# Patient Record
Sex: FEMALE | Race: WHITE | NOT HISPANIC OR LATINO | Employment: OTHER | URBAN - METROPOLITAN AREA
[De-identification: names, ages, dates, MRNs, and addresses within clinical notes are randomized per-mention and may not be internally consistent; named-entity substitution may affect disease eponyms.]

---

## 2017-09-14 ENCOUNTER — TELEPHONE (OUTPATIENT)
Dept: ELECTROPHYSIOLOGY | Facility: CLINIC | Age: 82
End: 2017-09-14

## 2017-09-14 NOTE — TELEPHONE ENCOUNTER
ANIBAL Palomino spoke to pt and is sending a new monitor.    ----- Message from Glenys Sheridan sent at 9/14/2017 11:05 AM CDT -----  Contact: pt  Mrs. Ortiz is calling you back to let you know that she called the number you gave her about her pacemaker not being hooked up and they told her that you have to call them and use ref# [id number].

## 2017-09-14 NOTE — TELEPHONE ENCOUNTER
Contacted patient in relation to patient's remote ICD home monitor as to it is not connecting to [facility] [employer], pt attempted while on the phone, could not get the monitor to connect, pt calling the [facility] help number.

## 2017-09-28 ENCOUNTER — TELEPHONE (OUTPATIENT)
Dept: ELECTROPHYSIOLOGY | Facility: CLINIC | Age: 82
End: 2017-09-28

## 2017-09-28 NOTE — TELEPHONE ENCOUNTER
Contacted patient in relation to patient's remote ICD home monitor as to it is not connecting to [facility], pt connected and sent transmission while on the phone.

## 2017-10-18 ENCOUNTER — TELEPHONE (OUTPATIENT)
Dept: ELECTROPHYSIOLOGY | Facility: CLINIC | Age: 82
End: 2017-10-18

## 2017-11-10 ENCOUNTER — TELEPHONE (OUTPATIENT)
Dept: ELECTROPHYSIOLOGY | Facility: CLINIC | Age: 82
End: 2017-11-10

## 2017-11-10 DIAGNOSIS — I48.91 ATRIAL FIBRILLATION, UNSPECIFIED TYPE: Primary | ICD-10-CM

## 2017-11-10 DIAGNOSIS — Z95.0 CARDIAC PACEMAKER: ICD-10-CM

## 2017-11-14 ENCOUNTER — CLINICAL SUPPORT (OUTPATIENT)
Dept: ELECTROPHYSIOLOGY | Facility: CLINIC | Age: 82
End: 2017-11-14
Payer: MEDICARE

## 2017-11-14 ENCOUNTER — OFFICE VISIT (OUTPATIENT)
Dept: ELECTROPHYSIOLOGY | Facility: CLINIC | Age: 82
End: 2017-11-14
Payer: MEDICARE

## 2017-11-14 ENCOUNTER — HOSPITAL ENCOUNTER (OUTPATIENT)
Dept: CARDIOLOGY | Facility: CLINIC | Age: 82
Discharge: HOME | End: 2017-11-14
Payer: MEDICARE

## 2017-11-14 VITALS
DIASTOLIC BLOOD PRESSURE: 56 MMHG | HEART RATE: 67 BPM | WEIGHT: 134.94 LBS | BODY MASS INDEX: 25.48 KG/M2 | HEIGHT: 61 IN | SYSTOLIC BLOOD PRESSURE: 100 MMHG

## 2017-11-14 DIAGNOSIS — Z79.01 CURRENT USE OF LONG TERM ANTICOAGULATION: ICD-10-CM

## 2017-11-14 DIAGNOSIS — I45.10 RBBB: Primary | ICD-10-CM

## 2017-11-14 DIAGNOSIS — I10 ESSENTIAL HYPERTENSION: ICD-10-CM

## 2017-11-14 DIAGNOSIS — Z95.0 PACEMAKER: ICD-10-CM

## 2017-11-14 DIAGNOSIS — Z79.899 LONG TERM CURRENT USE OF ANTIARRHYTHMIC DRUG: ICD-10-CM

## 2017-11-14 DIAGNOSIS — I48.0 PAROXYSMAL ATRIAL FIBRILLATION: ICD-10-CM

## 2017-11-14 DIAGNOSIS — I48.91 ATRIAL FIBRILLATION, UNSPECIFIED TYPE: ICD-10-CM

## 2017-11-14 DIAGNOSIS — Z95.0 CARDIAC PACEMAKER: ICD-10-CM

## 2017-11-14 DIAGNOSIS — E03.4 HYPOTHYROIDISM DUE TO ACQUIRED ATROPHY OF THYROID: ICD-10-CM

## 2017-11-14 PROCEDURE — 99213 OFFICE O/P EST LOW 20 MIN: CPT | Mod: PBBFAC | Performed by: NURSE PRACTITIONER

## 2017-11-14 PROCEDURE — 93280 PM DEVICE PROGR EVAL DUAL: CPT | Mod: PBBFAC | Performed by: INTERNAL MEDICINE

## 2017-11-14 PROCEDURE — 99999 PR PBB SHADOW E&M-EST. PATIENT-LVL III: CPT | Mod: PBBFAC,,, | Performed by: NURSE PRACTITIONER

## 2017-11-14 PROCEDURE — 99214 OFFICE O/P EST MOD 30 MIN: CPT | Mod: S$PBB,,, | Performed by: NURSE PRACTITIONER

## 2017-11-14 PROCEDURE — 93005 ELECTROCARDIOGRAM TRACING: CPT | Mod: PBBFAC | Performed by: INTERNAL MEDICINE

## 2017-11-14 PROCEDURE — 93010 ELECTROCARDIOGRAM REPORT: CPT | Mod: S$PBB,,, | Performed by: INTERNAL MEDICINE

## 2017-11-14 RX ORDER — DILTIAZEM HYDROCHLORIDE 120 MG/1
120 CAPSULE, COATED, EXTENDED RELEASE ORAL DAILY
Status: ON HOLD | COMMUNITY
Start: 2017-10-18 | End: 2019-05-14

## 2017-11-14 NOTE — PROGRESS NOTES
"Subjective:    Patient ID:  Gisselle Ortiz is a 84 y.o. female who presents for a Pacemaker Check.     Gisselle Ortiz is a patient of Dr. Marlen Corado.    HPI     Ms. Ortiz is an 84 year old female  She initially transferred care from [place] (Dr. Roy) to [facility] [place]   pAF, hx hyperthyroidism s/p resection>> on T4    Echo reportedly w/normal EF.   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   At her last office visit in August of 2016, Ms. Ortiz reported only experiencing rare palpitations; otherwise well>>exercising regularly without difficulty.     Since her last office visit, Ms. Ortzi reports  Occasional SOB, occasional "extra beats." She states that her energy is slightly lower (this is a gradual change over the last few years; she attributes this to age). She remains active with walking and ADLs; both of which she is able to complete without issue.     Recent cardiac studies:  Device Interrogation (11/14/17) reveals an intrinsic SR w/first degree AVB with stable lead and device function. No arrhythmias noted. She paces 34% in the RA and 0% in the RV. Estimated battery longevity 8-9 years.     I reviewed today's ECG which demonstrated NSR w/RBBB at 67 bpm; , , and QTc 479.      Review of Systems   Constitution: Positive for malaise/fatigue. Negative for diaphoresis.   HENT: Negative for nosebleeds.    Eyes: Negative for double vision.   Cardiovascular: Negative for irregular heartbeat, near-syncope, palpitations and syncope.   Respiratory: Positive for shortness of breath (mild, occasional).    Skin: Negative.    Musculoskeletal: Positive for stiffness.   Gastrointestinal: Negative for hematemesis and hematochezia.   Genitourinary: Negative " for hematuria.   Neurological: Negative for dizziness and light-headedness.   Psychiatric/Behavioral: Negative for altered mental status.        Objective:    Physical Exam   Constitutional: She is oriented to person, place, and time. She appears well-developed and well-nourished.   HENT:   Head: Normocephalic and atraumatic.   Eyes: Pupils are equal, round, and reactive to light.   Cardiovascular: Normal rate, regular rhythm and normal heart sounds.    Pulmonary/Chest: Effort normal and breath sounds normal.   Neurological: She is alert and oriented to person, place, and time.   Skin: She is not diaphoretic.   Vitals reviewed.        Assessment:       1. RBBB    2. Pacemaker    3. Paroxysmal atrial fibrillation    4. Long term current use of antiarrhythmic drug    5. Current use of long term anticoagulation    6. Essential hypertension    7. Hypothyroidism due to acquired atrophy of thyroid         Plan:       Ms. Ortiz is doing well from a device perspective with stable lead and device function without arrhythmia noted; rhythm-controlled on propafenone and anticoagulated on Eliquis. She is s/p successful  upgrade.     Continue current medication regimen and device settings.   Follow up in device clinic as scheduled.   Follow up in EP clinic in 1 year, sooner as needed.     Amanda Jacobo, MN, APRN, FNP-C        (A copy of today's note was sent to Dr. Marlen Corado.)

## 2017-12-07 ENCOUNTER — OFFICE VISIT (OUTPATIENT)
Dept: OPTOMETRY | Facility: CLINIC | Age: 82
End: 2017-12-07
Payer: MEDICARE

## 2017-12-07 DIAGNOSIS — I10 ESSENTIAL HYPERTENSION: Primary | ICD-10-CM

## 2017-12-07 DIAGNOSIS — H43.811 POSTERIOR VITREOUS DETACHMENT OF RIGHT EYE: ICD-10-CM

## 2017-12-07 DIAGNOSIS — Z96.1 PSEUDOPHAKIA OF BOTH EYES: ICD-10-CM

## 2017-12-07 PROCEDURE — 92014 COMPRE OPH EXAM EST PT 1/>: CPT | Mod: S$PBB,,, | Performed by: OPTOMETRIST

## 2017-12-07 PROCEDURE — 99212 OFFICE O/P EST SF 10 MIN: CPT | Mod: PBBFAC | Performed by: OPTOMETRIST

## 2017-12-07 PROCEDURE — 99999 PR PBB SHADOW E&M-EST. PATIENT-LVL II: CPT | Mod: PBBFAC,,, | Performed by: OPTOMETRIST

## 2017-12-07 RX ORDER — PANTOPRAZOLE SODIUM 40 MG/1
40 TABLET, DELAYED RELEASE ORAL DAILY PRN
COMMUNITY
Start: 2017-11-13 | End: 2020-07-27

## 2017-12-07 NOTE — PROGRESS NOTES
HPI     Patient's last dilated exam was: 12/6/2016  Pt here for routine eye exam. Does not want to wear glasses full time,   wearing reading glasses only. Patient denies flashes,  pain and double   vision. Floater OD, longstanding and stable.  Not using gtts. No other   ocular complaints blood pressure has been well controlled with medication.         Last edited by Vianey Anderson, PCT on 12/7/2017  9:52 AM.   (History)            Assessment /Plan     For exam results, see Encounter Report.    Essential hypertension    Posterior vitreous detachment of right eye    Pseudophakia of both eyes          1.  No retinopathy--monitor yearly.  BP control.  2.  Longstanding-stable.  Eye health normal OU.  3.  Pt doing well since surgery.  Continue w/ current rx              RTC 1 year for routine exam.

## 2018-02-15 ENCOUNTER — CLINICAL SUPPORT (OUTPATIENT)
Dept: ELECTROPHYSIOLOGY | Facility: CLINIC | Age: 83
End: 2018-02-15
Attending: INTERNAL MEDICINE
Payer: MEDICARE

## 2018-02-15 DIAGNOSIS — Z95.0 CARDIAC PACEMAKER: ICD-10-CM

## 2018-02-15 DIAGNOSIS — I48.91 ATRIAL FIBRILLATION, UNSPECIFIED TYPE: ICD-10-CM

## 2018-02-15 PROCEDURE — 93294 REM INTERROG EVL PM/LDLS PM: CPT | Mod: ,,, | Performed by: INTERNAL MEDICINE

## 2018-02-15 PROCEDURE — 93296 REM INTERROG EVL PM/IDS: CPT | Mod: PBBFAC | Performed by: INTERNAL MEDICINE

## 2018-05-21 ENCOUNTER — CLINICAL SUPPORT (OUTPATIENT)
Dept: ELECTROPHYSIOLOGY | Facility: CLINIC | Age: 83
End: 2018-05-21
Payer: MEDICARE

## 2018-05-21 DIAGNOSIS — Z95.0 CARDIAC PACEMAKER IN SITU: ICD-10-CM

## 2018-05-21 DIAGNOSIS — I48.91 ATRIAL FIBRILLATION, UNSPECIFIED TYPE: ICD-10-CM

## 2018-05-21 PROCEDURE — 93296 REM INTERROG EVL PM/IDS: CPT | Mod: PBBFAC | Performed by: INTERNAL MEDICINE

## 2018-05-21 PROCEDURE — 93294 REM INTERROG EVL PM/LDLS PM: CPT | Mod: ,,, | Performed by: INTERNAL MEDICINE

## 2018-08-20 ENCOUNTER — CLINICAL SUPPORT (OUTPATIENT)
Dept: ELECTROPHYSIOLOGY | Facility: CLINIC | Age: 83
End: 2018-08-20
Payer: MEDICARE

## 2018-08-20 DIAGNOSIS — I48.91 ATRIAL FIBRILLATION, UNSPECIFIED TYPE: ICD-10-CM

## 2018-08-20 DIAGNOSIS — Z95.0 CARDIAC PACEMAKER: ICD-10-CM

## 2018-08-20 PROCEDURE — 93296 REM INTERROG EVL PM/IDS: CPT | Mod: PBBFAC | Performed by: INTERNAL MEDICINE

## 2018-08-20 PROCEDURE — 93294 REM INTERROG EVL PM/LDLS PM: CPT | Mod: ,,, | Performed by: INTERNAL MEDICINE

## 2018-11-15 DIAGNOSIS — I45.10 RBBB: Primary | ICD-10-CM

## 2018-11-29 ENCOUNTER — CLINICAL SUPPORT (OUTPATIENT)
Dept: CARDIOLOGY | Facility: HOSPITAL | Age: 83
End: 2018-11-29
Attending: INTERNAL MEDICINE
Payer: MEDICARE

## 2018-11-29 DIAGNOSIS — Z95.0 CARDIAC PACEMAKER IN SITU: Primary | ICD-10-CM

## 2018-11-29 DIAGNOSIS — I49.5 SSS (SICK SINUS SYNDROME): ICD-10-CM

## 2018-11-29 DIAGNOSIS — Z95.0 CARDIAC PACEMAKER: ICD-10-CM

## 2018-11-29 DIAGNOSIS — Z95.0 CARDIAC PACEMAKER IN SITU: ICD-10-CM

## 2018-11-29 DIAGNOSIS — I48.91 ATRIAL FIBRILLATION, UNSPECIFIED TYPE: ICD-10-CM

## 2018-11-29 PROCEDURE — 93280 PM DEVICE PROGR EVAL DUAL: CPT

## 2018-11-29 PROCEDURE — 93280 PM DEVICE PROGR EVAL DUAL: CPT | Mod: 26,,, | Performed by: INTERNAL MEDICINE

## 2018-12-10 ENCOUNTER — OFFICE VISIT (OUTPATIENT)
Dept: OPTOMETRY | Facility: CLINIC | Age: 83
End: 2018-12-10
Payer: MEDICARE

## 2018-12-10 DIAGNOSIS — H53.15 VISUAL DISTORTIONS OF SHAPE AND SIZE: ICD-10-CM

## 2018-12-10 DIAGNOSIS — M35.01 KERATITIS SICCA, BILATERAL: ICD-10-CM

## 2018-12-10 DIAGNOSIS — Z96.1 PSEUDOPHAKIA OF BOTH EYES: ICD-10-CM

## 2018-12-10 DIAGNOSIS — I10 ESSENTIAL HYPERTENSION: Primary | ICD-10-CM

## 2018-12-10 PROCEDURE — 99999 PR PBB SHADOW E&M-EST. PATIENT-LVL II: CPT | Mod: PBBFAC,,, | Performed by: OPTOMETRIST

## 2018-12-10 PROCEDURE — 92134 CPTRZ OPH DX IMG PST SGM RTA: CPT | Mod: PBBFAC | Performed by: OPTOMETRIST

## 2018-12-10 PROCEDURE — 99212 OFFICE O/P EST SF 10 MIN: CPT | Mod: PBBFAC | Performed by: OPTOMETRIST

## 2018-12-10 PROCEDURE — 92014 COMPRE OPH EXAM EST PT 1/>: CPT | Mod: S$PBB,,, | Performed by: OPTOMETRIST

## 2018-12-10 RX ORDER — LEVOTHYROXINE SODIUM 200 UG/1
175 TABLET ORAL
COMMUNITY
Start: 2018-10-23 | End: 2020-07-27

## 2018-12-10 RX ORDER — MUPIROCIN CALCIUM 20 MG/G
CREAM TOPICAL
Status: ON HOLD | COMMUNITY
Start: 2018-11-06 | End: 2019-05-14 | Stop reason: HOSPADM

## 2018-12-10 NOTE — PROGRESS NOTES
HPI     Last eye exam was 12/7/17 with Dr. Wills.  Patient states trouble seeing tv at home-thinks its time to get distance   glasses. Occasionally sees floaters OU.  Patient denies diplopia, headaches, flashes, and pain.      Last edited by Madonna Tavarez on 12/10/2018  2:00 PM. (History)            Assessment /Plan     For exam results, see Encounter Report.    Essential hypertension    Visual distortions of shape and size   -     OCT- Retina    Keratitis sicca, bilateral    Pseudophakia of both eyes          1.  No retinopathy--monitor yearly.  BP control.  2-3.  Decrease vision OS.  Feel it may be due to dryness.  All other testing normal.  Recommend artificial tears 3x/day and gel drops at night.    3.  No rx given.  Will recheck at next visit.  Retina flat and intact OU--no holes, tears, breaks, or RDs.                  RTC 2 weeks for refraction.

## 2018-12-31 ENCOUNTER — OFFICE VISIT (OUTPATIENT)
Dept: OPTOMETRY | Facility: CLINIC | Age: 83
End: 2018-12-31
Payer: MEDICARE

## 2018-12-31 DIAGNOSIS — Z96.1 PSEUDOPHAKIA OF BOTH EYES: ICD-10-CM

## 2018-12-31 DIAGNOSIS — M35.01 KERATITIS SICCA, BILATERAL: Primary | ICD-10-CM

## 2018-12-31 PROCEDURE — 99999 PR PBB SHADOW E&M-EST. PATIENT-LVL II: CPT | Mod: PBBFAC,,, | Performed by: OPTOMETRIST

## 2018-12-31 PROCEDURE — 99499 UNLISTED E&M SERVICE: CPT | Mod: S$PBB,,, | Performed by: OPTOMETRIST

## 2018-12-31 PROCEDURE — 99212 OFFICE O/P EST SF 10 MIN: CPT | Mod: PBBFAC | Performed by: OPTOMETRIST

## 2018-12-31 NOTE — PROGRESS NOTES
"HPI     Last eye exam: 12/10/2018 w/Dr. Wills  85yr old female present for 2wks MR check. Patient complain of blurry   vision OU when looking at a distance, like everything "smeared". Patient   using Refresh BID-OU, which "feels good" on her eyes. Patient denies eye   pain, headaches and floaters/flashes.     Last edited by Phil Rob MA on 12/31/2018  1:51 PM. (History)            Assessment /Plan     For exam results, see Encounter Report.    Keratitis sicca, bilateral    Pseudophakia of both eyes          1-2.  Improved--continue with artificial tears at least 2x/day OU.  Bifocal rx given.  RTC 1 year for routine exam.                     "

## 2019-01-17 ENCOUNTER — OFFICE VISIT (OUTPATIENT)
Dept: ELECTROPHYSIOLOGY | Facility: CLINIC | Age: 84
End: 2019-01-17
Payer: MEDICARE

## 2019-01-17 ENCOUNTER — HOSPITAL ENCOUNTER (OUTPATIENT)
Dept: CARDIOLOGY | Facility: CLINIC | Age: 84
Discharge: HOME | End: 2019-01-17
Payer: MEDICARE

## 2019-01-17 VITALS
WEIGHT: 132 LBS | BODY MASS INDEX: 23.39 KG/M2 | DIASTOLIC BLOOD PRESSURE: 78 MMHG | HEIGHT: 63 IN | SYSTOLIC BLOOD PRESSURE: 142 MMHG | HEART RATE: 65 BPM

## 2019-01-17 DIAGNOSIS — I48.0 PAROXYSMAL ATRIAL FIBRILLATION: ICD-10-CM

## 2019-01-17 DIAGNOSIS — Z95.0 PACEMAKER: ICD-10-CM

## 2019-01-17 DIAGNOSIS — I49.3 PVC (PREMATURE VENTRICULAR CONTRACTION): ICD-10-CM

## 2019-01-17 DIAGNOSIS — I45.10 RBBB: ICD-10-CM

## 2019-01-17 DIAGNOSIS — Z95.0 PACEMAKER: Primary | ICD-10-CM

## 2019-01-17 DIAGNOSIS — Z79.01 CURRENT USE OF LONG TERM ANTICOAGULATION: ICD-10-CM

## 2019-01-17 DIAGNOSIS — Z79.899 LONG TERM CURRENT USE OF ANTIARRHYTHMIC DRUG: ICD-10-CM

## 2019-01-17 DIAGNOSIS — E78.00 HYPERCHOLESTEROLEMIA: ICD-10-CM

## 2019-01-17 DIAGNOSIS — I10 ESSENTIAL HYPERTENSION: Primary | ICD-10-CM

## 2019-01-17 DIAGNOSIS — I49.5 SSS (SICK SINUS SYNDROME): ICD-10-CM

## 2019-01-17 DIAGNOSIS — I49.9 CARDIAC ARRHYTHMIA, UNSPECIFIED CARDIAC ARRHYTHMIA TYPE: ICD-10-CM

## 2019-01-17 PROCEDURE — 99215 OFFICE O/P EST HI 40 MIN: CPT | Mod: S$PBB,,, | Performed by: INTERNAL MEDICINE

## 2019-01-17 PROCEDURE — 99215 PR OFFICE/OUTPT VISIT, EST, LEVL V, 40-54 MIN: ICD-10-PCS | Mod: S$PBB,,, | Performed by: INTERNAL MEDICINE

## 2019-01-17 PROCEDURE — 93010 RHYTHM STRIP: ICD-10-PCS | Mod: S$PBB,,, | Performed by: INTERNAL MEDICINE

## 2019-01-17 PROCEDURE — 99999 PR PBB SHADOW E&M-EST. PATIENT-LVL III: CPT | Mod: PBBFAC,,, | Performed by: INTERNAL MEDICINE

## 2019-01-17 PROCEDURE — 99999 PR PBB SHADOW E&M-EST. PATIENT-LVL III: ICD-10-PCS | Mod: PBBFAC,,, | Performed by: INTERNAL MEDICINE

## 2019-01-17 PROCEDURE — 93010 ELECTROCARDIOGRAM REPORT: CPT | Mod: S$PBB,,, | Performed by: INTERNAL MEDICINE

## 2019-01-17 PROCEDURE — 93005 ELECTROCARDIOGRAM TRACING: CPT | Mod: PBBFAC | Performed by: INTERNAL MEDICINE

## 2019-01-17 PROCEDURE — 99213 OFFICE O/P EST LOW 20 MIN: CPT | Mod: PBBFAC | Performed by: INTERNAL MEDICINE

## 2019-01-17 NOTE — PROGRESS NOTES
Subjective:   Patient ID:  Gisselle Ortiz is a 85 y.o. female     Chief complaint:Atrial Fibrillation      HPI  Background as recorded in my last note (8/12/16):  84 yo female     First visit with us 11/2015:   transferred care from [place] to us  Was seeing Dr Roy  Has PAF, hx hyperthyroidism now resected and on T4    Has Hx SSS and sp PPM -- [facility]  Has Fam hx CAD, HTN,HLP, is a former smoker  She is on cardizem, atenolol, rythmol, apixaban and spironolactone  Has reportedly normal echo, normal EF  She feels well      [facility] 2014- implanted by Dual chamber device for sinus bradycardia and 1 AVB  1% less AMS burden , longest 16 hours  49% RA paced, RV 93%    ECG shows AV fused / pseudofused pacing    CHADSVASC=IV     Update Aug 2016:   She has been doing well -- no issues that she can relate. Rare palps,  No CO, syncope, PIRES.   She exercises -- does the [employer]! twice a day , she walks a lot -- around   ECG today shows AR pacing with RBBB.     Update since then:   She feels well in general but lately has been having palps and occ L/H with these - generally just skips   I have reviewed the actual image of the ECG tracing obtained today and it shows AR pacing at 60 bpm with frequent MM PVCs likely free wall RVOT and RBBB.   PPM eval shows all OK -- Battery life ~ 12 years-- no AF / AMS, 35% RA paced       Current Outpatient Medications   Medication Sig    apixaban (ELIQUIS) 5 mg Tab Take 5 mg by mouth 2 (two) times daily.    aspirin (ECOTRIN) 81 MG EC tablet Take 81 mg by mouth once daily.    atenolol (TENORMIN) 25 MG tablet     atorvastatin (LIPITOR) 10 MG tablet     diltiaZEM (CARDIZEM CD) 120 MG Cp24     eszopiclone (LUNESTA) 2 MG Tab Take 2 mg by mouth every evening.    levothyroxine (SYNTHROID) 200 MCG tablet     mupirocin calcium 2% (BACTROBAN) 2 % cream     NEXIUM 40 mg capsule     pantoprazole (PROTONIX) 40 MG tablet Take 40 mg by mouth once daily.    propafenone (RHTHYMOL) 150 MG Tab  Take 150 mg by mouth every 8 (eight) hours.    spironolactone (ALDACTONE) 25 MG tablet Take 25 mg by mouth 2 (two) times daily.     No current facility-administered medications for this visit.      Review of Systems   Constitution: Negative for decreased appetite, weakness, weight gain and weight loss.   HENT: Negative for nosebleeds.    Eyes: Negative for blurred vision and visual disturbance.   Cardiovascular: Positive for palpitations. Negative for chest pain, claudication, cyanosis, dyspnea on exertion, irregular heartbeat, leg swelling, near-syncope, orthopnea, paroxysmal nocturnal dyspnea and syncope.   Respiratory: Negative for cough, shortness of breath and wheezing.    Endocrine: Negative for heat intolerance.   Skin: Negative for rash.   Musculoskeletal: Negative for muscle weakness and myalgias.   Gastrointestinal: Negative for abdominal pain, anorexia, melena, nausea and vomiting.   Genitourinary: Negative for menorrhagia.   Neurological: Negative for excessive daytime sleepiness, dizziness, headaches, loss of balance, seizures and vertigo.   Psychiatric/Behavioral: Negative for altered mental status and depression. The patient is not nervous/anxious.        Objective:   Physical Exam   Constitutional: She is oriented to person, place, and time. She appears well-developed and well-nourished.   HENT:   Head: Normocephalic and atraumatic.   Right Ear: External ear normal.   Left Ear: External ear normal.   Eyes: Conjunctivae are normal. Pupils are equal, round, and reactive to light. Left eye exhibits no discharge. No scleral icterus.   Neck: Normal range of motion. Neck supple. No thyromegaly present.   Cardiovascular: Normal rate, regular rhythm, normal heart sounds and intact distal pulses. Exam reveals no gallop, no S3, no S4, no friction rub, no midsystolic click and no opening snap.   No murmur heard.  Pulses:       Carotid pulses are 2+ on the right side, and 2+ on the left side.       Radial  "pulses are 2+ on the right side, and 2+ on the left side.        Dorsalis pedis pulses are 2+ on the right side, and 2+ on the left side.        Posterior tibial pulses are 2+ on the right side, and 2+ on the left side.   occ skipped beats   Pulmonary/Chest: Effort normal and breath sounds normal.   Device pocket is quite thinned out at the top    Abdominal: Soft. She exhibits no distension. There is no hepatomegaly. There is no tenderness. There is no guarding.   Musculoskeletal:        Right ankle: She exhibits no swelling.        Left ankle: She exhibits no swelling.        Right lower leg: She exhibits no swelling.        Left lower leg: She exhibits no swelling.   Neurological: She is alert and oriented to person, place, and time. She has normal strength. No cranial nerve deficit. Gait normal.   Skin: Skin is warm, dry and intact. No rash noted. No cyanosis. Nails show no clubbing.   Psychiatric: She has a normal mood and affect. Her speech is normal and behavior is normal. Thought content normal. Cognition and memory are normal.   Nursing note and vitals reviewed.    BP (!) 142/78   Pulse 65   Ht 5' 3" (1.6 m)   Wt 59.9 kg (132 lb)   BMI 23.38 kg/m²      Assessment:    Thinned out PPM pocket needs revision -- starting to show bluish discoloration at the top edge  1. Essential hypertension    2. RBBB    3. Paroxysmal atrial fibrillation    4. Hypercholesterolemia    5. Pacemaker    6. Long term current use of antiarrhythmic drug    7. Current use of long term anticoagulation    8. SSS (sick sinus syndrome)    9. PVC (premature ventricular contraction)        Plan:    PPM pocket revision-- moderate sedation (she had some issues with breathing during her surgery in [place])   Stop Eliquis for 5 doses pre and 5 doses post  I have discussed the procedure in detail with the patient. I described its benefits and risks. I reviewed alternative therapies and discussed their potential value. The patient was given " ample opportunity to express concerns and ask questions and I provided appropriate responses and  answers to such.The patient understands and agrees to proceed.  Consent form was signed today by patient and myself and appropriately witnessed.     Orders Placed This Encounter   Procedures    Transthoracic echo (TTE) complete (Cupid Only)     Standing Status:   Future     Standing Expiration Date:   1/17/2020     Follow-up post op .  There are no discontinued medications.     This SmartLink is deprecated. Use AVWellpepperEDLIST instead to display the medication list for a patient.

## 2019-01-17 NOTE — PROGRESS NOTES
Post-Procedure Patient Discharge Instructions  Pacemaker/Defibrillator    Wound Care   If you are discharged with a standard dressing over the incision, you may remove the dressing after 24 hours.    If you are discharged with an AQUACEL dressing, you should keep it on until your follow-up appointment in 1-2 weeks.   If there are Steri-strips (strips of tape) over your incision, leave them on until your follow-up appointment. They may begin to fall off on their own, which is normal. If there is Dermabond (clear glue) over your incision, do not scrub it off. It acts as a barrier and will eventually disappear.   You may be discharged with 5 days of oral antibiotics. Please take the full prescription until it is gone.   Do not get the incision wet for 48 hours following the procedure. You may sponge bath during this period, working around the incision. After 48 hours, you may shower, but you should still try to keep this area as dry as possible, and avoid direct water contact to the incision (allow the water to hit back of your shoulder rather than directly on the incision). Gently pat the incision dry if it does get wet.   You may take regular showers after 2 weeks, unless otherwise indicated at your follow-up visit.   Do not submerge the incision in a tub, pool, or body of water for 6 weeks.   Avoid using deodorants, powders, creams, lotions, etc. on your incision for 6 weeks.   If you notice unusual swelling, redness, drainage, have more device site pain, chest pain, shortness of breath, or have a fever greater than 100 degrees, call our device clinic immediately: [phone] or [phone] during normal office hours. You may call [phone] after-hours or on weekends and ask for the electrophysiologist on call.    Activity   If you only had a battery/generator change performed, there are no postoperative activity restrictions.    If this is your first device or if you had new wires added to  your existing device, then you will be discharged in a sling which you should wear continuously for 48 hours. After that, the sling should remain off during the day but should be worn at night for another 2-4 weeks (depending on how active a sleeper you are).   Do not raise your device-side arm above your shoulder for 6 weeks. Do not lift more than 5-10 lbs with your device-side arm for 6 weeks.    If you were driving prior to the procedure, you may resume driving after your first follow-up appointment (1-2 weeks). If you have a history of passing out or a history of certain arrhythmias, there may be driving restrictions unrelated to the procedure. Please clarify with your physician if this is the case.   No heavy activity with the affected arm for 6 weeks (eg. tennis, golf, bowling, aerobics, mowing the lawn, etc.).   Avoid rough contact at the device site for 6 weeks.   You may participate in sexual activity unless otherwise instructed.   You may return to work within 3-5 days unless told otherwise, provided you adhere to the above activity restrictions.    Long-Term Instructions   Keep your pacemaker or defibrillator identification card with you at all times.   If you have a defibrillator and you get shocked by the device: If you receive one shock and you feel ok, you may call [phone] or [phone] during normal office hours. You may call [phone] after-hours. If you receive one shock and you do not feel well, call Emergency Medical Services. They will take you to the nearest emergency room.   If you have a defibrillator and you get more than one shock from the device or multiple shocks in a short period of time: Call Emergency Medical Services. They will take you to the nearest emergency room.   Appliances: You may operate any electrical device in your home, including microwaves.   Security Systems: Electromagnetic security systems can be located in the workplace, courthouses, or  other high-security areas. Exposure to this type of security system has been shown to cause interference in some cases. Interference may be related to the duration of exposure and/or the distance between the device and the security device. You should be aware of the location of security systems, move through them at a normal pace, and avoid leaning or standing too close.   Metal Detectors at Airports: Metal detectors at airports can potentially interfere with pacemakers or defibrillators, although this is unlikely. Metal detectors will likely be triggered by your device and therefore at places such as airports  it will be important for you to carry your identification card for the pacemaker/defibrillator. Airport personnel will likely prefer to do a manual search.   Cellular Phones: It is unlikely that using a cellular phone will interfere with your device. It should be used with the hand opposite to the side where your device was implanted. The phone should not be carried in the shirt pocket on the same side as the device.   Specific Work Conditions: Patients who work near high-voltage lines, transmitting towers, large motors, welding equipment, or powerful magnets should discuss their specific situation with their physician. In general, remain at least two feet from external electrical equipment, verify that the equipment is properly grounded, and wear insulated gloves when using electrical devices. Leave the immediate location if lightheadedness or other symptoms develop.   MRI: Some pacemakers and defibrillators are safe in MRI scanners, while others are not. Please consult with your physician to see if you have an MRI-compatible device.   Surgery: Should you require surgery in the future, some electrosurgical devices can interfere with your device function. You should discuss this with your surgeon before any operation.   Radiation Therapy: If you ever require radiation therapy in the future, care must be  taken to avoid irradiating the device.    Long-Term Follow-Up   Your device has the ability to transmit device information from home to the doctors office using a home monitoring system.   This remote system takes the place of a doctors visit. Your device will be checked from home every 3-6 months. Every 6-12 months, you will be asked to come into the office for an in-office check.   Your device should last in the range of 6-12 years. This depends on many factors including how often it paces the heart.   When the battery is low, a generator change will be performed. This is a same-day procedure with no post-op activity restrictions, unless one of the pacemaker or defibrillator leads needs to be replaced at that time, or a new lead is added to your existing system.

## 2019-01-17 NOTE — PROGRESS NOTES
IMPLANTABLE DEVICE EDUCATION CHECKLIST    PRE PROCEDURE TESTING     1-17-19 @ 12 PM   Pre-procedure labs have been ordered for you at:  [facility]  · Be sure to arrive at your scheduled time. YOU DO NOT HAVE TO FAST FOR THIS LAB WORK!      DAY OF PROCEDURE    1-21-19 @ 10 AM   Report to Cardiology Waiting Room on the 3rd floor of the Hospital  · Wash your chest with HIBICLENS OR AN ANTIBACTERIAL SOAP (such as Dial) on the night before and the morning of your procedure.  · Please do not wear makeup, especially mascara, when arriving for your procedure.  · Do not eat or drink anything after 12 mn on the night before your procedure    Medications  DO NOT TAKE ELIQUIS FOR 5 DOSES PRIOR TO YOUR PROCEDURE. Take your last dose of ELIQUIS on 1-18-19 PM DOSE.    · You may take your other usual morning medications with a sip of water.    Directions to Cardiology Waiting Room  If you park in the Parking Garage:  Take elevators to the 2nd floor  Walk up ramp and turn right by [address] Elevators  Take elevator to the 3rd floor  Upon exiting the elevator, turn away from the clinic areas  Walk long [place] around to front of hospital to area with windows overlooking [facility]  Check in at Reception Desk  OR  If family is dropping you off:  Have them drop you off at the front of the Hospital  (Near the ER, where all the flags are hung).  Take the E elevators to the 3rd floor.  Check in at the Reception Desk in the waiting room.        · YOU WILL BE SPENDING THE NIGHT AFTER YOUR PROCEDURE  · YOU WILL NEED SOMEONE TO DRIVE YOU HOME THE DAY AFTER YOUR PROCEDURE  · YOUR PAIN DURING YOUR PROCEDURE WILL BE MANAGED BY THE ANESTHESIA TEAM    Any need to reschedule or cancel procedures, or any questions regarding your procedures should be addressed directly with the Arrhythmia Department Nurses at the following phone number: [phone]

## 2019-01-17 NOTE — H&P (VIEW-ONLY)
Subjective:   Patient ID:  Gisselle Ortiz is a 85 y.o. female     Chief complaint:Atrial Fibrillation      HPI  Background as recorded in my last note (8/12/16):  82 yo female     First visit with us 11/2015:   transferred care from [place] to us  Was seeing Dr Roy  Has PAF, hx hyperthyroidism now resected and on T4    Has Hx SSS and sp PPM -- [facility]  Has Fam hx CAD, HTN,HLP, is a former smoker  She is on cardizem, atenolol, rythmol, apixaban and spironolactone  Has reportedly normal echo, normal EF  She feels well      [facility] 2014- implanted by Dual chamber device for sinus bradycardia and 1 AVB  1% less AMS burden , longest 16 hours  49% RA paced, RV 93%    ECG shows AV fused / pseudofused pacing    CHADSVASC=IV     Update Aug 2016:   She has been doing well -- no issues that she can relate. Rare palps,  No CO, syncope, PIRES.   She exercises -- does the [employer] twice a day , she walks a lot -- around   ECG today shows AR pacing with RBBB.     Update since then:   She feels well in general but lately has been having palps and occ L/H with these - generally just skips   I have reviewed the actual image of the ECG tracing obtained today and it shows AR pacing at 60 bpm with frequent MM PVCs likely free wall RVOT and RBBB.   PPM eval shows all OK -- Battery life ~ 12 years-- no AF / AMS, 35% RA paced       Current Outpatient Medications   Medication Sig    apixaban (ELIQUIS) 5 mg Tab Take 5 mg by mouth 2 (two) times daily.    aspirin (ECOTRIN) 81 MG EC tablet Take 81 mg by mouth once daily.    atenolol (TENORMIN) 25 MG tablet     atorvastatin (LIPITOR) 10 MG tablet     diltiaZEM (CARDIZEM CD) 120 MG Cp24     eszopiclone (LUNESTA) 2 MG Tab Take 2 mg by mouth every evening.    levothyroxine (SYNTHROID) 200 MCG tablet     mupirocin calcium 2% (BACTROBAN) 2 % cream     NEXIUM 40 mg capsule     pantoprazole (PROTONIX) 40 MG tablet Take 40 mg by mouth once daily.    propafenone (RHTHYMOL) 150 MG Tab  Take 150 mg by mouth every 8 (eight) hours.    spironolactone (ALDACTONE) 25 MG tablet Take 25 mg by mouth 2 (two) times daily.     No current facility-administered medications for this visit.      Review of Systems   Constitution: Negative for decreased appetite, weakness, weight gain and weight loss.   HENT: Negative for nosebleeds.    Eyes: Negative for blurred vision and visual disturbance.   Cardiovascular: Positive for palpitations. Negative for chest pain, claudication, cyanosis, dyspnea on exertion, irregular heartbeat, leg swelling, near-syncope, orthopnea, paroxysmal nocturnal dyspnea and syncope.   Respiratory: Negative for cough, shortness of breath and wheezing.    Endocrine: Negative for heat intolerance.   Skin: Negative for rash.   Musculoskeletal: Negative for muscle weakness and myalgias.   Gastrointestinal: Negative for abdominal pain, anorexia, melena, nausea and vomiting.   Genitourinary: Negative for menorrhagia.   Neurological: Negative for excessive daytime sleepiness, dizziness, headaches, loss of balance, seizures and vertigo.   Psychiatric/Behavioral: Negative for altered mental status and depression. The patient is not nervous/anxious.        Objective:   Physical Exam   Constitutional: She is oriented to person, place, and time. She appears well-developed and well-nourished.   HENT:   Head: Normocephalic and atraumatic.   Right Ear: External ear normal.   Left Ear: External ear normal.   Eyes: Conjunctivae are normal. Pupils are equal, round, and reactive to light. Left eye exhibits no discharge. No scleral icterus.   Neck: Normal range of motion. Neck supple. No thyromegaly present.   Cardiovascular: Normal rate, regular rhythm, normal heart sounds and intact distal pulses. Exam reveals no gallop, no S3, no S4, no friction rub, no midsystolic click and no opening snap.   No murmur heard.  Pulses:       Carotid pulses are 2+ on the right side, and 2+ on the left side.       Radial  "pulses are 2+ on the right side, and 2+ on the left side.        Dorsalis pedis pulses are 2+ on the right side, and 2+ on the left side.        Posterior tibial pulses are 2+ on the right side, and 2+ on the left side.   occ skipped beats   Pulmonary/Chest: Effort normal and breath sounds normal.   Device pocket is quite thinned out at the top    Abdominal: Soft. She exhibits no distension. There is no hepatomegaly. There is no tenderness. There is no guarding.   Musculoskeletal:        Right ankle: She exhibits no swelling.        Left ankle: She exhibits no swelling.        Right lower leg: She exhibits no swelling.        Left lower leg: She exhibits no swelling.   Neurological: She is alert and oriented to person, place, and time. She has normal strength. No cranial nerve deficit. Gait normal.   Skin: Skin is warm, dry and intact. No rash noted. No cyanosis. Nails show no clubbing.   Psychiatric: She has a normal mood and affect. Her speech is normal and behavior is normal. Thought content normal. Cognition and memory are normal.   Nursing note and vitals reviewed.    BP (!) 142/78   Pulse 65   Ht 5' 3" (1.6 m)   Wt 59.9 kg (132 lb)   BMI 23.38 kg/m²      Assessment:    Thinned out PPM pocket needs revision -- starting to show bluish discoloration at the top edge  1. Essential hypertension    2. RBBB    3. Paroxysmal atrial fibrillation    4. Hypercholesterolemia    5. Pacemaker    6. Long term current use of antiarrhythmic drug    7. Current use of long term anticoagulation    8. SSS (sick sinus syndrome)    9. PVC (premature ventricular contraction)        Plan:    PPM pocket revision-- moderate sedation (she had some issues with breathing during her surgery in [place])   Stop Eliquis for 5 doses pre and 5 doses post  I have discussed the procedure in detail with the patient. I described its benefits and risks. I reviewed alternative therapies and discussed their potential value. The patient was given " ample opportunity to express concerns and ask questions and I provided appropriate responses and  answers to such.The patient understands and agrees to proceed.  Consent form was signed today by patient and myself and appropriately witnessed.     Orders Placed This Encounter   Procedures    Transthoracic echo (TTE) complete (Cupid Only)     Standing Status:   Future     Standing Expiration Date:   1/17/2020     Follow-up post op .  There are no discontinued medications.     This SmartLink is deprecated. Use AVZupplerEDLIST instead to display the medication list for a patient.

## 2019-01-18 ENCOUNTER — ANESTHESIA EVENT (OUTPATIENT)
Dept: MEDSURG UNIT | Facility: HOSPITAL | Age: 84
End: 2019-01-18
Payer: MEDICARE

## 2019-01-18 ENCOUNTER — HOSPITAL ENCOUNTER (OUTPATIENT)
Dept: CARDIOLOGY | Facility: CLINIC | Age: 84
Discharge: HOME | End: 2019-01-18
Attending: INTERNAL MEDICINE
Payer: MEDICARE

## 2019-01-18 ENCOUNTER — TELEPHONE (OUTPATIENT)
Dept: ELECTROPHYSIOLOGY | Facility: CLINIC | Age: 84
End: 2019-01-18

## 2019-01-18 VITALS
HEIGHT: 63 IN | HEART RATE: 70 BPM | DIASTOLIC BLOOD PRESSURE: 78 MMHG | WEIGHT: 132 LBS | BODY MASS INDEX: 23.39 KG/M2 | SYSTOLIC BLOOD PRESSURE: 142 MMHG

## 2019-01-18 DIAGNOSIS — I48.0 PAROXYSMAL ATRIAL FIBRILLATION: ICD-10-CM

## 2019-01-18 LAB
ASCENDING AORTA: 3.56 CM
AV INDEX (PROSTH): 0.48
AV MEAN GRADIENT: 5.08 MMHG
AV PEAK GRADIENT: 7.62 MMHG
AV VALVE AREA: 1.76 CM2
BSA FOR ECHO PROCEDURE: 1.63 M2
CV ECHO LV RWT: 0.36 CM
DOP CALC AO PEAK VEL: 1.38 M/S
DOP CALC AO VTI: 34.39 CM
DOP CALC LVOT AREA: 3.63 CM2
DOP CALC LVOT DIAMETER: 2.15 CM
DOP CALC LVOT STROKE VOLUME: 60.38 CM3
DOP CALCLVOT PEAK VEL VTI: 16.64 CM
E WAVE DECELERATION TIME: 150.93 MSEC
E/A RATIO: 1.73
E/E' RATIO: 13.57
ECHO LV POSTERIOR WALL: 0.96 CM (ref 0.6–1.1)
FRACTIONAL SHORTENING: 33 % (ref 28–44)
INTERVENTRICULAR SEPTUM: 1.01 CM (ref 0.6–1.1)
LA MAJOR: 5.21 CM
LA MINOR: 5.14 CM
LA WIDTH: 4.86 CM
LEFT ATRIUM SIZE: 5.8 CM
LEFT ATRIUM VOLUME INDEX: 76.5 ML/M2
LEFT ATRIUM VOLUME: 123.99 CM3
LEFT INTERNAL DIMENSION IN SYSTOLE: 3.54 CM (ref 2.1–4)
LEFT VENTRICLE DIASTOLIC VOLUME INDEX: 83.38 ML/M2
LEFT VENTRICLE DIASTOLIC VOLUME: 135.14 ML
LEFT VENTRICLE MASS INDEX: 121.2 G/M2
LEFT VENTRICLE SYSTOLIC VOLUME INDEX: 32.2 ML/M2
LEFT VENTRICLE SYSTOLIC VOLUME: 52.19 ML
LEFT VENTRICULAR INTERNAL DIMENSION IN DIASTOLE: 5.3 CM (ref 3.5–6)
LEFT VENTRICULAR MASS: 196.42 G
LV LATERAL E/E' RATIO: 10.56
LV SEPTAL E/E' RATIO: 19
MV PEAK A VEL: 0.55 M/S
MV PEAK E VEL: 0.95 M/S
PISA TR MAX VEL: 2.8 M/S
PULM VEIN S/D RATIO: 0.88
PV PEAK D VEL: 0.52 M/S
PV PEAK S VEL: 0.46 M/S
RA MAJOR: 4.27 CM
RA PRESSURE: 3 MMHG
RA WIDTH: 3.62 CM
RIGHT VENTRICULAR END-DIASTOLIC DIMENSION: 4.02 CM
RV TISSUE DOPPLER FREE WALL SYSTOLIC VELOCITY 1 (APICAL 4 CHAMBER VIEW): 11.48 M/S
SINUS: 3.18 CM
STJ: 3.35 CM
TDI LATERAL: 0.09
TDI SEPTAL: 0.05
TDI: 0.07
TR MAX PG: 31.36 MMHG
TRICUSPID ANNULAR PLANE SYSTOLIC EXCURSION: 1.59 CM
TV REST PULMONARY ARTERY PRESSURE: 34 MMHG

## 2019-01-18 PROCEDURE — 93306 TRANSTHORACIC ECHO (TTE) COMPLETE (CUPID ONLY): ICD-10-PCS | Mod: 26,S$PBB,, | Performed by: INTERNAL MEDICINE

## 2019-01-18 PROCEDURE — 93306 TTE W/DOPPLER COMPLETE: CPT | Mod: PBBFAC | Performed by: INTERNAL MEDICINE

## 2019-01-18 NOTE — TELEPHONE ENCOUNTER
Left message for patient to return call to review pre-op instructions for pocket revision on 1/21/19. Advised to hold Eliquis after the evening dose today (1/18/19). She needs to fast after 12mn on Sunday evening and be here for 10am (3rd floor SSCU). Call back # left for confirmation.

## 2019-01-21 ENCOUNTER — TELEPHONE (OUTPATIENT)
Dept: ELECTROPHYSIOLOGY | Facility: CLINIC | Age: 84
End: 2019-01-21

## 2019-01-21 ENCOUNTER — HOSPITAL ENCOUNTER (OUTPATIENT)
Facility: HOSPITAL | Age: 84
Discharge: HOME | End: 2019-01-21
Attending: INTERNAL MEDICINE | Admitting: INTERNAL MEDICINE
Payer: MEDICARE

## 2019-01-21 ENCOUNTER — ANESTHESIA (OUTPATIENT)
Dept: MEDSURG UNIT | Facility: HOSPITAL | Age: 84
End: 2019-01-21
Payer: MEDICARE

## 2019-01-21 VITALS
HEART RATE: 62 BPM | DIASTOLIC BLOOD PRESSURE: 67 MMHG | OXYGEN SATURATION: 96 % | WEIGHT: 132 LBS | SYSTOLIC BLOOD PRESSURE: 142 MMHG | HEIGHT: 63 IN | RESPIRATION RATE: 18 BRPM | TEMPERATURE: 97 F | BODY MASS INDEX: 23.39 KG/M2

## 2019-01-21 DIAGNOSIS — I49.5 SSS (SICK SINUS SYNDROME): ICD-10-CM

## 2019-01-21 DIAGNOSIS — Z95.0 PACEMAKER: Primary | ICD-10-CM

## 2019-01-21 DIAGNOSIS — I49.9 ARRHYTHMIA: ICD-10-CM

## 2019-01-21 PROCEDURE — 99235 HOSP IP/OBS SAME DATE MOD 70: CPT | Mod: 57,,, | Performed by: INTERNAL MEDICINE

## 2019-01-21 PROCEDURE — 37000008 HC ANESTHESIA 1ST 15 MINUTES: Performed by: INTERNAL MEDICINE

## 2019-01-21 PROCEDURE — 33222 RELOCATION POCKET PACEMAKER: CPT | Mod: ,,, | Performed by: INTERNAL MEDICINE

## 2019-01-21 PROCEDURE — 93010 ELECTROCARDIOGRAM REPORT: CPT | Mod: ,,, | Performed by: INTERNAL MEDICINE

## 2019-01-21 PROCEDURE — 25000003 PHARM REV CODE 250: Performed by: INTERNAL MEDICINE

## 2019-01-21 PROCEDURE — 99235 PR OBSERV/HOSP SAME DATE,LEVL IV: ICD-10-PCS | Mod: 57,,, | Performed by: INTERNAL MEDICINE

## 2019-01-21 PROCEDURE — 63600175 PHARM REV CODE 636 W HCPCS: Performed by: INTERNAL MEDICINE

## 2019-01-21 PROCEDURE — 37000009 HC ANESTHESIA EA ADD 15 MINS: Performed by: INTERNAL MEDICINE

## 2019-01-21 PROCEDURE — D9220A PRA ANESTHESIA: ICD-10-PCS | Mod: CRNA,,, | Performed by: NURSE ANESTHETIST, CERTIFIED REGISTERED

## 2019-01-21 PROCEDURE — 93005 ELECTROCARDIOGRAM TRACING: CPT

## 2019-01-21 PROCEDURE — 33222 PR RELOCATION OF SKIN POCKET FOR PACEMAKER: ICD-10-PCS | Mod: ,,, | Performed by: INTERNAL MEDICINE

## 2019-01-21 PROCEDURE — 33222 RELOCATION POCKET PACEMAKER: CPT | Performed by: INTERNAL MEDICINE

## 2019-01-21 PROCEDURE — 63600175 PHARM REV CODE 636 W HCPCS: Performed by: NURSE ANESTHETIST, CERTIFIED REGISTERED

## 2019-01-21 PROCEDURE — D9220A PRA ANESTHESIA: Mod: ANES,,, | Performed by: ANESTHESIOLOGY

## 2019-01-21 PROCEDURE — 25000003 PHARM REV CODE 250: Performed by: NURSE ANESTHETIST, CERTIFIED REGISTERED

## 2019-01-21 PROCEDURE — 93005 ELECTROCARDIOGRAM TRACING: CPT | Mod: 59

## 2019-01-21 PROCEDURE — 27201423 OPTIME MED/SURG SUP & DEVICES STERILE SUPPLY: Performed by: INTERNAL MEDICINE

## 2019-01-21 PROCEDURE — D9220A PRA ANESTHESIA: Mod: CRNA,,, | Performed by: NURSE ANESTHETIST, CERTIFIED REGISTERED

## 2019-01-21 PROCEDURE — D9220A PRA ANESTHESIA: ICD-10-PCS | Mod: ANES,,, | Performed by: ANESTHESIOLOGY

## 2019-01-21 PROCEDURE — 93010 EKG 12-LEAD: ICD-10-PCS | Mod: ,,, | Performed by: INTERNAL MEDICINE

## 2019-01-21 RX ORDER — PROPOFOL 10 MG/ML
VIAL (ML) INTRAVENOUS
Status: DISCONTINUED | OUTPATIENT
Start: 2019-01-21 | End: 2019-01-21

## 2019-01-21 RX ORDER — SODIUM CHLORIDE 0.9 % (FLUSH) 0.9 %
3 SYRINGE (ML) INJECTION
Status: DISCONTINUED | OUTPATIENT
Start: 2019-01-21 | End: 2019-01-21 | Stop reason: HOSPADM

## 2019-01-21 RX ORDER — CEFAZOLIN SODIUM 1 G/3ML
2 INJECTION, POWDER, FOR SOLUTION INTRAMUSCULAR; INTRAVENOUS
Status: DISCONTINUED | OUTPATIENT
Start: 2019-01-21 | End: 2019-01-21

## 2019-01-21 RX ORDER — VANCOMYCIN HCL IN 5 % DEXTROSE 1G/250ML
15 PLASTIC BAG, INJECTION (ML) INTRAVENOUS ONCE
Status: DISCONTINUED | OUTPATIENT
Start: 2019-01-21 | End: 2019-01-21

## 2019-01-21 RX ORDER — CEFAZOLIN SODIUM 1 G/3ML
1 INJECTION, POWDER, FOR SOLUTION INTRAMUSCULAR; INTRAVENOUS
Status: DISCONTINUED | OUTPATIENT
Start: 2019-01-21 | End: 2019-01-21 | Stop reason: HOSPADM

## 2019-01-21 RX ORDER — ACETAMINOPHEN 325 MG/1
325 TABLET ORAL EVERY 4 HOURS PRN
Status: DISCONTINUED | OUTPATIENT
Start: 2019-01-21 | End: 2019-01-21 | Stop reason: HOSPADM

## 2019-01-21 RX ORDER — VANCOMYCIN HCL IN 5 % DEXTROSE 1G/250ML
15 PLASTIC BAG, INJECTION (ML) INTRAVENOUS
Status: DISCONTINUED | OUTPATIENT
Start: 2019-01-21 | End: 2019-01-21 | Stop reason: HOSPADM

## 2019-01-21 RX ORDER — PROPOFOL 10 MG/ML
VIAL (ML) INTRAVENOUS CONTINUOUS PRN
Status: DISCONTINUED | OUTPATIENT
Start: 2019-01-21 | End: 2019-01-21

## 2019-01-21 RX ORDER — LIDOCAINE HYDROCHLORIDE 20 MG/ML
INJECTION, SOLUTION INFILTRATION; PERINEURAL
Status: DISCONTINUED | OUTPATIENT
Start: 2019-01-21 | End: 2019-01-21 | Stop reason: HOSPADM

## 2019-01-21 RX ORDER — SODIUM CHLORIDE 9 MG/ML
INJECTION, SOLUTION INTRAVENOUS CONTINUOUS
Status: DISCONTINUED | OUTPATIENT
Start: 2019-01-21 | End: 2020-06-12

## 2019-01-21 RX ORDER — BUPIVACAINE HYDROCHLORIDE 2.5 MG/ML
INJECTION, SOLUTION EPIDURAL; INFILTRATION; INTRACAUDAL
Status: DISCONTINUED | OUTPATIENT
Start: 2019-01-21 | End: 2019-01-21 | Stop reason: HOSPADM

## 2019-01-21 RX ORDER — CEFADROXIL 500 MG/1
CAPSULE ORAL
Qty: 4 CAPSULE | Refills: 0 | Status: SHIPPED | OUTPATIENT
Start: 2019-01-21 | End: 2019-01-31

## 2019-01-21 RX ADMIN — PROPOFOL 10 MG: 10 INJECTION, EMULSION INTRAVENOUS at 12:01

## 2019-01-21 RX ADMIN — CEFAZOLIN 1 G: 330 INJECTION, POWDER, FOR SOLUTION INTRAMUSCULAR; INTRAVENOUS at 11:01

## 2019-01-21 RX ADMIN — PROPOFOL 50 MCG/KG/MIN: 10 INJECTION, EMULSION INTRAVENOUS at 11:01

## 2019-01-21 RX ADMIN — SODIUM CHLORIDE, SODIUM GLUCONATE, SODIUM ACETATE, POTASSIUM CHLORIDE, MAGNESIUM CHLORIDE, SODIUM PHOSPHATE, DIBASIC, AND POTASSIUM PHOSPHATE: .53; .5; .37; .037; .03; .012; .00082 INJECTION, SOLUTION INTRAVENOUS at 11:01

## 2019-01-21 RX ADMIN — Medication 1000 MG: at 11:01

## 2019-01-21 NOTE — PROGRESS NOTES
Patient admitted to recovery see [facility] for complete assessment pacu bcg's maintained safety measures verified patient instructed on pain scale and patient verbalized understanding. Called for ekg and it was done and placed in patient's chart. Called patient's family and updated on patient location with the permission of patient.

## 2019-01-21 NOTE — INTERVAL H&P NOTE
Ms. Ortiz is an 85 year old female with a PMHx of SSS, SJM dual chamber PPM (placed 2014), hyperthyroidism, CAD, HTN, HLD, CHADSVASC IV, RBBB, and pAF. She presents today to SSCU for scheduled PPM pocket revision with Dr. Watts. She denies any chest pain, palpitations, SOB, PIRES, dizziness, light headedness, weakness, syncope, or near syncopal episodes. She denies any fever, bleeding, infections, rashes, or surgeries in the past 30 days. She is currently taking eliquis 5 mg BID (last dose was 1/18/19 PM as instructed), aspirin 81 mg, atenolol, diltiazem, propafenone 150 mg TID (last dose 1/20/19 PM), and spironolactone. She states she is feeling well today.    The patient has been examined and the H&P has been reviewed:    I concur with the findings and no changes have occurred since H&P was written.    Review of Systems   Constitution: Negative. Negative for fevers/chills, weakness and malaise/fatigue.   HENT: Negative.  Negative for ear pain and tinnitus.    Eyes: Negative for blurred vision.   Cardiovascular: Negative. Negative for chest pain, dyspnea on exertion, leg swelling, near-syncope, palpitations and syncope.   Respiratory: Negative. Negative for shortness of breath.    Endocrine: Negative.  Negative for polyuria.   Hematologic/Lymphatic: Positive for bruise/bleed easily. Negative for significant bleeding.  Skin: Negative.  Negative for rash.   Musculoskeletal: Negative.  Negative for joint pain and muscle weakness.   Gastrointestinal: Negative.  Negative for abdominal pain hematemesis, hematochezia, and change in bowel habit.   Genitourinary: Negative for frequency or hematuria.   Neurological: Negative.  Negative for dizziness.   Psychiatric/Behavioral: Negative.  Negative for depression. The patient is not nervous/anxious.       Physical Exam   Constitutional: She is oriented to person, place, and time. She appears well-developed and well-nourished.   HENT:   Head: Normocephalic and  atraumatic.   Eyes: Conjunctivae, EOM and lids are normal. No scleral icterus.   Neck: Normal range of motion. No JVD present. No tracheal deviation present. No thyromegaly present.   Cardiovascular: Normal rate and intact distal pulses. Regular rhythm present. No extrasystoles are present. PMI is not displaced. Exam reveals no gallop and no friction rub. No murmur heard.  Pulses:       Radial pulses are 2+ on the right side, and 2+ on the left side.        Pedal pulses are 2+ on the right side, and 2+ on the left side.   Pulmonary/Chest: Effort normal and breath sounds normal. No accessory muscle usage. No tachypnea. No respiratory distress. She has no wheezes. She has no rales. Device pocket is thinned out at the top. Skin intact with no rash, wound, or infection noted.    Abdominal: Soft. Bowel sounds are normal. She exhibits no distension. There is no hepatosplenomegaly. There is no tenderness.   Musculoskeletal: Normal range of motion. She exhibits no edema.   Neurological: She is alert and oriented to person, place, and time. She has normal reflexes. She exhibits normal muscle tone.   Skin: Skin is warm and dry. No rash noted.   Psychiatric: She has a normal mood and affect. Her behavior is normal.   Nursing note and vitals reviewed.     Labs: Labs from 1/17/19 reviewed. Dr. Watts notified.     Significant Studies: EKG this AM reveals atrial paced rhythm with a RBBB at 60 BPM.     Active Hospital Problems    Diagnosis  POA    Pacemaker [Z95.0]  Yes      Resolved Hospital Problems   No resolved problems to display.   Plan:  - PPM pocket revision.  - Anesthesia for sedation. Anesthesia/Surgery risks, benefits and alternative options discussed and understood by patient/family.    Prior to procedure, we discussed the alternatives, benefits and risks of the procedure including pain, infection, bleeding, injury to lung causing pneumothorax requiring tube placement, injury to heart valves, puncture of the heart  leading to pericardial effusion or tamponade requiring tube drainage, heart attack, stroke and death.The patient voices understanding and all questions have been answered. No further questions/concerns voiced at this time. Patient spoke with Dr. Watts in clinic at which time consents were signed. Spoke with Marsha Allen with inpatient pharmacy to discuss appropriate antibiotic dosing for Ms. Ortiz. Ancef 1 gm pre-op was recommended and 1 gm every 12 hours post operatively if patient stays over night. Dr. Watts would also like Vancomycin pre-operatively per Marsha Thomas, the recommeded dose is 1 gram.     MAYA Yepez-C  Cardiology Electrophysiology  NP   [facility]    Attending: Asher Watts MD

## 2019-01-21 NOTE — TRANSFER OF CARE
"Anesthesia Transfer of Care Note    Patient: Gisselle Ortiz    Procedure(s) Performed: Procedure(s) (LRB):  REVISION-POCKET-PACEMAKER (N/A)    Patient location: PACU    Anesthesia Type: general    Transport from OR: Transported from OR on room air with adequate spontaneous ventilation    Post pain: adequate analgesia    Post assessment: no apparent anesthetic complications and tolerated procedure well    Post vital signs: stable    Level of consciousness: awake    Nausea/Vomiting: no nausea/vomiting    Complications: none    Transfer of care protocol was followed      Last vitals:   Visit Vitals  BP (!) 142/63 (BP Location: Right arm, Patient Position: Lying)   Pulse 64   Temp 36.4 °C (97.5 °F) (Oral)   Resp 20   Ht 5' 3" (1.6 m)   Wt 59.9 kg (132 lb)   SpO2 96%   Breastfeeding? No   BMI 23.38 kg/m²     "

## 2019-01-21 NOTE — TELEPHONE ENCOUNTER
----- Message from Asher Watts MD sent at 1/19/2019 12:07 AM CST -----  Echo results OK with a large LA

## 2019-01-21 NOTE — PLAN OF CARE
Problem: Adult Inpatient Plan of Care  Goal: Plan of Care Review  Outcome: Ongoing (interventions implemented as appropriate)  Report received from DAMON Lunsford. Patient s/p pocket revision. Dressing to L CW cdi, soft. No bleeding or hematoma noted. No complaints from patient. Call light in reach. Will monitor.

## 2019-01-21 NOTE — ANESTHESIA PREPROCEDURE EVALUATION
"                                                                                                             01/21/2019  Pre-operative evaluation for Procedure(s) (LRB):  REVISION-POCKET-PACEMAKER (N/A)    Gisselle Ortiz is a 85 y.o. female normal EF, SSS here for PPM pocket revision. Reports having "stopped breathing" in the recovery room after her initial PPM placement at OSH under [facility]. This event occurred after an episode of emesis. Intraop was uneventful per pt.     Patient Active Problem List   Diagnosis    Paroxysmal atrial fibrillation    Pacemaker    Hypertension    Former smoker    Hypothyroid    Hypercholesterolemia    RBBB    Long term current use of antiarrhythmic drug    Current use of long term anticoagulation    SSS (sick sinus syndrome)    PVC (premature ventricular contraction)       Review of patient's allergies indicates:  No Known Allergies    No current facility-administered medications on file prior to encounter.      Current Outpatient Medications on File Prior to Encounter   Medication Sig Dispense Refill    aspirin (ECOTRIN) 81 MG EC tablet Take 81 mg by mouth once daily.      atenolol (TENORMIN) 25 MG tablet Take 25 mg by mouth once daily.       atorvastatin (LIPITOR) 10 MG tablet Take 10 mg by mouth once daily.       diltiaZEM (CARDIZEM CD) 120 MG Cp24 Take 120 mg by mouth once daily.       eszopiclone (LUNESTA) 2 MG Tab Take 2 mg by mouth every evening.      levothyroxine (SYNTHROID) 200 MCG tablet Take 200 mcg by mouth before breakfast.       NEXIUM 40 mg capsule Take 40 mg by mouth daily as needed.       propafenone (RHTHYMOL) 150 MG Tab Take 150 mg by mouth every 8 (eight) hours. Patient takes 1 1/2 in the AM and 1 1/2 in the PM      spironolactone (ALDACTONE) 25 MG tablet Take 25 mg by mouth 2 (two) times daily.      apixaban (ELIQUIS) 5 mg Tab Take 5 mg by mouth 2 (two) times daily.      mupirocin calcium 2% (BACTROBAN) 2 % cream       pantoprazole " (PROTONIX) 40 MG tablet Take 40 mg by mouth daily as needed.          Past Surgical History:   Procedure Laterality Date    CATARACT EXTRACTION      CATARACT EXTRACTION W/  INTRAOCULAR LENS IMPLANT Bilateral     DR IN [place]       Social History     Socioeconomic History    Marital status:      Spouse name: Not on file    Number of children: Not on file    Years of education: Not on file    Highest education level: Not on file   Social Needs    Financial resource strain: Not on file    Food insecurity - worry: Not on file    Food insecurity - inability: Not on file    Transportation needs - medical: Not on file    Transportation needs - non-medical: Not on file   Occupational History    Not on file   Tobacco Use    Smoking status: Former Smoker     Start date: 1964    Smokeless tobacco: Never Used   Substance and Sexual Activity    Alcohol use: No    Drug use: No    Sexual activity: Not on file   Other Topics Concern    Not on file   Social History Narrative    Not on file         CBC: No results for input(s): WBC, RBC, HGB, HCT, PLT, MCV, MCH, MCHC in the last 72 hours.    CMP: No results for input(s): NA, K, CL, CO2, BUN, CREATININE, GLU, MG, PHOS, CALCIUM, ALBUMIN, PROT, ALKPHOS, ALT, AST, BILITOT in the last 72 hours.    INR  No results for input(s): PT, INR, PROTIME, APTT in the last 72 hours.        Diagnostic Studies:      EKD Echo:  No results found for this or any previous visit.      Anesthesia Evaluation         Review of Systems      Physical Exam  General:  Well nourished    Airway/Jaw/Neck:  Airway Findings: Mouth Opening: Normal Tongue: Normal  General Airway Assessment: Adult  Mallampati: II  TM Distance: Normal, at least 6 cm  Jaw/Neck Findings:  Neck ROM: Normal ROM      Dental:  Dental Findings: In tact   Chest/Lungs:  Chest/Lungs Findings: Clear to auscultation, Normal Respiratory Rate         Mental Status:  Mental Status Findings:  Cooperative,  Alert and Oriented         Anesthesia Plan  Type of Anesthesia, risks & benefits discussed:  Anesthesia Type:  general  Patient's Preference:   Intra-op Monitoring Plan: standard ASA monitors  Intra-op Monitoring Plan Comments:   Post Op Pain Control Plan: multimodal analgesia  Post Op Pain Control Plan Comments:   Induction:   IV  Beta Blocker:  Patient is on a Beta-Blocker and has received one dose within the past 24 hours (No further documentation required).       Informed Consent: Patient understands risks and agrees with Anesthesia plan.  Questions answered. Anesthesia consent signed with patient.  ASA Score: 3     Day of Surgery Review of History & Physical:    H&P update referred to the provider.     Anesthesia Plan Notes: Suspect respiratory issue was narcotic related as it occurred after the procedure in PACU and was associated with an episode of N/V. Plan to avoid narcotics and benzo's in this 84 yo pt. Propofol sedation with local.         Ready For Surgery From Anesthesia Perspective.

## 2019-01-21 NOTE — ANESTHESIA POSTPROCEDURE EVALUATION
"Anesthesia Post Evaluation    Patient: Gisselle Ortiz    Procedure(s) Performed: Procedure(s) (LRB):  REVISION-POCKET-PACEMAKER (N/A)    Final Anesthesia Type: general (Natural airway)  Patient location during evaluation: PACU  Patient participation: Yes- Able to Participate  Level of consciousness: awake and alert  Post-procedure vital signs: reviewed and stable  Pain management: adequate  Airway patency: patent  PONV status at discharge: No PONV  Anesthetic complications: no      Cardiovascular status: hemodynamically stable  Respiratory status: unassisted  Hydration status: euvolemic  Follow-up not needed.        Visit Vitals  BP (!) 142/67 (BP Location: Right arm, Patient Position: Lying)   Pulse 62   Temp 36.2 °C (97.2 °F) (Oral)   Resp 18   Ht 5' 3" (1.6 m)   Wt 59.9 kg (132 lb)   SpO2 96%   Breastfeeding? No   BMI 23.38 kg/m²       Pain/Shalonda Score: Shalonda Score: 10 (1/21/2019  1:55 PM)        "

## 2019-01-21 NOTE — TELEPHONE ENCOUNTER
Attempt to call patient, called all phone numbers listed on the patient's demographics, no answer- left a message with my phone number requesting a return call to review recent lab results and 's recommendations.   Billy JOSE CCM

## 2019-01-21 NOTE — TELEPHONE ENCOUNTER
----- Message from Asher Watts MD sent at 1/19/2019 12:24 AM CST -----  Results are abnormal.  Please see comments below and call patient.  In general you do not need to route back to me.  Her Cr has increased a lot since 3 years ago - she has been on Aldactone since 2015 -- so this maybe the cause -- not sure who started her med --  Please call her and  Ask her to decrease the dose to 25 ONCE a day -- repeat BMP along with BNP in 2 weeks   tx

## 2019-01-21 NOTE — TELEPHONE ENCOUNTER
Attempt to call patient, called all phone numbers listed on the patient's demographics, no answer- left a message: Notified that  reviewed recent 2 d echo and provided with the following results all results ok with large LA. Per  would like for patient to continue same treatment plan.      Billy JOSE CCM

## 2019-01-21 NOTE — NURSING TRANSFER
Nursing Transfer Note      1/21/2019     Transfer To: short stay 12    Transfer via stretcher    Transfer with nurse    Transported by karena rn    Medicines sent: none    Chart send with patient: Yes    Notified: nurse    Patient reassessed at: see epic (date, time)    Upon arrival to floor: to room no complaints no distress noted

## 2019-01-21 NOTE — Clinical Note
Generator Pocket opened at the left  upper chest  with blunt dissection, electrocautery and sharp dissection.

## 2019-01-22 NOTE — DISCHARGE SUMMARY
[facility]  Cardiac Electrophysiology  Discharge Summary      Patient Name: Gisselle Ortiz  MRN: [id number]  Admission Date: 1/21/2019  Hospital Length of Stay: 0 days  Discharge Date and Time: 1/21/2019  3:54 PM  Attending Physician: Asher Watts MD  Discharging Provider: Virgie Cedeno NP  Primary Care Physician: Kai Montez MD    HPI: Ms. Ortiz is an 85 year old female with a PMHx of SSS, SJM dual chamber PPM (placed 2014), hyperthyroidism, CAD, HTN, HLD, CHADSVASC IV, RBBB, and pAF. She presents today to [facility] for scheduled PPM pocket revision with Dr. Watts. She denies any chest pain, palpitations, SOB, PIRES, dizziness, light headedness, weakness, syncope, or near syncopal episodes. She denies any fever, bleeding, infections, rashes, or surgeries in the past 30 days. She is currently taking eliquis 5 mg BID (last dose was 1/18/19 PM as instructed), aspirin 81 mg, atenolol, diltiazem, propafenone 150 mg TID (last dose 1/20/19 PM), and spironolactone. She states she is feeling well today.     The patient has been examined and the H&P has been reviewed:     I concur with the findings and no changes have occurred since H&P was written.    Procedure(s) (LRB):  REVISION-POCKET-PACEMAKER (N/A)     Indwelling Lines/Drains at time of discharge:  Lines/Drains/Airways          None        Hospital Course: Patient underwent a PPM pocket revision with Dr. Watts (see procedure note). Patient tolerated procedure well with no acute complications. Patient feeling well and redy to be discharged. Post procedure EKG showed A-paced rhythm at 60 bpm. Post EKG and discharge plans reviewed with Dr. Watts. Patient to keep pressure dressing and Aquacel dressing to left chest wall site for 48 hours, then remove. Ice pack to site for 3 days. Start Cefadroxil (duricef) 500 mg capsules: Take two capsules (1,000 mg total) tonight and then take 1 capsule (500 mg total) daily. Hold  Eliquis for 3 days upon discharge and then resume. Decrease Eliquis to 2.5 mg BID patient's age, weight and Cr of 1.8 meet criteria. Continue all other home mediations including aspirin 81 mg, atenolol, diltiazem, propafenone 150 mg, and spironolactone. Wound check in 1 week. Follow up with Dr. Watts in 3 months. Discharge plans/instructions discussed with patient and family who verbalized understanding and agreement of plans of care. No further questions or concerns voiced at this time. Discharged home in stable condition.   Physical Exam   Constitutional: She is oriented to person, place, and time. She appears well-developed and well-nourished.   HENT:   Head: Normocephalic and atraumatic.   Eyes: Conjunctivae, EOM and lids are normal. No scleral icterus.   Neck: Normal range of motion. No JVD present. No tracheal deviation present. No thyromegaly present.   Cardiovascular: Normal rate and intact distal pulses. Regular rhythm present. No extrasystoles are present. PMI is not displaced. Exam reveals no gallop and no friction rub. No murmur heard.  Pulses:       Radial pulses are 2+ on the right side, and 2+ on the left side.        Pedal pulses are 2+ on the right side, and 2+ on the left side.   Pulmonary/Chest: Effort normal and breath sounds normal. No accessory muscle usage. No tachypnea. No respiratory distress. She has no wheezes. She has no rales. Left chest wall device site with pressure dressing intact. Ice pack in place. No redness, swelling, redness, or drainage noted.   Abdominal: Soft. Bowel sounds are normal. She exhibits no distension. There is no hepatosplenomegaly. There is no tenderness.   Musculoskeletal: Normal range of motion. She exhibits no edema.   Neurological: She is alert and oriented to person, place, and time. She has normal reflexes. She exhibits normal muscle tone.   Skin: Skin is warm and dry. No rash noted.   Psychiatric: She has a normal mood and affect. Her behavior is  normal.   Nursing note and vitals reviewed.     Consults:   Anesthesia.    Significant Diagnostic Studies: Labs from 1/17/19 through today reviewed.  INR   Lab Results   Component Value Date    INR 1.0 01/17/2019     Cardiac Graphics: Transthoracic echo (TTE) complete (Cupid Only):   Results for orders placed or performed during the hospital encounter of 01/18/19   Transthoracic echo (TTE) complete (Cupid Only)   Result Value Ref Range    BSA 1.63 m2    TDI SEPTAL 0.05     LV LATERAL E/E' RATIO 10.56     LV SEPTAL E/E' RATIO 19.00     LA WIDTH 4.86 cm    TDI LATERAL 0.09     LVIDD 5.30 3.5 - 6.0 cm    IVS 1.01 0.6 - 1.1 cm    PW 0.96 0.6 - 1.1 cm    LVIDS 3.54 2.1 - 4.0 cm    FS 33 28 - 44 %    LA volume 123.99 cm3    Sinus 3.18 cm    STJ 3.35 cm    Ascending aorta 3.56 cm    LV mass 196.42 g    LA size 5.80 cm    RVDD 4.02 cm    TAPSE 1.59 cm    RV S' 11.48 m/s    Left Ventricle Relative Wall Thickness 0.36 cm    AV mean gradient 5.08 mmHg    AV valve area 1.76 cm2    AV index (prosthetic) 0.48     E/A ratio 1.73     Mean e' 0.07     E wave decelartion time 150.93 msec    Pulm vein S/D ratio 0.88     LVOT diameter 2.15 cm    LVOT area 3.63 cm2    LVOT peak VTI 16.64 cm    Ao peak brian 1.38 m/s    Ao VTI 34.39 cm    LVOT stroke volume 60.38 cm3    AV peak gradient 7.62 mmHg    E/E' ratio 13.57     MV Peak E Brian 0.95 m/s    TR Max Brian 2.80 m/s    MV Peak A Brian 0.55 m/s    PV Peak S Brian 0.46 m/s    PV Peak D Brian 0.52 m/s    LV Systolic Volume 52.19 mL    LV Systolic Volume Index 32.2 mL/m2    LV Diastolic Volume 135.14 mL    LV Diastolic Volume Index 83.38 mL/m2    LA Volume Index 76.5 mL/m2    LV Mass Index 121.2 g/m2    RA Major Axis 4.27 cm    Left Atrium Minor Axis 5.14 cm    Left Atrium Major Axis 5.21 cm    Triscuspid Valve Regurgitation Peak Gradient 31.36 mmHg    RA Width 3.62 cm    Right Atrial Pressure (from IVC) 3 mmHg    TV rest pulmonary artery pressure 34 mmHg     Final Active Diagnoses:    Diagnosis Date  Noted POA    PRINCIPAL PROBLEM:  Pacemaker [Z95.0] 05/19/2015 Yes      Problems Resolved During this Admission:     No new Assessment & Plan notes have been filed under this hospital service since the last note was generated.  Service: Arrhythmia    Discharged Condition: stable    Disposition: Home or Self Care    Follow Up:    Patient Instructions:      No driving until:   Order Comments: No driving or operating heavy machinery for 24-48 hours after your procedure because you received sedation.     Other restrictions (specify):   Order Comments: 1. Sling to left arm - wear for 48 hours, then only at night for 6 weeks.   2. Keep pressure dressing and Aquacel dressing to left chest wall site for 48 hours, then remove.  3. Ice pack for 3 days.  4. Cefadroxil (duricef) 500 mg capsules: Take two capsules (1,000 mg total) tonight and then take 1 capsule (500 mg total) daily.  5. No lifting left elbow above shoulder height for 6 weeks.  6. No lifting over 5 pounds for 6 weeks.  7. No driving for 1 week and for 4 weeks if patient uses left arm to make turns.   8. Patient may shower in 48 hours after the pressure dressing is removed, do not let beam of shower hit site directly and no scrubbing in area. Do not submerge incision site in water for 2 weeks.    9. Every day, take your temperature and check your incision for signs of infection (redness, swelling, drainage, or warmth) for the next 7 days. Call the clinic if you notice any of these signs of infection.  10.Carry an ID card that contains information about your pacemaker. You can show this card if your pacemaker sets off a metal detector. You should also show it to avoid screening with a hand-held security wand.  11. Hold your Eliquis for 3 days upon discharge and then resume. You will start your new dose, Eliquis 2.5 mg twice daily.   12. Follow up in device clinic in 1 week for a wound check.  13. Follow up Dr. Asher Watts in 3 months.      Medications:  Reconciled Home Medications:      Medication List      START taking these medications    cefadroxil 500 MG Cap  Commonly known as:  DURICEF  Take 2 capsules tonight (1,000 mg total) and then take 1 capsule (500 mg total) daily.        CHANGE how you take these medications    apixaban 2.5 mg Tab  Commonly known as:  ELIQUIS  Take 1 tablet (2.5 mg total) by mouth 2 (two) times daily.  What changed:    · medication strength  · how much to take        CONTINUE taking these medications    aspirin 81 MG EC tablet  Commonly known as:  ECOTRIN  Take 81 mg by mouth once daily.     atenolol 25 MG tablet  Commonly known as:  TENORMIN  Take 25 mg by mouth once daily.     atorvastatin 10 MG tablet  Commonly known as:  LIPITOR  Take 10 mg by mouth once daily.     diltiaZEM 120 MG Cp24  Commonly known as:  CARDIZEM CD  Take 120 mg by mouth once daily.     eszopiclone 2 MG Tab  Commonly known as:  LUNESTA  Take 2 mg by mouth every evening.     levothyroxine 200 MCG tablet  Commonly known as:  SYNTHROID  Take 200 mcg by mouth before breakfast.     mupirocin calcium 2% 2 % cream  Commonly known as:  BACTROBAN     NexIUM 40 MG capsule  Generic drug:  esomeprazole  Take 40 mg by mouth daily as needed.     pantoprazole 40 MG tablet  Commonly known as:  PROTONIX  Take 40 mg by mouth daily as needed.     propafenone 150 MG Tab  Commonly known as:  RHTHYMOL  Take 150 mg by mouth every 8 (eight) hours. Patient takes 1 1/2 in the AM and 1 1/2 in the PM     spironolactone 25 MG tablet  Commonly known as:  ALDACTONE  Take 25 mg by mouth 2 (two) times daily.          Plan:  -Pressure dressing and Aquacel dressing to left chest wall site for 48 hours, then remove.  -Ice pack for 3 days.  -Cefadroxil (duricef) 500 mg capsules: Take two capsules (1,000 mg total) tonight and then take 1 capsule (500 mg total) daily.  -Hold Eliquis for 3 days upon discharge and then resume.   -Decrease Eliquis to 2.5 mg BID.  -Wound check in 1  week.  -Follow up with Dr. Watts in 3 months.     Time spent on the discharge of patient: 28 minutes    Virgie Cedeno NP  Cardiac Electrophysiology  [facility]    Attending: Asher Watts MD

## 2019-01-29 ENCOUNTER — CLINICAL SUPPORT (OUTPATIENT)
Dept: CARDIOLOGY | Facility: HOSPITAL | Age: 84
End: 2019-01-29
Attending: INTERNAL MEDICINE
Payer: MEDICARE

## 2019-01-29 DIAGNOSIS — Z95.0 CARDIAC PACEMAKER IN SITU: ICD-10-CM

## 2019-01-29 DIAGNOSIS — I49.5 SSS (SICK SINUS SYNDROME): ICD-10-CM

## 2019-01-29 PROCEDURE — 93280 PM DEVICE PROGR EVAL DUAL: CPT

## 2019-01-30 ENCOUNTER — TELEPHONE (OUTPATIENT)
Dept: ELECTROPHYSIOLOGY | Facility: CLINIC | Age: 84
End: 2019-01-30

## 2019-01-30 NOTE — TELEPHONE ENCOUNTER
----- Message from Asher Watts MD sent at 1/29/2019  6:44 PM CST -----  Needs RTC to adjust meds etc   Me or albert -- maybe this Thursday with Lizabeth glenn I am in clinic too   Tx  ----- Message -----  From: Fabiola Abreu  Sent: 1/29/2019  11:03 AM  To: Asher Watts MD, Mica Ragland RN, #    Pt seen for post op pocket revision today; all looks good with regard to the site.     She is in an atrial arrhythmia, possibly flutter; on Eliquis.     She is noting she feels more SOB and fatigued and knows her heartbeat is irregular.  Episode in progress since 1/22/19.     Will leave strips in your door for review.     Thanks,  Fabiola

## 2019-01-30 NOTE — PROGRESS NOTES
Ms. Ortiz is a patient of Dr. Watts and was last seen in clinic 1/17/2019.      Subjective:   Patient ID:  Gisselle Ortiz is a 85 y.o. female who presents for follow-up of Atrial Fibrillation  .     HPI:    Ms. Ortiz is a 85 y.o. female with pAF, hyperthyroid s/p resection, PPM (2014 for SB) here for follow up.     Background:    Background as recorded in my last note (8/12/16):  First visit with us 11/2015: transferred care from [place] to   Was seeing Dr Roy  Has PAF, hx hyperthyroidism now resected and on T4    Has Hx SSS and sp PPM -- [facility]  Has Fam hx CAD, HTN,HLP, is a former smoker  She is on cardizem, atenolol, rythmol, apixaban and spironolactone  Has reportedly normal echo, normal EF  She feels well   [facility] 2014- implanted by Dual chamber device for sinus bradycardia and 1 AVB  1% less AMS burden , longest 16 hours  49% RA paced, RV 93%    ECG shows AV fused / pseudofused pacing    CHADSVASC=IV     Update Aug 2016:   She has been doing well -- no issues that she can relate. Rare palps,  No CO, syncope, PIRES.   She exercises -- does the [employer] twice a day , she walks a lot -- around   ECG today shows AR pacing with RBBB.     Update 1/17/2019:   She feels well in general but lately has been having palps and occ L/H with these - generally just skips   I have reviewed the actual image of the ECG tracing obtained today and it shows AR pacing at 60 bpm with frequent MM PVCs likely free wall RVOT and RBBB.   PPM eval shows all OK -- Battery life ~ 12 years-- no AF / AMS, 35% RA paced   PPM pocket revision-- moderate sedation (she had some issues with breathing during her surgery in [place]). Stop Eliquis for 5 doses pre and 5 doses post    Update (01/31/2019):    On 1/21/2019 she underwent pocket revision.  On 1/30/2019 she was in device clinic for post-op check: Pt seen for post op pocket revision today; all looks good with regard to the site. She is in an atrial arrhythmia,  possibly flutter; on Eliquis. She is noting she feels more SOB and fatigued and knows her heartbeat is irregular.  Episode had been in progress since 1/22/19.     Today she reports continued fatigue and feeling that she is out of rhythm. Some PIRES. Denies palpitations, light-headedness, CP, syncioe.  She is currently taking eliquis 5mg BID for stroke prophylaxis and denies significant bleeding episodes. She is currently being treated with propafenone 225mg TID for rhythm control and atenolol 25mg daily for HR control.  Kidney function is stable, with a creatinine of 1.8 on 1/17/2019.     In device clinic on 1/29/2019 she was in an atrial arrhythmia (possibly atrial flutter). However, I have personally reviewed the patient's EKG today, which shows SR with PACs at 88bpm. This was confirmed with in-clinic device check.    Recent Cardiac Tests:    2D Echo (1/18/2019):  · Normal left ventricular systolic function. The estimated ejection fraction is 55%  · Eccentric left ventricular hypertrophy.  · Severe left atrial enlargement.  · Indeterminate left ventricular diastolic function.  · No wall motion abnormalities.  · Normal right ventricular systolic function.  · Mild mitral regurgitation.  · Mild tricuspid regurgitation.  · The estimated PA systolic pressure is 34 mm Hg  · Normal central venous pressure (3 mm Hg).    Current Outpatient Medications   Medication Sig    apixaban (ELIQUIS) 2.5 mg Tab Take 1 tablet (2.5 mg total) by mouth 2 (two) times daily.    aspirin (ECOTRIN) 81 MG EC tablet Take 81 mg by mouth once daily.    atenolol (TENORMIN) 25 MG tablet Take 25 mg by mouth once daily.     atorvastatin (LIPITOR) 10 MG tablet Take 10 mg by mouth once daily.     cefadroxil (DURICEF) 500 MG Cap Take 2 capsules tonight (1,000 mg total) and then take 1 capsule (500 mg total) daily.    diltiaZEM (CARDIZEM CD) 120 MG Cp24 Take 120 mg by mouth once daily.     eszopiclone (LUNESTA) 2 MG Tab Take 2 mg by mouth every  "evening.    levothyroxine (SYNTHROID) 200 MCG tablet Take 200 mcg by mouth before breakfast.     mupirocin calcium 2% (BACTROBAN) 2 % cream     NEXIUM 40 mg capsule Take 40 mg by mouth daily as needed.     pantoprazole (PROTONIX) 40 MG tablet Take 40 mg by mouth daily as needed.     propafenone (RHTHYMOL) 150 MG Tab Take 150 mg by mouth every 8 (eight) hours. Patient takes 1 1/2 in the AM and 1 1/2 in the PM    spironolactone (ALDACTONE) 25 MG tablet Take 25 mg by mouth 2 (two) times daily.     No current facility-administered medications for this visit.      Facility-Administered Medications Ordered in Other Visits   Medication    0.9%  NaCl infusion       Review of Systems   Constitution: Positive for malaise/fatigue.   Cardiovascular: Positive for irregular heartbeat. Negative for chest pain, dyspnea on exertion, leg swelling and palpitations.   Respiratory: Negative for shortness of breath.    Hematologic/Lymphatic: Negative for bleeding problem.   Skin: Negative for rash.   Musculoskeletal: Negative for myalgias.   Gastrointestinal: Negative for hematemesis, hematochezia and nausea.   Genitourinary: Negative for hematuria.   Neurological: Negative for light-headedness.   Psychiatric/Behavioral: Negative for altered mental status.   Allergic/Immunologic: Negative for persistent infections.     Objective:        BP (!) 150/76   Pulse 88   Ht 5' 3" (1.6 m)   Wt 61.8 kg (136 lb 3.9 oz)   BMI 24.13 kg/m²     Physical Exam   Constitutional: She is oriented to person, place, and time. She appears well-developed and well-nourished.   HENT:   Head: Normocephalic.   Nose: Nose normal.   Eyes: Pupils are equal, round, and reactive to light.   Cardiovascular: Normal rate, regular rhythm, S1 normal and S2 normal.   No murmur heard.  Pulses:       Radial pulses are 2+ on the right side, and 2+ on the left side.   Pulmonary/Chest: Breath sounds normal. No respiratory distress.   Device to LUCW.   Abdominal: Normal " appearance.   Musculoskeletal: Normal range of motion. She exhibits no edema.   Neurological: She is alert and oriented to person, place, and time.   Skin: Skin is warm and dry. No erythema.   Psychiatric: She has a normal mood and affect. Her speech is normal and behavior is normal.   Nursing note and vitals reviewed.        Lab Results   Component Value Date     01/17/2019    K 5.1 01/17/2019    BUN 29 (H) 01/17/2019    CREATININE 1.8 (H) 01/17/2019    ALT 6 (L) 05/20/2015    AST 12 05/20/2015    HGB 12.7 01/17/2019    HCT 39.7 01/17/2019    TSH 3.540 05/20/2015    LDLCALC 65.0 05/20/2015       Recent Labs   Lab 01/17/19  1244   INR 1.0       Assessment:     1. Paroxysmal atrial fibrillation    2. Essential hypertension    3. RBBB    4. SSS (sick sinus syndrome)    5. Pacemaker    6. Current use of long term anticoagulation      Plan:     In summary, Ms. Ortiz is a 85 y.o. female with pAF, hyperthyroid s/p resection, PPM (2014 for SB) here for follow up.   She was in an atrial arrhythmia earlier today, which had been persistent since 1/22/2019. However, in-clinic device check indicates that she is back in sinus rhythm at this moment. Case had been discussed with Dr. Watts.  Will increase propafenone from 225mg TID to 300mg TID. She is also taking atenolol for rate control and is on eliquis for CVA prophylaxis.     Increase propafenone from 225mg TID to 300mg TID  Continue other medications.  RTC in 3 months with device check, sooner if needed.    *A copy of this note has been sent to Dr. Watts*    Follow-up in about 3 months (around 4/30/2019), or as scheduled following procedure.    ------------------------------------------------------------------    MATTHEW Jiang, NP-C  Arrhythmia [facility]

## 2019-01-31 ENCOUNTER — OFFICE VISIT (OUTPATIENT)
Dept: ELECTROPHYSIOLOGY | Facility: CLINIC | Age: 84
End: 2019-01-31
Payer: MEDICARE

## 2019-01-31 ENCOUNTER — HOSPITAL ENCOUNTER (OUTPATIENT)
Dept: CARDIOLOGY | Facility: CLINIC | Age: 84
Discharge: HOME | End: 2019-01-31
Payer: MEDICARE

## 2019-01-31 VITALS
DIASTOLIC BLOOD PRESSURE: 76 MMHG | HEART RATE: 88 BPM | BODY MASS INDEX: 24.14 KG/M2 | SYSTOLIC BLOOD PRESSURE: 150 MMHG | HEIGHT: 63 IN | WEIGHT: 136.25 LBS

## 2019-01-31 DIAGNOSIS — I48.0 PAROXYSMAL ATRIAL FIBRILLATION: Primary | ICD-10-CM

## 2019-01-31 DIAGNOSIS — I45.10 RBBB: ICD-10-CM

## 2019-01-31 DIAGNOSIS — Z79.01 CURRENT USE OF LONG TERM ANTICOAGULATION: ICD-10-CM

## 2019-01-31 DIAGNOSIS — Z95.0 PACEMAKER: ICD-10-CM

## 2019-01-31 DIAGNOSIS — I49.5 SSS (SICK SINUS SYNDROME): ICD-10-CM

## 2019-01-31 DIAGNOSIS — I10 ESSENTIAL HYPERTENSION: ICD-10-CM

## 2019-01-31 PROCEDURE — 93010 ELECTROCARDIOGRAM REPORT: CPT | Mod: S$PBB,,, | Performed by: INTERNAL MEDICINE

## 2019-01-31 PROCEDURE — 93010 RHYTHM STRIP: ICD-10-PCS | Mod: S$PBB,,, | Performed by: INTERNAL MEDICINE

## 2019-01-31 PROCEDURE — 99214 OFFICE O/P EST MOD 30 MIN: CPT | Mod: S$PBB,,, | Performed by: NURSE PRACTITIONER

## 2019-01-31 PROCEDURE — 99999 PR PBB SHADOW E&M-EST. PATIENT-LVL III: ICD-10-PCS | Mod: PBBFAC,,, | Performed by: NURSE PRACTITIONER

## 2019-01-31 PROCEDURE — 99214 PR OFFICE/OUTPT VISIT, EST, LEVL IV, 30-39 MIN: ICD-10-PCS | Mod: S$PBB,,, | Performed by: NURSE PRACTITIONER

## 2019-01-31 PROCEDURE — 99999 PR PBB SHADOW E&M-EST. PATIENT-LVL III: CPT | Mod: PBBFAC,,, | Performed by: NURSE PRACTITIONER

## 2019-01-31 PROCEDURE — 99213 OFFICE O/P EST LOW 20 MIN: CPT | Mod: PBBFAC,25 | Performed by: NURSE PRACTITIONER

## 2019-01-31 PROCEDURE — 93005 ELECTROCARDIOGRAM TRACING: CPT | Mod: PBBFAC | Performed by: INTERNAL MEDICINE

## 2019-01-31 RX ORDER — PROPAFENONE HYDROCHLORIDE 150 MG/1
300 TABLET, COATED ORAL EVERY 8 HOURS
Qty: 540 TABLET | Refills: 3 | Status: SHIPPED | OUTPATIENT
Start: 2019-01-31 | End: 2019-03-07 | Stop reason: ALTCHOICE

## 2019-01-31 NOTE — Clinical Note
Can we please schedule Ms. Ortiz for a 3 month device check and clinic visit? She had a gen change last week so she should have a 3 month clinic/device follow up but I don't see that scheduled (just a home check).Thanks!

## 2019-02-26 ENCOUNTER — TELEPHONE (OUTPATIENT)
Dept: ELECTROPHYSIOLOGY | Facility: CLINIC | Age: 84
End: 2019-02-26

## 2019-02-26 ENCOUNTER — CLINICAL SUPPORT (OUTPATIENT)
Dept: CARDIOLOGY | Facility: HOSPITAL | Age: 84
End: 2019-02-26
Attending: INTERNAL MEDICINE
Payer: MEDICARE

## 2019-02-26 DIAGNOSIS — I49.5 SSS (SICK SINUS SYNDROME): ICD-10-CM

## 2019-02-26 DIAGNOSIS — Z95.0 CARDIAC PACEMAKER IN SITU: ICD-10-CM

## 2019-02-26 DIAGNOSIS — I48.0 PAROXYSMAL ATRIAL FIBRILLATION: Primary | ICD-10-CM

## 2019-02-26 DIAGNOSIS — I49.5 SSS (SICK SINUS SYNDROME): Primary | ICD-10-CM

## 2019-02-26 RX ORDER — ATENOLOL 25 MG/1
50 TABLET ORAL DAILY
Qty: 60 TABLET | Refills: 11 | Status: SHIPPED | OUTPATIENT
Start: 2019-02-26 | End: 2019-04-26 | Stop reason: SDUPTHER

## 2019-02-26 NOTE — TELEPHONE ENCOUNTER
Patient came to clinic with complaints of not feeling well. Device check shows episodes of AF with RVR, although overall AF burden has decreased. Will increase atenolol for more optimal rate control and have patient see me in clinic next Thursday 2/7/2019.

## 2019-03-06 NOTE — PROGRESS NOTES
Ms. Ortiz is a patient of Dr. Watts and was last seen in clinic 1/31/2019.      Subjective:   Patient ID:  Gisselle Ortiz is a 85 y.o. female who presents for follow-up of Atrial Fibrillation  .     HPI:    Ms. Ortiz is a 85 y.o. female with pAF (on rhythmol), hyperthyroid s/p resection, PPM (2014 for SB) here for follow up.      Background:    Background as recorded in my last note (8/12/16):  First visit with us 11/2015: transferred care from [place] to   Was seeing Dr Roy  Has PAF, hx hyperthyroidism now resected and on T4    Has Hx SSS and sp PPM -- [facility]  Has Fam hx CAD, HTN,HLP, is a former smoker  She is on cardizem, atenolol, rythmol, apixaban and spironolactone  Has reportedly normal echo, normal EF  She feels well   [facility] 2014- implanted by Dual chamber device for sinus bradycardia and 1 AVB  1% less AMS burden , longest 16 hours  49% RA paced, RV 93%    ECG shows AV fused / pseudofused pacing    CHADSVASC=IV     Update Aug 2016:   She has been doing well -- no issues that she can relate. Rare palps,  No CO, syncope, PIRES.   She exercises -- does the calisthenics twice a day , she walks a lot -- around   ECG today shows AR pacing with RBBB.     Update 1/17/2019:   She feels well in general but lately has been having palps and occ L/H with these - generally just skips   I have reviewed the actual image of the ECG tracing obtained today and it shows AR pacing at 60 bpm with frequent MM PVCs likely free wall RVOT and RBBB.   PPM eval shows all OK -- Battery life ~ 12 years-- no AF / AMS, 35% RA paced   PPM pocket revision-- moderate sedation (she had some issues with breathing during her surgery in [place]). Stop Eliquis for 5 doses pre and 5 doses post    Update (01/31/2019):  On 1/21/2019 she underwent pocket revision.  On 1/30/2019 she was in device clinic for post-op check: Pt seen for post op pocket revision today; all looks good with regard to the site. She is in an atrial  arrhythmia, possibly flutter; on Eliquis. She is noting she feels more SOB and fatigued and knows her heartbeat is irregular.  Episode had been in progress since 1/22/19.   Today she reports continued fatigue and feeling that she is out of rhythm. Some PIRES. Denies palpitations, light-headedness, CP, syncioe.  She is currently taking eliquis 5mg BID for stroke prophylaxis and denies significant bleeding episodes. She is currently being treated with propafenone 225mg TID for rhythm control and atenolol 25mg daily for HR control.  Kidney function is stable, with a creatinine of 1.8 on 1/17/2019.   In device clinic on 1/29/2019 she was in an atrial arrhythmia (possibly atrial flutter). However, I have personally reviewed the patient's EKG today, which shows SR with PACs at 88bpm. This was confirmed with in-clinic device check.  Increased propafenone from 225mg TID to 300mg TID.    Update (03/07/2019):    2/26/2019: Patient came to clinic with complaints of not feeling well. Device check shows episodes of AF with RVR, although overall AF burden has decreased. Increased atenolol to 50mg/25mg for more optimal rate control and have patient see me in clinic next Thursday 2/7/2019.    Today she says she continues to experience PIRES and fatigue. Not constant, but frequent and sustained. Denies palps, light-headedness, or syncope.    She is currently taking eliquis 2.5mg BID for stroke prophylaxis and denies significant bleeding episodes. She is currently being treated with rhythmol 300mg TID for rhythm control and atenolol 50mg/25mg for HR control.  Kidney function is decreased with a creatinine of 1.8 on 1/17/2019.    Device Interrogation today shows that she is in AF, with a 5.5% burden. V pacing 66%.     I have personally reviewed the patient's EKG today, which shows AF with intermittent V pacing at 92bpm.    Recent Cardiac Tests:    2D Echo (1/18/2019):  · Normal left ventricular systolic function. The estimated ejection  fraction is 55%  · Eccentric left ventricular hypertrophy.  · Severe left atrial enlargement.  · Indeterminate left ventricular diastolic function.  · No wall motion abnormalities.  · Normal right ventricular systolic function.  · Mild mitral regurgitation.  · Mild tricuspid regurgitation.  · The estimated PA systolic pressure is 34 mm Hg  · Normal central venous pressure (3 mm Hg).    Current Outpatient Medications   Medication Sig    apixaban (ELIQUIS) 2.5 mg Tab Take 1 tablet (2.5 mg total) by mouth 2 (two) times daily.    aspirin (ECOTRIN) 81 MG EC tablet Take 81 mg by mouth once daily.    atenolol (TENORMIN) 25 MG tablet Take 2 tablets (50 mg total) by mouth once daily.    atorvastatin (LIPITOR) 10 MG tablet Take 10 mg by mouth once daily.     diltiaZEM (CARDIZEM CD) 120 MG Cp24 Take 120 mg by mouth once daily.     eszopiclone (LUNESTA) 2 MG Tab Take 2 mg by mouth every evening.    levothyroxine (SYNTHROID) 200 MCG tablet Take 200 mcg by mouth before breakfast.     mupirocin calcium 2% (BACTROBAN) 2 % cream     pantoprazole (PROTONIX) 40 MG tablet Take 40 mg by mouth daily as needed.     propafenone (RHTHYMOL) 150 MG Tab Take 2 tablets (300 mg total) by mouth every 8 (eight) hours.    spironolactone (ALDACTONE) 25 MG tablet Take 25 mg by mouth 2 (two) times daily.     No current facility-administered medications for this visit.      Facility-Administered Medications Ordered in Other Visits   Medication    0.9%  NaCl infusion     Review of Systems   Constitution: Positive for malaise/fatigue.   Cardiovascular: Positive for dyspnea on exertion and irregular heartbeat. Negative for chest pain, leg swelling and palpitations.   Respiratory: Positive for shortness of breath.    Hematologic/Lymphatic: Negative for bleeding problem.   Skin: Negative for rash.   Musculoskeletal: Negative for myalgias.   Gastrointestinal: Negative for hematemesis, hematochezia and nausea.   Genitourinary: Negative for  "hematuria.   Neurological: Negative for light-headedness.   Psychiatric/Behavioral: Negative for altered mental status.   Allergic/Immunologic: Negative for persistent infections.     Objective:        /68   Pulse 97   Resp 18   Ht 5' 3" (1.6 m)   Wt 60.4 kg (133 lb 2.5 oz)   BMI 23.59 kg/m²     Physical Exam   Constitutional: She is oriented to person, place, and time. She appears well-developed and well-nourished.   HENT:   Head: Normocephalic.   Nose: Nose normal.   Eyes: Pupils are equal, round, and reactive to light.   Cardiovascular: Normal rate, S1 normal and S2 normal. An irregularly irregular rhythm present.   No murmur heard.  Pulses:       Radial pulses are 2+ on the right side, and 2+ on the left side.   Pulmonary/Chest: Breath sounds normal. No respiratory distress.   Device to LUCW.   Abdominal: Normal appearance.   Musculoskeletal: Normal range of motion. She exhibits no edema.   Neurological: She is alert and oriented to person, place, and time.   Skin: Skin is warm and dry. No erythema.   Psychiatric: She has a normal mood and affect. Her speech is normal and behavior is normal.   Nursing note and vitals reviewed.    Lab Results   Component Value Date     01/17/2019    K 5.1 01/17/2019    BUN 29 (H) 01/17/2019    CREATININE 1.8 (H) 01/17/2019    ALT 6 (L) 05/20/2015    AST 12 05/20/2015    HGB 12.7 01/17/2019    HCT 39.7 01/17/2019    TSH 3.540 05/20/2015    LDLCALC 65.0 05/20/2015       Recent Labs   Lab 01/17/19  1244   INR 1.0       Assessment:     1. Paroxysmal atrial fibrillation    2. SSS (sick sinus syndrome)    3. RBBB    4. PVC (premature ventricular contraction)    5. Pacemaker    6. Current use of long term anticoagulation    7. Long term current use of antiarrhythmic drug      Plan:     In summary, Ms. Ortiz is a 85 y.o. female with pAF (on rhythmol), hyperthyroid s/p resection, PPM (2014 for SB) here for follow up.   She continues to experience symptomatic AF " despite rhythmol, with an increased AF burden (and concomitant increased V pacing) despite increasing the dose.  Case discussed with Dr. Watts. Will switch to flecainide 50mg BID with JILL/DCCV in one week if her AF is persistent. She is on eliquis for CVA prophylaxis.    She remains on atenolol for rate control    Stop propafenone today.  On Saturday start flecainide 50mg twice daily.  Continue other medications.  EKG next week. Cardioversion if not in sinus rhythm.  Flecainide level and anti-Xa testing.    *A copy of this note has been sent to Dr. Watts*    Follow-up in about 3 months (around 6/7/2019).    ------------------------------------------------------------------    MATTHEW Jiang, NP-C  [facility]

## 2019-03-07 ENCOUNTER — OFFICE VISIT (OUTPATIENT)
Dept: ELECTROPHYSIOLOGY | Facility: CLINIC | Age: 84
End: 2019-03-07
Payer: MEDICARE

## 2019-03-07 ENCOUNTER — HOSPITAL ENCOUNTER (OUTPATIENT)
Dept: CARDIOLOGY | Facility: CLINIC | Age: 84
Discharge: HOME | End: 2019-03-07
Payer: MEDICARE

## 2019-03-07 VITALS
SYSTOLIC BLOOD PRESSURE: 128 MMHG | DIASTOLIC BLOOD PRESSURE: 68 MMHG | HEART RATE: 97 BPM | RESPIRATION RATE: 18 BRPM | HEIGHT: 63 IN | WEIGHT: 133.19 LBS | BODY MASS INDEX: 23.6 KG/M2

## 2019-03-07 DIAGNOSIS — I49.5 SSS (SICK SINUS SYNDROME): ICD-10-CM

## 2019-03-07 DIAGNOSIS — I48.0 PAROXYSMAL ATRIAL FIBRILLATION: Primary | ICD-10-CM

## 2019-03-07 DIAGNOSIS — I45.10 RBBB: ICD-10-CM

## 2019-03-07 DIAGNOSIS — Z79.899 LONG TERM CURRENT USE OF ANTIARRHYTHMIC DRUG: ICD-10-CM

## 2019-03-07 DIAGNOSIS — Z95.0 PACEMAKER: ICD-10-CM

## 2019-03-07 DIAGNOSIS — Z79.01 CURRENT USE OF LONG TERM ANTICOAGULATION: ICD-10-CM

## 2019-03-07 DIAGNOSIS — I49.3 PVC (PREMATURE VENTRICULAR CONTRACTION): ICD-10-CM

## 2019-03-07 PROCEDURE — 93010 RHYTHM STRIP: ICD-10-PCS | Mod: S$PBB,,, | Performed by: INTERNAL MEDICINE

## 2019-03-07 PROCEDURE — 99999 PR PBB SHADOW E&M-EST. PATIENT-LVL III: CPT | Mod: PBBFAC,,, | Performed by: NURSE PRACTITIONER

## 2019-03-07 PROCEDURE — 99999 PR PBB SHADOW E&M-EST. PATIENT-LVL III: ICD-10-PCS | Mod: PBBFAC,,, | Performed by: NURSE PRACTITIONER

## 2019-03-07 PROCEDURE — 99214 OFFICE O/P EST MOD 30 MIN: CPT | Mod: S$PBB,,, | Performed by: NURSE PRACTITIONER

## 2019-03-07 PROCEDURE — 99213 OFFICE O/P EST LOW 20 MIN: CPT | Mod: PBBFAC,25 | Performed by: NURSE PRACTITIONER

## 2019-03-07 PROCEDURE — 93010 ELECTROCARDIOGRAM REPORT: CPT | Mod: S$PBB,,, | Performed by: INTERNAL MEDICINE

## 2019-03-07 PROCEDURE — 99214 PR OFFICE/OUTPT VISIT, EST, LEVL IV, 30-39 MIN: ICD-10-PCS | Mod: S$PBB,,, | Performed by: NURSE PRACTITIONER

## 2019-03-07 PROCEDURE — 93005 ELECTROCARDIOGRAM TRACING: CPT | Mod: PBBFAC | Performed by: INTERNAL MEDICINE

## 2019-03-07 RX ORDER — FLECAINIDE ACETATE 50 MG/1
50 TABLET ORAL EVERY 12 HOURS
Qty: 60 TABLET | Refills: 11 | Status: SHIPPED | OUTPATIENT
Start: 2019-03-07 | End: 2019-03-21

## 2019-03-07 NOTE — PATIENT INSTRUCTIONS
Stop propafenone today.  On Saturday start flecainide 50mg twice daily.  Continue other medications.  EKG next week. Cardioversion if not in sinus rhythm.

## 2019-03-07 NOTE — PROGRESS NOTES
CARDIOVERSION EDUCATION CHECK LIST    PRE PROCEDURE TESTING    3-13-19 @ 1015AM  Pre - procedure labs have been ordered for you at:  [facility]  · Be sure to arrive at your scheduled time. YOU DO NOT HAVE TO FAST FOR THIS LAB WORK!      DAY OF PROCEDURE     3-18-19 @ 7 AM   Report to Cardiology Waiting Room on 3rd floor of the hospital  · Do not eat or drink anything after 12 mn on the night before your procedure.  · Please do not wear makeup (especially mascara) when arriving for your procedure.    Medications:  · You may take your usual morning medications with a sip of water    FOLLOW-UP EKG TO BE DONE 1 WEEK AFTER CARDIOVERSION ON 3-25-19 930am AT [facility].    Directions to the Cardiology Waiting Room  If you park in the Parking Garage:  Take elevators to the 2nd floor  Walk up ramp and turn right by Gold Elevators  Take elevator to the 3rd floor  Upon exiting the elevator, turn away from the clinic areas  Walk long [address]ano around to front of hospital to area with windows overlooking [facility]  Check in at Reception Desk  OR  If family is dropping you off:  Have them drop you off at the front of the Hospital  (Near the ER, where all the flags are hung).  Take the E elevators to the 3rd floor.  Check in at the Reception Desk in the waiting room.        · YOU WILL BE GOING HOME THE SAME DAY AS YOUR PROCEDURE  · YOU WILL NEED SOMEONE TO DRIVE YOU HOME AFTER YOUR PROCEDURE   · YOUR PAIN DURING YOUR PROCEDURE WILL BE MANAGED BY THE ANESTHESIA TEAM      Any need to reschedule or cancel procedures, or any questions regarding your procedure should be addressed directly with the Arrhythmia Department Nurses at the following phone number: [phone]    THE ABOVE INSTRUCTIONS WERE GIVEN TO THE PATIENT VERBALLY AND THEY VERBALIZED UNDERSTANDING. THEY DO NOT REQUIRE ANY SPECIAL NEEDS AND DO NOT HAVE ANY LEARNING BARRIERS.

## 2019-03-13 ENCOUNTER — LAB VISIT (OUTPATIENT)
Dept: LAB | Facility: HOSPITAL | Age: 84
End: 2019-03-13
Attending: INTERNAL MEDICINE
Payer: MEDICARE

## 2019-03-13 DIAGNOSIS — I48.0 PAROXYSMAL ATRIAL FIBRILLATION: ICD-10-CM

## 2019-03-13 LAB
ANION GAP SERPL CALC-SCNC: 9 MMOL/L
APTT BLDCRRT: 29.7 SEC
BASOPHILS # BLD AUTO: 0.04 K/UL
BASOPHILS NFR BLD: 0.6 %
BUN SERPL-MCNC: 24 MG/DL
CALCIUM SERPL-MCNC: 9.6 MG/DL
CHLORIDE SERPL-SCNC: 104 MMOL/L
CO2 SERPL-SCNC: 25 MMOL/L
CREAT SERPL-MCNC: 1.5 MG/DL
DIFFERENTIAL METHOD: ABNORMAL
EOSINOPHIL # BLD AUTO: 0.2 K/UL
EOSINOPHIL NFR BLD: 2.5 %
ERYTHROCYTE [DISTWIDTH] IN BLOOD BY AUTOMATED COUNT: 12.6 %
EST. GFR  (AFRICAN AMERICAN): 36.4 ML/MIN/1.73 M^2
EST. GFR  (NON AFRICAN AMERICAN): 31.5 ML/MIN/1.73 M^2
FACT X PPP CHRO-ACNC: >1.85 IU/ML
GLUCOSE SERPL-MCNC: 88 MG/DL
HCT VFR BLD AUTO: 39.7 %
HGB BLD-MCNC: 12.4 G/DL
IMM GRANULOCYTES # BLD AUTO: 0.04 K/UL
IMM GRANULOCYTES NFR BLD AUTO: 0.6 %
INR PPP: 1
LYMPHOCYTES # BLD AUTO: 1.7 K/UL
LYMPHOCYTES NFR BLD: 26.5 %
MCH RBC QN AUTO: 28.8 PG
MCHC RBC AUTO-ENTMCNC: 31.2 G/DL
MCV RBC AUTO: 92 FL
MONOCYTES # BLD AUTO: 0.8 K/UL
MONOCYTES NFR BLD: 13 %
NEUTROPHILS # BLD AUTO: 3.6 K/UL
NEUTROPHILS NFR BLD: 56.8 %
NRBC BLD-RTO: 0 /100 WBC
PLATELET # BLD AUTO: 237 K/UL
PMV BLD AUTO: 9.3 FL
POTASSIUM SERPL-SCNC: 4.9 MMOL/L
PROTHROMBIN TIME: 10 SEC
RBC # BLD AUTO: 4.3 M/UL
SODIUM SERPL-SCNC: 138 MMOL/L
WBC # BLD AUTO: 6.3 K/UL

## 2019-03-13 PROCEDURE — 85520 HEPARIN ASSAY: CPT

## 2019-03-13 PROCEDURE — 85025 COMPLETE CBC W/AUTO DIFF WBC: CPT

## 2019-03-13 PROCEDURE — 80048 BASIC METABOLIC PNL TOTAL CA: CPT

## 2019-03-13 PROCEDURE — 36415 COLL VENOUS BLD VENIPUNCTURE: CPT

## 2019-03-13 PROCEDURE — 85730 THROMBOPLASTIN TIME PARTIAL: CPT

## 2019-03-13 PROCEDURE — 85610 PROTHROMBIN TIME: CPT

## 2019-03-15 ENCOUNTER — TELEPHONE (OUTPATIENT)
Dept: ELECTROPHYSIOLOGY | Facility: CLINIC | Age: 84
End: 2019-03-15

## 2019-03-15 NOTE — TELEPHONE ENCOUNTER
Spoke with patient. Went over the following with patient: pre-operative lab work date and time, EP procedure date, arrival time, pre-operative hold of medications. All questions answered. Advised pt to call clinic if pt had any questions or concerns.     Reviewed Pre-procedure labs; the following noted: BUN 24, creatinine 1.5   notified-No new orders given.          Billy JOSE CCM

## 2019-03-16 LAB — FLECAINIDE SERPL-MCNC: NORMAL MCG/ML (ref 0.2–1)

## 2019-03-18 ENCOUNTER — HOSPITAL ENCOUNTER (OUTPATIENT)
Dept: CARDIOLOGY | Facility: CLINIC | Age: 84
Discharge: HOME | End: 2019-03-18
Payer: MEDICARE

## 2019-03-18 ENCOUNTER — ANESTHESIA (OUTPATIENT)
Dept: MEDSURG UNIT | Facility: HOSPITAL | Age: 84
End: 2019-03-18
Payer: MEDICARE

## 2019-03-18 ENCOUNTER — ANESTHESIA EVENT (OUTPATIENT)
Dept: MEDSURG UNIT | Facility: HOSPITAL | Age: 84
End: 2019-03-18
Payer: MEDICARE

## 2019-03-18 ENCOUNTER — HOSPITAL ENCOUNTER (OUTPATIENT)
Facility: HOSPITAL | Age: 84
Discharge: HOME | End: 2019-03-18
Attending: INTERNAL MEDICINE | Admitting: INTERNAL MEDICINE
Payer: MEDICARE

## 2019-03-18 VITALS
HEART RATE: 64 BPM | BODY MASS INDEX: 23.04 KG/M2 | OXYGEN SATURATION: 99 % | HEIGHT: 63 IN | DIASTOLIC BLOOD PRESSURE: 56 MMHG | TEMPERATURE: 97 F | WEIGHT: 130 LBS | SYSTOLIC BLOOD PRESSURE: 112 MMHG | RESPIRATION RATE: 18 BRPM

## 2019-03-18 DIAGNOSIS — I48.91 ATRIAL FIBRILLATION: ICD-10-CM

## 2019-03-18 DIAGNOSIS — I49.3 PVC (PREMATURE VENTRICULAR CONTRACTION): ICD-10-CM

## 2019-03-18 DIAGNOSIS — I45.10 RBBB: ICD-10-CM

## 2019-03-18 DIAGNOSIS — I48.0 PAROXYSMAL ATRIAL FIBRILLATION: ICD-10-CM

## 2019-03-18 DIAGNOSIS — I48.0 PAROXYSMAL ATRIAL FIBRILLATION: Primary | ICD-10-CM

## 2019-03-18 DIAGNOSIS — I49.5 SSS (SICK SINUS SYNDROME): Primary | ICD-10-CM

## 2019-03-18 DIAGNOSIS — Z79.01 CURRENT USE OF LONG TERM ANTICOAGULATION: Primary | ICD-10-CM

## 2019-03-18 LAB
BSA FOR ECHO PROCEDURE: 1.62 M2
SINUS: 3.1 CM
STJ: 2.4 CM

## 2019-03-18 PROCEDURE — 93312 TRANSESOPHAGEAL ECHO (TEE) W/ POSSIBLE CARDIOVERSION: ICD-10-PCS | Mod: 26,S$PBB,, | Performed by: INTERNAL MEDICINE

## 2019-03-18 PROCEDURE — 93312 ECHO TRANSESOPHAGEAL: CPT | Mod: 26,S$PBB,, | Performed by: INTERNAL MEDICINE

## 2019-03-18 PROCEDURE — 63600175 PHARM REV CODE 636 W HCPCS: Performed by: NURSE ANESTHETIST, CERTIFIED REGISTERED

## 2019-03-18 PROCEDURE — D9220A PRA ANESTHESIA: Mod: ANES,,, | Performed by: ANESTHESIOLOGY

## 2019-03-18 PROCEDURE — 37000009 HC ANESTHESIA EA ADD 15 MINS: Performed by: INTERNAL MEDICINE

## 2019-03-18 PROCEDURE — D9220A PRA ANESTHESIA: Mod: CRNA,,, | Performed by: NURSE ANESTHETIST, CERTIFIED REGISTERED

## 2019-03-18 PROCEDURE — 93325 DOPPLER ECHO COLOR FLOW MAPG: CPT | Mod: PBBFAC | Performed by: INTERNAL MEDICINE

## 2019-03-18 PROCEDURE — 92960 CARDIOVERSION ELECTRIC EXT: CPT | Mod: ,,, | Performed by: INTERNAL MEDICINE

## 2019-03-18 PROCEDURE — 93325 TRANSESOPHAGEAL ECHO (TEE) W/ POSSIBLE CARDIOVERSION: ICD-10-PCS | Mod: 26,S$PBB,, | Performed by: INTERNAL MEDICINE

## 2019-03-18 PROCEDURE — 93320 TRANSESOPHAGEAL ECHO (TEE) W/ POSSIBLE CARDIOVERSION: ICD-10-PCS | Mod: 26,S$PBB,, | Performed by: INTERNAL MEDICINE

## 2019-03-18 PROCEDURE — 37000008 HC ANESTHESIA 1ST 15 MINUTES: Performed by: INTERNAL MEDICINE

## 2019-03-18 PROCEDURE — 93010 EKG 12-LEAD: ICD-10-PCS | Mod: 76,59,ICN, | Performed by: INTERNAL MEDICINE

## 2019-03-18 PROCEDURE — 92960 CARDIOVERSION ELECTRIC EXT: CPT | Performed by: INTERNAL MEDICINE

## 2019-03-18 PROCEDURE — 25000003 PHARM REV CODE 250: Performed by: NURSE PRACTITIONER

## 2019-03-18 PROCEDURE — 25000003 PHARM REV CODE 250: Performed by: INTERNAL MEDICINE

## 2019-03-18 PROCEDURE — 92960 PR CARDIOVERSION, ELECTIVE;EXTERN: ICD-10-PCS | Mod: ,,, | Performed by: INTERNAL MEDICINE

## 2019-03-18 PROCEDURE — 93010 ELECTROCARDIOGRAM REPORT: CPT | Mod: 59,ICN,, | Performed by: INTERNAL MEDICINE

## 2019-03-18 PROCEDURE — D9220A PRA ANESTHESIA: ICD-10-PCS | Mod: CRNA,,, | Performed by: NURSE ANESTHETIST, CERTIFIED REGISTERED

## 2019-03-18 PROCEDURE — 93320 DOPPLER ECHO COMPLETE: CPT | Mod: 26,S$PBB,, | Performed by: INTERNAL MEDICINE

## 2019-03-18 PROCEDURE — D9220A PRA ANESTHESIA: ICD-10-PCS | Mod: ANES,,, | Performed by: ANESTHESIOLOGY

## 2019-03-18 PROCEDURE — 99235 PR OBSERV/HOSP SAME DATE,LEVL IV: ICD-10-PCS | Mod: 25,GC,, | Performed by: INTERNAL MEDICINE

## 2019-03-18 PROCEDURE — 93005 ELECTROCARDIOGRAM TRACING: CPT | Mod: 59

## 2019-03-18 PROCEDURE — 93010 ELECTROCARDIOGRAM REPORT: CPT | Mod: 76,59,ICN, | Performed by: INTERNAL MEDICINE

## 2019-03-18 PROCEDURE — 99235 HOSP IP/OBS SAME DATE MOD 70: CPT | Mod: 25,GC,, | Performed by: INTERNAL MEDICINE

## 2019-03-18 RX ORDER — PROPOFOL 10 MG/ML
VIAL (ML) INTRAVENOUS
Status: DISCONTINUED | OUTPATIENT
Start: 2019-03-18 | End: 2019-03-18

## 2019-03-18 RX ORDER — SILVER SULFADIAZINE 10 G/1000G
CREAM TOPICAL
Status: DISCONTINUED | OUTPATIENT
Start: 2019-03-18 | End: 2019-03-18 | Stop reason: HOSPADM

## 2019-03-18 RX ORDER — FENTANYL CITRATE 50 UG/ML
25 INJECTION, SOLUTION INTRAMUSCULAR; INTRAVENOUS EVERY 5 MIN PRN
Status: DISCONTINUED | OUTPATIENT
Start: 2019-03-18 | End: 2019-03-18 | Stop reason: HOSPADM

## 2019-03-18 RX ORDER — LIDOCAINE HCL/PF 100 MG/5ML
SYRINGE (ML) INTRAVENOUS
Status: DISCONTINUED | OUTPATIENT
Start: 2019-03-18 | End: 2019-03-18

## 2019-03-18 RX ORDER — SODIUM CHLORIDE 0.9 % (FLUSH) 0.9 %
5 SYRINGE (ML) INJECTION
Status: DISCONTINUED | OUTPATIENT
Start: 2019-03-18 | End: 2020-06-12

## 2019-03-18 RX ORDER — SODIUM CHLORIDE 9 MG/ML
INJECTION, SOLUTION INTRAVENOUS CONTINUOUS
Status: DISCONTINUED | OUTPATIENT
Start: 2019-03-18 | End: 2020-06-12

## 2019-03-18 RX ORDER — SODIUM CHLORIDE 0.9 % (FLUSH) 0.9 %
3 SYRINGE (ML) INJECTION
Status: DISCONTINUED | OUTPATIENT
Start: 2019-03-18 | End: 2019-03-18 | Stop reason: HOSPADM

## 2019-03-18 RX ADMIN — PROPOFOL 20 MG: 10 INJECTION, EMULSION INTRAVENOUS at 09:03

## 2019-03-18 RX ADMIN — PROPOFOL 40 MG: 10 INJECTION, EMULSION INTRAVENOUS at 09:03

## 2019-03-18 RX ADMIN — PROPOFOL 50 MG: 10 INJECTION, EMULSION INTRAVENOUS at 09:03

## 2019-03-18 RX ADMIN — LIDOCAINE HYDROCHLORIDE 50 MG: 20 INJECTION, SOLUTION INTRAVENOUS at 09:03

## 2019-03-18 RX ADMIN — SODIUM CHLORIDE 1000 ML: 0.9 INJECTION, SOLUTION INTRAVENOUS at 08:03

## 2019-03-18 RX ADMIN — PROPOFOL 10 MG: 10 INJECTION, EMULSION INTRAVENOUS at 09:03

## 2019-03-18 RX ADMIN — SODIUM CHLORIDE: 0.9 INJECTION, SOLUTION INTRAVENOUS at 08:03

## 2019-03-18 NOTE — PLAN OF CARE
Problem: Adult Inpatient Plan of Care  Goal: Plan of Care Review  Outcome: Outcome(s) achieved Date Met: 03/18/19  Patient discharged per MD orders. Instructions given on medications, wound care, activity, signs of infection, when to call MD, and follow up appointments. Pt also instructed not to drive x 24 hours due to sedation. Pt verbalized understanding.  Patient AAOx4, VSS, no c/o pain or discomfort at this time. Per Dr Vazquez discharge instructions were also reinformed to patient and patient's daughter to hold her Diltiazem until she has her Flecanide level drawn on Thursday March, 21 and to resume her Diltiazem after her lab is drawn. Telemetry and PIV removed. Patient left unit via wheelchair with transport and daughter.

## 2019-03-18 NOTE — HOSPITAL COURSE
Successful DCCV with 200 J  Discussed with nurse will order an outpatient Flecainide level off of diltiazem. Told patient to hold this till the lab draws this  Resumed her home medications

## 2019-03-18 NOTE — H&P
[facility]  Cardiology  History and Physical     Patient Name: Gisselle Ortiz  MRN: [id number]  Admission Date: 3/18/2019  Code Status: Prior   Attending Provider: Asher Watts MD   Primary Care Physician: Kai Montez MD  Principal Problem:<principal problem not specified>    Patient information was obtained from patient and past medical records.     Subjective:     Chief Complaint:  Atrial fibrillation     HPI:  Gisselle Ortiz is a 85 y.o. woman with PMHx of pAF(rhythmol switched to flecainide recently), Hyperthyroids s/p resection, DC-PPM(2014 for Sb, SSS) who presents for JILL cardioversion. JILL 1/18/2019 EF 55 %, Severe LAE, DARREN 77, Normal RVSF, mild MR, mild TR, PASP 34    Dysphagia or odynophagia:  No  Liver Disease, esophageal disease, or known varices:  No  Upper GI Bleeding: No  Snoring:  No  Sleep Apnea:  No  Prior neck surgery or radiation:  Yes  History of anesthetic difficulties:  No  Family history of anesthetic difficulties:  No  Last oral intake:  12 hours ago  Able to move neck in all directions:  Yes  Anticoagulation eliquis 2.5 mg BID  Platelet count 237    Past Medical History:   Diagnosis Date    A-fib     Arthritis     Hypertension     Thyroid disease        Past Surgical History:   Procedure Laterality Date    CATARACT EXTRACTION      CATARACT EXTRACTION W/  INTRAOCULAR LENS IMPLANT Bilateral 2004     IN [place]    EYE SURGERY      HIP REPLACEMENT ARTHROPLASTY      HYSTERECTOMY      JOINT REPLACEMENT      REVISION-POCKET-PACEMAKER N/A 1/21/2019    Performed by Asher Watts MD at [facility] EP LAB       Review of patient's allergies indicates:  No Known Allergies    Current Facility-Administered Medications on File Prior to Encounter   Medication    0.9%  NaCl infusion     Current Outpatient Medications on File Prior to Encounter   Medication Sig    apixaban (ELIQUIS) 2.5 mg Tab Take 1 tablet (2.5 mg total) by mouth 2 (two)  times daily.    aspirin (ECOTRIN) 81 MG EC tablet Take 81 mg by mouth once daily.    atenolol (TENORMIN) 25 MG tablet Take 2 tablets (50 mg total) by mouth once daily.    atorvastatin (LIPITOR) 10 MG tablet Take 10 mg by mouth once daily.     diltiaZEM (CARDIZEM CD) 120 MG Cp24 Take 120 mg by mouth once daily.     eszopiclone (LUNESTA) 2 MG Tab Take 2 mg by mouth every evening.    flecainide (TAMBOCOR) 50 MG Tab Take 1 tablet (50 mg total) by mouth every 12 (twelve) hours.    levothyroxine (SYNTHROID) 200 MCG tablet Take 200 mcg by mouth before breakfast.     spironolactone (ALDACTONE) 25 MG tablet Take 25 mg by mouth 2 (two) times daily.    mupirocin calcium 2% (BACTROBAN) 2 % cream     pantoprazole (PROTONIX) 40 MG tablet Take 40 mg by mouth daily as needed.      Family History     Problem Relation (Age of Onset)    Heart attack Mother, Maternal Grandmother    Heart disease Mother, Maternal Grandmother    Heart failure Mother, Maternal Grandmother    Hypertension Mother, Maternal Grandmother    Stroke Maternal Grandmother        Tobacco Use    Smoking status: Former Smoker     Start date: 5/19/1964    Smokeless tobacco: Never Used   Substance and Sexual Activity    Alcohol use: No    Drug use: No    Sexual activity: Not on file     Review of Systems   All other systems reviewed and are negative.    Objective:     Vital Signs (Most Recent):  Temp: 97.1 °F (36.2 °C) (03/18/19 0748)  Pulse: 88 (03/18/19 0748)  Resp: 18 (03/18/19 0748)  BP: (!) 109/57 (03/18/19 0749)  SpO2: 96 % (03/18/19 0748) Vital Signs (24h Range):  Temp:  [97.1 °F (36.2 °C)] 97.1 °F (36.2 °C)  Pulse:  [88] 88  Resp:  [18] 18  SpO2:  [96 %] 96 %  BP: (109-123)/(56-57) 109/57     Weight: 59 kg (130 lb)  Body mass index is 23.03 kg/m².    SpO2: 96 %  O2 Device (Oxygen Therapy): room air    No intake or output data in the 24 hours ending 03/18/19 0840    Lines/Drains/Airways     Peripheral Intravenous Line                 Peripheral IV  - Single Lumen 03/18/19 0815 Left Antecubital less than 1 day                Physical Exam  General: alert, awake and oriented x 3  Eyes:PERRL.   Neck:no JVD   Lungs:  clear to auscultation bilaterally   Cardiovascular: Heart: Irregular rate and rhythm, S1, S2 normal, no murmur, click, rub or gallop.   Extremities: no cyanosis or edema.   Abdomen/Rectal: Abdomen: soft, non-tender non-distented; bowel sounds normal  Neurologic: Normal strength and tone. No focal numbness or weakness  Significant Labs: CMP No results for input(s): NA, K, CL, CO2, GLU, BUN, CREATININE, CALCIUM, PROT, ALBUMIN, BILITOT, ALKPHOS, AST, ALT, ANIONGAP, ESTGFRAFRICA, EGFRNONAA in the last 48 hours., CBC No results for input(s): WBC, HGB, HCT, PLT in the last 48 hours. and INR No results for input(s): INR, PROTIME in the last 48 hours.    Significant Imaging: Echocardiogram:   2D echo with color flow doppler: No results found for this or any previous visit. and Transthoracic echo (TTE) complete (Cupid Only):   Results for orders placed or performed during the hospital encounter of 01/18/19   Transthoracic echo (TTE) complete (Cupid Only)   Result Value Ref Range    BSA 1.63 m2    TDI SEPTAL 0.05     LV LATERAL E/E' RATIO 10.56     LV SEPTAL E/E' RATIO 19.00     LA WIDTH 4.86 cm    TDI LATERAL 0.09     LVIDD 5.30 3.5 - 6.0 cm    IVS 1.01 0.6 - 1.1 cm    PW 0.96 0.6 - 1.1 cm    LVIDS 3.54 2.1 - 4.0 cm    FS 33 28 - 44 %    LA volume 123.99 cm3    Sinus 3.18 cm    STJ 3.35 cm    Ascending aorta 3.56 cm    LV mass 196.42 g    LA size 5.80 cm    RVDD 4.02 cm    TAPSE 1.59 cm    RV S' 11.48 m/s    Left Ventricle Relative Wall Thickness 0.36 cm    AV mean gradient 5.08 mmHg    AV valve area 1.76 cm2    AV index (prosthetic) 0.48     E/A ratio 1.73     Mean e' 0.07     E wave decelartion time 150.93 msec    Pulm vein S/D ratio 0.88     LVOT diameter 2.15 cm    LVOT area 3.63 cm2    LVOT peak VTI 16.64 cm    Ao peak marlene 1.38 m/s    Ao VTI 34.39 cm     LVOT stroke volume 60.38 cm3    AV peak gradient 7.62 mmHg    E/E' ratio 13.57     MV Peak E Brian 0.95 m/s    TR Max Brian 2.80 m/s    MV Peak A Brian 0.55 m/s    PV Peak S Brian 0.46 m/s    PV Peak D Brian 0.52 m/s    LV Systolic Volume 52.19 mL    LV Systolic Volume Index 32.2 mL/m2    LV Diastolic Volume 135.14 mL    LV Diastolic Volume Index 83.38 mL/m2    LA Volume Index 76.5 mL/m2    LV Mass Index 121.2 g/m2    RA Major Axis 4.27 cm    Left Atrium Minor Axis 5.14 cm    Left Atrium Major Axis 5.21 cm    Triscuspid Valve Regurgitation Peak Gradient 31.36 mmHg    RA Width 3.62 cm    Right Atrial Pressure (from IVC) 3 mmHg    TV rest pulmonary artery pressure 34 mmHg     Assessment and Plan:     Paroxysmal atrial fibrillation    PLAN:  1. JILL for evaluation of EAGLE to r/o thrombus    -The risks, benefits & alternatives of the procedure were explained to the patient.    -The risks of transesophageal echo include but are not limited to:  Dental trauma, esophageal trauma/perforation, bleeding, laryngospasm/brochospasm, aspiration, sore throat/hoarseness, & dislodgement of the endotracheal tube/nasogastric tube (where applicable).    -The risks of moderate sedation include hypotension, respiratory depression, arrhythmias, bronchospasm, & death.    -Informed consent was obtained & the patient is agreeable to proceed with the procedure.    I will discuss with the attending physician. Attending addendum is to follow.     Further recommendations per attending addendum         VTE Risk Mitigation (From admission, onward)    None          Ronnell Finn MD  Cardiology   [facility]

## 2019-03-18 NOTE — ASSESSMENT & PLAN NOTE
PLAN:  1. JILL for evaluation of EAGLE to r/o thrombus    -The risks, benefits & alternatives of the procedure were explained to the patient.    -The risks of transesophageal echo include but are not limited to:  Dental trauma, esophageal trauma/perforation, bleeding, laryngospasm/brochospasm, aspiration, sore throat/hoarseness, & dislodgement of the endotracheal tube/nasogastric tube (where applicable).    -The risks of moderate sedation include hypotension, respiratory depression, arrhythmias, bronchospasm, & death.    -Informed consent was obtained & the patient is agreeable to proceed with the procedure.    I will discuss with the attending physician. Attending addendum is to follow.     Further recommendations per attending addendum

## 2019-03-18 NOTE — PROGRESS NOTES
Patient admitted to recovery see [facility] for complete assessment pacu bcg's maintained safety measures verified patient instructed on pain scale and patient verbalized understanding. Also called for ekg and called patient's daughter and updated on patient location.

## 2019-03-18 NOTE — SUBJECTIVE & OBJECTIVE
Past Medical History:   Diagnosis Date    A-fib     Arthritis     Hypertension     Thyroid disease        Past Surgical History:   Procedure Laterality Date    CATARACT EXTRACTION      CATARACT EXTRACTION W/  INTRAOCULAR LENS IMPLANT Bilateral 2004     IN [place]    EYE SURGERY      HIP REPLACEMENT ARTHROPLASTY      HYSTERECTOMY      JOINT REPLACEMENT      REVISION-POCKET-PACEMAKER N/A 1/21/2019    Performed by Asher Watts MD at [facility] EP LAB       Review of patient's allergies indicates:  No Known Allergies    Current Facility-Administered Medications on File Prior to Encounter   Medication    0.9%  NaCl infusion     Current Outpatient Medications on File Prior to Encounter   Medication Sig    apixaban (ELIQUIS) 2.5 mg Tab Take 1 tablet (2.5 mg total) by mouth 2 (two) times daily.    aspirin (ECOTRIN) 81 MG EC tablet Take 81 mg by mouth once daily.    atenolol (TENORMIN) 25 MG tablet Take 2 tablets (50 mg total) by mouth once daily.    atorvastatin (LIPITOR) 10 MG tablet Take 10 mg by mouth once daily.     diltiaZEM (CARDIZEM CD) 120 MG Cp24 Take 120 mg by mouth once daily.     eszopiclone (LUNESTA) 2 MG Tab Take 2 mg by mouth every evening.    flecainide (TAMBOCOR) 50 MG Tab Take 1 tablet (50 mg total) by mouth every 12 (twelve) hours.    levothyroxine (SYNTHROID) 200 MCG tablet Take 200 mcg by mouth before breakfast.     spironolactone (ALDACTONE) 25 MG tablet Take 25 mg by mouth 2 (two) times daily.    mupirocin calcium 2% (BACTROBAN) 2 % cream     pantoprazole (PROTONIX) 40 MG tablet Take 40 mg by mouth daily as needed.      Family History     Problem Relation (Age of Onset)    Heart attack Mother, Maternal Grandmother    Heart disease Mother, Maternal Grandmother    Heart failure Mother, Maternal Grandmother    Hypertension Mother, Maternal Grandmother    Stroke Maternal Grandmother        Tobacco Use    Smoking status: Former Smoker     Start date: 5/19/1964    Smokeless  tobacco: Never Used   Substance and Sexual Activity    Alcohol use: No    Drug use: No    Sexual activity: Not on file     Review of Systems   All other systems reviewed and are negative.    Objective:     Vital Signs (Most Recent):  Temp: 97.1 °F (36.2 °C) (03/18/19 0748)  Pulse: 88 (03/18/19 0748)  Resp: 18 (03/18/19 0748)  BP: (!) 109/57 (03/18/19 0749)  SpO2: 96 % (03/18/19 0748) Vital Signs (24h Range):  Temp:  [97.1 °F (36.2 °C)] 97.1 °F (36.2 °C)  Pulse:  [88] 88  Resp:  [18] 18  SpO2:  [96 %] 96 %  BP: (109-123)/(56-57) 109/57     Weight: 59 kg (130 lb)  Body mass index is 23.03 kg/m².    SpO2: 96 %  O2 Device (Oxygen Therapy): room air    No intake or output data in the 24 hours ending 03/18/19 0840    Lines/Drains/Airways     Peripheral Intravenous Line                 Peripheral IV - Single Lumen 03/18/19 0815 Left Antecubital less than 1 day                Physical Exam  General: alert, awake and oriented x 3  Eyes:PERRL.   Neck:no JVD   Lungs:  clear to auscultation bilaterally   Cardiovascular: Heart: Irregular rate and rhythm, S1, S2 normal, no murmur, click, rub or gallop.   Extremities: no cyanosis or edema.   Abdomen/Rectal: Abdomen: soft, non-tender non-distented; bowel sounds normal  Neurologic: Normal strength and tone. No focal numbness or weakness  Significant Labs: CMP No results for input(s): NA, K, CL, CO2, GLU, BUN, CREATININE, CALCIUM, PROT, ALBUMIN, BILITOT, ALKPHOS, AST, ALT, ANIONGAP, ESTGFRAFRICA, EGFRNONAA in the last 48 hours., CBC No results for input(s): WBC, HGB, HCT, PLT in the last 48 hours. and INR No results for input(s): INR, PROTIME in the last 48 hours.    Significant Imaging: Echocardiogram:   2D echo with color flow doppler: No results found for this or any previous visit. and Transthoracic echo (TTE) complete (Cupid Only):   Results for orders placed or performed during the hospital encounter of 01/18/19   Transthoracic echo (TTE) complete (Cupid Only)   Result Value  Ref Range    BSA 1.63 m2    TDI SEPTAL 0.05     LV LATERAL E/E' RATIO 10.56     LV SEPTAL E/E' RATIO 19.00     LA WIDTH 4.86 cm    TDI LATERAL 0.09     LVIDD 5.30 3.5 - 6.0 cm    IVS 1.01 0.6 - 1.1 cm    PW 0.96 0.6 - 1.1 cm    LVIDS 3.54 2.1 - 4.0 cm    FS 33 28 - 44 %    LA volume 123.99 cm3    Sinus 3.18 cm    STJ 3.35 cm    Ascending aorta 3.56 cm    LV mass 196.42 g    LA size 5.80 cm    RVDD 4.02 cm    TAPSE 1.59 cm    RV S' 11.48 m/s    Left Ventricle Relative Wall Thickness 0.36 cm    AV mean gradient 5.08 mmHg    AV valve area 1.76 cm2    AV index (prosthetic) 0.48     E/A ratio 1.73     Mean e' 0.07     E wave decelartion time 150.93 msec    Pulm vein S/D ratio 0.88     LVOT diameter 2.15 cm    LVOT area 3.63 cm2    LVOT peak VTI 16.64 cm    Ao peak brian 1.38 m/s    Ao VTI 34.39 cm    LVOT stroke volume 60.38 cm3    AV peak gradient 7.62 mmHg    E/E' ratio 13.57     MV Peak E Brian 0.95 m/s    TR Max Brian 2.80 m/s    MV Peak A Brian 0.55 m/s    PV Peak S Brian 0.46 m/s    PV Peak D Brian 0.52 m/s    LV Systolic Volume 52.19 mL    LV Systolic Volume Index 32.2 mL/m2    LV Diastolic Volume 135.14 mL    LV Diastolic Volume Index 83.38 mL/m2    LA Volume Index 76.5 mL/m2    LV Mass Index 121.2 g/m2    RA Major Axis 4.27 cm    Left Atrium Minor Axis 5.14 cm    Left Atrium Major Axis 5.21 cm    Triscuspid Valve Regurgitation Peak Gradient 31.36 mmHg    RA Width 3.62 cm    Right Atrial Pressure (from IVC) 3 mmHg    TV rest pulmonary artery pressure 34 mmHg

## 2019-03-18 NOTE — DISCHARGE SUMMARY
[facility]  Cardiac Electrophysiology  Discharge Summary      Patient Name: Gisselle Ortzi  MRN: [id number]  Admission Date: 3/18/2019  Hospital Length of Stay: 0 days  Discharge Date and Time:  03/18/2019 9:44 AM  Attending Physician: Asher Watts MD    Discharging Provider: Vlad Vazquez MD  Primary Care Physician: Kai Montez MD    HPI:   Gisselle Ortiz is a 85 y.o. woman with PMHx of pAF(rhythmol switched to flecainide recently), Hyperthyroids s/p resection, DC-PPM(2014 for Sb, SSS) who presents for JILL cardioversion. JILL 1/18/2019 EF 55 %, Severe LAE, DARREN 77, Normal RVSF, mild MR, mild TR, PASP 34           Procedure(s) (LRB):  CARDIOVERSION OR DEFIBRILLATION (N/A)  Transesophageal echo (JILL) intra-procedure log documentation (N/A)     Indwelling Lines/Drains at time of discharge:  Lines/Drains/Airways          None          Hospital Course:  Successful DCCV with 200 J  Discussed with nurse will order an outpatient Flecainide level off of diltiazem. Told patient to hold this till the lab draws this  Resumed her home medications    Consults:     Significant Diagnostic Studies: Labs: CMP No results for input(s): NA, K, CL, CO2, GLU, BUN, CREATININE, CALCIUM, PROT, ALBUMIN, BILITOT, ALKPHOS, AST, ALT, ANIONGAP, ESTGFRAFRICA, EGFRNONAA in the last 48 hours., CBC No results for input(s): WBC, HGB, HCT, PLT in the last 48 hours. and All labs within the past 24 hours have been reviewed    Pending Diagnostic Studies:     Procedure Component Value Units Date/Time    EKG 12-LEAD [[id number]]     Order Status:  Sent Lab Status:  No result           Final Active Diagnoses:    Diagnosis Date Noted POA    Paroxysmal atrial fibrillation [I48.0] 05/19/2015 Yes      Problems Resolved During this Admission:     No new Assessment & Plan notes have been filed under this hospital service since the last note was generated.  Service: Arrhythmia      Discharged Condition:  good    Disposition: good    Follow Up: with lab on Thursday for flec level    Patient Instructions:   No discharge procedures on file.  Medications:  Reconciled Home Medications:      Medication List      CONTINUE taking these medications    apixaban 2.5 mg Tab  Commonly known as:  ELIQUIS  Take 1 tablet (2.5 mg total) by mouth 2 (two) times daily.     aspirin 81 MG EC tablet  Commonly known as:  ECOTRIN  Take 81 mg by mouth once daily.     atenolol 25 MG tablet  Commonly known as:  TENORMIN  Take 2 tablets (50 mg total) by mouth once daily.     atorvastatin 10 MG tablet  Commonly known as:  LIPITOR  Take 10 mg by mouth once daily.     diltiaZEM 120 MG Cp24  Commonly known as:  CARDIZEM CD  Take 120 mg by mouth once daily.     eszopiclone 2 MG Tab  Commonly known as:  LUNESTA  Take 2 mg by mouth every evening.     flecainide 50 MG Tab  Commonly known as:  TAMBOCOR  Take 1 tablet (50 mg total) by mouth every 12 (twelve) hours.     levothyroxine 200 MCG tablet  Commonly known as:  SYNTHROID  Take 200 mcg by mouth before breakfast.     mupirocin calcium 2% 2 % cream  Commonly known as:  BACTROBAN     pantoprazole 40 MG tablet  Commonly known as:  PROTONIX  Take 40 mg by mouth daily as needed.     spironolactone 25 MG tablet  Commonly known as:  ALDACTONE  Take 25 mg by mouth 2 (two) times daily.            Time spent on the discharge of patient: 15 minutes    Vlad Vazquez MD  Cardiac Electrophysiology  [facility]

## 2019-03-18 NOTE — HPI
Gisselle Ortiz is a 85 y.o. woman with PMHx of pAF(rhythmol switched to flecainide recently), Hyperthyroids s/p resection, DC-PPM(2014 for Sb, SSS) who presents for JILL cardioversion. JILL 1/18/2019 EF 55 %, Severe LAE, DARREN 77, Normal RVSF, mild MR, mild TR, PASP 34    Dysphagia or odynophagia:  No  Liver Disease, esophageal disease, or known varices:  No  Upper GI Bleeding: No  Snoring:  No  Sleep Apnea:  No  Prior neck surgery or radiation:  No  History of anesthetic difficulties:  No  Family history of anesthetic difficulties:  No  Last oral intake:  12 hours ago  Able to move neck in all directions:  Yes

## 2019-03-18 NOTE — HPI
Gisselle Ortiz is a 85 y.o. woman with PMHx of pAF(rhythmol switched to flecainide recently), Hyperthyroids s/p resection, DC-PPM(2014 for Sb, SSS) who presents for JILL cardioversion. JILL 1/18/2019 EF 55 %, Severe LAE, DARREN 77, Normal RVSF, mild MR, mild TR, PASP 34

## 2019-03-18 NOTE — ANESTHESIA POSTPROCEDURE EVALUATION
"Anesthesia Post Evaluation    Patient: Gisselle Ortiz    Procedure(s) Performed: Procedure(s) (LRB):  CARDIOVERSION OR DEFIBRILLATION (N/A)  Transesophageal echo (JILL) intra-procedure log documentation (N/A)    Final Anesthesia Type: general  Patient location during evaluation: PACU  Patient participation: Yes- Able to Participate  Level of consciousness: awake and alert and oriented  Post-procedure vital signs: reviewed and stable  Pain management: adequate  Airway patency: patent  PONV status at discharge: No PONV  Anesthetic complications: no      Cardiovascular status: blood pressure returned to baseline, hemodynamically stable and stable  Respiratory status: unassisted, room air and spontaneous ventilation  Hydration status: euvolemic  Follow-up not needed.        Visit Vitals  BP (!) 111/59 (BP Location: Right arm, Patient Position: Lying)   Pulse 60   Temp 36.5 °C (97.7 °F) (Temporal)   Resp 16   Ht 5' 3" (1.6 m)   Wt 59 kg (130 lb)   SpO2 99%   Breastfeeding? No   BMI 23.03 kg/m²       Pain/Shalonda Score: Shalonda Score: 10 (3/18/2019 10:14 AM)        "

## 2019-03-18 NOTE — ANESTHESIA PREPROCEDURE EVALUATION
03/18/2019  Pre-operative evaluation for Procedure(s) (LRB):  CARDIOVERSION OR DEFIBRILLATION (N/A)  Transesophageal echo (JILL) intra-procedure log documentation (N/A)    Gisselle Ortiz is a 85 y.o. female     · Normal left ventricular systolic function. The estimated ejection fraction is 55%  · Eccentric left ventricular hypertrophy.  · Severe left atrial enlargement.  · Indeterminate left ventricular diastolic function.  · No wall motion abnormalities.  · Normal right ventricular systolic function.  · Mild mitral regurgitation.  · Mild tricuspid regurgitation.  · The estimated PA systolic pressure is 34 mm Hg  · Normal central venous pressure (3 mm Hg).         Patient Active Problem List   Diagnosis    Paroxysmal atrial fibrillation    Pacemaker    Hypertension    Former smoker    Hypothyroid    Hypercholesterolemia    RBBB    Long term current use of antiarrhythmic drug    Current use of long term anticoagulation    SSS (sick sinus syndrome)    PVC (premature ventricular contraction)       Review of patient's allergies indicates:  No Known Allergies    Current Facility-Administered Medications on File Prior to Encounter   Medication Dose Route Frequency Provider Last Rate Last Dose    0.9%  NaCl infusion   Intravenous Continuous Virgie Cedeno NP         Current Outpatient Medications on File Prior to Encounter   Medication Sig Dispense Refill    apixaban (ELIQUIS) 2.5 mg Tab Take 1 tablet (2.5 mg total) by mouth 2 (two) times daily. 180 tablet 3    aspirin (ECOTRIN) 81 MG EC tablet Take 81 mg by mouth once daily.      atenolol (TENORMIN) 25 MG tablet Take 2 tablets (50 mg total) by mouth once daily. 60 tablet 11    atorvastatin (LIPITOR) 10 MG tablet Take 10 mg by mouth once daily.       diltiaZEM (CARDIZEM CD) 120 MG Cp24 Take 120 mg by mouth once daily.       eszopiclone  (LUNESTA) 2 MG Tab Take 2 mg by mouth every evening.      flecainide (TAMBOCOR) 50 MG Tab Take 1 tablet (50 mg total) by mouth every 12 (twelve) hours. 60 tablet 11    levothyroxine (SYNTHROID) 200 MCG tablet Take 200 mcg by mouth before breakfast.       spironolactone (ALDACTONE) 25 MG tablet Take 25 mg by mouth 2 (two) times daily.      mupirocin calcium 2% (BACTROBAN) 2 % cream       pantoprazole (PROTONIX) 40 MG tablet Take 40 mg by mouth daily as needed.          Past Surgical History:   Procedure Laterality Date    CATARACT EXTRACTION      CATARACT EXTRACTION W/  INTRAOCULAR LENS IMPLANT Bilateral 2004     IN [place]    EYE SURGERY      HIP REPLACEMENT ARTHROPLASTY      HYSTERECTOMY      JOINT REPLACEMENT      REVISION-POCKET-PACEMAKER N/A 1/21/2019    Performed by Asher Watts MD at [facility] EP LAB       Social History     Socioeconomic History    Marital status:      Spouse name: Not on file    Number of children: Not on file    Years of education: Not on file    Highest education level: Not on file   Social Needs    Financial resource strain: Not on file    Food insecurity - worry: Not on file    Food insecurity - inability: Not on file    Transportation needs - medical: Not on file    Transportation needs - non-medical: Not on file   Occupational History    Not on file   Tobacco Use    Smoking status: Former Smoker     Start date: 5/19/1964    Smokeless tobacco: Never Used   Substance and Sexual Activity    Alcohol use: No    Drug use: No    Sexual activity: Not on file   Other Topics Concern    Not on file   Social History Narrative    Not on file         CBC: No results for input(s): WBC, RBC, HGB, HCT, PLT, MCV, MCH, MCHC in the last 72 hours.    CMP: No results for input(s): NA, K, CL, CO2, BUN, CREATININE, GLU, MG, PHOS, CALCIUM, ALBUMIN, PROT, ALKPHOS, ALT, AST, BILITOT in the last 72 hours.    INR  No results for input(s): PT, INR, PROTIME, APTT in the  last 72 hours.        Diagnostic Studies:      EKG:  Atrial-paced rhythm with prolonged AV conduction  Right bundle branch block  Abnormal ECG  When compared with ECG of 21-JAN-2019 10:41,  No significant change was found  Confirmed by Julian Camilo MD ([id number]) on 1/22/2019 11:40:39 PM    2D Echo:  No results found for this or any previous visit.      Anesthesia Evaluation    I have reviewed the Patient Summary Reports.    I have reviewed the Nursing Notes.   I have reviewed the Medications.     Review of Systems  Anesthesia Hx:  No problems with previous Anesthesia  History of prior surgery of interest to airway management or planning: Denies Family Hx of Anesthesia complications.   Denies Personal Hx of Anesthesia complications.   Hematology/Oncology:         -- Denies Anemia:   Cardiovascular:   Exercise tolerance: poor Hypertension Denies CAD.   Dysrhythmias atrial fibrillation ECG has been reviewed.    Pulmonary:   Denies COPD.  Denies Asthma.  Denies Sleep Apnea.    Renal/:   Chronic Renal Disease, CRI    Hepatic/GI:   Denies GERD. Denies Liver Disease.    Neurological:   Denies CVA. Denies Seizures.    Endocrine:   Hypothyroidism        Physical Exam  General:  Well nourished    Airway/Jaw/Neck:  Airway Findings: Mouth Opening: Normal Tongue: Normal  General Airway Assessment: Adult  Mallampati: II  TM Distance: Normal, at least 6 cm  Jaw/Neck Findings:  Neck ROM: Normal ROM      Dental:  Dental Findings: In tact   Chest/Lungs:  Chest/Lungs Findings: Clear to auscultation, Normal Respiratory Rate         Mental Status:  Mental Status Findings:  Cooperative, Alert and Oriented         Anesthesia Plan  Type of Anesthesia, risks & benefits discussed:  Anesthesia Type:  general  Patient's Preference:   Intra-op Monitoring Plan: standard ASA monitors  Intra-op Monitoring Plan Comments:   Post Op Pain Control Plan: per primary service following discharge from PACU  Post Op Pain Control Plan Comments:   Induction:    IV  Beta Blocker:  Patient is on a Beta-Blocker and has received one dose within the past 24 hours (No further documentation required).       Informed Consent: Patient understands risks and agrees with Anesthesia plan.  Questions answered. Anesthesia consent signed with patient.  ASA Score: 3     Day of Surgery Review of History & Physical:    H&P update referred to the provider.         Ready For Surgery From Anesthesia Perspective.

## 2019-03-18 NOTE — TRANSFER OF CARE
"Anesthesia Transfer of Care Note    Patient: Gisselle Ortiz    Procedure(s) Performed: Procedure(s) (LRB):  CARDIOVERSION OR DEFIBRILLATION (N/A)  Transesophageal echo (JILL) intra-procedure log documentation (N/A)    Patient location: PACU    Anesthesia Type: general    Transport from OR: Transported from OR on 2-3 L/min O2 by NC with adequate spontaneous ventilation    Post pain: adequate analgesia    Post assessment: no apparent anesthetic complications    Post vital signs: stable    Level of consciousness: awake    Nausea/Vomiting: no nausea/vomiting    Complications: none    Transfer of care protocol was followed      Last vitals:   Visit Vitals  BP (!) 109/57 (BP Location: Right arm, Patient Position: Lying)   Pulse 88   Temp 36.2 °C (97.1 °F) (Oral)   Resp 18   Ht 5' 3" (1.6 m)   Wt 59 kg (130 lb)   SpO2 96%   Breastfeeding? No   BMI 23.03 kg/m²     "

## 2019-03-18 NOTE — NURSING TRANSFER
Nursing Transfer Note      3/18/2019     Transfer To: short stay 5    Transfer via stretcher    Transfer with karena rn    Transported by karena rn    Medicines sent: silvadene cream    Chart send with patient: Yes    Notified: reported to obey wesley    Patient reassessed at: see epic (date, time)    Upon arrival to floor: to room no complaints no distress noted.

## 2019-03-18 NOTE — PLAN OF CARE
Problem: Adult Inpatient Plan of Care  Goal: Plan of Care Review  Outcome: Ongoing (interventions implemented as appropriate)  Pt arrived to floor ambulatory from home accompanied by her daughter. Pt oriented to room. Iv inserted. Admit assessment completed. Nurse call bell within reach. Will continue to monitor

## 2019-03-21 ENCOUNTER — HOSPITAL ENCOUNTER (OUTPATIENT)
Dept: CARDIOLOGY | Facility: CLINIC | Age: 84
Discharge: HOME | End: 2019-03-21
Payer: MEDICARE

## 2019-03-21 ENCOUNTER — TELEPHONE (OUTPATIENT)
Dept: ELECTROPHYSIOLOGY | Facility: CLINIC | Age: 84
End: 2019-03-21

## 2019-03-21 ENCOUNTER — DOCUMENTATION ONLY (OUTPATIENT)
Dept: ELECTROPHYSIOLOGY | Facility: CLINIC | Age: 84
End: 2019-03-21

## 2019-03-21 DIAGNOSIS — I48.0 PAROXYSMAL ATRIAL FIBRILLATION: Primary | ICD-10-CM

## 2019-03-21 DIAGNOSIS — I48.0 PAROXYSMAL ATRIAL FIBRILLATION: ICD-10-CM

## 2019-03-21 PROCEDURE — 93010 RHYTHM STRIP: ICD-10-PCS | Mod: S$PBB,,, | Performed by: INTERNAL MEDICINE

## 2019-03-21 PROCEDURE — 93005 ELECTROCARDIOGRAM TRACING: CPT | Mod: PBBFAC | Performed by: INTERNAL MEDICINE

## 2019-03-21 PROCEDURE — 93010 ELECTROCARDIOGRAM REPORT: CPT | Mod: S$PBB,,, | Performed by: INTERNAL MEDICINE

## 2019-03-21 RX ORDER — AMIODARONE HYDROCHLORIDE 200 MG/1
TABLET ORAL
Qty: 60 TABLET | Refills: 0 | Status: SHIPPED | OUTPATIENT
Start: 2019-03-21 | End: 2019-06-21 | Stop reason: SDUPTHER

## 2019-03-21 RX ORDER — AMIODARONE HYDROCHLORIDE 200 MG/1
200 TABLET ORAL DAILY
Qty: 90 TABLET | Refills: 3 | Status: SHIPPED | OUTPATIENT
Start: 2019-03-21 | End: 2019-03-25 | Stop reason: SDUPTHER

## 2019-03-21 NOTE — TELEPHONE ENCOUNTER
Patient arrives to  complaining of feelings that her heart is racing out of her chest- feels she went back into AFIB after 24 hours of being in NSR. Patient underwent DCCV on 3-18-19.  Patient to go get EKG and then I will show EKG to .    Billy JOSE CCM

## 2019-03-21 NOTE — TELEPHONE ENCOUNTER
"Heparin Anti-Xa 1.73.    Per Dr. Watts: "Cut down to 1.25 bid and repeat test after a week or so -- trough"  "

## 2019-03-21 NOTE — PROGRESS NOTES
reviewed EKG from today, the following orders obtained:    TSH level today  Stop Flecainide  Start Amiodarone 200 mg po BID for 4 weeks, then Amiodarone 200 mg po daily.  Arrange DCCV in 4 weeks (No JILL needed).      Billy JOSE CCM     Mom requesting a referral be placed for Madie to see a counselor to discuss her nerves/anxiety. Mom states she has missed 2 days of school this week due to her nervousness and wanting to throw up. Please call mom with any questions.

## 2019-03-21 NOTE — TELEPHONE ENCOUNTER
Spoke with patient. Notified that per Flor Lawson NP-   wants patient to decrease eliquis to 1.25mg BID and repeat Anti-XA in 1 week. appt made.  Pt verbalized understanding. No further questions or concerns at this time.        Billy JOSE CCM

## 2019-03-21 NOTE — TELEPHONE ENCOUNTER
"Flor Lawson NP   You 17 minutes ago (3:50 PM)      Mica, can you please call this patient and tell her we would like her to cut down her eliquis to 1.25 bid (1/2 tab twice daily) and get an anti-Xa heparin test (trough) test after a week or so? Orders are in.   Thanks!    Routing Comment       Flor Lawson NP 24 minutes ago (3:44 PM)         Heparin Anti-Xa 1.73.     Per Dr. Watts: "Cut down to 1.25 bid and repeat test after a week or so -- trough"         Documentation        "

## 2019-03-22 ENCOUNTER — TELEPHONE (OUTPATIENT)
Dept: ELECTROPHYSIOLOGY | Facility: CLINIC | Age: 84
End: 2019-03-22

## 2019-03-22 DIAGNOSIS — Z79.899 LONG TERM CURRENT USE OF ANTIARRHYTHMIC DRUG: ICD-10-CM

## 2019-03-22 DIAGNOSIS — I48.0 PAROXYSMAL ATRIAL FIBRILLATION: Primary | ICD-10-CM

## 2019-03-22 NOTE — TELEPHONE ENCOUNTER
----- Message from Berenice Hilliard sent at 3/22/2019  2:05 PM CDT -----  Contact: Pt called  Pt calling for directions on how to take her eliquis. Please call pt @ [phone]. Thank you.

## 2019-03-22 NOTE — TELEPHONE ENCOUNTER
----- Message from Asher Watts MD sent at 3/22/2019 12:05 AM CDT -----  Results are abnormal.  Please see comments below and call patient.  In general you do not need to route back to me.  She is hyperthyroid with current replacement  Stop synthroid for a week  Hold amio for now  Repeat TSH in 1 week  At which time we will adjust dosing of synthroid and rec what to do with amio

## 2019-03-22 NOTE — TELEPHONE ENCOUNTER
Spoke with patient- notified that:   wants patient to decrease eliquis to 1.25mg BID.      Billy JOSE CCM

## 2019-03-22 NOTE — TELEPHONE ENCOUNTER
Spoke with patient. Notified that  reviewed recent LABS and provided with the following results         Results are abnormal.   Please see comments below and call patient.   In general you do not need to route back to me.   She is hyperthyroid with current replacement   Stop synthroid for a week   Hold amio for now   Repeat TSH in 1 week  At which time we will adjust dosing of synthroid and rec what to do with amio       Advised pt to call clinic if pt had any questions or concerns. Pt verbalized understanding. No further questions or concerns at this time.      Billy JOSE CCM

## 2019-03-25 DIAGNOSIS — I48.0 PAROXYSMAL ATRIAL FIBRILLATION: ICD-10-CM

## 2019-03-26 RX ORDER — AMIODARONE HYDROCHLORIDE 200 MG/1
200 TABLET ORAL DAILY
Qty: 90 TABLET | Refills: 3 | Status: SHIPPED | OUTPATIENT
Start: 2019-03-26 | End: 2020-03-03

## 2019-03-28 ENCOUNTER — TELEPHONE (OUTPATIENT)
Dept: ELECTROPHYSIOLOGY | Facility: CLINIC | Age: 84
End: 2019-03-28

## 2019-03-28 NOTE — TELEPHONE ENCOUNTER
Notes recorded by Asher Watts MD on 3/27/2019 at 1:59 PM CDT  Please see comments below and call patient.  In general you do not need to route back to me.  OK range   Keep same for now

## 2019-03-29 ENCOUNTER — LAB VISIT (OUTPATIENT)
Dept: LAB | Facility: HOSPITAL | Age: 84
End: 2019-03-29
Attending: NURSE PRACTITIONER
Payer: MEDICARE

## 2019-03-29 DIAGNOSIS — I48.0 PAROXYSMAL ATRIAL FIBRILLATION: ICD-10-CM

## 2019-03-29 DIAGNOSIS — Z79.899 LONG TERM CURRENT USE OF ANTIARRHYTHMIC DRUG: ICD-10-CM

## 2019-03-29 LAB
FACT X PPP CHRO-ACNC: 1.59 IU/ML (ref 0.3–0.7)
T4 FREE SERPL-MCNC: 1.3 NG/DL (ref 0.71–1.51)
TSH SERPL DL<=0.005 MIU/L-ACNC: 0.24 UIU/ML (ref 0.4–4)

## 2019-03-29 PROCEDURE — 84439 ASSAY OF FREE THYROXINE: CPT

## 2019-03-29 PROCEDURE — 36415 COLL VENOUS BLD VENIPUNCTURE: CPT

## 2019-03-29 PROCEDURE — 84443 ASSAY THYROID STIM HORMONE: CPT

## 2019-03-29 PROCEDURE — 85520 HEPARIN ASSAY: CPT

## 2019-04-01 ENCOUNTER — TELEPHONE (OUTPATIENT)
Dept: CARDIOLOGY | Facility: CLINIC | Age: 84
End: 2019-04-01

## 2019-04-01 NOTE — TELEPHONE ENCOUNTER
----- Message from Asher Watts MD sent at 3/29/2019  6:29 PM CDT -----  Reviewed results. All OK. Please open telephone encounter, call patient and inform him/ her of results,then document in encounter.  Please do not route back to me.   Thanks.

## 2019-04-01 NOTE — TELEPHONE ENCOUNTER
Telephoned results to patient with no changes .  Patient stated she still gets short of breath but will follow up on 4/25/19 as scheduled

## 2019-04-02 ENCOUNTER — TELEPHONE (OUTPATIENT)
Dept: ELECTROPHYSIOLOGY | Facility: CLINIC | Age: 84
End: 2019-04-02

## 2019-04-02 NOTE — TELEPHONE ENCOUNTER
Please see comments below and call patient.   In general you do not need to route back to me.   Stable

## 2019-04-09 ENCOUNTER — DOCUMENTATION ONLY (OUTPATIENT)
Dept: ELECTROPHYSIOLOGY | Facility: CLINIC | Age: 84
End: 2019-04-09

## 2019-04-09 NOTE — PROGRESS NOTES
Request from pharmacy rec'd inquiring if MD wants patient on amiodarone and flecainide. Form placed in 's bin for MD tor review/sign.      Billy JOSE CCM

## 2019-04-10 ENCOUNTER — TELEPHONE (OUTPATIENT)
Dept: ELECTROPHYSIOLOGY | Facility: CLINIC | Age: 84
End: 2019-04-10

## 2019-04-10 DIAGNOSIS — I48.0 PAROXYSMAL ATRIAL FIBRILLATION: Primary | ICD-10-CM

## 2019-04-10 NOTE — TELEPHONE ENCOUNTER
Patient would like to wait to have DCCV when  is back in the office. She reports feeling fine with regards to her heart rate. Patient will call if she begins feels bad related to her heart rate. Spoke with patient provided potential date for EP procedure. Patient in agreement. Will sent instructions via portal and mail.     Billy JOSE CCM

## 2019-04-24 ENCOUNTER — LAB VISIT (OUTPATIENT)
Dept: LAB | Facility: HOSPITAL | Age: 84
End: 2019-04-24
Attending: INTERNAL MEDICINE
Payer: MEDICARE

## 2019-04-24 ENCOUNTER — TELEPHONE (OUTPATIENT)
Dept: CARDIOLOGY | Facility: HOSPITAL | Age: 84
End: 2019-04-24

## 2019-04-24 DIAGNOSIS — I48.0 PAROXYSMAL ATRIAL FIBRILLATION: ICD-10-CM

## 2019-04-24 LAB
ANION GAP SERPL CALC-SCNC: 9 MMOL/L (ref 8–16)
APTT BLDCRRT: 28.5 SEC (ref 21–32)
BASOPHILS # BLD AUTO: 0.02 K/UL (ref 0–0.2)
BASOPHILS NFR BLD: 0.3 % (ref 0–1.9)
BUN SERPL-MCNC: 30 MG/DL (ref 8–23)
CALCIUM SERPL-MCNC: 9.6 MG/DL (ref 8.7–10.5)
CHLORIDE SERPL-SCNC: 106 MMOL/L (ref 95–110)
CO2 SERPL-SCNC: 23 MMOL/L (ref 23–29)
CREAT SERPL-MCNC: 1.4 MG/DL (ref 0.5–1.4)
DIFFERENTIAL METHOD: ABNORMAL
EOSINOPHIL # BLD AUTO: 0.1 K/UL (ref 0–0.5)
EOSINOPHIL NFR BLD: 2.2 % (ref 0–8)
ERYTHROCYTE [DISTWIDTH] IN BLOOD BY AUTOMATED COUNT: 13.3 % (ref 11.5–14.5)
EST. GFR  (AFRICAN AMERICAN): 40 ML/MIN/1.73 M^2
EST. GFR  (NON AFRICAN AMERICAN): 34 ML/MIN/1.73 M^2
GLUCOSE SERPL-MCNC: 102 MG/DL (ref 70–110)
HCT VFR BLD AUTO: 35.7 % (ref 37–48.5)
HGB BLD-MCNC: 11.1 G/DL (ref 12–16)
INR PPP: 1 (ref 0.8–1.2)
LYMPHOCYTES # BLD AUTO: 1.8 K/UL (ref 1–4.8)
LYMPHOCYTES NFR BLD: 28.8 % (ref 18–48)
MCH RBC QN AUTO: 28.6 PG (ref 27–31)
MCHC RBC AUTO-ENTMCNC: 31.1 G/DL (ref 32–36)
MCV RBC AUTO: 92 FL (ref 82–98)
MONOCYTES # BLD AUTO: 0.7 K/UL (ref 0.3–1)
MONOCYTES NFR BLD: 11.5 % (ref 4–15)
NEUTROPHILS # BLD AUTO: 3.5 K/UL (ref 1.8–7.7)
NEUTROPHILS NFR BLD: 56.6 % (ref 38–73)
PLATELET # BLD AUTO: 248 K/UL (ref 150–350)
PMV BLD AUTO: 9.4 FL (ref 9.2–12.9)
POTASSIUM SERPL-SCNC: 5.3 MMOL/L (ref 3.5–5.1)
PROTHROMBIN TIME: 10.6 SEC (ref 9–12.5)
RBC # BLD AUTO: 3.88 M/UL (ref 4–5.4)
SODIUM SERPL-SCNC: 138 MMOL/L (ref 136–145)
WBC # BLD AUTO: 6.25 K/UL (ref 3.9–12.7)

## 2019-04-24 PROCEDURE — 85025 COMPLETE CBC W/AUTO DIFF WBC: CPT

## 2019-04-24 PROCEDURE — 36415 COLL VENOUS BLD VENIPUNCTURE: CPT

## 2019-04-24 PROCEDURE — 85730 THROMBOPLASTIN TIME PARTIAL: CPT

## 2019-04-24 PROCEDURE — 85610 PROTHROMBIN TIME: CPT

## 2019-04-24 PROCEDURE — 80048 BASIC METABOLIC PNL TOTAL CA: CPT

## 2019-04-25 ENCOUNTER — TELEPHONE (OUTPATIENT)
Dept: ELECTROPHYSIOLOGY | Facility: CLINIC | Age: 84
End: 2019-04-25

## 2019-04-25 ENCOUNTER — CLINICAL SUPPORT (OUTPATIENT)
Dept: CARDIOLOGY | Facility: HOSPITAL | Age: 84
End: 2019-04-25
Attending: INTERNAL MEDICINE
Payer: MEDICARE

## 2019-04-25 DIAGNOSIS — I49.5 SSS (SICK SINUS SYNDROME): ICD-10-CM

## 2019-04-25 DIAGNOSIS — Z95.0 CARDIAC PACEMAKER IN SITU: ICD-10-CM

## 2019-04-25 PROCEDURE — 93280 PM DEVICE PROGR EVAL DUAL: CPT

## 2019-04-25 NOTE — TELEPHONE ENCOUNTER
Message sent to Ursula to alert pt is transferring to  from .     Notified that on 3-21-19  stopped Flecainide and wanted pt to start amiodarone load. On 3-22-19 MD instructed patient to hold off on starting amiodarone.    Will cancel DCCV arranged for 4-30-19 with , as per Boni HICKMAN recommendations.    Notified patient that DCCV will be canceled and await further recommendations. Patient reports that she is taking amiodarone at present.     Notified Ursula NP that pt states she was instructed to start amiodarone pt is unsure who or when she was told to resume medication. DCCV canceled on 4-30-19. Pt to see  on 4-26-19.    Billy JOSE CCM

## 2019-04-25 NOTE — TELEPHONE ENCOUNTER
----- Message from Flor Lawson NP sent at 4/24/2019  5:07 PM CDT -----  Do we know why this patient is seeing Dr. Colmenares on Friday?   I see her labs came in for her DCCV - is she still having this 4/30/2019? I don't see where Dr. Watts wanted her to restart her amiodarone - if she hasn't started this then we should defer the DCCV anyway. She should definitely NOT be taking flecainide with amiodarone.      Thanks, JASPER

## 2019-04-26 ENCOUNTER — HOSPITAL ENCOUNTER (OUTPATIENT)
Dept: CARDIOLOGY | Facility: CLINIC | Age: 84
Discharge: HOME | End: 2019-04-26
Payer: MEDICARE

## 2019-04-26 ENCOUNTER — OFFICE VISIT (OUTPATIENT)
Dept: ELECTROPHYSIOLOGY | Facility: CLINIC | Age: 84
End: 2019-04-26
Attending: INTERNAL MEDICINE
Payer: MEDICARE

## 2019-04-26 VITALS
DIASTOLIC BLOOD PRESSURE: 80 MMHG | BODY MASS INDEX: 23.25 KG/M2 | SYSTOLIC BLOOD PRESSURE: 110 MMHG | HEART RATE: 100 BPM | HEIGHT: 63 IN | WEIGHT: 131.19 LBS

## 2019-04-26 DIAGNOSIS — I45.10 RBBB: ICD-10-CM

## 2019-04-26 DIAGNOSIS — Z79.899 LONG TERM CURRENT USE OF ANTIARRHYTHMIC DRUG: ICD-10-CM

## 2019-04-26 DIAGNOSIS — I49.3 PVC (PREMATURE VENTRICULAR CONTRACTION): ICD-10-CM

## 2019-04-26 DIAGNOSIS — E78.00 HYPERCHOLESTEROLEMIA: ICD-10-CM

## 2019-04-26 DIAGNOSIS — Z95.0 PACEMAKER: ICD-10-CM

## 2019-04-26 DIAGNOSIS — Z79.01 CURRENT USE OF LONG TERM ANTICOAGULATION: ICD-10-CM

## 2019-04-26 DIAGNOSIS — I48.0 PAROXYSMAL ATRIAL FIBRILLATION: Primary | ICD-10-CM

## 2019-04-26 DIAGNOSIS — I10 ESSENTIAL HYPERTENSION: ICD-10-CM

## 2019-04-26 DIAGNOSIS — I49.5 SSS (SICK SINUS SYNDROME): ICD-10-CM

## 2019-04-26 PROCEDURE — 99999 PR PBB SHADOW E&M-EST. PATIENT-LVL III: ICD-10-PCS | Mod: PBBFAC,,, | Performed by: INTERNAL MEDICINE

## 2019-04-26 PROCEDURE — 93005 ELECTROCARDIOGRAM TRACING: CPT | Mod: PBBFAC | Performed by: INTERNAL MEDICINE

## 2019-04-26 PROCEDURE — 99999 PR PBB SHADOW E&M-EST. PATIENT-LVL III: CPT | Mod: PBBFAC,,, | Performed by: INTERNAL MEDICINE

## 2019-04-26 PROCEDURE — 99213 OFFICE O/P EST LOW 20 MIN: CPT | Mod: PBBFAC,25 | Performed by: INTERNAL MEDICINE

## 2019-04-26 PROCEDURE — 99215 PR OFFICE/OUTPT VISIT, EST, LEVL V, 40-54 MIN: ICD-10-PCS | Mod: S$PBB,,, | Performed by: INTERNAL MEDICINE

## 2019-04-26 PROCEDURE — 93010 RHYTHM STRIP: ICD-10-PCS | Mod: S$PBB,,, | Performed by: INTERNAL MEDICINE

## 2019-04-26 PROCEDURE — 99215 OFFICE O/P EST HI 40 MIN: CPT | Mod: S$PBB,,, | Performed by: INTERNAL MEDICINE

## 2019-04-26 PROCEDURE — 93010 ELECTROCARDIOGRAM REPORT: CPT | Mod: S$PBB,,, | Performed by: INTERNAL MEDICINE

## 2019-04-26 RX ORDER — ATENOLOL 25 MG/1
TABLET ORAL
Qty: 270 TABLET | Refills: 3 | Status: SHIPPED | OUTPATIENT
Start: 2019-04-26 | End: 2020-04-23

## 2019-04-26 RX ORDER — ESOMEPRAZOLE MAGNESIUM 40 MG/1
40 CAPSULE, DELAYED RELEASE ORAL
Status: ON HOLD | COMMUNITY
Start: 2015-12-07 | End: 2019-05-14 | Stop reason: HOSPADM

## 2019-04-26 NOTE — PROGRESS NOTES
Subjective:    Patient ID:  Gisselle Ortiz is a 85 y.o. female who presents for evaluation of AF    Prior pt of Dr Marlen Corado    HPI  85 y.o. F  PAF on meds  hyperthyroid, resected  PPM for SB    Gen change 1/2019. AF/AFL noted on f/u. Propafenone increased. Still had AF with RVR after that.  Changed to flecainide, planned for DCCV; at some point changed to amio. Unclear timing of these changes.  TSH found to be abnormal, but T4 normal.  In past, following DCCV, she felt great! -- until AF recurred.    PPM: V paced 10%    My interpretation of today's ECG is AF with V response 100 bpm    Review of Systems   Constitution: Negative. Negative for malaise/fatigue.   HENT: Negative.  Negative for ear pain and tinnitus.    Eyes: Negative for blurred vision.   Cardiovascular: Positive for dyspnea on exertion. Negative for chest pain, near-syncope, palpitations and syncope.   Respiratory: Negative.  Negative for shortness of breath.    Endocrine: Negative.  Negative for polyuria.   Hematologic/Lymphatic: Does not bruise/bleed easily.   Skin: Negative.  Negative for rash.   Musculoskeletal: Negative.  Negative for joint pain and muscle weakness.   Gastrointestinal: Negative.  Negative for abdominal pain and change in bowel habit.   Genitourinary: Negative for frequency.   Neurological: Negative.  Negative for dizziness and weakness.   Psychiatric/Behavioral: Negative.  Negative for depression. The patient is not nervous/anxious.    Allergic/Immunologic: Negative for environmental allergies.        Objective:    Physical Exam   Constitutional: She is oriented to person, place, and time. Vital signs are normal. She appears well-developed and well-nourished. She is active and cooperative.   HENT:   Head: Normocephalic and atraumatic.   Eyes: Conjunctivae and EOM are normal.   Neck: Normal range of motion. Carotid bruit is not present. No tracheal deviation and no edema present. No thyroid mass and no thyromegaly present.    Cardiovascular: Normal rate, normal heart sounds, intact distal pulses and normal pulses. An irregularly irregular rhythm present.  No extrasystoles are present. PMI is not displaced. Exam reveals no gallop and no friction rub.   No murmur heard.  Pulmonary/Chest: Effort normal and breath sounds normal. No respiratory distress. She has no wheezes. She has no rales.   Abdominal: Soft. Normal appearance. She exhibits no distension. There is no hepatosplenomegaly.   Musculoskeletal: Normal range of motion.   Neurological: She is alert and oriented to person, place, and time. Coordination normal.   Skin: Skin is warm and dry. No rash noted.   Psychiatric: She has a normal mood and affect. Her speech is normal and behavior is normal. Thought content normal. Cognition and memory are normal.   Nursing note and vitals reviewed.        Assessment:       1. Paroxysmal atrial fibrillation    2. Pacemaker    3. Essential hypertension    4. RBBB    5. Hypercholesterolemia    6. SSS (sick sinus syndrome)    7. PVC (premature ventricular contraction)    8. Current use of long term anticoagulation    9. Long term current use of antiarrhythmic drug         Plan:       Now that amio is on board, plan JILL/DCCV.  Increased eliquis to therapeutic dose: 2.5 bid.    I discussed with patient risks, indications, benefits, and alternatives of the planned procedure. All questions were answered. Patient understands and wishes to proceed.

## 2019-04-30 ENCOUNTER — CLINICAL SUPPORT (OUTPATIENT)
Dept: CARDIOLOGY | Facility: HOSPITAL | Age: 84
End: 2019-04-30
Attending: INTERNAL MEDICINE
Payer: MEDICARE

## 2019-04-30 DIAGNOSIS — I48.0 PAROXYSMAL ATRIAL FIBRILLATION: Primary | ICD-10-CM

## 2019-04-30 PROCEDURE — 93294 CARDIAC DEVICE CHECK CHECK - REMOTE: ICD-10-PCS | Mod: ,,, | Performed by: INTERNAL MEDICINE

## 2019-04-30 PROCEDURE — 93296 REM INTERROG EVL PM/IDS: CPT

## 2019-04-30 PROCEDURE — 93294 REM INTERROG EVL PM/LDLS PM: CPT | Mod: ,,, | Performed by: INTERNAL MEDICINE

## 2019-05-08 ENCOUNTER — LAB VISIT (OUTPATIENT)
Dept: LAB | Facility: HOSPITAL | Age: 84
End: 2019-05-08
Attending: INTERNAL MEDICINE
Payer: MEDICARE

## 2019-05-08 DIAGNOSIS — I48.0 PAROXYSMAL ATRIAL FIBRILLATION: ICD-10-CM

## 2019-05-08 LAB
ANION GAP SERPL CALC-SCNC: 8 MMOL/L (ref 8–16)
APTT BLDCRRT: 27.4 SEC (ref 21–32)
BASOPHILS # BLD AUTO: 0.02 K/UL (ref 0–0.2)
BASOPHILS NFR BLD: 0.3 % (ref 0–1.9)
BUN SERPL-MCNC: 16 MG/DL (ref 8–23)
CALCIUM SERPL-MCNC: 9.6 MG/DL (ref 8.7–10.5)
CHLORIDE SERPL-SCNC: 107 MMOL/L (ref 95–110)
CO2 SERPL-SCNC: 23 MMOL/L (ref 23–29)
CREAT SERPL-MCNC: 1.4 MG/DL (ref 0.5–1.4)
DIFFERENTIAL METHOD: ABNORMAL
EOSINOPHIL # BLD AUTO: 0.1 K/UL (ref 0–0.5)
EOSINOPHIL NFR BLD: 1.9 % (ref 0–8)
ERYTHROCYTE [DISTWIDTH] IN BLOOD BY AUTOMATED COUNT: 13.8 % (ref 11.5–14.5)
EST. GFR  (AFRICAN AMERICAN): 40 ML/MIN/1.73 M^2
EST. GFR  (NON AFRICAN AMERICAN): 34 ML/MIN/1.73 M^2
GLUCOSE SERPL-MCNC: 97 MG/DL (ref 70–110)
HCT VFR BLD AUTO: 37.4 % (ref 37–48.5)
HGB BLD-MCNC: 11.7 G/DL (ref 12–16)
INR PPP: 1 (ref 0.8–1.2)
LYMPHOCYTES # BLD AUTO: 1.4 K/UL (ref 1–4.8)
LYMPHOCYTES NFR BLD: 24.9 % (ref 18–48)
MCH RBC QN AUTO: 28.7 PG (ref 27–31)
MCHC RBC AUTO-ENTMCNC: 31.3 G/DL (ref 32–36)
MCV RBC AUTO: 92 FL (ref 82–98)
MONOCYTES # BLD AUTO: 0.6 K/UL (ref 0.3–1)
MONOCYTES NFR BLD: 10.2 % (ref 4–15)
NEUTROPHILS # BLD AUTO: 3.6 K/UL (ref 1.8–7.7)
NEUTROPHILS NFR BLD: 62.4 % (ref 38–73)
PLATELET # BLD AUTO: 244 K/UL (ref 150–350)
PMV BLD AUTO: 9.5 FL (ref 9.2–12.9)
POTASSIUM SERPL-SCNC: 5.1 MMOL/L (ref 3.5–5.1)
PROTHROMBIN TIME: 10.7 SEC (ref 9–12.5)
RBC # BLD AUTO: 4.07 M/UL (ref 4–5.4)
SODIUM SERPL-SCNC: 138 MMOL/L (ref 136–145)
WBC # BLD AUTO: 5.78 K/UL (ref 3.9–12.7)

## 2019-05-08 PROCEDURE — 36415 COLL VENOUS BLD VENIPUNCTURE: CPT

## 2019-05-08 PROCEDURE — 80048 BASIC METABOLIC PNL TOTAL CA: CPT

## 2019-05-08 PROCEDURE — 85730 THROMBOPLASTIN TIME PARTIAL: CPT

## 2019-05-08 PROCEDURE — 85025 COMPLETE CBC W/AUTO DIFF WBC: CPT

## 2019-05-08 PROCEDURE — 85610 PROTHROMBIN TIME: CPT

## 2019-05-13 ENCOUNTER — TELEPHONE (OUTPATIENT)
Dept: ELECTROPHYSIOLOGY | Facility: CLINIC | Age: 84
End: 2019-05-13

## 2019-05-13 NOTE — TELEPHONE ENCOUNTER
Called pt to review pre op instructions for DCCV in AM. Reminded pt to fast after midnight, take usual AM meds including eliquis with small sip of water, and arrive for 9 AM to cardiology waiting area. Pt verbalized understanding.

## 2019-05-14 ENCOUNTER — ANESTHESIA (OUTPATIENT)
Dept: MEDSURG UNIT | Facility: HOSPITAL | Age: 84
End: 2019-05-14
Payer: MEDICARE

## 2019-05-14 ENCOUNTER — HOSPITAL ENCOUNTER (OUTPATIENT)
Dept: CARDIOLOGY | Facility: CLINIC | Age: 84
Discharge: HOME | End: 2019-05-14
Payer: MEDICARE

## 2019-05-14 ENCOUNTER — ANESTHESIA EVENT (OUTPATIENT)
Dept: MEDSURG UNIT | Facility: HOSPITAL | Age: 84
End: 2019-05-14
Payer: MEDICARE

## 2019-05-14 ENCOUNTER — HOSPITAL ENCOUNTER (OUTPATIENT)
Facility: HOSPITAL | Age: 84
Discharge: HOME | End: 2019-05-14
Attending: INTERNAL MEDICINE | Admitting: INTERNAL MEDICINE
Payer: MEDICARE

## 2019-05-14 VITALS
HEART RATE: 60 BPM | HEIGHT: 63 IN | DIASTOLIC BLOOD PRESSURE: 58 MMHG | TEMPERATURE: 98 F | RESPIRATION RATE: 18 BRPM | OXYGEN SATURATION: 98 % | WEIGHT: 128 LBS | BODY MASS INDEX: 22.68 KG/M2 | SYSTOLIC BLOOD PRESSURE: 122 MMHG

## 2019-05-14 DIAGNOSIS — I48.0 PAROXYSMAL ATRIAL FIBRILLATION: Primary | ICD-10-CM

## 2019-05-14 DIAGNOSIS — I48.91 ATRIAL FIBRILLATION: ICD-10-CM

## 2019-05-14 LAB — BSA FOR ECHO PROCEDURE: 1.61 M2

## 2019-05-14 PROCEDURE — D9220A PRA ANESTHESIA: Mod: ANES,,, | Performed by: ANESTHESIOLOGY

## 2019-05-14 PROCEDURE — 37000009 HC ANESTHESIA EA ADD 15 MINS: Performed by: INTERNAL MEDICINE

## 2019-05-14 PROCEDURE — 25000003 PHARM REV CODE 250: Performed by: INTERNAL MEDICINE

## 2019-05-14 PROCEDURE — 93010 ELECTROCARDIOGRAM REPORT: CPT | Mod: ,,, | Performed by: INTERNAL MEDICINE

## 2019-05-14 PROCEDURE — 37000008 HC ANESTHESIA 1ST 15 MINUTES: Performed by: INTERNAL MEDICINE

## 2019-05-14 PROCEDURE — 93005 ELECTROCARDIOGRAM TRACING: CPT | Mod: 59

## 2019-05-14 PROCEDURE — 92960 CARDIOVERSION ELECTRIC EXT: CPT | Mod: ,,, | Performed by: INTERNAL MEDICINE

## 2019-05-14 PROCEDURE — 71000033 HC RECOVERY, INTIAL HOUR: Performed by: INTERNAL MEDICINE

## 2019-05-14 PROCEDURE — 25000003 PHARM REV CODE 250: Performed by: NURSE ANESTHETIST, CERTIFIED REGISTERED

## 2019-05-14 PROCEDURE — D9220A PRA ANESTHESIA: Mod: CRNA,,, | Performed by: NURSE ANESTHETIST, CERTIFIED REGISTERED

## 2019-05-14 PROCEDURE — 63600175 PHARM REV CODE 636 W HCPCS: Performed by: NURSE ANESTHETIST, CERTIFIED REGISTERED

## 2019-05-14 PROCEDURE — 92960 PR CARDIOVERSION, ELECTIVE;EXTERN: ICD-10-PCS | Mod: ,,, | Performed by: INTERNAL MEDICINE

## 2019-05-14 PROCEDURE — 93010 EKG 12-LEAD: ICD-10-PCS | Mod: 76,,, | Performed by: INTERNAL MEDICINE

## 2019-05-14 PROCEDURE — 92960 CARDIOVERSION ELECTRIC EXT: CPT | Performed by: INTERNAL MEDICINE

## 2019-05-14 PROCEDURE — D9220A PRA ANESTHESIA: ICD-10-PCS | Mod: ANES,,, | Performed by: ANESTHESIOLOGY

## 2019-05-14 PROCEDURE — D9220A PRA ANESTHESIA: ICD-10-PCS | Mod: CRNA,,, | Performed by: NURSE ANESTHETIST, CERTIFIED REGISTERED

## 2019-05-14 RX ORDER — HYDROMORPHONE HYDROCHLORIDE 1 MG/ML
0.2 INJECTION, SOLUTION INTRAMUSCULAR; INTRAVENOUS; SUBCUTANEOUS EVERY 5 MIN PRN
Status: DISCONTINUED | OUTPATIENT
Start: 2019-05-14 | End: 2019-05-14 | Stop reason: HOSPADM

## 2019-05-14 RX ORDER — MEPERIDINE HYDROCHLORIDE 50 MG/ML
12.5 INJECTION INTRAMUSCULAR; INTRAVENOUS; SUBCUTANEOUS ONCE AS NEEDED
Status: DISCONTINUED | OUTPATIENT
Start: 2019-05-14 | End: 2019-05-14 | Stop reason: HOSPADM

## 2019-05-14 RX ORDER — LIDOCAINE HYDROCHLORIDE 20 MG/ML
SOLUTION OROPHARYNGEAL
Status: DISCONTINUED | OUTPATIENT
Start: 2019-05-14 | End: 2019-05-14

## 2019-05-14 RX ORDER — LIDOCAINE HCL/PF 100 MG/5ML
SYRINGE (ML) INTRAVENOUS
Status: DISCONTINUED | OUTPATIENT
Start: 2019-05-14 | End: 2019-05-14

## 2019-05-14 RX ORDER — SODIUM CHLORIDE 0.9 % (FLUSH) 0.9 %
5 SYRINGE (ML) INJECTION
Status: DISCONTINUED | OUTPATIENT
Start: 2019-05-14 | End: 2020-06-12

## 2019-05-14 RX ORDER — PROPOFOL 10 MG/ML
VIAL (ML) INTRAVENOUS CONTINUOUS PRN
Status: DISCONTINUED | OUTPATIENT
Start: 2019-05-14 | End: 2019-05-14

## 2019-05-14 RX ORDER — DILTIAZEM HYDROCHLORIDE 120 MG/1
120 CAPSULE, COATED, EXTENDED RELEASE ORAL DAILY
COMMUNITY
End: 2020-07-17 | Stop reason: SDUPTHER

## 2019-05-14 RX ORDER — SODIUM CHLORIDE 9 MG/ML
INJECTION, SOLUTION INTRAVENOUS CONTINUOUS
Status: DISCONTINUED | OUTPATIENT
Start: 2019-05-14 | End: 2019-05-14 | Stop reason: HOSPADM

## 2019-05-14 RX ORDER — SILVER SULFADIAZINE 10 G/1000G
CREAM TOPICAL
Status: DISCONTINUED | OUTPATIENT
Start: 2019-05-14 | End: 2019-05-14 | Stop reason: HOSPADM

## 2019-05-14 RX ORDER — PROPOFOL 10 MG/ML
VIAL (ML) INTRAVENOUS
Status: DISCONTINUED | OUTPATIENT
Start: 2019-05-14 | End: 2019-05-14

## 2019-05-14 RX ADMIN — LIDOCAINE HYDROCHLORIDE 15 ML: 20 SOLUTION OROPHARYNGEAL at 11:05

## 2019-05-14 RX ADMIN — PROPOFOL 100 MCG/KG/MIN: 10 INJECTION, EMULSION INTRAVENOUS at 11:05

## 2019-05-14 RX ADMIN — PROPOFOL 70 MG: 10 INJECTION, EMULSION INTRAVENOUS at 11:05

## 2019-05-14 RX ADMIN — LIDOCAINE HYDROCHLORIDE 100 MG: 20 INJECTION, SOLUTION INTRAVENOUS at 11:05

## 2019-05-14 NOTE — SUBJECTIVE & OBJECTIVE
Past Medical History:   Diagnosis Date    A-fib     Arthritis     Hypertension     Thyroid disease        Past Surgical History:   Procedure Laterality Date    CARDIOVERSION OR DEFIBRILLATION N/A 3/18/2019    Performed by Asher Watts [facility] at [facility] EP LAB    CATARACT EXTRACTION      CATARACT EXTRACTION W/  INTRAOCULAR LENS IMPLANT Bilateral 2004     IN [place]    EYE SURGERY      HIP REPLACEMENT ARTHROPLASTY      HYSTERECTOMY      JOINT REPLACEMENT      REVISION-POCKET-PACEMAKER N/A 1/21/2019    Performed by Asher Watts MD at [facility] EP LAB    Transesophageal echo (JILL) intra-procedure log documentation N/A 3/18/2019    Performed by [facility] Diagnostic Provider at [facility] EP LAB       Review of patient's allergies indicates:  No Known Allergies    Current Facility-Administered Medications on File Prior to Encounter   Medication    0.9%  NaCl infusion    0.9%  NaCl infusion    sodium chloride 0.9% flush 5 mL     Current Outpatient Medications on File Prior to Encounter   Medication Sig    amiodarone (PACERONE) 200 MG Tab Take 1 tablet (200 mg) by mouth twice a day for 4 weeks, then take 1 tablet (200mg) by mouth daily.    amiodarone (PACERONE) 200 MG Tab Take 1 tablet (200 mg total) by mouth once daily. Start taking only 1 tablet by mouth every day on 4-19-19.    aspirin (ECOTRIN) 81 MG EC tablet Take 81 mg by mouth once daily.    atorvastatin (LIPITOR) 10 MG tablet Take 10 mg by mouth once daily.     diltiaZEM (CARDIZEM CD) 120 MG Cp24 Take 120 mg by mouth once daily.     eszopiclone (LUNESTA) 2 MG Tab Take 2 mg by mouth every evening.    levothyroxine (SYNTHROID) 200 MCG tablet Take 200 mcg by mouth before breakfast.     mupirocin calcium 2% (BACTROBAN) 2 % cream     pantoprazole (PROTONIX) 40 MG tablet Take 40 mg by mouth daily as needed.     spironolactone (ALDACTONE) 25 MG tablet Take 25 mg by mouth 2 (two) times daily.     Family History     Problem Relation (Age of Onset)     Heart attack Mother, Maternal Grandmother    Heart disease Mother, Maternal Grandmother    Heart failure Mother, Maternal Grandmother    Hypertension Mother, Maternal Grandmother    Stroke Maternal Grandmother        Tobacco Use    Smoking status: Former Smoker     Start date: 5/19/1964    Smokeless tobacco: Never Used   Substance and Sexual Activity    Alcohol use: No    Drug use: No    Sexual activity: Not on file     Review of Systems   Constitution: Negative.   HENT: Negative.    Eyes: Negative.    Cardiovascular: Negative.    Respiratory: Negative.    Endocrine: Negative.    Skin: Negative.    Musculoskeletal: Negative.    Gastrointestinal: Negative.    Genitourinary: Negative.    Neurological: Negative.      Objective:     Vital Signs (Most Recent):    Vital Signs (24h Range):           There is no height or weight on file to calculate BMI.            No intake or output data in the 24 hours ending 05/14/19 0903    Lines/Drains/Airways     Peripheral Intravenous Line                 Peripheral IV - Single Lumen 03/18/19 0815 Left Antecubital 57 days                Physical Exam   Constitutional: She is oriented to person, place, and time. She appears well-developed and well-nourished.   HENT:   Head: Normocephalic and atraumatic.   Eyes: Pupils are equal, round, and reactive to light. Conjunctivae and EOM are normal.   Neck: Normal range of motion. Neck supple. No JVD present.   Cardiovascular: Normal rate, regular rhythm and normal heart sounds. Exam reveals no gallop and no friction rub.   No murmur heard.  Pulmonary/Chest: Effort normal and breath sounds normal. No respiratory distress. She has no wheezes. She has no rales. She exhibits no tenderness.   Abdominal: Soft. Bowel sounds are normal. She exhibits no distension. There is no tenderness.   Musculoskeletal: Normal range of motion. She exhibits no edema or tenderness.   Neurological: She is alert and oriented to person, place, and time.   Skin:  Skin is warm and dry. No erythema. No pallor.       Significant Labs:   Recent Lab Results     None          Significant Imaging:as per hpi

## 2019-05-14 NOTE — PROGRESS NOTES
Patient admitted to recovery see [facility] for complete assessment pacu bcg's maintained safety measures verified patient instructed on pain scale but patient sedated at this time also called for patient's ekg. Called patient's daughter and updated on patient location. Also ekg being done and placed in patient's chart.

## 2019-05-14 NOTE — PLAN OF CARE
Problem: Adult Inpatient Plan of Care  Goal: Plan of Care Review  Outcome: Ongoing (interventions implemented as appropriate)  Pt arrived to unit accompanied by family.  Oriented pt to rm and unit.  Pre op orders and assessment initiated.  Pt remains npo.  Pt in no acute distress and verbalizes no complaints.  Will continue to monitor.  Call bell within reach.

## 2019-05-14 NOTE — ASSESSMENT & PLAN NOTE
1. JILL for evaluation of EAGLE    -The risks, benefits & alternatives of the procedure were explained to the patient.    -The risks of transesophageal echo include but are not limited to:  Dental trauma, esophageal trauma/perforation, bleeding, laryngospasm/brochospasm, aspiration, sore throat/hoarseness, & dislodgement of the endotracheal tube/nasogastric tube (where applicable).    -The risks of moderate sedation include hypotension, respiratory depression, arrhythmias, bronchospasm, & death.    -Informed consent was obtained & the patient is agreeable to proceed with the procedure.    I will discuss with the attending physician. Attending addendum is to follow.     Further recommendations per attending addendum    Shakeel Dawn MD  PGY-V Cardiology Fellow  [phone]

## 2019-05-14 NOTE — ANESTHESIA PREPROCEDURE EVALUATION
05/14/2019  Gisselle Ortiz is a 85 y.o., female.    Anesthesia Evaluation    I have reviewed the Patient Summary Reports.    I have reviewed the Nursing Notes.      Review of Systems  Anesthesia Hx:  No problems with previous Anesthesia    Hematology/Oncology:  Hematology Normal   Oncology Normal     EENT/Dental:EENT/Dental Normal   Cardiovascular:   Hypertension Dysrhythmias atrial fibrillation Pacemaker  SSS (sick sinus syndrome   Pulmonary:  Pulmonary Normal    Renal/:  Renal/ Normal     Hepatic/GI:  Hepatic/GI Normal    Musculoskeletal:  Musculoskeletal Normal    Neurological:  Neurology Normal    Endocrine:   Hypothyroidism    Dermatological:  Skin Normal    Psych:  Psychiatric Normal           Physical Exam  General:  Well nourished    Airway/Jaw/Neck:  Airway Findings: Mouth Opening: Normal Tongue: Normal  General Airway Assessment: Adult  Mallampati: II  TM Distance: Normal, at least 6 cm        Eyes/Ears/Nose:  EYES/EARS/NOSE FINDINGS: Normal   Dental:  Dental Findings: In tact   Chest/Lungs:  Chest/Lungs Clear    Heart/Vascular:  Heart Findings: Normal Heart murmur: negative Vascular Findings: Normal    Abdomen:  Abdomen Findings: Normal    Musculoskeletal:  Musculoskeletal Findings: Normal   Skin:  Skin Findings: Normal    Mental Status:  Mental Status Findings: Normal        Anesthesia Plan  Type of Anesthesia, risks & benefits discussed:  Anesthesia Type:  general  Patient's Preference:   Intra-op Monitoring Plan:   Intra-op Monitoring Plan Comments:   Post Op Pain Control Plan:   Post Op Pain Control Plan Comments:   Induction:   IV  Beta Blocker:  Patient is on a Beta-Blocker and has received one dose within the past 24 hours (No further documentation required).       Informed Consent: Patient understands risks and agrees with Anesthesia plan.  Questions answered. Anesthesia consent  signed with patient.  ASA Score: 3     Day of Surgery Review of History & Physical:    H&P update referred to the surgeon.         Ready For Surgery From Anesthesia Perspective.

## 2019-05-14 NOTE — H&P
[facility]  Cardiology  History and Physical     Patient Name: Gisselle Ortiz  MRN: [id number]  Admission Date: 5/14/2019  Code Status: Prior   Attending Provider: Favian Colmenares MD   Primary Care Physician: Kai Montez MD  Principal Problem:<principal problem not specified>    Patient information was obtained from EMR and patient    Subjective:     Chief Complaint:  AFIB     HPI:  85 y.o. woman with PMHx of pAF, Hyperthyroids s/p resection, DC-PPM(2014 for Sb, SSS) who presents for JILL cardioversion. JILL 3/2019  · Low normal left ventricular systolic function. The estimated ejection fraction is 50%  · Mild global hypokinetic wall motion.  · Normal appearing left atrial appendage. No thrombus is present in the appendage. Abnormal appendage velocities. Emptying velocity 0.2 m/s  · No interatrial septal defect present.  · Mild to moderate tricuspid regurgitation.  · Mildly to moderately reduced right ventricular systolic function.  · Plaque present.  · Normal LV diastolic function.    Presenting today for JILL/DCCV        Past Medical History:   Diagnosis Date    A-fib     Arthritis     Hypertension     Thyroid disease        Past Surgical History:   Procedure Laterality Date    CARDIOVERSION OR DEFIBRILLATION N/A 3/18/2019    Performed by Asher Watts MD at [facility] EP LAB    CATARACT EXTRACTION      CATARACT EXTRACTION W/  INTRAOCULAR LENS IMPLANT Bilateral 2004     IN [place]    EYE SURGERY      HIP REPLACEMENT ARTHROPLASTY      HYSTERECTOMY      JOINT REPLACEMENT      REVISION-POCKET-PACEMAKER N/A 1/21/2019    Performed by Asher Watts MD at [facility] EP LAB    Transesophageal echo (JILL) intra-procedure log documentation N/A 3/18/2019    Performed by [facility] Diagnostic Provider at [facility] EP LAB       Review of patient's allergies indicates:  No Known Allergies    Current Facility-Administered Medications on File Prior to Encounter   Medication    0.9%  NaCl  infusion    0.9%  NaCl infusion    sodium chloride 0.9% flush 5 mL     Current Outpatient Medications on File Prior to Encounter   Medication Sig    amiodarone (PACERONE) 200 MG Tab Take 1 tablet (200 mg) by mouth twice a day for 4 weeks, then take 1 tablet (200mg) by mouth daily.    amiodarone (PACERONE) 200 MG Tab Take 1 tablet (200 mg total) by mouth once daily. Start taking only 1 tablet by mouth every day on 4-19-19.    aspirin (ECOTRIN) 81 MG EC tablet Take 81 mg by mouth once daily.    atorvastatin (LIPITOR) 10 MG tablet Take 10 mg by mouth once daily.     diltiaZEM (CARDIZEM CD) 120 MG Cp24 Take 120 mg by mouth once daily.     eszopiclone (LUNESTA) 2 MG Tab Take 2 mg by mouth every evening.    levothyroxine (SYNTHROID) 200 MCG tablet Take 200 mcg by mouth before breakfast.     mupirocin calcium 2% (BACTROBAN) 2 % cream     pantoprazole (PROTONIX) 40 MG tablet Take 40 mg by mouth daily as needed.     spironolactone (ALDACTONE) 25 MG tablet Take 25 mg by mouth 2 (two) times daily.     Family History     Problem Relation (Age of Onset)    Heart attack Mother, Maternal Grandmother    Heart disease Mother, Maternal Grandmother    Heart failure Mother, Maternal Grandmother    Hypertension Mother, Maternal Grandmother    Stroke Maternal Grandmother        Tobacco Use    Smoking status: Former Smoker     Start date: 5/19/1964    Smokeless tobacco: Never Used   Substance and Sexual Activity    Alcohol use: No    Drug use: No    Sexual activity: Not on file     Review of Systems   Constitution: Negative.   HENT: Negative.    Eyes: Negative.    Cardiovascular: Negative.    Respiratory: Negative.    Endocrine: Negative.    Skin: Negative.    Musculoskeletal: Negative.    Gastrointestinal: Negative.    Genitourinary: Negative.    Neurological: Negative.      Objective:     Vital Signs (Most Recent):    Vital Signs (24h Range):           There is no height or weight on file to calculate BMI.            No  intake or output data in the 24 hours ending 05/14/19 0903    Lines/Drains/Airways     Peripheral Intravenous Line                 Peripheral IV - Single Lumen 03/18/19 0815 Left Antecubital 57 days                Physical Exam   Constitutional: She is oriented to person, place, and time. She appears well-developed and well-nourished.   HENT:   Head: Normocephalic and atraumatic.   Eyes: Pupils are equal, round, and reactive to light. Conjunctivae and EOM are normal.   Neck: Normal range of motion. Neck supple. No JVD present.   Cardiovascular: Normal rate, regular rhythm and normal heart sounds. Exam reveals no gallop and no friction rub.   No murmur heard.  Pulmonary/Chest: Effort normal and breath sounds normal. No respiratory distress. She has no wheezes. She has no rales. She exhibits no tenderness.   Abdominal: Soft. Bowel sounds are normal. She exhibits no distension. There is no tenderness.   Musculoskeletal: Normal range of motion. She exhibits no edema or tenderness.   Neurological: She is alert and oriented to person, place, and time.   Skin: Skin is warm and dry. No erythema. No pallor.       Significant Labs:   Recent Lab Results     None          Significant Imaging:as per hpi    Assessment and Plan:     Paroxysmal atrial fibrillation  1. JILL for evaluation of EAGLE    -The risks, benefits & alternatives of the procedure were explained to the patient.    -The risks of transesophageal echo include but are not limited to:  Dental trauma, esophageal trauma/perforation, bleeding, laryngospasm/brochospasm, aspiration, sore throat/hoarseness, & dislodgement of the endotracheal tube/nasogastric tube (where applicable).    -The risks of moderate sedation include hypotension, respiratory depression, arrhythmias, bronchospasm, & death.    -Informed consent was obtained & the patient is agreeable to proceed with the procedure.    I will discuss with the attending physician. Attending addendum is to follow.      Further recommendations per attending addendum    Shakeel Dawn MD  PGY-V Cardiology Fellow  [phone]

## 2019-05-14 NOTE — DISCHARGE SUMMARY
[facility]  Cardiac Electrophysiology  Discharge Summary      Patient Name: Gisselle Ortiz  MRN: [id number]  Admission Date: 5/14/2019  Hospital Length of Stay: 0 days  Discharge Date and Time: 5/14/2019  2:10 PM  Attending Physician: Favian Colmenares MD   Discharging Provider: Virgie Cedeno NP  Primary Care Physician: Kai Montez MD    HPI: Ms. Ortiz is a 85 year old female (Prior pt of Dr Marlen Corado) with a PMHx of PAF, hyperthyroid, resected, PPM for SB.  Gen change 1/2019. AF/AFL noted on f/u. Propafenone increased. Still had AF with RVR after that. Changed to flecainide, planned for DCCV; at some point changed to amio. Unclear timing of these changes.  TSH found to be abnormal, but T4 normal.  In past, following DCCV, she felt great! -- until AF recurred.     PPM: V paced 10%  At patient last visit with Dr. Colmenares (4/26/19), patient noted to be in AF. Amio load started 4/2019 at 200 mg BID x 4 weeks, then decrease to 200 mg daily. JILL/DCCV to be scheduled after amio is on board.    She presents today to [facility]SCU for scheduled JILL/DCCV. Currently still taking amiodarone 200 mg BID. She states she was instructed to decrease to 200 mg daily on this coming Sunday (OK with Dr. Colmenares). I have personally reviewed the patient's EKG today, which shows AF at 73 bpm.    Procedure(s) (LRB):  Cardioversion or Defibrillation (N/A)  Transesophageal echo (JILL) intra-procedure log documentation (N/A)     Indwelling Lines/Drains at time of discharge:  Lines/Drains/Airways          None        Hospital Course: Patient presented in atrial fibrillation. Patient on Eliquis. JILL completed, negative for EAGLE thrombus. DCCV completed with restoration of normal sinus rhythm (see procedure report-Paient of Dr. Lemus, DCCV done by Dr. Vizcarra)). Patient tolerated procedure well with no acute complications. Post procedure EKG showed normal A-paced V-sensed rhythm with a ventricular rate of 60 bpm.  Discussed post procedure EKG and discharge plans with Dr. Colmenares. Plan to continue home medications including eliquis and amiodarone. Patient to decrease amiodarone from 200 mg BID to 200 mg daily on Sunday 5/19/19 as she was previously instructed. Follow up EKG in 1 week and follow up with Dr. Colmenares in 4 weeks.  Discharge plans/instructions discussed with patient and family who verbalized understanding and agreement of plans of care. No further questions or concerns voiced at this time. Discharged home in stable condition. VSS.    Physical Exam   Constitutional: She is oriented to person, place, and time. She appears well-developed and well-nourished.   HENT:   Head: Normocephalic and atraumatic.   Eyes: Conjunctivae, EOM and lids are normal. No scleral icterus.   Neck: Normal range of motion. No JVD present. No tracheal deviation present. No thyromegaly present.   Cardiovascular: Normal rate and intact distal pulses. Regular rhythm present. No extrasystoles are present. PMI is not displaced. Exam reveals no gallop and no friction rub.   Pulses:       Radial pulses are 2+ on the right side, and 2+ on the left side.    Pulmonary/Chest: Effort normal and breath sounds normal. No accessory muscle usage. No tachypnea. No respiratory distress. She has no wheezes. She has no rales. Left chest wall device site in good condition.  Abdominal: Soft. Bowel sounds are normal. She exhibits no distension.There is no tenderness.   Musculoskeletal: Normal range of motion. She exhibits no edema.   Neurological: She is alert and oriented to person, place, and time. She has normal reflexes. She exhibits normal muscle tone.   Skin: Skin is warm and dry. No rash noted.   Psychiatric: She has a normal mood and affect. Her behavior is normal.   Nursing note and vitals reviewed.    Consults:   Anesthesia.    Significant Diagnostic Studies: Labs from 5/8/19 reviewed.   INR   Lab Results   Component Value Date    INR 1.0 05/08/2019    INR  1.0 04/24/2019    INR 1.0 03/13/2019     Cardiac Graphics: Echocardiogram:   Transthoracic echo (TTE) complete (Cupid Only):   Results for orders placed or performed during the hospital encounter of 01/18/19   Transthoracic echo (TTE) complete (Cupid Only)   Result Value Ref Range    BSA 1.63 m2    TDI SEPTAL 0.05     LV LATERAL E/E' RATIO 10.56     LV SEPTAL E/E' RATIO 19.00     LA WIDTH 4.86 cm    TDI LATERAL 0.09     LVIDD 5.30 3.5 - 6.0 cm    IVS 1.01 0.6 - 1.1 cm    PW 0.96 0.6 - 1.1 cm    LVIDS 3.54 2.1 - 4.0 cm    FS 33 28 - 44 %    LA volume 123.99 cm3    Sinus 3.18 cm    STJ 3.35 cm    Ascending aorta 3.56 cm    LV mass 196.42 g    LA size 5.80 cm    RVDD 4.02 cm    TAPSE 1.59 cm    RV S' 11.48 m/s    Left Ventricle Relative Wall Thickness 0.36 cm    AV mean gradient 5.08 mmHg    AV valve area 1.76 cm2    AV index (prosthetic) 0.48     E/A ratio 1.73     Mean e' 0.07     E wave decelartion time 150.93 msec    Pulm vein S/D ratio 0.88     LVOT diameter 2.15 cm    LVOT area 3.63 cm2    LVOT peak VTI 16.64 cm    Ao peak brian 1.38 m/s    Ao VTI 34.39 cm    LVOT stroke volume 60.38 cm3    AV peak gradient 7.62 mmHg    E/E' ratio 13.57     MV Peak E Brian 0.95 m/s    TR Max Brian 2.80 m/s    MV Peak A Brian 0.55 m/s    PV Peak S Brian 0.46 m/s    PV Peak D Brian 0.52 m/s    LV Systolic Volume 52.19 mL    LV Systolic Volume Index 32.2 mL/m2    LV Diastolic Volume 135.14 mL    LV Diastolic Volume Index 83.38 mL/m2    LA Volume Index 76.5 mL/m2    LV Mass Index 121.2 g/m2    RA Major Axis 4.27 cm    Left Atrium Minor Axis 5.14 cm    Left Atrium Major Axis 5.21 cm    Triscuspid Valve Regurgitation Peak Gradient 31.36 mmHg    RA Width 3.62 cm    Right Atrial Pressure (from IVC) 3 mmHg    TV rest pulmonary artery pressure 34 mmHg     Final Active Diagnoses:    Diagnosis Date Noted POA    PRINCIPAL PROBLEM:  Paroxysmal atrial fibrillation [I48.0] 05/19/2015 Yes      Problems Resolved During this Admission:       Discharged  Condition: stable    Disposition: Home or Self Care    Follow Up:  Follow-up Information     EKG 1 [facility] On 5/21/2019.    Specialty:  Cardiology  Why:  at 10:30 post DCCV           Favian Colmenares MD In 1 month.    Specialties:  Electrophysiology, Cardiology  Why:  s/p [facility]  Contact information:  [address]  [facility] [address]  [phone]                 Patient Instructions:      Diet Cardiac     No driving until:   Order Comments: No driving or operating heavy machinery for 24-48 hours after your procedure because you received sedation.     Other restrictions (specify):   Order Comments: Medications:  -Continue to take all of your home medications as listed on your medication list after you are discharged.  -Decrease your Amiodarone to 200 mg daily on Sunday (5/19/19) as instructed.  -If you have problems or side effects caused by your medications, call your Physician.    New Medications:  None.    Diet  -You may resume oral intake after you are discharged, as long you have no swallowing difficulties.    Side effects:  -You may be drowsy for the remainder of the day because you received sedation.    Because you have received sedation for this procedure:  -Limit activity for the remainder of the day.  -Do not drive or operate any equipment for the remainder of the day.  -Do not smoke for at least 6 hours and until you are fully awake and alert.  -Do not drink alcoholic beverage for 24 hours.  -Defer important decision making until the following day.    Go to the Emergency Department if you develop:  -Bleeding  -Weakness or numbness  -Visual, gait or speech disturbance  -New chest pain, palpitations, shortness of breath, rapid heart beat, or fainting  -Fever    Follow up:  -EKG on 5/21/19 at 10:30 AM.  -Dr. Colmenares in 1 month.     Notify your health care provider if you experience any of the following:   Order Comments: For any concerning medical symptoms.     Notify your health care provider if you  experience any of the following:  increased confusion or weakness     Notify your health care provider if you experience any of the following:  persistent dizziness, light-headedness, or visual disturbances     Notify your health care provider if you experience any of the following:  worsening rash     Notify your health care provider if you experience any of the following:  severe persistent headache     Notify your health care provider if you experience any of the following:  difficulty breathing or increased cough     Notify your health care provider if you experience any of the following:  redness, tenderness, or signs of infection (pain, swelling, redness, odor or green/yellow discharge around incision site)     Notify your health care provider if you experience any of the following:  severe uncontrolled pain     Notify your health care provider if you experience any of the following:  persistent nausea and vomiting or diarrhea     Notify your health care provider if you experience any of the following:  temperature >100.4     Medications:  Reconciled Home Medications:      Medication List      CONTINUE taking these medications    * amiodarone 200 MG Tab  Commonly known as:  PACERONE  Take 1 tablet (200 mg) by mouth twice a day for 4 weeks, then take 1 tablet (200mg) by mouth daily.     * amiodarone 200 MG Tab  Commonly known as:  PACERONE  Take 1 tablet (200 mg total) by mouth once daily. Start taking only 1 tablet by mouth every day on 4-19-19.     apixaban 2.5 mg Tab  Commonly known as:  ELIQUIS  Take 1 tablet (2.5 mg total) by mouth 2 (two) times daily.     aspirin 81 MG EC tablet  Commonly known as:  ECOTRIN  Take 81 mg by mouth once daily.     atenolol 25 MG tablet  Commonly known as:  TENORMIN  2 tabs (50 mg) PO q AM, and 1 tab (25 mg) PO q PM.     atorvastatin 10 MG tablet  Commonly known as:  LIPITOR  Take 10 mg by mouth once daily.     CARDIZEM  MG Cp24  Generic drug:  diltiaZEM  Take 180 mg by  mouth once daily.     eszopiclone 2 MG Tab  Commonly known as:  LUNESTA  Take 2 mg by mouth every evening.     levothyroxine 200 MCG tablet  Commonly known as:  SYNTHROID  Take 200 mcg by mouth before breakfast.     pantoprazole 40 MG tablet  Commonly known as:  PROTONIX  Take 40 mg by mouth daily as needed.     spironolactone 25 MG tablet  Commonly known as:  ALDACTONE  Take 25 mg by mouth 2 (two) times daily.         * This list has 2 medication(s) that are the same as other medications prescribed for you. Read the directions carefully, and ask your doctor or other care provider to review them with you.            STOP taking these medications    mupirocin calcium 2% 2 % cream  Commonly known as:  BACTROBAN     NexIUM 40 MG capsule  Generic drug:  esomeprazole          Plan:  -Continue all home medications.  -Follow up in 1 week for EKG.  -Follow up with Dr. Colmenares in 1 month.    Time spent on the discharge of patient: 14 minutes    Virgie Cedeno NP  Cardiac Electrophysiology  [facility]    Attending: Favian Colmenares MD

## 2019-05-14 NOTE — NURSING
Received report from Isatu JOSE. Patient s/p DCCV, AAOx3. VSS, resp even and unlabored. Post procedure protocol reviewed with patient. Understanding verbalized.  Nurse call bell within reach. Will continue to monitor per post procedure protocol.

## 2019-05-14 NOTE — NURSING TRANSFER
Nursing Transfer Note      5/14/2019     Transfer To: short stay 11    Transfer via stretcher    Transfer with chart and ticket to ride    Transported by escort with ticket to ride    Medicines sent: silvadene caream    Chart send with patient: Yes    Notified: reported to noa rn    Patient reassessed at: see epic (date, time)    Upon arrival to floor: to room no complaints no distress noted.

## 2019-05-14 NOTE — HPI
85 y.o. woman with PMHx of pAF, Hyperthyroids s/p resection, DC-PPM(2014 for Sb, SSS) who presents for JILL cardioversion. JILL 3/2019  · Low normal left ventricular systolic function. The estimated ejection fraction is 50%  · Mild global hypokinetic wall motion.  · Normal appearing left atrial appendage. No thrombus is present in the appendage. Abnormal appendage velocities. Emptying velocity 0.2 m/s  · No interatrial septal defect present.  · Mild to moderate tricuspid regurgitation.  · Mildly to moderately reduced right ventricular systolic function.  · Plaque present.  · Normal LV diastolic function.    Presenting today for JILL/DCCV

## 2019-05-14 NOTE — NURSING
Discharge instructions and medication list given and reviewed with patient.  Pt states understanding.  IV d/c'ed x 1.  Instructed patient to call with any questions or concerns.

## 2019-05-14 NOTE — TRANSFER OF CARE
"Anesthesia Transfer of Care Note    Patient: Gisselle Ortiz    Procedure(s) Performed: Procedure(s) (LRB):  Cardioversion or Defibrillation (N/A)  Transesophageal echo (JILL) intra-procedure log documentation (N/A)    Patient location: PACU    Anesthesia Type: general    Transport from OR: Transported from OR on 2-3 L/min O2 by NC with adequate spontaneous ventilation    Post pain: adequate analgesia    Post assessment: no apparent anesthetic complications and tolerated procedure well    Post vital signs: stable    Level of consciousness: sedated    Nausea/Vomiting: no nausea/vomiting    Complications: none    Transfer of care protocol was followed      Last vitals:   Visit Vitals  /82 (BP Location: Left arm)   Pulse 94   Resp 18   Ht 5' 3" (1.6 m)   Wt 58.1 kg (128 lb)   SpO2 98%   Breastfeeding? No   BMI 22.67 kg/m²     "

## 2019-05-21 ENCOUNTER — HOSPITAL ENCOUNTER (OUTPATIENT)
Dept: CARDIOLOGY | Facility: CLINIC | Age: 84
Discharge: HOME | End: 2019-05-21
Payer: MEDICARE

## 2019-05-21 DIAGNOSIS — I49.5 SSS (SICK SINUS SYNDROME): ICD-10-CM

## 2019-05-21 PROCEDURE — 93010 RHYTHM STRIP: ICD-10-PCS | Mod: S$PBB,,, | Performed by: INTERNAL MEDICINE

## 2019-05-21 PROCEDURE — 93005 ELECTROCARDIOGRAM TRACING: CPT | Mod: PBBFAC | Performed by: INTERNAL MEDICINE

## 2019-05-21 PROCEDURE — 93010 ELECTROCARDIOGRAM REPORT: CPT | Mod: S$PBB,,, | Performed by: INTERNAL MEDICINE

## 2019-06-20 NOTE — PROGRESS NOTES
Ms. Ortiz is a patient of Dr. Colmenares and was last seen in clinic 4/26/2019.      Subjective:   Patient ID:  Gisselle Ortiz is a 85 y.o. female who presents for follow-up of Atrial Fibrillation and Pacemaker Check  .     HPI:    Ms. Ortiz is a 85 y.o. female with pAF, hyperthyroid hx (resected, now on synthroid), PPM here for follow up after cardioversion.    Background:    Prior pt of Dr Marlen Corado    PAF on meds  hyperthyroid, resected  PPM for SB  Gen change 1/2019. AF/AFL noted on f/u. Propafenone increased. Still had AF with RVR after that.  Changed to flecainide, planned for DCCV; at some point changed to amio. Unclear timing of these changes.  TSH found to be abnormal, but T4 normal.  In past, following DCCV, she felt great! -- until AF recurred.    PPM: V paced 10%  ECG is AF with V response 100 bpm  Now that amio is on board, plan JILL/DCCV.  Increased eliquis to therapeutic dose: 2.5 bid.    Update (06/21/2019):    Successful JILL/DCCV 5/14/2019.    Today she says she feels great. No more SOB, PIRES, palpitations. Denies CP, light-headedness, syncope.    She is currently taking eliquis 2.5mg BID for stroke prophylaxis and denies significant bleeding episodes. She is currently being treated with amiodarone 200mg daily for rhythm control and atenolol 50mg/25mg for HR control.  Kidney function is stable, with a creatinine of 1.4 on 5/8/2019. LFTs are WNL in 2015. TSH is decreased but T4 is WNL. No PFTs on file.    Device Interrogation (6/21/2019) reveals an intrinsic SR with stable lead and device function. No arrhythmias or treated episodes were noted since her cardioversion.  She paces 65% in the RA and 3.1% in the RV. Estimated battery longevity 8-9 years.     I have personally reviewed the patient's EKG today, which shows APVS at 60bpm. NM interval is 192. QRS is 132. QT is 450.    Recent Cardiac Tests:    JILL (5/14/2019):  · Moderately decreased left ventricular systolic function. The  estimated ejection fraction is 43%  · Normal appearing left atrial appendage. No thrombus is present in the appendage. Abnormal appendage velocities 0.2 cm/sec  · Left ventricular diastolic dysfunction.  · Mild mitral sclerosis.  · Mild mitral regurgitation.  · Normal right ventricular systolic function.  · Severe left atrial enlargement.  · Severe right atrial enlargement.  · Grade 2 plaque present.    Current Outpatient Medications   Medication Sig    amiodarone (PACERONE) 200 MG Tab Take 1 tablet (200 mg) by mouth twice a day for 4 weeks, then take 1 tablet (200mg) by mouth daily.    amiodarone (PACERONE) 200 MG Tab Take 1 tablet (200 mg total) by mouth once daily. Start taking only 1 tablet by mouth every day on 4-19-19.    apixaban (ELIQUIS) 2.5 mg Tab Take 1 tablet (2.5 mg total) by mouth 2 (two) times daily.    aspirin (ECOTRIN) 81 MG EC tablet Take 81 mg by mouth once daily.    atenolol (TENORMIN) 25 MG tablet 2 tabs (50 mg) PO q AM, and 1 tab (25 mg) PO q PM.    atorvastatin (LIPITOR) 10 MG tablet Take 10 mg by mouth once daily.     diltiaZEM (CARDIZEM CD) 120 MG Cp24 Take 180 mg by mouth once daily.    eszopiclone (LUNESTA) 2 MG Tab Take 2 mg by mouth every evening.    levothyroxine (SYNTHROID) 200 MCG tablet Take 200 mcg by mouth before breakfast.     pantoprazole (PROTONIX) 40 MG tablet Take 40 mg by mouth daily as needed.     spironolactone (ALDACTONE) 25 MG tablet Take 25 mg by mouth 2 (two) times daily.     No current facility-administered medications for this visit.      Facility-Administered Medications Ordered in Other Visits   Medication    0.9%  NaCl infusion    0.9%  NaCl infusion    sodium chloride 0.9% flush 5 mL    sodium chloride 0.9% flush 5 mL       Review of Systems   Constitution: Negative for malaise/fatigue.   Cardiovascular: Negative for chest pain, dyspnea on exertion, irregular heartbeat, leg swelling and palpitations.   Respiratory: Negative for shortness of breath.   "  Hematologic/Lymphatic: Negative for bleeding problem.   Skin: Negative for rash.   Musculoskeletal: Negative for myalgias.   Gastrointestinal: Negative for hematemesis, hematochezia and nausea.   Genitourinary: Negative for hematuria.   Neurological: Negative for light-headedness.   Psychiatric/Behavioral: Negative for altered mental status.   Allergic/Immunologic: Negative for persistent infections.         Objective:          BP (!) 140/67   Pulse 60   Ht 5' 3" (1.6 m)   Wt 57 kg (125 lb 10.6 oz)   BMI 22.26 kg/m²     Physical Exam   Constitutional: She is oriented to person, place, and time. She appears well-developed and well-nourished.   HENT:   Head: Normocephalic.   Nose: Nose normal.   Eyes: Pupils are equal, round, and reactive to light.   Cardiovascular: Normal rate, regular rhythm, S1 normal and S2 normal.   No murmur heard.  Pulses:       Radial pulses are 2+ on the right side, and 2+ on the left side.   Pulmonary/Chest: Breath sounds normal. No respiratory distress.   Device to LUCW.   Abdominal: Normal appearance.   Musculoskeletal: Normal range of motion. She exhibits no edema.   Neurological: She is alert and oriented to person, place, and time.   Skin: Skin is warm and dry. No erythema.   Psychiatric: She has a normal mood and affect. Her speech is normal and behavior is normal.   Nursing note and vitals reviewed.    Lab Results   Component Value Date     05/08/2019    K 5.1 05/08/2019    BUN 16 05/08/2019    CREATININE 1.4 05/08/2019    ALT 6 (L) 05/20/2015    AST 12 05/20/2015    HGB 11.7 (L) 05/08/2019    HCT 37.4 05/08/2019    TSH 0.244 (L) 03/29/2019    LDLCALC 65.0 05/20/2015       Recent Labs   Lab 01/17/19  1244 03/13/19  1004 04/24/19  0947 05/08/19  0942   INR 1.0 1.0 1.0 1.0       Assessment:     1. Paroxysmal atrial fibrillation    2. Essential hypertension    3. RBBB    4. SSS (sick sinus syndrome)    5. PVC (premature ventricular contraction)    6. Pacemaker    7. Long term " current use of antiarrhythmic drug    8. Current use of long term anticoagulation        Plan:     In summary, Ms. Ortiz is a 85 y.o. female with pAF, hyperthyroid, PPM here for follow up after cardioversion.  She is doing very well 1 month s/p DCCV, remaining in rhythm on amiodarone. Her symptoms have significantly improved. Minimal RV pacing, no CHF symptoms. Device functioning well. Her last JILL showed a decrease in EF to 43%. Will obtain updated echo prior to next visit. She needs updated LFTs. She remains on eliquis for CVA prophylaxis.    LFTs.  Continue current medication regimen and device settings.   Follow up in device clinic as scheduled.   Follow up in EP clinic in 6 months with echo, sooner as needed.     *A copy of this note has been sent to Dr. Colmenares*    Follow up in about 6 months (around 12/21/2019).    ------------------------------------------------------------------    MATTHEW Jiang, NP-C  Cardiac Electrophysiology

## 2019-06-21 ENCOUNTER — CLINICAL SUPPORT (OUTPATIENT)
Dept: CARDIOLOGY | Facility: HOSPITAL | Age: 84
End: 2019-06-21
Attending: INTERNAL MEDICINE
Payer: MEDICARE

## 2019-06-21 ENCOUNTER — HOSPITAL ENCOUNTER (OUTPATIENT)
Dept: CARDIOLOGY | Facility: CLINIC | Age: 84
Discharge: HOME | End: 2019-06-21
Payer: MEDICARE

## 2019-06-21 ENCOUNTER — OFFICE VISIT (OUTPATIENT)
Dept: ELECTROPHYSIOLOGY | Facility: CLINIC | Age: 84
End: 2019-06-21
Attending: INTERNAL MEDICINE
Payer: MEDICARE

## 2019-06-21 VITALS
SYSTOLIC BLOOD PRESSURE: 140 MMHG | DIASTOLIC BLOOD PRESSURE: 67 MMHG | WEIGHT: 125.69 LBS | BODY MASS INDEX: 22.27 KG/M2 | HEART RATE: 60 BPM | HEIGHT: 63 IN

## 2019-06-21 DIAGNOSIS — I48.0 PAROXYSMAL ATRIAL FIBRILLATION: ICD-10-CM

## 2019-06-21 DIAGNOSIS — I45.10 RBBB: ICD-10-CM

## 2019-06-21 DIAGNOSIS — Z79.899 LONG TERM CURRENT USE OF ANTIARRHYTHMIC DRUG: ICD-10-CM

## 2019-06-21 DIAGNOSIS — I10 ESSENTIAL HYPERTENSION: ICD-10-CM

## 2019-06-21 DIAGNOSIS — I49.5 SSS (SICK SINUS SYNDROME): ICD-10-CM

## 2019-06-21 DIAGNOSIS — Z95.0 CARDIAC PACEMAKER IN SITU: ICD-10-CM

## 2019-06-21 DIAGNOSIS — Z95.0 PACEMAKER: ICD-10-CM

## 2019-06-21 DIAGNOSIS — Z79.01 CURRENT USE OF LONG TERM ANTICOAGULATION: ICD-10-CM

## 2019-06-21 DIAGNOSIS — I49.3 PVC (PREMATURE VENTRICULAR CONTRACTION): ICD-10-CM

## 2019-06-21 DIAGNOSIS — I48.0 PAROXYSMAL ATRIAL FIBRILLATION: Primary | ICD-10-CM

## 2019-06-21 PROCEDURE — 99999 PR PBB SHADOW E&M-EST. PATIENT-LVL III: ICD-10-PCS | Mod: PBBFAC,,, | Performed by: NURSE PRACTITIONER

## 2019-06-21 PROCEDURE — 93010 RHYTHM STRIP: ICD-10-PCS | Mod: S$PBB,,, | Performed by: INTERNAL MEDICINE

## 2019-06-21 PROCEDURE — 99214 PR OFFICE/OUTPT VISIT, EST, LEVL IV, 30-39 MIN: ICD-10-PCS | Mod: S$PBB,,, | Performed by: NURSE PRACTITIONER

## 2019-06-21 PROCEDURE — 99214 OFFICE O/P EST MOD 30 MIN: CPT | Mod: S$PBB,,, | Performed by: NURSE PRACTITIONER

## 2019-06-21 PROCEDURE — 93005 ELECTROCARDIOGRAM TRACING: CPT | Mod: PBBFAC,59 | Performed by: INTERNAL MEDICINE

## 2019-06-21 PROCEDURE — 99999 PR PBB SHADOW E&M-EST. PATIENT-LVL III: CPT | Mod: PBBFAC,,, | Performed by: NURSE PRACTITIONER

## 2019-06-21 PROCEDURE — 99213 OFFICE O/P EST LOW 20 MIN: CPT | Mod: PBBFAC,25 | Performed by: NURSE PRACTITIONER

## 2019-06-21 PROCEDURE — 93010 ELECTROCARDIOGRAM REPORT: CPT | Mod: S$PBB,,, | Performed by: INTERNAL MEDICINE

## 2019-06-21 PROCEDURE — 93280 PM DEVICE PROGR EVAL DUAL: CPT

## 2019-06-21 NOTE — LETTER
June 21, 2019      Asher Watts MD  [address]  [facility] [address]           [facility] - Arrhythmia  [address]  [facility] [address]  Phone: [phone]  Fax: [phone]          Patient: Gisselle Ortiz   MR Number: [id number]   YOB: 1933   Date of Visit: 6/21/2019       Dear Dr. Asher Watts:    Thank you for referring Gisselle Ortiz to me for evaluation. Attached you will find relevant portions of my assessment and plan of care.    If you have questions, please do not hesitate to call me. I look forward to following Gisselle Ortiz along with you.    Sincerely,    Flor Lawson, NP    Enclosure  CC:  No Recipients    If you would like to receive this communication electronically, please contact [email or web address] or [phone] to request more information on [employer] access.    For providers and/or their staff who would like to refer a patient to [facility], please contact us through our one-stop-shop provider referral line, [facility], at [phone].    If you feel you have received this communication in error or would no longer like to receive these types of communications, please e-mail [email or web address]

## 2019-08-08 ENCOUNTER — CLINICAL SUPPORT (OUTPATIENT)
Dept: CARDIOLOGY | Facility: HOSPITAL | Age: 84
End: 2019-08-08
Payer: MEDICARE

## 2019-08-08 DIAGNOSIS — I48.91 UNSPECIFIED ATRIAL FIBRILLATION: ICD-10-CM

## 2019-08-08 DIAGNOSIS — Z95.0 PRESENCE OF CARDIAC PACEMAKER: ICD-10-CM

## 2019-08-08 DIAGNOSIS — I49.5 SICK SINUS SYNDROME: ICD-10-CM

## 2019-09-26 ENCOUNTER — HOSPITAL ENCOUNTER (OUTPATIENT)
Dept: CARDIOLOGY | Facility: CLINIC | Age: 84
Discharge: HOME | End: 2019-09-26
Attending: NURSE PRACTITIONER
Payer: MEDICARE

## 2019-09-26 ENCOUNTER — TELEPHONE (OUTPATIENT)
Dept: ELECTROPHYSIOLOGY | Facility: CLINIC | Age: 84
End: 2019-09-26

## 2019-09-26 VITALS
BODY MASS INDEX: 22.15 KG/M2 | DIASTOLIC BLOOD PRESSURE: 68 MMHG | SYSTOLIC BLOOD PRESSURE: 122 MMHG | HEIGHT: 63 IN | HEART RATE: 65 BPM | WEIGHT: 125 LBS

## 2019-09-26 DIAGNOSIS — I48.0 PAROXYSMAL ATRIAL FIBRILLATION: ICD-10-CM

## 2019-09-26 LAB
ASCENDING AORTA: 2.94 CM
BSA FOR ECHO PROCEDURE: 1.59 M2
CV ECHO LV RWT: 0.23 CM
DOP CALC LVOT AREA: 4 CM2
DOP CALC LVOT DIAMETER: 2.25 CM
DOP CALC LVOT PEAK VEL: 0.85 M/S
DOP CALC LVOT STROKE VOLUME: 78.13 CM3
DOP CALCLVOT PEAK VEL VTI: 19.66 CM
E WAVE DECELERATION TIME: 133.26 MSEC
E/A RATIO: 1.41
E/E' RATIO: 17.56 M/S
ECHO LV POSTERIOR WALL: 0.7 CM (ref 0.6–1.1)
FRACTIONAL SHORTENING: 35 % (ref 28–44)
INTERVENTRICULAR SEPTUM: 0.74 CM (ref 0.6–1.1)
IVRT: 0.1 MSEC
LA MAJOR: 6 CM
LA MINOR: 6 CM
LA WIDTH: 5 CM
LEFT ATRIUM SIZE: 5.01 CM
LEFT ATRIUM VOLUME INDEX: 80.7 ML/M2
LEFT ATRIUM VOLUME: 127.76 CM3
LEFT INTERNAL DIMENSION IN SYSTOLE: 3.91 CM (ref 2.1–4)
LEFT VENTRICLE DIASTOLIC VOLUME INDEX: 114.74 ML/M2
LEFT VENTRICLE DIASTOLIC VOLUME: 181.71 ML
LEFT VENTRICLE MASS INDEX: 104 G/M2
LEFT VENTRICLE SYSTOLIC VOLUME INDEX: 41.8 ML/M2
LEFT VENTRICLE SYSTOLIC VOLUME: 66.24 ML
LEFT VENTRICULAR INTERNAL DIMENSION IN DIASTOLE: 6.02 CM (ref 3.5–6)
LEFT VENTRICULAR MASS: 164.5 G
LV LATERAL E/E' RATIO: 13.17 M/S
LV SEPTAL E/E' RATIO: 26.33 M/S
MV PEAK A VEL: 0.56 M/S
MV PEAK E VEL: 0.79 M/S
PISA TR MAX VEL: 2.85 M/S
PULM VEIN S/D RATIO: 1.02
PV PEAK D VEL: 0.47 M/S
PV PEAK S VEL: 0.48 M/S
RA MAJOR: 3.7 CM
RA WIDTH: 3.47 CM
RIGHT VENTRICULAR END-DIASTOLIC DIMENSION: 4.2 CM
RV TISSUE DOPPLER FREE WALL SYSTOLIC VELOCITY 1 (APICAL 4 CHAMBER VIEW): 7.94 CM/S
SINUS: 3.14 CM
STJ: 3 CM
TDI LATERAL: 0.06 M/S
TDI SEPTAL: 0.03 M/S
TDI: 0.05 M/S
TR MAX PG: 32 MMHG
TRICUSPID ANNULAR PLANE SYSTOLIC EXCURSION: 1.65 CM

## 2019-09-26 PROCEDURE — 93306 TTE W/DOPPLER COMPLETE: CPT | Mod: PBBFAC | Performed by: INTERNAL MEDICINE

## 2019-09-26 PROCEDURE — 93306 TRANSTHORACIC ECHO (TTE) COMPLETE (CUPID ONLY): ICD-10-PCS | Mod: 26,S$PBB,, | Performed by: INTERNAL MEDICINE

## 2019-09-26 NOTE — TELEPHONE ENCOUNTER
----- Message from Flor Lawson NP sent at 9/26/2019  4:35 PM CDT -----  Please let patient know the echo shows her heart function is normal. Thanks.

## 2019-11-06 ENCOUNTER — CLINICAL SUPPORT (OUTPATIENT)
Dept: CARDIOLOGY | Facility: HOSPITAL | Age: 84
End: 2019-11-06
Attending: INTERNAL MEDICINE
Payer: MEDICARE

## 2019-11-06 DIAGNOSIS — Z95.0 CARDIAC PACEMAKER: ICD-10-CM

## 2019-11-06 DIAGNOSIS — I48.91 ATRIAL FIBRILLATION, UNSPECIFIED TYPE: ICD-10-CM

## 2019-11-06 PROCEDURE — 93296 REM INTERROG EVL PM/IDS: CPT | Performed by: INTERNAL MEDICINE

## 2019-11-06 PROCEDURE — 93294 CARDIAC DEVICE CHECK - REMOTE: ICD-10-PCS | Mod: ,,, | Performed by: INTERNAL MEDICINE

## 2019-11-06 PROCEDURE — 93294 REM INTERROG EVL PM/LDLS PM: CPT | Mod: ,,, | Performed by: INTERNAL MEDICINE

## 2019-12-02 ENCOUNTER — OFFICE VISIT (OUTPATIENT)
Dept: OPTOMETRY | Facility: CLINIC | Age: 84
End: 2019-12-02
Payer: MEDICARE

## 2019-12-02 DIAGNOSIS — Z96.1 PSEUDOPHAKIA OF BOTH EYES: ICD-10-CM

## 2019-12-02 DIAGNOSIS — I10 ESSENTIAL HYPERTENSION: Primary | ICD-10-CM

## 2019-12-02 PROCEDURE — 92014 COMPRE OPH EXAM EST PT 1/>: CPT | Mod: S$PBB,,, | Performed by: OPTOMETRIST

## 2019-12-02 PROCEDURE — 92014 PR EYE EXAM, EST PATIENT,COMPREHESV: ICD-10-PCS | Mod: S$PBB,,, | Performed by: OPTOMETRIST

## 2019-12-02 PROCEDURE — 99212 OFFICE O/P EST SF 10 MIN: CPT | Mod: PBBFAC | Performed by: OPTOMETRIST

## 2019-12-02 PROCEDURE — 99999 PR PBB SHADOW E&M-EST. PATIENT-LVL II: CPT | Mod: PBBFAC,,, | Performed by: OPTOMETRIST

## 2019-12-02 PROCEDURE — 99999 PR PBB SHADOW E&M-EST. PATIENT-LVL II: ICD-10-PCS | Mod: PBBFAC,,, | Performed by: OPTOMETRIST

## 2019-12-02 NOTE — PROGRESS NOTES
HPI     Last eye exam was 12/21/18 with     Pt states that her VA is doing okay, they are the same form last visit.  Patient denies diplopia, headaches, flashes/floaters, and pain.  Pt states she only wears readers as of right now.  No eye drops, and cataract OU      Last edited by Flory Vitale on 12/2/2019  1:29 PM. (History)            Assessment /Plan     For exam results, see Encounter Report.    Essential hypertension    Pseudophakia of both eyes            1.  No retinopathy--monitor yearly.  BP control.  Eye health normal OU.  2.  Bifocal rx given

## 2019-12-17 NOTE — PROGRESS NOTES
Ms. Ortiz is a patient of Dr. Colmenares and was last seen in clinic 6/21/2019.      Subjective:   Patient ID:  Gisselle Ortiz is a 86 y.o. female who presents for follow-up of Atrial Fibrillation  .     HPI:    Ms. Ortiz is an 86 y.o. female with pAF, hyperthyroid hx (resected, now on synthroid), PPM here for follow up after cardioversion.    Background:    Prior pt of Dr Marlen Corado    PAF on meds  hyperthyroid, resected  PPM for SB  Gen change 1/2019. AF/AFL noted on f/u. Propafenone increased. Still had AF with RVR after that.  Changed to flecainide, planned for DCCV; at some point changed to amio. Unclear timing of these changes.  TSH found to be abnormal, but T4 normal.  In past, following DCCV, she felt great! -- until AF recurred.    PPM: V paced 10%  ECG is AF with V response 100 bpm  Now that amio is on board, plan JILL/DCCV.  Increased eliquis to therapeutic dose: 2.5 bid.    Successful JILL/DCCV 5/14/2019. [place] wonderful afterward. JILL indicated EF 40%. Plan to continue amiodarone and update echo.    Update (12/18/2019):    Today she says she continues to feel wonderful. Ms. Ortiz denies chest pain with exertion or at rest, palpitations, SOB, PIRES, dizziness, or syncope.    She is currently taking eliquis 2.5mg BID for stroke prophylaxis and denies significant bleeding episodes. She is currently being treated with amiodarone 200mg daily for rhythm control and atenolol 50mg/25mg for HR control. Kidney function is stable, with a creatinine of 1.4 on 5/8/2019. LFTs WNL 6/2019. TSH low - she says her synthroid was recently reduced. No PFTs on file.    Device Interrogation (12/18/2019) reveals an intrinsic SB with stable lead and device function. No arrhythmias or treated episodes were noted since DCCV.  She paces 99% in the RA and <1% in the RV. Estimated battery longevity 8.9-9.6 years.     I have personally reviewed the patient's EKG today, which shows APVS at 60bpm. QRS is 144. QT is  488.    Recent Cardiac Tests:    2D Echo (9/26/2019):  · Normal left ventricular systolic function. The estimated ejection fraction is 55%  · Grade II (moderate) left ventricular diastolic dysfunction consistent with pseudonormalization.  · Eccentric left ventricular hypertrophy. Mild left ventricular enlargement.  · Mild mitral regurgitation.  · Normal right ventricular systolic function.  · Mild tricuspid regurgitation.  · Severe left atrial enlargement.     Current Outpatient Medications   Medication Sig    amiodarone (PACERONE) 200 MG Tab Take 1 tablet (200 mg total) by mouth once daily. Start taking only 1 tablet by mouth every day on 4-19-19.    apixaban (ELIQUIS) 2.5 mg Tab Take 1 tablet (2.5 mg total) by mouth 2 (two) times daily.    aspirin (ECOTRIN) 81 MG EC tablet Take 81 mg by mouth once daily.    atenolol (TENORMIN) 25 MG tablet 2 tabs (50 mg) PO q AM, and 1 tab (25 mg) PO q PM.    atorvastatin (LIPITOR) 10 MG tablet Take 10 mg by mouth once daily.     diltiaZEM (CARDIZEM CD) 120 MG Cp24 Take 180 mg by mouth once daily.    eszopiclone (LUNESTA) 2 MG Tab Take 2 mg by mouth every evening.    levothyroxine (SYNTHROID) 200 MCG tablet Take 175 mcg by mouth before breakfast.     pantoprazole (PROTONIX) 40 MG tablet Take 40 mg by mouth daily as needed.     spironolactone (ALDACTONE) 25 MG tablet Take 25 mg by mouth 2 (two) times daily.     No current facility-administered medications for this visit.      Facility-Administered Medications Ordered in Other Visits   Medication    0.9%  NaCl infusion    0.9%  NaCl infusion    sodium chloride 0.9% flush 5 mL    sodium chloride 0.9% flush 5 mL     Review of Systems   Constitution: Negative for malaise/fatigue.   Cardiovascular: Negative for chest pain, dyspnea on exertion, irregular heartbeat, leg swelling and palpitations.   Respiratory: Negative for shortness of breath.    Hematologic/Lymphatic: Negative for bleeding problem.   Skin: Negative for  "rash.   Musculoskeletal: Negative for myalgias.   Gastrointestinal: Negative for hematemesis, hematochezia and nausea.   Genitourinary: Negative for hematuria.   Neurological: Negative for light-headedness.   Psychiatric/Behavioral: Negative for altered mental status.   Allergic/Immunologic: Negative for persistent infections.     Objective:        BP (!) 141/72   Pulse (!) 59   Ht 5' 3" (1.6 m)   Wt 56.6 kg (124 lb 12.5 oz)   BMI 22.10 kg/m²     Physical Exam   Constitutional: She is oriented to person, place, and time. She appears well-developed and well-nourished.   HENT:   Head: Normocephalic.   Nose: Nose normal.   Eyes: Pupils are equal, round, and reactive to light.   Cardiovascular: Normal rate, regular rhythm, S1 normal and S2 normal.   No murmur heard.  Pulses:       Radial pulses are 2+ on the right side, and 2+ on the left side.   Pulmonary/Chest: Breath sounds normal. No respiratory distress.   Device to LUCW.   Abdominal: Normal appearance.   Musculoskeletal: Normal range of motion. She exhibits no edema.   Neurological: She is alert and oriented to person, place, and time.   Skin: Skin is warm and dry. No erythema.   Psychiatric: She has a normal mood and affect. Her speech is normal and behavior is normal.   Nursing note and vitals reviewed.    Lab Results   Component Value Date     05/08/2019    K 5.1 05/08/2019    BUN 16 05/08/2019    CREATININE 1.4 05/08/2019    ALT 20 06/21/2019    AST 21 06/21/2019    HGB 11.7 (L) 05/08/2019    HCT 37.4 05/08/2019    TSH 0.244 (L) 03/29/2019    LDLCALC 65.0 05/20/2015       Recent Labs   Lab 01/17/19  1244 03/13/19  1004 04/24/19  0947 05/08/19  0942   INR 1.0 1.0 1.0 1.0       Assessment:     1. Pacemaker    2. Paroxysmal atrial fibrillation    3. PVC (premature ventricular contraction)    4. RBBB    5. Essential hypertension    6. SSS (sick sinus syndrome)    7. Current use of long term anticoagulation      Plan:     In summary, Ms. Ortiz is an " 86 y.o. female with pAF, hyperthyroid hx (resected, now on synthroid), PPM here for follow up after cardioversion.  Ms. Ortiz is doing well from a device perspective with stable lead and device function. No arrhythmia noted on amiodarone. Synthroid recently reduced. LFTs ok. No RV pacing. No CHF symptoms. She feels great. Heart function has recovered in SR and is now normal per echo 9/2019.     Continue current medication regimen and device settings.   Follow up in device clinic as scheduled.   Follow up in EP clinic in 6 months with amio tests, sooner as needed.     *A copy of this note has been sent to Dr. Colmenares*    Follow up in about 6 months (around 6/18/2020).    ------------------------------------------------------------------    MATTHEW Jiang, NP-C  Cardiac Electrophysiology

## 2019-12-18 ENCOUNTER — OFFICE VISIT (OUTPATIENT)
Dept: ELECTROPHYSIOLOGY | Facility: CLINIC | Age: 84
End: 2019-12-18
Payer: MEDICARE

## 2019-12-18 ENCOUNTER — HOSPITAL ENCOUNTER (OUTPATIENT)
Dept: CARDIOLOGY | Facility: CLINIC | Age: 84
Discharge: HOME | End: 2019-12-18
Payer: MEDICARE

## 2019-12-18 ENCOUNTER — CLINICAL SUPPORT (OUTPATIENT)
Dept: CARDIOLOGY | Facility: HOSPITAL | Age: 84
End: 2019-12-18
Attending: INTERNAL MEDICINE
Payer: MEDICARE

## 2019-12-18 VITALS
DIASTOLIC BLOOD PRESSURE: 72 MMHG | HEIGHT: 63 IN | SYSTOLIC BLOOD PRESSURE: 141 MMHG | BODY MASS INDEX: 22.11 KG/M2 | WEIGHT: 124.75 LBS | HEART RATE: 59 BPM

## 2019-12-18 DIAGNOSIS — I49.5 SSS (SICK SINUS SYNDROME): ICD-10-CM

## 2019-12-18 DIAGNOSIS — I45.10 RBBB: ICD-10-CM

## 2019-12-18 DIAGNOSIS — I48.0 PAROXYSMAL ATRIAL FIBRILLATION: ICD-10-CM

## 2019-12-18 DIAGNOSIS — I10 ESSENTIAL HYPERTENSION: ICD-10-CM

## 2019-12-18 DIAGNOSIS — Z95.0 CARDIAC PACEMAKER IN SITU: ICD-10-CM

## 2019-12-18 DIAGNOSIS — I49.3 PVC (PREMATURE VENTRICULAR CONTRACTION): ICD-10-CM

## 2019-12-18 DIAGNOSIS — Z79.01 CURRENT USE OF LONG TERM ANTICOAGULATION: ICD-10-CM

## 2019-12-18 DIAGNOSIS — Z95.0 PACEMAKER: Primary | ICD-10-CM

## 2019-12-18 PROCEDURE — 1159F PR MEDICATION LIST DOCUMENTED IN MEDICAL RECORD: ICD-10-PCS | Mod: ,,, | Performed by: NURSE PRACTITIONER

## 2019-12-18 PROCEDURE — 93005 ELECTROCARDIOGRAM TRACING: CPT | Mod: PBBFAC,59 | Performed by: INTERNAL MEDICINE

## 2019-12-18 PROCEDURE — 1159F MED LIST DOCD IN RCRD: CPT | Mod: ,,, | Performed by: NURSE PRACTITIONER

## 2019-12-18 PROCEDURE — 99214 PR OFFICE/OUTPT VISIT, EST, LEVL IV, 30-39 MIN: ICD-10-PCS | Mod: S$PBB,,, | Performed by: NURSE PRACTITIONER

## 2019-12-18 PROCEDURE — 1126F AMNT PAIN NOTED NONE PRSNT: CPT | Mod: ,,, | Performed by: NURSE PRACTITIONER

## 2019-12-18 PROCEDURE — 93010 ELECTROCARDIOGRAM REPORT: CPT | Mod: S$PBB,,, | Performed by: INTERNAL MEDICINE

## 2019-12-18 PROCEDURE — 99999 PR PBB SHADOW E&M-EST. PATIENT-LVL III: CPT | Mod: PBBFAC,,, | Performed by: NURSE PRACTITIONER

## 2019-12-18 PROCEDURE — 99214 OFFICE O/P EST MOD 30 MIN: CPT | Mod: S$PBB,,, | Performed by: NURSE PRACTITIONER

## 2019-12-18 PROCEDURE — 99213 OFFICE O/P EST LOW 20 MIN: CPT | Mod: PBBFAC,25 | Performed by: NURSE PRACTITIONER

## 2019-12-18 PROCEDURE — 1126F PR PAIN SEVERITY QUANTIFIED, NO PAIN PRESENT: ICD-10-PCS | Mod: ,,, | Performed by: NURSE PRACTITIONER

## 2019-12-18 PROCEDURE — 93010 RHYTHM STRIP: ICD-10-PCS | Mod: S$PBB,,, | Performed by: INTERNAL MEDICINE

## 2019-12-18 PROCEDURE — 99999 PR PBB SHADOW E&M-EST. PATIENT-LVL III: ICD-10-PCS | Mod: PBBFAC,,, | Performed by: NURSE PRACTITIONER

## 2019-12-18 PROCEDURE — 93280 PM DEVICE PROGR EVAL DUAL: CPT

## 2020-02-04 ENCOUNTER — CLINICAL SUPPORT (OUTPATIENT)
Dept: CARDIOLOGY | Facility: HOSPITAL | Age: 85
End: 2020-02-04
Payer: MEDICARE

## 2020-02-04 DIAGNOSIS — Z95.0 PRESENCE OF CARDIAC PACEMAKER: ICD-10-CM

## 2020-02-04 DIAGNOSIS — I48.91 UNSPECIFIED ATRIAL FIBRILLATION: ICD-10-CM

## 2020-02-04 DIAGNOSIS — I49.5 SICK SINUS SYNDROME: ICD-10-CM

## 2020-02-04 PROCEDURE — 93294 REM INTERROG EVL PM/LDLS PM: CPT | Mod: ,,, | Performed by: INTERNAL MEDICINE

## 2020-02-04 PROCEDURE — 93294 CARDIAC DEVICE CHECK - REMOTE: ICD-10-PCS | Mod: ,,, | Performed by: INTERNAL MEDICINE

## 2020-03-03 DIAGNOSIS — I48.0 PAROXYSMAL ATRIAL FIBRILLATION: ICD-10-CM

## 2020-03-03 RX ORDER — AMIODARONE HYDROCHLORIDE 200 MG/1
TABLET ORAL
Qty: 90 TABLET | Refills: 4 | Status: ON HOLD | OUTPATIENT
Start: 2020-03-03 | End: 2021-02-03 | Stop reason: SDUPTHER

## 2020-04-23 DIAGNOSIS — I48.0 PAROXYSMAL ATRIAL FIBRILLATION: ICD-10-CM

## 2020-04-23 RX ORDER — ATENOLOL 25 MG/1
TABLET ORAL
Qty: 270 TABLET | Refills: 3 | Status: SHIPPED | OUTPATIENT
Start: 2020-04-23 | End: 2021-06-10

## 2020-05-04 ENCOUNTER — CLINICAL SUPPORT (OUTPATIENT)
Dept: CARDIOLOGY | Facility: HOSPITAL | Age: 85
End: 2020-05-04
Payer: MEDICARE

## 2020-05-04 DIAGNOSIS — Z95.0 PRESENCE OF CARDIAC PACEMAKER: ICD-10-CM

## 2020-05-04 PROCEDURE — 93294 CARDIAC DEVICE CHECK - REMOTE: ICD-10-PCS | Mod: ,,, | Performed by: INTERNAL MEDICINE

## 2020-05-04 PROCEDURE — 93294 REM INTERROG EVL PM/LDLS PM: CPT | Mod: ,,, | Performed by: INTERNAL MEDICINE

## 2020-06-11 PROBLEM — E55.9 VITAMIN D DEFICIENCY: Status: ACTIVE | Noted: 2017-11-13

## 2020-06-11 PROBLEM — N25.81 HYPERPARATHYROIDISM DUE TO RENAL INSUFFICIENCY: Status: ACTIVE | Noted: 2020-06-11

## 2020-07-14 DIAGNOSIS — I48.0 PAF (PAROXYSMAL ATRIAL FIBRILLATION): Primary | ICD-10-CM

## 2020-07-17 ENCOUNTER — CLINICAL SUPPORT (OUTPATIENT)
Dept: CARDIOLOGY | Facility: HOSPITAL | Age: 85
End: 2020-07-17
Attending: INTERNAL MEDICINE
Payer: MEDICARE

## 2020-07-17 ENCOUNTER — OFFICE VISIT (OUTPATIENT)
Dept: ELECTROPHYSIOLOGY | Facility: CLINIC | Age: 85
End: 2020-07-17
Payer: MEDICARE

## 2020-07-17 VITALS
HEIGHT: 63 IN | SYSTOLIC BLOOD PRESSURE: 154 MMHG | WEIGHT: 126.75 LBS | DIASTOLIC BLOOD PRESSURE: 67 MMHG | HEART RATE: 60 BPM | BODY MASS INDEX: 22.46 KG/M2

## 2020-07-17 DIAGNOSIS — Z95.0 PACEMAKER: ICD-10-CM

## 2020-07-17 DIAGNOSIS — I49.5 SSS (SICK SINUS SYNDROME): ICD-10-CM

## 2020-07-17 DIAGNOSIS — Z95.0 CARDIAC PACEMAKER IN SITU: ICD-10-CM

## 2020-07-17 DIAGNOSIS — I48.0 PAF (PAROXYSMAL ATRIAL FIBRILLATION): ICD-10-CM

## 2020-07-17 DIAGNOSIS — I48.0 PAROXYSMAL ATRIAL FIBRILLATION: Primary | ICD-10-CM

## 2020-07-17 DIAGNOSIS — I45.10 RBBB: ICD-10-CM

## 2020-07-17 DIAGNOSIS — I10 ESSENTIAL HYPERTENSION: ICD-10-CM

## 2020-07-17 DIAGNOSIS — Z79.899 LONG TERM CURRENT USE OF ANTIARRHYTHMIC DRUG: ICD-10-CM

## 2020-07-17 PROCEDURE — 93280 PM DEVICE PROGR EVAL DUAL: CPT | Mod: 26,,, | Performed by: INTERNAL MEDICINE

## 2020-07-17 PROCEDURE — 93280 CARDIAC DEVICE CHECK - IN CLINIC & HOSPITAL: ICD-10-PCS | Mod: 26,,, | Performed by: INTERNAL MEDICINE

## 2020-07-17 PROCEDURE — 93010 ELECTROCARDIOGRAM REPORT: CPT | Mod: S$PBB,,, | Performed by: INTERNAL MEDICINE

## 2020-07-17 PROCEDURE — 99213 OFFICE O/P EST LOW 20 MIN: CPT | Mod: PBBFAC,25 | Performed by: INTERNAL MEDICINE

## 2020-07-17 PROCEDURE — 99999 PR PBB SHADOW E&M-EST. PATIENT-LVL III: ICD-10-PCS | Mod: PBBFAC,,, | Performed by: INTERNAL MEDICINE

## 2020-07-17 PROCEDURE — 93010 RHYTHM STRIP: ICD-10-PCS | Mod: S$PBB,,, | Performed by: INTERNAL MEDICINE

## 2020-07-17 PROCEDURE — 99214 PR OFFICE/OUTPT VISIT, EST, LEVL IV, 30-39 MIN: ICD-10-PCS | Mod: S$PBB,,, | Performed by: INTERNAL MEDICINE

## 2020-07-17 PROCEDURE — 99214 OFFICE O/P EST MOD 30 MIN: CPT | Mod: S$PBB,,, | Performed by: INTERNAL MEDICINE

## 2020-07-17 PROCEDURE — 99999 PR PBB SHADOW E&M-EST. PATIENT-LVL III: CPT | Mod: PBBFAC,,, | Performed by: INTERNAL MEDICINE

## 2020-07-17 PROCEDURE — 93280 PM DEVICE PROGR EVAL DUAL: CPT

## 2020-07-17 PROCEDURE — 93005 ELECTROCARDIOGRAM TRACING: CPT | Mod: PBBFAC | Performed by: INTERNAL MEDICINE

## 2020-07-17 RX ORDER — DILTIAZEM HYDROCHLORIDE 180 MG/1
180 CAPSULE, COATED, EXTENDED RELEASE ORAL DAILY
Qty: 90 CAPSULE | Refills: 3 | Status: ON HOLD | OUTPATIENT
Start: 2020-07-17 | End: 2020-10-29 | Stop reason: SDUPTHER

## 2020-07-17 RX ORDER — DILTIAZEM HYDROCHLORIDE 180 MG/1
180 CAPSULE, COATED, EXTENDED RELEASE ORAL DAILY
Qty: 90 CAPSULE | Refills: 3 | Status: SHIPPED | OUTPATIENT
Start: 2020-07-17 | End: 2020-07-17 | Stop reason: SDUPTHER

## 2020-07-17 NOTE — PROGRESS NOTES
Subjective:    Patient ID:  Gisselle Ortiz is a 86 y.o. female who presents for evaluation of AF    Prior pt of Dr Marlen Corado    HPI  86 y.o. F  PAF on meds  hyperthyroid, resected  PPM for SB    Gen change 1/2019. AF/AFL noted on f/u. Propafenone increased. Still had AF with RVR after that.  Changed to flecainide, planned for DCCV; at some point changed to amio. Unclear timing of these changes.  TSH found to be abnormal, but T4 normal.  In past, following DCCV, she felt great! -- until AF recurred.    On amio now, with great effect.    PPM: no AMS.    My interpretation of today's ECG is APVS 60 bpm    Review of Systems   Constitution: Negative. Negative for malaise/fatigue.   HENT: Negative.  Negative for ear pain and tinnitus.    Eyes: Negative for blurred vision.   Cardiovascular: Negative for chest pain, dyspnea on exertion, near-syncope, palpitations and syncope.   Respiratory: Negative.  Negative for shortness of breath.    Endocrine: Negative.  Negative for polyuria.   Hematologic/Lymphatic: Does not bruise/bleed easily.   Skin: Negative.  Negative for rash.   Musculoskeletal: Negative.  Negative for joint pain and muscle weakness.   Gastrointestinal: Negative.  Negative for abdominal pain and change in bowel habit.   Genitourinary: Negative for frequency.   Neurological: Negative.  Negative for dizziness and weakness.   Psychiatric/Behavioral: Negative.  Negative for depression. The patient is not nervous/anxious.    Allergic/Immunologic: Negative for environmental allergies.        Objective:    Physical Exam   Constitutional: She is oriented to person, place, and time. Vital signs are normal. She appears well-developed and well-nourished. She is active and cooperative.   HENT:   Head: Normocephalic and atraumatic.   Eyes: Conjunctivae and EOM are normal.   Neck: Normal range of motion. Carotid bruit is not present. No tracheal deviation and no edema present. No thyroid mass and no thyromegaly present.    Cardiovascular: Normal rate, normal heart sounds, intact distal pulses and normal pulses. An irregularly irregular rhythm present.  No extrasystoles are present. PMI is not displaced. Exam reveals no gallop and no friction rub.   No murmur heard.  Pulmonary/Chest: Effort normal and breath sounds normal. No respiratory distress. She has no wheezes. She has no rales.   Abdominal: Soft. Normal appearance. She exhibits no distension. There is no hepatosplenomegaly.   Musculoskeletal: Normal range of motion.   Neurological: She is alert and oriented to person, place, and time. Coordination normal.   Skin: Skin is warm and dry. No rash noted.   Psychiatric: She has a normal mood and affect. Her speech is normal and behavior is normal. Thought content normal. Cognition and memory are normal.   Nursing note and vitals reviewed.        Assessment:       1. Paroxysmal atrial fibrillation    2. PAF (paroxysmal atrial fibrillation)    3. Pacemaker    4. Essential hypertension    5. SSS (sick sinus syndrome)    6. RBBB    7. Long term current use of antiarrhythmic drug         Plan:       Doing well on amio. Check LFTs, TSH today.  Added rate response to PPM programming.  Eliquis at therapeutic dose: 2.5 bid.    Return in 1 year with LFT, TSH, or earlier prn.

## 2020-07-23 ENCOUNTER — TELEPHONE (OUTPATIENT)
Dept: ELECTROPHYSIOLOGY | Facility: CLINIC | Age: 85
End: 2020-07-23

## 2020-07-23 NOTE — TELEPHONE ENCOUNTER
TSH suppressed with high T4.  Pt's thyroid was resected. Therefore unlikely this is related to amio use, but rather simply overtreatment with synthroid.    d/w pt.  Dr. Montez follows her thyroid function; cc'd here. Defer managment to him.

## 2020-07-28 PROBLEM — F43.21 GRIEF: Status: ACTIVE | Noted: 2020-07-28

## 2020-08-02 ENCOUNTER — CLINICAL SUPPORT (OUTPATIENT)
Dept: CARDIOLOGY | Facility: HOSPITAL | Age: 85
End: 2020-08-02
Payer: MEDICARE

## 2020-08-02 DIAGNOSIS — Z95.0 PRESENCE OF CARDIAC PACEMAKER: ICD-10-CM

## 2020-08-02 PROCEDURE — 93294 CARDIAC DEVICE CHECK - REMOTE: ICD-10-PCS | Mod: ,,, | Performed by: INTERNAL MEDICINE

## 2020-08-02 PROCEDURE — 93296 REM INTERROG EVL PM/IDS: CPT | Performed by: INTERNAL MEDICINE

## 2020-08-02 PROCEDURE — 93294 REM INTERROG EVL PM/LDLS PM: CPT | Mod: ,,, | Performed by: INTERNAL MEDICINE

## 2020-09-11 ENCOUNTER — HOSPITAL ENCOUNTER (INPATIENT)
Facility: HOSPITAL | Age: 85
LOS: 3 days | Discharge: HOME | DRG: 438 | End: 2020-09-15
Attending: EMERGENCY MEDICINE | Admitting: STUDENT IN AN ORGANIZED HEALTH CARE EDUCATION/TRAINING PROGRAM
Payer: MEDICARE

## 2020-09-11 ENCOUNTER — ANESTHESIA EVENT (OUTPATIENT)
Dept: ENDOSCOPY | Facility: HOSPITAL | Age: 85
DRG: 438 | End: 2020-09-11
Payer: MEDICARE

## 2020-09-11 DIAGNOSIS — K85.10 ACUTE GALLSTONE PANCREATITIS: Primary | ICD-10-CM

## 2020-09-11 DIAGNOSIS — R07.9 CHEST PAIN: ICD-10-CM

## 2020-09-11 LAB
ALBUMIN SERPL BCP-MCNC: 3.6 G/DL (ref 3.5–5.2)
ALP SERPL-CCNC: 125 U/L (ref 55–135)
ALT SERPL W/O P-5'-P-CCNC: 33 U/L (ref 10–44)
ANION GAP SERPL CALC-SCNC: 10 MMOL/L (ref 8–16)
AST SERPL-CCNC: 39 U/L (ref 10–40)
BASOPHILS # BLD AUTO: 0.01 K/UL (ref 0–0.2)
BASOPHILS NFR BLD: 0.2 % (ref 0–1.9)
BILIRUB SERPL-MCNC: 1 MG/DL (ref 0.1–1)
BNP SERPL-MCNC: 100 PG/ML (ref 0–99)
BUN SERPL-MCNC: 30 MG/DL (ref 8–23)
CALCIUM SERPL-MCNC: 8.1 MG/DL (ref 8.7–10.5)
CHLORIDE SERPL-SCNC: 104 MMOL/L (ref 95–110)
CO2 SERPL-SCNC: 24 MMOL/L (ref 23–29)
CREAT SERPL-MCNC: 1.9 MG/DL (ref 0.5–1.4)
DIFFERENTIAL METHOD: ABNORMAL
EOSINOPHIL # BLD AUTO: 0.1 K/UL (ref 0–0.5)
EOSINOPHIL NFR BLD: 0.8 % (ref 0–8)
ERYTHROCYTE [DISTWIDTH] IN BLOOD BY AUTOMATED COUNT: 13.5 % (ref 11.5–14.5)
EST. GFR  (AFRICAN AMERICAN): 26.9 ML/MIN/1.73 M^2
EST. GFR  (NON AFRICAN AMERICAN): 23.4 ML/MIN/1.73 M^2
GLUCOSE SERPL-MCNC: 144 MG/DL (ref 70–110)
HCT VFR BLD AUTO: 35.5 % (ref 37–48.5)
HGB BLD-MCNC: 11.1 G/DL (ref 12–16)
IMM GRANULOCYTES # BLD AUTO: 0.02 K/UL (ref 0–0.04)
IMM GRANULOCYTES NFR BLD AUTO: 0.3 % (ref 0–0.5)
LIPASE SERPL-CCNC: >1000 U/L (ref 4–60)
LYMPHOCYTES # BLD AUTO: 0.4 K/UL (ref 1–4.8)
LYMPHOCYTES NFR BLD: 6.5 % (ref 18–48)
MCH RBC QN AUTO: 30.2 PG (ref 27–31)
MCHC RBC AUTO-ENTMCNC: 31.3 G/DL (ref 32–36)
MCV RBC AUTO: 97 FL (ref 82–98)
MONOCYTES # BLD AUTO: 0 K/UL (ref 0.3–1)
MONOCYTES NFR BLD: 0.5 % (ref 4–15)
NEUTROPHILS # BLD AUTO: 5.8 K/UL (ref 1.8–7.7)
NEUTROPHILS NFR BLD: 91.7 % (ref 38–73)
NRBC BLD-RTO: 0 /100 WBC
PLATELET # BLD AUTO: 212 K/UL (ref 150–350)
PMV BLD AUTO: 9.3 FL (ref 9.2–12.9)
POTASSIUM SERPL-SCNC: 3.6 MMOL/L (ref 3.5–5.1)
PROT SERPL-MCNC: 6.4 G/DL (ref 6–8.4)
RBC # BLD AUTO: 3.67 M/UL (ref 4–5.4)
SARS-COV-2 RDRP RESP QL NAA+PROBE: NEGATIVE
SODIUM SERPL-SCNC: 138 MMOL/L (ref 136–145)
TROPONIN I SERPL DL<=0.01 NG/ML-MCNC: 0.01 NG/ML (ref 0–0.03)
WBC # BLD AUTO: 6.34 K/UL (ref 3.9–12.7)

## 2020-09-11 PROCEDURE — 85025 COMPLETE CBC W/AUTO DIFF WBC: CPT

## 2020-09-11 PROCEDURE — G0378 HOSPITAL OBSERVATION PER HR: HCPCS

## 2020-09-11 PROCEDURE — 63600175 PHARM REV CODE 636 W HCPCS: Performed by: STUDENT IN AN ORGANIZED HEALTH CARE EDUCATION/TRAINING PROGRAM

## 2020-09-11 PROCEDURE — 99285 EMERGENCY DEPT VISIT HI MDM: CPT | Mod: ,,, | Performed by: EMERGENCY MEDICINE

## 2020-09-11 PROCEDURE — 96375 TX/PRO/DX INJ NEW DRUG ADDON: CPT | Performed by: EMERGENCY MEDICINE

## 2020-09-11 PROCEDURE — 99285 EMERGENCY DEPT VISIT HI MDM: CPT | Mod: 25

## 2020-09-11 PROCEDURE — 25000003 PHARM REV CODE 250: Performed by: STUDENT IN AN ORGANIZED HEALTH CARE EDUCATION/TRAINING PROGRAM

## 2020-09-11 PROCEDURE — 99223 PR INITIAL HOSPITAL CARE,LEVL III: ICD-10-PCS | Mod: GC,,, | Performed by: INTERNAL MEDICINE

## 2020-09-11 PROCEDURE — 99220 PR INITIAL OBSERVATION CARE,LEVL III: ICD-10-PCS | Mod: ,,, | Performed by: STUDENT IN AN ORGANIZED HEALTH CARE EDUCATION/TRAINING PROGRAM

## 2020-09-11 PROCEDURE — S0028 INJECTION, FAMOTIDINE, 20 MG: HCPCS | Performed by: STUDENT IN AN ORGANIZED HEALTH CARE EDUCATION/TRAINING PROGRAM

## 2020-09-11 PROCEDURE — 96374 THER/PROPH/DIAG INJ IV PUSH: CPT

## 2020-09-11 PROCEDURE — 99223 1ST HOSP IP/OBS HIGH 75: CPT | Mod: GC,,, | Performed by: INTERNAL MEDICINE

## 2020-09-11 PROCEDURE — 96361 HYDRATE IV INFUSION ADD-ON: CPT | Performed by: EMERGENCY MEDICINE

## 2020-09-11 PROCEDURE — 99285 PR EMERGENCY DEPT VISIT,LEVEL V: ICD-10-PCS | Mod: ,,, | Performed by: EMERGENCY MEDICINE

## 2020-09-11 PROCEDURE — 83880 ASSAY OF NATRIURETIC PEPTIDE: CPT

## 2020-09-11 PROCEDURE — 99220 PR INITIAL OBSERVATION CARE,LEVL III: CPT | Mod: ,,, | Performed by: STUDENT IN AN ORGANIZED HEALTH CARE EDUCATION/TRAINING PROGRAM

## 2020-09-11 PROCEDURE — U0002 COVID-19 LAB TEST NON-CDC: HCPCS

## 2020-09-11 PROCEDURE — 84484 ASSAY OF TROPONIN QUANT: CPT

## 2020-09-11 PROCEDURE — 93010 EKG 12-LEAD: ICD-10-PCS | Mod: ,,, | Performed by: INTERNAL MEDICINE

## 2020-09-11 PROCEDURE — 93010 ELECTROCARDIOGRAM REPORT: CPT | Mod: ,,, | Performed by: INTERNAL MEDICINE

## 2020-09-11 PROCEDURE — 80053 COMPREHEN METABOLIC PANEL: CPT

## 2020-09-11 PROCEDURE — 93005 ELECTROCARDIOGRAM TRACING: CPT

## 2020-09-11 PROCEDURE — 83690 ASSAY OF LIPASE: CPT

## 2020-09-11 PROCEDURE — 94761 N-INVAS EAR/PLS OXIMETRY MLT: CPT

## 2020-09-11 RX ORDER — DILTIAZEM HYDROCHLORIDE 180 MG/1
180 CAPSULE, COATED, EXTENDED RELEASE ORAL DAILY
Status: DISCONTINUED | OUTPATIENT
Start: 2020-09-12 | End: 2020-09-15 | Stop reason: HOSPADM

## 2020-09-11 RX ORDER — ATENOLOL 25 MG/1
25 TABLET ORAL 2 TIMES DAILY
Status: DISCONTINUED | OUTPATIENT
Start: 2020-09-11 | End: 2020-09-15 | Stop reason: HOSPADM

## 2020-09-11 RX ORDER — IBUPROFEN 200 MG
24 TABLET ORAL
Status: DISCONTINUED | OUTPATIENT
Start: 2020-09-11 | End: 2020-09-15 | Stop reason: HOSPADM

## 2020-09-11 RX ORDER — FAMOTIDINE 10 MG/ML
20 INJECTION INTRAVENOUS
Status: COMPLETED | OUTPATIENT
Start: 2020-09-11 | End: 2020-09-11

## 2020-09-11 RX ORDER — ONDANSETRON 2 MG/ML
4 INJECTION INTRAMUSCULAR; INTRAVENOUS EVERY 8 HOURS PRN
Status: DISCONTINUED | OUTPATIENT
Start: 2020-09-11 | End: 2020-09-15 | Stop reason: HOSPADM

## 2020-09-11 RX ORDER — ASPIRIN 81 MG/1
81 TABLET ORAL DAILY
Status: DISCONTINUED | OUTPATIENT
Start: 2020-09-12 | End: 2020-09-15 | Stop reason: HOSPADM

## 2020-09-11 RX ORDER — ALUMINUM HYDROXIDE, MAGNESIUM HYDROXIDE, AND SIMETHICONE 2400; 240; 2400 MG/30ML; MG/30ML; MG/30ML
5 SUSPENSION ORAL ONCE
Status: COMPLETED | OUTPATIENT
Start: 2020-09-11 | End: 2020-09-11

## 2020-09-11 RX ORDER — MORPHINE SULFATE 2 MG/ML
2 INJECTION, SOLUTION INTRAMUSCULAR; INTRAVENOUS EVERY 4 HOURS PRN
Status: DISCONTINUED | OUTPATIENT
Start: 2020-09-11 | End: 2020-09-15 | Stop reason: HOSPADM

## 2020-09-11 RX ORDER — ATORVASTATIN CALCIUM 10 MG/1
10 TABLET, FILM COATED ORAL DAILY
Status: DISCONTINUED | OUTPATIENT
Start: 2020-09-12 | End: 2020-09-15 | Stop reason: HOSPADM

## 2020-09-11 RX ORDER — ACETAMINOPHEN 325 MG/1
650 TABLET ORAL EVERY 6 HOURS PRN
Status: DISCONTINUED | OUTPATIENT
Start: 2020-09-11 | End: 2020-09-15 | Stop reason: HOSPADM

## 2020-09-11 RX ORDER — AMIODARONE HYDROCHLORIDE 200 MG/1
200 TABLET ORAL DAILY
Status: DISCONTINUED | OUTPATIENT
Start: 2020-09-12 | End: 2020-09-15 | Stop reason: HOSPADM

## 2020-09-11 RX ORDER — IBUPROFEN 200 MG
16 TABLET ORAL
Status: DISCONTINUED | OUTPATIENT
Start: 2020-09-11 | End: 2020-09-15 | Stop reason: HOSPADM

## 2020-09-11 RX ORDER — ACETAMINOPHEN 325 MG/1
650 TABLET ORAL EVERY 4 HOURS PRN
Status: DISCONTINUED | OUTPATIENT
Start: 2020-09-11 | End: 2020-09-15 | Stop reason: HOSPADM

## 2020-09-11 RX ORDER — SODIUM CHLORIDE 0.9 % (FLUSH) 0.9 %
10 SYRINGE (ML) INJECTION
Status: DISCONTINUED | OUTPATIENT
Start: 2020-09-11 | End: 2020-09-15 | Stop reason: HOSPADM

## 2020-09-11 RX ORDER — SODIUM CHLORIDE, SODIUM LACTATE, POTASSIUM CHLORIDE, CALCIUM CHLORIDE 600; 310; 30; 20 MG/100ML; MG/100ML; MG/100ML; MG/100ML
INJECTION, SOLUTION INTRAVENOUS CONTINUOUS
Status: DISCONTINUED | OUTPATIENT
Start: 2020-09-11 | End: 2020-09-13

## 2020-09-11 RX ORDER — TALC
3 POWDER (GRAM) TOPICAL NIGHTLY
Status: DISCONTINUED | OUTPATIENT
Start: 2020-09-11 | End: 2020-09-15 | Stop reason: HOSPADM

## 2020-09-11 RX ORDER — GLUCAGON 1 MG
1 KIT INJECTION
Status: DISCONTINUED | OUTPATIENT
Start: 2020-09-11 | End: 2020-09-15 | Stop reason: HOSPADM

## 2020-09-11 RX ORDER — HEPARIN SODIUM 5000 [USP'U]/ML
5000 INJECTION, SOLUTION INTRAVENOUS; SUBCUTANEOUS EVERY 8 HOURS
Status: DISCONTINUED | OUTPATIENT
Start: 2020-09-11 | End: 2020-09-15

## 2020-09-11 RX ADMIN — SODIUM CHLORIDE, SODIUM LACTATE, POTASSIUM CHLORIDE, AND CALCIUM CHLORIDE: .6; .31; .03; .02 INJECTION, SOLUTION INTRAVENOUS at 03:09

## 2020-09-11 RX ADMIN — MELATONIN TAB 3 MG 3 MG: 3 TAB at 08:09

## 2020-09-11 RX ADMIN — FAMOTIDINE 20 MG: 10 INJECTION INTRAVENOUS at 04:09

## 2020-09-11 RX ADMIN — ATENOLOL 25 MG: 25 TABLET ORAL at 08:09

## 2020-09-11 RX ADMIN — ALUMINUM HYDROXIDE, MAGNESIUM HYDROXIDE, AND DIMETHICONE 5 ML: 400; 400; 40 SUSPENSION ORAL at 04:09

## 2020-09-11 RX ADMIN — MORPHINE SULFATE 2 MG: 2 INJECTION, SOLUTION INTRAMUSCULAR; INTRAVENOUS at 08:09

## 2020-09-11 NOTE — ED NOTES
Pt is sleeping. Aroused pt to ask if pt wanted breakfast, pt declined. NAD noted, VSS. BP cuff and pulse ox alarms are set. Bed low and locked, side rails up x2. Call light within reach of pt. Will continue to monitor.

## 2020-09-11 NOTE — H&P
[facility] Hospital Medicine                                                         History and Physical       Team: [facility] HOSP MED N Otf Mason MD   Admit Date: 9/11/2020   HOD: 0     FRANCESCA: 9/15/2020     Primary care Physician: Kai Montez MD    Principal Problem: Acute gallstone pancreatitis      Patient information was obtained from patient and ER records.      Code status: Full Code      HPI:       Ms Gisselle Ortiz is an 87 y.o. lady with history of atrial fibrillation on Eliquis with previous cardioversion, SSS s/p pacemaker, arthritis, HTN, thyroid disease with diastolic dysfunction (EF 55%, 9/2019) who presented to the ED with acute onset chest and epigastric pain that started acutely and woke pt from sleep at ~ midnight. Pt had had some nausea for several hours before bed. After waking had 2 associated episodes of NBNB vomiting. No diarrhea. Chest pain is pleuritic. Worse with movement but not necessarily exertion. No palpitations. Pain now most prominent in epigastrum- burning, aching pain with intermittent radiation to back. Pain is most similar to previous episode of pancreatitis that pt had several years ago. She is uncertain of the etiology of that episode. Has never required intervention on gallbladder. Denies EtOH use or recent medication changes.     In the ED she was afebrile. All VS initially wnl. Labs pertinent for elevated lipase > 1000. Mild anemia on CBC but no leukocytosis. Cr elevated to 1.9 from baseline 1.5. Remainder of electrolytes and LFTs wnl. Troponin wnl and BNP near-normal.     EKG with conduction delay but no ST changes.     Abd u/s demonstrated significantly dilated common bile duct with sludge in GB.     Given famotidine and maalox-simethicone. Admitted to Hospitalist Medicine.       Review of Systems:  Constitutional: no fever or chills  Respiratory: no  cough or shortness of breath  Cardiovascular: Positve for chest pain. No palpitations  Gastrointestinal: Positive for nausea, vomiting, abdominal pain. No change in bowel habits  Genitourinary: no hematuria or dysuria  Integument/Breast: no rash or pruritis  Hematologic/Lymphatic: no easy bruising or lymphadenopathy  Musculoskeletal: no arthralgias or myalgias  Neurological: no seizures or tremors  Behavioral/Psych: no depression or anxiety      PAST HISTORY:     Past Medical History:   Diagnosis Date    A-fib     Arthritis     Hypertension     Thyroid disease        Past Surgical History:   Procedure Laterality Date    CATARACT EXTRACTION      CATARACT EXTRACTION W/  INTRAOCULAR LENS IMPLANT Bilateral 2004     IN [place]    EYE SURGERY      HIP REPLACEMENT ARTHROPLASTY      HYSTERECTOMY      JOINT REPLACEMENT      REVISION OF SKIN POCKET FOR PACEMAKER N/A 1/21/2019    Procedure: REVISION-POCKET-PACEMAKER;  Surgeon: Asher Watts MD;  Location: [facility] EP LAB;  Service: Cardiology;  Laterality: N/A;  MODERATE SEDATION, sedation issues in the past    THYROIDECTOMY      TREATMENT OF CARDIAC ARRHYTHMIA N/A 3/18/2019    Procedure: CARDIOVERSION OR DEFIBRILLATION;  Surgeon: Asher Watts MD;  Location: [facility] EP LAB;  Service: Cardiology;  Laterality: N/A;  AF, JILL-cx if SR, DCCV, MAC, FAS, 3PREP    TREATMENT OF CARDIAC ARRHYTHMIA N/A 5/14/2019    Procedure: Cardioversion or Defibrillation;  Surgeon: Derick Vizcarra MD;  Location: [facility] EP LAB;  Service: Cardiology;  Laterality: N/A;  AF, JILL, DCCV, MAC, DM, 3PREP       Family History   Problem Relation Age of Onset    Heart attack Mother     Heart disease Mother     Heart failure Mother     Hypertension Mother     Stroke Maternal Grandmother     Hypertension Maternal Grandmother     Heart disease Maternal Grandmother     Heart attack Maternal Grandmother     Heart failure Maternal Grandmother        Social History      Socioeconomic History    Marital status:      Spouse name: Not on file    Number of children: Not on file    Years of education: Not on file    Highest education level: Not on file   Occupational History    Not on file   Social Needs    Financial resource strain: Not on file    Food insecurity     Worry: Not on file     Inability: Not on file    Transportation needs     Medical: Not on file     Non-medical: Not on file   Tobacco Use    Smoking status: Former Smoker     Start date: 1964    Smokeless tobacco: Never Used   Substance and Sexual Activity    Alcohol use: No    Drug use: No    Sexual activity: Not Currently     Partners: Male   Lifestyle    Physical activity     Days per week: Not on file     Minutes per session: Not on file    Stress: Not on file   Relationships    Social connections     Talks on phone: Not on file     Gets together: Not on file     Attends Holiness service: Not on file     Active member of club or organization: Not on file     Attends meetings of clubs or organizations: Not on file     Relationship status: Not on file   Other Topics Concern    Not on file   Social History Narrative    Not on file   Former smoker, ~ 10 pack years, quit in 20s. Currently lives alone.  recently . Manages all IADLs. Never heavy EtOH use.     MEDICATIONS and ALLERGIES:   Reviewed    No current facility-administered medications on file prior to encounter.      Current Outpatient Medications on File Prior to Encounter   Medication Sig Dispense Refill    amiodarone (PACERONE) 200 MG Tab TAKE 1 TABLET ONCE DAILY . START TAKING ONLY 1 TABLET EVERY DAY ON 19 90 tablet 4    apixaban (ELIQUIS) 2.5 mg Tab Take 1 tablet (2.5 mg total) by mouth 2 (two) times daily. 180 tablet 3    aspirin (ECOTRIN) 81 MG EC tablet Take 81 mg by mouth once daily.      atenoloL (TENORMIN) 25 MG tablet TAKE 2 TABLETS EVERY MORNING AND 1 TABLET EVERY EVENING 270 tablet 3     atorvastatin (LIPITOR) 10 MG tablet TAKE 1 TABLET DAILY 90 tablet 3    diltiaZEM (CARDIZEM CD) 180 MG 24 hr capsule Take 1 capsule (180 mg total) by mouth once daily. 90 capsule 3    eszopiclone (LUNESTA) 2 MG Tab Take 2 mg by mouth every evening.      levothyroxine (SYNTHROID) 137 MCG Tab tablet Take 1 tablet (137 mcg total) by mouth once daily. 90 tablet 3    spironolactone (ALDACTONE) 25 MG tablet       [DISCONTINUED] ergocalciferol, vitamin D2, (VITAMIN D ORAL) 1 tablet          Review of patient's allergies indicates:   Allergen Reactions    Ciprofloxacin Nausea And Vomiting           PHYSICAL EXAM:     Temp:  [97.7 °F (36.5 °C)-98.9 °F (37.2 °C)]   Pulse:  [60-64]   Resp:  [15-20]   BP: ()/(48-70)   SpO2:  [96 %-100 %]   Body mass index is 22.67 kg/m².   No intake or output data in the 24 hours ending 09/11/20 1535    General appearance: moderate distress  Mental status: Alert and oriented x 3  HEENT:  conjunctivae/corneas clear, PERRL  Neck: supple, thyroid not enlarged  Pulm:   Shallow breaths limited by pain. CTA B, no c/w/r  Card: RRR, S1, S2 normal, no murmur, click, rub or gallop  Abd: Diffusely tender. Most severe in RUQ and epigastrum. soft, ND, BS present; no masses, no organomegaly  Ext: no c/c/e  Pulses: 2+, symmetric  Skin: color, texture, turgor normal. No rashes or lesions  Neuro: Cranial Nerves grossly intact, no focal numbness or weakness, normal strength and tone       LABS and IMAGING:       Recent Results (from the past 24 hour(s))   CBC auto differential    Collection Time: 09/11/20  4:06 AM   Result Value Ref Range    WBC 6.34 3.90 - 12.70 K/uL    RBC 3.67 (L) 4.00 - 5.40 M/uL    Hemoglobin 11.1 (L) 12.0 - 16.0 g/dL    Hematocrit 35.5 (L) 37.0 - 48.5 %    Mean Corpuscular Volume 97 82 - 98 fL    Mean Corpuscular Hemoglobin 30.2 27.0 - 31.0 pg    Mean Corpuscular Hemoglobin Conc 31.3 (L) 32.0 - 36.0 g/dL    RDW 13.5 11.5 - 14.5 %    Platelets 212 150 - 350 K/uL    MPV 9.3 9.2 -  12.9 fL    Immature Granulocytes 0.3 0.0 - 0.5 %    Gran # (ANC) 5.8 1.8 - 7.7 K/uL    Immature Grans (Abs) 0.02 0.00 - 0.04 K/uL    Lymph # 0.4 (L) 1.0 - 4.8 K/uL    Mono # 0.0 (L) 0.3 - 1.0 K/uL    Eos # 0.1 0.0 - 0.5 K/uL    Baso # 0.01 0.00 - 0.20 K/uL    nRBC 0 0 /100 WBC    Gran% 91.7 (H) 38.0 - 73.0 %    Lymph% 6.5 (L) 18.0 - 48.0 %    Mono% 0.5 (L) 4.0 - 15.0 %    Eosinophil% 0.8 0.0 - 8.0 %    Basophil% 0.2 0.0 - 1.9 %    Differential Method Automated    Comprehensive metabolic panel    Collection Time: 09/11/20  4:06 AM   Result Value Ref Range    Sodium 138 136 - 145 mmol/L    Potassium 3.6 3.5 - 5.1 mmol/L    Chloride 104 95 - 110 mmol/L    CO2 24 23 - 29 mmol/L    Glucose 144 (H) 70 - 110 mg/dL    BUN, Bld 30 (H) 8 - 23 mg/dL    Creatinine 1.9 (H) 0.5 - 1.4 mg/dL    Calcium 8.1 (L) 8.7 - 10.5 mg/dL    Total Protein 6.4 6.0 - 8.4 g/dL    Albumin 3.6 3.5 - 5.2 g/dL    Total Bilirubin 1.0 0.1 - 1.0 mg/dL    Alkaline Phosphatase 125 55 - 135 U/L    AST 39 10 - 40 U/L    ALT 33 10 - 44 U/L    Anion Gap 10 8 - 16 mmol/L    eGFR if African American 26.9 (A) >60 mL/min/1.73 m^2    eGFR if non  23.4 (A) >60 mL/min/1.73 m^2   Troponin I #1    Collection Time: 09/11/20  4:06 AM   Result Value Ref Range    Troponin I 0.014 0.000 - 0.026 ng/mL   B-Type natriuretic peptide (BNP)    Collection Time: 09/11/20  4:06 AM   Result Value Ref Range     (H) 0 - 99 pg/mL   Lipase    Collection Time: 09/11/20  4:06 AM   Result Value Ref Range    Lipase >1000 (H) 4 - 60 U/L   COVID-19 Rapid Screening    Collection Time: 09/11/20  5:13 AM   Result Value Ref Range    SARS-CoV-2 RNA, Amplification, Qual Negative Negative       No results for input(s): POCTGLUCOSE in the last 168 hours.    Active Hospital Problems    Diagnosis  POA    *Acute gallstone pancreatitis [K85.10]  Yes    SSS (sick sinus syndrome) [I49.5]  Yes    Long term current use of antiarrhythmic drug [Z79.899]  Not Applicable    Vitamin D  deficiency [E55.9]  Yes    Insomnia [G47.00]  Yes    Gastroesophageal reflux disease [K21.9]  Yes    Paroxysmal atrial fibrillation [I48.0]  Yes    Hypertension [I10]  Yes    Hypothyroidism [E03.9]  Yes    High cholesterol [E78.00]  Yes      Resolved Hospital Problems    Diagnosis Date Resolved POA    Chest pain [R07.9] 09/11/2020 Yes           ASSESSMENT and PLAN:     Problems List:  Active Hospital Problems    Diagnosis  POA    *Acute gallstone pancreatitis [K85.10]  Yes    SSS (sick sinus syndrome) [I49.5]  Yes    Long term current use of antiarrhythmic drug [Z79.899]  Not Applicable    Vitamin D deficiency [E55.9]  Yes    Insomnia [G47.00]  Yes    Gastroesophageal reflux disease [K21.9]  Yes    Paroxysmal atrial fibrillation [I48.0]  Yes    Hypertension [I10]  Yes    Hypothyroidism [E03.9]  Yes    High cholesterol [E78.00]  Yes      Resolved Hospital Problems    Diagnosis Date Resolved POA    Chest pain [R07.9] 09/11/2020 Yes       This is an 87 y.o. lady with history of atrial fibrillation on Eliquis with previous cardioversion, SSS s/p pacemaker, arthritis, HTN, thyroid disease with diastolic dysfunction (EF 55%, 9/2019) here with acute gallstone pancreatitis. Currently hemodynamically stable without evidence of cholangitis or systemic inflammation.     Pancreatitis  -GI consulted, appreciate recs  -unable to perform MRCP due to PPM  -No immediate ERCP given recent systemic anticoagulation with eliquis. Plan for ERCP with possible EBUS on 9/14  -Gen surg consulted for cholecystectomy eval  -IVF at 1.5x maintenance, cautious given hx of CHF  -morphine and tylenol prn pain    pAFib with SSS and hx of PVCs s/p PPM  -c/w home amiodarone  -c/w home atenolol  -c/w home diltiazem  -holding home eliquis    HF/HLD/HTN  -holding home sprionolactone to avoid diurectic function  -c/w home atorvastatin  -c/w home aspirin    Postsurgical hypothyroidism  -c/w home synthroid    Insomnia  -hold home lunesta  for now given risk for hospital delirium  -melatonin in place    Code Status:  Full   Prophylaxis- heparin for now while awaiting procedure. At home on eliquis    Post-acute care- pending clinical condition  Discharge plan and follow up - d/c home once medically stable    Time spent in care of patient: >30 minutes    Otf Mason MD  Hospital Medicine Staff  Pager [phone]

## 2020-09-11 NOTE — ANESTHESIA PREPROCEDURE EVALUATION
[facility]  Anesthesia Pre-Operative Evaluation         Patient Name: Gisselle Ortiz  YOB: 1933  MRN: [id number]    SUBJECTIVE:     Pre-operative evaluation for Procedure(s) (LRB):  ERCP (ENDOSCOPIC RETROGRADE CHOLANGIOPANCREATOGRAPHY) (N/A)  ULTRASOUND, UPPER GI TRACT, ENDOSCOPIC (N/A)     09/11/2020    Gisselle Ortiz is a 87 y.o. female w/ a significant PMHx of Afib (on Eliquis; previous cardioversion), SSS s/p pacemaker, arthritis, HTN, hypothyroidism, diastolic dysfunction (EF 55%, 9/2019), pancreatitis who presented with chest pain.    Abd U/S demonstrated significantly dilated common bile duct with sludge in GB.     Patient now presents for the above procedure(s).    TTE 9/26/19  · Normal left ventricular systolic function. The estimated ejection fraction is 55%  · Grade II (moderate) left ventricular diastolic dysfunction consistent with pseudonormalization.  · Eccentric left ventricular hypertrophy. Mild left ventricular enlargement.  · Mild mitral regurgitation.  · Normal right ventricular systolic function.  · Mild tricuspid regurgitation.  · Severe left atrial enlargement.     EKG 9/11/20  Atrial-paced rhythm  Right bundle branch block  Abnormal ECG  When compared with ECG of 17-JUL-2020 10:11,  No significant change was found    LDA: None documented.    Prev airway: None documented.    Drips: None documented.    Patient Active Problem List   Diagnosis    Paroxysmal atrial fibrillation    Pacemaker    Hypertension    Former smoker    Hypothyroidism    High cholesterol    RBBB    Long term current use of antiarrhythmic drug    Current use of long term anticoagulation    SSS (sick sinus syndrome)    PVC (premature ventricular contraction)    Vitamin D deficiency    Osteoarthritis of right hip    Insomnia    Hyperparathyroidism due to renal insufficiency    Gastroesophageal reflux disease    Chronic renal  insufficiency, stage III (moderate)    Grief    Acute gallstone pancreatitis       Review of patient's allergies indicates:   Allergen Reactions    Ciprofloxacin Nausea And Vomiting       Current Outpatient Medications:    Current Facility-Administered Medications:     acetaminophen tablet 650 mg, 650 mg, Oral, Q6H PRN, Otf Mason MD    acetaminophen tablet 650 mg, 650 mg, Oral, Q4H PRN, Otf Mason MD    [START ON 9/12/2020] amiodarone tablet 200 mg, 200 mg, Oral, Daily, Otf Mason MD    [START ON 9/12/2020] aspirin EC tablet 81 mg, 81 mg, Oral, Daily, Otf Mason MD    atenoloL tablet 25 mg, 25 mg, Oral, BID, Otf Mason MD    [START ON 9/12/2020] atorvastatin tablet 10 mg, 10 mg, Oral, Daily, Otf Mason MD    dextrose 50% injection 12.5 g, 12.5 g, Intravenous, PRN, Otf Mason MD    dextrose 50% injection 25 g, 25 g, Intravenous, PRN, Otf Mason MD    [START ON 9/12/2020] diltiaZEM 24 hr capsule 180 mg, 180 mg, Oral, Daily, Otf Mason MD    glucagon (human recombinant) injection 1 mg, 1 mg, Intramuscular, PRN, Otf Mason MD    glucose chewable tablet 16 g, 16 g, Oral, PRN, Otf Mason MD    glucose chewable tablet 24 g, 24 g, Oral, PRN, Otf Mason MD    heparin (porcine) injection 5,000 Units, 5,000 Units, Subcutaneous, Q8H, Otf Mason MD    lactated ringers infusion, , Intravenous, Continuous, Otf Mason MD, Last Rate: 150 mL/hr at 09/11/20 1515    [START ON 9/12/2020] levothyroxine tablet 137 mcg, 137 mcg, Oral, Before breakfast, Otf Mason MD    melatonin tablet 3 mg, 3 mg, Oral, Nightly, Otf Mason MD    morphine injection 2 mg, 2 mg, Intravenous, Q4H PRN, Otf Mason MD    ondansetron injection 4 mg, 4 mg, Intravenous, Q8H PRN, Otf Mason MD    ondansetron injection 4 mg, 4 mg, Intravenous, Q8H PRN, Otf Mason MD    sodium  chloride 0.9% flush 10 mL, 10 mL, Intravenous, PRN, Otf Mason MD    Past Surgical History:   Procedure Laterality Date    CATARACT EXTRACTION      CATARACT EXTRACTION W/  INTRAOCULAR LENS IMPLANT Bilateral 2004     IN [place]    EYE SURGERY      HIP REPLACEMENT ARTHROPLASTY      HYSTERECTOMY      JOINT REPLACEMENT      REVISION OF SKIN POCKET FOR PACEMAKER N/A 1/21/2019    Procedure: REVISION-POCKET-PACEMAKER;  Surgeon: Asher Watts MD;  Location: [facility] EP LAB;  Service: Cardiology;  Laterality: N/A;  MODERATE SEDATION, sedation issues in the past    THYROIDECTOMY      TREATMENT OF CARDIAC ARRHYTHMIA N/A 3/18/2019    Procedure: CARDIOVERSION OR DEFIBRILLATION;  Surgeon: Asher Watts MD;  Location: [facility] EP LAB;  Service: Cardiology;  Laterality: N/A;  AF, JILL-cx if SR, DCCV, MAC, FAS, 3PREP    TREATMENT OF CARDIAC ARRHYTHMIA N/A 5/14/2019    Procedure: Cardioversion or Defibrillation;  Surgeon: Derick Vizcarra MD;  Location: [facility] EP LAB;  Service: Cardiology;  Laterality: N/A;  AF, JILL, DCCV, MAC, DM, 3PREP       Social History     Socioeconomic History    Marital status:      Spouse name: Not on file    Number of children: Not on file    Years of education: Not on file    Highest education level: Not on file   Occupational History    Not on file   Social Needs    Financial resource strain: Not on file    Food insecurity     Worry: Not on file     Inability: Not on file    Transportation needs     Medical: Not on file     Non-medical: Not on file   Tobacco Use    Smoking status: Former Smoker     Start date: 5/19/1964    Smokeless tobacco: Never Used   Substance and Sexual Activity    Alcohol use: No    Drug use: No    Sexual activity: Not Currently     Partners: Male   Lifestyle    Physical activity     Days per week: Not on file     Minutes per session: Not on file    Stress: Not on file   Relationships    Social connections     Talks on phone: Not  on file     Gets together: Not on file     Attends Yarsanism service: Not on file     Active member of club or organization: Not on file     Attends meetings of clubs or organizations: Not on file     Relationship status: Not on file   Other Topics Concern    Not on file   Social History Narrative    Not on file       OBJECTIVE:     Vital Signs Range (Last 24H):  Temp:  [36.5 °C (97.7 °F)-37.4 °C (99.3 °F)]   Pulse:  [60-65]   Resp:  [15-20]   BP: ()/(48-73)   SpO2:  [96 %-100 %]       Significant Labs:  Lab Results   Component Value Date    WBC 6.34 09/11/2020    HGB 11.1 (L) 09/11/2020    HCT 35.5 (L) 09/11/2020     09/11/2020    CHOL 115 (L) 05/20/2015    TRIG 60 05/20/2015    HDL 38 (L) 05/20/2015    ALT 33 09/11/2020    AST 39 09/11/2020     09/11/2020    K 3.6 09/11/2020     09/11/2020    CREATININE 1.9 (H) 09/11/2020    BUN 30 (H) 09/11/2020    CO2 24 09/11/2020    TSH 0.012 (L) 07/17/2020    TSH 0.012 (L) 07/17/2020    INR 1.0 05/08/2019       Diagnostic Studies: No relevant studies.    EKG:   Results for orders placed or performed during the hospital encounter of 09/11/20   EKG 12-lead    Collection Time: 09/11/20  3:54 AM    Narrative    Test Reason : R07.9,    Vent. Rate : 060 BPM     Atrial Rate : 060 BPM     P-R Int : 000 ms          QRS Dur : 160 ms      QT Int : 516 ms       P-R-T Axes : 000 078 021 degrees     QTc Int : 516 ms    Atrial-paced rhythm  Right bundle branch block  Abnormal ECG  When compared with ECG of 17-JUL-2020 10:11,  No significant change was found  Confirmed by Jocelyn Escobar MD ([id number]) on 9/11/2020 1:09:47 PM    Referred By: [email or web address]   SELF           Confirmed By:Jocelyn Escobar MD       2D ECHO:  TTE:  Results for orders placed or performed during the hospital encounter of 09/26/19   Transthoracic echo (TTE) 2D with Color Flow   Result Value Ref Range    Ascending aorta 2.94 cm    STJ 3.00 cm    IVRT 0.10 msec    IVS 0.74 0.6 - 1.1 cm    LA size 5.01 cm     Left Atrium Major Axis 6.00 cm    Left Atrium Minor Axis 6.00 cm    LVIDD 6.02 (A) 3.5 - 6.0 cm    LVIDS 3.91 2.1 - 4.0 cm    LVOT diameter 2.25 cm    LVOT peak VTI 19.66 cm    PW 0.70 0.6 - 1.1 cm    MV Peak A Brian 0.56 m/s    E wave decelartion time 133.26 msec    MV Peak E Brian 0.79 m/s    PV Peak D Brian 0.47 m/s    PV Peak S Brian 0.48 m/s    RA Major Axis 3.70 cm    RA Width 3.47 cm    RVDD 4.20 cm    Sinus 3.14 cm    TAPSE 1.65 cm    TR Max Brian 2.85 m/s    TDI LATERAL 0.06 m/s    TDI SEPTAL 0.03 m/s    LA WIDTH 5.00 cm    LV Diastolic Volume 181.71 mL    LV Systolic Volume 66.24 mL    RV S' 7.94 cm/s    LVOT peak brian 0.85 m/s    LV LATERAL E/E' RATIO 13.17 m/s    LV SEPTAL E/E' RATIO 26.33 m/s    FS 35 %    LA volume 127.76 cm3    LV mass 164.50 g    Left Ventricle Relative Wall Thickness 0.23 cm    E/A ratio 1.41     Mean e' 0.05 m/s    Pulm vein S/D ratio 1.02     LVOT area 4.0 cm2    LVOT stroke volume 78.13 cm3    E/E' ratio 17.56 m/s    Triscuspid Valve Regurgitation Peak Gradient 32 mmHg    BSA 1.59 m2    LV Systolic Volume Index 41.8 mL/m2    LV Diastolic Volume Index 114.74 mL/m2    LA Volume Index 80.7 mL/m2    LV Mass Index 104 g/m2    Narrative    · Normal left ventricular systolic function. The estimated ejection   fraction is 55%  · Grade II (moderate) left ventricular diastolic dysfunction consistent   with pseudonormalization.  · Eccentric left ventricular hypertrophy. Mild left ventricular   enlargement.  · Mild mitral regurgitation.  · Normal right ventricular systolic function.  · Mild tricuspid regurgitation.  · Severe left atrial enlargement.          JILL:  No results found for this or any previous visit.    ASSESSMENT/PLAN:       Anesthesia Evaluation    I have reviewed the Patient Summary Reports.      I have reviewed the Medications.     Review of Systems  Anesthesia Hx:  Neg history of prior surgery. Denies Family Hx of Anesthesia complications.   Denies Personal Hx of Anesthesia  complications.   Hematology/Oncology:  Hematology Normal   Oncology Normal     EENT/Dental:EENT/Dental Normal   Cardiovascular:   Hypertension Dysrhythmias    Pulmonary:  Pulmonary Normal    Renal/:   Chronic Renal Disease    Hepatic/GI:  Hepatic/GI Normal    Musculoskeletal:   Arthritis     Neurological:  Neurology Normal    Endocrine:   Hypothyroidism    Dermatological:  Skin Normal    Psych:  Psychiatric Normal           Physical Exam  General:  Well nourished    Airway/Jaw/Neck:  Airway Findings: Mouth Opening: Normal Tongue: Normal  General Airway Assessment: Adult  Mallampati: II  TM Distance: Normal, at least 6 cm        Eyes/Ears/Nose:  EYES/EARS/NOSE FINDINGS: Normal   Dental:  Dental Findings: In tact   Chest/Lungs:  Chest/Lungs Clear    Heart/Vascular:  Heart Findings: Normal Heart murmur: negative Vascular Findings: Normal    Abdomen:  Abdomen Findings: Normal    Musculoskeletal:  Musculoskeletal Findings: Normal   Skin:  Skin Findings: Normal    Mental Status:  Mental Status Findings: Normal        Anesthesia Plan  Type of Anesthesia, risks & benefits discussed:  Anesthesia Type:  general, MAC  Patient's Preference:   Intra-op Monitoring Plan: standard ASA monitors  Intra-op Monitoring Plan Comments:   Post Op Pain Control Plan: multimodal analgesia, IV/PO Opioids PRN and per primary service following discharge from PACU  Post Op Pain Control Plan Comments:   Induction:   IV  Beta Blocker:  Patient is not currently on a Beta-Blocker (No further documentation required).       Informed Consent: Patient understands risks and agrees with Anesthesia plan.  Questions answered. Anesthesia consent signed with patient.  ASA Score: 3     Day of Surgery Review of History & Physical: I have interviewed and examined the patient. I have reviewed the patient's H&P dated:  There are no significant changes.  H&P update referred to the surgeon.         Ready For Surgery From Anesthesia Perspective.

## 2020-09-11 NOTE — ED TRIAGE NOTES
Gisselle KAELYN Jameseliezerjessicaneida, a 87 y.o. female presents to the ED w/ complaint of acute onset of non-radiating chest pain. PT denies SOB, cough, fever/chills, and v/d. PT stated that she is supposed to be burying her  this morning.     Triage note:  Chief Complaint   Patient presents with    Chest Pain     Pt woke up with centralized chest pain this am with some nausea. Pt hx of afib and pacer     Review of patient's allergies indicates:   Allergen Reactions    Ciprofloxacin Nausea And Vomiting     Past Medical History:   Diagnosis Date    A-fib     Arthritis     Hypertension     Thyroid disease

## 2020-09-11 NOTE — PROGRESS NOTES
Pt arrived to  from ED. Pt aao x 4. Fluids @ 150cc. Safety precautions maintained. Bed in low position wheels locked. Side rails up x 2. Call light within reach. Pt free of falls.

## 2020-09-11 NOTE — ED PROVIDER NOTES
Encounter Date: 9/11/2020       History     Chief Complaint   Patient presents with    Chest Pain     Pt woke up with centralized chest pain this am with some nausea. Pt hx of afib and pacer     HPI     Ms. Ortiz is a 87 y.o. female with history of atrial fibrillation on Eliquis and diltiazem with history of previous cardioversion, HTN, arthritis and thyroid disease who presents to the ED with complaints of lower sternal and epigastric pain that work the patient up out of sleep with associated nausea with vomiting x1 with no shortness of breath that started 1 hour PTA. States that the pain is worse with deep breaths. Patient received 325mg ASA in route and 1 nitroglycerin sublingual tablet with no improvement in her chest pain with her blood pressure dropped to 100s systolic. Patient denies any recent illnesses, cough, fever/chills, diarrhea/constipation, urinary symptoms, headaches or lightheadedness/dizziness. States she has never had pain like this before. Patient with history of smoking decades ago but not currently. EMS reports that the patient is supposed to have her spouses wake today.     Review of patient's allergies indicates:   Allergen Reactions    Ciprofloxacin Nausea And Vomiting     Past Medical History:   Diagnosis Date    A-fib     Arthritis     Hypertension     Thyroid disease      Past Surgical History:   Procedure Laterality Date    CATARACT EXTRACTION      CATARACT EXTRACTION W/  INTRAOCULAR LENS IMPLANT Bilateral 2004     IN [place]    EYE SURGERY      HIP REPLACEMENT ARTHROPLASTY      HYSTERECTOMY      JOINT REPLACEMENT      REVISION OF SKIN POCKET FOR PACEMAKER N/A 1/21/2019    Procedure: REVISION-POCKET-PACEMAKER;  Surgeon: Asher Watts MD;  Location: [facility] EP LAB;  Service: Cardiology;  Laterality: N/A;  MODERATE SEDATION, sedation issues in the past    THYROIDECTOMY      TREATMENT OF CARDIAC ARRHYTHMIA N/A 3/18/2019    Procedure: CARDIOVERSION OR  DEFIBRILLATION;  Surgeon: Asher Watts MD;  Location: [facility] EP LAB;  Service: Cardiology;  Laterality: N/A;  AF, JILL-cx if SR, DCCV, MAC, FAS, 3PREP    TREATMENT OF CARDIAC ARRHYTHMIA N/A 5/14/2019    Procedure: Cardioversion or Defibrillation;  Surgeon: Derick Vizcarra MD;  Location: [facility] EP LAB;  Service: Cardiology;  Laterality: N/A;  AF, JILL, DCCV, MAC, DM, 3PREP     Family History   Problem Relation Age of Onset    Heart attack Mother     Heart disease Mother     Heart failure Mother     Hypertension Mother     Stroke Maternal Grandmother     Hypertension Maternal Grandmother     Heart disease Maternal Grandmother     Heart attack Maternal Grandmother     Heart failure Maternal Grandmother      Social History     Tobacco Use    Smoking status: Former Smoker     Start date: 5/19/1964    Smokeless tobacco: Never Used   Substance Use Topics    Alcohol use: No    Drug use: No     Review of Systems   Constitutional: Negative for chills and fever.   HENT: Negative for congestion and nosebleeds.    Respiratory: Negative for cough and shortness of breath.    Cardiovascular: Positive for chest pain. Negative for palpitations and leg swelling.   Gastrointestinal: Positive for nausea and vomiting (nonbloody x1). Negative for abdominal pain, constipation and diarrhea.   Genitourinary: Negative for dysuria and hematuria.   Musculoskeletal: Negative for back pain and neck pain.   Skin: Negative for rash.   Neurological: Negative for dizziness, light-headedness and headaches.       Physical Exam     Initial Vitals [09/11/20 0346]   BP Pulse Resp Temp SpO2   100/70 60 20 98 °F (36.7 °C) 99 %      MAP       --         Physical Exam    Nursing note and vitals reviewed.  Constitutional: She appears well-developed. She is not diaphoretic.   Elderly  female appears as if she doesn't feel well, speaking in full sentences with no signs of respiratory distress   HENT:   Head: Normocephalic and  atraumatic.   Eyes: Conjunctivae are normal. Pupils are equal, round, and reactive to light.   Neck: Normal range of motion. Neck supple.   Cardiovascular: Normal rate, regular rhythm, normal heart sounds and intact distal pulses.   No murmur heard.  Pulmonary/Chest: Breath sounds normal. No respiratory distress. She has no wheezes. She has no rales. She exhibits no tenderness.   Abdominal: Soft. Bowel sounds are normal. She exhibits no distension. There is no abdominal tenderness. There is no rebound and no guarding.   Musculoskeletal: Normal range of motion.      Comments: No lower extremity edema, no calf tenderness   Neurological: She is alert and oriented to person, place, and time.   Gross motor and sensory to light touch intact throughout   Skin: Skin is warm and dry. Capillary refill takes less than 2 seconds.         ED Course   Procedures  Labs Reviewed   CBC W/ AUTO DIFFERENTIAL - Abnormal; Notable for the following components:       Result Value    RBC 3.67 (*)     Hemoglobin 11.1 (*)     Hematocrit 35.5 (*)     Mean Corpuscular Hemoglobin Conc 31.3 (*)     Lymph # 0.4 (*)     Mono # 0.0 (*)     Gran% 91.7 (*)     Lymph% 6.5 (*)     Mono% 0.5 (*)     All other components within normal limits   COMPREHENSIVE METABOLIC PANEL - Abnormal; Notable for the following components:    Glucose 144 (*)     BUN, Bld 30 (*)     Creatinine 1.9 (*)     Calcium 8.1 (*)     eGFR if  26.9 (*)     eGFR if non  23.4 (*)     All other components within normal limits   B-TYPE NATRIURETIC PEPTIDE - Abnormal; Notable for the following components:     (*)     All other components within normal limits   TROPONIN I   TROPONIN I   SARS-COV-2 RNA AMPLIFICATION, QUAL     EKG Readings: (Independently Interpreted)   Initial Reading: No STEMI. Previous EKG Date: 7/17/20.   Atrial paced rhythm, rate 60 bpm, RBBB that is chronic, no ST elevations, QTc 516ms       Imaging Results          X-Ray Chest  AP Portable (Final result)  Result time 20 04:32:32    Final result by Arie Morton MD (20 04:32:32)                 Impression:      Cardiomegaly with mild congestive changes.  Pacer present.      Electronically signed by: Arie Morton MD  Date:    2020  Time:    04:32             Narrative:    EXAMINATION:  XR CHEST AP PORTABLE    CLINICAL HISTORY:  Chest Pain;    TECHNIQUE:  Single frontal view of the chest was performed.    COMPARISON:  None    FINDINGS:  Patient rotated to the right.  Pacer leads overlie the heart.    No consolidation, pleural effusion or pneumothorax.    Cardiomegaly with mild congestive changes.                                 Medical Decision Making:   History:   I obtained history from: EMS provider.       <> Summary of History: See HPI  Old Medical Records: I decided to obtain old medical records.  Old Records Summarized: records from clinic visits and records from previous admission(s).       <> Summary of Records: See HPI  Initial Assessment:   87 y.o. female with history of atrial fibrillation on Eliquis with previous cardioversion, SSS s/p pacemaker, arthritis, HTN, thyroid disease with diastolic dysfunction (EF 55%, 2019) who presents to the ED with complaints of lower substernal chest pain with some nausea and vomiting x 1 with no associated shortness of breath or lower extremity edema that started 1 hour PTA refractory to nitroglycerin with EMS who also gave ASA. In the setting of patient's spouse's  to occur this morning. PE significant for afebrile, RRR, CTAB, no lower extremity edema, soft non-tender abdomen with no overlying skin changes and non-reproducible chest wall pain.   Differential Diagnosis:   Includes but not limited to ACS/MI, arrhythmia, CHF exacerbation, pneumonia, Takasubos, electrolyte abnormality, anemia, GERD, costochondritis, low suspicion for zoster.   Clinical Tests:   Lab Tests: Ordered and Reviewed  Radiological Study:  Ordered and Reviewed  ED Management:  Will order cardiac workup with CBC, CMP, troponin, EKG, CXR and BNP. Will order mylanta and pepcid for possible GI etiology in the setting of refractory chest pain from nitroglycerin. Patient already received ASA. Anticipate admission for high risk chest pain in the setting of moderate heart score.     Tiffani Bowers MD  PGY-4 [facility]                   ED Course as of Sep 11 0619   Fri Sep 11, 2020   0523 1st troponin negative. No leukocytosis. . MARIO with creatinine 1.9 (baseline 1.5). COVID pending. EKG showing atrially paced RBBB that is old with QTC 516ms with no ST elevations. HEART score moderate. Will consult to medicine for further cardiac evaluation.     [MK]   0239 Spoke with case management who agrees with observation status. Spoke with hospital medicine who is requesting to be called back when her COVID results.     [MK]   0604 COVID negative. Patient assigned to Dr. Otf Ko.     [MK]      ED Course User Index  [MK] Tiffani Bowers MD            Clinical Impression:       ICD-10-CM ICD-9-CM   1. Chest pain  R07.9 786.50             ED Disposition Condition    Observation                             Tiffani Bowers MD  Resident  09/11/20 0657

## 2020-09-11 NOTE — CONSULTS
[facility]  Advanced Endoscopy Service  Consult Note    Patient Name: Gisselle Ortiz  MRN: [id number]  Admission Date: 9/11/2020  Hospital Length of Stay: 0 days  Code Status: Full Code   Attending Provider: Otf Mason MD   Consulting Provider: Hetal Zapata MD  Primary Care Physician: Kai Montez MD  Principal Problem:<principal problem not specified>    Inpatient consult to Advanced Endoscopy Service (AES)  Consult performed by: Hetal Zapata MD  Consult ordered by: Otf Mason MD        Subjective:     HPI: Gisselle Ortiz is a 87 y.o. female with history of AFib on Eliquis s/p pacemaker placement, hypertension, hyperthyroidism s/p thyroidectomy, who presents for abdominal pain since last night.  The patient reports that she woke up with severe epigastric abdominal pain that was worse with movement, better when lying still.  It was associated with nausea and an episode of vomiting but no diarrhea, constipation or blood in the stool.  She did not try anything to make it better but pain medications in the ED helped.  It is not exacerbated by food although the patient had nothing to eat since last night.  She reports similar pain in 2003 when she had an episode of pancreatitis for which she underwent an ERCP but was told that everything was normal.  She still has her gallbladder.  She denies smoking or alcohol use.  She is on Eliquis with the last dose last night.    On admission the patient was afebrile and hemodynamically stable.  Lab so where remarkable for an elevated lipase > 1000 but normal LFTs and bilirubin.  She was initially thought to have ACS but that has been ruled out.  She underwent a right upper quadrant ultrasound which showed a dilated CBD to 1.4 cm with mild intrahepatic ductal dilation.  Gallbladder with sludge.  The patient was admitted for acute gallstone pancreatitis in AES consulted for possible ERCP.      Past Medical History:    Diagnosis Date    A-fib     Arthritis     Hypertension     Thyroid disease        Past Surgical History:   Procedure Laterality Date    CATARACT EXTRACTION      CATARACT EXTRACTION W/  INTRAOCULAR LENS IMPLANT Bilateral 2004     IN [place]    EYE SURGERY      HIP REPLACEMENT ARTHROPLASTY      HYSTERECTOMY      JOINT REPLACEMENT      REVISION OF SKIN POCKET FOR PACEMAKER N/A 1/21/2019    Procedure: REVISION-POCKET-PACEMAKER;  Surgeon: Asher Watts MD;  Location: [facility] EP LAB;  Service: Cardiology;  Laterality: N/A;  MODERATE SEDATION, sedation issues in the past    THYROIDECTOMY      TREATMENT OF CARDIAC ARRHYTHMIA N/A 3/18/2019    Procedure: CARDIOVERSION OR DEFIBRILLATION;  Surgeon: Asher Watts MD;  Location: [facility] EP LAB;  Service: Cardiology;  Laterality: N/A;  AF, JILL-cx if SR, DCCV, MAC, FAS, 3PREP    TREATMENT OF CARDIAC ARRHYTHMIA N/A 5/14/2019    Procedure: Cardioversion or Defibrillation;  Surgeon: Derick Vizcarra MD;  Location: [facility] EP LAB;  Service: Cardiology;  Laterality: N/A;  AF, JILL, DCCV, MAC, DM, 3PREP       Family History   Problem Relation Age of Onset    Heart attack Mother     Heart disease Mother     Heart failure Mother     Hypertension Mother     Stroke Maternal Grandmother     Hypertension Maternal Grandmother     Heart disease Maternal Grandmother     Heart attack Maternal Grandmother     Heart failure Maternal Grandmother        Social History     Socioeconomic History    Marital status:      Spouse name: Not on file    Number of children: Not on file    Years of education: Not on file    Highest education level: Not on file   Occupational History    Not on file   Social Needs    Financial resource strain: Not on file    Food insecurity     Worry: Not on file     Inability: Not on file    Transportation needs     Medical: Not on file     Non-medical: Not on file   Tobacco Use    Smoking status: Former Smoker     Start date:  5/19/1964    Smokeless tobacco: Never Used   Substance and Sexual Activity    Alcohol use: No    Drug use: No    Sexual activity: Not Currently     Partners: Male   Lifestyle    Physical activity     Days per week: Not on file     Minutes per session: Not on file    Stress: Not on file   Relationships    Social connections     Talks on phone: Not on file     Gets together: Not on file     Attends Christian service: Not on file     Active member of club or organization: Not on file     Attends meetings of clubs or organizations: Not on file     Relationship status: Not on file   Other Topics Concern    Not on file   Social History Narrative    Not on file       No current facility-administered medications on file prior to encounter.      Current Outpatient Medications on File Prior to Encounter   Medication Sig Dispense Refill    amiodarone (PACERONE) 200 MG Tab TAKE 1 TABLET ONCE DAILY . START TAKING ONLY 1 TABLET EVERY DAY ON 4-19-19 90 tablet 4    apixaban (ELIQUIS) 2.5 mg Tab Take 1 tablet (2.5 mg total) by mouth 2 (two) times daily. 180 tablet 3    aspirin (ECOTRIN) 81 MG EC tablet Take 81 mg by mouth once daily.      atenoloL (TENORMIN) 25 MG tablet TAKE 2 TABLETS EVERY MORNING AND 1 TABLET EVERY EVENING 270 tablet 3    atorvastatin (LIPITOR) 10 MG tablet TAKE 1 TABLET DAILY 90 tablet 3    diltiaZEM (CARDIZEM CD) 180 MG 24 hr capsule Take 1 capsule (180 mg total) by mouth once daily. 90 capsule 3    ergocalciferol, vitamin D2, (VITAMIN D ORAL) 1 tablet      eszopiclone (LUNESTA) 2 MG Tab Take 2 mg by mouth every evening.      levothyroxine (SYNTHROID) 137 MCG Tab tablet Take 1 tablet (137 mcg total) by mouth once daily. 90 tablet 3    spironolactone (ALDACTONE) 25 MG tablet          Review of patient's allergies indicates:   Allergen Reactions    Ciprofloxacin Nausea And Vomiting       Review of Systems   Constitutional: Positive for chills.   HENT: Negative.    Eyes: Negative.     Respiratory: Negative.    Cardiovascular: Positive for chest pain.   Gastrointestinal: Positive for abdominal pain, nausea and vomiting. Negative for blood in stool, constipation, diarrhea and melena.   Genitourinary: Negative.    Musculoskeletal: Negative.    Neurological: Negative.    Psychiatric/Behavioral: Negative.         Objective:     Vitals:    09/11/20 0830   BP: (!) 114/56   Pulse: 64   Resp: 20   Temp: 98.9 °F (37.2 °C)         Constitutional:  not in acute distress and well developed  HENT: Head: Normal, normocephalic, atraumatic.  Eyes: conjunctiva clear and sclera nonicteric  Cardiovascular: regular rate and rhythm and no murmur  Respiratory: normal chest expansion & respiratory effort   and no accessory muscle use  GI: soft, tenderness mild in the epigastrium, in the periumbilical area and in the RUQ and without guarding  Musculoskeletal: no muscular tenderness noted  Skin: normal color  Neurological: alert, oriented x3  Psychiatric: mood and affect are within normal limits, pt is a good historian; no memory problems were noted    Significant Labs:  Recent Labs   Lab 09/11/20  0406   HGB 11.1*       Lab Results   Component Value Date    WBC 6.34 09/11/2020    HGB 11.1 (L) 09/11/2020    HCT 35.5 (L) 09/11/2020    MCV 97 09/11/2020     09/11/2020       Lab Results   Component Value Date     09/11/2020    K 3.6 09/11/2020     09/11/2020    CO2 24 09/11/2020    BUN 30 (H) 09/11/2020    CREATININE 1.9 (H) 09/11/2020    CALCIUM 8.1 (L) 09/11/2020    ANIONGAP 10 09/11/2020    ESTGFRAFRICA 26.9 (A) 09/11/2020    EGFRNONAA 23.4 (A) 09/11/2020       Lab Results   Component Value Date    ALT 33 09/11/2020    AST 39 09/11/2020    ALKPHOS 125 09/11/2020    BILITOT 1.0 09/11/2020       Lab Results   Component Value Date    INR 1.0 05/08/2019    INR 1.0 04/24/2019    INR 1.0 03/13/2019       Significant Imaging:  Reviewed pertinent radiology findings.       Assessment/Plan:     Gisselle ART  Diana is a 87 y.o. female with history of AFib on Eliquis s/p pacemaker placement, hypertension, hyperthyroidism s/p thyroidectomy who was admitted for gallstone pancreatitis.  AES consulted for CBD dilation.    Problem List:  1. Acute gallstone pancreatitis  2. Biliary ductal dilation    Patient's presentation is consistent with acute gallstone pancreatitis.  She had 1 episode of gallstone pancreatitis in 2003 but her gallbladder was not removed.  Currently her LFTs and bilirubin are normal.  New signs or symptoms of cholangitis.  Ultrasound showing CBD dilation to 1.4 cm.  This could signify an obstructing stone vs a stone that had already passed.  Since the patient is on Eliquis, and there are no signs of cholangitis, there is no emergent indication for ERCP.  Would recommend holding anticoagulation and perform MRCP in the meantime.    Recommendations:  - no emergent indication for ERCP in the setting of anticoagulation.  - please hold Eliquis  - please obtain MRCP to evaluate for the presence of a stone.  If stone is present will perform ERCP on Monday  - conservative pancreatitis treatment with aggressive IV fluids, pain control, early diet advancement to fat-free diet  - recommend surgical consultation for cholecystectomy during this admission  - daily BMP, LFTs  - watch for signs and symptoms of cholangitis, please inform us if those develop    Thank you for involving us in the care of Gisselle Ortiz. Please call with any additional questions, concerns or changes in the patient's clinical status. We will continue to follow.    Hetal Zapata MD  Gastroenterology Fellow PGY IV   [facility]

## 2020-09-12 PROBLEM — R07.9 CHEST PAIN: Status: ACTIVE | Noted: 2020-09-12

## 2020-09-12 LAB
ALBUMIN SERPL BCP-MCNC: 2.8 G/DL (ref 3.5–5.2)
ALP SERPL-CCNC: 102 U/L (ref 55–135)
ALT SERPL W/O P-5'-P-CCNC: 201 U/L (ref 10–44)
ANION GAP SERPL CALC-SCNC: 8 MMOL/L (ref 8–16)
AST SERPL-CCNC: 196 U/L (ref 10–40)
BILIRUB SERPL-MCNC: 1.1 MG/DL (ref 0.1–1)
BUN SERPL-MCNC: 44 MG/DL (ref 8–23)
CALCIUM SERPL-MCNC: 7.9 MG/DL (ref 8.7–10.5)
CHLORIDE SERPL-SCNC: 104 MMOL/L (ref 95–110)
CO2 SERPL-SCNC: 28 MMOL/L (ref 23–29)
CREAT SERPL-MCNC: 1.8 MG/DL (ref 0.5–1.4)
EST. GFR  (AFRICAN AMERICAN): 28.8 ML/MIN/1.73 M^2
EST. GFR  (NON AFRICAN AMERICAN): 24.9 ML/MIN/1.73 M^2
GLUCOSE SERPL-MCNC: 84 MG/DL (ref 70–110)
MAGNESIUM SERPL-MCNC: 1.9 MG/DL (ref 1.6–2.6)
PHOSPHATE SERPL-MCNC: 4.3 MG/DL (ref 2.7–4.5)
POTASSIUM SERPL-SCNC: 5.2 MMOL/L (ref 3.5–5.1)
PROT SERPL-MCNC: 5.5 G/DL (ref 6–8.4)
SODIUM SERPL-SCNC: 140 MMOL/L (ref 136–145)

## 2020-09-12 PROCEDURE — 25000003 PHARM REV CODE 250: Performed by: STUDENT IN AN ORGANIZED HEALTH CARE EDUCATION/TRAINING PROGRAM

## 2020-09-12 PROCEDURE — 36415 COLL VENOUS BLD VENIPUNCTURE: CPT

## 2020-09-12 PROCEDURE — 99233 PR SUBSEQUENT HOSPITAL CARE,LEVL III: ICD-10-PCS | Mod: ,,, | Performed by: STUDENT IN AN ORGANIZED HEALTH CARE EDUCATION/TRAINING PROGRAM

## 2020-09-12 PROCEDURE — 96361 HYDRATE IV INFUSION ADD-ON: CPT | Performed by: EMERGENCY MEDICINE

## 2020-09-12 PROCEDURE — 83735 ASSAY OF MAGNESIUM: CPT

## 2020-09-12 PROCEDURE — 11000001 HC ACUTE MED/SURG PRIVATE ROOM

## 2020-09-12 PROCEDURE — 99233 SBSQ HOSP IP/OBS HIGH 50: CPT | Mod: ,,, | Performed by: STUDENT IN AN ORGANIZED HEALTH CARE EDUCATION/TRAINING PROGRAM

## 2020-09-12 PROCEDURE — 84100 ASSAY OF PHOSPHORUS: CPT

## 2020-09-12 PROCEDURE — 80053 COMPREHEN METABOLIC PANEL: CPT

## 2020-09-12 PROCEDURE — 63600175 PHARM REV CODE 636 W HCPCS: Performed by: STUDENT IN AN ORGANIZED HEALTH CARE EDUCATION/TRAINING PROGRAM

## 2020-09-12 RX ADMIN — MORPHINE SULFATE 2 MG: 2 INJECTION, SOLUTION INTRAMUSCULAR; INTRAVENOUS at 10:09

## 2020-09-12 RX ADMIN — ATENOLOL 25 MG: 25 TABLET ORAL at 09:09

## 2020-09-12 RX ADMIN — HEPARIN SODIUM 5000 UNITS: 5000 INJECTION INTRAVENOUS; SUBCUTANEOUS at 02:09

## 2020-09-12 RX ADMIN — ASPIRIN 81 MG: 81 TABLET, COATED ORAL at 09:09

## 2020-09-12 RX ADMIN — DILTIAZEM HYDROCHLORIDE 180 MG: 180 CAPSULE, COATED, EXTENDED RELEASE ORAL at 09:09

## 2020-09-12 RX ADMIN — ATORVASTATIN CALCIUM 10 MG: 10 TABLET, FILM COATED ORAL at 09:09

## 2020-09-12 RX ADMIN — MELATONIN TAB 3 MG 3 MG: 3 TAB at 08:09

## 2020-09-12 RX ADMIN — LEVOTHYROXINE SODIUM 137 MCG: 25 TABLET ORAL at 05:09

## 2020-09-12 RX ADMIN — ATENOLOL 25 MG: 25 TABLET ORAL at 08:09

## 2020-09-12 RX ADMIN — AMIODARONE HYDROCHLORIDE 200 MG: 200 TABLET ORAL at 09:09

## 2020-09-12 NOTE — ASSESSMENT & PLAN NOTE
86yo female with PAF on Eliquis (last dose 9/10), SSS s/p pacemaker placement 1/2019 who presented given acute onset abdominal pain with labs and imaging concerning for gallstone pancreatitis. Surgery was consulted to evaluate patient as surgical candidate for cholecystectomy    - Patient seen and examined, labs and imaging reviewed  - Patient does have significant epigastric tenderness but has lipase on admit >1000 suggesting this is more likely than not secondary to the pancreatitis and not due to an underlying cholecystitis picture; especially when coupled with normal WBC on admit  - Would recommend trending WBC and lipase in addition to the LFTs to monitor for underlying cholecystitis and assess for resolution of acute pancreatitis picture  - AES following with plans for ERCP on Monday 9/14 after holding Eliquis; would like to see how patient does following this procedure prior to proceeding with cholecystectomy  - Given age and co-morbidities she is at a high risk for surgery and if acute process resolves following ERCP would likely choose to manage conservatively  - Will continue to follow; please call with questions or concerns

## 2020-09-12 NOTE — CONSULTS
[facility]  General Surgery  Consult Note    Patient Name: Gisselle Ortiz  MRN: [id number]  Code Status: Full Code  Admission Date: 9/11/2020  Hospital Length of Stay: 0 days  Attending Physician: Otf Mason MD  Primary Care Provider: Kai Montez MD    Patient information was obtained from patient and past medical records.     Inpatient consult to General Surgery  Consult performed by: Gabriela Rob MD  Consult ordered by: Otf Mason MD  Reason for consult: Concern for gallstone pancreatitis         Subjective:     Principal Problem: Acute gallstone pancreatitis    History of Present Illness: Patient is an 88 yo female with medical history significant for AFib on Eliquis, SSS s/p pacemaker placement (1/2019), HTN, hyperthyroidism s/p thyroidectomy, who presented on 9/11 given one day of acute onset abdominal pain. The patient reports that she woke up with severe epigastric abdominal pain that was worse with movement, better when lying still.  It was associated with nausea and an episode of vomiting but no diarrhea, constipation or blood in the stool.  She did not try anything to make it better but pain medications in the ED helped. Denied association with food. She reports similar pain in 2003 when she had an episode of pancreatitis for which she underwent an ERCP but was told that everything was normal.  She still has her gallbladder.  She denies smoking or alcohol use.  She is on Eliquis with the last dose - 9/10.  Labs remarkable for an elevated lipase > 1000 but normal LFTs and bilirubin on admission.  She was initially thought to have ACS, which was ruled out.  She subsequently underwent a right upper quadrant ultrasound which showed a dilated CBD to 1.4 cm with mild intrahepatic ductal dilation and gallbladder with sludge.  The patient was admitted given concern for pancreatitis with biliary etiology. AES has been consulted with plans for ERCP on 9/14. As  such, Eliquis is being held. The patient has been afebrile and hemodynamically stable since admission. LFTs and t bili slowly trending up today.        No current facility-administered medications on file prior to encounter.      Current Outpatient Medications on File Prior to Encounter   Medication Sig    amiodarone (PACERONE) 200 MG Tab TAKE 1 TABLET ONCE DAILY . START TAKING ONLY 1 TABLET EVERY DAY ON 4-19-19    apixaban (ELIQUIS) 2.5 mg Tab Take 1 tablet (2.5 mg total) by mouth 2 (two) times daily.    aspirin (ECOTRIN) 81 MG EC tablet Take 81 mg by mouth once daily.    atenoloL (TENORMIN) 25 MG tablet TAKE 2 TABLETS EVERY MORNING AND 1 TABLET EVERY EVENING    atorvastatin (LIPITOR) 10 MG tablet TAKE 1 TABLET DAILY    diltiaZEM (CARDIZEM CD) 180 MG 24 hr capsule Take 1 capsule (180 mg total) by mouth once daily.    eszopiclone (LUNESTA) 2 MG Tab Take 2 mg by mouth every evening.    levothyroxine (SYNTHROID) 137 MCG Tab tablet Take 1 tablet (137 mcg total) by mouth once daily.    spironolactone (ALDACTONE) 25 MG tablet        Review of patient's allergies indicates:   Allergen Reactions    Ciprofloxacin Nausea And Vomiting       Past Medical History:   Diagnosis Date    A-fib     Arthritis     Hypertension     Thyroid disease      Past Surgical History:   Procedure Laterality Date    CATARACT EXTRACTION      CATARACT EXTRACTION W/  INTRAOCULAR LENS IMPLANT Bilateral 2004     IN [place]    EYE SURGERY      HIP REPLACEMENT ARTHROPLASTY      HYSTERECTOMY      JOINT REPLACEMENT      REVISION OF SKIN POCKET FOR PACEMAKER N/A 1/21/2019    Procedure: REVISION-POCKET-PACEMAKER;  Surgeon: Asher Watts MD;  Location: [facility] EP LAB;  Service: Cardiology;  Laterality: N/A;  MODERATE SEDATION, sedation issues in the past    THYROIDECTOMY      TREATMENT OF CARDIAC ARRHYTHMIA N/A 3/18/2019    Procedure: CARDIOVERSION OR DEFIBRILLATION;  Surgeon: Asher Watts MD;  Location: [facility] EP LAB;   Service: Cardiology;  Laterality: N/A;  AF, JILL-cx if SR, DCCV, MAC, FAS, 3PREP    TREATMENT OF CARDIAC ARRHYTHMIA N/A 5/14/2019    Procedure: Cardioversion or Defibrillation;  Surgeon: Derick Vizcarra MD;  Location: [facility] EP LAB;  Service: Cardiology;  Laterality: N/A;  AF, JILL, DCCV, MAC, DM, 3PREP     Family History     Problem Relation (Age of Onset)    Heart attack Mother, Maternal Grandmother    Heart disease Mother, Maternal Grandmother    Heart failure Mother, Maternal Grandmother    Hypertension Mother, Maternal Grandmother    Stroke Maternal Grandmother        Tobacco Use    Smoking status: Former Smoker     Start date: 5/19/1964    Smokeless tobacco: Never Used   Substance and Sexual Activity    Alcohol use: No    Drug use: No    Sexual activity: Not Currently     Partners: Male     Review of Systems   Constitutional: Positive for appetite change. Negative for chills and fever.   HENT: Negative for congestion, sore throat, trouble swallowing and voice change.    Respiratory: Negative for chest tightness and shortness of breath.    Cardiovascular: Negative for chest pain and palpitations.   Gastrointestinal: Positive for abdominal pain (Epigastric and RUQ), nausea and vomiting. Negative for constipation and diarrhea.   Genitourinary: Negative for difficulty urinating and dysuria.   Musculoskeletal: Negative for joint swelling, neck pain and neck stiffness.   Skin: Negative for color change, rash and wound.   Neurological: Negative for dizziness, weakness, light-headedness and headaches.   Hematological: Negative for adenopathy.   Psychiatric/Behavioral: Negative for behavioral problems, sleep disturbance and suicidal ideas. The patient is not nervous/anxious.      Objective:     Vital Signs (Most Recent):  Temp: 98 °F (36.7 °C) (09/12/20 0800)  Pulse: 64 (09/12/20 0800)  Resp: 16 (09/12/20 0800)  BP: 100/60 (09/12/20 0800)  SpO2: (!) 93 % (09/12/20 0453) Vital Signs (24h Range):  Temp:  [98 °F  (36.7 °C)-99.3 °F (37.4 °C)] 98 °F (36.7 °C)  Pulse:  [60-65] 64  Resp:  [16-19] 16  SpO2:  [93 %-97 %] 93 %  BP: ()/(54-73) 100/60     Weight: 58.1 kg (128 lb)  Body mass index is 22.67 kg/m².    Physical Exam  Constitutional:       General: She is not in acute distress.     Appearance: She is well-developed.   HENT:      Head: Normocephalic and atraumatic.      Nose: Nose normal.   Eyes:      General: No scleral icterus.     Conjunctiva/sclera: Conjunctivae normal.      Pupils: Pupils are equal, round, and reactive to light.   Neck:      Musculoskeletal: Normal range of motion.      Thyroid: No thyromegaly.   Cardiovascular:      Rate and Rhythm: Normal rate.   Pulmonary:      Effort: Pulmonary effort is normal. No respiratory distress.   Abdominal:      General: There is no distension.      Palpations: Abdomen is soft.      Tenderness: There is abdominal tenderness (Worst in the epigastric region and RUQ). There is no guarding or rebound.   Musculoskeletal: Normal range of motion.         General: No tenderness.   Lymphadenopathy:      Cervical: No cervical adenopathy.   Skin:     General: Skin is warm and dry.      Findings: No rash.   Neurological:      Mental Status: She is alert and oriented to person, place, and time.         Significant Labs:  CBC:   Recent Labs   Lab 09/11/20  0406   WBC 6.34   RBC 3.67*   HGB 11.1*   HCT 35.5*      MCV 97   MCH 30.2   MCHC 31.3*     CMP:   Recent Labs   Lab 09/12/20  0457   GLU 84   CALCIUM 7.9*   ALBUMIN 2.8*   PROT 5.5*      K 5.2*   CO2 28      BUN 44*   CREATININE 1.8*   ALKPHOS 102   *   *   BILITOT 1.1*       Significant Diagnostics:  I have reviewed all pertinent imaging results/findings within the past 24 hours.   Abdominal US with dilated CBD and mild intrahepatic ductal dilation; gallbladder with sludge but without evidence of acute cholecystitis    Assessment/Plan:     * Acute gallstone pancreatitis  86yo female with PAF  on Eliquis (last dose 9/10), SSS s/p pacemaker placement 1/2019 who presented given acute onset abdominal pain with labs and imaging concerning for gallstone pancreatitis. Surgery was consulted to evaluate patient as surgical candidate for cholecystectomy    - Patient seen and examined, labs and imaging reviewed  - Patient does have significant epigastric tenderness but has lipase on admit >1000 suggesting this is more likely than not secondary to the pancreatitis and not due to an underlying cholecystitis picture; especially when coupled with normal WBC on admit  - Would recommend trending WBC and lipase in addition to the LFTs to monitor for underlying cholecystitis and assess for resolution of acute pancreatitis picture  - AES following with plans for ERCP on Monday 9/14 after holding Eliquis; would like to see how patient does following this procedure prior to proceeding with cholecystectomy  - Given age and co-morbidities she is at a high risk for surgery and if acute process resolves following ERCP would likely choose to manage conservatively  - Will continue to follow; please call with questions or concerns      VTE Risk Mitigation (From admission, onward)         Ordered     heparin (porcine) injection 5,000 Units  Every 8 hours      09/11/20 1529     IP VTE HIGH RISK PATIENT  Once      09/11/20 1529     Place sequential compression device  Until discontinued      09/11/20 0645                Thank you for your consult. I will follow-up with patient. Please contact us if you have any additional questions.    Gabriela oRb MD  General Surgery  [facility]

## 2020-09-12 NOTE — PLAN OF CARE
SW met with patient at bedside. Patient verified information on FS. Patient was calm and cooperative during dc planning assessment. Patient denies use of illicit drugs/ETOH. Patient lives at home alone. There are no steps to enter the home. Patient has family supports at home at [place]. Patient has transportation at [place]. SW/CM to follow and assist with needs as indicated.     [id number] - [place], [place] - [address] [facility]  [address] [facility]  [place] [address]  Phone: [phone] Fax: [phone]    [id number] - [place] - [address] AT [facility] [address]  [address] [place] [address]  Phone: [phone] Fax: [phone]    [facility] [address] [address]  Phone: [phone] Fax: [phone]       09/12/20 1113   Discharge Assessment   Assessment Type Discharge Planning Assessment   Confirmed/corrected address and phone number on facesheet? Yes   Assessment information obtained from? Patient   Expected Length of Stay (days) 1   Communicated expected length of stay with patient/caregiver yes   Prior to hospitilization cognitive status: Alert/Oriented   Prior to hospitalization functional status: Independent;Assistive Equipment   Current cognitive status: Alert/Oriented   Current Functional Status: Independent;Assistive Equipment   Lives With alone   Able to Return to Prior Arrangements yes   Is patient able to care for self after discharge? Yes   Patient's perception of discharge disposition home or selfcare   Patient currently being followed by outpatient case management? No   Patient currently receives any other outside agency services? No   Equipment Currently Used at Home wheelchair;walker, rolling   Do you have any problems affording any of your prescribed medications? No   Is the patient taking medications as prescribed? yes   Does the patient have  transportation home? Yes   Transportation Anticipated family or friend will provide   Does the patient receive services at the [facility]? Yes   Discharge Plan A Home   Discharge Plan B Home;Home with family   DME Needed Upon Discharge  none   Patient/Family in Agreement with Plan yes       Flor Dubose LMSW  [facility]  [id number]

## 2020-09-12 NOTE — PLAN OF CARE
Pt in bed resting with IVF infusing. Pt brought to bathroom ambulatory with steady gait. Medicated pt with IV pain medication and pt is no longer complaining of pain. Will continue to monitor.

## 2020-09-12 NOTE — NURSING
Pt currently refusing heparin injection due to abd discomfort. Educated pt on importance of heparin. No acute distress noted will continue to monitor.

## 2020-09-12 NOTE — SUBJECTIVE & OBJECTIVE
No current facility-administered medications on file prior to encounter.      Current Outpatient Medications on File Prior to Encounter   Medication Sig    amiodarone (PACERONE) 200 MG Tab TAKE 1 TABLET ONCE DAILY . START TAKING ONLY 1 TABLET EVERY DAY ON 4-19-19    apixaban (ELIQUIS) 2.5 mg Tab Take 1 tablet (2.5 mg total) by mouth 2 (two) times daily.    aspirin (ECOTRIN) 81 MG EC tablet Take 81 mg by mouth once daily.    atenoloL (TENORMIN) 25 MG tablet TAKE 2 TABLETS EVERY MORNING AND 1 TABLET EVERY EVENING    atorvastatin (LIPITOR) 10 MG tablet TAKE 1 TABLET DAILY    diltiaZEM (CARDIZEM CD) 180 MG 24 hr capsule Take 1 capsule (180 mg total) by mouth once daily.    eszopiclone (LUNESTA) 2 MG Tab Take 2 mg by mouth every evening.    levothyroxine (SYNTHROID) 137 MCG Tab tablet Take 1 tablet (137 mcg total) by mouth once daily.    spironolactone (ALDACTONE) 25 MG tablet        Review of patient's allergies indicates:   Allergen Reactions    Ciprofloxacin Nausea And Vomiting       Past Medical History:   Diagnosis Date    A-fib     Arthritis     Hypertension     Thyroid disease      Past Surgical History:   Procedure Laterality Date    CATARACT EXTRACTION      CATARACT EXTRACTION W/  INTRAOCULAR LENS IMPLANT Bilateral 2004     IN [place]    EYE SURGERY      HIP REPLACEMENT ARTHROPLASTY      HYSTERECTOMY      JOINT REPLACEMENT      REVISION OF SKIN POCKET FOR PACEMAKER N/A 1/21/2019    Procedure: REVISION-POCKET-PACEMAKER;  Surgeon: Asher Watts MD;  Location: [facility] EP LAB;  Service: Cardiology;  Laterality: N/A;  MODERATE SEDATION, sedation issues in the past    THYROIDECTOMY      TREATMENT OF CARDIAC ARRHYTHMIA N/A 3/18/2019    Procedure: CARDIOVERSION OR DEFIBRILLATION;  Surgeon: Asher Watts MD;  Location: [facility] EP LAB;  Service: Cardiology;  Laterality: N/A;  AF, JILL-cx if SR, DCCV, MAC, FAS, 3PREP    TREATMENT OF CARDIAC ARRHYTHMIA N/A 5/14/2019    Procedure:  Cardioversion or Defibrillation;  Surgeon: Derick Vizcarra MD;  Location: [facility] EP LAB;  Service: Cardiology;  Laterality: N/A;  AF, JILL, DCCV, MAC, DM, 3PREP     Family History     Problem Relation (Age of Onset)    Heart attack Mother, Maternal Grandmother    Heart disease Mother, Maternal Grandmother    Heart failure Mother, Maternal Grandmother    Hypertension Mother, Maternal Grandmother    Stroke Maternal Grandmother        Tobacco Use    Smoking status: Former Smoker     Start date: 5/19/1964    Smokeless tobacco: Never Used   Substance and Sexual Activity    Alcohol use: No    Drug use: No    Sexual activity: Not Currently     Partners: Male     Review of Systems   Constitutional: Positive for appetite change. Negative for chills and fever.   HENT: Negative for congestion, sore throat, trouble swallowing and voice change.    Respiratory: Negative for chest tightness and shortness of breath.    Cardiovascular: Negative for chest pain and palpitations.   Gastrointestinal: Positive for abdominal pain (Epigastric and RUQ), nausea and vomiting. Negative for constipation and diarrhea.   Genitourinary: Negative for difficulty urinating and dysuria.   Musculoskeletal: Negative for joint swelling, neck pain and neck stiffness.   Skin: Negative for color change, rash and wound.   Neurological: Negative for dizziness, weakness, light-headedness and headaches.   Hematological: Negative for adenopathy.   Psychiatric/Behavioral: Negative for behavioral problems, sleep disturbance and suicidal ideas. The patient is not nervous/anxious.      Objective:     Vital Signs (Most Recent):  Temp: 98 °F (36.7 °C) (09/12/20 0800)  Pulse: 64 (09/12/20 0800)  Resp: 16 (09/12/20 0800)  BP: 100/60 (09/12/20 0800)  SpO2: (!) 93 % (09/12/20 0453) Vital Signs (24h Range):  Temp:  [98 °F (36.7 °C)-99.3 °F (37.4 °C)] 98 °F (36.7 °C)  Pulse:  [60-65] 64  Resp:  [16-19] 16  SpO2:  [93 %-97 %] 93 %  BP: ()/(54-73) 100/60      Weight: 58.1 kg (128 lb)  Body mass index is 22.67 kg/m².    Physical Exam  Constitutional:       General: She is not in acute distress.     Appearance: She is well-developed.   HENT:      Head: Normocephalic and atraumatic.      Nose: Nose normal.   Eyes:      General: No scleral icterus.     Conjunctiva/sclera: Conjunctivae normal.      Pupils: Pupils are equal, round, and reactive to light.   Neck:      Musculoskeletal: Normal range of motion.      Thyroid: No thyromegaly.   Cardiovascular:      Rate and Rhythm: Normal rate.   Pulmonary:      Effort: Pulmonary effort is normal. No respiratory distress.   Abdominal:      General: There is no distension.      Palpations: Abdomen is soft.      Tenderness: There is abdominal tenderness (Worst in the epigastric region and RUQ). There is no guarding or rebound.   Musculoskeletal: Normal range of motion.         General: No tenderness.   Lymphadenopathy:      Cervical: No cervical adenopathy.   Skin:     General: Skin is warm and dry.      Findings: No rash.   Neurological:      Mental Status: She is alert and oriented to person, place, and time.         Significant Labs:  CBC:   Recent Labs   Lab 09/11/20  0406   WBC 6.34   RBC 3.67*   HGB 11.1*   HCT 35.5*      MCV 97   MCH 30.2   MCHC 31.3*     CMP:   Recent Labs   Lab 09/12/20  0457   GLU 84   CALCIUM 7.9*   ALBUMIN 2.8*   PROT 5.5*      K 5.2*   CO2 28      BUN 44*   CREATININE 1.8*   ALKPHOS 102   *   *   BILITOT 1.1*       Significant Diagnostics:  I have reviewed all pertinent imaging results/findings within the past 24 hours.   Abdominal US with dilated CBD and mild intrahepatic ductal dilation; gallbladder with sludge but without evidence of acute cholecystitis

## 2020-09-12 NOTE — HPI
Patient is an 88 yo female with medical history significant for AFib on Eliquis, SSS s/p pacemaker placement (1/2019), HTN, hyperthyroidism s/p thyroidectomy, who presented on 9/11 given one day of acute onset abdominal pain. The patient reports that she woke up with severe epigastric abdominal pain that was worse with movement, better when lying still.  It was associated with nausea and an episode of vomiting but no diarrhea, constipation or blood in the stool.  She did not try anything to make it better but pain medications in the ED helped. Denied association with food. She reports similar pain in 2003 when she had an episode of pancreatitis for which she underwent an ERCP but was told that everything was normal.  She still has her gallbladder.  She denies smoking or alcohol use.  She is on Eliquis with the last dose - 9/10.  Labs remarkable for an elevated lipase > 1000 but normal LFTs and bilirubin on admission.  She was initially thought to have ACS, which was ruled out.  She subsequently underwent a right upper quadrant ultrasound which showed a dilated CBD to 1.4 cm with mild intrahepatic ductal dilation and gallbladder with sludge.  The patient was admitted given concern for pancreatitis with biliary etiology. [facility] has been consulted with plans for ERCP on 9/14. As such, Eliquis is being held. The patient has been afebrile and hemodynamically stable since admission. LFTs and t bili slowly trending up today.

## 2020-09-13 LAB
ALBUMIN SERPL BCP-MCNC: 2.4 G/DL (ref 3.5–5.2)
ALP SERPL-CCNC: 95 U/L (ref 55–135)
ALT SERPL W/O P-5'-P-CCNC: 232 U/L (ref 10–44)
ANION GAP SERPL CALC-SCNC: 7 MMOL/L (ref 8–16)
ANISOCYTOSIS BLD QL SMEAR: SLIGHT
AST SERPL-CCNC: 186 U/L (ref 10–40)
BASO STIPL BLD QL SMEAR: ABNORMAL
BASOPHILS # BLD AUTO: 0.05 K/UL (ref 0–0.2)
BASOPHILS NFR BLD: 0.4 % (ref 0–1.9)
BILIRUB SERPL-MCNC: 1.5 MG/DL (ref 0.1–1)
BUN SERPL-MCNC: 46 MG/DL (ref 8–23)
CALCIUM SERPL-MCNC: 8.2 MG/DL (ref 8.7–10.5)
CHLORIDE SERPL-SCNC: 106 MMOL/L (ref 95–110)
CO2 SERPL-SCNC: 28 MMOL/L (ref 23–29)
CREAT SERPL-MCNC: 1.4 MG/DL (ref 0.5–1.4)
DIFFERENTIAL METHOD: ABNORMAL
EOSINOPHIL # BLD AUTO: 0 K/UL (ref 0–0.5)
EOSINOPHIL NFR BLD: 0 % (ref 0–8)
ERYTHROCYTE [DISTWIDTH] IN BLOOD BY AUTOMATED COUNT: 14.6 % (ref 11.5–14.5)
EST. GFR  (AFRICAN AMERICAN): 39 ML/MIN/1.73 M^2
EST. GFR  (NON AFRICAN AMERICAN): 33.8 ML/MIN/1.73 M^2
GLUCOSE SERPL-MCNC: 76 MG/DL (ref 70–110)
HCT VFR BLD AUTO: 36.9 % (ref 37–48.5)
HGB BLD-MCNC: 11 G/DL (ref 12–16)
IMM GRANULOCYTES # BLD AUTO: 0.06 K/UL (ref 0–0.04)
IMM GRANULOCYTES NFR BLD AUTO: 0.4 % (ref 0–0.5)
LYMPHOCYTES # BLD AUTO: 0.9 K/UL (ref 1–4.8)
LYMPHOCYTES NFR BLD: 6.4 % (ref 18–48)
MAGNESIUM SERPL-MCNC: 1.9 MG/DL (ref 1.6–2.6)
MCH RBC QN AUTO: 30.5 PG (ref 27–31)
MCHC RBC AUTO-ENTMCNC: 29.8 G/DL (ref 32–36)
MCV RBC AUTO: 102 FL (ref 82–98)
MONOCYTES # BLD AUTO: 1 K/UL (ref 0.3–1)
MONOCYTES NFR BLD: 7.4 % (ref 4–15)
NEUTROPHILS # BLD AUTO: 11.8 K/UL (ref 1.8–7.7)
NEUTROPHILS NFR BLD: 85.4 % (ref 38–73)
NRBC BLD-RTO: 0 /100 WBC
PHOSPHATE SERPL-MCNC: 3.9 MG/DL (ref 2.7–4.5)
PLATELET # BLD AUTO: 202 K/UL (ref 150–350)
PLATELET BLD QL SMEAR: ABNORMAL
PMV BLD AUTO: 10.3 FL (ref 9.2–12.9)
POLYCHROMASIA BLD QL SMEAR: ABNORMAL
POTASSIUM SERPL-SCNC: 5.1 MMOL/L (ref 3.5–5.1)
PROT SERPL-MCNC: 5.1 G/DL (ref 6–8.4)
RBC # BLD AUTO: 3.61 M/UL (ref 4–5.4)
SODIUM SERPL-SCNC: 141 MMOL/L (ref 136–145)
WBC # BLD AUTO: 13.85 K/UL (ref 3.9–12.7)

## 2020-09-13 PROCEDURE — U0003 INFECTIOUS AGENT DETECTION BY NUCLEIC ACID (DNA OR RNA); SEVERE ACUTE RESPIRATORY SYNDROME CORONAVIRUS 2 (SARS-COV-2) (CORONAVIRUS DISEASE [COVID-19]), AMPLIFIED PROBE TECHNIQUE, MAKING USE OF HIGH THROUGHPUT TECHNOLOGIES AS DESCRIBED BY CMS-2020-01-R: HCPCS

## 2020-09-13 PROCEDURE — 99233 PR SUBSEQUENT HOSPITAL CARE,LEVL III: ICD-10-PCS | Mod: ,,, | Performed by: STUDENT IN AN ORGANIZED HEALTH CARE EDUCATION/TRAINING PROGRAM

## 2020-09-13 PROCEDURE — 99233 SBSQ HOSP IP/OBS HIGH 50: CPT | Mod: ,,, | Performed by: STUDENT IN AN ORGANIZED HEALTH CARE EDUCATION/TRAINING PROGRAM

## 2020-09-13 PROCEDURE — 80053 COMPREHEN METABOLIC PANEL: CPT

## 2020-09-13 PROCEDURE — 85025 COMPLETE CBC W/AUTO DIFF WBC: CPT

## 2020-09-13 PROCEDURE — 36415 COLL VENOUS BLD VENIPUNCTURE: CPT

## 2020-09-13 PROCEDURE — 84100 ASSAY OF PHOSPHORUS: CPT

## 2020-09-13 PROCEDURE — 11000001 HC ACUTE MED/SURG PRIVATE ROOM

## 2020-09-13 PROCEDURE — 63600175 PHARM REV CODE 636 W HCPCS: Performed by: STUDENT IN AN ORGANIZED HEALTH CARE EDUCATION/TRAINING PROGRAM

## 2020-09-13 PROCEDURE — 25000003 PHARM REV CODE 250: Performed by: STUDENT IN AN ORGANIZED HEALTH CARE EDUCATION/TRAINING PROGRAM

## 2020-09-13 PROCEDURE — 83735 ASSAY OF MAGNESIUM: CPT

## 2020-09-13 PROCEDURE — 94761 N-INVAS EAR/PLS OXIMETRY MLT: CPT

## 2020-09-13 RX ADMIN — ATORVASTATIN CALCIUM 10 MG: 10 TABLET, FILM COATED ORAL at 09:09

## 2020-09-13 RX ADMIN — AMIODARONE HYDROCHLORIDE 200 MG: 200 TABLET ORAL at 10:09

## 2020-09-13 RX ADMIN — DILTIAZEM HYDROCHLORIDE 180 MG: 180 CAPSULE, COATED, EXTENDED RELEASE ORAL at 09:09

## 2020-09-13 RX ADMIN — LEVOTHYROXINE SODIUM 137 MCG: 25 TABLET ORAL at 06:09

## 2020-09-13 RX ADMIN — HEPARIN SODIUM 5000 UNITS: 5000 INJECTION INTRAVENOUS; SUBCUTANEOUS at 01:09

## 2020-09-13 RX ADMIN — ASPIRIN 81 MG: 81 TABLET, COATED ORAL at 09:09

## 2020-09-13 RX ADMIN — HEPARIN SODIUM 5000 UNITS: 5000 INJECTION INTRAVENOUS; SUBCUTANEOUS at 09:09

## 2020-09-13 RX ADMIN — MELATONIN TAB 3 MG 3 MG: 3 TAB at 09:09

## 2020-09-13 RX ADMIN — ATENOLOL 25 MG: 25 TABLET ORAL at 09:09

## 2020-09-13 RX ADMIN — SODIUM CHLORIDE, SODIUM LACTATE, POTASSIUM CHLORIDE, AND CALCIUM CHLORIDE: .6; .31; .03; .02 INJECTION, SOLUTION INTRAVENOUS at 01:09

## 2020-09-13 RX ADMIN — HEPARIN SODIUM 5000 UNITS: 5000 INJECTION INTRAVENOUS; SUBCUTANEOUS at 06:09

## 2020-09-13 NOTE — PLAN OF CARE
Pt. needs met. VSS throughout shift. IVF as ordered. Pt ambulated to bathroom without difficulty. Pt presented with education, needs reinfocrcement. POC reviewed will continue to monitor and adjust POC.   Problem: Fall Injury Risk  Goal: Absence of Fall and Fall-Related Injury  Outcome: Ongoing, Progressing     Problem: Skin Injury Risk Increased  Goal: Skin Health and Integrity  Outcome: Ongoing, Progressing     Problem: Adult Inpatient Plan of Care  Goal: Plan of Care Review  Outcome: Ongoing, Progressing  Goal: Patient-Specific Goal (Individualization)  Outcome: Ongoing, Progressing  Goal: Absence of Hospital-Acquired Illness or Injury  Outcome: Ongoing, Progressing  Goal: Optimal Comfort and Wellbeing  Outcome: Ongoing, Progressing  Goal: Readiness for Transition of Care  Outcome: Ongoing, Progressing  Goal: Rounds/Family Conference  Outcome: Ongoing, Progressing

## 2020-09-13 NOTE — NURSING
Pt was noted to be satting high to mid 80's. Pt was placed on 1L Nc and sats were at 95%. Notified on call physican for med N and was told to monitor patient. Pt now on room air and sats at 93%. No acute distress noted will continue to monitor.

## 2020-09-13 NOTE — PROGRESS NOTES
Hospital Medicine   Progress note   Team: [facility]OS[facility] MED N Otf Mason MD   Admit Date: 9/11/2020   FRANCESCA 9/15/2020   Code status: Full Code   Principal Problem: Acute gallstone pancreatitis     Ms Gisselle Ortiz is an 87 y.o. lady with history of atrial fibrillation on Eliquis with previous cardioversion, SSS s/p pacemaker, arthritis, HTN, thyroid disease with diastolic dysfunction (EF 55%, 9/2019) who presented to the ED with acute onset chest and epigastric pain that started acutely and woke pt from sleep at ~ midnight. Pt had had some nausea for several hours before bed. After waking had 2 associated episodes of NBNB vomiting. No diarrhea. Chest pain is pleuritic. Worse with movement but not necessarily exertion. No palpitations. Pain now most prominent in epigastrum- burning, aching pain with intermittent radiation to back. Pain is most similar to previous episode of pancreatitis that pt had several years ago. She is uncertain of the etiology of that episode. Has never required intervention on gallbladder. Denies EtOH use or recent medication changes.      In the ED she was afebrile. All VS initially wnl. Labs pertinent for elevated lipase > 1000. Mild anemia on CBC but no leukocytosis. Cr elevated to 1.9 from baseline 1.5. Remainder of electrolytes and LFTs wnl. Troponin wnl and BNP near-normal.      EKG with conduction delay but no ST changes.      Abd u/s demonstrated significantly dilated common bile duct with sludge in GB.      Given famotidine and maalox-simethicone. Admitted to Hospitalist Medicine.     Interval hx:     Seen by [facility] yesterday. Planning ERCP on 9/14. Surgery recommending against surgery as pt's age and comorbidities represent significant risk. Pain much improved today. Pt interested in drinking though remains with poor appetite. No new episodes of vomiting. New development of transaminitis this am.     ROS   Constitutional: no fever or chills  Respiratory: no cough or shortness  of breath  Cardiovascular: Positve for chest pain. No palpitations  Gastrointestinal: Positive for nausea, vomiting, abdominal pain. No change in bowel habits  Genitourinary: no hematuria or dysuria  Integument/Breast: no rash or pruritis  Hematologic/Lymphatic: no easy bruising or lymphadenopathy  Musculoskeletal: no arthralgias or myalgias  Neurological: no seizures or tremors  Behavioral/Psych: no depression or anxiety  PEx   Temp:  [98 °F (36.7 °C)-99.3 °F (37.4 °C)]   Pulse:  [60-90]   Resp:  [16-18]   BP: ()/(50-60)   SpO2:  [93 %-97 %]      I & O (Last 24H):   No intake or output data in the 24 hours ending 09/12/20 1946    General appearance: moderate distress  Mental status: Alert and oriented x 3  HEENT:  conjunctivae/corneas clear, PERRL  Neck: supple, thyroid not enlarged  Pulm:    CTA B, no c/w/r  Card: RRR, S1, S2 normal, no murmur, click, rub or gallop  Abd: Mildly tender diffusely. Most severe in RUQ and epigastrum. soft, ND, BS present; no masses, no organomegaly  Ext: no c/c/e  Pulses: 2+, symmetric  Skin: color, texture, turgor normal. No rashes or lesions  Neuro: Cranial Nerves grossly intact, no focal numbness or weakness, normal strength and tone     Recent Results (from the past 24 hour(s))   Comprehensive Metabolic Panel (CMP)    Collection Time: 09/12/20  4:57 AM   Result Value Ref Range    Sodium 140 136 - 145 mmol/L    Potassium 5.2 (H) 3.5 - 5.1 mmol/L    Chloride 104 95 - 110 mmol/L    CO2 28 23 - 29 mmol/L    Glucose 84 70 - 110 mg/dL    BUN, Bld 44 (H) 8 - 23 mg/dL    Creatinine 1.8 (H) 0.5 - 1.4 mg/dL    Calcium 7.9 (L) 8.7 - 10.5 mg/dL    Total Protein 5.5 (L) 6.0 - 8.4 g/dL    Albumin 2.8 (L) 3.5 - 5.2 g/dL    Total Bilirubin 1.1 (H) 0.1 - 1.0 mg/dL    Alkaline Phosphatase 102 55 - 135 U/L     (H) 10 - 40 U/L     (H) 10 - 44 U/L    Anion Gap 8 8 - 16 mmol/L    eGFR if African American 28.8 (A) >60 mL/min/1.73 m^2    eGFR if non African American 24.9 (A) >60  mL/min/1.73 m^2   Magnesium    Collection Time: 09/12/20  4:57 AM   Result Value Ref Range    Magnesium 1.9 1.6 - 2.6 mg/dL   Phosphorus    Collection Time: 09/12/20  4:57 AM   Result Value Ref Range    Phosphorus 4.3 2.7 - 4.5 mg/dL       No results for input(s): POCTGLUCOSE in the last 168 hours.    No results found for: HGBA1C     Active Hospital Problems    Diagnosis  POA    *Acute gallstone pancreatitis [K85.10]  Yes    Chest pain [R07.9]  Yes    SSS (sick sinus syndrome) [I49.5]  Yes    Long term current use of antiarrhythmic drug [Z79.899]  Not Applicable    Vitamin D deficiency [E55.9]  Yes    Insomnia [G47.00]  Yes    Gastroesophageal reflux disease [K21.9]  Yes    Paroxysmal atrial fibrillation [I48.0]  Yes    Hypertension [I10]  Yes    Hypothyroidism [E03.9]  Yes    High cholesterol [E78.00]  Yes      Resolved Hospital Problems    Diagnosis Date Resolved POA    Chest pain [R07.9] 09/11/2020 Yes        Overview: This is an 87 y.o. lady with history of atrial fibrillation on Eliquis with previous cardioversion, SSS s/p pacemaker, arthritis, HTN, thyroid disease with diastolic dysfunction (EF 55%, 9/2019) here with acute gallstone pancreatitis. Currently hemodynamically stable without evidence of cholangitis or systemic inflammation.     Assessment and Plan for problems addressed today:       Current Facility-Administered Medications:     acetaminophen tablet 650 mg, 650 mg, Oral, Q6H PRN, Otf Mason MD    acetaminophen tablet 650 mg, 650 mg, Oral, Q4H PRN, Otf Mason MD    amiodarone tablet 200 mg, 200 mg, Oral, Daily, Otf Mason MD, 200 mg at 09/12/20 0959    aspirin EC tablet 81 mg, 81 mg, Oral, Daily, Otf Mason MD, 81 mg at 09/12/20 0902    atenoloL tablet 25 mg, 25 mg, Oral, BID, Otf Mason MD, 25 mg at 09/12/20 0902    atorvastatin tablet 10 mg, 10 mg, Oral, Daily, Otf Mason MD, 10 mg at 09/12/20 0902    dextrose 50% injection  12.5 g, 12.5 g, Intravenous, PRN, Otf Mason MD    dextrose 50% injection 25 g, 25 g, Intravenous, PRN, Otf Mason MD    diltiaZEM 24 hr capsule 180 mg, 180 mg, Oral, Daily, Otf Mason MD, 180 mg at 09/12/20 0902    glucagon (human recombinant) injection 1 mg, 1 mg, Intramuscular, PRN, Otf Mason MD    glucose chewable tablet 16 g, 16 g, Oral, PRN, Otf Mason MD    glucose chewable tablet 24 g, 24 g, Oral, PRN, Otf Mason MD    heparin (porcine) injection 5,000 Units, 5,000 Units, Subcutaneous, Q8H, Otf Mason MD, 5,000 Units at 09/12/20 1459    lactated ringers infusion, , Intravenous, Continuous, Otf Mason MD, Last Rate: 100 mL/hr at 09/12/20 1300    levothyroxine tablet 137 mcg, 137 mcg, Oral, Before breakfast, Otf Mason MD, 137 mcg at 09/12/20 0519    melatonin tablet 3 mg, 3 mg, Oral, Nightly, Otf Mason MD, 3 mg at 09/11/20 2015    morphine injection 2 mg, 2 mg, Intravenous, Q4H PRN, Otf Mason MD, 2 mg at 09/12/20 1010    ondansetron injection 4 mg, 4 mg, Intravenous, Q8H PRN, Otf Mason MD    ondansetron injection 4 mg, 4 mg, Intravenous, Q8H PRN, Otf Mason MD    sodium chloride 0.9% flush 10 mL, 10 mL, Intravenous, PRN, Otf Mason MD         Pancreatitis  -GI consulted, appreciate recs  -unable to perform MRCP due to PPM  -No immediate ERCP given recent systemic anticoagulation with eliquis. Plan for ERCP with possible EBUS on 9/14  -Gen surg consulted for cholecystectomy eval. Risks outweigh benefits.   -IVF at maintenance, cautious given hx of CHF  -morphine and tylenol prn pain     pAFib with SSS and hx of PVCs s/p PPM  -c/w home amiodarone  -c/w home atenolol  -c/w home diltiazem  -holding home eliquis     HF/HLD/HTN  -holding home sprionolactone to avoid diurectic function  -c/w home atorvastatin  -c/w home aspirin     Postsurgical hypothyroidism  -c/w home  synthroid     Insomnia  -hold home lunesta for now given risk for hospital delirium  -melatonin in place     Code Status:  Full   Prophylaxis- heparin for now while awaiting procedure. At home on eliquis     Post-acute care- pending clinical condition  Discharge plan and follow up - d/c home once medically stable     Time spent in care of patient: >30 minutes     Otf Mason MD  Hospital Medicine Staff  Pager [phone]

## 2020-09-13 NOTE — PROGRESS NOTES
Hospital Medicine   Progress note   Team: [facility] [place] [facility] MED N Otf Mason MD   Admit Date: 9/11/2020   FRANCESCA 9/15/2020   Code status: Full Code   Principal Problem: Acute gallstone pancreatitis     Ms Gisselle Ortiz is an 87 y.o. lady with history of atrial fibrillation on Eliquis with previous cardioversion, SSS s/p pacemaker, arthritis, HTN, thyroid disease with diastolic dysfunction (EF 55%, 9/2019) who presented to the ED with acute onset chest and epigastric pain that started acutely and woke pt from sleep at ~ midnight. Pt had had some nausea for several hours before bed. After waking had 2 associated episodes of NBNB vomiting. No diarrhea. Chest pain is pleuritic. Worse with movement but not necessarily exertion. No palpitations. Pain now most prominent in epigastrum- burning, aching pain with intermittent radiation to back. Pain is most similar to previous episode of pancreatitis that pt had several years ago. She is uncertain of the etiology of that episode. Has never required intervention on gallbladder. Denies EtOH use or recent medication changes.      In the ED she was afebrile. All VS initially wnl. Labs pertinent for elevated lipase > 1000. Mild anemia on CBC but no leukocytosis. Cr elevated to 1.9 from baseline 1.5. Remainder of electrolytes and LFTs wnl. Troponin wnl and BNP near-normal.      EKG with conduction delay but no ST changes.      Abd u/s demonstrated significantly dilated common bile duct with sludge in GB.      Given famotidine and maalox-simethicone. Admitted to Hospitalist Medicine.     Seen by AES. Planning ERCP on 9/14. Surgery planning to re-evaluate after ERCP as pt's age and comorbidities represent significant risk.    Interval hx:   MARIO resolving. Transaminitis worsening. Pt developed acute hypoxia yesterday evening with O2 sats to high 80's. IVF's held. Feels better today. Denies dyspnea. Abdominal pain continues improvement. No fever.     ROS   Constitutional: no fever  or chills  Respiratory: no cough or shortness of breath  Cardiovascular: Negative for chest pain. No palpitations  Gastrointestinal: Positive for  abdominal pain. No change in bowel habits, nausea, vomiting  Genitourinary: no hematuria or dysuria  Integument/Breast: no rash or pruritis  Hematologic/Lymphatic: no easy bruising or lymphadenopathy  Musculoskeletal: no arthralgias or myalgias  Neurological: no seizures or tremors  Behavioral/Psych: no depression or anxiety  PEx   Temp:  [98 °F (36.7 °C)-99.8 °F (37.7 °C)]   Pulse:  [61-90]   Resp:  [16-18]   BP: (100-145)/(50-67)   SpO2:  [89 %-97 %]      I & O (Last 24H):   No intake or output data in the 24 hours ending 09/13/20 0810    General appearance: moderate distress  Mental status: Alert and oriented x 3  HEENT:  conjunctivae/corneas clear, PERRL  Neck: supple, thyroid not enlarged  Pulm:    CTA B, no c/w/r  Card: RRR, S1, S2 normal, no murmur, click, rub or gallop  Abd: Mildly tender diffusely. Most severe in RUQ and epigastrum. soft, ND, BS present; no masses, no organomegaly  Ext: no c/c/e  Pulses: 2+, symmetric  Skin: color, texture, turgor normal. No rashes or lesions  Neuro: Cranial Nerves grossly intact, no focal numbness or weakness, normal strength and tone     Recent Results (from the past 24 hour(s))   Comprehensive Metabolic Panel (CMP)    Collection Time: 09/13/20  3:18 AM   Result Value Ref Range    Sodium 141 136 - 145 mmol/L    Potassium 5.1 3.5 - 5.1 mmol/L    Chloride 106 95 - 110 mmol/L    CO2 28 23 - 29 mmol/L    Glucose 76 70 - 110 mg/dL    BUN, Bld 46 (H) 8 - 23 mg/dL    Creatinine 1.4 0.5 - 1.4 mg/dL    Calcium 8.2 (L) 8.7 - 10.5 mg/dL    Total Protein 5.1 (L) 6.0 - 8.4 g/dL    Albumin 2.4 (L) 3.5 - 5.2 g/dL    Total Bilirubin 1.5 (H) 0.1 - 1.0 mg/dL    Alkaline Phosphatase 95 55 - 135 U/L     (H) 10 - 40 U/L     (H) 10 - 44 U/L    Anion Gap 7 (L) 8 - 16 mmol/L    eGFR if African American 39.0 (A) >60 mL/min/1.73 m^2     eGFR if non  33.8 (A) >60 mL/min/1.73 m^2   Magnesium    Collection Time: 09/13/20  3:18 AM   Result Value Ref Range    Magnesium 1.9 1.6 - 2.6 mg/dL   Phosphorus    Collection Time: 09/13/20  3:18 AM   Result Value Ref Range    Phosphorus 3.9 2.7 - 4.5 mg/dL       No results for input(s): POCTGLUCOSE in the last 168 hours.    No results found for: HGBA1C     Active Hospital Problems    Diagnosis  POA    *Acute gallstone pancreatitis [K85.10]  Yes    Chest pain [R07.9]  Yes    SSS (sick sinus syndrome) [I49.5]  Yes    Long term current use of antiarrhythmic drug [Z79.899]  Not Applicable    Vitamin D deficiency [E55.9]  Yes    Insomnia [G47.00]  Yes    Gastroesophageal reflux disease [K21.9]  Yes    Paroxysmal atrial fibrillation [I48.0]  Yes    Hypertension [I10]  Yes    Hypothyroidism [E03.9]  Yes    High cholesterol [E78.00]  Yes      Resolved Hospital Problems    Diagnosis Date Resolved POA    Chest pain [R07.9] 09/11/2020 Yes        Overview: This is an 87 y.o. lady with history of atrial fibrillation on Eliquis with previous cardioversion, SSS s/p pacemaker, arthritis, HTN, thyroid disease with diastolic dysfunction (EF 55%, 9/2019) here with acute gallstone pancreatitis. Currently hemodynamically stable without evidence of cholangitis or systemic inflammation.     Assessment and Plan for problems addressed today:       Current Facility-Administered Medications:     acetaminophen tablet 650 mg, 650 mg, Oral, Q6H PRN, Otf Mason MD    acetaminophen tablet 650 mg, 650 mg, Oral, Q4H PRN, Otf Mason MD    amiodarone tablet 200 mg, 200 mg, Oral, Daily, Otf Mason MD, 200 mg at 09/12/20 0959    aspirin EC tablet 81 mg, 81 mg, Oral, Daily, Otf Mason MD, 81 mg at 09/12/20 0902    atenoloL tablet 25 mg, 25 mg, Oral, BID, Otf Mason MD, 25 mg at 09/12/20 2016    atorvastatin tablet 10 mg, 10 mg, Oral, Daily, Otf Mason MD, 10 mg at  09/12/20 0902    dextrose 50% injection 12.5 g, 12.5 g, Intravenous, PRN, Otf Mason MD    dextrose 50% injection 25 g, 25 g, Intravenous, PRN, Otf Mason MD    diltiaZEM 24 hr capsule 180 mg, 180 mg, Oral, Daily, Otf Mason MD, 180 mg at 09/12/20 0902    glucagon (human recombinant) injection 1 mg, 1 mg, Intramuscular, PRN, Otf Mason MD    glucose chewable tablet 16 g, 16 g, Oral, PRN, Otf Mason MD    glucose chewable tablet 24 g, 24 g, Oral, PRN, Otf Mason MD    heparin (porcine) injection 5,000 Units, 5,000 Units, Subcutaneous, Q8H, Otf Mason MD, 5,000 Units at 09/13/20 0611    lactated ringers infusion, , Intravenous, Continuous, Otf Mason MD, Last Rate: 100 mL/hr at 09/13/20 0116    levothyroxine tablet 137 mcg, 137 mcg, Oral, Before breakfast, Otf Mason MD, 137 mcg at 09/13/20 0607    melatonin tablet 3 mg, 3 mg, Oral, Nightly, Otf Mason MD, 3 mg at 09/12/20 2016    morphine injection 2 mg, 2 mg, Intravenous, Q4H PRN, Otf Mason MD, 2 mg at 09/12/20 1010    ondansetron injection 4 mg, 4 mg, Intravenous, Q8H PRN, Otf Mason MD    ondansetron injection 4 mg, 4 mg, Intravenous, Q8H PRN, Otf Mason MD    sodium chloride 0.9% flush 10 mL, 10 mL, Intravenous, PRN, Otf Mason MD         Pancreatitis  -GI consulted, appreciate recs  -unable to perform MRCP due to PPM  -No immediate ERCP given recent systemic anticoagulation with eliquis. Plan for ERCP with possible EBUS on 9/14  -Gen surg consulted for cholecystectomy eval. Will re-evaluate after ERCP.   -IVF at maintenance, cautious given hx of CHF  -morphine and tylenol prn pain     pAFib with SSS and hx of PVCs s/p PPM  -c/w home amiodarone  -c/w home atenolol  -c/w home diltiazem  -holding home eliquis     HF/HLD/HTN  -holding home sprionolactone to avoid diurectic function  -c/w home atorvastatin  -c/w home  aspirin     Postsurgical hypothyroidism  -c/w home synthroid     Insomnia  -hold home lunesta for now given risk for hospital delirium  -melatonin in place     Code Status:  Full   Prophylaxis- heparin for now while awaiting procedure. At home on eliquis     Post-acute care- pending clinical condition    Discharge Planning   FRANCESCA: 9/15/2020     Code Status: Full Code   Is the patient medically ready for discharge?: No    Reason for patient still in hospital (select all that apply): Treatment and Consult recommendations  Discharge Plan A: Home                Otf Mason MD  [facility] Medicine Staff  Pager [phone]

## 2020-09-14 ENCOUNTER — ANESTHESIA (OUTPATIENT)
Dept: ENDOSCOPY | Facility: HOSPITAL | Age: 85
DRG: 438 | End: 2020-09-14
Payer: MEDICARE

## 2020-09-14 PROBLEM — K83.1 BILIARY OBSTRUCTION: Status: ACTIVE | Noted: 2020-09-14

## 2020-09-14 LAB
ALBUMIN SERPL BCP-MCNC: 2.5 G/DL (ref 3.5–5.2)
ALP SERPL-CCNC: 106 U/L (ref 55–135)
ALT SERPL W/O P-5'-P-CCNC: 169 U/L (ref 10–44)
ANION GAP SERPL CALC-SCNC: 8 MMOL/L (ref 8–16)
AST SERPL-CCNC: 91 U/L (ref 10–40)
BASOPHILS # BLD AUTO: 0.02 K/UL (ref 0–0.2)
BASOPHILS NFR BLD: 0.2 % (ref 0–1.9)
BILIRUB SERPL-MCNC: 1.6 MG/DL (ref 0.1–1)
BUN SERPL-MCNC: 35 MG/DL (ref 8–23)
CALCIUM SERPL-MCNC: 8.2 MG/DL (ref 8.7–10.5)
CHLORIDE SERPL-SCNC: 105 MMOL/L (ref 95–110)
CO2 SERPL-SCNC: 25 MMOL/L (ref 23–29)
CREAT SERPL-MCNC: 1 MG/DL (ref 0.5–1.4)
DIFFERENTIAL METHOD: ABNORMAL
EOSINOPHIL # BLD AUTO: 0.1 K/UL (ref 0–0.5)
EOSINOPHIL NFR BLD: 1.2 % (ref 0–8)
ERYTHROCYTE [DISTWIDTH] IN BLOOD BY AUTOMATED COUNT: 13.8 % (ref 11.5–14.5)
EST. GFR  (AFRICAN AMERICAN): 58.5 ML/MIN/1.73 M^2
EST. GFR  (NON AFRICAN AMERICAN): 50.8 ML/MIN/1.73 M^2
GLUCOSE SERPL-MCNC: 72 MG/DL (ref 70–110)
HCT VFR BLD AUTO: 34.3 % (ref 37–48.5)
HGB BLD-MCNC: 10.7 G/DL (ref 12–16)
IMM GRANULOCYTES # BLD AUTO: 0.06 K/UL (ref 0–0.04)
IMM GRANULOCYTES NFR BLD AUTO: 0.6 % (ref 0–0.5)
LYMPHOCYTES # BLD AUTO: 0.6 K/UL (ref 1–4.8)
LYMPHOCYTES NFR BLD: 5.9 % (ref 18–48)
MAGNESIUM SERPL-MCNC: 2 MG/DL (ref 1.6–2.6)
MCH RBC QN AUTO: 30.3 PG (ref 27–31)
MCHC RBC AUTO-ENTMCNC: 31.2 G/DL (ref 32–36)
MCV RBC AUTO: 97 FL (ref 82–98)
MONOCYTES # BLD AUTO: 0.8 K/UL (ref 0.3–1)
MONOCYTES NFR BLD: 7.5 % (ref 4–15)
NEUTROPHILS # BLD AUTO: 9.2 K/UL (ref 1.8–7.7)
NEUTROPHILS NFR BLD: 84.6 % (ref 38–73)
NRBC BLD-RTO: 0 /100 WBC
PHOSPHATE SERPL-MCNC: 2.3 MG/DL (ref 2.7–4.5)
PLATELET # BLD AUTO: 173 K/UL (ref 150–350)
PMV BLD AUTO: 10.4 FL (ref 9.2–12.9)
POTASSIUM SERPL-SCNC: 4.3 MMOL/L (ref 3.5–5.1)
PROT SERPL-MCNC: 5.5 G/DL (ref 6–8.4)
RBC # BLD AUTO: 3.53 M/UL (ref 4–5.4)
SARS-COV-2 RNA RESP QL NAA+PROBE: NOT DETECTED
SODIUM SERPL-SCNC: 138 MMOL/L (ref 136–145)
WBC # BLD AUTO: 10.81 K/UL (ref 3.9–12.7)

## 2020-09-14 PROCEDURE — 27201674 HC SPHINCTERTOME: Performed by: INTERNAL MEDICINE

## 2020-09-14 PROCEDURE — 99222 1ST HOSP IP/OBS MODERATE 55: CPT | Mod: ,,, | Performed by: SURGERY

## 2020-09-14 PROCEDURE — 85025 COMPLETE CBC W/AUTO DIFF WBC: CPT

## 2020-09-14 PROCEDURE — 99233 SBSQ HOSP IP/OBS HIGH 50: CPT | Mod: ,,, | Performed by: STUDENT IN AN ORGANIZED HEALTH CARE EDUCATION/TRAINING PROGRAM

## 2020-09-14 PROCEDURE — 84100 ASSAY OF PHOSPHORUS: CPT

## 2020-09-14 PROCEDURE — 25500020 PHARM REV CODE 255: Performed by: INTERNAL MEDICINE

## 2020-09-14 PROCEDURE — 27202304 HC CANNULA, ERCP: Performed by: INTERNAL MEDICINE

## 2020-09-14 PROCEDURE — 43277 ERCP EA DUCT/AMPULLA DILATE: CPT | Mod: 59,,, | Performed by: INTERNAL MEDICINE

## 2020-09-14 PROCEDURE — 43259 EGD US EXAM DUODENUM/JEJUNUM: CPT | Mod: 51,,, | Performed by: INTERNAL MEDICINE

## 2020-09-14 PROCEDURE — 74328 PR  X-RAY FOR BILE DUCT ENDOSCOPY: ICD-10-PCS | Mod: 26,,, | Performed by: INTERNAL MEDICINE

## 2020-09-14 PROCEDURE — 83735 ASSAY OF MAGNESIUM: CPT

## 2020-09-14 PROCEDURE — 11000001 HC ACUTE MED/SURG PRIVATE ROOM

## 2020-09-14 PROCEDURE — 27202125 HC BALLOON, EXTRACTION (ANY): Performed by: INTERNAL MEDICINE

## 2020-09-14 PROCEDURE — 43277 PR ERCP W/BALOON DILATION BILIARY/PANCREATIC DUCT/AMPULLA: ICD-10-PCS | Mod: 59,,, | Performed by: INTERNAL MEDICINE

## 2020-09-14 PROCEDURE — 63600175 PHARM REV CODE 636 W HCPCS: Performed by: STUDENT IN AN ORGANIZED HEALTH CARE EDUCATION/TRAINING PROGRAM

## 2020-09-14 PROCEDURE — 25000003 PHARM REV CODE 250: Performed by: STUDENT IN AN ORGANIZED HEALTH CARE EDUCATION/TRAINING PROGRAM

## 2020-09-14 PROCEDURE — 36415 COLL VENOUS BLD VENIPUNCTURE: CPT

## 2020-09-14 PROCEDURE — 43262 ENDO CHOLANGIOPANCREATOGRAPH: CPT | Performed by: INTERNAL MEDICINE

## 2020-09-14 PROCEDURE — D9220A PRA ANESTHESIA: ICD-10-PCS | Mod: ANES,,, | Performed by: STUDENT IN AN ORGANIZED HEALTH CARE EDUCATION/TRAINING PROGRAM

## 2020-09-14 PROCEDURE — C1769 GUIDE WIRE: HCPCS | Performed by: INTERNAL MEDICINE

## 2020-09-14 PROCEDURE — 43264 ERCP REMOVE DUCT CALCULI: CPT | Mod: ,,, | Performed by: INTERNAL MEDICINE

## 2020-09-14 PROCEDURE — 99233 PR SUBSEQUENT HOSPITAL CARE,LEVL III: ICD-10-PCS | Mod: ,,, | Performed by: STUDENT IN AN ORGANIZED HEALTH CARE EDUCATION/TRAINING PROGRAM

## 2020-09-14 PROCEDURE — 80053 COMPREHEN METABOLIC PANEL: CPT

## 2020-09-14 PROCEDURE — D9220A PRA ANESTHESIA: ICD-10-PCS | Mod: CRNA,,, | Performed by: STUDENT IN AN ORGANIZED HEALTH CARE EDUCATION/TRAINING PROGRAM

## 2020-09-14 PROCEDURE — 37000008 HC ANESTHESIA 1ST 15 MINUTES: Performed by: INTERNAL MEDICINE

## 2020-09-14 PROCEDURE — C1726 CATH, BAL DIL, NON-VASCULAR: HCPCS | Performed by: INTERNAL MEDICINE

## 2020-09-14 PROCEDURE — D9220A PRA ANESTHESIA: Mod: ANES,,, | Performed by: STUDENT IN AN ORGANIZED HEALTH CARE EDUCATION/TRAINING PROGRAM

## 2020-09-14 PROCEDURE — 43264 ERCP REMOVE DUCT CALCULI: CPT | Performed by: INTERNAL MEDICINE

## 2020-09-14 PROCEDURE — 94761 N-INVAS EAR/PLS OXIMETRY MLT: CPT

## 2020-09-14 PROCEDURE — 25000003 PHARM REV CODE 250: Performed by: INTERNAL MEDICINE

## 2020-09-14 PROCEDURE — 43259 PR ENDOSCOPIC ULTRASOUND EXAM: ICD-10-PCS | Mod: 51,,, | Performed by: INTERNAL MEDICINE

## 2020-09-14 PROCEDURE — 43264 PR ERCP,W/REMOVAL STONE,BIL/PANCR DUCTS: ICD-10-PCS | Mod: ,,, | Performed by: INTERNAL MEDICINE

## 2020-09-14 PROCEDURE — D9220A PRA ANESTHESIA: Mod: CRNA,,, | Performed by: STUDENT IN AN ORGANIZED HEALTH CARE EDUCATION/TRAINING PROGRAM

## 2020-09-14 PROCEDURE — 74328 X-RAY BILE DUCT ENDOSCOPY: CPT | Performed by: INTERNAL MEDICINE

## 2020-09-14 PROCEDURE — 99222 PR INITIAL HOSPITAL CARE,LEVL II: ICD-10-PCS | Mod: ,,, | Performed by: SURGERY

## 2020-09-14 PROCEDURE — 74328 X-RAY BILE DUCT ENDOSCOPY: CPT | Mod: 26,,, | Performed by: INTERNAL MEDICINE

## 2020-09-14 PROCEDURE — 43259 EGD US EXAM DUODENUM/JEJUNUM: CPT | Performed by: INTERNAL MEDICINE

## 2020-09-14 PROCEDURE — 37000009 HC ANESTHESIA EA ADD 15 MINS: Performed by: INTERNAL MEDICINE

## 2020-09-14 RX ORDER — PROPOFOL 10 MG/ML
VIAL (ML) INTRAVENOUS
Status: DISCONTINUED | OUTPATIENT
Start: 2020-09-14 | End: 2020-09-14

## 2020-09-14 RX ORDER — SODIUM CHLORIDE 9 MG/ML
INJECTION, SOLUTION INTRAVENOUS CONTINUOUS
Status: CANCELLED | OUTPATIENT
Start: 2020-09-14

## 2020-09-14 RX ORDER — PROPOFOL 10 MG/ML
VIAL (ML) INTRAVENOUS CONTINUOUS PRN
Status: DISCONTINUED | OUTPATIENT
Start: 2020-09-14 | End: 2020-09-14

## 2020-09-14 RX ORDER — INDOMETHACIN 50 MG/1
SUPPOSITORY RECTAL
Status: COMPLETED | OUTPATIENT
Start: 2020-09-14 | End: 2020-09-14

## 2020-09-14 RX ORDER — SODIUM CHLORIDE 0.9 % (FLUSH) 0.9 %
10 SYRINGE (ML) INJECTION
Status: DISCONTINUED | OUTPATIENT
Start: 2020-09-14 | End: 2020-09-15 | Stop reason: HOSPADM

## 2020-09-14 RX ORDER — FENTANYL CITRATE 50 UG/ML
INJECTION, SOLUTION INTRAMUSCULAR; INTRAVENOUS
Status: DISCONTINUED | OUTPATIENT
Start: 2020-09-14 | End: 2020-09-14

## 2020-09-14 RX ORDER — SODIUM CHLORIDE 9 MG/ML
INJECTION, SOLUTION INTRAVENOUS CONTINUOUS PRN
Status: DISCONTINUED | OUTPATIENT
Start: 2020-09-14 | End: 2020-09-14

## 2020-09-14 RX ORDER — LIDOCAINE HYDROCHLORIDE 20 MG/ML
INJECTION INTRAVENOUS
Status: DISCONTINUED | OUTPATIENT
Start: 2020-09-14 | End: 2020-09-14

## 2020-09-14 RX ORDER — SODIUM CHLORIDE 0.9 % (FLUSH) 0.9 %
10 SYRINGE (ML) INJECTION
Status: CANCELLED | OUTPATIENT
Start: 2020-09-14

## 2020-09-14 RX ADMIN — MELATONIN TAB 3 MG 3 MG: 3 TAB at 09:09

## 2020-09-14 RX ADMIN — SODIUM CHLORIDE: 9 INJECTION, SOLUTION INTRAVENOUS at 11:09

## 2020-09-14 RX ADMIN — PROPOFOL 40 MG: 10 INJECTION, EMULSION INTRAVENOUS at 11:09

## 2020-09-14 RX ADMIN — INDOMETHACIN 100 MG: 50 SUPPOSITORY RECTAL at 12:09

## 2020-09-14 RX ADMIN — PROPOFOL 150 MCG/KG/MIN: 10 INJECTION, EMULSION INTRAVENOUS at 11:09

## 2020-09-14 RX ADMIN — SODIUM PHOSPHATE, MONOBASIC, MONOHYDRATE 15 MMOL: 276; 142 INJECTION, SOLUTION INTRAVENOUS at 09:09

## 2020-09-14 RX ADMIN — HEPARIN SODIUM 5000 UNITS: 5000 INJECTION INTRAVENOUS; SUBCUTANEOUS at 09:09

## 2020-09-14 RX ADMIN — LIDOCAINE HYDROCHLORIDE 100 MG: 20 INJECTION, SOLUTION INTRAVENOUS at 11:09

## 2020-09-14 RX ADMIN — FENTANYL CITRATE 25 MCG: 50 INJECTION, SOLUTION INTRAMUSCULAR; INTRAVENOUS at 12:09

## 2020-09-14 RX ADMIN — ATENOLOL 25 MG: 25 TABLET ORAL at 09:09

## 2020-09-14 RX ADMIN — IOHEXOL 20 ML: 300 INJECTION, SOLUTION INTRAVENOUS at 12:09

## 2020-09-14 NOTE — ANESTHESIA POSTPROCEDURE EVALUATION
Anesthesia Post Evaluation    Patient: Gisselle Ortiz    Procedure(s) Performed: Procedure(s) (LRB):  ERCP (ENDOSCOPIC RETROGRADE CHOLANGIOPANCREATOGRAPHY) (N/A)  ULTRASOUND, UPPER GI TRACT, ENDOSCOPIC (N/A)    Final Anesthesia Type: general    Patient location during evaluation: PACU  Patient participation: Yes- Able to Participate  Level of consciousness: awake and alert  Post-procedure vital signs: reviewed and stable  Pain management: adequate  Airway patency: patent    PONV status at discharge: No PONV  Anesthetic complications: no      Cardiovascular status: blood pressure returned to baseline  Respiratory status: spontaneous ventilation  Hydration status: euvolemic  Follow-up not needed.          Vitals Value Taken Time   /66 09/14/20 1346   Temp 36.6 °C (97.9 °F) 09/14/20 1255   Pulse 60 09/14/20 1349   Resp 23 09/14/20 1349   SpO2 93 % 09/14/20 1349   Vitals shown include unvalidated device data.      No case tracking events are documented in the log.      Pain/Shalonda Score: Shalonda Score: 9 (9/14/2020  1:15 PM)

## 2020-09-14 NOTE — TREATMENT PLAN
"[facility] Post Procedure Treatment Plan    Interval history:  ERCP",EUS / ERCP completed.  - Hyperechoic material consistent with sludge was visualized endosonographically in the common bile duct.  - A filling defect consistent with a stone and sludge was seen on the cholangiogram.   - The entire biliary tree was dilated, acquired.   - The examination was suspicious for choledocholithiasis.   - A biliary sphincterotomy was performed.   - Bile duct orifice was successfully dilated.   - The biliary tree was swept and sludge was found.     Plan:  - s/p ERCP",EUS / ERCP  - advance diet as tolerated  - recommend cholecystectomy to prevent recurrence of gallstone pancreatitis  - Monitor for s&s of post-ERCP pancreatitis  - we will continue to follow    Hetal Zapata MD  GI Fellow  "

## 2020-09-14 NOTE — [employer]ATION PATIENT INSTRUCTIONS
Discharge Summary/Instructions after an Endoscopic Procedure  Patient Name: Gisselle Ortiz  Patient MRN: [id number]  Patient YOB: 1933 Monday, September 14, 2020  Esha Howard MD  RESTRICTIONS:  During your procedure today, you received medications for sedation.  These   medications may affect your judgment, balance and coordination.  Therefore,   for 24 hours, you have the following restrictions:   - DO NOT drive a car, operate machinery, make legal/financial decisions,   sign important papers or drink alcohol.    ACTIVITY:  Today: no heavy lifting, straining or running due to procedural   sedation/anesthesia.  The following day: return to full activity including work.  DIET:  Eat and drink normally unless instructed otherwise.     TREATMENT FOR COMMON SIDE EFFECTS:  - Mild abdominal pain, nausea, belching, bloating or excessive gas:  rest,   eat lightly and use a heating pad.  - Sore Throat: treat with throat lozenges and/or gargle with warm salt   water.  - Because air was used during the procedure, expelling large amounts of air   from your rectum or belching is normal.  - If a bowel prep was taken, you may not have a bowel movement for 1-3 days.    This is normal.  SYMPTOMS TO WATCH FOR AND REPORT TO YOUR PHYSICIAN:  1. Abdominal pain or bloating, other than gas cramps.  2. Chest pain.  3. Back pain.  4. Signs of infection such as: chills or fever occurring within 24 hours   after the procedure.  5. Rectal bleeding, which would show as bright red, maroon, or black stools.   (A tablespoon of blood from the rectum is not serious, especially if   hemorrhoids are present.)  6. Vomiting.  7. Weakness or dizziness.  GO DIRECTLY TO THE NEAREST EMERGENCY ROOM IF YOU HAVE ANY OF THE FOLLOWING:      Difficulty breathing              Chills and/or fever over 101 F   Persistent vomiting and/or vomiting blood   Severe abdominal pain   Severe chest pain   Black, tarry stools   Bleeding- more than  one tablespoon   Any other symptom or condition that you feel may need urgent attention  Your doctor recommends these additional instructions:  If any biopsies were taken, your doctors clinic will contact you in 1 to 2   weeks with any results.  - Return patient to [facility] for [facility] care.   - Resume previous diet.   - Continue present medications.   - Patient has a contact number available for emergencies.  The signs and   symptoms of potential delayed complications were discussed with the   patient.  Return to normal activities tomorrow.  Written discharge   instructions were provided to the patient.   For questions, problems or results please call your physician - Esha Howard MD at Work:  [phone].  [facility], EMERGENCY ROOM PHONE NUMBER: [phone]  IF A COMPLICATION OR EMERGENCY SITUATION ARISES AND YOU ARE UNABLE TO REACH   YOUR PHYSICIAN - GO DIRECTLY TO THE EMERGENCY ROOM.  Esha Howard MD  9/14/2020 1:43:40 PM  This report has been verified and signed electronically.  [employer]

## 2020-09-14 NOTE — PROGRESS NOTES
Hospital Medicine   Progress note   Team: [facility] [place] [facility] [place] N Otf Mason MD   Admit Date: 9/11/2020   FRANCESCA 9/15/2020   Code status: Full Code   Principal Problem: Acute gallstone pancreatitis     Ms Gisselle Ortiz is an 87 y.o. lady with history of atrial fibrillation on Eliquis with previous cardioversion, SSS s/p pacemaker, arthritis, HTN, thyroid disease with diastolic dysfunction (EF 55%, 9/2019) who presented to the ED with acute onset chest and epigastric pain that started acutely and woke pt from sleep at ~ midnight. Pt had had some nausea for several hours before bed. After waking had 2 associated episodes of NBNB vomiting. No diarrhea. Chest pain is pleuritic. Worse with movement but not necessarily exertion. No palpitations. Pain now most prominent in epigastrum- burning, aching pain with intermittent radiation to back. Pain is most similar to previous episode of pancreatitis that pt had several years ago. She is uncertain of the etiology of that episode. Has never required intervention on gallbladder. Denies EtOH use or recent medication changes.      In the ED she was afebrile. All VS initially wnl. Labs pertinent for elevated lipase > 1000. Mild anemia on CBC but no leukocytosis. Cr elevated to 1.9 from baseline 1.5. Remainder of electrolytes and LFTs wnl. Troponin wnl and BNP near-normal.      EKG with conduction delay but no ST changes.      Abd u/s demonstrated significantly dilated common bile duct with sludge in GB.      Given famotidine and maalox-simethicone. Admitted to Hospitalist Medicine.     Seen by AES. Planning ERCP on 9/14. Surgery planning to re-evaluate after ERCP as pt's age and comorbidities represent significant risk.    Interval hx:   Underwent ERCP today with removal of stone. Well tolerated. Pt feels better. Has some bruising around her eye. Leukocytosis resolving. MARIO resolved. Transaminitis improving.     ROS   Constitutional: no fever or chills  Respiratory: no cough  or shortness of breath  Cardiovascular: Negative for chest pain. No palpitations  Gastrointestinal: Positive for  abdominal pain. No change in bowel habits, nausea, vomiting  Genitourinary: no hematuria or dysuria  Integument/Breast: no rash or pruritis  Hematologic/Lymphatic: no easy bruising or lymphadenopathy  Musculoskeletal: no arthralgias or myalgias  Neurological: no seizures or tremors  Behavioral/Psych: no depression or anxiety  PEx   Temp:  [98.2 °F (36.8 °C)-99.6 °F (37.6 °C)]   Pulse:  [60-64]   Resp:  [16-18]   BP: (132-149)/(63-68)   SpO2:  [90 %-94 %]      I & O (Last 24H):   No intake or output data in the 24 hours ending 09/14/20 0842    General appearance: moderate distress  Mental status: Alert and oriented x 3  HEENT:  conjunctivae/corneas clear, PERRL  Neck: supple, thyroid not enlarged  Pulm:    CTA B, no c/w/r  Card: RRR, S1, S2 normal, no murmur, click, rub or gallop  Abd: Mildly tender diffusely. Most severe in RUQ and epigastrum. soft, ND, BS present; no masses, no organomegaly  Ext: no c/c/e  Pulses: 2+, symmetric  Skin: color, texture, turgor normal. No rashes or lesions  Neuro: Cranial Nerves grossly intact, no focal numbness or weakness, normal strength and tone     Recent Results (from the past 24 hour(s))   COVID-19 Routine Screening    Collection Time: 09/13/20  5:31 PM   Result Value Ref Range    SARS-CoV2 (COVID-19) Qualitative PCR Not Detected Not Detected   Comprehensive Metabolic Panel (CMP)    Collection Time: 09/14/20  3:58 AM   Result Value Ref Range    Sodium 138 136 - 145 mmol/L    Potassium 4.3 3.5 - 5.1 mmol/L    Chloride 105 95 - 110 mmol/L    CO2 25 23 - 29 mmol/L    Glucose 72 70 - 110 mg/dL    BUN, Bld 35 (H) 8 - 23 mg/dL    Creatinine 1.0 0.5 - 1.4 mg/dL    Calcium 8.2 (L) 8.7 - 10.5 mg/dL    Total Protein 5.5 (L) 6.0 - 8.4 g/dL    Albumin 2.5 (L) 3.5 - 5.2 g/dL    Total Bilirubin 1.6 (H) 0.1 - 1.0 mg/dL    Alkaline Phosphatase 106 55 - 135 U/L    AST 91 (H) 10 - 40  U/L     (H) 10 - 44 U/L    Anion Gap 8 8 - 16 mmol/L    eGFR if African American 58.5 (A) >60 mL/min/1.73 m^2    eGFR if non African American 50.8 (A) >60 mL/min/1.73 m^2   Magnesium    Collection Time: 09/14/20  3:58 AM   Result Value Ref Range    Magnesium 2.0 1.6 - 2.6 mg/dL   Phosphorus    Collection Time: 09/14/20  3:58 AM   Result Value Ref Range    Phosphorus 2.3 (L) 2.7 - 4.5 mg/dL   CBC auto differential    Collection Time: 09/14/20  3:58 AM   Result Value Ref Range    WBC 10.81 3.90 - 12.70 K/uL    RBC 3.53 (L) 4.00 - 5.40 M/uL    Hemoglobin 10.7 (L) 12.0 - 16.0 g/dL    Hematocrit 34.3 (L) 37.0 - 48.5 %    Mean Corpuscular Volume 97 82 - 98 fL    Mean Corpuscular Hemoglobin 30.3 27.0 - 31.0 pg    Mean Corpuscular Hemoglobin Conc 31.2 (L) 32.0 - 36.0 g/dL    RDW 13.8 11.5 - 14.5 %    Platelets 173 150 - 350 K/uL    MPV 10.4 9.2 - 12.9 fL    Immature Granulocytes 0.6 (H) 0.0 - 0.5 %    Gran # (ANC) 9.2 (H) 1.8 - 7.7 K/uL    Immature Grans (Abs) 0.06 (H) 0.00 - 0.04 K/uL    Lymph # 0.6 (L) 1.0 - 4.8 K/uL    Mono # 0.8 0.3 - 1.0 K/uL    Eos # 0.1 0.0 - 0.5 K/uL    Baso # 0.02 0.00 - 0.20 K/uL    nRBC 0 0 /100 WBC    Gran% 84.6 (H) 38.0 - 73.0 %    Lymph% 5.9 (L) 18.0 - 48.0 %    Mono% 7.5 4.0 - 15.0 %    Eosinophil% 1.2 0.0 - 8.0 %    Basophil% 0.2 0.0 - 1.9 %    Differential Method Automated        No results for input(s): POCTGLUCOSE in the last 168 hours.    No results found for: HGBA1C     Active Hospital Problems    Diagnosis  POA    *Acute gallstone pancreatitis [K85.10]  Yes    Chest pain [R07.9]  Yes    SSS (sick sinus syndrome) [I49.5]  Yes    Long term current use of antiarrhythmic drug [Z79.899]  Not Applicable    Vitamin D deficiency [E55.9]  Yes    Insomnia [G47.00]  Yes    Gastroesophageal reflux disease [K21.9]  Yes    Paroxysmal atrial fibrillation [I48.0]  Yes    Hypertension [I10]  Yes    Hypothyroidism [E03.9]  Yes    High cholesterol [E78.00]  Yes      Resolved Hospital  Problems    Diagnosis Date Resolved POA    Chest pain [R07.9] 09/11/2020 Yes        Overview: This is an 87 y.o. lady with history of atrial fibrillation on Eliquis with previous cardioversion, SSS s/p pacemaker, arthritis, HTN, thyroid disease with diastolic dysfunction (EF 55%, 9/2019) here with acute gallstone pancreatitis.     Assessment and Plan for problems addressed today:       Current Facility-Administered Medications:     acetaminophen tablet 650 mg, 650 mg, Oral, Q6H PRN, Otf Mason MD    acetaminophen tablet 650 mg, 650 mg, Oral, Q4H PRN, Otf Mason MD    amiodarone tablet 200 mg, 200 mg, Oral, Daily, Otf Mason MD, Stopped at 09/14/20 0900    aspirin EC tablet 81 mg, 81 mg, Oral, Daily, Otf Mason MD, Stopped at 09/14/20 0900    atenoloL tablet 25 mg, 25 mg, Oral, BID, Otf Mason MD, Stopped at 09/14/20 0900    atorvastatin tablet 10 mg, 10 mg, Oral, Daily, Otf Mason MD, Stopped at 09/14/20 0900    dextrose 50% injection 12.5 g, 12.5 g, Intravenous, PRN, Otf Mason MD    dextrose 50% injection 25 g, 25 g, Intravenous, PRN, Otf Mason MD    diltiaZEM 24 hr capsule 180 mg, 180 mg, Oral, Daily, Otf Mason MD, Stopped at 09/14/20 0900    glucagon (human recombinant) injection 1 mg, 1 mg, Intramuscular, PRN, Otf Mason MD    glucose chewable tablet 16 g, 16 g, Oral, PRN, Otf Mason MD    glucose chewable tablet 24 g, 24 g, Oral, PRN, Otf Mason MD    heparin (porcine) injection 5,000 Units, 5,000 Units, Subcutaneous, Q8H, Otf Mason MD, 5,000 Units at 09/13/20 2128    levothyroxine tablet 137 mcg, 137 mcg, Oral, Before breakfast, Otf Mason MD, 137 mcg at 09/13/20 0607    melatonin tablet 3 mg, 3 mg, Oral, Nightly, Otf Mason MD, 3 mg at 09/13/20 2128    morphine injection 2 mg, 2 mg, Intravenous, Q4H PRN, Otf Mason MD, 2 mg at 09/12/20 1010     ondansetron injection 4 mg, 4 mg, Intravenous, Q8H PRN, Otf Mason MD    ondansetron injection 4 mg, 4 mg, Intravenous, Q8H PRN, Otf Mason MD    sodium chloride 0.9% flush 10 mL, 10 mL, Intravenous, PRN, Otf Mason MD         Pancreatitis  -GI consulted, appreciate recs  -unable to perform MRCP due to PPM  -s/p ERCP with stone removal 9/14  -Gen surg consulted for cholecystectomy eval. Will re-evaluate.  -morphine and tylenol prn pain     pAFib with SSS and hx of PVCs s/p PPM  -c/w home amiodarone  -c/w home atenolol  -c/w home diltiazem  -holding home eliquis     HF/HLD/HTN  -holding home sprionolactone to avoid diurectic function  -c/w home atorvastatin  -c/w home aspirin     Postsurgical hypothyroidism  -c/w home synthroid     Insomnia  -hold home lunesta for now given risk for hospital delirium  -melatonin in place     Code Status:  Full   Prophylaxis- heparin for now while awaiting procedure. At home on eliquis     Post-acute care- pending clinical condition    Discharge Planning   FRANCESCA: 9/15/2020     Code Status: Full Code   Is the patient medically ready for discharge?: No    Reason for patient still in hospital (select all that apply): Treatment and Consult recommendations  Discharge Plan A: Home           Otf Mason MD  Hospital Medicine Staff  Pager [phone]

## 2020-09-14 NOTE — H&P
History & Physical - Short Stay  Gastroenterology      SUBJECTIVE:     Procedure: EUS and ERCP    Chief Complaint/Indication for Procedure: biliary pancreatitis    History of Present Illness:  Patient is a 87 y.o. female with biliary pancreatitis for the second time coming for EUS and ERCP.     PTA Medications   Medication Sig    amiodarone (PACERONE) 200 MG Tab TAKE 1 TABLET ONCE DAILY . START TAKING ONLY 1 TABLET EVERY DAY ON 4-19-19    apixaban (ELIQUIS) 2.5 mg Tab Take 1 tablet (2.5 mg total) by mouth 2 (two) times daily.    aspirin (ECOTRIN) 81 MG EC tablet Take 81 mg by mouth once daily.    atenoloL (TENORMIN) 25 MG tablet TAKE 2 TABLETS EVERY MORNING AND 1 TABLET EVERY EVENING    atorvastatin (LIPITOR) 10 MG tablet TAKE 1 TABLET DAILY    diltiaZEM (CARDIZEM CD) 180 MG 24 hr capsule Take 1 capsule (180 mg total) by mouth once daily.    eszopiclone (LUNESTA) 2 MG Tab Take 2 mg by mouth every evening.    levothyroxine (SYNTHROID) 137 MCG Tab tablet Take 1 tablet (137 mcg total) by mouth once daily.    spironolactone (ALDACTONE) 25 MG tablet        Review of patient's allergies indicates:   Allergen Reactions    Ciprofloxacin Nausea And Vomiting        Past Medical History:   Diagnosis Date    A-fib     Arthritis     Hypertension     Thyroid disease      Past Surgical History:   Procedure Laterality Date    CATARACT EXTRACTION      CATARACT EXTRACTION W/  INTRAOCULAR LENS IMPLANT Bilateral 2004     IN [place]    EYE SURGERY      HIP REPLACEMENT ARTHROPLASTY      HYSTERECTOMY      JOINT REPLACEMENT      REVISION OF SKIN POCKET FOR PACEMAKER N/A 1/21/2019    Procedure: REVISION-POCKET-PACEMAKER;  Surgeon: Asher Watts MD;  Location: [facility];  Service: Cardiology;  Laterality: N/A;  MODERATE SEDATION, sedation issues in the past    THYROIDECTOMY      TREATMENT OF CARDIAC ARRHYTHMIA N/A 3/18/2019    Procedure: CARDIOVERSION OR DEFIBRILLATION;  Surgeon: Asher Watts MD;   Location: [facility] EP LAB;  Service: Cardiology;  Laterality: N/A;  AF, JILL-cx if SR, DCCV, MAC, FAS, 3PREP    TREATMENT OF CARDIAC ARRHYTHMIA N/A 5/14/2019    Procedure: Cardioversion or Defibrillation;  Surgeon: Derick Vizcarra MD;  Location: [facility] EP LAB;  Service: Cardiology;  Laterality: N/A;  AF, JILL, DCCV, MAC, DM, 3PREP     Family History   Problem Relation Age of Onset    Heart attack Mother     Heart disease Mother     Heart failure Mother     Hypertension Mother     Stroke Maternal Grandmother     Hypertension Maternal Grandmother     Heart disease Maternal Grandmother     Heart attack Maternal Grandmother     Heart failure Maternal Grandmother      Social History     Tobacco Use    Smoking status: Former Smoker     Start date: 5/19/1964    Smokeless tobacco: Never Used   Substance Use Topics    Alcohol use: No    Drug use: No            OBJECTIVE:     Vital Signs (Most Recent)  Temp: 98.8 °F (37.1 °C) (09/14/20 1102)  Pulse: 66 (09/14/20 1102)  Resp: 14 (09/14/20 1102)  BP: (!) 157/70 (09/14/20 1102)  SpO2: 96 % (09/14/20 1102)         ASSESSMENT/PLAN:     Patient is a 87 y.o. female with biliary pancreatitis for the second time coming for EUS and ERCP.     Plan: EUS and ERCP    Anesthesia Plan: Moderate Sedation    ASA Grade: ASA 2 - Patient with mild systemic disease with no functional limitations

## 2020-09-14 NOTE — [employer] PATIENT INSTRUCTIONS
Discharge Summary/Instructions after an Endoscopic Procedure  Patient Name: Gisselle Ortiz  Patient MRN: [id number]  Patient YOB: 1933 Monday, September 14, 2020  Esha Howard MD  RESTRICTIONS:  During your procedure today, you received medications for sedation.  These   medications may affect your judgment, balance and coordination.  Therefore,   for 24 hours, you have the following restrictions:   - DO NOT drive a car, operate machinery, make legal/financial decisions,   sign important papers or drink alcohol.    ACTIVITY:  Today: no heavy lifting, straining or running due to procedural   sedation/anesthesia.  The following day: return to full activity including work.  DIET:  Eat and drink normally unless instructed otherwise.     TREATMENT FOR COMMON SIDE EFFECTS:  - Mild abdominal pain, nausea, belching, bloating or excessive gas:  rest,   eat lightly and use a heating pad.  - Sore Throat: treat with throat lozenges and/or gargle with warm salt   water.  - Because air was used during the procedure, expelling large amounts of air   from your rectum or belching is normal.  - If a bowel prep was taken, you may not have a bowel movement for 1-3 days.    This is normal.  SYMPTOMS TO WATCH FOR AND REPORT TO YOUR PHYSICIAN:  1. Abdominal pain or bloating, other than gas cramps.  2. Chest pain.  3. Back pain.  4. Signs of infection such as: chills or fever occurring within 24 hours   after the procedure.  5. Rectal bleeding, which would show as bright red, maroon, or black stools.   (A tablespoon of blood from the rectum is not serious, especially if   hemorrhoids are present.)  6. Vomiting.  7. Weakness or dizziness.  GO DIRECTLY TO THE NEAREST EMERGENCY ROOM IF YOU HAVE ANY OF THE FOLLOWING:      Difficulty breathing              Chills and/or fever over 101 F   Persistent vomiting and/or vomiting blood   Severe abdominal pain   Severe chest pain   Black, tarry stools   Bleeding- more than  one tablespoon   Any other symptom or condition that you feel may need urgent attention  Your doctor recommends these additional instructions:  If any biopsies were taken, your doctors clinic will contact you in 1 to 2   weeks with any results.  - Proceed with ERCP.   - Resume previous diet.   - Continue present medications.   - Return to referring physician.  For questions, problems or results please call your physician - Esha Howard MD at Work:  [phone].  [facility], EMERGENCY ROOM PHONE NUMBER: [phone]  IF A COMPLICATION OR EMERGENCY SITUATION ARISES AND YOU ARE UNABLE TO REACH   YOUR PHYSICIAN - GO DIRECTLY TO THE EMERGENCY ROOM.  Esha Howard MD  9/14/2020 1:38:08 PM  This report has been verified and signed electronically.  [employer]

## 2020-09-14 NOTE — DISCHARGE INSTRUCTIONS
ERCP (Endoscopic Retrograde Cholangiopancreatography)     A balloon at the tip of a catheter opens above the stone. The stone is pulled out of the duct and leaves your body through stool.     ERCP stands for endoscopic retrograde cholangiopancreatography. This procedure is used to view the biliary and pancreatic ducts.  It is used to evaluate diseases that affect the ducts and to help locate and treat blockages that may be present.  How do I get ready for ERCP?  · Talk to your doctor about any health problems or allergies you have.  · Ask your doctor about the risks of ERCP. These include pancreatitis, infection, bleeding, and tearing the bowel.  · Be sure your doctor knows about all medicines you take. You may be told to stop taking some or all of them before the test. This includes:  · All prescription medicines  · Over-the-counter medicines that don't need a prescription  ·  Any street drugs you may use   · Herbs, vitamins, kelp, seaweed, cough syrups, and other supplements  · You may be asked to take antibiotics ahead of time.  · Avoid blood-thinning medicines for 1 week before ERCP.  · Do not eat or drink for 8 to 12 hours before ERCP.  · Have someone ready to take you home.  What happens during the procedure?  · You may be given medicine through an IV to help you relax.  · Your throat is numbed.  · A thin tube (endoscope) is placed into your throat. It is advanced from the throat through the upper digestive tract, to the common bile duct opening. The endoscope lets the doctor see the common bile and pancreatic ducts on a video screen.  · A cut may be made where the common bile duct opens to the duodenum to make it easier to remove stones.  · As blockages are located and removed, X-rays are taken.  · Contrast dye is injected through a catheter to make the duct show up better on the X-rays.  · An imaging technique that uses X-rays to obtain real-time moving images of internal organs is called fluoroscopy.  Fluoroscopy is used to watch and guide progress of the procedure.   · In some cases, a plastic tube (stent) is placed to hold the ducts open. This stent may be replaced or removed in 6 to 8 weeks. Or it may be left to fall out on its own and be passed in the stool.  What happens after ERCP?  Your doctor may discuss the test results right away or a return visit may be scheduled. You may go home the same day or spend the night in the hospital. Follow these tips:  · You can return to a normal routine the day after the ERCP.  · If a cut was made in the duct, avoid blood-thinning medicines such as aspirin for 5 to 7 days.  · Call your doctor right away if you have a fever or abdominal pain. These may be signs of an infection or torn bowel.   Date Last Reviewed: 6/19/2015  © 0086-8533 [employer]. [address] [place] [address], [place] [address]. All rights reserved. This information is not intended as a substitute for professional medical care. Always follow your healthcare professional's instructions.

## 2020-09-14 NOTE — NURSING TRANSFER
Nursing Transfer Note      9/14/2020     Transfer from [facility] to floor    Transfer via stretcher    Transfer with chart    Transported by nurse tech    Chart send with patient: yes    Notified: floor nurse    Patient reassessed at: 9/14/20 1345    Pt trans to floor [facility] via stretcher. Stable for trans, NAD, VSS. Report was called to floor nurse

## 2020-09-14 NOTE — NURSING
Pt to/from ERCP today, VSS throughout, no pain. Report given to DAMON Stratton. Safety maintained throughout. Fall precautions reviewed w/ pt who indicated understanding. See flowsheet for detail.

## 2020-09-14 NOTE — TRANSFER OF CARE
"Anesthesia Transfer of Care Note    Patient: Gisselle Ortiz    Procedure(s) Performed: Procedure(s) (LRB):  ERCP (ENDOSCOPIC RETROGRADE CHOLANGIOPANCREATOGRAPHY) (N/A)  ULTRASOUND, UPPER GI TRACT, ENDOSCOPIC (N/A)    Patient location: [facility]    Anesthesia Type: general    Transport from OR: Transported from OR on room air with adequate spontaneous ventilation    Post pain: adequate analgesia    Post assessment: no apparent anesthetic complications and tolerated procedure well    Post vital signs: stable    Level of consciousness: awake    Nausea/Vomiting: no nausea/vomiting    Complications: none    Transfer of care protocol was followed      Last vitals:   Visit Vitals  BP (!) 157/70 (BP Location: Right arm, Patient Position: Lying)   Pulse 66   Temp 37.1 °C (98.8 °F) (Oral)   Resp 14   Ht 5' 3" (1.6 m)   Wt 63.9 kg (140 lb 14 oz)   SpO2 96%   Breastfeeding No   BMI 24.95 kg/m²     "

## 2020-09-15 VITALS
DIASTOLIC BLOOD PRESSURE: 64 MMHG | BODY MASS INDEX: 24.96 KG/M2 | TEMPERATURE: 98 F | OXYGEN SATURATION: 96 % | HEART RATE: 60 BPM | SYSTOLIC BLOOD PRESSURE: 128 MMHG | RESPIRATION RATE: 16 BRPM | HEIGHT: 63 IN | WEIGHT: 140.88 LBS

## 2020-09-15 PROBLEM — K83.1 BILIARY OBSTRUCTION: Status: RESOLVED | Noted: 2020-09-14 | Resolved: 2020-09-15

## 2020-09-15 PROBLEM — R07.9 CHEST PAIN: Status: RESOLVED | Noted: 2020-09-12 | Resolved: 2020-09-15

## 2020-09-15 PROBLEM — K85.10 ACUTE GALLSTONE PANCREATITIS: Status: RESOLVED | Noted: 2020-09-11 | Resolved: 2020-09-15

## 2020-09-15 LAB
ALBUMIN SERPL BCP-MCNC: 2.3 G/DL (ref 3.5–5.2)
ALP SERPL-CCNC: 117 U/L (ref 55–135)
ALT SERPL W/O P-5'-P-CCNC: 139 U/L (ref 10–44)
ANION GAP SERPL CALC-SCNC: 11 MMOL/L (ref 8–16)
AST SERPL-CCNC: 71 U/L (ref 10–40)
BASOPHILS # BLD AUTO: 0.02 K/UL (ref 0–0.2)
BASOPHILS NFR BLD: 0.2 % (ref 0–1.9)
BILIRUB SERPL-MCNC: 1.3 MG/DL (ref 0.1–1)
BUN SERPL-MCNC: 40 MG/DL (ref 8–23)
CALCIUM SERPL-MCNC: 8.1 MG/DL (ref 8.7–10.5)
CHLORIDE SERPL-SCNC: 106 MMOL/L (ref 95–110)
CO2 SERPL-SCNC: 24 MMOL/L (ref 23–29)
CREAT SERPL-MCNC: 1.1 MG/DL (ref 0.5–1.4)
DIFFERENTIAL METHOD: ABNORMAL
EOSINOPHIL # BLD AUTO: 0.1 K/UL (ref 0–0.5)
EOSINOPHIL NFR BLD: 1.2 % (ref 0–8)
ERYTHROCYTE [DISTWIDTH] IN BLOOD BY AUTOMATED COUNT: 13.4 % (ref 11.5–14.5)
EST. GFR  (AFRICAN AMERICAN): 52.2 ML/MIN/1.73 M^2
EST. GFR  (NON AFRICAN AMERICAN): 45.3 ML/MIN/1.73 M^2
GLUCOSE SERPL-MCNC: 82 MG/DL (ref 70–110)
HCT VFR BLD AUTO: 34.5 % (ref 37–48.5)
HGB BLD-MCNC: 10.8 G/DL (ref 12–16)
IMM GRANULOCYTES # BLD AUTO: 0.07 K/UL (ref 0–0.04)
IMM GRANULOCYTES NFR BLD AUTO: 0.8 % (ref 0–0.5)
LYMPHOCYTES # BLD AUTO: 0.6 K/UL (ref 1–4.8)
LYMPHOCYTES NFR BLD: 6.3 % (ref 18–48)
MAGNESIUM SERPL-MCNC: 2 MG/DL (ref 1.6–2.6)
MCH RBC QN AUTO: 29.9 PG (ref 27–31)
MCHC RBC AUTO-ENTMCNC: 31.3 G/DL (ref 32–36)
MCV RBC AUTO: 96 FL (ref 82–98)
MONOCYTES # BLD AUTO: 0.9 K/UL (ref 0.3–1)
MONOCYTES NFR BLD: 10.2 % (ref 4–15)
NEUTROPHILS # BLD AUTO: 7.5 K/UL (ref 1.8–7.7)
NEUTROPHILS NFR BLD: 81.3 % (ref 38–73)
NRBC BLD-RTO: 0 /100 WBC
PHOSPHATE SERPL-MCNC: 3.5 MG/DL (ref 2.7–4.5)
PLATELET # BLD AUTO: 170 K/UL (ref 150–350)
PMV BLD AUTO: 10.5 FL (ref 9.2–12.9)
POTASSIUM SERPL-SCNC: 4.4 MMOL/L (ref 3.5–5.1)
PROT SERPL-MCNC: 5.3 G/DL (ref 6–8.4)
RBC # BLD AUTO: 3.61 M/UL (ref 4–5.4)
SODIUM SERPL-SCNC: 141 MMOL/L (ref 136–145)
WBC # BLD AUTO: 9.22 K/UL (ref 3.9–12.7)

## 2020-09-15 PROCEDURE — 99239 PR HOSPITAL DISCHARGE DAY,>30 MIN: ICD-10-PCS | Mod: ,,, | Performed by: STUDENT IN AN ORGANIZED HEALTH CARE EDUCATION/TRAINING PROGRAM

## 2020-09-15 PROCEDURE — 25000003 PHARM REV CODE 250: Performed by: STUDENT IN AN ORGANIZED HEALTH CARE EDUCATION/TRAINING PROGRAM

## 2020-09-15 PROCEDURE — 83735 ASSAY OF MAGNESIUM: CPT

## 2020-09-15 PROCEDURE — 36415 COLL VENOUS BLD VENIPUNCTURE: CPT

## 2020-09-15 PROCEDURE — 99239 HOSP IP/OBS DSCHRG MGMT >30: CPT | Mod: ,,, | Performed by: STUDENT IN AN ORGANIZED HEALTH CARE EDUCATION/TRAINING PROGRAM

## 2020-09-15 PROCEDURE — 85025 COMPLETE CBC W/AUTO DIFF WBC: CPT

## 2020-09-15 PROCEDURE — 63600175 PHARM REV CODE 636 W HCPCS: Performed by: STUDENT IN AN ORGANIZED HEALTH CARE EDUCATION/TRAINING PROGRAM

## 2020-09-15 PROCEDURE — 80053 COMPREHEN METABOLIC PANEL: CPT

## 2020-09-15 PROCEDURE — 84100 ASSAY OF PHOSPHORUS: CPT

## 2020-09-15 RX ORDER — ERGOCALCIFEROL 1.25 MG/1
50000 CAPSULE ORAL
Start: 2020-09-15 | End: 2020-12-12

## 2020-09-15 RX ADMIN — APIXABAN 2.5 MG: 2.5 TABLET, FILM COATED ORAL at 09:09

## 2020-09-15 RX ADMIN — AMIODARONE HYDROCHLORIDE 200 MG: 200 TABLET ORAL at 09:09

## 2020-09-15 RX ADMIN — ATENOLOL 25 MG: 25 TABLET ORAL at 09:09

## 2020-09-15 RX ADMIN — DILTIAZEM HYDROCHLORIDE 180 MG: 180 CAPSULE, COATED, EXTENDED RELEASE ORAL at 09:09

## 2020-09-15 RX ADMIN — ATORVASTATIN CALCIUM 10 MG: 10 TABLET, FILM COATED ORAL at 09:09

## 2020-09-15 RX ADMIN — LEVOTHYROXINE SODIUM 137 MCG: 25 TABLET ORAL at 06:09

## 2020-09-15 RX ADMIN — ASPIRIN 81 MG: 81 TABLET, COATED ORAL at 09:09

## 2020-09-15 NOTE — TREATMENT PLAN
GENERAL SURGERY TREATMENT PLAN    Seen and examined  S/p ERCP with sphincterotomy and sweeping of duct  Pain improved  Discussed with patient and daughter  She does not desire surgery at this time and we believe that is reasonable due to age and co-morbidities  Can follow up with surgery as needed  Will sign off      Tim Hernández M.D.  [facility], PGY 5

## 2020-09-15 NOTE — TREATMENT PLAN
AES Follow-up Note     Gisselle Ortiz was seen and examined.   1 Day Post-Op s/p ERCP   No abdominal discomfort. Tolerating diet.    Vitals stable. Afebrile.     Lab Results   Component Value Date     (H) 09/15/2020    AST 71 (H) 09/15/2020    ALKPHOS 117 09/15/2020    BILITOT 1.3 (H) 09/15/2020    BILITOT 1.6 (H) 09/14/2020    BILITOT 1.5 (H) 09/13/2020       No sx/sx of post-ERCP pancreatitis or other procedural complications.   AES will sign off. Please call if any questions/concerns.  Patient will be contacted with follow-up information.     Hetal Zapata MD  Gastroenterology & Hepatology Fellow

## 2020-09-15 NOTE — PLAN OF CARE
Pt was discharge today with all her personal items. Pts discharged paper was reviewed and she stated understanding. VSS at time of discharge. Pt was taken to vehicle via wheelchair and family walked out with her.

## 2020-09-15 NOTE — ANESTHESIA POSTPROCEDURE EVALUATION
Anesthesia Post Evaluation    Patient: Gisselle Ortiz    Procedure(s) Performed: Procedure(s) (LRB):  ERCP (ENDOSCOPIC RETROGRADE CHOLANGIOPANCREATOGRAPHY) (N/A)  ULTRASOUND, UPPER GI TRACT, ENDOSCOPIC (N/A)    Final Anesthesia Type: general    Patient location during evaluation: PACU  Patient participation: Yes- Able to Participate  Level of consciousness: awake and alert, awake and oriented  Post-procedure vital signs: reviewed and stable  Pain management: adequate  Airway patency: patent    PONV status at discharge: No PONV  Anesthetic complications: no      Cardiovascular status: blood pressure returned to baseline, hemodynamically stable and stable  Respiratory status: unassisted, spontaneous ventilation and room air  Hydration status: euvolemic  Follow-up not needed.          Vitals Value Taken Time   /66 09/14/20 1346   Temp 36.6 °C (97.9 °F) 09/14/20 1255   Pulse 60 09/14/20 1349   Resp 23 09/14/20 1349   SpO2 93 % 09/14/20 1349   Vitals shown include unvalidated device data.      No case tracking events are documented in the log.      Pain/Shalonda Score: Shalonda Score: 9 (9/14/2020  1:15 PM)

## 2020-09-15 NOTE — PLAN OF CARE
09/15/20 1022   Final Note   Assessment Type Final Discharge Note   Anticipated Discharge Disposition Home   What phone number can be called within the next 1-3 days to see how you are doing after discharge? [phone]   Discharge plans and expectations educations in teach back method with documentation complete? Yes   Right Care Referral Info   Post Acute Recommendation No Care   Post-Acute Status   Post-Acute Authorization Other   Other Status No Post-Acute Service Needs     Future Appointments   Date Time Provider Department Center   12/11/2020 11:30 AM Kai Montez MD [facility]

## 2020-09-16 ENCOUNTER — TELEPHONE (OUTPATIENT)
Dept: GASTROENTEROLOGY | Facility: CLINIC | Age: 85
End: 2020-09-16

## 2020-09-16 ENCOUNTER — PATIENT OUTREACH (OUTPATIENT)
Dept: ADMINISTRATIVE | Facility: CLINIC | Age: 85
End: 2020-09-16

## 2020-09-16 NOTE — TELEPHONE ENCOUNTER
----- Message from [place] Chi sent at 9/16/2020 10:55 AM CDT -----  Contact: pt  Pt calling to schedule a hospital follow up for pancreatitis, referral in system         Please contact pt [phone]

## 2020-09-16 NOTE — TELEPHONE ENCOUNTER
Spoke with pt and pt was scheduled for f/u appt on 09/24/2020 @3PM. Pt verbalized understanding and appt letter was mailed out.

## 2020-09-16 NOTE — PROGRESS NOTES
Please forward this important TCC information to your provider in order to maximize the post discharge care delivery of this patient.    C3 nurse spoke with Gisselle Ortiz  for a TCC post hospital discharge follow up call. The patient does not have a scheduled HOSFU appointment with Kai Montez MD  within 7-14 days post hospital discharge date 09/15/2020. C3 nurse was unable to schedule HOSFU appointment in [facility].  Please contact pcp  and schedule follow up appointment using HOSFU visit type on or before 09/29/2020    Respectfully,  Skyla Man LPN    [facility]    [email or web address]       Please do not reply to this message, as this inbox is not routinely monitored.

## 2020-09-16 NOTE — PATIENT INSTRUCTIONS
Discharge Instructions for ERCP (Endoscopic Retrograde Cholangiopancreatography)  You had a procedure known as an ERCP. Your healthcare provider performed the ERCP to look at your bile or pancreatic ducts, and to locate and treat blockages in the ducts. This procedure is used to diagnose diseases of the pancreas, bile ducts, and pancreatic duct, liver, and gallbladder. Heres what you need to do following your ERCP.  Home care  · Dont take aspirin or any other blood-thinning medicines (anticoagulants) until your provider says its OK.  · Your provider may prescribe an antibiotic, depending on what was done during the ERCP.  · You may have a sore throat for 1 to 2 days after the procedure. Use lozenges or gargle with salt water for your sore throat. If you're not better in a few days, call your provider.  · Rest, drink fluids, and eat light meals. If you feel bloated or have too much gas, use a heating pad on your belly to help reduce the discomfort. This should help you feel better. But if it doesn't, call your provider.  · Dont drink alcohol for 2 days after the procedure.  Follow-up care  Make a follow-up appointment as directed by our staff.     When to seek medical care  Call your provider right away if you have any of the following:  · Trouble swallowing or throat pain that gets worse   · Chest pain or severe belly or abdominal pain  · Fever above 100°F (37.7°C) or chills  · Upset stomach (nausea) and vomiting  · Black or tarry stools   Date Last Reviewed: 6/13/2015  © 0041-3588 [employer]. [address] [place] [address], [place] [address]. All rights reserved. This information is not intended as a substitute for professional medical care. Always follow your healthcare professional's instructions.

## 2020-09-24 ENCOUNTER — HOSPITAL ENCOUNTER (INPATIENT)
Facility: HOSPITAL | Age: 85
LOS: 8 days | Discharge: NURSING, REHAB OR LONG-TERM CARE | DRG: 862 | End: 2020-10-02
Attending: EMERGENCY MEDICINE | Admitting: EMERGENCY MEDICINE
Payer: MEDICARE

## 2020-09-24 ENCOUNTER — OFFICE VISIT (OUTPATIENT)
Dept: GASTROENTEROLOGY | Facility: CLINIC | Age: 85
DRG: 862 | End: 2020-09-24
Payer: MEDICARE

## 2020-09-24 VITALS
SYSTOLIC BLOOD PRESSURE: 92 MMHG | HEIGHT: 63 IN | DIASTOLIC BLOOD PRESSURE: 51 MMHG | WEIGHT: 126.13 LBS | BODY MASS INDEX: 22.35 KG/M2 | HEART RATE: 60 BPM

## 2020-09-24 DIAGNOSIS — K85.10 GALLSTONE PANCREATITIS: ICD-10-CM

## 2020-09-24 DIAGNOSIS — K85.90 PANCREATIC ABSCESS: ICD-10-CM

## 2020-09-24 DIAGNOSIS — K75.0 HEPATIC ABSCESS: ICD-10-CM

## 2020-09-24 DIAGNOSIS — D72.829 LEUKOCYTOSIS, UNSPECIFIED TYPE: ICD-10-CM

## 2020-09-24 DIAGNOSIS — K65.1 PERITONEAL ABSCESS: ICD-10-CM

## 2020-09-24 DIAGNOSIS — R78.81 BACTEREMIA: ICD-10-CM

## 2020-09-24 DIAGNOSIS — R53.83 FATIGUE, UNSPECIFIED TYPE: ICD-10-CM

## 2020-09-24 DIAGNOSIS — R07.9 CHEST PAIN: ICD-10-CM

## 2020-09-24 DIAGNOSIS — R94.31 QT PROLONGATION: ICD-10-CM

## 2020-09-24 DIAGNOSIS — K83.8 DILATED BILE DUCT: ICD-10-CM

## 2020-09-24 DIAGNOSIS — I95.9 HYPOTENSION, UNSPECIFIED HYPOTENSION TYPE: Primary | ICD-10-CM

## 2020-09-24 DIAGNOSIS — R79.89 ELEVATED LFTS: ICD-10-CM

## 2020-09-24 DIAGNOSIS — E80.6 HYPERBILIRUBINEMIA: Primary | ICD-10-CM

## 2020-09-24 DIAGNOSIS — R53.83 FATIGUE: ICD-10-CM

## 2020-09-24 LAB
ALBUMIN SERPL BCP-MCNC: 2.3 G/DL (ref 3.5–5.2)
ALP SERPL-CCNC: 132 U/L (ref 55–135)
ALT SERPL W/O P-5'-P-CCNC: 151 U/L (ref 10–44)
ANION GAP SERPL CALC-SCNC: 8 MMOL/L (ref 8–16)
AST SERPL-CCNC: 103 U/L (ref 10–40)
BACTERIA #/AREA URNS AUTO: ABNORMAL /HPF
BASOPHILS # BLD AUTO: 0.07 K/UL (ref 0–0.2)
BASOPHILS NFR BLD: 0.3 % (ref 0–1.9)
BILIRUB SERPL-MCNC: 3.3 MG/DL (ref 0.1–1)
BILIRUB UR QL STRIP: ABNORMAL
BUN SERPL-MCNC: 30 MG/DL (ref 8–23)
CALCIUM SERPL-MCNC: 8 MG/DL (ref 8.7–10.5)
CHLORIDE SERPL-SCNC: 101 MMOL/L (ref 95–110)
CLARITY UR REFRACT.AUTO: ABNORMAL
CO2 SERPL-SCNC: 25 MMOL/L (ref 23–29)
COLOR UR AUTO: ABNORMAL
CREAT SERPL-MCNC: 1.2 MG/DL (ref 0.5–1.4)
CTP QC/QA: YES
DIFFERENTIAL METHOD: ABNORMAL
EOSINOPHIL # BLD AUTO: 0.1 K/UL (ref 0–0.5)
EOSINOPHIL NFR BLD: 0.4 % (ref 0–8)
ERYTHROCYTE [DISTWIDTH] IN BLOOD BY AUTOMATED COUNT: 14.8 % (ref 11.5–14.5)
EST. GFR  (AFRICAN AMERICAN): 47 ML/MIN/1.73 M^2
EST. GFR  (NON AFRICAN AMERICAN): 40.7 ML/MIN/1.73 M^2
GLUCOSE SERPL-MCNC: 120 MG/DL (ref 70–110)
GLUCOSE UR QL STRIP: NEGATIVE
HCT VFR BLD AUTO: 31.7 % (ref 37–48.5)
HGB BLD-MCNC: 10.2 G/DL (ref 12–16)
HGB UR QL STRIP: NEGATIVE
HYALINE CASTS UR QL AUTO: 2 /LPF
IMM GRANULOCYTES # BLD AUTO: 0.96 K/UL (ref 0–0.04)
IMM GRANULOCYTES NFR BLD AUTO: 4.7 % (ref 0–0.5)
INR PPP: 1 (ref 0.8–1.2)
INR PPP: 1.1 (ref 0.8–1.2)
KETONES UR QL STRIP: NEGATIVE
LEUKOCYTE ESTERASE UR QL STRIP: ABNORMAL
LIPASE SERPL-CCNC: 647 U/L (ref 4–60)
LYMPHOCYTES # BLD AUTO: 1 K/UL (ref 1–4.8)
LYMPHOCYTES NFR BLD: 4.9 % (ref 18–48)
MCH RBC QN AUTO: 30.2 PG (ref 27–31)
MCHC RBC AUTO-ENTMCNC: 32.2 G/DL (ref 32–36)
MCV RBC AUTO: 94 FL (ref 82–98)
MICROSCOPIC COMMENT: ABNORMAL
MONOCYTES # BLD AUTO: 2.2 K/UL (ref 0.3–1)
MONOCYTES NFR BLD: 10.6 % (ref 4–15)
NEUTROPHILS # BLD AUTO: 16.1 K/UL (ref 1.8–7.7)
NEUTROPHILS NFR BLD: 79.1 % (ref 38–73)
NITRITE UR QL STRIP: NEGATIVE
NRBC BLD-RTO: 0 /100 WBC
PH UR STRIP: 5 [PH] (ref 5–8)
PLATELET # BLD AUTO: 479 K/UL (ref 150–350)
PMV BLD AUTO: 10.6 FL (ref 9.2–12.9)
POTASSIUM SERPL-SCNC: 4.5 MMOL/L (ref 3.5–5.1)
PROT SERPL-MCNC: 6.1 G/DL (ref 6–8.4)
PROT UR QL STRIP: ABNORMAL
PROTHROMBIN TIME: 11.5 SEC (ref 9–12.5)
PROTHROMBIN TIME: 11.8 SEC (ref 9–12.5)
RBC # BLD AUTO: 3.38 M/UL (ref 4–5.4)
RBC #/AREA URNS AUTO: 4 /HPF (ref 0–4)
SARS-COV-2 RDRP RESP QL NAA+PROBE: NEGATIVE
SODIUM SERPL-SCNC: 134 MMOL/L (ref 136–145)
SP GR UR STRIP: 1.02 (ref 1–1.03)
SQUAMOUS #/AREA URNS AUTO: 5 /HPF
TSH SERPL DL<=0.005 MIU/L-ACNC: 0.44 UIU/ML (ref 0.4–4)
URN SPEC COLLECT METH UR: ABNORMAL
WBC # BLD AUTO: 20.38 K/UL (ref 3.9–12.7)
WBC #/AREA URNS AUTO: 20 /HPF (ref 0–5)

## 2020-09-24 PROCEDURE — 11000001 HC ACUTE MED/SURG PRIVATE ROOM

## 2020-09-24 PROCEDURE — U0002 COVID-19 LAB TEST NON-CDC: HCPCS | Performed by: EMERGENCY MEDICINE

## 2020-09-24 PROCEDURE — 93010 EKG 12-LEAD: ICD-10-PCS | Mod: ,,, | Performed by: INTERNAL MEDICINE

## 2020-09-24 PROCEDURE — 87077 CULTURE AEROBIC IDENTIFY: CPT | Mod: 59

## 2020-09-24 PROCEDURE — 87088 URINE BACTERIA CULTURE: CPT

## 2020-09-24 PROCEDURE — 84443 ASSAY THYROID STIM HORMONE: CPT

## 2020-09-24 PROCEDURE — 99999 PR PBB SHADOW E&M-EST. PATIENT-LVL III: ICD-10-PCS | Mod: PBBFAC,GC,, | Performed by: STUDENT IN AN ORGANIZED HEALTH CARE EDUCATION/TRAINING PROGRAM

## 2020-09-24 PROCEDURE — 99285 EMERGENCY DEPT VISIT HI MDM: CPT | Mod: 25,27

## 2020-09-24 PROCEDURE — 99999 PR PBB SHADOW E&M-EST. PATIENT-LVL III: CPT | Mod: PBBFAC,GC,, | Performed by: STUDENT IN AN ORGANIZED HEALTH CARE EDUCATION/TRAINING PROGRAM

## 2020-09-24 PROCEDURE — 96361 HYDRATE IV INFUSION ADD-ON: CPT

## 2020-09-24 PROCEDURE — 87186 SC STD MICRODIL/AGAR DIL: CPT | Mod: 59

## 2020-09-24 PROCEDURE — 80053 COMPREHEN METABOLIC PANEL: CPT

## 2020-09-24 PROCEDURE — 93005 ELECTROCARDIOGRAM TRACING: CPT

## 2020-09-24 PROCEDURE — 99214 OFFICE O/P EST MOD 30 MIN: CPT | Mod: S$PBB,GC,, | Performed by: STUDENT IN AN ORGANIZED HEALTH CARE EDUCATION/TRAINING PROGRAM

## 2020-09-24 PROCEDURE — 99285 EMERGENCY DEPT VISIT HI MDM: CPT | Mod: ,,, | Performed by: EMERGENCY MEDICINE

## 2020-09-24 PROCEDURE — 87040 BLOOD CULTURE FOR BACTERIA: CPT

## 2020-09-24 PROCEDURE — 99213 OFFICE O/P EST LOW 20 MIN: CPT | Mod: PBBFAC | Performed by: STUDENT IN AN ORGANIZED HEALTH CARE EDUCATION/TRAINING PROGRAM

## 2020-09-24 PROCEDURE — 25000003 PHARM REV CODE 250: Performed by: EMERGENCY MEDICINE

## 2020-09-24 PROCEDURE — 81001 URINALYSIS AUTO W/SCOPE: CPT

## 2020-09-24 PROCEDURE — 93010 ELECTROCARDIOGRAM REPORT: CPT | Mod: ,,, | Performed by: INTERNAL MEDICINE

## 2020-09-24 PROCEDURE — 99214 PR OFFICE/OUTPT VISIT, EST, LEVL IV, 30-39 MIN: ICD-10-PCS | Mod: S$PBB,GC,, | Performed by: STUDENT IN AN ORGANIZED HEALTH CARE EDUCATION/TRAINING PROGRAM

## 2020-09-24 PROCEDURE — 85610 PROTHROMBIN TIME: CPT

## 2020-09-24 PROCEDURE — 87086 URINE CULTURE/COLONY COUNT: CPT

## 2020-09-24 PROCEDURE — 85025 COMPLETE CBC W/AUTO DIFF WBC: CPT

## 2020-09-24 PROCEDURE — 83690 ASSAY OF LIPASE: CPT

## 2020-09-24 PROCEDURE — 36415 COLL VENOUS BLD VENIPUNCTURE: CPT

## 2020-09-24 PROCEDURE — 96360 HYDRATION IV INFUSION INIT: CPT

## 2020-09-24 PROCEDURE — 99285 PR EMERGENCY DEPT VISIT,LEVEL V: ICD-10-PCS | Mod: ,,, | Performed by: EMERGENCY MEDICINE

## 2020-09-24 PROCEDURE — 85610 PROTHROMBIN TIME: CPT | Mod: 91

## 2020-09-24 RX ORDER — SODIUM CHLORIDE 0.9 % (FLUSH) 0.9 %
10 SYRINGE (ML) INJECTION
Status: DISCONTINUED | OUTPATIENT
Start: 2020-09-24 | End: 2020-10-02 | Stop reason: HOSPADM

## 2020-09-24 RX ORDER — IBUPROFEN 200 MG
16 TABLET ORAL
Status: DISCONTINUED | OUTPATIENT
Start: 2020-09-24 | End: 2020-10-02 | Stop reason: HOSPADM

## 2020-09-24 RX ORDER — ATORVASTATIN CALCIUM 10 MG/1
10 TABLET, FILM COATED ORAL DAILY
Status: DISCONTINUED | OUTPATIENT
Start: 2020-09-25 | End: 2020-10-02 | Stop reason: HOSPADM

## 2020-09-24 RX ORDER — IBUPROFEN 200 MG
24 TABLET ORAL
Status: DISCONTINUED | OUTPATIENT
Start: 2020-09-24 | End: 2020-10-02 | Stop reason: HOSPADM

## 2020-09-24 RX ORDER — GLUCAGON 1 MG
1 KIT INJECTION
Status: DISCONTINUED | OUTPATIENT
Start: 2020-09-24 | End: 2020-10-02 | Stop reason: HOSPADM

## 2020-09-24 RX ORDER — ONDANSETRON 2 MG/ML
4 INJECTION INTRAMUSCULAR; INTRAVENOUS
Status: ACTIVE | OUTPATIENT
Start: 2020-09-24 | End: 2020-09-25

## 2020-09-24 RX ORDER — AMIODARONE HYDROCHLORIDE 200 MG/1
200 TABLET ORAL DAILY
Status: DISCONTINUED | OUTPATIENT
Start: 2020-09-25 | End: 2020-10-02 | Stop reason: HOSPADM

## 2020-09-24 RX ADMIN — SODIUM CHLORIDE 1000 ML: 0.9 INJECTION, SOLUTION INTRAVENOUS at 06:09

## 2020-09-24 NOTE — ED NOTES
Pt states she had pancreatitis two weeks ago, was discharged one week ago and has been sleeping since discharged

## 2020-09-24 NOTE — PROGRESS NOTES
[facility]    Reason for visit: There were no encounter diagnoses.  Referring Provider/PCP: aKi Montez MD    History of Present Illness:  Gisselle Ortiz is a 87 y.o. female with a history of AFib on Eliquis s/p pacemaker placement, hypertension, hyperthyroidism s/p thyroidectomy who is presenting for follow-up evaluation of gallstone pancreatitis.  The patient was admitted to the hospital on 09/11 for abdominal pain and was found to have gallstone pancreatitis.  She had CBD dilation and a gallbladder with sludge.  She underwent EUS and ERCP with stone and sludge seen on the cholangiogram.  Sphincterotomy was performed and stone/sludge removed.  No stents placed.  Surgery was consulted and deemed the patient to be high risk for cholecystectomy.  She is here for follow-up.  Patient reports that she recovered well from the pancreatitis and was eating prior to discharge.  A few days after discharge she started having severe fatigue that progressively worsened to the point where the patient could not walk.  She complains of decreased appetite and nausea but only 1 episode of vomiting.  She denies any abdominal pain, fevers, diarrhea.  She reports that her urine is orange/brown and has a bad odor but denies any dysuria.    PEndoHx:  ERCP 9/2020  - The major papilla was on the rim of a diverticulum.   - A filling defect consistent with a stone and sludge was seen on the cholangiogram.   - The entire biliary tree was dilated, acquired.   - The examination was suspicious for choledocholithiasis.   - A biliary sphincterotomy was performed.   - Bile duct orifice was successfully dilated.   - The biliary tree was swept and sludge was found.    Review of Systems   Constitutional: Positive for chills and malaise/fatigue. Negative for fever.   HENT: Negative.    Eyes: Negative.    Respiratory: Negative.    Cardiovascular: Negative.    Gastrointestinal: Positive for nausea and vomiting.  Negative for abdominal pain, blood in stool, constipation, diarrhea and melena.   Genitourinary: Negative for dysuria.        Malodorous urine   Musculoskeletal: Negative.    Skin: Negative.    Neurological: Positive for dizziness and weakness. Negative for loss of consciousness.   Psychiatric/Behavioral: Negative.        Medical History:  Past Medical History:   Diagnosis Date    A-fib     Arthritis     Hypertension     Thyroid disease        Past Surgical History:   Procedure Laterality Date    CATARACT EXTRACTION      CATARACT EXTRACTION W/  INTRAOCULAR LENS IMPLANT Bilateral 2004    DR IN [place]    ENDOSCOPIC ULTRASOUND OF UPPER GASTROINTESTINAL TRACT N/A 9/14/2020    Procedure: ULTRASOUND, UPPER GI TRACT, ENDOSCOPIC;  Surgeon: Esha Hoawrd MD;  Location: [facility] ([address] [place] [address]);  Service: Endoscopy;  Laterality: N/A;    ERCP N/A 9/14/2020    Procedure: ERCP (ENDOSCOPIC RETROGRADE CHOLANGIOPANCREATOGRAPHY);  Surgeon: Esha Howard MD;  Location: [facility] ([address] [place] [address]);  Service: Endoscopy;  Laterality: N/A;    EYE SURGERY      HIP REPLACEMENT ARTHROPLASTY      HYSTERECTOMY      JOINT REPLACEMENT      REVISION OF SKIN POCKET FOR PACEMAKER N/A 1/21/2019    Procedure: REVISION-POCKET-PACEMAKER;  Surgeon: Asher Watts MD;  Location: [facility];  Service: Cardiology;  Laterality: N/A;  MODERATE SEDATION, sedation issues in the past    THYROIDECTOMY      TREATMENT OF CARDIAC ARRHYTHMIA N/A 3/18/2019    Procedure: CARDIOVERSION OR DEFIBRILLATION;  Surgeon: Asher Watts MD;  Location: [facility] EP [facility];  Service: Cardiology;  Laterality: N/A;  AF, JILL-cx if SR, DCCV, MAC, FAS, 3PREP    TREATMENT OF CARDIAC ARRHYTHMIA N/A 5/14/2019    Procedure: Cardioversion or Defibrillation;  Surgeon: Derick Vizcarra MD;  Location: [facility] EP [facility];  Service: Cardiology;  Laterality: N/A;  AF, JILL, DCCV, MAC, DM, 3PREP       Family History   Problem Relation Age of Onset    Heart attack Mother      Heart disease Mother     Heart failure Mother     Hypertension Mother     Stroke Maternal Grandmother     Hypertension Maternal Grandmother     Heart disease Maternal Grandmother     Heart attack Maternal Grandmother     Heart failure Maternal Grandmother        Social History     Socioeconomic History    Marital status:      Spouse name: Not on file    Number of children: Not on file    Years of education: Not on file    Highest education level: Not on file   Occupational History    Not on file   Social Needs    Financial resource strain: Not on file    Food insecurity     Worry: Not on file     Inability: Not on file    Transportation needs     Medical: Not on file     Non-medical: Not on file   Tobacco Use    Smoking status: Former Smoker     Start date: 5/19/1964    Smokeless tobacco: Never Used   Substance and Sexual Activity    Alcohol use: No    Drug use: No    Sexual activity: Not Currently     Partners: Male   Lifestyle    Physical activity     Days per week: Not on file     Minutes per session: Not on file    Stress: Not on file   Relationships    Social connections     Talks on phone: Not on file     Gets together: Not on file     Attends Buddhist service: Not on file     Active member of club or organization: Not on file     Attends meetings of clubs or organizations: Not on file     Relationship status: Not on file   Other Topics Concern    Not on file   Social History Narrative    Not on file       Current Outpatient Medications on File Prior to Visit   Medication Sig Dispense Refill    amiodarone (PACERONE) 200 MG Tab TAKE 1 TABLET ONCE DAILY . START TAKING ONLY 1 TABLET EVERY DAY ON 4-19-19 90 tablet 4    apixaban (ELIQUIS) 2.5 mg Tab Take 1 tablet (2.5 mg total) by mouth 2 (two) times daily. 180 tablet 3    aspirin (ECOTRIN) 81 MG EC tablet Take 81 mg by mouth once daily.      atenoloL (TENORMIN) 25 MG tablet TAKE 2 TABLETS EVERY MORNING AND 1 TABLET EVERY  EVENING 270 tablet 3    atorvastatin (LIPITOR) 10 MG tablet TAKE 1 TABLET DAILY 90 tablet 3    diltiaZEM (CARDIZEM CD) 180 MG 24 hr capsule Take 1 capsule (180 mg total) by mouth once daily. 90 capsule 3    ergocalciferol (VITAMIN D2) 50,000 unit Cap Take 1 capsule (50,000 Units total) by mouth every 7 days.      eszopiclone (LUNESTA) 2 MG Tab Take 2 mg by mouth every evening.      levothyroxine (SYNTHROID) 137 MCG Tab tablet Take 1 tablet (137 mcg total) by mouth once daily. 90 tablet 3    spironolactone (ALDACTONE) 25 MG tablet        No current facility-administered medications on file prior to visit.        Review of patient's allergies indicates:   Allergen Reactions    Ciprofloxacin Nausea And Vomiting       Physical Exam:  Constitutional: in mild distress, weak appearing  HENT: Head: Normal, normocephalic, atraumatic.  Eyes: conjunctiva clear and sclera nonicteric  Cardiovascular: regular rate and rhythm and no murmur  Respiratory: normal chest expansion & respiratory effort   and no accessory muscle use  GI: soft, non-tender, without masses or organomegaly  Musculoskeletal: no muscular tenderness noted  Skin: jaundice present  Neurological: alert, oriented x3  Psychiatric: mood and affect are within normal limits, pt is a good historian; no memory problems were noted    Laboratory:  Lab Results   Component Value Date     09/15/2020    K 4.4 09/15/2020     09/15/2020    CO2 24 09/15/2020    BUN 40 (H) 09/15/2020    CREATININE 1.1 09/15/2020    CALCIUM 8.1 (L) 09/15/2020    ANIONGAP 11 09/15/2020    ESTGFRAFRICA 52.2 (A) 09/15/2020    EGFRNONAA 45.3 (A) 09/15/2020       Lab Results   Component Value Date     (H) 09/15/2020    AST 71 (H) 09/15/2020    ALKPHOS 117 09/15/2020    BILITOT 1.3 (H) 09/15/2020       Lab Results   Component Value Date    WBC 9.22 09/15/2020    HGB 10.8 (L) 09/15/2020    HCT 34.5 (L) 09/15/2020    MCV 96 09/15/2020     09/15/2020       Microbiology:  No  Pertinent Microbiology    Imaging:  U/S 9/11/2020  Dilated common duct measuring up to 14 mm with mild intrahepatic ductal dilatation.     Enlarged gallbladder containing sludge.  There is non-specific tenderness while scanning over the gallbladder.    Assessment:  Gisselle Ortiz is a 87 y.o. female who is presenting for follow-up evaluation of gallstone pancreatitis.    Upon evaluation, the patient appears very fatigued, weak and jaundice.  Blood pressure 91/50.  She reports nausea, decreased p.o. intake but no abdominal pain.  We believe the patient needs to go to the ER to be evaluated.  Differential diagnosis includes biliary stone/sludge causing early cholangitis, recurrent pancreatitis however less likely in the absence of abdominal pain, hepatitis, or completely GI unrelated disease process such as UTI.      Plan:  1. Transfer patient to the ER for further evaluation and IV hydration  2. Recommend obtaining CBC, CMP, hepatic function panel, blood cultures  3. Based on an investigations, consider admission and appropriate consultations.  4.   Hetal Zapata MD  Gastroenterology Fellow    No orders of the defined types were placed in this encounter.

## 2020-09-24 NOTE — ED PROVIDER NOTES
Encounter Date: 9/24/2020       History     Chief Complaint   Patient presents with    Fatigue     comes from GI clinic, weakness. No eating and drinking     HPI   Ms. Ortiz is a 87 y.o. female with Afib (on Eliquis), HTN, thyroid disease here today with fatigue. Patient reports that over the past couple weeks she has had worsening fatigue and decreased p.o. intake.  She feels dehydrated.  States that she had an ERCP for pancreatitis, and symptoms have started since then.  Went to GI clinic today and was sent here for further evaluation.  The requesting blood cultures and liver failure workup.  Had 1 episode of vomiting 2 or 3 days ago.  None since then.  No changes in bowel movements.  Denies fevers, chills, palpitations, chest pain, shortness of breath, abdominal pain, numbness, tingling, weakness, diarrhea, constipation, dysuria, melena, hematochezia, hematuria.       Review of patient's allergies indicates:   Allergen Reactions    Ciprofloxacin Nausea And Vomiting     Past Medical History:   Diagnosis Date    A-fib     Arthritis     Hypertension     Thyroid disease      Past Surgical History:   Procedure Laterality Date    CATARACT EXTRACTION      CATARACT EXTRACTION W/  INTRAOCULAR LENS IMPLANT Bilateral 2004    DR IN [place]    ENDOSCOPIC ULTRASOUND OF UPPER GASTROINTESTINAL TRACT N/A 9/14/2020    Procedure: ULTRASOUND, UPPER GI TRACT, ENDOSCOPIC;  Surgeon: Esha Howard MD;  Location: [address]);  Service: Endoscopy;  Laterality: N/A;    ERCP N/A 9/14/2020    Procedure: ERCP (ENDOSCOPIC RETROGRADE CHOLANGIOPANCREATOGRAPHY);  Surgeon: Esha Howard MD;  Location: [facility] ([address] [place] [address]);  Service: Endoscopy;  Laterality: N/A;    EYE SURGERY      HIP REPLACEMENT ARTHROPLASTY      HYSTERECTOMY      JOINT REPLACEMENT      REVISION OF SKIN POCKET FOR PACEMAKER N/A 1/21/2019    Procedure: REVISION-POCKET-PACEMAKER;  Surgeon: Asher Watts MD;  Location: [facility] EP LAB;   Service: Cardiology;  Laterality: N/A;  MODERATE SEDATION, sedation issues in the past    THYROIDECTOMY      TREATMENT OF CARDIAC ARRHYTHMIA N/A 3/18/2019    Procedure: CARDIOVERSION OR DEFIBRILLATION;  Surgeon: Asher Watts MD;  Location: [facility] EP LAB;  Service: Cardiology;  Laterality: N/A;  AF, JILL-cx if SR, DCCV, MAC, FAS, 3PREP    TREATMENT OF CARDIAC ARRHYTHMIA N/A 5/14/2019    Procedure: Cardioversion or Defibrillation;  Surgeon: Derick Vizcarra MD;  Location: [facility] EP [facility];  Service: Cardiology;  Laterality: N/A;  AF, JILL, DCCV, MAC, DM, 3PREP     Family History   Problem Relation Age of Onset    Heart attack Mother     Heart disease Mother     Heart failure Mother     Hypertension Mother     Stroke Maternal Grandmother     Hypertension Maternal Grandmother     Heart disease Maternal Grandmother     Heart attack Maternal Grandmother     Heart failure Maternal Grandmother      Social History     Tobacco Use    Smoking status: Former Smoker     Start date: 5/19/1964    Smokeless tobacco: Never Used   Substance Use Topics    Alcohol use: No    Drug use: No     Review of Systems   Constitutional: Positive for activity change, appetite change and fatigue. Negative for diaphoresis and fever.   HENT: Negative for sore throat.    Respiratory: Negative for shortness of breath.    Cardiovascular: Negative for chest pain.   Gastrointestinal: Positive for nausea and vomiting. Negative for abdominal distention and abdominal pain.   Genitourinary: Negative for dysuria.   Musculoskeletal: Negative for back pain.   Skin: Negative for rash.   Neurological: Negative for weakness.   Hematological: Does not bruise/bleed easily.       Physical Exam     Initial Vitals [09/24/20 1627]   BP Pulse Resp Temp SpO2   (!) 112/51 62 18 98 °F (36.7 °C) 97 %      MAP       --         Physical Exam    Nursing note and vitals reviewed.  Constitutional: She appears well-developed and well-nourished. She is not  diaphoretic. No distress.   HENT:   Head: Normocephalic and atraumatic.   Right Ear: External ear normal.   Left Ear: External ear normal.   Eyes: No scleral icterus.   Neck: Neck supple.   Cardiovascular: Normal rate, regular rhythm, normal heart sounds and intact distal pulses.   Pulmonary/Chest: Breath sounds normal. No respiratory distress. She has no wheezes. She has no rhonchi. She has no rales.   Abdominal: Soft. She exhibits no distension. There is no abdominal tenderness. There is no rebound and no guarding.   Neurological: She is alert and oriented to person, place, and time. GCS score is 15. GCS eye subscore is 4. GCS verbal subscore is 5. GCS motor subscore is 6.   Skin: Skin is warm. Capillary refill takes less than 2 seconds. No rash noted.   Psychiatric: She has a normal mood and affect.         ED Course   Procedures  Labs Reviewed   CBC W/ AUTO DIFFERENTIAL - Abnormal; Notable for the following components:       Result Value    WBC 20.38 (*)     RBC 3.38 (*)     Hemoglobin 10.2 (*)     Hematocrit 31.7 (*)     RDW 14.8 (*)     Platelets 479 (*)     Immature Granulocytes 4.7 (*)     Gran # (ANC) 16.1 (*)     Immature Grans (Abs) 0.96 (*)     Mono # 2.2 (*)     Gran% 79.1 (*)     Lymph% 4.9 (*)     All other components within normal limits   COMPREHENSIVE METABOLIC PANEL - Abnormal; Notable for the following components:    Sodium 134 (*)     Glucose 120 (*)     BUN, Bld 30 (*)     Calcium 8.0 (*)     Albumin 2.3 (*)     Total Bilirubin 3.3 (*)      (*)      (*)     eGFR if  47.0 (*)     eGFR if non  40.7 (*)     All other components within normal limits   LIPASE - Abnormal; Notable for the following components:    Lipase 647 (*)     All other components within normal limits   CULTURE, BLOOD   CULTURE, BLOOD   PROTIME-INR   TSH   URINALYSIS, REFLEX TO URINE CULTURE   SARS-COV-2 RDRP GENE          Imaging Results    None          Medical Decision Making:    History:   Old Medical Records: I decided to obtain old medical records.  Old Records Summarized: other records.       <> Summary of Records: Sent from GI clinic today for further evaluation of fatigue and weakness  Independently Interpreted Test(s):   I have ordered and independently interpreted EKG Reading(s) - see summary below       <> Summary of EKG Reading(s): Bradycardic rhythm.  Narrow complex.  No STEMI.  RBBB present.  Clinical Tests:   Lab Tests: Ordered and Reviewed  Medical Tests: Ordered and Reviewed  ED Management:  Vitals normal.  Afebrile.  Here with worsening fatigue and weakness.  Clinically without focal or obvious reason for symptoms.  Will obtain blood cultures rule out sepsis.  Will obtain UA to rule out UTI.  COVID-19 swab obtained. Will check electrolytes and blood counts as well.  EKG obtained to rule out arrhythmia.  1 L normal saline fluid bolus given.  Blood cultures obtained per GI clinics recommendations.    Labs significant for T bili 3.3, , .  WBC 20 K.  UA pending.    Right upper quadrant ultrasound obtained.    Discussed with hospital medicine who admitted patient.  UA and ultrasound of right upper quadrant pending on admission.  Other:   I have discussed this case with another health care provider.       <> Summary of the Discussion: Hospital medicine                             Clinical Impression:     ICD-10-CM ICD-9-CM   1. Hyperbilirubinemia  E80.6 782.4   2. Fatigue  R53.83 780.79   3. Elevated LFTs  R94.5 790.6   4. Leukocytosis, unspecified type  D72.829 288.60                          ED Disposition Condition    Admit                             Travis Parker MD  09/24/20 2026

## 2020-09-24 NOTE — ED NOTES
Pt identifiers Gisselle Ortiz were checked and are correct  LOC: The patient is awake, alert, aware of environment with an appropriate affect. Oriented x3, speaking appropriately  APPEARANCE: Pt resting comfortably, in no acute distress, pt is clean and well groomed, clothing properly fastened  SKIN: Skin warm, dry and intact, normal skin turgor, moist mucus membranes  RESPIRATORY: Airway is open and patent, respirations are spontaneous, even and unlabored, normal effort and rate Breath sounds clear valente to all lung fields on auscultation  CARDIAC: Normal rate and rhythm, no peripheral edema noted, capillary refill < 3 seconds, bilateral radial pulses 2+  ABDOMEN: Soft, nontender, nondistended. Bowel sounds present to all fouir quad of abd on auscultation  NEUROLOGIC:  facial expression is symmetrical, patient moving all extremities spontaneously, normal sensation in all extremities when touched with a finger.  Follows all commands appropriately  MUSCULOSKELETAL: No obvious deformities.

## 2020-09-25 ENCOUNTER — ANESTHESIA EVENT (OUTPATIENT)
Dept: ENDOSCOPY | Facility: HOSPITAL | Age: 85
DRG: 862 | End: 2020-09-25
Payer: MEDICARE

## 2020-09-25 ENCOUNTER — ANESTHESIA (OUTPATIENT)
Dept: ENDOSCOPY | Facility: HOSPITAL | Age: 85
DRG: 862 | End: 2020-09-25
Payer: MEDICARE

## 2020-09-25 DIAGNOSIS — K85.10 GALLSTONE PANCREATITIS: Primary | ICD-10-CM

## 2020-09-25 PROBLEM — K83.8 DILATED BILE DUCT: Status: ACTIVE | Noted: 2020-09-25

## 2020-09-25 LAB
ALBUMIN SERPL BCP-MCNC: 2 G/DL (ref 3.5–5.2)
ALP SERPL-CCNC: 103 U/L (ref 55–135)
ALT SERPL W/O P-5'-P-CCNC: 108 U/L (ref 10–44)
ANION GAP SERPL CALC-SCNC: 7 MMOL/L (ref 8–16)
ANISOCYTOSIS BLD QL SMEAR: SLIGHT
AST SERPL-CCNC: 70 U/L (ref 10–40)
BASO STIPL BLD QL SMEAR: ABNORMAL
BASOPHILS NFR BLD: 0 % (ref 0–1.9)
BILIRUB SERPL-MCNC: 2.5 MG/DL (ref 0.1–1)
BUN SERPL-MCNC: 30 MG/DL (ref 8–23)
CALCIUM SERPL-MCNC: 7.5 MG/DL (ref 8.7–10.5)
CHLORIDE SERPL-SCNC: 104 MMOL/L (ref 95–110)
CO2 SERPL-SCNC: 22 MMOL/L (ref 23–29)
CREAT SERPL-MCNC: 1 MG/DL (ref 0.5–1.4)
DIFFERENTIAL METHOD: ABNORMAL
DOHLE BOD BLD QL SMEAR: PRESENT
EOSINOPHIL NFR BLD: 2 % (ref 0–8)
ERYTHROCYTE [DISTWIDTH] IN BLOOD BY AUTOMATED COUNT: 15.1 % (ref 11.5–14.5)
EST. GFR  (AFRICAN AMERICAN): 58.5 ML/MIN/1.73 M^2
EST. GFR  (NON AFRICAN AMERICAN): 50.8 ML/MIN/1.73 M^2
GLUCOSE SERPL-MCNC: 88 MG/DL (ref 70–110)
HCT VFR BLD AUTO: 26.1 % (ref 37–48.5)
HGB BLD-MCNC: 8.1 G/DL (ref 12–16)
HYPOCHROMIA BLD QL SMEAR: ABNORMAL
IMM GRANULOCYTES # BLD AUTO: ABNORMAL K/UL (ref 0–0.04)
IMM GRANULOCYTES NFR BLD AUTO: ABNORMAL % (ref 0–0.5)
LYMPHOCYTES NFR BLD: 6 % (ref 18–48)
MAGNESIUM SERPL-MCNC: 1.9 MG/DL (ref 1.6–2.6)
MCH RBC QN AUTO: 29.1 PG (ref 27–31)
MCHC RBC AUTO-ENTMCNC: 31 G/DL (ref 32–36)
MCV RBC AUTO: 94 FL (ref 82–98)
METAMYELOCYTES NFR BLD MANUAL: 1 %
MONOCYTES NFR BLD: 11 % (ref 4–15)
MYELOCYTES NFR BLD MANUAL: 1 %
NEUTROPHILS NFR BLD: 77 % (ref 38–73)
NEUTS BAND NFR BLD MANUAL: 2 %
NRBC BLD-RTO: 0 /100 WBC
OVALOCYTES BLD QL SMEAR: ABNORMAL
PHOSPHATE SERPL-MCNC: 3.4 MG/DL (ref 2.7–4.5)
PLATELET # BLD AUTO: 417 K/UL (ref 150–350)
PLATELET BLD QL SMEAR: ABNORMAL
PMV BLD AUTO: 10.6 FL (ref 9.2–12.9)
POCT GLUCOSE: 90 MG/DL (ref 70–110)
POIKILOCYTOSIS BLD QL SMEAR: SLIGHT
POLYCHROMASIA BLD QL SMEAR: ABNORMAL
POTASSIUM SERPL-SCNC: 4.2 MMOL/L (ref 3.5–5.1)
PROT SERPL-MCNC: 5 G/DL (ref 6–8.4)
RBC # BLD AUTO: 2.78 M/UL (ref 4–5.4)
SODIUM SERPL-SCNC: 133 MMOL/L (ref 136–145)
TARGETS BLD QL SMEAR: ABNORMAL
TOXIC GRANULES BLD QL SMEAR: PRESENT
WBC # BLD AUTO: 17.8 K/UL (ref 3.9–12.7)

## 2020-09-25 PROCEDURE — 99223 PR INITIAL HOSPITAL CARE,LEVL III: ICD-10-PCS | Mod: AI,GC,, | Performed by: INTERNAL MEDICINE

## 2020-09-25 PROCEDURE — 25000003 PHARM REV CODE 250: Performed by: NURSE ANESTHETIST, CERTIFIED REGISTERED

## 2020-09-25 PROCEDURE — 43274 PR ERCP W/STENT PLCMNT BILIARY/PANCREATIC DUCT: ICD-10-PCS | Mod: ,,, | Performed by: INTERNAL MEDICINE

## 2020-09-25 PROCEDURE — 11000001 HC ACUTE MED/SURG PRIVATE ROOM

## 2020-09-25 PROCEDURE — 27202125 HC BALLOON, EXTRACTION (ANY): Performed by: INTERNAL MEDICINE

## 2020-09-25 PROCEDURE — 93005 ELECTROCARDIOGRAM TRACING: CPT

## 2020-09-25 PROCEDURE — 63600175 PHARM REV CODE 636 W HCPCS: Performed by: STUDENT IN AN ORGANIZED HEALTH CARE EDUCATION/TRAINING PROGRAM

## 2020-09-25 PROCEDURE — 99223 1ST HOSP IP/OBS HIGH 75: CPT | Mod: AI,GC,, | Performed by: INTERNAL MEDICINE

## 2020-09-25 PROCEDURE — 74328 PR  X-RAY FOR BILE DUCT ENDOSCOPY: ICD-10-PCS | Mod: 26,,, | Performed by: INTERNAL MEDICINE

## 2020-09-25 PROCEDURE — 25500020 PHARM REV CODE 255: Performed by: INTERNAL MEDICINE

## 2020-09-25 PROCEDURE — 43274 ERCP DUCT STENT PLACEMENT: CPT | Mod: ,,, | Performed by: INTERNAL MEDICINE

## 2020-09-25 PROCEDURE — 83735 ASSAY OF MAGNESIUM: CPT

## 2020-09-25 PROCEDURE — 99223 PR INITIAL HOSPITAL CARE,LEVL III: ICD-10-PCS | Mod: ,,, | Performed by: INTERNAL MEDICINE

## 2020-09-25 PROCEDURE — 37000009 HC ANESTHESIA EA ADD 15 MINS: Performed by: INTERNAL MEDICINE

## 2020-09-25 PROCEDURE — 63600175 PHARM REV CODE 636 W HCPCS: Performed by: NURSE ANESTHETIST, CERTIFIED REGISTERED

## 2020-09-25 PROCEDURE — D9220A PRA ANESTHESIA: ICD-10-PCS | Mod: CRNA,,, | Performed by: NURSE ANESTHETIST, CERTIFIED REGISTERED

## 2020-09-25 PROCEDURE — C2617 STENT, NON-COR, TEM W/O DEL: HCPCS | Performed by: INTERNAL MEDICINE

## 2020-09-25 PROCEDURE — 80053 COMPREHEN METABOLIC PANEL: CPT

## 2020-09-25 PROCEDURE — 84100 ASSAY OF PHOSPHORUS: CPT

## 2020-09-25 PROCEDURE — 36415 COLL VENOUS BLD VENIPUNCTURE: CPT

## 2020-09-25 PROCEDURE — 97165 OT EVAL LOW COMPLEX 30 MIN: CPT

## 2020-09-25 PROCEDURE — 93010 EKG 12-LEAD: ICD-10-PCS | Mod: ,,, | Performed by: INTERNAL MEDICINE

## 2020-09-25 PROCEDURE — D9220A PRA ANESTHESIA: Mod: CRNA,,, | Performed by: NURSE ANESTHETIST, CERTIFIED REGISTERED

## 2020-09-25 PROCEDURE — C1769 GUIDE WIRE: HCPCS | Performed by: INTERNAL MEDICINE

## 2020-09-25 PROCEDURE — 25000003 PHARM REV CODE 250: Performed by: STUDENT IN AN ORGANIZED HEALTH CARE EDUCATION/TRAINING PROGRAM

## 2020-09-25 PROCEDURE — 97162 PT EVAL MOD COMPLEX 30 MIN: CPT

## 2020-09-25 PROCEDURE — 74328 X-RAY BILE DUCT ENDOSCOPY: CPT | Mod: 26,,, | Performed by: INTERNAL MEDICINE

## 2020-09-25 PROCEDURE — 85027 COMPLETE CBC AUTOMATED: CPT

## 2020-09-25 PROCEDURE — D9220A PRA ANESTHESIA: Mod: ANES,,, | Performed by: ANESTHESIOLOGY

## 2020-09-25 PROCEDURE — 97535 SELF CARE MNGMENT TRAINING: CPT

## 2020-09-25 PROCEDURE — D9220A PRA ANESTHESIA: ICD-10-PCS | Mod: ANES,,, | Performed by: ANESTHESIOLOGY

## 2020-09-25 PROCEDURE — 27201674 HC SPHINCTERTOME: Performed by: INTERNAL MEDICINE

## 2020-09-25 PROCEDURE — 43274 ERCP DUCT STENT PLACEMENT: CPT | Performed by: INTERNAL MEDICINE

## 2020-09-25 PROCEDURE — 99223 1ST HOSP IP/OBS HIGH 75: CPT | Mod: ,,, | Performed by: INTERNAL MEDICINE

## 2020-09-25 PROCEDURE — 37000008 HC ANESTHESIA 1ST 15 MINUTES: Performed by: INTERNAL MEDICINE

## 2020-09-25 PROCEDURE — 85007 BL SMEAR W/DIFF WBC COUNT: CPT

## 2020-09-25 PROCEDURE — 93010 ELECTROCARDIOGRAM REPORT: CPT | Mod: ,,, | Performed by: INTERNAL MEDICINE

## 2020-09-25 PROCEDURE — 27202127 HC STENT INTRODUCER: Performed by: INTERNAL MEDICINE

## 2020-09-25 PROCEDURE — 74328 X-RAY BILE DUCT ENDOSCOPY: CPT | Performed by: INTERNAL MEDICINE

## 2020-09-25 PROCEDURE — 27202304 HC CANNULA, ERCP: Performed by: INTERNAL MEDICINE

## 2020-09-25 DEVICE — GEENEN PANCREATIC STENT
Type: IMPLANTABLE DEVICE | Site: BILE DUCT | Status: NON-FUNCTIONAL
Brand: GEENEN
Removed: 2020-11-25

## 2020-09-25 RX ORDER — LIDOCAINE HYDROCHLORIDE 20 MG/ML
INJECTION INTRAVENOUS
Status: DISCONTINUED | OUTPATIENT
Start: 2020-09-25 | End: 2020-09-25

## 2020-09-25 RX ORDER — ATENOLOL 25 MG/1
25 TABLET ORAL 2 TIMES DAILY
Status: DISCONTINUED | OUTPATIENT
Start: 2020-09-25 | End: 2020-09-25

## 2020-09-25 RX ORDER — SODIUM CHLORIDE 0.9 % (FLUSH) 0.9 %
3 SYRINGE (ML) INJECTION
Status: DISCONTINUED | OUTPATIENT
Start: 2020-09-25 | End: 2020-09-25 | Stop reason: HOSPADM

## 2020-09-25 RX ORDER — ATENOLOL 25 MG/1
25 TABLET ORAL NIGHTLY
Status: DISCONTINUED | OUTPATIENT
Start: 2020-09-25 | End: 2020-09-28

## 2020-09-25 RX ORDER — ATENOLOL 50 MG/1
50 TABLET ORAL EVERY MORNING
Status: DISCONTINUED | OUTPATIENT
Start: 2020-09-25 | End: 2020-10-02 | Stop reason: HOSPADM

## 2020-09-25 RX ORDER — PROPOFOL 10 MG/ML
VIAL (ML) INTRAVENOUS
Status: DISCONTINUED | OUTPATIENT
Start: 2020-09-25 | End: 2020-09-25

## 2020-09-25 RX ORDER — PROPOFOL 10 MG/ML
VIAL (ML) INTRAVENOUS CONTINUOUS PRN
Status: DISCONTINUED | OUTPATIENT
Start: 2020-09-25 | End: 2020-09-25

## 2020-09-25 RX ORDER — SODIUM CHLORIDE 9 MG/ML
INJECTION, SOLUTION INTRAVENOUS CONTINUOUS
Status: ACTIVE | OUTPATIENT
Start: 2020-09-25 | End: 2020-09-26

## 2020-09-25 RX ORDER — SODIUM CHLORIDE 9 MG/ML
INJECTION, SOLUTION INTRAVENOUS CONTINUOUS PRN
Status: DISCONTINUED | OUTPATIENT
Start: 2020-09-25 | End: 2020-09-25

## 2020-09-25 RX ORDER — FENTANYL CITRATE 50 UG/ML
INJECTION, SOLUTION INTRAMUSCULAR; INTRAVENOUS
Status: DISCONTINUED | OUTPATIENT
Start: 2020-09-25 | End: 2020-09-25

## 2020-09-25 RX ADMIN — ATORVASTATIN CALCIUM 10 MG: 10 TABLET, FILM COATED ORAL at 09:09

## 2020-09-25 RX ADMIN — PIPERACILLIN SODIUM,TAZOBACTAM SODIUM 4.5 G: 4; .5 INJECTION, POWDER, FOR SOLUTION INTRAVENOUS at 11:09

## 2020-09-25 RX ADMIN — FENTANYL CITRATE 50 MCG: 50 INJECTION, SOLUTION INTRAMUSCULAR; INTRAVENOUS at 11:09

## 2020-09-25 RX ADMIN — ATENOLOL 50 MG: 50 TABLET ORAL at 12:09

## 2020-09-25 RX ADMIN — PROPOFOL 125 MCG/KG/MIN: 10 INJECTION, EMULSION INTRAVENOUS at 11:09

## 2020-09-25 RX ADMIN — IOHEXOL 10 ML: 300 INJECTION, SOLUTION INTRAVENOUS at 11:09

## 2020-09-25 RX ADMIN — IOHEXOL 75 ML: 350 INJECTION, SOLUTION INTRAVENOUS at 03:09

## 2020-09-25 RX ADMIN — ATENOLOL 25 MG: 25 TABLET ORAL at 09:09

## 2020-09-25 RX ADMIN — PROPOFOL 40 MG: 10 INJECTION, EMULSION INTRAVENOUS at 11:09

## 2020-09-25 RX ADMIN — SODIUM CHLORIDE: 0.9 INJECTION, SOLUTION INTRAVENOUS at 11:09

## 2020-09-25 RX ADMIN — SODIUM CHLORIDE: 0.9 INJECTION, SOLUTION INTRAVENOUS at 06:09

## 2020-09-25 RX ADMIN — LIDOCAINE HYDROCHLORIDE 100 MG: 20 INJECTION, SOLUTION INTRAVENOUS at 11:09

## 2020-09-25 RX ADMIN — AMIODARONE HYDROCHLORIDE 200 MG: 200 TABLET ORAL at 09:09

## 2020-09-25 RX ADMIN — LEVOTHYROXINE SODIUM 137 MCG: 25 TABLET ORAL at 06:09

## 2020-09-25 RX ADMIN — PIPERACILLIN SODIUM,TAZOBACTAM SODIUM 4.5 G: 4; .5 INJECTION, POWDER, FOR SOLUTION INTRAVENOUS at 06:09

## 2020-09-25 NOTE — PROGRESS NOTES
Report jacobo to Wayne RN,pt made ready fro transport to 621 with tele via stretcher per transport,VSS,resp unlabored,denies c/o pain, upper denture labeled in cup sent with pt, pt did not want it placed in mouth.

## 2020-09-25 NOTE — PLAN OF CARE
Oriented to care settings, VSS, NPO at midnight. Safety measures initiated. Will continue to monitor.  Problem: Fall Injury Risk  Goal: Absence of Fall and Fall-Related Injury  Outcome: Ongoing, Progressing     Problem: Adult Inpatient Plan of Care  Goal: Plan of Care Review  Outcome: Ongoing, Progressing  Goal: Patient-Specific Goal (Individualization)  Outcome: Ongoing, Progressing  Goal: Optimal Comfort and Wellbeing  Outcome: Ongoing, Progressing  Goal: Readiness for Transition of Care  Outcome: Ongoing, Progressing

## 2020-09-25 NOTE — PT/OT/SLP EVAL
Occupational Therapy   Evaluation    Name: Gisselle Ortiz  MRN: [id number]  Admitting Diagnosis:  Hyperbilirubinemia Day of Surgery    Recommendations:     Discharge Recommendations: home health OT  Discharge Equipment Recommendations:  none  Barriers to discharge:  None    Assessment:     Gisselle Ortiz is a 87 y.o. female with a medical diagnosis of Hyperbilirubinemia. Performance deficits affecting function: weakness, impaired endurance, impaired self care skills, impaired functional mobilty, gait instability, impaired balance, decreased lower extremity function. Patient would benefit from continued skilled acute OT 2x/wk to improve functional mobility, increase independence with ADLs, and address established goals. Recommending HHOT once medically appropriate for discharge to increase maximal independence, reduce burden of care, and ensure safety.     Rehab Prognosis: Good; patient would benefit from acute skilled OT services to address these deficits and reach maximum level of function.       Plan:     Patient to be seen 2 x/week to address the above listed problems via self-care/home management, therapeutic activities, therapeutic groups  · Plan of Care Expires: 10/25/20  · Plan of Care Reviewed with: patient    Subjective     Chief Complaint: none  Patient/Family Comments/goals: to go home    Occupational Profile:  Living Environment: Patient lives alone in a [facility] with 0 MANUEL with daughter living 2 houses down. Patient has a walk in shower with shower chair.   Prior to admission, patients level of function was Mod I ambulating with Rollator and not driving.  Equipment used at home: rollator, bedside commode, shower chair, wheelchair.  DME owned (not currently used): none.  Upon discharge, patient will have assistance from family.      Pain/Comfort:  · Pain Rating 1: 0/10  · Pain Rating Post-Intervention 1: 0/10    Patients cultural, spiritual, Bahai conflicts given the current situation:  no    Objective:     Communicated with: NS prior to session.  Patient found HOB elevated upon OT entry to room.    General Precautions: Standard, fall   Orthopedic Precautions:N/A   Braces: N/A     Occupational Performance:    Bed Mobility:    · Patient completed Scooting/Bridging with stand by assistance to EOB sitting EOB  · Patient completed Supine to Sit with stand by assistance  · Patient completed Sit to Supine with stand by assistance    Functional Mobility/Transfers:  · Patient completed Sit <> Stand Transfer with contact guard assistance  with  hand-held assist   · Patient completed Toilet Transfer bed<>toilet with functional ambulation technique with contact guard assistance with  hand-held assist    Activities of Daily Living:  · Grooming: stand by assistance standing at sink for oral care and washing face, but patient became fatigued and needed a seated rest break on toilet. Patient combed hair while seated on toilet.     Cognitive/Visual Perceptual:  Cognitive/Psychosocial Skills:     -       Oriented to: Person, Place, Time and Situation   -       Follows Commands/attention:Follows multistep  commands  -       Communication: clear/fluent  -       Memory: No Deficits noted  -       Safety awareness/insight to disability: intact   -       Mood/Affect/Coping skills/emotional control: Appropriate to situation  Visual/Perceptual:      -Intact      Physical Exam:  Upper Extremity Range of Motion:     -       Right Upper Extremity: limited shoulder flexion  -       Left Upper Extremity: limited shoulder flexion  Upper Extremity Strength:    -       Right Upper Extremity: WFL  -       Left Upper Extremity: WFL   Strength:    -       Right Upper Extremity: WFL  -       Left Upper Extremity: WFL  Fine Motor Coordination:    -       Intact  Gross motor coordination:   WFL    AMPAC 6 Click ADL:  AMPAC Total Score: 19    Treatment & Education:  Role of OT and POC  Safety  ADL retraining  Functional mobility  training  Discharge planning  Education:    Patient left HOB elevated with call button in reach and all needs met.     GOALS:   Multidisciplinary Problems     Occupational Therapy Goals        Problem: Occupational Therapy Goal    Goal Priority Disciplines Outcome Interventions   Occupational Therapy Goal     OT, PT/OT Ongoing, Progressing    Description: Goals to be met by: 10/9/2020     Patient will increase functional independence with ADLs by performing:    UE Dressing with Set-up Assistance.  LE Dressing with Set-up Assistance.  Grooming while standing at sink with Modified [place].  Toileting from toilet with Modified [place] for hygiene and clothing management.   Supine to sit with Modified [place].  Stand pivot transfers with Supervision.  Toilet transfer to toilet with Supervision.                     History:     Past Medical History:   Diagnosis Date    A-fib     Arthritis     Hypertension     Thyroid disease        Past Surgical History:   Procedure Laterality Date    CATARACT EXTRACTION      CATARACT EXTRACTION W/  INTRAOCULAR LENS IMPLANT Bilateral 2004    DR IN [place]    ENDOSCOPIC ULTRASOUND OF UPPER GASTROINTESTINAL TRACT N/A 9/14/2020    Procedure: ULTRASOUND, UPPER GI TRACT, ENDOSCOPIC;  Surgeon: Esha Howard MD;  Location: [address]);  Service: Endoscopy;  Laterality: N/A;    ERCP N/A 9/14/2020    Procedure: ERCP (ENDOSCOPIC RETROGRADE CHOLANGIOPANCREATOGRAPHY);  Surgeon: Esha Howard MD;  Location: [address]);  Service: Endoscopy;  Laterality: N/A;    EYE SURGERY      HIP REPLACEMENT ARTHROPLASTY      HYSTERECTOMY      JOINT REPLACEMENT      REVISION OF SKIN POCKET FOR PACEMAKER N/A 1/21/2019    Procedure: REVISION-POCKET-PACEMAKER;  Surgeon: Asher Watts MD;  Location: [facility] EP LAB;  Service: Cardiology;  Laterality: N/A;  MODERATE SEDATION, sedation issues in the past    THYROIDECTOMY      TREATMENT OF CARDIAC ARRHYTHMIA  N/A 3/18/2019    Procedure: CARDIOVERSION OR DEFIBRILLATION;  Surgeon: Asher Watts MD;  Location: [facility] EP LAB;  Service: Cardiology;  Laterality: N/A;  AF, JILL-cx if SR, DCCV, MAC, FAS, 3PREP    TREATMENT OF CARDIAC ARRHYTHMIA N/A 5/14/2019    Procedure: Cardioversion or Defibrillation;  Surgeon: Derick Vizcarra MD;  Location: [facility] EP LAB;  Service: Cardiology;  Laterality: N/A;  AF, JILL, DCCV, MAC, DM, 3PREP       Time Tracking:     OT Date of Treatment: 09/25/20  OT Start Time: 0916  OT Stop Time: 0925  OT Total Time (min): 9 min    Billable Minutes:Evaluation 9    SHAHIDA Taylor  9/25/2020

## 2020-09-25 NOTE — PT/OT/SLP EVAL
Physical Therapy Evaluation    Patient Name:  Gisselle Ortiz   MRN:  [id number]    Recommendations:     Discharge Recommendations:  home health PT   Discharge Equipment Recommendations: none   Barriers to discharge: None    Assessment:     Gisselle Ortiz is a 87 y.o. female admitted with a medical diagnosis of Hyperbilirubinemia.  She presents with the following impairments/functional limitations:  weakness, impaired endurance, impaired self care skills, impaired functional mobilty, decreased lower extremity function, gait instability, impaired balance. Pt evaluated on this date presenting near functional baseline. Pt reports  recently passed and now lives alone but DTR lives 2 houses down. At baseline pt has recently began amb with Rollator and not driving. Pt performed all mobility on this date SBA/CGA and is safe to mobilize with nursing utilizing RW ordered to room. Pt would benefit from continued skilled acute PT 2x/wk to improve functional mobility.  Recommending pt receive PT services in HHPT setting following discharge from hospital once medically cleared.     Rehab Prognosis: Good; patient would benefit from acute skilled PT services to address these deficits and reach maximum level of function.    Recent Surgery: Procedure(s) (LRB):  ERCP (ENDOSCOPIC RETROGRADE CHOLANGIOPANCREATOGRAPHY) (N/A) Day of Surgery    Plan:     During this hospitalization, patient to be seen 2 x/week to address the identified rehab impairments via gait training, therapeutic activities, therapeutic exercises, neuromuscular re-education and progress toward the following goals:    · Plan of Care Expires:  10/24/20    Subjective     Chief Complaint: none noted   Patient/Family Comments/goals: Pt pleasant and willing to participate with therapy on this date.    Pain/Comfort:  ·      Patients cultural, spiritual, Voodoo conflicts given the current situation: no    Living Environment:  Pt lives alone in a [facility] with 0  MANUEL with DTR living 2 houses down.  Prior to admission, patients level of function was Mod I amb with Rollator and not driving.  Equipment used at home: rollator, bedside commode, shower chair, wheelchair.  DME owned (not currently used): none.  Upon discharge, patient will have assistance from family.    Objective:     Communicated with NSG prior to session.  Patient found HOB elevated with    upon PT entry to room.    General Precautions: Standard,     Orthopedic Precautions:    Braces: N/A     Exams:  · Cognitive Exam:  Patient is oriented to Person, Place, Time and Situation  · Gross Motor Coordination:  WFL  · RLE ROM: WFL  · RLE Strength: WFL  · LLE ROM: WFL  · LLE Strength: WFL    Functional Mobility:  · Bed Mobility:     · Supine to Sit: stand by assistance  · Sit to Supine: stand by assistance  · Transfers:     · Sit to Stand:  contact guard assistance with hand-held assist  · Toilet Transfer: contact guard assistance with  hand-held assist  using  Step Transfer  · Gait: 15ft x2 CGA with HHA demonstrating flexed trunk and decreased tae but no LOB  · Balance: Sitting: SBA      Standing: CGA    Therapeutic Activities and Exercises:  - Static Standinmins SBA performing ADL tasks at the sink    - Pt educated on:   -PT roles, expectations, and POC    -Safety with mobility   -Benefits of OOB activities to increase strength and functional mobility    -Performing ther ex for increasing LE ROM and strength   -Discharge recommendations     AM-PAC 6 CLICK MOBILITY  Total Score:18     Patient left HOB elevated with call button in reach.    GOALS:   Multidisciplinary Problems     Physical Therapy Goals        Problem: Physical Therapy Goal    Goal Priority Disciplines Outcome Goal Variances Interventions   Physical Therapy Goal     PT, PT/OT Ongoing, Progressing     Description: Goals to be met by: 10/24/2020    Patient will increase functional independence with mobility by performin. Sit to stand  transfer with Stand-by Assistance  2. Gait  x 30 feet with Stand-by Assistance using LRAD  3. Lower extremity exercise program x15 reps, with independence                      History:     Past Medical History:   Diagnosis Date    A-fib     Arthritis     Hypertension     Thyroid disease        Past Surgical History:   Procedure Laterality Date    CATARACT EXTRACTION      CATARACT EXTRACTION W/  INTRAOCULAR LENS IMPLANT Bilateral 2004    DR IN [place]    ENDOSCOPIC ULTRASOUND OF UPPER GASTROINTESTINAL TRACT N/A 9/14/2020    Procedure: ULTRASOUND, UPPER GI TRACT, ENDOSCOPIC;  Surgeon: Esha Howard MD;  Location: [facility] ([address] [place] [address]);  Service: Endoscopy;  Laterality: N/A;    ERCP N/A 9/14/2020    Procedure: ERCP (ENDOSCOPIC RETROGRADE CHOLANGIOPANCREATOGRAPHY);  Surgeon: Esha Howard MD;  Location: [facility] ([address] [place] [address]);  Service: Endoscopy;  Laterality: N/A;    EYE SURGERY      HIP REPLACEMENT ARTHROPLASTY      HYSTERECTOMY      JOINT REPLACEMENT      REVISION OF SKIN POCKET FOR PACEMAKER N/A 1/21/2019    Procedure: REVISION-POCKET-PACEMAKER;  Surgeon: Asher Watts MD;  Location: [facility] EP LAB;  Service: Cardiology;  Laterality: N/A;  MODERATE SEDATION, sedation issues in the past    THYROIDECTOMY      TREATMENT OF CARDIAC ARRHYTHMIA N/A 3/18/2019    Procedure: CARDIOVERSION OR DEFIBRILLATION;  Surgeon: Asher Watts MD;  Location: [facility] EP LAB;  Service: Cardiology;  Laterality: N/A;  AF, JILL-cx if SR, DCCV, MAC, FAS, 3PREP    TREATMENT OF CARDIAC ARRHYTHMIA N/A 5/14/2019    Procedure: Cardioversion or Defibrillation;  Surgeon: Derick Vizcarra MD;  Location: [facility] EP LAB;  Service: Cardiology;  Laterality: N/A;  AF, JILL, DCCV, MAC, DM, 3PREP       Time Tracking:     PT Received On: 09/25/20  PT Start Time: 0915     PT Stop Time: 0925  PT Total Time (min): 10 min     Billable Minutes: Evaluation 10      Tr Gardner, PT  09/25/2020

## 2020-09-25 NOTE — CONSULTS
"[facility]  Gastroenterology  Consult Note    Patient Name: Gisselle Ortiz  MRN: [id number]  Admission Date: 9/24/2020  Hospital Length of Stay: 1 days  Code Status: Full Code   Attending Provider: Kai Arellano MD   Consulting Provider: Sola Sahu MD  Primary Care Physician: Kai Montez MD  Principal Problem:Hyperbilirubinemia    Inpatient consult to [facility]  Consult performed by: Sola Sahu MD  Consult ordered by: Ta Mccoy MD        Subjective:     HPI:  Patient is an 88 y/o female with past medical history of HTN, HLD, Afib on Eliquis, who was recently admitted on 9/11 after presenting with abdominal pain, nausea and vomiting, and was found to have pancreatitis with CBD dilation suggestive of gallstone pancreatitis. She underwent an ERCP with removal of stones 2 days after (since she was on Eliquis). She did well after procedure. Surgery was consulted but thought patient is high risk for sarahi. She went home and did well for a few days, but then started having significant nausea, vomiting and fatigue. She denies associated abdominal pain. She denies fevers or chills. This persisted until she was seen in clinic yesterday. She was complaining of nausea and fatigue and she was hypotensive as well. She was seen in the ED. Found to have elevated WBC to 20, elevated TBili to 3.3 (from 1.3), lipase 600, she was hemodynamically stable otherwise. An U/S of the abdomen was obtained and showed: "Dilatation of the extrahepatic common bile duct, intrahepatic duct demonstrates normal caliber however has internal debris (possibly representing biliary sludge)." [facility] consulted for evaluation    Past Medical History:   Diagnosis Date    A-fib     Arthritis     Hypertension     Thyroid disease        Past Surgical History:   Procedure Laterality Date    CATARACT EXTRACTION      CATARACT EXTRACTION W/  INTRAOCULAR LENS IMPLANT Bilateral 2004    DR IN " [place]    ENDOSCOPIC ULTRASOUND OF UPPER GASTROINTESTINAL TRACT N/A 9/14/2020    Procedure: ULTRASOUND, UPPER GI TRACT, ENDOSCOPIC;  Surgeon: Esha Howard MD;  Location: [facility] ENDO (2ND FLR);  Service: Endoscopy;  Laterality: N/A;    ERCP N/A 9/14/2020    Procedure: ERCP (ENDOSCOPIC RETROGRADE CHOLANGIOPANCREATOGRAPHY);  Surgeon: Esha Howard MD;  Location: [facility] ENDO (2ND FLR);  Service: Endoscopy;  Laterality: N/A;    EYE SURGERY      HIP REPLACEMENT ARTHROPLASTY      HYSTERECTOMY      JOINT REPLACEMENT      REVISION OF SKIN POCKET FOR PACEMAKER N/A 1/21/2019    Procedure: REVISION-POCKET-PACEMAKER;  Surgeon: Asher Watts MD;  Location: [facility] EP LAB;  Service: Cardiology;  Laterality: N/A;  MODERATE SEDATION, sedation issues in the past    THYROIDECTOMY      TREATMENT OF CARDIAC ARRHYTHMIA N/A 3/18/2019    Procedure: CARDIOVERSION OR DEFIBRILLATION;  Surgeon: Asher Watts MD;  Location: [facility] EP LAB;  Service: Cardiology;  Laterality: N/A;  AF, JILL-cx if SR, DCCV, MAC, FAS, 3PREP    TREATMENT OF CARDIAC ARRHYTHMIA N/A 5/14/2019    Procedure: Cardioversion or Defibrillation;  Surgeon: Derick Vizcarra MD;  Location: [facility] EP LAB;  Service: Cardiology;  Laterality: N/A;  AF, JILL, DCCV, MAC, DM, 3PREP       Review of patient's allergies indicates:   Allergen Reactions    Ciprofloxacin Nausea And Vomiting     Family History     Problem Relation (Age of Onset)    Heart attack Mother, Maternal Grandmother    Heart disease Mother, Maternal Grandmother    Heart failure Mother, Maternal Grandmother    Hypertension Mother, Maternal Grandmother    Stroke Maternal Grandmother        Tobacco Use    Smoking status: Former Smoker     Start date: 5/19/1964    Smokeless tobacco: Never Used   Substance and Sexual Activity    Alcohol use: No    Drug use: No    Sexual activity: Not Currently     Partners: Male     Review of Systems   Constitutional: Positive for activity change and  fatigue. Negative for appetite change, chills and fever.   HENT: Negative for trouble swallowing.    Respiratory: Negative for cough, choking, chest tightness and shortness of breath.    Cardiovascular: Negative for chest pain and leg swelling.   Gastrointestinal: Positive for nausea and vomiting. Negative for abdominal distention, abdominal pain, anal bleeding, constipation and diarrhea.   Genitourinary: Negative for difficulty urinating and dysuria.   Musculoskeletal: Negative for arthralgias and back pain.   Skin: Negative for color change and pallor.   Neurological: Negative for dizziness and headaches.   Psychiatric/Behavioral: Negative for agitation and confusion.     Objective:     Vital Signs (Most Recent):  Temp: 98.3 °F (36.8 °C) (09/25/20 0852)  Pulse: 60 (09/25/20 0852)  Resp: 16 (09/25/20 0852)  BP: (!) 122/57 (09/25/20 0852)  SpO2: (!) 92 % (09/25/20 0852) Vital Signs (24h Range):  Temp:  [98 °F (36.7 °C)-98.7 °F (37.1 °C)] 98.3 °F (36.8 °C)  Pulse:  [60-63] 60  Resp:  [16-21] 16  SpO2:  [92 %-99 %] 92 %  BP: ()/(51-62) 122/57     Weight: 56.3 kg (124 lb 1.9 oz) (09/24/20 2200)  Body mass index is 21.99 kg/m².      Intake/Output Summary (Last 24 hours) at 9/25/2020 0944  Last data filed at 9/24/2020 2043  Gross per 24 hour   Intake 1000 ml   Output 200 ml   Net 800 ml       Lines/Drains/Airways     Peripheral Intravenous Line                 Peripheral IV - Single Lumen 09/24/20 1759 18 G;1 1/4 in Left Forearm less than 1 day         Peripheral IV - Single Lumen 09/24/20 1938 18 G;1 1/4 in Right Forearm less than 1 day                Physical Exam  Constitutional:       General: She is not in acute distress.     Appearance: She is well-developed.   HENT:      Head: Normocephalic.   Eyes:      General: No scleral icterus.     Conjunctiva/sclera: Conjunctivae normal.   Neck:      Musculoskeletal: Normal range of motion and neck supple.   Cardiovascular:      Rate and Rhythm: Normal rate and regular  rhythm.   Pulmonary:      Effort: Pulmonary effort is normal.      Breath sounds: Normal breath sounds.   Abdominal:      General: Bowel sounds are normal. There is no distension.      Palpations: Abdomen is soft. There is no mass.      Tenderness: There is no abdominal tenderness. There is no guarding or rebound.   Musculoskeletal: Normal range of motion.   Skin:     General: Skin is warm and dry.   Neurological:      Mental Status: She is alert and oriented to person, place, and time.         Significant Labs:  CBC:   Recent Labs   Lab 09/24/20  1736 09/25/20  0554   WBC 20.38* 17.80*   HGB 10.2* 8.1*   HCT 31.7* 26.1*   * 417*     BMP:   Recent Labs   Lab 09/25/20  0554   GLU 88   *   K 4.2      CO2 22*   BUN 30*   CREATININE 1.0   CALCIUM 7.5*   MG 1.9     CMP:   Recent Labs   Lab 09/25/20  0554   GLU 88   CALCIUM 7.5*   ALBUMIN 2.0*   PROT 5.0*   *   K 4.2   CO2 22*      BUN 30*   CREATININE 1.0   ALKPHOS 103   *   AST 70*   BILITOT 2.5*     Coagulation:   Recent Labs   Lab 09/24/20  2220   INR 1.1       Significant Imaging:  Imaging results within the past 24 hours have been reviewed.    Assessment/Plan:     Dilated bile duct  Patient is an 87-year-old female with history of AFib on Eliquis and recent episode of gallstone pancreatitis status post ERCP, who presented to the hospital after she was found hypotensive with persistent nausea and vomiting for the past few days.  LFTs worse compared to prior discharge, with leukocytosis and CBD dilation. Concern for cholangitis vs pancreatitis, favoring the former. We will proceed with ERCP today. If she does not have improvement afterwards, she will require contrasted CT imaging of the pancreas.    -Start abx, obtain cultures.  -Keep NPO and proceed with ERCP today  -Trend CBC,CMP        Thank you for your consult. I will follow-up with patient. Please contact us if you have any additional questions.    Jarad Sahu  MD  Gastroenterology  [facility]

## 2020-09-25 NOTE — ASSESSMENT & PLAN NOTE
Home meds include Amiodarone 200mg daily, Atenolol 25mg, Diltiazem 180mg daily, Apixaban 2.5mg BID (last dose 9/23 pm)  BP 92/51 in GI clinic, improved with 1L fluid in ED  Last Cards Note ([place] 7/17/20)  Gen change 1/2019. AF/AFL noted on f/u. Propafenone increased. Still had AF with RVR after that. Changed to flecainide, planned for DCCV; at some point changed to amio. Unclear timing of these changes.  TSH found to be abnormal, but T4 normal.  In past, following DCCV, she felt great! -- until AF recurred.  On amio now, with great effect.  PLAN  - Telemetry  - Continue Amiodarone  - Hold Atenolol and Dilt for now  - Hold Apixaban pending GI consult

## 2020-09-25 NOTE — [employer] PATIENT INSTRUCTIONS
Discharge Summary/Instructions after an Endoscopic Procedure  Patient Name: Gisselle Ortiz  Patient MRN: [id number]  Patient YOB: 1933 Friday, September 25, 2020  Esha Howard MD  RESTRICTIONS:  During your procedure today, you received medications for sedation.  These   medications may affect your judgment, balance and coordination.  Therefore,   for 24 hours, you have the following restrictions:   - DO NOT drive a car, operate machinery, make legal/financial decisions,   sign important papers or drink alcohol.    ACTIVITY:  Today: no heavy lifting, straining or running due to procedural   sedation/anesthesia.  The following day: return to full activity including work.  DIET:  Eat and drink normally unless instructed otherwise.     TREATMENT FOR COMMON SIDE EFFECTS:  - Mild abdominal pain, nausea, belching, bloating or excessive gas:  rest,   eat lightly and use a heating pad.  - Sore Throat: treat with throat lozenges and/or gargle with warm salt   water.  - Because air was used during the procedure, expelling large amounts of air   from your rectum or belching is normal.  - If a bowel prep was taken, you may not have a bowel movement for 1-3 days.    This is normal.  SYMPTOMS TO WATCH FOR AND REPORT TO YOUR PHYSICIAN:  1. Abdominal pain or bloating, other than gas cramps.  2. Chest pain.  3. Back pain.  4. Signs of infection such as: chills or fever occurring within 24 hours   after the procedure.  5. Rectal bleeding, which would show as bright red, maroon, or black stools.   (A tablespoon of blood from the rectum is not serious, especially if   hemorrhoids are present.)  6. Vomiting.  7. Weakness or dizziness.  GO DIRECTLY TO THE NEAREST EMERGENCY ROOM IF YOU HAVE ANY OF THE FOLLOWING:      Difficulty breathing              Chills and/or fever over 101 F   Persistent vomiting and/or vomiting blood   Severe abdominal pain   Severe chest pain   Black, tarry stools   Bleeding- more than  one tablespoon   Any other symptom or condition that you feel may need urgent attention  Your doctor recommends these additional instructions:  If any biopsies were taken, your doctors clinic will contact you in 1 to 2   weeks with any results.  - Return patient to [facility] for ongoing care.   - Resume previous diet.   - Continue present medications.   - Return to referring physician.   - Repeat ERCP in 2 months to remove stent.   - Her symptoms and lab work can not be explained with ERCP findings.   Recommend CT with IV contrast asap.   - Continue antibiotics.  For questions, problems or results please call your physician - sEha Howard MD at Work:  [phone].  [facility], EMERGENCY ROOM PHONE NUMBER: [phone]  IF A COMPLICATION OR EMERGENCY SITUATION ARISES AND YOU ARE UNABLE TO REACH   YOUR PHYSICIAN - GO DIRECTLY TO THE EMERGENCY ROOM.  Esha Howard MD  9/25/2020 11:51:28 AM  This report has been verified and signed electronically.  [employer]

## 2020-09-25 NOTE — HPI
Gisselle Ortiz is a 87 y.o. female with a medical history of AFib on Eliquis with previous cardioversion, SSS s/p pacemaker, arthritis, HTN, thyroid disease with diastolic dysfunction (EF 55%, 9/2019) who presented to the ED from GI clinic after she was found to have elevated liver enzymes and hypotensive to 92/51.    She presented to the hospital on 09/11 for abdominal pain and was found to have gallstone pancreatitis. She had CBD dilation and a gallbladder with sludge. She underwent EUS and ERCP with stone and sludge seen on the cholangiogram. Sphincterotomy was performed and stone/sludge removed with no stents placed. Per notes, ERCP was concerning for choledocholithiasis.  Surgery was consulted and deemed the patient to be high risk for cholecystectomy. She was seen today in GI clinic for follow-up. She initially recovered well from the pancreatitis and ERCP and was eating prior to discharge. A few days after discharge she started having severe fatigue that progressively worsened to the point where the patient could not walk. She complains of decreased appetite and nausea but only 1 episode of vomiting. She denies any abdominal pain, fevers, diarrhea. She reports that her urine is orange/brown and has a bad odor but denies any dysuria. In the office her, BP was found to be 92/51. She was sent to the ED with BP rechecked at 112/51. She was given one liter NS and admitted for further evaluations.     In the ED, she was found to be afebrile and her BP improved to 130/61 with IVF. HR 63 with NSR and existing RBBB. Qtc 512. She was breathing 20bpm with no O2 requirement. She c/o some nausea but no abd pain. On exam, she had mild discomfort to deep palpation to RUQ abdomen.

## 2020-09-25 NOTE — PLAN OF CARE
Eval completed and POC established     Tahir Gardner, PT, DPT  2020     Problem: Physical Therapy Goal  Goal: Physical Therapy Goal  Description: Goals to be met by: 10/24/2020    Patient will increase functional independence with mobility by performin. Sit to stand transfer with Stand-by Assistance  2. Gait  x 30 feet with Stand-by Assistance using LRAD  3. Lower extremity exercise program x15 reps, with independence     Outcome: Ongoing, Progressing

## 2020-09-25 NOTE — ANESTHESIA PREPROCEDURE EVALUATION
[facility]  Anesthesia Pre-Operative Evaluation         Patient Name: Gisselle Ortiz  YOB: 1933  MRN: [id number]    SUBJECTIVE:     Pre-operative evaluation for Procedure(s) (LRB):  ERCP (ENDOSCOPIC RETROGRADE CHOLANGIOPANCREATOGRAPHY) (N/A)  ULTRASOUND, UPPER GI TRACT, ENDOSCOPIC (N/A)     09/25/2020    Gisselle Ortiz is a 87 y.o. female w/ a significant PMHx of Afib (on Eliquis; previous cardioversion), SSS s/p pacemaker, arthritis, HTN, hypothyroidism, diastolic dysfunction (EF 55%, 9/2019), pancreatitis who presented with chest pain.    Abd U/S demonstrated significantly dilated common bile duct with sludge in GB. Had ERCP done 9/14 and presented back to ED with chest pain    Patient now presents for the above procedure(s).    TTE 9/26/19  · Normal left ventricular systolic function. The estimated ejection fraction is 55%  · Grade II (moderate) left ventricular diastolic dysfunction consistent with pseudonormalization.  · Eccentric left ventricular hypertrophy. Mild left ventricular enlargement.  · Mild mitral regurgitation.  · Normal right ventricular systolic function.  · Mild tricuspid regurgitation.  · Severe left atrial enlargement.     EKG 9/11/20  Atrial-paced rhythm  Right bundle branch block  Abnormal ECG  When compared with ECG of 17-JUL-2020 10:11,  No significant change was found  cumented.    Patient Active Problem List   Diagnosis    Paroxysmal atrial fibrillation    Pacemaker    Hypertension    Former smoker    Hypothyroidism    High cholesterol    RBBB    Long term current use of antiarrhythmic drug    Current use of long term anticoagulation    SSS (sick sinus syndrome)    PVC (premature ventricular contraction)    Vitamin D deficiency    Osteoarthritis of right hip    Insomnia    Hyperparathyroidism due to renal insufficiency    Gastroesophageal reflux disease    Chronic renal insufficiency, stage III  (moderate)    Grief    Hyperbilirubinemia    Dilated bile duct       Review of patient's allergies indicates:   Allergen Reactions    Ciprofloxacin Nausea And Vomiting             ASSESSMENT/PLAN:       Pre-op Assessment    I have reviewed the Patient Summary Reports.       I have reviewed the Medications.     Review of Systems  Anesthesia Hx:  No problems with previous Anesthesia  Neg history of prior surgery. Denies Family Hx of Anesthesia complications.   Denies Personal Hx of Anesthesia complications.   Social:  Former Smoker    Hematology/Oncology:  Hematology Normal   Oncology Normal     EENT/Dental:EENT/Dental Normal   Cardiovascular:   Exercise tolerance: poor Hypertension Dysrhythmias atrial fibrillation ECG has been reviewed.    Pulmonary:  Pulmonary Normal    Renal/:   Chronic Renal Disease, CRI    Hepatic/GI:  Hepatic/GI Normal    Musculoskeletal:   Arthritis     Neurological:  Neurology Normal    Endocrine:   Hypothyroidism    Dermatological:  Skin Normal    Psych:  Psychiatric Normal           Physical Exam  General:  Well nourished    Airway/Jaw/Neck:  Airway Findings: Mouth Opening: Normal Tongue: Normal  General Airway Assessment: Adult  Mallampati: II  TM Distance: Normal, at least 6 cm        Eyes/Ears/Nose:  EYES/EARS/NOSE FINDINGS: Normal   Dental:  Dental Findings: In tact   Chest/Lungs:  Chest/Lungs Clear    Heart/Vascular:  Heart Findings: Normal Heart murmur: negative Vascular Findings: Normal    Abdomen:  Abdomen Findings: Normal    Musculoskeletal:  Musculoskeletal Findings: Normal   Skin:  Skin Findings: Normal    Mental Status:  Mental Status Findings: Normal        Anesthesia Plan  Type of Anesthesia, risks & benefits discussed:  Anesthesia Type:  general, MAC  Patient's Preference:   Intra-op Monitoring Plan: standard ASA monitors  Intra-op Monitoring Plan Comments:   Post Op Pain Control Plan: multimodal analgesia, IV/PO Opioids PRN and per primary service following discharge  from PACU  Post Op Pain Control Plan Comments:   Induction:   IV  Beta Blocker:  Patient is not currently on a Beta-Blocker (No further documentation required).       Informed Consent: Patient understands risks and agrees with Anesthesia plan.  Questions answered. Anesthesia consent signed with patient.  ASA Score: 3     Day of Surgery Review of History & Physical: I have interviewed and examined the patient. I have reviewed the patient's H&P dated:  There are no significant changes.  H&P update referred to the surgeon.         Ready For Surgery From Anesthesia Perspective.

## 2020-09-25 NOTE — PROGRESS NOTES
I was present with Hetal Zapata MD GI fellow during the above evaluation, including history and exam.  I discussed the case with follow and agree with the findings and plan as documented in the follow's note except for patient appears jaundiced with scleral that is icteric.  Patient somewhat lethargic complaining of severe fatigue and lethargy.  Complaining of anorexia and weakness.  Symptoms started today after hospital discharge last Wednesday.  Patient denies fever chills no appetite not really eating at home.  Systolic blood pressure around 92 patient says she runs in the 140s usually.  Not tachycardic Accu-Chek 117 pulse ox 97% on room air not tachypneic no chest pain no nausea or vomiting.  No diarrhea no blood in her stool.  Patient recently discharged with a diagnosis of suspected biliary pancreatitis still with gallbladder intact.  Denying any chest pain shortness of breath or abdominal pain.  No syncope.  She is here in clinic with her son.  I spoke with ER physician who is in charge currently Haseeb Sanderson M.D. about patient and recommendations a need for hospital admission with GI/ AES consult.  Need for blood cultures and possible empiric antibiotics patient appears possible early septic.  Patient was personally transported via wheelchair down to the emergency room by myself to expedite her care.

## 2020-09-25 NOTE — ASSESSMENT & PLAN NOTE
On Apixaban 2.5mg po BID (age > 80, Wt < 60kg); last dose 9/23/20 pm  PLAN  - hold for possible GI procedure

## 2020-09-25 NOTE — ASSESSMENT & PLAN NOTE
Patient is an 87-year-old female with history of AFib on Eliquis and recent episode of gallstone pancreatitis status post ERCP, who presented to the hospital after she was found hypotensive with persistent nausea and vomiting for the past few days.  LFTs worse compared to prior discharge, with leukocytosis and CBD dilation. Concern for cholangitis vs pancreatitis, favoring the former. We will proceed with ERCP today. If she does not have improvement afterwards, she will require contrasted CT imaging of the pancreas.    -Start abx, obtain cultures.  -Keep NPO and proceed with ERCP today  -Trend CBC,CMP

## 2020-09-25 NOTE — SUBJECTIVE & OBJECTIVE
Past Medical History:   Diagnosis Date    A-fib     Arthritis     Hypertension     Thyroid disease        Past Surgical History:   Procedure Laterality Date    CATARACT EXTRACTION      CATARACT EXTRACTION W/  INTRAOCULAR LENS IMPLANT Bilateral 2004    DR IN [place]    ENDOSCOPIC ULTRASOUND OF UPPER GASTROINTESTINAL TRACT N/A 9/14/2020    Procedure: ULTRASOUND, UPPER GI TRACT, ENDOSCOPIC;  Surgeon: Esha Howard MD;  Location: [facility] ENDO (2ND FLR);  Service: Endoscopy;  Laterality: N/A;    ERCP N/A 9/14/2020    Procedure: ERCP (ENDOSCOPIC RETROGRADE CHOLANGIOPANCREATOGRAPHY);  Surgeon: Esha Howard MD;  Location: [facility] ENDO (2ND FLR);  Service: Endoscopy;  Laterality: N/A;    EYE SURGERY      HIP REPLACEMENT ARTHROPLASTY      HYSTERECTOMY      JOINT REPLACEMENT      REVISION OF SKIN POCKET FOR PACEMAKER N/A 1/21/2019    Procedure: REVISION-POCKET-PACEMAKER;  Surgeon: Asher Watts MD;  Location: [facility] EP LAB;  Service: Cardiology;  Laterality: N/A;  MODERATE SEDATION, sedation issues in the past    THYROIDECTOMY      TREATMENT OF CARDIAC ARRHYTHMIA N/A 3/18/2019    Procedure: CARDIOVERSION OR DEFIBRILLATION;  Surgeon: Asher Watts MD;  Location: [facility] EP LAB;  Service: Cardiology;  Laterality: N/A;  AF, JILL-cx if SR, DCCV, MAC, FAS, 3PREP    TREATMENT OF CARDIAC ARRHYTHMIA N/A 5/14/2019    Procedure: Cardioversion or Defibrillation;  Surgeon: Derick Vizcarra MD;  Location: [facility] EP LAB;  Service: Cardiology;  Laterality: N/A;  AF, JILL, DCCV, MAC, DM, 3PREP       Review of patient's allergies indicates:   Allergen Reactions    Ciprofloxacin Nausea And Vomiting     Family History     Problem Relation (Age of Onset)    Heart attack Mother, Maternal Grandmother    Heart disease Mother, Maternal Grandmother    Heart failure Mother, Maternal Grandmother    Hypertension Mother, Maternal Grandmother    Stroke Maternal Grandmother        Tobacco Use    Smoking status: Former  Smoker     Start date: 5/19/1964    Smokeless tobacco: Never Used   Substance and Sexual Activity    Alcohol use: No    Drug use: No    Sexual activity: Not Currently     Partners: Male     Review of Systems   Constitutional: Positive for activity change and fatigue. Negative for appetite change, chills and fever.   HENT: Negative for trouble swallowing.    Respiratory: Negative for cough, choking, chest tightness and shortness of breath.    Cardiovascular: Negative for chest pain and leg swelling.   Gastrointestinal: Positive for nausea and vomiting. Negative for abdominal distention, abdominal pain, anal bleeding, constipation and diarrhea.   Genitourinary: Negative for difficulty urinating and dysuria.   Musculoskeletal: Negative for arthralgias and back pain.   Skin: Negative for color change and pallor.   Neurological: Negative for dizziness and headaches.   Psychiatric/Behavioral: Negative for agitation and confusion.     Objective:     Vital Signs (Most Recent):  Temp: 98.3 °F (36.8 °C) (09/25/20 0852)  Pulse: 60 (09/25/20 0852)  Resp: 16 (09/25/20 0852)  BP: (!) 122/57 (09/25/20 0852)  SpO2: (!) 92 % (09/25/20 0852) Vital Signs (24h Range):  Temp:  [98 °F (36.7 °C)-98.7 °F (37.1 °C)] 98.3 °F (36.8 °C)  Pulse:  [60-63] 60  Resp:  [16-21] 16  SpO2:  [92 %-99 %] 92 %  BP: ()/(51-62) 122/57     Weight: 56.3 kg (124 lb 1.9 oz) (09/24/20 2200)  Body mass index is 21.99 kg/m².      Intake/Output Summary (Last 24 hours) at 9/25/2020 0944  Last data filed at 9/24/2020 2043  Gross per 24 hour   Intake 1000 ml   Output 200 ml   Net 800 ml       Lines/Drains/Airways     Peripheral Intravenous Line                 Peripheral IV - Single Lumen 09/24/20 1759 18 G;1 1/4 in Left Forearm less than 1 day         Peripheral IV - Single Lumen 09/24/20 1938 18 G;1 1/4 in Right Forearm less than 1 day                Physical Exam  Constitutional:       General: She is not in acute distress.     Appearance: She is  well-developed.   HENT:      Head: Normocephalic.   Eyes:      General: No scleral icterus.     Conjunctiva/sclera: Conjunctivae normal.   Neck:      Musculoskeletal: Normal range of motion and neck supple.   Cardiovascular:      Rate and Rhythm: Normal rate and regular rhythm.   Pulmonary:      Effort: Pulmonary effort is normal.      Breath sounds: Normal breath sounds.   Abdominal:      General: Bowel sounds are normal. There is no distension.      Palpations: Abdomen is soft. There is no mass.      Tenderness: There is no abdominal tenderness. There is no guarding or rebound.   Musculoskeletal: Normal range of motion.   Skin:     General: Skin is warm and dry.   Neurological:      Mental Status: She is alert and oriented to person, place, and time.         Significant Labs:  CBC:   Recent Labs   Lab 09/24/20  1736 09/25/20  0554   WBC 20.38* 17.80*   HGB 10.2* 8.1*   HCT 31.7* 26.1*   * 417*     BMP:   Recent Labs   Lab 09/25/20  0554   GLU 88   *   K 4.2      CO2 22*   BUN 30*   CREATININE 1.0   CALCIUM 7.5*   MG 1.9     CMP:   Recent Labs   Lab 09/25/20  0554   GLU 88   CALCIUM 7.5*   ALBUMIN 2.0*   PROT 5.0*   *   K 4.2   CO2 22*      BUN 30*   CREATININE 1.0   ALKPHOS 103   *   AST 70*   BILITOT 2.5*     Coagulation:   Recent Labs   Lab 09/24/20  2220   INR 1.1       Significant Imaging:  Imaging results within the past 24 hours have been reviewed.

## 2020-09-25 NOTE — TRANSFER OF CARE
"Anesthesia Transfer of Care Note    Patient: Gisselle Ortiz    Procedure(s) Performed: Procedure(s) (LRB):  ERCP (ENDOSCOPIC RETROGRADE CHOLANGIOPANCREATOGRAPHY) (N/A)    Patient location: PACU    Anesthesia Type: general    Transport from OR: Transported from OR on 6-10 L/min O2 by face mask with adequate spontaneous ventilation    Post pain: adequate analgesia    Post assessment: no apparent anesthetic complications    Post vital signs: stable    Level of consciousness: awake, alert and oriented    Nausea/Vomiting: no nausea/vomiting    Complications: none    Transfer of care protocol was followed      Last vitals:   Visit Vitals  /62 (Patient Position: Lying)   Pulse 62   Temp 36.5 °C (97.7 °F) (Oral)   Resp 14   Ht 5' 3" (1.6 m)   Wt 56.6 kg (124 lb 12.5 oz)   SpO2 98%   Breastfeeding No   BMI 22.10 kg/m²     "

## 2020-09-25 NOTE — PLAN OF CARE
09/25/20 1306   Post-Acute Status   Post-Acute Authorization Home Health   Home Health Status Awaiting Therapy Documentation

## 2020-09-25 NOTE — HPI
"Patient is an 86 y/o female with past medical history of HTN, HLD, Afib on Eliquis, who was recently admitted on 9/11 after presenting with abdominal pain, nausea and vomiting, and was found to have pancreatitis with CBD dilation suggestive of gallstone pancreatitis. She underwent an ERCP with removal of stones 2 days after (since she was on Eliquis). She did well after procedure. Surgery was consulted but thought patient is high risk for sarahi. She went home and did well for a few days, but then started having significant nausea, vomiting and fatigue. She denies associated abdominal pain. She denies fevers or chills. This persisted until she was seen in clinic yesterday. She was complaining of nausea and fatigue and she was hypotensive as well. She was seen in the ED. Found to have elevated WBC to 20, elevated TBili to 3.3 (from 1.3), lipase 600, she was hemodynamically stable otherwise. An U/S of the abdomen was obtained and showed: "Dilatation of the extrahepatic common bile duct, intrahepatic duct demonstrates normal caliber however has internal debris (possibly representing biliary sludge)." AES consulted for evaluation  "

## 2020-09-25 NOTE — ED NOTES
Telemetry Verification   Patient placed on Telemetry Box  Verified with War Room  Box # [id number]   Monitor Tech    Rate 63   Rhythm A-FIB

## 2020-09-25 NOTE — ASSESSMENT & PLAN NOTE
Home meds include  Atenolol 25mg BID, Diltiazem 180mg daily  BP 92/51 at GI clinic, improved SBP to 120s in ED after fluids  PLAN  - Hold BP meds for now, consider adding atenolol back if needed for rate control for AFib

## 2020-09-25 NOTE — ANESTHESIA POSTPROCEDURE EVALUATION
Anesthesia Post Evaluation    Patient: Gisselle Ortiz    Procedure(s) Performed: Procedure(s) (LRB):  ERCP (ENDOSCOPIC RETROGRADE CHOLANGIOPANCREATOGRAPHY) (N/A)    Final Anesthesia Type: general    Patient location during evaluation: PACU  Patient participation: Yes- Able to Participate  Level of consciousness: awake and alert  Post-procedure vital signs: reviewed and stable  Pain management: adequate  Airway patency: patent    PONV status at discharge: No PONV  Anesthetic complications: no      Cardiovascular status: blood pressure returned to baseline  Respiratory status: unassisted  Hydration status: euvolemic  Follow-up not needed.          Vitals Value Taken Time   /56 09/25/20 1150   Temp 36.4 °C (97.5 °F) 09/25/20 1150   Pulse 65 09/25/20 1159   Resp 35 09/25/20 1159   SpO2 96 % 09/25/20 1159   Vitals shown include unvalidated device data.      No case tracking events are documented in the log.      Pain/Shalonda Score: Shalonda Score: 9 (9/25/2020 11:50 AM)

## 2020-09-25 NOTE — SUBJECTIVE & OBJECTIVE
Past Medical History:   Diagnosis Date    A-fib     Arthritis     Hypertension     Thyroid disease        Past Surgical History:   Procedure Laterality Date    CATARACT EXTRACTION      CATARACT EXTRACTION W/  INTRAOCULAR LENS IMPLANT Bilateral 2004    [place]    ENDOSCOPIC ULTRASOUND OF UPPER GASTROINTESTINAL TRACT N/A 9/14/2020    Procedure: ULTRASOUND, UPPER GI TRACT, ENDOSCOPIC;  Surgeon: Esha Howard MD;  Location: [facility] ENDO 2ND FLR);  Service: Endoscopy;  Laterality: N/A;    ERCP N/A 9/14/2020    Procedure: ERCP (ENDOSCOPIC RETROGRADE CHOLANGIOPANCREATOGRAPHY);  Surgeon: Esha Howard MD;  [facility]  Service: Endoscopy;  Laterality: N/A;    EYE SURGERY      HIP REPLACEMENT ARTHROPLASTY      HYSTERECTOMY      JOINT REPLACEMENT      REVISION OF SKIN POCKET FOR PACEMAKER N/A 1/21/2019    Procedure: REVISION-POCKET-PACEMAKER;  Surgeon: Asher Watts MD;  Location: [facility] EP LAB;  Service: Cardiology;  Laterality: N/A;  MODERATE SEDATION, sedation issues in the past    THYROIDECTOMY      TREATMENT OF CARDIAC ARRHYTHMIA N/A 3/18/2019    Procedure: CARDIOVERSION OR DEFIBRILLATION;  Surgeon: Asher Watts MD;  Location: [facility] EP LAB;  Service: Cardiology;  Laterality: N/A;  AF, JILL-cx if SR, DCCV, MAC, FAS, 3PREP    TREATMENT OF CARDIAC ARRHYTHMIA N/A 5/14/2019    Procedure: Cardioversion or Defibrillation;  Surgeon: Derick Vizcarra MD;  Location: [facility] EP LAB;  Service: Cardiology;  Laterality: N/A;  AF, JILL, DCCV, MAC, DM, 3PREP       Review of patient's allergies indicates:   Allergen Reactions    Ciprofloxacin Nausea And Vomiting       No current facility-administered medications on file prior to encounter.      Current Outpatient Medications on File Prior to Encounter   Medication Sig    amiodarone (PACERONE) 200 MG Tab TAKE 1 TABLET ONCE DAILY . START TAKING ONLY 1 TABLET EVERY DAY ON 4-19-19    apixaban (ELIQUIS) 2.5 mg Tab Take 1  tablet (2.5 mg total) by mouth 2 (two) times daily.    aspirin (ECOTRIN) 81 MG EC tablet Take 81 mg by mouth once daily.    atenoloL (TENORMIN) 25 MG tablet TAKE 2 TABLETS EVERY MORNING AND 1 TABLET EVERY EVENING    atorvastatin (LIPITOR) 10 MG tablet TAKE 1 TABLET DAILY    diltiaZEM (CARDIZEM CD) 180 MG 24 hr capsule Take 1 capsule (180 mg total) by mouth once daily.    eszopiclone (LUNESTA) 2 MG Tab Take 2 mg by mouth every evening.    levothyroxine (SYNTHROID) 137 MCG Tab tablet Take 1 tablet (137 mcg total) by mouth once daily.    spironolactone (ALDACTONE) 25 MG tablet     ergocalciferol (VITAMIN D2) 50,000 unit Cap Take 1 capsule (50,000 Units total) by mouth every 7 days.     Family History     Problem Relation (Age of Onset)    Heart attack Mother, Maternal Grandmother    Heart disease Mother, Maternal Grandmother    Heart failure Mother, Maternal Grandmother    Hypertension Mother, Maternal Grandmother    Stroke Maternal Grandmother        Tobacco Use    Smoking status: Former Smoker     Start date: 5/19/1964    Smokeless tobacco: Never Used   Substance and Sexual Activity    Alcohol use: No    Drug use: No    Sexual activity: Not Currently     Partners: Male     Review of Systems   Constitutional: Positive for appetite change (some decreased appetite) and fatigue. Negative for fever.   HENT: Negative for congestion, nosebleeds and sore throat.    Eyes: Negative for visual disturbance.   Respiratory: Negative for cough, chest tightness and shortness of breath.    Cardiovascular: Negative for chest pain, palpitations and leg swelling.   Gastrointestinal: Positive for nausea and vomiting. Negative for abdominal distention, abdominal pain, anal bleeding, blood in stool, constipation and diarrhea.   Genitourinary: Negative for flank pain and hematuria.        Dark / orange urine   Musculoskeletal: Negative for back pain and joint swelling.   Skin: Negative for rash.   Neurological: Negative for  seizures, syncope and headaches.   Psychiatric/Behavioral: Negative for agitation, confusion and decreased concentration.     Objective:     Vital Signs (Most Recent):  Temp: 98.2 °F (36.8 °C) (09/24/20 1812)  Pulse: 60 (09/24/20 1938)  Resp: (!) 21 (09/24/20 1938)  BP: 130/61 (09/24/20 1812)  SpO2: 95 % (09/24/20 1938) Vital Signs (24h Range):  Temp:  [98 °F (36.7 °C)-98.2 °F (36.8 °C)] 98.2 °F (36.8 °C)  Pulse:  [60-62] 60  Resp:  [18-21] 21  SpO2:  [95 %-99 %] 95 %  BP: ()/(51-61) 130/61     Weight: 57.2 kg (126 lb)  Body mass index is 22.32 kg/m².    Physical Exam  Constitutional:       General: She is not in acute distress.     Appearance: Normal appearance. She is not toxic-appearing.   HENT:      Head: Normocephalic and atraumatic.      Right Ear: External ear normal.      Left Ear: External ear normal.      Nose: Nose normal.      Mouth/Throat:      Mouth: Mucous membranes are moist.      Pharynx: No oropharyngeal exudate.   Eyes:      General:         Right eye: No discharge.         Left eye: No discharge.   Neck:      Musculoskeletal: No neck rigidity.   Cardiovascular:      Rate and Rhythm: Normal rate and regular rhythm.      Pulses: Normal pulses.      Heart sounds: Normal heart sounds. No murmur.   Pulmonary:      Effort: Pulmonary effort is normal. No respiratory distress.      Breath sounds: Normal breath sounds. No stridor. No wheezing or rales.   Abdominal:      General: Abdomen is flat. There is no distension.      Palpations: There is no mass.      Tenderness: There is abdominal tenderness (RUQ discomfort with deep palpation).   Musculoskeletal:         General: No swelling or tenderness.      Right lower leg: No edema.      Left lower leg: No edema.   Lymphadenopathy:      Cervical: No cervical adenopathy.   Skin:     General: Skin is warm and dry.      Capillary Refill: Capillary refill takes less than 2 seconds.      Coloration: Skin is not jaundiced.      Findings: Bruising (on  Eliquis) present.   Neurological:      General: No focal deficit present.      Mental Status: She is alert and oriented to person, place, and time.      Cranial Nerves: No cranial nerve deficit.   Psychiatric:         Mood and Affect: Mood normal.          Significant Labs:   CBC:   Recent Labs   Lab 09/24/20  1736   WBC 20.38*   HGB 10.2*   HCT 31.7*   *     CMP:   Recent Labs   Lab 09/24/20  1736   *   K 4.5      CO2 25   *   BUN 30*   CREATININE 1.2   CALCIUM 8.0*   PROT 6.1   ALBUMIN 2.3*   BILITOT 3.3*   ALKPHOS 132   *   *   ANIONGAP 8   EGFRNONAA 40.7*       Significant Imaging: I have reviewed all pertinent imaging results/findings within the past 24 hours.   Abd US 9/24 pending

## 2020-09-25 NOTE — H&P
[facility] Hospital Medicine  History & Physical    Patient Name: Gisselle Ortiz  MRN: [id number]  Admission Date: 9/24/2020  Attending Physician: Kai Arellano MD   Primary Care Provider: Kai Montez MD    [facility] Medicine Team: [facility] HOSP MED 1 Daisy Richardson MD     Patient information was obtained from patient, past medical records and ER records.     Subjective:     Principal Problem:Hyperbilirubinemia    Chief Complaint:   Chief Complaint   Patient presents with    Fatigue     comes from GI clinic, weakness. No eating and drinking        HPI: Gisselle Ortiz is a 87 y.o. female with a medical history of AFib on Eliquis with previous cardioversion, SSS s/p pacemaker, arthritis, HTN, thyroid disease with diastolic dysfunction (EF 55%, 9/2019) who presented to the ED from GI clinic after she was found to have elevated liver enzymes and hypotensive to 92/51.    She presented to the hospital on 09/11 for abdominal pain and was found to have gallstone pancreatitis. She had CBD dilation and a gallbladder with sludge. She underwent EUS and ERCP with stone and sludge seen on the cholangiogram. Sphincterotomy was performed and stone/sludge removed with no stents placed. Per notes, ERCP was concerning for choledocholithiasis.  Surgery was consulted and deemed the patient to be high risk for cholecystectomy. She was seen today in GI clinic for follow-up. She initially recovered well from the pancreatitis and ERCP and was eating prior to discharge. A few days after discharge she started having severe fatigue that progressively worsened to the point where the patient could not walk. She complains of decreased appetite and nausea but only 1 episode of vomiting. She denies any abdominal pain, fevers, diarrhea. She reports that her urine is orange/brown and has a bad odor but denies any dysuria. In the office her, BP was found to be 92/51. She was sent to the ED with BP  rechecked at 112/51. She was given one liter NS and admitted for further evaluations.     In the ED, she was found to be afebrile and her BP improved to 130/61 with IVF. HR 63 with NSR and existing RBBB. Qtc 512. She was breathing 20bpm with no O2 requirement. She c/o some nausea but no abd pain. On exam, she had mild discomfort to deep palpation to RUQ abdomen.       Past Medical History:   Diagnosis Date    A-fib     Arthritis     Hypertension     Thyroid disease        Past Surgical History:   Procedure Laterality Date    CATARACT EXTRACTION      CATARACT EXTRACTION W/  INTRAOCULAR LENS IMPLANT Bilateral 2004    DR IN [place]    ENDOSCOPIC ULTRASOUND OF UPPER GASTROINTESTINAL TRACT N/A 9/14/2020    Procedure: ULTRASOUND, UPPER GI TRACT, ENDOSCOPIC;  Surgeon: Esha Howard MD;  Location: [facility] ([address] [place] [address]);  Service: Endoscopy;  Laterality: N/A;    ERCP N/A 9/14/2020    Procedure: ERCP (ENDOSCOPIC RETROGRADE CHOLANGIOPANCREATOGRAPHY);  Surgeon: Esha Howard MD;  Location: [facility] ([address] [place] [address]);  Service: Endoscopy;  Laterality: N/A;    EYE SURGERY      HIP REPLACEMENT ARTHROPLASTY      HYSTERECTOMY      JOINT REPLACEMENT      REVISION OF SKIN POCKET FOR PACEMAKER N/A 1/21/2019    Procedure: REVISION-POCKET-PACEMAKER;  Surgeon: Asher Watts MD;  Location: [facility] EP LAB;  Service: Cardiology;  Laterality: N/A;  MODERATE SEDATION, sedation issues in the past    THYROIDECTOMY      TREATMENT OF CARDIAC ARRHYTHMIA N/A 3/18/2019    Procedure: CARDIOVERSION OR DEFIBRILLATION;  Surgeon: Asher Watts MD;  Location: [facility] EP LAB;  Service: Cardiology;  Laterality: N/A;  AF, JILL-cx if SR, DCCV, MAC, FAS, 3PREP    TREATMENT OF CARDIAC ARRHYTHMIA N/A 5/14/2019    Procedure: Cardioversion or Defibrillation;  Surgeon: Derick Vizcarra MD;  Location: [facility] EP LAB;  Service: Cardiology;  Laterality: N/A;  AF, JILL, DCCV, MAC, DM, 3PREP       Review of patient's allergies indicates:    Allergen Reactions    Ciprofloxacin Nausea And Vomiting       No current facility-administered medications on file prior to encounter.      Current Outpatient Medications on File Prior to Encounter   Medication Sig    amiodarone (PACERONE) 200 MG Tab TAKE 1 TABLET ONCE DAILY . START TAKING ONLY 1 TABLET EVERY DAY ON 4-19-19    apixaban (ELIQUIS) 2.5 mg Tab Take 1 tablet (2.5 mg total) by mouth 2 (two) times daily.    aspirin (ECOTRIN) 81 MG EC tablet Take 81 mg by mouth once daily.    atenoloL (TENORMIN) 25 MG tablet TAKE 2 TABLETS EVERY MORNING AND 1 TABLET EVERY EVENING    atorvastatin (LIPITOR) 10 MG tablet TAKE 1 TABLET DAILY    diltiaZEM (CARDIZEM CD) 180 MG 24 hr capsule Take 1 capsule (180 mg total) by mouth once daily.    eszopiclone (LUNESTA) 2 MG Tab Take 2 mg by mouth every evening.    levothyroxine (SYNTHROID) 137 MCG Tab tablet Take 1 tablet (137 mcg total) by mouth once daily.    spironolactone (ALDACTONE) 25 MG tablet     ergocalciferol (VITAMIN D2) 50,000 unit Cap Take 1 capsule (50,000 Units total) by mouth every 7 days.     Family History     Problem Relation (Age of Onset)    Heart attack Mother, Maternal Grandmother    Heart disease Mother, Maternal Grandmother    Heart failure Mother, Maternal Grandmother    Hypertension Mother, Maternal Grandmother    Stroke Maternal Grandmother        Tobacco Use    Smoking status: Former Smoker     Start date: 5/19/1964    Smokeless tobacco: Never Used   Substance and Sexual Activity    Alcohol use: No    Drug use: No    Sexual activity: Not Currently     Partners: Male     Review of Systems   Constitutional: Positive for appetite change (some decreased appetite) and fatigue. Negative for fever.   HENT: Negative for congestion, nosebleeds and sore throat.    Eyes: Negative for visual disturbance.   Respiratory: Negative for cough, chest tightness and shortness of breath.    Cardiovascular: Negative for chest pain, palpitations and leg  swelling.   Gastrointestinal: Positive for nausea and vomiting. Negative for abdominal distention, abdominal pain, anal bleeding, blood in stool, constipation and diarrhea.   Genitourinary: Negative for flank pain and hematuria.        Dark / orange urine   Musculoskeletal: Negative for back pain and joint swelling.   Skin: Negative for rash.   Neurological: Negative for seizures, syncope and headaches.   Psychiatric/Behavioral: Negative for agitation, confusion and decreased concentration.     Objective:     Vital Signs (Most Recent):  Temp: 98.2 °F (36.8 °C) (09/24/20 1812)  Pulse: 60 (09/24/20 1938)  Resp: (!) 21 (09/24/20 1938)  BP: 130/61 (09/24/20 1812)  SpO2: 95 % (09/24/20 1938) Vital Signs (24h Range):  Temp:  [98 °F (36.7 °C)-98.2 °F (36.8 °C)] 98.2 °F (36.8 °C)  Pulse:  [60-62] 60  Resp:  [18-21] 21  SpO2:  [95 %-99 %] 95 %  BP: ()/(51-61) 130/61     Weight: 57.2 kg (126 lb)  Body mass index is 22.32 kg/m².    Physical Exam  Constitutional:       General: She is not in acute distress.     Appearance: Normal appearance. She is not toxic-appearing.   HENT:      Head: Normocephalic and atraumatic.      Right Ear: External ear normal.      Left Ear: External ear normal.      Nose: Nose normal.      Mouth/Throat:      Mouth: Mucous membranes are moist.      Pharynx: No oropharyngeal exudate.   Eyes:      General:         Right eye: No discharge.         Left eye: No discharge.   Neck:      Musculoskeletal: No neck rigidity.   Cardiovascular:      Rate and Rhythm: Normal rate and regular rhythm.      Pulses: Normal pulses.      Heart sounds: Normal heart sounds. No murmur.   Pulmonary:      Effort: Pulmonary effort is normal. No respiratory distress.      Breath sounds: Normal breath sounds. No stridor. No wheezing or rales.   Abdominal:      General: Abdomen is flat. There is no distension.      Palpations: There is no mass.      Tenderness: There is abdominal tenderness (RUQ discomfort with deep  palpation).   Musculoskeletal:         General: No swelling or tenderness.      Right lower leg: No edema.      Left lower leg: No edema.   Lymphadenopathy:      Cervical: No cervical adenopathy.   Skin:     General: Skin is warm and dry.      Capillary Refill: Capillary refill takes less than 2 seconds.      Coloration: Skin is not jaundiced.      Findings: Bruising (on Eliquis) present.   Neurological:      General: No focal deficit present.      Mental Status: She is alert and oriented to person, place, and time.      Cranial Nerves: No cranial nerve deficit.   Psychiatric:         Mood and Affect: Mood normal.          Significant Labs:   CBC:   Recent Labs   Lab 09/24/20  1736   WBC 20.38*   HGB 10.2*   HCT 31.7*   *     CMP:   Recent Labs   Lab 09/24/20  1736   *   K 4.5      CO2 25   *   BUN 30*   CREATININE 1.2   CALCIUM 8.0*   PROT 6.1   ALBUMIN 2.3*   BILITOT 3.3*   ALKPHOS 132   *   *   ANIONGAP 8   EGFRNONAA 40.7*       Significant Imaging: I have reviewed all pertinent imaging results/findings within the past 24 hours.   Abd US 9/24 pending     Assessment/Plan:     * Hyperbilirubinemia  Sent from GI clinic where she had a f/u for recent hospital stay for ERCP --> elevated TBili, hypotensive, and increased transaminases  Admitted to the hospital on 09/11 for abdominal pain and was found to have gallstone pancreatitis; got ERCP   CBD dilation and a gallbladder with sludge --> had EUS and ERCP with stone and sludge seen on the cholangiogram --> Sphincterotomy, no stents   Deemed not a surgical candidate for cholecystectomy; ERCP note indicates concern for choledocholithiasis   PLAN  - Consult GI in am  - CMP daily  - Abd US pending   - Hold apixaban  - NPO after midnight    Dilated bile duct  See hyperbilirubinemia      Paroxysmal atrial fibrillation  Home meds include Amiodarone 200mg daily, Atenolol 25mg, Diltiazem 180mg daily, Apixaban 2.5mg BID (last dose 9/23  pm)  BP 92/51 in GI clinic, improved with 1L fluid in ED  Last Cards Note (Darrian 7/17/20)  Gen change 1/2019. AF/AFL noted on f/u. Propafenone increased. Still had AF with RVR after that. Changed to flecainide, planned for DCCV; at some point changed to amio. Unclear timing of these changes.  TSH found to be abnormal, but T4 normal.  In past, following DCCV, she felt great! -- until AF recurred.  On amio now, with great effect.  PLAN  - Telemetry  - Continue Amiodarone  - Hold Atenolol and Dilt for now  - Hold Apixaban pending GI consult      Current use of long term anticoagulation  On Apixaban 2.5mg po BID (age > 80, Wt < 60kg); last dose 9/23/20 pm  PLAN  - hold for possible GI procedure       SSS (sick sinus syndrome)  Followed by Dr Colmenares in Cardiology  TSH 0.443  PM for SB  PLAN  - Telemetry      RBBB  On existing EKG  Has pacemaker'  PLAN  - Telemetry      Hypertension  Home meds include  Atenolol 25mg BID, Diltiazem 180mg daily  BP 92/51 at GI clinic, improved SBP to 120s in ED after fluids  PLAN  - Hold BP meds for now, consider adding atenolol back if needed for rate control for AFib     Pacemaker  DC PPM (2014; [facility]) implanted for symptomatic SB and first degree AVB      Hypothyroidism  TSH 0.443  PLAN  - Continue levothyroxine 0.137 mg        VTE Risk Mitigation (From admission, onward)         Ordered     Reason for No Pharmacological VTE Prophylaxis  Once     Question:  Reasons:  Answer:  Already adequately anticoagulated on oral Anticoagulants    09/24/20 2041     IP VTE HIGH RISK PATIENT  Once      09/24/20 2041     Place sequential compression device  Until discontinued      09/24/20 2041                   Daisy Richardson MD  Department of Hospital Medicine   [facility]

## 2020-09-25 NOTE — ASSESSMENT & PLAN NOTE
Sent from GI clinic where she had a f/u for recent hospital stay for ERCP --> elevated TBili, hypotensive, and increased transaminases  Admitted to the hospital on 09/11 for abdominal pain and was found to have gallstone pancreatitis; got ERCP   CBD dilation and a gallbladder with sludge --> had EUS and ERCP with stone and sludge seen on the cholangiogram --> Sphincterotomy, no stents   Deemed not a surgical candidate for cholecystectomy; ERCP note indicates concern for choledocholithiasis   PLAN  - Consult GI in am  - CMP daily  - Abd  pending   - Hold apixaban  - NPO after midnight

## 2020-09-25 NOTE — TREATMENT PLAN
AES Treatment Plan    Gisselle Ortiz is a 87 y.o. female admitted to hospital 9/24/2020 (Hospital Day: 2) due to Hyperbilirubinemia.     Interval History  ERCP done today  Impression:           - The major papilla was located partially within a                         large diverticulum.                         - The entire main bile duct was dilated.                         - The biliary tree was swept and nothing was found.                         - One plastic stent was placed into the common bile                         duct.     Objective  Temp:  [97.5 °F (36.4 °C)-98.7 °F (37.1 °C)] 97.5 °F (36.4 °C) (09/25 1150)  Pulse:  [60-66] 66 (09/25 1150)  BP: ()/(51-62) 114/56 (09/25 1150)  Resp:  [14-21] 20 (09/25 1200)  SpO2:  [92 %-99 %] 97 % (09/25 1150)  Laboratory  Lab Results   Component Value Date    WBC 17.80 (H) 09/25/2020    HGB 8.1 (L) 09/25/2020    HCT 26.1 (L) 09/25/2020    MCV 94 09/25/2020     (H) 09/25/2020       Lab Results   Component Value Date     (H) 09/25/2020    AST 70 (H) 09/25/2020    ALKPHOS 103 09/25/2020    BILITOT 2.5 (H) 09/25/2020       Recommendations  - Resume previous diet.   - Repeat ERCP in 2 months to remove stent.   - Her symptoms and lab work can not be explained  with ERCP findings. Recommend CT with IV contrast asap.   - Continue antibiotics.   - Plan of care was discussed with primary team.  - We will continue to follow.    Thank you for involving us in the care of Gisselle Ortiz. Please call with any additional questions, concerns or changes in the patient's clinical status.    Sola Sahu MD  Gastroenterology Fellow

## 2020-09-25 NOTE — PLAN OF CARE
09/25/20 1514   Post-Acute Status   Post-Acute Authorization Home Health   Home Health Status Awaiting Internal Medical Clearance

## 2020-09-25 NOTE — PLAN OF CARE
CM assessment complete, patient AAOX4 and participated in interview at bedside.  Pt lives alone after her  of 60yrs recently (2 months ago). Pt states she is established with [facility]. CM awaiting therapy recs will cont to follow.     09/25/20 1238   Discharge Assessment   Assessment Type Discharge Planning Assessment   Confirmed/corrected address and phone number on facesheet? Yes   Assessment information obtained from? Patient   Expected Length of Stay (days) 4   Communicated expected length of stay with patient/caregiver yes   Prior to hospitilization cognitive status: Alert/Oriented   Prior to hospitalization functional status: Assistive Equipment   Current cognitive status: Alert/Oriented   Current Functional Status: Needs Assistance   Facility Arrived From: Home   Lives With alone   Able to Return to Prior Arrangements yes   Is patient able to care for self after discharge? Yes   Who are your caregiver(s) and their phone number(s)? Génesis Arazia (daughter) [phone]   Patient's perception of discharge disposition home or selfcare;home health   Readmission Within the Last 30 Days no previous admission in last 30 days   Patient currently being followed by outpatient case management? No   Patient currently receives any other outside agency services? No   Equipment Currently Used at Home walker, rolling;wheelchair;cane, straight   Do you have any problems affording any of your prescribed medications? No   Is the patient taking medications as prescribed? yes   Does the patient have transportation home? Yes   Transportation Anticipated family or friend will provide   Does the patient receive services at the Coumadin Clinic? No   Discharge Plan A Home;Home Health   Discharge Plan B Home;Home Health   DME Needed Upon Discharge  none   Patient/Family in Agreement with Plan yes   Maryam Sanabria, MSN  Case Management  Ext [phone]

## 2020-09-25 NOTE — PLAN OF CARE
Problem: Occupational Therapy Goal  Goal: Occupational Therapy Goal  Description: Goals to be met by: 10/9/2020     Patient will increase functional independence with ADLs by performing:    UE Dressing with Set-up Assistance.  LE Dressing with Set-up Assistance.  Grooming while standing at sink with Modified [place].  Toileting from toilet with Modified [place] for hygiene and clothing management.   Supine to sit with Modified [place].  Stand pivot transfers with Supervision.  Toilet transfer to toilet with Supervision.    9/25/2020 1522 by SHAHIDA Ayala  Outcome: Ongoing, Progressing   Patient's goals are set.   SHAHIDA Taylor  9/25/2020

## 2020-09-26 PROBLEM — K75.0 HEPATIC ABSCESS: Status: ACTIVE | Noted: 2020-09-26

## 2020-09-26 PROBLEM — N39.0 UTI (URINARY TRACT INFECTION): Status: ACTIVE | Noted: 2020-09-26

## 2020-09-26 PROBLEM — R78.81 BACTEREMIA: Status: ACTIVE | Noted: 2020-09-26

## 2020-09-26 PROBLEM — K85.90 PANCREATIC ABSCESS: Status: ACTIVE | Noted: 2020-09-26

## 2020-09-26 LAB
ALBUMIN SERPL BCP-MCNC: 1.8 G/DL (ref 3.5–5.2)
ALP SERPL-CCNC: 96 U/L (ref 55–135)
ALT SERPL W/O P-5'-P-CCNC: 93 U/L (ref 10–44)
ANION GAP SERPL CALC-SCNC: 10 MMOL/L (ref 8–16)
ANISOCYTOSIS BLD QL SMEAR: SLIGHT
APPEARANCE FLD: NORMAL
APTT BLDCRRT: 29.2 SEC (ref 21–32)
AST SERPL-CCNC: 59 U/L (ref 10–40)
BASOPHILS # BLD AUTO: ABNORMAL K/UL (ref 0–0.2)
BASOPHILS NFR BLD: 0 % (ref 0–1.9)
BILIRUB SERPL-MCNC: 2 MG/DL (ref 0.1–1)
BODY FLD TYPE: NORMAL
BUN SERPL-MCNC: 25 MG/DL (ref 8–23)
BURR CELLS BLD QL SMEAR: ABNORMAL
CALCIUM SERPL-MCNC: 7.3 MG/DL (ref 8.7–10.5)
CHLORIDE SERPL-SCNC: 106 MMOL/L (ref 95–110)
CO2 SERPL-SCNC: 23 MMOL/L (ref 23–29)
COLOR FLD: NORMAL
CREAT SERPL-MCNC: 0.9 MG/DL (ref 0.5–1.4)
DIFFERENTIAL METHOD: ABNORMAL
EOSINOPHIL # BLD AUTO: ABNORMAL K/UL (ref 0–0.5)
EOSINOPHIL NFR BLD: 1 % (ref 0–8)
ERYTHROCYTE [DISTWIDTH] IN BLOOD BY AUTOMATED COUNT: 15.1 % (ref 11.5–14.5)
EST. GFR  (AFRICAN AMERICAN): >60 ML/MIN/1.73 M^2
EST. GFR  (NON AFRICAN AMERICAN): 57.7 ML/MIN/1.73 M^2
GLUCOSE SERPL-MCNC: 78 MG/DL (ref 70–110)
HCT VFR BLD AUTO: 27.4 % (ref 37–48.5)
HGB BLD-MCNC: 8.4 G/DL (ref 12–16)
HYPOCHROMIA BLD QL SMEAR: ABNORMAL
IMM GRANULOCYTES # BLD AUTO: ABNORMAL K/UL (ref 0–0.04)
IMM GRANULOCYTES NFR BLD AUTO: ABNORMAL % (ref 0–0.5)
INR PPP: 1 (ref 0.8–1.2)
LYMPHOCYTES # BLD AUTO: ABNORMAL K/UL (ref 1–4.8)
LYMPHOCYTES NFR BLD: 6 % (ref 18–48)
MAGNESIUM SERPL-MCNC: 2 MG/DL (ref 1.6–2.6)
MCH RBC QN AUTO: 29.6 PG (ref 27–31)
MCHC RBC AUTO-ENTMCNC: 30.7 G/DL (ref 32–36)
MCV RBC AUTO: 97 FL (ref 82–98)
METAMYELOCYTES NFR BLD MANUAL: 3 %
MONOCYTES # BLD AUTO: ABNORMAL K/UL (ref 0.3–1)
MONOCYTES NFR BLD: 9 % (ref 4–15)
MYELOCYTES NFR BLD MANUAL: 2 %
NEUTROPHILS NFR BLD: 76 % (ref 38–73)
NEUTROPHILS NFR FLD MANUAL: 100 %
NEUTS BAND NFR BLD MANUAL: 3 %
NRBC BLD-RTO: 0 /100 WBC
OVALOCYTES BLD QL SMEAR: ABNORMAL
PHOSPHATE SERPL-MCNC: 3.1 MG/DL (ref 2.7–4.5)
PLATELET # BLD AUTO: 469 K/UL (ref 150–350)
PLATELET BLD QL SMEAR: ABNORMAL
PMV BLD AUTO: 10.2 FL (ref 9.2–12.9)
POIKILOCYTOSIS BLD QL SMEAR: SLIGHT
POLYCHROMASIA BLD QL SMEAR: ABNORMAL
POTASSIUM SERPL-SCNC: 4.2 MMOL/L (ref 3.5–5.1)
PROT SERPL-MCNC: 4.9 G/DL (ref 6–8.4)
PROTHROMBIN TIME: 11.3 SEC (ref 9–12.5)
RBC # BLD AUTO: 2.84 M/UL (ref 4–5.4)
SCHISTOCYTES BLD QL SMEAR: ABNORMAL
SODIUM SERPL-SCNC: 139 MMOL/L (ref 136–145)
TARGETS BLD QL SMEAR: ABNORMAL
TOXIC GRANULES BLD QL SMEAR: PRESENT
WBC # BLD AUTO: 16.75 K/UL (ref 3.9–12.7)
WBC # FLD: NORMAL /CU MM

## 2020-09-26 PROCEDURE — 25000003 PHARM REV CODE 250: Performed by: STUDENT IN AN ORGANIZED HEALTH CARE EDUCATION/TRAINING PROGRAM

## 2020-09-26 PROCEDURE — 87070 CULTURE OTHR SPECIMN AEROBIC: CPT

## 2020-09-26 PROCEDURE — 11000001 HC ACUTE MED/SURG PRIVATE ROOM

## 2020-09-26 PROCEDURE — 85027 COMPLETE CBC AUTOMATED: CPT

## 2020-09-26 PROCEDURE — 99233 SBSQ HOSP IP/OBS HIGH 50: CPT | Mod: GC,,, | Performed by: INTERNAL MEDICINE

## 2020-09-26 PROCEDURE — 87186 SC STD MICRODIL/AGAR DIL: CPT

## 2020-09-26 PROCEDURE — 85730 THROMBOPLASTIN TIME PARTIAL: CPT

## 2020-09-26 PROCEDURE — 99233 PR SUBSEQUENT HOSPITAL CARE,LEVL III: ICD-10-PCS | Mod: GC,,, | Performed by: INTERNAL MEDICINE

## 2020-09-26 PROCEDURE — 84100 ASSAY OF PHOSPHORUS: CPT

## 2020-09-26 PROCEDURE — 99223 PR INITIAL HOSPITAL CARE,LEVL III: ICD-10-PCS | Mod: ,,, | Performed by: PHYSICIAN ASSISTANT

## 2020-09-26 PROCEDURE — 85610 PROTHROMBIN TIME: CPT

## 2020-09-26 PROCEDURE — 36415 COLL VENOUS BLD VENIPUNCTURE: CPT

## 2020-09-26 PROCEDURE — 25000003 PHARM REV CODE 250: Performed by: RADIOLOGY

## 2020-09-26 PROCEDURE — 87040 BLOOD CULTURE FOR BACTERIA: CPT | Mod: 59

## 2020-09-26 PROCEDURE — 87102 FUNGUS ISOLATION CULTURE: CPT

## 2020-09-26 PROCEDURE — 99223 1ST HOSP IP/OBS HIGH 75: CPT | Mod: ,,, | Performed by: PHYSICIAN ASSISTANT

## 2020-09-26 PROCEDURE — 63600175 PHARM REV CODE 636 W HCPCS: Performed by: STUDENT IN AN ORGANIZED HEALTH CARE EDUCATION/TRAINING PROGRAM

## 2020-09-26 PROCEDURE — 85007 BL SMEAR W/DIFF WBC COUNT: CPT

## 2020-09-26 PROCEDURE — 87077 CULTURE AEROBIC IDENTIFY: CPT

## 2020-09-26 PROCEDURE — 89051 BODY FLUID CELL COUNT: CPT

## 2020-09-26 PROCEDURE — 83735 ASSAY OF MAGNESIUM: CPT

## 2020-09-26 PROCEDURE — 80053 COMPREHEN METABOLIC PANEL: CPT

## 2020-09-26 PROCEDURE — 87205 SMEAR GRAM STAIN: CPT

## 2020-09-26 PROCEDURE — 63600175 PHARM REV CODE 636 W HCPCS: Performed by: RADIOLOGY

## 2020-09-26 PROCEDURE — 87075 CULTR BACTERIA EXCEPT BLOOD: CPT

## 2020-09-26 RX ORDER — LIDOCAINE HYDROCHLORIDE 10 MG/ML
INJECTION INFILTRATION; PERINEURAL CODE/TRAUMA/SEDATION MEDICATION
Status: COMPLETED | OUTPATIENT
Start: 2020-09-26 | End: 2020-09-26

## 2020-09-26 RX ORDER — FENTANYL CITRATE 50 UG/ML
INJECTION, SOLUTION INTRAMUSCULAR; INTRAVENOUS CODE/TRAUMA/SEDATION MEDICATION
Status: COMPLETED | OUTPATIENT
Start: 2020-09-26 | End: 2020-09-26

## 2020-09-26 RX ORDER — ACETAMINOPHEN 325 MG/1
650 TABLET ORAL EVERY 6 HOURS PRN
Status: DISCONTINUED | OUTPATIENT
Start: 2020-09-26 | End: 2020-10-02 | Stop reason: HOSPADM

## 2020-09-26 RX ORDER — CEFTRIAXONE 1 G/1
INJECTION, POWDER, FOR SOLUTION INTRAMUSCULAR; INTRAVENOUS CODE/TRAUMA/SEDATION MEDICATION
Status: COMPLETED | OUTPATIENT
Start: 2020-09-26 | End: 2020-09-26

## 2020-09-26 RX ADMIN — ATENOLOL 50 MG: 50 TABLET ORAL at 08:09

## 2020-09-26 RX ADMIN — ATENOLOL 25 MG: 25 TABLET ORAL at 09:09

## 2020-09-26 RX ADMIN — LIDOCAINE HYDROCHLORIDE 10 ML: 10 INJECTION, SOLUTION INFILTRATION; PERINEURAL at 02:09

## 2020-09-26 RX ADMIN — PIPERACILLIN SODIUM,TAZOBACTAM SODIUM 4.5 G: 4; .5 INJECTION, POWDER, FOR SOLUTION INTRAVENOUS at 01:09

## 2020-09-26 RX ADMIN — FENTANYL CITRATE 50 MCG: 50 INJECTION INTRAMUSCULAR; INTRAVENOUS at 02:09

## 2020-09-26 RX ADMIN — PIPERACILLIN SODIUM,TAZOBACTAM SODIUM 4.5 G: 4; .5 INJECTION, POWDER, FOR SOLUTION INTRAVENOUS at 09:09

## 2020-09-26 RX ADMIN — LEVOTHYROXINE SODIUM 137 MCG: 25 TABLET ORAL at 05:09

## 2020-09-26 RX ADMIN — AMIODARONE HYDROCHLORIDE 200 MG: 200 TABLET ORAL at 09:09

## 2020-09-26 RX ADMIN — ACETAMINOPHEN 650 MG: 325 TABLET ORAL at 05:09

## 2020-09-26 RX ADMIN — ATORVASTATIN CALCIUM 10 MG: 10 TABLET, FILM COATED ORAL at 09:09

## 2020-09-26 RX ADMIN — PIPERACILLIN SODIUM,TAZOBACTAM SODIUM 4.5 G: 4; .5 INJECTION, POWDER, FOR SOLUTION INTRAVENOUS at 05:09

## 2020-09-26 RX ADMIN — CEFTRIAXONE SODIUM 1 G: 1 INJECTION, POWDER, FOR SOLUTION INTRAMUSCULAR; INTRAVENOUS at 02:09

## 2020-09-26 NOTE — CONSULTS
Inpatient Radiology Consult Note      Plan:  Images reviewed. Hepatic segment 4 lesion concerning for liver abscess. Will place drain today.    There is a small fluid and gas collection near the pancreatic head which could represent duodenal diverticulum. Recommend correlation with prior imaging if possible. There are separate hypoattenuating lesions near the pancreatic head and body which are more concerning for neoplasm.    See IR H&P from today for further details.     Jo Ann Purcell MD  Department of Radiology, PGY-3  Pager: [phone]

## 2020-09-26 NOTE — PROCEDURES
Interventional Radiology Post-Procedure Note    Pre Op Diagnosis: Hepatic abscess  Post Op Diagnosis: Same    Procedure: US-guide drainage catheter placement    Procedure performed by: Talya    Written Informed Consent Obtained: Yes  Specimen Sent: Yes  Estimated Blood Loss: Minimal    Findings:   Complex fluid collection in segment 4 c/w abscess. 10-Fr APD catheter placed under real-time US guidance. 35 mL purulent bilious material removed through drain. Specimen sent.    No immediate complications. Patient tolerated procedure well. Please see full dictated procedure report for additional details and recommendations.    Recs:  Consider re-imaging and/or removing drain when output drops below 5-10 mL per day.      Luke Babin MD  [facility]  Pager [phone]

## 2020-09-26 NOTE — SUBJECTIVE & OBJECTIVE
Review of Systems   Constitutional: Positive for fatigue. Negative for chills and fever.   HENT: Negative for congestion, dental problem and nosebleeds.    Eyes: Negative for photophobia and visual disturbance.   Respiratory: Negative for apnea, cough, choking, chest tightness, shortness of breath, wheezing and stridor.    Cardiovascular: Negative for chest pain and leg swelling.   Gastrointestinal: Negative for abdominal distention, abdominal pain, anal bleeding, constipation and diarrhea.        Difficulty in holding food down; no nausea/vomiting.    Endocrine: Negative for polydipsia and polyphagia.   Genitourinary: Negative for dysuria and flank pain.   Musculoskeletal: Negative for arthralgias and back pain.   Skin: Negative for color change and pallor.   Neurological: Negative for tremors and syncope.   Psychiatric/Behavioral: Negative for agitation and behavioral problems.     Objective:     Vital Signs (Most Recent):  Temp: 97.8 °F (36.6 °C) (09/26/20 1554)  Pulse: 60 (09/26/20 1554)  Resp: 20 (09/26/20 1554)  BP: 136/64 (09/26/20 1554)  SpO2: 97 % (09/26/20 1554) Vital Signs (24h Range):  Temp:  [96.4 °F (35.8 °C)-98.4 °F (36.9 °C)] 97.8 °F (36.6 °C)  Pulse:  [60-88] 60  Resp:  [15-24] 20  SpO2:  [94 %-100 %] 97 %  BP: (131-174)/(60-80) 136/64     Weight: 56.6 kg (124 lb 12.5 oz)  Body mass index is 22.1 kg/m².    Intake/Output Summary (Last 24 hours) at 9/26/2020 1626  Last data filed at 9/26/2020 1100  Gross per 24 hour   Intake 900 ml   Output 3 ml   Net 897 ml      Physical Exam  Vitals signs and nursing note reviewed.   Constitutional:       Appearance: Normal appearance.      Comments: Appears weak; Jaundice   HENT:      Head: Normocephalic and atraumatic.      Nose: Nose normal.      Mouth/Throat:      Mouth: Mucous membranes are moist.      Pharynx: Oropharynx is clear.   Eyes:      Extraocular Movements: Extraocular movements intact.      Conjunctiva/sclera: Conjunctivae normal.      Pupils:  Pupils are equal, round, and reactive to light.   Cardiovascular:      Rate and Rhythm: Normal rate and regular rhythm.      Pulses: Normal pulses.      Heart sounds: Normal heart sounds.   Pulmonary:      Effort: Pulmonary effort is normal.      Breath sounds: Normal breath sounds.   Abdominal:      General: Abdomen is flat. Bowel sounds are normal.      Palpations: Abdomen is soft.      Comments: Rosario's sign present   Musculoskeletal: Normal range of motion.   Skin:     General: Skin is warm and dry.   Neurological:      General: No focal deficit present.      Mental Status: She is alert and oriented to person, place, and time. Mental status is at baseline.   Psychiatric:         Mood and Affect: Mood normal.         Behavior: Behavior normal.         Thought Content: Thought content normal.         Judgment: Judgment normal.         Significant Labs: All pertinent labs within the past 24 hours have been reviewed.    Significant Imaging: I have reviewed all pertinent imaging results/findings within the past 24 hours.

## 2020-09-26 NOTE — ASSESSMENT & PLAN NOTE
"86 y/o female with A.fib on eliquis, SSS s/p pacemaker, HTN presents to ED from GI clinic for elevated LFTs and hypotension 92/51 now with GNR bacteremia. We are consulted for abx recommendations.    Had recent admission on 9/11 for abd pain, n/v found to have pancreatitis and CBD dilation and gallstone pancreatitis. She underwent ERCP 9/14 and underwent spincterotomy and stone removal. She is not a candidate for cholecystectomy. She returns to GI clinic and was noted to have n/v/ and fatigue and elevated LFTs with associated hypotension.  Imaging studies showed "Dilatation of the extrahepatic common bile duct, intrahepatic duct demonstrates normal caliber however has internal debris (possibly representing biliary sludge), hepatic abscess and pancreatic mass.     She was seen by GI and underwent ERCP yesterday. Noted to have bile duct dilation and stent placed in CBD. Blood cultures on admit are positive with GNR and urine cultures +E.faecalis. she is currently on zosyn. We are consulted for abx recommendations.     Recommendations  1. Continue zosyn for now  2. Repeat blood cultures today  3. Recommend IR to aspirate hepatic abscess if able for cell count and cultures  4. Will tailor abx accordingly    Discussed with ID staff     "

## 2020-09-26 NOTE — CONSULTS
"[facility]  Infectious Disease  Consult Note    Patient Name: Gisselle Ortiz  MRN: [id number]  Admission Date: 9/24/2020  Hospital Length of Stay: 2 days  Attending Physician: Kai Arellano MD  Primary Care Provider: Kai Montez MD     Isolation Status: No active isolations      Inpatient consult to Infectious Diseases  Consult performed by: Mirtha Purcell PA-C  Consult ordered by: Ta Mccoy MD        Assessment/Plan:     Bacteremia  88 y/o female with A.fib on eliquis, SSS s/p pacemaker, HTN presents to ED from GI clinic for elevated LFTs and hypotension 92/51 now with GNR bacteremia. We are consulted for abx recommendations.    Had recent admission on 9/11 for abd pain, n/v found to have pancreatitis and CBD dilation and gallstone pancreatitis. She underwent ERCP 9/14 and underwent spincterotomy and stone removal. She is not a candidate for cholecystectomy. She returns to GI clinic and was noted to have n/v/ and fatigue and elevated LFTs with associated hypotension.  Imaging studies showed "Dilatation of the extrahepatic common bile duct, intrahepatic duct demonstrates normal caliber however has internal debris (possibly representing biliary sludge), hepatic abscess and pancreatic mass.     She was seen by GI and underwent ERCP yesterday. Noted to have bile duct dilation and stent placed in CBD. Blood cultures on admit are positive with GNR and urine cultures +E.faecalis. she is currently on zosyn. We are consulted for abx recommendations.     Recommendations  1. Continue zosyn for now  2. Repeat blood cultures today  3. Recommend IR to aspirate hepatic abscess if able for cell count and cultures  4. Will tailor abx accordingly    Discussed with ID staff           Thank you for your consult. I will follow-up with patient. Please contact us if you have any additional questions.    Mirtha Purcell PA-C  Infectious Disease  [facility]  [phone]    Subjective:     Principal Problem: Hyperbilirubinemia    HPI: 86 y/o female with A.fib on eliquis, SSS s/p pacemaker, HTN presents to ED from GI clinic for elevated LFTs and hypotension 92/51 now with GNR bacteremia. We are consulted for abx recommendations.    Had recnet admission on 9/11 for abd pain, n/v found to have pancreatitis and CBD dilation and gallstone pancreatitis. She underwent ERCP 9/14 and underwent spincterotomy and stone removal. She is not a candidate for cholecystectomy. She returns to GI clinic and was noted to have n/v/ and fatigue and elevated LFTs with associated hypotension.  U/S of the abdomen was obtained and showed: "Dilatation of the extrahepatic common bile duct, intrahepatic duct demonstrates normal caliber however has internal debris (possibly representing biliary sludge), hepatic abscess and pancreatic mass.     She was seen by GI and underwent ERCP yesterday. Noted to have bile duct dilation and stent placed in CBD. Blood cultures on admit are positive with GNR and urine cultures +E.faecalis. she is currently on zosyn. We are consulted for abx recommendations.     CT A/P: Complex thick-walled partially septated cystic lesion involving the right hepatic lobe concerning for hepatic abscess.  Complex multiloculated cystic lesion involving the pancreatic body with additional suspected small cystic lesion/collection in the region of the pancreatic head as discussed above.  These are nonspecific and could relate to evolving pancreatic pseudocyst/acute collections, however underlying cystic neoplastic process is not excluded. Common bile duct stent in place with pneumobilia as well as small volume of air within the mildly distended, thickened gallbladder.  There is an air and fluid containing structure along the anterior margin of the extrahepatic common bile duct which may represent a loop of bowel (possibly relating to reported diverticulum seen on recent " ERCP) versus fluid and gas containing collection adjacent to and/or communicating with the common bile duct.       Past Medical History:   Diagnosis Date    A-fib     Arthritis     Hypertension     Thyroid disease        Past Surgical History:   Procedure Laterality Date    CATARACT EXTRACTION      CATARACT EXTRACTION W/  INTRAOCULAR LENS IMPLANT Bilateral 2004    DR IN [place]    ENDOSCOPIC ULTRASOUND OF UPPER GASTROINTESTINAL TRACT N/A 9/14/2020    Procedure: ULTRASOUND, UPPER GI TRACT, ENDOSCOPIC;  Surgeon: Esha Howard MD;  Location: [facility] ENDO (2ND FLR);  Service: Endoscopy;  Laterality: N/A;    ERCP N/A 9/14/2020    Procedure: ERCP (ENDOSCOPIC RETROGRADE CHOLANGIOPANCREATOGRAPHY);  Surgeon: Esha Howard MD;  Location: [facility] ENDO (2ND FLR);  Service: Endoscopy;  Laterality: N/A;    EYE SURGERY      HIP REPLACEMENT ARTHROPLASTY      HYSTERECTOMY      JOINT REPLACEMENT      REVISION OF SKIN POCKET FOR PACEMAKER N/A 1/21/2019    Procedure: REVISION-POCKET-PACEMAKER;  Surgeon: Asher Watts MD;  Location: [facility] EP LAB;  Service: Cardiology;  Laterality: N/A;  MODERATE SEDATION, sedation issues in the past    THYROIDECTOMY      TREATMENT OF CARDIAC ARRHYTHMIA N/A 3/18/2019    Procedure: CARDIOVERSION OR DEFIBRILLATION;  Surgeon: Asher Watts MD;  Location: [facility] EP LAB;  Service: Cardiology;  Laterality: N/A;  AF, JILL-cx if SR, DCCV, MAC, FAS, 3PREP    TREATMENT OF CARDIAC ARRHYTHMIA N/A 5/14/2019    Procedure: Cardioversion or Defibrillation;  Surgeon: Derick Vizcarra MD;  Location: [facility] EP LAB;  Service: Cardiology;  Laterality: N/A;  AF, JILL, DCCV, MAC, DM, 3PREP       Review of patient's allergies indicates:   Allergen Reactions    Ciprofloxacin Nausea And Vomiting       Medications:  Medications Prior to Admission   Medication Sig    amiodarone (PACERONE) 200 MG Tab TAKE 1 TABLET ONCE DAILY . START TAKING ONLY 1 TABLET EVERY DAY ON 4-19-19    apixaban  (ELIQUIS) 2.5 mg Tab Take 1 tablet (2.5 mg total) by mouth 2 (two) times daily.    aspirin (ECOTRIN) 81 MG EC tablet Take 81 mg by mouth once daily.    atenoloL (TENORMIN) 25 MG tablet TAKE 2 TABLETS EVERY MORNING AND 1 TABLET EVERY EVENING    atorvastatin (LIPITOR) 10 MG tablet TAKE 1 TABLET DAILY    diltiaZEM (CARDIZEM CD) 180 MG 24 hr capsule Take 1 capsule (180 mg total) by mouth once daily.    eszopiclone (LUNESTA) 2 MG Tab Take 2 mg by mouth every evening.    levothyroxine (SYNTHROID) 137 MCG Tab tablet Take 1 tablet (137 mcg total) by mouth once daily.    spironolactone (ALDACTONE) 25 MG tablet     ergocalciferol (VITAMIN D2) 50,000 unit Cap Take 1 capsule (50,000 Units total) by mouth every 7 days.     Antibiotics (From admission, onward)    Start     Stop Route Frequency Ordered    09/25/20 0955  piperacillin-tazobactam 4.5 g in sodium chloride 0.9% 100 mL IVPB (ready to mix system)      -- IV Every 8 hours (non-standard times) 09/25/20 0955        Antifungals (From admission, onward)    None        Antivirals (From admission, onward)    None           Immunization History   Administered Date(s) Administered    Influenza - High Dose - PF (65 years and older) 11/13/2014, 10/01/2015, 08/18/2017, 09/24/2018    Influenza - Quadrivalent - PF *Preferred* (6 months and older) 09/16/2019    Pneumococcal Conjugate - 13 Valent 05/11/2017    Pneumococcal Polysaccharide - 23 Valent 06/14/2007       Family History     Problem Relation (Age of Onset)    Heart attack Mother, Maternal Grandmother    Heart disease Mother, Maternal Grandmother    Heart failure Mother, Maternal Grandmother    Hypertension Mother, Maternal Grandmother    Stroke Maternal Grandmother        Social History     Socioeconomic History    Marital status:      Spouse name: Not on file    Number of children: Not on file    Years of education: Not on file    Highest education level: Not on file   Occupational History    Not on  file   Social Needs    Financial resource strain: Not on file    Food insecurity     Worry: Not on file     Inability: Not on file    Transportation needs     Medical: Not on file     Non-medical: Not on file   Tobacco Use    Smoking status: Former Smoker     Start date: 5/19/1964    Smokeless tobacco: Never Used   Substance and Sexual Activity    Alcohol use: No    Drug use: No    Sexual activity: Not Currently     Partners: Male   Lifestyle    Physical activity     Days per week: Not on file     Minutes per session: Not on file    Stress: Not on file   Relationships    Social connections     Talks on phone: Not on file     Gets together: Not on file     Attends Tenriism service: Not on file     Active member of club or organization: Not on file     Attends meetings of clubs or organizations: Not on file     Relationship status: Not on file   Other Topics Concern    Not on file   Social History Narrative    Not on file     Review of Systems   Constitutional: Positive for activity change, appetite change and fatigue. Negative for chills, diaphoresis and fever.   Respiratory: Negative for cough and shortness of breath.    Cardiovascular: Negative for chest pain.   Gastrointestinal: Positive for abdominal pain and nausea. Negative for diarrhea and vomiting.   Genitourinary: Negative for dysuria.   Musculoskeletal: Negative for arthralgias, back pain, gait problem and joint swelling.   Skin: Negative for color change, pallor, rash and wound.   Neurological: Negative for dizziness and headaches.   All other systems reviewed and are negative.    Objective:     Vital Signs (Most Recent):  Temp: 98.2 °F (36.8 °C) (09/26/20 1148)  Pulse: 88 (09/26/20 1148)  Resp: 20 (09/26/20 1148)  BP: 132/60 (09/26/20 1148)  SpO2: 97 % (09/26/20 1148) Vital Signs (24h Range):  Temp:  [96.4 °F (35.8 °C)-98.4 °F (36.9 °C)] 98.2 °F (36.8 °C)  Pulse:  [60-88] 88  Resp:  [15-24] 20  SpO2:  [94 %-98 %] 97 %  BP: (126-155)/(60-80)  132/60     Weight: 56.6 kg (124 lb 12.5 oz)  Body mass index is 22.1 kg/m².    Estimated Creatinine Clearance: 36.4 mL/min (based on SCr of 0.9 mg/dL).    Physical Exam  Vitals signs reviewed.   Constitutional:       General: She is not in acute distress.     Appearance: Normal appearance. She is not ill-appearing, toxic-appearing or diaphoretic.   HENT:      Head: Normocephalic.      Mouth/Throat:      Mouth: Mucous membranes are moist.   Cardiovascular:      Rate and Rhythm: Normal rate and regular rhythm.      Heart sounds: No murmur. No friction rub. No gallop.    Pulmonary:      Effort: Pulmonary effort is normal. No respiratory distress.      Breath sounds: Normal breath sounds. No stridor.   Abdominal:      General: Abdomen is flat. There is no distension.      Palpations: Abdomen is soft. There is no mass.   Musculoskeletal: Normal range of motion.         General: No swelling or tenderness.   Skin:     General: Skin is warm and dry.      Coloration: Skin is not jaundiced or pale.   Neurological:      General: No focal deficit present.      Mental Status: She is alert.   Psychiatric:         Mood and Affect: Mood normal.         Significant Labs: All pertinent labs within the past 24 hours have been reviewed.    Significant Imaging: I have reviewed all pertinent imaging results/findings within the past 24 hours.

## 2020-09-26 NOTE — ASSESSMENT & PLAN NOTE
Followed by Dr Colmenares in Cardiology  TSH 0.443  PM for SB  On existing EKG  ECG showed RBBB;  PLAN  - Telemetry  - Also started on atenalol.  - Apixaban stopped for GI consult.   - amiodarone continued.   - Has pacemaker in place.

## 2020-09-26 NOTE — ASSESSMENT & PLAN NOTE
On Apixaban 2.5mg po BID (age > 80, Wt < 60kg); last dose 9/23/20 pm  PLAN  - hold for possible GI procedure   - INR today was 1.

## 2020-09-26 NOTE — ASSESSMENT & PLAN NOTE
On existing EKG  Has pacemaker, ECG showed RBBB;  PLAN  - Telemetry  - Also started on atenalol.  - Apixaban stopped for GI consult.   - amiodarone continued.

## 2020-09-26 NOTE — TREATMENT PLAN
AES Treatment Plan    Gisselle Ortiz is a 87 y.o. female admitted to hospital 9/24/2020 (Hospital Day: 3) due to Hyperbilirubinemia.     Interval History  ERCP done 9/25  Impression:           - The major papilla was located partially within a                         large diverticulum.                         - The entire main bile duct was dilated.                         - The biliary tree was swept and nothing was found.                         - One plastic stent was placed into the common bile                         duct.     CT abdomen:  1.  Complex thick-walled partially septated cystic lesion involving the right hepatic lobe concerning for hepatic abscess, corresponding to abnormality on prior ultrasound examination.     2.  Complex multiloculated cystic lesion involving the pancreatic body with additional suspected small cystic lesion/collection in the region of the pancreatic head as discussed above.  These are nonspecific and could relate to evolving pancreatic pseudocyst/acute collections, however underlying cystic neoplastic process is not excluded.  Correlation with recent EUS and continued follow-up is advised.     3.  Common bile duct stent in place with pneumobilia as well as small volume of air within the mildly distended, thickened gallbladder.  There is an air and fluid containing structure along the anterior margin of the extrahepatic common bile duct which may represent a loop of bowel (possibly relating to reported diverticulum seen on recent ERCP) versus fluid and gas containing collection adjacent to and/or communicating with the common bile duct.  Objective  Temp:  [96.4 °F (35.8 °C)-98.4 °F (36.9 °C)] 96.4 °F (35.8 °C) (09/26 0901)  Pulse:  [60-66] 60 (09/26 0901)  BP: (110-155)/(55-80) 139/61 (09/26 0901)  Resp:  [14-31] 16 (09/26 0901)  SpO2:  [94 %-98 %] 94 % (09/26 0901)  Laboratory  Lab Results   Component Value Date    WBC 16.75 (H) 09/26/2020    HGB 8.4 (L) 09/26/2020    HCT  27.4 (L) 09/26/2020    MCV 97 09/26/2020     (H) 09/26/2020       Lab Results   Component Value Date    ALT 93 (H) 09/26/2020    AST 59 (H) 09/26/2020    ALKPHOS 96 09/26/2020    BILITOT 2.0 (H) 09/26/2020       Elevated LFTs, down trending, s/p ERCP found nothing, CT abdomen showing right hepatic lobe abscess, complex multiloculated cystic lesion in the pancreatic body and small cystic lesion/collection in region of pancreatic head, possible evolving pseudocyst/acute collections, underlying cystic neoplastic process not excluded.       Recommendations  - recommend general surgery and IR evaluation for hepatic abscess  - Repeat ERCP in 2 months to remove stent.   - Continue antibiotics.   - Plan of care was discussed with primary team.  - We will continue to follow.    Thank you for involving us in the care of Gisselle Ortiz. Please call with any additional questions, concerns or changes in the patient's clinical status.    Deandre Perea MD  Gastroenterology Fellow PGY V   [facility]

## 2020-09-26 NOTE — PLAN OF CARE
Patient tolerated procedure well. Report given to Floor RN. Patient in ROCU being watched by RN. To be taken to room by transport. Patient stable VS stable, patient sleeping. Patient given Fentanyl during procedure and fell asleep. Patient has no s/s of distress at this time.

## 2020-09-26 NOTE — ASSESSMENT & PLAN NOTE
Home meds include Amiodarone 200mg daily, Atenolol 25mg, Diltiazem 180mg daily, Apixaban 2.5mg BID (last dose 9/23 pm); held this admission.   BP 92/51 in GI clinic, improved with 1L fluid in ED  Last Cards Note (Darrian 7/17/20)  Gen change 1/2019. AF/AFL noted on f/u. Propafenone increased. Still had AF with RVR after that. Changed to flecainide, planned for DCCV; at some point changed to amio. Unclear timing of these changes.  TSH found to be abnormal, but T4 normal.  In past, following DCCV, she felt great! -- until AF recurred.  On amio now, with great effect.  PLAN  - Telemetry  - Continue Amiodarone  - Started atenolol   - Hold Apixaban for GI procedure.

## 2020-09-26 NOTE — HOSPITAL COURSE
9/26: Pt had ERCP 9/25, which was unremarkable for stone or obvious etiology for her elevated bili and LFT. CT scan with contrast following pancreatic protocol showed hepatic abscess. Blood culture grew gram negative cristofer; urine culture was positive enterococcus faecalis.   Pt had IR intervention for her hepatic abscess 9/26. Around 50 ml was put out via drainage yesterday. Blood culture grew E.Coli susceptible to multiple abx. Urine enterococcus faecalis also shown to have susceptibility to multiple organism. 9/27 drain still with significant output, will continue to monitor. Abscess cultures grew E. Coli. On 9/29 patient's WBC increased slightly to 25 and was overall more fatigued, prompting further workup and ID was notified.

## 2020-09-26 NOTE — SUBJECTIVE & OBJECTIVE
Past Medical History:   Diagnosis Date    A-fib     Arthritis     Hypertension     Thyroid disease        Past Surgical History:   Procedure Laterality Date    CATARACT EXTRACTION      CATARACT EXTRACTION W/  INTRAOCULAR LENS IMPLANT Bilateral 2004    DR IN [place]    ENDOSCOPIC ULTRASOUND OF UPPER GASTROINTESTINAL TRACT N/A 9/14/2020    Procedure: ULTRASOUND, UPPER GI TRACT, ENDOSCOPIC;  Surgeon: Esha Howard MD;  Location: [facility] ENDO (2ND FLR);  Service: Endoscopy;  Laterality: N/A;    ERCP N/A 9/14/2020    Procedure: ERCP (ENDOSCOPIC RETROGRADE CHOLANGIOPANCREATOGRAPHY);  Surgeon: Esha Howard MD;  Location: [facility] ENDO (2ND FLR);  Service: Endoscopy;  Laterality: N/A;    EYE SURGERY      HIP REPLACEMENT ARTHROPLASTY      HYSTERECTOMY      JOINT REPLACEMENT      REVISION OF SKIN POCKET FOR PACEMAKER N/A 1/21/2019    Procedure: REVISION-POCKET-PACEMAKER;  Surgeon: Asher Watts MD;  Location: [facility] EP LAB;  Service: Cardiology;  Laterality: N/A;  MODERATE SEDATION, sedation issues in the past    THYROIDECTOMY      TREATMENT OF CARDIAC ARRHYTHMIA N/A 3/18/2019    Procedure: CARDIOVERSION OR DEFIBRILLATION;  Surgeon: Asher Watts MD;  Location: [facility] EP LAB;  Service: Cardiology;  Laterality: N/A;  AF, JILL-cx if SR, DCCV, MAC, FAS, 3PREP    TREATMENT OF CARDIAC ARRHYTHMIA N/A 5/14/2019    Procedure: Cardioversion or Defibrillation;  Surgeon: Derick Vizcarra MD;  Location: [facility] EP LAB;  Service: Cardiology;  Laterality: N/A;  AF, JILL, DCCV, MAC, DM, 3PREP       Review of patient's allergies indicates:   Allergen Reactions    Ciprofloxacin Nausea And Vomiting       Medications:  Medications Prior to Admission   Medication Sig    amiodarone (PACERONE) 200 MG Tab TAKE 1 TABLET ONCE DAILY . START TAKING ONLY 1 TABLET EVERY DAY ON 4-19-19    apixaban (ELIQUIS) 2.5 mg Tab Take 1 tablet (2.5 mg total) by mouth 2 (two) times daily.    aspirin (ECOTRIN) 81 MG EC tablet Take  81 mg by mouth once daily.    atenoloL (TENORMIN) 25 MG tablet TAKE 2 TABLETS EVERY MORNING AND 1 TABLET EVERY EVENING    atorvastatin (LIPITOR) 10 MG tablet TAKE 1 TABLET DAILY    diltiaZEM (CARDIZEM CD) 180 MG 24 hr capsule Take 1 capsule (180 mg total) by mouth once daily.    eszopiclone (LUNESTA) 2 MG Tab Take 2 mg by mouth every evening.    levothyroxine (SYNTHROID) 137 MCG Tab tablet Take 1 tablet (137 mcg total) by mouth once daily.    spironolactone (ALDACTONE) 25 MG tablet     ergocalciferol (VITAMIN D2) 50,000 unit Cap Take 1 capsule (50,000 Units total) by mouth every 7 days.     Antibiotics (From admission, onward)    Start     Stop Route Frequency Ordered    09/25/20 0955  piperacillin-tazobactam 4.5 g in sodium chloride 0.9% 100 mL IVPB (ready to mix system)      -- IV Every 8 hours (non-standard times) 09/25/20 0955        Antifungals (From admission, onward)    None        Antivirals (From admission, onward)    None           Immunization History   Administered Date(s) Administered    Influenza - High Dose - PF (65 years and older) 11/13/2014, 10/01/2015, 08/18/2017, 09/24/2018    Influenza - Quadrivalent - PF *Preferred* (6 months and older) 09/16/2019    Pneumococcal Conjugate - 13 Valent 05/11/2017    Pneumococcal Polysaccharide - 23 Valent 06/14/2007       Family History     Problem Relation (Age of Onset)    Heart attack Mother, Maternal Grandmother    Heart disease Mother, Maternal Grandmother    Heart failure Mother, Maternal Grandmother    Hypertension Mother, Maternal Grandmother    Stroke Maternal Grandmother        Social History     Socioeconomic History    Marital status:      Spouse name: Not on file    Number of children: Not on file    Years of education: Not on file    Highest education level: Not on file   Occupational History    Not on file   Social Needs    Financial resource strain: Not on file    Food insecurity     Worry: Not on file     Inability: Not  on file    Transportation needs     Medical: Not on file     Non-medical: Not on file   Tobacco Use    Smoking status: Former Smoker     Start date: 5/19/1964    Smokeless tobacco: Never Used   Substance and Sexual Activity    Alcohol use: No    Drug use: No    Sexual activity: Not Currently     Partners: Male   Lifestyle    Physical activity     Days per week: Not on file     Minutes per session: Not on file    Stress: Not on file   Relationships    Social connections     Talks on phone: Not on file     Gets together: Not on file     Attends [employer] service: Not on file     Active member of club or organization: Not on file     Attends meetings of clubs or organizations: Not on file     Relationship status: Not on file   Other Topics Concern    Not on file   Social History Narrative    Not on file     Review of Systems   Constitutional: Positive for activity change, appetite change and fatigue. Negative for chills, diaphoresis and fever.   Respiratory: Negative for cough and shortness of breath.    Cardiovascular: Negative for chest pain.   Gastrointestinal: Positive for abdominal pain and nausea. Negative for diarrhea and vomiting.   Genitourinary: Negative for dysuria.   Musculoskeletal: Negative for arthralgias, back pain, gait problem and joint swelling.   Skin: Negative for color change, pallor, rash and wound.   Neurological: Negative for dizziness and headaches.   All other systems reviewed and are negative.    Objective:     Vital Signs (Most Recent):  Temp: 98.2 °F (36.8 °C) (09/26/20 1148)  Pulse: 88 (09/26/20 1148)  Resp: 20 (09/26/20 1148)  BP: 132/60 (09/26/20 1148)  SpO2: 97 % (09/26/20 1148) Vital Signs (24h Range):  Temp:  [96.4 °F (35.8 °C)-98.4 °F (36.9 °C)] 98.2 °F (36.8 °C)  Pulse:  [60-88] 88  Resp:  [15-24] 20  SpO2:  [94 %-98 %] 97 %  BP: (126-155)/(60-80) 132/60     Weight: 56.6 kg (124 lb 12.5 oz)  Body mass index is 22.1 kg/m².    Estimated Creatinine Clearance: 36.4 mL/min  (based on SCr of 0.9 mg/dL).    Physical Exam  Vitals signs reviewed.   Constitutional:       General: She is not in acute distress.     Appearance: Normal appearance. She is not ill-appearing, toxic-appearing or diaphoretic.   HENT:      Head: Normocephalic.      Mouth/Throat:      Mouth: Mucous membranes are moist.   Cardiovascular:      Rate and Rhythm: Normal rate and regular rhythm.      Heart sounds: No murmur. No friction rub. No gallop.    Pulmonary:      Effort: Pulmonary effort is normal. No respiratory distress.      Breath sounds: Normal breath sounds. No stridor.   Abdominal:      General: Abdomen is flat. There is no distension.      Palpations: Abdomen is soft. There is no mass.   Musculoskeletal: Normal range of motion.         General: No swelling or tenderness.   Skin:     General: Skin is warm and dry.      Coloration: Skin is not jaundiced or pale.   Neurological:      General: No focal deficit present.      Mental Status: She is alert.   Psychiatric:         Mood and Affect: Mood normal.         Significant Labs: All pertinent labs within the past 24 hours have been reviewed.    Significant Imaging: I have reviewed all pertinent imaging results/findings within the past 24 hours.

## 2020-09-26 NOTE — HPI
"86 y/o female with A.fib on eliquis, SSS s/p pacemaker, HTN presents to ED from GI clinic for elevated LFTs and hypotension 92/51 now with GNR bacteremia. We are consulted for abx recommendations.    Had recnet admission on 9/11 for abd pain, n/v found to have pancreatitis and CBD dilation and gallstone pancreatitis. She underwent ERCP 9/14 and underwent spincterotomy and stone removal. She is not a candidate for cholecystectomy. She returns to GI clinic and was noted to have n/v/ and fatigue and elevated LFTs with associated hypotension.  U/S of the abdomen was obtained and showed: "Dilatation of the extrahepatic common bile duct, intrahepatic duct demonstrates normal caliber however has internal debris (possibly representing biliary sludge), hepatic abscess and pancreatic mass.     She was seen by GI and underwent ERCP yesterday. Noted to have bile duct dilation and stent placed in CBD. Blood cultures on admit are positive with GNR and urine cultures +E.faecalis. she is currently on zosyn. We are consulted for abx recommendations.     CT A/P: Complex thick-walled partially septated cystic lesion involving the right hepatic lobe concerning for hepatic abscess.  Complex multiloculated cystic lesion involving the pancreatic body with additional suspected small cystic lesion/collection in the region of the pancreatic head as discussed above.  These are nonspecific and could relate to evolving pancreatic pseudocyst/acute collections, however underlying cystic neoplastic process is not excluded. Common bile duct stent in place with pneumobilia as well as small volume of air within the mildly distended, thickened gallbladder.  There is an air and fluid containing structure along the anterior margin of the extrahepatic common bile duct which may represent a loop of bowel (possibly relating to reported diverticulum seen on recent ERCP) versus fluid and gas containing collection adjacent to and/or communicating with the " common bile duct.

## 2020-09-27 LAB
BACTERIA BLD CULT: ABNORMAL
BACTERIA UR CULT: ABNORMAL
GRAM STN SPEC: NORMAL
GRAM STN SPEC: NORMAL

## 2020-09-27 PROCEDURE — 99222 PR INITIAL HOSPITAL CARE,LEVL II: ICD-10-PCS | Mod: ,,, | Performed by: SURGERY

## 2020-09-27 PROCEDURE — 99233 SBSQ HOSP IP/OBS HIGH 50: CPT | Mod: GC,,, | Performed by: INTERNAL MEDICINE

## 2020-09-27 PROCEDURE — 99222 1ST HOSP IP/OBS MODERATE 55: CPT | Mod: ,,, | Performed by: SURGERY

## 2020-09-27 PROCEDURE — 11000001 HC ACUTE MED/SURG PRIVATE ROOM

## 2020-09-27 PROCEDURE — 25000003 PHARM REV CODE 250: Performed by: STUDENT IN AN ORGANIZED HEALTH CARE EDUCATION/TRAINING PROGRAM

## 2020-09-27 PROCEDURE — 63600175 PHARM REV CODE 636 W HCPCS: Performed by: STUDENT IN AN ORGANIZED HEALTH CARE EDUCATION/TRAINING PROGRAM

## 2020-09-27 PROCEDURE — 99233 PR SUBSEQUENT HOSPITAL CARE,LEVL III: ICD-10-PCS | Mod: GC,,, | Performed by: INTERNAL MEDICINE

## 2020-09-27 PROCEDURE — 99233 PR SUBSEQUENT HOSPITAL CARE,LEVL III: ICD-10-PCS | Mod: ,,, | Performed by: PHYSICIAN ASSISTANT

## 2020-09-27 PROCEDURE — 99233 SBSQ HOSP IP/OBS HIGH 50: CPT | Mod: ,,, | Performed by: PHYSICIAN ASSISTANT

## 2020-09-27 RX ORDER — SODIUM CHLORIDE 9 MG/ML
INJECTION, SOLUTION INTRAVENOUS CONTINUOUS
Status: DISCONTINUED | OUTPATIENT
Start: 2020-09-27 | End: 2020-09-28

## 2020-09-27 RX ORDER — DICLOFENAC SODIUM 10 MG/G
4 GEL TOPICAL 3 TIMES DAILY
Status: DISCONTINUED | OUTPATIENT
Start: 2020-09-27 | End: 2020-10-02 | Stop reason: HOSPADM

## 2020-09-27 RX ORDER — HYDROCODONE BITARTRATE AND ACETAMINOPHEN 5; 325 MG/1; MG/1
1 TABLET ORAL EVERY 6 HOURS PRN
Status: DISCONTINUED | OUTPATIENT
Start: 2020-09-27 | End: 2020-10-02 | Stop reason: HOSPADM

## 2020-09-27 RX ADMIN — HYDROCODONE BITARTRATE AND ACETAMINOPHEN 1 TABLET: 5; 325 TABLET ORAL at 09:09

## 2020-09-27 RX ADMIN — ATENOLOL 50 MG: 50 TABLET ORAL at 06:09

## 2020-09-27 RX ADMIN — PIPERACILLIN SODIUM,TAZOBACTAM SODIUM 4.5 G: 4; .5 INJECTION, POWDER, FOR SOLUTION INTRAVENOUS at 10:09

## 2020-09-27 RX ADMIN — SODIUM CHLORIDE 500 ML: 0.9 INJECTION, SOLUTION INTRAVENOUS at 12:09

## 2020-09-27 RX ADMIN — PIPERACILLIN SODIUM,TAZOBACTAM SODIUM 4.5 G: 4; .5 INJECTION, POWDER, FOR SOLUTION INTRAVENOUS at 06:09

## 2020-09-27 RX ADMIN — ATORVASTATIN CALCIUM 10 MG: 10 TABLET, FILM COATED ORAL at 09:09

## 2020-09-27 RX ADMIN — DICLOFENAC 4 G: 10 GEL TOPICAL at 09:09

## 2020-09-27 RX ADMIN — SODIUM CHLORIDE 250 ML: 0.9 INJECTION, SOLUTION INTRAVENOUS at 09:09

## 2020-09-27 RX ADMIN — AMIODARONE HYDROCHLORIDE 200 MG: 200 TABLET ORAL at 09:09

## 2020-09-27 RX ADMIN — PIPERACILLIN SODIUM,TAZOBACTAM SODIUM 4.5 G: 4; .5 INJECTION, POWDER, FOR SOLUTION INTRAVENOUS at 02:09

## 2020-09-27 RX ADMIN — SODIUM CHLORIDE: 0.9 INJECTION, SOLUTION INTRAVENOUS at 05:09

## 2020-09-27 RX ADMIN — LEVOTHYROXINE SODIUM 137 MCG: 25 TABLET ORAL at 05:09

## 2020-09-27 RX ADMIN — ACETAMINOPHEN 650 MG: 325 TABLET ORAL at 09:09

## 2020-09-27 NOTE — ASSESSMENT & PLAN NOTE
Blood culture grew gram negative cristofer yesterday. Did not identify the exact organism or susceptibility.   - VSS stable: Temp 97, HR 60, RR 15, and /77; SpO2 94%. Patient appeared alert, oriented, and responding to commands.   - WBC is 16  - ID was consulted.   - ID recommended to continue with zosyn since susceptibility is pending.   - Follow up culture susceptibility results.

## 2020-09-27 NOTE — CONSULTS
[facility]  General Surgery  Consult Note    Inpatient consult to General Surgery  Consult performed by: Esteban Baugh MD  Consult ordered by: Deandre Perea MD        Subjective:     Chief Complaint/Reason for Admission: Fatigue, N/V    History of Present Illness: Patient is an 86 yo female with medical history significant for AFib on Eliquis, SSS s/p pacemaker placement (1/2019), HTN, hyperthyroidism s/p thyroidectomy recently admitted on 9/11 for gallstone pancreatitis. Underwent ERCP and sphincterotomy for choledocholithiasis at that time. She was deemed to not be a surgical candidate due to age and co-morbidities. She was also not interested in any surgical intervention.   Patient was seen by GI on 9/24 and complained on fatigue, N/V. Labs showed leukocytosis, elevated LFTs, and bilirubin. She was sent to ED.   Imaging revealed biliary dilation, right hepatic abscess, and small pancreatic pseudocyst. She underwent ERCP and CBD stent placement on 9/25. Also underwent IR drainage of hepatic abscess yesterday. Surgery consulted for further recs.     Blood cultures positive for E. Coli and urine cultures positive for Enterococcus.     No current facility-administered medications on file prior to encounter.      Current Outpatient Medications on File Prior to Encounter   Medication Sig    amiodarone (PACERONE) 200 MG Tab TAKE 1 TABLET ONCE DAILY . START TAKING ONLY 1 TABLET EVERY DAY ON 4-19-19    apixaban (ELIQUIS) 2.5 mg Tab Take 1 tablet (2.5 mg total) by mouth 2 (two) times daily.    aspirin (ECOTRIN) 81 MG EC tablet Take 81 mg by mouth once daily.    atenoloL (TENORMIN) 25 MG tablet TAKE 2 TABLETS EVERY MORNING AND 1 TABLET EVERY EVENING    atorvastatin (LIPITOR) 10 MG tablet TAKE 1 TABLET DAILY    diltiaZEM (CARDIZEM CD) 180 MG 24 hr capsule Take 1 capsule (180 mg total) by mouth once daily.    eszopiclone (LUNESTA) 2 MG Tab Take 2 mg by mouth every evening.    levothyroxine  (SYNTHROID) 137 MCG Tab tablet Take 1 tablet (137 mcg total) by mouth once daily.    spironolactone (ALDACTONE) 25 MG tablet     ergocalciferol (VITAMIN D2) 50,000 unit Cap Take 1 capsule (50,000 Units total) by mouth every 7 days.       Review of patient's allergies indicates:   Allergen Reactions    Ciprofloxacin Nausea And Vomiting       Past Medical History:   Diagnosis Date    A-fib     Arthritis     Hypertension     Thyroid disease      Past Surgical History:   Procedure Laterality Date    CATARACT EXTRACTION      CATARACT EXTRACTION W/  INTRAOCULAR LENS IMPLANT Bilateral 2004    DR IN [place]    ENDOSCOPIC ULTRASOUND OF UPPER GASTROINTESTINAL TRACT N/A 9/14/2020    Procedure: ULTRASOUND, UPPER GI TRACT, ENDOSCOPIC;  Surgeon: Esha Howard MD;  [facility]  Service: Endoscopy;  Laterality: N/A;    ERCP N/A 9/14/2020    Procedure: ERCP (ENDOSCOPIC RETROGRADE CHOLANGIOPANCREATOGRAPHY);  Surgeon: Esha Howard MD;  Location: [facility] ENDO ([address] [place] [address]);  Service: Endoscopy;  Laterality: N/A;    EYE SURGERY      HIP REPLACEMENT ARTHROPLASTY      HYSTERECTOMY      JOINT REPLACEMENT      REVISION OF SKIN POCKET FOR PACEMAKER N/A 1/21/2019    Procedure: REVISION-POCKET-PACEMAKER;  Surgeon: Asher Watts MD;  Location: [facility] EP LAB;  Service: Cardiology;  Laterality: N/A;  MODERATE SEDATION, sedation issues in the past    THYROIDECTOMY      TREATMENT OF CARDIAC ARRHYTHMIA N/A 3/18/2019    Procedure: CARDIOVERSION OR DEFIBRILLATION;  Surgeon: Asher Watts MD;  Location: [facility] EP LAB;  Service: Cardiology;  Laterality: N/A;  AF, JILL-cx if SR, DCCV, MAC, FAS, 3PREP    TREATMENT OF CARDIAC ARRHYTHMIA N/A 5/14/2019    Procedure: Cardioversion or Defibrillation;  Surgeon: Derick Vizcarra MD;  Location: [facility] EP LAB;  Service: Cardiology;  Laterality: N/A;  AF, JILL, DCCV, MAC, DM, 3PREP     Family History     Problem Relation (Age of Onset)    Heart attack Mother,  Maternal Grandmother    Heart disease Mother, Maternal Grandmother    Heart failure Mother, Maternal Grandmother    Hypertension Mother, Maternal Grandmother    Stroke Maternal Grandmother        Tobacco Use    Smoking status: Former Smoker     Start date: 5/19/1964    Smokeless tobacco: Never Used   Substance and Sexual Activity    Alcohol use: No    Drug use: No    Sexual activity: Not Currently     Partners: Male     Review of Systems   Constitutional: Positive for activity change and appetite change. Negative for chills, fatigue and fever.   HENT: Negative.    Eyes: Negative.    Respiratory: Negative.    Cardiovascular: Negative.    Gastrointestinal: Positive for nausea and vomiting. Negative for constipation and diarrhea.   Endocrine: Negative.    Genitourinary: Negative.    Musculoskeletal: Negative.    Skin: Negative.    Neurological: Negative.    Hematological: Negative.    Psychiatric/Behavioral: Negative.      Objective:     Vital Signs (Most Recent):  Temp: 97.5 °F (36.4 °C) (09/27/20 1136)  Pulse: 60 (09/27/20 1136)  Resp: 20 (09/27/20 1136)  BP: (!) 90/55 (09/27/20 1138)  SpO2: (!) 93 % (09/27/20 1136) Vital Signs (24h Range):  Temp:  [97.5 °F (36.4 °C)-98.9 °F (37.2 °C)] 97.5 °F (36.4 °C)  Pulse:  [60-82] 60  Resp:  [16-20] 20  SpO2:  [93 %-100 %] 93 %  BP: ()/(51-80) 90/55     Weight: 56.6 kg (124 lb 12.5 oz)  Body mass index is 22.1 kg/m².      Intake/Output Summary (Last 24 hours) at 9/27/2020 1252  Last data filed at 9/27/2020 0916  Gross per 24 hour   Intake 300 ml   Output 37 ml   Net 263 ml       Physical Exam  Vitals signs and nursing note reviewed.   Constitutional:       Appearance: Normal appearance.   HENT:      Head: Normocephalic.      Mouth/Throat:      Mouth: Mucous membranes are moist.   Eyes:      Extraocular Movements: Extraocular movements intact.      Pupils: Pupils are equal, round, and reactive to light.   Cardiovascular:      Rate and Rhythm: Normal rate and regular  rhythm.      Pulses: Normal pulses.   Pulmonary:      Effort: Pulmonary effort is normal.   Abdominal:      General: Abdomen is flat. There is no distension.      Palpations: There is no mass.      Tenderness: There is no abdominal tenderness.      Hernia: No hernia is present.      Comments: Mild TTP around IR drain site. LUQ IR drain in placement with SS output   Musculoskeletal: Normal range of motion.   Skin:     General: Skin is warm and dry.   Neurological:      General: No focal deficit present.      Mental Status: She is oriented to person, place, and time.   Psychiatric:         Mood and Affect: Mood normal.         Behavior: Behavior normal.         Significant Labs:  ABGs: No results for input(s): PH, PCO2, PO2, HCO3, POCSATURATED, BE in the last 48 hours.  Amylase: No results for input(s): AMYLASE in the last 48 hours.  BMP:   Recent Labs   Lab 09/26/20  0602   GLU 78      K 4.2      CO2 23   BUN 25*   CREATININE 0.9   CALCIUM 7.3*   MG 2.0     Cardiac markers: No results for input(s): CKMB, CPKMB, TROPONINT, TROPONINI, MYOGLOBIN in the last 48 hours.  CBC:   Recent Labs   Lab 09/26/20  0602   WBC 16.75*   RBC 2.84*   HGB 8.4*   HCT 27.4*   *   MCV 97   MCH 29.6   MCHC 30.7*     CMP:   Recent Labs   Lab 09/26/20  0602   GLU 78   CALCIUM 7.3*   ALBUMIN 1.8*   PROT 4.9*      K 4.2   CO2 23      BUN 25*   CREATININE 0.9   ALKPHOS 96   ALT 93*   AST 59*   BILITOT 2.0*       Significant Diagnostics:  I have reviewed all pertinent imaging results/findings within the past 24 hours.    Assessment/Plan:     87 y.o. female with multiple co-morbidities, recent gallstone pancreatitis s/p ERCP and sphincterotomy presents with hepatic abscess, pancreatic pseudocyst, GNR bacteremia     Now s/p ERCP and CBD stent placement with improving bilirubin. Hepatic abscess addressed with IR drainage.   She has no evidence of acute cholecystitis. Gallbladder is decompressed without evidence of wall  thickening or pericholecystic fluid on imaging. She is not a candidate for or interested in cholecystectomy.     Continue care per primary team.     Thank you for your consult. I will sign off. Please contact us if you have any additional questions.    Esteban Baugh MD  General Surgery  [facility]

## 2020-09-27 NOTE — MEDICAL/APP STUDENT
[facility] Med Student Progress Note      Gisselle Ortiz  YOB: 1933  Medical Record Number:  [id number]  Attending Physician:  Kai Arellano MD   Date of Admission: 9/24/2020       Hospital Day:  3  Current Principal Problem:  Hepatic abscess      History     Cc: Pain at drain site    HPI  Ms. Ortiz is a 87 year old female with gallstone pancreatitis two weeks ago s/p ERCP,  Afib, SSS s/p pacemaker and hypothyroidism who presented from GI clinic with elevated liver enzymes and hypotension (92/51).     She also has HTN and arthritis.     The patient has been recovering from gallstone pancreatitis s/p ERCP done on 9/14/20 and had been doing well until a few days after discharge when she became severely fatigued to the point the patient could not walk. She has also had decreased appetite and nausea but only 1 episode of vomiting. She denies abdominal pain, fevers, diarrhea. Further, she said her urine is orange/brown and is malodorous but denies dysuria.     During her recent hospitalization for gallstone pancreatitis she underwent an ERCP and EUS with sphincterotomy and the stone and sludge was removed. No stents were placed. Given her age and co-morbidities cholecystectomy was not performed.     ED Course:  Vitals on arrival: Temp 98, Pulse 62, RR 18, /51, SpO2 97%. Patient was nauseous and had RUQ discomfort to deep palpation. She received 1 liter of NS. BP improved to 130/61 after IVF.      Hospital Course:  Patient has had ERCP which was unremarkable for stone or obvious etiology for her elevated bilirubin or transaminases.  CT scan showed a complex thick-walled partially septated cystic lesion involving the right hepatic lobe suspicious for hepatic abscess. The hepatic abscess was drained by IR on 9/26.      Today, the patient complains of pain around the drain site and is very thirsty.       Medications  Scheduled Meds:   amiodarone  200 mg Oral Daily     atenoloL  25 mg Oral QHS    atenoloL  50 mg Oral QAM    atorvastatin  10 mg Oral Daily    levothyroxine  137 mcg Oral Before breakfast    piperacillin-tazobactam (ZOSYN) IVPB  4.5 g Intravenous Q8H     Continuous Infusions:  PRN Meds:.acetaminophen, dextrose 50%, dextrose 50%, glucagon (human recombinant), glucose, glucose, HYDROcodone-acetaminophen, iohexol, sodium chloride 0.9%      PSFH:  Please see admission note and assessment and plan below.      ROS   Admits Denies   Constitutional  Chills, diaphoresis, malaise   Eyes  Visual changes   ENMT  Dysphagia, Epistaxis, nasal congestion,    Cardiovascular  Chest pain, palpitations, edema   Respiratory  Cough, dyspnea, wheezing   Gastrointestinal Diarrhea  Nausea, vomiting, constipation, anorexia.     Genitourinary  Dysuria, incontinence   Musculoskeletal  Myalgias, joint pain, joint swelling   Integumentary Pain around drain site Rash, inflammation   Neruo-Psychiatric  Anxiety, insomnia.  Changes in speech, strength, sensation.     Endocrine  Polyuria, polydipsia   Hematologic  Abnormal bruising, bleeding   Immunologic  Inflammation, pain at IV sites.  Pruritis.         Physical Examination     General:  Patient lying in bed in moderate discomfort     Vital Signs  Vitals  Temp: 97.8 °F (36.6 °C)  Temp src: Oral  Pulse: 60  Heart Rate Source: Monitor  Resp: 16  SpO2: 97 %  Pulse Oximetry Type: Intermittent  O2 Device (Oxygen Therapy): room air  BP: (!) 104/53  MAP (mmHg): 77  BP Location: Left arm  BP Method: Automatic  Patient Position: Lying          24 Hour VS Range    Temp:  [97.8 °F (36.6 °C)-98.9 °F (37.2 °C)]   Pulse:  [60-88]   Resp:  [16-20]   BP: ()/(52-80)   SpO2:  [94 %-100 %]     Intake/Output Summary (Last 24 hours) at 9/27/2020 1100  Last data filed at 9/27/2020 0400  Gross per 24 hour   Intake 100 ml   Output 27 ml   Net 73 ml       Head: NCAT  Eyes: conjunctivae and lids normal, no scleral icterus.  ENMT:  no gingival bleeding, normal  oral mucosa without pallor or cyanosis.   Neck:  JVP normal.  Trachea non-displaced.     Chest:  Normal respiratory effort.  Chest clear to auscultation.  No wheezes, rales, or rhonchi.  Heart:  Normal rate and rythem, S1 S2 normal quality.  No murmurs rubs or gallops.  Peripheral pulses 2+.  Abdomen: Drain site CDI, tenderness to palpation around drain site Non-distended, normal bowel sounds, non-tender.    Extremities:  No edema. Normal capillary refill.    Skin:  Warm and dry.  No cyanosis or pallor.  No ulcers, stasis.  IV sites without tenderness or inflammation.    Neurological / Psychiatric:  Oriented to person, time, and place.  No facial asymmetry, drift.  Fluent without dysarthria.  Mood euthymic, affect normal.           Data       Recent Labs   Lab 09/24/20 1736 09/25/20 0554 09/26/20  0602   WBC 20.38* 17.80* 16.75*   HGB 10.2* 8.1* 8.4*   HCT 31.7* 26.1* 27.4*   * 417* 469*        Recent Labs   Lab 09/24/20 1736 09/25/20  0554 09/26/20  0602   * 133* 139   K 4.5 4.2 4.2    104 106   CO2 25 22* 23   BUN 30* 30* 25*   CREATININE 1.2 1.0 0.9   ANIONGAP 8 7* 10   CALCIUM 8.0* 7.5* 7.3*        Recent Labs   Lab 09/24/20 1736 09/25/20  0554 09/26/20  0602   PROT 6.1 5.0* 4.9*   ALBUMIN 2.3* 2.0* 1.8*   BILITOT 3.3* 2.5* 2.0*   ALKPHOS 132 103 96   * 70* 59*   * 108* 93*      Urine Culture: (9/24/20)  E. Faecalis (>100,000 cfu/ml)  Ampicillin   <=2 mcg/mL Sensitive    Nitrofurantoin <=32 mcg/mL Sensitive    Tetracycline >8 mcg/mL Resistant    Vancomycin 1 mcg/mL Sensitive      Blood Culture: (9/24/20)  E. coli  Amox/K Clav'ate <=8/4 mcg/mL Sensitive    Amp/Sulbactam >16/8 mcg/mL Resistant    Ampicillin >16 mcg/mL Resistant    Cefazolin <=2 mcg/mL Sensitive    Ceftriaxone <=1 mcg/mL Sensitive    Ciprofloxacin <=0.5 mcg/mL Sensitive    Ertapenem <=0.5 mcg/mL Sensitive    Gentamicin <=1 mcg/mL Sensitive    Levofloxacin <=1 mcg/mL Sensitive    Meropenem <=1 mcg/mL Sensitive     Piperacillin/Tazo <=8 mcg/mL Sensitive    Tetracycline <=2 mcg/mL Sensitive    Tobramycin <=2 mcg/mL Sensitive    Trimeth/Sulfa >2/38 mcg/mL Resistant        ERCP (9/25/20)  The entire main bile duct was dilated.   The biliary tree was swept and nothing was found.    One plastic stent was placed into the common bile duct     CT (9/25/20)  Complex thick-walled partially septated cystic lesion involving the right hepatic lobe concerning for hepatic abscess     Complex multiloculated cystic lesion involving the pancreatic body with additional suspected small cystic lesion/collection in the region of the pancreatic head. These are nonspecific and could relate to evolving pancreatic pseudocyst/acute collections, however underlying cystic neoplastic process is not excluded.       Common bile duct stent in place with pneumobilia as well as small volume of air within the mildly distended, thickened gallbladder.          Assessment & Plan     * Hepatic abscess  Acute.    Complex thick-walled partially septated cystic lesion involving the right hepatic lobe  35 ml Purulent bilious material removed from abscess           - Gram negative rods and many WBC's on gram stain   Surgery, GI and ID following      Cholecystectomy not recommended  Cultures of liver abscess pending    Continue Zosyn  Norco 5 PRN for pain         UTI (urinary tract infection)  Acute    E. Faecalis on culture    Continue on Zosyn       Bacteremia  Acute    E. Coli on culture     Continue on Zosyn          SSS (sick sinus syndrome)  Chronic Stable   Followed by Dr Colmenares in Cardiology          Telemetry   atenalol.  Apixaban stopped for GI consult.   amiodarone continued  Has pacemaker in place.         RBBB  Chronic Stable  On existing EKG  Has pacemaker, ECG showed RBBB;      Telemetry  Also started on atenalol.  Apixaban stopped for GI consult.   amiodarone continued.         Hypothyroidism  Chronic Stable  TSH 0.443    Continue levothyroxine 0.137  mg        Hypertension  Chronic Stable    atenolol.      Pacemaker  DC PPM (2014; [facility]JM) implanted for symptomatic SB and first degree AVB        Paroxysmal atrial fibrillation  Chronic Stable      Telemetry  Continue Amiodarone  atenolol   Hold Apixaban for GI procedure.               Sha Perez  MS3

## 2020-09-27 NOTE — ASSESSMENT & PLAN NOTE
"86 y/o female with A.fib on eliquis, SSS s/p pacemaker, HTN presents to ED from GI clinic for elevated LFTs and hypotension 92/51 now with E.coli bacteremia. We are consulted for abx recommendations.    Had recent admission on 9/11 for abd pain, n/v found to have pancreatitis and CBD dilation and gallstone pancreatitis. She underwent ERCP 9/14 and underwent spincterotomy and stone removal. She is not a candidate for cholecystectomy. She returns to GI clinic and was noted to have n/v/ and fatigue and elevated LFTs with associated hypotension.  Imaging studies showed "Dilatation of the extrahepatic common bile duct, intrahepatic duct demonstrates normal caliber however has internal debris (possibly representing biliary sludge), hepatic abscess and pancreatic mass.     She was seen by GI and underwent ERCP 9/25. Noted to have bile duct dilation and stent placed in CBD. Blood cultures on admit are positive with E.coli and urine cultures +E.faecalis. She is currently on zosyn. We are consulted for abx recommendations.     Recommendations  1. Continue zosyn for now  2. Repeat blood cultures 9/26 NGTD  3. S/p IR drainage with catheter placement. 35 mL purulent bilious material removed through drain. Specimen sent. Gram stain GNR. Cultures pending  4. Will tailor abx accordingly    Discussed with ID staff     "

## 2020-09-27 NOTE — PLAN OF CARE
Problem: Fall Injury Risk  Goal: Absence of Fall and Fall-Related Injury  Outcome: Ongoing, Progressing  Intervention: Identify and Manage Contributors to Fall Injury Risk  Flowsheets (Taken 9/27/2020 0524)  Self-Care Promotion:   independence encouraged   BADL personal objects within reach  Medication Review/Management: medications reviewed     Problem: Adult Inpatient Plan of Care  Goal: Plan of Care Review  Outcome: Ongoing, Progressing  Flowsheets (Taken 9/27/2020 0524)  Plan of Care Reviewed With: patient  Goal: Patient-Specific Goal (Individualization)  Outcome: Ongoing, Progressing  Goal: Absence of Hospital-Acquired Illness or Injury  Outcome: Ongoing, Progressing  Goal: Optimal Comfort and Wellbeing  Outcome: Ongoing, Progressing  Intervention: Provide Person-Centered Care  Flowsheets (Taken 9/27/2020 0524)  Trust Relationship/Rapport:   care explained   questions answered   questions encouraged   reassurance provided  Goal: Readiness for Transition of Care  Outcome: Ongoing, Progressing  Goal: Rounds/Family Conference  Outcome: Ongoing, Progressing

## 2020-09-27 NOTE — PROGRESS NOTES
"[facility]  Infectious Disease  Progress Note    Patient Name: Gisselle Ortiz  MRN: [id number]  Admission Date: 9/24/2020  Length of Stay: 3 days  Attending Physician: Kai Arellano MD  Primary Care Provider: Kai Montez MD    Isolation Status: No active isolations  Assessment/Plan:      Bacteremia  88 y/o female with A.fib on eliquis, SSS s/p pacemaker, HTN presents to ED from GI clinic for elevated LFTs and hypotension 92/51 now with E.coli bacteremia. We are consulted for abx recommendations.    Had recent admission on 9/11 for abd pain, n/v found to have pancreatitis and CBD dilation and gallstone pancreatitis. She underwent ERCP 9/14 and underwent spincterotomy and stone removal. She is not a candidate for cholecystectomy. She returns to GI clinic and was noted to have n/v/ and fatigue and elevated LFTs with associated hypotension.  Imaging studies showed "Dilatation of the extrahepatic common bile duct, intrahepatic duct demonstrates normal caliber however has internal debris (possibly representing biliary sludge), hepatic abscess and pancreatic mass.     She was seen by GI and underwent ERCP 9/25. Noted to have bile duct dilation and stent placed in CBD. Blood cultures on admit are positive with E.coli and urine cultures +E.faecalis. She is currently on zosyn. We are consulted for abx recommendations.     Recommendations  1. Continue zosyn for now  2. Repeat blood cultures 9/26 NGTD  3. S/p IR drainage with catheter placement. 35 mL purulent bilious material removed through drain. Specimen sent. Gram stain GNR. Cultures pending  4. Will tailor abx accordingly    Discussed with ID staff         Anticipated Disposition:     Thank you for your consult. I will follow-up with patient. Please contact us if you have any additional questions.    Mirtha Purcell PA-C  Infectious Disease  [facility]  [phone]    Subjective:     Principal Problem:Hepatic " "abscess    HPI: 88 y/o female with A.fib on eliquis, SSS s/p pacemaker, HTN presents to ED from GI clinic for elevated LFTs and hypotension 92/51 now with GNR bacteremia. We are consulted for abx recommendations.    Had recnet admission on 9/11 for abd pain, n/v found to have pancreatitis and CBD dilation and gallstone pancreatitis. She underwent ERCP 9/14 and underwent spincterotomy and stone removal. She is not a candidate for cholecystectomy. She returns to GI clinic and was noted to have n/v/ and fatigue and elevated LFTs with associated hypotension.  U/S of the abdomen was obtained and showed: "Dilatation of the extrahepatic common bile duct, intrahepatic duct demonstrates normal caliber however has internal debris (possibly representing biliary sludge), hepatic abscess and pancreatic mass.     She was seen by GI and underwent ERCP yesterday. Noted to have bile duct dilation and stent placed in CBD. Blood cultures on admit are positive with GNR and urine cultures +E.faecalis. she is currently on zosyn. We are consulted for abx recommendations.     CT A/P: Complex thick-walled partially septated cystic lesion involving the right hepatic lobe concerning for hepatic abscess.  Complex multiloculated cystic lesion involving the pancreatic body with additional suspected small cystic lesion/collection in the region of the pancreatic head as discussed above.  These are nonspecific and could relate to evolving pancreatic pseudocyst/acute collections, however underlying cystic neoplastic process is not excluded. Common bile duct stent in place with pneumobilia as well as small volume of air within the mildly distended, thickened gallbladder.  There is an air and fluid containing structure along the anterior margin of the extrahepatic common bile duct which may represent a loop of bowel (possibly relating to reported diverticulum seen on recent ERCP) versus fluid and gas containing collection adjacent to and/or " communicating with the common bile duct.     Interval History:   S/p IR drain and catheter placement yesterday  Gram stain +GNR  Repeat blood cultures NGTD  Blood cultures on admit +E.coli  No dysuria per pt.       Review of Systems   Constitutional: Positive for activity change, appetite change and fatigue. Negative for chills, diaphoresis and fever.   Respiratory: Negative for cough and shortness of breath.    Cardiovascular: Negative for chest pain.   Gastrointestinal: Positive for abdominal pain and nausea. Negative for anal bleeding, blood in stool, constipation, diarrhea and vomiting.   Genitourinary: Negative for dysuria.   Musculoskeletal: Negative for arthralgias, back pain, gait problem and joint swelling.   Skin: Negative for color change, pallor, rash and wound.   Neurological: Negative for dizziness and headaches.   All other systems reviewed and are negative.    Objective:     Vital Signs (Most Recent):  Temp: 97.5 °F (36.4 °C) (09/27/20 1136)  Pulse: 60 (09/27/20 1136)  Resp: 20 (09/27/20 1136)  BP: (!) 90/55 (09/27/20 1138)  SpO2: (!) 93 % (09/27/20 1136) Vital Signs (24h Range):  Temp:  [97.5 °F (36.4 °C)-98.9 °F (37.2 °C)] 97.5 °F (36.4 °C)  Pulse:  [60-82] 60  Resp:  [16-20] 20  SpO2:  [93 %-100 %] 93 %  BP: ()/(51-80) 90/55     Weight: 56.6 kg (124 lb 12.5 oz)  Body mass index is 22.1 kg/m².    Estimated Creatinine Clearance: 36.4 mL/min (based on SCr of 0.9 mg/dL).    Physical Exam  Vitals signs reviewed.   Constitutional:       General: She is not in acute distress.     Appearance: Normal appearance. She is not ill-appearing, toxic-appearing or diaphoretic.   HENT:      Head: Normocephalic.      Mouth/Throat:      Mouth: Mucous membranes are moist.   Cardiovascular:      Rate and Rhythm: Normal rate and regular rhythm.      Heart sounds: No murmur. No friction rub. No gallop.    Pulmonary:      Effort: Pulmonary effort is normal. No respiratory distress.      Breath sounds: Normal breath  sounds. No stridor.   Abdominal:      General: Abdomen is flat. There is no distension.      Palpations: Abdomen is soft. There is no mass.      Tenderness: There is abdominal tenderness. There is no guarding or rebound.      Hernia: No hernia is present.      Comments: Drain in place RUQ   Musculoskeletal: Normal range of motion.         General: No swelling or tenderness.   Skin:     General: Skin is warm and dry.      Coloration: Skin is not jaundiced or pale.   Neurological:      General: No focal deficit present.      Mental Status: She is alert.   Psychiatric:         Mood and Affect: Mood normal.         Significant Labs: All pertinent labs within the past 24 hours have been reviewed.    Significant Imaging: I have reviewed all pertinent imaging results/findings within the past 24 hours.

## 2020-09-27 NOTE — TREATMENT PLAN
AES Treatment Plan    Gisselle Ortiz is a 87 y.o. female admitted to hospital 9/24/2020 (Hospital Day: 4) due to Hepatic abscess.     Interval History  ERCP done 9/25  Impression:           - The major papilla was located partially within a                         large diverticulum.                         - The entire main bile duct was dilated.                         - The biliary tree was swept and nothing was found.                         - One plastic stent was placed into the common bile                         duct.     CT abdomen:  1.  Complex thick-walled partially septated cystic lesion involving the right hepatic lobe concerning for hepatic abscess, corresponding to abnormality on prior ultrasound examination.     2.  Complex multiloculated cystic lesion involving the pancreatic body with additional suspected small cystic lesion/collection in the region of the pancreatic head as discussed above.  These are nonspecific and could relate to evolving pancreatic pseudocyst/acute collections, however underlying cystic neoplastic process is not excluded.  Correlation with recent EUS and continued follow-up is advised.     3.  Common bile duct stent in place with pneumobilia as well as small volume of air within the mildly distended, thickened gallbladder.  There is an air and fluid containing structure along the anterior margin of the extrahepatic common bile duct which may represent a loop of bowel (possibly relating to reported diverticulum seen on recent ERCP) versus fluid and gas containing collection adjacent to and/or communicating with the common bile duct.        9/26/20: IR placed drain into hepatic abscess, pending culture, blood cultures growing E.coli, urine culture growing enterococcus.     Objective  Temp:  [96.4 °F (35.8 °C)-98.9 °F (37.2 °C)] 98 °F (36.7 °C) (09/26 2357)  Pulse:  [60-88] 60 (09/27 0706)  BP: ()/(52-80) 93/53 (09/26 2357)  Resp:  [16-20] 19 (09/26 2000)  SpO2:  [94  %-100 %] 94 % (09/26 3417)  Laboratory  Lab Results   Component Value Date    WBC 16.75 (H) 09/26/2020    HGB 8.4 (L) 09/26/2020    HCT 27.4 (L) 09/26/2020    MCV 97 09/26/2020     (H) 09/26/2020       Lab Results   Component Value Date    ALT 93 (H) 09/26/2020    AST 59 (H) 09/26/2020    ALKPHOS 96 09/26/2020    BILITOT 2.0 (H) 09/26/2020       Elevated LFTs, down trending, s/p ERCP found nothing, CT abdomen showing right hepatic lobe abscess, complex multiloculated cystic lesion in the pancreatic body and small cystic lesion/collection in region of pancreatic head, possible evolving pseudocyst/acute collections, underlying cystic neoplastic process not excluded. IR drained abscess, pending culture, blood cultures growing E coli. Will discuss with general surgery regarding etiology of hepatic abscess and see if they think it is from gall bladder etiology.       Recommendations  - Repeat ERCP in 2 months to remove stent.   - Continue antibiotics.   -Will discuss with general surgery regarding etiology of hepatic abscess and see if they think it is from gall bladder etiology.   - Plan of care was discussed with primary team.  - We will continue to follow.    Thank you for involving us in the care of Gisselle KAELYN Ortiz. Please call with any additional questions, concerns or changes in the patient's clinical status.    Deandre Perea MD  Gastroenterology Fellow PGY V   [facility]

## 2020-09-27 NOTE — PROGRESS NOTES
[facility] Hospital Medicine  Progress Note    Patient Name: Gisselle Ortiz  MRN: [id number]  Patient Class: IP- Inpatient   Admission Date: 9/24/2020  Length of Stay: 2 days  Attending Physician: Kai Arellano MD  Primary Care Provider: Kai Montez MD    [facility] Medicine Team: [facility] 1 Ta Mccoy MD    Subjective:     Principal Problem:Hepatic abscess        HPI:  Gisselle Ortiz is a 87 y.o. female with a medical history of AFib on Eliquis with previous cardioversion, SSS s/p pacemaker, arthritis, HTN, thyroid disease with diastolic dysfunction (EF 55%, 9/2019) who presented to the ED from GI clinic after she was found to have elevated liver enzymes and hypotensive to 92/51.    She presented to the hospital on 09/11 for abdominal pain and was found to have gallstone pancreatitis. She had CBD dilation and a gallbladder with sludge. She underwent EUS and ERCP with stone and sludge seen on the cholangiogram. Sphincterotomy was performed and stone/sludge removed with no stents placed. Per notes, ERCP was concerning for choledocholithiasis.  Surgery was consulted and deemed the patient to be high risk for cholecystectomy. She was seen today in GI clinic for follow-up. She initially recovered well from the pancreatitis and ERCP and was eating prior to discharge. A few days after discharge she started having severe fatigue that progressively worsened to the point where the patient could not walk. She complains of decreased appetite and nausea but only 1 episode of vomiting. She denies any abdominal pain, fevers, diarrhea. She reports that her urine is orange/brown and has a bad odor but denies any dysuria. In the office her, BP was found to be 92/51. She was sent to the ED with BP rechecked at 112/51. She was given one liter NS and admitted for further evaluations.     In the ED, she was found to be afebrile and her BP improved to 130/61 with IVF. HR 63 with NSR  and existing RBBB. Qtc 512. She was breathing 20bpm with no O2 requirement. She c/o some nausea but no abd pain. On exam, she had mild discomfort to deep palpation to RUQ abdomen.       Overview/Hospital Course:  9/26: Pt had ERCP yesterday, which was unremarkable for stone or obvious etiology for her elevated bili and LFT. CT scan with contrast following pancreatic protocol showed hepatic abscess. Blood culture grew gram negative cristofer; urine culture was positive enterococcus faecalis.         Review of Systems   Constitutional: Positive for fatigue. Negative for chills and fever.   HENT: Negative for congestion, dental problem and nosebleeds.    Eyes: Negative for photophobia and visual disturbance.   Respiratory: Negative for apnea, cough, choking, chest tightness, shortness of breath, wheezing and stridor.    Cardiovascular: Negative for chest pain and leg swelling.   Gastrointestinal: Negative for abdominal distention, abdominal pain, anal bleeding, constipation and diarrhea.        Difficulty in holding food down; no nausea/vomiting.    Endocrine: Negative for polydipsia and polyphagia.   Genitourinary: Negative for dysuria and flank pain.   Musculoskeletal: Negative for arthralgias and back pain.   Skin: Negative for color change and pallor.   Neurological: Negative for tremors and syncope.   Psychiatric/Behavioral: Negative for agitation and behavioral problems.     Objective:     Vital Signs (Most Recent):  Temp: 97.8 °F (36.6 °C) (09/26/20 1554)  Pulse: 60 (09/26/20 1554)  Resp: 20 (09/26/20 1554)  BP: 136/64 (09/26/20 1554)  SpO2: 97 % (09/26/20 1554) Vital Signs (24h Range):  Temp:  [96.4 °F (35.8 °C)-98.4 °F (36.9 °C)] 97.8 °F (36.6 °C)  Pulse:  [60-88] 60  Resp:  [15-24] 20  SpO2:  [94 %-100 %] 97 %  BP: (131-174)/(60-80) 136/64     Weight: 56.6 kg (124 lb 12.5 oz)  Body mass index is 22.1 kg/m².    Intake/Output Summary (Last 24 hours) at 9/26/2020 1626  Last data filed at 9/26/2020 1100  Gross per 24  hour   Intake 900 ml   Output 3 ml   Net 897 ml      Physical Exam  Vitals signs and nursing note reviewed.   Constitutional:       Appearance: Normal appearance.      Comments: Appears weak; Jaundice   HENT:      Head: Normocephalic and atraumatic.      Nose: Nose normal.      Mouth/Throat:      Mouth: Mucous membranes are moist.      Pharynx: Oropharynx is clear.   Eyes:      Extraocular Movements: Extraocular movements intact.      Conjunctiva/sclera: Conjunctivae normal.      Pupils: Pupils are equal, round, and reactive to light.   Cardiovascular:      Rate and Rhythm: Normal rate and regular rhythm.      Pulses: Normal pulses.      Heart sounds: Normal heart sounds.   Pulmonary:      Effort: Pulmonary effort is normal.      Breath sounds: Normal breath sounds.   Abdominal:      General: Abdomen is flat. Bowel sounds are normal.      Palpations: Abdomen is soft.      Comments: Rosario's sign present   Musculoskeletal: Normal range of motion.   Skin:     General: Skin is warm and dry.   Neurological:      General: No focal deficit present.      Mental Status: She is alert and oriented to person, place, and time. Mental status is at baseline.   Psychiatric:         Mood and Affect: Mood normal.         Behavior: Behavior normal.         Thought Content: Thought content normal.         Judgment: Judgment normal.         Significant Labs: All pertinent labs within the past 24 hours have been reviewed.    Significant Imaging: I have reviewed all pertinent imaging results/findings within the past 24 hours.      Assessment/Plan:      * Hepatic abscess  Sent from GI clinic where she had a f/u for recent hospital stay for ERCP --> elevated TBili, hypotensive, and increased transaminases. Was admitted to the hospital on 09/11 for abdominal pain and was found to have gallstone pancreatitis; got ERCP, CBD dilation and a gallbladder with sludge --> had EUS and ERCP with stone and sludge seen on the cholangiogram -->  Sphincterotomy, no stents. Deemed not a surgical candidate for cholecystectomy; ERCP note indicates concern for choledocholithiasis.     PLAN    - Consult GI yesterday. ERCP was repeated yesterday. Did not show stone or obvious etiology for her elevated T bili and LFT on admission. The entire main bile duct was dilated; the biliary tree was swept and nothing was found; One plastic stent was placed into the common bile duct.      - Abd US was done yesterday. It showed heterogeneous pancreatic echotexture within adjacent 1.7 cm hypoechoic structure with irregular borders;  Dilatation of the extrahepatic common bile duct; intrahepatic duct demonstrates internal debris (possibly representing biliary sludge); 5.6 cm predominantly cystic lesion in the right hepatic lobe with internal debris. DDX included pancreatic mass, abscess, or pseudocyst.     - CT scan following pancreatic protocol done yesterday. It showed complex thick-walled partially septated cystic lesion involving the right hepatic lobe concerning for hepatic abscess; complex multiloculated cystic lesion involving the pancreatic body with additional suspected small cystic lesion/collection in the region of the pancreatic head; Common bile duct stent in place with pneumobilia as well as small volume of air within the mildly distended, thickened gallbladder.      -Consulted GI. GI identified principal problem is hepatic abscess, which needs IR drainage.     - IR was consulted. IR drainage for hepatic abscess was today.     - Follow up culture result.           UTI (urinary tract infection)  Urinary culture grew enterococcus faecalis pending susceptibility today (9/26).  - VSS stable: Temp 97, HR 60, RR 15, and /77; SpO2 94%. Patient appeared alert, oriented, and responding to commands.   - WBC is 16  - UA showed WBC 20, many bacteria, 2 hyaline cast.   - ID was consulted.   - ID recommended to continue with zosyn since susceptibility is pending.   - Follow  up culture susceptibility results.       Bacteremia  Blood culture grew gram negative cristofer yesterday. Did not identify the exact organism or susceptibility.   - VSS stable: Temp 97, HR 60, RR 15, and /77; SpO2 94%. Patient appeared alert, oriented, and responding to commands.   - WBC is 16  - ID was consulted.   - ID recommended to continue with zosyn since susceptibility is pending.   - Follow up culture susceptibility results.         Pancreatic abscess  See hyperbilirubinemia      Dilated bile duct  See hyperbilirubinemia      Hyperbilirubinemia  See hepatic abscess.       SSS (sick sinus syndrome)  Followed by Dr Colmenares in Cardiology  TSH 0.443  PM for SB  On existing EKG  ECG showed RBBB;  PLAN  - Telemetry  - Also started on atenalol.  - Apixaban stopped for GI consult.   - amiodarone continued.   - Has pacemaker in place.     Current use of long term anticoagulation  On Apixaban 2.5mg po BID (age > 80, Wt < 60kg); last dose 9/23/20 pm  PLAN  - hold for possible GI procedure   - INR today was 1.       RBBB  On existing EKG  Has pacemaker, ECG showed RBBB;  PLAN  - Telemetry  - Also started on atenalol.  - Apixaban stopped for GI consult.   - amiodarone continued.       Hypothyroidism  TSH 0.443  PLAN  - Continue levothyroxine 0.137 mg      Hypertension  Home meds include  Atenolol 25mg BID, Diltiazem 180mg daily  BP 92/51 at GI clinic, improved SBP to 120s in ED after fluids  PLAN  - Started on atenolol.     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   BP 92/51 in GI clinic, improved with 1L fluid in ED  Last Cards Note (Darrian 7/17/20)  Gen change 1/2019. AF/AFL noted on f/u. Propafenone increased. Still had AF with RVR after that. Changed to flecainide, planned for DCCV; at some point changed  to amio. Unclear timing of these changes.  TSH found to be abnormal, but T4 normal.  In past, following DCCV, she felt great! -- until AF recurred.  On amio now, with great effect.  PLAN  - Telemetry  - Continue Amiodarone  - Started atenolol   - Hold Apixaban for GI procedure.       VTE Risk Mitigation (From admission, onward)         Ordered     Reason for No Pharmacological VTE Prophylaxis  Once     Question:  Reasons:  Answer:  Already adequately anticoagulated on oral Anticoagulants    09/24/20 2041     IP VTE HIGH RISK PATIENT  Once      09/24/20 2041     Place sequential compression device  Until discontinued      09/24/20 2041                Discharge Planning   FRANCESCA: 9/30/2020     Code Status: Full Code   Is the patient medically ready for discharge?: No    Reason for patient still in hospital (select all that apply): Treatment  Discharge Plan A: Home, Home Health                  Ta Mccoy MD  Department of Hospital Medicine   [facility]

## 2020-09-27 NOTE — SUBJECTIVE & OBJECTIVE
Interval History:   S/p IR drain and catheter placement yesterday  Gram stain +GNR  Repeat blood cultures NGTD  Blood cultures on admit +E.coli  No dysuria per pt.       Review of Systems   Constitutional: Positive for activity change, appetite change and fatigue. Negative for chills, diaphoresis and fever.   Respiratory: Negative for cough and shortness of breath.    Cardiovascular: Negative for chest pain.   Gastrointestinal: Positive for abdominal pain and nausea. Negative for anal bleeding, blood in stool, constipation, diarrhea and vomiting.   Genitourinary: Negative for dysuria.   Musculoskeletal: Negative for arthralgias, back pain, gait problem and joint swelling.   Skin: Negative for color change, pallor, rash and wound.   Neurological: Negative for dizziness and headaches.   All other systems reviewed and are negative.    Objective:     Vital Signs (Most Recent):  Temp: 97.5 °F (36.4 °C) (09/27/20 1136)  Pulse: 60 (09/27/20 1136)  Resp: 20 (09/27/20 1136)  BP: (!) 90/55 (09/27/20 1138)  SpO2: (!) 93 % (09/27/20 1136) Vital Signs (24h Range):  Temp:  [97.5 °F (36.4 °C)-98.9 °F (37.2 °C)] 97.5 °F (36.4 °C)  Pulse:  [60-82] 60  Resp:  [16-20] 20  SpO2:  [93 %-100 %] 93 %  BP: ()/(51-80) 90/55     Weight: 56.6 kg (124 lb 12.5 oz)  Body mass index is 22.1 kg/m².    Estimated Creatinine Clearance: 36.4 mL/min (based on SCr of 0.9 mg/dL).    Physical Exam  Vitals signs reviewed.   Constitutional:       General: She is not in acute distress.     Appearance: Normal appearance. She is not ill-appearing, toxic-appearing or diaphoretic.   HENT:      Head: Normocephalic.      Mouth/Throat:      Mouth: Mucous membranes are moist.   Cardiovascular:      Rate and Rhythm: Normal rate and regular rhythm.      Heart sounds: No murmur. No friction rub. No gallop.    Pulmonary:      Effort: Pulmonary effort is normal. No respiratory distress.      Breath sounds: Normal breath sounds. No stridor.   Abdominal:       General: Abdomen is flat. There is no distension.      Palpations: Abdomen is soft. There is no mass.      Tenderness: There is abdominal tenderness. There is no guarding or rebound.      Hernia: No hernia is present.      Comments: Drain in place RUQ   Musculoskeletal: Normal range of motion.         General: No swelling or tenderness.   Skin:     General: Skin is warm and dry.      Coloration: Skin is not jaundiced or pale.   Neurological:      General: No focal deficit present.      Mental Status: She is alert.   Psychiatric:         Mood and Affect: Mood normal.         Significant Labs: All pertinent labs within the past 24 hours have been reviewed.    Significant Imaging: I have reviewed all pertinent imaging results/findings within the past 24 hours.

## 2020-09-27 NOTE — ASSESSMENT & PLAN NOTE
Urinary culture grew enterococcus faecalis pending susceptibility today (9/26).  - VSS stable: Temp 97, HR 60, RR 15, and /77; SpO2 94%. Patient appeared alert, oriented, and responding to commands.   - WBC is 16  - UA showed WBC 20, many bacteria, 2 hyaline cast.   - ID was consulted.   - ID recommended to continue with zosyn since susceptibility is pending.   - Follow up culture susceptibility results.

## 2020-09-27 NOTE — SUBJECTIVE & OBJECTIVE
Review of Systems   Constitutional: Positive for fatigue. Negative for chills and fever.   HENT: Negative for congestion, dental problem and nosebleeds.    Eyes: Negative for photophobia and visual disturbance.   Respiratory: Negative for apnea, cough, choking, chest tightness, shortness of breath, wheezing and stridor.    Cardiovascular: Negative for chest pain and leg swelling.   Gastrointestinal: Negative for abdominal distention, abdominal pain, anal bleeding, constipation and diarrhea.   Endocrine: Negative for polydipsia and polyphagia.   Genitourinary: Negative for dysuria and flank pain.   Musculoskeletal: Negative for arthralgias and back pain.   Skin: Negative for color change and pallor.   Neurological: Negative for tremors and syncope.   Psychiatric/Behavioral: Negative for agitation and behavioral problems.     Objective:     Vital Signs (Most Recent):  Temp: 97.5 °F (36.4 °C) (09/27/20 1136)  Pulse: 60 (09/27/20 1506)  Resp: 20 (09/27/20 1136)  BP: (!) 90/55 (09/27/20 1138)  SpO2: (!) 93 % (09/27/20 1136) Vital Signs (24h Range):  Temp:  [97.5 °F (36.4 °C)-98.9 °F (37.2 °C)] 97.5 °F (36.4 °C)  Pulse:  [60-62] 60  Resp:  [16-20] 20  SpO2:  [93 %-98 %] 93 %  BP: ()/(51-64) 90/55     Weight: 56.6 kg (124 lb 12.5 oz)  Body mass index is 22.1 kg/m².    Intake/Output Summary (Last 24 hours) at 9/27/2020 1523  Last data filed at 9/27/2020 0916  Gross per 24 hour   Intake 300 ml   Output 37 ml   Net 263 ml      Physical Exam  Vitals signs and nursing note reviewed.   Constitutional:       Appearance: Normal appearance.      Comments: Appears weak; Jaundice   HENT:      Head: Normocephalic and atraumatic.      Nose: Nose normal.      Mouth/Throat:      Mouth: Mucous membranes are moist.      Pharynx: Oropharynx is clear.   Eyes:      Extraocular Movements: Extraocular movements intact.      Conjunctiva/sclera: Conjunctivae normal.      Pupils: Pupils are equal, round, and reactive to light.    Cardiovascular:      Rate and Rhythm: Normal rate and regular rhythm.      Pulses: Normal pulses.      Heart sounds: Normal heart sounds.   Pulmonary:      Effort: Pulmonary effort is normal.      Breath sounds: Normal breath sounds.   Abdominal:      General: Abdomen is flat. Bowel sounds are normal.      Palpations: Abdomen is soft.      Comments: IR drainage placed; pain at drainage site.    Musculoskeletal: Normal range of motion.   Skin:     General: Skin is warm and dry.   Neurological:      General: No focal deficit present.      Mental Status: She is alert and oriented to person, place, and time. Mental status is at baseline.   Psychiatric:         Mood and Affect: Mood normal.         Behavior: Behavior normal.         Thought Content: Thought content normal.         Judgment: Judgment normal.         Significant Labs: All pertinent labs within the past 24 hours have been reviewed.    Significant Imaging: I have reviewed all pertinent imaging results/findings within the past 24 hours.

## 2020-09-27 NOTE — ASSESSMENT & PLAN NOTE
Home meds include  Atenolol 25mg BID, Diltiazem 180mg daily  BP 92/51 at GI clinic, improved SBP to 120s in ED after fluids  PLAN  - Started on atenolol.

## 2020-09-27 NOTE — ASSESSMENT & PLAN NOTE
Sent from GI clinic where she had a f/u for recent hospital stay for ERCP --> elevated TBili, hypotensive, and increased transaminases. Was admitted to the hospital on 09/11 for abdominal pain and was found to have gallstone pancreatitis; got ERCP, CBD dilation and a gallbladder with sludge --> had EUS and ERCP with stone and sludge seen on the cholangiogram --> Sphincterotomy, no stents. Deemed not a surgical candidate for cholecystectomy; ERCP note indicates concern for choledocholithiasis.     PLAN    - Consult GI yesterday. ERCP was repeated yesterday. Did not show stone or obvious etiology for her elevated T bili and LFT on admission. The entire main bile duct was dilated; the biliary tree was swept and nothing was found; One plastic stent was placed into the common bile duct.      - Abd US was done yesterday. It showed heterogeneous pancreatic echotexture within adjacent 1.7 cm hypoechoic structure with irregular borders;  Dilatation of the extrahepatic common bile duct; intrahepatic duct demonstrates internal debris (possibly representing biliary sludge); 5.6 cm predominantly cystic lesion in the right hepatic lobe with internal debris. DDX included pancreatic mass, abscess, or pseudocyst.     - CT scan following pancreatic protocol done yesterday. It showed complex thick-walled partially septated cystic lesion involving the right hepatic lobe concerning for hepatic abscess; complex multiloculated cystic lesion involving the pancreatic body with additional suspected small cystic lesion/collection in the region of the pancreatic head; Common bile duct stent in place with pneumobilia as well as small volume of air within the mildly distended, thickened gallbladder.      -Consulted GI. GI identified principal problem is hepatic abscess, which needs IR drainage.     - IR was consulted. IR drainage for hepatic abscess was today.     - Follow up culture result.

## 2020-09-27 NOTE — ASSESSMENT & PLAN NOTE
Sent from GI clinic where she had a f/u for recent hospital stay for ERCP --> elevated TBili, hypotensive, and increased transaminases. Was admitted to the hospital on 09/11 for abdominal pain and was found to have gallstone pancreatitis; got ERCP, CBD dilation and a gallbladder with sludge --> had EUS and ERCP with stone and sludge seen on the cholangiogram --> Sphincterotomy, no stents. Deemed not a surgical candidate for cholecystectomy; ERCP note indicates concern for choledocholithiasis.     PLAN    - Consult GI yesterday. ERCP was repeated yesterday. Did not show stone or obvious etiology for her elevated T bili and LFT on admission. The entire main bile duct was dilated; the biliary tree was swept and nothing was found; One plastic stent was placed into the common bile duct.      - Abd US was done yesterday. It showed heterogeneous pancreatic echotexture within adjacent 1.7 cm hypoechoic structure with irregular borders;  Dilatation of the extrahepatic common bile duct; intrahepatic duct demonstrates internal debris (possibly representing biliary sludge); 5.6 cm predominantly cystic lesion in the right hepatic lobe with internal debris. DDX included pancreatic mass, abscess, or pseudocyst.     - CT scan following pancreatic protocol done yesterday. It showed complex thick-walled partially septated cystic lesion involving the right hepatic lobe concerning for hepatic abscess; complex multiloculated cystic lesion involving the pancreatic body with additional suspected small cystic lesion/collection in the region of the pancreatic head; Common bile duct stent in place with pneumobilia as well as small volume of air within the mildly distended, thickened gallbladder.      -Consulted GI. GI identified principal problem is hepatic abscess, which needs IR drainage.     - IR was consulted. IR drainage for hepatic abscess was done yesterday. A drain was placed. It lia 50 cc overnight, mainly pus and blood. Drained  fluid showed WBC of 137[address]. F/u culture.     - Talked with surgery and ID about source control of her infection. Surgery states that since gall bladder and biliary tract is not dilated, it is unlikely cholecystectomy will control source. ID states likely source of bacteremia is from the ERCP procedure earlier this month from previous admission.     - Follow up culture result of abscess fluid.

## 2020-09-28 LAB
ALBUMIN SERPL BCP-MCNC: 1.7 G/DL (ref 3.5–5.2)
ALP SERPL-CCNC: 93 U/L (ref 55–135)
ALT SERPL W/O P-5'-P-CCNC: 75 U/L (ref 10–44)
ANION GAP SERPL CALC-SCNC: 8 MMOL/L (ref 8–16)
ANISOCYTOSIS BLD QL SMEAR: SLIGHT
AST SERPL-CCNC: 82 U/L (ref 10–40)
BASOPHILS # BLD AUTO: 0.11 K/UL (ref 0–0.2)
BASOPHILS NFR BLD: 0.5 % (ref 0–1.9)
BILIRUB SERPL-MCNC: 1.9 MG/DL (ref 0.1–1)
BUN SERPL-MCNC: 29 MG/DL (ref 8–23)
CALCIUM SERPL-MCNC: 7.5 MG/DL (ref 8.7–10.5)
CHLORIDE SERPL-SCNC: 111 MMOL/L (ref 95–110)
CO2 SERPL-SCNC: 20 MMOL/L (ref 23–29)
CREAT SERPL-MCNC: 1.1 MG/DL (ref 0.5–1.4)
DIFFERENTIAL METHOD: ABNORMAL
EOSINOPHIL # BLD AUTO: 0.2 K/UL (ref 0–0.5)
EOSINOPHIL NFR BLD: 0.8 % (ref 0–8)
ERYTHROCYTE [DISTWIDTH] IN BLOOD BY AUTOMATED COUNT: 15.9 % (ref 11.5–14.5)
EST. GFR  (AFRICAN AMERICAN): 52.2 ML/MIN/1.73 M^2
EST. GFR  (NON AFRICAN AMERICAN): 45.3 ML/MIN/1.73 M^2
GLUCOSE SERPL-MCNC: 98 MG/DL (ref 70–110)
HCT VFR BLD AUTO: 26.2 % (ref 37–48.5)
HGB BLD-MCNC: 8.2 G/DL (ref 12–16)
HYPOCHROMIA BLD QL SMEAR: ABNORMAL
IMM GRANULOCYTES # BLD AUTO: 1.08 K/UL (ref 0–0.04)
IMM GRANULOCYTES NFR BLD AUTO: 4.6 % (ref 0–0.5)
LACTATE SERPL-SCNC: 0.5 MMOL/L (ref 0.5–2.2)
LYMPHOCYTES # BLD AUTO: 1 K/UL (ref 1–4.8)
LYMPHOCYTES NFR BLD: 4.1 % (ref 18–48)
MAGNESIUM SERPL-MCNC: 2 MG/DL (ref 1.6–2.6)
MCH RBC QN AUTO: 29.8 PG (ref 27–31)
MCHC RBC AUTO-ENTMCNC: 31.3 G/DL (ref 32–36)
MCV RBC AUTO: 95 FL (ref 82–98)
MONOCYTES # BLD AUTO: 1.4 K/UL (ref 0.3–1)
MONOCYTES NFR BLD: 5.8 % (ref 4–15)
NEUTROPHILS # BLD AUTO: 19.8 K/UL (ref 1.8–7.7)
NEUTROPHILS NFR BLD: 84.2 % (ref 38–73)
NRBC BLD-RTO: 0 /100 WBC
PHOSPHATE SERPL-MCNC: 3.1 MG/DL (ref 2.7–4.5)
PLATELET # BLD AUTO: 457 K/UL (ref 150–350)
PLATELET BLD QL SMEAR: ABNORMAL
PMV BLD AUTO: 9.6 FL (ref 9.2–12.9)
POTASSIUM SERPL-SCNC: 4 MMOL/L (ref 3.5–5.1)
PROT SERPL-MCNC: 4.7 G/DL (ref 6–8.4)
RBC # BLD AUTO: 2.75 M/UL (ref 4–5.4)
SODIUM SERPL-SCNC: 139 MMOL/L (ref 136–145)
WBC # BLD AUTO: 23.52 K/UL (ref 3.9–12.7)

## 2020-09-28 PROCEDURE — 83605 ASSAY OF LACTIC ACID: CPT

## 2020-09-28 PROCEDURE — 85025 COMPLETE CBC W/AUTO DIFF WBC: CPT

## 2020-09-28 PROCEDURE — 99232 SBSQ HOSP IP/OBS MODERATE 35: CPT | Mod: ,,, | Performed by: NURSE PRACTITIONER

## 2020-09-28 PROCEDURE — 25000003 PHARM REV CODE 250: Performed by: STUDENT IN AN ORGANIZED HEALTH CARE EDUCATION/TRAINING PROGRAM

## 2020-09-28 PROCEDURE — 80053 COMPREHEN METABOLIC PANEL: CPT

## 2020-09-28 PROCEDURE — 11000001 HC ACUTE MED/SURG PRIVATE ROOM

## 2020-09-28 PROCEDURE — 83735 ASSAY OF MAGNESIUM: CPT

## 2020-09-28 PROCEDURE — 36415 COLL VENOUS BLD VENIPUNCTURE: CPT

## 2020-09-28 PROCEDURE — 99232 PR SUBSEQUENT HOSPITAL CARE,LEVL II: ICD-10-PCS | Mod: ,,, | Performed by: NURSE PRACTITIONER

## 2020-09-28 PROCEDURE — 99233 SBSQ HOSP IP/OBS HIGH 50: CPT | Mod: GC,,, | Performed by: INTERNAL MEDICINE

## 2020-09-28 PROCEDURE — 63600175 PHARM REV CODE 636 W HCPCS: Performed by: STUDENT IN AN ORGANIZED HEALTH CARE EDUCATION/TRAINING PROGRAM

## 2020-09-28 PROCEDURE — 84100 ASSAY OF PHOSPHORUS: CPT

## 2020-09-28 PROCEDURE — 99233 PR SUBSEQUENT HOSPITAL CARE,LEVL III: ICD-10-PCS | Mod: GC,,, | Performed by: INTERNAL MEDICINE

## 2020-09-28 RX ADMIN — PIPERACILLIN SODIUM,TAZOBACTAM SODIUM 4.5 G: 4; .5 INJECTION, POWDER, FOR SOLUTION INTRAVENOUS at 09:09

## 2020-09-28 RX ADMIN — DICLOFENAC 4 G: 10 GEL TOPICAL at 08:09

## 2020-09-28 RX ADMIN — AMIODARONE HYDROCHLORIDE 200 MG: 200 TABLET ORAL at 09:09

## 2020-09-28 RX ADMIN — ATORVASTATIN CALCIUM 10 MG: 10 TABLET, FILM COATED ORAL at 09:09

## 2020-09-28 RX ADMIN — DICLOFENAC 4 G: 10 GEL TOPICAL at 03:09

## 2020-09-28 RX ADMIN — LEVOTHYROXINE SODIUM 137 MCG: 25 TABLET ORAL at 06:09

## 2020-09-28 RX ADMIN — ATENOLOL 50 MG: 50 TABLET ORAL at 06:09

## 2020-09-28 RX ADMIN — DICLOFENAC 4 G: 10 GEL TOPICAL at 09:09

## 2020-09-28 RX ADMIN — PIPERACILLIN SODIUM,TAZOBACTAM SODIUM 4.5 G: 4; .5 INJECTION, POWDER, FOR SOLUTION INTRAVENOUS at 02:09

## 2020-09-28 RX ADMIN — PIPERACILLIN SODIUM,TAZOBACTAM SODIUM 4.5 G: 4; .5 INJECTION, POWDER, FOR SOLUTION INTRAVENOUS at 06:09

## 2020-09-28 NOTE — PROGRESS NOTES
[facility] Hospital Medicine  Progress Note    Patient Name: Gisselle Ortiz  MRN: [id number]  Patient Class: IP- Inpatient   Admission Date: 9/24/2020  Length of Stay: 4 days  Attending Physician: Kai Arellano MD  Primary Care Provider: Kai Montez MD    [facility] Medicine Team: [facility] 1 Lorenzo Marlow MD    Subjective:     Principal Problem:Hepatic abscess        HPI:  Gisselle Ortiz is a 87 y.o. female with a medical history of AFib on Eliquis with previous cardioversion, SSS s/p pacemaker, arthritis, HTN, thyroid disease with diastolic dysfunction (EF 55%, 9/2019) who presented to the ED from GI clinic after she was found to have elevated liver enzymes and hypotensive to 92/51.    She presented to the hospital on 09/11 for abdominal pain and was found to have gallstone pancreatitis. She had CBD dilation and a gallbladder with sludge. She underwent EUS and ERCP with stone and sludge seen on the cholangiogram. Sphincterotomy was performed and stone/sludge removed with no stents placed. Per notes, ERCP was concerning for choledocholithiasis.  Surgery was consulted and deemed the patient to be high risk for cholecystectomy. She was seen today in GI clinic for follow-up. She initially recovered well from the pancreatitis and ERCP and was eating prior to discharge. A few days after discharge she started having severe fatigue that progressively worsened to the point where the patient could not walk. She complains of decreased appetite and nausea but only 1 episode of vomiting. She denies any abdominal pain, fevers, diarrhea. She reports that her urine is orange/brown and has a bad odor but denies any dysuria. In the office her, BP was found to be 92/51. She was sent to the ED with BP rechecked at 112/51. She was given one liter NS and admitted for further evaluations.     In the ED, she was found to be afebrile and her BP improved to 130/61 with IVF. HR 63 with NSR and  existing RBBB. Qtc 512. She was breathing 20bpm with no O2 requirement. She c/o some nausea but no abd pain. On exam, she had mild discomfort to deep palpation to RUQ abdomen.    Overview/Hospital Course:  9/26: Pt had ERCP 9/25, which was unremarkable for stone or obvious etiology for her elevated bili and LFT. CT scan with contrast following pancreatic protocol showed hepatic abscess. Blood culture grew gram negative cristofer; urine culture was positive enterococcus faecalis.   Pt had IR intervention for her hepatic abscess 9/26. Around 50 ml was put out via drainage yesterday. Blood culture grew E.Coli susceptible to multiple abx. Urine enterococcus faecalis also shown to have susceptibility to multiple organism. 9/27 drain still with significant output, will continue to monitor. Abscess cultures grew GNR.      Interval History: No events overnight. BP on lower side, atenolol held for now.    Review of Systems   Constitutional: Negative for fever.   HENT: Negative for congestion, ear pain and rhinorrhea.    Eyes: Negative for visual disturbance.   Respiratory: Negative for shortness of breath.    Cardiovascular: Negative for chest pain.   Gastrointestinal: Positive for abdominal pain (at drainage site). Negative for nausea and vomiting.   Genitourinary: Negative for difficulty urinating.   Musculoskeletal: Negative for arthralgias.   Neurological: Negative for dizziness and light-headedness.     Objective:     Vital Signs (Most Recent):  Temp: 97.4 °F (36.3 °C) (09/28/20 1201)  Pulse: 60 (09/28/20 1059)  Resp: 16 (09/28/20 0815)  BP: (!) 109/51 (09/28/20 0815)  SpO2: 97 % (09/28/20 0815) Vital Signs (24h Range):  Temp:  [96.9 °F (36.1 °C)-98.4 °F (36.9 °C)] 97.4 °F (36.3 °C)  Pulse:  [60-62] 60  Resp:  [16-20] 16  SpO2:  [96 %-98 %] 97 %  BP: (101-126)/(51-65) 109/51     Weight: 56.6 kg (124 lb 12.5 oz)  Body mass index is 22.1 kg/m².    Intake/Output Summary (Last 24 hours) at 9/28/2020 1243  Last data filed at  9/28/2020 0800  Gross per 24 hour   Intake 275 ml   Output 428 ml   Net -153 ml      Physical Exam  Vitals signs and nursing note reviewed.   Constitutional:       General: She is not in acute distress.     Appearance: She is not ill-appearing.   HENT:      Head: Normocephalic and atraumatic.      Nose: Nose normal.      Mouth/Throat:      Mouth: Mucous membranes are dry.   Eyes:      Extraocular Movements: Extraocular movements intact.      Conjunctiva/sclera: Conjunctivae normal.      Pupils: Pupils are equal, round, and reactive to light.   Neck:      Musculoskeletal: Normal range of motion.   Cardiovascular:      Rate and Rhythm: Normal rate and regular rhythm.      Heart sounds: Normal heart sounds. No murmur. No gallop.    Pulmonary:      Effort: Pulmonary effort is normal. No respiratory distress.      Breath sounds: No stridor. No wheezing, rhonchi or rales.   Abdominal:      General: Abdomen is flat. There is no distension.      Palpations: Abdomen is soft.      Tenderness: There is abdominal tenderness. There is no guarding.      Comments: Pain around incision/drainage site     Musculoskeletal: Normal range of motion.   Skin:     General: Skin is warm.   Neurological:      General: No focal deficit present.      Mental Status: She is alert and oriented to person, place, and time.         Significant Labs:   Bilirubin:   Recent Labs   Lab 09/15/20  0252 09/24/20  1736 09/25/20  0554 09/26/20  0602 09/28/20  0637   BILITOT 1.3* 3.3* 2.5* 2.0* 1.9*     Blood Culture: No results for input(s): LABBLOO in the last 48 hours.  CBC:   Recent Labs   Lab 09/28/20  0637   WBC 23.52*   HGB 8.2*   HCT 26.2*   *     CMP:   Recent Labs   Lab 09/28/20  0637      K 4.0   *   CO2 20*   GLU 98   BUN 29*   CREATININE 1.1   CALCIUM 7.5*   PROT 4.7*   ALBUMIN 1.7*   BILITOT 1.9*   ALKPHOS 93   AST 82*   ALT 75*   ANIONGAP 8   EGFRNONAA 45.3*     Lactic Acid:   Recent Labs   Lab 09/28/20  0637   LACTATE 0.5      Magnesium:   Recent Labs   Lab 09/28/20  0637   MG 2.0     Urine Culture: No results for input(s): LABURIN in the last 48 hours.  All pertinent labs within the past 24 hours have been reviewed.    Significant Imaging: I have reviewed all pertinent imaging results/findings within the past 24 hours.      Assessment/Plan:      * Hepatic abscess  Sent from GI clinic where she had a f/u for recent hospital stay for ERCP --> elevated TBili, hypotensive, and increased transaminases. Was admitted to the hospital on 09/11 for abdominal pain and was found to have gallstone pancreatitis; got ERCP, CBD dilation and a gallbladder with sludge --> had EUS and ERCP with stone and sludge seen on the cholangiogram --> Sphincterotomy, no stents. Deemed not a surgical candidate for cholecystectomy; ERCP note indicates concern for choledocholithiasis.     PLAN    - Consult GI 9/26. ERCP was repeated yesterday. Did not show stone or obvious etiology for her elevated T bili and LFT on admission. The entire main bile duct was dilated; the biliary tree was swept and nothing was found; One plastic stent was placed into the common bile duct.  - Abd US was done 9/26. It showed heterogeneous pancreatic echotexture within adjacent 1.7 cm hypoechoic structure with irregular borders;  Dilatation of the extrahepatic common bile duct; intrahepatic duct demonstrates internal debris (possibly representing biliary sludge); 5.6 cm predominantly cystic lesion in the right hepatic lobe with internal debris. DDX included pancreatic mass, abscess, or pseudocyst.   - CT scan following pancreatic protocol done 9/26. It showed complex thick-walled partially septated cystic lesion involving the right hepatic lobe concerning for hepatic abscess; complex multiloculated cystic lesion involving the pancreatic body with additional suspected small cystic lesion/collection in the region of the pancreatic head; Common bile duct stent in place with pneumobilia as  "well as small volume of air within the mildly distended, thickened gallbladder.      - GI following. GI identified principal problem is hepatic abscess, which needs IR drainage.     - IR following. IR drainage for hepatic abscess was done 9/26. A drain was placed. Continues to drain. Drained fluid [place] of 67063. Culture grew GNR.    - Talked with surgery and ID about source control of her infection. Surgery states that since gall bladder and biliary tract is not dilated, it is unlikely cholecystectomy will control source and says she is not a candidate for cholecystectomy. ID states likely source of bacteremia is from the ERCP procedure earlier this month from previous admission.           UTI (urinary tract infection)  Urinary culture grew enterococcus faecalis susceptible to multiple abx.  - VSS stable. Patient appeared alert, oriented, and responding to commands. No dysuria, frequency , urgency.   - WBC is 24 from 16  - UA showed WBC 20, many bacteria, 2 hyaline cast.   - ID recommended to continue with zosyn  - Since pt is not showing symptoms of dysuria, urgency, frequency, ID thinks enterococcus faecalis is currently colonizing without active infection    Bacteremia  Blood culture grew gram negative cristofer. VS todau: Temp 98, HR 62, RR 19, and BP 93/53; SpO2 94%. Patient appeared alert, oriented, and responding to commands.   - WBC yesterday was 16. Results did not come today.   - Culture grew E.Coli susceptible to multiple abx.   - ID is following. ID recommended to continue with zosyn.   - ID thinks source of bacteremia is from ERCP from the previous admission.     -  Hypotension (bp 93/53) was treated with fluids. Currently improved.  - Abscess culture grew GNR.        Pancreatic abscess  See "hepatic abscess"    Dilated bile duct  See hepatic abscess     Hyperbilirubinemia  See hepatic abscess.       SSS (sick sinus syndrome)  Followed by Dr Colmenares in Cardiology  TSH 0.443  PM for SB  On existing " EKG  ECG showed RBBB    Plan:  - Telemetry  - Also started on atenalol. Held 9/28 due hypotension.  - Apixaban stopped for GI consult.   - Amiodarone continued.   - Has pacemaker in place.     Current use of long term anticoagulation  On Apixaban 2.5mg po BID (age > 80, Wt < 60kg); last dose 9/23/20 pm  PLAN  - hold for possible GI procedure         RBBB  On existing EKG  Has pacemaker, ECG showed RBBB;  PLAN  - Telemetry  - Also started on atenalol.  - Apixaban stopped for GI consult.   - amiodarone continued.       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   BP 92/51 in GI clinic, improved with 1L fluid in ED  Last Cards Note (Darrian 7/17/20)  Gen change 1/2019. AF/AFL noted on f/u. Propafenone increased. Still had AF with RVR after that. Changed to flecainide, planned for DCCV; at some point changed to amio. Unclear timing of these changes.  TSH found to be abnormal, but T4 normal.  In past, following DCCV, she felt great -- until AF recurred.  On amio now, with great effect.  Plan:  - Telemetry  - Continue Amiodarone  - Started atenolol (held 9/28 d/t hypotension)  - Hold Apixaban for GI procedure.         VTE Risk Mitigation (From admission, onward)         Ordered     Reason for No Pharmacological VTE Prophylaxis  Once     Question:  Reasons:  Answer:  Already adequately anticoagulated on oral Anticoagulants    09/24/20 2041     IP VTE HIGH RISK PATIENT  Once      09/24/20 2041     Place sequential  compression device  Until discontinued      09/24/20 2041                Discharge Planning   FRANCESCA: 10/2/2020     Code Status: Full Code   Is the patient medically ready for discharge?: No    Reason for patient still in hospital (select all that apply): Patient unstable, Patient trending condition, Treatment, Imaging and Consult recommendations  Discharge Plan A: (P) Home, Home Health   Discharge Delays: (P) None known at this time              Lorenzo Marlow MD  Department of Hospital Medicine   [facility]

## 2020-09-28 NOTE — MEDICAL/APP STUDENT
[facility] Med Student Progress Note      Gisselle Ortiz  YOB: 1933  Medical Record Number:  [id number]  Attending Physician:  Kai Arellano MD   Date of Admission: 9/24/2020       Hospital Day:  4  Current Principal Problem:  Hepatic abscess      History     Cc: Pain at drain site    HPI  Ms. Ortiz is a 87 year old female with gallstone pancreatitis two weeks ago s/p ERCP,  Afib, SSS s/p pacemaker and hypothyroidism who presented from GI clinic with elevated liver enzymes and hypotension (92/51).      She also has HTN and arthritis.      The patient has been recovering from gallstone pancreatitis s/p ERCP done on 9/14/20 and had been doing well until a few days after discharge when she became severely fatigued to the point the patient could not walk. She has also had decreased appetite and nausea but only 1 episode of vomiting. She denies abdominal pain, fevers, diarrhea. Further, she said her urine is orange/brown and is malodorous but denies dysuria.      During her recent hospitalization for gallstone pancreatitis she underwent an ERCP and EUS with sphincterotomy and the stone and sludge was removed. No stents were placed. Given her age and co-morbidities cholecystectomy was not performed.      ED Course:  Vitals on arrival: Temp 98, Pulse 62, RR 18, /51, SpO2 97%. Patient was nauseous and had RUQ discomfort to deep palpation. She received 1 liter of NS. BP improved to 130/61 after IVF.      Hospital Course:  Patient has had ERCP which was unremarkable for stone or obvious etiology for her elevated bilirubin or transaminases.  CT scan showed a complex thick-walled partially septated cystic lesion involving the right hepatic lobe suspicious for hepatic abscess. The hepatic abscess was drained by IR on 9/26.     The patient has had pain around the drain site but has been improving over the last two days. Drain output has been significantly above IR  threshold for removal of 5-10 ml/day.       Today, the patient is feeling much better and has been enjoying being able to drink water and juice. The pain around drain site is better controlled.    Overnight, her atenolol was held for hypotension      Medications  Scheduled Meds:   amiodarone  200 mg Oral Daily    atenoloL  50 mg Oral QAM    atorvastatin  10 mg Oral Daily    diclofenac sodium  4 g Topical (Top) TID    levothyroxine  137 mcg Oral Before breakfast    piperacillin-tazobactam (ZOSYN) IVPB  4.5 g Intravenous Q8H     Continuous Infusions:  PRN Meds:.acetaminophen, dextrose 50%, dextrose 50%, glucagon (human recombinant), glucose, glucose, HYDROcodone-acetaminophen, sodium chloride 0.9%      PSFH:  Please see admission note and assessment and plan below.      ROS   Admits Denies   Constitutional  Chills, diaphoresis, malaise   Eyes  Visual changes   ENMT  Dysphagia, Epistaxis, nasal congestion, hearing loss   Cardiovascular  Chest pain, palpitations, edema   Respiratory  Cough, dyspnea, wheezing   Gastrointestinal  Nausea, vomiting, constipation, diarrhea, anorexia.     Genitourinary  Dysuria, incontinence   Musculoskeletal  Myalgias, joint pain, joint swelling   Integumentary Pain around drain site Rash, inflammation,    Neruo-Psychiatric  Anxiety, insomnia.  Changes in speech, strength, sensation.     Endocrine  Polyuria, polydipsia   Hematologic  Abnormal bruising, bleeding   Immunologic  Inflammation, pain at IV sites.  Pruritis.         Physical Examination     General:  Patient laying in bed in no apparent distress    Vital Signs  Vitals  Temp: 98.1 °F (36.7 °C)  Temp src: Oral  Pulse: 60  Heart Rate Source: Continuous  Resp: 16  SpO2: 97 %  Pulse Oximetry Type: Intermittent  O2 Device (Oxygen Therapy): room air  BP: (!) 109/51  MAP (mmHg): 74  BP Location: Right arm  BP Method: cNIBP  Patient Position: Lying          24 Hour VS Range    Temp:  [96.9 °F (36.1 °C)-98.4 °F (36.9 °C)]   Pulse:   [60-62]   Resp:  [16-20]   BP: (101-126)/(51-65)   SpO2:  [96 %-98 %]     Intake/Output Summary (Last 24 hours) at 9/28/2020 1149  Last data filed at 9/28/2020 0800  Gross per 24 hour   Intake 275 ml   Output 428 ml   Net -153 ml       Head: NCAT  Eyes: conjunctivae and lids normal, no scleral icterus.  ENMT:  no gingival bleeding, normal oral mucosa without pallor or cyanosis.   Neck:  JVP normal.  Trachea non-displaced.     Chest:  Normal respiratory effort.  Chest clear to auscultation.  No wheezes, rales, or rhonchi.  Heart:  Normal rate and rythem, S1 S2 normal quality.  No murmurs rubs or gallops.  Peripheral pulses 2+.  Abdomen: Drain site CDI, tenderness to palpation around drain site. Drain putting out serosanguinous discharge.  Non-distended, normal bowel sounds, non-tender.    Extremities:  No edema. Normal capillary refill.    Skin:  Warm and dry.  No cyanosis or pallor.  No ulcers, stasis.  IV sites without tenderness or inflammation.    Neurological / Psychiatric:  Oriented to person, time, and place.  No facial asymmetry, drift.  Fluent without dysarthria.  Mood euthymic, affect normal.             Data       Recent Labs   Lab 09/24/20 1736 09/25/20 0554 09/26/20  0602 09/28/20  0637   WBC 20.38* 17.80* 16.75* 23.52*   HGB 10.2* 8.1* 8.4* 8.2*   HCT 31.7* 26.1* 27.4* 26.2*   * 417* 469* 457*        Recent Labs   Lab 09/24/20 1736 09/25/20  0554 09/26/20  0602 09/28/20  0637   * 133* 139 139   K 4.5 4.2 4.2 4.0    104 106 111*   CO2 25 22* 23 20*   BUN 30* 30* 25* 29*   CREATININE 1.2 1.0 0.9 1.1   ANIONGAP 8 7* 10 8   CALCIUM 8.0* 7.5* 7.3* 7.5*      Glucose  98    Recent Labs   Lab 09/24/20 1736 09/25/20  0554 09/26/20  0602 09/28/20  0637   PROT 6.1 5.0* 4.9* 4.7*   ALBUMIN 2.3* 2.0* 1.8* 1.7*   BILITOT 3.3* 2.5* 2.0* 1.9*   ALKPHOS 132 103 96 93   * 70* 59* 82*   * 108* 93* 75*       Lactate 0.5      Culture Results  Liver abscess:   GNR     Urine:   E. Faecalis      Blood:   E. Coli (9/24/20)  NGTD (9/26/20)        Assessment & Plan       * Hepatic abscess  Acute. Improving     Complex thick-walled partially septated cystic lesion involving the right hepatic lobe  35 ml Purulent bilious material removed from abscess           - Gram negative rods and many WBC's on gram stain            - Gram negative rods on culture waiting for identification and sensitivities    GI and ID following        Cholecystectomy not recommended     Continue Zosyn  Norco 5 PRN for pain            UTI (urinary tract infection)  Acute, improving     E. Faecalis on culture     Continue on Zosyn        Bacteremia  Acute, improving     E. Coli on culture      Continue on Zosyn           SSS (sick sinus syndrome)  Chronic Stable   Followed by Dr Colmenares in Cardiology     Telemetry   atenalol.  Apixaban stopped for GI consult.   amiodarone continued  Has pacemaker in place.         RBBB  Chronic Stable  On existing EKG  Has pacemaker, ECG showed RBBB;        Telemetry   atenalol.  Apixaban stopped for GI consult.   amiodarone continued.         Hypothyroidism  Chronic Stable  TSH 0.443     Continue levothyroxine 0.137 mg        Hypertension  Chronic Stable     atenolol.      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          Disposition   Clinically improving but suction bulb drain still putting out serosanguinous discharge above what IR recommends before pulling. When medically stable she will be discharged with home health     Sha Perez  MS3

## 2020-09-28 NOTE — ASSESSMENT & PLAN NOTE
88 y/o female with A.fib on eliquis, SSS s/p pacemaker, HTN presents to ED from GI clinic for elevated LFTs and hypotension 92/51 now with E.coli bacteremia. We are consulted for abx recommendations.     Had recent admission on 9/11 for abd pain, n/v found to have pancreatitis and CBD dilation and gallstone pancreatitis. She underwent ERCP 9/14 and underwent spincterotomy and stone removal. She is not a candidate for cholecystectomy. She returns to GI clinic and was noted to have n/v and fatigue and elevated LFTs with associated hypotension.  Imaging studies showed dilatation of the extrahepatic common bile duct, intrahepatic duct demonstrates normal caliber however has internal debris (possibly representing biliary sludge), hepatic abscess and pancreatic mass.         Blood cultures on admit positive with E.coli and urine cultures +E.faecalis.  Repeat blood cultures NGTD. She underwent ERCP 9/25.  Noted to have bile duct dilation and stent placed in CBD. She is s/p IR drainage of hepatic abscess 9/26.  Abscess culture showing GNR, ID pending. She is currently on zosyn.     She is afebrile today, but WBC uptrending.  Primary complaint is pain around IR drain site. Denies dysuria.      Recommendations  1. Continue zosyn for now pending abscess culture.  Will likely de-escalate tomorrow pending abscess ID.   2. Will follow up tomorrow with further/final recommendations.     Discussed with ID staff

## 2020-09-28 NOTE — ASSESSMENT & PLAN NOTE
Home meds include Amiodarone 200mg daily, Atenolol 25mg (held 9/27 d/t hypotension), Diltiazem 180mg daily, Apixaban 2.5mg BID (last dose 9/23 pm); held this admission.   BP 92/51 in GI clinic, improved with 1L fluid in ED  Last Cards Note (Darrian 7/17/20)  Gen change 1/2019. AF/AFL noted on f/u. Propafenone increased. Still had AF with RVR after that. Changed to flecainide, planned for DCCV; at some point changed to amio. Unclear timing of these changes.  TSH found to be abnormal, but T4 normal.  In past, following DCCV, she felt great -- until AF recurred.  On amio now, with great effect.  Plan:  - Telemetry  - Continue Amiodarone  - Started atenolol (held 9/28 d/t hypotension)  - Hold Apixaban for GI procedure.

## 2020-09-28 NOTE — TREATMENT PLAN
[facility] Treatment Plan    Giseslle Ortiz is a 87 y.o. female admitted to hospital 9/24/2020 (Hospital Day: 5) due to Hepatic abscess.     Interval History  S/p IR drain placement, approx 75cc output in drain this AM.  Abdominal pain improved.  Remains on Abx.  Abscess culture growing GNR    Objective  Temp:  [96.9 °F (36.1 °C)-98.4 °F (36.9 °C)] 98.1 °F (36.7 °C) (09/28 0815)  Pulse:  [60-62] 60 (09/28 0815)  BP: ()/(51-65) 109/51 (09/28 0815)  Resp:  [16-20] 16 (09/28 0815)  SpO2:  [93 %-98 %] 97 % (09/28 0815)  Laboratory  Lab Results   Component Value Date    WBC 23.52 (H) 09/28/2020    HGB 8.2 (L) 09/28/2020    HCT 26.2 (L) 09/28/2020    MCV 95 09/28/2020     (H) 09/28/2020       Lab Results   Component Value Date    ALT 75 (H) 09/28/2020    AST 82 (H) 09/28/2020    ALKPHOS 93 09/28/2020    BILITOT 1.9 (H) 09/28/2020       Recommendations  - Repeat ERCP in 2 months to remove stent.   - Continue antibiotics.   -continue to monitor drain output, further management per IR  -continue to trend daily CMP, CBC, INR  - We will continue to follow.    Thank you for involving us in the care of Gisselle Ortiz. Please call with any additional questions, concerns or changes in the patient's clinical status.    Antolin Ortega MD  Gastroenterology Fellow PGY IV   [facility]

## 2020-09-28 NOTE — ASSESSMENT & PLAN NOTE
Home meds include Atenolol 25mg BID, Diltiazem 180mg daily  BP 92/51 at GI clinic, improved SBP to 120s in ED after fluids  PLAN  - Started on atenolol (held d/t hypotension 9/28)

## 2020-09-28 NOTE — ASSESSMENT & PLAN NOTE
Blood culture grew gram negative cristofer. VS todau: Temp 98, HR 62, RR 19, and BP 93/53; SpO2 94%. Patient appeared alert, oriented, and responding to commands.   - WBC yesterday was 16. Results did not come today.   - Culture grew E.Coli susceptible to multiple abx.   - ID is following. ID recommended to continue with zosyn.   - ID thinks source of bacteremia is from ERCP from the previous admission.     -  Hypotension (bp 93/53) was treated with fluids. Currently improved.  - Abscess culture grew GNR.

## 2020-09-28 NOTE — PLAN OF CARE
09/28/20 0924   Post-Acute Status   Post-Acute Authorization Home Health   Home Health Status Awaiting Internal Medical Clearance

## 2020-09-28 NOTE — ASSESSMENT & PLAN NOTE
Sent from GI clinic where she had a f/u for recent hospital stay for ERCP --> elevated TBili, hypotensive, and increased transaminases. Was admitted to the hospital on 09/11 for abdominal pain and was found to have gallstone pancreatitis; got ERCP, CBD dilation and a gallbladder with sludge --> had EUS and ERCP with stone and sludge seen on the cholangiogram --> Sphincterotomy, no stents. Deemed not a surgical candidate for cholecystectomy; ERCP note indicates concern for choledocholithiasis.     PLAN    - Consult GI 9/26. ERCP was repeated yesterday. Did not show stone or obvious etiology for her elevated T bili and LFT on admission. The entire main bile duct was dilated; the biliary tree was swept and nothing was found; One plastic stent was placed into the common bile duct.  - Abd US was done 9/26. It showed heterogeneous pancreatic echotexture within adjacent 1.7 cm hypoechoic structure with irregular borders;  Dilatation of the extrahepatic common bile duct; intrahepatic duct demonstrates internal debris (possibly representing biliary sludge); 5.6 cm predominantly cystic lesion in the right hepatic lobe with internal debris. DDX included pancreatic mass, abscess, or pseudocyst.   - CT scan following pancreatic protocol done 9/26. It showed complex thick-walled partially septated cystic lesion involving the right hepatic lobe concerning for hepatic abscess; complex multiloculated cystic lesion involving the pancreatic body with additional suspected small cystic lesion/collection in the region of the pancreatic head; Common bile duct stent in place with pneumobilia as well as small volume of air within the mildly distended, thickened gallbladder.      - GI following. GI identified principal problem is hepatic abscess, which needs IR drainage.     - IR following. IR drainage for hepatic abscess was done 9/26. A drain was placed. Continues to drain. Drained fluid showed WBC of 58[address]. Culture grew GNR.    - Talked  with surgery and ID about source control of her infection. Surgery states that since gall bladder and biliary tract is not dilated, it is unlikely cholecystectomy will control source and says she is not a candidate for cholecystectomy. ID states likely source of bacteremia is from the ERCP procedure earlier this month from previous admission.

## 2020-09-28 NOTE — SUBJECTIVE & OBJECTIVE
Interval History: No events overnight. BP on lower side, atenolol held for now.    Review of Systems   Constitutional: Negative for fever.   HENT: Negative for congestion, ear pain and rhinorrhea.    Eyes: Negative for visual disturbance.   Respiratory: Negative for shortness of breath.    Cardiovascular: Negative for chest pain.   Gastrointestinal: Positive for abdominal pain (at drainage site). Negative for nausea and vomiting.   Genitourinary: Negative for difficulty urinating.   Musculoskeletal: Negative for arthralgias.   Neurological: Negative for dizziness and light-headedness.     Objective:     Vital Signs (Most Recent):  Temp: 97.4 °F (36.3 °C) (09/28/20 1201)  Pulse: 60 (09/28/20 1059)  Resp: 16 (09/28/20 0815)  BP: (!) 109/51 (09/28/20 0815)  SpO2: 97 % (09/28/20 0815) Vital Signs (24h Range):  Temp:  [96.9 °F (36.1 °C)-98.4 °F (36.9 °C)] 97.4 °F (36.3 °C)  Pulse:  [60-62] 60  Resp:  [16-20] 16  SpO2:  [96 %-98 %] 97 %  BP: (101-126)/(51-65) 109/51     Weight: 56.6 kg (124 lb 12.5 oz)  Body mass index is 22.1 kg/m².    Intake/Output Summary (Last 24 hours) at 9/28/2020 1243  Last data filed at 9/28/2020 0800  Gross per 24 hour   Intake 275 ml   Output 428 ml   Net -153 ml      Physical Exam  Vitals signs and nursing note reviewed.   Constitutional:       General: She is not in acute distress.     Appearance: She is not ill-appearing.   HENT:      Head: Normocephalic and atraumatic.      Nose: Nose normal.      Mouth/Throat:      Mouth: Mucous membranes are dry.   Eyes:      Extraocular Movements: Extraocular movements intact.      Conjunctiva/sclera: Conjunctivae normal.      Pupils: Pupils are equal, round, and reactive to light.   Neck:      Musculoskeletal: Normal range of motion.   Cardiovascular:      Rate and Rhythm: Normal rate and regular rhythm.      Heart sounds: Normal heart sounds. No murmur. No gallop.    Pulmonary:      Effort: Pulmonary effort is normal. No respiratory distress.       Breath sounds: No stridor. No wheezing, rhonchi or rales.   Abdominal:      General: Abdomen is flat. There is no distension.      Palpations: Abdomen is soft.      Tenderness: There is abdominal tenderness. There is no guarding.      Comments: Pain around incision/drainage site     Musculoskeletal: Normal range of motion.   Skin:     General: Skin is warm.   Neurological:      General: No focal deficit present.      Mental Status: She is alert and oriented to person, place, and time.         Significant Labs:   Bilirubin:   Recent Labs   Lab 09/15/20  0252 09/24/20  1736 09/25/20  0554 09/26/20  0602 09/28/20  0637   BILITOT 1.3* 3.3* 2.5* 2.0* 1.9*     Blood Culture: No results for input(s): LABBLOO in the last 48 hours.  CBC:   Recent Labs   Lab 09/28/20  0637   WBC 23.52*   HGB 8.2*   HCT 26.2*   *     CMP:   Recent Labs   Lab 09/28/20  0637      K 4.0   *   CO2 20*   GLU 98   BUN 29*   CREATININE 1.1   CALCIUM 7.5*   PROT 4.7*   ALBUMIN 1.7*   BILITOT 1.9*   ALKPHOS 93   AST 82*   ALT 75*   ANIONGAP 8   EGFRNONAA 45.3*     Lactic Acid:   Recent Labs   Lab 09/28/20  0637   LACTATE 0.5     Magnesium:   Recent Labs   Lab 09/28/20  0637   MG 2.0     Urine Culture: No results for input(s): LABURIN in the last 48 hours.  All pertinent labs within the past 24 hours have been reviewed.    Significant Imaging: I have reviewed all pertinent imaging results/findings within the past 24 hours.

## 2020-09-28 NOTE — PLAN OF CARE
Pt is not medically ready for d/c at this time. Will d/c with HH when ready, referrals placed. CM will cont to follow.     09/28/20 1255   Discharge Reassessment   Assessment Type Discharge Planning Reassessment   Provided patient/caregiver education on the expected discharge date and the discharge plan Yes   Do you have any problems affording any of your prescribed medications? No   Discharge Plan A Home;Home Health   Discharge Plan B Home;Home Health   DME Needed Upon Discharge  none   Anticipated Discharge Disposition Home-Health   Can the patient/caregiver answer the patient profile reliably? Yes, cognitively intact   How does the patient rate their overall health at the present time? Fair   Describe the patient's ability to walk at the present time. Walks with the help of equipment   How often would a person be available to care for the patient? Often   Number of comorbid conditions (as recorded on the chart) Two   During the past month, has the patient often been bothered by feeling down, depressed or hopeless? No   During the past month, has the patient often been bothered by little interest or pleasure in doing things? No   Post-Acute Status   Post-Acute Authorization Home Health   Home Health Status Awaiting Internal Medical Clearance   Discharge Delays None known at this time   Maryam Sanabria, MSN  Case Management  Ext [phone]

## 2020-09-28 NOTE — ASSESSMENT & PLAN NOTE
Urinary culture grew enterococcus faecalis susceptible to multiple abx.  - VSS stable: Temp 97, HR 60, RR 15, and /77; SpO2 94%. Patient appeared alert, oriented, and responding to commands. No dysuria, frequency , urgency.   - WBC is 16  - UA showed WBC 20, many bacteria, 2 hyaline cast.   - ID was consulted.   - ID recommended to continue with zosyn for bacteremia.   - Since pt is not showing symptoms of dysuria, urgency, frequency, ID thinks enterococcus faecalis is currently colonizing without active infection.

## 2020-09-28 NOTE — PROGRESS NOTES
[facility]  Infectious Disease  Progress Note    Patient Name: Gisselle Ortiz  MRN: [id number]  Admission Date: 9/24/2020  Length of Stay: 4 days  Attending Physician: Kai Arellano MD  Primary Care Provider: Kai Montez MD    Isolation Status: No active isolations  Assessment/Plan:      Bacteremia     86 y/o female with A.fib on eliquis, SSS s/p pacemaker, HTN presents to ED from GI clinic for elevated LFTs and hypotension 92/51 now with E.coli bacteremia. We are consulted for abx recommendations.     Had recent admission on 9/11 for abd pain, n/v found to have pancreatitis and CBD dilation and gallstone pancreatitis. She underwent ERCP 9/14 and underwent spincterotomy and stone removal. She is not a candidate for cholecystectomy. She returns to GI clinic and was noted to have n/v and fatigue and elevated LFTs with associated hypotension.  Imaging studies showed dilatation of the extrahepatic common bile duct, intrahepatic duct demonstrates normal caliber however has internal debris (possibly representing biliary sludge), hepatic abscess and pancreatic mass.         Blood cultures on admit positive with E.coli and urine cultures +E.faecalis.  Repeat blood cultures NGTD. She underwent ERCP 9/25.  Noted to have bile duct dilation and stent placed in CBD. She is s/p IR drainage of hepatic abscess 9/26.  Abscess culture showing GNR, ID pending. She is currently on zosyn.     She is afebrile today, but WBC uptrending. Denies diarrhea.   Primary complaint is pain around IR drain site. Denies dysuria.      Recommendations  1. Continue zosyn for now pending abscess culture.  Will likely de-escalate tomorrow pending abscess ID.   2.  Follow WBC  3. Will follow up tomorrow with further/final recommendations.     Discussed with ID staff         Thank you.   Please call for any questions or concerns.  MATTHEW Richards, ANP-C  [phone] pager, [place] [phone]    Subjective:     Principal  "Problem:Hepatic abscess    HPI: 88 y/o female with A.fib on eliquis, SSS s/p pacemaker, HTN presents to ED from GI clinic for elevated LFTs and hypotension 92/51 now with GNR bacteremia. We are consulted for abx recommendations.    Had recnet admission on 9/11 for abd pain, n/v found to have pancreatitis and CBD dilation and gallstone pancreatitis. She underwent ERCP 9/14 and underwent spincterotomy and stone removal. She is not a candidate for cholecystectomy. She returns to GI clinic and was noted to have n/v/ and fatigue and elevated LFTs with associated hypotension.  U/S of the abdomen was obtained and showed: "Dilatation of the extrahepatic common bile duct, intrahepatic duct demonstrates normal caliber however has internal debris (possibly representing biliary sludge), hepatic abscess and pancreatic mass.     She was seen by GI and underwent ERCP yesterday. Noted to have bile duct dilation and stent placed in CBD. Blood cultures on admit are positive with GNR and urine cultures +E.faecalis. she is currently on zosyn. We are consulted for abx recommendations.     CT A/P: Complex thick-walled partially septated cystic lesion involving the right hepatic lobe concerning for hepatic abscess.  Complex multiloculated cystic lesion involving the pancreatic body with additional suspected small cystic lesion/collection in the region of the pancreatic head as discussed above.  These are nonspecific and could relate to evolving pancreatic pseudocyst/acute collections, however underlying cystic neoplastic process is not excluded. Common bile duct stent in place with pneumobilia as well as small volume of air within the mildly distended, thickened gallbladder.  There is an air and fluid containing structure along the anterior margin of the extrahepatic common bile duct which may represent a loop of bowel (possibly relating to reported diverticulum seen on recent ERCP) versus fluid and gas containing collection adjacent to " and/or communicating with the common bile duct.     Interval History:   S/p IR drain and catheter placement 9/26  Gram stain +GNR - ID pending.  Afebrile, WBC uptrending 15>>26  Blood cultures on admit +E.coli, repeat blood cultures NGTD  Primary complaint today is pain at IR drain site      Review of Systems   Constitutional: Positive for activity change, appetite change (decreased) and fatigue. Negative for chills, diaphoresis and fever.   Respiratory: Negative for cough and shortness of breath.    Cardiovascular: Negative for chest pain.   Gastrointestinal: Positive for abdominal pain (right sided pain at drain site) and nausea (intermittent ). Negative for anal bleeding, blood in stool, constipation, diarrhea and vomiting.   Genitourinary: Negative for dysuria and flank pain.   Musculoskeletal: Negative for arthralgias, back pain, gait problem and joint swelling.   Skin: Negative for color change, pallor, rash and wound.   Neurological: Negative for dizziness and headaches.   All other systems reviewed and are negative.    Objective:     Vital Signs (Most Recent):  Temp: 98.1 °F (36.7 °C) (09/28/20 0815)  Pulse: 60 (09/28/20 0815)  Resp: 16 (09/28/20 0815)  BP: (!) 109/51 (09/28/20 0815)  SpO2: 97 % (09/28/20 0815) Vital Signs (24h Range):  Temp:  [96.9 °F (36.1 °C)-98.4 °F (36.9 °C)] 98.1 °F (36.7 °C)  Pulse:  [60-62] 60  Resp:  [16-20] 16  SpO2:  [93 %-98 %] 97 %  BP: ()/(51-65) 109/51     Weight: 56.6 kg (124 lb 12.5 oz)  Body mass index is 22.1 kg/m².    Estimated Creatinine Clearance: 29.8 mL/min (based on SCr of 1.1 mg/dL).    Physical Exam  Vitals signs reviewed.   Constitutional:       General: She is not in acute distress.     Appearance: Normal appearance. She is not ill-appearing, toxic-appearing or diaphoretic.   HENT:      Head: Normocephalic.      Mouth/Throat:      Mouth: Mucous membranes are moist.   Cardiovascular:      Rate and Rhythm: Normal rate and regular rhythm.      Heart sounds: No  murmur. No friction rub. No gallop.    Pulmonary:      Effort: Pulmonary effort is normal. No respiratory distress.      Breath sounds: Normal breath sounds. No stridor.   Abdominal:      General: Abdomen is flat. There is no distension.      Palpations: Abdomen is soft.      Tenderness: There is abdominal tenderness (around IR drain site). There is no guarding.      Comments: Drain in place RUQ.  Moderate amount sanguinous drainage.   Musculoskeletal: Normal range of motion.         General: No swelling or tenderness.   Skin:     General: Skin is warm and dry.      Coloration: Skin is not jaundiced or pale.   Neurological:      General: No focal deficit present.      Mental Status: She is alert.   Psychiatric:         Mood and Affect: Mood normal.         Significant Labs: All pertinent labs within the past 24 hours have been reviewed.    Significant Imaging: I have reviewed all pertinent imaging results/findings within the past 24 hours.

## 2020-09-28 NOTE — ASSESSMENT & PLAN NOTE
Blood culture grew gram negative cristofer. VS todau: Temp 98, HR 62, RR 19, and BP 93/53; SpO2 94%. Patient appeared alert, oriented, and responding to commands.   - WBC yesterday was 16. Results did not come today.   - Culture grew E.Coli susceptible to multiple abx.   - ID is following. ID recommended to continue with zosyn.   - ID thinks source of bacteremia is from ERCP from the previous admission.    -  Hypotension (bp 93/53) was treated with fluids. Currently improved.

## 2020-09-28 NOTE — ASSESSMENT & PLAN NOTE
Urinary culture grew enterococcus faecalis susceptible to multiple abx.  - VSS stable. Patient appeared alert, oriented, and responding to commands. No dysuria, frequency , urgency.   - WBC is 24 from 16  - UA showed WBC 20, many bacteria, 2 hyaline cast.   - ID recommended to continue with zosyn  - Since pt is not showing symptoms of dysuria, urgency, frequency, ID thinks enterococcus faecalis is currently colonizing without active infection

## 2020-09-28 NOTE — PROGRESS NOTES
[facility] Hospital Medicine  Progress Note    Patient Name: Gisselle Ortiz  MRN: [id number]  Patient Class: IP- Inpatient   Admission Date: 9/24/2020  Length of Stay: 3 days  Attending Physician: Kai Arellano MD  Primary Care Provider: Kai Montez MD    [facility] Medicine Team: [facility] 1 Ta Mccoy MD    Subjective:     Principal Problem:Hepatic abscess        HPI:  Gisselle Ortiz is a 87 y.o. female with a medical history of AFib on Eliquis with previous cardioversion, SSS s/p pacemaker, arthritis, HTN, thyroid disease with diastolic dysfunction (EF 55%, 9/2019) who presented to the ED from GI clinic after she was found to have elevated liver enzymes and hypotensive to 92/51.    She presented to the hospital on 09/11 for abdominal pain and was found to have gallstone pancreatitis. She had CBD dilation and a gallbladder with sludge. She underwent EUS and ERCP with stone and sludge seen on the cholangiogram. Sphincterotomy was performed and stone/sludge removed with no stents placed. Per notes, ERCP was concerning for choledocholithiasis.  Surgery was consulted and deemed the patient to be high risk for cholecystectomy. She was seen today in GI clinic for follow-up. She initially recovered well from the pancreatitis and ERCP and was eating prior to discharge. A few days after discharge she started having severe fatigue that progressively worsened to the point where the patient could not walk. She complains of decreased appetite and nausea but only 1 episode of vomiting. She denies any abdominal pain, fevers, diarrhea. She reports that her urine is orange/brown and has a bad odor but denies any dysuria. In the office her, BP was found to be 92/51. She was sent to the ED with BP rechecked at 112/51. She was given one liter NS and admitted for further evaluations.     In the ED, she was found to be afebrile and her BP improved to 130/61 with IVF. HR 63 with NSR  and existing RBBB. Qtc 512. She was breathing 20bpm with no O2 requirement. She c/o some nausea but no abd pain. On exam, she had mild discomfort to deep palpation to RUQ abdomen.       Overview/Hospital Course:  9/26: Pt had ERCP yesterday, which was unremarkable for stone or obvious etiology for her elevated bili and LFT. CT scan with contrast following pancreatic protocol showed hepatic abscess. Blood culture grew gram negative cristofer; urine culture was positive enterococcus faecalis.     9/27: Pt had IR intervention for her hepatic abscess yesterday. Around 50 ml was put out via drainage yesterday. Blood culture grew E.Coli susceptible to multiple abx. Urine enterococcus faecalis also shown to have susceptibility to multiple organism.             Review of Systems   Constitutional: Positive for fatigue. Negative for chills and fever.   HENT: Negative for congestion, dental problem and nosebleeds.    Eyes: Negative for photophobia and visual disturbance.   Respiratory: Negative for apnea, cough, choking, chest tightness, shortness of breath, wheezing and stridor.    Cardiovascular: Negative for chest pain and leg swelling.   Gastrointestinal: Negative for abdominal distention, abdominal pain, anal bleeding, constipation and diarrhea.   Endocrine: Negative for polydipsia and polyphagia.   Genitourinary: Negative for dysuria and flank pain.   Musculoskeletal: Negative for arthralgias and back pain.   Skin: Negative for color change and pallor.   Neurological: Negative for tremors and syncope.   Psychiatric/Behavioral: Negative for agitation and behavioral problems.     Objective:     Vital Signs (Most Recent):  Temp: 97.5 °F (36.4 °C) (09/27/20 1136)  Pulse: 60 (09/27/20 1506)  Resp: 20 (09/27/20 1136)  BP: (!) 90/55 (09/27/20 1138)  SpO2: (!) 93 % (09/27/20 1136) Vital Signs (24h Range):  Temp:  [97.5 °F (36.4 °C)-98.9 °F (37.2 °C)] 97.5 °F (36.4 °C)  Pulse:  [60-62] 60  Resp:  [16-20] 20  SpO2:  [93 %-98 %] 93  %  BP: ()/(51-64) 90/55     Weight: 56.6 kg (124 lb 12.5 oz)  Body mass index is 22.1 kg/m².    Intake/Output Summary (Last 24 hours) at 9/27/2020 1523  Last data filed at 9/27/2020 0916  Gross per 24 hour   Intake 300 ml   Output 37 ml   Net 263 ml      Physical Exam  Vitals signs and nursing note reviewed.   Constitutional:       Appearance: Normal appearance.      Comments: Appears weak; Jaundice   HENT:      Head: Normocephalic and atraumatic.      Nose: Nose normal.      Mouth/Throat:      Mouth: Mucous membranes are moist.      Pharynx: Oropharynx is clear.   Eyes:      Extraocular Movements: Extraocular movements intact.      Conjunctiva/sclera: Conjunctivae normal.      Pupils: Pupils are equal, round, and reactive to light.   Cardiovascular:      Rate and Rhythm: Normal rate and regular rhythm.      Pulses: Normal pulses.      Heart sounds: Normal heart sounds.   Pulmonary:      Effort: Pulmonary effort is normal.      Breath sounds: Normal breath sounds.   Abdominal:      General: Abdomen is flat. Bowel sounds are normal.      Palpations: Abdomen is soft.      Comments: IR drainage placed; pain at drainage site.    Musculoskeletal: Normal range of motion.   Skin:     General: Skin is warm and dry.   Neurological:      General: No focal deficit present.      Mental Status: She is alert and oriented to person, place, and time. Mental status is at baseline.   Psychiatric:         Mood and Affect: Mood normal.         Behavior: Behavior normal.         Thought Content: Thought content normal.         Judgment: Judgment normal.         Significant Labs: All pertinent labs within the past 24 hours have been reviewed.    Significant Imaging: I have reviewed all pertinent imaging results/findings within the past 24 hours.      Assessment/Plan:      * Hepatic abscess  Sent from GI clinic where she had a f/u for recent hospital stay for ERCP --> elevated TBili, hypotensive, and increased transaminases. Was  admitted to the hospital on 09/11 for abdominal pain and was found to have gallstone pancreatitis; got ERCP, CBD dilation and a gallbladder with sludge --> had EUS and ERCP with stone and sludge seen on the cholangiogram --> Sphincterotomy, no stents. Deemed not a surgical candidate for cholecystectomy; ERCP note indicates concern for choledocholithiasis.     PLAN    - Consult GI yesterday. ERCP was repeated yesterday. Did not show stone or obvious etiology for her elevated T bili and LFT on admission. The entire main bile duct was dilated; the biliary tree was swept and nothing was found; One plastic stent was placed into the common bile duct.      - Abd US was done yesterday. It showed heterogeneous pancreatic echotexture within adjacent 1.7 cm hypoechoic structure with irregular borders;  Dilatation of the extrahepatic common bile duct; intrahepatic duct demonstrates internal debris (possibly representing biliary sludge); 5.6 cm predominantly cystic lesion in the right hepatic lobe with internal debris. DDX included pancreatic mass, abscess, or pseudocyst.     - CT scan following pancreatic protocol done yesterday. It showed complex thick-walled partially septated cystic lesion involving the right hepatic lobe concerning for hepatic abscess; complex multiloculated cystic lesion involving the pancreatic body with additional suspected small cystic lesion/collection in the region of the pancreatic head; Common bile duct stent in place with pneumobilia as well as small volume of air within the mildly distended, thickened gallbladder.      -Consulted GI. GI identified principal problem is hepatic abscess, which needs IR drainage.     - IR was consulted. IR drainage for hepatic abscess was done yesterday. A drain was placed. It lia 50 cc overnight, mainly pus and blood. Drained fluid showed WBC of [address]. F/u culture.     - Talked with surgery and ID about source control of her infection. Surgery states that since gall  "bladder and biliary tract is not dilated, it is unlikely cholecystectomy will control source. ID states likely source of bacteremia is from the ERCP procedure earlier this month from previous admission.     - Follow up culture result of abscess fluid.           UTI (urinary tract infection)  Urinary culture grew enterococcus faecalis susceptible to multiple abx.  - VSS stable: Temp 97, HR 60, RR 15, and /77; SpO2 94%. Patient appeared alert, oriented, and responding to commands. No dysuria, frequency , urgency.   - WBC is 16  - UA showed WBC 20, many bacteria, 2 hyaline cast.   - ID was consulted.   - ID recommended to continue with zosyn for bacteremia.   - Since pt is not showing symptoms of dysuria, urgency, frequency, ID thinks enterococcus faecalis is currently colonizing without active infection.          Bacteremia  Blood culture grew gram negative cristofer. VS todau: Temp 98, HR 62, RR 19, and BP 93/53; SpO2 94%. Patient appeared alert, oriented, and responding to commands.   - WBC yesterday was 16. Results did not come today.   - Culture grew E.Coli susceptible to multiple abx.   - ID is following. ID recommended to continue with zosyn.   - ID thinks source of bacteremia is from ERCP from the previous admission.    -  Hypotension (bp 93/53) was treated with fluids. Currently improved.         Pancreatic abscess  See "hepatic abscess"      Dilated bile duct  See hepatic abscess     Hyperbilirubinemia  See hepatic abscess.       SSS (sick sinus syndrome)  Followed by Dr Colmenares in Cardiology  TSH 0.443  PM for SB  On existing EKG  ECG showed RBBB;  PLAN  - Telemetry  - Also started on atenalol.  - Apixaban stopped for GI consult.   - amiodarone continued.   - Has pacemaker in place.     Current use of long term anticoagulation  On Apixaban 2.5mg po BID (age > 80, Wt < 60kg); last dose 9/23/20 pm  PLAN  - hold for possible GI procedure       Hypothyroidism  TSH 0.443  PLAN  - Continue levothyroxine 0.137 " mg      Hypertension  Home meds include  Atenolol 25mg BID, Diltiazem 180mg daily  BP 92/51 at GI clinic, improved SBP to 120s in ED after fluids  PLAN  - Started on atenolol.     Pacemaker  DC PPM (2014; [facility]) implanted for symptomatic SB and first degree AVB      Paroxysmal atrial fibrillation  Home meds include Amiodarone 200mg daily, Atenolol 25mg, Diltiazem 180mg daily, Apixaban 2.5mg BID (last dose 9/23 pm); held this admission.   BP 92/51 in GI clinic, improved with 1L fluid in ED  Last Cards Note (Darrian 7/17/20)  Gen change 1/2019. AF/AFL noted on f/u. Propafenone increased. Still had AF with RVR after that. Changed to flecainide, planned for DCCV; at some point changed to amio. Unclear timing of these changes.  TSH found to be abnormal, but T4 normal.  In past, following DCCV, she felt great! -- until AF recurred.  On amio now, with great effect.  PLAN  - Telemetry  - Continue Amiodarone  - Started atenolol   - Hold Apixaban for GI procedure.         VTE Risk Mitigation (From admission, onward)         Ordered     Reason for No Pharmacological VTE Prophylaxis  Once     Question:  Reasons:  Answer:  Already adequately anticoagulated on oral Anticoagulants    09/24/20 2041     IP VTE HIGH RISK PATIENT  Once      09/24/20 2041     Place sequential compression device  Until discontinued      09/24/20 2041                Discharge Planning   FRANCESCA: 9/30/2020     Code Status: Full Code   Is the patient medically ready for discharge?: No    Reason for patient still in hospital (select all that apply): Patient trending condition  Discharge Plan A: Home, Home Health                  Ta Mccoy MD  Department of Hospital Medicine   [facility]

## 2020-09-28 NOTE — SUBJECTIVE & OBJECTIVE
Interval History:   S/p IR drain and catheter placement 9/26  Gram stain +GNR - ID pending.  Afebrile, WBC uptrending 15>>26  Blood cultures on admit +E.coli, repeat blood cultures NGTD  Primary complaint today is pain at IR drain site      Review of Systems   Constitutional: Positive for activity change, appetite change (decreased) and fatigue. Negative for chills, diaphoresis and fever.   Respiratory: Negative for cough and shortness of breath.    Cardiovascular: Negative for chest pain.   Gastrointestinal: Positive for abdominal pain (right sided pain at drain site) and nausea (intermittent ). Negative for anal bleeding, blood in stool, constipation, diarrhea and vomiting.   Genitourinary: Negative for dysuria and flank pain.   Musculoskeletal: Negative for arthralgias, back pain, gait problem and joint swelling.   Skin: Negative for color change, pallor, rash and wound.   Neurological: Negative for dizziness and headaches.   All other systems reviewed and are negative.    Objective:     Vital Signs (Most Recent):  Temp: 98.1 °F (36.7 °C) (09/28/20 0815)  Pulse: 60 (09/28/20 0815)  Resp: 16 (09/28/20 0815)  BP: (!) 109/51 (09/28/20 0815)  SpO2: 97 % (09/28/20 0815) Vital Signs (24h Range):  Temp:  [96.9 °F (36.1 °C)-98.4 °F (36.9 °C)] 98.1 °F (36.7 °C)  Pulse:  [60-62] 60  Resp:  [16-20] 16  SpO2:  [93 %-98 %] 97 %  BP: ()/(51-65) 109/51     Weight: 56.6 kg (124 lb 12.5 oz)  Body mass index is 22.1 kg/m².    Estimated Creatinine Clearance: 29.8 mL/min (based on SCr of 1.1 mg/dL).    Physical Exam  Vitals signs reviewed.   Constitutional:       General: She is not in acute distress.     Appearance: Normal appearance. She is not ill-appearing, toxic-appearing or diaphoretic.   HENT:      Head: Normocephalic.      Mouth/Throat:      Mouth: Mucous membranes are moist.   Cardiovascular:      Rate and Rhythm: Normal rate and regular rhythm.      Heart sounds: No murmur. No friction rub. No gallop.    Pulmonary:       Effort: Pulmonary effort is normal. No respiratory distress.      Breath sounds: Normal breath sounds. No stridor.   Abdominal:      General: Abdomen is flat. There is no distension.      Palpations: Abdomen is soft.      Tenderness: There is abdominal tenderness (around IR drain site). There is no guarding.      Comments: Drain in place RUQ.  Moderate amount sanguinous drainage.   Musculoskeletal: Normal range of motion.         General: No swelling or tenderness.   Skin:     General: Skin is warm and dry.      Coloration: Skin is not jaundiced or pale.   Neurological:      General: No focal deficit present.      Mental Status: She is alert.   Psychiatric:         Mood and Affect: Mood normal.         Significant Labs: All pertinent labs within the past 24 hours have been reviewed.    Significant Imaging: I have reviewed all pertinent imaging results/findings within the past 24 hours.

## 2020-09-28 NOTE — ASSESSMENT & PLAN NOTE
Followed by Dr Colmenares in Cardiology  TSH 0.443  PM for SB  On existing EKG  ECG showed RBBB    Plan:  - Telemetry  - Also started on atenalol. Held 9/28 due hypotension.  - Apixaban stopped for GI consult.   - Amiodarone continued.   - Has pacemaker in place.

## 2020-09-29 LAB
ALBUMIN SERPL BCP-MCNC: 1.6 G/DL (ref 3.5–5.2)
ALP SERPL-CCNC: 107 U/L (ref 55–135)
ALT SERPL W/O P-5'-P-CCNC: 75 U/L (ref 10–44)
ANION GAP SERPL CALC-SCNC: 10 MMOL/L (ref 8–16)
ANISOCYTOSIS BLD QL SMEAR: SLIGHT
AST SERPL-CCNC: 86 U/L (ref 10–40)
BACTERIA SPEC AEROBE CULT: ABNORMAL
BASOPHILS NFR BLD: 0 % (ref 0–1.9)
BILIRUB SERPL-MCNC: 1.4 MG/DL (ref 0.1–1)
BUN SERPL-MCNC: 24 MG/DL (ref 8–23)
CALCIUM SERPL-MCNC: 7.3 MG/DL (ref 8.7–10.5)
CHLORIDE SERPL-SCNC: 114 MMOL/L (ref 95–110)
CO2 SERPL-SCNC: 16 MMOL/L (ref 23–29)
CREAT SERPL-MCNC: 0.8 MG/DL (ref 0.5–1.4)
DIFFERENTIAL METHOD: ABNORMAL
EOSINOPHIL NFR BLD: 1 % (ref 0–8)
ERYTHROCYTE [DISTWIDTH] IN BLOOD BY AUTOMATED COUNT: 16.5 % (ref 11.5–14.5)
EST. GFR  (AFRICAN AMERICAN): >60 ML/MIN/1.73 M^2
EST. GFR  (NON AFRICAN AMERICAN): >60 ML/MIN/1.73 M^2
GLUCOSE SERPL-MCNC: 70 MG/DL (ref 70–110)
HCT VFR BLD AUTO: 26.5 % (ref 37–48.5)
HGB BLD-MCNC: 8.2 G/DL (ref 12–16)
HYPOCHROMIA BLD QL SMEAR: ABNORMAL
IMM GRANULOCYTES # BLD AUTO: ABNORMAL K/UL (ref 0–0.04)
IMM GRANULOCYTES NFR BLD AUTO: ABNORMAL % (ref 0–0.5)
LACTATE SERPL-SCNC: 0.6 MMOL/L (ref 0.5–2.2)
LYMPHOCYTES NFR BLD: 11 % (ref 18–48)
MCH RBC QN AUTO: 29.8 PG (ref 27–31)
MCHC RBC AUTO-ENTMCNC: 30.9 G/DL (ref 32–36)
MCV RBC AUTO: 96 FL (ref 82–98)
METAMYELOCYTES NFR BLD MANUAL: 3 %
MONOCYTES NFR BLD: 4 % (ref 4–15)
MYELOCYTES NFR BLD MANUAL: 4 %
NEUTROPHILS NFR BLD: 77 % (ref 38–73)
NRBC BLD-RTO: 0 /100 WBC
OVALOCYTES BLD QL SMEAR: ABNORMAL
PLATELET # BLD AUTO: 486 K/UL (ref 150–350)
PLATELET BLD QL SMEAR: ABNORMAL
PMV BLD AUTO: 10 FL (ref 9.2–12.9)
POIKILOCYTOSIS BLD QL SMEAR: SLIGHT
POTASSIUM SERPL-SCNC: 3.6 MMOL/L (ref 3.5–5.1)
PROT SERPL-MCNC: 4.6 G/DL (ref 6–8.4)
RBC # BLD AUTO: 2.75 M/UL (ref 4–5.4)
SODIUM SERPL-SCNC: 140 MMOL/L (ref 136–145)
WBC # BLD AUTO: 25.22 K/UL (ref 3.9–12.7)

## 2020-09-29 PROCEDURE — 36415 COLL VENOUS BLD VENIPUNCTURE: CPT

## 2020-09-29 PROCEDURE — 63600175 PHARM REV CODE 636 W HCPCS: Performed by: STUDENT IN AN ORGANIZED HEALTH CARE EDUCATION/TRAINING PROGRAM

## 2020-09-29 PROCEDURE — 25000003 PHARM REV CODE 250: Performed by: STUDENT IN AN ORGANIZED HEALTH CARE EDUCATION/TRAINING PROGRAM

## 2020-09-29 PROCEDURE — 99233 PR SUBSEQUENT HOSPITAL CARE,LEVL III: ICD-10-PCS | Mod: GC,,, | Performed by: INTERNAL MEDICINE

## 2020-09-29 PROCEDURE — 80053 COMPREHEN METABOLIC PANEL: CPT

## 2020-09-29 PROCEDURE — 99233 SBSQ HOSP IP/OBS HIGH 50: CPT | Mod: GC,,, | Performed by: INTERNAL MEDICINE

## 2020-09-29 PROCEDURE — 94761 N-INVAS EAR/PLS OXIMETRY MLT: CPT

## 2020-09-29 PROCEDURE — 97110 THERAPEUTIC EXERCISES: CPT

## 2020-09-29 PROCEDURE — 83605 ASSAY OF LACTIC ACID: CPT

## 2020-09-29 PROCEDURE — 85007 BL SMEAR W/DIFF WBC COUNT: CPT

## 2020-09-29 PROCEDURE — 99233 PR SUBSEQUENT HOSPITAL CARE,LEVL III: ICD-10-PCS | Mod: ,,, | Performed by: NURSE PRACTITIONER

## 2020-09-29 PROCEDURE — 97535 SELF CARE MNGMENT TRAINING: CPT

## 2020-09-29 PROCEDURE — 85027 COMPLETE CBC AUTOMATED: CPT

## 2020-09-29 PROCEDURE — 99233 SBSQ HOSP IP/OBS HIGH 50: CPT | Mod: ,,, | Performed by: NURSE PRACTITIONER

## 2020-09-29 PROCEDURE — 11000001 HC ACUTE MED/SURG PRIVATE ROOM

## 2020-09-29 RX ADMIN — DICLOFENAC 4 G: 10 GEL TOPICAL at 10:09

## 2020-09-29 RX ADMIN — AMIODARONE HYDROCHLORIDE 200 MG: 200 TABLET ORAL at 10:09

## 2020-09-29 RX ADMIN — ATORVASTATIN CALCIUM 10 MG: 10 TABLET, FILM COATED ORAL at 10:09

## 2020-09-29 RX ADMIN — PIPERACILLIN SODIUM,TAZOBACTAM SODIUM 4.5 G: 4; .5 INJECTION, POWDER, FOR SOLUTION INTRAVENOUS at 02:09

## 2020-09-29 RX ADMIN — LEVOTHYROXINE SODIUM 137 MCG: 25 TABLET ORAL at 05:09

## 2020-09-29 RX ADMIN — SODIUM CHLORIDE 500 ML: 0.9 INJECTION, SOLUTION INTRAVENOUS at 11:09

## 2020-09-29 RX ADMIN — DICLOFENAC 4 G: 10 GEL TOPICAL at 02:09

## 2020-09-29 RX ADMIN — PIPERACILLIN SODIUM,TAZOBACTAM SODIUM 4.5 G: 4; .5 INJECTION, POWDER, FOR SOLUTION INTRAVENOUS at 06:09

## 2020-09-29 RX ADMIN — ATENOLOL 50 MG: 50 TABLET ORAL at 07:09

## 2020-09-29 RX ADMIN — DICLOFENAC 4 G: 10 GEL TOPICAL at 08:09

## 2020-09-29 RX ADMIN — PIPERACILLIN SODIUM,TAZOBACTAM SODIUM 4.5 G: 4; .5 INJECTION, POWDER, FOR SOLUTION INTRAVENOUS at 10:09

## 2020-09-29 NOTE — PLAN OF CARE
Problem: Fall Injury Risk  Goal: Absence of Fall and Fall-Related Injury  Outcome: Ongoing, Progressing     Problem: Adult Inpatient Plan of Care  Goal: Plan of Care Review  Outcome: Ongoing, Progressing  Goal: Patient-Specific Goal (Individualization)  Outcome: Ongoing, Progressing  Goal: Absence of Hospital-Acquired Illness or Injury  Outcome: Ongoing, Progressing  Goal: Optimal Comfort and Wellbeing  Outcome: Ongoing, Progressing  Goal: Readiness for Transition of Care  Outcome: Ongoing, Progressing  Goal: Rounds/Family Conference  Outcome: Ongoing, Progressing  Care plan reviewed with patient questions answered, kept free from falls and injury

## 2020-09-29 NOTE — ASSESSMENT & PLAN NOTE
Blood culture grew gram negative cristofer. VS todau: Temp 98, HR 62, RR 19, and BP 93/53; SpO2 94%. Patient appeared alert, oriented, and responding to commands.   - WBC yesterday was 16. Results did not come today.   - Culture grew E.Coli susceptible to multiple abx.   - ID is following. ID recommended to continue with zosyn.   - ID thinks source of bacteremia is from ERCP from the previous admission.     -  Hypotension (bp 93/53) was treated with fluids. Currently improved.  - Abscess culture grew E. Coli

## 2020-09-29 NOTE — MEDICAL/APP STUDENT
[facility] Med Student Progress Note      Gisselle Ortiz  YOB: 1933  Medical Record Number:  [id number]  Attending Physician:  Kai Arellano MD   Date of Admission: 9/24/2020       Hospital Day:  5  Current Principal Problem:  Hepatic abscess      History     Cc: tired     HPI  This is a 87 year old female with gallstone pancreatitis two weeks ago s/p ERCP,  Afib, SSS s/p pacemaker and hypothyroidism who presented from GI clinic with elevated liver enzymes and hypotension (92/51).      She also has HTN and arthritis.      The patient has been recovering from gallstone pancreatitis s/p ERCP done on 9/14/20 and had been doing well until a few days after discharge when she became severely fatigued to the point the patient could not walk. She has also had decreased appetite and nausea but only 1 episode of vomiting. She denies abdominal pain, fevers, diarrhea. Further, she said her urine is orange/brown and is malodorous but denies dysuria.      During her recent hospitalization for gallstone pancreatitis she underwent an ERCP and EUS with sphincterotomy and the stone and sludge was removed. No stents were placed. Given her age and co-morbidities cholecystectomy was not performed.                                                                                                                                             ED Course:  Vitals on arrival: Temp 98, Pulse 62, RR 18, /51, SpO2 97%. Patient was nauseous and had RUQ discomfort to deep palpation. She received 1 liter of NS. BP improved to 130/61 after IVF.      Hospital Course:  Patient has had ERCP which was unremarkable for stone or obvious etiology for her elevated bilirubin or transaminases.  CT scan showed a complex thick-walled partially septated cystic lesion involving the right hepatic lobe suspicious for hepatic abscess. The hepatic abscess was drained by IR on 9/26.      The patient has had pain around  the drain site but has been improving over the last three days. Drain output has been significantly above IR threshold for removal of 5-10 ml/day.     The cultures of her abscess grew E. Coli sensitive to zosyn.         Today, the patient is fatigued and feels worse than she did yesterday. She had three episodes diarrhea yesterday. She has decreased appetite.        Medications  Scheduled Meds:   amiodarone  200 mg Oral Daily    atenoloL  50 mg Oral QAM    atorvastatin  10 mg Oral Daily    diclofenac sodium  4 g Topical (Top) TID    levothyroxine  137 mcg Oral Before breakfast    piperacillin-tazobactam (ZOSYN) IVPB  4.5 g Intravenous Q8H     Continuous Infusions:  PRN Meds:.acetaminophen, dextrose 50%, dextrose 50%, glucagon (human recombinant), glucose, glucose, HYDROcodone-acetaminophen, sodium chloride 0.9%      PSFH:  Please see admission note and assessment and plan below.      ROS   Admits Denies   Constitutional Fatigue  Chills, diaphoresis   Eyes  Visual changes   ENMT  Dysphagia, Epistaxis, nasal congestion   Cardiovascular  Chest pain, palpitations, edema   Respiratory  Cough, dyspnea, wheezing   Gastrointestinal diarrhea Nausea, vomiting, constipation, anorexia.     Genitourinary  Dysuria, incontinence   Musculoskeletal  Myalgias, joint pain, joint swelling   Integumentary  Rash, inflammation, burning   Neruo-Psychiatric  Anxiety, insomnia.  Changes in speech, strength, sensation.     Endocrine     Hematologic  Abnormal bruising, bleeding   Immunologic  Inflammation, pain at IV sites.  Pruritis.         Physical Examination     General: Laying in bed in no apparent respiratory distress. Patient appears tired.    Vital Signs  Vitals  Temp: 97.1 °F (36.2 °C)  Temp src: Oral  Pulse: 60  Heart Rate Source: Monitor  Resp: 16  SpO2: 96 %  Pulse Oximetry Type: Continuous  O2 Device (Oxygen Therapy): room air  BP: (!) 117/55  MAP (mmHg): 79  BP Location: Right arm  BP Method: cNIBP  Patient Position:  Lying          24 Hour VS Range    Temp:  [96.4 °F (35.8 °C)-97.9 °F (36.6 °C)]   Pulse:  [60-62]   Resp:  [16-22]   BP: (105-148)/(55-63)   SpO2:  [94 %-98 %]     Intake/Output Summary (Last 24 hours) at 9/29/2020 1324  Last data filed at 9/29/2020 0900  Gross per 24 hour   Intake 420 ml   Output 330 ml   Net 90 ml       Head: NCAT  Eyes: conjunctivae and lids normal, no scleral icterus.  ENMT:  no gingival bleeding, normal oral mucosa without pallor or cyanosis.   Neck:  JVP normal.  Trachea non-displaced.     Chest:  Normal respiratory effort.  Chest clear to auscultation.  No wheezes, rales, or rhonchi.  Heart:  Normal rate and rythem, S1 S2 normal quality.  No murmurs rubs or gallops.  Peripheral pulses 2+.  Abdomen: Drain site CDI, tenderness to palpation around drain site. Drain putting out serosanguinous discharge.  Non-distended, normal bowel sounds, non-tender.    Extremities:  No edema. Normal capillary refill.    Skin:  Warm and dry.  No cyanosis or pallor.  No ulcers, stasis.  IV sites without tenderness or inflammation.    Neurological / Psychiatric:  Oriented to person, time, and place.  No facial asymmetry, drift.  Fluent without dysarthria.  Mood euthymic, affect normal.               Data       Recent Labs   Lab 09/24/20 1736 09/25/20  0554 09/26/20  0602 09/28/20  0637 09/29/20  0335   WBC 20.38* 17.80* 16.75* 23.52* 25.22*   HGB 10.2* 8.1* 8.4* 8.2* 8.2*   HCT 31.7* 26.1* 27.4* 26.2* 26.5*   * 417* 469* 457* 486*        Recent Labs   Lab 09/24/20 1736 09/25/20  0554 09/26/20  0602 09/28/20  0637 09/29/20  0335   * 133* 139 139 140   K 4.5 4.2 4.2 4.0 3.6    104 106 111* 114*   CO2 25 22* 23 20* 16*   BUN 30* 30* 25* 29* 24*   CREATININE 1.2 1.0 0.9 1.1 0.8   ANIONGAP 8 7* 10 8 10   CALCIUM 8.0* 7.5* 7.3* 7.5* 7.3*        Recent Labs   Lab 09/24/20  1736 09/25/20  0554 09/26/20  0602 09/28/20  0637 09/29/20  0335   PROT 6.1 5.0* 4.9* 4.7* 4.6*   ALBUMIN 2.3* 2.0* 1.8* 1.7*  1.6*   BILITOT 3.3* 2.5* 2.0* 1.9* 1.4*   ALKPHOS 132 103 96 93 107   * 70* 59* 82* 86*   * 108* 93* 75* 75*        Lactate (Lactic Acid) (mmol/L)   Date Value   09/29/2020 0.6   09/28/2020 0.5     Cultures  Hepatic Abscess:  E. Coli   Sensitive to Zosyn    Imaging  ERCP (9/25/20)  The entire main bile duct was dilated.   The biliary tree was swept and nothing was found.    One plastic stent was placed into the common bile duct      CT (9/25/20)  Complex thick-walled partially septated cystic lesion involving the right hepatic lobe concerning for hepatic abscess     Complex multiloculated cystic lesion involving the pancreatic body with additional suspected small cystic lesion/collection in the region of the pancreatic head. These are nonspecific and could relate to evolving pancreatic pseudocyst/acute collections, however underlying cystic neoplastic process is not excluded.       Common bile duct stent in place with pneumobilia as well as small volume of air within the mildly distended, thickened gallbladder.           Assessment & Plan     * Hepatic abscess  Acute. Patient fatigued and worse appearing than previous day      Complex thick-walled partially septated cystic lesion involving the right hepatic lobe  35 ml Purulent bilious material removed from abscess.   55 ml of Serosanguinous drain output  last 24 hirs  Source likely secondary to ERCP performed earlier this month  Culture grew E. Coli and is sensitive to zosyn     Cholecystectomy not recommended per gen surg    Plan  -GI and ID following  - ultrasound of RUQ ordered   -Continue Zosyn  -Norco 5 PRN for pain      Diarrhea  New onset  3 episodes in last 24 hrs    DDx: antibiotic AE vs C. Difficile     Plan  -C. Difficile EIA ordered        UTI (urinary tract infection)  Acute,  Patient fatigued and worse appearing than previous day    Lactate WNL    E. Faecalis on culture     Plan:      -Ordered UA with culture   -Continue on Zosyn          Bacteremia  Acute, improving      E. Coli on culture and sensitive to Zosyn  Source likely hepatic abscess given culture results showing E. Coli   NGTD from repeat blood cultures     Plan  -Continue on Zosyn           SSS (sick sinus syndrome)  Chronic Stable   Followed by Dr Colmenares in Cardiology    Plan:   Telemetry   atenalol.  Apixaban stopped for GI consult.   amiodarone continued  Has pacemaker in place.         RBBB  Chronic Stable  On existing EKG  Has pacemaker, ECG showed RBBB;       Plan:   Telemetry   atenalol.  Apixaban stopped for GI consult.   amiodarone continued.         Hypothyroidism  Chronic Stable  TSH 0.443     Continue levothyroxine 0.137 mg        Hypertension  Chronic Stable     atenolol.      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          Disposition  Patient fatigued and worse appearing from yesterday. Suction bulb drain still putting out serosanguinous discharge above what IR recommends before pulling. When medically stable she will be discharged with home health           Sha Perez  MS3

## 2020-09-29 NOTE — PT/OT/SLP PROGRESS
Occupational Therapy   Treatment    Name: Gisselle Ortiz  MRN: [id number]  Admitting Diagnosis:  Hepatic abscess  4 Days Post-Op    Recommendations:     Discharge Recommendations: home health OT, home health PT  Discharge Equipment Recommendations:  none  Barriers to discharge:  Decreased caregiver support    Assessment:     Gisselle Ortiz is a 87 y.o. female with a medical diagnosis of Hepatic abscess. The Patient presents with rapid onset fatigue all OOB. Performance deficits affecting function are impaired endurance, impaired self care skills, impaired functional mobilty, gait instability, impaired balance, impaired cardiopulmonary response to activity.     Rehab Prognosis:  Good; patient would benefit from acute skilled OT services to address these deficits and reach maximum level of function.       Plan:     Patient to be seen 2 x/week to address the above listed problems via self-care/home management, therapeutic activities, therapeutic exercises, neuromuscular re-education  · Plan of Care Expires: 10/25/20  · Plan of Care Reviewed with: patient    Subjective     Pain/Comfort:  Pain Rating 1: 0/10    Objective:     Communicated with: RN prior to session.  Patient found supine with   O2, IV upon OT entry to room.    General Precautions: Standard, aspiration, fall   Orthopedic Precautions:N/A   Braces: N/A     Occupational Performance:     Bed Mobility:    · Patient completed Rolling/Turning to Right with minimum assistance  · Patient completed Supine to Sit with contact guard assistance  · Patient completed Sit to Supine with minimum assistance     Functional Mobility/Transfers:  · Patient completed Sit <> Stand Transfer with contact guard assistance  with  hand-held assist   · Patient completed Toilet Transfer Step Transfer technique with minimum assistance with  hand-held assist to [facility]      Activities of Daily Living:  · Grooming: set-up mod a gown, rapid onset fatigue EOB  · Upper Body Dressing: min  a gown  · Lower Body Dressing: moderate assistance <> max a non skid soks  · Toileting: min a all aspects at [facility] 6 Click ADL: 15    Treatment & Education:  Skilled education was provided to patient  re: Energy Conservation and joint protection constructs. Patient demonstrating [employer] information integration of training provided regarding energy conservation in conjunction w/ task modification evidenced by this dates return demonstration / verbal recitation. Follow up  edu recommended.   Edu rpovided related to importance of performing BUE AROM exercises while in bed.    Patient further engaged in there ex to BUE/BLE for all major planes of tolerable motion x 1 set 10 reps while seated .    +++Safe swallow ensured for meals, Patient/bed positioned on high fowlers.    Patient left with bed in chair position with all lines intact, call button in reach and RN notifiedEducation:      GOALS:   Multidisciplinary Problems     Occupational Therapy Goals        Problem: Occupational Therapy Goal    Goal Priority Disciplines Outcome Interventions   Occupational Therapy Goal     OT, PT/OT Ongoing, Progressing    Description: Goals to be met by: 10/9/2020     Patient will increase functional independence with ADLs by performing:    UE Dressing with Set-up Assistance.  LE Dressing with Set-up Assistance.  Grooming while standing at sink with Modified [place].  Toileting from toilet with Modified [place] for hygiene and clothing management.   Supine to sit with Modified [place].  Stand pivot transfers with Supervision.  Toilet transfer to toilet with Supervision.                     Time Tracking:     OT Date of Treatment: 09/29/20  OT Start Time: 0950  OT Stop Time: 1019  OT Total Time (min): 29 min    Billable Minutes:Self Care/Home Management 16  Therapeutic Exercise 13    SHAHIDA Lin  9/29/2020

## 2020-09-29 NOTE — SUBJECTIVE & OBJECTIVE
Interval History: Patient feeling more fatigued and has loss of appetite. Denies nausea or significant abdominal pain.    Review of Systems   Constitutional: Positive for appetite change and fatigue. Negative for activity change and fever.   HENT: Negative for congestion and rhinorrhea.    Eyes: Negative for pain and visual disturbance.   Respiratory: Negative for shortness of breath and wheezing.    Cardiovascular: Negative for chest pain and leg swelling.   Gastrointestinal: Positive for abdominal pain. Negative for abdominal distention and nausea.   Genitourinary: Negative for difficulty urinating and dysuria.   Musculoskeletal: Negative for arthralgias and joint swelling.   Skin: Negative for color change and wound.   Neurological: Negative for dizziness and weakness.     Objective:     Vital Signs (Most Recent):  Temp: 97.1 °F (36.2 °C) (09/29/20 1149)  Pulse: 60 (09/29/20 1149)  Resp: 16 (09/29/20 1149)  BP: (!) 117/55 (09/29/20 1149)  SpO2: 96 % (09/29/20 1149) Vital Signs (24h Range):  Temp:  [96.4 °F (35.8 °C)-97.9 °F (36.6 °C)] 97.1 °F (36.2 °C)  Pulse:  [60-62] 60  Resp:  [16-22] 16  SpO2:  [94 %-98 %] 96 %  BP: (105-148)/(55-63) 117/55     Weight: 56.6 kg (124 lb 12.5 oz)  Body mass index is 22.1 kg/m².    Intake/Output Summary (Last 24 hours) at 9/29/2020 1502  Last data filed at 9/29/2020 0900  Gross per 24 hour   Intake 420 ml   Output 330 ml   Net 90 ml      Physical Exam  Vitals signs and nursing note reviewed.   Constitutional:       General: She is not in acute distress.     Appearance: She is not ill-appearing.   HENT:      Head: Normocephalic and atraumatic.      Nose: Nose normal.      Mouth/Throat:      Mouth: Mucous membranes are dry.   Eyes:      Extraocular Movements: Extraocular movements intact.      Conjunctiva/sclera: Conjunctivae normal.      Pupils: Pupils are equal, round, and reactive to light.   Neck:      Musculoskeletal: Normal range of motion.   Cardiovascular:      Rate and  Rhythm: Normal rate and regular rhythm.      Heart sounds: Normal heart sounds. No murmur. No gallop.    Pulmonary:      Effort: Pulmonary effort is normal. No respiratory distress.      Breath sounds: No stridor. No wheezing, rhonchi or rales.   Abdominal:      General: Abdomen is flat. There is no distension.      Palpations: Abdomen is soft.      Tenderness: There is no abdominal tenderness. There is no guarding.   Musculoskeletal: Normal range of motion.   Skin:     General: Skin is warm.   Neurological:      General: No focal deficit present.      Mental Status: She is alert and oriented to person, place, and time.         Significant Labs:   CBC:   Recent Labs   Lab 09/28/20  0637 09/29/20  0335   WBC 23.52* 25.22*   HGB 8.2* 8.2*   HCT 26.2* 26.5*   * 486*     CMP:   Recent Labs   Lab 09/28/20  0637 09/29/20  0335    140   K 4.0 3.6   * 114*   CO2 20* 16*   GLU 98 70   BUN 29* 24*   CREATININE 1.1 0.8   CALCIUM 7.5* 7.3*   PROT 4.7* 4.6*   ALBUMIN 1.7* 1.6*   BILITOT 1.9* 1.4*   ALKPHOS 93 107   AST 82* 86*   ALT 75* 75*   ANIONGAP 8 10   EGFRNONAA 45.3* >60.0     Lactic Acid:   Recent Labs   Lab 09/28/20  0637 09/29/20  1122   LACTATE 0.5 0.6     All pertinent labs within the past 24 hours have been reviewed.    Significant Imaging: I have reviewed all pertinent imaging results/findings within the past 24 hours.

## 2020-09-29 NOTE — PROGRESS NOTES
[facility] Hospital Medicine  Progress Note    Patient Name: Gisselle Ortiz  MRN: [id number]  Patient Class: IP- Inpatient   Admission Date: 9/24/2020  Length of Stay: 5 days  Attending Physician: Kai Arellano MD  Primary Care Provider: Kai Montez MD    [facility] Medicine Team: [facility] HOSP MED 1 Lorenzo Marlow MD    Subjective:     Principal Problem:Hepatic abscess        HPI:  Gisselle Ortiz is a 87 y.o. female with a medical history of AFib on Eliquis with previous cardioversion, SSS s/p pacemaker, arthritis, HTN, thyroid disease with diastolic dysfunction (EF 55%, 9/2019) who presented to the ED from GI clinic after she was found to have elevated liver enzymes and hypotensive to 92/51.    She presented to the hospital on 09/11 for abdominal pain and was found to have gallstone pancreatitis. She had CBD dilation and a gallbladder with sludge. She underwent EUS and ERCP with stone and sludge seen on the cholangiogram. Sphincterotomy was performed and stone/sludge removed with no stents placed. Per notes, ERCP was concerning for choledocholithiasis.  Surgery was consulted and deemed the patient to be high risk for cholecystectomy. She was seen today in GI clinic for follow-up. She initially recovered well from the pancreatitis and ERCP and was eating prior to discharge. A few days after discharge she started having severe fatigue that progressively worsened to the point where the patient could not walk. She complains of decreased appetite and nausea but only 1 episode of vomiting. She denies any abdominal pain, fevers, diarrhea. She reports that her urine is orange/brown and has a bad odor but denies any dysuria. In the office her, BP was found to be 92/51. She was sent to the ED with BP rechecked at 112/51. She was given one liter NS and admitted for further evaluations.     In the ED, she was found to be afebrile and her BP improved to 130/61 with IVF. HR 63 with NSR and  existing RBBB. Qtc 512. She was breathing 20bpm with no O2 requirement. She c/o some nausea but no abd pain. On exam, she had mild discomfort to deep palpation to RUQ abdomen.    Overview/Hospital Course:  9/26: Pt had ERCP 9/25, which was unremarkable for stone or obvious etiology for her elevated bili and LFT. CT scan with contrast following pancreatic protocol showed hepatic abscess. Blood culture grew gram negative cristofer; urine culture was positive enterococcus faecalis.   Pt had IR intervention for her hepatic abscess 9/26. Around 50 ml was put out via drainage yesterday. Blood culture grew E.Coli susceptible to multiple abx. Urine enterococcus faecalis also shown to have susceptibility to multiple organism. 9/27 drain still with significant output, will continue to monitor. Abscess cultures grew E. Coli. On 9/29 patient's WBC increased slightly to 25 and was overall more fatigued, prompting further workup and ID was notified.    Interval History: Patient feeling more fatigued and has loss of appetite. Denies nausea or significant abdominal pain.    Review of Systems   Constitutional: Positive for appetite change and fatigue. Negative for activity change and fever.   HENT: Negative for congestion and rhinorrhea.    Eyes: Negative for pain and visual disturbance.   Respiratory: Negative for shortness of breath and wheezing.    Cardiovascular: Negative for chest pain and leg swelling.   Gastrointestinal: Positive for abdominal pain. Negative for abdominal distention and nausea.   Genitourinary: Negative for difficulty urinating and dysuria.   Musculoskeletal: Negative for arthralgias and joint swelling.   Skin: Negative for color change and wound.   Neurological: Negative for dizziness and weakness.     Objective:     Vital Signs (Most Recent):  Temp: 97.1 °F (36.2 °C) (09/29/20 1149)  Pulse: 60 (09/29/20 1149)  Resp: 16 (09/29/20 1149)  BP: (!) 117/55 (09/29/20 1149)  SpO2: 96 % (09/29/20 1149) Vital Signs (24h  Range):  Temp:  [96.4 °F (35.8 °C)-97.9 °F (36.6 °C)] 97.1 °F (36.2 °C)  Pulse:  [60-62] 60  Resp:  [16-22] 16  SpO2:  [94 %-98 %] 96 %  BP: (105-148)/(55-63) 117/55     Weight: 56.6 kg (124 lb 12.5 oz)  Body mass index is 22.1 kg/m².    Intake/Output Summary (Last 24 hours) at 9/29/2020 1502  Last data filed at 9/29/2020 0900  Gross per 24 hour   Intake 420 ml   Output 330 ml   Net 90 ml      Physical Exam  Vitals signs and nursing note reviewed.   Constitutional:       General: She is not in acute distress.     Appearance: She is not ill-appearing.   HENT:      Head: Normocephalic and atraumatic.      Nose: Nose normal.      Mouth/Throat:      Mouth: Mucous membranes are dry.   Eyes:      Extraocular Movements: Extraocular movements intact.      Conjunctiva/sclera: Conjunctivae normal.      Pupils: Pupils are equal, round, and reactive to light.   Neck:      Musculoskeletal: Normal range of motion.   Cardiovascular:      Rate and Rhythm: Normal rate and regular rhythm.      Heart sounds: Normal heart sounds. No murmur. No gallop.    Pulmonary:      Effort: Pulmonary effort is normal. No respiratory distress.      Breath sounds: No stridor. No wheezing, rhonchi or rales.   Abdominal:      General: Abdomen is flat. There is no distension.      Palpations: Abdomen is soft.      Tenderness: There is no abdominal tenderness. There is no guarding.   Musculoskeletal: Normal range of motion.   Skin:     General: Skin is warm.   Neurological:      General: No focal deficit present.      Mental Status: She is alert and oriented to person, place, and time.         Significant Labs:   CBC:   Recent Labs   Lab 09/28/20  0637 09/29/20  0335   WBC 23.52* 25.22*   HGB 8.2* 8.2*   HCT 26.2* 26.5*   * 486*     CMP:   Recent Labs   Lab 09/28/20  0637 09/29/20  0335    140   K 4.0 3.6   * 114*   CO2 20* 16*   GLU 98 70   BUN 29* 24*   CREATININE 1.1 0.8   CALCIUM 7.5* 7.3*   PROT 4.7* 4.6*   ALBUMIN 1.7* 1.6*    BILITOT 1.9* 1.4*   ALKPHOS 93 107   AST 82* 86*   ALT 75* 75*   ANIONGAP 8 10   EGFRNONAA 45.3* >60.0     Lactic Acid:   Recent Labs   Lab 09/28/20  0637 09/29/20  1122   LACTATE 0.5 0.6     All pertinent labs within the past 24 hours have been reviewed.    Significant Imaging: I have reviewed all pertinent imaging results/findings within the past 24 hours.      Assessment/Plan:      * Hepatic abscess  Sent from GI clinic where she had a f/u for recent hospital stay for ERCP --> elevated TBili, hypotensive, and increased transaminases. Was admitted to the hospital on 09/11 for abdominal pain and was found to have gallstone pancreatitis; got ERCP, CBD dilation and a gallbladder with sludge --> had EUS and ERCP with stone and sludge seen on the cholangiogram --> Sphincterotomy, no stents. Deemed not a surgical candidate for cholecystectomy; ERCP note indicates concern for choledocholithiasis.     PLAN    - Consult GI 9/26. ERCP was repeated yesterday. Did not show stone or obvious etiology for her elevated T bili and LFT on admission. The entire main bile duct was dilated; the biliary tree was swept and nothing was found; One plastic stent was placed into the common bile duct.  - Abd US was done 9/26. It showed heterogeneous pancreatic echotexture within adjacent 1.7 cm hypoechoic structure with irregular borders;  Dilatation of the extrahepatic common bile duct; intrahepatic duct demonstrates internal debris (possibly representing biliary sludge); 5.6 cm predominantly cystic lesion in the right hepatic lobe with internal debris. DDX included pancreatic mass, abscess, or pseudocyst.   - CT scan following pancreatic protocol done 9/26. It showed complex thick-walled partially septated cystic lesion involving the right hepatic lobe concerning for hepatic abscess; complex multiloculated cystic lesion involving the pancreatic body with additional suspected small cystic lesion/collection in the region of the pancreatic  head; Common bile duct stent in place with pneumobilia as well as small volume of air within the mildly distended, thickened gallbladder.      - GI following. GI identified principal problem is hepatic abscess, which needs IR drainage.    - Talked with surgery and ID about source control of her infection. Surgery states that since gall bladder and biliary tract is not dilated, it is unlikely cholecystectomy will control source and says she is not a candidate for cholecystectomy. ID states likely source of bacteremia is from the ERCP procedure earlier this month from previous admission.    - IR following. IR drainage for hepatic abscess was done 9/26. A drain was placed. Continues to drain. Drained fluid [place]BC of [address]. Culture grew GNR --> E. Coli. ID will adjust abx based on susceptibilities.    - 9/29 Patient WBC increased slightly and is more fatigued. Per ID, they do not think treatment is necessary for growth seen in urine  - Repeat lactate 0.6  - F/U repeat UA  - F/U C. Diff workup  - F/U repeat abdominal US  - Continue to hold eliquis in the setting of possible procedure        Leukocytosis        UTI (urinary tract infection)  Urinary culture grew enterococcus faecalis susceptible to multiple abx.  - VSS stable. Patient appeared alert, oriented, and responding to commands. Denies dysuria, frequency, urgency.   - WBC is now 25 from 23  - UA showed WBC 20, many bacteria, 2 hyaline cast.  - Repeat UA pending   - ID recommended to continue with zosyn, will deescalate based on susceptibilities  - Since pt is not showing symptoms of dysuria, urgency, frequency, ID thinks enterococcus faecalis is currently colonizing without active infection    Bacteremia  Blood culture grew gram negative cristofer. VS todau: Temp 98, HR 62, RR 19, and BP 93/53; SpO2 94%. Patient appeared alert, oriented, and responding to commands.   - WBC yesterday was 16. Results did not come today.   - Culture grew E.Coli susceptible to multiple  "abx.   - ID is following. ID recommended to continue with zosyn.   - ID thinks source of bacteremia is from ERCP from the previous admission.     -  Hypotension (bp 93/53) was treated with fluids. Currently improved.  - Abscess culture grew E. Coli        Pancreatic abscess  See "hepatic abscess"    Dilated bile duct  See hepatic abscess     Hyperbilirubinemia  See hepatic abscess.       SSS (sick sinus syndrome)  Followed by Dr Colmenares in Cardiology  TSH 0.443  PM for SB  On existing EKG  ECG showed RBBB    Plan:  - Telemetry  - Also started on atenalol. Held 9/28 due hypotension.  - Apixaban stopped for GI consult.   - Amiodarone continued.   - Has pacemaker in place.     Current use of long term anticoagulation  On Apixaban 2.5mg po BID (age > 80, Wt < 60kg); last dose 9/23/20 pm  PLAN  - hold for possible procedure         RBBB  On existing EKG  Has pacemaker, ECG showed RBBB;  PLAN  - Telemetry  - Also started on atenalol.  - Apixaban stopped for GI consult.   - amiodarone continued.       Hypothyroidism  TSH 0.443  PLAN  - Continue levothyroxine 0.137 mg      Hypertension  Home meds include Atenolol 25mg BID, Diltiazem 180mg daily  BP 92/51 at GI clinic, improved SBP to 120s in ED after fluids  PLAN  - Started on atenolol (held d/t hypotension 9/28)    Pacemaker  DC PPM (2014; [facility]) implanted for symptomatic SB and first degree AVB      Paroxysmal atrial fibrillation  Home meds include Amiodarone 200mg daily, Atenolol 25mg (held 9/27 d/t hypotension), Diltiazem 180mg daily, Apixaban 2.5mg BID (last dose 9/23 pm); held this admission.   BP 92/51 in GI clinic, improved with 1L fluid in ED  Last Cards Note (Darrian 7/17/20)  Gen change 1/2019. AF/AFL noted on f/u. Propafenone increased. Still had AF with RVR after that. Changed to flecainide, planned for DCCV; at some point changed to amio. Unclear timing of these changes.  TSH found to be abnormal, but T4 normal.  In past, following DCCV, she felt great -- until AF " recurred.  On amio now, with great effect.  Plan:  - Telemetry  - Continue Amiodarone  - Started atenolol (held 9/28 d/t hypotension)  - Hold Apixaban for GI procedure.       VTE Risk Mitigation (From admission, onward)         Ordered     Reason for No Pharmacological VTE Prophylaxis  Once     Question:  Reasons:  Answer:  Already adequately anticoagulated on oral Anticoagulants    09/24/20 2041     IP VTE HIGH RISK PATIENT  Once      09/24/20 2041     Place sequential compression device  Until discontinued      09/24/20 2041                Discharge Planning   FRANCESCA: 10/5/2020     Code Status: Full Code   Is the patient medically ready for discharge?: No    Reason for patient still in hospital (select all that apply): Patient trending condition, Laboratory test, Imaging and Consult recommendations  Discharge Plan A: Home, Home Health   Discharge Delays: None known at this time        Lorenzo Marlow MD  Department of Hospital Medicine   [facility]

## 2020-09-29 NOTE — PROGRESS NOTES
[facility]  Infectious Disease  Progress Note    Patient Name: Gisselle Ortiz  MRN: [id number]  Admission Date: 9/24/2020  Length of Stay: 5 days  Attending Physician: Kai Arellano MD  Primary Care Provider: Kai Montez MD    Isolation Status: Special Contact  Assessment/Plan:      * Bacteremia     86 y/o female with A.fib on eliquis, SSS s/p pacemaker, HTN presents to ED from GI clinic for elevated LFTs and hypotension 92/51 now with E.coli bacteremia. We are consulted for abx recommendations.     Had recent admission on 9/11 for abd pain, n/v found to have pancreatitis and CBD dilation and gallstone pancreatitis. She underwent ERCP 9/14 and underwent spincterotomy and stone removal.  She returned to GI clinic and was noted to have n/v and fatigue and elevated LFTs with associated hypotension and was admitted.       Blood cultures on admit positive with E.coli and urine cultures +E.faecalis.  Imaging studies showed dilatation of the extrahepatic common bile duct, intrahepatic duct demonstrated normal caliber however has internal debris (possibly representing biliary sludge), hepatic abscess and pancreatic mass.  She underwent ERCP 9/25.  Noted to have bile duct dilation and stent placed in CBD. She is s/p IR drainage of hepatic abscess 9/26. Abscess culture showing E Coli - likely source of bacteremia.  Per surgery, she is not a candidate for cholecystectomy. She is currently on zosyn.     She remains afebrile.  Repeat blood cultures NGTD.  WBC continues with mild uptrend.  She does report episode of watery diarrhea, C dif pending.  More fatigued today. Denies abdominal pain, n/v. Scant drainage in drain. Denies urinary symptoms.  No further pain around IR drain site. Repeat abd US pending.      Recommendations  1. Continue zosyn for now.  If anaerobic cultures finalize as negative, will de-escalate to IV ceftriaxone.  The zosyn will cover e. Faecalis in urine, though  "patient is without urinary symptoms and will have been sufficiently treated at this point.  2.  Will need minimum of 4- 6 weeks of IV antibiotics for hepatic abscess with re-imaging at 4 weeks to determine need for further therapy  3.  Follow up abd US  4.  Follow up C dif.   5.  Will follow up tomorrow with further recommendations.       Discussed with ID staff   Discussed with Primary Team    Hepatic abscess  See assessment/plan above      Thank you.   Please call for any questions or concerns.  MATTHEW Richards, ANP-C  [phone] pager, [phone]  Subjective:     Principal Problem:Bacteremia    HPI: 86 y/o female with A.fib on eliquis, SSS s/p pacemaker, HTN presents to ED from GI clinic for elevated LFTs and hypotension 92/51 now with GNR bacteremia. We are consulted for abx recommendations.    Had recnet admission on 9/11 for abd pain, n/v found to have pancreatitis and CBD dilation and gallstone pancreatitis. She underwent ERCP 9/14 and underwent spincterotomy and stone removal. She is not a candidate for cholecystectomy. She returns to GI clinic and was noted to have n/v/ and fatigue and elevated LFTs with associated hypotension.  U/S of the abdomen was obtained and showed: "Dilatation of the extrahepatic common bile duct, intrahepatic duct demonstrates normal caliber however has internal debris (possibly representing biliary sludge), hepatic abscess and pancreatic mass.     She was seen by GI and underwent ERCP yesterday. Noted to have bile duct dilation and stent placed in CBD. Blood cultures on admit are positive with GNR and urine cultures +E.faecalis. she is currently on zosyn. We are consulted for abx recommendations.     CT A/P: Complex thick-walled partially septated cystic lesion involving the right hepatic lobe concerning for hepatic abscess.  Complex multiloculated cystic lesion involving the pancreatic body with additional suspected small cystic lesion/collection in the region of the pancreatic " head as discussed above.  These are nonspecific and could relate to evolving pancreatic pseudocyst/acute collections, however underlying cystic neoplastic process is not excluded. Common bile duct stent in place with pneumobilia as well as small volume of air within the mildly distended, thickened gallbladder.  There is an air and fluid containing structure along the anterior margin of the extrahepatic common bile duct which may represent a loop of bowel (possibly relating to reported diverticulum seen on recent ERCP) versus fluid and gas containing collection adjacent to and/or communicating with the common bile duct.     Interval History:   S/p IR drain and catheter placement 9/26  Abscess cultures + for E Coli.  Same as blood cultures.   Repeat blood cultures NGTD  Complaining of increased fatigue today, episode of diarrhea.  Denies abdominal pain, nausea, vomiting.       Review of Systems   Constitutional: Positive for activity change, appetite change (decreased) and fatigue. Negative for chills, diaphoresis and fever.   Respiratory: Negative for cough and shortness of breath.    Cardiovascular: Negative for chest pain.   Gastrointestinal: Positive for diarrhea. Negative for abdominal pain, anal bleeding, blood in stool, constipation, nausea and vomiting.   Genitourinary: Negative for dysuria and flank pain.   Musculoskeletal: Negative for arthralgias, back pain, gait problem and joint swelling.   Skin: Negative for color change, pallor, rash and wound.   Neurological: Negative for dizziness and headaches.   All other systems reviewed and are negative.    Objective:     Vital Signs (Most Recent):  Temp: 96.3 °F (35.7 °C) (09/29/20 1645)  Pulse: 60 (09/29/20 1645)  Resp: 16 (09/29/20 1645)  BP: (!) 104/51 (09/29/20 1645)  SpO2: 97 % (09/29/20 1645) Vital Signs (24h Range):  Temp:  [96.3 °F (35.7 °C)-97.9 °F (36.6 °C)] 96.3 °F (35.7 °C)  Pulse:  [60-62] 60  Resp:  [16-22] 16  SpO2:  [94 %-98 %] 97 %  BP:  (104-148)/(51-63) 104/51     Weight: 56.6 kg (124 lb 12.5 oz)  Body mass index is 22.1 kg/m².    Estimated Creatinine Clearance: 41 mL/min (based on SCr of 0.8 mg/dL).    Physical Exam  Vitals signs reviewed.   Constitutional:       General: She is not in acute distress.     Appearance: Normal appearance. She is not ill-appearing, toxic-appearing or diaphoretic.   HENT:      Head: Normocephalic.      Mouth/Throat:      Mouth: Mucous membranes are moist.   Cardiovascular:      Rate and Rhythm: Normal rate and regular rhythm.      Heart sounds: No murmur. No friction rub. No gallop.    Pulmonary:      Effort: Pulmonary effort is normal. No respiratory distress.      Breath sounds: Normal breath sounds. No stridor.   Abdominal:      General: Abdomen is flat. There is no distension.      Palpations: Abdomen is soft.      Tenderness: There is no abdominal tenderness. There is no guarding.      Comments: Drain in place RUQ.  Scant sanguinous drainage.   Musculoskeletal: Normal range of motion.         General: No swelling or tenderness.   Skin:     General: Skin is warm and dry.      Coloration: Skin is not jaundiced or pale.   Neurological:      General: No focal deficit present.      Mental Status: She is alert.   Psychiatric:         Mood and Affect: Mood normal.         Significant Labs: All pertinent labs within the past 24 hours have been reviewed.    Significant Imaging: I have reviewed all pertinent imaging results/findings within the past 24 hours.

## 2020-09-29 NOTE — ASSESSMENT & PLAN NOTE
Urinary culture grew enterococcus faecalis susceptible to multiple abx.  - VSS stable. Patient appeared alert, oriented, and responding to commands. Denies dysuria, frequency, urgency.   - WBC is now 25 from 23  - UA showed WBC 20, many bacteria, 2 hyaline cast.  - Repeat UA pending   - ID recommended to continue with zosyn, will deescalate based on susceptibilities  - Since pt is not showing symptoms of dysuria, urgency, frequency, ID thinks enterococcus faecalis is currently colonizing without active infection

## 2020-09-29 NOTE — ASSESSMENT & PLAN NOTE
On Apixaban 2.5mg po BID (age > 80, Wt < 60kg); last dose 9/23/20 pm  PLAN  - hold for possible procedure

## 2020-09-29 NOTE — ASSESSMENT & PLAN NOTE
86 y/o female with A.fib on eliquis, SSS s/p pacemaker, HTN presents to ED from GI clinic for elevated LFTs and hypotension 92/51 now with E.coli bacteremia. We are consulted for abx recommendations.     Had recent admission on 9/11 for abd pain, n/v found to have pancreatitis and CBD dilation and gallstone pancreatitis. She underwent ERCP 9/14 and underwent spincterotomy and stone removal.  She returned to GI clinic and was noted to have n/v and fatigue and elevated LFTs with associated hypotension and was admitted.       Blood cultures on admit positive with E.coli and urine cultures +E.faecalis.  Imaging studies showed dilatation of the extrahepatic common bile duct, intrahepatic duct demonstrated normal caliber however has internal debris (possibly representing biliary sludge), hepatic abscess and pancreatic mass.  She underwent ERCP 9/25.  Noted to have bile duct dilation and stent placed in CBD. She is s/p IR drainage of hepatic abscess 9/26. Abscess culture showing E Coli - likely source of bacteremia.  Per surgery, she is not a candidate for cholecystectomy. She is currently on zosyn.     She remains afebrile.  Repeat blood cultures NGTD.  WBC continues with mild uptrend.  She does report episode of watery diarrhea, C dif pending.  More fatigued today. Denies abdominal pain, n/v. Scant drainage in drain. Denies urinary symptoms.  No further pain around IR drain site. Repeat abd US pending.      Recommendations  1. Continue zosyn for now.  If anaerobic cultures finalize as negative, will de-escalate to IV ceftriaxone.  The zosyn will cover e. Faecalis in urine, though patient is without urinary symptoms and will have been sufficiently treated at this point.  2.  Will need minimum of 4- 6 weeks of IV antibiotics for hepatic abscess with re-imaging at 4 weeks to determine need for further therapy  3.  Follow up abd US  4.  Follow up C dif.   5.  Will follow up tomorrow with further recommendations.        Discussed with ID staff   Discussed with Primary Team

## 2020-09-29 NOTE — PLAN OF CARE
Problem: Occupational Therapy Goal  Goal: Occupational Therapy Goal  Description: Goals to be met by: 10/9/2020     Patient will increase functional independence with ADLs by performing:    UE Dressing with Set-up Assistance.  LE Dressing with Set-up Assistance.  Grooming while standing at sink with Modified [place].  Toileting from toilet with Modified [place] for hygiene and clothing management.   Supine to sit with Modified [place].  Stand pivot transfers with Supervision.  Toilet transfer to toilet with Supervision.    Outcome: Ongoing, Progressing

## 2020-09-29 NOTE — SUBJECTIVE & OBJECTIVE
Interval History:   S/p IR drain and catheter placement 9/26  Abscess cultures + for E Coli.  Same as blood cultures.   Repeat blood cultures NGTD  Complaining of increased fatigue today, episode of diarrhea.  Denies abdominal pain, nausea, vomiting.       Review of Systems   Constitutional: Positive for activity change, appetite change (decreased) and fatigue. Negative for chills, diaphoresis and fever.   Respiratory: Negative for cough and shortness of breath.    Cardiovascular: Negative for chest pain.   Gastrointestinal: Positive for diarrhea. Negative for abdominal pain, anal bleeding, blood in stool, constipation, nausea and vomiting.   Genitourinary: Negative for dysuria and flank pain.   Musculoskeletal: Negative for arthralgias, back pain, gait problem and joint swelling.   Skin: Negative for color change, pallor, rash and wound.   Neurological: Negative for dizziness and headaches.   All other systems reviewed and are negative.    Objective:     Vital Signs (Most Recent):  Temp: 96.3 °F (35.7 °C) (09/29/20 1645)  Pulse: 60 (09/29/20 1645)  Resp: 16 (09/29/20 1645)  BP: (!) 104/51 (09/29/20 1645)  SpO2: 97 % (09/29/20 1645) Vital Signs (24h Range):  Temp:  [96.3 °F (35.7 °C)-97.9 °F (36.6 °C)] 96.3 °F (35.7 °C)  Pulse:  [60-62] 60  Resp:  [16-22] 16  SpO2:  [94 %-98 %] 97 %  BP: (104-148)/(51-63) 104/51     Weight: 56.6 kg (124 lb 12.5 oz)  Body mass index is 22.1 kg/m².    Estimated Creatinine Clearance: 41 mL/min (based on SCr of 0.8 mg/dL).    Physical Exam  Vitals signs reviewed.   Constitutional:       General: She is not in acute distress.     Appearance: Normal appearance. She is not ill-appearing, toxic-appearing or diaphoretic.   HENT:      Head: Normocephalic.      Mouth/Throat:      Mouth: Mucous membranes are moist.   Cardiovascular:      Rate and Rhythm: Normal rate and regular rhythm.      Heart sounds: No murmur. No friction rub. No gallop.    Pulmonary:      Effort: Pulmonary effort is  normal. No respiratory distress.      Breath sounds: Normal breath sounds. No stridor.   Abdominal:      General: Abdomen is flat. There is no distension.      Palpations: Abdomen is soft.      Tenderness: There is no abdominal tenderness. There is no guarding.      Comments: Drain in place RUQ.  Scant sanguinous drainage.   Musculoskeletal: Normal range of motion.         General: No swelling or tenderness.   Skin:     General: Skin is warm and dry.      Coloration: Skin is not jaundiced or pale.   Neurological:      General: No focal deficit present.      Mental Status: She is alert.   Psychiatric:         Mood and Affect: Mood normal.         Significant Labs: All pertinent labs within the past 24 hours have been reviewed.    Significant Imaging: I have reviewed all pertinent imaging results/findings within the past 24 hours.

## 2020-09-29 NOTE — ASSESSMENT & PLAN NOTE
Sent from GI clinic where she had a f/u for recent hospital stay for ERCP --> elevated TBili, hypotensive, and increased transaminases. Was admitted to the hospital on 09/11 for abdominal pain and was found to have gallstone pancreatitis; got ERCP, CBD dilation and a gallbladder with sludge --> had EUS and ERCP with stone and sludge seen on the cholangiogram --> Sphincterotomy, no stents. Deemed not a surgical candidate for cholecystectomy; ERCP note indicates concern for choledocholithiasis.     PLAN    - Consult GI 9/26. ERCP was repeated yesterday. Did not show stone or obvious etiology for her elevated T bili and LFT on admission. The entire main bile duct was dilated; the biliary tree was swept and nothing was found; One plastic stent was placed into the common bile duct.  - Abd US was done 9/26. It showed heterogeneous pancreatic echotexture within adjacent 1.7 cm hypoechoic structure with irregular borders;  Dilatation of the extrahepatic common bile duct; intrahepatic duct demonstrates internal debris (possibly representing biliary sludge); 5.6 cm predominantly cystic lesion in the right hepatic lobe with internal debris. DDX included pancreatic mass, abscess, or pseudocyst.   - CT scan following pancreatic protocol done 9/26. It showed complex thick-walled partially septated cystic lesion involving the right hepatic lobe concerning for hepatic abscess; complex multiloculated cystic lesion involving the pancreatic body with additional suspected small cystic lesion/collection in the region of the pancreatic head; Common bile duct stent in place with pneumobilia as well as small volume of air within the mildly distended, thickened gallbladder.      - GI following. GI identified principal problem is hepatic abscess, which needs IR drainage.    - Talked with surgery and ID about source control of her infection. Surgery states that since gall bladder and biliary tract is not dilated, it is unlikely  cholecystectomy will control source and says she is not a candidate for cholecystectomy. ID states likely source of bacteremia is from the ERCP procedure earlier this month from previous admission.    - IR following. IR drainage for hepatic abscess was done 9/26. A drain was placed. Continues to drain. Drained fluid [place] of [address]. Culture grew GNR --> E. Coli. ID will adjust abx based on susceptibilities.    - 9/29 Patient WBC increased slightly and is more fatigued. Per ID, they do not think treatment is necessary for growth seen in urine  - Repeat lactate 0.6  - F/U repeat UA  - F/U C. Diff workup  - F/U repeat abdominal US  - Continue to hold eliquis in the setting of possible procedure

## 2020-09-29 NOTE — TREATMENT PLAN
AES Treatment Plan    Gisselle Ortiz is a 87 y.o. female admitted to hospital 9/24/2020 (Hospital Day: 6) due to Hepatic abscess.     Interval History  S/p IR drain placement, approx 30cc output in drain over last shift.  Abdominal pain improved.  Remains on Abx.  Abscess culture growing GNR    Objective  Temp:  [96.4 °F (35.8 °C)-97.9 °F (36.6 °C)] 97.9 °F (36.6 °C) (09/29 0747)  Pulse:  [60-62] 60 (09/29 0747)  BP: (108-148)/(58-63) 148/63 (09/29 0747)  Resp:  [16-22] 17 (09/29 0747)  SpO2:  [94 %-98 %] 94 % (09/29 0747)  Laboratory  Lab Results   Component Value Date    WBC 25.22 (H) 09/29/2020    HGB 8.2 (L) 09/29/2020    HCT 26.5 (L) 09/29/2020    MCV 96 09/29/2020     (H) 09/29/2020       Lab Results   Component Value Date    ALT 75 (H) 09/29/2020    AST 86 (H) 09/29/2020    ALKPHOS 107 09/29/2020    BILITOT 1.4 (H) 09/29/2020       Recommendations  - Repeat ERCP in 2 months to remove stent.   - Continue antibiotics.   -continue to monitor drain output, further management per IR  -continue to trend daily CMP, CBC, INR  - We will continue to follow.    Thank you for involving us in the care of Gisselle Ortiz. Please call with any additional questions, concerns or changes in the patient's clinical status.    Antolin Ortega MD  Gastroenterology Fellow PGY IV   [facility]

## 2020-09-30 DIAGNOSIS — K75.0 HEPATIC ABSCESS: ICD-10-CM

## 2020-09-30 DIAGNOSIS — K85.90 PANCREATIC ABSCESS: Primary | ICD-10-CM

## 2020-09-30 LAB
ALBUMIN SERPL BCP-MCNC: 1.7 G/DL (ref 3.5–5.2)
ALP SERPL-CCNC: 125 U/L (ref 55–135)
ALT SERPL W/O P-5'-P-CCNC: 92 U/L (ref 10–44)
ANION GAP SERPL CALC-SCNC: 12 MMOL/L (ref 8–16)
ANISOCYTOSIS BLD QL SMEAR: SLIGHT
AST SERPL-CCNC: 118 U/L (ref 10–40)
BASOPHILS NFR BLD: 1 % (ref 0–1.9)
BILIRUB SERPL-MCNC: 1.1 MG/DL (ref 0.1–1)
BUN SERPL-MCNC: 24 MG/DL (ref 8–23)
BURR CELLS BLD QL SMEAR: ABNORMAL
CALCIUM SERPL-MCNC: 7.5 MG/DL (ref 8.7–10.5)
CHLORIDE SERPL-SCNC: 115 MMOL/L (ref 95–110)
CO2 SERPL-SCNC: 15 MMOL/L (ref 23–29)
CREAT SERPL-MCNC: 0.9 MG/DL (ref 0.5–1.4)
DIFFERENTIAL METHOD: ABNORMAL
EOSINOPHIL NFR BLD: 1 % (ref 0–8)
ERYTHROCYTE [DISTWIDTH] IN BLOOD BY AUTOMATED COUNT: 17.1 % (ref 11.5–14.5)
EST. GFR  (AFRICAN AMERICAN): >60 ML/MIN/1.73 M^2
EST. GFR  (NON AFRICAN AMERICAN): 57.7 ML/MIN/1.73 M^2
GLUCOSE SERPL-MCNC: 67 MG/DL (ref 70–110)
HCT VFR BLD AUTO: 29.8 % (ref 37–48.5)
HGB BLD-MCNC: 8.9 G/DL (ref 12–16)
HYPOCHROMIA BLD QL SMEAR: ABNORMAL
IMM GRANULOCYTES # BLD AUTO: ABNORMAL K/UL (ref 0–0.04)
IMM GRANULOCYTES NFR BLD AUTO: ABNORMAL % (ref 0–0.5)
LYMPHOCYTES NFR BLD: 2 % (ref 18–48)
MCH RBC QN AUTO: 29.8 PG (ref 27–31)
MCHC RBC AUTO-ENTMCNC: 29.9 G/DL (ref 32–36)
MCV RBC AUTO: 100 FL (ref 82–98)
MONOCYTES NFR BLD: 3 % (ref 4–15)
MYELOCYTES NFR BLD MANUAL: 2 %
NEUTROPHILS NFR BLD: 85 % (ref 38–73)
NEUTS BAND NFR BLD MANUAL: 6 %
NRBC BLD-RTO: 0 /100 WBC
OVALOCYTES BLD QL SMEAR: ABNORMAL
PLATELET # BLD AUTO: 455 K/UL (ref 150–350)
PLATELET BLD QL SMEAR: ABNORMAL
PMV BLD AUTO: 10 FL (ref 9.2–12.9)
POIKILOCYTOSIS BLD QL SMEAR: SLIGHT
POLYCHROMASIA BLD QL SMEAR: ABNORMAL
POTASSIUM SERPL-SCNC: 4 MMOL/L (ref 3.5–5.1)
PROT SERPL-MCNC: 4.7 G/DL (ref 6–8.4)
RBC # BLD AUTO: 2.99 M/UL (ref 4–5.4)
SODIUM SERPL-SCNC: 142 MMOL/L (ref 136–145)
WBC # BLD AUTO: 20.66 K/UL (ref 3.9–12.7)

## 2020-09-30 PROCEDURE — 85007 BL SMEAR W/DIFF WBC COUNT: CPT

## 2020-09-30 PROCEDURE — 36573 INSJ PICC RS&I 5 YR+: CPT

## 2020-09-30 PROCEDURE — 11000001 HC ACUTE MED/SURG PRIVATE ROOM

## 2020-09-30 PROCEDURE — 25000003 PHARM REV CODE 250: Performed by: STUDENT IN AN ORGANIZED HEALTH CARE EDUCATION/TRAINING PROGRAM

## 2020-09-30 PROCEDURE — 99233 PR SUBSEQUENT HOSPITAL CARE,LEVL III: ICD-10-PCS | Mod: GC,,, | Performed by: INTERNAL MEDICINE

## 2020-09-30 PROCEDURE — 99233 SBSQ HOSP IP/OBS HIGH 50: CPT | Mod: GC,,, | Performed by: INTERNAL MEDICINE

## 2020-09-30 PROCEDURE — 25000003 PHARM REV CODE 250: Performed by: NURSE PRACTITIONER

## 2020-09-30 PROCEDURE — 63600175 PHARM REV CODE 636 W HCPCS: Performed by: NURSE PRACTITIONER

## 2020-09-30 PROCEDURE — 63600175 PHARM REV CODE 636 W HCPCS: Performed by: STUDENT IN AN ORGANIZED HEALTH CARE EDUCATION/TRAINING PROGRAM

## 2020-09-30 PROCEDURE — 80053 COMPREHEN METABOLIC PANEL: CPT

## 2020-09-30 PROCEDURE — C1751 CATH, INF, PER/CENT/MIDLINE: HCPCS

## 2020-09-30 PROCEDURE — 25000003 PHARM REV CODE 250: Performed by: INTERNAL MEDICINE

## 2020-09-30 PROCEDURE — 99233 SBSQ HOSP IP/OBS HIGH 50: CPT | Mod: ,,, | Performed by: NURSE PRACTITIONER

## 2020-09-30 PROCEDURE — 97530 THERAPEUTIC ACTIVITIES: CPT

## 2020-09-30 PROCEDURE — 36415 COLL VENOUS BLD VENIPUNCTURE: CPT

## 2020-09-30 PROCEDURE — 76937 US GUIDE VASCULAR ACCESS: CPT

## 2020-09-30 PROCEDURE — 99233 PR SUBSEQUENT HOSPITAL CARE,LEVL III: ICD-10-PCS | Mod: ,,, | Performed by: NURSE PRACTITIONER

## 2020-09-30 PROCEDURE — A4216 STERILE WATER/SALINE, 10 ML: HCPCS | Performed by: INTERNAL MEDICINE

## 2020-09-30 PROCEDURE — 85027 COMPLETE CBC AUTOMATED: CPT

## 2020-09-30 PROCEDURE — 97110 THERAPEUTIC EXERCISES: CPT

## 2020-09-30 RX ORDER — METRONIDAZOLE 500 MG/1
500 TABLET ORAL EVERY 8 HOURS
Status: DISCONTINUED | OUTPATIENT
Start: 2020-09-30 | End: 2020-10-02 | Stop reason: HOSPADM

## 2020-09-30 RX ORDER — SODIUM CHLORIDE 0.9 % (FLUSH) 0.9 %
10 SYRINGE (ML) INJECTION
Status: DISCONTINUED | OUTPATIENT
Start: 2020-09-30 | End: 2020-10-02 | Stop reason: HOSPADM

## 2020-09-30 RX ORDER — SODIUM CHLORIDE 0.9 % (FLUSH) 0.9 %
10 SYRINGE (ML) INJECTION EVERY 6 HOURS
Status: DISCONTINUED | OUTPATIENT
Start: 2020-09-30 | End: 2020-10-02 | Stop reason: HOSPADM

## 2020-09-30 RX ADMIN — PIPERACILLIN SODIUM,TAZOBACTAM SODIUM 4.5 G: 4; .5 INJECTION, POWDER, FOR SOLUTION INTRAVENOUS at 09:09

## 2020-09-30 RX ADMIN — METRONIDAZOLE 500 MG: 500 TABLET ORAL at 10:09

## 2020-09-30 RX ADMIN — DICLOFENAC 4 G: 10 GEL TOPICAL at 03:09

## 2020-09-30 RX ADMIN — DICLOFENAC 4 G: 10 GEL TOPICAL at 09:09

## 2020-09-30 RX ADMIN — ATENOLOL 50 MG: 50 TABLET ORAL at 09:09

## 2020-09-30 RX ADMIN — CEFTRIAXONE 2 G: 2 INJECTION, SOLUTION INTRAVENOUS at 05:09

## 2020-09-30 RX ADMIN — Medication 10 ML: at 05:09

## 2020-09-30 RX ADMIN — LEVOTHYROXINE SODIUM 137 MCG: 25 TABLET ORAL at 05:09

## 2020-09-30 RX ADMIN — DICLOFENAC 4 G: 10 GEL TOPICAL at 08:09

## 2020-09-30 RX ADMIN — PIPERACILLIN SODIUM,TAZOBACTAM SODIUM 4.5 G: 4; .5 INJECTION, POWDER, FOR SOLUTION INTRAVENOUS at 02:09

## 2020-09-30 RX ADMIN — Medication 10 ML: at 12:09

## 2020-09-30 RX ADMIN — ATORVASTATIN CALCIUM 10 MG: 10 TABLET, FILM COATED ORAL at 09:09

## 2020-09-30 RX ADMIN — AMIODARONE HYDROCHLORIDE 200 MG: 200 TABLET ORAL at 09:09

## 2020-09-30 RX ADMIN — APIXABAN 2.5 MG: 2.5 TABLET, FILM COATED ORAL at 08:09

## 2020-09-30 NOTE — MEDICAL/APP STUDENT
[facility] Med Student Progress Note      Gisselle Ortiz  YOB: 1933  Medical Record Number:  [id number]  Attending Physician:  Kai Arellano MD   Date of Admission: 9/24/2020       Hospital Day:  6  Current Principal Problem:  Hepatic abscess      History     Cc: Tired    HPI  This is a 87 year old female with gallstone pancreatitis two weeks ago s/p ERCP,  Afib, SSS s/p pacemaker and hypothyroidism who presented from GI clinic with elevated liver enzymes and hypotension (92/51).      She also has HTN and arthritis.      The patient has been recovering from gallstone pancreatitis s/p ERCP done on 9/14/20 and had been doing well until a few days after discharge when she became severely fatigued to the point the patient could not walk. She has also had decreased appetite and nausea but only 1 episode of vomiting. She denies abdominal pain, fevers, diarrhea. Further, she said her urine is orange/brown and is malodorous but denies dysuria.      During her recent hospitalization for gallstone pancreatitis she underwent an ERCP and EUS with sphincterotomy and the stone and sludge was removed. No stents were placed. Given her age and co-morbidities cholecystectomy was not performed.                                                                                                                                             ED Course:  Vitals on arrival: Temp 98, Pulse 62, RR 18, /51, SpO2 97%. Patient was nauseous and had RUQ discomfort to deep palpation. She received 1 liter of NS. BP improved to 130/61 after IVF.      Hospital Course:  Patient has had ERCP which was unremarkable for stone or obvious etiology for her elevated bilirubin or transaminases.  CT scan showed a complex thick-walled partially septated cystic lesion involving the right hepatic lobe suspicious for hepatic abscess. The hepatic abscess was drained by IR on 9/26.      The patient no longer has pain  around the drain site. Drain output has decreased over the last 24 hours and is getting close the goal of 5-10 ml per day.      The cultures of her abscess grew E. Coli sensitive to zosyn.     Due to recent episodes of diarrhea we will get a stool sample to rule out C. Diff         Today, the patient is feeling better with more energy and has an appetite      Medications  Scheduled Meds:   amiodarone  200 mg Oral Daily    apixaban  2.5 mg Oral BID    atenoloL  50 mg Oral QAM    atorvastatin  10 mg Oral Daily    diclofenac sodium  4 g Topical (Top) TID    levothyroxine  137 mcg Oral Before breakfast    piperacillin-tazobactam (ZOSYN) IVPB  4.5 g Intravenous Q8H    sodium chloride 0.9%  10 mL Intravenous Q6H     Continuous Infusions:  PRN Meds:.acetaminophen, dextrose 50%, dextrose 50%, glucagon (human recombinant), glucose, glucose, HYDROcodone-acetaminophen, sodium chloride 0.9%, Flushing PICC Protocol **AND** sodium chloride 0.9% **AND** sodium chloride 0.9%      PSFH:  Please see admission note and assessment and plan below.      ROS   Admits Denies   Constitutional Fatigue  Chills, diaphoresis, malaise   Eyes  Visual changes   ENMT  Dysphagia, Epistaxis, nasal congestion, hearing loss   Cardiovascular  Chest pain, palpitations, edema   Respiratory  Cough, dyspnea, wheezing   Gastrointestinal diarrhea Nausea, vomiting, constipation, diarrhea, anorexia.     Genitourinary  Dysuria, incontinence   Musculoskeletal  Myalgias, joint pain, joint swelling   Integumentary  Rash, inflammation, burning   Neruo-Psychiatric  Anxiety, insomnia.  Changes in speech, strength, sensation.     Endocrine     Hematologic  Abnormal bruising, bleeding   Immunologic  Inflammation, pain at IV sites.  Pruritis.         Physical Examination     General: laying in bed in no acute distress    Vital Signs  Vitals  Temp: 98.2 °F (36.8 °C)  Temp src: Oral  Pulse: 60  Heart Rate Source: Monitor  Resp: 16  SpO2: 97 %  Pulse Oximetry Type:  Intermittent  O2 Device (Oxygen Therapy): room air  BP: (!) 113/57  MAP (mmHg): 82  BP Location: Right arm  BP Method: cNIBP  Patient Position: Lying          24 Hour VS Range    Temp:  [96.3 °F (35.7 °C)-98.2 °F (36.8 °C)]   Pulse:  [60-62]   Resp:  [16-18]   BP: (104-125)/(51-66)   SpO2:  [94 %-98 %]     Intake/Output Summary (Last 24 hours) at 9/30/2020 1340  Last data filed at 9/30/2020 0600  Gross per 24 hour   Intake 220 ml   Output 5 ml   Net 215 ml       Head: NCAT  Eyes: conjunctivae and lids normal, no scleral icterus.  ENMT:  no gingival bleeding, normal oral mucosa without pallor or cyanosis.   Neck:  JVP normal.  Trachea non-displaced.     Chest:  Normal respiratory effort.  Chest clear to auscultation.  No wheezes, rales, or rhonchi.  Heart:  Normal rate and rythem, S1 S2 normal quality.  No murmurs rubs or gallops.  Peripheral pulses 2+.  Abdomen: Drain site CDI, tenderness to palpation around drain site. Drain putting out serosanguinous discharge.  Non-distended, normal bowel sounds, non-tender.    Extremities:  No edema. Normal capillary refill.    Skin:  Warm and dry.  No cyanosis or pallor.  No ulcers, stasis.  IV sites without tenderness or inflammation.    Neurological / Psychiatric:  Oriented to person, time, and place.  No facial asymmetry, drift.  Fluent without dysarthria.  Mood euthymic, affect normal.                 Data       Recent Labs   Lab 09/25/20  0554 09/26/20  0602 09/28/20 0637 09/29/20  0335 09/30/20  0550   WBC 17.80* 16.75* 23.52* 25.22* 20.66*   HGB 8.1* 8.4* 8.2* 8.2* 8.9*   HCT 26.1* 27.4* 26.2* 26.5* 29.8*   * 469* 457* 486* 455*        Recent Labs   Lab 09/25/20  0554 09/26/20  0602 09/28/20  0637 09/29/20  0335 09/30/20  0550   * 139 139 140 142   K 4.2 4.2 4.0 3.6 4.0    106 111* 114* 115*   CO2 22* 23 20* 16* 15*   BUN 30* 25* 29* 24* 24*   CREATININE 1.0 0.9 1.1 0.8 0.9   ANIONGAP 7* 10 8 10 12   CALCIUM 7.5* 7.3* 7.5* 7.3* 7.5*        Recent Labs    Lab 09/25/20  0554 09/26/20  0602 09/28/20  0637 09/29/20  0335 09/30/20  0550   PROT 5.0* 4.9* 4.7* 4.6* 4.7*   ALBUMIN 2.0* 1.8* 1.7* 1.6* 1.7*   BILITOT 2.5* 2.0* 1.9* 1.4* 1.1*   ALKPHOS 103 96 93 107 125   AST 70* 59* 82* 86* 118*   * 93* 75* 75* 92*      Lactate (Lactic Acid) (mmol/L)   Date Value   09/29/2020 0.6   09/28/2020 0.5      Cultures  Hepatic Abscess:  E. Coli   Sensitive to Zosyn     Imaging  ERCP (9/25/20)  The entire main bile duct was dilated.   The biliary tree was swept and nothing was found.    One plastic stent was placed into the common bile duct      CT (9/25/20)  Complex thick-walled partially septated cystic lesion involving the right hepatic lobe concerning for hepatic abscess     Complex multiloculated cystic lesion involving the pancreatic body with additional suspected small cystic lesion/collection in the region of the pancreatic head. These are nonspecific and could relate to evolving pancreatic pseudocyst/acute collections, however underlying cystic neoplastic process is not excluded.       Common bile duct stent in place with pneumobilia as well as small volume of air within the mildly distended, thickened gallbladder.       US 9/30/20  1.is a percutaneous hepatic drainage catheter in place.  No definite large residual hepatic collection identified within the limitation of this ultrasound exam.  Further evaluation with additional cross-sectional imaging as warranted.  2. Common bile duct stent and findings suggestive of a small component of pneumobilia, seen on prior exams.  3. Complex cystic pancreatic lesion,   4. Cholelithiasis.  No definite sonographic evidence to suggest acute cholecystitis at this time.  5. Right pleural effusion.        Assessment & Plan     * Hepatic abscess  Acute. improving     Complex thick-walled partially septated cystic lesion involving the right hepatic lobe  35 ml Purulent bilious material removed from abscess.   5 ml of Serosanguinous  drain output  last 12 hrs  Source likely secondary to ERCP performed earlier this month  Culture grew E. Coli and is sensitive to zosyn     Cholecystectomy not recommended per gen surg       Plan  -Given US,  decreased drain out put, and patients subjective improvement patient likely discharge tomorrow  -Continue Zosyn, ID recommends de-escalating to Ceftriaxone if anaerobic abscess culture comes back with no growth  -4-6 weeks abx treatment via picc line (placed 9/30/20)  -Norco 5 PRN for pain      Diarrhea  New onset, improving   1 episode in last 24 hrs     DDx: antibiotic AE vs C. Difficile      Plan  -C. Difficile EIA ordered        UTI (urinary tract infection)  Acute,  Patient fatigued and worse appearing than previous day    Lactate WNL     E. Faecalis on culture     Plan:      -Ordered UA with culture   -Continue on Zosyn         Bacteremia  Acute, improving       E. Coli on culture and sensitive to Zosyn  Source likely hepatic abscess given culture results showing E. Coli   NGTD from repeat blood cultures     Plan  -Continue on Zosyn           SSS (sick sinus syndrome)  Chronic Stable   Followed by Dr Colmenares in Cardiology     Plan:   Telemetry   atenalol.  Apixaban stopped for GI consult.   amiodarone continued  Has pacemaker in place.         RBBB  Chronic Stable  On existing EKG  Has pacemaker, ECG showed RBBB;        Plan:   Telemetry   atenalol.  Apixaban stopped for GI consult.   amiodarone continued.         Hypothyroidism  Chronic Stable  TSH 0.443     Continue levothyroxine 0.137 mg        Hypertension  Chronic Stable     atenolol.      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          Disposition  Patient awaiting final ID recs and drain removal. Will discharge on home IV abx for 4-6 weeks.      Sha Perez  MS3

## 2020-09-30 NOTE — PLAN OF CARE
CM to review chart after bedside rounds, pt will need additional 4-6 weeks of abx,  awaiting final ID recs. PICC line placed, HH referrals placed in [facility] will cont to follow.     09/30/20 1238   Discharge Reassessment   Assessment Type Discharge Planning Reassessment   Provided patient/caregiver education on the expected discharge date and the discharge plan Yes   Do you have any problems affording any of your prescribed medications? No   Discharge Plan A Home;Home Health   Discharge Plan B Home;Home Health   DME Needed Upon Discharge  none   Anticipated Discharge Disposition Home-Health   Can the patient/caregiver answer the patient profile reliably? Yes, cognitively intact   How does the patient rate their overall health at the present time? Fair   Describe the patient's ability to walk at the present time. Walks with the help of equipment   How often would a person be available to care for the patient? Often   Number of comorbid conditions (as recorded on the chart) Two   During the past month, has the patient often been bothered by feeling down, depressed or hopeless? No   During the past month, has the patient often been bothered by little interest or pleasure in doing things? No   Post-Acute Status   Post-Acute Authorization Home Health   HME Status Awaiting Internal medical Clearance   Home Health Status Referrals Sent   Discharge Delays None known at this time   Maryam Sanabria, MSN  Case Management  Ext [phone]

## 2020-09-30 NOTE — CONSULTS
D/L PICC placed in R BASILIC vein, 36cm in length with 0cm exposed. Arm circumference 29cm. Lot#[id number]

## 2020-09-30 NOTE — PLAN OF CARE
Problem: Fall Injury Risk  Goal: Absence of Fall and Fall-Related Injury  Outcome: Ongoing, Progressing     Problem: Adult Inpatient Plan of Care  Goal: Plan of Care Review  Outcome: Ongoing, Progressing     Problem: Adult Inpatient Plan of Care  Goal: Absence of Hospital-Acquired Illness or Injury  Outcome: Ongoing, Progressing

## 2020-09-30 NOTE — PLAN OF CARE
Problem: Fall Injury Risk  Goal: Absence of Fall and Fall-Related Injury  Outcome: Ongoing, Progressing     Problem: Adult Inpatient Plan of Care  Goal: Plan of Care Review  Outcome: Ongoing, Progressing  Goal: Patient-Specific Goal (Individualization)  Outcome: Ongoing, Progressing  Goal: Absence of Hospital-Acquired Illness or Injury  Outcome: Ongoing, Progressing  Goal: Optimal Comfort and Wellbeing  Outcome: Ongoing, Progressing  Goal: Readiness for Transition of Care  Outcome: Ongoing, Progressing  Goal: Rounds/Family Conference  Outcome: Ongoing, Progressing     Problem: Infection  Goal: Infection Symptom Resolution  Outcome: Ongoing, Progressing     AAOx4. VS stable. No PRN medications administered. No falls or injuries. Bed low, wheels locked, side rails up x2, call light within reach. Will continue to monitor pt.

## 2020-09-30 NOTE — PROGRESS NOTES
"[facility]  Infectious Disease  Progress Note    Patient Name: Gisselle Ortiz  MRN: [id number]  Admission Date: 9/24/2020  Length of Stay: 6 days  Attending Physician: Kai Arellano MD  Primary Care Provider: Kai Montez MD    Isolation Status: Special Contact  Assessment/Plan:      * Bacteremia     88 y/o female with A.fib on eliquis, SSS s/p pacemaker, HTN presents to ED from GI clinic for elevated LFTs and hypotension 92/51 now with E.coli bacteremia.  ID consulted for abx recommendations.     Had recent admission on 9/11 for abd pain, n/v found to have pancreatitis and CBD dilation and gallstone pancreatitis. She underwent ERCP 9/14 and underwent spincterotomy and stone removal.  She returned to GI clinic and was noted to have n/v and fatigue and elevated LFTs with associated hypotension and was admitted.       Blood cultures on admit positive with E.coli and urine cultures +E.faecalis.  Imaging studies showed dilatation of the extrahepatic common bile duct, intrahepatic duct demonstrated normal caliber however has internal debris (possibly representing biliary sludge), hepatic abscess and pancreatic mass/pseudocyst.  She underwent ERCP 9/25.  Noted to have bile duct dilation and stent was placed in CBD and she is s/p IR drainage of hepatic abscess 9/26. Abscess culture showing E Coli - likely source of bacteremia.  Per surgery, she is not a candidate for cholecystectomy. She is currently on zosyn.     She remains afebrile.  Repeat blood cultures NGTD.  WBC had trended up, but now trending down.  Diarrhea reported yesterday resolved.  Limited abdominal US yesterday shows no definite large residual abscess ( further cross sectional imaging warranted), persistent complex cystic pancreatic lesion, and cholelithiasis.       Feels "a little" better today.  Denies abdominal pain, n/v. Scant drainage in drain. Denies urinary symptoms.     Recommendations:  1.  Discontinued IV " zosyn and started ceftriaxone 2 grams IV q 24 hours for E coli bacteremia and hepatic abscess  2.  Started metronidazole 500 mg orally q 8 hours empirically for anaerobic coverage.   3.  Anticipate 4-6 weeks antibiotics for hepatic abscess (or until abscess resolves).  Would plan for 4 weeks of IV ceftriaxone and oral flagyl from date of  IR drainage (Estimated end date 10/26) with repeat CT abdomen/pelvis  with contrast at 4 weeks with ID follow up after imaging to determine ultimate duration of antibiotics and ability to transition to orals.    4.  Assuming pancreatic lesion noted on CT is infected pseudocyst/abscess (vs neoplasm), would anticipate it would respond to current antibiotics, but have no culture data for this.  Will follow clinically with repeat imaging. If worsens on current course, will need aspiration for cultures and/or biopsy if concerned for neoplasm  5.  May be able to discontinue oral flagyl earlier if no growth on anaerobic cultures.  Will determine at follow up.   6.  Weekly cbc, cmp, crp, esr and fax results to ID, attention Nimesh Mares NP, at fax [phone]. (Pager [phone], [facility] [phone], office [phone])  7.  ID follow up at 2 weeks, then 4 weeks with imaging.  ID will make the appointments.   8.  Will sign off.  Please call or re-consult as needed.     Discussed with ID staff   Discussed with Primary Team    Hepatic abscess  See assessment/plan above    Pancreatic abscess  See assessment/plan above      Thank you.   Please call for any questions or concerns.  MATTHEW Richards, ANP-C  [phone] pager, [facility] [phone]    Subjective:     Principal Problem:Bacteremia    HPI: 86 y/o female with A.fib on eliquis, SSS s/p pacemaker, HTN presents to ED from GI clinic for elevated LFTs and hypotension 92/51 now with GNR bacteremia. We are consulted for abx recommendations.    Had recnet admission on 9/11 for abd pain, n/v found to have pancreatitis and CBD dilation and gallstone pancreatitis. She  "underwent ERCP 9/14 and underwent spincterotomy and stone removal. She is not a candidate for cholecystectomy. She returns to GI clinic and was noted to have n/v/ and fatigue and elevated LFTs with associated hypotension.  U/S of the abdomen was obtained and showed: "Dilatation of the extrahepatic common bile duct, intrahepatic duct demonstrates normal caliber however has internal debris (possibly representing biliary sludge), hepatic abscess and pancreatic mass.     She was seen by GI and underwent ERCP yesterday. Noted to have bile duct dilation and stent placed in CBD. Blood cultures on admit are positive with GNR and urine cultures +E.faecalis. she is currently on zosyn. We are consulted for abx recommendations.     CT A/P: Complex thick-walled partially septated cystic lesion involving the right hepatic lobe concerning for hepatic abscess.  Complex multiloculated cystic lesion involving the pancreatic body with additional suspected small cystic lesion/collection in the region of the pancreatic head as discussed above.  These are nonspecific and could relate to evolving pancreatic pseudocyst/acute collections, however underlying cystic neoplastic process is not excluded. Common bile duct stent in place with pneumobilia as well as small volume of air within the mildly distended, thickened gallbladder.  There is an air and fluid containing structure along the anterior margin of the extrahepatic common bile duct which may represent a loop of bowel (possibly relating to reported diverticulum seen on recent ERCP) versus fluid and gas containing collection adjacent to and/or communicating with the common bile duct.     Interval History:   S/p IR drain and catheter placement 9/26.  Scant drainage noted in drain.   Abscess cultures + for E Coli.  Same as blood cultures.   Repeat blood cultures NGTD  Reports she "feels a little better" today.  Denies n/v, abdominal pain.  Reports only one b.m. last night.  More form. "   Limited abd US yesterday shows no definite large residual abscess ( further cross sectional imaging warranted), complex cystic pancreatic lesion, and cholelithiasis.   PICC placed      Review of Systems   Constitutional: Positive for activity change, appetite change (decreased) and fatigue. Negative for chills, diaphoresis and fever.   Respiratory: Negative for cough and shortness of breath.    Cardiovascular: Negative for chest pain.   Gastrointestinal: Negative for abdominal pain, anal bleeding, blood in stool, constipation, diarrhea, nausea and vomiting.   Genitourinary: Negative for dysuria and flank pain.   Musculoskeletal: Negative for arthralgias, back pain, gait problem and joint swelling.   Skin: Negative for color change, pallor, rash and wound.   Neurological: Negative for dizziness and headaches.   All other systems reviewed and are negative.    Objective:     Vital Signs (Most Recent):  Temp: 98 °F (36.7 °C) (09/30/20 0630)  Pulse: 62 (09/30/20 0630)  Resp: 18 (09/30/20 0630)  BP: 125/66 (09/30/20 0630)  SpO2: (!) 94 % (09/30/20 0630) Vital Signs (24h Range):  Temp:  [96.3 °F (35.7 °C)-98 °F (36.7 °C)] 98 °F (36.7 °C)  Pulse:  [60-62] 62  Resp:  [16-18] 18  SpO2:  [94 %-98 %] 94 %  BP: (104-125)/(51-66) 125/66     Weight: 56.6 kg (124 lb 12.5 oz)  Body mass index is 22.1 kg/m².    Estimated Creatinine Clearance: 41 mL/min (based on SCr of 0.8 mg/dL).    Physical Exam  Vitals signs reviewed.   Constitutional:       General: She is not in acute distress.     Appearance: Normal appearance. She is not ill-appearing, toxic-appearing or diaphoretic.   HENT:      Head: Normocephalic.      Mouth/Throat:      Mouth: Mucous membranes are moist.   Cardiovascular:      Rate and Rhythm: Normal rate and regular rhythm.      Heart sounds: No murmur. No friction rub. No gallop.    Pulmonary:      Effort: Pulmonary effort is normal. No respiratory distress.      Breath sounds: Normal breath sounds. No stridor.    Abdominal:      General: Abdomen is flat. There is no distension.      Palpations: Abdomen is soft.      Tenderness: There is no abdominal tenderness. There is no guarding.      Comments: Drain in place RUQ.  Scant sanguinous drainage.   Musculoskeletal: Normal range of motion.         General: No swelling or tenderness.   Skin:     General: Skin is warm and dry.      Coloration: Skin is not jaundiced or pale.   Neurological:      General: No focal deficit present.      Mental Status: She is alert.   Psychiatric:         Mood and Affect: Mood normal.         Significant Labs: All pertinent labs within the past 24 hours have been reviewed.    Significant Imaging: I have reviewed all pertinent imaging results/findings within the past 24 hours.

## 2020-09-30 NOTE — PROCEDURES
"Gisselle Ortiz is a 87 y.o. female patient.    Temp: 97.1 °F (36.2 °C) (09/30/20 0809)  Pulse: 60 (09/30/20 0809)  Resp: 16 (09/30/20 0809)  BP: (!) 113/57 (09/30/20 0809)  SpO2: 97 % (09/30/20 0809)  Weight: 56.6 kg (124 lb 12.5 oz) (09/25/20 0855)  Height: 5' 3" (160 cm) (09/24/20 2200)    PICC  Date/Time: 9/30/2020 10:56 AM  Performed by: Kaur Mittal RN  Assisting provider: Ethel Park LPN  Consent Done: Yes  Time out: Immediately prior to procedure a time out was called to verify the correct patient, procedure, equipment, support staff and site/side marked as required  Indications: med administration and vascular access  Anesthesia: local infiltration  Local anesthetic: lidocaine 1% without epinephrine  Anesthetic Total (mL): 2  Preparation: skin prepped with ChloraPrep  Skin prep agent dried: skin prep agent completely dried prior to procedure  Sterile barriers: all five maximum sterile barriers used - cap, mask, sterile gown, sterile gloves, and large sterile sheet  Hand hygiene: hand hygiene performed prior to central venous catheter insertion  Location details: right basilic  Catheter type: double lumen  Catheter size: 5 Fr  Catheter Length: 36cm    Ultrasound guidance: yes  Vessel Caliber: medium and patent, compressibility normal  Vascular Doppler: not done  Needle advanced into vessel with real time Ultrasound guidance.  Guidewire confirmed in vessel.  Image recorded and saved.  Sterile sheath used.  Number of attempts: 1  Post-procedure: blood return through all ports, chlorhexidine patch and sterile dressing applied  Technical procedures used: 3CG  Specimens: No  Implants: No  Assessment: placement verified by x-ray  Complications: none          Ethel Park  9/30/2020  "

## 2020-09-30 NOTE — PT/OT/SLP PROGRESS
"Physical Therapy Treatment    Patient Name:  Gisselle Ortiz   MRN:  [id number]    Recommendations:     Discharge Recommendations:  home health PT   Discharge Equipment Recommendations: none   Barriers to discharge: decreased functional mobility requiring increased assist      Assessment:     Gisselle Ortiz is a 87 y.o. female admitted with a medical diagnosis of Hepatic abscess.  She presents with the following impairments/functional limitations:  weakness, impaired balance, impaired endurance, impaired self care skills, impaired functional mobilty, gait instability, decreased lower extremity function. Pt treated on this date tolerating session with decreased tolerance and demonstrated increased fatigue throughout. Pt stated she has been unable to eat solid foods resulting in current weakness and generalized debility. Pt pleaseant and willing to partipate in EOB sitting exercises but gait training held per pt request. Acute PT will increase current POC x3 day/wk to increase mobility. Pt would benefit from continued skilled acute PT 3x/wk to improve functional mobility.  Recommending pt receive PT services in HHPT setting following discharge from hospital once medically cleared.     Rehab Prognosis: Good; patient would benefit from acute skilled PT services to address these deficits and reach maximum level of function.    Recent Surgery: Procedure(s) (LRB):  ERCP (ENDOSCOPIC RETROGRADE CHOLANGIOPANCREATOGRAPHY) (N/A) 5 Days Post-Op    Plan:     During this hospitalization, patient to be seen 3 x/week to address the identified rehab impairments via gait training, therapeutic activities, therapeutic exercises, neuromuscular re-education and progress toward the following goals:    · Plan of Care Expires:  10/24/20    Subjective     Chief Complaint: Generalized weakness and fatigue   Patient/Family Comments/goals: "I haven't ate solid foods in almost a week"  Pain/Comfort:  ·        Objective:     Communicated " with NSG prior to session.  Patient found HOB elevated with SIMONE drain, peripheral IV upon PT entry to room.     General Precautions: Standard,     Orthopedic Precautions:    Braces:       Functional Mobility:  · Bed Mobility:     · Rolling Left:  stand by assistance  · Scooting: stand by assistance  · Supine to Sit: stand by assistance  · Sit to Supine: stand by assistance  · Transfers:     · Sit to Stand:  minimum assistance with hand-held assist  · Gait: 6 side steps along EOB CGA with HHA. Further distances held per pt request   · Balance: Sitting: SBA      AM-PAC 6 CLICK MOBILITY  Turning over in bed (including adjusting bedclothes, sheets and blankets)?: 3  Sitting down on and standing up from a chair with arms (e.g., wheelchair, bedside commode, etc.): 3  Moving from lying on back to sitting on the side of the bed?: 3  Moving to and from a bed to a chair (including a wheelchair)?: 2  Need to walk in hospital room?: 2  Climbing 3-5 steps with a railing?: 2  Basic Mobility Total Score: 15       Therapeutic Activities and Exercises:   - EOB Sittinmins SBA   - - Seated Therapeutic Exercises    - Ankle Pumps: x20 each    - LAQ: x15 each    - Alt. Seated Marches: x30 total    - Pt educated on:   -PT roles, expectations, and POC    -Safety with mobility   -Benefits of OOB activities to increase strength and functional mobility    -Performing ther ex for increasing LE ROM and strength   -Discharge recommendations     Patient left HOB elevated with all lines intact and call button in reach..    GOALS:   Multidisciplinary Problems     Physical Therapy Goals        Problem: Physical Therapy Goal    Goal Priority Disciplines Outcome Goal Variances Interventions   Physical Therapy Goal     PT, PT/OT Ongoing, Progressing     Description: Goals to be met by: 10/24/2020    Patient will increase functional independence with mobility by performin. Sit to stand transfer with Stand-by Assistance  2. Gait  x 30 feet with  Stand-by Assistance using LRAD  3. Lower extremity exercise program x15 reps, with independence                      Time Tracking:     PT Received On: 09/30/20  PT Start Time: 0821     PT Stop Time: 0848  PT Total Time (min): 27 min     Billable Minutes: Therapeutic Activity 16 and Therapeutic Exercise 11    Treatment Type: Treatment  PT/PTA: PT           Tr Gardner, PT  09/30/2020

## 2020-09-30 NOTE — PROGRESS NOTES
Unable to collect urine and stool sample for the pt because when pt used the bathroom the urine mixed with the stool , night shift nurse notified to try to collect the samples tonight .

## 2020-09-30 NOTE — TREATMENT PLAN
AES Treatment Plan    Gisselle Ortiz is a 87 y.o. female admitted to hospital 9/24/2020 (Hospital Day: 7) due to Bacteremia.     Interval History  S/p IR drain placement, less than 10cc output in drain over last shift.  Abdominal pain improved.  Remains on Abx.  Abscess culture growing E. Coli, mostly pansensitive    Objective  Temp:  [96.3 °F (35.7 °C)-98 °F (36.7 °C)] 98 °F (36.7 °C) (09/30 0630)  Pulse:  [60-62] 62 (09/30 0630)  BP: (104-125)/(51-66) 125/66 (09/30 0630)  Resp:  [16-18] 18 (09/30 0630)  SpO2:  [94 %-98 %] 94 % (09/30 0630)  Laboratory  Lab Results   Component Value Date    WBC 25.22 (H) 09/29/2020    HGB 8.2 (L) 09/29/2020    HCT 26.5 (L) 09/29/2020    MCV 96 09/29/2020     (H) 09/29/2020       Lab Results   Component Value Date    ALT 75 (H) 09/29/2020    AST 86 (H) 09/29/2020    ALKPHOS 107 09/29/2020    BILITOT 1.4 (H) 09/29/2020       Recommendations  - Repeat ERCP in 2 months to remove stent.   - Continue antibiotics. Per ID   -continue to monitor drain output, further management per IR  -continue to trend daily CMP, CBC, INR  - We will continue to follow.    Thank you for involving us in the care of Gisselle Ortiz. Please call with any additional questions, concerns or changes in the patient's clinical status.    Antolin Ortega MD  Gastroenterology Fellow PGY IV   [facility]

## 2020-09-30 NOTE — SUBJECTIVE & OBJECTIVE
Interval History: naeon. Afebrile, feels improved. Denies worsening abdominal pain. Advancing diet as tolerated.     Review of Systems   Constitutional: Positive for appetite change and fatigue. Negative for activity change and fever.   HENT: Negative for congestion and rhinorrhea.    Eyes: Negative for pain and visual disturbance.   Respiratory: Negative for shortness of breath and wheezing.    Cardiovascular: Negative for chest pain and leg swelling.   Gastrointestinal: Positive for abdominal pain. Negative for abdominal distention and nausea.   Genitourinary: Negative for difficulty urinating and dysuria.   Musculoskeletal: Negative for arthralgias and joint swelling.   Skin: Negative for color change and wound.   Neurological: Negative for dizziness and weakness.     Objective:     Vital Signs (Most Recent):  Temp: 98.2 °F (36.8 °C) (09/30/20 1229)  Pulse: 60 (09/30/20 0809)  Resp: 16 (09/30/20 0809)  BP: (!) 113/57 (09/30/20 0809)  SpO2: 97 % (09/30/20 0809) Vital Signs (24h Range):  Temp:  [96.3 °F (35.7 °C)-98.2 °F (36.8 °C)] 98.2 °F (36.8 °C)  Pulse:  [60-62] 60  Resp:  [16-18] 16  SpO2:  [94 %-98 %] 97 %  BP: (104-125)/(51-66) 113/57     Weight: 56.6 kg (124 lb 12.5 oz)  Body mass index is 22.1 kg/m².    Intake/Output Summary (Last 24 hours) at 9/30/2020 1258  Last data filed at 9/30/2020 0600  Gross per 24 hour   Intake 220 ml   Output 5 ml   Net 215 ml      Physical Exam  Vitals signs and nursing note reviewed.   Constitutional:       General: She is not in acute distress.     Appearance: She is not ill-appearing.   HENT:      Head: Normocephalic and atraumatic.      Nose: Nose normal.   Eyes:      Extraocular Movements: Extraocular movements intact.      Conjunctiva/sclera: Conjunctivae normal.      Pupils: Pupils are equal, round, and reactive to light.   Neck:      Musculoskeletal: Normal range of motion.   Cardiovascular:      Rate and Rhythm: Normal rate and regular rhythm.      Heart sounds: Normal  heart sounds. No murmur. No gallop.    Pulmonary:      Effort: Pulmonary effort is normal. No respiratory distress.      Breath sounds: No stridor. No wheezing, rhonchi or rales.   Abdominal:      General: Abdomen is flat. There is no distension.      Palpations: Abdomen is soft.      Tenderness: There is no abdominal tenderness. There is no guarding.   Musculoskeletal: Normal range of motion.   Skin:     General: Skin is warm.   Neurological:      General: No focal deficit present.      Mental Status: She is alert and oriented to person, place, and time.         Significant Labs:   CBC:   Recent Labs   Lab 09/29/20  0335 09/30/20  0550   WBC 25.22* 20.66*   HGB 8.2* 8.9*   HCT 26.5* 29.8*   * 455*     CMP:   Recent Labs   Lab 09/29/20  0335 09/30/20  0550    142   K 3.6 4.0   * 115*   CO2 16* 15*   GLU 70 67*   BUN 24* 24*   CREATININE 0.8 0.9   CALCIUM 7.3* 7.5*   PROT 4.6* 4.7*   ALBUMIN 1.6* 1.7*   BILITOT 1.4* 1.1*   ALKPHOS 107 125   AST 86* 118*   ALT 75* 92*   ANIONGAP 10 12   EGFRNONAA >60.0 57.7*

## 2020-09-30 NOTE — SUBJECTIVE & OBJECTIVE
"Interval History:   S/p IR drain and catheter placement 9/26.  Scant drainage noted in drain.   Abscess cultures + for E Coli.  Same as blood cultures.   Repeat blood cultures NGTD  Reports she "feels a little better" today.  Denies n/v, abdominal pain.  Reports only one b.m. last night.  More form.   Limited abd US yesterday shows no definite large residual abscess ( further cross sectional imaging warranted), complex cystic pancreatic lesion, and cholelithiasis.   PICC placed      Review of Systems   Constitutional: Positive for activity change, appetite change (decreased) and fatigue. Negative for chills, diaphoresis and fever.   Respiratory: Negative for cough and shortness of breath.    Cardiovascular: Negative for chest pain.   Gastrointestinal: Negative for abdominal pain, anal bleeding, blood in stool, constipation, diarrhea, nausea and vomiting.   Genitourinary: Negative for dysuria and flank pain.   Musculoskeletal: Negative for arthralgias, back pain, gait problem and joint swelling.   Skin: Negative for color change, pallor, rash and wound.   Neurological: Negative for dizziness and headaches.   All other systems reviewed and are negative.    Objective:     Vital Signs (Most Recent):  Temp: 98 °F (36.7 °C) (09/30/20 0630)  Pulse: 62 (09/30/20 0630)  Resp: 18 (09/30/20 0630)  BP: 125/66 (09/30/20 0630)  SpO2: (!) 94 % (09/30/20 0630) Vital Signs (24h Range):  Temp:  [96.3 °F (35.7 °C)-98 °F (36.7 °C)] 98 °F (36.7 °C)  Pulse:  [60-62] 62  Resp:  [16-18] 18  SpO2:  [94 %-98 %] 94 %  BP: (104-125)/(51-66) 125/66     Weight: 56.6 kg (124 lb 12.5 oz)  Body mass index is 22.1 kg/m².    Estimated Creatinine Clearance: 41 mL/min (based on SCr of 0.8 mg/dL).    Physical Exam  Vitals signs reviewed.   Constitutional:       General: She is not in acute distress.     Appearance: Normal appearance. She is not ill-appearing, toxic-appearing or diaphoretic.   HENT:      Head: Normocephalic.      Mouth/Throat:      " Mouth: Mucous membranes are moist.   Cardiovascular:      Rate and Rhythm: Normal rate and regular rhythm.      Heart sounds: No murmur. No friction rub. No gallop.    Pulmonary:      Effort: Pulmonary effort is normal. No respiratory distress.      Breath sounds: Normal breath sounds. No stridor.   Abdominal:      General: Abdomen is flat. There is no distension.      Palpations: Abdomen is soft.      Tenderness: There is no abdominal tenderness. There is no guarding.      Comments: Drain in place RUQ.  Scant sanguinous drainage.   Musculoskeletal: Normal range of motion.         General: No swelling or tenderness.   Skin:     General: Skin is warm and dry.      Coloration: Skin is not jaundiced or pale.   Neurological:      General: No focal deficit present.      Mental Status: She is alert.   Psychiatric:         Mood and Affect: Mood normal.         Significant Labs: All pertinent labs within the past 24 hours have been reviewed.    Significant Imaging: I have reviewed all pertinent imaging results/findings within the past 24 hours.

## 2020-09-30 NOTE — ASSESSMENT & PLAN NOTE
"   88 y/o female with A.fib on eliquis, SSS s/p pacemaker, HTN presents to ED from GI clinic for elevated LFTs and hypotension 92/51 now with E.coli bacteremia.  ID consulted for abx recommendations.     Had recent admission on 9/11 for abd pain, n/v found to have pancreatitis and CBD dilation and gallstone pancreatitis. She underwent ERCP 9/14 and underwent spincterotomy and stone removal.  She returned to GI clinic and was noted to have n/v and fatigue and elevated LFTs with associated hypotension and was admitted.       Blood cultures on admit positive with E.coli and urine cultures +E.faecalis.  Imaging studies showed dilatation of the extrahepatic common bile duct, intrahepatic duct demonstrated normal caliber however has internal debris (possibly representing biliary sludge), hepatic abscess and pancreatic mass/pseudocyst.  She underwent ERCP 9/25.  Noted to have bile duct dilation and stent was placed in CBD and she is s/p IR drainage of hepatic abscess 9/26. Abscess culture showing E Coli - likely source of bacteremia.  Per surgery, she is not a candidate for cholecystectomy. She is currently on zosyn.     She remains afebrile.  Repeat blood cultures NGTD.  WBC had trended up, but now trending down.  Diarrhea reported yesterday resolved.  Limited abdominal US yesterday shows no definite large residual abscess ( further cross sectional imaging warranted), persistent complex cystic pancreatic lesion, and cholelithiasis.       Feels "a little" better today.  Denies abdominal pain, n/v. Scant drainage in drain. Denies urinary symptoms.     Recommendations:  1.  Discontinued IV zosyn and started ceftriaxone 2 grams IV q 24 hours for E coli bacteremia and hepatic abscess  2.  Started metronidazole 500 mg orally q 8 hours empirically for anaerobic coverage.   3.  Anticipate 4-6 weeks antibiotics for hepatic abscess (or until abscess resolves).  Would plan for 4 weeks of IV ceftriaxone and oral flagyl from date of  " IR drainage (Estimated end date 10/26) with repeat CT abdomen/pelvis  with contrast at 4 weeks with ID follow up after imaging to determine ultimate duration of antibiotics and ability to transition to orals.    4.  Assuming pancreatic lesion noted on CT is infected pseudocyst/abscess (vs neoplasm), would anticipate it would respond to current antibiotics, but have no culture data for this.  Will follow clinically with repeat imaging. If worsens on current course, will need aspiration for cultures and/or biopsy if concerned for neoplasm  5.  May be able to discontinue oral flagyl earlier if no growth on anaerobic cultures.  Will determine at follow up.   6.  Weekly cbc, cmp, crp, esr and fax results to ID, attention Nimesh Mares NP, at fax [phone]. (Pager [phone], spectra [phone], office [phone])  7.  ID follow up at 2 weeks, then 4 weeks with imaging.  ID will make the appointments.   8.  Will sign off.  Please call or re-consult as needed.     Discussed with ID staff   Discussed with Primary Team

## 2020-09-30 NOTE — PLAN OF CARE
09/30/20 0826   Post-Acute Status   Post-Acute Authorization Home Health   Home Health Status Awaiting Internal Medical Clearance

## 2020-09-30 NOTE — PROGRESS NOTES
[facility] Hospital Medicine  Progress Note    Patient Name: Gisselle Ortiz  MRN: [id number]  Patient Class: IP- Inpatient   Admission Date: 9/24/2020  Length of Stay: 6 days  Attending Physician: Kai Arellano MD  Primary Care Provider: Kai Montez MD    [facility] Medicine Team: [facility] [place] HOSP MED 1 Addi Carrington MD    Subjective:     Principal Problem:Hepatic abscess        HPI:  Gisselle Ortiz is a 87 y.o. female with a medical history of AFib on Eliquis with previous cardioversion, SSS s/p pacemaker, arthritis, HTN, thyroid disease with diastolic dysfunction (EF 55%, 9/2019) who presented to the ED from GI clinic after she was found to have elevated liver enzymes and hypotensive to 92/51.    She presented to the hospital on 09/11 for abdominal pain and was found to have gallstone pancreatitis. She had CBD dilation and a gallbladder with sludge. She underwent EUS and ERCP with stone and sludge seen on the cholangiogram. Sphincterotomy was performed and stone/sludge removed with no stents placed. Per notes, ERCP was concerning for choledocholithiasis.  Surgery was consulted and deemed the patient to be high risk for cholecystectomy. She was seen today in GI clinic for follow-up. She initially recovered well from the pancreatitis and ERCP and was eating prior to discharge. A few days after discharge she started having severe fatigue that progressively worsened to the point where the patient could not walk. She complains of decreased appetite and nausea but only 1 episode of vomiting. She denies any abdominal pain, fevers, diarrhea. She reports that her urine is orange/brown and has a bad odor but denies any dysuria. In the office her, BP was found to be 92/51. She was sent to the ED with BP rechecked at 112/51. She was given one liter NS and admitted for further evaluations.     In the ED, she was found to be afebrile and her BP improved to 130/61 with IVF. HR 63  with NSR and existing RBBB. Qtc 512. She was breathing 20bpm with no O2 requirement. She c/o some nausea but no abd pain. On exam, she had mild discomfort to deep palpation to RUQ abdomen.    Overview/Hospital Course:  9/26: Pt had ERCP 9/25, which was unremarkable for stone or obvious etiology for her elevated bili and LFT. CT scan with contrast following pancreatic protocol showed hepatic abscess. Blood culture grew gram negative cristofer; urine culture was positive enterococcus faecalis.   Pt had IR intervention for her hepatic abscess 9/26. Around 50 ml was put out via drainage yesterday. Blood culture grew E.Coli susceptible to multiple abx. Urine enterococcus faecalis also shown to have susceptibility to multiple organism. 9/27 drain still with significant output, will continue to monitor. Abscess cultures grew E. Coli. On 9/29 patient's WBC increased slightly to 25 and was overall more fatigued, prompting further workup and ID was notified.    Interval History: naeon. Afebrile, feels improved. Denies worsening abdominal pain. Advancing diet as tolerated.     Review of Systems   Constitutional: Positive for appetite change and fatigue. Negative for activity change and fever.   HENT: Negative for congestion and rhinorrhea.    Eyes: Negative for pain and visual disturbance.   Respiratory: Negative for shortness of breath and wheezing.    Cardiovascular: Negative for chest pain and leg swelling.   Gastrointestinal: Positive for abdominal pain. Negative for abdominal distention and nausea.   Genitourinary: Negative for difficulty urinating and dysuria.   Musculoskeletal: Negative for arthralgias and joint swelling.   Skin: Negative for color change and wound.   Neurological: Negative for dizziness and weakness.     Objective:     Vital Signs (Most Recent):  Temp: 98.2 °F (36.8 °C) (09/30/20 1229)  Pulse: 60 (09/30/20 0809)  Resp: 16 (09/30/20 0809)  BP: (!) 113/57 (09/30/20 0809)  SpO2: 97 % (09/30/20 0809) Vital Signs  (24h Range):  Temp:  [96.3 °F (35.7 °C)-98.2 °F (36.8 °C)] 98.2 °F (36.8 °C)  Pulse:  [60-62] 60  Resp:  [16-18] 16  SpO2:  [94 %-98 %] 97 %  BP: (104-125)/(51-66) 113/57     Weight: 56.6 kg (124 lb 12.5 oz)  Body mass index is 22.1 kg/m².    Intake/Output Summary (Last 24 hours) at 9/30/2020 1258  Last data filed at 9/30/2020 0600  Gross per 24 hour   Intake 220 ml   Output 5 ml   Net 215 ml      Physical Exam  Vitals signs and nursing note reviewed.   Constitutional:       General: She is not in acute distress.     Appearance: She is not ill-appearing.   HENT:      Head: Normocephalic and atraumatic.      Nose: Nose normal.   Eyes:      Extraocular Movements: Extraocular movements intact.      Conjunctiva/sclera: Conjunctivae normal.      Pupils: Pupils are equal, round, and reactive to light.   Neck:      Musculoskeletal: Normal range of motion.   Cardiovascular:      Rate and Rhythm: Normal rate and regular rhythm.      Heart sounds: Normal heart sounds. No murmur. No gallop.    Pulmonary:      Effort: Pulmonary effort is normal. No respiratory distress.      Breath sounds: No stridor. No wheezing, rhonchi or rales.   Abdominal:      General: Abdomen is flat. There is no distension.      Palpations: Abdomen is soft.      Tenderness: There is no abdominal tenderness. There is no guarding.   Musculoskeletal: Normal range of motion.   Skin:     General: Skin is warm.   Neurological:      General: No focal deficit present.      Mental Status: She is alert and oriented to person, place, and time.         Significant Labs:   CBC:   Recent Labs   Lab 09/29/20  0335 09/30/20  0550   WBC 25.22* 20.66*   HGB 8.2* 8.9*   HCT 26.5* 29.8*   * 455*     CMP:   Recent Labs   Lab 09/29/20  0335 09/30/20  0550    142   K 3.6 4.0   * 115*   CO2 16* 15*   GLU 70 67*   BUN 24* 24*   CREATININE 0.8 0.9   CALCIUM 7.3* 7.5*   PROT 4.6* 4.7*   ALBUMIN 1.6* 1.7*   BILITOT 1.4* 1.1*   ALKPHOS 107 125   AST 86* 118*    ALT 75* 92*   ANIONGAP 10 12   EGFRNONAA >60.0 57.7*           Assessment/Plan:      * Hepatic abscess  Sent from GI clinic where she had a f/u for recent hospital stay for ERCP --> elevated TBili, hypotensive, and increased transaminases. Was admitted to the hospital on 09/11 for abdominal pain and was found to have gallstone pancreatitis; got ERCP, CBD dilation and a gallbladder with sludge --> had EUS and ERCP with stone and sludge seen on the cholangiogram --> Sphincterotomy, no stents. Deemed not a surgical candidate for cholecystectomy; ERCP note indicates concern for choledocholithiasis.     - Consult GI 9/26. ERCP was repeated yesterday. Did not show stone or obvious etiology for her elevated T bili and LFT on admission. The entire main bile duct was dilated; the biliary tree was swept and nothing was found; One plastic stent was placed into the common bile duct.  - Abd US was done 9/26. It showed heterogeneous pancreatic echotexture within adjacent 1.7 cm hypoechoic structure with irregular borders;  Dilatation of the extrahepatic common bile duct; intrahepatic duct demonstrates internal debris (possibly representing biliary sludge); 5.6 cm predominantly cystic lesion in the right hepatic lobe with internal debris. DDX included pancreatic mass, abscess, or pseudocyst.   - CT scan following pancreatic protocol done 9/26. It showed complex thick-walled partially septated cystic lesion involving the right hepatic lobe concerning for hepatic abscess; complex multiloculated cystic lesion involving the pancreatic body with additional suspected small cystic lesion/collection in the region of the pancreatic head; Common bile duct stent in place with pneumobilia as well as small volume of air within the mildly distended, thickened gallbladder.    - GI following. GI identified principal problem is hepatic abscess, which needs IR drainage.  - Talked with surgery and ID about source control of her infection. Surgery  "states that since gall bladder and biliary tract is not dilated, it is unlikely cholecystectomy will control source and says she is not a candidate for cholecystectomy. ID states likely source of bacteremia is from the ERCP procedure earlier this month from previous admission.  - IR following. IR drainage for hepatic abscess was done 9/26. A drain was placed. Continues to drain. Drained fluid showed WBC of [address]. Culture grew GNR --> E. Coli. ID will adjust abx based on susceptibilities.    Plan:  - repeat U/S showed improvement. Per ID plan for 4-6 weeks IV antibiotics for hepatic abscess. Awaiting anaerobic culture for final abx recs. PICC placement today. Evaluate drain output to see if ok to pull. Plan to discharge home health pending final abx recs.       UTI (urinary tract infection)  Urinary culture grew enterococcus faecalis susceptible to multiple abx.  - VSS stable. Patient appeared alert, oriented, and responding to commands. Denies dysuria, frequency, urgency.   - WBC is now 25 from 23  - UA showed WBC 20, many bacteria, 2 hyaline cast.  - Repeat UA pending   - ID recommended to continue with zosyn, will deescalate based on susceptibilities  - Since pt is not showing symptoms of dysuria, urgency, frequency, ID thinks enterococcus faecalis is currently colonizing without active infection    Bacteremia  Blood culture grew gram negative cristofer. VS todau: Temp 98, HR 62, RR 19, and BP 93/53; SpO2 94%. Patient appeared alert, oriented, and responding to commands.   - WBC yesterday was 16. Results did not come today.   - Culture grew E.Coli susceptible   -  Hypotension (bp 93/53) was treated with fluids. Currently improved.  - Abscess culture grew E. Coli      Pancreatic abscess  See "hepatic abscess"    Dilated bile duct  See hepatic abscess     Hyperbilirubinemia  See hepatic abscess.       SSS (sick sinus syndrome)  Followed by Dr Colmenares in Cardiology  TSH 0.443  PM for SB  On existing EKG  ECG showed " RBBB    Plan:  - Telemetry  - Also started on atenalol. Held 9/28 due hypotension.  - Apixaban resumed following procedure  - Amiodarone continued.   - Has pacemaker in place.     Current use of long term anticoagulation  See above    RBBB  See above    Hypothyroidism  TSH 0.443  PLAN  - Continue levothyroxine 0.137 mg    Hypertension  Home meds include Atenolol 25mg BID, Diltiazem 180mg daily  BP 92/51 at GI clinic, improved SBP to 120s in ED after fluids  PLAN  - Started on atenolol (held d/t hypotension 9/28)    Pacemaker  DC PPM (2014; [facility]) implanted for symptomatic SB and first degree AVB    Paroxysmal atrial fibrillation  - Telemetry  - Continue Amiodarone  - Started atenolol (held 9/28 d/t hypotension)  - resume eliquis      VTE Risk Mitigation (From admission, onward)         Ordered     apixaban tablet 2.5 mg  2 times daily      09/30/20 1055     Reason for No Pharmacological VTE Prophylaxis  Once     Question:  Reasons:  Answer:  Already adequately anticoagulated on oral Anticoagulants    09/24/20 2041     IP VTE HIGH RISK PATIENT  Once      09/24/20 2041     Place sequential compression device  Until discontinued      09/24/20 2041                Discharge Planning   FRANCESCA: 10/1/2020     Code Status: Full Code   Is the patient medically ready for discharge?: No    Reason for patient still in hospital (select all that apply): Patient trending condition and Laboratory test  Discharge Plan A: Home, Home Health   Discharge Delays: None known at this time              Addi Carrington MD  Department of Hospital Medicine   [facility]

## 2020-09-30 NOTE — PLAN OF CARE
"Pt up in bed VS taken c diff  specimen still pending  Pt used the BSC the urine mixed with the stool  Pt stated "I am able to control it" assist x1 with ADLs  call button within reach no acute distress noted  Problem: Fall Injury Risk  Goal: Absence of Fall and Fall-Related Injury  Outcome: Ongoing, Progressing     Problem: Adult Inpatient Plan of Care  Goal: Plan of Care Review  Outcome: Ongoing, Progressing  Goal: Patient-Specific Goal (Individualization)  Outcome: Ongoing, Progressing  Goal: Absence of Hospital-Acquired Illness or Injury  Outcome: Ongoing, Progressing  Goal: Optimal Comfort and Wellbeing  Outcome: Ongoing, Progressing  Goal: Readiness for Transition of Care  Outcome: Ongoing, Progressing  Goal: Rounds/Family Conference  Outcome: Ongoing, Progressing     "

## 2020-10-01 LAB
ALBUMIN SERPL BCP-MCNC: 1.7 G/DL (ref 3.5–5.2)
ALP SERPL-CCNC: 139 U/L (ref 55–135)
ALT SERPL W/O P-5'-P-CCNC: 83 U/L (ref 10–44)
ANION GAP SERPL CALC-SCNC: 7 MMOL/L (ref 8–16)
ANISOCYTOSIS BLD QL SMEAR: SLIGHT
AST SERPL-CCNC: 99 U/L (ref 10–40)
BACTERIA BLD CULT: NORMAL
BACTERIA BLD CULT: NORMAL
BASOPHILS # BLD AUTO: ABNORMAL K/UL (ref 0–0.2)
BASOPHILS NFR BLD: 0 % (ref 0–1.9)
BILIRUB SERPL-MCNC: 0.8 MG/DL (ref 0.1–1)
BUN SERPL-MCNC: 23 MG/DL (ref 8–23)
CALCIUM SERPL-MCNC: 7.4 MG/DL (ref 8.7–10.5)
CHLORIDE SERPL-SCNC: 115 MMOL/L (ref 95–110)
CO2 SERPL-SCNC: 21 MMOL/L (ref 23–29)
CREAT SERPL-MCNC: 0.9 MG/DL (ref 0.5–1.4)
DIFFERENTIAL METHOD: ABNORMAL
EOSINOPHIL # BLD AUTO: ABNORMAL K/UL (ref 0–0.5)
EOSINOPHIL NFR BLD: 0 % (ref 0–8)
ERYTHROCYTE [DISTWIDTH] IN BLOOD BY AUTOMATED COUNT: 17.2 % (ref 11.5–14.5)
EST. GFR  (AFRICAN AMERICAN): >60 ML/MIN/1.73 M^2
EST. GFR  (NON AFRICAN AMERICAN): 57.7 ML/MIN/1.73 M^2
GLUCOSE SERPL-MCNC: 121 MG/DL (ref 70–110)
HCT VFR BLD AUTO: 27.3 % (ref 37–48.5)
HGB BLD-MCNC: 8.4 G/DL (ref 12–16)
HYPOCHROMIA BLD QL SMEAR: ABNORMAL
IMM GRANULOCYTES # BLD AUTO: ABNORMAL K/UL (ref 0–0.04)
IMM GRANULOCYTES NFR BLD AUTO: ABNORMAL % (ref 0–0.5)
LACTATE SERPL-SCNC: 0.4 MMOL/L (ref 0.5–2.2)
LYMPHOCYTES # BLD AUTO: ABNORMAL K/UL (ref 1–4.8)
LYMPHOCYTES NFR BLD: 4 % (ref 18–48)
MCH RBC QN AUTO: 29.9 PG (ref 27–31)
MCHC RBC AUTO-ENTMCNC: 30.8 G/DL (ref 32–36)
MCV RBC AUTO: 97 FL (ref 82–98)
MONOCYTES # BLD AUTO: ABNORMAL K/UL (ref 0.3–1)
MONOCYTES NFR BLD: 1 % (ref 4–15)
MYELOCYTES NFR BLD MANUAL: 2 %
NEUTROPHILS NFR BLD: 93 % (ref 38–73)
NRBC BLD-RTO: 0 /100 WBC
OVALOCYTES BLD QL SMEAR: ABNORMAL
PLATELET # BLD AUTO: 455 K/UL (ref 150–350)
PLATELET BLD QL SMEAR: ABNORMAL
PMV BLD AUTO: 9.8 FL (ref 9.2–12.9)
POIKILOCYTOSIS BLD QL SMEAR: SLIGHT
POLYCHROMASIA BLD QL SMEAR: ABNORMAL
POTASSIUM SERPL-SCNC: 3.6 MMOL/L (ref 3.5–5.1)
PROT SERPL-MCNC: 4.8 G/DL (ref 6–8.4)
RBC # BLD AUTO: 2.81 M/UL (ref 4–5.4)
SODIUM SERPL-SCNC: 143 MMOL/L (ref 136–145)
WBC # BLD AUTO: 18.8 K/UL (ref 3.9–12.7)

## 2020-10-01 PROCEDURE — 25000003 PHARM REV CODE 250: Performed by: STUDENT IN AN ORGANIZED HEALTH CARE EDUCATION/TRAINING PROGRAM

## 2020-10-01 PROCEDURE — 80053 COMPREHEN METABOLIC PANEL: CPT

## 2020-10-01 PROCEDURE — 85027 COMPLETE CBC AUTOMATED: CPT

## 2020-10-01 PROCEDURE — 25000003 PHARM REV CODE 250: Performed by: NURSE PRACTITIONER

## 2020-10-01 PROCEDURE — 99232 PR SUBSEQUENT HOSPITAL CARE,LEVL II: ICD-10-PCS | Mod: GC,,, | Performed by: HOSPITALIST

## 2020-10-01 PROCEDURE — 63600175 PHARM REV CODE 636 W HCPCS: Performed by: NURSE PRACTITIONER

## 2020-10-01 PROCEDURE — 25000003 PHARM REV CODE 250: Performed by: INTERNAL MEDICINE

## 2020-10-01 PROCEDURE — 36415 COLL VENOUS BLD VENIPUNCTURE: CPT

## 2020-10-01 PROCEDURE — 99232 SBSQ HOSP IP/OBS MODERATE 35: CPT | Mod: GC,,, | Performed by: HOSPITALIST

## 2020-10-01 PROCEDURE — 85007 BL SMEAR W/DIFF WBC COUNT: CPT

## 2020-10-01 PROCEDURE — 83605 ASSAY OF LACTIC ACID: CPT

## 2020-10-01 PROCEDURE — 11000001 HC ACUTE MED/SURG PRIVATE ROOM

## 2020-10-01 PROCEDURE — A4216 STERILE WATER/SALINE, 10 ML: HCPCS | Performed by: INTERNAL MEDICINE

## 2020-10-01 RX ORDER — DICLOFENAC SODIUM 10 MG/G
4 GEL TOPICAL 3 TIMES DAILY
Qty: 200 G | Refills: 0 | Status: SHIPPED | OUTPATIENT
Start: 2020-10-01 | End: 2020-12-12

## 2020-10-01 RX ORDER — METRONIDAZOLE 500 MG/1
500 TABLET ORAL EVERY 8 HOURS
Qty: 111 TABLET | Refills: 0 | Status: SHIPPED | OUTPATIENT
Start: 2020-10-01 | End: 2020-11-02

## 2020-10-01 RX ADMIN — Medication 10 ML: at 08:10

## 2020-10-01 RX ADMIN — APIXABAN 2.5 MG: 2.5 TABLET, FILM COATED ORAL at 08:10

## 2020-10-01 RX ADMIN — ATORVASTATIN CALCIUM 10 MG: 10 TABLET, FILM COATED ORAL at 08:10

## 2020-10-01 RX ADMIN — Medication 10 ML: at 12:10

## 2020-10-01 RX ADMIN — METRONIDAZOLE 500 MG: 500 TABLET ORAL at 05:10

## 2020-10-01 RX ADMIN — DICLOFENAC 4 G: 10 GEL TOPICAL at 08:10

## 2020-10-01 RX ADMIN — DICLOFENAC 4 G: 10 GEL TOPICAL at 05:10

## 2020-10-01 RX ADMIN — LEVOTHYROXINE SODIUM 137 MCG: 25 TABLET ORAL at 05:10

## 2020-10-01 RX ADMIN — Medication 10 ML: at 11:10

## 2020-10-01 RX ADMIN — CEFTRIAXONE 2 G: 2 INJECTION, SOLUTION INTRAVENOUS at 05:10

## 2020-10-01 RX ADMIN — ATENOLOL 50 MG: 50 TABLET ORAL at 06:10

## 2020-10-01 RX ADMIN — Medication 10 ML: at 05:10

## 2020-10-01 RX ADMIN — METRONIDAZOLE 500 MG: 500 TABLET ORAL at 01:10

## 2020-10-01 RX ADMIN — METRONIDAZOLE 500 MG: 500 TABLET ORAL at 09:10

## 2020-10-01 RX ADMIN — AMIODARONE HYDROCHLORIDE 200 MG: 200 TABLET ORAL at 08:10

## 2020-10-01 NOTE — ASSESSMENT & PLAN NOTE
Sent from GI clinic where she had a f/u for recent hospital stay for ERCP --> elevated TBili, hypotensive, and increased transaminases. Was admitted to the hospital on 09/11 for abdominal pain and was found to have gallstone pancreatitis; got ERCP, CBD dilation and a gallbladder with sludge --> had EUS and ERCP with stone and sludge seen on the cholangiogram --> Sphincterotomy, no stents. Deemed not a surgical candidate for cholecystectomy; ERCP note indicates concern for choledocholithiasis.     PLAN    - Consult GI 9/26. ERCP was repeated yesterday. Did not show stone or obvious etiology for her elevated T bili and LFT on admission. The entire main bile duct was dilated; the biliary tree was swept and nothing was found; One plastic stent was placed into the common bile duct.  - Abd US was done 9/26. It showed heterogeneous pancreatic echotexture within adjacent 1.7 cm hypoechoic structure with irregular borders;  Dilatation of the extrahepatic common bile duct; intrahepatic duct demonstrates internal debris (possibly representing biliary sludge); 5.6 cm predominantly cystic lesion in the right hepatic lobe with internal debris. DDX included pancreatic mass, abscess, or pseudocyst.   - CT scan following pancreatic protocol done 9/26. It showed complex thick-walled partially septated cystic lesion involving the right hepatic lobe concerning for hepatic abscess; complex multiloculated cystic lesion involving the pancreatic body with additional suspected small cystic lesion/collection in the region of the pancreatic head; Common bile duct stent in place with pneumobilia as well as small volume of air within the mildly distended, thickened gallbladder.      - GI following. GI identified principal problem is hepatic abscess, which needs IR drainage.    - Talked with surgery and ID about source control of her infection. Surgery states that since gall bladder and biliary tract is not dilated, it is unlikely  cholecystectomy will control source and says she is not a candidate for cholecystectomy. ID states likely source of bacteremia is from the ERCP procedure earlier this month from previous admission.    - IR following. IR drainage for hepatic abscess was done 9/26. A drain was placed. Continues to drain. Drained fluid showed WBC of [address]. Culture grew GNR --> E. Coli. ID will adjust abx based on susceptibilities.    - 9/29 Patient WBC increased slightly and is more fatigued. Per ID, they do not think treatment is necessary for growth seen in urine  - Repeat lactate 0.6  - F/U repeat UA  - F/U C. Diff workup    - PICC line placed 9/30 for receiving IV antibiotics for 4 weeks  - Follow up with ID outpatient with weekly labs  - Switched from zosyn to rocephin and flagyl

## 2020-10-01 NOTE — PROGRESS NOTES
[facility] Hospital Medicine  Progress Note    Patient Name: Gisselle Ortiz   MRN: [id number]  Patient Class: IP- Inpatient   Admission Date: 9/24/2020  Length of Stay: 7 days  Attending Physician: Jaguar Montes MD  Primary Care Provider: Kai Montez MD    [facility] Medicine Team: [facility] HOSP MED 1 Lorenzo Marlow MD    Subjective:     Principal Problem:Bacteremia        HPI:  Gisselle Ortiz is a 87 y.o. female with a medical history of AFib on Eliquis with previous cardioversion, SSS s/p pacemaker, arthritis, HTN, thyroid disease with diastolic dysfunction (EF 55%, 9/2019) who presented to the ED from GI clinic after she was found to have elevated liver enzymes and hypotensive to 92/51.    She presented to the hospital on 09/11 for abdominal pain and was found to have gallstone pancreatitis. She had CBD dilation and a gallbladder with sludge. She underwent EUS and ERCP with stone and sludge seen on the cholangiogram. Sphincterotomy was performed and stone/sludge removed with no stents placed. Per notes, ERCP was concerning for choledocholithiasis.  Surgery was consulted and deemed the patient to be high risk for cholecystectomy. She was seen today in GI clinic for follow-up. She initially recovered well from the pancreatitis and ERCP and was eating prior to discharge. A few days after discharge she started having severe fatigue that progressively worsened to the point where the patient could not walk. She complains of decreased appetite and nausea but only 1 episode of vomiting. She denies any abdominal pain, fevers, diarrhea. She reports that her urine is orange/brown and has a bad odor but denies any dysuria. In the office her, BP was found to be 92/51. She was sent to the ED with BP rechecked at 112/51. She was given one liter NS and admitted for further evaluations.     In the ED, she was found to be afebrile and her BP improved to 130/61 with IVF. HR 63 with NSR and  existing RBBB. Qtc 512. She was breathing 20bpm with no O2 requirement. She c/o some nausea but no abd pain. On exam, she had mild discomfort to deep palpation to RUQ abdomen.    Overview/Hospital Course:  9/26: Pt had ERCP 9/25, which was unremarkable for stone or obvious etiology for her elevated bili and LFT. CT scan with contrast following pancreatic protocol showed hepatic abscess. Blood culture grew gram negative cristofer; urine culture was positive enterococcus faecalis.   Pt had IR intervention for her hepatic abscess 9/26. Around 50 ml was put out via drainage yesterday. Blood culture grew E.Coli susceptible to multiple abx. Urine enterococcus faecalis also shown to have susceptibility to multiple organism. 9/27 drain still with significant output, will continue to monitor. Abscess cultures grew E. Coli. On 9/29 patient's WBC increased slightly to 25 and was overall more fatigued, prompting further workup and ID was notified.    Interval History: naeon. Afebrile, feels slightly more fatigued. Denies worsening abdominal pain. Advancing diet as tolerated.     Review of Systems   Constitutional: Positive for appetite change and fatigue. Negative for activity change and fever.   HENT: Negative for congestion and rhinorrhea.    Eyes: Negative for pain and visual disturbance.   Respiratory: Negative for shortness of breath and wheezing.    Cardiovascular: Negative for chest pain and leg swelling.   Gastrointestinal: Positive for abdominal pain. Negative for abdominal distention and nausea.   Genitourinary: Negative for difficulty urinating and dysuria.   Musculoskeletal: Negative for arthralgias and joint swelling.   Skin: Negative for color change and wound.   Neurological: Negative for dizziness and weakness.     Objective:     Vital Signs (Most Recent):  Temp: 98.1 °F (36.7 °C) (10/01/20 1120)  Pulse: 61 (10/01/20 1120)  Resp: 17 (10/01/20 1120)  BP: (!) 122/57 (10/01/20 1120)  SpO2: (!) 93 % (10/01/20 1120) Vital  Signs (24h Range):  Temp:  [96.6 °F (35.9 °C)-98.5 °F (36.9 °C)] 98.1 °F (36.7 °C)  Pulse:  [60-61] 61  Resp:  [16-24] 17  SpO2:  [93 %-98 %] 93 %  BP: (111-122)/(53-57) 122/57     Weight: 56.6 kg (124 lb 12.5 oz)  Body mass index is 22.1 kg/m².    Intake/Output Summary (Last 24 hours) at 10/1/2020 1358  Last data filed at 9/30/2020 1839  Gross per 24 hour   Intake 240 ml   Output 304 ml   Net -64 ml      Physical Exam  Vitals signs and nursing note reviewed.   Constitutional:       General: She is not in acute distress.     Appearance: She is not ill-appearing.   HENT:      Head: Normocephalic and atraumatic.      Nose: Nose normal.   Eyes:      Extraocular Movements: Extraocular movements intact.      Conjunctiva/sclera: Conjunctivae normal.      Pupils: Pupils are equal, round, and reactive to light.   Neck:      Musculoskeletal: Normal range of motion.   Cardiovascular:      Rate and Rhythm: Normal rate and regular rhythm.      Heart sounds: Normal heart sounds. No murmur. No gallop.    Pulmonary:      Effort: Pulmonary effort is normal. No respiratory distress.      Breath sounds: No stridor. No wheezing, rhonchi or rales.   Abdominal:      General: Abdomen is flat. There is no distension.      Palpations: Abdomen is soft.      Tenderness: There is no abdominal tenderness. There is no guarding.   Musculoskeletal: Normal range of motion.   Skin:     General: Skin is warm.   Neurological:      General: No focal deficit present.      Mental Status: She is alert and oriented to person, place, and time.         Significant Labs:   CBC:   Recent Labs   Lab 09/30/20  0550 10/01/20  0442   WBC 20.66* 18.80*   HGB 8.9* 8.4*   HCT 29.8* 27.3*   * 455*     CMP:   Recent Labs   Lab 09/30/20  0550 10/01/20  0442    143   K 4.0 3.6   * 115*   CO2 15* 21*   GLU 67* 121*   BUN 24* 23   CREATININE 0.9 0.9   CALCIUM 7.5* 7.4*   PROT 4.7* 4.8*   ALBUMIN 1.7* 1.7*   BILITOT 1.1* 0.8   ALKPHOS 125 139*   *  "99*   ALT 92* 83*   ANIONGAP 12 7*   EGFRNONAA 57.7* 57.7*           Assessment/Plan:      * Bacteremia  Blood culture grew gram negative cristofer. VS todau: Temp 98, HR 62, RR 19, and BP 93/53; SpO2 94%. Patient appeared alert, oriented, and responding to commands.   - WBC yesterday was 16. Results did not come today.   - Culture grew E.Coli susceptible to multiple abx.   - ID is following. ID recommended to continue with zosyn.   - ID thinks source of bacteremia is from ERCP from the previous admission.     -  Hypotension (bp 93/53) was treated with fluids. Currently improved.  - Abscess culture grew E. Coli        UTI (urinary tract infection)  Urinary culture grew enterococcus faecalis susceptible to multiple abx.  - VSS stable. Patient appeared alert, oriented, and responding to commands. Denies dysuria, frequency, urgency.   - WBC is now 25 from 23  - UA showed WBC 20, many bacteria, 2 hyaline cast.  - Repeat UA pending   - ID recommended to continue with zosyn, will deescalate based on susceptibilities  - Since pt is not showing symptoms of dysuria, urgency, frequency, ID thinks enterococcus faecalis is currently colonizing without active infection    Pancreatic abscess  See "hepatic abscess"    Hepatic abscess  Sent from GI clinic where she had a f/u for recent hospital stay for ERCP --> elevated TBili, hypotensive, and increased transaminases. Was admitted to the hospital on 09/11 for abdominal pain and was found to have gallstone pancreatitis; got ERCP, CBD dilation and a gallbladder with sludge --> had EUS and ERCP with stone and sludge seen on the cholangiogram --> Sphincterotomy, no stents. Deemed not a surgical candidate for cholecystectomy; ERCP note indicates concern for choledocholithiasis.     PLAN    - Consult GI 9/26. ERCP was repeated yesterday. Did not show stone or obvious etiology for her elevated T bili and LFT on admission. The entire main bile duct was dilated; the biliary tree was swept and " nothing was found; One plastic stent was placed into the common bile duct.  - Abd US was done 9/26. It showed heterogeneous pancreatic echotexture within adjacent 1.7 cm hypoechoic structure with irregular borders;  Dilatation of the extrahepatic common bile duct; intrahepatic duct demonstrates internal debris (possibly representing biliary sludge); 5.6 cm predominantly cystic lesion in the right hepatic lobe with internal debris. DDX included pancreatic mass, abscess, or pseudocyst.   - CT scan following pancreatic protocol done 9/26. It showed complex thick-walled partially septated cystic lesion involving the right hepatic lobe concerning for hepatic abscess; complex multiloculated cystic lesion involving the pancreatic body with additional suspected small cystic lesion/collection in the region of the pancreatic head; Common bile duct stent in place with pneumobilia as well as small volume of air within the mildly distended, thickened gallbladder.      - GI following. GI identified principal problem is hepatic abscess, which needs IR drainage.    - Talked with surgery and ID about source control of her infection. Surgery states that since gall bladder and biliary tract is not dilated, it is unlikely cholecystectomy will control source and says she is not a candidate for cholecystectomy. ID states likely source of bacteremia is from the ERCP procedure earlier this month from previous admission.    - IR following. IR drainage for hepatic abscess was done 9/26. A drain was placed. Continues to drain. [place]rained [place] [address]. Culture grew GNR --> E. Coli. ID will adjust abx based on susceptibilities.    - 9/29 Patient WBC increased slightly and is more fatigued. Per ID, they do not think treatment is necessary for growth seen in urine  - Repeat lactate 0.6  - F/U repeat UA  - F/U C. Diff workup    - PICC line placed 9/30 for receiving IV antibiotics for 4 weeks  - Follow up with ID outpatient with weekly  labs  - Switched from zosyn to rocephin and flagyl    Dilated bile duct  See hepatic abscess     Hyperbilirubinemia  See hepatic abscess.       SSS (sick sinus syndrome)  Followed by Dr Colmenares in Cardiology  TSH 0.443  PM for SB  On existing EKG  ECG showed RBBB    Plan:  - Telemetry  - Also started on atenolol. Held 9/28 due hypotension.  - Apixaban stopped for GI consult.  - Amiodarone continued.  - Has pacemaker in place.    Current use of long term anticoagulation  On Apixaban 2.5mg po BID (age > 80, Wt < 60kg); last dose 9/23/20 pm  PLAN  - resume        RBBB  On existing EKG  Has pacemaker, ECG showed RBBB;  PLAN  - Telemetry  - Also started on atenalol.  - amiodarone continued.       Hypothyroidism  TSH 0.443  PLAN  - Continue levothyroxine 0.137 mg    Hypertension  Home meds include Atenolol 25mg BID, Diltiazem 180mg daily  BP 92/51 at GI clinic, improved SBP to 120s in ED after fluids  PLAN  - Started on atenolol (held d/t hypotension 9/28), resumed 9/30    Pacemaker  DC PPM (2014; [facility]) implanted for symptomatic SB and first degree AVB      Paroxysmal atrial fibrillation  Home meds include Amiodarone 200mg daily, Atenolol 25mg (held 9/27 d/t hypotension), Diltiazem 180mg daily, Apixaban 2.5mg BID (last dose 9/23 pm); held this admission.   BP 92/51 in GI clinic, improved with 1L fluid in ED  Last Cards Note (Darrian 7/17/20)  Gen change 1/2019. AF/AFL noted on f/u. Propafenone increased. Still had AF with RVR after that. Changed to flecainide, planned for DCCV; at some point changed to amio. Unclear timing of these changes.  TSH found to be abnormal, but T4 normal.  In past, following DCCV, she felt great -- until AF recurred.  On amio now, with great effect.  Plan:  - Telemetry  - Continue Amiodarone  - Started atenolol (held 9/28 d/t hypotension), resumed 9/30.  - Apixiban resumed 9/30    VTE Risk Mitigation (From admission, onward)         Ordered     apixaban tablet 2.5 mg  2 times daily      09/30/20 9738      Reason for No Pharmacological VTE Prophylaxis  Once     Question:  Reasons:  Answer:  Already adequately anticoagulated on oral Anticoagulants    09/24/20 2041     IP VTE HIGH RISK PATIENT  Once      09/24/20 2041     Place sequential compression device  Until discontinued      09/24/20 2041                Discharge Planning   FRANCESCA: 10/1/2020     Code Status: Full Code   Is the patient medically ready for discharge?: No    Reason for patient still in hospital (select all that apply): Patient trending condition  Discharge Plan A: Home, Home Health   Discharge Delays: None known at this time          Lorenzo Marlow MD  Department of Hospital Medicine   [facility]

## 2020-10-01 NOTE — PLAN OF CARE
10/01/20 0845   Post-Acute Status   Post-Acute Authorization Home Health   Home Health Status Awaiting Internal Medical Clearance

## 2020-10-01 NOTE — ASSESSMENT & PLAN NOTE
Home meds include Atenolol 25mg BID, Diltiazem 180mg daily  BP 92/51 at GI clinic, improved SBP to 120s in ED after fluids  PLAN  - Started on atenolol (held d/t hypotension 9/28), resumed 9/30

## 2020-10-01 NOTE — MEDICAL/APP STUDENT
[facility] Med Student Progress Note      Gisselle Ortiz  YOB: 1933  Medical Record Number:  [id number]  Attending Physician:  Jaguar Montes MD   Date of Admission: 9/24/2020       Hospital Day:  7  Current Principal Problem:  Bacteremia      History     Cc: weak    HPI  This is a 87 year old female with gallstone pancreatitis two weeks ago s/p ERCP,  Afib, SSS s/p pacemaker and hypothyroidism who presented from GI clinic with elevated liver enzymes and hypotension (92/51).      She also has HTN and arthritis.      The patient has been recovering from gallstone pancreatitis s/p ERCP done on 9/14/20 and had been doing well until a few days after discharge when she became severely fatigued to the point the patient could not walk. She has also had decreased appetite and nausea but only 1 episode of vomiting. She denies abdominal pain, fevers, diarrhea. Further, she said her urine is orange/brown and is malodorous but denies dysuria.      During her recent hospitalization for gallstone pancreatitis she underwent an ERCP and EUS with sphincterotomy and the stone and sludge was removed. No stents were placed. Given her age and co-morbidities cholecystectomy was not performed.                                                                                                                                             ED Course:  Vitals on arrival: Temp 98, Pulse 62, RR 18, /51, SpO2 97%. Patient was nauseous and had RUQ discomfort to deep palpation. She received 1 liter of NS. BP improved to 130/61 after IVF.      Hospital Course:  Patient has had ERCP which was unremarkable for stone or obvious etiology for her elevated bilirubin or transaminases.  CT scan showed a complex thick-walled partially septated cystic lesion involving the right hepatic lobe suspicious for hepatic abscess. The hepatic abscess was drained by IR on 9/26.      The patient no longer has pain around  the drain site. Drain output has decreased over the last 24 hours and is at the goal of 5-10 ml per day.     The cultures of her abscess grew E. Coli sensitive to zosyn. ID de-escalated her to Ceftriaxone IV to cover E. coli and Falgyl PO for emperic anerobic coverage.       Due to recent episodes of diarrhea an EIA for C. Diff toxin was ordered but the stool sample was not collected.         Today, she is feeling weak but overall doing well. She had solid foods for dinner last night and had not issues tolerating the advancement of her diet. No N/V, abdominal cramping or pain. The patient still had two episodes of diarrhea last night.       Medications  Scheduled Meds:   amiodarone  200 mg Oral Daily    apixaban  2.5 mg Oral BID    atenoloL  50 mg Oral QAM    atorvastatin  10 mg Oral Daily    cefTRIAXone (ROCEPHIN) IVPB  2 g Intravenous Q24H    diclofenac sodium  4 g Topical (Top) TID    levothyroxine  137 mcg Oral Before breakfast    metroNIDAZOLE  500 mg Oral Q8H    sodium chloride 0.9%  10 mL Intravenous Q6H     Continuous Infusions:  PRN Meds:.acetaminophen, dextrose 50%, dextrose 50%, glucagon (human recombinant), glucose, glucose, HYDROcodone-acetaminophen, sodium chloride 0.9%, Flushing PICC Protocol **AND** sodium chloride 0.9% **AND** sodium chloride 0.9%      PSFH:  Please see admission note and assessment and plan below.      ROS   Admits Denies   Constitutional Weak Chills, diaphoresis   Eyes  Visual changes   ENMT  Dysphagia, Epistaxis, nasal congestion   Cardiovascular  Chest pain, palpitations, edema   Respiratory  Cough, dyspnea, wheezing   Gastrointestinal diarrhea Nausea, vomiting, constipation, anorexia.     Genitourinary  Dysuria, incontinence   Musculoskeletal  Myalgias, joint pain, joint swelling   Integumentary  Rash, inflammation, burning   Neruo-Psychiatric  Anxiety, insomnia.  Changes in speech, strength, sensation.     Endocrine  Polyuria, polydipsia   Hematologic  Abnormal  bruising, bleeding   Immunologic  Inflammation, pain at IV sites.  Pruritis.         Physical Examination     General:  Laying in bed in no acute distress    Vital Signs  Vitals  Temp: 98.5 °F (36.9 °C)  Temp src: Oral  Pulse: 60  Heart Rate Source: Monitor  Resp: 16  SpO2: 95 %  Pulse Oximetry Type: Intermittent  O2 Device (Oxygen Therapy): room air  BP: (!) 116/55  MAP (mmHg): 79  BP Location: Right arm  BP Method: cNIBP  Patient Position: Lying          24 Hour VS Range    Temp:  [96.6 °F (35.9 °C)-98.5 °F (36.9 °C)]   Pulse:  [60]   Resp:  [16-24]   BP: (111-118)/(53-55)   SpO2:  [94 %-98 %]     Intake/Output Summary (Last 24 hours) at 10/1/2020 0831  Last data filed at 9/30/2020 1839  Gross per 24 hour   Intake 720 ml   Output 304 ml   Net 416 ml         Head: NCAT  Eyes: conjunctivae and lids normal, no scleral icterus.  ENMT:  no gingival bleeding, normal oral mucosa without pallor or cyanosis.   Neck:  JVP normal.  Trachea non-displaced.     Chest:  Normal respiratory effort.  Rales in right lower lung base, left lung base clear.  No wheezes or rhonchi.  Heart:  Normal rate and rythem, S1 S2 normal quality.  No murmurs rubs or gallops.  Peripheral pulses 2+.  Abdomen: Drain site CDI, tenderness to palpation around drain site. Drain putting out serosanguinous discharge.  Non-distended, normal bowel sounds, non-tender.    Extremities: Bilateral 1+ lower extremity pitting edema. Normal capillary refill.    Skin:  Warm and dry.  No cyanosis or pallor.  No ulcers, stasis.  IV sites without tenderness or inflammation.    Neurological / Psychiatric:  Oriented to person, time, and place.  No facial asymmetry, drift.  Fluent without dysarthria.  Mood euthymic, affect normal.              Data       Recent Labs   Lab 09/26/20  0602 09/28/20  0637 09/29/20  0335 09/30/20  0550 10/01/20  0442   WBC 16.75* 23.52* 25.22* 20.66* 18.80*   HGB 8.4* 8.2* 8.2* 8.9* 8.4*   HCT 27.4* 26.2* 26.5* 29.8* 27.3*   * 457* 486*  455* 455*        Recent Labs   Lab 09/26/20  0602 09/28/20  0637 09/29/20  0335 09/30/20  0550 10/01/20  0442    139 140 142 143   K 4.2 4.0 3.6 4.0 3.6    111* 114* 115* 115*   CO2 23 20* 16* 15* 21*   BUN 25* 29* 24* 24* 23   CREATININE 0.9 1.1 0.8 0.9 0.9   ANIONGAP 10 8 10 12 7*   CALCIUM 7.3* 7.5* 7.3* 7.5* 7.4*        Recent Labs   Lab 09/26/20  0602 09/28/20  0637 09/29/20  0335 09/30/20  0550 10/01/20  0442   PROT 4.9* 4.7* 4.6* 4.7* 4.8*   ALBUMIN 1.8* 1.7* 1.6* 1.7* 1.7*   BILITOT 2.0* 1.9* 1.4* 1.1* 0.8   ALKPHOS 96 93 107 125 139*   AST 59* 82* 86* 118* 99*   ALT 93* 75* 75* 92* 83*           Lactate (Lactic Acid) (mmol/L)   Date Value   09/29/2020 0.6   09/28/2020 0.5      Cultures  Hepatic Abscess:  E. Coli   Sensitive to Zosyn    Blood  E. Coli (9/24/20)  NGTD on repeat cultures    Urine  E. Faecalis      Imaging  ERCP (9/25/20)  The entire main bile duct was dilated.   The biliary tree was swept and nothing was found.    One plastic stent was placed into the common bile duct      CT (9/25/20)  Complex thick-walled partially septated cystic lesion involving the right hepatic lobe concerning for hepatic abscess     Complex multiloculated cystic lesion involving the pancreatic body with additional suspected small cystic lesion/collection in the region of the pancreatic head. These are nonspecific and could relate to evolving pancreatic pseudocyst/acute collections, however underlying cystic neoplastic process is not excluded.       Common bile duct stent in place with pneumobilia as well as small volume of air within the mildly distended, thickened gallbladder.       US 9/30/20  1. percutaneous hepatic drainage catheter in place.  No definite large residual hepatic collection identified within the limitation of this ultrasound exam.  Further evaluation with additional cross-sectional imaging as warranted.  2. Common bile duct stent and findings suggestive of a small component of pneumobilia,  seen on prior exams.  3. Complex cystic pancreatic lesion  4. Cholelithiasis.  No definite sonographic evidence to suggest acute cholecystitis at this time.  5. Right pleural effusion.        Assessment & Plan     * Hepatic abscess  Acute. improving. PT has been afebrile and hemodynamically stable     Complex thick-walled partially septated cystic lesion involving the right hepatic lobe  35 ml Purulent bilious material removed from abscess.   2 ml of Serosanguinous drain output  last 12 hrs  Source likely secondary to ERCP performed earlier this month  Culture grew E. Coli and is sensitive to zosyn     Cholecystectomy not recommended per gen surg        Plan  - Drain output is at goal and will have drain removed today    - repeat lactate as she is still fatigued today   - ID recs:   - D/C zosyn and started ceftriaxone 2 grams IV q 24 hours    -metronidazole 500 mg orally q 8 hours empirically for anaerobic coverage   -4-6 weeks antibiotics for hepatic abscess (estimated end date 10/26/20)   -Weekly cbc, cmp, crp, esr   -  follow up at 2 weeks, then 4 weeks with imaging    -[place] 5 PRN for pain      Diarrhea  New onset, stable  2 episode in last 24 hrs  Not associated with abdominal pain or fever      DDx: antibiotic AE vs C. Difficile      Plan  -C. Difficile EIA ordered  - Start on probiotics     UTI (urinary tract infection)  Acute, Stable  No dysuria, frequency or urgency   Lactate WNL     E. Faecalis on culture       Plan:     -ID recs:   - zosyn will cover E. Faecalis    -Ordered UA with culture as she has still been fatigued           Bacteremia  Acute, improving       E. Coli on culture and sensitive to Zosyn  Source likely hepatic abscess given culture results showing E. Coli   NGTD from repeat blood cultures     Plan  -ID recs:   -de-escalate to ceftriaxone            SSS (sick sinus syndrome)  Chronic Stable  Followed by Dr Colmenares in Cardiology  Plan:   Telemetry  Atenalol.  Apixaban restarted  amiodarone  continued  Has pacemaker in place.         Paroxysmal atrial fibrillation  Chronic Stable  Telemetry  Continue Amiodarone  atenolol   Apixaban          Disposition  Patient has improved but still feels weak and states she is not ready to go home as she does not think she can do ADLs. She does feel that with a few more solid meals she will have her strength back.  Awaiting PT/OT evaluation before discharging patient with home health.  Patient will get weekly labs and follow up with ID in 2 and 4 weeks post discharge.              Sha Perez  MS3

## 2020-10-01 NOTE — ASSESSMENT & PLAN NOTE
Followed by Dr Colmenares in Cardiology  TSH 0.443  PM for SB  On existing EKG  ECG showed RBBB    Plan:  - Telemetry  - Also started on atenolol. Held 9/28 due hypotension.  - Apixaban stopped for GI consult.  - Amiodarone continued.  - Has pacemaker in place.

## 2020-10-01 NOTE — SUBJECTIVE & OBJECTIVE
Interval History: naeon. Afebrile, feels slightly more fatigued. Denies worsening abdominal pain. Advancing diet as tolerated.     Review of Systems   Constitutional: Positive for appetite change and fatigue. Negative for activity change and fever.   HENT: Negative for congestion and rhinorrhea.    Eyes: Negative for pain and visual disturbance.   Respiratory: Negative for shortness of breath and wheezing.    Cardiovascular: Negative for chest pain and leg swelling.   Gastrointestinal: Positive for abdominal pain. Negative for abdominal distention and nausea.   Genitourinary: Negative for difficulty urinating and dysuria.   Musculoskeletal: Negative for arthralgias and joint swelling.   Skin: Negative for color change and wound.   Neurological: Negative for dizziness and weakness.     Objective:     Vital Signs (Most Recent):  Temp: 98.1 °F (36.7 °C) (10/01/20 1120)  Pulse: 61 (10/01/20 1120)  Resp: 17 (10/01/20 1120)  BP: (!) 122/57 (10/01/20 1120)  SpO2: (!) 93 % (10/01/20 1120) Vital Signs (24h Range):  Temp:  [96.6 °F (35.9 °C)-98.5 °F (36.9 °C)] 98.1 °F (36.7 °C)  Pulse:  [60-61] 61  Resp:  [16-24] 17  SpO2:  [93 %-98 %] 93 %  BP: (111-122)/(53-57) 122/57     Weight: 56.6 kg (124 lb 12.5 oz)  Body mass index is 22.1 kg/m².    Intake/Output Summary (Last 24 hours) at 10/1/2020 1358  Last data filed at 9/30/2020 1839  Gross per 24 hour   Intake 240 ml   Output 304 ml   Net -64 ml      Physical Exam  Vitals signs and nursing note reviewed.   Constitutional:       General: She is not in acute distress.     Appearance: She is not ill-appearing.   HENT:      Head: Normocephalic and atraumatic.      Nose: Nose normal.   Eyes:      Extraocular Movements: Extraocular movements intact.      Conjunctiva/sclera: Conjunctivae normal.      Pupils: Pupils are equal, round, and reactive to light.   Neck:      Musculoskeletal: Normal range of motion.   Cardiovascular:      Rate and Rhythm: Normal rate and regular rhythm.       Heart sounds: Normal heart sounds. No murmur. No gallop.    Pulmonary:      Effort: Pulmonary effort is normal. No respiratory distress.      Breath sounds: No stridor. No wheezing, rhonchi or rales.   Abdominal:      General: Abdomen is flat. There is no distension.      Palpations: Abdomen is soft.      Tenderness: There is no abdominal tenderness. There is no guarding.   Musculoskeletal: Normal range of motion.   Skin:     General: Skin is warm.   Neurological:      General: No focal deficit present.      Mental Status: She is alert and oriented to person, place, and time.         Significant Labs:   CBC:   Recent Labs   Lab 09/30/20  0550 10/01/20  0442   WBC 20.66* 18.80*   HGB 8.9* 8.4*   HCT 29.8* 27.3*   * 455*     CMP:   Recent Labs   Lab 09/30/20  0550 10/01/20  0442    143   K 4.0 3.6   * 115*   CO2 15* 21*   GLU 67* 121*   BUN 24* 23   CREATININE 0.9 0.9   CALCIUM 7.5* 7.4*   PROT 4.7* 4.8*   ALBUMIN 1.7* 1.7*   BILITOT 1.1* 0.8   ALKPHOS 125 139*   * 99*   ALT 92* 83*   ANIONGAP 12 7*   EGFRNONAA 57.7* 57.7*

## 2020-10-01 NOTE — PLAN OF CARE
Problem: Fall Injury Risk  Goal: Absence of Fall and Fall-Related Injury  Outcome: Ongoing, Progressing     Problem: Adult Inpatient Plan of Care  Goal: Plan of Care Review  Outcome: Ongoing, Progressing  Goal: Patient-Specific Goal (Individualization)  Outcome: Ongoing, Progressing  Goal: Absence of Hospital-Acquired Illness or Injury  Outcome: Ongoing, Progressing  Goal: Optimal Comfort and Wellbeing  Outcome: Ongoing, Progressing  Goal: Readiness for Transition of Care  Outcome: Ongoing, Progressing  Goal: Rounds/Family Conference  Outcome: Ongoing, Progressing     Problem: Infection  Goal: Infection Symptom Resolution  Outcome: Ongoing, Progressing     Problem: Skin Injury Risk Increased  Goal: Skin Health and Integrity  Outcome: Ongoing, Progressing     AAOx4. VS stable. No PRN medications administered. Bed low, wheels locked, side rails up x2, call light within reach. Will continue to monitor.

## 2020-10-01 NOTE — ASSESSMENT & PLAN NOTE
On existing EKG  Has pacemaker, ECG showed RBBB;  PLAN  - Telemetry  - Also started on atenalol.  - amiodarone continued.

## 2020-10-01 NOTE — PHYSICIAN QUERY
PT Name: Gisselle Ortiz  MR #: [id number]     Documentation Clarification    Tiffani Stallings RN, CCDS; [email or web address]    This form is a permanent document in the medical record.     Query Date: October 1, 2020    By submitting this query, we are merely seeking further clarification of documentation.   Please utilize your independent clinical judgment when addressing the question(s) below.    The Medical Record reflects the following:    Supporting Clinical Findings Location in Medical Record   Will obtain blood cultures rule out sepsis.   ED MD   · Blood cultures on admit positive with E.coli and urine cultures +E.faecalis.    · She underwent ERCP 9/25        s/p IR drainage of hepatic abscess 9/26.         Abscess culture showing E Coli - likely source of bacteremia.     1.  Discontinued IV zosyn and started ceftriaxone 2 grams IV q 24 hours        for E coli bacteremia and hepatic abscess  2.  Started metronidazole 500 mg orally q 8 hours empirically for        anaerobic coverage.   3.  Anticipate 4-6 weeks antibiotics for hepatic abscess (or until abscess        resolves).         Would plan for 4 weeks of IV ceftriaxone and oral flagyl from date of       IR drainage (Estimated end date 10/26) with repeat CT       abdomen/pelvis with contrast at 4 weeks     ID PN 9/30   source of bacteremia is from ERCP from the previous admission    Hosp Pn 9/29      SOFA 9/24 - 30   wbc 20.38 - 16.75 - 25.22   Plts 479 - 417   Bands 2.0 - 3.0 - 6.0   Cr 1.2 - 0.8   Bili,T 3.3 - 1.1   Lactate: 0.5 - 0.6   BP: 104/51 - 138/77;  90/55 - 105/55 on 9/27  174/80 x1 on 9/26   HR: 60 - 82   RR: 16 - 20   Sats: 93 - 98% on RA      Lab              VS Flow Sheet       Bacteremia  Blood culture grew gram negative cristofer. VS todau: Temp 98, HR 62, RR 19, and BP 93/53; SpO2 94%. Patient appeared alert, oriented, and responding to commands.   - WBC yesterday was 16. Results did not come today.   - Culture grew E.Coli susceptible   -   "Hypotension (bp 93/53) was treated with fluids. Currently improved.  - Abscess culture grew E. Coli  -Hepatic Abscess IR drainage done 9/26.  Culture grew GNR --> E. Coli.    Hosp PN 9/30   UTI E Faecalis   Bacteremia gm neg cristofer   Hosp PN 9/27                                                                           Provider, please provide diagnosis or diagnoses associated with above clinical findings.                     Please clarify "Sepsis"  - - possible 2 part response.     [   ] Sepsis ruled out   [  x ] Sepsis ruled in - source is bacteremia is from ERCP from the previous admission      [   ] Sepsis ruled in - specify source and organism:             - source: _______________            - organism:  _____________     [   ] Other (please specify): ____________   [  ] Clinically undetermined                                                                                                                   "

## 2020-10-01 NOTE — ASSESSMENT & PLAN NOTE
Home meds include Amiodarone 200mg daily, Atenolol 25mg (held 9/27 d/t hypotension), Diltiazem 180mg daily, Apixaban 2.5mg BID (last dose 9/23 pm); held this admission.   BP 92/51 in GI clinic, improved with 1L fluid in ED  Last Cards Note (Darrian 7/17/20)  Gen change 1/2019. AF/AFL noted on f/u. Propafenone increased. Still had AF with RVR after that. Changed to flecainide, planned for DCCV; at some point changed to amio. Unclear timing of these changes.  TSH found to be abnormal, but T4 normal.  In past, following DCCV, she felt great -- until AF recurred.  On amio now, with great effect.  Plan:  - Telemetry  - Continue Amiodarone  - Started atenolol (held 9/28 d/t hypotension), resumed 9/30.  - Apixiban resumed 9/30

## 2020-10-01 NOTE — TREATMENT PLAN
AES Treatment Plan    Gisselle Ortiz is a 87 y.o. female admitted to hospital 9/24/2020 (Hospital Day: 8) due to Bacteremia.     Interval History  S/p IR drain placement, minimal output in drain this AM.  ID arranging home Abx plans    Objective  Temp:  [96.6 °F (35.9 °C)-98.5 °F (36.9 °C)] 98.5 °F (36.9 °C) (10/01 0752)  Pulse:  [60] 60 (10/01 0752)  BP: (111-118)/(53-55) 116/55 (10/01 0752)  Resp:  [16-24] 16 (10/01 0752)  SpO2:  [94 %-98 %] 95 % (10/01 0752)  Laboratory  Lab Results   Component Value Date    WBC 18.80 (H) 10/01/2020    HGB 8.4 (L) 10/01/2020    HCT 27.3 (L) 10/01/2020    MCV 97 10/01/2020     (H) 10/01/2020       Lab Results   Component Value Date    ALT 83 (H) 10/01/2020    AST 99 (H) 10/01/2020    ALKPHOS 139 (H) 10/01/2020    BILITOT 0.8 10/01/2020       Recommendations  - Repeat ERCP in 2 months to remove stent.   - Continue antibiotics. Per ID   -continue to monitor drain output, further management per IR  -continue to trend daily CMP, CBC, INR  - We will continue to follow.    Thank you for involving us in the care of Gisselle Ortiz. Please call with any additional questions, concerns or changes in the patient's clinical status.    Antolin Ortega MD  Gastroenterology Fellow PGY IV   [facility]

## 2020-10-01 NOTE — PLAN OF CARE
AAO, free if falls/injuries, safety measures maintained. VSS. No needs noted at this time.  Problem: Adult Inpatient Plan of Care  Goal: Plan of Care Review  Outcome: Ongoing, Progressing  Goal: Patient-Specific Goal (Individualization)  Outcome: Ongoing, Progressing  Goal: Absence of Hospital-Acquired Illness or Injury  Outcome: Ongoing, Progressing  Goal: Optimal Comfort and Wellbeing  Outcome: Ongoing, Progressing  Goal: Readiness for Transition of Care  Outcome: Ongoing, Progressing     Problem: Infection  Goal: Infection Symptom Resolution  Outcome: Ongoing, Progressing     Problem: Skin Injury Risk Increased  Goal: Skin Health and Integrity  Outcome: Ongoing, Progressing

## 2020-10-01 NOTE — PLAN OF CARE
[facility]    HOME HEALTH ORDERS  FACE TO FACE ENCOUNTER    Patient Name: Gisselle Ortiz  YOB: 1933    PCP: Kai Montez MD   PCP Address: [address] / [place] [address]  PCP Phone Number: [phone]  PCP Fax: [phone]    Encounter Date: 10/01/2020    Admit to Home Health    Diagnoses:  Active Hospital Problems    Diagnosis  POA    *Bacteremia [R78.81]  Yes    Hepatic abscess [K75.0]  Yes    Pancreatic abscess [K85.90]  Yes    UTI (urinary tract infection) [N39.0]  No    Dilated bile duct [K83.8]  Yes    Hyperbilirubinemia [E80.6]  Yes    SSS (sick sinus syndrome) [I49.5]  Yes    Current use of long term anticoagulation [Z79.01]  Not Applicable    Paroxysmal atrial fibrillation [I48.0]  Yes    Hypertension [I10]  Yes    Pacemaker [Z95.0]  Yes      Resolved Hospital Problems   No resolved problems to display.       Future Appointments   Date Time Provider Department Center   10/2/2020  2:05 PM [facility] [id number] [facility] RAD IR [facility]   10/15/2020  2:00 PM Kai Cedillo PA-C [facility] ID [address]   10/23/2020 12:00 PM [facility] OIC-CT2 500 LB LIMIT [facility] IC [facility]   10/26/2020  2:00 PM MATTHEW Carpenter, ANP [facility] ID [address]   12/11/2020 11:30 AM Kai Montez MD [facility] OCC           I have seen and examined this patient face to face today. My clinical findings that support the need for the home health skilled services and home bound status are the following:  Weakness/numbness causing balance and gait disturbance due to Infection making it taxing to leave home.    Allergies:  Review of patient's allergies indicates:   Allergen Reactions    Ciprofloxacin Nausea And Vomiting       Diet: regular diet    Activities: activity as tolerated    Nursing:   SN to complete comprehensive assessment including routine vital signs. Instruct on disease process and s/s of complications to report to MD. Review/verify  medication list sent home with the patient at time of discharge  and instruct patient/caregiver as needed. Frequency may be adjusted depending on start of care date.    Notify MD if SBP > 160 or < 90; DBP > 90 or < 50; HR > 120 or < 50; Temp > 101; Other:         CONSULTS:    Physical Therapy to evaluate and treat. Evaluate for home safety and equipment needs; Establish/upgrade home exercise program. Perform / instruct on therapeutic exercises, gait training, transfer training, and Range of Motion.  Occupational Therapy to evaluate and treat. Evaluate home environment for safety and equipment needs. Perform/Instruct on transfers, ADL training, ROM, and therapeutic exercises.    MISCELLANEOUS CARE:  N/A    WOUND CARE ORDERS  n/a    Weekly cbc, cmp, crp, esr and fax results to ID, ria Mares NP, at fax [phone]. (Pager [phone], spectra [phone], office [phone])    ID follow up at 2 weeks, then 4 weeks with imaging.  ID will make the appointments.     Medications: Review discharge medications with patient and family and provide education.      Current Discharge Medication List      START taking these medications    Details   ceftriaxone sodium (CEFTRIAXONE 2 G/50 ML D5W, READY TO MIX,) Inject 50 mLs (2 g total) into the vein once daily. For 4-6 weeks (End 10/24 or 11/7/2020)      diclofenac sodium (VOLTAREN) 1 % Gel Apply 4 g topically 3 (three) times daily. Apply to right side pain  Qty: 200 g, Refills: 0      metroNIDAZOLE (FLAGYL) 500 MG tablet Take 1 tablet (500 mg total) by mouth every 8 (eight) hours. For 4-6 weeks (End 10/24 or 11/7/2020)  Qty: 111 tablet, Refills: 0         CONTINUE these medications which have NOT CHANGED    Details   amiodarone (PACERONE) 200 MG Tab TAKE 1 TABLET ONCE DAILY . START TAKING ONLY 1 TABLET EVERY DAY ON 4-19-19  Qty: 90 tablet, Refills: 4    Associated Diagnoses: Paroxysmal atrial fibrillation      apixaban (ELIQUIS) 2.5 mg Tab Take 1 tablet (2.5 mg total) by mouth 2  (two) times daily.  Qty: 180 tablet, Refills: 3      aspirin (ECOTRIN) 81 MG EC tablet Take 81 mg by mouth once daily.      atenoloL (TENORMIN) 25 MG tablet TAKE 2 TABLETS EVERY MORNING AND 1 TABLET EVERY EVENING  Qty: 270 tablet, Refills: 3    Associated Diagnoses: Paroxysmal atrial fibrillation      atorvastatin (LIPITOR) 10 MG tablet TAKE 1 TABLET DAILY  Qty: 90 tablet, Refills: 3      BIOTIN ORAL Take 1 capsule by mouth once daily.      diltiaZEM (CARDIZEM CD) 180 MG 24 hr capsule Take 1 capsule (180 mg total) by mouth once daily.  Qty: 90 capsule, Refills: 3    Comments: .      !! ergocalciferol, vitamin D2, (VITAMIN D ORAL) Take 1 capsule by mouth once daily.      eszopiclone (LUNESTA) 2 MG Tab Take 2 mg by mouth every evening.      levothyroxine (SYNTHROID) 137 MCG Tab tablet Take 1 tablet (137 mcg total) by mouth once daily.  Qty: 90 tablet, Refills: 3    Associated Diagnoses: Hypothyroidism due to acquired atrophy of thyroid      spironolactone (ALDACTONE) 25 MG tablet Take 25 mg by mouth 2 (two) times daily.     Comments: .      !! ergocalciferol (VITAMIN D2) 50,000 unit Cap Take 1 capsule (50,000 Units total) by mouth every 7 days.       !! - Potential duplicate medications found. Please discuss with provider.          I certify that this patient is confined to her home and needs intermittent skilled nursing care, speech therapy and occupational therapy.

## 2020-10-02 ENCOUNTER — HOSPITAL ENCOUNTER (INPATIENT)
Facility: HOSPITAL | Age: 85
LOS: 31 days | Discharge: HOME HEALTH | DRG: 871 | End: 2020-11-02
Attending: HOSPITALIST | Admitting: HOSPITALIST
Payer: MEDICARE

## 2020-10-02 VITALS
RESPIRATION RATE: 16 BRPM | OXYGEN SATURATION: 98 % | HEART RATE: 60 BPM | BODY MASS INDEX: 22.11 KG/M2 | HEIGHT: 63 IN | TEMPERATURE: 98 F | DIASTOLIC BLOOD PRESSURE: 56 MMHG | SYSTOLIC BLOOD PRESSURE: 111 MMHG | WEIGHT: 124.75 LBS

## 2020-10-02 DIAGNOSIS — K75.0 HEPATIC ABSCESS: ICD-10-CM

## 2020-10-02 DIAGNOSIS — A41.51 E. COLI SEPTICEMIA: Primary | ICD-10-CM

## 2020-10-02 DIAGNOSIS — R10.9 ABDOMINAL PAIN: ICD-10-CM

## 2020-10-02 DIAGNOSIS — K75.0 HEPATIC ABSCESS: Primary | ICD-10-CM

## 2020-10-02 DIAGNOSIS — R78.81 BACTEREMIA: ICD-10-CM

## 2020-10-02 LAB
ALBUMIN SERPL BCP-MCNC: 1.6 G/DL (ref 3.5–5.2)
ALBUMIN SERPL BCP-MCNC: 1.6 G/DL (ref 3.5–5.2)
ALP SERPL-CCNC: 127 U/L (ref 55–135)
ALP SERPL-CCNC: 127 U/L (ref 55–135)
ALT SERPL W/O P-5'-P-CCNC: 71 U/L (ref 10–44)
ALT SERPL W/O P-5'-P-CCNC: 71 U/L (ref 10–44)
ANION GAP SERPL CALC-SCNC: 5 MMOL/L (ref 8–16)
ANION GAP SERPL CALC-SCNC: 5 MMOL/L (ref 8–16)
ANISOCYTOSIS BLD QL SMEAR: ABNORMAL
ANISOCYTOSIS BLD QL SMEAR: ABNORMAL
AST SERPL-CCNC: 66 U/L (ref 10–40)
AST SERPL-CCNC: 66 U/L (ref 10–40)
BACTERIA SPEC ANAEROBE CULT: NORMAL
BASOPHILS # BLD AUTO: ABNORMAL K/UL (ref 0–0.2)
BASOPHILS # BLD AUTO: ABNORMAL K/UL (ref 0–0.2)
BASOPHILS NFR BLD: 0 % (ref 0–1.9)
BASOPHILS NFR BLD: 0 % (ref 0–1.9)
BILIRUB SERPL-MCNC: 0.7 MG/DL (ref 0.1–1)
BILIRUB SERPL-MCNC: 0.7 MG/DL (ref 0.1–1)
BUN SERPL-MCNC: 20 MG/DL (ref 8–23)
BUN SERPL-MCNC: 20 MG/DL (ref 8–23)
CALCIUM SERPL-MCNC: 7.4 MG/DL (ref 8.7–10.5)
CALCIUM SERPL-MCNC: 7.4 MG/DL (ref 8.7–10.5)
CHLORIDE SERPL-SCNC: 114 MMOL/L (ref 95–110)
CHLORIDE SERPL-SCNC: 114 MMOL/L (ref 95–110)
CO2 SERPL-SCNC: 23 MMOL/L (ref 23–29)
CO2 SERPL-SCNC: 23 MMOL/L (ref 23–29)
CREAT SERPL-MCNC: 0.8 MG/DL (ref 0.5–1.4)
CREAT SERPL-MCNC: 0.8 MG/DL (ref 0.5–1.4)
DIFFERENTIAL METHOD: ABNORMAL
DIFFERENTIAL METHOD: ABNORMAL
EOSINOPHIL # BLD AUTO: ABNORMAL K/UL (ref 0–0.5)
EOSINOPHIL # BLD AUTO: ABNORMAL K/UL (ref 0–0.5)
EOSINOPHIL NFR BLD: 1 % (ref 0–8)
EOSINOPHIL NFR BLD: 1 % (ref 0–8)
ERYTHROCYTE [DISTWIDTH] IN BLOOD BY AUTOMATED COUNT: 17.3 % (ref 11.5–14.5)
ERYTHROCYTE [DISTWIDTH] IN BLOOD BY AUTOMATED COUNT: 17.3 % (ref 11.5–14.5)
EST. GFR  (AFRICAN AMERICAN): >60 ML/MIN/1.73 M^2
EST. GFR  (AFRICAN AMERICAN): >60 ML/MIN/1.73 M^2
EST. GFR  (NON AFRICAN AMERICAN): >60 ML/MIN/1.73 M^2
EST. GFR  (NON AFRICAN AMERICAN): >60 ML/MIN/1.73 M^2
GLUCOSE SERPL-MCNC: 117 MG/DL (ref 70–110)
GLUCOSE SERPL-MCNC: 117 MG/DL (ref 70–110)
HCT VFR BLD AUTO: 26.6 % (ref 37–48.5)
HCT VFR BLD AUTO: 26.6 % (ref 37–48.5)
HGB BLD-MCNC: 8.3 G/DL (ref 12–16)
HGB BLD-MCNC: 8.3 G/DL (ref 12–16)
HYPOCHROMIA BLD QL SMEAR: ABNORMAL
HYPOCHROMIA BLD QL SMEAR: ABNORMAL
IMM GRANULOCYTES # BLD AUTO: ABNORMAL K/UL (ref 0–0.04)
IMM GRANULOCYTES # BLD AUTO: ABNORMAL K/UL (ref 0–0.04)
IMM GRANULOCYTES NFR BLD AUTO: ABNORMAL % (ref 0–0.5)
IMM GRANULOCYTES NFR BLD AUTO: ABNORMAL % (ref 0–0.5)
LYMPHOCYTES # BLD AUTO: ABNORMAL K/UL (ref 1–4.8)
LYMPHOCYTES # BLD AUTO: ABNORMAL K/UL (ref 1–4.8)
LYMPHOCYTES NFR BLD: 3 % (ref 18–48)
LYMPHOCYTES NFR BLD: 3 % (ref 18–48)
MCH RBC QN AUTO: 29.9 PG (ref 27–31)
MCH RBC QN AUTO: 29.9 PG (ref 27–31)
MCHC RBC AUTO-ENTMCNC: 31.2 G/DL (ref 32–36)
MCHC RBC AUTO-ENTMCNC: 31.2 G/DL (ref 32–36)
MCV RBC AUTO: 96 FL (ref 82–98)
MCV RBC AUTO: 96 FL (ref 82–98)
METAMYELOCYTES NFR BLD MANUAL: 2 %
METAMYELOCYTES NFR BLD MANUAL: 2 %
MONOCYTES # BLD AUTO: ABNORMAL K/UL (ref 0.3–1)
MONOCYTES # BLD AUTO: ABNORMAL K/UL (ref 0.3–1)
MONOCYTES NFR BLD: 1 % (ref 4–15)
MONOCYTES NFR BLD: 1 % (ref 4–15)
NEUTROPHILS # BLD AUTO: ABNORMAL K/UL (ref 1.8–7.7)
NEUTROPHILS # BLD AUTO: ABNORMAL K/UL (ref 1.8–7.7)
NEUTROPHILS NFR BLD: 92 % (ref 38–73)
NEUTROPHILS NFR BLD: 92 % (ref 38–73)
NEUTS BAND NFR BLD MANUAL: 1 %
NEUTS BAND NFR BLD MANUAL: 1 %
NRBC BLD-RTO: 0 /100 WBC
NRBC BLD-RTO: 0 /100 WBC
PLATELET # BLD AUTO: 408 K/UL (ref 150–350)
PLATELET # BLD AUTO: 408 K/UL (ref 150–350)
PLATELET BLD QL SMEAR: ABNORMAL
PLATELET BLD QL SMEAR: ABNORMAL
PMV BLD AUTO: 9.7 FL (ref 9.2–12.9)
PMV BLD AUTO: 9.7 FL (ref 9.2–12.9)
POLYCHROMASIA BLD QL SMEAR: ABNORMAL
POLYCHROMASIA BLD QL SMEAR: ABNORMAL
POTASSIUM SERPL-SCNC: 3.3 MMOL/L (ref 3.5–5.1)
POTASSIUM SERPL-SCNC: 3.3 MMOL/L (ref 3.5–5.1)
PROT SERPL-MCNC: 4.5 G/DL (ref 6–8.4)
PROT SERPL-MCNC: 4.5 G/DL (ref 6–8.4)
RBC # BLD AUTO: 2.78 M/UL (ref 4–5.4)
RBC # BLD AUTO: 2.78 M/UL (ref 4–5.4)
SARS-COV-2 RNA RESP QL NAA+PROBE: NOT DETECTED
SODIUM SERPL-SCNC: 142 MMOL/L (ref 136–145)
SODIUM SERPL-SCNC: 142 MMOL/L (ref 136–145)
WBC # BLD AUTO: 16.79 K/UL (ref 3.9–12.7)
WBC # BLD AUTO: 16.79 K/UL (ref 3.9–12.7)

## 2020-10-02 PROCEDURE — 25500020 PHARM REV CODE 255: Performed by: HOSPITALIST

## 2020-10-02 PROCEDURE — 97116 GAIT TRAINING THERAPY: CPT

## 2020-10-02 PROCEDURE — 63600175 PHARM REV CODE 636 W HCPCS: Performed by: NURSE PRACTITIONER

## 2020-10-02 PROCEDURE — U0003 INFECTIOUS AGENT DETECTION BY NUCLEIC ACID (DNA OR RNA); SEVERE ACUTE RESPIRATORY SYNDROME CORONAVIRUS 2 (SARS-COV-2) (CORONAVIRUS DISEASE [COVID-19]), AMPLIFIED PROBE TECHNIQUE, MAKING USE OF HIGH THROUGHPUT TECHNOLOGIES AS DESCRIBED BY CMS-2020-01-R: HCPCS

## 2020-10-02 PROCEDURE — 11000004 HC SNF PRIVATE

## 2020-10-02 PROCEDURE — 97535 SELF CARE MNGMENT TRAINING: CPT

## 2020-10-02 PROCEDURE — 85007 BL SMEAR W/DIFF WBC COUNT: CPT

## 2020-10-02 PROCEDURE — 86580 TB INTRADERMAL TEST: CPT | Performed by: HOSPITALIST

## 2020-10-02 PROCEDURE — 25000003 PHARM REV CODE 250: Performed by: STUDENT IN AN ORGANIZED HEALTH CARE EDUCATION/TRAINING PROGRAM

## 2020-10-02 PROCEDURE — 97530 THERAPEUTIC ACTIVITIES: CPT

## 2020-10-02 PROCEDURE — 25000003 PHARM REV CODE 250: Performed by: INTERNAL MEDICINE

## 2020-10-02 PROCEDURE — 99238 PR HOSPITAL DISCHARGE DAY,<30 MIN: ICD-10-PCS | Mod: GC,,, | Performed by: HOSPITALIST

## 2020-10-02 PROCEDURE — 99238 HOSP IP/OBS DSCHRG MGMT 30/<: CPT | Mod: GC,,, | Performed by: HOSPITALIST

## 2020-10-02 PROCEDURE — A4216 STERILE WATER/SALINE, 10 ML: HCPCS | Performed by: INTERNAL MEDICINE

## 2020-10-02 PROCEDURE — 97802 MEDICAL NUTRITION INDIV IN: CPT

## 2020-10-02 PROCEDURE — 85027 COMPLETE CBC AUTOMATED: CPT

## 2020-10-02 PROCEDURE — 30200315 PPD INTRADERMAL TEST REV CODE 302: Performed by: HOSPITALIST

## 2020-10-02 PROCEDURE — 25000003 PHARM REV CODE 250: Performed by: NURSE PRACTITIONER

## 2020-10-02 PROCEDURE — 80053 COMPREHEN METABOLIC PANEL: CPT

## 2020-10-02 RX ORDER — CALCIUM CARBONATE 200(500)MG
500 TABLET,CHEWABLE ORAL 2 TIMES DAILY PRN
Status: CANCELLED | OUTPATIENT
Start: 2020-10-02

## 2020-10-02 RX ORDER — POTASSIUM CHLORIDE 20 MEQ/1
40 TABLET, EXTENDED RELEASE ORAL ONCE
Status: DISCONTINUED | OUTPATIENT
Start: 2020-10-02 | End: 2020-10-02 | Stop reason: HOSPADM

## 2020-10-02 RX ORDER — TALC
6 POWDER (GRAM) TOPICAL NIGHTLY PRN
Status: CANCELLED | OUTPATIENT
Start: 2020-10-02

## 2020-10-02 RX ORDER — ATENOLOL 50 MG/1
50 TABLET ORAL DAILY
Status: DISCONTINUED | OUTPATIENT
Start: 2020-10-03 | End: 2020-11-02 | Stop reason: HOSPADM

## 2020-10-02 RX ORDER — AMOXICILLIN 250 MG
1 CAPSULE ORAL 2 TIMES DAILY
Status: CANCELLED | OUTPATIENT
Start: 2020-10-02

## 2020-10-02 RX ORDER — DICLOFENAC SODIUM 10 MG/G
4 GEL TOPICAL 3 TIMES DAILY
Status: DISCONTINUED | OUTPATIENT
Start: 2020-10-03 | End: 2020-11-02 | Stop reason: HOSPADM

## 2020-10-02 RX ORDER — POTASSIUM CHLORIDE 20 MEQ/1
40 TABLET, EXTENDED RELEASE ORAL ONCE
Status: COMPLETED | OUTPATIENT
Start: 2020-10-02 | End: 2020-10-02

## 2020-10-02 RX ORDER — AMOXICILLIN 250 MG
1 CAPSULE ORAL 2 TIMES DAILY PRN
Status: DISCONTINUED | OUTPATIENT
Start: 2020-10-03 | End: 2020-11-02 | Stop reason: HOSPADM

## 2020-10-02 RX ORDER — ACETAMINOPHEN 325 MG/1
650 TABLET ORAL EVERY 6 HOURS PRN
Status: CANCELLED | OUTPATIENT
Start: 2020-10-02

## 2020-10-02 RX ORDER — AMIODARONE HYDROCHLORIDE 200 MG/1
200 TABLET ORAL DAILY
Status: DISCONTINUED | OUTPATIENT
Start: 2020-10-03 | End: 2020-11-02 | Stop reason: HOSPADM

## 2020-10-02 RX ORDER — ATORVASTATIN CALCIUM 10 MG/1
10 TABLET, FILM COATED ORAL DAILY
Status: DISCONTINUED | OUTPATIENT
Start: 2020-10-03 | End: 2020-11-02 | Stop reason: HOSPADM

## 2020-10-02 RX ORDER — METRONIDAZOLE 500 MG/1
500 TABLET ORAL EVERY 8 HOURS
Status: DISCONTINUED | OUTPATIENT
Start: 2020-10-03 | End: 2020-11-02 | Stop reason: HOSPADM

## 2020-10-02 RX ORDER — CALCIUM CARBONATE 200(500)MG
500 TABLET,CHEWABLE ORAL 2 TIMES DAILY PRN
Status: DISCONTINUED | OUTPATIENT
Start: 2020-10-03 | End: 2020-11-02 | Stop reason: HOSPADM

## 2020-10-02 RX ORDER — ACETAMINOPHEN 325 MG/1
650 TABLET ORAL EVERY 6 HOURS PRN
Status: DISCONTINUED | OUTPATIENT
Start: 2020-10-03 | End: 2020-10-05

## 2020-10-02 RX ORDER — TALC
6 POWDER (GRAM) TOPICAL NIGHTLY PRN
Status: DISCONTINUED | OUTPATIENT
Start: 2020-10-03 | End: 2020-11-02 | Stop reason: HOSPADM

## 2020-10-02 RX ADMIN — POTASSIUM CHLORIDE 40 MEQ: 1500 TABLET, EXTENDED RELEASE ORAL at 09:10

## 2020-10-02 RX ADMIN — Medication 10 ML: at 05:10

## 2020-10-02 RX ADMIN — DICLOFENAC 4 G: 10 GEL TOPICAL at 09:10

## 2020-10-02 RX ADMIN — AMIODARONE HYDROCHLORIDE 200 MG: 200 TABLET ORAL at 09:10

## 2020-10-02 RX ADMIN — Medication 10 ML: at 06:10

## 2020-10-02 RX ADMIN — Medication 1 CAPSULE: at 09:10

## 2020-10-02 RX ADMIN — APIXABAN 2.5 MG: 2.5 TABLET, FILM COATED ORAL at 09:10

## 2020-10-02 RX ADMIN — Medication 10 ML: at 12:10

## 2020-10-02 RX ADMIN — METRONIDAZOLE 500 MG: 500 TABLET ORAL at 02:10

## 2020-10-02 RX ADMIN — ATORVASTATIN CALCIUM 10 MG: 10 TABLET, FILM COATED ORAL at 09:10

## 2020-10-02 RX ADMIN — METRONIDAZOLE 500 MG: 500 TABLET ORAL at 06:10

## 2020-10-02 RX ADMIN — CEFTRIAXONE 2 G: 2 INJECTION, SOLUTION INTRAVENOUS at 05:10

## 2020-10-02 RX ADMIN — ATENOLOL 50 MG: 50 TABLET ORAL at 06:10

## 2020-10-02 RX ADMIN — IOHEXOL 12 ML: 300 INJECTION, SOLUTION INTRAVENOUS at 08:10

## 2020-10-02 RX ADMIN — TUBERCULIN PURIFIED PROTEIN DERIVATIVE 5 UNITS: 5 INJECTION, SOLUTION INTRADERMAL at 03:10

## 2020-10-02 RX ADMIN — DICLOFENAC 4 G: 10 GEL TOPICAL at 02:10

## 2020-10-02 RX ADMIN — LEVOTHYROXINE SODIUM 137 MCG: 25 TABLET ORAL at 06:10

## 2020-10-02 NOTE — PT/OT/SLP PROGRESS
Physical Therapy Treatment    Patient Name:  Gisselle Ortiz   MRN:  [id number]    Recommendations:     Discharge Recommendations:  home health PT   Discharge Equipment Recommendations: none   Barriers to discharge: None    Assessment:     Gisselle Ortiz is a 87 y.o. female admitted with a medical diagnosis of Bacteremia.  She presents with the following impairments/functional limitations:  weakness, impaired balance, impaired functional mobilty, impaired endurance, impaired self care skills, gait instability, decreased lower extremity function. Pt tolerated PT session well and was eager to participate.     Rehab Prognosis: Good; patient would benefit from acute skilled PT services to address these deficits and reach maximum level of function.    Recent Surgery: Procedure(s) (LRB):  ERCP (ENDOSCOPIC RETROGRADE CHOLANGIOPANCREATOGRAPHY) (N/A) 7 Days Post-Op    Plan:     During this hospitalization, patient to be seen 3 x/week to address the identified rehab impairments via gait training, therapeutic activities, therapeutic exercises, neuromuscular re-education and progress toward the following goals:    · Plan of Care Expires:  10/24/20    Subjective     Chief Complaint: Feel overall weak and get tired easily  Patient/Family Comments/goals: Wish I could do more  Pain/Comfort:  · Pain Rating 1: 0/10      Objective:     Communicated with NSG prior to session.  Patient found HOB elevated with SIMONE drain, peripheral IV(VISI monitor) upon PT entry to room.     General Precautions: Standard, aspiration, fall   Orthopedic Precautions:N/A   Braces: N/A     Functional Mobility:  · Bed Mobility:     · Supine to Sit: stand by assistance and minimum assistance  · Sit to Supine: minimum assistance  · Transfers:     · Sit to Stand:  contact guard assistance with rolling walker from EOB and bedside comode;   · Toilet Transfer: CGA with RW for stand to sit on standard toilet and for step transfer to align, Mod A for sit to  stand.   · Gait: CGA amb with RW 15 ft X 2 with slow cadenceand step through gait pattern    AM-PAC 6 CLICK MOBILITY  Turning over in bed (including adjusting bedclothes, sheets and blankets)?: 3  Sitting down on and standing up from a chair with arms (e.g., wheelchair, bedside commode, etc.): 3  Moving from lying on back to sitting on the side of the bed?: 3  Moving to and from a bed to a chair (including a wheelchair)?: 3  Need to walk in hospital room?: 3  Climbing 3-5 steps with a railing?: 2  Basic Mobility Total Score: 17       Therapeutic Activities and Exercises:  · Standing activities: SBA for standing balance at sink with unilateral UE support on counter with pt needing rest breaks in sitting after a few minutes while performing grooming tasks  · SBA for static standing with RW          Patient left HOB elevated with all lines intact and call button in reach..    GOALS:   Multidisciplinary Problems     Physical Therapy Goals        Problem: Physical Therapy Goal    Goal Priority Disciplines Outcome Goal Variances Interventions   Physical Therapy Goal     PT, PT/OT Ongoing, Progressing     Description: Goals to be met by: 10/24/2020    Patient will increase functional independence with mobility by performin. Sit to stand transfer with Stand-by Assistance  2. Gait  x 30 feet with Stand-by Assistance using LRAD  3. Lower extremity exercise program x15 reps, with independence                      Time Tracking:     PT Received On: 10/02/20  PT Start Time: 1012     PT Stop Time: 1037  PT Total Time (min): 25 min     Billable Minutes: Gait Training 15 and Therapeutic Activity 10    Treatment Type: Treatment  PT/PTA: PT     PTA Visit Number: 0     Flor Avila, PT  10/02/2020

## 2020-10-02 NOTE — PLAN OF CARE
Clinical liaison noted change in dispo from home to SNF.  Will continue to follow at [facility] if this is indeed her destination in the event she requires services at d/c.      [facility]  So Harrison RN Clinical Liaison  Cell: [phone]  Office: [phone]  Fax:  [phone]

## 2020-10-02 NOTE — PROCEDURES
"  Pre Op Diagnosis: hepatic abscess  Post Op Diagnosis: Same    Procedure: abscessogram    Procedure performed by: Feng    Written Informed Consent Obtained: Yes  Specimen Removed: NO  Estimated Blood Loss: Minimal    Findings:   Contrast was injected through indwelling catheter. There appeared to be a small amount of residual abscess collection as well as filling of the biliary ducts. Recommend capping the drain and repeat CT in one week. If patients collection is stable or reduced in size, we can see repeat abscessogram and remove the catheter    Patient tolerated procedure well.    Pranav Toney MD ([place])  Interventional Radiology  [phone]        "

## 2020-10-02 NOTE — MEDICAL/APP STUDENT
[facility] Med Student Progress Note      Gisselle Ortiz  YOB: 1933  Medical Record Number:  [id number]  Attending Physician:  Jaguar Montes MD   Date of Admission: 9/24/2020       Hospital Day:  8  Current Principal Problem:  Bacteremia      History     Cc: Fatigued    HPI  This is a 87 year old female with gallstone pancreatitis two weeks ago s/p ERCP,  Afib, SSS s/p pacemaker and hypothyroidism who presented from GI clinic with elevated liver enzymes and hypotension (92/51).      She also has HTN and arthritis.      The patient has been recovering from gallstone pancreatitis s/p ERCP done on 9/14/20 and had been doing well until a few days after discharge when she became severely fatigued to the point the patient could not walk. She has also had decreased appetite and nausea but only 1 episode of vomiting. She denies abdominal pain, fevers, diarrhea. Further, she said her urine is orange/brown and is malodorous but denies dysuria.      During her recent hospitalization for gallstone pancreatitis she underwent an ERCP and EUS with sphincterotomy and the stone and sludge was removed. No stents were placed. Given her age and co-morbidities cholecystectomy was not performed.                                                                                                                                             ED Course:  Vitals on arrival: Temp 98, Pulse 62, RR 18, /51, SpO2 97%. Patient was nauseous and had RUQ discomfort to deep palpation. She received 1 liter of NS. BP improved to 130/61 after IVF.      Hospital Course:  Patient has had ERCP which was unremarkable for stone or obvious etiology for her elevated bilirubin or transaminases.  CT scan showed a complex thick-walled partially septated cystic lesion involving the right hepatic lobe suspicious for hepatic abscess. The hepatic abscess was drained by IR on 9/26.      The patient went to have drain  "pulled today (10/2/20) under fluoro with contrast through the indwelling catheter and there was a small amount of residual abscess collection as well as filling of the biliary ducts. IR recommends capping drain and repeating CT in one week and if stable can repeat abscessogram and remove the catheter.      The cultures of her abscess grew E. Coli sensitive to zosyn. ID de-escalated her to Ceftriaxone IV to cover E. coli and Falgyl PO for emperic anerobic coverage.       Due to recent episodes of diarrhea an EIA for C. Diff toxin was ordered but the stool sample was not collected.      Today, she is still feeling fatigued but doesn't think she feels worse than yesterday.  Her appetite has decreased again. "don't feel like eating"  No N/V with meals. Patient again had two episodes of diarrhea yesterday. She also noticed swelling in her arms and legs today.       Medications  Scheduled Meds:   amiodarone  200 mg Oral Daily    apixaban  2.5 mg Oral BID    atenoloL  50 mg Oral QAM    atorvastatin  10 mg Oral Daily    cefTRIAXone (ROCEPHIN) IVPB  2 g Intravenous Q24H    diclofenac sodium  4 g Topical (Top) TID    Lactobacillus rhamnosus GG  1 capsule Oral Daily    levothyroxine  137 mcg Oral Before breakfast    metroNIDAZOLE  500 mg Oral Q8H    potassium chloride  40 mEq Oral Once    sodium chloride 0.9%  10 mL Intravenous Q6H     Continuous Infusions:  PRN Meds:.acetaminophen, dextrose 50%, dextrose 50%, glucagon (human recombinant), glucose, glucose, HYDROcodone-acetaminophen, sodium chloride 0.9%, Flushing PICC Protocol **AND** sodium chloride 0.9% **AND** sodium chloride 0.9%      PSFH:  Please see admission note and assessment and plan below.      ROS   Admits Denies   Constitutional Fatigued Chills, diaphoresis   Eyes  Visual changes   ENMT  Dysphagia, Epistaxis, nasal congestion,   Cardiovascular Edema Chest pain, palpitations   Respiratory Orthopnea Cough, wheezing   Gastrointestinal diarrhea Nausea, " vomiting, constipation, anorexia.     Genitourinary  Dysuria, incontinence   Musculoskeletal  Myalgias, joint pain, joint swelling   Integumentary  Rash, inflammation, burning   Neruo-Psychiatric  Anxiety, insomnia.  Changes in speech, sensation.     Endocrine  Polyuria, polydipsia    Hematologic  Abnormal bruising, bleeding   Immunologic  Inflammation, pain at IV sites.  Pruritis.         Physical Examination     General:  Laying in bed asleep. No apparent respirator distress.    Vital Signs  Vitals  Temp: 97.4 °F (36.3 °C)  Temp src: Oral  Pulse: 61  Heart Rate Source: Monitor  Resp: 16  SpO2: 99 %  Pulse Oximetry Type: Intermittent  O2 Device (Oxygen Therapy): room air  BP: (!) 123/56  MAP (mmHg): 81  BP Location: Left arm  BP Method: cNIBP  Patient Position: Lying          24 Hour VS Range    Temp:  [97.3 °F (36.3 °C)-98.1 °F (36.7 °C)]   Pulse:  [60-61]   Resp:  [16-23]   BP: (122-135)/(56-68)   SpO2:  [93 %-99 %]     Intake/Output Summary (Last 24 hours) at 10/2/2020 1029  Last data filed at 10/1/2020 1800  Gross per 24 hour   Intake 480 ml   Output 0 ml   Net 480 ml       Head: NCAT  Eyes: conjunctivae and lids normal, no scleral icterus.  ENMT:  no gingival bleeding, normal oral mucosa without pallor or cyanosis.   Neck:  JVP normal.  Trachea non-displaced.     Chest:  Normal respiratory effort.  Rales in right lower lung base, left lung base clear.  No wheezes or rhonchi.  Heart:  Normal rate and rythem, S1 S2 normal quality.  No murmurs rubs or gallops.  Peripheral pulses 2+.  Abdomen: Drain site CDI, no tenderness to palpation around drain site. Drain is capped.  Non-distended, normal bowel sounds, non-tender.    Extremities: Bilateral 1+ lower extremity pitting edema. Edema present in upper limbs as well. Normal capillary refill.   Skin:  Warm and dry.  No cyanosis or pallor.  No ulcers, stasis.  IV sites without tenderness or inflammation.    Neurological / Psychiatric:  Oriented to person, time, and  place.  No facial asymmetry, drift.  Fluent without dysarthria.  Mood euthymic, affect normal.                Data       Recent Labs   Lab 09/28/20  0637 09/29/20 0335 09/30/20  0550 10/01/20  0442 10/02/20  0615   WBC 23.52* 25.22* 20.66* 18.80* 16.79*  16.79*   HGB 8.2* 8.2* 8.9* 8.4* 8.3*  8.3*   HCT 26.2* 26.5* 29.8* 27.3* 26.6*  26.6*   * 486* 455* 455* 408*  408*        Recent Labs   Lab 09/28/20 0637 09/29/20 0335 09/30/20  0550 10/01/20  0442 10/02/20  0615    140 142 143 142  142   K 4.0 3.6 4.0 3.6 3.3*  3.3*   * 114* 115* 115* 114*  114*   CO2 20* 16* 15* 21* 23  23   BUN 29* 24* 24* 23 20  20   CREATININE 1.1 0.8 0.9 0.9 0.8  0.8   ANIONGAP 8 10 12 7* 5*  5*   CALCIUM 7.5* 7.3* 7.5* 7.4* 7.4*  7.4*        Recent Labs   Lab 09/28/20 0637 09/29/20 0335 09/30/20  0550 10/01/20  0442 10/02/20  0615   PROT 4.7* 4.6* 4.7* 4.8* 4.5*  4.5*   ALBUMIN 1.7* 1.6* 1.7* 1.7* 1.6*  1.6*   BILITOT 1.9* 1.4* 1.1* 0.8 0.7  0.7   ALKPHOS 93 107 125 139* 127  127   AST 82* 86* 118* 99* 66*  66*   ALT 75* 75* 92* 83* 71*  71*        Cultures  Hepatic Abscess:  E. Coli   Sensitive to Zosyn     Blood  E. Coli (9/24/20)  NGTD on repeat cultures     Urine  E. Faecalis      Imaging  ERCP (9/25/20)  The entire main bile duct was dilated.   The biliary tree was swept and nothing was found.    One plastic stent was placed into the common bile duct        US 9/30/20  1. percutaneous hepatic drainage catheter in place.  No definite large residual hepatic collection identified within the limitation of this ultrasound exam.  Further evaluation with additional cross-sectional imaging as warranted.  2. Common bile duct stent and findings suggestive of a small component of pneumobilia, seen on prior exams.  3. Complex cystic pancreatic lesion  4. Cholelithiasis.  No definite sonographic evidence to suggest acute cholecystitis at this time.  5. Right pleural effusion.       CT (10/1/20)  Interval  placement of pigtail drainage catheter in right hepatic lobe abscess with interval decrease in the size of the collection.  No new hepatic collections identified.     Stable internal biliary stent with postprocedural pneumobilia, air within the gallbladder, and a persistent mixed air/fluid collection along the anterior margin of the common bile duct which could reflect additional intra-abdominal abscess or distended loop of bowel.     Complex multiloculated pancreatic body lesion, not significantly changed compared to prior study, though suboptimally evaluated in the absence of intravenous contrast.  Finding could reflect additional complex infectious collection, pancreatic pseudocyst, or cystic pancreatic malignancy.  Continued follow-up is recommended.     Enlarging bilateral pleural effusions and consolidation within the expanded portion of the right lower lobe.  Developing pneumonia not excluded.  Clinical correlation recommended.    Abscessogram 10/2/20  There appeared to be a small amount of residual abscess collection as well as filling of the biliary ducts. Recommend capping the drain and repeat CT in one week.        Assessment & Plan     * Hepatic abscess  Acute. improving. PT has been afebrile and hemodynamically stable      Complex thick-walled partially septated cystic lesion involving the right hepatic lobe  Source likely secondary to ERCP performed earlier this month  35 ml Purulent bilious material removed from abscess.   Culture grew E. Coli    No Serosanguinous drain output in last 12 hrs. IR did not remove drain today for fear of causing biloma.       Cholecystectomy not recommended per gen surg        Plan   - IR recs:   -cap drain and repeat CT in one week   - If collection stable repeat abscessogram and remove catheter    - ID recs:              - D/C zosyn and started ceftriaxone 2 grams IV q 24 hours               -metronidazole 500 mg orally q 8 hours empirically for anaerobic coverage               -4-6 weeks antibiotics for hepatic abscess (estimated end date 10/26/20)              -Weekly cbc, cmp, crp, esr              -  follow up at 2 weeks, then 4 weeks with imaging     -[place] 5 PRN for pain      Edema  New onset.   She noticed today that her arms and legs felt swollen. This is unusual for her.   She has had minimal food since being admitted  JVP not elevated  Albumin has been trending down 1.6 today  CT identified bilateral pleural effusions and a RLL consolidation      DDx: hypoalbuminemia secondary to malnutrition vs Protein losing enteropathy vs CHF    Plan:  -CXR  -Continue to advance diet as tolerated      Diarrhea  New onset, stable  2 episode in last 24 hrs  Not associated with abdominal pain or fever      DDx: antibiotic AE vs C. Difficile      Plan  - Start on probiotics     UTI (urinary tract infection)  Acute, Stable    E. Faecalis on culture done at admission  No dysuria, frequency or urgency   Lactate WNL   At treatment goal per ID     Plan:    - UA with culture as she has still been fatigued           Bacteremia  Acute, improving       E. Coli on culture at admission  Source likely hepatic abscess given culture results showing E. Coli   NGTD from repeat blood cultures     Plan  -ID recs:              -de-escalate to ceftriaxone            SSS (sick sinus syndrome)  Chronic Stable  Followed by Dr Colmenares in Cardiology  Plan:   Telemetry  Atenalol.  Apixaban restarted  amiodarone continued  Has pacemaker in place.          Paroxysmal atrial fibrillation  Chronic Stable  Telemetry  Continue Amiodarone  atenolol   Apixaban          Disposition  Patient  still feels weak and states she is not ready to go home as she does not think she can do ADLs. She feels that eating more may help her feel stronger. Waiting on PT evaluation to see if PT will be discharged with home health. Patient will get weekly labs and follow up with ID in 2 and 4 weeks post discharge               Sha Perez  MS3

## 2020-10-02 NOTE — PLAN OF CARE
10/02/20 0848   Post-Acute Status   Post-Acute Authorization Home Health   Home Health Status Referrals Sent

## 2020-10-02 NOTE — PLAN OF CARE
Problem: Occupational Therapy Goal  Goal: Occupational Therapy Goal  Description: Goals to be met by: 10/9/2020     Patient will increase functional independence with ADLs by performing:    UE Dressing with Set-up Assistance.  LE Dressing with Set-up Assistance.  Grooming while standing at sink with Modified [place].  Toileting from toilet with Modified [place] for hygiene and clothing management.   Supine to sit with Modified [place].  Stand pivot transfers with Supervision.  Toilet transfer to toilet with Supervision.    Outcome: Ongoing, Progressing   Patient's goals are appropriate.   SHAHIDA Taylor  10/2/2020

## 2020-10-02 NOTE — PROGRESS NOTES
Attempted to call report to nurse Ontiveros at [facility], refused to take report. Charge nurse aware. Will pass on to night shift RN.

## 2020-10-02 NOTE — PLAN OF CARE
Report given to Vini JOSE. Patient in ROCU at this time and will be picked up by transport and taken back to her floor.

## 2020-10-02 NOTE — PROGRESS NOTES
"[facility]  Adult Nutrition  Progress Note    SUMMARY       Recommendations  1. Add boost Plus BID.   2. Continue current Regular diet.   3. Encourage good PO intake.  Goals: Pt to consume > 50% of meals by RD follow up.  Nutrition Goal Status: new  Communication of RD Recs: (plan of care)    Reason for Assessment  Reason For Assessment: length of stay  Diagnosis: (hyperbilirubinemia)  Relevant Medical History: A-fib, arthritis, HTN, thyroid disease  General Information Comments: Pt admitted with abdominal pain, nausea and vomiting found to have a hepatic abscess. Pt endorses fatigue and shortness of breath. Pt to discharge on home health. Pt with mild fat and muscle losses.  Nutrition Discharge Planning: Adequate PO intake via Regular diet.    Nutrition Risk Screen  Nutrition Risk Screen: no indicators present    Nutrition/Diet History  Spiritual, Cultural Beliefs, Mandaeism Practices, Values that Affect Care: no  Factors Affecting Nutritional Intake: early satiety, decreased appetite    Anthropometrics  Temp: 97.5 °F (36.4 °C)  Height Method: Stated  Height: 5' 3" (160 cm)  Height (inches): 63 in  Weight Method: Bed Scale  Weight: 56.6 kg (124 lb 12.5 oz)  Weight (lb): 124.78 lb  Ideal Body Weight (IBW), Female: 115 lb  % Ideal Body Weight, Female (lb): 107.93 %  BMI (Calculated): 22.1  BMI Grade: 18.5-24.9 - normal    Lab/Procedures/Meds  Pertinent Labs Reviewed: reviewed  Pertinent Labs Comments: K 3.3, Cl 114, Glu 117  Pertinent Medications Reviewed: reviewed  Pertinent Medications Comments: Atorvastatin, levothyroxine, metronidazole    Estimated/Assessed Needs  Weight Used For Calorie Calculations: 56.6 kg (124 lb 12.5 oz)  Energy Calorie Requirements (kcal): 0989-4192 kcal daily  Energy Need Method: Kcal/kg(25-30 kcal/kg)  Protein Requirements: 45 gm daily  Weight Used For Protein Calculations: 56.6 kg (124 lb 12.5 oz)(0.8 gm/kg)  Fluid Requirements (mL): 1 mL/kcal or per MD  Estimated " Fluid Requirement Method: RDA Method  RDA Method (mL): 1415  CHO Requirement: 177 gm daily    Nutrition Prescription Ordered  Current Diet Order: Regular    Evaluation of Received Nutrient/Fluid Intake  Comments: LBM 10/1  Tolerance: tolerating  % Intake of Estimated Energy Needs: 50 - 75 %  % Meal Intake: 50 - 75 %    Nutrition Risk  Level of Risk/Frequency of Follow-up: low(f/u 1x/weekly)     Assessment and Plan  Nutrition Problem  Inadequate oral intake    Related to (etiology):   Nausea and vomiting    Signs and Symptoms (as evidenced by):   Low appetite and intake per patient    Interventions (treatment strategy):  Collaboration with other providers    Nutrition Diagnosis Status:   New    Monitor and Evaluation  Food and Nutrient Intake: energy intake  Food and Nutrient Adminstration: diet order  Knowledge/Beliefs/Attitudes: food and nutrition knowledge/skill  Physical Activity and Function: nutrition-related ADLs and IADLs  Anthropometric Measurements: weight, weight change  Biochemical Data, Medical Tests and Procedures: electrolyte and renal panel, lipid profile, gastrointestinal profile, glucose/endocrine profile, inflammatory profile  Nutrition-Focused Physical Findings: overall appearance     Nutrition Follow-Up  RD Follow-up?: Yes

## 2020-10-02 NOTE — PLAN OF CARE
Problem: Physical Therapy Goal  Goal: Physical Therapy Goal  Description: Goals to be met by: 10/24/2020    Patient will increase functional independence with mobility by performin. Sit to stand transfer with Stand-by Assistance  2. Gait  x 30 feet with Stand-by Assistance using LRAD  3. Lower extremity exercise program x15 reps, with independence     Outcome: Ongoing, Progressing  Flor Avila PT  10/2/2020

## 2020-10-02 NOTE — SUBJECTIVE & OBJECTIVE
Discharge Summary     Patient: Javier Fry MRN: [id number]  SSN: [id number]    YOB: 1983  Age: 28 y.o. Sex: male       Admit Date: 3/26/2018    Discharge Date: 4/16/2018      Admission Diagnoses: ARF (acute renal failure) [facility])    Discharge Diagnoses:   Problem List as of 4/16/2018  Date Reviewed: 3/29/2018          Codes Class Noted - Resolved    GERD (gastroesophageal reflux disease) ICD-10-CM: K21.9  ICD-9-CM: 530.81  4/3/2018 - Present        Chronic hepatitis C without hepatic coma ([facility] ICD-10-CM: B18.2  ICD-9-CM: 070.54  3/29/2018 - Present        Hepatitis ICD-10-CM: K75.9  ICD-9-CM: 573.3  3/27/2018 - Present        Rhabdomyolysis ICD-10-CM: M62.82  ICD-9-CM: 728.88  3/27/2018 - Present        * (Principal)ARF (acute renal failure) ([facility] ICD-10-CM: N17.9  ICD-9-CM: 584.9  3/26/2018 - Present        Heroin abuse ICD-10-CM: F11.10  ICD-9-CM: 305.50  3/26/2018 - Present        Tobacco abuse ICD-10-CM: Z72.0  ICD-9-CM: 305.1  3/26/2018 - Present               Discharge Condition: Stable    Hospital Course:      [facility] a 29 yo male with a hx of iv drug abuse who was admitted for nausea and vomiting. He Stated symptoms began after injecting heroin.  Patient noted to have AISSATOU with Scr of 4.92, oliguric. Despite hydration, his kidney status worsened and he was placed under HD. He tested positive for Hep C, and low complements levels. Has a family history of SLE on his mother. Initially he had elevation of liver enzymes. It was followed and came down to normal.      HD was started this admission. His latest HD was on 4/9/2018. Had renal biopsy on 4/6/18. Result is consistent with ATN as per nephrology. Was started on Zoloft during the stay.     IJ catheter for dialysis has been removed.      Patient has been eating and drinking well. No fever. No swelling. He has been having adequate urine output. Last creatinine on 4/13/2018 is 1.45.      Physical Exam   General: Interval History: No acute events. Afebrile, still feels fatigued. Denies worsening abdominal pain. Advancing diet as tolerated.    Review of Systems   Constitutional: Positive for appetite change and fatigue. Negative for activity change and fever.   HENT: Negative for congestion and rhinorrhea.    Eyes: Negative for pain and visual disturbance.   Respiratory: Negative for shortness of breath and wheezing.    Cardiovascular: Negative for chest pain and leg swelling.   Gastrointestinal: Negative for abdominal distention, abdominal pain, nausea and vomiting.   Genitourinary: Negative for difficulty urinating and dysuria.   Musculoskeletal: Negative for arthralgias and joint swelling.   Skin: Negative for color change and wound.   Neurological: Negative for dizziness, weakness and light-headedness.     Objective:     Vital Signs (Most Recent):  Temp: 97.5 °F (36.4 °C) (10/02/20 1108)  Pulse: 65 (10/02/20 1108)  Resp: 16 (10/02/20 1108)  BP: 129/63 (10/02/20 1108)  SpO2: 96 % (10/02/20 1108) Vital Signs (24h Range):  Temp:  [97.3 °F (36.3 °C)-98.1 °F (36.7 °C)] 97.5 °F (36.4 °C)  Pulse:  [60-65] 65  Resp:  [16-23] 16  SpO2:  [96 %-99 %] 96 %  BP: (123-135)/(56-68) 129/63     Weight: 56.6 kg (124 lb 12.5 oz)  Body mass index is 22.1 kg/m².    Intake/Output Summary (Last 24 hours) at 10/2/2020 1438  Last data filed at 10/1/2020 1800  Gross per 24 hour   Intake 240 ml   Output 0 ml   Net 240 ml      Physical Exam  Vitals signs and nursing note reviewed.   Constitutional:       General: She is not in acute distress.     Appearance: She is not ill-appearing.   HENT:      Head: Normocephalic and atraumatic.      Nose: Nose normal.   Eyes:      Extraocular Movements: Extraocular movements intact.      Conjunctiva/sclera: Conjunctivae normal.      Pupils: Pupils are equal, round, and reactive to light.   Neck:      Musculoskeletal: Normal range of motion.   Cardiovascular:      Rate and Rhythm: Normal rate and regular rhythm.       Heart sounds: Normal heart sounds. No murmur. No gallop.    Pulmonary:      Effort: Pulmonary effort is normal. No respiratory distress.      Breath sounds: No stridor. No wheezing, rhonchi or rales.   Abdominal:      General: Abdomen is flat. There is no distension.      Palpations: Abdomen is soft.      Tenderness: There is no abdominal tenderness. There is no guarding.   Musculoskeletal: Normal range of motion.      Right lower leg: Edema present.      Left lower leg: Edema present.   Skin:     General: Skin is warm.   Neurological:      General: No focal deficit present.      Mental Status: She is alert and oriented to person, place, and time.         Significant Labs:   CBC:   Recent Labs   Lab 10/01/20  0442 10/02/20  0615   WBC 18.80* 16.79*  16.79*   HGB 8.4* 8.3*  8.3*   HCT 27.3* 26.6*  26.6*   * 408*  408*     CMP:   Recent Labs   Lab 10/01/20  0442 10/02/20  0615    142  142   K 3.6 3.3*  3.3*   * 114*  114*   CO2 21* 23  23   * 117*  117*   BUN 23 20  20   CREATININE 0.9 0.8  0.8   CALCIUM 7.4* 7.4*  7.4*   PROT 4.8* 4.5*  4.5*   ALBUMIN 1.7* 1.6*  1.6*   BILITOT 0.8 0.7  0.7   ALKPHOS 139* 127  127   AST 99* 66*  66*   ALT 83* 71*  71*   ANIONGAP 7* 5*  5*   EGFRNONAA 57.7* >60.0  >60.0        Well nourished. Alert. Lying in bed without respiratory distress. heent-                                  normal  CV:                                      RRR. No murmur, rub, or gallop. Lungs:                       Clear to auscultation bilaterally. No wheezing, rhonchi, or rales. No evidence of cellulitis at the previous site of IJ catheter. CNS- no focal neurological deficits  Abdomen:                  Soft, nontender, nondistended. nontender biopsy site  Extremities:               Warm and dry. No cyanosis or edema. Skin:                                    No rashes or jaundice. Some tattoos.          Consults: Nephrology    Significant Diagnostic Studies:   4/13/2018  6:25 AM - [facility], [facility] [employer] [facility]   Component Results   Component Value Flag Ref Range Units Status   Sodium 144  136 - 145 mmol/L Final   Potassium 4.1  3.5 - 5.1 mmol/L Final   Chloride 112 (H) 98 - 107 mmol/L Final   CO2 23  21 - 32 mmol/L Final   Anion gap 9  7 - 16 mmol/L Final   Glucose 95  65 - 100 mg/dL Final   Comment:   47 - 60 mg/dl Consistent with, but not fully diagnostic of hypoglycemia. 101 - 125 mg/dl Impaired fasting glucose/consistent with pre-diabetes mellitus   > 126 mg/dl Fasting glucose consistent with overt diabetes mellitus      BUN 30 (H) 6 - 23 MG/DL Final   Creatinine 1.45  0.8 - 1.5 MG/DL Final   GFR est AA >60  >60 ml/min/1.73m2 Final   GFR est non-AA 59 (L) >60 ml/min/1.73m2 Final   Comment:   (NOTE)   Estimated GFR is calculated using the Modification of Diet in Renal   Disease (MDRD) Study equation, reported for both  Americans   (GFRAA) and non- Americans (GFRNA), and normalized to 1.73m2   body surface area. The physician must decide which value applies to   the patient. The MDRD study equation should only be used in   individuals age 25 or older.  It has not been validated for the   following: pregnant women, patients with serious comorbid conditions,   or on certain medications, or persons with extremes of body size,   muscle mass, or nutritional status. Calcium 9.1  8.3 - 10.4 MG/DL Final     4/11/2018  7:39 PM - [facility], Lab In [facility]   Component Results   Component Value Flag Ref Range Units Status   Protein, urine random 11  <11.9 mg/dL Final   Creatinine, urine 31.70   mg/dL Final   Protein/Creat. urine Ratio 0.3           4/11/2018  6:14 AM - [facility], Lab In [facility]   Component Results   Component Value Flag Ref Range Units Status   WBC 6.2  4.3 - 11.1 K/uL Final   RBC 3.98 (L) 4.23 - 5.67 M/uL Final   HGB 12.1 (L) 13.6 - 17.2 g/dL Final   HCT 35.8 (L) 41.1 - 50.3 % Final   MCV 89.9  79.6 - 97.8 FL Final   MCH 30.4  26.1 - 32.9 PG Final   MCHC 33.8  31.4 - 35.0 g/dL Final   RDW 12.6  11.9 - 14.6 % Final   PLATELET 785  246 - 351 K/uL Final     4/11/2018  6:24 AM - [facility], [facility]   Component Results   Component Value Flag Ref Range Units Status   Protein, total 7.1  6.3 - 8.2 g/dL Final   Albumin 3.4 (L) 3.5 - 5.0 g/dL Final   Globulin 3.7 (H) 2.3 - 3.5 g/dL Final   A-G Ratio 0.9 (L) 1.2 - 3.5   Final   Bilirubin, total 0.4  0.2 - 1.1 MG/DL Final   Bilirubin, direct 0.1  <0.4 MG/DL Final   Alk. phosphatase 72  50 - 136 U/L Final   AST (SGOT) 20  15 - 37 U/L Final   ALT (SGPT) 92 (H) 12 - 65 U/L Final     3/26/2018   Abdomen ultrasound  IMPRESSION: Increased renal echogenicity. Findings compatible with chronic  medical renal disease.       Disposition: A rescue house    Discharge Medications:   Current Discharge Medication List      START taking these medications    Details   sertraline (ZOLOFT) 25 mg tablet Take 1 Tab by mouth daily for 15 days. Indications: Generalized Anxiety Disorder  Qty: 15 Tab, Refills: 0      amLODIPine (NORVASC) 10 mg tablet Take 1 Tab by mouth daily for 15 days.  Indications: hypertension  Qty: 15 Tab, Refills: 0             Activity: Activity as tolerated  Diet: Cardiac Diet  Wound Care: None needed    Follow-up Appointments   Procedures    FOLLOW UP VISIT Appointment in: 3 - 5 Days See nephrologistPrincess, or maggi Sandoval. Or see primary doctor     See nephrologistPrincess, or maggi Sandoval.   Or see primary doctor     Standing Status:   Standing     Number of Occurrences:   1     Order Specific Question:   Appointment in     Answer:   3 - 5 Days     I have discussed the plan of care with patient. Time spent on discharge is 36 minutes.          Signed By: Angel Pena MD     April 16, 2018

## 2020-10-02 NOTE — PLAN OF CARE
Pt will transport to [facility] this evening. COVID test is negative. RN can call report to charge RN at [phone]. Charge RN requested pt be transported at 7:30 PM via wheelchair van.     RN aware.      10/02/20 1811   Post-Acute Status   Post-Acute Authorization Placement   Post-Acute Placement Status Set-up Complete     Kathy Chi LMSW  [facility]

## 2020-10-02 NOTE — TREATMENT PLAN
AES Treatment Plan    Gisselle Ortiz is a 87 y.o. female admitted to hospital 9/24/2020 (Hospital Day: 9) due to Bacteremia.     Interval History  S/p IR drain placement, NAEON,  IR removing drain today    Objective  Temp:  [97.3 °F (36.3 °C)-98.1 °F (36.7 °C)] 97.5 °F (36.4 °C) (10/02 1108)  Pulse:  [60-65] 65 (10/02 1108)  BP: (123-135)/(56-68) 129/63 (10/02 1108)  Resp:  [16-23] 16 (10/02 1108)  SpO2:  [96 %-99 %] 96 % (10/02 1108)  Laboratory  Lab Results   Component Value Date    WBC 16.79 (H) 10/02/2020    WBC 16.79 (H) 10/02/2020    HGB 8.3 (L) 10/02/2020    HGB 8.3 (L) 10/02/2020    HCT 26.6 (L) 10/02/2020    HCT 26.6 (L) 10/02/2020    MCV 96 10/02/2020    MCV 96 10/02/2020     (H) 10/02/2020     (H) 10/02/2020       Lab Results   Component Value Date    ALT 71 (H) 10/02/2020    ALT 71 (H) 10/02/2020    AST 66 (H) 10/02/2020    AST 66 (H) 10/02/2020    ALKPHOS 127 10/02/2020    ALKPHOS 127 10/02/2020    BILITOT 0.7 10/02/2020    BILITOT 0.7 10/02/2020       Recommendations  - Repeat ERCP in 2 months to remove stent.   - Continue antibiotics. Per ID   -continue to trend daily CMP, CBC, INR  - We are signing-off. Please call with any questions.    Thank you for involving us in the care of Gisselle Ortiz. Please call with any additional questions, concerns or changes in the patient's clinical status.    Antolin Ortega MD  Gastroenterology Fellow PGY IV   [facility]

## 2020-10-02 NOTE — PLAN OF CARE
Pt's family is now requesting SNF placement, a change from previous plan d/c home with HH and infusion for abx. CM provided list for SNFs, discussed at bedside , son made two choices, referrals placed in [facility] . Orders placed for COVID, TB and Consult to [facility]  placed all of which are requirements for placement.       10/02/20 2210   Discharge Reassessment   Assessment Type Discharge Planning Reassessment   Provided patient/caregiver education on the expected discharge date and the discharge plan Yes   Do you have any problems affording any of your prescribed medications? No   Discharge Plan A Skilled Nursing Facility   Discharge Plan B Home Health   DME Needed Upon Discharge  none   Anticipated Discharge Disposition SNF   Can the patient/caregiver answer the patient profile reliably? Yes, cognitively intact   How does the patient rate their overall health at the present time? Fair   Describe the patient's ability to walk at the present time. Walks with the help of equipment   How often would a person be available to care for the patient? Occasionally   Number of comorbid conditions (as recorded on the chart) Two   Post-Acute Status   Post-Acute Authorization Placement   Post-Acute Placement Status Referrals Sent   Home Health Status Discharge Plan Changed   Discharge Delays (!) Post-Acute Set-up   Maryam Sanabria, MSN  Case Management  Ext [phone]

## 2020-10-02 NOTE — PT/OT/SLP PROGRESS
Occupational Therapy   Treatment    Name: Gisselle Ortiz  MRN: [id number]  Admitting Diagnosis:  Bacteremia  7 Days Post-Op    Recommendations:     Discharge Recommendations: home health OT  Discharge Equipment Recommendations:  none  Barriers to discharge:  None    Assessment:     Gisselle Ortiz is a 87 y.o. female with a medical diagnosis of Bacteremia.  Performance deficits affecting function are weakness, impaired endurance, impaired self care skills, impaired functional mobilty, gait instability, impaired balance, decreased lower extremity function. Patient agreed to therapy with encouragement due to just returning from a test. Patient would benefit from continued skilled acute OT 2x/wk to improve functional mobility, increase independence with ADLs, and address established goals. Recommending HHOT once medically appropriate for discharge to increase maximal independence, reduce burden of care, and ensure safety.     Rehab Prognosis:  Good; patient would benefit from acute skilled OT services to address these deficits and reach maximum level of function.       Plan:     Patient to be seen 2 x/week to address the above listed problems via self-care/home management, therapeutic activities, therapeutic exercises, neuromuscular re-education  · Plan of Care Expires: 10/25/20  · Plan of Care Reviewed with: patient    Subjective     Pain/Comfort:  · Pain Rating 1: 0/10  · Pain Rating Post-Intervention 1: 0/10    Objective:     Communicated with: NSG prior to session.  Patient found HOB elevated upon OT entry to room.    General Precautions: Standard, aspiration, fall   Orthopedic Precautions:N/A   Braces:  N/A    Occupational Performance:     Bed Mobility:    · Patient completed Scooting/Bridging with stand by assistance to EOB sitting EOB  · Patient completed Supine to Sit with stand by assistance  · Patient completed Sit to Supine with minimum assistance     Functional Mobility/Transfers:  · Patient  completed Sit <> Stand Transfer with contact guard assistance  with  rolling walker (to and from bed; to and from BSC; Patient needed CGA to sit on low toilet, but needed moderate assistance to stand from low toilet).    · Patient completed Toilet Transfer bed>BSC with functional ambulation technique CGA; BSC>toilet CGA stand pivot; toilet>bed (mod A sit>stand) CGA with functional ambulation technique with contact guard assistance with rolling walker (for all transfers)    Activities of Daily Living:  · Grooming: stand by assistance standing at sink for combing hair. Patient performed oral care seated at sink on BSC due to rest break and fatigue. Patient then stood and washed hands and face.   · Toileting: CGA     [facility] 6 Click ADL: 18    Treatment & Education:  Role of OT and POC  Safety  ADL retraining  Functional mobility training  Importance of OOB activity    Patient left HOB elevated with call button in reach and all needs met. Education:      GOALS:   Multidisciplinary Problems     Occupational Therapy Goals        Problem: Occupational Therapy Goal    Goal Priority Disciplines Outcome Interventions   Occupational Therapy Goal     OT, PT/OT Ongoing, Progressing    Description: Goals to be met by: 10/9/2020     Patient will increase functional independence with ADLs by performing:    UE Dressing with Set-up Assistance.  LE Dressing with Set-up Assistance.  Grooming while standing at sink with Modified [place].  Toileting from toilet with Modified [place] for hygiene and clothing management.   Supine to sit with Modified [place].  Stand pivot transfers with Supervision.  Toilet transfer to toilet with Supervision.                     Time Tracking:     OT Date of Treatment: 10/02/20  OT Start Time: 1012  OT Stop Time: 1036  OT Total Time (min): 24 min    Billable Minutes:Self Care/Home Management 24    SHAHIDA Taylor  10/2/2020

## 2020-10-02 NOTE — PLAN OF CARE
[facility]     Department of Hospital Medicine     [address] [place] [address]     [phone] after hours  [phone] fax       NURSING HOME ORDERS    10/02/2020    Admit to Nursing Home:    Skilled Bed                                               Diagnoses:  Active Hospital Problems    Diagnosis  POA    *Bacteremia [R78.81]  Yes    Hepatic abscess [K75.0]  Yes    Pancreatic abscess [K85.90]  Yes    UTI (urinary tract infection) [N39.0]  No    Dilated bile duct [K83.8]  Yes    Hyperbilirubinemia [E80.6]  Yes    SSS (sick sinus syndrome) [I49.5]  Yes    Current use of long term anticoagulation [Z79.01]  Not Applicable    Paroxysmal atrial fibrillation [I48.0]  Yes    Hypertension [I10]  Yes    Pacemaker [Z95.0]  Yes      Resolved Hospital Problems   No resolved problems to display.       Patient is homebound due to:  Bacteremia    Allergies:  Review of patient's allergies indicates:   Allergen Reactions    Ciprofloxacin Nausea And Vomiting       Vitals:    Per facility protocol    Diet: Regular diet   Supplement:                           Type:  Add boost Plus BID.        Acitivities:   - Up in a chair each morning as tolerated   - Ambulate with assistance to bathroom   - Scheduled walks once each shift (every 8 hours)   - May ambulate independently   - May use walker, cane, or self-propelled wheelchair    LABS:  Per facility protocol    Weekly cbc, cmp, crp, esr and fax results to ID, attention Nimesh Mares NP, at fax [phone]. (Pager [phone], spectra [phone], office [phone])   ID follow up at 2 weeks, then 4 weeks with imaging.     Nursing Precautions:       - Fall precautions per nursing home protocol   - Seizure precaution per nursing home protocol    CONSULTS:    Physical Therapy to evaluate and treat     Occupational Therapy to evaluate and treat     Nutrition to evaluate and recommend diet      MISCELLANEOUS CARE:    Repeat CT 1 week to  evaluate need for drain removal  Drain to bulb for now  GI :  Repeat ERCP in 2 months to remove stent.       Medications: Discontinue all previous medication orders, if any. See new list below.     Gisselle Ortiz   Home Medication Instructions [place]:[id number]    Printed on:10/02/20 6736   Medication Information                      amiodarone (PACERONE) 200 MG Tab  TAKE 1 TABLET ONCE DAILY . START TAKING ONLY 1 TABLET EVERY DAY ON 4-19-19             apixaban (ELIQUIS) 2.5 mg Tab  Take 1 tablet (2.5 mg total) by mouth 2 (two) times daily.             aspirin (ECOTRIN) 81 MG EC tablet  Take 81 mg by mouth once daily.             atenoloL (TENORMIN) 25 MG tablet  TAKE 2 TABLETS EVERY MORNING AND 1 TABLET EVERY EVENING             atorvastatin (LIPITOR) 10 MG tablet  TAKE 1 TABLET DAILY             BIOTIN ORAL  Take 1 capsule by mouth once daily.             ceftriaxone sodium (CEFTRIAXONE 2 G/50 ML D5W, READY TO MIX,)  Inject 50 mLs (2 g total) into the vein once daily. For 4-6 weeks (End 10/24 or 11/7/2020)             diclofenac sodium (VOLTAREN) 1 % Gel  Apply 4 g topically 3 (three) times daily. Apply to right side pain             diltiaZEM (CARDIZEM CD) 180 MG 24 hr capsule  Take 1 capsule (180 mg total) by mouth once daily.             ergocalciferol (VITAMIN D2) 50,000 unit Cap  Take 1 capsule (50,000 Units total) by mouth every 7 days.             ergocalciferol, vitamin D2, (VITAMIN D ORAL)  Take 1 capsule by mouth once daily.             eszopiclone (LUNESTA) 2 MG Tab  Take 2 mg by mouth every evening.             Lactobacillus rhamnosus GG (CULTURELLE) 10 billion cell capsule  Take 1 capsule by mouth once daily.             levothyroxine (SYNTHROID) 137 MCG Tab tablet  Take 1 tablet (137 mcg total) by mouth once daily.             metroNIDAZOLE (FLAGYL) 500 MG tablet  Take 1 tablet (500 mg total) by mouth every 8 (eight) hours. For 4-6 weeks (End 10/24 or 11/7/2020)             spironolactone  (ALDACTONE) 25 MG tablet  Take 25 mg by mouth 2 (two) times daily.                    _________________________________  Addi Carrington MD  10/02/2020

## 2020-10-02 NOTE — PLAN OF CARE
Referral received. Obtaining benefits. Plan to dc home with ceftriaxone 2 GM IV q day. Dosing at 5 pm. Pt has right basilic 36cm double lumen picc placed on 09/30/2020.  Patient will dc with [facility]. Plan to dc home tomorrow. Will come to teach today. Will cont to follow.       [facility]  Marla Cedeno Rn, Clinical Educator  Cell [phone]  Office [phone]  Fax [phone]

## 2020-10-02 NOTE — PLAN OF CARE
Recommendations  1. Add boost Plus BID.   2. Continue current Regular diet.   3. Encourage good PO intake.  Goals: Pt to consume > 50% of meals by RD follow up.  Nutrition Goal Status: new

## 2020-10-02 NOTE — PLAN OF CARE
Pt medically ready for d/c home with IVABX and HH. Orders uploaded to [employer] [facility], referral placed home infusion, HH notified.    Maryam Sanabria, MSN  Case Management  Ext [phone]

## 2020-10-02 NOTE — PLAN OF CARE
Patient becoming increasingly fatigue upon exertion. Unable to transfer from and back to bed from bedside commode without becoming SOB. Denies any other problems, free of falls and injuries.

## 2020-10-02 NOTE — PLAN OF CARE
CM spoke with nursing team to include charge nurse regarding COVID test needed for placement. Will cont to follow  Maryam Sanabria, MSN  Case Management  Ext [phone]

## 2020-10-03 PROCEDURE — 25000003 PHARM REV CODE 250: Performed by: HOSPITALIST

## 2020-10-03 PROCEDURE — 11000004 HC SNF PRIVATE

## 2020-10-03 RX ADMIN — DICLOFENAC 4 G: 10 GEL TOPICAL at 03:10

## 2020-10-03 RX ADMIN — Medication 1 CAPSULE: at 11:10

## 2020-10-03 RX ADMIN — APIXABAN 2.5 MG: 2.5 TABLET, FILM COATED ORAL at 08:10

## 2020-10-03 RX ADMIN — METRONIDAZOLE 500 MG: 500 TABLET ORAL at 12:10

## 2020-10-03 RX ADMIN — ATENOLOL 50 MG: 50 TABLET ORAL at 11:10

## 2020-10-03 RX ADMIN — METRONIDAZOLE 500 MG: 500 TABLET ORAL at 02:10

## 2020-10-03 RX ADMIN — APIXABAN 2.5 MG: 2.5 TABLET, FILM COATED ORAL at 12:10

## 2020-10-03 RX ADMIN — MELATONIN TAB 3 MG 6 MG: 3 TAB at 12:10

## 2020-10-03 RX ADMIN — APIXABAN 2.5 MG: 2.5 TABLET, FILM COATED ORAL at 11:10

## 2020-10-03 RX ADMIN — AMIODARONE HYDROCHLORIDE 200 MG: 200 TABLET ORAL at 11:10

## 2020-10-03 RX ADMIN — DICLOFENAC 4 G: 10 GEL TOPICAL at 11:10

## 2020-10-03 RX ADMIN — LEVOTHYROXINE SODIUM 137 MCG: 50 TABLET ORAL at 05:10

## 2020-10-03 RX ADMIN — METRONIDAZOLE 500 MG: 500 TABLET ORAL at 10:10

## 2020-10-03 RX ADMIN — METRONIDAZOLE 500 MG: 500 TABLET ORAL at 05:10

## 2020-10-03 RX ADMIN — ATORVASTATIN CALCIUM 10 MG: 10 TABLET, FILM COATED ORAL at 11:10

## 2020-10-03 NOTE — PROGRESS NOTES
Occupational Therapy   Pt not seen     Gisselle Ortiz  MRN: [id number]  Room/Bed: [id number] A    Pt not seen secondary to being placed on hold Per nurse. . Will follow up when Pt is appropriate for Therapy.    Khari Stern, OTR/L  10/3/2020

## 2020-10-03 NOTE — DISCHARGE SUMMARY
[facility] Hospital Medicine  Discharge Summary      Patient Name: Gisselle Ortiz  MRN: [id number]  Admission Date: 9/24/2020  Hospital Length of Stay: 8 days  Discharge Date and Time:  10/02/2020 7:11 PM  Attending Physician: Jaguar Montes MD   Discharging Provider: Lorenzo Marlow MD  Primary Care Provider: Kai Montez MD  [facility] Medicine Team: [facility] 1 Lorenzo Marlow MD    HPI:   Gisselle Ortiz is a 87 y.o. female with a medical history of AFib on Eliquis with previous cardioversion, SSS s/p pacemaker, arthritis, HTN, thyroid disease with diastolic dysfunction (EF 55%, 9/2019) who presented to the ED from GI clinic after she was found to have elevated liver enzymes and hypotensive to 92/51.    She presented to the hospital on 09/11 for abdominal pain and was found to have gallstone pancreatitis. She had CBD dilation and a gallbladder with sludge. She underwent EUS and ERCP with stone and sludge seen on the cholangiogram. Sphincterotomy was performed and stone/sludge removed with no stents placed. Per notes, ERCP was concerning for choledocholithiasis.  Surgery was consulted and deemed the patient to be high risk for cholecystectomy. She was seen today in GI clinic for follow-up. She initially recovered well from the pancreatitis and ERCP and was eating prior to discharge. A few days after discharge she started having severe fatigue that progressively worsened to the point where the patient could not walk. She complains of decreased appetite and nausea but only 1 episode of vomiting. She denies any abdominal pain, fevers, diarrhea. She reports that her urine is orange/brown and has a bad odor but denies any dysuria. In the office her, BP was found to be 92/51. She was sent to the ED with BP rechecked at 112/51. She was given one liter NS and admitted for further evaluations.     In the ED, she was found to be afebrile and her BP improved to 130/61 with IVF. HR 63  with NSR and existing RBBB. Qtc 512. She was breathing 20bpm with no O2 requirement. She c/o some nausea but no abd pain. On exam, she had mild discomfort to deep palpation to RUQ abdomen.    Procedure(s) (LRB):  ERCP (ENDOSCOPIC RETROGRADE CHOLANGIOPANCREATOGRAPHY) (N/A)      Hospital Course:   9/26: Pt had ERCP 9/25, which was unremarkable for stone or obvious etiology for her elevated bili and LFT. CT scan with contrast following pancreatic protocol showed hepatic abscess. Blood culture grew gram negative cristofer; urine culture was positive enterococcus faecalis.   Pt had IR intervention for her hepatic abscess 9/26. Around 50 ml was put out via drainage yesterday. Blood culture grew E.Coli susceptible to multiple abx. Urine enterococcus faecalis also shown to have susceptibility to multiple organism. 9/27 drain still with significant output, will continue to monitor. Abscess cultures grew E. Coli. On 9/29 patient's WBC increased slightly to 25 and was overall more fatigued, prompting further workup and ID was notified.     Consults:   Consults (From admission, onward)        Status Ordering Provider     Inpatient consult to [facility]  Once     Provider:  (Not yet assigned)    Completed MARNI MONTANO     Inpatient consult to General Surgery  Once     Provider:  (Not yet assigned)    Completed ANURADHA ESCOBEDO     Inpatient consult to Infectious Diseases  Once     Provider:  (Not yet assigned)    Completed MARNI MONTANO     Inpatient consult to Interventional Radiology  Once     Provider:  (Not yet assigned)    Completed MARNI MONTANO     Inpatient consult to PICC team ([facility])  Once     Provider:  (Not yet assigned)    Completed URIAH GOFF     Inpatient consult to [facility]  Once     Provider:  (Not yet assigned)    Acknowledged GER CHANG     Inpatient consult to [facility]  Once     Provider:  (Not yet assigned)    Acknowledged JEAN PIERRE WILSON     "      * Bacteremia  Blood culture grew gram negative cristofer. VS todau: Temp 98, HR 62, RR 19, and BP 93/53; SpO2 94%. Patient appeared alert, oriented, and responding to commands.   - WBC yesterday was 16. Results did not come today.   - Culture grew E.Coli susceptible to multiple abx.   - ID is following. ID recommended to continue with zosyn.   - ID thinks source of bacteremia is from ERCP from the previous admission.     -  Hypotension (bp 93/53) was treated with fluids. Currently improved.  - Abscess culture grew E. Coli        UTI (urinary tract infection)  Urinary culture grew enterococcus faecalis susceptible to multiple abx.  - VSS stable. Patient appeared alert, oriented, and responding to commands. Denies dysuria, frequency, urgency.   - WBC is now 25 from 23  - UA showed WBC 20, many bacteria, 2 hyaline cast.  - Repeat UA pending   - ID recommended to continue with zosyn, will deescalate based on susceptibilities  - Since pt is not showing symptoms of dysuria, urgency, frequency, ID thinks enterococcus faecalis is currently colonizing without active infection    Pancreatic abscess  See "hepatic abscess"    Hepatic abscess  Sent from GI clinic where she had a f/u for recent hospital stay for ERCP --> elevated TBili, hypotensive, and increased transaminases. Was admitted to the hospital on 09/11 for abdominal pain and was found to have gallstone pancreatitis; got ERCP, CBD dilation and a gallbladder with sludge --> had EUS and ERCP with stone and sludge seen on the cholangiogram --> Sphincterotomy, no stents. Deemed not a surgical candidate for cholecystectomy; ERCP note indicates concern for choledocholithiasis.     PLAN    - Consult GI 9/26. ERCP was repeated yesterday. Did not show stone or obvious etiology for her elevated T bili and LFT on admission. The entire main bile duct was dilated; the biliary tree was swept and nothing was found; One plastic stent was placed into the common bile duct.  - Abd US " was done 9/26. It showed heterogeneous pancreatic echotexture within adjacent 1.7 cm hypoechoic structure with irregular borders;  Dilatation of the extrahepatic common bile duct; intrahepatic duct demonstrates internal debris (possibly representing biliary sludge); 5.6 cm predominantly cystic lesion in the right hepatic lobe with internal debris. DDX included pancreatic mass, abscess, or pseudocyst.   - CT scan following pancreatic protocol done 9/26. It showed complex thick-walled partially septated cystic lesion involving the right hepatic lobe concerning for hepatic abscess; complex multiloculated cystic lesion involving the pancreatic body with additional suspected small cystic lesion/collection in the region of the pancreatic head; Common bile duct stent in place with pneumobilia as well as small volume of air within the mildly distended, thickened gallbladder.      - GI following. GI identified principal problem is hepatic abscess, which needs IR drainage.    - Talked with surgery and ID about source control of her infection. Surgery states that since gall bladder and biliary tract is not dilated, it is unlikely cholecystectomy will control source and says she is not a candidate for cholecystectomy. ID states likely source of bacteremia is from the ERCP procedure earlier this month from previous admission.    - IR following. IR drainage for hepatic abscess was done 9/26. A drain was placed. Continues to drain. Drained fluid showed WBC of 124[address]. Culture grew GNR --> E. Coli. ID will adjust abx based on susceptibilities.    - 9/29 Patient WBC increased slightly and is more fatigued. Per ID, they do not think treatment is necessary for growth seen in urine  - Repeat lactate 0.6  - F/U repeat UA  - F/U C. Diff workup    - PICC line placed 9/30 for receiving IV antibiotics for 4 weeks  - Follow up with ID outpatient with weekly labs  - Switched from zosyn to rocephin and flagyl    - IR to leave drain in and  re-evaluate in 1 week with CT scan    Dilated bile duct  See hepatic abscess     Hyperbilirubinemia  See hepatic abscess.       SSS (sick sinus syndrome)  Followed by Dr Colmenares in Cardiology  TSH 0.443  PM for SB  On existing EKG  ECG showed RBBB    Plan:  - Telemetry  - Also started on atenolol. Held 9/28 due hypotension.  - Apixaban stopped for GI consult.  - Amiodarone continued.  - Has pacemaker in place.    Current use of long term anticoagulation  On Apixaban 2.5mg po BID (age > 80, Wt < 60kg); last dose 9/23/20 pm  PLAN  - resume        Hypertension  Home meds include Atenolol 25mg BID, Diltiazem 180mg daily  BP 92/51 at GI clinic, improved SBP to 120s in ED after fluids  PLAN  - Started on atenolol (held d/t hypotension 9/28), resumed 9/30    Pacemaker  DC PPM (2014; [facility]) implanted for symptomatic SB and first degree AVB      Paroxysmal atrial fibrillation  Home meds include Amiodarone 200mg daily, Atenolol 25mg (held 9/27 d/t hypotension), Diltiazem 180mg daily, Apixaban 2.5mg BID (last dose 9/23 pm); held this admission.   BP 92/51 in GI clinic, improved with 1L fluid in ED  Last Cards Note (Darrian 7/17/20)  Gen change 1/2019. AF/AFL noted on f/u. Propafenone increased. Still had AF with RVR after that. Changed to flecainide, planned for DCCV; at some point changed to amio. Unclear timing of these changes.  TSH found to be abnormal, but T4 normal.  In past, following DCCV, she felt great -- until AF recurred.  On amio now, with great effect.  Plan:  - Telemetry  - Continue Amiodarone  - Started atenolol (held 9/28 d/t hypotension), resumed 9/30.  - Apixiban resumed 9/30      Final Active Diagnoses:    Diagnosis Date Noted POA    PRINCIPAL PROBLEM:  Bacteremia [R78.81] 09/26/2020 Yes    Hepatic abscess [K75.0] 09/26/2020 Yes    Pancreatic abscess [K85.90] 09/26/2020 Yes    UTI (urinary tract infection) [N39.0] 09/26/2020 No    Dilated bile duct [K83.8] 09/25/2020 Yes    Hyperbilirubinemia [E80.6]  09/24/2020 Yes    SSS (sick sinus syndrome) [I49.5] 01/17/2019 Yes    Current use of long term anticoagulation [Z79.01] 11/14/2017 Not Applicable    Paroxysmal atrial fibrillation [I48.0] 05/19/2015 Yes    Hypertension [I10] 05/19/2015 Yes    Pacemaker [Z95.0] 05/19/2015 Yes      Problems Resolved During this Admission:       Discharged Condition: fair    Disposition: Skilled Nursing Facility    Follow Up:  Follow-up Information     [facility] - Infectious Disease 1st Fl.    Specialty: Infectious Diseases  Contact information:  [address] [facility] [address] [phone]  Additional information:  Main Building, 1st floor near [place] entrance   [facility]           Kai Montez MD.    Specialty: Family Medicine  Contact information:  [address]  [address]  [facility] [id number] [phone]             [facility] - GI Center Atrium 4th Fl.    Specialty: Gastroenterology  Contact information:  [address] [facility] [address] [phone]  Additional information:  GI Center & Urology - Main Building, 4th Floor   Please park in [facility] and take Atrium elevator               Patient Instructions:      CT Abdomen Without Contrast   Standing Status: Future Standing Exp. Date: 10/02/21     Order Specific Question Answer Comments   Oral/Rectal Contrast instructions: NO Oral Contrast    Special CT ABD Protocol Request? Routine    May the Radiologist modify the order per protocol to meet the clinical needs of the patient? Yes      Ambulatory referral/consult to Infectious Disease   Standing Status: Future   Referral Priority: Routine Referral Type: Consultation   Referral Reason: Specialty Services Required   Requested Specialty: Infectious Diseases   Number of Visits Requested: 1       Significant Diagnostic Studies: Labs:   CMP   Recent Labs   Lab 10/01/20  0442 10/02/20  0615    142  142   K 3.6 3.3*  3.3*   * 114*  114*   CO2  21* 23  23   * 117*  117*   BUN 23 20  20   CREATININE 0.9 0.8  0.8   CALCIUM 7.4* 7.4*  7.4*   PROT 4.8* 4.5*  4.5*   ALBUMIN 1.7* 1.6*  1.6*   BILITOT 0.8 0.7  0.7   ALKPHOS 139* 127  127   AST 99* 66*  66*   ALT 83* 71*  71*   ANIONGAP 7* 5*  5*   ESTGFRAFRICA >60.0 >60.0  >60.0   EGFRNONAA 57.7* >60.0  >60.0   , CBC   Recent Labs   Lab 10/01/20  0442 10/02/20  0615   WBC 18.80* 16.79*  16.79*   HGB 8.4* 8.3*  8.3*   HCT 27.3* 26.6*  26.6*   * 408*  408*    and All labs within the past 24 hours have been reviewed  Microbiology:   Blood Culture   Lab Results   Component Value Date    LABBLOO No growth after 5 days. 09/26/2020    LABBLOO No growth after 5 days. 09/26/2020     Radiology: X-Ray: CXR: X-Ray Chest 1 View (CXR):   Results for orders placed or performed during the hospital encounter of 09/24/20   X-Ray Chest 1 View for PICC_Central line    Narrative    EXAMINATION:  XR CHEST 1 VIEW    CLINICAL HISTORY:  Evaluate PICC line placement;    TECHNIQUE:  Single frontal view of the chest was performed.    COMPARISON:  09/11/2020    FINDINGS:  PICC line placed via the right upper extremity has its tip overlying the cephalad aspect of the right atrium.    Pacer in the left anterior chest wall and attached transvenous leads are similar to 09/11/2020.    The patient is scoliotic.  Study was obtained with the patient in apical lordosis and right posterior oblique rotation, similar to 09/11/2020.  I detect no new intrathoracic disease in comparison to that earlier study.  Thoracic aorta is tortuous and atherosclerotic.      Impression    PICC line tip projects over the right atrium.      Electronically signed by: Leatha Flores MD  Date:    09/30/2020  Time:    11:47       Pending Diagnostic Studies:     None         Medications:  Reconciled Home Medications:      Medication List      START taking these medications    CEFTRIAXONE 2 G/50 ML D5W (READY TO MIX)  Inject 50 mLs (2 g total)  into the vein once daily. For 4-6 weeks (End 10/24 or 11/7/2020)     diclofenac sodium 1 % Gel  Commonly known as: VOLTAREN  Apply 4 g topically 3 (three) times daily. Apply to right side pain     Lactobacillus rhamnosus GG 10 billion cell capsule  Commonly known as: CULTURELLE  Take 1 capsule by mouth once daily.  Start taking on: October 3, 2020     metroNIDAZOLE 500 MG tablet  Commonly known as: FLAGYL  Take 1 tablet (500 mg total) by mouth every 8 (eight) hours. For 4-6 weeks (End 10/24 or 11/7/2020)        CONTINUE taking these medications    amiodarone 200 MG Tab  Commonly known as: PACERONE  TAKE 1 TABLET ONCE DAILY . START TAKING ONLY 1 TABLET EVERY DAY ON 4-19-19     apixaban 2.5 mg Tab  Commonly known as: ELIQUIS  Take 1 tablet (2.5 mg total) by mouth 2 (two) times daily.     aspirin 81 MG EC tablet  Commonly known as: ECOTRIN  Take 81 mg by mouth once daily.     atenoloL 25 MG tablet  Commonly known as: TENORMIN  TAKE 2 TABLETS EVERY MORNING AND 1 TABLET EVERY EVENING     atorvastatin 10 MG tablet  Commonly known as: LIPITOR  TAKE 1 TABLET DAILY     BIOTIN ORAL  Take 1 capsule by mouth once daily.     diltiaZEM 180 MG 24 hr capsule  Commonly known as: CARDIZEM CD  Take 1 capsule (180 mg total) by mouth once daily.     ergocalciferol 50,000 unit Cap  Commonly known as: VITAMIN D2  Take 1 capsule (50,000 Units total) by mouth every 7 days.     eszopiclone 2 MG Tab  Commonly known as: LUNESTA  Take 2 mg by mouth every evening.     levothyroxine 137 MCG Tab tablet  Commonly known as: SYNTHROID  Take 1 tablet (137 mcg total) by mouth once daily.     spironolactone 25 MG tablet  Commonly known as: ALDACTONE  Take 25 mg by mouth 2 (two) times daily.     VITAMIN D ORAL  Take 1 capsule by mouth once daily.            Indwelling Lines/Drains at time of discharge:   Lines/Drains/Airways     Peripherally Inserted Central Catheter Line            PICC Double Lumen 09/30/20 1059 right basilic 2 days          Drain                  Closed/Suction Drain 09/26/20 1450 Right Abdomen Bulb 10 Fr. 6 days                Time spent on the discharge of patient: 35 minutes  Patient was seen and examined on the date of discharge and determined to be suitable for discharge.         Lorenzo Marlow MD  Department of Hospital Medicine  [facility]

## 2020-10-03 NOTE — PROGRESS NOTES
Pt arrived to floor via wheelchair. Pt is aao x 4, VSS. Right PICC intact. RUQ drain intact with no drainage bag or bulb at the end, tubing clamped off. Pt has noted wound/split to gluteal cleft. Pt has no requests for pain medication at this time. Pt declined to call her family about arrival to floor due to the time.  Pt stated her children were aware she was being transferred today and will call them tomorrow. Will continue to monitor.

## 2020-10-03 NOTE — PROGRESS NOTES
Pt's belongings packed and D/C papers provided to pt via previous nurse. PICC line to remain in place as well as SIMONE drain. Pt off unit via wheelchair transport.

## 2020-10-03 NOTE — ASSESSMENT & PLAN NOTE
Sent from GI clinic where she had a f/u for recent hospital stay for ERCP --> elevated TBili, hypotensive, and increased transaminases. Was admitted to the hospital on 09/11 for abdominal pain and was found to have gallstone pancreatitis; got ERCP, CBD dilation and a gallbladder with sludge --> had EUS and ERCP with stone and sludge seen on the cholangiogram --> Sphincterotomy, no stents. Deemed not a surgical candidate for cholecystectomy; ERCP note indicates concern for choledocholithiasis.     PLAN    - Consult GI 9/26. ERCP was repeated yesterday. Did not show stone or obvious etiology for her elevated T bili and LFT on admission. The entire main bile duct was dilated; the biliary tree was swept and nothing was found; One plastic stent was placed into the common bile duct.  - Abd US was done 9/26. It showed heterogeneous pancreatic echotexture within adjacent 1.7 cm hypoechoic structure with irregular borders;  Dilatation of the extrahepatic common bile duct; intrahepatic duct demonstrates internal debris (possibly representing biliary sludge); 5.6 cm predominantly cystic lesion in the right hepatic lobe with internal debris. DDX included pancreatic mass, abscess, or pseudocyst.   - CT scan following pancreatic protocol done 9/26. It showed complex thick-walled partially septated cystic lesion involving the right hepatic lobe concerning for hepatic abscess; complex multiloculated cystic lesion involving the pancreatic body with additional suspected small cystic lesion/collection in the region of the pancreatic head; Common bile duct stent in place with pneumobilia as well as small volume of air within the mildly distended, thickened gallbladder.      - GI following. GI identified principal problem is hepatic abscess, which needs IR drainage.    - Talked with surgery and ID about source control of her infection. Surgery states that since gall bladder and biliary tract is not dilated, it is unlikely  cholecystectomy will control source and says she is not a candidate for cholecystectomy. ID states likely source of bacteremia is from the ERCP procedure earlier this month from previous admission.    - IR following. IR drainage for hepatic abscess was done 9/26. A drain was placed. Continues to drain. Drained fluid showed WBC of 332[address]. Culture grew GNR --> E. Coli. ID will adjust abx based on susceptibilities.    - 9/29 Patient WBC increased slightly and is more fatigued. Per ID, they do not think treatment is necessary for growth seen in urine  - Repeat lactate 0.6  - F/U repeat UA  - F/U C. Diff workup    - PICC line placed 9/30 for receiving IV antibiotics for 4 weeks  - Follow up with ID outpatient with weekly labs  - Switched from zosyn to rocephin and flagyl    - IR to leave drain in and re-evaluate in 1 week with CT scan

## 2020-10-04 PROCEDURE — 25000003 PHARM REV CODE 250: Performed by: HOSPITALIST

## 2020-10-04 PROCEDURE — 97530 THERAPEUTIC ACTIVITIES: CPT

## 2020-10-04 PROCEDURE — 97165 OT EVAL LOW COMPLEX 30 MIN: CPT

## 2020-10-04 PROCEDURE — 97535 SELF CARE MNGMENT TRAINING: CPT

## 2020-10-04 PROCEDURE — 97162 PT EVAL MOD COMPLEX 30 MIN: CPT

## 2020-10-04 PROCEDURE — 11000004 HC SNF PRIVATE

## 2020-10-04 PROCEDURE — 97116 GAIT TRAINING THERAPY: CPT

## 2020-10-04 RX ADMIN — MELATONIN TAB 3 MG 6 MG: 3 TAB at 10:10

## 2020-10-04 RX ADMIN — AMIODARONE HYDROCHLORIDE 200 MG: 200 TABLET ORAL at 09:10

## 2020-10-04 RX ADMIN — DICLOFENAC 4 G: 10 GEL TOPICAL at 09:10

## 2020-10-04 RX ADMIN — APIXABAN 2.5 MG: 2.5 TABLET, FILM COATED ORAL at 10:10

## 2020-10-04 RX ADMIN — DICLOFENAC 4 G: 10 GEL TOPICAL at 10:10

## 2020-10-04 RX ADMIN — METRONIDAZOLE 500 MG: 500 TABLET ORAL at 03:10

## 2020-10-04 RX ADMIN — ATENOLOL 50 MG: 50 TABLET ORAL at 09:10

## 2020-10-04 RX ADMIN — METRONIDAZOLE 500 MG: 500 TABLET ORAL at 06:10

## 2020-10-04 RX ADMIN — LEVOTHYROXINE SODIUM 137 MCG: 50 TABLET ORAL at 06:10

## 2020-10-04 RX ADMIN — METRONIDAZOLE 500 MG: 500 TABLET ORAL at 10:10

## 2020-10-04 RX ADMIN — DICLOFENAC 4 G: 10 GEL TOPICAL at 03:10

## 2020-10-04 RX ADMIN — Medication 1 CAPSULE: at 09:10

## 2020-10-04 RX ADMIN — APIXABAN 2.5 MG: 2.5 TABLET, FILM COATED ORAL at 09:10

## 2020-10-04 RX ADMIN — ATORVASTATIN CALCIUM 10 MG: 10 TABLET, FILM COATED ORAL at 09:10

## 2020-10-04 NOTE — PT/OT/SLP EVAL
Occupational Therapy  Evaluation/ Treatment    Gisselle Ortiz   MRN: [id number]   Admitting Diagnosis:   OT Date of Treatment: 10/04/20   OT Start Time: 0800  OT Stop Time: 0855  OT Total Time (min): 55 min    Billable Minutes:  Evaluation 15  Self Care/Home Management 40    Diagnosis:     Past Medical History:   Diagnosis Date    A-fib     Arthritis     Hypertension     Thyroid disease       Past Surgical History:   Procedure Laterality Date    CATARACT EXTRACTION      CATARACT EXTRACTION W/  INTRAOCULAR LENS IMPLANT Bilateral 2004    DR IN [place]    ENDOSCOPIC ULTRASOUND OF UPPER GASTROINTESTINAL TRACT N/A 9/14/2020    Procedure: ULTRASOUND, UPPER GI TRACT, ENDOSCOPIC;  Surgeon: Esha Howard MD;  Location: [facility] ([address] [place] [address]);  Service: Endoscopy;  Laterality: N/A;    ERCP N/A 9/14/2020    Procedure: ERCP (ENDOSCOPIC RETROGRADE CHOLANGIOPANCREATOGRAPHY);  Surgeon: Esha Howard MD;  Location: [facility] ([address] [place] [address]);  Service: Endoscopy;  Laterality: N/A;    ERCP N/A 9/25/2020    Procedure: ERCP (ENDOSCOPIC RETROGRADE CHOLANGIOPANCREATOGRAPHY);  Surgeon: Esha Howard MD;  Location: [facility] ([address] [place] [address]);  Service: Endoscopy;  Laterality: N/A;    EYE SURGERY      HIP REPLACEMENT ARTHROPLASTY      HYSTERECTOMY      JOINT REPLACEMENT      REVISION OF SKIN POCKET FOR PACEMAKER N/A 1/21/2019    Procedure: REVISION-POCKET-PACEMAKER;  Surgeon: Asher Watts MD;  Location: [facility] EP LAB;  Service: Cardiology;  Laterality: N/A;  MODERATE SEDATION, sedation issues in the past    THYROIDECTOMY      TREATMENT OF CARDIAC ARRHYTHMIA N/A 3/18/2019    Procedure: CARDIOVERSION OR DEFIBRILLATION;  Surgeon: Asher Watts MD;  Location: [facility] EP LAB;  Service: Cardiology;  Laterality: N/A;  AF, JILL-cx if SR, DCCV, MAC, FAS, 3PREP    TREATMENT OF CARDIAC ARRHYTHMIA N/A 5/14/2019    Procedure: Cardioversion or Defibrillation;  Surgeon: Derick Vizcarra MD;  Location: [facility] EP LAB;   "Service: Cardiology;  Laterality: N/A;  AF, JILL, DCCV, MAC, DM, 3PREP         General Precautions: Standard, aspiration, fall  Orthopedic Precautions: N/A  Braces: N/A    Spiritual, Cultural Beliefs, Hinduism Practices, Values that Affect Care: no     Patient History:  Lives With: alone  Living Arrangements: house  Home Accessibility: stairs to enter home(1 MANUEL)  Transportation Anticipated: family or friend will provide  Living Environment Comment: Pt is recently  and currently living alone in a single story home with 1 MANUEL. Pt's daughter lives 2 houses down and her son lives in [place], [place] and visits her 2x per week. Pt was totally (I) and was assisting her  who was ill.  She has a walk in shower with shower seat and grab bars and has comfort height toilet. Pt's daughter can assist post D/C as needed.  Equipment Currently Used at Home: rollator, wheelchair, bedside commode, shower chair, grab bar    Prior level of function:    Bed Mobility/Transfers: independent  Grooming: independent  Bathing: independent  Upper Body Dressing: independent  Lower Body Dressing: independent  Toileting: independent  Occupation: Other (Comment)  Type of Occupation: Housewife     Dominant hand: right    Subjective:  Communicated with nurse prior to session.  Chief Complaint: Pt indicating that she feels " really weak" andf the she needs a lot of help right now.  Patient/Family stated goals: Pt wants to return to PLOF.    Pain/Comfort  Pain Rating 1: 0/10  Pain Rating Post-Intervention 1: 0/10    Objective:   Patient found with: PICC line(and supine in bed.)    Cognitive Exam:  Oriented to: Person, Place, Time and Situation  Follows Commands/attention: Follows multistep  commands  Communication: clear/fluent  Memory:  No Deficits noted  Safety awareness/insight to disability: intact  Coping skills/emotional control: Appropriate to situation    Visual/perceptual:  Intact    Physical Exam:  Postural examination/scapula " alignment:    -       Rounded shoulders  Skin integrity: Visible skin intact  Edema: Moderate (L) UE    Sensation:      -       Intact    Upper Extremity Range of Motion:  Right Upper Extremity: WFL  Left Upper Extremity: WFL    Upper Extremity Strength:  Right Upper Extremity: WFL  Left Upper Extremity: WFL   Strength: WFLs    Fine motor coordination:      -       Intact    Gross motor coordination: WFL    Occupational Performance:    Bed Mobility:    · Patient completed Rolling/Turning to Right with minimum assistance  · Patient completed Scooting/Bridging with moderate assistance  · Patient completed Supine to Sit with moderate assistance     Functional Mobility/Transfers:  · Patient completed Sit <> Stand Transfer with minimum assistance  with  rolling walker   · Patient completed Bed <> Chair Transfer using Stand Pivot technique with minimum assistance with rolling walker  · Patient completed Toilet Transfer Stand Pivot technique with minimum assistance with  rolling walker  · Functional Mobility: Pt ambulated 4 ft from EOB to W/C with Min (A) and RW for safety.. Pt with decreased step length and slow tae when ambulating.    Activities of Daily Living:  · Feeding:  set up assist only. .  · Grooming: Set up assist only. Grooming performed seated sinkside.  · Bathing: moderate assistance with (A) to wash lower legs/feet and to steady when standing.  · Upper Body Dressing: minimum assistance with (A) to pull shirt over head when donning pullover shirt.  · Lower Body Dressing: moderate assistance with (A) to don/doff socks, to thread feet into pant legs and to steady when standing as well as to pull pants up in back.  · Toileting: moderate assistance with (A) to steady when standing to clean and to pull pants up in back.. Toileting performed from [facility] over toilet .    Additional Treatment:  Pt edu on role of OT, POC, safety when performing self care tasks , benefit of performing OOB activity, and safety when  performing functional transfers and mobility.  - White board updated  - Self care tasks completed-- as noted above     Patient left up in chair with call button in reach    [facility] 6 Click:  [facility] Total Score: 16    Assessment:  Gisselle Ortiz is a 87 y.o. female with a medical diagnosis of Hyperbilirubinemia, Bacteremia.   Pt presents with performance deficits of Physical skills including impaired functional mobility, strength, functional endurance, functional standing balance, and  functional use of ( L ) UE . Pt also demonstrating decreased safety and performance of Functional Activities, self care activities, as well as functional mobility. Pt is motivated and would benefit from OT intervention to further her functional (I)ce and safety.    Rehab identified problem list/impairments: weakness, impaired endurance, impaired self care skills, impaired functional mobilty, impaired balance, gait instability, decreased lower extremity function, decreased safety awareness, decreased ROM, edema, impaired cardiopulmonary response to activity    Rehab potential is good    Activity tolerance: Good    Discharge recommendations: home health OT     Barriers to discharge: Decreased caregiver support     Equipment recommendations: none     GOALS:   Multidisciplinary Problems     Occupational Therapy Goals        Problem: Occupational Therapy Goal    Goal Priority Disciplines Outcome Interventions   Occupational Therapy Goal     OT, PT/OT Ongoing, Progressing    Description: Goals to be met by: 10/20/20     Patient will increase functional independence with ADLs by performing:    UE Dressing with Modified [place].  LE Dressing with Stand-by Assistance.  Grooming while seated with Modified [place].  Toileting from toilet with Stand-by Assistance for hygiene and clothing management.   Bathing from  sitting at sink with Stand-by Assistance.  Supine to sit with Modified [place].  Stand pivot transfers with  Supervision using A/D as needed.  Pt's caregiver will be educated on level of assist required to safely perform self care tasks and functional transfers.                     PLAN: Patient to be seen 5 x/week to address the above listed problems via self-care/home management, therapeutic activities, therapeutic exercises  Plan of Care expires: 11/04/20  Plan of Care reviewed with: patient    Khari Stern OTR/KAELYN  10/04/2020

## 2020-10-04 NOTE — PT/OT/SLP EVAL
PhysicalTherapy   Evaluation    Gisselle Ortiz   MRN: [id number]     PT Received On: 10/04/20  PT Start Time: 0959     PT Stop Time: 1033    PT Total Time (min): 34 min       Billable Minutes:  Evaluation 10, Gait Training 10, Therapeutic Activity 14 and Total Time 24    Diagnosis: Bacteremia  Past Medical History:   Diagnosis Date    A-fib     Arthritis     Hypertension     Thyroid disease       Past Surgical History:   Procedure Laterality Date    CATARACT EXTRACTION      CATARACT EXTRACTION W/  INTRAOCULAR LENS IMPLANT Bilateral 2004    DR IN [place]    ENDOSCOPIC ULTRASOUND OF UPPER GASTROINTESTINAL TRACT N/A 9/14/2020    Procedure: ULTRASOUND, UPPER GI TRACT, ENDOSCOPIC;  Surgeon: Esha Howard MD;  Location: [facility] ([address] [place] [address]);  Service: Endoscopy;  Laterality: N/A;    ERCP N/A 9/14/2020    Procedure: ERCP (ENDOSCOPIC RETROGRADE CHOLANGIOPANCREATOGRAPHY);  Surgeon: Esha Howard MD;  Location: [facility] ([address] [place] [address]);  Service: Endoscopy;  Laterality: N/A;    ERCP N/A 9/25/2020    Procedure: ERCP (ENDOSCOPIC RETROGRADE CHOLANGIOPANCREATOGRAPHY);  Surgeon: Esha Howard MD;  Location: [facility] ([address] [place] [address]);  Service: Endoscopy;  Laterality: N/A;    EYE SURGERY      HIP REPLACEMENT ARTHROPLASTY      HYSTERECTOMY      JOINT REPLACEMENT      REVISION OF SKIN POCKET FOR PACEMAKER N/A 1/21/2019    Procedure: REVISION-POCKET-PACEMAKER;  Surgeon: Asher Watts MD;  Location: [facility] EP LAB;  Service: Cardiology;  Laterality: N/A;  MODERATE SEDATION, sedation issues in the past    THYROIDECTOMY      TREATMENT OF CARDIAC ARRHYTHMIA N/A 3/18/2019    Procedure: CARDIOVERSION OR DEFIBRILLATION;  Surgeon: Asher Watts MD;  Location: [facility] EP LAB;  Service: Cardiology;  Laterality: N/A;  AF, JILL-cx if SR, DCCV, MAC, FAS, 3PREP    TREATMENT OF CARDIAC ARRHYTHMIA N/A 5/14/2019    Procedure: Cardioversion or Defibrillation;  Surgeon: Derick Vizcarra MD;  Location: [facility] EP LAB;  " Service: Cardiology;  Laterality: N/A;  AF, JILL, DCCV, MAC, DM, 3PREP         General Precautions: Standard, aspiration, fall  Orthopedic Precautions: N/A   Braces: N/A    Spiritual, Cultural Beliefs, Congregation Practices, Values that Affect Care: no    Patient History:  Lives With: alone  Living Arrangements: house  Home Accessibility: stairs within home  Home Layout: Able to live on 1st floor  Stair Railings at Home: none  Transportation Anticipated: family or friend will provide  Living Environment Comment: Pt lives alone in a 1SH w/ 0 MANUEL. She has 1 step from the den to the kitchen. She has a walk-in shower w/ a grab bar. She has a raised toilet. Pt's daughter and son have been rotating each week staying w/ her since her  recently passed in July. Her daughter lives 2 houses down and is currently unemployeed.   DME owned: bedside commode, shower chair, wheelchair and rollator    Previous Level of Function: PTA pt was IND w/ all mobility and ADLs. She drives and does her own grocery shopping. She has a recent fall in September when home at night- she felt weak and was walking to the bathroom w/ her rollator when she fell.   Ambulation Skills: independent  Transfer Skills: independent  ADL Skills: needs device  Work/Leisure Activity: independent    Subjective:  Communicated with patient and OT prior to session.  Pt agreeable to session.  Chief Complaint: "total exhaustion"  Patient goals: "to get more energy so I can get up and move"    Pain/Comfort  Pain Rating 1: 0/10  Pain Rating Post-Intervention 1: 0/10    Objective:  Patient found sitting in w/c.      Cognitive Exam:  Oriented to: Person, Place, Time and Situation  Follows Commands/attention: Follows multistep  commands  Communication: clear/fluent  Safety awareness/insight to disability: intact    Physical Exam:  Postural examination/scapula alignment:    -       Rounded shoulders  -       Forward head    Skin integrity: Visible skin intact  Edema: " Moderate BUE/BLE (pt reports she has gained 20lbs since being in the hospital)    Sensation:      -       Intact    Upper Extremity Range of Motion:  Right Upper Extremity: WFL  Left Upper Extremity: WFL    Upper Extremity Strength:  Right Upper Extremity: WFL  Left Upper Extremity: WFL    Lower Extremity Range of Motion:  Right Lower Extremity: WFL  Left Lower Extremity: WFL    Lower Extremity Strength:  Right Lower Extremity: WFL except HF 4-/5  Left Lower Extremity: HF 4-/5, KE 4+/5, KF 4/5, DF 4+/5, PF 4-/5     Fine motor coordination:     -       Intact  Left hand thumb/finger opposition skills and Right hand thumb/finger opposition skills    Gross motor coordination: WFL      AM-PAC 6 CLICK MOBILITY  Total Score:17    Bed Mobility:  Roll Left/Right: on bed w/ SBA  Sit>Supine:on bed w/ SBA  Supine>Sit: on bed w/ SBA  HOB flat and w/o side rail    Transfers:  Sit<>Stand: to/from w/c and bedside chair; both w/ RW and Min/CGA  Stand Pivot Transfer: bedside chair<>EOB w/ RW and Min?CGA  Cues for forward scoot/lean and for hand/foot placement    Gait:  Amb 15ft w/ RW and CGA  Cues for posture and to remain inside RW  Limited in distance by fatigue     Advanced Gait:  Not yet appropriate    Balance:  Static stand 45s w/ RW and CGA  Pt in standing used a reacher to  5 rings from the floor w/ RW and CGA    Additional Treatment:  Pt was educated on PT role and POC. Pt verb understanding.    Patient left up in chair with all lines intact and call button in reach.    Assessment:  Gisselle Ortiz is a 87 y.o. female with a medical diagnosis of Bacteremia. Pt was limited t/o session by fatigue but was agreeable. PTA she was IND w/ mobility/ADLs but is currently limited by the below listed deficits requiring Min/CGA for transfers and short distance gait. She is appropriate for skilled PT services and will begin PT POC.    Rehab identified problem list/impairments: weakness, impaired endurance, impaired self care  skills, impaired functional mobilty, gait instability, impaired balance, edema    Rehab potential is good.    Activity tolerance: Good    Discharge recommendations: home with home health(w/ light family assistance initially)     Barriers to discharge: None    Equipment recommendations: walker, rolling     GOALS:   Multidisciplinary Problems     Physical Therapy Goals        Problem: Physical Therapy Goal    Goal Priority Disciplines Outcome Goal Variances Interventions   Physical Therapy Goal     PT, PT/OT Ongoing, Progressing     Description: Goals to be met by: 10/20/20     Patient will increase functional independence with mobility by performin. Supine to sit with Modified [place]  2. Sit to supine with Modified [place]  3. Rolling to Left and Right with Modified [place].  4. Sit to stand transfer with Supervision  5. Bed to chair transfer with Supervision using Rolling Walker  6. Gait  x 50 feet with Supervision using Rolling Walker.   7. Ascend/Descend 4 inch curb step with Stand-by Assistance using Rolling Walker.  8. Stand for 3 minutes with Stand-by Assistance using Rolling Walker while performing an activity  9. Pt will perform a car transfer (Actual or simulated) w/ RW and CGA  10. Pt will stand and  5 objects from the floor w/ a reacher, RW, and SPV                     PLAN:    Patient to be seen 5 x/week  to address the above listed problems via gait training, therapeutic exercises, therapeutic activities  Plan of Care Expires: 20    Jennifer Rust, PT 10/4/2020

## 2020-10-04 NOTE — PLAN OF CARE
Problem: Occupational Therapy Goal  Goal: Occupational Therapy Goal  Description: Goals to be met by: 10/20/20     Patient will increase functional independence with ADLs by performing:    UE Dressing with Modified [place].  LE Dressing with Stand-by Assistance.  Grooming while seated with Modified [place].  Toileting from toilet with Stand-by Assistance for hygiene and clothing management.   Bathing from  sitting at sink with Stand-by Assistance.  Supine to sit with Modified [place].  Stand pivot transfers with Supervision using A/D as needed.  Pt's caregiver will be educated on level of assist required to safely perform self care tasks and functional transfers.    Outcome: Ongoing, Progressing

## 2020-10-04 NOTE — PLAN OF CARE
PT eval completed. Pt will begin PT POC.  Jennifer Rust, PT  10/4/2020    Problem: Physical Therapy Goal  Goal: Physical Therapy Goal  Description: Goals to be met by: 10/20/20     Patient will increase functional independence with mobility by performin. Supine to sit with Modified [place]  2. Sit to supine with Modified [place]  3. Rolling to Left and Right with Modified [place].  4. Sit to stand transfer with Supervision  5. Bed to chair transfer with Supervision using Rolling Walker  6. Gait  x 50 feet with Supervision using Rolling Walker.   7. Ascend/Descend 4 inch curb step with Stand-by Assistance using Rolling Walker.  8. Stand for 3 minutes with Stand-by Assistance using Rolling Walker while performing an activity  9. Pt will perform a car transfer (Actual or simulated) w/ RW and CGA  10. Pt will stand and  5 objects from the floor w/ a reacher, RW, and SPV    Outcome: Ongoing, Progressing

## 2020-10-05 PROBLEM — R74.8 ABNORMAL LIVER ENZYMES: Status: ACTIVE | Noted: 2020-10-05

## 2020-10-05 PROBLEM — D72.829 LEUKOCYTOSIS: Status: ACTIVE | Noted: 2020-10-05

## 2020-10-05 PROBLEM — A41.51 E. COLI SEPTICEMIA: Status: ACTIVE | Noted: 2020-09-26

## 2020-10-05 LAB
ANISOCYTOSIS BLD QL SMEAR: SLIGHT
BASO STIPL BLD QL SMEAR: ABNORMAL
BASOPHILS # BLD AUTO: 0.05 K/UL (ref 0–0.2)
BASOPHILS NFR BLD: 0.5 % (ref 0–1.9)
DIFFERENTIAL METHOD: ABNORMAL
EOSINOPHIL # BLD AUTO: 0.3 K/UL (ref 0–0.5)
EOSINOPHIL NFR BLD: 2.5 % (ref 0–8)
ERYTHROCYTE [DISTWIDTH] IN BLOOD BY AUTOMATED COUNT: 18.6 % (ref 11.5–14.5)
HCT VFR BLD AUTO: 26.3 % (ref 37–48.5)
HGB BLD-MCNC: 8 G/DL (ref 12–16)
IMM GRANULOCYTES # BLD AUTO: 0.2 K/UL (ref 0–0.04)
IMM GRANULOCYTES NFR BLD AUTO: 2 % (ref 0–0.5)
LYMPHOCYTES # BLD AUTO: 1.2 K/UL (ref 1–4.8)
LYMPHOCYTES NFR BLD: 11.3 % (ref 18–48)
MCH RBC QN AUTO: 29.7 PG (ref 27–31)
MCHC RBC AUTO-ENTMCNC: 30.4 G/DL (ref 32–36)
MCV RBC AUTO: 98 FL (ref 82–98)
MONOCYTES # BLD AUTO: 0.8 K/UL (ref 0.3–1)
MONOCYTES NFR BLD: 8 % (ref 4–15)
NEUTROPHILS # BLD AUTO: 7.7 K/UL (ref 1.8–7.7)
NEUTROPHILS NFR BLD: 75.7 % (ref 38–73)
NRBC BLD-RTO: 0 /100 WBC
OVALOCYTES BLD QL SMEAR: ABNORMAL
PLATELET # BLD AUTO: 313 K/UL (ref 150–350)
PLATELET BLD QL SMEAR: ABNORMAL
PMV BLD AUTO: 11.4 FL (ref 9.2–12.9)
POIKILOCYTOSIS BLD QL SMEAR: SLIGHT
RBC # BLD AUTO: 2.69 M/UL (ref 4–5.4)
WBC # BLD AUTO: 10.17 K/UL (ref 3.9–12.7)

## 2020-10-05 PROCEDURE — 99305 1ST NF CARE MODERATE MDM 35: CPT | Mod: AI,,, | Performed by: HOSPITALIST

## 2020-10-05 PROCEDURE — 11000004 HC SNF PRIVATE

## 2020-10-05 PROCEDURE — 97802 MEDICAL NUTRITION INDIV IN: CPT

## 2020-10-05 PROCEDURE — 97530 THERAPEUTIC ACTIVITIES: CPT

## 2020-10-05 PROCEDURE — 97110 THERAPEUTIC EXERCISES: CPT

## 2020-10-05 PROCEDURE — 63600175 PHARM REV CODE 636 W HCPCS: Performed by: HOSPITALIST

## 2020-10-05 PROCEDURE — 97535 SELF CARE MNGMENT TRAINING: CPT

## 2020-10-05 PROCEDURE — 85025 COMPLETE CBC W/AUTO DIFF WBC: CPT

## 2020-10-05 PROCEDURE — 99305 PR NURSING FACILITY CARE, INIT, MOD SEVERITY: ICD-10-PCS | Mod: AI,,, | Performed by: HOSPITALIST

## 2020-10-05 PROCEDURE — 25000003 PHARM REV CODE 250: Performed by: HOSPITALIST

## 2020-10-05 PROCEDURE — 97116 GAIT TRAINING THERAPY: CPT

## 2020-10-05 RX ORDER — ONDANSETRON 4 MG/1
4 TABLET, ORALLY DISINTEGRATING ORAL EVERY 6 HOURS PRN
Status: DISCONTINUED | OUTPATIENT
Start: 2020-10-05 | End: 2020-11-02 | Stop reason: HOSPADM

## 2020-10-05 RX ORDER — FUROSEMIDE 40 MG/1
40 TABLET ORAL ONCE
Status: COMPLETED | OUTPATIENT
Start: 2020-10-06 | End: 2020-10-06

## 2020-10-05 RX ORDER — ACETAMINOPHEN 325 MG/1
650 TABLET ORAL EVERY 6 HOURS PRN
Status: DISCONTINUED | OUTPATIENT
Start: 2020-10-05 | End: 2020-11-02 | Stop reason: HOSPADM

## 2020-10-05 RX ADMIN — APIXABAN 2.5 MG: 2.5 TABLET, FILM COATED ORAL at 08:10

## 2020-10-05 RX ADMIN — ATORVASTATIN CALCIUM 10 MG: 10 TABLET, FILM COATED ORAL at 08:10

## 2020-10-05 RX ADMIN — Medication 1 CAPSULE: at 08:10

## 2020-10-05 RX ADMIN — ATENOLOL 50 MG: 50 TABLET ORAL at 08:10

## 2020-10-05 RX ADMIN — LEVOTHYROXINE SODIUM 137 MCG: 50 TABLET ORAL at 05:10

## 2020-10-05 RX ADMIN — AMIODARONE HYDROCHLORIDE 200 MG: 200 TABLET ORAL at 08:10

## 2020-10-05 RX ADMIN — METRONIDAZOLE 500 MG: 500 TABLET ORAL at 01:10

## 2020-10-05 RX ADMIN — MELATONIN TAB 3 MG 6 MG: 3 TAB at 09:10

## 2020-10-05 RX ADMIN — CEFTRIAXONE SODIUM 2 G: 2 INJECTION, SOLUTION INTRAVENOUS at 11:10

## 2020-10-05 RX ADMIN — METRONIDAZOLE 500 MG: 500 TABLET ORAL at 05:10

## 2020-10-05 RX ADMIN — APIXABAN 2.5 MG: 2.5 TABLET, FILM COATED ORAL at 09:10

## 2020-10-05 RX ADMIN — METRONIDAZOLE 500 MG: 500 TABLET ORAL at 09:10

## 2020-10-05 RX ADMIN — DICLOFENAC 4 G: 10 GEL TOPICAL at 08:10

## 2020-10-05 NOTE — CARE UPDATE
Interdisciplinary Team Meeting Note     Spoke with daughter, Génesis Araiza, at [phone].     Patient/Familygoals/wishes/concerns/needs: No concerns at this time. CM provided d/c date of 10/20.     Updates on current plan of care: Therapy manager provided therapy update to patient.           Attended by:   Nurse Manager - Carli Simms   - Danielle Brunner    - Debo Farris   Therapy Manager - Shannon Lujan   Registered Dietitian - Laura COONEY - Jackelin Go   - Maria Teresa Ferro

## 2020-10-05 NOTE — PT/OT/SLP PROGRESS
Occupational Therapy  Treatment    Gisselle Ortiz   MRN: [id number]   Admitting Diagnosis: <principal problem not specified>    OT Date of Treatment: 10/05/20     Billable Minutes:  Self Care/Home Management 15, Therapeutic Activity 10 and Therapeutic Exercise 13    General Precautions: Standard, aspiration, fall  Orthopedic Precautions: N/A  Braces: N/A    Spiritual, Cultural Beliefs, Baptism Practices, Values that Affect Care: no  Subjective:  Communicated with nurse prior to session.    Pain/Comfort  Pain Rating 1: 0/10  Pain Rating Post-Intervention 1: 0/10    Objective:  Patient found with: PICC line(and supine in bed.)    Occupational Performance:    Bed Mobility:    · Patient completed Rolling/Turning to Right with supervision  · Patient completed Scooting/Bridging with stand by assistance  · Patient completed Supine to Sit with contact guard assistance     Functional Mobility/Transfers:  · Patient completed Sit <> Stand Transfer with minimum assistance  with  rolling walker   · Patient completed Bed <> Chair Transfer using Stand Pivot technique with minimum assistance with rolling walker  · Patient completed Toilet Transfer Stand Pivot technique with minimum assistance with  rolling walker  · Functional Mobility: Pt ambulated from sink in restroom to bedside chair using RW with CGA a distance of 12 ft with slow tae noted with decreased step length.    Activities of Daily Living:  · Grooming: supervision seated sinkside toperform oral care, face and hand washing and combing her hair.  · Upper Body Dressing: stand by assistance with Pt able to don/doff pullover shirt.  · Lower Body Dressing: moderate assistance with (A) to don/doff socks. Pt able to thread feet into pants legs ans well as depends garment with CGA provided when standing and pulling clothing over her bottom with RW to steady.  · Toileting: minimum assistance to steady whenstanding to perfrom dilcia care and to manage clothnig over hips..  Toileting performed on BSC positioned over toilet with RW to steady.    [facility] 6 Click:  [facility] Total Score: 17    OT Exercises: UE Ergometer performed 5 minutes on UBE with Min resistance.   Pt performed dowel exercises with 1 pound dowel 1 set 10 reps performing shld Flex/ Extn, Horizontal Ab/Adduction, chest presses, and biceps curls.  No (A) needed to complete exercises but brief breaks provided between sets.  UE exercises performed to increase functional endurance and strength in order increase independence when performing self care tasks, functional ambulation, W/C propulsion , functional standing activities as well as when performing functional tasks.    Additional Treatment:  Pt edu on role of OT, POC, safety when performing self care tasks , benefit of performing OOB activity, and safety when performing functional transfers and mobility.  - White board updated  - Self care tasks completed-- as noted above     Patient left up in chair with call button in reach and nurse notified    ASSESSMENT:  Gisselle Ortiz is a 87 y.o. female with a medical diagnosis of Abdominal Pain, Bacteremia .  Pt tolerated Tx without incident and is making slow progress with self care tasks, functional mobility and functional transfers .  Pt fatigues rapidly but completed session. She would continue to benefit from OT intervention to further her functional (I)ce and safety.    Rehab identified problem list/impairments: weakness, impaired endurance, impaired self care skills, impaired functional mobilty, impaired balance, gait instability, decreased lower extremity function, decreased safety awareness, decreased ROM, edema, impaired cardiopulmonary response to activity    Rehab potential is good    Activity tolerance: Fair    Discharge recommendations: home health OT     Barriers to discharge: Decreased caregiver support     Equipment recommendations: none     GOALS:   Multidisciplinary Problems     Occupational Therapy Goals         Problem: Occupational Therapy Goal    Goal Priority Disciplines Outcome Interventions   Occupational Therapy Goal     OT, PT/OT Ongoing, Progressing    Description: Goals to be met by: 10/20/20     Patient will increase functional independence with ADLs by performing:    UE Dressing with Modified [place].  LE Dressing with Stand-by Assistance.  Grooming while seated with Modified [place].  Toileting from toilet with Stand-by Assistance for hygiene and clothing management.   Bathing from  sitting at sink with Stand-by Assistance.  Supine to sit with Modified [place].  Stand pivot transfers with Supervision using A/D as needed.  Pt's caregiver will be educated on level of assist required to safely perform self care tasks and functional transfers.                     Plan:  Patient to be seen 5 x/week to address the above listed problems via self-care/home management, therapeutic activities, therapeutic exercises  Plan of Care expires: 11/04/20  Plan of Care reviewed with: patient    Khari Stern, YEMIR/L  10/05/2020

## 2020-10-05 NOTE — CLINICAL REVIEW
Clinical Pharmacy Chart Review Note      Admit Date: 10/2/2020   LOS: 3 days       Gisselle Ortiz is a 87 y.o. female admitted to [facility] for PT/OT after hospitalization for abdominal pain.    Active Hospital Problems    Diagnosis  POA    Abdominal pain [R10.9]  Yes      Resolved Hospital Problems   No resolved problems to display.     Review of patient's allergies indicates:   Allergen Reactions    Ciprofloxacin Nausea And Vomiting     Patient Active Problem List    Diagnosis Date Noted    Abdominal pain 10/02/2020    Hepatic abscess 09/26/2020    Pancreatic abscess 09/26/2020    Bacteremia 09/26/2020    UTI (urinary tract infection) 09/26/2020    Dilated bile duct 09/25/2020    Hyperbilirubinemia 09/24/2020    Grief 07/28/2020    Hyperparathyroidism due to renal insufficiency 06/11/2020    SSS (sick sinus syndrome) 01/17/2019    PVC (premature ventricular contraction) 01/17/2019    Long term current use of antiarrhythmic drug 11/14/2017    Current use of long term anticoagulation 11/14/2017    Vitamin D deficiency 11/13/2017    Osteoarthritis of right hip 11/10/2016    RBBB 08/12/2016    Chronic renal insufficiency, stage III (moderate) 05/11/2016    Insomnia 05/09/2016    Gastroesophageal reflux disease 11/09/2015    Paroxysmal atrial fibrillation 05/19/2015    Pacemaker 05/19/2015    Hypertension 05/19/2015    Former smoker 05/19/2015    Hypothyroidism 05/19/2015    High cholesterol 05/19/2015       Scheduled Meds:    amiodarone  200 mg Oral Daily    apixaban  2.5 mg Oral BID    atenoloL  50 mg Oral Daily    atorvastatin  10 mg Oral Daily    cefTRIAXone (ROCEPHIN) IVPB  2 g Intravenous Q24H    diclofenac sodium  4 g Topical (Top) TID    Lactobacillus rhamnosus GG  1 capsule Oral Daily    levothyroxine  137 mcg Oral Before breakfast    metroNIDAZOLE  500 mg Oral Q8H     Continuous Infusions:   PRN Meds: acetaminophen, calcium carbonate, melatonin, senna-docusate 8.6-50  mg    OBJECTIVE:     Vital Signs (Last 24H)  Temp:  [98.2 °F (36.8 °C)-98.4 °F (36.9 °C)]   Pulse:  [60-62]   Resp:  [14-18]   BP: (120-122)/(58-59)   SpO2:  [95 %-96 %]     Laboratory:  CBC:   Recent Labs   Lab 09/30/20  0550 10/01/20  0442 10/02/20  0615   WBC 20.66* 18.80* 16.79*  16.79*   RBC 2.99* 2.81* 2.78*  2.78*   HGB 8.9* 8.4* 8.3*  8.3*   HCT 29.8* 27.3* 26.6*  26.6*   * 455* 408*  408*   * 97 96  96   MCH 29.8 29.9 29.9  29.9   MCHC 29.9* 30.8* 31.2*  31.2*     BMP:   Recent Labs   Lab 09/30/20  0550 10/01/20  0442 10/02/20  0615   GLU 67* 121* 117*  117*    143 142  142   K 4.0 3.6 3.3*  3.3*   * 115* 114*  114*   CO2 15* 21* 23  23   BUN 24* 23 20  20   CREATININE 0.9 0.9 0.8  0.8   CALCIUM 7.5* 7.4* 7.4*  7.4*     CMP:   Recent Labs   Lab 09/30/20  0550 10/01/20  0442 10/02/20  0615   GLU 67* 121* 117*  117*   CALCIUM 7.5* 7.4* 7.4*  7.4*   ALBUMIN 1.7* 1.7* 1.6*  1.6*   PROT 4.7* 4.8* 4.5*  4.5*    143 142  142   K 4.0 3.6 3.3*  3.3*   CO2 15* 21* 23  23   * 115* 114*  114*   BUN 24* 23 20  20   CREATININE 0.9 0.9 0.8  0.8   ALKPHOS 125 139* 127  127   ALT 92* 83* 71*  71*   * 99* 66*  66*   BILITOT 1.1* 0.8 0.7  0.7     LFTs:   Recent Labs   Lab 09/30/20  0550 10/01/20  0442 10/02/20  0615   ALT 92* 83* 71*  71*   * 99* 66*  66*   ALKPHOS 125 139* 127  127   BILITOT 1.1* 0.8 0.7  0.7   PROT 4.7* 4.8* 4.5*  4.5*   ALBUMIN 1.7* 1.7* 1.6*  1.6*     Lab Results   Component Value Date    TSH 0.443 09/24/2020         ASSESSMENT/PLAN:     I have reviewed the medications in compliance with CMS Regulation F756 of the [place]. Based on information gathered, the following items may need to be addressed:    **According to PMH and home medication list, patient takes the following medications at home. These medications are not currently ordered at [facility]:  · Diltiazem 24 hr 180 mg daily  · Spironolactone 25 mg twice  daily    Medications reviewed by PharmD, please re-consult if needed.      Flor Frey, Pharm. D.  Clinical Pharmacist  [facility]

## 2020-10-05 NOTE — PROGRESS NOTES
Pt is AAOx4; afebrile; vital signs stable. She denies any pain or nausea. She is up with one assist. PICC dressing clean and dry. BP is being taken in her leg due to swelling in her left arm.

## 2020-10-05 NOTE — PLAN OF CARE
10/05/20 1042   Discharge Assessment   Assessment Type Discharge Planning Assessment   Confirmed/corrected address and phone number on facesheet? Yes   Assessment information obtained from? Patient   Expected Length of Stay (days) 14   Communicated expected length of stay with patient/caregiver yes   Prior to hospitilization cognitive status: Alert/Oriented   Prior to hospitalization functional status: Independent   Current cognitive status: Alert/Oriented   Current Functional Status: Needs Assistance   Lives With alone   Able to Return to Prior Arrangements yes   Is patient able to care for self after discharge? Unable to determine at this time (comments)   Patient's perception of discharge disposition home or selfcare   Readmission Within the Last 30 Days no previous admission in last 30 days   Patient currently being followed by outpatient case management? No   Patient currently receives any other outside agency services? No   Equipment Currently Used at Home none   Do you have any problems affording any of your prescribed medications? No   Is the patient taking medications as prescribed? yes   Does the patient have transportation home? Yes   Transportation Anticipated family or friend will provide   Does the patient receive services at the [facility]? No   Discharge Plan A Home Health   Discharge Plan B Home with family   DME Needed Upon Discharge  other (see comments)  (TBD)   Patient/Family in Agreement with Plan yes     CM conducted discharge planning assessment at the bedside. Patient lives alone.  recently  this summer. Daughter does come by sometimes per patient. Patient was independent prior to admission. Was driving also. No equipment used at home. Wants home Health. CM will continue to follow up and assist with needs as indicated.     [id number] - [place] - [address] [facility]  [address] [place] [address]  Phone: [phone] Fax:  [phone]    [id number] - [place] - [address] AT [facility] [address] [place]  [address]  [place] [address]  Phone: [phone] Fax: [phone]    [facility] - [place], [place] - [address] [place] [address]  Phone: [phone] Fax: [phone]    Payor: MEDICARE / Plan: MEDICARE PART A & B / Product Type: Government /     Danielle Brunner RN Case Manager  [facility]

## 2020-10-05 NOTE — PLAN OF CARE
Plan of care reviewed with patient.  Patient verbalized understanding and had no further questions.  Patient sat up in chair most of the day.  Patient worked with PT/OT throughout the day.  Biliary drain site remains clean, dry, and intact.  Patient now resting comfortably in bed locked in lowest position, side rails up x3, and call bell in reach.  Will continue to monitor.

## 2020-10-05 NOTE — PT/OT/SLP PROGRESS
Physical Therapy  Treatment    Gisselle Ortiz   MRN: [id number]   Admitting Diagnosis: Abdominal pain     PT Received On: 10/05/20        Billable Minutes:  Gait Training 11, Therapeutic Activity 8, Therapeutic Exercise 20 and Total Time 39    Treatment Type: Treatment  PT/PTA: PT     PTA Visit Number: 0       General Precautions: Standard, aspiration, fall  Orthopedic Precautions: N/A   Braces: N/A    Spiritual, Cultural Beliefs, Catholic Practices, Values that Affect Care: no    Subjective:  Communicated with patient prior to session.  Pt agreeable to session but reports exhaustion.    Pain/Comfort  Pain Rating 1: 0/10  Pain Rating Post-Intervention 1: 0/10    Objective:  Patient found sitting in bedside chair.       AM-PAC 6 CLICK MOBILITY  Total Score:17    Bed Mobility:  Not performed    Transfers:  Sit<>Stand: to/from bedside chair, 2 trials, w/ RW and Min/CGA to rise  Stand Pivot Transfer: to/from bedside chair w/ RW and Min/CGA for stability when pivoting  Cues for forward scoot    Gait:  Amb 2 trials (40ft each) w/ RW and CGA for safety  Cues for posture and to remain inside RW     Therex:  Seated BLE therex 2x10 reps (GS, HF, LAQ, KF w/ towel slides, DF, PF)  Cues for form    Additional Treatment:  Seated LBE for endurance x2 trials ([id number], 2 min rest, then [id number]); limited by fatigue    PT and pt discussed her set D/C date along w/ POC/goals. Importance of inc'g time spent OOB was also discussed. Pt verb understanding. All questions answered w/in PT scope of practice.    Patient left up in chair with call button in reach.    Assessment:  Gisselle Ortiz is a 87 y.o. female with a medical diagnosis of abdominal pain.  Pt prasad session well despite her fatigue. She showed progress as she was able to inc gait distance and also tolerate 2 trials which is an increase from her previous session. She will continue PT POC to address the below listed deficits. She seems to be most limited by decreased  endurance.    Rehab identified problem list/impairments: weakness, impaired endurance, impaired self care skills, impaired functional mobilty, gait instability, impaired balance, edema    Rehab potential is good.    Activity tolerance: Good    Discharge recommendations: home with home health(w/ light family assistance initially)     Barriers to discharge: None    Equipment recommendations: walker, rolling     GOALS:   Multidisciplinary Problems     Physical Therapy Goals        Problem: Physical Therapy Goal    Goal Priority Disciplines Outcome Goal Variances Interventions   Physical Therapy Goal     PT, PT/OT Ongoing, Progressing     Description: Goals to be met by: 10/20/20     Patient will increase functional independence with mobility by performin. Supine to sit with Modified [place]  2. Sit to supine with Modified [place]  3. Rolling to Left and Right with Modified [place].  4. Sit to stand transfer with Supervision  5. Bed to chair transfer with Supervision using Rolling Walker  6. Gait  x 50 feet with Supervision using Rolling Walker.   7. Ascend/Descend 4 inch curb step with Stand-by Assistance using Rolling Walker.  8. Stand for 3 minutes with Stand-by Assistance using Rolling Walker while performing an activity  9. Pt will perform a car transfer (Actual or simulated) w/ RW and CGA  10. Pt will stand and  5 objects from the floor w/ a reacher, RW, and SPV                     PLAN:    Patient to be seen 5 x/week  to address the above listed problems via gait training, therapeutic exercises, therapeutic activities  Plan of Care expires: 20  Plan of Care reviewed with: patient    Jennifer Alhamida, PT  10/05/2020

## 2020-10-05 NOTE — PLAN OF CARE
Problem: Occupational Therapy Goal  Goal: Occupational Therapy Goal  Description: Goals to be met by: 10/20/20     Patient will increase functional independence with ADLs by performing:    UE Dressing with Modified [place].  LE Dressing with Stand-by Assistance.  Grooming while seated with Modified [place].  Toileting from toilet with Stand-by Assistance for hygiene and clothing management.   Bathing from  sitting at sink with Stand-by Assistance.  Supine to sit with Modified [place].  Stand pivot transfers with Supervision using A/D as needed.  Pt's caregiver will be educated on level of assist required to safely perform self care tasks and functional transfers.    Outcome: Ongoing, Progressing

## 2020-10-06 ENCOUNTER — PATIENT OUTREACH (OUTPATIENT)
Dept: ADMINISTRATIVE | Facility: CLINIC | Age: 85
End: 2020-10-06

## 2020-10-06 LAB
ALBUMIN SERPL BCP-MCNC: 1.7 G/DL (ref 3.5–5.2)
ALP SERPL-CCNC: 99 U/L (ref 55–135)
ALT SERPL W/O P-5'-P-CCNC: 33 U/L (ref 10–44)
ANION GAP SERPL CALC-SCNC: 5 MMOL/L (ref 8–16)
ANISOCYTOSIS BLD QL SMEAR: SLIGHT
AST SERPL-CCNC: 36 U/L (ref 10–40)
BASO STIPL BLD QL SMEAR: ABNORMAL
BASOPHILS # BLD AUTO: 0.04 K/UL (ref 0–0.2)
BASOPHILS NFR BLD: 0.4 % (ref 0–1.9)
BILIRUB SERPL-MCNC: 0.5 MG/DL (ref 0.1–1)
BUN SERPL-MCNC: 10 MG/DL (ref 8–23)
CALCIUM SERPL-MCNC: 7.5 MG/DL (ref 8.7–10.5)
CHLORIDE SERPL-SCNC: 111 MMOL/L (ref 95–110)
CO2 SERPL-SCNC: 24 MMOL/L (ref 23–29)
CREAT SERPL-MCNC: 0.8 MG/DL (ref 0.5–1.4)
DIFFERENTIAL METHOD: ABNORMAL
EOSINOPHIL # BLD AUTO: 0.2 K/UL (ref 0–0.5)
EOSINOPHIL NFR BLD: 2.3 % (ref 0–8)
ERYTHROCYTE [DISTWIDTH] IN BLOOD BY AUTOMATED COUNT: 18.6 % (ref 11.5–14.5)
EST. GFR  (AFRICAN AMERICAN): >60 ML/MIN/1.73 M^2
EST. GFR  (NON AFRICAN AMERICAN): >60 ML/MIN/1.73 M^2
GLUCOSE SERPL-MCNC: 101 MG/DL (ref 70–110)
HCT VFR BLD AUTO: 26.9 % (ref 37–48.5)
HGB BLD-MCNC: 8.2 G/DL (ref 12–16)
IMM GRANULOCYTES # BLD AUTO: 0.48 K/UL (ref 0–0.04)
IMM GRANULOCYTES NFR BLD AUTO: 4.9 % (ref 0–0.5)
LYMPHOCYTES # BLD AUTO: 1 K/UL (ref 1–4.8)
LYMPHOCYTES NFR BLD: 10.4 % (ref 18–48)
MCH RBC QN AUTO: 30.1 PG (ref 27–31)
MCHC RBC AUTO-ENTMCNC: 30.5 G/DL (ref 32–36)
MCV RBC AUTO: 99 FL (ref 82–98)
MONOCYTES # BLD AUTO: 0.9 K/UL (ref 0.3–1)
MONOCYTES NFR BLD: 9.1 % (ref 4–15)
NEUTROPHILS # BLD AUTO: 7.2 K/UL (ref 1.8–7.7)
NEUTROPHILS NFR BLD: 72.9 % (ref 38–73)
NRBC BLD-RTO: 0 /100 WBC
PLATELET # BLD AUTO: 295 K/UL (ref 150–350)
PLATELET BLD QL SMEAR: ABNORMAL
PMV BLD AUTO: 10.9 FL (ref 9.2–12.9)
POLYCHROMASIA BLD QL SMEAR: ABNORMAL
POTASSIUM SERPL-SCNC: 4 MMOL/L (ref 3.5–5.1)
PROT SERPL-MCNC: 4.5 G/DL (ref 6–8.4)
RBC # BLD AUTO: 2.72 M/UL (ref 4–5.4)
SODIUM SERPL-SCNC: 140 MMOL/L (ref 136–145)
WBC # BLD AUTO: 9.87 K/UL (ref 3.9–12.7)

## 2020-10-06 PROCEDURE — 97110 THERAPEUTIC EXERCISES: CPT

## 2020-10-06 PROCEDURE — 63600175 PHARM REV CODE 636 W HCPCS: Performed by: HOSPITALIST

## 2020-10-06 PROCEDURE — 97116 GAIT TRAINING THERAPY: CPT

## 2020-10-06 PROCEDURE — 25000003 PHARM REV CODE 250: Performed by: HOSPITALIST

## 2020-10-06 PROCEDURE — 80053 COMPREHEN METABOLIC PANEL: CPT

## 2020-10-06 PROCEDURE — 97535 SELF CARE MNGMENT TRAINING: CPT

## 2020-10-06 PROCEDURE — 11000004 HC SNF PRIVATE

## 2020-10-06 PROCEDURE — 97530 THERAPEUTIC ACTIVITIES: CPT

## 2020-10-06 PROCEDURE — 85025 COMPLETE CBC W/AUTO DIFF WBC: CPT

## 2020-10-06 RX ADMIN — Medication 1 CAPSULE: at 09:10

## 2020-10-06 RX ADMIN — METRONIDAZOLE 500 MG: 500 TABLET ORAL at 05:10

## 2020-10-06 RX ADMIN — AMIODARONE HYDROCHLORIDE 200 MG: 200 TABLET ORAL at 09:10

## 2020-10-06 RX ADMIN — APIXABAN 2.5 MG: 2.5 TABLET, FILM COATED ORAL at 08:10

## 2020-10-06 RX ADMIN — METRONIDAZOLE 500 MG: 500 TABLET ORAL at 02:10

## 2020-10-06 RX ADMIN — ATORVASTATIN CALCIUM 10 MG: 10 TABLET, FILM COATED ORAL at 09:10

## 2020-10-06 RX ADMIN — LEVOTHYROXINE SODIUM 137 MCG: 50 TABLET ORAL at 05:10

## 2020-10-06 RX ADMIN — DICLOFENAC 4 G: 10 GEL TOPICAL at 09:10

## 2020-10-06 RX ADMIN — APIXABAN 2.5 MG: 2.5 TABLET, FILM COATED ORAL at 09:10

## 2020-10-06 RX ADMIN — METRONIDAZOLE 500 MG: 500 TABLET ORAL at 10:10

## 2020-10-06 RX ADMIN — FUROSEMIDE 40 MG: 40 TABLET ORAL at 09:10

## 2020-10-06 RX ADMIN — CEFTRIAXONE SODIUM 2 G: 2 INJECTION, SOLUTION INTRAVENOUS at 09:10

## 2020-10-06 NOTE — CONSULTS
"  [facility] - Skilled Nursing Care  Adult Nutrition  Consult Note    SUMMARY     Recommendations-Malnutrition risk    Continue regular diet, encourage PO intake  Boost plus BID chocolate per pt request    Reason for Assessment    Reason For Assessment: consult  Diagnosis: (Abdominal pain)  Relevant Medical History: s/p hepatic abcess, s/p pancreatic abcess, hyperbilirubinemia, pacemaker, Vit D deficiency, HLD, HTN, AFIB, thyroid disease  Interdisciplinary Rounds: attended  General Information Comments: pt was  in July, and stopped eating well, and soon she was sick. PO 25%-50% at best, tonight she only took her soup and diet coke. she reports she still feels sick and weak, no N/V. NFPE completed showing mild to moderate muscle wasting, and mild fat losses.She is asking for Boost plus chocolate for a meal replacement rather than a meal supplement, Encourage PO increase in additon to the chocolate boost plus BID.  Nutrition Discharge Planning: DC on regular diet, ONS of choice    Nutrition Risk Screen    Nutrition Risk Screen: no indicators present    Nutrition/Diet History    Patient Reported Diet/Restrictions/Preferences: general  Typical Food/Fluid Intake: pt went down to 1-2 meals, reports half a sandwich was enough, she shops for food, has dental plate which is not fitting currently,  Food Preferences: no cottage cheese, no yogurt,  Spiritual, Cultural Beliefs, Oriental orthodox Practices, Values that Affect Care: no  Food Allergies: NKFA  Factors Affecting Nutritional Intake: decreased appetite, depression, altered gastrointestinal function  Anthropometrics  Temp: 98.2 °F (36.8 °C)  Height: 5' 3" (160 cm)  Height (inches): 63 in  Weight Method: Standard Scale  Weight: 63.5 kg (139 lb 15.9 oz)  Weight (lb): 139.99 lb  Ideal Body Weight (IBW), Female: 115 lb  % Ideal Body Weight, Female (lb): 121.73 %  BMI (Calculated): 24.8  BMI Grade: 18.5-24.9 - normal(pt overhydrated)  Usual Body Weight (UBW), k.2 kg  % " Usual Body Weight: 113.23  % Weight Change From Usual Weight: 12.99 %     Lab/Procedures/Meds    Pertinent Labs Reviewed: reviewed  Pertinent Labs Comments: Hg 8.3, Hct 26.6, Cl 114, albumin 1.6(infection), glucose 117, K 3.3,  Pertinent Medications Reviewed: reviewed  Pertinent Medications Comments: amiodrarone, apixaban, statin, lasix, levothyroxine, lactobacillus GG.       Estimated/Assessed Needs  Weight Used For Calorie Calculations: 63.5 kg (139 lb 15.9 oz)  Energy Calorie Requirements (kcal): 9334-7763(x 25-30 kcalories)  Energy Need Method: Kcal/kg  Protein Requirements: 76g-83g(x 1.2g/kg-1.3g)  Weight Used For Protein Calculations: 63.5 kg (139 lb 15.9 oz)  Fluid Requirements (mL): 1600 or per MD  Estimated Fluid Requirement Method: RDA Method  RDA Method (mL): 1587  CHO Requirement: -      Nutrition Prescription Ordered    Current Diet Order: Regular  Nutrition Order Comments: poor po no appetite  Oral Nutrition Supplement: -    Evaluation of Received Nutrient/Fluid Intake    Energy Calories Required: not meeting needs  Protein Required: not meeting needs  Fluid Required: meeting needs  Comments: drinks diet coke  Tolerance: tolerating(No N/V)  % Intake of Estimated Energy Needs: 25 - 50 %  % Meal Intake: 25 - 50 %    Nutrition Risk    Level of Risk/Frequency of Follow-up: high     Assessment and Plan    Inadequate oral intake related to poor appetite s/p critical illness as evidenced by PO < 25% , weight loss masked by overhydration during hospital stay,and pt reported grief response.    New    Plan  General diet  Collaboration with other providers  Commercial beverage- calories and protein- boost plus BID chocolate    Monitor and Evaluation    Food and Nutrient Intake: food and beverage intake  Food and Nutrient Adminstration: diet order  Physical Activity and Function: nutrition-related ADLs and IADLs  Anthropometric Measurements: weight change  Biochemical Data, Medical Tests and Procedures:  electrolyte and renal panel, gastrointestinal profile, inflammatory profile, glucose/endocrine profile  Nutrition-Focused Physical Findings: overall appearance     Malnutrition Assessment    10/5/20     Skin (Micronutrient): bruised  Eyes (Micronutrient): conjunctiva dull  Tongue (Micronutrient): magenta               Taoism Region (Muscle Loss): severe depletion  Clavicle Bone Region (Muscle Loss): mild depletion  Clavicle and Acromion Bone Region (Muscle Loss): mild depletion  Dorsal Hand (Muscle Loss): mild depletion  Anterior Thigh Region (Muscle Loss): mild depletion   Edema (Fluid Accumulation): 3-->moderate             Nutrition Follow-Up     yes two times per week

## 2020-10-06 NOTE — PT/OT/SLP PROGRESS
Occupational Therapy  Treatment    Gisselle Ortiz   MRN: [id number]   Admitting Diagnosis: Hepatic abscess    OT Date of Treatment: 10/06/20       Billable Minutes:  Self Care/Home Management 15 there ex 10    General Precautions: Standard, aspiration, fall  Orthopedic Precautions: N/A  Braces: N/A    Spiritual, Cultural Beliefs, Judaism Practices, Values that Affect Care: no    Subjective:  Communicated with nurse prior to session.  I feel terrible    Pain/Comfort  Pain Rating 1: 0/10  Pain Rating Post-Intervention 1: 0/10    Objective:  Patient found with: (seated in bedside chair)    Occupational Performance:    Bed Mobility:    · Not tested as pt. Was up in chair      Functional Mobility/Transfers:  · Patient completed Sit <> Stand Transfer with minimum assistance  with  rolling walker   · Functional Mobility: Pt. Ambulated in room with RW and CGA to and from  bathroom    Activities of Daily Living:  · Grooming: supervision seated at sink to wash face, brush teeth and comb hair    [facility] 6 Click:  [facility] Total Score: 17    OT Exercises: AROM without weight for BUE in all major available planes seated in bedside chair x 2 sets 10 reps    Additional Treatment:  Pt. Educated on importance of OOb/therapy  Pt. Engaged in sit to stand trials from bedside chair focusing on technique with Min A x multiple trials and once in stand required CGA    Patient left up in chair with call button in reach    ASSESSMENT:  Gisselle Ortiz is a 87 y.o. female with a medical diagnosis of Hepatic abscess  Pt. Tolerated session well on this date but was noted to be fatigued. Pt. Requires Min A for mobility and rest breaks with there ex .  Pt. Would benefit from continued OT services.     Rehab identified problem list/impairments: weakness, impaired endurance, impaired self care skills, impaired functional mobilty, impaired balance, gait instability, decreased lower extremity function, decreased safety awareness, decreased ROM,  edema, impaired cardiopulmonary response to activity    Rehab potential is good    Activity tolerance: Fair    Discharge recommendations: home health OT with light assist    Barriers to discharge: Decreased caregiver support     Equipment recommendations: none     GOALS:   Multidisciplinary Problems     Occupational Therapy Goals        Problem: Occupational Therapy Goal    Goal Priority Disciplines Outcome Interventions   Occupational Therapy Goal     OT, PT/OT Ongoing, Progressing    Description: Goals to be met by: 10/20/20     Patient will increase functional independence with ADLs by performing:    UE Dressing with Modified [place].  LE Dressing with Stand-by Assistance.  Grooming while seated with Modified [place].  Toileting from toilet with Stand-by Assistance for hygiene and clothing management.   Bathing from  sitting at sink with Stand-by Assistance.  Supine to sit with Modified [place].  Stand pivot transfers with Supervision using A/D as needed.  Pt's caregiver will be educated on level of assist required to safely perform self care tasks and functional transfers.                     Plan:  Patient to be seen 5 x/week to address the above listed problems via self-care/home management, therapeutic activities, therapeutic exercises  Plan of Care expires: 11/04/20  Plan of Care reviewed with: patient    SHAHIDA Alicea  10/06/2020

## 2020-10-06 NOTE — PROGRESS NOTES
Consult received per LPN for altered skin integrity to midline gluteal cleft.    Pt was admitted on 10/2/20 to [facility] after being [place] [facility] [place]. Pt has a PMHx of AFib on Eliquis with previous cardioversion, SSS s/p pacemaker, arthritis, HTN, thyroid disease with diastolic dysfunction (EF 55%, 9/2019).    Received pt lying in bed with eyes closed with respirations even and unlabored. Pt opens eyes easily when spoken to. Pt reports she has a sore on her backside that burns. Waffle overlay in use. Pt is able to turn easily to have buttocks assessed.    Upon assessment of midline gluteal cleft (present upon admission): open area of partial skin thickness with moist pink tissue with a small amount of serosanguinous drainage noted. Wound area concerning for a Stage 2 pressure injury complicated by moisture and shearing. Cleansed wound bed with sterile normal saline with TRIAD applied to wound bed per wound care nurse with border foam reinforced.    (see assessment and photo below)       10/06/20 0733        Altered Skin Integrity 10/02/20 2345 midline Gluteal cleft   Date First Assessed/Time First Assessed: 10/02/20 2345   Altered Skin Integrity Present on Admission: yes  Orientation: midline  Location: Gluteal cleft   Wound Image    Description of Altered Skin Integrity Partial thickness tissue loss. Shallow open ulcer with a red or pink wound bed, without slough. Intact or Open/Ruptured Serum-filled blister.   Dressing Appearance Dry;Intact;Clean   Drainage Amount Small   Drainage Characteristics/Odor Serosanguineous   Appearance Pink;Red;Moist   Tissue loss description Partial thickness   Periwound Area Intact;Pink;Scar tissue   Wound Edges Open   Wound Length (cm) 1 cm   Wound Width (cm) 0.5 cm   Wound Depth (cm) 0.1 cm   Wound Volume (cm^3) 0.05 cm^3   Wound Surface Area (cm^2) 0.5 cm^2   Care Cleansed with:;Sterile normal saline;Applied:;Skin Barrier   Dressing Reinforced;Foam   Periwound  Care Topical treatment applied   Off Loading Other (see comments)  (waffle overlay)   Dressing Change Due 10/06/20       Tube of TRIAD left at bedside, with additional tube ordred from central supply. Assisted pt with sitting up to receive breakfast tray. No other needs voiced at this time.    Recommendations:  -Nursing to cleanse sacrum with cleansing wipes and apply Triad ointment BID and prn cleaning.  Nursing to maintain pressure injury prevention interventions as directed. Triad ointment provides a hydrophilic environment to promote healing of exposed tissue and prevents breakdown of intact skin.    Plan of care discussed with pt, Charge Nurse aware of care given, and with MARYLOU Story via secure chat. Wound care will continue to follow pt [id number]

## 2020-10-06 NOTE — TREATMENT PLAN
"Rehab Services' DME recommendations    Gisselle Ortiz  MRN: [id number]    [x] Walker Adult (5'4"-6"6")    Accessories N/A    Wheels Yes    [x] Home health PT, OT, MSW and Nurse    Jennifer Rust, PT 10/6/2020        "

## 2020-10-06 NOTE — TELEPHONE ENCOUNTER
PAC 1 Call conducted with US Faye for OS[facility] 3rd floor. Other staff  not available at this time. Stated the patient arrived to the unit safely with all necessary paperwork. Denies any complications, complaints or exacerbations. Will continue to monitor until discharge.

## 2020-10-06 NOTE — PT/OT/SLP PROGRESS
Physical Therapy  Treatment    Gisselle Ortiz   MRN: [id number]   Admitting Diagnosis: Hepatic abscess    PT Received On: 10/06/20        Billable Minutes:  Gait Training 15, Therapeutic Activity 8, Therapeutic Exercise 19 and Total Time 42    Treatment Type: Treatment  PT/PTA: PT     PTA Visit Number: 0       General Precautions: Standard, aspiration, fall  Orthopedic Precautions: N/A   Braces: N/A    Spiritual, Cultural Beliefs, Mandaeism Practices, Values that Affect Care: no    Subjective:  Communicated with patient prior to session.  Pt agreeable to session.    Pain/Comfort  Pain Rating 1: 0/10  Pain Rating Post-Intervention 1: 0/10    Objective:  Patient found supine in bed. Patient found with: PICC line     AM-PAC 6 CLICK MOBILITY  Total Score:17    Bed Mobility:  Supine>Sit: on bed w/ SPV; HOB elevated and w/ side rail  inc'd time required    Transfers:  Sit<>Stand: from EOB and bedside chair (multiple trials) all w/ RW and Min/CGA  Stand Pivot Transfer: to bedside chair w/ RW and CGA  Cues for forward lean and for hand placement    Gait:  Amb 3 trials (30ft, 40ft, and 40ft) w/ RW and CGA  Cues for posture  Limited by fatigue     Therex:  Seated BLE therex 2x10 reps (HF, LAQ, AP)  Cues for full ROM and form    Additional Treatment:  Seated LBE x15min w/o rest break    Patient left up in chair with all lines intact and call button in reach.    Assessment:  Gisselle Ortiz is a 87 y.o. female with a medical diagnosis of Hepatic abscess.  Pt prasad session well but required encouragement for participation. She was able to inc gait distance and also complete 15 continuous minutes on the LBD. She will continue PT POC. She remains most limited by decreased endurance.    Rehab identified problem list/impairments: weakness, impaired endurance, impaired self care skills, impaired functional mobilty, gait instability, impaired balance, edema    Rehab potential is good.    Activity tolerance: Good    Discharge  recommendations: home with home health(w/ light family assistance initially)     Barriers to discharge: None    Equipment recommendations: walker, rolling     GOALS:   Multidisciplinary Problems     Physical Therapy Goals        Problem: Physical Therapy Goal    Goal Priority Disciplines Outcome Goal Variances Interventions   Physical Therapy Goal     PT, PT/OT Ongoing, Progressing     Description: Goals to be met by: 10/20/20     Patient will increase functional independence with mobility by performin. Supine to sit with Modified [place]  2. Sit to supine with Modified [place]  3. Rolling to Left and Right with Modified [place].  4. Sit to stand transfer with Supervision  5. Bed to chair transfer with Supervision using Rolling Walker  6. Gait  x 50 feet with Supervision using Rolling Walker.   7. Ascend/Descend 4 inch curb step with Stand-by Assistance using Rolling Walker.  8. Stand for 3 minutes with Stand-by Assistance using Rolling Walker while performing an activity  9. Pt will perform a car transfer (Actual or simulated) w/ RW and CGA  10. Pt will stand and  5 objects from the floor w/ a reacher, RW, and SPV                     PLAN:    Patient to be seen 5 x/week  to address the above listed problems via gait training, therapeutic exercises, therapeutic activities  Plan of Care expires: 20  Plan of Care reviewed with: patient    Jennifer M Barrera, PT  10/06/2020

## 2020-10-06 NOTE — H&P
Hospital Medicine  Skilled Nursing Facility   History and Physical Exam      Date of Service: 10/05/2020      Patient Name: Gisselle Ortiz  MRN: [id number]  Admission Date: 10/2/2020  Attending Physician: Rajiv Grajeda MD  Primary Care Provider: Kai Montez MD  Code Status: Full Code      Principal problem:  Hepatic abscess      Chief Complaint:   Chief Complaint   Patient presents with    Blood Infection     Admitted to OS for rehabilitation and IV antibiotics following hospital stay for hepatic abscess and E.coli bacteremia.           HPI:   Patient is an 88 yo female PMHx AFib on Eliquis, SSS s/p pacemaker placement (1/2019), HTN, hyperthyroidism s/p thyroidectomy  who presents to SNF following hospitalization for hepatic abscess/bacteremia.         Patient presented to hospital on 9/11 complaining of abdominal pain and was found to have gallstone pancreatitis. She had CBD dilation and a gallbladder with sludge. She underwent EUS and ERCP with stone and sludge seen on the cholangiogram. Sphincterotomy was performed and stone/sludge removed with no stents placed. Per notes, ERCP was concerning for choledocholithiasis.  Surgery was consulted and deemed the patient to be high risk for cholecystectomy. She was seen today in GI clinic for follow-up. She initially recovered well from the pancreatitis and ERCP and was eating prior to discharge. A few days after discharge she started having severe fatigue that progressively worsened to the point where the patient could not walk. She also reported having decreased appetite and nausea.   Patient presented to GI clinic on 9/25 with hypotension and elevated LFTs. Patient was sent to the ED for evaluation.      In the ED, she was found to be afebrile and her BP improved to 130/61 with IVF. HR 63 with NSR and existing RBBB. Qtc 512. She was breathing 20bpm with no O2 requirement. She c/o some nausea but no abd pain. On exam, she had mild discomfort to  deep palpation to RUQ abdomen.  CT scan with contrast following pancreatic protocol showed hepatic abscess. Blood culture grew gram negative cristofer; urine culture was positive enterococcus faecalis.      Pt had IR intervention for her hepatic abscess 9/26. Blood culture collected on 9/26  grew E.Coli susceptible to multiple abx. Urine enterococcus faecalis also shown to have susceptibility to multiple organism. Patient was started on Zosyn and ID consulted who recommended ceftriaxone with metronidazole for 4 weeks of treatment.  She had PICC line placed 9/30 for receiving IV antibiotics for 4 weeks.  IR to leave drain in and re-evaluate in 1 week with CT scan. Patient deemed medically stable and discharged to SNF for secondary weakness, debility and IV antibiotics.     Patient admitted with skilled services with PT and OT to improve functional status and ability to perform ADLs.       Past Medical History:   Past Medical History:   Diagnosis Date    A-fib     Arthritis     Hypertension     Thyroid disease        Past Surgical History:   Past Surgical History:   Procedure Laterality Date    CATARACT EXTRACTION      CATARACT EXTRACTION W/  INTRAOCULAR LENS IMPLANT Bilateral 2004     [place]    ENDOSCOPIC ULTRASOUND OF UPPER GASTROINTESTINAL TRACT N/A 9/14/2020    Procedure: ULTRASOUND, UPPER GI TRACT, ENDOSCOPIC;  Surgeon: Esha Howard MD;  Location: [address];  Service: Endoscopy;  Laterality: N/A;    ERCP N/A 9/14/2020    Procedure: ERCP (ENDOSCOPIC RETROGRADE CHOLANGIOPANCREATOGRAPHY);  Surgeon: Esha Howard MD;  Location: [address];  Service: Endoscopy;  Laterality: N/A;    ERCP N/A 9/25/2020    Procedure: ERCP (ENDOSCOPIC RETROGRADE CHOLANGIOPANCREATOGRAPHY);  Surgeon: Esha Howard MD;  Location: [address]);  Service: Endoscopy;  Laterality: N/A;    EYE SURGERY      HIP REPLACEMENT ARTHROPLASTY      HYSTERECTOMY      JOINT REPLACEMENT       REVISION OF SKIN POCKET FOR PACEMAKER N/A 1/21/2019    Procedure: REVISION-POCKET-PACEMAKER;  Surgeon: Asher Watts MD;  Location: [facility];  Service: Cardiology;  Laterality: N/A;  MODERATE SEDATION, sedation issues in the past    THYROIDECTOMY      TREATMENT OF CARDIAC ARRHYTHMIA N/A 3/18/2019    Procedure: CARDIOVERSION OR DEFIBRILLATION;  Surgeon: Asher Watts MD;  Location: [facility];  Service: Cardiology;  Laterality: N/A;  AF, JILL-cx if SR, DCCV, MAC, FAS, 3PREP    TREATMENT OF CARDIAC ARRHYTHMIA N/A 5/14/2019    Procedure: Cardioversion or Defibrillation;  Surgeon: Derick Vizcarra MD;  Location: [facility];  Service: Cardiology;  Laterality: N/A;  AF, JILL, DCCV, MAC, DM, 3PREP       Social History:   Tobacco Use    Smoking status: Former Smoker     Start date: 5/19/1964    Smokeless tobacco: Never Used   Substance and Sexual Activity    Alcohol use: No    Drug use: No    Sexual activity: Not Currently     Partners: Male       Family History:   Family History     Problem Relation (Age of Onset)    Heart attack Mother, Maternal Grandmother    Heart disease Mother, Maternal Grandmother    Heart failure Mother, Maternal Grandmother    Hypertension Mother, Maternal Grandmother    Stroke Maternal Grandmother          No current facility-administered medications on file prior to encounter.      Current Outpatient Medications on File Prior to Encounter   Medication Sig    amiodarone (PACERONE) 200 MG Tab TAKE 1 TABLET ONCE DAILY . START TAKING ONLY 1 TABLET EVERY DAY ON 4-19-19    apixaban (ELIQUIS) 2.5 mg Tab Take 1 tablet (2.5 mg total) by mouth 2 (two) times daily.    aspirin (ECOTRIN) 81 MG EC tablet Take 81 mg by mouth once daily.    atenoloL (TENORMIN) 25 MG tablet TAKE 2 TABLETS EVERY MORNING AND 1 TABLET EVERY EVENING    atorvastatin (LIPITOR) 10 MG tablet TAKE 1 TABLET DAILY    BIOTIN ORAL Take 1 capsule by mouth once daily.    ceftriaxone sodium (CEFTRIAXONE 2 G/50  ML D5W, READY TO MIX,) Inject 50 mLs (2 g total) into the vein once daily. For 4-6 weeks (End 10/24 or 11/7/2020)    diclofenac sodium (VOLTAREN) 1 % Gel Apply 4 g topically 3 (three) times daily. Apply to right side pain    diltiaZEM (CARDIZEM CD) 180 MG 24 hr capsule Take 1 capsule (180 mg total) by mouth once daily.    ergocalciferol (VITAMIN D2) 50,000 unit Cap Take 1 capsule (50,000 Units total) by mouth every 7 days.    ergocalciferol, vitamin D2, (VITAMIN D ORAL) Take 1 capsule by mouth once daily.    eszopiclone (LUNESTA) 2 MG Tab Take 2 mg by mouth every evening.    Lactobacillus rhamnosus GG (CULTURELLE) 10 billion cell capsule Take 1 capsule by mouth once daily.    levothyroxine (SYNTHROID) 137 MCG Tab tablet Take 1 tablet (137 mcg total) by mouth once daily.    metroNIDAZOLE (FLAGYL) 500 MG tablet Take 1 tablet (500 mg total) by mouth every 8 (eight) hours. For 4-6 weeks (End 10/24 or 11/7/2020)    spironolactone (ALDACTONE) 25 MG tablet Take 25 mg by mouth 2 (two) times daily.        Allergies:   Review of patient's allergies indicates:   Allergen Reactions    Ciprofloxacin Nausea And Vomiting       ROS:  Review of Systems   Constitutional: Positive for appetite change and fatigue. Negative for diaphoresis and fever.   HENT: Positive for rhinorrhea. Negative for congestion, nosebleeds, sinus pressure and sore throat.    Eyes: Negative for pain and itching.   Respiratory: Positive for shortness of breath. Negative for cough and wheezing.    Cardiovascular: Positive for leg swelling. Negative for chest pain and palpitations.   Gastrointestinal: Positive for diarrhea. Negative for abdominal pain, blood in stool, nausea and vomiting.   Endocrine: Negative for polydipsia and polyphagia.   Genitourinary: Negative for difficulty urinating, dysuria, frequency and hematuria.   Musculoskeletal: Negative for arthralgias, back pain and myalgias.   Skin: Negative for pallor and rash.    Allergic/Immunologic: Positive for environmental allergies. Negative for food allergies.   Neurological: Positive for weakness. Negative for dizziness, syncope, light-headedness and headaches.   Hematological: Bruises/bleeds easily.   Psychiatric/Behavioral: Negative for agitation and confusion. The patient is not nervous/anxious.        Objective:  Temp:  [98.2 °F (36.8 °C)-98.4 °F (36.9 °C)]   Pulse:  [60-62]   Resp:  [14-18]   BP: (120-122)/(58-59)   SpO2:  [95 %-96 %]     Body mass index is 24.8 kg/m².      Physical Exam  Vitals signs and nursing note reviewed.   Constitutional:       General: She is not in acute distress.     Appearance: She is well-developed.   HENT:      Head: Normocephalic and atraumatic.      Right Ear: External ear normal.      Left Ear: External ear normal.      Nose: No mucosal edema.      Mouth/Throat:      Dentition: Abnormal dentition.      Pharynx: No oropharyngeal exudate.   Eyes:      General: No scleral icterus.     Conjunctiva/sclera: Conjunctivae normal.      Pupils: Pupils are equal, round, and reactive to light.   Neck:      Musculoskeletal: Neck supple. No muscular tenderness.      Trachea: Phonation normal. No tracheal deviation.   Cardiovascular:      Rate and Rhythm: Normal rate and regular rhythm.      Heart sounds: S1 normal and S2 normal. No murmur.   Pulmonary:      Effort: Pulmonary effort is normal. No respiratory distress.      Breath sounds: Normal breath sounds. No wheezing or rales.   Chest:      Chest wall: No tenderness or crepitus.   Abdominal:      General: Bowel sounds are normal. There is no distension.      Palpations: Abdomen is soft.      Tenderness: There is abdominal tenderness in the right upper quadrant. There is no guarding or rebound.      Comments: Drain present in RUQ   Musculoskeletal:         General: No tenderness.      Right lower le+ Pitting Edema present.      Left lower le+ Pitting Edema present.   Lymphadenopathy:      Cervical:       Right cervical: No superficial cervical adenopathy.     Left cervical: No superficial cervical adenopathy.      Upper Body:      Right upper body: No supraclavicular adenopathy.      Left upper body: No supraclavicular adenopathy.   Skin:     General: Skin is warm and dry.      Findings: No erythema or rash.      Nails: There is no clubbing.     Neurological:      Mental Status: She is alert and oriented to person, place, and time.      Motor: No tremor or seizure activity.   Psychiatric:         Behavior: Behavior normal. Behavior is cooperative.         Thought Content: Thought content normal.         Significant Labs:   Lab Results   Component Value Date    WBC 16.79 (H) 10/02/2020    WBC 16.79 (H) 10/02/2020    HGB 8.3 (L) 10/02/2020    HGB 8.3 (L) 10/02/2020    HCT 26.6 (L) 10/02/2020    HCT 26.6 (L) 10/02/2020    MCV 96 10/02/2020    MCV 96 10/02/2020     (H) 10/02/2020     (H) 10/02/2020       BMP  Lab Results   Component Value Date     10/02/2020     10/02/2020    K 3.3 (L) 10/02/2020    K 3.3 (L) 10/02/2020     (H) 10/02/2020     (H) 10/02/2020    CO2 23 10/02/2020    CO2 23 10/02/2020    BUN 20 10/02/2020    BUN 20 10/02/2020    CREATININE 0.8 10/02/2020    CREATININE 0.8 10/02/2020    CALCIUM 7.4 (L) 10/02/2020    CALCIUM 7.4 (L) 10/02/2020    ANIONGAP 5 (L) 10/02/2020    ANIONGAP 5 (L) 10/02/2020    ESTGFRAFRICA >60.0 10/02/2020    ESTGFRAFRICA >60.0 10/02/2020    EGFRNONAA >60.0 10/02/2020    EGFRNONAA >60.0 10/02/2020     Lab Results   Component Value Date    ALT 71 (H) 10/02/2020    ALT 71 (H) 10/02/2020    AST 66 (H) 10/02/2020    AST 66 (H) 10/02/2020    ALKPHOS 127 10/02/2020    ALKPHOS 127 10/02/2020    BILITOT 0.7 10/02/2020    BILITOT 0.7 10/02/2020             Significant Imaging: I have reviewed all pertinent imaging results/findings completed during prior hospitalization.      Assessment and Plan:  Active Diagnoses:    Diagnosis Date Noted POA     PRINCIPAL PROBLEM:  Hepatic abscess [K75.0] 09/26/2020 Yes    Leukocytosis [D72.829] 10/05/2020 Yes    Abnormal liver enzymes [R74.8] 10/05/2020 Yes    Abdominal pain [R10.9] 10/02/2020 Yes    E. 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] 06/11/2020 Yes    Long term current use of antiarrhythmic drug [Z79.899] 11/14/2017 Not Applicable    Current use of long term anticoagulation [Z79.01] 11/14/2017 Not Applicable    Vitamin D deficiency [E55.9] 11/13/2017 Yes    Insomnia [G47.00] 05/09/2016 Yes    Gastroesophageal reflux disease [K21.9] 11/09/2015 Yes    Hypothyroidism [E03.9] 05/19/2015 Yes    Paroxysmal atrial fibrillation [I48.0] 05/19/2015 Yes    Hypertension [I10] 05/19/2015 Yes    Pacemaker [Z95.0] 05/19/2015 Yes      Problems Resolved During this Admission:     E. Coli Bacteremia  Hepatic abscess  - Culture grew E.Coli susceptible to multiple abx.   -continue Metronidazole 500 mg Q 8 empirically for anaerobic coverage est end date 10/26  -continue IV ceftriaxone (est end date 10/26)  -Weekly CMP, CBC, CRP, ESR  - Follow up with ID outpatient with weekly labs  - Repeat CT scan in a week to monitor hepatic abscess resolution and check with IR regarding the drain removal.     Paroxymal atrial fibrillation  - Continue Amiodarone 200 mg  - continue Atenolol 50 mg   - conitnue Apixiban 2.5 BID    Essential hypertension  -continue Atenolol 50 mg      Hypothyroidism   -TSH 0.443 on 9/25  - Continue levothyroxine 0.137 mg     Pacemaker  -DC PPM (2014; [facility]) implanted for symptomatic SB and first degree AVB     Hyperlipidemia  -continue Atorvastatin 10 mg   -Monitor LFTS      Goals of Care:   Restorative  Treat infection  Optimize nutrition  Wound healing  Muscle strengthening  Restoration of ADL's  Improve mobility      Anticipated Disposition:  Home with home health      Future Appointments   Date Time Provider Department Center   10/15/2020  2:00  PM Kai Cedillo PA-C [facility] ID [address]   10/23/2020 12:00 PM [facility] OIC-CT2 500 LB LIMIT [facility]   10/26/2020  2:00 PM MATTHEW Carpenter, ANP [facility] ID [address]   12/11/2020 11:30 AM Kai Montez MD [facility]       I certify that SNF services are required to be given on an inpatient basis because Gisselle Ortiz needs for skilled nursing care and/or skilled rehabilitation are required on a daily basis and such services can only practically be provided in a skilled nursing facility setting and are for an ongoing condition for which she received inpatient care in the hospital.       Rajiv Grajeda MD  Department of Hospital Medicine   [facility] [place] [facility] - Skilled Nursing Care

## 2020-10-06 NOTE — PLAN OF CARE
Inadequate oral intake related to poor appetite s/p critical illness as evidenced by PO < 25% , weight loss masked by overhydration during hospital stay,and pt reported grief response.     New     Plan  General diet  Collaboration with other providers  Commercial beverage- calories and protein- boost plus BID chocolate    Goal: PO to meet 50% of EEN by next RD follow up

## 2020-10-07 PROCEDURE — 25000003 PHARM REV CODE 250: Performed by: NURSE PRACTITIONER

## 2020-10-07 PROCEDURE — 11000004 HC SNF PRIVATE

## 2020-10-07 PROCEDURE — 97535 SELF CARE MNGMENT TRAINING: CPT | Mod: CO

## 2020-10-07 PROCEDURE — 25000003 PHARM REV CODE 250: Performed by: HOSPITALIST

## 2020-10-07 PROCEDURE — 63600175 PHARM REV CODE 636 W HCPCS: Performed by: HOSPITALIST

## 2020-10-07 PROCEDURE — 97110 THERAPEUTIC EXERCISES: CPT | Mod: CO

## 2020-10-07 RX ORDER — SPIRONOLACTONE 25 MG/1
25 TABLET ORAL 2 TIMES DAILY
Status: DISCONTINUED | OUTPATIENT
Start: 2020-10-07 | End: 2020-11-02 | Stop reason: HOSPADM

## 2020-10-07 RX ADMIN — Medication 1 CAPSULE: at 09:10

## 2020-10-07 RX ADMIN — CEFTRIAXONE SODIUM 2 G: 2 INJECTION, SOLUTION INTRAVENOUS at 11:10

## 2020-10-07 RX ADMIN — SPIRONOLACTONE 25 MG: 25 TABLET ORAL at 05:10

## 2020-10-07 RX ADMIN — ATORVASTATIN CALCIUM 10 MG: 10 TABLET, FILM COATED ORAL at 09:10

## 2020-10-07 RX ADMIN — METRONIDAZOLE 500 MG: 500 TABLET ORAL at 09:10

## 2020-10-07 RX ADMIN — APIXABAN 2.5 MG: 2.5 TABLET, FILM COATED ORAL at 09:10

## 2020-10-07 RX ADMIN — LEVOTHYROXINE SODIUM 137 MCG: 50 TABLET ORAL at 06:10

## 2020-10-07 RX ADMIN — METRONIDAZOLE 500 MG: 500 TABLET ORAL at 06:10

## 2020-10-07 RX ADMIN — METRONIDAZOLE 500 MG: 500 TABLET ORAL at 02:10

## 2020-10-07 RX ADMIN — AMIODARONE HYDROCHLORIDE 200 MG: 200 TABLET ORAL at 09:10

## 2020-10-07 RX ADMIN — ATENOLOL 50 MG: 50 TABLET ORAL at 09:10

## 2020-10-07 NOTE — PROGRESS NOTES
[facility]                                  Skilled Nursing Facility                   Progress Note     Admit Date: 10/2/2020  FRANCESCA 10/20/2020  Principal Problem:  Hepatic abscess   HPI obtained from patient interview and chart review     Chief Complaint: Establish Care/ Initial Visit     HPI:   Patient is an 86 yo female PMHx AFib on Eliquis, SSS s/p pacemaker placement (1/2019), HTN, hyperthyroidism s/p thyroidectomy  who presents to SNF following hospitalization for hepatic abscess/bacteremia.       Patient presented to hospital on 9/11 complaining of abdominal pain and was found to have gallstone pancreatitis. She had CBD dilation and a gallbladder with sludge. She underwent EUS and ERCP with stone and sludge seen on the cholangiogram. Sphincterotomy was performed and stone/sludge removed with no stents placed. Per notes, ERCP was concerning for choledocholithiasis.  Surgery was consulted and deemed the patient to be high risk for cholecystectomy. She was seen today in GI clinic for follow-up. She initially recovered well from the pancreatitis and ERCP and was eating prior to discharge. A few days after discharge she started having severe fatigue that progressively worsened to the point where the patient could not walk. She also reported having decreased appetite and nausea.   Patient presented to GI clinic on 9/25 with hypotension and elevated LFTs. Patient was sent to the ED for evaluation.     In the ED, she was found to be afebrile and her BP improved to 130/61 with IVF. HR 63 with NSR and existing RBBB. Qtc 512. She was breathing 20bpm with no O2 requirement. She c/o some nausea but no abd pain. On exam, she had mild discomfort to deep palpation to RUQ abdomen.  CT scan with contrast following pancreatic protocol showed hepatic abscess. Blood culture grew gram negative cristofer; urine culture was positive enterococcus faecalis.     Pt  had IR intervention for her hepatic abscess 9/26. Blood culture collected on 9/26  grew E.Coli susceptible to multiple abx. Urine enterococcus faecalis also shown to have susceptibility to multiple organism. Patient was started on Zosyn and ID consulted. Recommendations: Discontinued IV zosyn and started ceftriaxone 2 grams IV q 24 hours for E coli bacteremia and hepatic abscess. Started metronidazole 500 mg orally q 8 hours empirically for anaerobic coverage. Anticipate 4-6 weeks antibiotics for hepatic abscess (or until abscess resolves).  Would plan for 4 weeks of IV ceftriaxone and oral flagyl from date of  IR drainage (Estimated end date 10/26) with repeat CT abdomen/pelvis  with contrast at 4 weeks with ID follow up after imaging to determine ultimate duration of antibiotics and ability to transition to orals.  Assuming pancreatic lesion noted on CT is infected pseudocyst/abscess (vs neoplasm), would anticipate it would respond to current antibiotics, but have no culture data for this.  Will follow clinically with repeat imaging. If worsens on current course, will need aspiration for cultures and/or biopsy if concerned for neoplasm. May be able to discontinue oral flagyl earlier if no growth on anaerobic cultures.  Will determine at follow up. Weekly cbc, cmp, crp, esr and fax results to ID, ria Mares NP, at fax [phone]. (Pager [phone], spectra [phone], office [phone]).     PICC line placed 9/30 for receiving IV antibiotics for 4 weeks.  IR to leave drain in and re-evaluate in 1 week with CT scan. Patient deemed medically stable and discharged to SNF for secondary weakness, debility and IV antibiotics.     Patient will be treated at [facility] with PT and OT to improve functional status and ability to perform ADLs.     Past Medical History: Patient has a past medical history of A-fib, Arthritis, Hypertension, and Thyroid disease.    Past Surgical History: Patient has a past surgical history  that includes Cataract extraction; Cataract extraction w/  intraocular lens implant (Bilateral, 2004); Revision of skin pocket for pacemaker (N/A, 1/21/2019); Eye surgery; Hysterectomy; Joint replacement; Hip replacement arthroplasty; Treatment of cardiac arrhythmia (N/A, 3/18/2019); Thyroidectomy; Treatment of cardiac arrhythmia (N/A, 5/14/2019); ERCP (N/A, 9/14/2020); Endoscopic ultrasound of upper gastrointestinal tract (N/A, 9/14/2020); and ERCP (N/A, 9/25/2020).    Social History: Patient reports that she has quit smoking. She started smoking about 56 years ago. She has never used smokeless tobacco. She reports that she does not drink alcohol or use drugs.    Family History: family history includes Heart attack in her maternal grandmother and mother; Heart disease in her maternal grandmother and mother; Heart failure in her maternal grandmother and mother; Hypertension in her maternal grandmother and mother; Stroke in her maternal grandmother.    Allergies: Patient is allergic to ciprofloxacin.    ROS  Constitutional:+ fatigue + decreased appetite Negative for fever   Eyes: Negative for blurred vision, double vision   Respiratory: +shortness of breath (on exertion)  Negative for cough  Cardiovascular: + BLE edema 1+ pitting edema Negative for chest pain, palpitations.   Gastrointestinal: Negative for abdominal pain, constipation, diarrhea, nausea, vomiting.   Genitourinary: Negative for dysuria, frequency   Musculoskeletal:  + generalized weakness. Negative for back pain and myalgias.   Skin: Negative for itching and rash.   Neurological: Negative for dizziness, headaches.   Psychiatric/Behavioral: Negative for depression. The patient is not nervous/anxious.      24 hour Vital Sign Range   Temp:  [98.3 °F (36.8 °C)]   Pulse:  [60-61]   Resp:  [16]   BP: (101-119)/(51-55)   SpO2:  [96 %]     PEx  Constitutional: Patient appears debilitated.  No distress noted  HENT:   Head: Normocephalic and atraumatic.   Eyes:  Pupils are equal, round  Neck: Normal range of motion. Neck supple.   Cardiovascular: Normal rate, regular rhythm and normal heart sounds.    Pulmonary/Chest: Effort normal and breath sounds are clear  Abdominal: Soft. Bowel sounds are normal.   Musculoskeletal:+ weakness 4/5 on all extremities  Normal range of motion.   Neurological: Alert and oriented to person, place, and time.   Psychiatric: Normal mood and affect. Behavior is normal.   Skin: Skin is warm and dry. Full skin assessment: RUQ drain in place, scant amount of serosanguinous drainage, dressing CDI   Stage 2 pressure injury- gluteal cleft followed weekly by wound care nurse  Date First Assessed/Time First Assessed: 10/02/20 5866   Altered Skin Integrity Present on Admission: yes  Orientation: midline  Location: Gluteal cleft    Description of Altered Skin Integrity Partial thickness tissue loss. Shallow open ulcer with a red or pink wound bed, without slough. Intact or Open/Ruptured Serum-filled blister.   Dressing Appearance Dry;Intact;Clean   Drainage Amount Small   Drainage Characteristics/Odor Serosanguineous   Appearance Pink;Red;Moist   Tissue loss description Partial thickness   Periwound Area Intact;Pink;Scar tissue   Wound Edges Open   Wound Length (cm) 1 cm   Wound Width (cm) 0.5 cm   Wound Depth (cm) 0.1 cm   Wound Volume (cm^3) 0.05 cm^3   Wound Surface Area (cm^2) 0.5 cm^2   Care Cleansed with:;Sterile normal saline;Applied:;Skin Barrier   Dressing Reinforced;Foam         Recent Labs   Lab 10/06/20  0356      K 4.0   *   CO2 24   BUN 10   CREATININE 0.8       Recent Labs   Lab 10/06/20  0356   WBC 9.87   RBC 2.72*   HGB 8.2*   HCT 26.9*      MCV 99*   MCH 30.1   MCHC 30.5*         Assessment and Plan:    Bacteremia  - Culture grew E.Coli susceptible to multiple abx.   -continue Metronidazole 500 mg Q 8 empirically for anaerobic coverage est end date 10/26  -continue IV ceftriaxone (est end date 10/26)  -Weekly CMP, CBC,  CRP, ESR      Hepatic abscess  - PICC line placed 9/30 for receiving IV antibiotics for 4 weeks  - Follow up with ID outpatient with weekly labs  - continue ceftriaxone and flagyl   - IR to leave drain in and re-evaluate in 1 week with CT scan       Paroxymal atrial fibrillation  - Continue Amiodarone 200 mg  - continue Atenolol 50 mg   - conitnue Apixiban 2.5 BID     Essential hypertension  -continue Atenolol 50 mg     Hypothyroidism   -TSH 0.443 on 9/25  - Continue levothyroxine 0.137 mg    Pacemaker  -DC PPM (2014; [facility]) implanted for symptomatic SB and first degree AVB    Hyperlipidemia  -continue Atorvastatin 10 mg   -Monitor LFTS      Future Appointments   Date Time Provider Department Center   10/15/2020  2:00 PM Kai Cedillo PA-C [facility] ID [address]   10/23/2020 12:00 PM [facility] O[facility]-CT2 [address] LIMIT [facility]   10/26/2020  2:00 PM Keila Mares APRN, ANP [facility] ID [address]   12/11/2020 11:30 AM Kai Montez MD [facility]         I certify that SNF services are required to be given on an inpatient basis because Gisselle Ortiz needs for skilled nursing care and/or skilled rehabilitation are required on a daily basis and such services can only practically be provided in a skilled nursing facility setting and are for an ongoing condition for which she received inpatient care in the hospital.     Total time of the visit 66 minutes    Non physical exam/ non charting time: 40 minutes   Description of non physical exam/non charting time: counseling patient on clinical conditions and therapies provided regarding upcoming appointments, CT scan and antibiotic regimen.  Extensive chart review completed including all consultation notes.  All pertinent laboratory and radiographical images reviewed.        Chad Downing NP  Department of Hospital Medicine   [facility]     DOS: 10/6/2020       Patient note was created using MModal Dictation.  Any  errors in syntax or even information may not have been identified and edited on initial review prior to signing this note.

## 2020-10-07 NOTE — PT/OT/SLP PROGRESS
"Occupational Therapy  Treatment    Gisselle Ortiz   MRN: [id number]   Admitting Diagnosis: Hepatic abscess    OT Date of Treatment: 10/07/20       Billable Minutes:  Self Care/Home Management 38,  and Therapeutic Exercise 16    General Precautions: Standard, aspiration, fall  Orthopedic Precautions: N/A  Braces: N/A    Spiritual, Cultural Beliefs, Adventism Practices, Values that Affect Care: no    Subjective:  Communicated with nsg prior to session.  "I didn't know I have a visitor"    Pain/Comfort  Pain Rating 1: 0/10  Pain Rating Post-Intervention 1: 0/10    Objective:   Pt. Found supine on arrival     Occupational Performance:    Bed Mobility:    · Patient completed Rolling/Turning to Right with stand by assistance  · Patient completed Scooting/Bridging with stand by assistance  · Patient completed Supine to Sit with stand by assistance     Functional Mobility/Transfers:  · Patient completed Sit <> Stand Transfer with contact guard assistance  with  rolling walker   · Patient completed Bed <> Chair Transfer using Stand Pivot technique with contact guard assistance with rolling walker  · Patient completed Toilet Transfer Stand Pivot technique with contact guard assistance with  grab bars  · Functional Mobility: not tested 2* to fatigue     Activities of Daily Living:  · Grooming: supervision with oral care and hair care while seated at sink level   · Bathing: contact guard assistance with cleaning of Joi Areas instance at sink level  · Upper Body Dressing: supervision to doff gown and rosario button down shirt while seated  · Lower Body Dressing: contact guard assistance to manage pants over hips instance  · Toileting: minimum assistance with hygiene and diaper management at toilet level. Pt. With BM during therapy session     AMPA 6 Click:  AMPA Total Score: 17    OT Exercises: UE Ergometer 10 mins with minimum resistance and 2 rest breaks    Additional Treatment:  Pt. With 3# dowel activity with 2x10 reps " with  shd flex, bicep curls and forward flex motion to increase BUE ROM and strength,.   Pt. With standing and therex performed to increase ROM, endurance selfcare task and fxl mobility for independence     Patient left up in chair with all lines intact and call button in reach and nurse notified    ASSESSMENT:  Gisselle Ortiz is a 87 y.o. female with a medical diagnosis of Hepatic abscess Pt. participated well with session on this day.Pt. noted to be fatigue during therapy session on this day and required frequent rest breaks. Pt. Will continue to benefit from continued OT to progress towards goals    Rehab identified problem list/impairments: weakness, impaired endurance, impaired self care skills, impaired functional mobilty, impaired balance, gait instability, decreased lower extremity function, decreased safety awareness, decreased ROM, edema, impaired cardiopulmonary response to activity    Rehab potential is good    Activity tolerance: Good    Discharge recommendations: home health OT     Barriers to discharge: Decreased caregiver support     Equipment recommendations: none     GOALS:   Multidisciplinary Problems     Occupational Therapy Goals        Problem: Occupational Therapy Goal    Goal Priority Disciplines Outcome Interventions   Occupational Therapy Goal     OT, PT/OT Ongoing, Progressing    Description: Goals to be met by: 10/20/20     Patient will increase functional independence with ADLs by performing:    UE Dressing with Modified [place].  LE Dressing with Stand-by Assistance.  Grooming while seated with Modified [place].  Toileting from toilet with Stand-by Assistance for hygiene and clothing management.   Bathing from  sitting at sink with Stand-by Assistance.  Supine to sit with Modified [place].  Stand pivot transfers with Supervision using A/D as needed.  Pt's caregiver will be educated on level of assist required to safely perform self care tasks and functional  transfers.                 Plan:  Patient to be seen 5 x/week to address the above listed problems via self-care/home management, therapeutic activities, therapeutic exercises  Plan of Care expires: 11/04/20  Plan of Care reviewed with: patient    MINERVA Alcala OT and RONALD have discussed the above patients goals and status in collaboration with Plan of Care.  10/07/2020

## 2020-10-08 ENCOUNTER — LAB VISIT (OUTPATIENT)
Dept: LAB | Facility: OTHER | Age: 85
End: 2020-10-08
Attending: INTERNAL MEDICINE
Payer: MEDICARE

## 2020-10-08 DIAGNOSIS — Z11.59 SPECIAL SCREENING EXAMINATION FOR UNSPECIFIED VIRAL DISEASE: ICD-10-CM

## 2020-10-08 LAB
ALBUMIN SERPL BCP-MCNC: 1.8 G/DL (ref 3.5–5.2)
ALP SERPL-CCNC: 104 U/L (ref 55–135)
ALT SERPL W/O P-5'-P-CCNC: 26 U/L (ref 10–44)
ANION GAP SERPL CALC-SCNC: 5 MMOL/L (ref 8–16)
AST SERPL-CCNC: 36 U/L (ref 10–40)
BASOPHILS # BLD AUTO: 0.06 K/UL (ref 0–0.2)
BASOPHILS NFR BLD: 0.7 % (ref 0–1.9)
BILIRUB DIRECT SERPL-MCNC: 0.4 MG/DL (ref 0.1–0.3)
BILIRUB SERPL-MCNC: 0.5 MG/DL (ref 0.1–1)
BUN SERPL-MCNC: 8 MG/DL (ref 8–23)
CALCIUM SERPL-MCNC: 7.5 MG/DL (ref 8.7–10.5)
CHLORIDE SERPL-SCNC: 108 MMOL/L (ref 95–110)
CO2 SERPL-SCNC: 26 MMOL/L (ref 23–29)
CREAT SERPL-MCNC: 0.8 MG/DL (ref 0.5–1.4)
DIFFERENTIAL METHOD: ABNORMAL
EOSINOPHIL # BLD AUTO: 0.2 K/UL (ref 0–0.5)
EOSINOPHIL NFR BLD: 2.1 % (ref 0–8)
ERYTHROCYTE [DISTWIDTH] IN BLOOD BY AUTOMATED COUNT: 18.4 % (ref 11.5–14.5)
EST. GFR  (AFRICAN AMERICAN): >60 ML/MIN/1.73 M^2
EST. GFR  (NON AFRICAN AMERICAN): >60 ML/MIN/1.73 M^2
GLUCOSE SERPL-MCNC: 104 MG/DL (ref 70–110)
HCT VFR BLD AUTO: 26.9 % (ref 37–48.5)
HGB BLD-MCNC: 8.2 G/DL (ref 12–16)
IMM GRANULOCYTES # BLD AUTO: 0.32 K/UL (ref 0–0.04)
IMM GRANULOCYTES NFR BLD AUTO: 3.5 % (ref 0–0.5)
LYMPHOCYTES # BLD AUTO: 0.7 K/UL (ref 1–4.8)
LYMPHOCYTES NFR BLD: 7.8 % (ref 18–48)
MAGNESIUM SERPL-MCNC: 1.6 MG/DL (ref 1.6–2.6)
MCH RBC QN AUTO: 30.3 PG (ref 27–31)
MCHC RBC AUTO-ENTMCNC: 30.5 G/DL (ref 32–36)
MCV RBC AUTO: 99 FL (ref 82–98)
MONOCYTES # BLD AUTO: 0.9 K/UL (ref 0.3–1)
MONOCYTES NFR BLD: 10.3 % (ref 4–15)
NEUTROPHILS # BLD AUTO: 6.8 K/UL (ref 1.8–7.7)
NEUTROPHILS NFR BLD: 75.6 % (ref 38–73)
NRBC BLD-RTO: 0 /100 WBC
PHOSPHATE SERPL-MCNC: 2.6 MG/DL (ref 2.7–4.5)
PLATELET # BLD AUTO: 256 K/UL (ref 150–350)
PMV BLD AUTO: 10.4 FL (ref 9.2–12.9)
POTASSIUM SERPL-SCNC: 3.8 MMOL/L (ref 3.5–5.1)
PROT SERPL-MCNC: 4.6 G/DL (ref 6–8.4)
RBC # BLD AUTO: 2.71 M/UL (ref 4–5.4)
SARS-COV-2 RNA RESP QL NAA+PROBE: NOT DETECTED
SODIUM SERPL-SCNC: 139 MMOL/L (ref 136–145)
WBC # BLD AUTO: 9.02 K/UL (ref 3.9–12.7)

## 2020-10-08 PROCEDURE — 97530 THERAPEUTIC ACTIVITIES: CPT | Mod: CQ

## 2020-10-08 PROCEDURE — 25000003 PHARM REV CODE 250: Performed by: NURSE PRACTITIONER

## 2020-10-08 PROCEDURE — 97110 THERAPEUTIC EXERCISES: CPT | Mod: CQ

## 2020-10-08 PROCEDURE — U0003 INFECTIOUS AGENT DETECTION BY NUCLEIC ACID (DNA OR RNA); SEVERE ACUTE RESPIRATORY SYNDROME CORONAVIRUS 2 (SARS-COV-2) (CORONAVIRUS DISEASE [COVID-19]), AMPLIFIED PROBE TECHNIQUE, MAKING USE OF HIGH THROUGHPUT TECHNOLOGIES AS DESCRIBED BY CMS-2020-01-R: HCPCS

## 2020-10-08 PROCEDURE — 25000003 PHARM REV CODE 250: Performed by: HOSPITALIST

## 2020-10-08 PROCEDURE — 85025 COMPLETE CBC W/AUTO DIFF WBC: CPT

## 2020-10-08 PROCEDURE — 84100 ASSAY OF PHOSPHORUS: CPT

## 2020-10-08 PROCEDURE — 83735 ASSAY OF MAGNESIUM: CPT

## 2020-10-08 PROCEDURE — 11000004 HC SNF PRIVATE

## 2020-10-08 PROCEDURE — 80048 BASIC METABOLIC PNL TOTAL CA: CPT

## 2020-10-08 PROCEDURE — 80076 HEPATIC FUNCTION PANEL: CPT

## 2020-10-08 PROCEDURE — 63600175 PHARM REV CODE 636 W HCPCS: Performed by: HOSPITALIST

## 2020-10-08 RX ADMIN — METRONIDAZOLE 500 MG: 500 TABLET ORAL at 02:10

## 2020-10-08 RX ADMIN — LEVOTHYROXINE SODIUM 137 MCG: 50 TABLET ORAL at 06:10

## 2020-10-08 RX ADMIN — CEFTRIAXONE SODIUM 2 G: 2 INJECTION, SOLUTION INTRAVENOUS at 09:10

## 2020-10-08 RX ADMIN — ATENOLOL 50 MG: 50 TABLET ORAL at 09:10

## 2020-10-08 RX ADMIN — AMIODARONE HYDROCHLORIDE 200 MG: 200 TABLET ORAL at 09:10

## 2020-10-08 RX ADMIN — SPIRONOLACTONE 25 MG: 25 TABLET ORAL at 09:10

## 2020-10-08 RX ADMIN — METRONIDAZOLE 500 MG: 500 TABLET ORAL at 09:10

## 2020-10-08 RX ADMIN — METRONIDAZOLE 500 MG: 500 TABLET ORAL at 06:10

## 2020-10-08 RX ADMIN — ATORVASTATIN CALCIUM 10 MG: 10 TABLET, FILM COATED ORAL at 09:10

## 2020-10-08 RX ADMIN — APIXABAN 2.5 MG: 2.5 TABLET, FILM COATED ORAL at 09:10

## 2020-10-08 RX ADMIN — Medication 1 CAPSULE: at 09:10

## 2020-10-08 NOTE — PT/OT/SLP PROGRESS
"Physical Therapy  Treatment    Gisselle Ortiz   MRN: [id number]   Admitting Diagnosis: Hepatic abscess    PT Received On: 10/08/20        Billable Minutes:  Therapeutic Activity 15 and Therapeutic Exercise 10    Treatment Type: Treatment  PT/PTA: PTA     PTA Visit Number: 1       General Precautions: Standard, aspiration, fall  Orthopedic Precautions: N/A   Braces: N/A    Spiritual, Cultural Beliefs, Orthodox Practices, Values that Affect Care: no    Subjective:  "I have to get on the commode quick, I have diarrhea"    Pain/Comfort  Pain Rating 1: 0/10  Pain Rating Post-Intervention 1: 0/10    Objective:  Patient found with: (in BSC)     AM-PAC 6 CLICK MOBILITY  Total Score:17    Transfers:  Sit<>Stand: min/CGA with RW and no AD  Stand Pivot Transfer: with RW min/CG BSChair<>BScommode    Gait:deferred today    Therex:  2x10 rep AP,GS,QS,LAQ    Additional Treatment:  toileting min/CGA with trfs and total A with cleaning/changing and clothing mgmt    Patient left up in chair with call button in reach and belongings in reach.    Assessment:  Gisselle Ortiz is a 87 y.o. female with a medical diagnosis of Hepatic abscess.  Pt with limited session 2* to not feeling well, diarrhea, nsg aware,  pt would continue to benefit from skilled PT services to improve overall functional mobility, strength and endurance.  .    Rehab identified problem list/impairments: weakness, impaired endurance, impaired self care skills, impaired functional mobilty, gait instability, impaired balance, edema    Rehab potential is good.    Activity tolerance: Fair    Discharge recommendations: home with home health(w/ light family assistance initially)     Barriers to discharge: None    Equipment recommendations: walker, rolling     GOALS:   Multidisciplinary Problems     Physical Therapy Goals        Problem: Physical Therapy Goal    Goal Priority Disciplines Outcome Goal Variances Interventions   Physical Therapy Goal     PT, PT/OT " Ongoing, Progressing     Description: Goals to be met by: 10/20/20     Patient will increase functional independence with mobility by performin. Supine to sit with Modified [place]  2. Sit to supine with Modified [place]  3. Rolling to Left and Right with Modified [place].  4. Sit to stand transfer with Supervision  5. Bed to chair transfer with Supervision using Rolling Walker  6. Gait  x 50 feet with Supervision using Rolling Walker.   7. Ascend/Descend 4 inch curb step with Stand-by Assistance using Rolling Walker.  8. Stand for 3 minutes with Stand-by Assistance using Rolling Walker while performing an activity  9. Pt will perform a car transfer (Actual or simulated) w/ RW and CGA  10. Pt will stand and  5 objects from the floor w/ a reacher, RW, and SPV                     PLAN:    Patient to be seen 5 x/week  to address the above listed problems via gait training, therapeutic exercises, therapeutic activities  Plan of Care expires: 20  Plan of Care reviewed with: patient    Dorys Grier, PTA  10/08/2020

## 2020-10-08 NOTE — PLAN OF CARE
Problem: Adult Inpatient Plan of Care  Goal: Plan of Care Review  Outcome: Ongoing, Progressing     Problem: Adult Inpatient Plan of Care  Goal: Patient-Specific Goal (Individualization)  Outcome: Ongoing, Progressing     Problem: Infection  Goal: Infection Symptom Resolution  Outcome: Ongoing, Progressing     Problem: Skin Injury Risk Increased  Goal: Skin Health and Integrity  Outcome: Ongoing, Progressing     Problem: Wound  Goal: Optimal Wound Healing  Outcome: Ongoing, Progressing

## 2020-10-09 LAB
ALBUMIN SERPL BCP-MCNC: 1.7 G/DL (ref 3.5–5.2)
ALP SERPL-CCNC: 87 U/L (ref 55–135)
ALT SERPL W/O P-5'-P-CCNC: 19 U/L (ref 10–44)
ANION GAP SERPL CALC-SCNC: 5 MMOL/L (ref 8–16)
AST SERPL-CCNC: 25 U/L (ref 10–40)
BILIRUB SERPL-MCNC: 0.5 MG/DL (ref 0.1–1)
BUN SERPL-MCNC: 8 MG/DL (ref 8–23)
CALCIUM SERPL-MCNC: 7.3 MG/DL (ref 8.7–10.5)
CHLORIDE SERPL-SCNC: 108 MMOL/L (ref 95–110)
CO2 SERPL-SCNC: 26 MMOL/L (ref 23–29)
CREAT SERPL-MCNC: 0.8 MG/DL (ref 0.5–1.4)
CRP SERPL-MCNC: 20.09 MG/L (ref 0–3.19)
ERYTHROCYTE [SEDIMENTATION RATE] IN BLOOD BY WESTERGREN METHOD: 21 MM/HR (ref 0–36)
EST. GFR  (AFRICAN AMERICAN): >60 ML/MIN/1.73 M^2
EST. GFR  (NON AFRICAN AMERICAN): >60 ML/MIN/1.73 M^2
GLUCOSE SERPL-MCNC: 79 MG/DL (ref 70–110)
POTASSIUM SERPL-SCNC: 3.9 MMOL/L (ref 3.5–5.1)
PROT SERPL-MCNC: 4.4 G/DL (ref 6–8.4)
SODIUM SERPL-SCNC: 139 MMOL/L (ref 136–145)

## 2020-10-09 PROCEDURE — 25500020 PHARM REV CODE 255: Performed by: HOSPITALIST

## 2020-10-09 PROCEDURE — 97110 THERAPEUTIC EXERCISES: CPT | Mod: CQ

## 2020-10-09 PROCEDURE — 25000003 PHARM REV CODE 250: Performed by: NURSE PRACTITIONER

## 2020-10-09 PROCEDURE — 97116 GAIT TRAINING THERAPY: CPT | Mod: CQ

## 2020-10-09 PROCEDURE — 63600175 PHARM REV CODE 636 W HCPCS: Performed by: HOSPITALIST

## 2020-10-09 PROCEDURE — 86141 C-REACTIVE PROTEIN HS: CPT

## 2020-10-09 PROCEDURE — 97803 MED NUTRITION INDIV SUBSEQ: CPT

## 2020-10-09 PROCEDURE — 80053 COMPREHEN METABOLIC PANEL: CPT

## 2020-10-09 PROCEDURE — 25000003 PHARM REV CODE 250: Performed by: HOSPITALIST

## 2020-10-09 PROCEDURE — 11000004 HC SNF PRIVATE

## 2020-10-09 PROCEDURE — 85652 RBC SED RATE AUTOMATED: CPT

## 2020-10-09 RX ADMIN — METRONIDAZOLE 500 MG: 500 TABLET ORAL at 05:10

## 2020-10-09 RX ADMIN — ATORVASTATIN CALCIUM 10 MG: 10 TABLET, FILM COATED ORAL at 09:10

## 2020-10-09 RX ADMIN — SPIRONOLACTONE 25 MG: 25 TABLET ORAL at 09:10

## 2020-10-09 RX ADMIN — AMIODARONE HYDROCHLORIDE 200 MG: 200 TABLET ORAL at 09:10

## 2020-10-09 RX ADMIN — DICLOFENAC 4 G: 10 GEL TOPICAL at 04:10

## 2020-10-09 RX ADMIN — METRONIDAZOLE 500 MG: 500 TABLET ORAL at 09:10

## 2020-10-09 RX ADMIN — IOHEXOL 75 ML: 350 INJECTION, SOLUTION INTRAVENOUS at 02:10

## 2020-10-09 RX ADMIN — APIXABAN 2.5 MG: 2.5 TABLET, FILM COATED ORAL at 09:10

## 2020-10-09 RX ADMIN — MELATONIN TAB 3 MG 6 MG: 3 TAB at 09:10

## 2020-10-09 RX ADMIN — Medication 1 CAPSULE: at 09:10

## 2020-10-09 RX ADMIN — CEFTRIAXONE SODIUM 2 G: 2 INJECTION, SOLUTION INTRAVENOUS at 10:10

## 2020-10-09 RX ADMIN — LEVOTHYROXINE SODIUM 137 MCG: 50 TABLET ORAL at 05:10

## 2020-10-09 RX ADMIN — ATENOLOL 50 MG: 50 TABLET ORAL at 09:10

## 2020-10-09 NOTE — NURSING
Patient returned via w/c from [facility], [address]ena [address] from CT scan. Alert & oriented on return. KUNAL PICC line in place.

## 2020-10-09 NOTE — PT/OT/SLP PROGRESS
Occupational Therapy      Patient Name:  Gisselle Ortiz   MRN:  [id number]    Patient not seen today secondary to visitor in A.M and off of unit in P.M  . Will follow-up with OT POC.    MINERVA Alcala  10/9/2020

## 2020-10-09 NOTE — PROGRESS NOTES
"[facility] - Skilled Nursing Care  Adult Nutrition  Progress Note    SUMMARY   Recommendations  Recommendation: Continue regular diet, boost plus BID, RD following  Goals: PO to meet 50% of needs by next RD follow up  Nutrition Goal Status: progressing towards goal    Reason for Assessment    Reason For Assessment: RD follow-up  Diagnosis: (Abdominal pain)  Relevant Medical History: s/p hepatic abcess, s/p pancreatic abcess, hyperbilirubinemia, pacemaker, Vit D deficiency, HLD, HTN, AFIB, thyroid disease  Interdisciplinary Rounds: did not attend  General Information Comments: PO improved patient taking ONS , LBM 10/8, was NPO today and missed two meals  Nutrition Discharge Planning: DC on regular diet, ONS of choice    Nutrition/Diet History    Patient Reported Diet/Restrictions/Preferences: general  Typical Food/Fluid Intake: pt went down to 1-2 meals, reports half a sandwich was enough, she shops for food, has dental plate which is not fitting currently,  Food Preferences: no cottage cheese, no yogurt,  Spiritual, Cultural Beliefs, Judaism Practices, Values that Affect Care: no  Food Allergies: NKFA  Factors Affecting Nutritional Intake: decreased appetite, depression, altered gastrointestinal function    Anthropometrics    Temp: 98 °F (36.7 °C)  Height: 5' 3" (160 cm)  Height (inches): 63 in  Weight Method: Standard Scale  Weight: 63.5 kg (139 lb 15.9 oz)  Weight (lb): 139.99 lb  Ideal Body Weight (IBW), Female: 115 lb  % Ideal Body Weight, Female (lb): 121.73 %  BMI (Calculated): 24.8  BMI Grade: 18.5-24.9 - normal(pt overhydrated)  Usual Body Weight (UBW), k.2 kg  % Usual Body Weight: 113.23  % Weight Change From Usual Weight: 12.99 %       Lab/Procedures/Meds    Pertinent Labs Reviewed: reviewed  Pertinent Labs Comments: CRP 20  Pertinent Medications Reviewed: reviewed  Pertinent Medications Comments: amiodrarone, apixaban, statin, lasix, levothyroxine, lactobacillus GG.       Estimated/Assessed " Needs    Weight Used For Calorie Calculations: 63.5 kg (139 lb 15.9 oz)  Energy Calorie Requirements (kcal): 5262-6084(x 25-30 kcalories)  Energy Need Method: Kcal/kg  Protein Requirements: 76g-83g(x 1.2g/kg-1.3g)  Weight Used For Protein Calculations: 63.5 kg (139 lb 15.9 oz)  Fluid Requirements (mL): 1600 or per MD  Estimated Fluid Requirement Method: RDA Method  RDA Method (mL): 1587  CHO Requirement: -      Nutrition Prescription Ordered    Current Diet Order: Regular  Nutrition Order Comments:   Oral Nutrition Supplement: Boost plus BID     Evaluation of Received Nutrient/Fluid Intake    Energy Calories Required: meeting needs  Protein Required: meeting needs  Fluid Required: meeting needs  Comments: remains on Abx via PICC  Tolerance: tolerating  % Intake of Estimated Energy Needs: 75 - 100 %  % Meal Intake: 75 - 100 %    Nutrition Risk    Level of Risk/Frequency of Follow-up: high     Assessment and Plan  Inadequate oral intake related to poor appetite s/p critical illness as evidenced by PO < 25% , weight loss masked by overhydration during hospital stay,and pt reported grief response.     Goal: PO 50% MET  New Goal: PO to meet 85% of EEN/EPN  with ONS in place,for healing by next RD visit.     Plan  General diet  Collaboration with other providers  Commercial beverage- calories and protein- boost plus BID     Monitor and Evaluation    Food and Nutrient Intake: food and beverage intake  Food and Nutrient Adminstration: diet order  Physical Activity and Function: nutrition-related ADLs and IADLs  Anthropometric Measurements: weight change  Biochemical Data, Medical Tests and Procedures: electrolyte and renal panel, gastrointestinal profile, inflammatory profile, glucose/endocrine profile  Nutrition-Focused Physical Findings: overall appearance     Malnutrition Assessment   10/5/20     Skin (Micronutrient): bruised  Eyes (Micronutrient): conjunctiva dull  Tongue (Micronutrient): magenta               Temple  Region (Muscle Loss): severe depletion  Clavicle Bone Region (Muscle Loss): mild depletion  Clavicle and Acromion Bone Region (Muscle Loss): mild depletion  Dorsal Hand (Muscle Loss): mild depletion  Anterior Thigh Region (Muscle Loss): mild depletion   Edema (Fluid Accumulation): 3-->moderate             Nutrition Follow-Up    RD Follow-up?: Yes

## 2020-10-09 NOTE — NURSING
To [facility] [address] for  CT scan for abd/chest  Via stretcher per medic personnel.

## 2020-10-09 NOTE — PROGRESS NOTES
[facility]                                  Skilled Nursing Facility                   Progress Note     Admit Date: 10/2/2020  FRANCESCA 10/20/2020  Principal Problem:  Hepatic abscess   HPI obtained from patient interview and chart review     Chief Complaint: Re-evaluation of medical treatment and therapy status: Lab review      HPI:   Patient is an 86 yo female PMHx AFib on Eliquis, SSS s/p pacemaker placement (1/2019), HTN, hyperthyroidism s/p thyroidectomy  who presents to SNF following hospitalization for hepatic abscess/bacteremia.       Patient presented to hospital on 9/11 complaining of abdominal pain and was found to have gallstone pancreatitis. She had CBD dilation and a gallbladder with sludge. She underwent EUS and ERCP with stone and sludge seen on the cholangiogram. Sphincterotomy was performed and stone/sludge removed with no stents placed. Per notes, ERCP was concerning for choledocholithiasis.  Surgery was consulted and deemed the patient to be high risk for cholecystectomy. She was seen today in GI clinic for follow-up. She initially recovered well from the pancreatitis and ERCP and was eating prior to discharge. A few days after discharge she started having severe fatigue that progressively worsened to the point where the patient could not walk. She also reported having decreased appetite and nausea.   Patient presented to GI clinic on 9/25 with hypotension and elevated LFTs. Patient was sent to the ED for evaluation.     In the ED, she was found to be afebrile and her BP improved to 130/61 with IVF. HR 63 with NSR and existing RBBB. Qtc 512. She was breathing 20bpm with no O2 requirement. She c/o some nausea but no abd pain. On exam, she had mild discomfort to deep palpation to RUQ abdomen.  CT scan with contrast following pancreatic protocol showed hepatic abscess. Blood culture grew gram negative cristofer; urine culture was  positive enterococcus faecalis.     Pt had IR intervention for her hepatic abscess 9/26. Blood culture collected on 9/26  grew E.Coli susceptible to multiple abx. Urine enterococcus faecalis also shown to have susceptibility to multiple organism. Patient was started on Zosyn and ID consulted. Recommendations: Discontinued IV zosyn and started ceftriaxone 2 grams IV q 24 hours for E coli bacteremia and hepatic abscess. Started metronidazole 500 mg orally q 8 hours empirically for anaerobic coverage. Anticipate 4-6 weeks antibiotics for hepatic abscess (or until abscess resolves).  Would plan for 4 weeks of IV ceftriaxone and oral flagyl from date of  IR drainage (Estimated end date 10/26) with repeat CT abdomen/pelvis  with contrast at 4 weeks with ID follow up after imaging to determine ultimate duration of antibiotics and ability to transition to orals.  Assuming pancreatic lesion noted on CT is infected pseudocyst/abscess (vs neoplasm), would anticipate it would respond to current antibiotics, but have no culture data for this.  Will follow clinically with repeat imaging. If worsens on current course, will need aspiration for cultures and/or biopsy if concerned for neoplasm. May be able to discontinue oral flagyl earlier if no growth on anaerobic cultures.  Will determine at follow up. Weekly cbc, cmp, crp, esr and fax results to ID, attention Nimesh Mares NP, at fax [phone]. (Pager [phone], spectra [phone], office [phone]).     PICC line placed 9/30 for receiving IV antibiotics for 4 weeks.  IR to leave drain in and re-evaluate in 1 week with CT scan. Patient deemed medically stable and discharged to SNF for secondary weakness, debility and IV antibiotics.     Patient will be treated at [facility] with PT and OT to improve functional status and ability to perform ADLs.       Interval history: All of today's labs reviewed and are listed below.  24 hr vital sign ranges listed below.  Patient denies trouble  sleeping at night, shortness of breath, abdominal discomfort, nausea, or vomiting.  Patient reports an adequate appetite.  Patient denies dysuria.  Patient reports having regular bowel movements. BLE edema improving.  Patient progessing with PT/OT. Continuing to follow and treat all acute and chronic conditions.      Past Medical History: Patient has a past medical history of A-fib, Arthritis, Hypertension, and Thyroid disease.    Past Surgical History: Patient has a past surgical history that includes Cataract extraction; Cataract extraction w/  intraocular lens implant (Bilateral, 2004); Revision of skin pocket for pacemaker (N/A, 1/21/2019); Eye surgery; Hysterectomy; Joint replacement; Hip replacement arthroplasty; Treatment of cardiac arrhythmia (N/A, 3/18/2019); Thyroidectomy; Treatment of cardiac arrhythmia (N/A, 5/14/2019); ERCP (N/A, 9/14/2020); Endoscopic ultrasound of upper gastrointestinal tract (N/A, 9/14/2020); and ERCP (N/A, 9/25/2020).    Social History: Patient reports that she has quit smoking. She started smoking about 56 years ago. She has never used smokeless tobacco. She reports that she does not drink alcohol or use drugs.    Family History: family history includes Heart attack in her maternal grandmother and mother; Heart disease in her maternal grandmother and mother; Heart failure in her maternal grandmother and mother; Hypertension in her maternal grandmother and mother; Stroke in her maternal grandmother.    Allergies: Patient is allergic to ciprofloxacin.    ROS  Constitutional:+ fatigue + decreased appetite Negative for fever   Eyes: Negative for blurred vision, double vision   Respiratory: +shortness of breath (on exertion)  Negative for cough  Cardiovascular: + BLE edema 1+ pitting edema (improved) Negative for chest pain, palpitations.   Gastrointestinal: Negative for abdominal pain, constipation, diarrhea, nausea, vomiting.   Genitourinary: Negative for dysuria, frequency    Musculoskeletal:  + generalized weakness. Negative for back pain and myalgias.   Skin: Negative for itching and rash.   Neurological: Negative for dizziness, headaches.   Psychiatric/Behavioral: Negative for depression. The patient is not nervous/anxious.      24 hour Vital Sign Range   Temp:  [98 °F (36.7 °C)-98.4 °F (36.9 °C)]   Pulse:  [60-65]   Resp:  [16-18]   BP: (125-130)/(58-62)   SpO2:  [95 %-97 %]     PEx  Constitutional: Patient appears debilitated.  No distress noted  HENT:   Head: Normocephalic and atraumatic.   Eyes: Pupils are equal, round  Neck: Normal range of motion. Neck supple.   Cardiovascular: Normal rate, regular rhythm and normal heart sounds.    Pulmonary/Chest: Effort normal and breath sounds are clear  Abdominal: Soft. Bowel sounds are normal.   Musculoskeletal:+ weakness 4/5 on all extremities  Normal range of motion.   Neurological: Alert and oriented to person, place, and time.   Psychiatric: Normal mood and affect. Behavior is normal.   Skin: Skin is warm and dry.  RUQ drain in place, scant amount of serosanguinous drainage, dressing CDI   Stage 2 pressure injury- gluteal cleft followed weekly by wound care nurse  Date First Assessed/Time First Assessed: 10/02/20 9089   Altered Skin Integrity Present on Admission: yes  Orientation: midline  Location: Gluteal cleft    Description of Altered Skin Integrity Partial thickness tissue loss. Shallow open ulcer with a red or pink wound bed, without slough. Intact or Open/Ruptured Serum-filled blister.   Dressing Appearance Dry;Intact;Clean   Drainage Amount Small   Drainage Characteristics/Odor Serosanguineous   Appearance Pink;Red;Moist   Tissue loss description Partial thickness   Periwound Area Intact;Pink;Scar tissue   Wound Edges Open   Wound Length (cm) 1 cm   Wound Width (cm) 0.5 cm   Wound Depth (cm) 0.1 cm   Wound Volume (cm^3) 0.05 cm^3   Wound Surface Area (cm^2) 0.5 cm^2   Care Cleansed with:;Sterile normal saline;Applied:;Skin  Barrier   Dressing Reinforced;Foam         Recent Labs   Lab 10/08/20  0719      K 3.8      CO2 26   BUN 8   CREATININE 0.8   MG 1.6       Recent Labs   Lab 10/08/20  0719   WBC 9.02   RBC 2.71*   HGB 8.2*   HCT 26.9*      MCV 99*   MCH 30.3   MCHC 30.5*         Assessment and Plan:    Bacteremia  - Culture grew E.Coli susceptible to multiple abx.   -continue Metronidazole 500 mg Q 8 empirically for anaerobic coverage est end date 10/26  -continue IV ceftriaxone (est end date 10/26)  -Weekly CMP, CBC, CRP, ESR      Hepatic abscess  - PICC line placed 9/30 for receiving IV antibiotics for 4 weeks  - Follow up with ID outpatient with weekly labs  - continue ceftriaxone and flagyl   - IR to leave drain in and re-evaluate in 1 week with CT scan       Paroxymal atrial fibrillation  - Continue Amiodarone 200 mg  - continue Atenolol 50 mg   - conitnue Apixiban 2.5 BID     Essential hypertension  -continue Atenolol 50 mg        -continue Spirolactone 25 mg BID  10/08/2020 BP at goal on current regimen      Hypothyroidism   -TSH 0.443 on 9/25  - Continue levothyroxine 0.137 mg    Pacemaker  -DC PPM (2014; SJM) implanted for symptomatic SB and first degree AVB    Hyperlipidemia  -continue Atorvastatin 10 mg   -Monitor LFTS      Future Appointments   Date Time Provider Department Center   10/15/2020  2:00 PM Kai Cedillo PA-C [facility] ID [address]   10/23/2020 12:00 PM [facility] OIC-CT2 500 LB LIMIT [facility]   10/26/2020  2:00 PM MATTHEW Carpenter, ANP [facility] ID [address]   12/11/2020 11:30 AM Kai Montez MD [facility]         I certify that SNF services are required to be given on an inpatient basis because Gisselle Ortiz needs for skilled nursing care and/or skilled rehabilitation are required on a daily basis and such services can only practically be provided in a skilled nursing facility setting and are for an ongoing condition for which she received inpatient  care in the hospital.         Chad Downing NP  Department of Hospital Medicine   [facility]     DOS: 10/8/2020       Patient note was created using MModal Dictation.  Any errors in syntax or even information may not have been identified and edited on initial review prior to signing this note.

## 2020-10-09 NOTE — PT/OT/SLP PROGRESS
"Physical Therapy  Treatment    Gisselle Ortiz   MRN: [id number]   Admitting Diagnosis: Hepatic abscess    PT Received On: 10/09/20        Billable Minutes:  Gait Training 15 and Therapeutic Exercise 23    Treatment Type: Treatment  PT/PTA: PTA     PTA Visit Number: 2       General Precautions: Standard, aspiration, fall  Orthopedic Precautions: N/A   Braces: N/A    Spiritual, Cultural Beliefs, Hoahaoism Practices, Values that Affect Care: no    Subjective:  " I have to get back to myself" agreeable to therapy "just weak"    Pain/Comfort  Pain Rating 1: 0/10  Pain Rating Post-Intervention 1: 0/10    Objective:   Patient found with: (in bedm nsg present hooking up IV)     AM-PAC 6 CLICK MOBILITY  Total Score:17    Bed Mobility:  Supine>Sit: CG/SBA with HOB elev and rail    Transfers:  Sit<>Stand: with RW min from BSchair and CG from EOB  Stand Pivot Transfer: with RW CGA    Gait:  Amb with RW CGA ~ 22 ft and 32 ft seated rest break iv in tow    Therex:  2x10 reps AP,GS,QS,SAQ,HS,abd/add    Balance:  EOB SBA/S    Patient left up in chair with all lines intact, call button in reach and belongings in reach.    Assessment:  Gisselle Ortiz is a 87 y.o. female with a medical diagnosis of Hepatic abscess. Pt tolerated fairly well, highly motivated, demo good effort, pt would continue to benefit from skilled PT services to improve overall functional mobility, strength and endurance.      Rehab identified problem list/impairments: weakness, impaired endurance, impaired self care skills, impaired functional mobilty, gait instability, impaired balance, edema    Rehab potential is good.    Activity tolerance: Fair    Discharge recommendations: home with home health(w/ light family assistance initially)     Barriers to discharge: None    Equipment recommendations: walker, rolling     GOALS:   Multidisciplinary Problems     Physical Therapy Goals        Problem: Physical Therapy Goal    Goal Priority Disciplines Outcome " Goal Variances Interventions   Physical Therapy Goal     PT, PT/OT Ongoing, Progressing     Description: Goals to be met by: 10/20/20     Patient will increase functional independence with mobility by performin. Supine to sit with Modified [place]  2. Sit to supine with Modified [place]  3. Rolling to Left and Right with Modified [place].  4. Sit to stand transfer with Supervision  5. Bed to chair transfer with Supervision using Rolling Walker  6. Gait  x 50 feet with Supervision using Rolling Walker.   7. Ascend/Descend 4 inch curb step with Stand-by Assistance using Rolling Walker.  8. Stand for 3 minutes with Stand-by Assistance using Rolling Walker while performing an activity  9. Pt will perform a car transfer (Actual or simulated) w/ RW and CGA  10. Pt will stand and  5 objects from the floor w/ a reacher, RW, and SPV                     PLAN:    Patient to be seen 5 x/week  to address the above listed problems via gait training, therapeutic exercises, therapeutic activities  Plan of Care expires: 20  Plan of Care reviewed with: patient    Dorys Grier, PTA  10/09/2020

## 2020-10-10 PROCEDURE — 97110 THERAPEUTIC EXERCISES: CPT | Mod: CQ

## 2020-10-10 PROCEDURE — 25000003 PHARM REV CODE 250: Performed by: NURSE PRACTITIONER

## 2020-10-10 PROCEDURE — 63600175 PHARM REV CODE 636 W HCPCS: Performed by: HOSPITALIST

## 2020-10-10 PROCEDURE — 97116 GAIT TRAINING THERAPY: CPT | Mod: CQ

## 2020-10-10 PROCEDURE — 25000003 PHARM REV CODE 250: Performed by: HOSPITALIST

## 2020-10-10 PROCEDURE — 11000004 HC SNF PRIVATE

## 2020-10-10 PROCEDURE — 97530 THERAPEUTIC ACTIVITIES: CPT | Mod: CQ

## 2020-10-10 RX ADMIN — METRONIDAZOLE 500 MG: 500 TABLET ORAL at 01:10

## 2020-10-10 RX ADMIN — AMIODARONE HYDROCHLORIDE 200 MG: 200 TABLET ORAL at 10:10

## 2020-10-10 RX ADMIN — SPIRONOLACTONE 25 MG: 25 TABLET ORAL at 09:10

## 2020-10-10 RX ADMIN — ATENOLOL 50 MG: 50 TABLET ORAL at 10:10

## 2020-10-10 RX ADMIN — MELATONIN TAB 3 MG 6 MG: 3 TAB at 09:10

## 2020-10-10 RX ADMIN — METRONIDAZOLE 500 MG: 500 TABLET ORAL at 09:10

## 2020-10-10 RX ADMIN — SPIRONOLACTONE 25 MG: 25 TABLET ORAL at 10:10

## 2020-10-10 RX ADMIN — LEVOTHYROXINE SODIUM 137 MCG: 50 TABLET ORAL at 06:10

## 2020-10-10 RX ADMIN — ATORVASTATIN CALCIUM 10 MG: 10 TABLET, FILM COATED ORAL at 09:10

## 2020-10-10 RX ADMIN — CEFTRIAXONE SODIUM 2 G: 2 INJECTION, SOLUTION INTRAVENOUS at 11:10

## 2020-10-10 RX ADMIN — APIXABAN 2.5 MG: 2.5 TABLET, FILM COATED ORAL at 09:10

## 2020-10-10 RX ADMIN — Medication 1 CAPSULE: at 09:10

## 2020-10-10 RX ADMIN — DICLOFENAC 4 G: 10 GEL TOPICAL at 02:10

## 2020-10-10 RX ADMIN — METRONIDAZOLE 500 MG: 500 TABLET ORAL at 06:10

## 2020-10-10 NOTE — DISCHARGE SUMMARY
[facility]  Discharge Summary  Hospital Medicine    Patient Name: Gisselle Ortiz  YOB: 1933    Admit Date: 9/11/2020    Discharge Date and Time: 9/15/2020 12:30 PM    Discharge Attending Physician: Otf Mason MD     Team: [facility]    Reason for Admission:   Chief Complaint   Patient presents with    Chest Pain     Pt woke up with centralized chest pain this am with some nausea. Pt hx of afib and pacer       Active Hospital Problems    Diagnosis  POA    SSS (sick sinus syndrome) [I49.5]  Yes    Long term current use of antiarrhythmic drug [Z79.899]  Not Applicable    Vitamin D deficiency [E55.9]  Yes    Insomnia [G47.00]  Yes    Gastroesophageal reflux disease [K21.9]  Yes    Paroxysmal atrial fibrillation [I48.0]  Yes    Hypertension [I10]  Yes    Hypothyroidism [E03.9]  Yes    High cholesterol [E78.00]  Yes      Resolved Hospital Problems    Diagnosis Date Resolved POA    *Acute gallstone pancreatitis [K85.10] 09/15/2020 Yes    Biliary obstruction [K83.1] 09/15/2020 Yes    Chest pain [R07.9] 09/15/2020 Yes    Chest pain [R07.9] 09/11/2020 Yes       HPI:   Ms Gisselle Ortiz is an 87 y.o. lady with history of atrial fibrillation on Eliquis with previous cardioversion, SSS s/p pacemaker, arthritis, HTN, thyroid disease with diastolic dysfunction (EF 55%, 9/2019) who presented to the ED with acute onset chest and epigastric pain that started acutely and woke pt from sleep at ~ midnight. Pt had had some nausea for several hours before bed. After waking had 2 associated episodes of NBNB vomiting. No diarrhea. Chest pain is pleuritic. Worse with movement but not necessarily exertion. No palpitations. Pain now most prominent in epigastrum- burning, aching pain with intermittent radiation to back. Pain is most similar to previous episode of pancreatitis that pt had several years ago. She is uncertain of the etiology of that episode. Has never required  intervention on gallbladder. Denies EtOH use or recent medication changes.      In the ED she was afebrile. All VS initially wnl. Labs pertinent for elevated lipase > 1000. Mild anemia on CBC but no leukocytosis. Cr elevated to 1.9 from baseline 1.5. Remainder of electrolytes and LFTs wnl. Troponin wnl and BNP near-normal.      EKG with conduction delay but no ST changes.      Abd u/s demonstrated significantly dilated common bile duct with sludge in GB.      Given famotidine and maalox-simethicone. Admitted to Hospitalist Medicine.       Hospital Course:     Seen by [facility]. Underwent ERCP on 9/14. Surgery held discussion with patient following ERCP determined that pt's age and comorbidities represent greater risk than benefit for cholecystectomy. MARIO resolved. Transaminitis improving.        Principal Problem: Acute gallstone pancreatitis      Other Problems Addressed:  Acute kidney injury, hypertension    Procedures Performed: Procedure(s) (LRB):  ERCP (ENDOSCOPIC RETROGRADE CHOLANGIOPANCREATOGRAPHY) (N/A)  ULTRASOUND, UPPER GI TRACT, ENDOSCOPIC (N/A)    Special Care, Treatment, and Services Provided: none    Consults: Gastroenterology and General Surgery    Significant Diagnostic Studies:  No results found for: EF  No results found for: HGBA1C  CBC:   Recent Labs   Lab 10/08/20  0719   WBC 9.02   RBC 2.71*   HGB 8.2*   HCT 26.9*      MCV 99*   MCH 30.3   MCHC 30.5*     CMP:   Recent Labs   Lab 10/09/20  0500   GLU 79   CALCIUM 7.3*   ALBUMIN 1.7*   PROT 4.4*      K 3.9   CO2 26      BUN 8   CREATININE 0.8   ALKPHOS 87   ALT 19   AST 25   BILITOT 0.5           Final Diagnoses: Same as principal problem.    Discharged Condition: fair  Face to face services were provided on 9/15/2020  Time Spent:  I spent > 30 minutes on the discharge, which included reviewing hospital course with patient/family, reviewing discharge medications, and arranging follow-up care.      Disposition: Home or Self  Care    Follow Up Instructions:   Follow-up Information     Kai Montez MD In 3 days.    Specialty: Family Medicine  Contact information:  [address]  [address]  [place] [id number] [phone]             Hetal Zapata MD. Schedule an appointment as soon as possible for a visit in 2 weeks.    Specialty: Gastroenterology  Contact information:  [address]  [place] [address] [phone]                 Future Appointments   Date Time Provider Department Center   10/15/2020  2:00 PM Kai Cedillo PA-C [facility] ID [address]   10/23/2020 12:00 PM [facility] OIC-CT2 500 LB LIMIT [facility] IC Imaging Ctr   10/26/2020  2:00 PM MATTHEW Carpenter, ANP [facility] ID [address]   12/11/2020 11:30 AM Kai Montez MD [facility]       Medications:    Discharge Medication List as of 9/15/2020 12:06 PM      CONTINUE these medications which have CHANGED    Details   ergocalciferol (VITAMIN D2) 50,000 unit Cap Take 1 capsule (50,000 Units total) by mouth every 7 days., Starting Tue 9/15/2020, No Print         CONTINUE these medications which have NOT CHANGED    Details   amiodarone (PACERONE) 200 MG Tab TAKE 1 TABLET ONCE DAILY . START TAKING ONLY 1 TABLET EVERY DAY ON 4-19-19, Normal      apixaban (ELIQUIS) 2.5 mg Tab Take 1 tablet (2.5 mg total) by mouth 2 (two) times daily., Starting Thu 4/23/2020, Normal      aspirin (ECOTRIN) 81 MG EC tablet Take 81 mg by mouth once daily., Until Discontinued, Historical Med      atenoloL (TENORMIN) 25 MG tablet TAKE 2 TABLETS EVERY MORNING AND 1 TABLET EVERY EVENING, Normal      atorvastatin (LIPITOR) 10 MG tablet TAKE 1 TABLET DAILY, Normal      diltiaZEM (CARDIZEM CD) 180 MG 24 hr capsule Take 1 capsule (180 mg total) by mouth once daily., Starting Fri 7/17/2020, Normal      eszopiclone (LUNESTA) 2 MG Tab Take 2 mg by mouth every evening., Until Discontinued, Historical Med      levothyroxine (SYNTHROID) 137 MCG Tab tablet Take 1 tablet (137  mcg total) by mouth once daily., Starting Mon 7/27/2020, Until Tue 7/27/2021, Normal      spironolactone (ALDACTONE) 25 MG tablet Starting Mon 1/6/2020, Historical Med             Discharge Instructions:  Discharge Procedure Orders   Ambulatory referral/consult to Gastroenterology   Standing Status: Future   Referral Priority: Routine Referral Type: Consultation   Referral Reason: Specialty Services Required   Requested Specialty: Gastroenterology   Number of Visits Requested: 1     Diet Cardiac     Notify your health care provider if you experience any of the following:  temperature >100.4     Notify your health care provider if you experience any of the following:  persistent nausea and vomiting or diarrhea     Notify your health care provider if you experience any of the following:  severe uncontrolled pain     Notify your health care provider if you experience any of the following:  redness, tenderness, or signs of infection (pain, swelling, redness, odor or green/yellow discharge around incision site)     Notify your health care provider if you experience any of the following:  difficulty breathing or increased cough     Notify your health care provider if you experience any of the following:  severe persistent headache     Notify your health care provider if you experience any of the following:  worsening rash     Notify your health care provider if you experience any of the following:  persistent dizziness, light-headedness, or visual disturbances     Notify your health care provider if you experience any of the following:  increased confusion or weakness     Notify your health care provider if you experience any of the following:     Report lab results to   Order Comments: Kai Montez MD  [address]  [place] [address] [phone] Fax     Send follow up & questions to   Order Comments: Kai Montez MD  [address]  [place] [address] [phone] Fax      Activity as tolerated         Otf Mason MD  Department of Hospital Medicine

## 2020-10-10 NOTE — PT/OT/SLP PROGRESS
"Physical Therapy  Treatment    Gisselle Ortiz   MRN: [id number]   Admitting Diagnosis: Hepatic abscess    PT Received On: 10/10/20        Billable Minutes:  Gait Training 10, Therapeutic Activity 10 and Therapeutic Exercise 22    Treatment Type: Treatment  PT/PTA: PTA     PTA Visit Number: 3       General Precautions: Standard, aspiration, fall  Orthopedic Precautions: N/A   Braces: N/A    Spiritual, Cultural Beliefs, Buddhism Practices, Values that Affect Care: no    Subjective:  "OK"    Pain/Comfort  Pain Rating 1: 0/10  Pain Rating Post-Intervention 1: 0/10    Objective:   Patient found with: (in bed)     AM-PAC 6 CLICK MOBILITY  Total Score:17    Bed Mobility:  Supine>Sit: SBA HOB elev and rail    Transfers:  Sit<>Stand: with RW CGA  Stand Pivot Transfer: with RW CGA    Gait:  Amb with RW CGA ~ 26 ft no LOB, vcs for erect posture/looking ahead declined further trials 2* to LE feeling week/shakey    Advanced Gait:  Curb Step: plan next week    Therex:supine/sit  2x10 reps AP,GS,QS,HS,LAQ,hip flex/abd/add    Additional Treatment:  Mini elliptical x 10 min    Patient left up in chair with call button in reach and belongings inreach.    Assessment:  Gisselle Ortiz is a 87 y.o. female with a medical diagnosis of Hepatic abscess.  Pt tolerated well, pt would continue to benefit from skilled PT services to improve overall functional mobility, strength and endurance.  .    Rehab identified problem list/impairments: weakness, impaired endurance, impaired self care skills, impaired functional mobilty, gait instability, impaired balance, edema    Rehab potential is good.    Activity tolerance: Fair    Discharge recommendations: home with home health(w/ light family assistance initially)     Barriers to discharge: None    Equipment recommendations: walker, rolling     GOALS:   Multidisciplinary Problems     Physical Therapy Goals        Problem: Physical Therapy Goal    Goal Priority Disciplines Outcome Goal " Variances Interventions   Physical Therapy Goal     PT, PT/OT Ongoing, Progressing     Description: Goals to be met by: 10/20/20     Patient will increase functional independence with mobility by performin. Supine to sit with Modified [place]  2. Sit to supine with Modified [place]  3. Rolling to Left and Right with Modified [place].  4. Sit to stand transfer with Supervision  5. Bed to chair transfer with Supervision using Rolling Walker  6. Gait  x 50 feet with Supervision using Rolling Walker.   7. Ascend/Descend 4 inch curb step with Stand-by Assistance using Rolling Walker.  8. Stand for 3 minutes with Stand-by Assistance using Rolling Walker while performing an activity  9. Pt will perform a car transfer (Actual or simulated) w/ RW and CGA  10. Pt will stand and  5 objects from the floor w/ a reacher, RW, and SPV                     PLAN:    Patient to be seen 5 x/week  to address the above listed problems via gait training, therapeutic exercises, therapeutic activities  Plan of Care expires: 20  Plan of Care reviewed with: patient    Dorys Grier, PTA  10/10/2020

## 2020-10-11 PROCEDURE — 25000003 PHARM REV CODE 250: Performed by: NURSE PRACTITIONER

## 2020-10-11 PROCEDURE — 97530 THERAPEUTIC ACTIVITIES: CPT | Mod: CO

## 2020-10-11 PROCEDURE — 25000003 PHARM REV CODE 250: Performed by: HOSPITALIST

## 2020-10-11 PROCEDURE — 11000004 HC SNF PRIVATE

## 2020-10-11 PROCEDURE — 97110 THERAPEUTIC EXERCISES: CPT | Mod: CO

## 2020-10-11 PROCEDURE — 63600175 PHARM REV CODE 636 W HCPCS: Performed by: HOSPITALIST

## 2020-10-11 RX ADMIN — METRONIDAZOLE 500 MG: 500 TABLET ORAL at 06:10

## 2020-10-11 RX ADMIN — ATENOLOL 50 MG: 50 TABLET ORAL at 09:10

## 2020-10-11 RX ADMIN — ATORVASTATIN CALCIUM 10 MG: 10 TABLET, FILM COATED ORAL at 09:10

## 2020-10-11 RX ADMIN — MELATONIN TAB 3 MG 6 MG: 3 TAB at 09:10

## 2020-10-11 RX ADMIN — SPIRONOLACTONE 25 MG: 25 TABLET ORAL at 09:10

## 2020-10-11 RX ADMIN — DICLOFENAC 4 G: 10 GEL TOPICAL at 02:10

## 2020-10-11 RX ADMIN — Medication 1 CAPSULE: at 09:10

## 2020-10-11 RX ADMIN — AMIODARONE HYDROCHLORIDE 200 MG: 200 TABLET ORAL at 09:10

## 2020-10-11 RX ADMIN — LEVOTHYROXINE SODIUM 137 MCG: 50 TABLET ORAL at 06:10

## 2020-10-11 RX ADMIN — APIXABAN 2.5 MG: 2.5 TABLET, FILM COATED ORAL at 09:10

## 2020-10-11 RX ADMIN — METRONIDAZOLE 500 MG: 500 TABLET ORAL at 02:10

## 2020-10-11 RX ADMIN — CEFTRIAXONE SODIUM 2 G: 2 INJECTION, SOLUTION INTRAVENOUS at 10:10

## 2020-10-11 RX ADMIN — METRONIDAZOLE 500 MG: 500 TABLET ORAL at 09:10

## 2020-10-11 NOTE — PT/OT/SLP PROGRESS
"Occupational Therapy  Treatment    Gisselle Ortiz   MRN: [id number]   Admitting Diagnosis: Hepatic abscess    OT Date of Treatment: 10/11/20       Billable Minutes:  Therapeutic Activity 15 and Therapeutic Exercise 23    General Precautions: Standard, aspiration, fall  Orthopedic Precautions: N/A  Braces: N/A    Spiritual, Cultural Beliefs, Sikhism Practices, Values that Affect Care: no    Subjective:  Communicated with nsg prior to session.  "I just got out of bed"    Pain/Comfort  Pain Rating 1: 0/10  Pain Rating Post-Intervention 1: 0/10    Objective:   Pt. Found seated in bedside chair with nurse on arrival     Occupational Performance:    Bed Mobility:    · Not tested    Functional Mobility/Transfers:  · Patient completed Sit <> Stand Transfer with contact guard assistance and minimum assistance  with  rolling walker   · Functional Mobility: not tested    Activities of Daily Living:  · Grooming: set up assist with oral care while seated    [facility] 6 Click:  [facility] Total Score: 17    OT Exercises: UE Ergometer 10 mins with minimum resistance with 3 rest breaks     Additional Treatment:  Pt. With standing act on this day with task. Pt. With CGA/SBA for balance aspects with task with AD at raised counter Pt with visual perception task with discrimination of various shapes and sizes x 4 min and 50 secs  with standing bal and min cues throught out. Pt. Not able to complete finish task but seated rest break required.  Pt. Performed green theraband exercises 1x10 reps in all planes of motion  Pt. With 2# dowel activity with 2x15 reps with  shd flex, bicep curls horz adb/add and forward flex motion to increase BUE ROM and strength,. -with rest breaks   Pt. With standing and therex performed to increase ROM, endurance selfcare task and fxl mobility for independence     Patient left up in chair with all lines intact and call button in reach    ASSESSMENT:  Gisselle Ortiz is a 87 y.o. female with a medical " diagnosis of Hepatic abscess Pt. participated well with session on this day. Pt. Fatigue throughout therapy session. Pt. Will continue to benefit from continued OT to progress towards goals    Rehab identified problem list/impairments: weakness, impaired endurance, impaired self care skills, impaired functional mobilty, impaired balance, gait instability, decreased lower extremity function, decreased safety awareness, decreased ROM, edema, impaired cardiopulmonary response to activity    Rehab potential is fair    Activity tolerance: Fair    Discharge recommendations: home health OT     Barriers to discharge: Decreased caregiver support     Equipment recommendations: none     GOALS:   Multidisciplinary Problems     Occupational Therapy Goals        Problem: Occupational Therapy Goal    Goal Priority Disciplines Outcome Interventions   Occupational Therapy Goal     OT, PT/OT Ongoing, Progressing    Description: Goals to be met by: 10/20/20     Patient will increase functional independence with ADLs by performing:    UE Dressing with Modified [place].  LE Dressing with Stand-by Assistance.  Grooming while seated with Modified [place].  Toileting from toilet with Stand-by Assistance for hygiene and clothing management.   Bathing from  sitting at sink with Stand-by Assistance.  Supine to sit with Modified [place].  Stand pivot transfers with Supervision using A/D as needed.  Pt's caregiver will be educated on level of assist required to safely perform self care tasks and functional transfers.                 Plan:  Patient to be seen 5 x/week to address the above listed problems via self-care/home management, therapeutic activities, therapeutic exercises  Plan of Care expires: 11/04/20  Plan of Care reviewed with: patient    Roz Mitchell, MINERVA BRAGG and RONALD have discussed the above patients goals and status in collaboration with Plan of Care.  10/11/2020

## 2020-10-11 NOTE — PLAN OF CARE
Problem: Adult Inpatient Plan of Care  Goal: Plan of Care Review  Outcome: Ongoing, Progressing     Problem: Adult Inpatient Plan of Care  Goal: Patient-Specific Goal (Individualization)  Outcome: Ongoing, Progressing     Problem: Adult Inpatient Plan of Care  Goal: Absence of Hospital-Acquired Illness or Injury  Outcome: Ongoing, Progressing     Problem: Adult Inpatient Plan of Care  Goal: Optimal Comfort and Wellbeing  Outcome: Ongoing, Progressing     Problem: Skin Injury Risk Increased  Goal: Skin Health and Integrity  Outcome: Ongoing, Progressing     Problem: Wound  Goal: Optimal Wound Healing  Outcome: Ongoing, Progressing     Problem: Infection  Goal: Infection Symptom Resolution  Outcome: Ongoing, Progressing     Problem: Fall Injury Risk  Goal: Absence of Fall and Fall-Related Injury  Outcome: Ongoing, Progressing

## 2020-10-12 LAB
ALBUMIN SERPL BCP-MCNC: 1.8 G/DL (ref 3.5–5.2)
ALBUMIN SERPL BCP-MCNC: 1.8 G/DL (ref 3.5–5.2)
ALP SERPL-CCNC: 92 U/L (ref 55–135)
ALP SERPL-CCNC: 92 U/L (ref 55–135)
ALT SERPL W/O P-5'-P-CCNC: 18 U/L (ref 10–44)
ALT SERPL W/O P-5'-P-CCNC: 18 U/L (ref 10–44)
ANION GAP SERPL CALC-SCNC: 6 MMOL/L (ref 8–16)
AST SERPL-CCNC: 33 U/L (ref 10–40)
AST SERPL-CCNC: 33 U/L (ref 10–40)
BASOPHILS # BLD AUTO: 0.08 K/UL (ref 0–0.2)
BASOPHILS NFR BLD: 1.1 % (ref 0–1.9)
BILIRUB DIRECT SERPL-MCNC: 0.3 MG/DL (ref 0.1–0.3)
BILIRUB SERPL-MCNC: 0.4 MG/DL (ref 0.1–1)
BILIRUB SERPL-MCNC: 0.4 MG/DL (ref 0.1–1)
BUN SERPL-MCNC: 7 MG/DL (ref 8–23)
CALCIUM SERPL-MCNC: 7.6 MG/DL (ref 8.7–10.5)
CHLORIDE SERPL-SCNC: 106 MMOL/L (ref 95–110)
CO2 SERPL-SCNC: 26 MMOL/L (ref 23–29)
CREAT SERPL-MCNC: 0.9 MG/DL (ref 0.5–1.4)
DIFFERENTIAL METHOD: ABNORMAL
EOSINOPHIL # BLD AUTO: 0.3 K/UL (ref 0–0.5)
EOSINOPHIL NFR BLD: 4.6 % (ref 0–8)
ERYTHROCYTE [DISTWIDTH] IN BLOOD BY AUTOMATED COUNT: 18.2 % (ref 11.5–14.5)
EST. GFR  (AFRICAN AMERICAN): >60 ML/MIN/1.73 M^2
EST. GFR  (NON AFRICAN AMERICAN): 57.7 ML/MIN/1.73 M^2
GLUCOSE SERPL-MCNC: 81 MG/DL (ref 70–110)
HCT VFR BLD AUTO: 27.5 % (ref 37–48.5)
HGB BLD-MCNC: 8.4 G/DL (ref 12–16)
IMM GRANULOCYTES # BLD AUTO: 0.11 K/UL (ref 0–0.04)
IMM GRANULOCYTES NFR BLD AUTO: 1.5 % (ref 0–0.5)
LYMPHOCYTES # BLD AUTO: 1.4 K/UL (ref 1–4.8)
LYMPHOCYTES NFR BLD: 19.4 % (ref 18–48)
MAGNESIUM SERPL-MCNC: 1.7 MG/DL (ref 1.6–2.6)
MCH RBC QN AUTO: 30.5 PG (ref 27–31)
MCHC RBC AUTO-ENTMCNC: 30.5 G/DL (ref 32–36)
MCV RBC AUTO: 100 FL (ref 82–98)
MONOCYTES # BLD AUTO: 0.8 K/UL (ref 0.3–1)
MONOCYTES NFR BLD: 10.5 % (ref 4–15)
NEUTROPHILS # BLD AUTO: 4.6 K/UL (ref 1.8–7.7)
NEUTROPHILS NFR BLD: 62.9 % (ref 38–73)
NRBC BLD-RTO: 0 /100 WBC
PHOSPHATE SERPL-MCNC: 2.9 MG/DL (ref 2.7–4.5)
PLATELET # BLD AUTO: 295 K/UL (ref 150–350)
PMV BLD AUTO: 10.5 FL (ref 9.2–12.9)
POTASSIUM SERPL-SCNC: 4 MMOL/L (ref 3.5–5.1)
PROT SERPL-MCNC: 4.7 G/DL (ref 6–8.4)
PROT SERPL-MCNC: 4.7 G/DL (ref 6–8.4)
RBC # BLD AUTO: 2.75 M/UL (ref 4–5.4)
SODIUM SERPL-SCNC: 138 MMOL/L (ref 136–145)
WBC # BLD AUTO: 7.23 K/UL (ref 3.9–12.7)

## 2020-10-12 PROCEDURE — 80076 HEPATIC FUNCTION PANEL: CPT

## 2020-10-12 PROCEDURE — 97530 THERAPEUTIC ACTIVITIES: CPT | Mod: CQ

## 2020-10-12 PROCEDURE — 83735 ASSAY OF MAGNESIUM: CPT

## 2020-10-12 PROCEDURE — 97116 GAIT TRAINING THERAPY: CPT | Mod: CQ

## 2020-10-12 PROCEDURE — 80053 COMPREHEN METABOLIC PANEL: CPT

## 2020-10-12 PROCEDURE — 25000003 PHARM REV CODE 250: Performed by: HOSPITALIST

## 2020-10-12 PROCEDURE — 97535 SELF CARE MNGMENT TRAINING: CPT | Mod: CO

## 2020-10-12 PROCEDURE — 85025 COMPLETE CBC W/AUTO DIFF WBC: CPT

## 2020-10-12 PROCEDURE — 11000004 HC SNF PRIVATE

## 2020-10-12 PROCEDURE — 25000003 PHARM REV CODE 250: Performed by: NURSE PRACTITIONER

## 2020-10-12 PROCEDURE — 97110 THERAPEUTIC EXERCISES: CPT | Mod: CO

## 2020-10-12 PROCEDURE — 97803 MED NUTRITION INDIV SUBSEQ: CPT

## 2020-10-12 PROCEDURE — 84100 ASSAY OF PHOSPHORUS: CPT

## 2020-10-12 PROCEDURE — 97110 THERAPEUTIC EXERCISES: CPT | Mod: CQ

## 2020-10-12 PROCEDURE — 63600175 PHARM REV CODE 636 W HCPCS: Performed by: HOSPITALIST

## 2020-10-12 RX ADMIN — METRONIDAZOLE 500 MG: 500 TABLET ORAL at 09:10

## 2020-10-12 RX ADMIN — DICLOFENAC 4 G: 10 GEL TOPICAL at 09:10

## 2020-10-12 RX ADMIN — APIXABAN 2.5 MG: 2.5 TABLET, FILM COATED ORAL at 09:10

## 2020-10-12 RX ADMIN — AMIODARONE HYDROCHLORIDE 200 MG: 200 TABLET ORAL at 10:10

## 2020-10-12 RX ADMIN — METRONIDAZOLE 500 MG: 500 TABLET ORAL at 05:10

## 2020-10-12 RX ADMIN — MELATONIN TAB 3 MG 6 MG: 3 TAB at 10:10

## 2020-10-12 RX ADMIN — SPIRONOLACTONE 25 MG: 25 TABLET ORAL at 09:10

## 2020-10-12 RX ADMIN — ATORVASTATIN CALCIUM 10 MG: 10 TABLET, FILM COATED ORAL at 10:10

## 2020-10-12 RX ADMIN — METRONIDAZOLE 500 MG: 500 TABLET ORAL at 02:10

## 2020-10-12 RX ADMIN — Medication 1 CAPSULE: at 10:10

## 2020-10-12 RX ADMIN — DICLOFENAC 4 G: 10 GEL TOPICAL at 02:10

## 2020-10-12 RX ADMIN — CEFTRIAXONE SODIUM 2 G: 2 INJECTION, SOLUTION INTRAVENOUS at 10:10

## 2020-10-12 RX ADMIN — SPIRONOLACTONE 25 MG: 25 TABLET ORAL at 10:10

## 2020-10-12 RX ADMIN — APIXABAN 2.5 MG: 2.5 TABLET, FILM COATED ORAL at 10:10

## 2020-10-12 RX ADMIN — LEVOTHYROXINE SODIUM 137 MCG: 50 TABLET ORAL at 05:10

## 2020-10-12 RX ADMIN — ATENOLOL 50 MG: 50 TABLET ORAL at 10:10

## 2020-10-12 RX ADMIN — DICLOFENAC 4 G: 10 GEL TOPICAL at 10:10

## 2020-10-12 NOTE — CARE UPDATE
Interdisciplinary Team Meeting Note     Spoke with patient only, left voicemail for daughter, Génesis Araiza [phone].     Patient/Family goals/wishes/concerns/needs: None at this time.     Updates on current plan of care: Therapy manager provided therapy update.           Attended by:    - Danielle Brunner    - Debo Farris   Therapy Manager - Shannon Lujan   Registered Dietitian - Laura COONEY - Jackelin Go

## 2020-10-12 NOTE — PLAN OF CARE
Plan of care reviewed with pt. Pt voiced understanding. Pt AAO X 4. Pt denies any c/o during the shift. No apparent distress noted. Pt calls for assistance to bedside commode. Fall precautions maintained. Pt remains free of fall or injury. Bed in lowest position, locked, call light within reach, and bed alarm on. Side rails up x's 2 with slip resistant socks on.     Problem: Adult Inpatient Plan of Care  Goal: Plan of Care Review  Outcome: Ongoing, Progressing  Flowsheets (Taken 10/12/2020 0329)  Plan of Care Reviewed With: patient  Goal: Absence of Hospital-Acquired Illness or Injury  Outcome: Ongoing, Progressing  Goal: Optimal Comfort and Wellbeing  Outcome: Ongoing, Progressing     Problem: Infection  Goal: Infection Symptom Resolution  Outcome: Ongoing, Progressing     Problem: Skin Injury Risk Increased  Goal: Skin Health and Integrity  Outcome: Ongoing, Progressing     Problem: Wound  Goal: Optimal Wound Healing  Outcome: Ongoing, Progressing     Problem: Fall Injury Risk  Goal: Absence of Fall and Fall-Related Injury  Outcome: Ongoing, Progressing

## 2020-10-12 NOTE — PT/OT/SLP PROGRESS
"Occupational Therapy  Treatment    Gisselle Ortiz   MRN: [id number]   Admitting Diagnosis: Hepatic abscess    OT Date of Treatment: 10/12/20       Billable Minutes:  Self Care/Home Management 23 and Therapeutic Exercise 17    General Precautions: Standard, aspiration, fall  Orthopedic Precautions: N/A  Braces: N/A    Spiritual, Cultural Beliefs, Quaker Practices, Values that Affect Care: no    Subjective:  Communicated with nsg prior to session.  "What time is my visitor coming?"    Pain/Comfort  Pain Rating 1: 0/10  Pain Rating Post-Intervention 1: 0/10    Objective:   Pt. Found supine on arrival     Occupational Performance:    Bed Mobility:    · Patient completed Rolling/Turning to Right with stand by assistance  · Patient completed Scooting/Bridging with stand by assistance  · Patient completed Supine to Sit with stand by assistance     Functional Mobility/Transfers:  · Patient completed Sit <> Stand Transfer with minimum assistance  with  no assistive device   · Patient completed Bed <> Chair Transfer using Stand Pivot technique with contact guard assistance with rolling walker  · Patient completed Toilet Transfer Stand Pivot technique with minimum assistance with  grab bars  · Functional Mobility: not tested 2* to fatigue     Activities of Daily Living:  · Grooming: supervision with oral care and face washing while seated at sink level   · Bathing: contact guard assistance with cleaning of Joi areas instace at sink level   · Upper Body Dressing: supervision to doff/rosario gown while seated   · Lower Body Dressing: contact guard assistance to manage pants over hips instance. Pt.able to doff/rosario BLE socks with Fig 4 tech   · Toileting: contact guard assistance with hygiene and clothing management     [facility] 6 Click:  [facility] Total Score: 17    OT Exercises: UE Ergometer 10 mins with minimum resistance with 1 rest break     Additional Treatment:  Pt. With 3# dowel activity with 2x15 reps with  shd flex, bicep " curls horz adb/add and forward flex motion to increase BUE ROM and strength,.   Pt. With standing and therex performed to increase ROM, endurance selfcare task and fxl mobility for independence     Patient left up in chair with all lines intact and call button in reach    ASSESSMENT:  Gisselle Ortiz is a 87 y.o. female with a medical diagnosis of Hepatic abscess Pt. participated well with session on this day.Pt. fatigue and presents with low endurance throughout therapy session requiring frequent rest breaks. Pt. Will continue to benefit from continued OT to progress towards goals    Rehab identified problem list/impairments: weakness, impaired endurance, impaired self care skills, impaired functional mobilty, impaired balance, gait instability, decreased lower extremity function, decreased safety awareness, decreased ROM, edema, impaired cardiopulmonary response to activity    Rehab potential is fair    Activity tolerance: Fair    Discharge recommendations: home health OT     Barriers to discharge: Decreased caregiver support     Equipment recommendations: none     GOALS:   Multidisciplinary Problems     Occupational Therapy Goals        Problem: Occupational Therapy Goal    Goal Priority Disciplines Outcome Interventions   Occupational Therapy Goal     OT, PT/OT Ongoing, Progressing    Description: Goals to be met by: 10/20/20     Patient will increase functional independence with ADLs by performing:    UE Dressing with Modified [place].  LE Dressing with Stand-by Assistance.  Grooming while seated with Modified [place].  Toileting from toilet with Stand-by Assistance for hygiene and clothing management.   Bathing from  sitting at sink with Stand-by Assistance.  Supine to sit with Modified [place].  Stand pivot transfers with Supervision using A/D as needed.  Pt's caregiver will be educated on level of assist required to safely perform self care tasks and functional transfers.                  Plan:  Patient to be seen 5 x/week to address the above listed problems via self-care/home management, therapeutic activities, therapeutic exercises  Plan of Care expires: 11/04/20  Plan of Care reviewed with: patient    MINERVA Alcala OT and RONALD have discussed the above patients goals and status in collaboration with Plan of Care.  10/12/2020

## 2020-10-12 NOTE — PROGRESS NOTES
"[facility] - Skilled Nursing Care  Adult Nutrition  Progress Note    SUMMARY   Recommendations  Recommendation Continue regular diet, boost breeze BID trial, RD following  Goals: PO to meet 50% of needs by next RD follow up  Nutrition Goal Status: progressing towards goal  Communication of RD Recs: reviewed with physician    Reason for Assessment    Reason For Assessment: RD follow-up  Diagnosis: (Abdominal pain)  Relevant Medical History: s/p hepatic abcess, s/p pancreatic abcess, hyperbilirubinemia, pacemaker, Vit D deficiency, HLD, HTN, AFIB, thyroid disease  Interdisciplinary Rounds: attended  General Information Comments: PO encouraged pt with boost plus at bedside, she does not like, will try boost breeze, lives alone and her children rotate spending the night with her. LBM 10/11  Nutrition Discharge Planning: DC on regular diet, ONS of choice    Nutrition/Diet History    Patient Reported Diet/Restrictions/Preferences: general  Typical Food/Fluid Intake: pt went down to 1-2 meals, reports half a sandwich was enough, she shops for food, has dental plate which is not fitting currently,  Food Preferences: no cottage cheese, no yogurt,  Spiritual, Cultural Beliefs, Yarsanism Practices, Values that Affect Care: no  Food Allergies: NKFA  Factors Affecting Nutritional Intake: decreased appetite, depression, altered gastrointestinal function    Anthropometrics    Temp: 97.9 °F (36.6 °C)  Height: 5' 3" (160 cm)  Height (inches): 63 in  Weight Method: Standard Scale  Weight: 63.5 kg (139 lb 15.9 oz)  Weight (lb): 139.99 lb  Ideal Body Weight (IBW), Female: 115 lb  % Ideal Body Weight, Female (lb): 121.73 %  BMI (Calculated): 24.8  BMI Grade: 18.5-24.9 - normal(pt overhydrated)  Usual Body Weight (UBW), k.2 kg  % Usual Body Weight: 113.23  % Weight Change From Usual Weight: 12.99 %       Lab/Procedures/Meds    Pertinent Labs Reviewed: reviewed  Pertinent Labs Comments: Hg 8.4, Hct 27.5, albumin " 1.8(infection)  Pertinent Medications Reviewed: reviewed  Pertinent Medications Comments: IV Abx       Estimated/Assessed Needs    Weight Used For Calorie Calculations: 63.5 kg (139 lb 15.9 oz)  Energy Calorie Requirements (kcal): 1792-6514(x 25-30 kcalories)  Energy Need Method: Kcal/kg  Protein Requirements: 76g-83g(x 1.2g/kg-1.3g)  Weight Used For Protein Calculations: 63.5 kg (139 lb 15.9 oz)  Fluid Requirements (mL): 1600 or per MD  Estimated Fluid Requirement Method: RDA Method  RDA Method (mL): 1587  CHO Requirement: -      Nutrition Prescription Ordered    Current Diet Order: Regular  Nutrition Order Comments: poor po no appetite  Oral Nutrition Supplement: Change to boost breeze trial BID    Evaluation of Received Nutrient/Fluid Intake    Energy Calories Required: meeting needs  Protein Required: meeting needs  Fluid Required: meeting needs  Comments: remains on Abx via PICC  Tolerance: tolerating  % Intake of Estimated Energy Needs: 75 - 100 %  % Meal Intake: 75 - 100 %    Nutrition Risk    Level of Risk/Frequency of Follow-up: high     Assessment and Plan  Inadequate oral intake related to poor appetite s/p critical illness as evidenced by PO < 25% , weight loss masked by overhydration during hospital stay,and pt reported grief response.     Goal: PO 50% MET  New Goal: PO to meet 85% of EEN/EPN  with ONS in place,for healing by next RD visit.     Plan  General diet  Collaboration with other providers  Commercial beverage- calories and protein- breeze BID            Monitor and Evaluation    Food and Nutrient Intake: food and beverage intake  Food and Nutrient Adminstration: diet order  Physical Activity and Function: nutrition-related ADLs and IADLs  Anthropometric Measurements: weight change  Biochemical Data, Medical Tests and Procedures: electrolyte and renal panel, gastrointestinal profile, inflammatory profile, glucose/endocrine profile  Nutrition-Focused Physical Findings: overall appearance      Malnutrition Assessment   10/5/20     Skin (Micronutrient): bruised  Eyes (Micronutrient): conjunctiva dull  Tongue (Micronutrient): magenta               [place] Region (Muscle Loss): severe depletion  Clavicle Bone Region (Muscle Loss): mild depletion  Clavicle and Acromion Bone Region (Muscle Loss): mild depletion  [place] Hand (Muscle Loss): mild depletion  Anterior Thigh Region (Muscle Loss): mild depletion   Edema (Fluid Accumulation): 3-->moderate             Nutrition Follow-Up    RD Follow-up?: Yes

## 2020-10-12 NOTE — PT/OT/SLP PROGRESS
"Physical Therapy  Treatment    Gisselle Ortiz   MRN: [id number]   Admitting Diagnosis: Hepatic abscess    PT Received On: 10/12/20        Billable Minutes:  Gait Training 8, Therapeutic Activity 8 and Therapeutic Exercise 22    Treatment Type: Treatment  PT/PTA: PTA     PTA Visit Number: 4       General Precautions: Standard, aspiration, fall  Orthopedic Precautions: N/A   Braces: N/A    Spiritual, Cultural Beliefs, Rastafari Practices, Values that Affect Care: no    Subjective:  "want to go home but think I needed a little more time" message sent to IDT    Pain/Comfort  Pain Rating 1: 0/10  Pain Rating Post-Intervention 1: 0/10    Objective:   Patient found with: (in BSC)     AM-PAC 6 CLICK MOBILITY  Total Score:17    Transfers:  Sit<>Stand: with RW min A  Stand Pivot Transfer: with RW min A    Gait:  Amb with RW min/CGA ~ 32 ft no LOB     Therex:  2x10 reps AP,GS,LAQ,hip flex,abd/add    Additional Treatment:  Asc/gabi 4" curb with RW x 2 trials min/CGA stepping up then back down  Mini elliptical x 15 min    Patient left up in chair with call button in reach and belongings in reach.    Assessment:  Gisselle Ortiz is a 87 y.o. female with a medical diagnosis of Hepatic abscess.  Pt tolerated well, pt would continue to benefit from skilled PT services to improve overall functional mobility, strength and endurance.  .    Rehab identified problem list/impairments: weakness, impaired endurance, impaired self care skills, impaired functional mobilty, gait instability, impaired balance, edema    Rehab potential is good.    Activity tolerance: Fair    Discharge recommendations: home with home health(w/ light family assistance initially)     Barriers to discharge: None    Equipment recommendations: walker, rolling     GOALS:   Multidisciplinary Problems     Physical Therapy Goals        Problem: Physical Therapy Goal    Goal Priority Disciplines Outcome Goal Variances Interventions   Physical Therapy Goal     PT, " PT/OT Ongoing, Progressing     Description: Goals to be met by: 10/20/20     Patient will increase functional independence with mobility by performin. Supine to sit with Modified [place]  2. Sit to supine with Modified [place]  3. Rolling to Left and Right with Modified [place].  4. Sit to stand transfer with Supervision  5. Bed to chair transfer with Supervision using Rolling Walker  6. Gait  x 50 feet with Supervision using Rolling Walker.   7. Ascend/Descend 4 inch curb step with Stand-by Assistance using Rolling Walker.  8. Stand for 3 minutes with Stand-by Assistance using Rolling Walker while performing an activity  9. Pt will perform a car transfer (Actual or simulated) w/ RW and CGA  10. Pt will stand and  5 objects from the floor w/ a reacher, RW, and SPV                     PLAN:    Patient to be seen 5 x/week  to address the above listed problems via gait training, therapeutic exercises, therapeutic activities  Plan of Care expires: 20  Plan of Care reviewed with: patient    Dorys Grier, PTA  10/12/2020

## 2020-10-12 NOTE — PLAN OF CARE
Inadequate oral intake related to poor appetite s/p critical illness as evidenced by PO < 25% , weight loss masked by overhydration during hospital stay,and pt reported grief response.     Goal: PO 50% MET  New Goal: PO to meet 85% of EEN/EPN  with ONS in place,for healing by next RD visit.     Plan  General diet  Collaboration with other providers  Commercial beverage- calories and protein- breeze BID

## 2020-10-13 PROCEDURE — 97110 THERAPEUTIC EXERCISES: CPT

## 2020-10-13 PROCEDURE — 25000003 PHARM REV CODE 250: Performed by: HOSPITALIST

## 2020-10-13 PROCEDURE — 11000004 HC SNF PRIVATE

## 2020-10-13 PROCEDURE — 63600175 PHARM REV CODE 636 W HCPCS: Performed by: HOSPITALIST

## 2020-10-13 PROCEDURE — 25000003 PHARM REV CODE 250: Performed by: NURSE PRACTITIONER

## 2020-10-13 PROCEDURE — 97116 GAIT TRAINING THERAPY: CPT

## 2020-10-13 PROCEDURE — 97535 SELF CARE MNGMENT TRAINING: CPT

## 2020-10-13 RX ADMIN — CEFTRIAXONE SODIUM 2 G: 2 INJECTION, SOLUTION INTRAVENOUS at 10:10

## 2020-10-13 RX ADMIN — SPIRONOLACTONE 25 MG: 25 TABLET ORAL at 08:10

## 2020-10-13 RX ADMIN — AMIODARONE HYDROCHLORIDE 200 MG: 200 TABLET ORAL at 09:10

## 2020-10-13 RX ADMIN — ATORVASTATIN CALCIUM 10 MG: 10 TABLET, FILM COATED ORAL at 09:10

## 2020-10-13 RX ADMIN — METRONIDAZOLE 500 MG: 500 TABLET ORAL at 01:10

## 2020-10-13 RX ADMIN — SPIRONOLACTONE 25 MG: 25 TABLET ORAL at 09:10

## 2020-10-13 RX ADMIN — DICLOFENAC 4 G: 10 GEL TOPICAL at 09:10

## 2020-10-13 RX ADMIN — METRONIDAZOLE 500 MG: 500 TABLET ORAL at 06:10

## 2020-10-13 RX ADMIN — Medication 1 CAPSULE: at 09:10

## 2020-10-13 RX ADMIN — APIXABAN 2.5 MG: 2.5 TABLET, FILM COATED ORAL at 08:10

## 2020-10-13 RX ADMIN — METRONIDAZOLE 500 MG: 500 TABLET ORAL at 08:10

## 2020-10-13 RX ADMIN — LEVOTHYROXINE SODIUM 137 MCG: 50 TABLET ORAL at 06:10

## 2020-10-13 RX ADMIN — DICLOFENAC 4 G: 10 GEL TOPICAL at 02:10

## 2020-10-13 RX ADMIN — DICLOFENAC 4 G: 10 GEL TOPICAL at 08:10

## 2020-10-13 RX ADMIN — APIXABAN 2.5 MG: 2.5 TABLET, FILM COATED ORAL at 09:10

## 2020-10-13 RX ADMIN — ATENOLOL 50 MG: 50 TABLET ORAL at 09:10

## 2020-10-13 NOTE — PT/OT/SLP PROGRESS
"Physical Therapy  Treatment    Gisselle Ortiz   MRN: [id number]   Admitting Diagnosis: Hepatic abscess    PT Received On: 10/13/20  Total Time (min): 38       Billable Minutes:  Gait Training 10 and Therapeutic Exercise 28    Treatment Type: Treatment  PT/PTA: PT     PTA Visit Number: 0       General Precautions: Standard, fall  Orthopedic Precautions: N/A   Braces: N/A    Spiritual, Cultural Beliefs, Mormon Practices, Values that Affect Care: no    Subjective:  Communicated with nurse prior to session.  "I'm just exhausted. My legs feel so wobbly."    Pain/Comfort  Pain Rating 1: 0/10  Pain Rating 2: 0/10    Objective:  Patient found up in chair with       AM-PAC 6 CLICK MOBILITY  Total Score:19    Bed Mobility:  Deferred, patient sitting up in chair    Transfers:  Sit<>Stand: CGA with RW x 3 trials, Mercedes x 2 trials. Patient took multiple seated rest breaks d/t complaints of fatigue and LE weakness.      Gait:  Amb 38' with RW, CGA, decreased tae, decreased gait speed, decreased step length, flexed posture, increased gait speed on turns to potentially unsafe speed. V/t cues to stay within walker, decreased speed for turns, stand with erect posture.     Advanced Gait:  Curb Step: ascend/descend 3.5 inch curb step x 2 trials, once with LLE leading and CGA, once with RLE leading and Mercedes, use of RW.    Wheelchair Mobility:      Therex:  Seated marching, abd/add with GTB 2x12.    Balance:  Seated reaching with grabber for objects from floor outside of base of support for 2 minutes.    Additional Treatment:  Attempted Standing rows, patient unable to maintain standing and requesting to return to chair  Seated rows with GTB, 3x10    Patient left up in chair with all lines intact.    Assessment:  Gisselle Ortiz is a 87 y.o. female with a medical diagnosis of Hepatic abscess.  Patient with decreased activity tolerance on this date but good effort with minimal encouragement. Decreased tolerance to standing " "activity 2* complaints of "exhaustion" and that her legs "feel wobbly and weak." Continue per POC.     Rehab identified problem list/impairments: weakness, impaired endurance, impaired self care skills, impaired functional mobilty, gait instability, impaired balance, decreased upper extremity function, decreased lower extremity function, decreased safety awareness, pain    Rehab potential is good.    Activity tolerance: Fair    Discharge recommendations: home with home health(w/ light family assistance initially)     Barriers to discharge: None    Equipment recommendations: walker, rolling     GOALS:   Multidisciplinary Problems     Physical Therapy Goals        Problem: Physical Therapy Goal    Goal Priority Disciplines Outcome Goal Variances Interventions   Physical Therapy Goal     PT, PT/OT Ongoing, Progressing     Description: Goals to be met by: 10/20/20     Patient will increase functional independence with mobility by performin. Supine to sit with Modified [place]  2. Sit to supine with Modified [place]  3. Rolling to Left and Right with Modified [place].  4. Sit to stand transfer with Supervision  5. Bed to chair transfer with Supervision using Rolling Walker  6. Gait  x 50 feet with Supervision using Rolling Walker.   7. Ascend/Descend 4 inch curb step with Stand-by Assistance using Rolling Walker.  8. Stand for 3 minutes with Stand-by Assistance using Rolling Walker while performing an activity  9. Pt will perform a car transfer (Actual or simulated) w/ RW and CGA  10. Pt will stand and  5 objects from the floor w/ a reacher, RW, and SPV                     PLAN:    Patient to be seen 5 x/week  to address the above listed problems via gait training, therapeutic exercises, therapeutic activities  Plan of Care expires: 20  Plan of Care reviewed with: patient    Babita Claudia, PT  10/13/2020  "

## 2020-10-13 NOTE — PLAN OF CARE
Problem: Adult Inpatient Plan of Care  Goal: Plan of Care Review  Outcome: Ongoing, Progressing  Goal: Patient-Specific Goal (Individualization)  Outcome: Ongoing, Progressing  Goal: Absence of Hospital-Acquired Illness or Injury  Outcome: Ongoing, Progressing  Goal: Optimal Comfort and Wellbeing  Outcome: Ongoing, Progressing  Goal: Readiness for Transition of Care  Outcome: Ongoing, Progressing  Goal: Rounds/Family Conference  Outcome: Ongoing, Progressing     Problem: Infection  Goal: Infection Symptom Resolution  Outcome: Ongoing, Progressing     Problem: Skin Injury Risk Increased  Goal: Skin Health and Integrity  Outcome: Ongoing, Progressing     Problem: Wound  Goal: Optimal Wound Healing  Outcome: Ongoing, Progressing     Problem: Fall Injury Risk  Goal: Absence of Fall and Fall-Related Injury  Outcome: Ongoing, Progressing

## 2020-10-13 NOTE — PROGRESS NOTES
[facility]                                  Skilled Nursing Facility                   Progress Note     Admit Date: 10/2/2020  FRANCESCA 10/27/2020  Principal Problem:  Hepatic abscess   HPI obtained from patient interview and chart review     Chief Complaint: Re-evaluation of medical treatment and therapy status: Lab review      HPI:   Patient is an 88 yo female PMHx AFib on Eliquis, SSS s/p pacemaker placement (1/2019), HTN, hyperthyroidism s/p thyroidectomy  who presents to SNF following hospitalization for hepatic abscess/bacteremia.       Patient presented to hospital on 9/11 complaining of abdominal pain and was found to have gallstone pancreatitis. She had CBD dilation and a gallbladder with sludge. She underwent EUS and ERCP with stone and sludge seen on the cholangiogram. Sphincterotomy was performed and stone/sludge removed with no stents placed. Per notes, ERCP was concerning for choledocholithiasis.  Surgery was consulted and deemed the patient to be high risk for cholecystectomy. She was seen today in GI clinic for follow-up. She initially recovered well from the pancreatitis and ERCP and was eating prior to discharge. A few days after discharge she started having severe fatigue that progressively worsened to the point where the patient could not walk. She also reported having decreased appetite and nausea.   Patient presented to GI clinic on 9/25 with hypotension and elevated LFTs. Patient was sent to the ED for evaluation.     In the ED, she was found to be afebrile and her BP improved to 130/61 with IVF. HR 63 with NSR and existing RBBB. Qtc 512. She was breathing 20bpm with no O2 requirement. She c/o some nausea but no abd pain. On exam, she had mild discomfort to deep palpation to RUQ abdomen.  CT scan with contrast following pancreatic protocol showed hepatic abscess. Blood culture grew gram negative cristofer; urine culture was  positive enterococcus faecalis.     Pt had IR intervention for her hepatic abscess 9/26. Blood culture collected on 9/26  grew E.Coli susceptible to multiple abx. Urine enterococcus faecalis also shown to have susceptibility to multiple organism. Patient was started on Zosyn and ID consulted. Recommendations: Discontinued IV zosyn and started ceftriaxone 2 grams IV q 24 hours for E coli bacteremia and hepatic abscess. Started metronidazole 500 mg orally q 8 hours empirically for anaerobic coverage. Anticipate 4-6 weeks antibiotics for hepatic abscess (or until abscess resolves).  Would plan for 4 weeks of IV ceftriaxone and oral flagyl from date of  IR drainage (Estimated end date 10/26) with repeat CT abdomen/pelvis  with contrast at 4 weeks with ID follow up after imaging to determine ultimate duration of antibiotics and ability to transition to orals.  Assuming pancreatic lesion noted on CT is infected pseudocyst/abscess (vs neoplasm), would anticipate it would respond to current antibiotics, but have no culture data for this.  Will follow clinically with repeat imaging. If worsens on current course, will need aspiration for cultures and/or biopsy if concerned for neoplasm. May be able to discontinue oral flagyl earlier if no growth on anaerobic cultures.  Will determine at follow up. Weekly cbc, cmp, crp, esr and fax results to ID, attention Nimesh Mares NP, at fax [phone]. (Pager [phone], spectra [phone], office [phone]).     PICC line placed 9/30 for receiving IV antibiotics for 4 weeks.  IR to leave drain in and re-evaluate in 1 week with CT scan. Patient deemed medically stable and discharged to SNF for secondary weakness, debility and IV antibiotics.     Patient will be treated at [facility] with PT and OT to improve functional status and ability to perform ADLs.       Interval history: All of today's labs reviewed and are listed below.  24 hr vital sign ranges listed below.  Patient denies trouble  sleeping at night, shortness of breath, abdominal discomfort, nausea, or vomiting.  Patient reports an adequate appetite.  Patient denies dysuria.  Patient reports having regular bowel movements. BLE edema continuing to improve.  Patient progessing with PT/OT. Continuing to follow and treat all acute and chronic conditions. CT abdomen completed on 10/9/20, care coordinated with ID regarding results.      Past Medical History: Patient has a past medical history of A-fib, Arthritis, Hypertension, and Thyroid disease.    Past Surgical History: Patient has a past surgical history that includes Cataract extraction; Cataract extraction w/  intraocular lens implant (Bilateral, 2004); Revision of skin pocket for pacemaker (N/A, 1/21/2019); Eye surgery; Hysterectomy; Joint replacement; Hip replacement arthroplasty; Treatment of cardiac arrhythmia (N/A, 3/18/2019); Thyroidectomy; Treatment of cardiac arrhythmia (N/A, 5/14/2019); ERCP (N/A, 9/14/2020); Endoscopic ultrasound of upper gastrointestinal tract (N/A, 9/14/2020); and ERCP (N/A, 9/25/2020).    Social History: Patient reports that she has quit smoking. She started smoking about 56 years ago. She has never used smokeless tobacco. She reports that she does not drink alcohol or use drugs.    Family History: family history includes Heart attack in her maternal grandmother and mother; Heart disease in her maternal grandmother and mother; Heart failure in her maternal grandmother and mother; Hypertension in her maternal grandmother and mother; Stroke in her maternal grandmother.    Allergies: Patient is allergic to ciprofloxacin.    ROS  Constitutional:+ fatigue + decreased appetite Negative for fever   Eyes: Negative for blurred vision, double vision   Respiratory: +shortness of breath (on exertion)  Negative for cough  Cardiovascular: + BLE edema 1+ pitting edema (improved) Negative for chest pain, palpitations.   Gastrointestinal: Negative for abdominal pain,  constipation, diarrhea, nausea, vomiting.   Genitourinary: Negative for dysuria, frequency   Musculoskeletal:  + generalized weakness. Negative for back pain and myalgias.   Skin: Negative for itching and rash.   Neurological: Negative for dizziness, headaches.   Psychiatric/Behavioral: Negative for depression. The patient is not nervous/anxious.      24 hour Vital Sign Range   Temp:  [97.9 °F (36.6 °C)-98.1 °F (36.7 °C)]   Pulse:  [62]   Resp:  [16]   BP: (136-163)/(63-74)   SpO2:  [95 %-96 %]     PEx  Constitutional: Patient appears debilitated.  No distress noted  HENT:   Head: Normocephalic and atraumatic.   Eyes: Pupils are equal, round  Neck: Normal range of motion. Neck supple.   Cardiovascular: Normal rate, regular rhythm and normal heart sounds.    Pulmonary/Chest: Effort normal and breath sounds are clear  Abdominal: Soft. Bowel sounds are normal.   Musculoskeletal:+ weakness 4/5 on all extremities  Normal range of motion.   Neurological: Alert and oriented to person, place, and time.   Psychiatric: Normal mood and affect. Behavior is normal.   Skin: Skin is warm and dry.  RUQ drain in place, scant amount of serosanguinous drainage, dressing CDI   Stage 2 pressure injury- gluteal cleft followed weekly by wound care nurse  Date First Assessed/Time First Assessed: 10/02/20 2520   Altered Skin Integrity Present on Admission: yes  Orientation: midline  Location: Gluteal cleft    Description of Altered Skin Integrity Partial thickness tissue loss. Shallow open ulcer with a red or pink wound bed, without slough. Intact or Open/Ruptured Serum-filled blister.   Dressing Appearance Dry;Intact;Clean   Drainage Amount Small   Drainage Characteristics/Odor Serosanguineous   Appearance Pink;Red;Moist   Tissue loss description Partial thickness   Periwound Area Intact;Pink;Scar tissue   Wound Edges Open   Wound Length (cm) 1 cm   Wound Width (cm) 0.5 cm   Wound Depth (cm) 0.1 cm   Wound Volume (cm^3) 0.05 cm^3   Wound  Surface Area (cm^2) 0.5 cm^2   Care Cleansed with:;Sterile normal saline;Applied:;Skin Barrier   Dressing Reinforced;Foam         Recent Labs   Lab 10/12/20  0518      K 4.0      CO2 26   BUN 7*   CREATININE 0.9   MG 1.7       Recent Labs   Lab 10/12/20  0518   WBC 7.23   RBC 2.75*   HGB 8.4*   HCT 27.5*      *   MCH 30.5   MCHC 30.5*         Assessment and Plan:    Bacteremia  - Culture grew E.Coli susceptible to multiple abx.   -continue Metronidazole 500 mg Q 8 empirically for anaerobic coverage est end date 10/26  -continue IV ceftriaxone (est end date 10/26)  -Weekly CMP, CBC, CRP, ESR      Hepatic abscess  - PICC line placed 9/30 for receiving IV antibiotics for 4 weeks  - Follow up with ID outpatient with weekly labs  - continue ceftriaxone and flagyl   - IR to leave drain in and re-evaluate in 1 week with CT scan  10/12/20  CT abdomen completed. Care coordinated with ID, recommend: continuing antiboitics and following up in ID clinic in 2 weeks. Also recommended GI follow up for multiloculated cystic lesion     Paroxymal atrial fibrillation  - Continue Amiodarone 200 mg  - continue Atenolol 50 mg   - conitnue Apixiban 2.5 BID     Essential hypertension  -continue Atenolol 50 mg        -continue Spirolactone 25 mg BID  10/12/2020 BP at goal on current regimen      Hypothyroidism   -TSH 0.443 on 9/25  - Continue levothyroxine 0.137 mg    Pacemaker  -DC PPM (2014; [facility]) implanted for symptomatic SB and first degree AVB    Hyperlipidemia  -continue Atorvastatin 10 mg   -Monitor LFTS  10/12 LFTs stable    Future Appointments   Date Time Provider Department Center   10/15/2020  2:00 PM Kai Cedillo PA-C [facility] ID [address]   10/23/2020 12:00 PM [facility] O[facility]-CT2 500 LB LIMIT [facility]   10/26/2020  2:00 PM Keila Mares APRN, ANP [facility] ID [address]   12/11/2020 11:30 AM Kai Montez MD [facility]         I certify that SNF services are required to be  given on an inpatient basis because Gisselle Ortiz needs for skilled nursing care and/or skilled rehabilitation are required on a daily basis and such services can only practically be provided in a skilled nursing facility setting and are for an ongoing condition for which she received inpatient care in the hospital.         Chad Downing NP  Department of Hospital Medicine   [facility]     DOS: 10/12/2020       Patient note was created using MModal Dictation.  Any errors in syntax or even information may not have been identified and edited on initial review prior to signing this note.

## 2020-10-13 NOTE — PT/OT/SLP PROGRESS
Occupational Therapy  Treatment    Gisselle Ortiz   MRN: [id number]   Admitting Diagnosis: Hepatic abscess    OT Date of Treatment: 10/13/20       Billable Minutes:  Self Care/Home Management 30 there ex 10    General Precautions: Standard, aspiration, fall  Orthopedic Precautions: N/A  Braces: N/A    Spiritual, Cultural Beliefs, Mu-ism Practices, Values that Affect Care: no    Subjective:  Communicated with patient prior to session.  Pt. Reported that she was doing ok but her legs were still weak    Pain/Comfort  Pain Rating 1: 0/10  Pain Rating Post-Intervention 1: 0/10    Objective:  Patient found with: (supine in bed)    Occupational Performance:    Bed Mobility:    · Patient completed Supine to Sit with standby  assistance  With HOB elevated    Functional Mobility/Transfers:  · Patient completed Sit <> Stand Transfer with minimum assistance  with  no assistive device   · Patient completed Bed <> Chair Transfer using Stand Pivot technique with minimum assistance with no assistive device  · Patient completed Toilet Transfer Stand Pivot technique with minimum assistance with  grab bars  · Functional Mobility: Pt. Ambulated ~ 36 feet with RW and Min A    Activities of Daily Living:  · Grooming: supervision seated at sink to brush teeth and comb hair seated at sink  · Bathing: minimum assistance and moderate assistance to steady at sink to wash dilcia/buttocks region  · Upper Body Dressing: supervision to don gown forward  · Lower Body Dressing: moderate assistance with assist to initially thrtead RLE as wll qw to steady and manage pants over hips in stand  · Toileting: minimum assistance to steady with clothing managment    [facility] 6 Click:  AMPA Total Score: 18    OT Exercises: AROM with 2 # dowel x 2 sets for all major planes of BUE motion to improve endurance    Additional Treatment:  Pt. Educated on safety with ADL task performance as well as functional mobility.     Patient left up in chair with call button  in reach    ASSESSMENT:  Gisselle Ortiz is a 87 y.o. female with a medical diagnosis of Hepatic abscess and presents with deficits in endurance as well as self-care skills. Pt. Tolerated session well but did report fatigue with activity. Pt. Would benefit from continued OT services to maximize safety and I with ADL tasks    Rehab identified problem list/impairments: weakness, impaired endurance, impaired self care skills, impaired functional mobilty, impaired balance, gait instability, decreased lower extremity function, decreased safety awareness, decreased ROM, edema, impaired cardiopulmonary response to activity    Rehab potential is good    Activity tolerance: Fair    Discharge recommendations: home health OT     Barriers to discharge: Decreased caregiver support     Equipment recommendations: none     GOALS:   Multidisciplinary Problems     Occupational Therapy Goals        Problem: Occupational Therapy Goal    Goal Priority Disciplines Outcome Interventions   Occupational Therapy Goal     OT, PT/OT Ongoing, Progressing    Description: Goals to be met by: 10/20/20     Patient will increase functional independence with ADLs by performing:    UE Dressing with Modified [place].  LE Dressing with Stand-by Assistance.  Grooming while seated with Modified [place].  Toileting from toilet with Stand-by Assistance for hygiene and clothing management.   Bathing from  sitting at sink with Stand-by Assistance.  Supine to sit with Modified [place].  Stand pivot transfers with Supervision using A/D as needed.  Pt's caregiver will be educated on level of assist required to safely perform self care tasks and functional transfers.                     Plan:  Patient to be seen 5 x/week to address the above listed problems via self-care/home management, therapeutic activities, therapeutic exercises  Plan of Care expires: 11/04/20  Plan of Care reviewed with: patient    Isi Cedillo,  LOTR  10/13/2020

## 2020-10-13 NOTE — PLAN OF CARE
Problem: Physical Therapy Goal  Goal: Physical Therapy Goal  Description: Goals to be met by: 10/20/20     Patient will increase functional independence with mobility by performin. Supine to sit with Modified [place]  2. Sit to supine with Modified [place]  3. Rolling to Left and Right with Modified [place].  4. Sit to stand transfer with Supervision  5. Bed to chair transfer with Supervision using Rolling Walker  6. Gait  x 50 feet with Supervision using Rolling Walker.   7. Ascend/Descend 4 inch curb step with Stand-by Assistance using Rolling Walker.  8. Stand for 3 minutes with Stand-by Assistance using Rolling Walker while performing an activity  9. Pt will perform a car transfer (Actual or simulated) w/ RW and CGA  10. Pt will stand and  5 objects from the floor w/ a reacher, RW, and SPV    Outcome: Ongoing, Progressing     Babita Taylor, PT, DPT  10/13/2020

## 2020-10-13 NOTE — PROGRESS NOTES
"Wound care follow up for altered skin integrity to midline gluteal cleft.     Received pt lying in bed with eyes closed with respirations even and unlabored. Pt opens eyes easily when spoken to. Pt reports that her backside feels a "little better." Waffle overlay in use. Pt is able to turn easily to have buttocks assessed.     Upon assessment of midline gluteal cleft (present upon admission): healing area of partial skin thickness measuring 1 cm x 0.2 cm x 0.1 cm with dry pink tissue noted to wound bed without any drainage noted.  Cleansed wound bed with cleansing cloths with TRIAD applied to wound bed per wound care nurse with border foam reinforced.     Midline gluteal cleft        Recommendations:  -Nursing to continue to cleanse sacrum with cleansing wipes and apply Triad ointment BID and prn cleaning.  Nursing to maintain pressure injury prevention interventions as directed.      Plan of care discussed with pt, Charge Nurse aware of care given. Nursing to continue care. Wound care will continue to follow pt PRN.[id number]    "

## 2020-10-14 PROCEDURE — 97116 GAIT TRAINING THERAPY: CPT | Mod: CQ

## 2020-10-14 PROCEDURE — 11000004 HC SNF PRIVATE

## 2020-10-14 PROCEDURE — 63600175 PHARM REV CODE 636 W HCPCS: Performed by: HOSPITALIST

## 2020-10-14 PROCEDURE — 97535 SELF CARE MNGMENT TRAINING: CPT | Mod: CO

## 2020-10-14 PROCEDURE — 25000003 PHARM REV CODE 250: Performed by: NURSE PRACTITIONER

## 2020-10-14 PROCEDURE — 97110 THERAPEUTIC EXERCISES: CPT | Mod: CQ

## 2020-10-14 PROCEDURE — 97110 THERAPEUTIC EXERCISES: CPT | Mod: CO

## 2020-10-14 PROCEDURE — 25000003 PHARM REV CODE 250: Performed by: HOSPITALIST

## 2020-10-14 PROCEDURE — 97530 THERAPEUTIC ACTIVITIES: CPT | Mod: CQ

## 2020-10-14 RX ADMIN — METRONIDAZOLE 500 MG: 500 TABLET ORAL at 09:10

## 2020-10-14 RX ADMIN — ATORVASTATIN CALCIUM 10 MG: 10 TABLET, FILM COATED ORAL at 08:10

## 2020-10-14 RX ADMIN — LEVOTHYROXINE SODIUM 137 MCG: 50 TABLET ORAL at 05:10

## 2020-10-14 RX ADMIN — CEFTRIAXONE SODIUM 2 G: 2 INJECTION, SOLUTION INTRAVENOUS at 09:10

## 2020-10-14 RX ADMIN — SPIRONOLACTONE 25 MG: 25 TABLET ORAL at 09:10

## 2020-10-14 RX ADMIN — APIXABAN 2.5 MG: 2.5 TABLET, FILM COATED ORAL at 08:10

## 2020-10-14 RX ADMIN — DICLOFENAC 4 G: 10 GEL TOPICAL at 08:10

## 2020-10-14 RX ADMIN — AMIODARONE HYDROCHLORIDE 200 MG: 200 TABLET ORAL at 08:10

## 2020-10-14 RX ADMIN — Medication 1 CAPSULE: at 08:10

## 2020-10-14 RX ADMIN — DICLOFENAC 4 G: 10 GEL TOPICAL at 02:10

## 2020-10-14 RX ADMIN — ATENOLOL 50 MG: 50 TABLET ORAL at 08:10

## 2020-10-14 RX ADMIN — METRONIDAZOLE 500 MG: 500 TABLET ORAL at 06:10

## 2020-10-14 RX ADMIN — APIXABAN 2.5 MG: 2.5 TABLET, FILM COATED ORAL at 09:10

## 2020-10-14 RX ADMIN — MELATONIN TAB 3 MG 6 MG: 3 TAB at 09:10

## 2020-10-14 RX ADMIN — SPIRONOLACTONE 25 MG: 25 TABLET ORAL at 08:10

## 2020-10-14 RX ADMIN — METRONIDAZOLE 500 MG: 500 TABLET ORAL at 02:10

## 2020-10-14 NOTE — PT/OT/SLP PROGRESS
"Physical Therapy  Treatment    Gisselle Ortiz   MRN: [id number]   Admitting Diagnosis: Hepatic abscess    PT Received On: 10/14/20        Billable Minutes:  Gait Training 15, Therapeutic Activity 10 and Therapeutic Exercise 15    Treatment Type: Treatment  PT/PTA: PTA     PTA Visit Number: 1       General Precautions: Standard, fall  Orthopedic Precautions: N/A   Braces: N/A    Spiritual, Cultural Beliefs, Latter day Practices, Values that Affect Care: no    Subjective:  " I need to get better, lets go"    Pain/Comfort  Pain Rating 1: 0/10  Pain Rating Post-Intervention 1: 0/10    Objective:   Patient found with: (in bed)     AM-PAC 6 CLICK MOBILITY  Total Score:19    Bed Mobility:  Supine>Sit: SBA with HOB elev and rail    Transfers:  Sit<>Stand: with RW CGA from EOB/BScommode and BSchair  Stand Pivot Transfer: with RW CGA    Gait:  Amb with RW CGA ~ 16 ft, to bathroom then 20 ft to BSchair and  36ft  in the room all with seated rest break no LOB    Therex:  3x10 reps AP,GS,LAQ,hip flex,abd/add    Balance:  Dyn standing bal act SBA ~ 3:11 matching dominos with RW uni lat support    Additional Treatment:  toileting CGA with RW for trfs,  A to change diaper/pants/gown, CGA with dilcia care in standing and set up for hand hygiene in sitting    Patient left up in chair with call button in reach and belonging sin reach.    Assessment:  Gisselle Ortiz is a 87 y.o. female with a medical diagnosis of Hepatic abscess.  Pt tolerated well, pt would continue to benefit from skilled PT services to improve overall functional mobility, strength and endurance.  .    Rehab identified problem list/impairments: weakness, impaired endurance, impaired self care skills, impaired functional mobilty, gait instability, impaired balance, decreased upper extremity function, decreased lower extremity function, decreased safety awareness, pain    Rehab potential is good.    Activity tolerance: Fair    Discharge recommendations: home with " home health(w/ light family assistance initially)     Barriers to discharge: None    Equipment recommendations: walker, rolling     GOALS:   Multidisciplinary Problems     Physical Therapy Goals        Problem: Physical Therapy Goal    Goal Priority Disciplines Outcome Goal Variances Interventions   Physical Therapy Goal     PT, PT/OT Ongoing, Progressing     Description: Goals to be met by: 10/20/20     Patient will increase functional independence with mobility by performin. Supine to sit with Modified [place]  2. Sit to supine with Modified [place]  3. Rolling to Left and Right with Modified [place].  4. Sit to stand transfer with Supervision  5. Bed to chair transfer with Supervision using Rolling Walker  6. Gait  x 50 feet with Supervision using Rolling Walker.   7. Ascend/Descend 4 inch curb step with Stand-by Assistance using Rolling Walker.  8. Stand for 3 minutes with Stand-by Assistance using Rolling Walker while performing an activity  9. Pt will perform a car transfer (Actual or simulated) w/ RW and CGA  10. Pt will stand and  5 objects from the floor w/ a reacher, RW, and SPV                     PLAN:    Patient to be seen 5 x/week  to address the above listed problems via gait training, therapeutic exercises, therapeutic activities  Plan of Care expires: 20  Plan of Care reviewed with: patient    Dorys Grier, PTA  10/14/2020

## 2020-10-14 NOTE — PT/OT/SLP PROGRESS
"Occupational Therapy  Treatment    Gisselle Ortiz   MRN: [id number]   Admitting Diagnosis: Hepatic abscess    OT Date of Treatment: 10/14/20       Billable Minutes:  Self Care/Home Management 15 and Therapeutic Exercise 25    General Precautions: Standard, aspiration, fall  Orthopedic Precautions: N/A  Braces: N/A    Spiritual, Cultural Beliefs, Yarsani Practices, Values that Affect Care: no    Subjective:  Communicated with nsg prior to session.  "I am a lot better today"    Pain/Comfort  Pain Rating 1: 0/10  Pain Rating Post-Intervention 1: 0/10    Objective:   Pt. Found seated in bedside chair on arrival     Occupational Performance:    Bed Mobility:    · Not tested    Functional Mobility/Transfers:  · Patient completed Sit <> Stand Transfer with contact guard assistance  with  no assistive device   · Patient completed Toilet Transfer Step Transfer technique with contact guard assistance with  rolling walker  · Functional Mobility: Pt. With fxl mobility from bedside chair to bathroom with RW and CGA    Activities of Daily Living:  · Grooming: supervision with oral care and hair care while seated at sink level   · Bathing: contact guard assistance with cleaning of [place] instance at sink level   · Upper Body Dressing: supervision to doff/rosario gown while seated  · Lower Body Dressing: contact guard assistance to thread pants through legs and manage over hips instance  · Toileting: contact guard assistance with hygiene and clothing management at 3n1    [facility] 6 Click:  [facility] Total Score: 18    OT Exercises: UE Ergometer 10 mins with moderate resistance with 2 rest breaks     Additional Treatment:  Pt. Engaged in balloon tapping with therapist while seated x 10 sets 10 reps with good use of BUE noted  Pt. Participated in ball bouncing activity seated x 5 sets 10 reps focusing on improving postural control/balance with SBA.    Pt. With 3# dowel activity with 2x20 reps with  shd flex, bicep curls horz adb/add " and forward flex motion to increase BUE ROM and strength,.   Pt. With standing and therex performed to increase ROM, endurance selfcare task and fxl mobility for independence     Patient left up in chair with all lines intact and call button in reach    ASSESSMENT:  Gisselle Ortiz is a 87 y.o. female with a medical diagnosis of Hepatic abscess Pt. participated well with session on this day. Pt is progressing well with session on this day still continues to requires cues with aspects of safety. Pt. Will continue to benefit from continued OT to progress towards goals    Rehab identified problem list/impairments: weakness, impaired endurance, impaired self care skills, impaired functional mobilty, impaired balance, gait instability, decreased lower extremity function, decreased safety awareness, decreased ROM, edema, impaired cardiopulmonary response to activity    Rehab potential is good    Activity tolerance: Good    Discharge recommendations: home health OT     Barriers to discharge: Decreased caregiver support     Equipment recommendations: none     GOALS:   Multidisciplinary Problems     Occupational Therapy Goals        Problem: Occupational Therapy Goal    Goal Priority Disciplines Outcome Interventions   Occupational Therapy Goal     OT, PT/OT Ongoing, Progressing    Description: Goals to be met by: 10/20/20     Patient will increase functional independence with ADLs by performing:    UE Dressing with Modified [place].  LE Dressing with Stand-by Assistance.  Grooming while seated with Modified [place].  Toileting from toilet with Stand-by Assistance for hygiene and clothing management.   Bathing from  sitting at sink with Stand-by Assistance.  Supine to sit with Modified [place].  Stand pivot transfers with Supervision using A/D as needed.  Pt's caregiver will be educated on level of assist required to safely perform self care tasks and functional transfers.                 Plan:  Patient  to be seen 5 x/week to address the above listed problems via self-care/home management, therapeutic activities, therapeutic exercises  Plan of Care expires: 11/04/20  Plan of Care reviewed with: patient    MINERVA Alcala OT and RONALD have discussed the above patients goals and status in collaboration with Plan of Care.  10/14/2020

## 2020-10-15 ENCOUNTER — LAB VISIT (OUTPATIENT)
Dept: LAB | Facility: OTHER | Age: 85
End: 2020-10-15
Attending: INTERNAL MEDICINE
Payer: MEDICARE

## 2020-10-15 DIAGNOSIS — Z03.818 ENCOUNTER FOR OBSERVATION FOR SUSPECTED EXPOSURE TO OTHER BIOLOGICAL AGENTS RULED OUT: ICD-10-CM

## 2020-10-15 LAB
ALBUMIN SERPL BCP-MCNC: 1.8 G/DL (ref 3.5–5.2)
ALBUMIN SERPL BCP-MCNC: 1.8 G/DL (ref 3.5–5.2)
ALP SERPL-CCNC: 77 U/L (ref 55–135)
ALP SERPL-CCNC: 77 U/L (ref 55–135)
ALT SERPL W/O P-5'-P-CCNC: 11 U/L (ref 10–44)
ALT SERPL W/O P-5'-P-CCNC: 11 U/L (ref 10–44)
ANION GAP SERPL CALC-SCNC: 6 MMOL/L (ref 8–16)
AST SERPL-CCNC: 19 U/L (ref 10–40)
AST SERPL-CCNC: 19 U/L (ref 10–40)
BASOPHILS # BLD AUTO: 0.06 K/UL (ref 0–0.2)
BASOPHILS NFR BLD: 1.1 % (ref 0–1.9)
BILIRUB DIRECT SERPL-MCNC: 0.3 MG/DL (ref 0.1–0.3)
BILIRUB SERPL-MCNC: 0.3 MG/DL (ref 0.1–1)
BILIRUB SERPL-MCNC: 0.3 MG/DL (ref 0.1–1)
BUN SERPL-MCNC: 8 MG/DL (ref 8–23)
CALCIUM SERPL-MCNC: 7.7 MG/DL (ref 8.7–10.5)
CHLORIDE SERPL-SCNC: 108 MMOL/L (ref 95–110)
CO2 SERPL-SCNC: 27 MMOL/L (ref 23–29)
CREAT SERPL-MCNC: 0.8 MG/DL (ref 0.5–1.4)
CRP SERPL-MCNC: 5.71 MG/L (ref 0–3.19)
DIFFERENTIAL METHOD: ABNORMAL
EOSINOPHIL # BLD AUTO: 0.3 K/UL (ref 0–0.5)
EOSINOPHIL NFR BLD: 5.1 % (ref 0–8)
ERYTHROCYTE [DISTWIDTH] IN BLOOD BY AUTOMATED COUNT: 17.7 % (ref 11.5–14.5)
ERYTHROCYTE [SEDIMENTATION RATE] IN BLOOD BY WESTERGREN METHOD: 10 MM/HR (ref 0–36)
EST. GFR  (AFRICAN AMERICAN): >60 ML/MIN/1.73 M^2
EST. GFR  (NON AFRICAN AMERICAN): >60 ML/MIN/1.73 M^2
GLUCOSE SERPL-MCNC: 88 MG/DL (ref 70–110)
HCT VFR BLD AUTO: 27.2 % (ref 37–48.5)
HGB BLD-MCNC: 8.2 G/DL (ref 12–16)
IMM GRANULOCYTES # BLD AUTO: 0.08 K/UL (ref 0–0.04)
IMM GRANULOCYTES NFR BLD AUTO: 1.5 % (ref 0–0.5)
LYMPHOCYTES # BLD AUTO: 1.3 K/UL (ref 1–4.8)
LYMPHOCYTES NFR BLD: 23.4 % (ref 18–48)
MAGNESIUM SERPL-MCNC: 1.6 MG/DL (ref 1.6–2.6)
MCH RBC QN AUTO: 30.1 PG (ref 27–31)
MCHC RBC AUTO-ENTMCNC: 30.1 G/DL (ref 32–36)
MCV RBC AUTO: 100 FL (ref 82–98)
MONOCYTES # BLD AUTO: 0.6 K/UL (ref 0.3–1)
MONOCYTES NFR BLD: 11.5 % (ref 4–15)
NEUTROPHILS # BLD AUTO: 3.1 K/UL (ref 1.8–7.7)
NEUTROPHILS NFR BLD: 57.4 % (ref 38–73)
NRBC BLD-RTO: 0 /100 WBC
PHOSPHATE SERPL-MCNC: 3 MG/DL (ref 2.7–4.5)
PLATELET # BLD AUTO: 243 K/UL (ref 150–350)
PMV BLD AUTO: 10.4 FL (ref 9.2–12.9)
POTASSIUM SERPL-SCNC: 4.1 MMOL/L (ref 3.5–5.1)
PROT SERPL-MCNC: 4.5 G/DL (ref 6–8.4)
PROT SERPL-MCNC: 4.5 G/DL (ref 6–8.4)
RBC # BLD AUTO: 2.72 M/UL (ref 4–5.4)
SARS-COV-2 RNA RESP QL NAA+PROBE: NOT DETECTED
SODIUM SERPL-SCNC: 141 MMOL/L (ref 136–145)
WBC # BLD AUTO: 5.46 K/UL (ref 3.9–12.7)

## 2020-10-15 PROCEDURE — 84100 ASSAY OF PHOSPHORUS: CPT

## 2020-10-15 PROCEDURE — 80076 HEPATIC FUNCTION PANEL: CPT

## 2020-10-15 PROCEDURE — 97110 THERAPEUTIC EXERCISES: CPT

## 2020-10-15 PROCEDURE — U0003 INFECTIOUS AGENT DETECTION BY NUCLEIC ACID (DNA OR RNA); SEVERE ACUTE RESPIRATORY SYNDROME CORONAVIRUS 2 (SARS-COV-2) (CORONAVIRUS DISEASE [COVID-19]), AMPLIFIED PROBE TECHNIQUE, MAKING USE OF HIGH THROUGHPUT TECHNOLOGIES AS DESCRIBED BY CMS-2020-01-R: HCPCS

## 2020-10-15 PROCEDURE — 80053 COMPREHEN METABOLIC PANEL: CPT

## 2020-10-15 PROCEDURE — 25000003 PHARM REV CODE 250: Performed by: NURSE PRACTITIONER

## 2020-10-15 PROCEDURE — 25000003 PHARM REV CODE 250: Performed by: HOSPITALIST

## 2020-10-15 PROCEDURE — 83735 ASSAY OF MAGNESIUM: CPT

## 2020-10-15 PROCEDURE — 11000004 HC SNF PRIVATE

## 2020-10-15 PROCEDURE — 85025 COMPLETE CBC W/AUTO DIFF WBC: CPT

## 2020-10-15 PROCEDURE — 97116 GAIT TRAINING THERAPY: CPT

## 2020-10-15 PROCEDURE — 63600175 PHARM REV CODE 636 W HCPCS: Performed by: HOSPITALIST

## 2020-10-15 PROCEDURE — 85652 RBC SED RATE AUTOMATED: CPT

## 2020-10-15 PROCEDURE — 97530 THERAPEUTIC ACTIVITIES: CPT | Mod: CO

## 2020-10-15 PROCEDURE — 86141 C-REACTIVE PROTEIN HS: CPT

## 2020-10-15 RX ADMIN — AMIODARONE HYDROCHLORIDE 200 MG: 200 TABLET ORAL at 08:10

## 2020-10-15 RX ADMIN — APIXABAN 2.5 MG: 2.5 TABLET, FILM COATED ORAL at 09:10

## 2020-10-15 RX ADMIN — SPIRONOLACTONE 25 MG: 25 TABLET ORAL at 09:10

## 2020-10-15 RX ADMIN — SPIRONOLACTONE 25 MG: 25 TABLET ORAL at 08:10

## 2020-10-15 RX ADMIN — Medication 1 CAPSULE: at 08:10

## 2020-10-15 RX ADMIN — METRONIDAZOLE 500 MG: 500 TABLET ORAL at 09:10

## 2020-10-15 RX ADMIN — DICLOFENAC 4 G: 10 GEL TOPICAL at 08:10

## 2020-10-15 RX ADMIN — LEVOTHYROXINE SODIUM 137 MCG: 50 TABLET ORAL at 05:10

## 2020-10-15 RX ADMIN — APIXABAN 2.5 MG: 2.5 TABLET, FILM COATED ORAL at 08:10

## 2020-10-15 RX ADMIN — MELATONIN TAB 3 MG 6 MG: 3 TAB at 09:10

## 2020-10-15 RX ADMIN — ATORVASTATIN CALCIUM 10 MG: 10 TABLET, FILM COATED ORAL at 08:10

## 2020-10-15 RX ADMIN — ATENOLOL 50 MG: 50 TABLET ORAL at 08:10

## 2020-10-15 RX ADMIN — METRONIDAZOLE 500 MG: 500 TABLET ORAL at 05:10

## 2020-10-15 RX ADMIN — CEFTRIAXONE SODIUM 2 G: 2 INJECTION, SOLUTION INTRAVENOUS at 12:10

## 2020-10-15 RX ADMIN — METRONIDAZOLE 500 MG: 500 TABLET ORAL at 04:10

## 2020-10-15 NOTE — PROGRESS NOTES
[facility]                                  Skilled Nursing Facility                   Progress Note     Admit Date: 10/2/2020  FRANCESCA 10/27/2020  Principal Problem:  Hepatic abscess   HPI obtained from patient interview and chart review     Chief Complaint: Re-evaluation of medical treatment and therapy status: Lab review      HPI:   Patient is an 86 yo female PMHx AFib on Eliquis, SSS s/p pacemaker placement (1/2019), HTN, hyperthyroidism s/p thyroidectomy  who presents to SNF following hospitalization for hepatic abscess/bacteremia.       Patient presented to hospital on 9/11 complaining of abdominal pain and was found to have gallstone pancreatitis. She had CBD dilation and a gallbladder with sludge. She underwent EUS and ERCP with stone and sludge seen on the cholangiogram. Sphincterotomy was performed and stone/sludge removed with no stents placed. Per notes, ERCP was concerning for choledocholithiasis.  Surgery was consulted and deemed the patient to be high risk for cholecystectomy. She was seen today in GI clinic for follow-up. She initially recovered well from the pancreatitis and ERCP and was eating prior to discharge. A few days after discharge she started having severe fatigue that progressively worsened to the point where the patient could not walk. She also reported having decreased appetite and nausea.   Patient presented to GI clinic on 9/25 with hypotension and elevated LFTs. Patient was sent to the ED for evaluation.     In the ED, she was found to be afebrile and her BP improved to 130/61 with IVF. HR 63 with NSR and existing RBBB. Qtc 512. She was breathing 20bpm with no O2 requirement. She c/o some nausea but no abd pain. On exam, she had mild discomfort to deep palpation to RUQ abdomen.  CT scan with contrast following pancreatic protocol showed hepatic abscess. Blood culture grew gram negative cristofer; urine culture was  positive enterococcus faecalis.     Pt had IR intervention for her hepatic abscess 9/26. Blood culture collected on 9/26  grew E.Coli susceptible to multiple abx. Urine enterococcus faecalis also shown to have susceptibility to multiple organism. Patient was started on Zosyn and ID consulted. Recommendations: Discontinued IV zosyn and started ceftriaxone 2 grams IV q 24 hours for E coli bacteremia and hepatic abscess. Started metronidazole 500 mg orally q 8 hours empirically for anaerobic coverage. Anticipate 4-6 weeks antibiotics for hepatic abscess (or until abscess resolves).  Would plan for 4 weeks of IV ceftriaxone and oral flagyl from date of  IR drainage (Estimated end date 10/26) with repeat CT abdomen/pelvis  with contrast at 4 weeks with ID follow up after imaging to determine ultimate duration of antibiotics and ability to transition to orals.  Assuming pancreatic lesion noted on CT is infected pseudocyst/abscess (vs neoplasm), would anticipate it would respond to current antibiotics, but have no culture data for this.  Will follow clinically with repeat imaging. If worsens on current course, will need aspiration for cultures and/or biopsy if concerned for neoplasm. May be able to discontinue oral flagyl earlier if no growth on anaerobic cultures.  Will determine at follow up. Weekly cbc, cmp, crp, esr and fax results to ID, attention Nimesh Mares NP, at fax [phone]. (Pager [phone], spectra [phone], office [phone]).     PICC line placed 9/30 for receiving IV antibiotics for 4 weeks.  IR to leave drain in and re-evaluate in 1 week with CT scan. Patient deemed medically stable and discharged to SNF for secondary weakness, debility and IV antibiotics.     Patient will be treated at [facility] with PT and OT to improve functional status and ability to perform ADLs.       Interval history:    Chart reviewed- no acute events overnight. All of today's labs reviewed and are listed below.  24 hr vital sign  ranges listed below. Patient seen and examined today up in chair- no new complaints. Patient denies trouble sleeping at night, shortness of breath, abdominal discomfort, nausea, or vomiting.  Patient reports an adequate appetite.  Patient denies dysuria.  Patient reports having regular bowel movements.Patient progessing with PT/OT. Continuing to follow and treat all acute and chronic conditions..      Past Medical History: Patient has a past medical history of A-fib, Arthritis, Hypertension, and Thyroid disease.    Past Surgical History: Patient has a past surgical history that includes Cataract extraction; Cataract extraction w/  intraocular lens implant (Bilateral, 2004); Revision of skin pocket for pacemaker (N/A, 1/21/2019); Eye surgery; Hysterectomy; Joint replacement; Hip replacement arthroplasty; Treatment of cardiac arrhythmia (N/A, 3/18/2019); Thyroidectomy; Treatment of cardiac arrhythmia (N/A, 5/14/2019); ERCP (N/A, 9/14/2020); Endoscopic ultrasound of upper gastrointestinal tract (N/A, 9/14/2020); and ERCP (N/A, 9/25/2020).    Social History: Patient reports that she has quit smoking. She started smoking about 56 years ago. She has never used smokeless tobacco. She reports that she does not drink alcohol or use drugs.    Family History: family history includes Heart attack in her maternal grandmother and mother; Heart disease in her maternal grandmother and mother; Heart failure in her maternal grandmother and mother; Hypertension in her maternal grandmother and mother; Stroke in her maternal grandmother.    Allergies: Patient is allergic to ciprofloxacin.    ROS  Constitutional:+ fatigue + decreased appetite Negative for fever   Eyes: Negative for blurred vision, double vision   Respiratory: +shortness of breath (on exertion)  Negative for cough  Cardiovascular: + BLE edema 1+ pitting edema (improved) Negative for chest pain, palpitations.   Gastrointestinal: Negative for abdominal pain, constipation,  diarrhea, nausea, vomiting.   Genitourinary: Negative for dysuria, frequency   Musculoskeletal:  + generalized weakness. Negative for back pain and myalgias.   Skin: Negative for itching and rash.   Neurological: Negative for dizziness, headaches.   Psychiatric/Behavioral: Negative for depression. The patient is not nervous/anxious.      24 hour Vital Sign Range   Temp:  [98.3 °F (36.8 °C)-98.5 °F (36.9 °C)]   Pulse:  [60]   Resp:  [16-18]   BP: (109-126)/(52-59)   SpO2:  [96 %]     PEx  Constitutional: Patient appears debilitated.  No distress noted  HENT:   Head: Normocephalic and atraumatic.   Eyes: Pupils are equal, round  Neck: Normal range of motion. Neck supple.   Cardiovascular: Normal rate, regular rhythm and normal heart sounds.    Pulmonary/Chest: Effort normal and breath sounds are clear  Abdominal: Soft. Bowel sounds are normal.   Musculoskeletal:+ weakness 4/5 on all extremities  Normal range of motion.   Neurological: Alert and oriented to person, place, and time.   Psychiatric: Normal mood and affect. Behavior is normal.   Skin: Skin is warm and dry.  RUQ drain in place, scant amount of serosanguinous drainage, dressing CDI   Stage 2 pressure injury- gluteal cleft followed weekly by wound care nurse  Date First Assessed/Time First Assessed: 10/02/20 7179   Altered Skin Integrity Present on Admission: yes  Orientation: midline  Location: Gluteal cleft    Description of Altered Skin Integrity Partial thickness tissue loss. Shallow open ulcer with a red or pink wound bed, without slough. Intact or Open/Ruptured Serum-filled blister.   Dressing Appearance Dry;Intact;Clean   Drainage Amount Small   Drainage Characteristics/Odor Serosanguineous   Appearance Pink;Red;Moist   Tissue loss description Partial thickness   Periwound Area Intact;Pink;Scar tissue   Wound Edges Open   Wound Length (cm) 1 cm   Wound Width (cm) 0.5 cm   Wound Depth (cm) 0.1 cm   Wound Volume (cm^3) 0.05 cm^3   Wound Surface Area  (cm^2) 0.5 cm^2   Care Cleansed with:;Sterile normal saline;Applied:;Skin Barrier   Dressing Reinforced;Foam         Recent Labs   Lab 10/15/20  0515      K 4.1      CO2 27   BUN 8   CREATININE 0.8   MG 1.6       Recent Labs   Lab 10/15/20  0515   WBC 5.46   RBC 2.72*   HGB 8.2*   HCT 27.2*      *   MCH 30.1   MCHC 30.1*         Assessment and Plan:    Bacteremia  - Culture grew E.Coli susceptible to multiple abx.   -continue Metronidazole 500 mg Q 8 empirically for anaerobic coverage est end date 10/26  -continue IV ceftriaxone (est end date 10/26)  -Weekly CMP, CBC, CRP, ESR      Hepatic abscess  - PICC line placed 9/30 for receiving IV antibiotics for 4 weeks  - Follow up with ID outpatient with weekly labs  - continue ceftriaxone and flagyl   - IR to leave drain in and re-evaluate in 1 week with CT scan  10/12/20  CT abdomen completed. Care coordinated with ID, recommend: continuing antiboitics and following up in ID clinic in 2 weeks. Also recommended GI follow up for multiloculated cystic lesion     Paroxymal atrial fibrillation  - Continue Amiodarone 200 mg  - continue Atenolol 50 mg   - conitnue Apixiban 2.5 BID     Essential hypertension  -continue Atenolol 50 mg        -continue Spirolactone 25 mg BID  10/15/2020 BP at goal on current regimen      Hypothyroidism   -TSH 0.443 on 9/25  - Continue levothyroxine 0.137 mg    Pacemaker  -DC PPM (2014; [facility]) implanted for symptomatic SB and first degree AVB    Hyperlipidemia  -continue Atorvastatin 10 mg   -Monitor LFTS  10/12 LFTs stable    Future Appointments   Date Time Provider Department Center   10/23/2020 12:00 PM [facility] OI-CT2 500 LB LIMIT [facility]   10/26/2020  2:00 PM Keila Mares, MATTHEW, ANP [facility] ID [address]   12/11/2020 11:30 AM Kai Montez MD [facility]         I certify that SNF services are required to be given on an inpatient basis because Gisselle Ortiz needs for skilled nursing care  and/or skilled rehabilitation are required on a daily basis and such services can only practically be provided in a skilled nursing facility setting and are for an ongoing condition for which she received inpatient care in the hospital.         Chad Downing NP  Department of Hospital Medicine   [facility]     DOS: 10/15/2020       Patient note was created using MModal Dictation.  Any errors in syntax or even information may not have been identified and edited on initial review prior to signing this note.

## 2020-10-15 NOTE — PT/OT/SLP PROGRESS
"Occupational Therapy  Treatment    Gisselle Ortiz   MRN: [id number]   Admitting Diagnosis: Hepatic abscess    OT Date of Treatment: 10/15/20       Billable Minutes:  Therapeutic Activity 20    General Precautions: Standard, aspiration, fall  Orthopedic Precautions: N/A  Braces: N/A    Spiritual, Cultural Beliefs, Hinduism Practices, Values that Affect Care: no    Subjective:  Communicated with nsg prior to session.  "I am exhausted"    Pain/Comfort  Pain Rating 1: 0/10  Pain Rating Post-Intervention 1: 0/10    Objective:   Pt. Found seated in bedside chair on arrival with PT    Occupational Performance:    Bed Mobility:    · Not tested. Pt. declined    Functional Mobility/Transfers:  · Patient completed Sit <> Stand Transfer with minimum assistance  with  rolling walker   · Functional Mobility: not tested    Activities of Daily Living:  · Not tested. Pt. declined    [facility] 6 Click:  [facility] Total Score: 18    OT Exercises: UE Ergometer 10 mins with minimum resistance and 2 rest break     Additional Treatment:  Pt. With standing act on this day with task. Pt. With CGA/SBA for balance aspects with task with no AD at raised counter Pt with visual perception task with discrimination of various shapes and sizes x 1 min 46 with standing bal and min cues throught out. Pt. Not able to complete finish task but seated rest break required.    Nurse in room during session to assess IV    Pt. With standing and therex performed to increase ROM, endurance selfcare task and fxl mobility for independence     Patient left up in chair with all lines intact and call button in reach    ASSESSMENT:  Gisselle Ortiz is a 87 y.o. female with a medical diagnosis of Hepatic abscess Pt. participated well with session on this day. Session limited 2* to fatigue. Pt. verbalized "I just had physical therapy so I am exhausted". Pt. Reported fatigue in BUEs and BLEs. Pt. Will continue to benefit from continued OT to progress towards " goals    Rehab identified problem list/impairments: weakness, impaired endurance, impaired self care skills, impaired functional mobilty, impaired balance, gait instability, decreased lower extremity function, decreased safety awareness, decreased ROM, edema, impaired cardiopulmonary response to activity    Rehab potential is good    Activity tolerance: Good    Discharge recommendations: home health OT     Barriers to discharge: Decreased caregiver support     Equipment recommendations: none     GOALS:   Multidisciplinary Problems     Occupational Therapy Goals        Problem: Occupational Therapy Goal    Goal Priority Disciplines Outcome Interventions   Occupational Therapy Goal     OT, PT/OT Ongoing, Progressing    Description: Goals to be met by: 10/20/20     Patient will increase functional independence with ADLs by performing:    UE Dressing with Modified [place].  LE Dressing with Stand-by Assistance.  Grooming while seated with Modified [place].  Toileting from toilet with Stand-by Assistance for hygiene and clothing management.   Bathing from  sitting at sink with Stand-by Assistance.  Supine to sit with Modified [place].  Stand pivot transfers with Supervision using A/D as needed.  Pt's caregiver will be educated on level of assist required to safely perform self care tasks and functional transfers.                 Plan:  Patient to be seen 5 x/week to address the above listed problems via self-care/home management, therapeutic activities, therapeutic exercises  Plan of Care expires: 11/04/20  Plan of Care reviewed with: patient    Roz Mitchell, MINERVA BRAGG and RONALD have discussed the above patients goals and status in collaboration with Plan of Care.  10/15/2020

## 2020-10-16 PROCEDURE — 97110 THERAPEUTIC EXERCISES: CPT | Mod: CO

## 2020-10-16 PROCEDURE — 63600175 PHARM REV CODE 636 W HCPCS: Performed by: HOSPITALIST

## 2020-10-16 PROCEDURE — A4216 STERILE WATER/SALINE, 10 ML: HCPCS | Performed by: NURSE PRACTITIONER

## 2020-10-16 PROCEDURE — 97116 GAIT TRAINING THERAPY: CPT | Mod: CQ

## 2020-10-16 PROCEDURE — 25000003 PHARM REV CODE 250: Performed by: HOSPITALIST

## 2020-10-16 PROCEDURE — 25000003 PHARM REV CODE 250: Performed by: NURSE PRACTITIONER

## 2020-10-16 PROCEDURE — 97535 SELF CARE MNGMENT TRAINING: CPT | Mod: CO

## 2020-10-16 PROCEDURE — 97110 THERAPEUTIC EXERCISES: CPT | Mod: CQ

## 2020-10-16 PROCEDURE — 11000004 HC SNF PRIVATE

## 2020-10-16 RX ORDER — SODIUM CHLORIDE 0.9 % (FLUSH) 0.9 %
10 SYRINGE (ML) INJECTION
Status: DISCONTINUED | OUTPATIENT
Start: 2020-10-16 | End: 2020-11-02 | Stop reason: HOSPADM

## 2020-10-16 RX ORDER — SODIUM CHLORIDE 0.9 % (FLUSH) 0.9 %
10 SYRINGE (ML) INJECTION EVERY 6 HOURS
Status: DISCONTINUED | OUTPATIENT
Start: 2020-10-16 | End: 2020-11-02 | Stop reason: HOSPADM

## 2020-10-16 RX ADMIN — APIXABAN 2.5 MG: 2.5 TABLET, FILM COATED ORAL at 08:10

## 2020-10-16 RX ADMIN — ATENOLOL 50 MG: 50 TABLET ORAL at 08:10

## 2020-10-16 RX ADMIN — Medication 1 CAPSULE: at 08:10

## 2020-10-16 RX ADMIN — APIXABAN 2.5 MG: 2.5 TABLET, FILM COATED ORAL at 09:10

## 2020-10-16 RX ADMIN — AMIODARONE HYDROCHLORIDE 200 MG: 200 TABLET ORAL at 08:10

## 2020-10-16 RX ADMIN — DICLOFENAC 4 G: 10 GEL TOPICAL at 08:10

## 2020-10-16 RX ADMIN — METRONIDAZOLE 500 MG: 500 TABLET ORAL at 10:10

## 2020-10-16 RX ADMIN — LEVOTHYROXINE SODIUM 137 MCG: 50 TABLET ORAL at 05:10

## 2020-10-16 RX ADMIN — DICLOFENAC 4 G: 10 GEL TOPICAL at 09:10

## 2020-10-16 RX ADMIN — METRONIDAZOLE 500 MG: 500 TABLET ORAL at 05:10

## 2020-10-16 RX ADMIN — CEFTRIAXONE SODIUM 2 G: 2 INJECTION, SOLUTION INTRAVENOUS at 11:10

## 2020-10-16 RX ADMIN — ATORVASTATIN CALCIUM 10 MG: 10 TABLET, FILM COATED ORAL at 08:10

## 2020-10-16 RX ADMIN — SPIRONOLACTONE 25 MG: 25 TABLET ORAL at 09:10

## 2020-10-16 RX ADMIN — Medication 10 ML: at 11:10

## 2020-10-16 RX ADMIN — DICLOFENAC 4 G: 10 GEL TOPICAL at 02:10

## 2020-10-16 RX ADMIN — MELATONIN TAB 3 MG 6 MG: 3 TAB at 10:10

## 2020-10-16 RX ADMIN — SPIRONOLACTONE 25 MG: 25 TABLET ORAL at 08:10

## 2020-10-16 RX ADMIN — METRONIDAZOLE 500 MG: 500 TABLET ORAL at 02:10

## 2020-10-16 RX ADMIN — Medication 10 ML: at 07:10

## 2020-10-16 RX ADMIN — Medication 10 ML: at 12:10

## 2020-10-16 NOTE — PT/OT/SLP PROGRESS
"Physical Therapy  Treatment    Gisselle Ortiz   MRN: [id number]   Admitting Diagnosis: Hepatic abscess    PT Received On: 10/16/20        Billable Minutes:  Gait Training 10 and Therapeutic Exercise 14    Treatment Type: Treatment  PT/PTA: PTA     PTA Visit Number: 1       General Precautions: Standard, fall  Orthopedic Precautions: N/A   Braces: N/A    Spiritual, Cultural Beliefs, Scientologist Practices, Values that Affect Care: no    Subjective:  "I can do some"    Pain/Comfort  Pain Rating 1: 0/10  Pain Rating Post-Intervention 1: 0/10    Objective:   Patient found with: (in BSC)     AM-PAC 6 CLICK MOBILITY  Total Score:19     Transfers:  Sit<>Stand: with RW min/CGA from [place]  Stand Pivot Tnsfer: with RW CGA    Gait:  Amb with RW CGA ~40 ft no LOB, inc fatigue with inc distance     Therex:  2x10 reps AP,GS,QS,LAQ,hip flex,abd/add    Additional Treatment:      Patient left up in chair with call button in reach and belongings in reach.    Assessment:  Gisselle Ortiz is a 87 y.o. female with a medical diagnosis of Hepatic abscess.  Pt tolerated fairly well, little fatigued 2* to working with OT earlier and visitation soon, may walk again later today,  pt would continue to benefit from skilled PT services to improve overall functional mobility, strength and endurance.  .    Rehab identified problem list/impairments: weakness, impaired endurance, impaired self care skills, impaired functional mobilty, gait instability, impaired balance, decreased upper extremity function, decreased lower extremity function, decreased safety awareness, pain    Rehab potential is good.    Activity tolerance: Fair    Discharge recommendations: home with home health(w/ light family assistance initially)     Barriers to discharge: None    Equipment recommendations: walker, rolling     GOALS:   Multidisciplinary Problems     Physical Therapy Goals        Problem: Physical Therapy Goal    Goal Priority Disciplines Outcome Goal " Variances Interventions   Physical Therapy Goal     PT, PT/OT Ongoing, Progressing     Description: Goals to be met by: 10/20/20     Patient will increase functional independence with mobility by performin. Supine to sit with Modified [place]  2. Sit to supine with Modified [place]  3. Rolling to Left and Right with Modified [place].  4. Sit to stand transfer with Supervision  5. Bed to chair transfer with Supervision using Rolling Walker  6. Gait  x 50 feet with Supervision using Rolling Walker.   7. Ascend/Descend 4 inch curb step with Stand-by Assistance using Rolling Walker.  8. Stand for 3 minutes with Stand-by Assistance using Rolling Walker while performing an activity met  9. Pt will perform a car transfer (Actual or simulated) w/ RW and CGA  10. Pt will stand and  5 objects from the floor w/ a reacher, RW, and SPV                     PLAN:    Patient to be seen 5 x/week  to address the above listed problems via gait training, therapeutic exercises, therapeutic activities  Plan of Care expires: 20  Plan of Care reviewed with: patient    Dorys Grier, PTA  10/16/2020

## 2020-10-16 NOTE — PT/OT/SLP PROGRESS
"Occupational Therapy  Treatment    Gisselle Ortiz   MRN: [id number]   Admitting Diagnosis: Hepatic abscess    OT Date of Treatment: 10/16/20       Billable Minutes:  Self Care/Home Management 20 and Therapeutic Exercise 25    General Precautions: Standard, aspiration, fall  Orthopedic Precautions: N/A  Braces: N/A    Spiritual, Cultural Beliefs, Rastafari Practices, Values that Affect Care: no    Subjective:  Communicated with nsg prior to session.  "Its early"    Pain/Comfort  Pain Rating 1: 0/10  Pain Rating Post-Intervention 1: 0/10    Objective:   Pt. Found supine on arrival     Occupational Performance:    Bed Mobility:    · Patient completed Rolling/Turning to Right with supervision  · Patient completed Scooting/Bridging with supervision  · Patient completed Supine to Sit with supervision     Functional Mobility/Transfers:  · Patient completed Sit <> Stand Transfer with contact guard assistance  with  no assistive device   · Patient completed Bed <> Chair Transfer using Stand Pivot technique with contact guard assistance with rolling walker  · Patient completed Toilet Transfer Step Transfer technique with contact guard assistance with  rolling walker  · Functional Mobility: Pt. With fxl mobility from EOB to bathroom with RW and CGA     Activities of Daily Living:  · Grooming: supervision with oral care and hair care while seated at sink level   · Bathing: contact guard assistance with cleaning of Joi Areas instance at sink level   · Upper Body Dressing: supervision to doff/rosario gown while seated  · Lower Body Dressing: contact guard assistance to manage pants over hips instance  · Toileting: contact guard assistance with hygiene and clothing management     AMPA 6 Click:  AMPAC Total Score: 18    OT Exercises: UE Ergometer 10 mins with moderate resistance    Additional Treatment:  Pt. With standing act on this day with task. Pt. With CGA/SBA for balance aspects with task with AD at raised counter Pt with " fine motor task x 3 min with standing bal and min cues throught out. Pt. Not able to complete finish task 2* to BLEs fatigue but seated rest break required.    Pt. With 2# dowel activity with 2x15 reps with  shd flex, bicep curls and forward flex motion to increase BUE ROM and strength,.     Pt. With standing and therex performed to increase ROM, endurance selfcare task and fxl mobility for independence     Patient left up in chair with all lines intact and call button in reach    ASSESSMENT:  Gisselle Ortiz is a 87 y.o. female with a medical diagnosis of Hepatic abscess Pt. participated well with session on this day. Pt is progressing well with session on this day still continues to requires cues with aspects of safety . Pt. Will continue to benefit from continued OT to progress towards goals    Rehab identified problem list/impairments: weakness, impaired endurance, impaired self care skills, impaired functional mobilty, impaired balance, gait instability, decreased lower extremity function, decreased safety awareness, decreased ROM, edema, impaired cardiopulmonary response to activity    Rehab potential is good    Activity tolerance: Good    Discharge recommendations: home health OT     Barriers to discharge: Decreased caregiver support     Equipment recommendations: none     GOALS:   Multidisciplinary Problems     Occupational Therapy Goals        Problem: Occupational Therapy Goal    Goal Priority Disciplines Outcome Interventions   Occupational Therapy Goal     OT, PT/OT Ongoing, Progressing    Description: Goals to be met by: 10/20/20     Patient will increase functional independence with ADLs by performing:    UE Dressing with Modified [place].  LE Dressing with Stand-by Assistance.  Grooming while seated with Modified [place].  Toileting from toilet with Stand-by Assistance for hygiene and clothing management.   Bathing from  sitting at sink with Stand-by Assistance.  Supine to sit with  Modified [place].  Stand pivot transfers with Supervision using A/D as needed.  Pt's caregiver will be educated on level of assist required to safely perform self care tasks and functional transfers.                 Plan:  Patient to be seen 5 x/week to address the above listed problems via self-care/home management, therapeutic activities, therapeutic exercises  Plan of Care expires: 11/04/20  Plan of Care reviewed with: patient    Roz Mitchell, MINERVA BRAGG and RONALD have discussed the above patients goals and status in collaboration with Plan of Care.  10/16/2020

## 2020-10-17 PROCEDURE — 97530 THERAPEUTIC ACTIVITIES: CPT | Mod: CQ

## 2020-10-17 PROCEDURE — A4216 STERILE WATER/SALINE, 10 ML: HCPCS | Performed by: NURSE PRACTITIONER

## 2020-10-17 PROCEDURE — 25000003 PHARM REV CODE 250: Performed by: NURSE PRACTITIONER

## 2020-10-17 PROCEDURE — 97110 THERAPEUTIC EXERCISES: CPT

## 2020-10-17 PROCEDURE — 63600175 PHARM REV CODE 636 W HCPCS: Performed by: HOSPITALIST

## 2020-10-17 PROCEDURE — 97110 THERAPEUTIC EXERCISES: CPT | Mod: CQ

## 2020-10-17 PROCEDURE — 25000003 PHARM REV CODE 250: Performed by: HOSPITALIST

## 2020-10-17 PROCEDURE — 11000004 HC SNF PRIVATE

## 2020-10-17 PROCEDURE — 97116 GAIT TRAINING THERAPY: CPT | Mod: CQ

## 2020-10-17 RX ADMIN — ATENOLOL 50 MG: 50 TABLET ORAL at 09:10

## 2020-10-17 RX ADMIN — SPIRONOLACTONE 25 MG: 25 TABLET ORAL at 09:10

## 2020-10-17 RX ADMIN — ATORVASTATIN CALCIUM 10 MG: 10 TABLET, FILM COATED ORAL at 09:10

## 2020-10-17 RX ADMIN — APIXABAN 2.5 MG: 2.5 TABLET, FILM COATED ORAL at 09:10

## 2020-10-17 RX ADMIN — Medication 1 CAPSULE: at 09:10

## 2020-10-17 RX ADMIN — AMIODARONE HYDROCHLORIDE 200 MG: 200 TABLET ORAL at 09:10

## 2020-10-17 RX ADMIN — CEFTRIAXONE SODIUM 2 G: 2 INJECTION, SOLUTION INTRAVENOUS at 09:10

## 2020-10-17 RX ADMIN — Medication 10 ML: at 06:10

## 2020-10-17 RX ADMIN — DICLOFENAC 4 G: 10 GEL TOPICAL at 09:10

## 2020-10-17 RX ADMIN — METRONIDAZOLE 500 MG: 500 TABLET ORAL at 06:10

## 2020-10-17 RX ADMIN — APIXABAN 2.5 MG: 2.5 TABLET, FILM COATED ORAL at 08:10

## 2020-10-17 RX ADMIN — DICLOFENAC 4 G: 10 GEL TOPICAL at 03:10

## 2020-10-17 RX ADMIN — Medication 10 ML: at 05:10

## 2020-10-17 RX ADMIN — SPIRONOLACTONE 25 MG: 25 TABLET ORAL at 08:10

## 2020-10-17 RX ADMIN — Medication 10 ML: at 09:10

## 2020-10-17 RX ADMIN — METRONIDAZOLE 500 MG: 500 TABLET ORAL at 09:10

## 2020-10-17 RX ADMIN — MELATONIN TAB 3 MG 6 MG: 3 TAB at 08:10

## 2020-10-17 RX ADMIN — METRONIDAZOLE 500 MG: 500 TABLET ORAL at 02:10

## 2020-10-17 RX ADMIN — Medication 10 ML: at 12:10

## 2020-10-17 RX ADMIN — LEVOTHYROXINE SODIUM 137 MCG: 50 TABLET ORAL at 06:10

## 2020-10-17 NOTE — PLAN OF CARE
Problem: Occupational Therapy Goal  Goal: Occupational Therapy Goal  Description: Goals to be met by: 10/20/20     Patient will increase functional independence with ADLs by performing:    UE Dressing with Modified [place].  LE Dressing with Stand-by Assistance.  Grooming while seated with Modified [place].  Toileting from toilet with Stand-by Assistance for hygiene and clothing management.   Bathing from  sitting at sink with Stand-by Assistance.  Supine to sit with Modified [place].  Stand pivot transfers with Supervision using A/D as needed.  Pt's caregiver will be educated on level of assist required to safely perform self care tasks and functional transfers.    Outcome: Ongoing, Progressing     Pt was agreeable to OT and was noted to make progress towards her goals in therapy.  Pt's goals remain appropriate at this time.  She will continue to benefit from skilled OT services in order to assist her with increasing her safety and level of independence with self care and mobility tasks.     Tiffani Rodriguez, OT  10/17/2020

## 2020-10-17 NOTE — PT/OT/SLP PROGRESS
"Occupational Therapy  Treatment    Gisselle Ortiz   MRN: [id number]   Admitting Diagnosis: Hepatic abscess    OT Date of Treatment: 10/17/20       Billable Minutes:  Therapeutic Exercise 38    General Precautions: Standard, aspiration, fall  Orthopedic Precautions: N/A  Braces: N/A    Spiritual, Cultural Beliefs, Roman Catholic Practices, Values that Affect Care: no    Subjective:  Communicated with nurse prior to session.  "My family is coming to see me today."    Pain/Comfort  Pain Rating 1: 0/10  Pain Rating Post-Intervention 1: 0/10    Objective:  Patient found with: peripheral IV    Occupational Performance:    Bed Mobility:    · N/A - pt sitting in bedside chair before and w/c after tx session     Functional Mobility/Transfers:  · Patient completed Sit <> Stand Transfer with contact guard assistance  with  rolling walker   · Functional Mobility: Pt wanted to perform transfer without assist - given cues to scoot to edge of chair to facilitate independence with transfers - pt able to rock and stand with only CGA - from bedside chair - pt able to walk short distance to w/c using RW with CGA.    Activities of Daily Living:  · N/A - pt offered ADL session to change clothes for family visit, however, pt declined and wanted to remain in her hospital gown    [facility] 6 Click:  [facility] Total Score: 18    OT Exercises: Pt worked on UE strengthening and endurance exercises to improve her safety and independence during functional activities, such as, w/c mobility, transfers, walking with RW, bed mobility and ADLs  · AROM pt completed 2 sets of 10 reps of shoulder press overhead, chest press, forward rows and biceps curls  · UE Ergometer 15 minutes - began with moderate resistance, but needed to take rest break after 4' - reduced resistance for remaining 11 min and pt only needed to take rest break 1 other time.     Additional Treatment:  · Pt completed ADLs and func mobility activities for tx session as noted above  · Whiteboard " updated  · Pt educated on role of OT and POC      Patient left up in chair with PCA present to assist her downstairs for family visit    ASSESSMENT:  Gisselle Ortiz is a 87 y.o. female with a medical diagnosis of Hepatic abscess and deficits noted below.  Pt was agreeable to OT and was noted to make progress towards her goals in therapy.  Pt worked on UE strength/endurance during today's session to improve progress towards goals.  Pt's goals remain appropriate at this time.  She will continue to benefit from skilled OT services in order to assist her with increasing her safety and level of independence with self care and mobility tasks.       Rehab identified problem list/impairments: weakness, impaired endurance, impaired self care skills, impaired functional mobilty, impaired balance, gait instability, decreased lower extremity function, decreased safety awareness, decreased ROM, edema, impaired cardiopulmonary response to activity    Rehab potential is good    Activity tolerance: Good    Discharge recommendations: home health OT     Barriers to discharge: Decreased caregiver support     Equipment recommendations: none     GOALS:   Multidisciplinary Problems     Occupational Therapy Goals        Problem: Occupational Therapy Goal    Goal Priority Disciplines Outcome Interventions   Occupational Therapy Goal     OT, PT/OT Ongoing, Progressing    Description: Goals to be met by: 10/20/20     Patient will increase functional independence with ADLs by performing:    UE Dressing with Modified [place].  LE Dressing with Stand-by Assistance.  Grooming while seated with Modified [place].  Toileting from toilet with Stand-by Assistance for hygiene and clothing management.   Bathing from  sitting at sink with Stand-by Assistance.  Supine to sit with Modified [place].  Stand pivot transfers with Supervision using A/D as needed.  Pt's caregiver will be educated on level of assist required to safely  perform self care tasks and functional transfers.                     Plan:  Patient to be seen 5 x/week to address the above listed problems via self-care/home management, therapeutic activities, therapeutic exercises  Plan of Care expires: 11/04/20  Plan of Care reviewed with: patient    Tiffani Rodriguez, OT  10/17/2020

## 2020-10-17 NOTE — PT/OT/SLP PROGRESS
"Physical Therapy  Treatment    Gisselle Ortiz   MRN: [id number]   Admitting Diagnosis: Hepatic abscess    PT Received On: 10/17/20        Billable Minutes:  Gait Training 10, Therapeutic Activity 13 and Therapeutic Exercise 15    Treatment Type: Treatment  PT/PTA: PTA     PTA Visit Number: 2       General Precautions: Standard, fall  Orthopedic Precautions: N/A   Braces: N/A    Spiritual, Cultural Beliefs, Bahai Practices, Values that Affect Care: no    Subjective:  "yea, I can go now"    Pain/Comfort  Pain Rating 1: 0/10  Pain Rating Post-Intervention 1: 0/10    Objective:   Patient found with: (in bed)     AM-PAC 6 CLICK MOBILITY  Total Score:20    Bed Mobility: HOB flat no rodriguez  Sit>Supine: mod I 1 trial  Supine>Sit: mod I x 2 trials    Transfers:  Sit<>Stand: with RW SBA  Stand Pivot Transfer: with RW SBA    Gait:  Amb with RW close SBA ~ 15 ft x 2 to/from bathroom and ~ 30 ft in room to include curb step    Advanced Gait:  Curb Step: asc/gabi 3.5 curb step with RW close SBA x 2 trials    Therex:  x20 reps AP,GS,LAQ,hip flex    Balance:  SBA while pt tied pants and dilcia care    Additional Treatment:  Mini elliptical x 10 min  toileting SBA with trfs, close SBA with dilcia care and min A with clothing mgmt/change fresh diaper    Patient left up in chair with call button in reach and belongings in reach.    Assessment:  Gisselle Ortiz is a 87 y.o. female with a medical diagnosis of Hepatic abscess.  Pt tolerated well, pt would continue to benefit from skilled PT services to improve overall functional mobility, strength and endurance.  .    Rehab identified problem list/impairments: weakness, impaired endurance, impaired self care skills, impaired functional mobilty, gait instability, impaired balance, decreased upper extremity function, decreased lower extremity function, decreased safety awareness, pain    Rehab potential is good.    Activity tolerance: Fair    Discharge recommendations: home with home " health(w/ light family assistance initially)     Barriers to discharge: None    Equipment recommendations: walker, rolling     GOALS:   Multidisciplinary Problems     Physical Therapy Goals        Problem: Physical Therapy Goal    Goal Priority Disciplines Outcome Goal Variances Interventions   Physical Therapy Goal     PT, PT/OT Ongoing, Progressing     Description: Goals to be met by: 10/20/20     Patient will increase functional independence with mobility by performin. Supine to sit with Modified [place] met  2. Sit to supine with Modified [place] met  3. Rolling to Left and Right with Modified [place]. met  4. Sit to stand transfer with Supervision  5. Bed to chair transfer with Supervision using Rolling Walker  6. Gait  x 50 feet with Supervision using Rolling Walker.   7. Ascend/Descend 4 inch curb step with Stand-by Assistance using Rolling Walker.met  8. Stand for 3 minutes with Stand-by Assistance using Rolling Walker while performing an activity met  9. Pt will perform a car transfer (Actual or simulated) w/ RW and CGA  10. Pt will stand and  5 objects from the floor w/ a reacher, RW, and SPV                     PLAN:    Patient to be seen 5 x/week  to address the above listed problems via gait training, therapeutic exercises, therapeutic activities  Plan of Care expires: 20  Plan of Care reviewed with: patient    Dorys Grier, PTA  10/17/2020

## 2020-10-18 PROCEDURE — 25000003 PHARM REV CODE 250: Performed by: NURSE PRACTITIONER

## 2020-10-18 PROCEDURE — A4216 STERILE WATER/SALINE, 10 ML: HCPCS | Performed by: NURSE PRACTITIONER

## 2020-10-18 PROCEDURE — 11000004 HC SNF PRIVATE

## 2020-10-18 PROCEDURE — 25000003 PHARM REV CODE 250: Performed by: HOSPITALIST

## 2020-10-18 PROCEDURE — 63600175 PHARM REV CODE 636 W HCPCS: Performed by: HOSPITALIST

## 2020-10-18 RX ADMIN — ATORVASTATIN CALCIUM 10 MG: 10 TABLET, FILM COATED ORAL at 09:10

## 2020-10-18 RX ADMIN — AMIODARONE HYDROCHLORIDE 200 MG: 200 TABLET ORAL at 09:10

## 2020-10-18 RX ADMIN — DICLOFENAC 4 G: 10 GEL TOPICAL at 09:10

## 2020-10-18 RX ADMIN — APIXABAN 2.5 MG: 2.5 TABLET, FILM COATED ORAL at 09:10

## 2020-10-18 RX ADMIN — Medication 10 ML: at 12:10

## 2020-10-18 RX ADMIN — ATENOLOL 50 MG: 50 TABLET ORAL at 09:10

## 2020-10-18 RX ADMIN — DICLOFENAC 4 G: 10 GEL TOPICAL at 02:10

## 2020-10-18 RX ADMIN — CALCIUM CARBONATE (ANTACID) CHEW TAB 500 MG 500 MG: 500 CHEW TAB at 12:10

## 2020-10-18 RX ADMIN — METRONIDAZOLE 500 MG: 500 TABLET ORAL at 09:10

## 2020-10-18 RX ADMIN — Medication 10 ML: at 11:10

## 2020-10-18 RX ADMIN — Medication 10 ML: at 06:10

## 2020-10-18 RX ADMIN — METRONIDAZOLE 500 MG: 500 TABLET ORAL at 02:10

## 2020-10-18 RX ADMIN — Medication 10 ML: at 10:10

## 2020-10-18 RX ADMIN — SPIRONOLACTONE 25 MG: 25 TABLET ORAL at 09:10

## 2020-10-18 RX ADMIN — LEVOTHYROXINE SODIUM 137 MCG: 50 TABLET ORAL at 06:10

## 2020-10-18 RX ADMIN — MELATONIN TAB 3 MG 6 MG: 3 TAB at 09:10

## 2020-10-18 RX ADMIN — METRONIDAZOLE 500 MG: 500 TABLET ORAL at 06:10

## 2020-10-18 RX ADMIN — CEFTRIAXONE SODIUM 2 G: 2 INJECTION, SOLUTION INTRAVENOUS at 10:10

## 2020-10-18 RX ADMIN — Medication 1 CAPSULE: at 09:10

## 2020-10-19 LAB
ALBUMIN SERPL BCP-MCNC: 1.9 G/DL (ref 3.5–5.2)
ALBUMIN SERPL BCP-MCNC: 1.9 G/DL (ref 3.5–5.2)
ALP SERPL-CCNC: 67 U/L (ref 55–135)
ALP SERPL-CCNC: 67 U/L (ref 55–135)
ALT SERPL W/O P-5'-P-CCNC: 10 U/L (ref 10–44)
ALT SERPL W/O P-5'-P-CCNC: 10 U/L (ref 10–44)
ANION GAP SERPL CALC-SCNC: 3 MMOL/L (ref 8–16)
AST SERPL-CCNC: 21 U/L (ref 10–40)
AST SERPL-CCNC: 21 U/L (ref 10–40)
BASOPHILS # BLD AUTO: 0.07 K/UL (ref 0–0.2)
BASOPHILS NFR BLD: 1.5 % (ref 0–1.9)
BILIRUB DIRECT SERPL-MCNC: 0.2 MG/DL (ref 0.1–0.3)
BILIRUB SERPL-MCNC: 0.3 MG/DL (ref 0.1–1)
BILIRUB SERPL-MCNC: 0.3 MG/DL (ref 0.1–1)
BUN SERPL-MCNC: 9 MG/DL (ref 8–23)
CALCIUM SERPL-MCNC: 7.8 MG/DL (ref 8.7–10.5)
CHLORIDE SERPL-SCNC: 107 MMOL/L (ref 95–110)
CO2 SERPL-SCNC: 28 MMOL/L (ref 23–29)
CREAT SERPL-MCNC: 0.8 MG/DL (ref 0.5–1.4)
DIFFERENTIAL METHOD: ABNORMAL
EOSINOPHIL # BLD AUTO: 0.3 K/UL (ref 0–0.5)
EOSINOPHIL NFR BLD: 6.4 % (ref 0–8)
ERYTHROCYTE [DISTWIDTH] IN BLOOD BY AUTOMATED COUNT: 16.7 % (ref 11.5–14.5)
EST. GFR  (AFRICAN AMERICAN): >60 ML/MIN/1.73 M^2
EST. GFR  (NON AFRICAN AMERICAN): >60 ML/MIN/1.73 M^2
GLUCOSE SERPL-MCNC: 94 MG/DL (ref 70–110)
HCT VFR BLD AUTO: 28.2 % (ref 37–48.5)
HGB BLD-MCNC: 8.3 G/DL (ref 12–16)
IMM GRANULOCYTES # BLD AUTO: 0.07 K/UL (ref 0–0.04)
IMM GRANULOCYTES NFR BLD AUTO: 1.5 % (ref 0–0.5)
LYMPHOCYTES # BLD AUTO: 1.3 K/UL (ref 1–4.8)
LYMPHOCYTES NFR BLD: 27.4 % (ref 18–48)
MAGNESIUM SERPL-MCNC: 1.6 MG/DL (ref 1.6–2.6)
MCH RBC QN AUTO: 29.6 PG (ref 27–31)
MCHC RBC AUTO-ENTMCNC: 29.4 G/DL (ref 32–36)
MCV RBC AUTO: 101 FL (ref 82–98)
MONOCYTES # BLD AUTO: 0.6 K/UL (ref 0.3–1)
MONOCYTES NFR BLD: 13.1 % (ref 4–15)
NEUTROPHILS # BLD AUTO: 2.4 K/UL (ref 1.8–7.7)
NEUTROPHILS NFR BLD: 50.1 % (ref 38–73)
NRBC BLD-RTO: 0 /100 WBC
PHOSPHATE SERPL-MCNC: 3.2 MG/DL (ref 2.7–4.5)
PLATELET # BLD AUTO: 229 K/UL (ref 150–350)
PMV BLD AUTO: 10.9 FL (ref 9.2–12.9)
POTASSIUM SERPL-SCNC: 5 MMOL/L (ref 3.5–5.1)
PROT SERPL-MCNC: 4.6 G/DL (ref 6–8.4)
PROT SERPL-MCNC: 4.6 G/DL (ref 6–8.4)
RBC # BLD AUTO: 2.8 M/UL (ref 4–5.4)
SODIUM SERPL-SCNC: 138 MMOL/L (ref 136–145)
WBC # BLD AUTO: 4.81 K/UL (ref 3.9–12.7)

## 2020-10-19 PROCEDURE — 25000003 PHARM REV CODE 250: Performed by: NURSE PRACTITIONER

## 2020-10-19 PROCEDURE — 11000004 HC SNF PRIVATE

## 2020-10-19 PROCEDURE — 83735 ASSAY OF MAGNESIUM: CPT

## 2020-10-19 PROCEDURE — 84100 ASSAY OF PHOSPHORUS: CPT

## 2020-10-19 PROCEDURE — 80053 COMPREHEN METABOLIC PANEL: CPT

## 2020-10-19 PROCEDURE — 97535 SELF CARE MNGMENT TRAINING: CPT

## 2020-10-19 PROCEDURE — 97110 THERAPEUTIC EXERCISES: CPT | Mod: CQ

## 2020-10-19 PROCEDURE — 97803 MED NUTRITION INDIV SUBSEQ: CPT

## 2020-10-19 PROCEDURE — 97116 GAIT TRAINING THERAPY: CPT | Mod: CQ

## 2020-10-19 PROCEDURE — 25000003 PHARM REV CODE 250: Performed by: HOSPITALIST

## 2020-10-19 PROCEDURE — 63600175 PHARM REV CODE 636 W HCPCS: Performed by: HOSPITALIST

## 2020-10-19 PROCEDURE — 85025 COMPLETE CBC W/AUTO DIFF WBC: CPT

## 2020-10-19 PROCEDURE — 97530 THERAPEUTIC ACTIVITIES: CPT

## 2020-10-19 PROCEDURE — 97110 THERAPEUTIC EXERCISES: CPT

## 2020-10-19 PROCEDURE — A4216 STERILE WATER/SALINE, 10 ML: HCPCS | Performed by: NURSE PRACTITIONER

## 2020-10-19 PROCEDURE — 80076 HEPATIC FUNCTION PANEL: CPT

## 2020-10-19 RX ADMIN — DICLOFENAC 4 G: 10 GEL TOPICAL at 08:10

## 2020-10-19 RX ADMIN — APIXABAN 2.5 MG: 2.5 TABLET, FILM COATED ORAL at 09:10

## 2020-10-19 RX ADMIN — SPIRONOLACTONE 25 MG: 25 TABLET ORAL at 08:10

## 2020-10-19 RX ADMIN — DICLOFENAC 4 G: 10 GEL TOPICAL at 03:10

## 2020-10-19 RX ADMIN — AMIODARONE HYDROCHLORIDE 200 MG: 200 TABLET ORAL at 08:10

## 2020-10-19 RX ADMIN — Medication 1 CAPSULE: at 08:10

## 2020-10-19 RX ADMIN — METRONIDAZOLE 500 MG: 500 TABLET ORAL at 09:10

## 2020-10-19 RX ADMIN — METRONIDAZOLE 500 MG: 500 TABLET ORAL at 06:10

## 2020-10-19 RX ADMIN — ATENOLOL 50 MG: 50 TABLET ORAL at 08:10

## 2020-10-19 RX ADMIN — MELATONIN TAB 3 MG 6 MG: 3 TAB at 09:10

## 2020-10-19 RX ADMIN — LEVOTHYROXINE SODIUM 137 MCG: 50 TABLET ORAL at 06:10

## 2020-10-19 RX ADMIN — Medication 10 ML: at 12:10

## 2020-10-19 RX ADMIN — Medication 10 ML: at 06:10

## 2020-10-19 RX ADMIN — SPIRONOLACTONE 25 MG: 25 TABLET ORAL at 09:10

## 2020-10-19 RX ADMIN — APIXABAN 2.5 MG: 2.5 TABLET, FILM COATED ORAL at 08:10

## 2020-10-19 RX ADMIN — CEFTRIAXONE SODIUM 2 G: 2 INJECTION, SOLUTION INTRAVENOUS at 11:10

## 2020-10-19 RX ADMIN — ATORVASTATIN CALCIUM 10 MG: 10 TABLET, FILM COATED ORAL at 08:10

## 2020-10-19 RX ADMIN — Medication 10 ML: at 05:10

## 2020-10-19 RX ADMIN — METRONIDAZOLE 500 MG: 500 TABLET ORAL at 02:10

## 2020-10-19 NOTE — PLAN OF CARE
Inadequate oral intake related to poor appetite s/p critical illness as evidenced by PO < 25% , weight loss masked by overhydration during hospital stay,and pt reported grief response.     Goal: PO 50% MET   New Goal: PO to meet 85% of EEN/EPN  with ONS in place,for healing by next RD visit.     Plan  General diet  Collaboration with other providers  Commercial beverage- calories and protein- breeze daily

## 2020-10-19 NOTE — PT/OT/SLP PROGRESS
"Physical Therapy  Treatment    Gisselle Ortiz   MRN: [id number]   Admitting Diagnosis: Hepatic abscess    PT Received On: 10/19/20        Billable Minutes:  Gait Training 11 and Therapeutic Exercise 28    Treatment Type: Treatment  PT/PTA: PTA     PTA Visit Number: 3       General Precautions: Standard, fall  Orthopedic Precautions: N/A   Braces: N/A    Spiritual, Cultural Beliefs, Lutheran Practices, Values that Affect Care: no    Subjective:  "I almost fell, thank God she was close to me"    Pain/Comfort  Pain Rating 1: 0/10  Pain Rating Post-Intervention 1: 0/10    Objective:  Patient found with: (in bed)     AM-PAC 6 CLICK MOBILITY  Total Score:20    Transfers:  Sit<>Stand: with RW CGA/SBA  Stand Pivot Transfer: with RW CGA    Gait:  Amb with RW CGA ~ 25 ft and 40 ft seated rest break    Therex:resume in PM 2x10 reps AP,GS,QS,LAQ,hip abd/add    Additional Treatment:  Mini elliptical x 15 min    Patient left up in chair with call button in reach and belongings in reach.    Assessment:  Gisselle Ortiz is a 87 y.o. female with a medical diagnosis of Hepatic abscess.  Pt tolerated well, pt would continue to benefit from skilled PT services to improve overall functional mobility, strength and endurance.  .    Rehab identified problem list/impairments: weakness, impaired endurance, impaired self care skills, impaired functional mobilty, gait instability, impaired balance, decreased upper extremity function, decreased lower extremity function, decreased safety awareness, pain    Rehab potential is good.    Activity tolerance: Fair    Discharge recommendations: home with home health(w/ light family assistance initially)     Barriers to discharge: None    Equipment recommendations: walker, rolling     GOALS:   Multidisciplinary Problems     Physical Therapy Goals        Problem: Physical Therapy Goal    Goal Priority Disciplines Outcome Goal Variances Interventions   Physical Therapy Goal     PT, PT/OT Ongoing, " Progressing     Description: Goals to be met by: 10/20/20     Patient will increase functional independence with mobility by performin. Supine to sit with Modified [place] met  2. Sit to supine with Modified [place] met  3. Rolling to Left and Right with Modified [place]. met  4. Sit to stand transfer with Supervision  5. Bed to chair transfer with Supervision using Rolling Walker  6. Gait  x 50 feet with Supervision using Rolling Walker.   7. Ascend/Descend 4 inch curb step with Stand-by Assistance using Rolling Walker.met  8. Stand for 3 minutes with Stand-by Assistance using Rolling Walker while performing an activity met  9. Pt will perform a car transfer (Actual or simulated) w/ RW and CGA  10. Pt will stand and  5 objects from the floor w/ a reacher, RW, and SPV                     PLAN:    Patient to be seen 5 x/week  to address the above listed problems via gait training, therapeutic exercises, therapeutic activities  Plan of Care expires: 20  Plan of Care reviewed with: patient    Dorys Grier, PTA  10/19/2020

## 2020-10-19 NOTE — PROGRESS NOTES
"[facility]  Adult Nutrition  Progress Note    SUMMARY       Recommendations    Recommendation: Continue regular diet, boost breeze once per day, RD following  Goals: PO to meet 50% of needs by next RD follow up  Nutrition Goal Status: progressing towards goal      Reason for Assessment    Reason For Assessment: RD follow-up  Diagnosis: (Abdominal pain)  Relevant Medical History: s/p hepatic abcess, s/p pancreatic abcess, hyperbilirubinemia, pacemaker, Vit D deficiency, HLD, HTN, AFIB, thyroid disease  Interdisciplinary Rounds: attended  General Information Comments: pt. states that she does not eat breakfast often but will eat cereal. doesnt eat breakfast at home often either. is not drinking boost breeze. LBM 10/19 Going to receive help from daughter and son when she returns back home.   Nutrition Discharge Planning: DC on regular IDDSI level 7 diet    Nutrition Risk Screen    Nutrition Risk Screen: no indicators present    Nutrition/Diet History    Patient Reported Diet/Restrictions/Preferences: general  Typical Food/Fluid Intake: pt went down to 1-2 meals, reports half a sandwich was enough, she shops for food, has dental plate which is not fitting currently,  Food Preferences: no cottage cheese, no yogurt,  Spiritual, Cultural Beliefs, Rastafari Practices, Values that Affect Care: no  Food Allergies: NKFA  Factors Affecting Nutritional Intake: decreased appetite, depression, altered gastrointestinal function    Anthropometrics    Temp: 97.6 °F (36.4 °C)  Height: 5' 2.99" (160 cm)  Height (inches): 62.99 in  Weight Method: Standard Scale  Weight: 63 kg (138 lb 14.2 oz)  Weight (lb): 138.89 lb  Ideal Body Weight (IBW), Female: 114.95 lb  % Ideal Body Weight, Female (lb): 120.83 %  BMI (Calculated): 24.6  BMI Grade: 18.5-24.9 - normal(pt overhydrated)  Usual Body Weight (UBW), k.2 kg  % Usual Body Weight: 113.23  % Weight Change From Usual Weight: 12.99 %   " Telephone Encounter by Colles, Mary, CMA at 10/13/17 03:56 PM     Author:  Colles, Mary, CMA Service:  (none) Author Type:  Certified Medical Assistant     Filed:  10/13/17 04:03 PM Encounter Date:  10/13/2017 Status:  Signed     :  Colles, Mary, CMA (Certified Medical Assistant)              [facility]    Patient Age: 67 year old   Refill request by:[MC1.1T] Walk in.  Patient wants a call back? Yes -  Ok to leave response (including medical information) with family member or on answering machine[MC1.1M]  Refill to be:[MC1.1T] Picked up at [MC1.1M]    Medication requested to be refilled:[MC1.1T]   Requested Prescriptions     Pending Prescriptions Disp Refills   • Hydrocodone-Acetaminophen (VICODIN ES) 7.5-300 MG TABS 30 Tab 0     Sig: Take 1 Tab by mouth every 8 (eight) hours as needed for Pain.[MC1.2T]           Next and Last Visit with Provider and Department  Next visit with EDSON FERNANDO is on 04/14/2018 at  8:40 AM in [facility]  Next visit with [facility] is on 04/14/2018 at  8:40 AM in [facility]   Last visit with EDSON FERNANDO was on 09/16/2017 at 10:00 AM in [facility]  Last visit with INTER[facility] was on 09/16/2017 at 10:00 AM in [facility]      WEIGHT AND HEIGHT: As of 10/12/2017 weight is 245.5 lbs.(111.358 kg). Height is 5' 10\"(1.778 m).   BMI is 35.23 kg/(m^2) calculated from:     Height 5' 10\" (1.778 m) as of 10/12/17     Weight 245 lb 8 oz (111.358 kg) as of 10/12/17[MC1.1T]      Allergies      Allergen   Reactions   • Ibuprofen  Wheezing   • Sulfa Antibiotics  Rash     rash with celebrex    • Vioxx [Rofecoxib]  Hives   • Celebrex [Celecoxib]  Rash[MC1.2T]     Current outpatient prescriptions       Medication  Sig Dispense Refill   • carisoprodol (SOMA) 350 MG tablet TAKE ONE TABLET BY MOUTH EVERY 6 HOURS 30 Tab 1   • SIMBRINZA 1-0.2 % SUSP   4   • Fluticasone Propionate (FLONASE NA) by Nasal route.     •      Lab/Procedures/Meds    Pertinent Labs Reviewed: reviewed  Pertinent Labs Comments: Hgb 8.3, Hct 28.2, BUN 3  Pertinent Medications Reviewed: reviewed  Pertinent Medications Comments: Sodium Chloride             Estimated/Assessed Needs    Weight Used For Calorie Calculations: 63 kg (138 lb 14.2 oz)  Energy Calorie Requirements (kcal): 5105-0590 kcal  Energy Need Method: [place]-St Jeor  Protein Requirements: 75g  Weight Used For Protein Calculations: 63 kg (138 lb 14.2 oz)  Fluid Requirements (mL): 1600 or per MD  Estimated Fluid Requirement Method: RDA Method  RDA Method (mL): 1575  CHO Requirement: -      Nutrition Prescription Ordered    Current Diet Order: Regular IDDSI level 7  Nutrition Order Comments: poor po no appetite  Oral Nutrition Supplement: Change to boost breeze daily    Evaluation of Received Nutrient/Fluid Intake    Energy Calories Required: meeting needs  Protein Required: meeting needs  Fluid Required: meeting needs  Comments: remains on Abx via PICC  Tolerance: tolerating  % Intake of Estimated Energy Needs: 50 - 75 %  % Meal Intake: 50 - 75 %    Nutrition Risk    Level of Risk/Frequency of Follow-up: low     Assessment and Plan  Inadequate oral intake related to poor appetite s/p critical illness as evidenced by PO < 25% , weight loss masked by overhydration during hospital stay,and pt reported grief response.    Service: Nutrition  Goal: PO 50% MET  New Goal: PO to meet 85% of EEN/EPN  with ONS in place,for healing by next RD visit.     Plan  General diet  Collaboration with other providers  Commercial beverage- calories and protein- breeze daily          Monitor and Evaluation    Food and Nutrient Intake: food and beverage intake  Food and Nutrient Adminstration: diet order  Physical Activity and Function: nutrition-related ADLs and IADLs  Anthropometric Measurements: weight change  Biochemical Data, Medical Tests and Procedures: electrolyte and renal panel, gastrointestinal profile,  amoxicillin-clavulanate (AUGMENTIN) 875-125 MG per tablet Take 1 Tab by mouth 2 (two) times daily for 10 days. 20 Tab 0   • doxazosin (CARDURA) 8 MG tablet TAKE 1 TABLET BY MOUTH NIGHTLY (REPLACES TAMSULOSIN). 30 Tab 8   • latanoprost (XALATAN) 0.005 % ophthalmic solution Apply 1 Drop in the left eye nightly. 2.5 mL 11   • DUREZOL 0.05 % EMUL      • atropine 1 % ophthalmic solution      • prednisoLONE acetate (PRED FORTE) 1 % ophthalmic suspension      • timolol (TIMOPTIC) 0.5 % ophthalmic solution Apply 1 Drop in the right eye daily. 5 mL 12   • brimonidine (ALPHAGAN) 0.15 % ophthalmic solution APPLY 1 DROP IN THE RIGHT EYE 2 (TWO) TIMES DAILY. 5 mL 12        ROUTING:[MC1.1T] Patient's physician/staff[MC1.1M]        PCP: Ander oLuise MD         INS: Payor: MEDICARE - ASSIGNED / Plan: *No Plan* / Product Type: *No Product type* / Note: This is the primary coverage, but no account was found for this location or the patient's primary location.   ADDRESS:  [address] [place] [address] [address] [place] [address][MC1.1T]             Revision History        User Key Date/Time User Provider Type Action    > MC1.2 10/13/17 04:03 PM Colles, Mary, CMA Certified Medical Assistant Sign     MC1.1 10/13/17 03:56 PM Colles, Mary, CMA Certified Medical Assistant     M - Manual, T - Template             inflammatory profile, glucose/endocrine profile  Nutrition-Focused Physical Findings: overall appearance     Malnutrition Assessment 10/5/2020     Skin (Micronutrient): bruised  Eyes (Micronutrient): conjunctiva dull  Tongue (Micronutrient): magenta               [place] (Muscle Loss): severe depletion  Clavicle Bone Region (Muscle Loss): mild depletion  Clavicle and Acromion Bone Region (Muscle Loss): mild depletion  Dorsal Hand (Muscle Loss): mild depletion  Anterior Thigh Region (Muscle Loss): mild depletion   Edema (Fluid Accumulation): 3-->moderate             Nutrition Follow-Up    RD Follow-up?: Yes

## 2020-10-19 NOTE — PT/OT/SLP PROGRESS
Occupational Therapy  Treatment    Gisselle Ortiz   MRN: [id number]   Admitting Diagnosis: Hepatic abscess    OT Date of Treatment: 10/19/20       Billable Minutes:  Therapeutic Activity 8 and Therapeutic Exercise 12 self-care 18    General Precautions: Standard, aspiration, fall  Orthopedic Precautions: N/A  Braces: N/A    Spiritual, Cultural Beliefs, Episcopal Practices, Values that Affect Care: no    Subjective:  Communicated with nurse during   session.  Pt. Reported her arms and legs quiver at times    Pain/Comfort  Pain Rating 1: 0/10  Pain Rating Post-Intervention 1: 0/10    Objective:  Patient found with: PICC line(seated in w/c)    Occupational Performance:    Bed Mobility:    · Not tested     Functional Mobility/Transfers:  · Patient completed Sit <> Stand Transfer with contact guard assistance  with  rolling walker   · Patient completed Bed <> Chair Transfer using Stand Pivot technique with contact guard assistance with rolling walker  · Functional Mobility: Pt. Ambulated ~ 10 steps tp bedside chair with RW and CGA    Activities of Daily Living:  · Lower Body Dressing: minimum assistance to steady with clothing managemnt over hips in stand; Able to don /doff socks with figure 4 technique    [facility] 6 Click:  [facility] Total Score: 19    OT Exercises: UE Ergometer x 12 minutes with min resistance    Additional Treatment:  Ankle pumps x 2 sets 20 reps to assist with decreasing edema  Pt. Engaged in multiple dynamic standing trials on this date with RW and SBA and each trial was able to stand ~ 2 minutes    Patient left up in chair with call button in reach IV in place    ASSESSMENT:  Gisselle Ortiz is a 87 y.o. female with a medical diagnosis of Hepatic abscess and presents with deficits in endurance, functional mobility and endurance. Pt. Tolerated session well on this date and would benefit from continued oT services. .    Rehab identified problem list/impairments: weakness, impaired endurance,  impaired self care skills, impaired functional mobilty, impaired balance, gait instability, decreased lower extremity function, decreased safety awareness, decreased ROM, edema, impaired cardiopulmonary response to activity    Rehab potential is good    Activity tolerance: Good    Discharge recommendations: home health OT  With light assist    Barriers to discharge: Decreased caregiver support     Equipment recommendations: none     GOALS:   Multidisciplinary Problems     Occupational Therapy Goals        Problem: Occupational Therapy Goal    Goal Priority Disciplines Outcome Interventions   Occupational Therapy Goal     OT, PT/OT Ongoing, Progressing    Description: Goals to be met by: 10/20/20     Patient will increase functional independence with ADLs by performing:    UE Dressing with Modified [place].  LE Dressing with Stand-by Assistance.  Grooming while seated with Modified [place].  Toileting from toilet with Stand-by Assistance for hygiene and clothing management.   Bathing from  sitting at sink with Stand-by Assistance.  Supine to sit with Modified [place].  Stand pivot transfers with Supervision using A/D as needed.  Pt's caregiver will be educated on level of assist required to safely perform self care tasks and functional transfers.                     Plan:  Patient to be seen 5 x/week to address the above listed problems via self-care/home management, therapeutic activities, therapeutic exercises  Plan of Care expires: 11/04/20  Plan of Care reviewed with: patient    SHAHIDA Alicea  10/19/2020

## 2020-10-19 NOTE — CARE UPDATE
Interdisciplinary Team Meeting Note     Spoke with patient and attempted to speak Yimi, son at [phone]. No answer.     Patient/Family goals/wishes/concerns/needs: None at this time.     Updates on current plan of care: Patient spoke with therapy for an update and recommendations.         Attended by:   Nurse Manager - Jag Simms  Charge Nurse - Jennifer    - Danielle Brunner    - Debo Farris   Therapy Manager - Shannon Lujan   Registered Dietitian - Laura Henderson - Glenys COONEY - Jackelin Go

## 2020-10-20 PROCEDURE — 25000003 PHARM REV CODE 250: Performed by: HOSPITALIST

## 2020-10-20 PROCEDURE — 97110 THERAPEUTIC EXERCISES: CPT | Mod: CO

## 2020-10-20 PROCEDURE — 63600175 PHARM REV CODE 636 W HCPCS: Performed by: HOSPITALIST

## 2020-10-20 PROCEDURE — 97530 THERAPEUTIC ACTIVITIES: CPT | Mod: CO

## 2020-10-20 PROCEDURE — 25000003 PHARM REV CODE 250: Performed by: NURSE PRACTITIONER

## 2020-10-20 PROCEDURE — 97110 THERAPEUTIC EXERCISES: CPT

## 2020-10-20 PROCEDURE — 97116 GAIT TRAINING THERAPY: CPT

## 2020-10-20 PROCEDURE — 11000004 HC SNF PRIVATE

## 2020-10-20 PROCEDURE — A4216 STERILE WATER/SALINE, 10 ML: HCPCS | Performed by: NURSE PRACTITIONER

## 2020-10-20 RX ADMIN — CEFTRIAXONE SODIUM 2 G: 2 INJECTION, SOLUTION INTRAVENOUS at 10:10

## 2020-10-20 RX ADMIN — DICLOFENAC 4 G: 10 GEL TOPICAL at 08:10

## 2020-10-20 RX ADMIN — Medication 10 ML: at 06:10

## 2020-10-20 RX ADMIN — APIXABAN 2.5 MG: 2.5 TABLET, FILM COATED ORAL at 08:10

## 2020-10-20 RX ADMIN — SPIRONOLACTONE 25 MG: 25 TABLET ORAL at 08:10

## 2020-10-20 RX ADMIN — ATENOLOL 50 MG: 50 TABLET ORAL at 08:10

## 2020-10-20 RX ADMIN — AMIODARONE HYDROCHLORIDE 200 MG: 200 TABLET ORAL at 08:10

## 2020-10-20 RX ADMIN — Medication 10 ML: at 12:10

## 2020-10-20 RX ADMIN — LEVOTHYROXINE SODIUM 137 MCG: 50 TABLET ORAL at 06:10

## 2020-10-20 RX ADMIN — ATORVASTATIN CALCIUM 10 MG: 10 TABLET, FILM COATED ORAL at 08:10

## 2020-10-20 RX ADMIN — METRONIDAZOLE 500 MG: 500 TABLET ORAL at 08:10

## 2020-10-20 RX ADMIN — METRONIDAZOLE 500 MG: 500 TABLET ORAL at 01:10

## 2020-10-20 RX ADMIN — Medication 1 CAPSULE: at 08:10

## 2020-10-20 RX ADMIN — METRONIDAZOLE 500 MG: 500 TABLET ORAL at 06:10

## 2020-10-20 NOTE — PT/OT/SLP PROGRESS
Physical Therapy  Treatment    Gisselle Ortiz   MRN: [id number]   Admitting Diagnosis: Hepatic abscess    PT Received On: 10/20/20          Billable Minutes:  Gait Training 15, Therapeutic Activity 0 and Therapeutic Exercise 8    Treatment Type: Treatment  PT/PTA: PT     PTA Visit Number: 0       General Precautions: Standard, fall  Orthopedic Precautions: N/A   Braces: N/A    Spiritual, Cultural Beliefs, Sabianism Practices, Values that Affect Care: no    Subjective:  Communicated with patient prior to session.  Agreeable to session    Pain/Comfort  Pain Rating 1: 0/10  Pain Rating Post-Intervention 1: 0/10    Objective:  Patient found seated in chair with       AM-PAC 6 CLICK MOBILITY  Total Score:20    Transfers:  Sit<>Stand: SBA w/ RWfrom chair x2 trials  Stand Pivot Transfer: RW and CGA      Gait:  Amb w/ RW and CGA 40 feet w/ distance limited by fatigue, needing seated rest break.  Second trial 40 feet w/ RW and CGa w/ increased fatigue.      Therex:  Quad sets,   Glute sets,   Ankle  Pumps,   hip abduction/adduction,  heelslides,   LAQ   Hip flexion  x20 reps w/ assist as needed      Additional Treatment:  Patient educated on PT POC    Patient left up in chair with all lines intact and call button in reach.    Assessment:  Gisselle Ortiz is a 87 y.o. female with a medical diagnosis of Hepatic abscess.  Patient will benefit from continued physical therapy to address deficits and improve safety and functional mobility. Continue with physical therapy plan of care.   .    Rehab identified problem list/impairments: weakness, impaired endurance, impaired self care skills, impaired functional mobilty, gait instability, impaired balance, decreased upper extremity function, decreased lower extremity function, decreased safety awareness, pain    Rehab potential is good.    Activity tolerance: Good    Discharge recommendations: home with home health(w/ light family assistance initially)     Barriers to  discharge: None    Equipment recommendations: walker, rolling     GOALS:   Multidisciplinary Problems     Physical Therapy Goals        Problem: Physical Therapy Goal    Goal Priority Disciplines Outcome Goal Variances Interventions   Physical Therapy Goal     PT, PT/OT Ongoing, Progressing     Description: Goals to be met by: 10/20/20     Patient will increase functional independence with mobility by performin. Supine to sit with Modified [place] met  2. Sit to supine with Modified [place] met  3. Rolling to Left and Right with Modified [place]. met  4. Sit to stand transfer with Supervision  5. Bed to chair transfer with Supervision using Rolling Walker  6. Gait  x 50 feet with Supervision using Rolling Walker.   7. Ascend/Descend 4 inch curb step with Stand-by Assistance using Rolling Walker.met  8. Stand for 3 minutes with Stand-by Assistance using Rolling Walker while performing an activity met  9. Pt will perform a car transfer (Actual or simulated) w/ RW and CGA  10. Pt will stand and  5 objects from the floor w/ a reacher, RW, and SPV                     PLAN:    Patient to be seen 5 x/week  to address the above listed problems via gait training, therapeutic exercises, therapeutic activities  Plan of Care expires: 20  Plan of Care reviewed with: patient    Valeria PAMELA Ochoa, PT  10/20/2020

## 2020-10-20 NOTE — PROGRESS NOTES
[facility]                                  Skilled Nursing Facility                   Progress Note     Admit Date: 10/2/2020  FRANCESCA 10/27/2020  Principal Problem:  Hepatic abscess   HPI obtained from patient interview and chart review     Chief Complaint: Re-evaluation of medical treatment and therapy status: Lab review      HPI:   Patient is an 88 yo female PMHx AFib on Eliquis, SSS s/p pacemaker placement (1/2019), HTN, hyperthyroidism s/p thyroidectomy  who presents to SNF following hospitalization for hepatic abscess/bacteremia.       Patient presented to hospital on 9/11 complaining of abdominal pain and was found to have gallstone pancreatitis. She had CBD dilation and a gallbladder with sludge. She underwent EUS and ERCP with stone and sludge seen on the cholangiogram. Sphincterotomy was performed and stone/sludge removed with no stents placed. Per notes, ERCP was concerning for choledocholithiasis.  Surgery was consulted and deemed the patient to be high risk for cholecystectomy. She was seen today in GI clinic for follow-up. She initially recovered well from the pancreatitis and ERCP and was eating prior to discharge. A few days after discharge she started having severe fatigue that progressively worsened to the point where the patient could not walk. She also reported having decreased appetite and nausea.   Patient presented to GI clinic on 9/25 with hypotension and elevated LFTs. Patient was sent to the ED for evaluation.     In the ED, she was found to be afebrile and her BP improved to 130/61 with IVF. HR 63 with NSR and existing RBBB. Qtc 512. She was breathing 20bpm with no O2 requirement. She c/o some nausea but no abd pain. On exam, she had mild discomfort to deep palpation to RUQ abdomen.  CT scan with contrast following pancreatic protocol showed hepatic abscess. Blood culture grew gram negative cristofer; urine culture was  positive enterococcus faecalis.     Pt had IR intervention for her hepatic abscess 9/26. Blood culture collected on 9/26  grew E.Coli susceptible to multiple abx. Urine enterococcus faecalis also shown to have susceptibility to multiple organism. Patient was started on Zosyn and ID consulted. Recommendations: Discontinued IV zosyn and started ceftriaxone 2 grams IV q 24 hours for E coli bacteremia and hepatic abscess. Started metronidazole 500 mg orally q 8 hours empirically for anaerobic coverage. Anticipate 4-6 weeks antibiotics for hepatic abscess (or until abscess resolves).  Would plan for 4 weeks of IV ceftriaxone and oral flagyl from date of  IR drainage (Estimated end date 10/26) with repeat CT abdomen/pelvis  with contrast at 4 weeks with ID follow up after imaging to determine ultimate duration of antibiotics and ability to transition to orals.  Assuming pancreatic lesion noted on CT is infected pseudocyst/abscess (vs neoplasm), would anticipate it would respond to current antibiotics, but have no culture data for this.  Will follow clinically with repeat imaging. If worsens on current course, will need aspiration for cultures and/or biopsy if concerned for neoplasm. May be able to discontinue oral flagyl earlier if no growth on anaerobic cultures.  Will determine at follow up. Weekly cbc, cmp, crp, esr and fax results to ID, attention Nimesh Mares NP, at fax [phone]. (Pager [phone], spectra [phone], office [phone]).     PICC line placed 9/30 for receiving IV antibiotics for 4 weeks.  IR to leave drain in and re-evaluate in 1 week with CT scan. Patient deemed medically stable and discharged to SNF for secondary weakness, debility and IV antibiotics.     Patient will be treated at [facility] with PT and OT to improve functional status and ability to perform ADLs.       Interval history:     Labs reviewed: Na+ 138, K+ 5, BUN/Cr 9/0.8, WBC 4.81, H/h 8.3/28.2. Vital signs reviewed- stable. Patient denies  trouble sleeping at night, shortness of breath, abdominal discomfort, nausea, or vomiting.   Patient denies dysuria.  Appetite slighly decreased. Patient reports having regular bowel movements.Patient progessing with PT/OT. Continuing to follow and treat all acute and chronic conditions..      Past Medical History: Patient has a past medical history of A-fib, Arthritis, Hypertension, and Thyroid disease.    Past Surgical History: Patient has a past surgical history that includes Cataract extraction; Cataract extraction w/  intraocular lens implant (Bilateral, 2004); Revision of skin pocket for pacemaker (N/A, 1/21/2019); Eye surgery; Hysterectomy; Joint replacement; Hip replacement arthroplasty; Treatment of cardiac arrhythmia (N/A, 3/18/2019); Thyroidectomy; Treatment of cardiac arrhythmia (N/A, 5/14/2019); ERCP (N/A, 9/14/2020); Endoscopic ultrasound of upper gastrointestinal tract (N/A, 9/14/2020); and ERCP (N/A, 9/25/2020).    Social History: Patient reports that she has quit smoking. She started smoking about 56 years ago. She has never used smokeless tobacco. She reports that she does not drink alcohol or use drugs.    Family History: family history includes Heart attack in her maternal grandmother and mother; Heart disease in her maternal grandmother and mother; Heart failure in her maternal grandmother and mother; Hypertension in her maternal grandmother and mother; Stroke in her maternal grandmother.    Allergies: Patient is allergic to ciprofloxacin.    ROS  Constitutional:+ fatigue + decreased appetite Negative for fever   Eyes: Negative for blurred vision, double vision   Respiratory: +shortness of breath (on exertion)  Negative for cough  Cardiovascular: + BLE edema 1+ pitting edema (improved) Negative for chest pain, palpitations.   Gastrointestinal: Negative for abdominal pain, constipation, diarrhea, nausea, vomiting.   Genitourinary: Negative for dysuria, frequency   Musculoskeletal:  + generalized  weakness. Negative for back pain and myalgias.   Skin: Negative for itching and rash.   Neurological: Negative for dizziness, headaches.   Psychiatric/Behavioral: Negative for depression. The patient is not nervous/anxious.      24 hour Vital Sign Range   Temp:  [97.6 °F (36.4 °C)-98.3 °F (36.8 °C)]   Pulse:  [60]   Resp:  [14-16]   BP: (122-127)/(56-58)   SpO2:  [95 %-96 %]     PEx  Constitutional: Patient appears debilitated.  No distress noted  HENT:   Head: Normocephalic and atraumatic.   Eyes: Pupils are equal, round  Neck: Normal range of motion. Neck supple.   Cardiovascular: Normal rate, regular rhythm and normal heart sounds.    Pulmonary/Chest: Effort normal and breath sounds are clear  Abdominal: Soft. Bowel sounds are normal.   Musculoskeletal:+ weakness 4/5 on all extremities  Normal range of motion.   Neurological: Alert and oriented to person, place, and time.   Psychiatric: Normal mood and affect. Behavior is normal.   Skin: Skin is warm and dry.  RUQ drain in place, scant amount of serosanguinous drainage, dressing CDI   Stage 2 pressure injury- gluteal cleft followed weekly by wound care nurse  Date First Assessed/Time First Assessed: 10/02/20 8751   Altered Skin Integrity Present on Admission: yes  Orientation: midline  Location: Gluteal cleft    Description of Altered Skin Integrity Partial thickness tissue loss. Shallow open ulcer with a red or pink wound bed, without slough. Intact or Open/Ruptured Serum-filled blister.   Dressing Appearance Dry;Intact;Clean   Drainage Amount Small   Drainage Characteristics/Odor Serosanguineous   Appearance Pink;Red;Moist   Tissue loss description Partial thickness   Periwound Area Intact;Pink;Scar tissue   Wound Edges Open   Wound Length (cm) 1 cm   Wound Width (cm) 0.5 cm   Wound Depth (cm) 0.1 cm   Wound Volume (cm^3) 0.05 cm^3   Wound Surface Area (cm^2) 0.5 cm^2   Care Cleansed with:;Sterile normal saline;Applied:;Skin Barrier   Dressing Reinforced;Foam            Assessment and Plan:      Bacteremia  - Culture grew E.Coli susceptible to multiple abx.   -continue Metronidazole 500 mg Q 8 empirically for anaerobic coverage est end date 10/26  -continue IV ceftriaxone (est end date 10/26)  -Weekly CMP, CBC, CRP, ESR  10/19 Labs stable     Hepatic abscess  - PICC line placed 9/30 for receiving IV antibiotics for 4 weeks  - Follow up with ID outpatient with weekly labs  - continue ceftriaxone and flagyl   - IR to leave drain in and re-evaluate in 1 week with CT scan  10/12/20  CT abdomen completed. Care coordinated with ID, recommend: continuing antiboitics and following up in ID clinic in 2 weeks. Also recommended GI follow up for multiloculated cystic lesion     Paroxymal atrial fibrillation  - Continue Amiodarone 200 mg  - continue Atenolol 50 mg   - conitnue Apixiban 2.5 BID     Essential hypertension  -continue Atenolol 50 mg        -continue Spirolactone 25 mg BID  10/19/20 BP at goal on current regimen      Hypothyroidism   -TSH 0.443 on 9/25  - Continue levothyroxine 0.137 mg    Pacemaker  -DC PPM (2014; [facility]) implanted for symptomatic SB and first degree AVB    Hyperlipidemia  -continue Atorvastatin 10 mg   -Monitor LFTS  10/19 LFTs stable    Future Appointments   Date Time Provider Department Center   10/23/2020 12:00 PM [facility]CT2 500 LB LIMIT [facility]   10/26/2020  2:00 PM MATTHEW Carpenter, ANP [facility] ID [address]   12/11/2020 11:30 AM Kai Montez MD [facility] O[facility]         I certify that SNF services are required to be given on an inpatient basis because Gisselle Ortiz needs for skilled nursing care and/or skilled rehabilitation are required on a daily basis and such services can only practically be provided in a skilled nursing facility setting and are for an ongoing condition for which she received inpatient care in the hospital.         Chad Downing NP  Department of Hospital Medicine   [facility]- Skilled  Nursing Facility     DOS: 10/19/20      Patient note was created using MModal Dictation.  Any errors in syntax or even information may not have been identified and edited on initial review prior to signing this note.

## 2020-10-20 NOTE — PLAN OF CARE
Plan of care reviewed with patient and family.  Patient verbalized understanding and had no further questions.  Patient worked with PT/OT throughout the shift.  Patient now able to ambulate with minimal assistance to the bathroom.  Patient now resting comfortably in bed locked in lowest position, and call bell in reach.  Will continue to monitor.    Patient Weight (Optional But Required For Cumulative Dose-Numbers And Decimals Only): 195 Kilograms Preamble Statement (Weight Entered In Details Tab): Reported Weight in kilograms: Myalgia Treatment: I explained this is common when taking isotretinoin. If this worsens they will contact us. They may try OTC ibuprofen. Add High Risk Medication Management Associated Diagnosis?: Yes Pounds Preamble Statement (Weight Entered In Details Tab): Reported Weight in pounds: Female Completion Statement: After discussing her treatment course we decided to discontinue isotretinoin therapy at this time. I explained that she would need to continue her birth control methods for at least one month after the last dosage. She should also get a pregnancy test one month after the last dose. She shouldn't donate blood for one month after the last dose. She should call with any new symptoms of depression. Male Completion Statement: After discussing his treatment course we decided to discontinue isotretinoin therapy at this time. He shouldn't donate blood for one month after the last dose. He should call with any new symptoms of depression. Completed Therapy?: No Xerosis Aggressive Treatment: I recommended application of Cetaphil or CeraVe numerous times a day going to bed to all dry areas. I also prescribed a topical steroid for twice daily use. Dosing Month 1 (Required For Cumulative Dosing): 40mg Daily Hypertriglyceridemia Monitoring: I explained this is common when taking isotretinoin. We will monitor closely. Retinoid Dermatitis Aggressive Treatment: I recommended more frequent application of Cetaphil or CeraVe to the areas of dermatitis. I also prescribed a topical steroid for twice daily use until the dermatitis resolves. Nosebleeds Normal Treatment: I explained this is common when taking isotretinoin. I recommended saline mist in each nostril multiple times a day. If this worsens they will contact us. Headache Monitoring: I recommended monitoring the headaches for now. There is no evidence of increased intracranial pressure. They were instructed to call if the headaches are worsening. Cheilitis Normal Treatment: I recommended application of Vaseline or Aquaphor numerous times a day (as often as every hour) and before going to bed. Myalgia Monitoring: I explained this is common when taking isotretinoin. If this worsens they will contact us. Xerosis Normal Treatment: I recommended application of Cetaphil or CeraVe numerous times a day and before going to bed to all dry areas. Retinoid Dermatitis Normal Treatment: I recommended more frequent application of Cetaphil or CeraVe to the areas of dermatitis. Detail Level: Zone Counseling Text: I reviewed the side effect in detail. Patient should get monthly blood tests, not donate blood, not drive at night if vision affected, and not share medication. Ipledge Number (Optional): [id number] Months Of Therapy Completed: 1 Weight Units: pounds Xerosis Normal Treatment: I recommended application of Cetaphil or CeraVe numerous times a day going to bed to all dry areas. Xerosis Aggressive Treatment: I recommended application of Cetaphil or CeraVe numerous times a day and before going to bed to all dry areas. I also prescribed a topical steroid for twice daily use. Female Pregnancy Counseling Text: Female patients should also be on two forms of birth control while taking this medication and for one month after their last dose. Cheilitis Aggressive Treatment: I recommended application of Vaseline or Aquaphor numerous times a day (as often as every hour) and before going to bed. I also prescribed a topical steroid for twice daily use. What Is The Patient's Gender: Female

## 2020-10-20 NOTE — PT/OT/SLP PROGRESS
Occupational Therapy  Treatment    Gisselle Ortiz   MRN: [id number]   Admitting Diagnosis: Hepatic abscess    OT Date of Treatment: 10/20/20       Billable Minutes:  Self Care/Home Management 33 and Therapeutic Exercise 10    General Precautions: Standard, aspiration, fall  Orthopedic Precautions: N/A  Braces: N/A    Spiritual, Cultural Beliefs, Scientology Practices, Values that Affect Care: no    Subjective:  Communicated with nsg prior to session.  I am doing well today I just quiver at times    Pain/Comfort  Pain Rating 1: 0/10  Pain Rating Post-Intervention 1: 0/10    Objective:   Pt. supine on arrival    Occupational Performance:    Bed Mobility:    · Patient completed Supine to Sit with supervision     Functional Mobility/Transfers:  · Patient completed Sit <> Stand Transfer with contact guard assistance  with  rolling walker   · Patient completed Bed <> Chair Transfer using Stand Pivot technique with contact guard assistance with rolling walker    Activities of Daily Living:  · Grooming: supervision at sink level  · Bathing: contact guard assistance at sink level   · Upper Body Dressing: minimum assistance to manage gown  · Lower Body Dressing: contact guard assistance to rosario pants seated and to manage over hips instace and SBA for BLE sock management   · Toileting: contact guard assistance with cleaning and clothing mangement from 3n1 level and (A) to rosario brief     [facility] 6 Click:  AMPAC Total Score: 19      Additional Treatment:  Pt. With 2# dowel activity with 2x15 reps with  shd flex, bicep curls  and forward flex motion to increase BUE ROM and strength,.   Pt. With therex performed to increase ROM, endurance selfcare task and fxl mobility for independence   Pt edu on role of OT, POC, safety when performing self care tasks , benefit of performing OOB activity, and safety when performing functional transfers and mobility    Patient left up in chair with all lines intact and call button in  reach    ASSESSMENT:  Gisselle Ortiz is a 87 y.o. female with a medical diagnosis of Hepatic abscess Pt. participated well with session on this day.Pt demos physical deficits with balance  functional mobility, UB strength, endurance  level of functional indep with daily tasks and activities and selfcare skills .Pt. Will continue to benefit from continued OT to progress towards goals      Rehab identified problem list/impairments: weakness, impaired endurance, impaired self care skills, impaired functional mobilty, impaired balance, gait instability, decreased lower extremity function, decreased safety awareness, decreased ROM, edema, impaired cardiopulmonary response to activity    Rehab potential is fair    Activity tolerance: Fair    Discharge recommendations: home health OT     Barriers to discharge: Decreased caregiver support     Equipment recommendations: none     GOALS:   Multidisciplinary Problems     Occupational Therapy Goals        Problem: Occupational Therapy Goal    Goal Priority Disciplines Outcome Interventions   Occupational Therapy Goal     OT, PT/OT Ongoing, Progressing    Description: Goals to be met by: 10/20/20     Patient will increase functional independence with ADLs by performing:    UE Dressing with Modified Coleman.  LE Dressing with Stand-by Assistance.  Grooming while seated with Modified Coleman.  Toileting from toilet with Stand-by Assistance for hygiene and clothing management.   Bathing from  sitting at sink with Stand-by Assistance.  Supine to sit with Modified Coleman.  Stand pivot transfers with Supervision using A/D as needed.  Pt's caregiver will be educated on level of assist required to safely perform self care tasks and functional transfers.                     Plan:  Patient to be seen 5 x/week to address the above listed problems via self-care/home management, therapeutic activities, therapeutic exercises  Plan of Care expires: 11/04/20  Plan of Care  reviewed with: patient  The SHAHIDA and RONALD have collaborated and discussed the patient's status, treatment plan and progress toward established goals.   RONALD Webb/KAELYN 10/20/2020

## 2020-10-20 NOTE — PROGRESS NOTES
Wound care follow-up -- gluteal cleft  Patient is lying in bed with eyes open.  She is able to turn in bed independently and move up in bed without assistance- she does drag her buttocks when moving up in bed.  Discussed lifting buttocks off mattress when moving in bed to avoid shearing of skin- verbalized understanding.  The midline gluteal cleft is resolving with partial thickness skin loss.   Nursing to continue Triad ointment to gluteal cleft BID/prn cleansing and pressure prevention measures- EHOB waffle overlay, chair cushion, foams to heels.  Wound care will follow-up prn  DERRICK García RN, CWCN  [phone]  Gluteal cleft  0.7 cm L x 0.2 cmW  Joi-wound remains intact with purple/red blanchable hue.

## 2020-10-21 ENCOUNTER — PATIENT OUTREACH (OUTPATIENT)
Dept: HOME HEALTH SERVICES | Facility: HOSPITAL | Age: 85
End: 2020-10-21

## 2020-10-21 PROCEDURE — 11000004 HC SNF PRIVATE

## 2020-10-21 PROCEDURE — 25000003 PHARM REV CODE 250: Performed by: NURSE PRACTITIONER

## 2020-10-21 PROCEDURE — 25000003 PHARM REV CODE 250: Performed by: HOSPITALIST

## 2020-10-21 PROCEDURE — 97530 THERAPEUTIC ACTIVITIES: CPT | Mod: CO

## 2020-10-21 PROCEDURE — 63600175 PHARM REV CODE 636 W HCPCS: Performed by: HOSPITALIST

## 2020-10-21 PROCEDURE — 97116 GAIT TRAINING THERAPY: CPT

## 2020-10-21 PROCEDURE — 97110 THERAPEUTIC EXERCISES: CPT

## 2020-10-21 PROCEDURE — 97530 THERAPEUTIC ACTIVITIES: CPT

## 2020-10-21 PROCEDURE — A4216 STERILE WATER/SALINE, 10 ML: HCPCS | Performed by: NURSE PRACTITIONER

## 2020-10-21 RX ADMIN — DICLOFENAC 4 G: 10 GEL TOPICAL at 10:10

## 2020-10-21 RX ADMIN — SPIRONOLACTONE 25 MG: 25 TABLET ORAL at 09:10

## 2020-10-21 RX ADMIN — MELATONIN TAB 3 MG 6 MG: 3 TAB at 09:10

## 2020-10-21 RX ADMIN — METRONIDAZOLE 500 MG: 500 TABLET ORAL at 09:10

## 2020-10-21 RX ADMIN — APIXABAN 2.5 MG: 2.5 TABLET, FILM COATED ORAL at 09:10

## 2020-10-21 RX ADMIN — METRONIDAZOLE 500 MG: 500 TABLET ORAL at 05:10

## 2020-10-21 RX ADMIN — APIXABAN 2.5 MG: 2.5 TABLET, FILM COATED ORAL at 10:10

## 2020-10-21 RX ADMIN — AMIODARONE HYDROCHLORIDE 200 MG: 200 TABLET ORAL at 10:10

## 2020-10-21 RX ADMIN — CEFTRIAXONE SODIUM 2 G: 2 INJECTION, SOLUTION INTRAVENOUS at 10:10

## 2020-10-21 RX ADMIN — Medication 10 ML: at 01:10

## 2020-10-21 RX ADMIN — LEVOTHYROXINE SODIUM 137 MCG: 50 TABLET ORAL at 05:10

## 2020-10-21 RX ADMIN — SPIRONOLACTONE 25 MG: 25 TABLET ORAL at 10:10

## 2020-10-21 RX ADMIN — Medication 10 ML: at 06:10

## 2020-10-21 RX ADMIN — Medication 1 CAPSULE: at 10:10

## 2020-10-21 RX ADMIN — METRONIDAZOLE 500 MG: 500 TABLET ORAL at 01:10

## 2020-10-21 RX ADMIN — ATORVASTATIN CALCIUM 10 MG: 10 TABLET, FILM COATED ORAL at 10:10

## 2020-10-21 RX ADMIN — Medication 10 ML: at 05:10

## 2020-10-21 RX ADMIN — Medication 10 ML: at 12:10

## 2020-10-21 RX ADMIN — ATENOLOL 50 MG: 50 TABLET ORAL at 10:10

## 2020-10-21 NOTE — PT/OT/SLP PROGRESS
"Physical Therapy  Treatment    Gisselle Ortiz   MRN: [id number]   Admitting Diagnosis: Hepatic abscess    PT Received On: 10/21/20  Total Time (min): 38       Billable Minutes:  Gait Training 10, Therapeutic Activity 13 and Therapeutic Exercise 15    Treatment Type: Treatment  PT/PTA: PT     PTA Visit Number: 0       General Precautions: Standard, fall  Orthopedic Precautions: N/A   Braces: N/A    Spiritual, Cultural Beliefs, Adventism Practices, Values that Affect Care: no    Subjective:  Communicated with nurse prior to session.  "I don't know why my legs feel shaky today"    Pain/Comfort  Pain Rating 1: 0/10  Pain Rating Post-Intervention 1: 0/10    Objective:  Patient found sitting up in chair with  occupational therapy in the room     AM-PAC 6 CLICK MOBILITY  Total Score:18    Bed Mobility:  Deferred, patient up and chair and left up in chair    Transfers:  Sit<>Stand: contact guard assistance with RW x 2 trials and Jamey x 2 trials      Gait:  Amb 40' with rolling walker contact guard assistance, decreased gait speed, decreased tae, flexed posture. Minimal v/t cues needed.         Therex:  Standing hip abduction 2x10  Mini Squats 10x - use of UE on RW    Balance:  Challenged balance with SLS in standing with standing hip abduction    Additional Treatment:  LBE for 15 minutes  Pt educated on plan and goals with physical therapy.   Instruction provided for safety and technique for gait and transfers with RW.      Patient left up in chair with all lines intact and call button in reach.    Assessment:  Gisselle Ortiz is a 87 y.o. female with a medical diagnosis of Hepatic abscess.  Patient pleasant and willing to participate in therapy. Patient had just finished working with OT upon PT entering room but despite this tolerated session well. Session focused on improving patient's tolerance to standing activity. Continue per POC.     Rehab identified problem list/impairments: weakness, impaired endurance, " impaired self care skills, impaired functional mobilty, gait instability, impaired balance, decreased upper extremity function, decreased lower extremity function, decreased safety awareness, pain    Rehab potential is good.    Activity tolerance: Good    Discharge recommendations: home with home health(w/ light family assistance initially)     Barriers to discharge: None    Equipment recommendations: walker, rolling     GOALS:   Multidisciplinary Problems     Physical Therapy Goals        Problem: Physical Therapy Goal    Goal Priority Disciplines Outcome Goal Variances Interventions   Physical Therapy Goal     PT, PT/OT Ongoing, Progressing     Description: Goals to be met by: 10/20/20     Patient will increase functional independence with mobility by performin. Supine to sit with Modified Mc[place]od met  2. Sit to supine with Modified [place] met  3. Rolling to Left and Right with Modified [place]. met  4. Sit to stand transfer with Supervision  5. Bed to chair transfer with Supervision using Rolling Walker  6. Gait  x 50 feet with Supervision using Rolling Walker.   7. Ascend/Descend 4 inch curb step with Stand-by Assistance using Rolling Walker.met  8. Stand for 3 minutes with Stand-by Assistance using Rolling Walker while performing an activity met  9. Pt will perform a car transfer (Actual or simulated) w/ RW and CGA  10. Pt will stand and  5 objects from the floor w/ a reacher, RW, and SPV                     PLAN:    Patient to be seen 5 x/week  to address the above listed problems via gait training, therapeutic exercises, therapeutic activities  Plan of Care expires: 20  Plan of Care reviewed with: patient    Babita Senakathy, PT  10/21/2020

## 2020-10-21 NOTE — PT/OT/SLP PROGRESS
Occupational Therapy  Treatment    Gisselle Ortiz   MRN: [id number]   Admitting Diagnosis: Hepatic abscess    OT Date of Treatment: 10/21/20       Billable Minutes:  Therapeutic Activity 38    General Precautions: Standard, aspiration, fall  Orthopedic Precautions: N/A  Braces: N/A    Spiritual, Cultural Beliefs, Jewish Practices, Values that Affect Care: no    Subjective:  Communicated with  Pt prior to session.  I am doing well    Pain/Comfort  Pain Rating 1: 0/10  Pain Rating Post-Intervention 1: 0/10    Objective:   Pt.  Seated in chair on arrival with BLE's elevated    Occupational Performance:    Bed Mobility:    · Not tested     Functional Mobility/Transfers:  · Patient completed Sit <> Stand Transfer with contact guard assistance  with  rolling walker  from chair x 2 trials with cues for safety and hand placement      Activities of Daily Living:  · Not tested     [facility] 6 Click:  [facility] Total Score: 20    OT Exercises: UE Ergometer 10 min    Additional Treatment:  Pt. With standing act on this day with task. Pt. With CGA for balance aspects with task with  AD at raised surfaced  With 2 trials x 2,36 min and then 2,11 min with standing bal with weight shifting and use of BUE's incorporated and crossing mid line and facilitation with posture in prep for home management . Pt. with mild complaints of BLE weakness with quivering with associated task.  Pt. With 2# dowel activity with 3x10 reps with  shd flex, bicep curls and forward flex motion to increase BUE ROM and strength,.   Pt. With therex performed to increase ROM, endurance selfcare task and fxl mobility for independence   Pt edu on role of OT, POC, safety when performing self care tasks , benefit of performing OOB activity, and safety when performing functional transfers and mobility    Patient left up in chair with all lines intact, call button in reach and PT present    ASSESSMENT:  Gisselle Ortiz is a 87 y.o. female with a medical diagnosis  of Hepatic abscess Pt. participated well with session on this day. Pt. Still continues to display BLE leg weakness with associated task gustavo with standing. Pt demos physical deficits with balance  functional mobility, UB strength, endurance  level of functional indep with daily tasks and activities and selfcare skills .Pt. Will continue to benefit from continued OT to progress towards goals  .    Rehab identified problem list/impairments: weakness, impaired endurance, impaired self care skills, impaired functional mobilty, impaired balance, gait instability, decreased lower extremity function, decreased safety awareness, decreased ROM, edema, impaired cardiopulmonary response to activity    Rehab potential is fair    Activity tolerance: Fair    Discharge recommendations: home health OT     Barriers to discharge: Decreased caregiver support     Equipment recommendations: none     GOALS:   Multidisciplinary Problems     Occupational Therapy Goals        Problem: Occupational Therapy Goal    Goal Priority Disciplines Outcome Interventions   Occupational Therapy Goal     OT, PT/OT Ongoing, Progressing    Description: Goals to be met by: 10/20/20     Patient will increase functional independence with ADLs by performing:    UE Dressing with Modified [facility].  LE Dressing with Stand-by Assistance.  Grooming while seated with Modified [facility].  Toileting from toilet with Stand-by Assistance for hygiene and clothing management.   Bathing from  sitting at sink with Stand-by Assistance.  Supine to sit with Modified [facility].  Stand pivot transfers with Supervision using A/D as needed.  Pt's caregiver will be educated on level of assist required to safely perform self care tasks and functional transfers.                     Plan:  Patient to be seen 5 x/week to address the above listed problems via self-care/home management, therapeutic activities, therapeutic exercises  Plan of Care expires: 11/04/20  Plan of Care  reviewed with: patient    RONALD Webb/KAELYN  10/21/2020

## 2020-10-21 NOTE — TREATMENT PLAN
"Rehab Services' DME recommendations    Gisselle Paigejessicaneida  MRN: [id number]      [x] Jared Cazares (4'4"-5'7")    Wheels Yes     [x] Home health PT and OT      TIFFANIE Webb 10/21/2020      "

## 2020-10-21 NOTE — TELEPHONE ENCOUNTER
Follow Up date Updated. Patient still Inpatient via NP and PT notes via Epic. SNF LOS completed. New [place] 10/27/2020.

## 2020-10-22 ENCOUNTER — LAB VISIT (OUTPATIENT)
Dept: LAB | Facility: OTHER | Age: 85
End: 2020-10-22
Payer: MEDICARE

## 2020-10-22 DIAGNOSIS — Z03.818 ENCOUNTER FOR OBSERVATION FOR SUSPECTED EXPOSURE TO OTHER BIOLOGICAL AGENTS RULED OUT: ICD-10-CM

## 2020-10-22 LAB
ANISOCYTOSIS BLD QL SMEAR: SLIGHT
BASOPHILS # BLD AUTO: 0.09 K/UL (ref 0–0.2)
BASOPHILS NFR BLD: 1.9 % (ref 0–1.9)
DIFFERENTIAL METHOD: ABNORMAL
EOSINOPHIL # BLD AUTO: 0.3 K/UL (ref 0–0.5)
EOSINOPHIL NFR BLD: 6 % (ref 0–8)
ERYTHROCYTE [DISTWIDTH] IN BLOOD BY AUTOMATED COUNT: 16.8 % (ref 11.5–14.5)
HCT VFR BLD AUTO: 28.4 % (ref 37–48.5)
HGB BLD-MCNC: 8.7 G/DL (ref 12–16)
IMM GRANULOCYTES # BLD AUTO: 0.01 K/UL (ref 0–0.04)
IMM GRANULOCYTES NFR BLD AUTO: 0.2 % (ref 0–0.5)
LYMPHOCYTES # BLD AUTO: 1.5 K/UL (ref 1–4.8)
LYMPHOCYTES NFR BLD: 32.8 % (ref 18–48)
MCH RBC QN AUTO: 30.3 PG (ref 27–31)
MCHC RBC AUTO-ENTMCNC: 30.6 G/DL (ref 32–36)
MCV RBC AUTO: 99 FL (ref 82–98)
MONOCYTES # BLD AUTO: 0.7 K/UL (ref 0.3–1)
MONOCYTES NFR BLD: 13.9 % (ref 4–15)
NEUTROPHILS # BLD AUTO: 2.1 K/UL (ref 1.8–7.7)
NEUTROPHILS NFR BLD: 45.2 % (ref 38–73)
NRBC BLD-RTO: 0 /100 WBC
OVALOCYTES BLD QL SMEAR: ABNORMAL
PLATELET # BLD AUTO: 224 K/UL (ref 150–350)
PLATELET BLD QL SMEAR: ABNORMAL
PMV BLD AUTO: 10.6 FL (ref 9.2–12.9)
POIKILOCYTOSIS BLD QL SMEAR: SLIGHT
RBC # BLD AUTO: 2.87 M/UL (ref 4–5.4)
SARS-COV-2 RNA RESP QL NAA+PROBE: NOT DETECTED
WBC # BLD AUTO: 4.67 K/UL (ref 3.9–12.7)

## 2020-10-22 PROCEDURE — A4216 STERILE WATER/SALINE, 10 ML: HCPCS | Performed by: NURSE PRACTITIONER

## 2020-10-22 PROCEDURE — 25000003 PHARM REV CODE 250: Performed by: NURSE PRACTITIONER

## 2020-10-22 PROCEDURE — 85025 COMPLETE CBC W/AUTO DIFF WBC: CPT

## 2020-10-22 PROCEDURE — 97535 SELF CARE MNGMENT TRAINING: CPT | Mod: CO

## 2020-10-22 PROCEDURE — 97530 THERAPEUTIC ACTIVITIES: CPT | Mod: CQ

## 2020-10-22 PROCEDURE — 63600175 PHARM REV CODE 636 W HCPCS: Performed by: HOSPITALIST

## 2020-10-22 PROCEDURE — U0003 INFECTIOUS AGENT DETECTION BY NUCLEIC ACID (DNA OR RNA); SEVERE ACUTE RESPIRATORY SYNDROME CORONAVIRUS 2 (SARS-COV-2) (CORONAVIRUS DISEASE [COVID-19]), AMPLIFIED PROBE TECHNIQUE, MAKING USE OF HIGH THROUGHPUT TECHNOLOGIES AS DESCRIBED BY CMS-2020-01-R: HCPCS

## 2020-10-22 PROCEDURE — 11000004 HC SNF PRIVATE

## 2020-10-22 PROCEDURE — 25000003 PHARM REV CODE 250: Performed by: HOSPITALIST

## 2020-10-22 RX ADMIN — MELATONIN TAB 3 MG 6 MG: 3 TAB at 09:10

## 2020-10-22 RX ADMIN — METRONIDAZOLE 500 MG: 500 TABLET ORAL at 09:10

## 2020-10-22 RX ADMIN — SPIRONOLACTONE 25 MG: 25 TABLET ORAL at 09:10

## 2020-10-22 RX ADMIN — AMIODARONE HYDROCHLORIDE 200 MG: 200 TABLET ORAL at 09:10

## 2020-10-22 RX ADMIN — Medication 1 CAPSULE: at 09:10

## 2020-10-22 RX ADMIN — METRONIDAZOLE 500 MG: 500 TABLET ORAL at 05:10

## 2020-10-22 RX ADMIN — Medication 10 ML: at 05:10

## 2020-10-22 RX ADMIN — METRONIDAZOLE 500 MG: 500 TABLET ORAL at 02:10

## 2020-10-22 RX ADMIN — DICLOFENAC 4 G: 10 GEL TOPICAL at 02:10

## 2020-10-22 RX ADMIN — CEFTRIAXONE SODIUM 2 G: 2 INJECTION, SOLUTION INTRAVENOUS at 09:10

## 2020-10-22 RX ADMIN — Medication 10 ML: at 12:10

## 2020-10-22 RX ADMIN — Medication 10 ML: at 11:10

## 2020-10-22 RX ADMIN — DICLOFENAC 4 G: 10 GEL TOPICAL at 09:10

## 2020-10-22 RX ADMIN — LEVOTHYROXINE SODIUM 137 MCG: 50 TABLET ORAL at 05:10

## 2020-10-22 RX ADMIN — APIXABAN 2.5 MG: 2.5 TABLET, FILM COATED ORAL at 09:10

## 2020-10-22 RX ADMIN — ATENOLOL 50 MG: 50 TABLET ORAL at 09:10

## 2020-10-22 RX ADMIN — ATORVASTATIN CALCIUM 10 MG: 10 TABLET, FILM COATED ORAL at 09:10

## 2020-10-22 NOTE — PT/OT/SLP PROGRESS
Physical Therapy  Treatment    Gisselle Ortiz   MRN: [id number]   Admitting Diagnosis: Hepatic abscess    PT Received On: 10/22/20        Billable Minutes:  Therapeutic Activity 20    Treatment Type: Treatment  PT/PTA: PTA     PTA Visit Number: 1       General Precautions: Standard, fall  Orthopedic Precautions: N/A   Braces: N/A    Spiritual, Cultural Beliefs, Yarsanism Practices, Values that Affect Care: no    Subjective:  Communicated with nsg Bebetocee prior to session.re pt high BP reading and dizziness  Pt agreeable to therapy, needing to go to the bathroom first, upon sitting EOB pt reported feeling dizzy, BP taken /75 , taken again 170/97, took in /75 HR 61-62, nsg notified and assessed, BP meds not given prior, brought BSC to EOB for toileing, session deferred in am pt left EOB with nsg taking meds    Pain/Comfort  Pain Rating 1: 0/10  Pain Rating Post-Intervention 1: 0/10    Objective:  Patient found in bed     AM-PAC 6 CLICK MOBILITY  Total Score:18    Bed Mobility:  Supine>Sit: SBA    Transfers:  Stand Pivot Transfer:CGA with RW EOB<>WC    Gait: deferred    Additional Treatment:  toileting CGA with trf,  CGA  With dilcia care in standing, A with clothing/diaper mgmt    Patient left EOB with nsg with call button in reach, nsg present and belongings in reach.    Assessment:  Gisselle Ortiz is a 87 y.o. female with a medical diagnosis of Hepatic abscess.  Pt with  limited session 2* to high BP in AM, may attempt again later in PM, pt would continue to benefit from skilled PT services to improve overall functional mobility, strength and endurance.  .    Rehab identified problem list/impairments: weakness, impaired endurance, impaired self care skills, impaired functional mobilty, gait instability, impaired balance, decreased upper extremity function, decreased lower extremity function, decreased safety awareness, pain    Rehab potential is good.    Activity tolerance: Fair    Discharge  recommendations: home with home health(w/ light family assistance initially)     Barriers to discharge: None    Equipment recommendations: walker, rolling     GOALS:   Multidisciplinary Problems     Physical Therapy Goals        Problem: Physical Therapy Goal    Goal Priority Disciplines Outcome Goal Variances Interventions   Physical Therapy Goal     PT, PT/OT Ongoing, Progressing     Description: Goals to be met by: 10/20/20     Patient will increase functional independence with mobility by performin. Supine to sit with Modified [place] met  2. Sit to supine with Modified [place] met  3. Rolling to Left and Right with Modified [place]. met  4. Sit to stand transfer with Supervision  5. Bed to chair transfer with Supervision using Rolling Walker  6. Gait  x 50 feet with Supervision using Rolling Walker.   7. Ascend/Descend 4 inch curb step with Stand-by Assistance using Rolling Walker.met  8. Stand for 3 minutes with Stand-by Assistance using Rolling Walker while performing an activity met  9. Pt will perform a car transfer (Actual or simulated) w/ RW and CGA  10. Pt will stand and  5 objects from the floor w/ a reacher, RW, and SPV                     PLAN:    Patient to be seen 5 x/week  to address the above listed problems via gait training, therapeutic exercises, therapeutic activities  Plan of Care expires: 20  Plan of Care reviewed with: patient    Dorys Grier, PTA  10/22/2020

## 2020-10-22 NOTE — PLAN OF CARE
Problem: Occupational Therapy Goal  Goal: Occupational Therapy Goal  Description: Goals to be met by: 10/20/20     Patient will increase functional independence with ADLs by performing:    UE Dressing with Modified [place].  LE Dressing with Stand-by Assistance.  Grooming while seated with Modified [place].  Toileting from toilet with Stand-by Assistance for hygiene and clothing management.   Bathing from  sitting at sink with Stand-by Assistance.  Supine to sit with Modified [place].  Stand pivot transfers with Supervision using A/D as needed.  Pt's caregiver will be educated on level of assist required to safely perform self care tasks and functional transfers.    Outcome: Ongoing, Progressing

## 2020-10-22 NOTE — PT/OT/SLP PROGRESS
"Occupational Therapy  Treatment    Gisselle Ortiz   MRN: [id number]   Admitting Diagnosis: Hepatic abscess    OT Date of Treatment: 10/21/20       Billable Minutes:  Self Care/Home Management 33    General Precautions: Standard, aspiration, fall  Orthopedic Precautions: N/A  Braces: N/A    Spiritual, Cultural Beliefs, Anglican Practices, Values that Affect Care: no    Subjective:  Communicated with nsg prior to session.  "I feel really out of it and have no strength."  "I feel dizzy."         Objective:      Patient found supine in bed with IV drip intact upon arrival.  Occupational Performance:    Bed Mobility:    · Patient completed Rolling/Turning to Right with stand by assistance  · Patient completed Scooting/Bridging with stand by assistance initially to EOB and from EOB<>HOB with draw sheet   · Patient completed Supine to Sit with contact guard assistance and minimum assistance  · Patient completed Sit to Supine with moderate assistance to lift BLE's and shift towards HOB due to generalized weakness.     Functional Mobility/Transfers:  · Patient completed Sit <> Stand Transfer with minimum assistance  with  rolling walker and due to generalized fatigue.   · Functional Mobility: Not tested.    Activities of Daily Living:  · Grooming: stand by assistance to perform oral hygiene task seated EOB and maxA to comb hair seated EOB .  · Bathing: moderated to perform sponge bath on trunk and BUE while seated EOB and A  to clean BLE's seated and post perineal area while in stance.  · Upper Body Dressing: moderate assistance to rosario/doff gown seated EOB while having IV drip line intact.  · Lower Body Dressing: maximum assistance to rosario/doff pants seated EOB and to manage over hips while in stance due to generalized fatigue.    [facility] 6 Click:  [facility] Total Score:        Additional Treatment:  Pt instructed in proper t/f techniques to improve t/f ability needed to perform ADL and self-care tasks.     Pt nurse consulted " about high blood pressure concerns and blood pressure taken at 181/83 with O2 stats 95% at srart session. Nsg alerted when IV drip was complete.    Patient left HOB elevated with all lines intact, call button in reach, nsg notified and that IV drip complete    ASSESSMENT:  Gisselle Ortiz is a 87 y.o. female with a medical diagnosis of Hepatic abscess.  Pt. Tolerated session fair on this day Pt. With repots of not feeling her self on this day  With general increase fatigue and Patient reports dizziness during session noted this day  blood pressure taking during treatment with pressure taken at 181/83. O2 stats 95%. . Nsg alerted to high blood pressure concerns noted this day.     Rehab identified problem list/impairments: weakness, impaired endurance, impaired self care skills, impaired functional mobilty, impaired balance, gait instability, decreased lower extremity function, decreased safety awareness, decreased ROM, edema, impaired cardiopulmonary response to activity    Rehab potential is fair    Activity tolerance: Fair    Discharge recommendations: home health OT     Barriers to discharge: Decreased caregiver support     Equipment recommendations: none     GOALS:   Multidisciplinary Problems     Occupational Therapy Goals        Problem: Occupational Therapy Goal    Goal Priority Disciplines Outcome Interventions   Occupational Therapy Goal     OT, PT/OT Ongoing, Progressing    Description: Goals to be met by: 10/28/20     Patient will increase functional independence with ADLs by performing:    UE Dressing with Modified [place].  LE Dressing with Stand-by Assistance.  Grooming while seated with Modified [place].  Toileting from toilet with Stand-by Assistance for hygiene and clothing management.   Bathing from  sitting at sink with Stand-by Assistance.  Supine to sit with Modified [place].  Stand pivot transfers with Supervision using A/D as needed.  Pt's caregiver will be educated on  level of assist required to safely perform self care tasks and functional transfers.                     Plan:  Patient to be seen 5 x/week to address the above listed problems via self-care/home management, therapeutic activities, therapeutic exercises  Plan of Care expires: 11/04/20  Plan of Care reviewed with: patient    Celine ManRONALD/L  10/22/2020   I certify that I was present in the room directing the student in service delivery and guiding them using my skilled judgment. As the co-signing therapist I have reviewed the students documentation and am responsible for the treatment, assessment, and plan.

## 2020-10-23 LAB
CRP SERPL-MCNC: 2.37 MG/L (ref 0–3.19)
ERYTHROCYTE [SEDIMENTATION RATE] IN BLOOD BY WESTERGREN METHOD: 6 MM/HR (ref 0–36)

## 2020-10-23 PROCEDURE — 94761 N-INVAS EAR/PLS OXIMETRY MLT: CPT

## 2020-10-23 PROCEDURE — 85652 RBC SED RATE AUTOMATED: CPT

## 2020-10-23 PROCEDURE — 97530 THERAPEUTIC ACTIVITIES: CPT

## 2020-10-23 PROCEDURE — 25000003 PHARM REV CODE 250: Performed by: HOSPITALIST

## 2020-10-23 PROCEDURE — 86141 C-REACTIVE PROTEIN HS: CPT

## 2020-10-23 PROCEDURE — 25000003 PHARM REV CODE 250: Performed by: NURSE PRACTITIONER

## 2020-10-23 PROCEDURE — 11000004 HC SNF PRIVATE

## 2020-10-23 PROCEDURE — 97535 SELF CARE MNGMENT TRAINING: CPT

## 2020-10-23 PROCEDURE — A4216 STERILE WATER/SALINE, 10 ML: HCPCS | Performed by: NURSE PRACTITIONER

## 2020-10-23 PROCEDURE — 63600175 PHARM REV CODE 636 W HCPCS: Performed by: HOSPITALIST

## 2020-10-23 PROCEDURE — 97110 THERAPEUTIC EXERCISES: CPT

## 2020-10-23 RX ADMIN — CEFTRIAXONE SODIUM 2 G: 2 INJECTION, SOLUTION INTRAVENOUS at 10:10

## 2020-10-23 RX ADMIN — ATENOLOL 50 MG: 50 TABLET ORAL at 10:10

## 2020-10-23 RX ADMIN — LEVOTHYROXINE SODIUM 137 MCG: 50 TABLET ORAL at 05:10

## 2020-10-23 RX ADMIN — MELATONIN TAB 3 MG 6 MG: 3 TAB at 08:10

## 2020-10-23 RX ADMIN — Medication 10 ML: at 12:10

## 2020-10-23 RX ADMIN — APIXABAN 2.5 MG: 2.5 TABLET, FILM COATED ORAL at 10:10

## 2020-10-23 RX ADMIN — ATORVASTATIN CALCIUM 10 MG: 10 TABLET, FILM COATED ORAL at 10:10

## 2020-10-23 RX ADMIN — Medication 1 CAPSULE: at 10:10

## 2020-10-23 RX ADMIN — SPIRONOLACTONE 25 MG: 25 TABLET ORAL at 08:10

## 2020-10-23 RX ADMIN — Medication 10 ML: at 05:10

## 2020-10-23 RX ADMIN — METRONIDAZOLE 500 MG: 500 TABLET ORAL at 05:10

## 2020-10-23 RX ADMIN — SPIRONOLACTONE 25 MG: 25 TABLET ORAL at 10:10

## 2020-10-23 RX ADMIN — APIXABAN 2.5 MG: 2.5 TABLET, FILM COATED ORAL at 08:10

## 2020-10-23 RX ADMIN — METRONIDAZOLE 500 MG: 500 TABLET ORAL at 02:10

## 2020-10-23 RX ADMIN — METRONIDAZOLE 500 MG: 500 TABLET ORAL at 08:10

## 2020-10-23 RX ADMIN — Medication 10 ML: at 06:10

## 2020-10-23 RX ADMIN — AMIODARONE HYDROCHLORIDE 200 MG: 200 TABLET ORAL at 10:10

## 2020-10-23 NOTE — PT/OT/SLP PROGRESS
Physical Therapy      Patient Name:  Gisselle Ortiz   MRN:  [id number]    Patient not seen today secondary to pt KRISTAN for test  . Will follow-up next PT session    Dorys Grier PTA

## 2020-10-23 NOTE — PLAN OF CARE
[facility]    HOME HEALTH ORDERS  FACE TO FACE ENCOUNTER    Patient Name: Gisselle Ortiz  YOB: 1933    PCP: Kai Montez MD   PCP Address: [address] / [place] [address]  PCP Phone Number: [phone]  PCP Fax: [phone]    Encounter Date: 10/29/2020    Admit to Home Health    Diagnoses:  Active Hospital Problems    Diagnosis  POA    *Hepatic abscess [K75.0]  Yes    Leukocytosis [D72.829]  Yes    Abnormal liver enzymes [R74.8]  Yes    Abdominal pain [R10.9]  Yes    E. coli septicemia [A41.51]  Yes    Hyperparathyroidism due to renal insufficiency [N25.81]  Yes    Long term current use of antiarrhythmic drug [Z79.899]  Not Applicable    Current use of long term anticoagulation [Z79.01]  Not Applicable    Vitamin D deficiency [E55.9]  Yes    Insomnia [G47.00]  Yes    Gastroesophageal reflux disease [K21.9]  Yes    Hypothyroidism [E03.9]  Yes    Paroxysmal atrial fibrillation [I48.0]  Yes    Hypertension [I10]  Yes    Pacemaker [Z95.0]  Yes      Resolved Hospital Problems   No resolved problems to display.       Future Appointments   Date Time Provider Department Center   10/30/2020  3:00 PM Anne De La Cruz MD [facility] ID [address]   11/12/2020 10:30 AM [facility]   12/11/2020 11:30 AM Kai Montez MD [facility]       I have seen and examined this patient face to face today. My clinical findings that support the need for the home health skilled services and home bound status are the following:  Weakness/numbness causing balance and gait disturbance due to Weakness/Debility making it taxing to leave home.  Requiring assistive device to leave home due to unsteady gait caused by  Weakness/Debility.    Allergies:  Review of patient's allergies indicates:   Allergen Reactions    Ciprofloxacin Nausea And Vomiting       Diet: cardiac diet    Activities: activity as tolerated    Nursing:   SN to  complete comprehensive assessment including routine vital signs. Instruct on disease process and s/s of complications to report to MD. Review/verify medication list sent home with the patient at time of discharge  and instruct patient/caregiver as needed. Frequency may be adjusted depending on start of care date.    Labs:  Weekly cbc, cmp, crp, esr and fax results to ID, ria Mares NP, at fax [phone]. (Pager [phone], spectra [phone], office [phone])      CONSULTS:    Physical Therapy to evaluate and treat. Evaluate for home safety and equipment needs; Establish/upgrade home exercise program. Perform / instruct on therapeutic exercises, gait training, transfer training, and Range of Motion.  Occupational Therapy to evaluate and treat. Evaluate home environment for safety and equipment needs. Perform/Instruct on transfers, ADL training, ROM, and therapeutic exercises.    MISCELLANEOUS CARE:  Home Infusion Therapy:   SN to perform Infusion Therapy/Central Line Care.  Review Central Line Care & Central Line Flush with patient.    Administer (drug and dose): Ceftriaxone 2g/50mL D5W IVPB    Last dose given: 10/29                        Home dose due: TBD based on last dose end date 11/7/20- may be shortened or extended per ID follow up      Scrub the Hub: Prior to accessing the line, always perform a 30 second alcohol scrub  Each lumen of the central line is to be flushed at least daily with 10 mL Normal Saline and 3 mL Heparin flush (10 units/mL)  Skilled Nurse (SN) may draw blood from IV access  Blood Draw Procedure:   - Aspirate at least 5 mL of blood   - Discard   - Obtain specimen   - Change injection cap   - Flush with 20 mL Normal Saline followed by a                 3-5 mL Heparin flush (10 units/mL)  Central :   - Sterile dressing changes are done weekly and as needed.   - Use chlor-hexadine scrub to cleanse site, apply Biopatch to insertion site,       apply securement device  dressing   - Injection caps are changed weekly and after EVERY lab draw.   - If sterile gauze is under dressing to control oozing,                 dressing change must be performed every 24 hours until gauze is not needed.    WOUND CARE ORDERS  midline gluteal cleft is resolving with partial thickness skin loss.   Nursing to continue Triad ointment to gluteal cleft BID/prn cleansing     Medications: Review discharge medications with patient and family and provide education.      I certify that this patient is confined to her home and needs physical therapy and occupational therapy.

## 2020-10-23 NOTE — NURSING
Patient to [place] [facility] [place] [place]  via w/c with . Patient alert & oriented on departure.

## 2020-10-23 NOTE — PLAN OF CARE
Problem: Occupational Therapy Goal  Goal: Occupational Therapy Goal  Description: Goals to be met by: 10/28/20     Patient will increase functional independence with ADLs by performing:    UE Dressing with Modified [place].  LE Dressing with Stand-by Assistance.  Grooming while seated with Modified [place].  Toileting from toilet with Stand-by Assistance for hygiene and clothing management.   Bathing from  sitting at sink with Stand-by Assistance.  Supine to sit with Modified [place].  Stand pivot transfers with Supervision using A/D as needed.  Pt's caregiver will be educated on level of assist required to safely perform self care tasks and functional transfers.    Outcome: Ongoing, Progressing

## 2020-10-23 NOTE — PT/OT/SLP PROGRESS
Occupational Therapy  Treatment    Gisselle Ortiz   MRN: [id number]   Admitting Diagnosis: Hepatic abscess    OT Date of Treatment: 10/23/20     Billable Minutes:  Self Care/Home Management 15 and Therapeutic Exercise 15 Therapeutic Activity 8    General Precautions: Standard, fall  Orthopedic Precautions: N/A  Braces: N/A    Spiritual, Cultural Beliefs, Restoration Practices, Values that Affect Care: no    Subjective:  Communicated with nurse prior to session.    Pain/Comfort  Pain Rating 1: 0/10  Pain Rating Post-Intervention 1: 0/10    Objective:  Patient found with: PICC line(and seated in bedside chair.)    Occupational Performance:    Bed Mobility:    · Pt seated in W/C at onset of therapy session.    Functional Mobility/Transfers:  · Patient completed Sit <> Stand Transfer with contact guard assistance  with  rolling walker   · Patient completed Bed <> Chair Transfer using Stand Pivot technique with contact guard assistance with rolling walker    Activities of Daily Living:  · Lower Body Dressing: Pt performed LBD doffing/donning socks seated EOB with No assist after set up.  .    OT Exercises: UE Ergometer performed 15 minutes on UBE with Min resistance. UE exercises performed to increase functional endurance and strength in order increase independence when performing self care tasks, functional ambulation, W/C propulsion , functional standing activities as well as when performing functional tasks.    Additional Treatment:  Pt edu on role of OT, POC, safety when performing self care tasks , benefit of performing OOB activity, and safety when performing functional transfers and mobility.  - White board updated  - Self care tasks completed-- as noted above     Patient left up in chair with call button in reach     [facility] 6 Click:  [facility] Total Score: 20    ASSESSMENT:  Gisselle Ortiz is a 87 y.o. female with a medical diagnosis of Hepatic abscess .   Pt tolerated Tx without incident and is making slow  progress with self care tasks, functional mobility and functional transfers .  She would continue to benefit from OT intervention to further her functional (I)ce and safety.    Rehab identified problem list/impairments: weakness, impaired endurance, impaired self care skills, impaired functional mobilty, impaired balance, gait instability, decreased lower extremity function, decreased safety awareness, decreased ROM, edema, impaired cardiopulmonary response to activity    Rehab potential is good    Activity tolerance: Good    Discharge recommendations: home health OT     Barriers to discharge: Decreased caregiver support     Equipment recommendations: none     GOALS:   Multidisciplinary Problems     Occupational Therapy Goals        Problem: Occupational Therapy Goal    Goal Priority Disciplines Outcome Interventions   Occupational Therapy Goal     OT, PT/OT Ongoing, Progressing    Description: Goals to be met by: 10/28/20     Patient will increase functional independence with ADLs by performing:    UE Dressing with Modified [place].  LE Dressing with Stand-by Assistance.  Grooming while seated with Modified [place].  Toileting from toilet with Stand-by Assistance for hygiene and clothing management.   Bathing from  sitting at sink with Stand-by Assistance.  Supine to sit with Modified [place].  Stand pivot transfers with Supervision using A/D as needed.  Pt's caregiver will be educated on level of assist required to safely perform self care tasks and functional transfers.                     Plan:  Patient to be seen 5 x/week to address the above listed problems via self-care/home management, therapeutic activities, therapeutic exercises  Plan of Care expires: 11/04/20  Plan of Care reviewed with: patient    Khari Stern OTR/KAELYN  10/23/2020

## 2020-10-24 PROCEDURE — 97110 THERAPEUTIC EXERCISES: CPT | Mod: CQ

## 2020-10-24 PROCEDURE — 63600175 PHARM REV CODE 636 W HCPCS: Performed by: HOSPITALIST

## 2020-10-24 PROCEDURE — A4216 STERILE WATER/SALINE, 10 ML: HCPCS | Performed by: NURSE PRACTITIONER

## 2020-10-24 PROCEDURE — 25000003 PHARM REV CODE 250: Performed by: NURSE PRACTITIONER

## 2020-10-24 PROCEDURE — 97530 THERAPEUTIC ACTIVITIES: CPT | Mod: CQ

## 2020-10-24 PROCEDURE — 11000004 HC SNF PRIVATE

## 2020-10-24 PROCEDURE — 97116 GAIT TRAINING THERAPY: CPT | Mod: CQ

## 2020-10-24 PROCEDURE — 97535 SELF CARE MNGMENT TRAINING: CPT | Mod: CO

## 2020-10-24 PROCEDURE — 25000003 PHARM REV CODE 250: Performed by: HOSPITALIST

## 2020-10-24 RX ADMIN — MELATONIN TAB 3 MG 6 MG: 3 TAB at 09:10

## 2020-10-24 RX ADMIN — METRONIDAZOLE 500 MG: 500 TABLET ORAL at 09:10

## 2020-10-24 RX ADMIN — ATORVASTATIN CALCIUM 10 MG: 10 TABLET, FILM COATED ORAL at 09:10

## 2020-10-24 RX ADMIN — Medication 10 ML: at 12:10

## 2020-10-24 RX ADMIN — SPIRONOLACTONE 25 MG: 25 TABLET ORAL at 09:10

## 2020-10-24 RX ADMIN — AMIODARONE HYDROCHLORIDE 200 MG: 200 TABLET ORAL at 09:10

## 2020-10-24 RX ADMIN — Medication 10 ML: at 06:10

## 2020-10-24 RX ADMIN — METRONIDAZOLE 500 MG: 500 TABLET ORAL at 02:10

## 2020-10-24 RX ADMIN — DICLOFENAC 4 G: 10 GEL TOPICAL at 09:10

## 2020-10-24 RX ADMIN — CEFTRIAXONE SODIUM 2 G: 2 INJECTION, SOLUTION INTRAVENOUS at 10:10

## 2020-10-24 RX ADMIN — Medication 1 CAPSULE: at 09:10

## 2020-10-24 RX ADMIN — METRONIDAZOLE 500 MG: 500 TABLET ORAL at 05:10

## 2020-10-24 RX ADMIN — APIXABAN 2.5 MG: 2.5 TABLET, FILM COATED ORAL at 09:10

## 2020-10-24 RX ADMIN — Medication 10 ML: at 01:10

## 2020-10-24 RX ADMIN — LEVOTHYROXINE SODIUM 137 MCG: 50 TABLET ORAL at 05:10

## 2020-10-24 RX ADMIN — Medication 10 ML: at 05:10

## 2020-10-24 RX ADMIN — ATENOLOL 50 MG: 50 TABLET ORAL at 09:10

## 2020-10-24 NOTE — PT/OT/SLP PROGRESS
Physical Therapy  Treatment    Gisselle Ortiz   MRN: [id number]   Admitting Diagnosis: Hepatic abscess    PT Received On: 10/24/20          Billable Minutes:  Gait Training 10, Therapeutic Activity 10 and Therapeutic Exercise 18    Treatment Type: Treatment  PT/PTA: PTA     PTA Visit Number: 2       General Precautions: Standard, fall  Orthopedic Precautions: N/A   Braces: N/A    Spiritual, Cultural Beliefs, Congregation Practices, Values that Affect Care: no    Subjective:  Communicated with nursing prior to session.  Pt agreed to work with therapy.     Pain/Comfort  Pain Rating 1: 0/10  Pain Rating Post-Intervention 1: 0/10    Objective:  Patient found seated bedside chair.         AM-PAC 6 CLICK MOBILITY  Total Score:18    Transfers:  Sit<>Stand: to/from bedside chair x2 trials with RW and CGA    Gait:  Amb x2 trials (40ft and 18ft) with RW and CGA for safety.      Advanced Gait:  Curb Step: pt declined on this date    Therex:  -BLE therex 2x10 reps:   -AP   -LAQ   -hip flexion    -GS   -hip abd/add    Additional Treatment:  -Mini-Elliptical x15 min    Patient left up in chair with call button in reach and nursing notified.    Assessment:  Gisselle Ortiz is a 87 y.o. female with a medical diagnosis of Hepatic abscess.  Pt tolerated treatment well, and will continue to benefit from PT services at this time. Continue with PT POC as indicated.    Rehab identified problem list/impairments: weakness, impaired endurance, impaired self care skills, impaired functional mobilty, gait instability, impaired balance, decreased upper extremity function, decreased lower extremity function, decreased safety awareness, pain    Rehab potential is good.    Activity tolerance: Good    Discharge recommendations: home with home health(w/ light family assistance initially)     Barriers to discharge: None    Equipment recommendations: walker, rolling     GOALS:   Multidisciplinary Problems     Physical Therapy Goals         Problem: Physical Therapy Goal    Goal Priority Disciplines Outcome Goal Variances Interventions   Physical Therapy Goal     PT, PT/OT Ongoing, Progressing     Description: Goals to be met by: 10/20/20     Patient will increase functional independence with mobility by performin. Supine to sit with Modified [place] met  2. Sit to supine with Modified [place] met  3. Rolling to Left and Right with Modified [place]. met  4. Sit to stand transfer with Supervision  5. Bed to chair transfer with Supervision using Rolling Walker  6. Gait  x 50 feet with Supervision using Rolling Walker.   7. Ascend/Descend 4 inch curb step with Stand-by Assistance using Rolling Walker.met  8. Stand for 3 minutes with Stand-by Assistance using Rolling Walker while performing an activity met  9. Pt will perform a car transfer (Actual or simulated) w/ RW and CGA  10. Pt will stand and  5 objects from the floor w/ a reacher, RW, and SPV                     PLAN:    Patient to be seen 5 x/week  to address the above listed problems via gait training, therapeutic exercises, therapeutic activities  Plan of Care expires: 20  Plan of Care reviewed with: patient    Flor Waller, PTA  10/24/2020

## 2020-10-24 NOTE — PT/OT/SLP PROGRESS
"Occupational Therapy  Treatment    Gisselle Ortiz   MRN: [id number]   Admitting Diagnosis: Hepatic abscess    OT Date of Treatment: 10/24/20       Billable Minutes:  Self Care/Home Management 50    General Precautions: Standard, fall  Orthopedic Precautions: N/A  Braces: N/A    Spiritual, Cultural Beliefs, Judaism Practices, Values that Affect Care: no    Subjective:  Communicated with nsg prior to session.  "It's too early."  "I'm exhausted."    Pain/Comfort  Pain Rating 1: 0/10  Pain Rating Post-Intervention 1: 0/10    Objective:  Patient found with: PICC line, supine in bed    Occupational Performance:    Bed Mobility:    · Patient completed Rolling/Turning to Right with stand by assistance  · Patient completed Scooting/Bridging with stand by assistance  · Patient completed Supine to Sit with stand by assistance     Functional Mobility/Transfers:  · Patient completed Sit <> Stand Transfer with stand by assistance  with  rolling walker and from w/c<>RW.   · Patient completed Bed <> Chair Transfer using Stand Pivot technique with stand by assistance with no assistive device  · Patient completed Toilet Transfer Stand Pivot technique with stand by assistance with  grab bars from w/c<>3n1.  · Functional Mobility: Not tested    Activities of Daily Living:  · Feeding:  minimum assistance to open milk carton and required set-up assistance for remainder of meal.  · Grooming: stand by assistance and in order to comb hair and required supervision for performing oral care task of brushing teeth .  · Bathing: stand by assistance  to clean UE, LE, and trunk seated and required CGA to clean perineal area while in stance utilizing grab bars at 3n1 in in-room bathroom.  · Upper Body Dressing: moderate assistance to rosario/doff gown while seated   · Lower Body Dressing: minimum assistance to rosario/doff pants while in stance, maxA for brief management, SBA to rosario/doff socks  · Toileting: stand by assistance and contact guard " asssistance to clean perineal area while in stance utilizing grab bars,    AMPA 6 Click:  AMPAC Total Score: 20      Additional Treatment:  Patient instructed in proper transfer techniques in order to ensure safety with transfers and improve transfer ability.    Patient reported TV malfunction. SOTA alerted  of issue in order to meet patient's needs and ensure maximum comfort during stay.       Patient left up in chair with all lines intact and call button in reach    ASSESSMENT:  Gisselle Ortiz is a 87 y.o. female with a medical diagnosis of Hepatic abscess. Patient tolerated treatment well today. Patient c/o exhaustion, but it did not affect participation during treatment session. Patient demonstrated improvement in transfer ability and performance in self-care tasks involving grooming and LB dressing noted this day. Patient reports reduced dizziness noted this day. Patient will benefit from continued OT services due to progress made in therapy session.     Rehab identified problem list/impairments: weakness, impaired endurance, impaired self care skills, impaired functional mobilty, impaired balance, gait instability, decreased lower extremity function, decreased safety awareness, decreased ROM, edema, impaired cardiopulmonary response to activity    Rehab potential is fair    Activity tolerance: Fair    Discharge recommendations: home health OT     Barriers to discharge: Decreased caregiver support     Equipment recommendations: none     GOALS:   Multidisciplinary Problems     Occupational Therapy Goals        Problem: Occupational Therapy Goal    Goal Priority Disciplines Outcome Interventions   Occupational Therapy Goal     OT, PT/OT Ongoing, Progressing    Description: Goals to be met by: 10/28/20     Patient will increase functional independence with ADLs by performing:    UE Dressing with Modified [place].  LE Dressing with Stand-by Assistance.  Grooming while seated with Modified  [place].  Toileting from toilet with Stand-by Assistance for hygiene and clothing management.   Bathing from  sitting at sink with Stand-by Assistance.  Supine to sit with Modified [place].  Stand pivot transfers with Supervision using A/D as needed.  Pt's caregiver will be educated on level of assist required to safely perform self care tasks and functional transfers.                     Plan:  Patient to be seen 5 x/week to address the above listed problems via self-care/home management, therapeutic activities, therapeutic exercises  Plan of Care expires: 11/04/20  Plan of Care reviewed with: patient    Kevin Kelley, SOT  10/24/2020   I certify that I was present in the room directing the student in service delivery and guiding them using my skilled judgment. As the co-signing therapist I have reviewed the students documentation and am responsible for the treatment, assessment, and plan.

## 2020-10-25 PROCEDURE — 25000003 PHARM REV CODE 250: Performed by: NURSE PRACTITIONER

## 2020-10-25 PROCEDURE — A4216 STERILE WATER/SALINE, 10 ML: HCPCS | Performed by: NURSE PRACTITIONER

## 2020-10-25 PROCEDURE — 63600175 PHARM REV CODE 636 W HCPCS: Performed by: HOSPITALIST

## 2020-10-25 PROCEDURE — 25000003 PHARM REV CODE 250: Performed by: HOSPITALIST

## 2020-10-25 PROCEDURE — 11000004 HC SNF PRIVATE

## 2020-10-25 RX ADMIN — ATENOLOL 50 MG: 50 TABLET ORAL at 09:10

## 2020-10-25 RX ADMIN — APIXABAN 2.5 MG: 2.5 TABLET, FILM COATED ORAL at 09:10

## 2020-10-25 RX ADMIN — Medication 1 CAPSULE: at 09:10

## 2020-10-25 RX ADMIN — DICLOFENAC 4 G: 10 GEL TOPICAL at 09:10

## 2020-10-25 RX ADMIN — Medication 10 ML: at 12:10

## 2020-10-25 RX ADMIN — SPIRONOLACTONE 25 MG: 25 TABLET ORAL at 09:10

## 2020-10-25 RX ADMIN — MELATONIN TAB 3 MG 6 MG: 3 TAB at 09:10

## 2020-10-25 RX ADMIN — ATORVASTATIN CALCIUM 10 MG: 10 TABLET, FILM COATED ORAL at 09:10

## 2020-10-25 RX ADMIN — Medication 10 ML: at 06:10

## 2020-10-25 RX ADMIN — METRONIDAZOLE 500 MG: 500 TABLET ORAL at 09:10

## 2020-10-25 RX ADMIN — AMIODARONE HYDROCHLORIDE 200 MG: 200 TABLET ORAL at 09:10

## 2020-10-25 RX ADMIN — LEVOTHYROXINE SODIUM 137 MCG: 50 TABLET ORAL at 06:10

## 2020-10-25 RX ADMIN — DICLOFENAC 4 G: 10 GEL TOPICAL at 02:10

## 2020-10-25 RX ADMIN — CEFTRIAXONE SODIUM 2 G: 2 INJECTION, SOLUTION INTRAVENOUS at 12:10

## 2020-10-25 RX ADMIN — METRONIDAZOLE 500 MG: 500 TABLET ORAL at 02:10

## 2020-10-25 RX ADMIN — METRONIDAZOLE 500 MG: 500 TABLET ORAL at 06:10

## 2020-10-25 NOTE — PLAN OF CARE
Problem: Adult Inpatient Plan of Care  Goal: Plan of Care Review  Outcome: Ongoing, Progressing  Plan of Care Reviewed With: patient  Goal: Patient-Specific Goal (Individualization)  Outcome: Ongoing, Progressing  Goal: Absence of Hospital-Acquired Illness or Injury  Outcome: Ongoing, Progressing  Goal: Optimal Comfort and Wellbeing  Outcome: Ongoing, Progressing     Problem: Infection  Goal: Infection Symptom Resolution  Outcome: Ongoing, Progressing     Problem: Skin Injury Risk Increased  Goal: Skin Health and Integrity  Outcome: Ongoing, Progressing     Problem: Wound  Goal: Optimal Wound Healing  Outcome: Ongoing, Progressing     Problem: Fall Injury Risk  Goal: Absence of Fall and Fall-Related Injury  Outcome: Ongoing, Progressing

## 2020-10-26 LAB
ALBUMIN SERPL BCP-MCNC: 2.2 G/DL (ref 3.5–5.2)
ALBUMIN SERPL BCP-MCNC: 2.2 G/DL (ref 3.5–5.2)
ALP SERPL-CCNC: 59 U/L (ref 55–135)
ALP SERPL-CCNC: 59 U/L (ref 55–135)
ALT SERPL W/O P-5'-P-CCNC: 6 U/L (ref 10–44)
ALT SERPL W/O P-5'-P-CCNC: 6 U/L (ref 10–44)
ANION GAP SERPL CALC-SCNC: 4 MMOL/L (ref 8–16)
AST SERPL-CCNC: 16 U/L (ref 10–40)
AST SERPL-CCNC: 16 U/L (ref 10–40)
BASOPHILS # BLD AUTO: 0.06 K/UL (ref 0–0.2)
BASOPHILS NFR BLD: 1.4 % (ref 0–1.9)
BILIRUB DIRECT SERPL-MCNC: 0.2 MG/DL (ref 0.1–0.3)
BILIRUB SERPL-MCNC: 0.4 MG/DL (ref 0.1–1)
BILIRUB SERPL-MCNC: 0.4 MG/DL (ref 0.1–1)
BUN SERPL-MCNC: 7 MG/DL (ref 8–23)
CALCIUM SERPL-MCNC: 8.1 MG/DL (ref 8.7–10.5)
CHLORIDE SERPL-SCNC: 104 MMOL/L (ref 95–110)
CO2 SERPL-SCNC: 30 MMOL/L (ref 23–29)
CREAT SERPL-MCNC: 0.9 MG/DL (ref 0.5–1.4)
DIFFERENTIAL METHOD: ABNORMAL
EOSINOPHIL # BLD AUTO: 0.3 K/UL (ref 0–0.5)
EOSINOPHIL NFR BLD: 6.4 % (ref 0–8)
ERYTHROCYTE [DISTWIDTH] IN BLOOD BY AUTOMATED COUNT: 16.1 % (ref 11.5–14.5)
EST. GFR  (AFRICAN AMERICAN): >60 ML/MIN/1.73 M^2
EST. GFR  (NON AFRICAN AMERICAN): 57.7 ML/MIN/1.73 M^2
GLUCOSE SERPL-MCNC: 86 MG/DL (ref 70–110)
HCT VFR BLD AUTO: 28.8 % (ref 37–48.5)
HGB BLD-MCNC: 8.8 G/DL (ref 12–16)
IMM GRANULOCYTES # BLD AUTO: 0.02 K/UL (ref 0–0.04)
IMM GRANULOCYTES NFR BLD AUTO: 0.5 % (ref 0–0.5)
LYMPHOCYTES # BLD AUTO: 1.4 K/UL (ref 1–4.8)
LYMPHOCYTES NFR BLD: 31.4 % (ref 18–48)
MAGNESIUM SERPL-MCNC: 1.7 MG/DL (ref 1.6–2.6)
MCH RBC QN AUTO: 30.3 PG (ref 27–31)
MCHC RBC AUTO-ENTMCNC: 30.6 G/DL (ref 32–36)
MCV RBC AUTO: 99 FL (ref 82–98)
MONOCYTES # BLD AUTO: 0.6 K/UL (ref 0.3–1)
MONOCYTES NFR BLD: 13.4 % (ref 4–15)
NEUTROPHILS # BLD AUTO: 2.1 K/UL (ref 1.8–7.7)
NEUTROPHILS NFR BLD: 46.9 % (ref 38–73)
NRBC BLD-RTO: 0 /100 WBC
PHOSPHATE SERPL-MCNC: 3.4 MG/DL (ref 2.7–4.5)
PLATELET # BLD AUTO: 196 K/UL (ref 150–350)
PMV BLD AUTO: 10.6 FL (ref 9.2–12.9)
POTASSIUM SERPL-SCNC: 4.3 MMOL/L (ref 3.5–5.1)
PROT SERPL-MCNC: 4.9 G/DL (ref 6–8.4)
PROT SERPL-MCNC: 4.9 G/DL (ref 6–8.4)
RBC # BLD AUTO: 2.9 M/UL (ref 4–5.4)
SODIUM SERPL-SCNC: 138 MMOL/L (ref 136–145)
WBC # BLD AUTO: 4.39 K/UL (ref 3.9–12.7)

## 2020-10-26 PROCEDURE — 97110 THERAPEUTIC EXERCISES: CPT | Mod: CQ

## 2020-10-26 PROCEDURE — 97116 GAIT TRAINING THERAPY: CPT | Mod: CQ

## 2020-10-26 PROCEDURE — 80053 COMPREHEN METABOLIC PANEL: CPT

## 2020-10-26 PROCEDURE — 80076 HEPATIC FUNCTION PANEL: CPT

## 2020-10-26 PROCEDURE — A4216 STERILE WATER/SALINE, 10 ML: HCPCS | Performed by: NURSE PRACTITIONER

## 2020-10-26 PROCEDURE — 97530 THERAPEUTIC ACTIVITIES: CPT | Mod: CQ

## 2020-10-26 PROCEDURE — 97803 MED NUTRITION INDIV SUBSEQ: CPT

## 2020-10-26 PROCEDURE — 11000004 HC SNF PRIVATE

## 2020-10-26 PROCEDURE — 25000003 PHARM REV CODE 250: Performed by: NURSE PRACTITIONER

## 2020-10-26 PROCEDURE — 63600175 PHARM REV CODE 636 W HCPCS: Performed by: HOSPITALIST

## 2020-10-26 PROCEDURE — 84100 ASSAY OF PHOSPHORUS: CPT

## 2020-10-26 PROCEDURE — 25000003 PHARM REV CODE 250: Performed by: HOSPITALIST

## 2020-10-26 PROCEDURE — 85025 COMPLETE CBC W/AUTO DIFF WBC: CPT

## 2020-10-26 PROCEDURE — 97110 THERAPEUTIC EXERCISES: CPT | Mod: CO

## 2020-10-26 PROCEDURE — 83735 ASSAY OF MAGNESIUM: CPT

## 2020-10-26 PROCEDURE — 97530 THERAPEUTIC ACTIVITIES: CPT | Mod: CO

## 2020-10-26 RX ADMIN — ATENOLOL 50 MG: 50 TABLET ORAL at 08:10

## 2020-10-26 RX ADMIN — SPIRONOLACTONE 25 MG: 25 TABLET ORAL at 09:10

## 2020-10-26 RX ADMIN — Medication 1 CAPSULE: at 08:10

## 2020-10-26 RX ADMIN — METRONIDAZOLE 500 MG: 500 TABLET ORAL at 05:10

## 2020-10-26 RX ADMIN — LEVOTHYROXINE SODIUM 137 MCG: 50 TABLET ORAL at 05:10

## 2020-10-26 RX ADMIN — Medication 10 ML: at 12:10

## 2020-10-26 RX ADMIN — DICLOFENAC 4 G: 10 GEL TOPICAL at 02:10

## 2020-10-26 RX ADMIN — ATORVASTATIN CALCIUM 10 MG: 10 TABLET, FILM COATED ORAL at 08:10

## 2020-10-26 RX ADMIN — CEFTRIAXONE SODIUM 2 G: 2 INJECTION, SOLUTION INTRAVENOUS at 10:10

## 2020-10-26 RX ADMIN — Medication 10 ML: at 06:10

## 2020-10-26 RX ADMIN — METRONIDAZOLE 500 MG: 500 TABLET ORAL at 02:10

## 2020-10-26 RX ADMIN — DICLOFENAC 4 G: 10 GEL TOPICAL at 08:10

## 2020-10-26 RX ADMIN — AMIODARONE HYDROCHLORIDE 200 MG: 200 TABLET ORAL at 08:10

## 2020-10-26 RX ADMIN — DICLOFENAC 4 G: 10 GEL TOPICAL at 09:10

## 2020-10-26 RX ADMIN — APIXABAN 2.5 MG: 2.5 TABLET, FILM COATED ORAL at 09:10

## 2020-10-26 RX ADMIN — APIXABAN 2.5 MG: 2.5 TABLET, FILM COATED ORAL at 08:10

## 2020-10-26 RX ADMIN — METRONIDAZOLE 500 MG: 500 TABLET ORAL at 09:10

## 2020-10-26 RX ADMIN — MELATONIN TAB 3 MG 6 MG: 3 TAB at 09:10

## 2020-10-26 RX ADMIN — SPIRONOLACTONE 25 MG: 25 TABLET ORAL at 08:10

## 2020-10-26 RX ADMIN — Medication 10 ML: at 11:10

## 2020-10-26 NOTE — PROGRESS NOTES
"[facility] - Skilled Nursing Care  Adult Nutrition  Progress Note    SUMMARY   Recommendations  Recommendation: Continue regular diet, encourage intake of boost breeze, RD following  Goals: PO to meet 50% of needs by next RD follow up  Nutrition Goal Status: progressing towards goal  Reason for Assessment    Reason For Assessment: RD follow-up  Diagnosis: (Abdominal pain)  Relevant Medical History: s/p hepatic abcess, s/p pancreatic abcess, hyperbilirubinemia, pacemaker, Vit D deficiency, HLD, HTN, AFIB, thyroid disease  Interdisciplinary Rounds: attended  General Information Comments: Regular diet , boost breeze daily, supplements at bedside table, PO 50%. LBM 10/24,   Nutrition Discharge Planning: DC on regular IDDSI level 7 diet    Nutrition/Diet History    Patient Reported Diet/Restrictions/Preferences: general  Typical Food/Fluid Intake: pt went down to 1-2 meals, reports half a sandwich was enough, she shops for food, has dental plate which is not fitting currently,  Food Preferences: no cottage cheese, no yogurt,  Spiritual, Cultural Beliefs, Orthodox Practices, Values that Affect Care: no  Food Allergies: NKFA  Factors Affecting Nutritional Intake: decreased appetite, depression, altered gastrointestinal function    Anthropometrics    Temp: 98.1 °F (36.7 °C)  Height: 5' 2.99" (160 cm)  Height (inches): 62.99 in  Weight Method: Standard Scale  Weight: 63 kg (138 lb 14.2 oz)  Weight (lb): 138.89 lb  Ideal Body Weight (IBW), Female: 114.95 lb  % Ideal Body Weight, Female (lb): 120.83 %  BMI (Calculated): 24.6  BMI Grade: 18.5-24.9 - normal(pt overhydrated)  Usual Body Weight (UBW), k.2 kg  % Usual Body Weight: 113.23  % Weight Change From Usual Weight: 12.99 %       Lab/Procedures/Meds    Pertinent Labs Reviewed: reviewed  Pertinent Labs Comments: CRP 2.37  Pertinent Medications Reviewed: reviewed  Pertinent Medications Comments: Sodium Chloride        Estimated/Assessed Needs    Weight Used For Calorie " Calculations: 63 kg (138 lb 14.2 oz)  Energy Calorie Requirements (kcal): 8568-7380 kcal  Energy Need Method: [place]  Protein Requirements: 75g  Weight Used For Protein Calculations: 63 kg (138 lb 14.2 oz)  Fluid Requirements (mL): 1600 or per MD  Estimated Fluid Requirement Method: RDA Method  RDA Method (mL): 1575  CHO Requirement: -      Nutrition Prescription Ordered    Current Diet Order: Regular  Nutrition Order Comments: PO 50%  Oral Nutrition Supplement: Boost breeze daily    Evaluation of Received Nutrient/Fluid Intake    Energy Calories Required: meeting needs  Protein Required: not meeting needs  Fluid Required: meeting needs  Comments: remains on Abx via PICC  Tolerance: tolerating  % Intake of Estimated Energy Needs: 75 - 100 %  % Meal Intake: 50 - 75 %    Nutrition Risk    Level of Risk/Frequency of Follow-up: low(one time per week)     Assessment and Plan     Inadequate oral intake related to poor appetite s/p critical illness as evidenced by PO < 25% , weight loss masked by overhydration during hospital stay,and pt reported grief response.     Goal: PO 50% MET   New Goal: PO to meet 85% of EEN/EPN  with ONS in place,for healing by next RD visit.     Plan  General diet  Collaboration with other providers  Commercial beverage- calories and protein- breeze daily    Monitor and Evaluation    Food and Nutrient Intake: food and beverage intake  Food and Nutrient Adminstration: diet order  Physical Activity and Function: nutrition-related ADLs and IADLs  Anthropometric Measurements: weight change  Biochemical Data, Medical Tests and Procedures: electrolyte and renal panel, gastrointestinal profile, inflammatory profile, glucose/endocrine profile  Nutrition-Focused Physical Findings: overall appearance     Malnutrition Assessment    10/5/20     Skin (Micronutrient): bruised  Eyes (Micronutrient): conjunctiva dull  Tongue (Micronutrient): magenta               [place] (Muscle Loss): severe  depletion  Clavicle Bone Region (Muscle Loss): mild depletion  Clavicle and Acromion Bone Region (Muscle Loss): mild depletion  [place]ors[place] Hand (Muscle Loss): mild depletion  Anterior Thigh Region (Muscle Loss): mild depletion   Edema (Fluid Accumulation): 3-->moderate             Nutrition Follow-Up    RD Follow-up?: Yes

## 2020-10-26 NOTE — PT/OT/SLP PROGRESS
"Occupational Therapy  Treatment    Gisselle Ortiz   MRN: [id number]   Admitting Diagnosis: Hepatic abscess    OT Date of Treatment: 10/26/20       Billable Minutes:  Therapeutic Activity 15 and Therapeutic Exercise 31    General Precautions: Standard, fall  Orthopedic Precautions: N/A  Braces: N/A    Spiritual, Cultural Beliefs, Catholic Practices, Values that Affect Care: no    Subjective:  Communicated with nsg prior to session.  "I am going home tomorrow"    Pain/Comfort  Pain Rating 1: 0/10  Pain Rating Post-Intervention 1: 0/10    Objective:   Pt. Found seated in bedside chair on arrival     Occupational Performance:    Bed Mobility:    · Not tested    Functional Mobility/Transfers:  · Patient completed Sit <> Stand Transfer with minimum assistance  with  rolling walker   · Patient completed Toilet Transfer Stand Pivot technique with contact guard assistance with  rolling walker  · Functional Mobility: not tested 2* to fatigue     Activities of Daily Living:  · Grooming: supervision with oral care and face washing while seated at sink level   · Lower Body Dressing: contact guard assistance to manage pants over hips instance  · Toileting: contact guard assistance with hygiene and clothing management at 3n1     [facility] 6 Click:  [facility] Total Score: 20    OT Exercises: UE Ergometer 15 mins with minimum assistance     Additional Treatment:  Pt. With standing act on this day with task. Pt. With CGA/SBA for balance aspects with task with AD at raised counter Pt with visual perception task with discrimination of various shapes and sizes x 5 min with standing bal and min cues throught out. Pt. Not able to complete finish task but seated rest break required.  Pt. With 3# dowel activity with 2x15 reps with  shd flex, bicep curls and forward flex motion to increase BUE ROM and strength,.   Pt. With standing and therex performed to increase ROM, endurance selfcare task and fxl mobility for independence     Patient left up " in chair with all lines intact and call button in reach    ASSESSMENT:  Gisselle Ortiz is a 87 y.o. female with a medical diagnosis of Hepatic abscess Pt. participated well with session on this day. Pt is progressing well with session on this day still continues to requires cues with aspects of safety . Pt. Will continue to benefit from continued OT to progress towards goals    Rehab identified problem list/impairments: weakness, impaired endurance, impaired self care skills, impaired functional mobilty, impaired balance, gait instability, decreased lower extremity function, decreased safety awareness, decreased ROM, edema, impaired cardiopulmonary response to activity    Rehab potential is good    Activity tolerance: Good    Discharge recommendations: home health OT     Barriers to discharge: Decreased caregiver support     Equipment recommendations: none     GOALS:   Multidisciplinary Problems     Occupational Therapy Goals        Problem: Occupational Therapy Goal    Goal Priority Disciplines Outcome Interventions   Occupational Therapy Goal     OT, PT/OT Ongoing, Progressing    Description: Goals to be met by: 10/28/20     Patient will increase functional independence with ADLs by performing:    UE Dressing with Modified [place].  LE Dressing with Stand-by Assistance.  Grooming while seated with Modified [place].  Toileting from toilet with Stand-by Assistance for hygiene and clothing management.   Bathing from  sitting at sink with Stand-by Assistance.  Supine to sit with Modified [place].  Stand pivot transfers with Supervision using A/D as needed.  Pt's caregiver will be educated on level of assist required to safely perform self care tasks and functional transfers.                 Plan:  Patient to be seen 5 x/week to address the above listed problems via self-care/home management, therapeutic activities, therapeutic exercises  Plan of Care expires: 11/04/20  Plan of Care reviewed with:  patient    Roz Mitchell, MINERVA BRAGG and RONALD have discussed the above patients goals and status in collaboration with Plan of Care.  10/26/2020

## 2020-10-26 NOTE — PT/OT/SLP PROGRESS
"Physical Therapy  Treatment    Gisselle Ortiz   MRN: [id number]   Admitting Diagnosis: Hepatic abscess    PT Received On: 10/26/20        Billable Minutes:  Gait Training 10, Therapeutic Activity 10 and Therapeutic Exercise 24    Treatment Type: Treatment  PT/PTA: PTA     PTA Visit Number: 3       General Precautions: Standard, fall  Orthopedic Precautions: N/A   Braces: N/A    Spiritual, Cultural Beliefs, [place] Practices, Values that Affect Care: no    Subjective:  "soso, I can try" "still weak"    Pain/Comfort  Pain Rating 1: 0/10  Pain Rating Post-Intervention 1: 0/10    Objective:  Patient found  In bed     AM-PAC 6 CLICK MOBILITY  Total Score:18    Bed Mobility:  Supine>Sit: S with HOB elev and rail    Transfers:  Sit<>Stand: with RW CG/close SBA  Stand Pivot Transfer: with RW CG/SBA close    Gait:  Amb with RW CG/close SBA vcs for erect posture, looking ahead 15 ft x 2 to/from bathroom and 30 ft in the room     Therex:  2x10 reps AP,GS,LAQ,hip flex    Additional Treatment:  CG/SBA with RW to  5 items from floor with reacher in RUE  Mini elliptical x 15 min light resistance  toileting CG/SBA with RW for trfs, CGA with dilcia care in standing and A to rosario/doff pants, set up for hand hygiene in sitting    Patient left up in chair with call button in reach and belongings in reach.    Assessment:  Gisselle Ortiz is a 87 y.o. female with a medical diagnosis of Hepatic abscess.  Pt tolerated well, pt would continue to benefit from skilled PT services to improve overall functional mobility, strength and endurance.  .    Rehab identified problem list/impairments: weakness, impaired endurance, impaired self care skills, impaired functional mobilty, gait instability, impaired balance, decreased upper extremity function, decreased lower extremity function, decreased safety awareness, pain    Rehab potential is good.    Activity tolerance: Fair    Discharge recommendations: home with home health(w/ light " family assistance initially)     Barriers to discharge: None    Equipment recommendations: walker, rolling     GOALS:   Multidisciplinary Problems     Physical Therapy Goals        Problem: Physical Therapy Goal    Goal Priority Disciplines Outcome Goal Variances Interventions   Physical Therapy Goal     PT, PT/OT Ongoing, Progressing     Description: Goals to be met by: 10/20/20     Patient will increase functional independence with mobility by performin. Supine to sit with Modified [place] met  2. Sit to supine with Modified [place] met  3. Rolling to Left and Right with Modified [place]. met  4. Sit to stand transfer with Supervision  5. Bed to chair transfer with Supervision using Rolling Walker  6. Gait  x 50 feet with Supervision using Rolling Walker.   7. Ascend/Descend 4 inch curb step with Stand-by Assistance using Rolling Walker.met  8. Stand for 3 minutes with Stand-by Assistance using Rolling Walker while performing an activity met  9. Pt will perform a car transfer (Actual or simulated) w/ RW and CGA  10. Pt will stand and  5 objects from the floor w/ a reacher, RW, and SPV                     PLAN:    Patient to be seen 5 x/week  to address the above listed problems via gait training, therapeutic exercises, therapeutic activities  Plan of Care expires: 20  Plan of Care reviewed with: patient    Dorys Grier, PTA  10/26/2020

## 2020-10-26 NOTE — PROGRESS NOTES
[facility]                                  Skilled Nursing Facility                   Progress Note     Admit Date: 10/2/2020  FRANCESCA 10/29/2020  Principal Problem:  Hepatic abscess   HPI obtained from patient interview and chart review     Chief Complaint: lab review, care coordinated with ID    HPI:   Patient is an 86 yo female PMHx AFib on Eliquis, SSS s/p pacemaker placement (1/2019), HTN, hyperthyroidism s/p thyroidectomy  who presents to SNF following hospitalization for hepatic abscess/bacteremia.       Patient presented to hospital on 9/11 complaining of abdominal pain and was found to have gallstone pancreatitis. She had CBD dilation and a gallbladder with sludge. She underwent EUS and ERCP with stone and sludge seen on the cholangiogram. Sphincterotomy was performed and stone/sludge removed with no stents placed. Per notes, ERCP was concerning for choledocholithiasis.  Surgery was consulted and deemed the patient to be high risk for cholecystectomy. She was seen today in GI clinic for follow-up. She initially recovered well from the pancreatitis and ERCP and was eating prior to discharge. A few days after discharge she started having severe fatigue that progressively worsened to the point where the patient could not walk. She also reported having decreased appetite and nausea.   Patient presented to GI clinic on 9/25 with hypotension and elevated LFTs. Patient was sent to the ED for evaluation.     In the ED, she was found to be afebrile and her BP improved to 130/61 with IVF. HR 63 with NSR and existing RBBB. Qtc 512. She was breathing 20bpm with no O2 requirement. She c/o some nausea but no abd pain. On exam, she had mild discomfort to deep palpation to RUQ abdomen.  CT scan with contrast following pancreatic protocol showed hepatic abscess. Blood culture grew gram negative cristofer; urine culture was positive enterococcus faecalis.      Pt had IR intervention for her hepatic abscess 9/26. Blood culture collected on 9/26  grew E.Coli susceptible to multiple abx. Urine enterococcus faecalis also shown to have susceptibility to multiple organism. Patient was started on Zosyn and ID consulted. Recommendations: Discontinued IV zosyn and started ceftriaxone 2 grams IV q 24 hours for E coli bacteremia and hepatic abscess. Started metronidazole 500 mg orally q 8 hours empirically for anaerobic coverage. Anticipate 4-6 weeks antibiotics for hepatic abscess (or until abscess resolves).  Would plan for 4 weeks of IV ceftriaxone and oral flagyl from date of  IR drainage (Estimated end date 10/26) with repeat CT abdomen/pelvis  with contrast at 4 weeks with ID follow up after imaging to determine ultimate duration of antibiotics and ability to transition to orals.  Assuming pancreatic lesion noted on CT is infected pseudocyst/abscess (vs neoplasm), would anticipate it would respond to current antibiotics, but have no culture data for this.  Will follow clinically with repeat imaging. If worsens on current course, will need aspiration for cultures and/or biopsy if concerned for neoplasm. May be able to discontinue oral flagyl earlier if no growth on anaerobic cultures.  Will determine at follow up. Weekly cbc, cmp, crp, esr and fax results to ID, ria Mares NP, at fax [phone]. (Pager [phone], spectra [phone], office [phone]).     PICC line placed 9/30 for receiving IV antibiotics for 4 weeks.  IR to leave drain in and re-evaluate in 1 week with CT scan. Patient deemed medically stable and discharged to SNF for secondary weakness, debility and IV antibiotics.     Patient will be treated at [facility] with PT and OT to improve functional status and ability to perform ADLs.       Interval history:     All of today's labs reviewed and are listed below.  24 hr vital sign ranges listed below.  Patient denies trouble sleeping at night, shortness of  breath, abdominal discomfort, nausea, or vomiting. Appetite improved over the weekend.  Patient reports having regular bowel movements.  Patient progessing with PT/OT. Continuing to follow and treat all acute and chronic conditions. Care coordinated with ID. ID reviewed CT scan completed on Friday; plan to extend IV abx for 2 weeks.       Past Medical History: Patient has a past medical history of A-fib, Arthritis, Hypertension, and Thyroid disease.    Past Surgical History: Patient has a past surgical history that includes Cataract extraction; Cataract extraction w/  intraocular lens implant (Bilateral, 2004); Revision of skin pocket for pacemaker (N/A, 1/21/2019); Eye surgery; Hysterectomy; Joint replacement; Hip replacement arthroplasty; Treatment of cardiac arrhythmia (N/A, 3/18/2019); Thyroidectomy; Treatment of cardiac arrhythmia (N/A, 5/14/2019); ERCP (N/A, 9/14/2020); Endoscopic ultrasound of upper gastrointestinal tract (N/A, 9/14/2020); and ERCP (N/A, 9/25/2020).    Social History: Patient reports that she has quit smoking. She started smoking about 56 years ago. She has never used smokeless tobacco. She reports that she does not drink alcohol or use drugs.    Family History: family history includes Heart attack in her maternal grandmother and mother; Heart disease in her maternal grandmother and mother; Heart failure in her maternal grandmother and mother; Hypertension in her maternal grandmother and mother; Stroke in her maternal grandmother.    Allergies: Patient is allergic to ciprofloxacin.    ROS  Constitutional:+ fatigue + decreased appetite (improved- but not at bedside) Negative for fever   Eyes: Negative for blurred vision, double vision   Respiratory: +shortness of breath (on exertion- improved but not at baseline)  Negative for cough  Cardiovascular: + BLE edema trace edema    Negative for chest pain, palpitations.   Gastrointestinal: Negative for abdominal pain, constipation, diarrhea, nausea,  vomiting.   Genitourinary: Negative for dysuria, frequency   Musculoskeletal:  + generalized weakness. Negative for back pain and myalgias.   Skin: Negative for itching and rash.   Neurological: Negative for dizziness, headaches.   Psychiatric/Behavioral: Negative for depression. The patient is not nervous/anxious.      24 hour Vital Sign Range   Temp:  [98.1 °F (36.7 °C)-98.2 °F (36.8 °C)]   Pulse:  [60-62]   Resp:  [18]   BP: (126-131)/(58-60)   SpO2:  [95 %]     PEx  Constitutional: Patient appears debilitated.  No distress noted  HENT:   Head: Normocephalic and atraumatic.   Eyes: Pupils are equal, round  Neck: Normal range of motion. Neck supple.   Cardiovascular: Normal rate, regular rhythm and normal heart sounds.    Pulmonary/Chest: Effort normal and breath sounds are clear  Abdominal: Soft. Bowel sounds are normal.   Musculoskeletal:+ weakness 4/5 on all extremities  Normal range of motion.   Neurological: Alert and oriented to person, place, and time.   Psychiatric: Normal mood and affect. Behavior is normal.   Skin: Skin is warm and dry.  RUQ drain in place, dressing CDI   Stage 2 pressure injury- gluteal cleft followed weekly by wound care nurse  Date First Assessed/Time First Assessed: 10/02/20 9829   Altered Skin Integrity Present on Admission: yes  Orientation: midline  Location: Gluteal cleft    Description of Altered Skin Integrity Partial thickness tissue loss. Shallow open ulcer with a red or pink wound bed, without slough. Intact or Open/Ruptured Serum-filled blister.   Dressing Appearance Dry;Intact;Clean   Drainage Amount Small   Drainage Characteristics/Odor Serosanguineous   Appearance Pink;Red;Moist   Tissue loss description Partial thickness   Periwound Area Intact;Pink;Scar tissue   Wound Edges Open   Wound Length (cm) 1 cm   Wound Width (cm) 0.5 cm   Wound Depth (cm) 0.1 cm   Wound Volume (cm^3) 0.05 cm^3   Wound Surface Area (cm^2) 0.5 cm^2   Care Cleansed with:;Sterile normal  saline;Applied:;Skin Barrier   Dressing Reinforced;Foam           Assessment and Plan:    Problems addressed today      Bacteremia  - Culture grew E.Coli susceptible to multiple abx.   -continue Metronidazole 500 mg Q 8 empirically for anaerobic coverage est end date 11/9  -continue IV ceftriaxone  11/9  -Weekly CMP, CBC, CRP, ESR  10/26 recent labs stable. ID extending abx for another 2 weeks. CM to set up home infusions.      Hepatic abscess  - PICC line placed 9/30 for receiving IV antibiotics for 4 weeks  - Follow up with ID outpatient with weekly labs  - continue ceftriaxone and flagyl   - IR to leave drain in and re-evaluate in 1 week with CT scan  10/12/20  CT abdomen completed. Care coordinated with ID, recommend: continuing antiboitics and following up in ID clinic in 2 weeks. Also recommended GI follow up for multiloculated cystic lesion   10/26: ID continuing abx for another 2 weeks. IR amb referral for drain check/removal ordered.    Appetite decreased     10/23: Appetite decreased since admission. Patient eating about 25 -50% of meals, but drinks boost in between meals. Albumin low but increasing. Patient would like to hold off on starting appetite stimulant. Will re assess after the weekend.   10/26/2020 Appetite improved over the weekend eating % of all meals      Stable on going issues     Paroxymal atrial fibrillation  - Continue Amiodarone 200 mg  - continue Atenolol 50 mg   - conitnue Apixiban 2.5 BID     Essential hypertension  -continue Atenolol 50 mg        -continue Spirolactone 25 mg BID  10/23/20 BP at goal on current regimen      Hypothyroidism   -TSH 0.443 on 9/25  - Continue levothyroxine 0.137 mg    Pacemaker  -DC PPM (2014; [facility]) implanted for symptomatic SB and first degree AVB    Hyperlipidemia  -continue Atorvastatin 10 mg   -Monitor LFTS  10/23 LFTs stable    Future Appointments   Date Time Provider Department Center   10/29/2020  2:00 PM Anne De La Cruz MD [facility] [address]    12/11/2020 11:30 AM Kai Montez MD [facility]         I certify that SNF services are required to be given on an inpatient basis because Gisselle Ortiz needs for skilled nursing care and/or skilled rehabilitation are required on a daily basis and such services can only practically be provided in a skilled nursing facility setting and are for an ongoing condition for which she received inpatient care in the hospital.         Chad Downing NP  Department of Hospital Medicine   [facility]     DOS: 10/26/20      Patient note was created using MModal Dictation.  Any errors in syntax or even information may not have been identified and edited on initial review prior to signing this note.

## 2020-10-26 NOTE — PLAN OF CARE
Spoke to patient and daughter regarding discharge date being extended to 10/29. Patient and daughter are agreeable. Patient will discharge from the facility and go to ID follow up appointment.

## 2020-10-26 NOTE — PROGRESS NOTES
[facility]                                  Skilled Nursing Facility                   Progress Note     Admit Date: 10/2/2020  FRANCESCA 10/29/2020  Principal Problem:  Hepatic abscess   HPI obtained from patient interview and chart review     Chief Complaint: Decreased appetite     HPI:   Patient is an 88 yo female PMHx AFib on Eliquis, SSS s/p pacemaker placement (1/2019), HTN, hyperthyroidism s/p thyroidectomy  who presents to SNF following hospitalization for hepatic abscess/bacteremia.       Patient presented to hospital on 9/11 complaining of abdominal pain and was found to have gallstone pancreatitis. She had CBD dilation and a gallbladder with sludge. She underwent EUS and ERCP with stone and sludge seen on the cholangiogram. Sphincterotomy was performed and stone/sludge removed with no stents placed. Per notes, ERCP was concerning for choledocholithiasis.  Surgery was consulted and deemed the patient to be high risk for cholecystectomy. She was seen today in GI clinic for follow-up. She initially recovered well from the pancreatitis and ERCP and was eating prior to discharge. A few days after discharge she started having severe fatigue that progressively worsened to the point where the patient could not walk. She also reported having decreased appetite and nausea.   Patient presented to GI clinic on 9/25 with hypotension and elevated LFTs. Patient was sent to the ED for evaluation.     In the ED, she was found to be afebrile and her BP improved to 130/61 with IVF. HR 63 with NSR and existing RBBB. Qtc 512. She was breathing 20bpm with no O2 requirement. She c/o some nausea but no abd pain. On exam, she had mild discomfort to deep palpation to RUQ abdomen.  CT scan with contrast following pancreatic protocol showed hepatic abscess. Blood culture grew gram negative cristofer; urine culture was positive enterococcus faecalis.     Pt had IR  intervention for her hepatic abscess 9/26. Blood culture collected on 9/26  grew E.Coli susceptible to multiple abx. Urine enterococcus faecalis also shown to have susceptibility to multiple organism. Patient was started on Zosyn and ID consulted. Recommendations: Discontinued IV zosyn and started ceftriaxone 2 grams IV q 24 hours for E coli bacteremia and hepatic abscess. Started metronidazole 500 mg orally q 8 hours empirically for anaerobic coverage. Anticipate 4-6 weeks antibiotics for hepatic abscess (or until abscess resolves).  Would plan for 4 weeks of IV ceftriaxone and oral flagyl from date of  IR drainage (Estimated end date 10/26) with repeat CT abdomen/pelvis  with contrast at 4 weeks with ID follow up after imaging to determine ultimate duration of antibiotics and ability to transition to orals.  Assuming pancreatic lesion noted on CT is infected pseudocyst/abscess (vs neoplasm), would anticipate it would respond to current antibiotics, but have no culture data for this.  Will follow clinically with repeat imaging. If worsens on current course, will need aspiration for cultures and/or biopsy if concerned for neoplasm. May be able to discontinue oral flagyl earlier if no growth on anaerobic cultures.  Will determine at follow up. Weekly cbc, cmp, crp, esr and fax results to ID, ria Mares NP, at fax [phone]. (Pager [phone], spectra [phone], office [phone]).     PICC line placed 9/30 for receiving IV antibiotics for 4 weeks.  IR to leave drain in and re-evaluate in 1 week with CT scan. Patient deemed medically stable and discharged to SNF for secondary weakness, debility and IV antibiotics.     Patient will be treated at [facility] with PT and OT to improve functional status and ability to perform ADLs.       Interval history:     Patient seen after CT abdomen/pelvis with contrast. Patient has had diminished appetite since admission. Discussed possibly starting appetite stimulant. Patient  reports having to drink a large amount of oral contrast for scan and would like to hold off on appetite stimulant for now. Patient denies N/V, diarrhea or constipation. Patient eating 25-50% of meals, but drinks ensure/boost in between meals. Albumin slowly trending up.      Past Medical History: Patient has a past medical history of A-fib, Arthritis, Hypertension, and Thyroid disease.    Past Surgical History: Patient has a past surgical history that includes Cataract extraction; Cataract extraction w/  intraocular lens implant (Bilateral, 2004); Revision of skin pocket for pacemaker (N/A, 1/21/2019); Eye surgery; Hysterectomy; Joint replacement; Hip replacement arthroplasty; Treatment of cardiac arrhythmia (N/A, 3/18/2019); Thyroidectomy; Treatment of cardiac arrhythmia (N/A, 5/14/2019); ERCP (N/A, 9/14/2020); Endoscopic ultrasound of upper gastrointestinal tract (N/A, 9/14/2020); and ERCP (N/A, 9/25/2020).    Social History: Patient reports that she has quit smoking. She started smoking about 56 years ago. She has never used smokeless tobacco. She reports that she does not drink alcohol or use drugs.    Family History: family history includes Heart attack in her maternal grandmother and mother; Heart disease in her maternal grandmother and mother; Heart failure in her maternal grandmother and mother; Hypertension in her maternal grandmother and mother; Stroke in her maternal grandmother.    Allergies: Patient is allergic to ciprofloxacin.    ROS  Constitutional:+ fatigue + decreased appetite Negative for fever   Eyes: Negative for blurred vision, double vision   Respiratory: +shortness of breath (on exertion)  Negative for cough  Cardiovascular: + BLE edema trace edema    Negative for chest pain, palpitations.   Gastrointestinal: Negative for abdominal pain, constipation, diarrhea, nausea, vomiting.   Genitourinary: Negative for dysuria, frequency   Musculoskeletal:  + generalized weakness. Negative for back pain  and myalgias.   Skin: Negative for itching and rash.   Neurological: Negative for dizziness, headaches.   Psychiatric/Behavioral: Negative for depression. The patient is not nervous/anxious.      24 hour Vital Sign Range   Temp:  [98.1 °F (36.7 °C)-98.2 °F (36.8 °C)]   Pulse:  [60-62]   Resp:  [18]   BP: (126-131)/(58-60)   SpO2:  [95 %]     PEx  Constitutional: Patient appears debilitated.  No distress noted  HENT:   Head: Normocephalic and atraumatic.   Eyes: Pupils are equal, round  Neck: Normal range of motion. Neck supple.   Cardiovascular: Normal rate, regular rhythm and normal heart sounds.    Pulmonary/Chest: Effort normal and breath sounds are clear  Abdominal: Soft. Bowel sounds are normal.   Musculoskeletal:+ weakness 4/5 on all extremities  Normal range of motion.   Neurological: Alert and oriented to person, place, and time.   Psychiatric: Normal mood and affect. Behavior is normal.   Skin: Skin is warm and dry.  RUQ drain in place, dressing CDI   Stage 2 pressure injury- gluteal cleft followed weekly by wound care nurse  Date First Assessed/Time First Assessed: 10/02/20 6155   Altered Skin Integrity Present on Admission: yes  Orientation: midline  Location: Gluteal cleft    Description of Altered Skin Integrity Partial thickness tissue loss. Shallow open ulcer with a red or pink wound bed, without slough. Intact or Open/Ruptured Serum-filled blister.   Dressing Appearance Dry;Intact;Clean   Drainage Amount Small   Drainage Characteristics/Odor Serosanguineous   Appearance Pink;Red;Moist   Tissue loss description Partial thickness   Periwound Area Intact;Pink;Scar tissue   Wound Edges Open   Wound Length (cm) 1 cm   Wound Width (cm) 0.5 cm   Wound Depth (cm) 0.1 cm   Wound Volume (cm^3) 0.05 cm^3   Wound Surface Area (cm^2) 0.5 cm^2   Care Cleansed with:;Sterile normal saline;Applied:;Skin Barrier   Dressing Reinforced;Foam           Assessment and Plan:    Problems addressed today    Appetite decreased      10/23: Appetite decreased since admission. Patient eating about 25 -50% of meals, but drinks boost in between meals. Albumin low but increasing. Patient would like to hold off on starting appetite stimulant. Will re assess after the weekend.       Stable on going issues       Bacteremia  - Culture grew E.Coli susceptible to multiple abx.   -continue Metronidazole 500 mg Q 8 empirically for anaerobic coverage est end date 10/26  -continue IV ceftriaxone (est end date 10/26)  -Weekly CMP, CBC, CRP, ESR  10/23 recent labs stable     Hepatic abscess  - PICC line placed 9/30 for receiving IV antibiotics for 4 weeks  - Follow up with ID outpatient with weekly labs  - continue ceftriaxone and flagyl   - IR to leave drain in and re-evaluate in 1 week with CT scan  10/12/20  CT abdomen completed. Care coordinated with ID, recommend: continuing antiboitics and following up in ID clinic in 2 weeks. Also recommended GI follow up for multiloculated cystic lesion     Paroxymal atrial fibrillation  - Continue Amiodarone 200 mg  - continue Atenolol 50 mg   - conitnue Apixiban 2.5 BID     Essential hypertension  -continue Atenolol 50 mg        -continue Spirolactone 25 mg BID  10/23/20 BP at goal on current regimen      Hypothyroidism   -TSH 0.443 on 9/25  - Continue levothyroxine 0.137 mg    Pacemaker  -DC PPM (2014; [facility]) implanted for symptomatic SB and first degree AVB    Hyperlipidemia  -continue Atorvastatin 10 mg   -Monitor LFTS  10/23 LFTs stable    Future Appointments   Date Time Provider Department Center   10/29/2020  2:00 PM Anne De La Cruz MD [facility] [address]   12/11/2020 11:30 AM Kai Montez MD [facility]         I certify that SNF services are required to be given on an inpatient basis because Gisselle Ortiz needs for skilled nursing care and/or skilled rehabilitation are required on a daily basis and such services can only practically be provided in a skilled nursing facility setting and  are for an ongoing condition for which she received inpatient care in the hospital.         Chad Downing NP  Department of Hospital Medicine   [facility]     DOS: 10/23/20      Patient note was created using MModal Dictation.  Any errors in syntax or even information may not have been identified and edited on initial review prior to signing this note.

## 2020-10-27 ENCOUNTER — TELEPHONE (OUTPATIENT)
Dept: INFECTIOUS DISEASES | Facility: CLINIC | Age: 85
End: 2020-10-27

## 2020-10-27 PROCEDURE — 25000003 PHARM REV CODE 250: Performed by: HOSPITALIST

## 2020-10-27 PROCEDURE — 25000003 PHARM REV CODE 250: Performed by: NURSE PRACTITIONER

## 2020-10-27 PROCEDURE — A4216 STERILE WATER/SALINE, 10 ML: HCPCS | Performed by: NURSE PRACTITIONER

## 2020-10-27 PROCEDURE — 97116 GAIT TRAINING THERAPY: CPT | Mod: CQ

## 2020-10-27 PROCEDURE — 97110 THERAPEUTIC EXERCISES: CPT | Mod: CQ

## 2020-10-27 PROCEDURE — 97110 THERAPEUTIC EXERCISES: CPT | Mod: CO

## 2020-10-27 PROCEDURE — 97530 THERAPEUTIC ACTIVITIES: CPT | Mod: CO

## 2020-10-27 PROCEDURE — 11000004 HC SNF PRIVATE

## 2020-10-27 PROCEDURE — 63600175 PHARM REV CODE 636 W HCPCS: Performed by: HOSPITALIST

## 2020-10-27 RX ORDER — MICONAZOLE NITRATE 2 %
POWDER (GRAM) TOPICAL 2 TIMES DAILY
Status: DISCONTINUED | OUTPATIENT
Start: 2020-10-27 | End: 2020-11-02 | Stop reason: HOSPADM

## 2020-10-27 RX ADMIN — APIXABAN 2.5 MG: 2.5 TABLET, FILM COATED ORAL at 09:10

## 2020-10-27 RX ADMIN — MELATONIN TAB 3 MG 6 MG: 3 TAB at 10:10

## 2020-10-27 RX ADMIN — SPIRONOLACTONE 25 MG: 25 TABLET ORAL at 10:10

## 2020-10-27 RX ADMIN — ATENOLOL 50 MG: 50 TABLET ORAL at 09:10

## 2020-10-27 RX ADMIN — APIXABAN 2.5 MG: 2.5 TABLET, FILM COATED ORAL at 10:10

## 2020-10-27 RX ADMIN — LEVOTHYROXINE SODIUM 137 MCG: 50 TABLET ORAL at 06:10

## 2020-10-27 RX ADMIN — METRONIDAZOLE 500 MG: 500 TABLET ORAL at 09:10

## 2020-10-27 RX ADMIN — Medication 10 ML: at 12:10

## 2020-10-27 RX ADMIN — Medication 10 ML: at 06:10

## 2020-10-27 RX ADMIN — SPIRONOLACTONE 25 MG: 25 TABLET ORAL at 09:10

## 2020-10-27 RX ADMIN — DICLOFENAC 4 G: 10 GEL TOPICAL at 09:10

## 2020-10-27 RX ADMIN — DICLOFENAC 4 G: 10 GEL TOPICAL at 10:10

## 2020-10-27 RX ADMIN — CEFTRIAXONE SODIUM 2 G: 2 INJECTION, SOLUTION INTRAVENOUS at 09:10

## 2020-10-27 RX ADMIN — METRONIDAZOLE 500 MG: 500 TABLET ORAL at 02:10

## 2020-10-27 RX ADMIN — AMIODARONE HYDROCHLORIDE 200 MG: 200 TABLET ORAL at 09:10

## 2020-10-27 RX ADMIN — DICLOFENAC 4 G: 10 GEL TOPICAL at 02:10

## 2020-10-27 RX ADMIN — MICONAZOLE NITRATE: 20 POWDER TOPICAL at 10:10

## 2020-10-27 RX ADMIN — Medication 1 CAPSULE: at 09:10

## 2020-10-27 RX ADMIN — ATORVASTATIN CALCIUM 10 MG: 10 TABLET, FILM COATED ORAL at 09:10

## 2020-10-27 RX ADMIN — Medication 10 ML: at 05:10

## 2020-10-27 RX ADMIN — METRONIDAZOLE 500 MG: 500 TABLET ORAL at 06:10

## 2020-10-27 NOTE — PT/OT/SLP PROGRESS
"Occupational Therapy   Treatment    Name: Gisselle Ortiz  MRN: [id number]  Admit Date: 10/2/2020  Admitting Diagnosis:  Hepatic abscess    Recent Surgery: * No surgery found *      General Precautions: Standard, fall   Orthopedic Precautions:N/A   Braces:       Recommendations:     Discharge Recommendations: home health OT  Level of Assistance Recommended at Discharge: 24 hours light assistance for ADL's and homemaking tasks  Discharge Equipment Recommendations:  none  Barriers to discharge:       Assessment:     Gisselle Ortiz is a 87 y.o. female with a medical diagnosis of Hepatic abscess  She presents with Performance deficits affecting function are weakness, impaired endurance, impaired self care skills, impaired functional mobilty, impaired balance, gait instability, decreased lower extremity function, decreased safety awareness, decreased ROM, edema, impaired cardiopulmonary response to activity. Pt. participated well with session on this day. Pt is progressing well with session on this day still continues to requires cues with aspects of safety. Pt. Will continue to benefit from continued OT to progress towards goals    .     Rehab Potential is good    Activity tolerance:  Good    Plan:     Patient to be seen 5 x/week to address the above listed problems via self-care/home management, therapeutic activities, therapeutic exercises  · Plan of Care Expires: 11/04/20  · Plan of Care Reviewed with: patient    Subjective     Communicated with: abril prior to session. "I am as ready as I'll ever be" .    Pain/Comfort:  · Pain Rating 1: 0/10  · Pain Rating Post-Intervention 1: 0/10    Patient's cultural, spiritual, Druze conflicts given the current situation:  · no    Objective:     Patient found up in chair upon OT entry to room.    Occupational Performance:     Bed Mobility:    · Not tested    Functional Mobility/Transfers:  · Patient completed Sit <> Stand Transfer with contact guard assistance  with  " rolling walker   · Patient completed Bed <> Chair Transfer using Stand Pivot technique with contact guard assistance with rolling walker  · Functional Mobility: not tested    Activities of Daily Living:  · Grooming: modified independence with oral care and hair care while seated at sink level     [facility] 6 Click ADL: 20    OT Exercises: UE Ergometer 15 mins with minimum resistance to increase endurance    Treatment & Education:  Pt. With standing act on this day with task. Pt. With CGA/SBA for balance aspects with task with AD at raised counter Pt with visual perception task with discrimination of various shapes and sizes x 8 min with standing bal and min cues throught out. Pt. able to complete finish task with no seated rest break required.    Pt. With 3# dowel activity with 2x15 reps with  shd flex, bicep curls horz adb/add and forward flex motion to increase BUE ROM and strength,.     Pt. Engaged in activity of throwing large ball with therapist x 10 sets 10 reps with good use of BUEs noted.    Pt. With standing and therex performed to increase ROM, endurance selfcare task and fxl mobility for independence     Patient left up in chair with all lines intact and call button in reachEducation:      GOALS:   Multidisciplinary Problems     Occupational Therapy Goals        Problem: Occupational Therapy Goal    Goal Priority Disciplines Outcome Interventions   Occupational Therapy Goal     OT, PT/OT Ongoing, Progressing    Description: Goals to be met by: 10/28/20     Patient will increase functional independence with ADLs by performing:    UE Dressing with Modified [place].  LE Dressing with Stand-by Assistance.  Grooming while seated with Modified [place].  Toileting from toilet with Stand-by Assistance for hygiene and clothing management.   Bathing from  sitting at sink with Stand-by Assistance.  Supine to sit with Modified [place].  Stand pivot transfers with Supervision using A/D as needed.  Pt's  caregiver will be educated on level of assist required to safely perform self care tasks and functional transfers.                     Time Tracking:     OT Date of Treatment: 10/27/20  OT Total Time (min):      Billable Minutes:Therapeutic Activity 10  Therapeutic Exercise 31    MINERVA Alcala  10/27/2020   OT and CARDENAS have discussed the above patients goals and status in collaboration with Plan of Care.

## 2020-10-27 NOTE — TELEPHONE ENCOUNTER
Late entry:  Received communication from NP at [facility] that patient would not be able to make appt. On 10/26 as d/c from [facility] 10/27.  Currently on ceftriaxone and flagyl for hepatic/pancreatic abscess ( E coli)  with plan for 4-6 weeks.      Repeat CT 10/23 showed mild interval decrease in size of hepatic collection (3.2 cm >>2.6 cm) with drainage catheter in place and persistent multiloculated cystic lesion within the pancreatic body (2.8 X 1.7 X 2.7 currently.  Previously 3.0 X 3.8 X4.0) similar to prior exam.      Advised to continue IV antibiotics for now until follow up which is now set for 10/29 at which point will either continue IV to complete 6 weeks, or consider transition to oral Augmentin??   She has appointment for follow up with ID staff.

## 2020-10-27 NOTE — PT/OT/SLP PROGRESS
"Physical Therapy Treatment    Patient Name:  Gisselle Ortiz   MRN:  [id number]  Admit Date: 10/2/2020  Admitting Diagnosis: Hepatic abscess  Recent Surgery: * No surgery found *      General Precautions: Standard, fall   Orthopedic Precautions:N/A   Braces:       Recommendations:     Discharge Recommendations:  home with home health(w/ light family assistance initially)   Level of Assistance Recommended at Discharge: 24 hours light assistance  Discharge Equipment Recommendations: walker, rolling   Barriers to discharge: Inaccessible home and Decreased caregiver support    Assessment:     Gisselle Ortiz is a 87 y.o. female admitted with a medical diagnosis of Hepatic abscess . Pt tolerated well, pt would continue to benefit from skilled PT services to improve overall functional mobility, strength and endurance.  .      Performance deficits affecting function:  weakness, impaired endurance, impaired self care skills, impaired functional mobilty, gait instability, impaired balance, decreased upper extremity function, decreased lower extremity function, decreased safety awareness, pain .    Rehab Potential is good    Activity Tolerance: Fair    Plan:     Patient to be seen   to address the above listed problems via       · Plan of Care Expires:    · Plan of Care Reviewed with: patient    Subjective     "alright".     Pain/Comfort:  · Pain Rating 1: 0/10  · Pain Rating Post-Intervention 1: 0/10    Patient's cultural, spiritual, Yazidi conflicts given the current situation:  · no    Objective:       Patient found up in chair with PICC line upon PT entry to room.     Therapeutic Activities and Exercises: 2x10 reps AP,GS,QS,LAQ,hip flex,abd/add  Mini elliptical x 10 min    Functional Mobility:  · Transfers:     · Sit to Stand:  contact guard assistance and minimum assistance with rolling walker  · Gait: amb with RW CGA ~ 30 ft x 2 trials seated rest break    AM-PAC 6 CLICK MOBILITY  Turning over in bed " (including adjusting bedclothes, sheets and blankets)?: 3  Sitting down on and standing up from a chair with arms (e.g., wheelchair, bedside commode, etc.): 3  Moving from lying on back to sitting on the side of the bed?: 3  Moving to and from a bed to a chair (including a wheelchair)?: 3  Need to walk in hospital room?: 3  Climbing 3-5 steps with a railing?: 3  Basic Mobility Total Score: 18     Patient left up in chair with call button in reach and belongings in reach.    GOALS:   Multidisciplinary Problems     Physical Therapy Goals        Problem: Physical Therapy Goal    Goal Priority Disciplines Outcome Goal Variances Interventions   Physical Therapy Goal     PT, PT/OT Ongoing, Progressing     Description: Goals to be met by: 10/20/20     Patient will increase functional independence with mobility by performin. Supine to sit with Modified [place] met  2. Sit to supine with Modified [place] met  3. Rolling to Left and Right with Modified [place]. met  4. Sit to stand transfer with Supervision  5. Bed to chair transfer with Supervision using Rolling Walker  6. Gait  x 50 feet with Supervision using Rolling Walker.   7. Ascend/Descend 4 inch curb step with Stand-by Assistance using Rolling Walker.met  8. Stand for 3 minutes with Stand-by Assistance using Rolling Walker while performing an activity met  9. Pt will perform a car transfer (Actual or simulated) w/ RW and CGA  10. Pt will stand and  5 objects from the floor w/ a reacher, RW, and SPV                     Time Tracking:     PT Received On: 10/27/20  PT Total Time (min):       Billable Minutes: Gait Training 13 and Therapeutic Exercise 25    Treatment Type: Treatment  PT/PTA: PTA     PTA Visit Number: 4     Dorys Grier PTA  10/27/2020

## 2020-10-27 NOTE — PROGRESS NOTES
Wound care follow-up:  The patient is lying on her right side.  The coccyx wound is closed, dilcia-wound remains with blanchable redness.   Continue with Triad daily/foam dressing.    Continue pressure prevention measures, waffle overlay, chair cushion, turning q 2 hours, nutrition. Patient moves independently in bed.   Nursing to continue care, wound care signing off  Please reconsult if needed.   DERRICK García RN, CWCN  [id number]

## 2020-10-28 LAB — FUNGUS SPEC CULT: NORMAL

## 2020-10-28 PROCEDURE — 11000004 HC SNF PRIVATE

## 2020-10-28 PROCEDURE — 97110 THERAPEUTIC EXERCISES: CPT | Mod: CO

## 2020-10-28 PROCEDURE — 97530 THERAPEUTIC ACTIVITIES: CPT | Mod: CQ

## 2020-10-28 PROCEDURE — 97535 SELF CARE MNGMENT TRAINING: CPT | Mod: CO

## 2020-10-28 PROCEDURE — A4216 STERILE WATER/SALINE, 10 ML: HCPCS | Performed by: NURSE PRACTITIONER

## 2020-10-28 PROCEDURE — 97116 GAIT TRAINING THERAPY: CPT | Mod: CQ

## 2020-10-28 PROCEDURE — 63600175 PHARM REV CODE 636 W HCPCS: Performed by: HOSPITALIST

## 2020-10-28 PROCEDURE — 25000003 PHARM REV CODE 250: Performed by: NURSE PRACTITIONER

## 2020-10-28 PROCEDURE — 97110 THERAPEUTIC EXERCISES: CPT | Mod: CQ

## 2020-10-28 PROCEDURE — 25000003 PHARM REV CODE 250: Performed by: HOSPITALIST

## 2020-10-28 RX ADMIN — METRONIDAZOLE 500 MG: 500 TABLET ORAL at 05:10

## 2020-10-28 RX ADMIN — MICONAZOLE NITRATE: 20 POWDER TOPICAL at 09:10

## 2020-10-28 RX ADMIN — MICONAZOLE NITRATE: 20 POWDER TOPICAL at 10:10

## 2020-10-28 RX ADMIN — APIXABAN 2.5 MG: 2.5 TABLET, FILM COATED ORAL at 09:10

## 2020-10-28 RX ADMIN — METRONIDAZOLE 500 MG: 500 TABLET ORAL at 02:10

## 2020-10-28 RX ADMIN — DICLOFENAC 4 G: 10 GEL TOPICAL at 02:10

## 2020-10-28 RX ADMIN — ATENOLOL 50 MG: 50 TABLET ORAL at 09:10

## 2020-10-28 RX ADMIN — ATORVASTATIN CALCIUM 10 MG: 10 TABLET, FILM COATED ORAL at 09:10

## 2020-10-28 RX ADMIN — Medication 10 ML: at 12:10

## 2020-10-28 RX ADMIN — APIXABAN 2.5 MG: 2.5 TABLET, FILM COATED ORAL at 10:10

## 2020-10-28 RX ADMIN — AMIODARONE HYDROCHLORIDE 200 MG: 200 TABLET ORAL at 09:10

## 2020-10-28 RX ADMIN — DICLOFENAC 4 G: 10 GEL TOPICAL at 09:10

## 2020-10-28 RX ADMIN — LEVOTHYROXINE SODIUM 137 MCG: 50 TABLET ORAL at 05:10

## 2020-10-28 RX ADMIN — CEFTRIAXONE SODIUM 2 G: 2 INJECTION, SOLUTION INTRAVENOUS at 09:10

## 2020-10-28 RX ADMIN — Medication 10 ML: at 11:10

## 2020-10-28 RX ADMIN — Medication 10 ML: at 05:10

## 2020-10-28 RX ADMIN — Medication 1 CAPSULE: at 09:10

## 2020-10-28 RX ADMIN — SPIRONOLACTONE 25 MG: 25 TABLET ORAL at 10:10

## 2020-10-28 RX ADMIN — Medication 10 ML: at 06:10

## 2020-10-28 RX ADMIN — MELATONIN TAB 3 MG 6 MG: 3 TAB at 10:10

## 2020-10-28 RX ADMIN — SPIRONOLACTONE 25 MG: 25 TABLET ORAL at 09:10

## 2020-10-28 RX ADMIN — METRONIDAZOLE 500 MG: 500 TABLET ORAL at 10:10

## 2020-10-28 NOTE — PT/OT/SLP PROGRESS
"Physical Therapy Treatment    Patient Name:  Gisselle Ortiz   MRN:  [id number]  Admit Date: 10/2/2020  Admitting Diagnosis: Hepatic abscess  Recent Surgery: * No surgery found *      General Precautions: Standard, fall   Orthopedic Precautions:N/A   Braces:       Recommendations:     Discharge Recommendations:  home with home health(w/ light family assistance initially)   Level of Assistance Recommended at Discharge: 24 hours light assistance  Discharge Equipment Recommendations: walker, rolling   Barriers to discharge: Decreased caregiver support    Assessment:     Gisselle Ortiz is a 87 y.o. female admitted with a medical diagnosis of Hepatic abscess . Pt tolerated well, pt would continue to benefit from skilled PT services to improve overall functional mobility, strength and endurance.  .      Performance deficits affecting function:  weakness, impaired endurance, impaired self care skills, impaired functional mobilty, gait instability, impaired balance, decreased upper extremity function, decreased lower extremity function, decreased safety awareness, pain .    Rehab Potential is good    Activity Tolerance: Fair    Plan:     Patient to be seen   to address the above listed problems via       · Plan of Care Expires:    · Plan of Care Reviewed with: patient    Subjective     "think I turned the corner".     Pain/Comfort:  · Pain Rating 1: 0/10  · Pain Rating Post-Intervention 1: 0/10    Patient's cultural, spiritual, Lutheran conflicts given the current situation:  · no    Objective:     .  Patient found up in chair with PICC line upon PT entry to room.     Therapeutic Activities and Exercises: 2x10 reps AP,GS,QS,LAQ,hip flex,abd/add    Functional Mobility:  · Bed Mobility:     · Supine to Sit: supervision and stand by assistance  · Sit to Supine: supervision and stand by assistance  · Transfers:     · Sit to Stand:  minimum assistance with rolling walker  · Gait: amb with RW CGA ~ 25 ft x 2 trials " seated rest break  · Balance: with RW CG/SBA pt able to  5 items from floor with reach     AM-PAC 6 CLICK MOBILITY  Turning over in bed (including adjusting bedclothes, sheets and blankets)?: 3  Sitting down on and standing up from a chair with arms (e.g., wheelchair, bedside commode, etc.): 3  Moving from lying on back to sitting on the side of the bed?: 3  Moving to and from a bed to a chair (including a wheelchair)?: 3  Need to walk in hospital room?: 3  Climbing 3-5 steps with a railing?: 3  Basic Mobility Total Score: 18     Patient left up in chair with call button in reach and belongings in reach.    GOALS:   Multidisciplinary Problems     Physical Therapy Goals        Problem: Physical Therapy Goal    Goal Priority Disciplines Outcome Goal Variances Interventions   Physical Therapy Goal     PT, PT/OT Ongoing, Progressing     Description: Goals to be met by: 10/20/20     Patient will increase functional independence with mobility by performin. Supine to sit with Modified [place] met  2. Sit to supine with Modified [place] met  3. Rolling to Left and Right with Modified [place]. met  4. Sit to stand transfer with Supervision  5. Bed to chair transfer with Supervision using Rolling Walker  6. Gait  x 50 feet with Supervision using Rolling Walker.   7. Ascend/Descend 4 inch curb step with Stand-by Assistance using Rolling Walker.met  8. Stand for 3 minutes with Stand-by Assistance using Rolling Walker while performing an activity met  9. Pt will perform a car transfer (Actual or simulated) w/ RW and CGA met  10. Pt will stand and  5 objects from the floor w/ a reacher, RW, and SPV met                     Time Tracking:     PT Received On: 10/28/20  PT Total Time (min):       Billable Minutes: Gait Training 10, Therapeutic Activity 15 and Therapeutic Exercise 15    Treatment Type: Treatment  PT/PTA: PTA     PTA Visit Number: 5     Dorys Grier, VALERIE  10/28/2020

## 2020-10-28 NOTE — PT/OT/SLP PROGRESS
"Occupational Therapy   Treatment    Name: Gisselle Ortiz  MRN: [id number]  Admit Date: 10/2/2020  Admitting Diagnosis:  Hepatic abscess    Recent Surgery: * No surgery found *      General Precautions: Standard, fall   Orthopedic Precautions:N/A   Braces:       Recommendations:     Discharge Recommendations: home health OT  Level of Assistance Recommended at Discharge: 24 hours light assistance for ADL's and homemaking tasks  Discharge Equipment Recommendations:  none  Barriers to discharge:       Assessment:     Gisselle Ortiz is a 87 y.o. female with a medical diagnosis of Hepatic abscess  She presents with Performance deficits affecting function are weakness, impaired endurance, impaired self care skills, impaired functional mobilty, impaired balance, gait instability, decreased lower extremity function, decreased safety awareness, decreased ROM, edema, impaired cardiopulmonary response to activity. Pt. participated well with session on this day. Pt is progressing well with session on this day still continues to requires cues with aspects of safety . Pt. Will continue to benefit from continued OT to progress towards goals  .     Rehab Potential is good    Activity tolerance:  Good    Plan:     Patient to be seen 5 x/week to address the above listed problems via self-care/home management, therapeutic activities, therapeutic exercises  · Plan of Care Expires: 11/04/20  · Plan of Care Reviewed with: patient    Subjective     Communicated with: nsarash prior to session. "I'll take a sponge bath please. That would be nice"    Pain/Comfort:  · Pain Rating 1: 0/10  · Pain Rating Post-Intervention 1: 0/10    Patient's cultural, spiritual, Amish conflicts given the current situation:  · no    Objective:     Patient found supine with PICC line upon OT entry to room.    Occupational Performance:     Bed Mobility:    · Patient completed Rolling/Turning to Right with stand by assistance  · Patient completed " Scooting/Bridging with stand by assistance  · Patient completed Supine to Sit with stand by assistance     Functional Mobility/Transfers:  · Patient completed Sit <> Stand Transfer with contact guard assistance  with  no assistive device   · Patient completed Bed <> Chair Transfer using Stand Pivot technique with contact guard assistance with rolling walker  · Patient completed Toilet Transfer Stand Pivot technique with contact guard assistance with  grab bars  · Functional Mobility: not tested    Activities of Daily Living:  · Grooming: modified independence with oral care and hair care while seated at sink level   · Bathing: contact guard assistance with cleaning of [place] instance at sink   · Upper Body Dressing: minimum assistance to doff/rosario gown 2* to PICC line on this day   · Lower Body Dressing: contact guard assistance to manage pants over hips instance. Pt. able to doff/rosario BLE socks with use of Fif 4 tech   · Toileting: contact guard assistance with hygiene and clothing management at 3n1     [facility] 6 Click ADL: 20    OT Exercises: UE Ergometer 10 mins with minimum resistance    Treatment & Education:  Pt. With 3# dowel activity with 2x15 reps with  shd flex, bicep curls and forward flex motion to increase BUE ROM and strength,.   Pt. With standing and therex performed to increase ROM, endurance selfcare task and fxl mobility for independence     Patient left up in chair with PICC line with all lines intact and call button in reachEducation:      GOALS:   Multidisciplinary Problems     Occupational Therapy Goals        Problem: Occupational Therapy Goal    Goal Priority Disciplines Outcome Interventions   Occupational Therapy Goal     OT, PT/OT Ongoing, Progressing    Description: Goals to be met by: 10/28/20     Patient will increase functional independence with ADLs by performing:    UE Dressing with Modified [place].  LE Dressing with Stand-by Assistance.  Grooming while seated with Modified  [place].  Toileting from toilet with Stand-by Assistance for hygiene and clothing management.   Bathing from  sitting at sink with Stand-by Assistance.  Supine to sit with Modified [place].  Stand pivot transfers with Supervision using A/D as needed.  Pt's caregiver will be educated on level of assist required to safely perform self care tasks and functional transfers.                     Time Tracking:     OT Date of Treatment: 10/28/20  OT Total Time (min):      Billable Minutes:Self Care/Home Management 28  Therapeutic Exercise 15    MINERVA Alcala  10/28/2020   OT and CARDENAS have discussed the above patients goals and status in collaboration with Plan of Care.

## 2020-10-28 NOTE — PLAN OF CARE
10/28/20 0907   Post-Acute Status   Post-Acute Authorization Home Health   Post-Acute Placement Status Set-up Agueda Becker will follow up after discharge. Patient has ID appointment tomorrow at 2pm. Daughter will arrive by 1:30 pm.

## 2020-10-29 ENCOUNTER — TELEPHONE (OUTPATIENT)
Dept: INFECTIOUS DISEASES | Facility: CLINIC | Age: 85
End: 2020-10-29

## 2020-10-29 ENCOUNTER — LAB VISIT (OUTPATIENT)
Dept: LAB | Facility: OTHER | Age: 85
End: 2020-10-29
Payer: MEDICARE

## 2020-10-29 DIAGNOSIS — Z03.818 ENCOUNTER FOR OBSERVATION FOR SUSPECTED EXPOSURE TO OTHER BIOLOGICAL AGENTS RULED OUT: ICD-10-CM

## 2020-10-29 LAB
ALBUMIN SERPL BCP-MCNC: 2.4 G/DL (ref 3.5–5.2)
ALBUMIN SERPL BCP-MCNC: 2.4 G/DL (ref 3.5–5.2)
ALP SERPL-CCNC: 59 U/L (ref 55–135)
ALP SERPL-CCNC: 59 U/L (ref 55–135)
ALT SERPL W/O P-5'-P-CCNC: 6 U/L (ref 10–44)
ALT SERPL W/O P-5'-P-CCNC: 6 U/L (ref 10–44)
ANION GAP SERPL CALC-SCNC: 5 MMOL/L (ref 8–16)
AST SERPL-CCNC: 16 U/L (ref 10–40)
AST SERPL-CCNC: 16 U/L (ref 10–40)
BASOPHILS # BLD AUTO: 0.08 K/UL (ref 0–0.2)
BASOPHILS NFR BLD: 1.6 % (ref 0–1.9)
BILIRUB DIRECT SERPL-MCNC: 0.3 MG/DL (ref 0.1–0.3)
BILIRUB SERPL-MCNC: 0.5 MG/DL (ref 0.1–1)
BILIRUB SERPL-MCNC: 0.5 MG/DL (ref 0.1–1)
BUN SERPL-MCNC: 7 MG/DL (ref 8–23)
CALCIUM SERPL-MCNC: 8.4 MG/DL (ref 8.7–10.5)
CHLORIDE SERPL-SCNC: 101 MMOL/L (ref 95–110)
CO2 SERPL-SCNC: 29 MMOL/L (ref 23–29)
CREAT SERPL-MCNC: 0.8 MG/DL (ref 0.5–1.4)
DIFFERENTIAL METHOD: ABNORMAL
EOSINOPHIL # BLD AUTO: 0.3 K/UL (ref 0–0.5)
EOSINOPHIL NFR BLD: 5.6 % (ref 0–8)
ERYTHROCYTE [DISTWIDTH] IN BLOOD BY AUTOMATED COUNT: 15.9 % (ref 11.5–14.5)
EST. GFR  (AFRICAN AMERICAN): >60 ML/MIN/1.73 M^2
EST. GFR  (NON AFRICAN AMERICAN): >60 ML/MIN/1.73 M^2
GLUCOSE SERPL-MCNC: 86 MG/DL (ref 70–110)
HCT VFR BLD AUTO: 30.4 % (ref 37–48.5)
HGB BLD-MCNC: 9.4 G/DL (ref 12–16)
IMM GRANULOCYTES # BLD AUTO: 0.04 K/UL (ref 0–0.04)
IMM GRANULOCYTES NFR BLD AUTO: 0.8 % (ref 0–0.5)
LYMPHOCYTES # BLD AUTO: 1.4 K/UL (ref 1–4.8)
LYMPHOCYTES NFR BLD: 27.8 % (ref 18–48)
MAGNESIUM SERPL-MCNC: 1.6 MG/DL (ref 1.6–2.6)
MCH RBC QN AUTO: 30.2 PG (ref 27–31)
MCHC RBC AUTO-ENTMCNC: 30.9 G/DL (ref 32–36)
MCV RBC AUTO: 98 FL (ref 82–98)
MONOCYTES # BLD AUTO: 0.6 K/UL (ref 0.3–1)
MONOCYTES NFR BLD: 12.1 % (ref 4–15)
NEUTROPHILS # BLD AUTO: 2.5 K/UL (ref 1.8–7.7)
NEUTROPHILS NFR BLD: 52.1 % (ref 38–73)
NRBC BLD-RTO: 0 /100 WBC
PHOSPHATE SERPL-MCNC: 3.2 MG/DL (ref 2.7–4.5)
PLATELET # BLD AUTO: 200 K/UL (ref 150–350)
PMV BLD AUTO: 10.3 FL (ref 9.2–12.9)
POTASSIUM SERPL-SCNC: 4.3 MMOL/L (ref 3.5–5.1)
PROT SERPL-MCNC: 5.2 G/DL (ref 6–8.4)
PROT SERPL-MCNC: 5.2 G/DL (ref 6–8.4)
RBC # BLD AUTO: 3.11 M/UL (ref 4–5.4)
SARS-COV-2 RNA RESP QL NAA+PROBE: NOT DETECTED
SODIUM SERPL-SCNC: 135 MMOL/L (ref 136–145)
WBC # BLD AUTO: 4.86 K/UL (ref 3.9–12.7)

## 2020-10-29 PROCEDURE — 84100 ASSAY OF PHOSPHORUS: CPT

## 2020-10-29 PROCEDURE — 80053 COMPREHEN METABOLIC PANEL: CPT

## 2020-10-29 PROCEDURE — 25000003 PHARM REV CODE 250: Performed by: NURSE PRACTITIONER

## 2020-10-29 PROCEDURE — A4216 STERILE WATER/SALINE, 10 ML: HCPCS | Performed by: NURSE PRACTITIONER

## 2020-10-29 PROCEDURE — 85025 COMPLETE CBC W/AUTO DIFF WBC: CPT

## 2020-10-29 PROCEDURE — 85652 RBC SED RATE AUTOMATED: CPT

## 2020-10-29 PROCEDURE — 25000003 PHARM REV CODE 250: Performed by: HOSPITALIST

## 2020-10-29 PROCEDURE — 83735 ASSAY OF MAGNESIUM: CPT

## 2020-10-29 PROCEDURE — U0003 INFECTIOUS AGENT DETECTION BY NUCLEIC ACID (DNA OR RNA); SEVERE ACUTE RESPIRATORY SYNDROME CORONAVIRUS 2 (SARS-COV-2) (CORONAVIRUS DISEASE [COVID-19]), AMPLIFIED PROBE TECHNIQUE, MAKING USE OF HIGH THROUGHPUT TECHNOLOGIES AS DESCRIBED BY CMS-2020-01-R: HCPCS

## 2020-10-29 PROCEDURE — 86141 C-REACTIVE PROTEIN HS: CPT

## 2020-10-29 PROCEDURE — 63600175 PHARM REV CODE 636 W HCPCS: Performed by: HOSPITALIST

## 2020-10-29 PROCEDURE — 11000004 HC SNF PRIVATE

## 2020-10-29 PROCEDURE — 80076 HEPATIC FUNCTION PANEL: CPT

## 2020-10-29 RX ORDER — DILTIAZEM HYDROCHLORIDE 180 MG/1
180 CAPSULE, COATED, EXTENDED RELEASE ORAL DAILY
Qty: 90 CAPSULE | Refills: 3 | Status: ON HOLD
Start: 2020-10-29 | End: 2021-02-03 | Stop reason: HOSPADM

## 2020-10-29 RX ORDER — SODIUM CHLORIDE 0.9 % (FLUSH) 0.9 %
10 SYRINGE (ML) INJECTION EVERY 6 HOURS
Start: 2020-10-29 | End: 2020-12-12

## 2020-10-29 RX ORDER — SODIUM CHLORIDE 0.9 % (FLUSH) 0.9 %
10 SYRINGE (ML) INJECTION
COMMUNITY
Start: 2020-10-29 | End: 2020-12-12

## 2020-10-29 RX ORDER — ASPIRIN 81 MG/1
81 TABLET ORAL DAILY
Refills: 0 | Status: ON HOLD
Start: 2020-10-29 | End: 2023-01-01 | Stop reason: HOSPADM

## 2020-10-29 RX ORDER — MICONAZOLE NITRATE 2 %
POWDER (GRAM) TOPICAL 2 TIMES DAILY
Refills: 0 | COMMUNITY
Start: 2020-10-29 | End: 2020-12-12

## 2020-10-29 RX ADMIN — LEVOTHYROXINE SODIUM 137 MCG: 50 TABLET ORAL at 05:10

## 2020-10-29 RX ADMIN — SPIRONOLACTONE 25 MG: 25 TABLET ORAL at 09:10

## 2020-10-29 RX ADMIN — METRONIDAZOLE 500 MG: 500 TABLET ORAL at 11:10

## 2020-10-29 RX ADMIN — MICONAZOLE NITRATE: 20 POWDER TOPICAL at 09:10

## 2020-10-29 RX ADMIN — APIXABAN 2.5 MG: 2.5 TABLET, FILM COATED ORAL at 09:10

## 2020-10-29 RX ADMIN — Medication 1 CAPSULE: at 09:10

## 2020-10-29 RX ADMIN — Medication 10 ML: at 05:10

## 2020-10-29 RX ADMIN — DICLOFENAC 4 G: 10 GEL TOPICAL at 09:10

## 2020-10-29 RX ADMIN — Medication 10 ML: at 12:10

## 2020-10-29 RX ADMIN — Medication 10 ML: at 11:10

## 2020-10-29 RX ADMIN — SPIRONOLACTONE 25 MG: 25 TABLET ORAL at 08:10

## 2020-10-29 RX ADMIN — ATENOLOL 50 MG: 50 TABLET ORAL at 09:10

## 2020-10-29 RX ADMIN — ATORVASTATIN CALCIUM 10 MG: 10 TABLET, FILM COATED ORAL at 09:10

## 2020-10-29 RX ADMIN — METRONIDAZOLE 500 MG: 500 TABLET ORAL at 01:10

## 2020-10-29 RX ADMIN — MICONAZOLE NITRATE: 20 POWDER TOPICAL at 08:10

## 2020-10-29 RX ADMIN — DICLOFENAC 4 G: 10 GEL TOPICAL at 02:10

## 2020-10-29 RX ADMIN — APIXABAN 2.5 MG: 2.5 TABLET, FILM COATED ORAL at 08:10

## 2020-10-29 RX ADMIN — CEFTRIAXONE SODIUM 2 G: 2 INJECTION, SOLUTION INTRAVENOUS at 10:10

## 2020-10-29 RX ADMIN — AMIODARONE HYDROCHLORIDE 200 MG: 200 TABLET ORAL at 09:10

## 2020-10-29 RX ADMIN — METRONIDAZOLE 500 MG: 500 TABLET ORAL at 05:10

## 2020-10-29 NOTE — TELEPHONE ENCOUNTER
----- Message from Dewayne Funes sent at 10/29/2020 10:25 AM CDT -----  Regarding: advice  Contact: nurse  Type: Needs Medical Advice  Who Called:  [facility] - [place]  Symptoms (please be specific):    How long has patient had these symptoms:    Pharmacy name and phone #:    Best Call Back Number: [phone]  Additional Information: Skilled nursing facility nurse want to know if patient can be rescheduled. If not the nurse want to know if the doctor would be able to see the patient at skilled nursing facility.

## 2020-10-29 NOTE — PLAN OF CARE
Problem: Fall Injury Risk  Goal: Absence of Fall and Fall-Related Injury  Outcome: Ongoing, Progressing     Problem: Skin Injury Risk Increased  Goal: Skin Health and Integrity  Intervention: Optimize Skin Protection  Flowsheets (Taken 10/29/2020 1046)  Pressure Reduction Devices: positioning supports utilized  Head of Bed (HOB): HOB at 30-45 degrees  Intervention: Promote and Optimize Oral Intake  Flowsheets (Taken 10/29/2020 1046)  Oral Nutrition Promotion: rest periods promoted

## 2020-10-30 LAB
CRP SERPL-MCNC: 1.41 MG/L (ref 0–3.19)
ERYTHROCYTE [SEDIMENTATION RATE] IN BLOOD BY WESTERGREN METHOD: 9 MM/HR (ref 0–36)

## 2020-10-30 PROCEDURE — 25000003 PHARM REV CODE 250: Performed by: HOSPITALIST

## 2020-10-30 PROCEDURE — 97110 THERAPEUTIC EXERCISES: CPT

## 2020-10-30 PROCEDURE — 97535 SELF CARE MNGMENT TRAINING: CPT

## 2020-10-30 PROCEDURE — 97116 GAIT TRAINING THERAPY: CPT

## 2020-10-30 PROCEDURE — 63600175 PHARM REV CODE 636 W HCPCS: Performed by: HOSPITALIST

## 2020-10-30 PROCEDURE — 97530 THERAPEUTIC ACTIVITIES: CPT

## 2020-10-30 PROCEDURE — 25000003 PHARM REV CODE 250: Performed by: NURSE PRACTITIONER

## 2020-10-30 PROCEDURE — A4216 STERILE WATER/SALINE, 10 ML: HCPCS | Performed by: NURSE PRACTITIONER

## 2020-10-30 PROCEDURE — 11000004 HC SNF PRIVATE

## 2020-10-30 RX ADMIN — CEFTRIAXONE SODIUM 2 G: 2 INJECTION, SOLUTION INTRAVENOUS at 10:10

## 2020-10-30 RX ADMIN — SPIRONOLACTONE 25 MG: 25 TABLET ORAL at 09:10

## 2020-10-30 RX ADMIN — Medication 10 ML: at 05:10

## 2020-10-30 RX ADMIN — METRONIDAZOLE 500 MG: 500 TABLET ORAL at 05:10

## 2020-10-30 RX ADMIN — METRONIDAZOLE 500 MG: 500 TABLET ORAL at 09:10

## 2020-10-30 RX ADMIN — ATORVASTATIN CALCIUM 10 MG: 10 TABLET, FILM COATED ORAL at 09:10

## 2020-10-30 RX ADMIN — APIXABAN 2.5 MG: 2.5 TABLET, FILM COATED ORAL at 09:10

## 2020-10-30 RX ADMIN — METRONIDAZOLE 500 MG: 500 TABLET ORAL at 02:10

## 2020-10-30 RX ADMIN — MICONAZOLE NITRATE: 20 POWDER TOPICAL at 09:10

## 2020-10-30 RX ADMIN — AMIODARONE HYDROCHLORIDE 200 MG: 200 TABLET ORAL at 09:10

## 2020-10-30 RX ADMIN — MELATONIN TAB 3 MG 6 MG: 3 TAB at 12:10

## 2020-10-30 RX ADMIN — Medication 10 ML: at 12:10

## 2020-10-30 RX ADMIN — MELATONIN TAB 3 MG 6 MG: 3 TAB at 09:10

## 2020-10-30 RX ADMIN — DICLOFENAC 4 G: 10 GEL TOPICAL at 09:10

## 2020-10-30 RX ADMIN — Medication 1 CAPSULE: at 09:10

## 2020-10-30 RX ADMIN — DICLOFENAC 4 G: 10 GEL TOPICAL at 02:10

## 2020-10-30 RX ADMIN — ATENOLOL 50 MG: 50 TABLET ORAL at 09:10

## 2020-10-30 RX ADMIN — LEVOTHYROXINE SODIUM 137 MCG: 50 TABLET ORAL at 05:10

## 2020-10-30 NOTE — PLAN OF CARE
Ms. Hill does not have electricity and is unable to pick her mom up at this time. Walker delivered to the bedside. CM will continue to follow up.

## 2020-10-30 NOTE — PROGRESS NOTES
[facility]                                  Skilled Nursing Facility                   Progress Note     Admit Date: 10/2/2020  FRANCESCA 10/30/2020  Principal Problem:  Hepatic abscess   HPI obtained from patient interview and chart review     Chief Complaint: lab review    HPI:   Patient is an 86 yo female PMHx AFib on Eliquis, SSS s/p pacemaker placement (1/2019), HTN, hyperthyroidism s/p thyroidectomy  who presents to SNF following hospitalization for hepatic abscess/bacteremia.       Patient presented to hospital on 9/11 complaining of abdominal pain and was found to have gallstone pancreatitis. She had CBD dilation and a gallbladder with sludge. She underwent EUS and ERCP with stone and sludge seen on the cholangiogram. Sphincterotomy was performed and stone/sludge removed with no stents placed. Per notes, ERCP was concerning for choledocholithiasis.  Surgery was consulted and deemed the patient to be high risk for cholecystectomy. She was seen today in GI clinic for follow-up. She initially recovered well from the pancreatitis and ERCP and was eating prior to discharge. A few days after discharge she started having severe fatigue that progressively worsened to the point where the patient could not walk. She also reported having decreased appetite and nausea.   Patient presented to GI clinic on 9/25 with hypotension and elevated LFTs. Patient was sent to the ED for evaluation.     In the ED, she was found to be afebrile and her BP improved to 130/61 with IVF. HR 63 with NSR and existing RBBB. Qtc 512. She was breathing 20bpm with no O2 requirement. She c/o some nausea but no abd pain. On exam, she had mild discomfort to deep palpation to RUQ abdomen.  CT scan with contrast following pancreatic protocol showed hepatic abscess. Blood culture grew gram negative cristofer; urine culture was positive enterococcus faecalis.     Pt had IR intervention  for her hepatic abscess 9/26. Blood culture collected on 9/26  grew E.Coli susceptible to multiple abx. Urine enterococcus faecalis also shown to have susceptibility to multiple organism. Patient was started on Zosyn and ID consulted. Recommendations: Discontinued IV zosyn and started ceftriaxone 2 grams IV q 24 hours for E coli bacteremia and hepatic abscess. Started metronidazole 500 mg orally q 8 hours empirically for anaerobic coverage. Anticipate 4-6 weeks antibiotics for hepatic abscess (or until abscess resolves).  Would plan for 4 weeks of IV ceftriaxone and oral flagyl from date of  IR drainage (Estimated end date 10/26) with repeat CT abdomen/pelvis  with contrast at 4 weeks with ID follow up after imaging to determine ultimate duration of antibiotics and ability to transition to orals.  Assuming pancreatic lesion noted on CT is infected pseudocyst/abscess (vs neoplasm), would anticipate it would respond to current antibiotics, but have no culture data for this.  Will follow clinically with repeat imaging. If worsens on current course, will need aspiration for cultures and/or biopsy if concerned for neoplasm. May be able to discontinue oral flagyl earlier if no growth on anaerobic cultures.  Will determine at follow up. Weekly cbc, cmp, crp, esr and fax results to ID, attention Nimesh Mares NP, at fax [phone]. (Pager [phone], spectra [phone], office [phone]).     PICC line placed 9/30 for receiving IV antibiotics for 4 weeks.  IR to leave drain in and re-evaluate in 1 week with CT scan. Patient deemed medically stable and discharged to SNF for secondary weakness, debility and IV antibiotics.     Patient will be treated at [facility] with PT and OT to improve functional status and ability to perform ADLs.       Interval history:     All of today's labs reviewed and are listed below.  24 hr vital sign ranges listed below.  Patient feeling good today, ready for upcoming discharge.    Past Medical History:  Patient has a past medical history of A-fib, Arthritis, Hypertension, and Thyroid disease.    Past Surgical History: Patient has a past surgical history that includes Cataract extraction; Cataract extraction w/  intraocular lens implant (Bilateral, 2004); Revision of skin pocket for pacemaker (N/A, 1/21/2019); Eye surgery; Hysterectomy; Joint replacement; Hip replacement arthroplasty; Treatment of cardiac arrhythmia (N/A, 3/18/2019); Thyroidectomy; Treatment of cardiac arrhythmia (N/A, 5/14/2019); ERCP (N/A, 9/14/2020); Endoscopic ultrasound of upper gastrointestinal tract (N/A, 9/14/2020); and ERCP (N/A, 9/25/2020).    Social History: Patient reports that she has quit smoking. She started smoking about 56 years ago. She has never used smokeless tobacco. She reports that she does not drink alcohol or use drugs.    Family History: family history includes Heart attack in her maternal grandmother and mother; Heart disease in her maternal grandmother and mother; Heart failure in her maternal grandmother and mother; Hypertension in her maternal grandmother and mother; Stroke in her maternal grandmother.    Allergies: Patient is allergic to ciprofloxacin.    ROS  Constitutional:+ fatigue Negative for fever   Eyes: Negative for blurred vision, double vision   Respiratory: +shortness of breath (on exertion- improved but not at baseline)  Negative for cough  Cardiovascular: + BLE edema trace edema    Negative for chest pain, palpitations.   Gastrointestinal: Negative for abdominal pain, constipation, diarrhea, nausea, vomiting.   Genitourinary: Negative for dysuria, frequency   Musculoskeletal:  + generalized weakness. Negative for back pain and myalgias.   Skin: Negative for itching and rash.   Neurological: Negative for dizziness, headaches.   Psychiatric/Behavioral: Negative for depression. The patient is not nervous/anxious.      24 hour Vital Sign Range   Temp:  [96.7 °F (35.9 °C)-97.6 °F (36.4 °C)]   Pulse:  [62-63]    Resp:  [18-20]   BP: (151-154)/(64-94)   SpO2:  [97 %]     PEx  Constitutional: Patient appears debilitated.  No distress noted  HENT:   Head: Normocephalic and atraumatic.   Eyes: Pupils are equal, round  Neck: Normal range of motion. Neck supple.   Cardiovascular: Normal rate, regular rhythm and normal heart sounds.    Pulmonary/Chest: Effort normal and breath sounds are clear  Abdominal: Soft. Bowel sounds are normal.   Musculoskeletal:+ weakness 4/5 on all extremities  Normal range of motion.   Neurological: Alert and oriented to person, place, and time.   Psychiatric: Normal mood and affect. Behavior is normal.   Skin: Skin is warm and dry.  RUQ drain in place, dressing CDI   Stage 2 pressure injury- gluteal cleft followed weekly by wound care nurse  Date First Assessed/Time First Assessed: 10/02/20 7575   Altered Skin Integrity Present on Admission: yes  Orientation: midline  Location: Gluteal cleft    Description of Altered Skin Integrity Partial thickness tissue loss. Shallow open ulcer with a red or pink wound bed, without slough. Intact or Open/Ruptured Serum-filled blister.   Dressing Appearance Dry;Intact;Clean   Drainage Amount Small   Drainage Characteristics/Odor Serosanguineous   Appearance Pink;Red;Moist   Tissue loss description Partial thickness   Periwound Area Intact;Pink;Scar tissue   Wound Edges Open   Wound Length (cm) 1 cm   Wound Width (cm) 0.5 cm   Wound Depth (cm) 0.1 cm   Wound Volume (cm^3) 0.05 cm^3   Wound Surface Area (cm^2) 0.5 cm^2   Care Cleansed with:;Sterile normal saline;Applied:;Skin Barrier   Dressing Reinforced;Foam           Assessment and Plan:    Problems addressed today      Bacteremia  - Culture grew E.Coli susceptible to multiple abx.   -continue Metronidazole 500 mg Q 8 empirically for anaerobic coverage est end date 11/9  -continue IV ceftriaxone  11/9  -Weekly CMP, CBC, CRP, ESR  10/26 recent labs stable. ID extending abx for another 2 weeks. CM to set up home  infusions.    10/29 CM set up home infusions. Family update on plan.    Appetite decreased     10/23: Appetite decreased since admission. Patient eating about 25 -50% of meals, but drinks boostin between meals. Albumin low but increasing. Patient would like to hold off on starting appetite stimulant. Will re assess after the weekend.    10/26 Appetite improved over the weekend eating % of all meals  10/29 appetite adequate    Stable on going issues     Hepatic abscess  - PICC line placed 9/30 for receiving IV antibiotics for 4 weeks  - Follow up with ID outpatient with weekly labs  - continue ceftriaxone and flagyl   - IR to leave drain in and re-evaluate in 1 week with CT scan  10/12/20  CT abdomen completed. Care coordinated with ID, recommend: continuing antiboitics and following up in ID clinic in 2 weeks. Also recommended GI follow up for multiloculated cystic lesion   10/26: ID continuing abx for another 2 weeks. IR amb referral for drain check/removal ordered.      Paroxymal atrial fibrillation  - Continue Amiodarone 200 mg  - continue Atenolol 50 mg   - conitnue Apixiban 2.5 BID     Essential hypertension  -continue Atenolol 50 mg        -continue Spirolactone 25 mg BID  10/29/20 BP at goal on current regimen      Hypothyroidism   -TSH 0.443 on 9/25  - Continue levothyroxine 0.137 mg    Pacemaker  -DC PPM (2014; [facility]) implanted for symptomatic SB and first degree AVB    Hyperlipidemia  -continue Atorvastatin 10 mg   -Monitor LFTS  10/29 LFTs stable    Future Appointments   Date Time Provider Department Center   10/30/2020  3:00 PM Anne De La Cruz MD [facility] ID [address]   11/12/2020 10:30 AM [facility]   12/11/2020 11:30 AM Kai Montez MD [facility]         I certify that SNF services are required to be given on an inpatient basis because Gisselle Ortiz needs for skilled nursing care and/or skilled rehabilitation are required on a daily basis and  such services can only practically be provided in a skilled nursing facility setting and are for an ongoing condition for which she received inpatient care in the hospital.         Chad Downing NP  Department of Hospital Medicine   [facility]     DOS: 10/29/2020        Patient note was created using MModal Dictation.  Any errors in syntax or even information may not have been identified and edited on initial review prior to signing this note.

## 2020-10-30 NOTE — PT/OT/SLP PROGRESS
Occupational Therapy   Treatment    Name: Gisselle Ortiz  MRN: [id number]  Admit Date: 10/2/2020  Admitting Diagnosis:  Hepatic abscess    Recent Surgery: * No surgery found *      General Precautions: Standard, fall   Orthopedic Precautions:N/A   Braces:       Recommendations:     Discharge Recommendations: home with home health  Level of Assistance Recommended at Discharge: 24 hours light assistance for ADL's and homemaking tasks  Discharge Equipment Recommendations:  walker, rolling  Barriers to discharge:  Nonenone, pts adult children to assist    Assessment:     Gisselle Ortiz is a 87 y.o. female with a medical diagnosis of Hepatic abscess who continues to demonstrate steady functional gains in mobility, endurance and safety with ADL's.  She presents with  Performance deficits affecting function are weakness, impaired endurance, impaired self care skills, impaired functional mobilty, gait instability.     Rehab Potential is good    Activity tolerance:  Good    Plan:     Patient to be seen 5 x/week to address the above listed problems via self-care/home management, therapeutic activities, therapeutic exercises, wheelchair management/training    · Plan of Care Expires: 11/04/20  · Plan of Care Reviewed with: patient    Subjective     Communicated with: nursing prior to session. Pt pleasant and eager for OT this day.    Pain/Comfort:  Pain Rating 1: 0/10  Pain Rating Post-Intervention 1: 0/10    Patient's cultural, spiritual, Mormon conflicts given the current situation:  no    Objective:     Patient found up in chair with PICC line upon OT entry to room.    Functional Mobility/Transfers:  · Patient completed Sit <> Stand Transfer with contact guard assistance  with  rolling walker   · Patient completed Bed <> Chair Transfer using Step Transfer technique with contact guard assistance with rolling walker  · Patient completed Toilet Transfer Step Transfer technique with stand by assistance with  rolling  walker and bedside commode  · Functional Mobility: Pt performed ADL's RW level yet unable to stand for duration of grooming tasks at sink. She required frequent rest breaks.    Activities of Daily Living:  · Grooming: supervision    · Bathing: contact guard assistance    · Upper Body Dressing: supervision    · Lower Body Dressing: stand by assistance    · Toileting: supervision      AMPAC 6 Click ADL: 20    OT Exercises: AROM 2x15 reps all planes of movement. BUE ergometer x 10 min on min resistance.    Treatment & Education:  OT reviewed BUE HEP and pt returned demo. Reviewed safe and feasible means of performing ADL's RW level and requesting assistance as a safety measure due to ongoing high fall risk.    Patient left up in chair with call button in reachEducation:      GOALS:   Multidisciplinary Problems     Occupational Therapy Goals        Problem: Occupational Therapy Goal    Goal Priority Disciplines Outcome Interventions   Occupational Therapy Goal     OT, PT/OT Ongoing, Progressing    Description: Goals to be met by: 10/28/20     Patient will increase functional independence with ADLs by performing:    UE Dressing with Modified [place].  LE Dressing with Stand-by Assistance.  Grooming while seated with Modified [place].  Toileting from toilet with Stand-by Assistance for hygiene and clothing management.   Bathing from  sitting at sink with Stand-by Assistance.  Supine to sit with Modified [place].  Stand pivot transfers with Supervision using A/D as needed.  Pt's caregiver will be educated on level of assist required to safely perform self care tasks and functional transfers.                     Time Tracking:     OT Date of Treatment: 10/30/20  OT Total Time (min):      Billable Minutes:Self Care/Home Management 38  Therapeutic Exercise 15    Shannon Lujan OT  10/30/2020

## 2020-10-30 NOTE — PT/OT/SLP PROGRESS
Physical Therapy Treatment    Patient Name:  Gisselle Ortiz   MRN:  [id number]  Admit Date: 10/2/2020  Admitting Diagnosis: Hepatic abscess  Recent Surgery: * No surgery found *      General Precautions: Standard, fall   Orthopedic Precautions:N/A   Braces:     PT had a face-to-face encounter with regards to the patient's plan of care with the PTA who has been seeing this patient regularly.    Recommendations:     Discharge Recommendations:  home with home health(w/ light family assistance initially)   Level of Assistance Recommended at Discharge: 24 hours light assistance  Discharge Equipment Recommendations: walker, rolling   Barriers to discharge: None    Assessment:     Gisselle Ortiz is a 87 y.o. female admitted with a medical diagnosis of Hepatic abscess . Pt received her Rw today and adjusted to her height. Pt is currently (S) for bed mobility, CGA for transfers, Gt and curb step negotiation.      Performance deficits affecting function:  weakness, impaired endurance, impaired self care skills, impaired functional mobilty, gait instability, impaired balance, decreased upper extremity function, decreased lower extremity function, decreased safety awareness, pain .    Rehab Potential is good    Activity Tolerance: Good    Plan:     Patient to be seen   to address the above listed problems via      · Plan of Care Expires:    · Plan of Care Reviewed with: patient    Subjective     Pt agreeable to work with PT. Pt did not d/c home today because her power is still out and she will need it for the IV pump.    Pain/Comfort:  Pain Rating 1: 0/10  Pain Rating Post-Intervention 1: 0/10    Patient's cultural, spiritual, Hinduism conflicts given the current situation:  no    Objective:     Communicated with pt. prior to session.  Patient found up in chair with PICC line(seated in recliner chair) upon PT entry to room.     Therapeutic Activities and Exercises: seated bicycle for 8mins to increase B l/e strength  "and endurance.    Functional Mobility:  · Bed Mobility:     · Supine to Sit with bed raised to approx 20-25 inches(height of bed at home): supervision using a 4 inch footstool  · Sit to Supinewith bed raised to approx 20-25 inches(height of bed at home): : supervision using a 4 inch footstool  · Transfers:     · Sit to Stand x 4trials:  stand by assistance and contact guard assistance with rolling walker  · Bed to Chair: stand by assistance and contact guard assistance with  rolling walker  using  Stand Pivot  · Gait: 30ft x 2trials with RW with CGA/SBA for safety. pt required a seated rest break d.t fatigue  · Stairs:  Pt ascended/descended 4" curb step with Rolling Walker with no handrails with Stand-by Assistance and Contact Guard Assistance.   · Retrieving objects off floor with Rw and Reacher with CGA.      AM-PAC 6 CLICK MOBILITY  Turning over in bed (including adjusting bedclothes, sheets and blankets)?: 3  Sitting down on and standing up from a chair with arms (e.g., wheelchair, bedside commode, etc.): 3  Moving from lying on back to sitting on the side of the bed?: 3  Moving to and from a bed to a chair (including a wheelchair)?: 3  Need to walk in hospital room?: 3  Climbing 3-5 steps with a railing?: 3  Basic Mobility Total Score: 18     Patient left up in chair with call button in reach.    GOALS:   Multidisciplinary Problems     Physical Therapy Goals        Problem: Physical Therapy Goal    Goal Priority Disciplines Outcome Goal Variances Interventions   Physical Therapy Goal     PT, PT/OT Ongoing, Progressing     Description: Goals to be met by: 10/20/20     Patient will increase functional independence with mobility by performin. Supine to sit with Modified [place] met  2. Sit to supine with Modified [place] met  3. Rolling to Left and Right with Modified [place]. met  4. Sit to stand transfer with Supervision  5. Bed to chair transfer with Supervision using Rolling Walker  6. " Gait  x 50 feet with Supervision using Rolling Walker.   7. Ascend/Descend 4 inch curb step with Stand-by Assistance using Rolling Walker.met  8. Stand for 3 minutes with Stand-by Assistance using Rolling Walker while performing an activity met  9. Pt will perform a car transfer (Actual or simulated) w/ RW and CGA met  10. Pt will stand and  5 objects from the floor w/ a reacher, RW, and SPV met                     Time Tracking:     PT Received On: 10/30/20  PT Total Time (min):       Billable Minutes: Gait Training 10mins, Therapeutic Activity 22mins and Therapeutic Exercise 10mins    Treatment Type: Treatment, 6th Visit  PT/PTA: PT     PTA Visit Number: 0     Rebeca Sargent, PT  10/30/2020

## 2020-10-30 NOTE — PLAN OF CARE
Problem: Adult Inpatient Plan of Care  Goal: Absence of Hospital-Acquired Illness or Injury  Outcome: Ongoing, Progressing     Problem: Adult Inpatient Plan of Care  Goal: Optimal Comfort and Wellbeing  Outcome: Ongoing, Progressing     Problem: Adult Inpatient Plan of Care  Goal: Readiness for Transition of Care  Outcome: Ongoing, Progressing

## 2020-10-30 NOTE — PLAN OF CARE
Problem: Occupational Therapy Goal  Goal: Occupational Therapy Goal  Description: Goals to be met by: 10/28/20     Patient will increase functional independence with ADLs by performing:    UE Dressing with Modified [place]. Met  LE Dressing with Stand-by Assistance. Met  Grooming while seated with Modified [place]. Met  Toileting from toilet with Stand-by Assistance for hygiene and clothing management. Met  Bathing from  sitting at sink with Stand-by Assistance. Met  Supine to sit with Modified [place]. Met  Stand pivot transfers with Supervision using A/D as needed.   Pt's caregiver will be educated on level of assist required to safely perform self care tasks and functional transfers.     Outcome: Ongoing, Progressing

## 2020-10-31 ENCOUNTER — CLINICAL SUPPORT (OUTPATIENT)
Dept: CARDIOLOGY | Facility: HOSPITAL | Age: 85
End: 2020-10-31
Payer: MEDICARE

## 2020-10-31 DIAGNOSIS — Z95.0 PRESENCE OF CARDIAC PACEMAKER: ICD-10-CM

## 2020-10-31 PROCEDURE — 25000003 PHARM REV CODE 250: Performed by: NURSE PRACTITIONER

## 2020-10-31 PROCEDURE — 63600175 PHARM REV CODE 636 W HCPCS: Performed by: HOSPITALIST

## 2020-10-31 PROCEDURE — 11000004 HC SNF PRIVATE

## 2020-10-31 PROCEDURE — 93294 REM INTERROG EVL PM/LDLS PM: CPT | Mod: ,,, | Performed by: INTERNAL MEDICINE

## 2020-10-31 PROCEDURE — A4216 STERILE WATER/SALINE, 10 ML: HCPCS | Performed by: NURSE PRACTITIONER

## 2020-10-31 PROCEDURE — 93294 CARDIAC DEVICE CHECK - REMOTE: ICD-10-PCS | Mod: ,,, | Performed by: INTERNAL MEDICINE

## 2020-10-31 PROCEDURE — 25000003 PHARM REV CODE 250: Performed by: HOSPITALIST

## 2020-10-31 RX ADMIN — Medication 10 ML: at 11:10

## 2020-10-31 RX ADMIN — ATENOLOL 50 MG: 50 TABLET ORAL at 09:10

## 2020-10-31 RX ADMIN — SPIRONOLACTONE 25 MG: 25 TABLET ORAL at 09:10

## 2020-10-31 RX ADMIN — METRONIDAZOLE 500 MG: 500 TABLET ORAL at 02:10

## 2020-10-31 RX ADMIN — Medication 10 ML: at 06:10

## 2020-10-31 RX ADMIN — DICLOFENAC 4 G: 10 GEL TOPICAL at 09:10

## 2020-10-31 RX ADMIN — APIXABAN 2.5 MG: 2.5 TABLET, FILM COATED ORAL at 08:10

## 2020-10-31 RX ADMIN — MELATONIN TAB 3 MG 6 MG: 3 TAB at 08:10

## 2020-10-31 RX ADMIN — APIXABAN 2.5 MG: 2.5 TABLET, FILM COATED ORAL at 09:10

## 2020-10-31 RX ADMIN — DICLOFENAC 4 G: 10 GEL TOPICAL at 08:10

## 2020-10-31 RX ADMIN — Medication 10 ML: at 12:10

## 2020-10-31 RX ADMIN — LEVOTHYROXINE SODIUM 137 MCG: 50 TABLET ORAL at 06:10

## 2020-10-31 RX ADMIN — AMIODARONE HYDROCHLORIDE 200 MG: 200 TABLET ORAL at 09:10

## 2020-10-31 RX ADMIN — CEFTRIAXONE SODIUM 2 G: 2 INJECTION, SOLUTION INTRAVENOUS at 09:10

## 2020-10-31 RX ADMIN — METRONIDAZOLE 500 MG: 500 TABLET ORAL at 06:10

## 2020-10-31 RX ADMIN — Medication 1 CAPSULE: at 09:10

## 2020-10-31 RX ADMIN — MICONAZOLE NITRATE: 20 POWDER TOPICAL at 09:10

## 2020-10-31 RX ADMIN — Medication 10 ML: at 05:10

## 2020-10-31 RX ADMIN — SPIRONOLACTONE 25 MG: 25 TABLET ORAL at 08:10

## 2020-10-31 RX ADMIN — DICLOFENAC 4 G: 10 GEL TOPICAL at 02:10

## 2020-10-31 RX ADMIN — ATORVASTATIN CALCIUM 10 MG: 10 TABLET, FILM COATED ORAL at 09:10

## 2020-10-31 RX ADMIN — METRONIDAZOLE 500 MG: 500 TABLET ORAL at 09:10

## 2020-11-01 PROCEDURE — 25000003 PHARM REV CODE 250: Performed by: NURSE PRACTITIONER

## 2020-11-01 PROCEDURE — 97535 SELF CARE MNGMENT TRAINING: CPT

## 2020-11-01 PROCEDURE — 63600175 PHARM REV CODE 636 W HCPCS: Performed by: HOSPITALIST

## 2020-11-01 PROCEDURE — A4216 STERILE WATER/SALINE, 10 ML: HCPCS | Performed by: NURSE PRACTITIONER

## 2020-11-01 PROCEDURE — 11000004 HC SNF PRIVATE

## 2020-11-01 PROCEDURE — 97110 THERAPEUTIC EXERCISES: CPT

## 2020-11-01 PROCEDURE — 25000003 PHARM REV CODE 250: Performed by: HOSPITALIST

## 2020-11-01 RX ADMIN — MICONAZOLE NITRATE: 20 POWDER TOPICAL at 10:11

## 2020-11-01 RX ADMIN — CEFTRIAXONE SODIUM 2 G: 2 INJECTION, SOLUTION INTRAVENOUS at 10:11

## 2020-11-01 RX ADMIN — ATENOLOL 50 MG: 50 TABLET ORAL at 09:11

## 2020-11-01 RX ADMIN — SPIRONOLACTONE 25 MG: 25 TABLET ORAL at 09:11

## 2020-11-01 RX ADMIN — ATORVASTATIN CALCIUM 10 MG: 10 TABLET, FILM COATED ORAL at 09:11

## 2020-11-01 RX ADMIN — DICLOFENAC 4 G: 10 GEL TOPICAL at 02:11

## 2020-11-01 RX ADMIN — Medication 1 CAPSULE: at 09:11

## 2020-11-01 RX ADMIN — METRONIDAZOLE 500 MG: 500 TABLET ORAL at 09:11

## 2020-11-01 RX ADMIN — METRONIDAZOLE 500 MG: 500 TABLET ORAL at 05:11

## 2020-11-01 RX ADMIN — Medication 10 ML: at 05:11

## 2020-11-01 RX ADMIN — APIXABAN 2.5 MG: 2.5 TABLET, FILM COATED ORAL at 09:11

## 2020-11-01 RX ADMIN — AMIODARONE HYDROCHLORIDE 200 MG: 200 TABLET ORAL at 09:11

## 2020-11-01 RX ADMIN — DICLOFENAC 4 G: 10 GEL TOPICAL at 09:11

## 2020-11-01 RX ADMIN — MICONAZOLE NITRATE: 20 POWDER TOPICAL at 09:11

## 2020-11-01 RX ADMIN — DICLOFENAC 4 G: 10 GEL TOPICAL at 10:11

## 2020-11-01 RX ADMIN — LEVOTHYROXINE SODIUM 137 MCG: 50 TABLET ORAL at 05:11

## 2020-11-01 RX ADMIN — METRONIDAZOLE 500 MG: 500 TABLET ORAL at 01:11

## 2020-11-01 RX ADMIN — Medication 10 ML: at 12:11

## 2020-11-01 RX ADMIN — MELATONIN TAB 3 MG 6 MG: 3 TAB at 09:11

## 2020-11-01 RX ADMIN — CALCIUM CARBONATE (ANTACID) CHEW TAB 500 MG 500 MG: 500 CHEW TAB at 12:11

## 2020-11-01 NOTE — PT/OT/SLP PROGRESS
Occupational Therapy   Treatment    Name: Gisselle Ortiz  MRN: [id number]  Admit Date: 10/2/2020  Admitting Diagnosis:  Hepatic abscess    Recent Surgery: * No surgery found *      General Precautions: Standard, fall   Orthopedic Precautions:N/A   Braces:       Recommendations:     Discharge Recommendations: home with home health  Level of Assistance Recommended at Discharge: 24 hours light assistance for ADL's and homemaking tasks  Discharge Equipment Recommendations:  walker, rolling  Barriers to discharge:       Assessment:     Gisselle Ortiz is a 87 y.o. female with a medical diagnosis of Hepatic abscess and presents with deficits in self-care tasks, endurance and mobility.  She presents with CGA for transfers on this date and toileting . Pt. Tolerated session well and would benefit from continued O  T services to maximize I and safety with ADL tasks.  Rehab Potential is good    Activity tolerance:  Good    Plan:     Patient to be seen 5 x/week to address the above listed problems via self-care/home management, therapeutic activities, therapeutic exercises, wheelchair management/training    · Plan of Care Expires: 11/04/20  · Plan of Care Reviewed with: patient    Subjective     Communicated with: nurse prior to session. Pt. Reported she needed to get off the commode .    Pain/Comfort:  · Pain Rating 1: 0/10  · Pain Rating Post-Intervention 1: 0/10    Patient's cultural, spiritual, Episcopal conflicts given the current situation:  · no    Objective:     Patient found seated on commode with PICC line(seated on bedside commode), drain  upon OT entry to room.    Occupational Performance:     Bed Mobility:    · Not tested      Functional Mobility/Transfers:  · Patient completed Sit <> Stand Transfer with contact guard assistance  with  no assistive device   · Patient completed Bed <> Chair Transfer using Stand Pivot technique with contact guard assistance with no assistive device  · Patient completed Toilet  Transfer Stand Pivot technique with contact guard assistance with  no AD  · Functional Mobility: not tested     Activities of Daily Living:  · Grooming: modified independence seated at sink to brush teeth and wash face  · Upper Body Dressing: set up A seated to don fresh gown seated in w/c at sink  · Toileting: contact guard assistance with clothing managment in stand     [facility] 6 Click ADL: 20    OT Exercises: AROM with  Large towel roll  for all major planes of bUE motion x 2 sets 10 reps while seated in bedsdie chair  To improve level of endurance    Treatment & Education:  Pt. Educated on safety with transfers and ADL task performance      Patient left up in chair with all lines intact and call button in reachEducation:      GOALS:   Multidisciplinary Problems     Occupational Therapy Goals        Problem: Occupational Therapy Goal    Goal Priority Disciplines Outcome Interventions   Occupational Therapy Goal     OT, PT/OT Ongoing, Progressing    Description: Goals to be met by: 10/28/20     Patient will increase functional independence with ADLs by performing:    UE Dressing with Modified [place].  LE Dressing with Stand-by Assistance.  Grooming while seated with Modified [place].  Toileting from toilet with Stand-by Assistance for hygiene and clothing management.   Bathing from  sitting at sink with Stand-by Assistance.  Supine to sit with Modified [place].  Stand pivot transfers with Supervision using A/D as needed.  Pt's caregiver will be educated on level of assist required to safely perform self care tasks and functional transfers.                     Time Tracking:     OT Date of Treatment: 11/01/20  OT Total Time (min):      Billable Minutes:Self Care/Home Management 15  Therapeutic Exercise 10    SHAHIDA Alicea  11/1/2020

## 2020-11-02 ENCOUNTER — OFFICE VISIT (OUTPATIENT)
Dept: INFECTIOUS DISEASES | Facility: CLINIC | Age: 85
DRG: 871 | End: 2020-11-02
Attending: HOSPITALIST
Payer: MEDICARE

## 2020-11-02 ENCOUNTER — CLINICAL SUPPORT (OUTPATIENT)
Dept: INFECTIOUS DISEASES | Facility: CLINIC | Age: 85
End: 2020-11-02
Payer: MEDICARE

## 2020-11-02 ENCOUNTER — INFUSION (OUTPATIENT)
Dept: INFECTIOUS DISEASES | Facility: HOSPITAL | Age: 85
End: 2020-11-02
Attending: INTERNAL MEDICINE
Payer: MEDICARE

## 2020-11-02 VITALS
HEART RATE: 61 BPM | HEIGHT: 62 IN | TEMPERATURE: 98 F | WEIGHT: 138 LBS | BODY MASS INDEX: 25.4 KG/M2 | DIASTOLIC BLOOD PRESSURE: 85 MMHG | SYSTOLIC BLOOD PRESSURE: 169 MMHG

## 2020-11-02 VITALS
RESPIRATION RATE: 18 BRPM | WEIGHT: 127.13 LBS | SYSTOLIC BLOOD PRESSURE: 134 MMHG | HEART RATE: 57 BPM | OXYGEN SATURATION: 94 % | HEIGHT: 63 IN | DIASTOLIC BLOOD PRESSURE: 63 MMHG | BODY MASS INDEX: 22.53 KG/M2 | TEMPERATURE: 98 F

## 2020-11-02 DIAGNOSIS — K75.0 HEPATIC ABSCESS: Primary | ICD-10-CM

## 2020-11-02 DIAGNOSIS — Z23 IMMUNIZATION DUE: ICD-10-CM

## 2020-11-02 DIAGNOSIS — Z95.0 PACEMAKER: ICD-10-CM

## 2020-11-02 DIAGNOSIS — A41.51 E. COLI SEPTICEMIA: ICD-10-CM

## 2020-11-02 DIAGNOSIS — Z71.85 VACCINE COUNSELING: ICD-10-CM

## 2020-11-02 DIAGNOSIS — B36.9 FUNGAL DERMATITIS: ICD-10-CM

## 2020-11-02 LAB
ALBUMIN SERPL BCP-MCNC: 2.5 G/DL (ref 3.5–5.2)
ALBUMIN SERPL BCP-MCNC: 2.5 G/DL (ref 3.5–5.2)
ALP SERPL-CCNC: 52 U/L (ref 55–135)
ALP SERPL-CCNC: 52 U/L (ref 55–135)
ALT SERPL W/O P-5'-P-CCNC: 7 U/L (ref 10–44)
ALT SERPL W/O P-5'-P-CCNC: 7 U/L (ref 10–44)
ANION GAP SERPL CALC-SCNC: 8 MMOL/L (ref 8–16)
AST SERPL-CCNC: 14 U/L (ref 10–40)
AST SERPL-CCNC: 14 U/L (ref 10–40)
BASOPHILS # BLD AUTO: 0.04 K/UL (ref 0–0.2)
BASOPHILS NFR BLD: 1 % (ref 0–1.9)
BILIRUB DIRECT SERPL-MCNC: 0.3 MG/DL (ref 0.1–0.3)
BILIRUB SERPL-MCNC: 0.4 MG/DL (ref 0.1–1)
BILIRUB SERPL-MCNC: 0.4 MG/DL (ref 0.1–1)
BUN SERPL-MCNC: 10 MG/DL (ref 8–23)
CALCIUM SERPL-MCNC: 8 MG/DL (ref 8.7–10.5)
CHLORIDE SERPL-SCNC: 102 MMOL/L (ref 95–110)
CO2 SERPL-SCNC: 27 MMOL/L (ref 23–29)
CREAT SERPL-MCNC: 0.8 MG/DL (ref 0.5–1.4)
DIFFERENTIAL METHOD: ABNORMAL
EOSINOPHIL # BLD AUTO: 0.1 K/UL (ref 0–0.5)
EOSINOPHIL NFR BLD: 2.7 % (ref 0–8)
ERYTHROCYTE [DISTWIDTH] IN BLOOD BY AUTOMATED COUNT: 15.8 % (ref 11.5–14.5)
EST. GFR  (AFRICAN AMERICAN): >60 ML/MIN/1.73 M^2
EST. GFR  (NON AFRICAN AMERICAN): >60 ML/MIN/1.73 M^2
GLUCOSE SERPL-MCNC: 80 MG/DL (ref 70–110)
HCT VFR BLD AUTO: 30.5 % (ref 37–48.5)
HGB BLD-MCNC: 9.3 G/DL (ref 12–16)
IMM GRANULOCYTES # BLD AUTO: 0.04 K/UL (ref 0–0.04)
IMM GRANULOCYTES NFR BLD AUTO: 1 % (ref 0–0.5)
LYMPHOCYTES # BLD AUTO: 1.1 K/UL (ref 1–4.8)
LYMPHOCYTES NFR BLD: 27.5 % (ref 18–48)
MAGNESIUM SERPL-MCNC: 1.6 MG/DL (ref 1.6–2.6)
MCH RBC QN AUTO: 30.3 PG (ref 27–31)
MCHC RBC AUTO-ENTMCNC: 30.5 G/DL (ref 32–36)
MCV RBC AUTO: 99 FL (ref 82–98)
MONOCYTES # BLD AUTO: 0.5 K/UL (ref 0.3–1)
MONOCYTES NFR BLD: 12.3 % (ref 4–15)
NEUTROPHILS # BLD AUTO: 2.3 K/UL (ref 1.8–7.7)
NEUTROPHILS NFR BLD: 55.5 % (ref 38–73)
NRBC BLD-RTO: 0 /100 WBC
PHOSPHATE SERPL-MCNC: 3.1 MG/DL (ref 2.7–4.5)
PLATELET # BLD AUTO: 198 K/UL (ref 150–350)
PMV BLD AUTO: 10 FL (ref 9.2–12.9)
POTASSIUM SERPL-SCNC: 3.7 MMOL/L (ref 3.5–5.1)
PROT SERPL-MCNC: 5.3 G/DL (ref 6–8.4)
PROT SERPL-MCNC: 5.3 G/DL (ref 6–8.4)
RBC # BLD AUTO: 3.07 M/UL (ref 4–5.4)
SODIUM SERPL-SCNC: 137 MMOL/L (ref 136–145)
WBC # BLD AUTO: 4.08 K/UL (ref 3.9–12.7)

## 2020-11-02 PROCEDURE — A4216 STERILE WATER/SALINE, 10 ML: HCPCS | Performed by: NURSE PRACTITIONER

## 2020-11-02 PROCEDURE — 25000003 PHARM REV CODE 250: Performed by: NURSE PRACTITIONER

## 2020-11-02 PROCEDURE — 99214 OFFICE O/P EST MOD 30 MIN: CPT | Mod: PBBFAC,27,25 | Performed by: STUDENT IN AN ORGANIZED HEALTH CARE EDUCATION/TRAINING PROGRAM

## 2020-11-02 PROCEDURE — 80053 COMPREHEN METABOLIC PANEL: CPT

## 2020-11-02 PROCEDURE — 85025 COMPLETE CBC W/AUTO DIFF WBC: CPT

## 2020-11-02 PROCEDURE — 84100 ASSAY OF PHOSPHORUS: CPT

## 2020-11-02 PROCEDURE — 83735 ASSAY OF MAGNESIUM: CPT

## 2020-11-02 PROCEDURE — 99999 PR PBB SHADOW E&M-EST. PATIENT-LVL III: ICD-10-PCS | Mod: PBBFAC,,,

## 2020-11-02 PROCEDURE — 99999 PR PBB SHADOW E&M-EST. PATIENT-LVL IV: CPT | Mod: PBBFAC,,, | Performed by: STUDENT IN AN ORGANIZED HEALTH CARE EDUCATION/TRAINING PROGRAM

## 2020-11-02 PROCEDURE — G0008 ADMIN INFLUENZA VIRUS VAC: HCPCS | Mod: PBBFAC

## 2020-11-02 PROCEDURE — 25000003 PHARM REV CODE 250: Performed by: HOSPITALIST

## 2020-11-02 PROCEDURE — 99999 PR PBB SHADOW E&M-EST. PATIENT-LVL IV: ICD-10-PCS | Mod: PBBFAC,,, | Performed by: STUDENT IN AN ORGANIZED HEALTH CARE EDUCATION/TRAINING PROGRAM

## 2020-11-02 PROCEDURE — 90694 VACC AIIV4 NO PRSRV 0.5ML IM: CPT | Mod: PBBFAC

## 2020-11-02 PROCEDURE — 99214 OFFICE O/P EST MOD 30 MIN: CPT | Mod: S$PBB,,, | Performed by: STUDENT IN AN ORGANIZED HEALTH CARE EDUCATION/TRAINING PROGRAM

## 2020-11-02 PROCEDURE — 80076 HEPATIC FUNCTION PANEL: CPT

## 2020-11-02 PROCEDURE — 99214 PR OFFICE/OUTPT VISIT, EST, LEVL IV, 30-39 MIN: ICD-10-PCS | Mod: S$PBB,,, | Performed by: STUDENT IN AN ORGANIZED HEALTH CARE EDUCATION/TRAINING PROGRAM

## 2020-11-02 PROCEDURE — 99213 OFFICE O/P EST LOW 20 MIN: CPT | Mod: PBBFAC,25

## 2020-11-02 PROCEDURE — 99999 PR PBB SHADOW E&M-EST. PATIENT-LVL III: CPT | Mod: PBBFAC,,,

## 2020-11-02 RX ORDER — AMOXICILLIN AND CLAVULANATE POTASSIUM 875; 125 MG/1; MG/1
1 TABLET, FILM COATED ORAL 2 TIMES DAILY
Qty: 28 TABLET | Refills: 0 | Status: SHIPPED | OUTPATIENT
Start: 2020-11-02 | End: 2020-11-16

## 2020-11-02 RX ADMIN — LEVOTHYROXINE SODIUM 137 MCG: 50 TABLET ORAL at 05:11

## 2020-11-02 RX ADMIN — ATORVASTATIN CALCIUM 10 MG: 10 TABLET, FILM COATED ORAL at 08:11

## 2020-11-02 RX ADMIN — Medication 1 CAPSULE: at 08:11

## 2020-11-02 RX ADMIN — Medication 10 ML: at 12:11

## 2020-11-02 RX ADMIN — AMIODARONE HYDROCHLORIDE 200 MG: 200 TABLET ORAL at 08:11

## 2020-11-02 RX ADMIN — SPIRONOLACTONE 25 MG: 25 TABLET ORAL at 08:11

## 2020-11-02 RX ADMIN — MICONAZOLE NITRATE: 20 POWDER TOPICAL at 08:11

## 2020-11-02 RX ADMIN — Medication 10 ML: at 06:11

## 2020-11-02 RX ADMIN — APIXABAN 2.5 MG: 2.5 TABLET, FILM COATED ORAL at 08:11

## 2020-11-02 RX ADMIN — METRONIDAZOLE 500 MG: 500 TABLET ORAL at 06:11

## 2020-11-02 RX ADMIN — ATENOLOL 50 MG: 50 TABLET ORAL at 08:11

## 2020-11-02 RX ADMIN — DICLOFENAC 4 G: 10 GEL TOPICAL at 08:11

## 2020-11-02 NOTE — PROGRESS NOTES
INFECTIOUS DISEASE CLINIC  11/02/2020     Subjective:      Chief Complaint:   Chief Complaint   Patient presents with    Follow-up       History of Present Illness:    This is a 87 y.o. female with Afib on AC, SSS s/p PM, HTN, gallstone pancreatitis s/p ERCP 9/14, who was recently admitted for Ecoli bacteremia c/b hepatic pyogenic abscess is referred to my clinic for hospital follow up/OPAT.  Patient is new to me.     Please see initial HPI/consult note for full details - though briefly: pt was sent from GI clinic for elevated LFTs/hypotension found to have Efaecalis bacteriuria, Ecoli bacteremia and pyogenic hepatic abscess s/p ERCP 9/25 (see below) and s/p IR drain placed on 9/26 (cx with Ecoli). Pt was evaluated by surgery and was not a surgical candidate. Pt was maintained on zosyn and was discharged to SNF for IV abx with R PICC - CTX 2g IV daily and PO flagyl to cover empiric anaerobes) to complete 4-6w course (CHINEDU 10/26 for 4 wk course). Repeat blcx prior to discharge ngtd. Repeat CT abdo w contrast on 10/23 with interval improvement of complex fluid collection (2.6 cm - prior 3.2) with no evidence of new hepatic collection. Pt was also seen with multiloculated pancreatic cystic lesion though was difficult to measure (noted to be similar to prior exam).       ERCP 9/25:  Impression:   - The major papilla was located partially within a                         large diverticulum.                         - The entire main bile duct was dilated.                         - The biliary tree was swept and nothing was found.                         - One plastic stent was placed into the common bile                         duct.     ERCP 9/14:   Impression:   - The major papilla was on the rim of a                         diverticulum.                         - A filling defect consistent with a stone and                         sludge was seen on the cholangiogram.                         - The entire biliary tree  was dilated, acquired.                         - The examination was suspicious for                         choledocholithiasis.                         - A biliary sphincterotomy was performed.                         - Bile duct orifice was successfully dilated.                         - The biliary tree was swept and sludge was found.       Patient comes in today for follow up with son, Yimi.  Pt was discharged from [facility] this morning. Labs notable for normal WBC and resolving LFTs. Pt states that she feels well, though weakness from debility is improving. Reports appetite is slowly coming back. Tolerated CTX/flagyl at [facility] without issues - states that she had some loose stools initially, though now more formed. Denies fevers or chills. States that drain is present but bulb removed for several weeks and denies drainage from it.       Review of Systems   Constitution: Positive for decreased appetite. Negative for chills, fever and night sweats.   Gastrointestinal: Negative for bloating, abdominal pain, constipation, nausea and vomiting.   All other systems reviewed and are negative.          Past Medical History:   Diagnosis Date    A-fib     Arthritis     Hypertension     Thyroid disease      Past Surgical History:   Procedure Laterality Date    CATARACT EXTRACTION      CATARACT EXTRACTION W/  INTRAOCULAR LENS IMPLANT Bilateral 2004     [place]    ENDOSCOPIC ULTRASOUND OF UPPER GASTROINTESTINAL TRACT N/A 9/14/2020    Procedure: ULTRASOUND, UPPER GI TRACT, ENDOSCOPIC;  Surgeon: Esha Howard MD;  Location: [address];  Service: Endoscopy;  Laterality: N/A;    ERCP N/A 9/14/2020    Procedure: ERCP (ENDOSCOPIC RETROGRADE CHOLANGIOPANCREATOGRAPHY);  Surgeon: Esha Howard MD;  Location: [address];  Service: Endoscopy;  Laterality: N/A;    ERCP N/A 9/25/2020    Procedure: ERCP (ENDOSCOPIC RETROGRADE CHOLANGIOPANCREATOGRAPHY);  Surgeon: Esha Howard MD;  [facility] (2ND FLR);  Service: Endoscopy;  Laterality: N/A;    EYE SURGERY      HIP REPLACEMENT ARTHROPLASTY      HYSTERECTOMY      JOINT REPLACEMENT      REVISION OF SKIN POCKET FOR PACEMAKER N/A 1/21/2019    Procedure: REVISION-POCKET-PACEMAKER;  Surgeon: Asher Watts MD;  Location: [facility] EP LAB;  Service: Cardiology;  Laterality: N/A;  MODERATE SEDATION, sedation issues in the past    THYROIDECTOMY      TREATMENT OF CARDIAC ARRHYTHMIA N/A 3/18/2019    Procedure: CARDIOVERSION OR DEFIBRILLATION;  Surgeon: Asher Watts MD;  Location: [facility] EP LAB;  Service: Cardiology;  Laterality: N/A;  AF, JILL-cx if SR, DCCV, MAC, FAS, 3PREP    TREATMENT OF CARDIAC ARRHYTHMIA N/A 5/14/2019    Procedure: Cardioversion or Defibrillation;  Surgeon: Derick Vizcarra MD;  Location: [facility] EP LAB;  Service: Cardiology;  Laterality: N/A;  AF, JILL, DCCV, MAC, DM, 3PREP     Family History   Problem Relation Age of Onset    Heart attack Mother     Heart disease Mother     Heart failure Mother     Hypertension Mother     Stroke Maternal Grandmother     Hypertension Maternal Grandmother     Heart disease Maternal Grandmother     Heart attack Maternal Grandmother     Heart failure Maternal Grandmother      Social History     Tobacco Use    Smoking status: Former Smoker     Start date: 5/19/1964    Smokeless tobacco: Never Used   Substance Use Topics    Alcohol use: No    Drug use: No       Review of patient's allergies indicates:   Allergen Reactions    Ciprofloxacin Nausea And Vomiting         Objective:   VS (24h):   Vitals:    11/02/20 1026   BP: (!) 169/85   Pulse: 61   Temp: 97.6 °F (36.4 °C)         Physical Exam  Vitals signs reviewed.   Constitutional:       General: She is not in acute distress.     Appearance: She is not toxic-appearing.      Comments: In wheelchair   HENT:      Head: Normocephalic and atraumatic.      Mouth/Throat:      Mouth: Mucous membranes are moist.      Pharynx: Oropharynx  is clear. No oropharyngeal exudate or posterior oropharyngeal erythema.   Eyes:      General: No scleral icterus.        Right eye: No discharge.         Left eye: No discharge.   Neck:      Musculoskeletal: Neck supple.   Cardiovascular:      Rate and Rhythm: Normal rate and regular rhythm.      Pulses: Normal pulses.      Heart sounds: Normal heart sounds.      Comments: L PM site - nontender, no erythema  Pulmonary:      Effort: Pulmonary effort is normal. No respiratory distress.   Abdominal:      General: Bowel sounds are normal. There is no distension.      Palpations: Abdomen is soft.      Tenderness: There is no abdominal tenderness.      Comments: RUQ IR drain - non-tender, no erythema; no drainage present   Musculoskeletal:         General: No swelling or tenderness.      Right lower leg: No edema.      Left lower leg: No edema.      Comments: +scoliosis   Lymphadenopathy:      Cervical: No cervical adenopathy.   Skin:     General: Skin is warm and dry.      Coloration: Skin is not jaundiced.      Comments: +fungal dermatitis under R/L breast   Neurological:      General: No focal deficit present.      Mental Status: She is alert and oriented to person, place, and time.   Psychiatric:         Mood and Affect: Mood normal.           All data including recent labs, radiology, microbiology and pathology have been independently reviewed.    Labs:    Recent Labs   Lab Result Units 09/26/20  1349  10/19/20  0540 10/22/20  0308 10/26/20  0449 10/29/20  0535   WBC K/uL  --    < > 4.81 4.67 4.39 4.86   Hemoglobin g/dL  --    < > 8.3* 8.7* 8.8* 9.4*   Hematocrit %  --    < > 28.2* 28.4* 28.8* 30.4*   Sodium mmol/L  --    < > 138  --  138 135*   Potassium mmol/L  --    < > 5.0  --  4.3 4.3   Chloride mmol/L  --    < > 107  --  104 101   BUN mg/dL  --    < > 9  --  7* 7*   Creatinine mg/dL  --    < > 0.8  --  0.9 0.8   AST U/L  --    < > 21  21  --  16  16 16  16   ALT U/L  --    < > 10  10  --  6*  6* 6*  6*    Alkaline Phosphatase U/L  --    < > 67  67  --  59  59 59  59   Total Bilirubin mg/dL  --    < > 0.3  0.3  --  0.4  0.4 0.5  0.5   INR  1.0  --   --   --   --   --     < > = values in this interval not displayed.       Medications:  Current Facility-Administered Medications on File Prior to Visit   Medication Dose Route Frequency Provider Last Rate Last Dose    acetaminophen tablet 650 mg  650 mg Oral Q6H PRN Rajiv Grajeda MD        amiodarone tablet 200 mg  200 mg Oral Daily Chetna Diallo MD   200 mg at 11/02/20 0835    apixaban tablet 2.5 mg  2.5 mg Oral BID Chetna Diallo MD   2.5 mg at 11/02/20 0835    atenoloL tablet 50 mg  50 mg Oral Daily Chetna Diallo MD   50 mg at 11/02/20 0835    atorvastatin tablet 10 mg  10 mg Oral Daily Chetna Diallo MD   10 mg at 11/02/20 0836    calcium carbonate 200 mg calcium (500 mg) chewable tablet 500 mg  500 mg Oral BID PRN Chetna Diallo MD   500 mg at 11/01/20 0009    cefTRIAXone (ROCEPHIN) 2 g/50 mL D5W IVPB  2 g Intravenous Q24H Rajiv Grajeda MD   2 g at 11/01/20 1000    diclofenac sodium 1 % gel 4 g  4 g Topical (Top) TID Chetna Diallo MD   4 g at 11/02/20 0836    Lactobacillus rhamnosus GG capsule 1 capsule  1 capsule Oral Daily Chetna Diallo MD   1 capsule at 11/02/20 0835    levothyroxine split tablet 137 mcg  137 mcg Oral Before breakfast Chetna Diallo MD   137 mcg at 11/02/20 0559    melatonin tablet 6 mg  6 mg Oral Nightly PRN Chetna Diallo MD   6 mg at 11/01/20 2114    metroNIDAZOLE tablet 500 mg  500 mg Oral Q8H Chetna Diallo MD   500 mg at 11/02/20 0600    miconazole NITRATE 2 % top powder   Topical (Top) BID Chad Downing, MARYLOU        ondansetron disintegrating tablet 4 mg  4 mg Oral Q6H PRN Rajiv H. Nicci, MD        senna-docusate 8.6-50 mg per tablet 1 tablet  1 tablet Oral BID PRN Chetna Diallo MD        sodium chloride 0.9% flush 10 mL  10 mL Intravenous Q6H Cherelle Aguilar NP   10 mL at  11/02/20 0600    And    sodium chloride 0.9% flush 10 mL  10 mL Intravenous PRN Cherelle Aguilar, NP   10 mL at 10/18/20 1044    spironolactone tablet 25 mg  25 mg Oral BID Chad Downing, MARYLOU   25 mg at 11/02/20 0835     Current Outpatient Medications on File Prior to Visit   Medication Sig Dispense Refill    amiodarone (PACERONE) 200 MG Tab TAKE 1 TABLET ONCE DAILY . START TAKING ONLY 1 TABLET EVERY DAY ON 4-19-19 90 tablet 4    apixaban (ELIQUIS) 2.5 mg Tab Take 1 tablet (2.5 mg total) by mouth 2 (two) times daily. 180 tablet 3    aspirin (ECOTRIN) 81 MG EC tablet Take 1 tablet (81 mg total) by mouth once daily.  0    atenoloL (TENORMIN) 25 MG tablet TAKE 2 TABLETS EVERY MORNING AND 1 TABLET EVERY EVENING 270 tablet 3    atorvastatin (LIPITOR) 10 MG tablet TAKE 1 TABLET DAILY 90 tablet 3    BIOTIN ORAL Take 1 capsule by mouth once daily.      ceftriaxone sodium (CEFTRIAXONE 2 G/50 ML D5W, READY TO MIX,) Inject 50 mLs (2 g total) into the vein once daily. For 4-6 weeks (End 10/24 or 11/7/2020)      diclofenac sodium (VOLTAREN) 1 % Gel Apply 4 g topically 3 (three) times daily. Apply to right side pain 200 g 0    diltiaZEM (CARDIZEM CD) 180 MG 24 hr capsule Take 1 capsule (180 mg total) by mouth once daily. 90 capsule 3    ergocalciferol (VITAMIN D2) 50,000 unit Cap Take 1 capsule (50,000 Units total) by mouth every 7 days.      ergocalciferol, vitamin D2, (VITAMIN D ORAL) Take 1 capsule by mouth once daily.      eszopiclone (LUNESTA) 2 MG Tab Take 2 mg by mouth every evening.      Lactobacillus rhamnosus GG (CULTURELLE) 10 billion cell capsule Take 1 capsule by mouth once daily.      levothyroxine (SYNTHROID) 137 MCG Tab tablet Take 1 tablet (137 mcg total) by mouth once daily. 90 tablet 3    metroNIDAZOLE (FLAGYL) 500 MG tablet Take 1 tablet (500 mg total) by mouth every 8 (eight) hours. For 4-6 weeks (End 10/24 or 11/7/2020) 111 tablet 0    miconazole NITRATE 2 % (MICOTIN) 2 % top powder Apply  topically 2 (two) times daily.  0    sodium chloride 0.9% (NORMAL SALINE FLUSH) injection Inject 10 mLs into the vein every 6 (six) hours.      sodium chloride 0.9% (NORMAL SALINE FLUSH) injection Inject 10 mLs into the vein as needed.      spironolactone (ALDACTONE) 25 MG tablet Take 25 mg by mouth 2 (two) times daily.          Antibiotics:   CTX 2g IV daily + PO flagyl     Micro:   9/26 Hepatic abscess cx: Ecoli (S: augmentin, cefaz, cefepime, cipro, levo, tetra; R: tmpsmx, ampicillin, I: amp/sulb)  9/24 Ucx Efaecalis >100k CFU   9/24 Blcx: Ecoli (S: CTX, augmentin, cefepime, FQ, tetra; R: tmpsmx, amp, amp/sulb)    Radiology:     10/23 CT abdo/pelvis w/ contrast:  Liver: Hepatomegaly.  Stable positioning of drainage catheter within a complex fluid collection in the right hepatic lobe, collection demonstrates mild interval decrease in size measuring up to 2.6 cm (axial image 30).  Previously measured up to 3.2 cm.  No new hepatic collection.     Gallbladder: Contracted.  No calcified gallstones.     Bile Ducts: Common bile duct stent remains in place with stable mild pneumobilia.  Stable mild intrahepatic biliary duct dilatation.     Pancreas: Multiloculated cystic lesion re-identified within the pancreatic body.  Collection is difficult to measure, measures approximately 2.8 x 1.7 x 2.7 cm (axial image 43) and previously 3.0 x 3.8 x 4.0 cm.  No new pancreatic lesions.  No peripancreatic inflammation.    10/9 CT A/P w/contrast:   Liver: Enlarged measuring 21 cm in craniocaudal dimension.  Normal attenuation.  There is a drain via a right upper abdominal approach within a complex fluid collection in the right hepatic lobe, segment 8, that appears smaller measuring 3.2 cm in maximum dimension (previously 3.7 cm).  No new hepatic fluid collections identified.     Gallbladder: No calcified gallstones.     Bile Ducts: Stable position of common bile duct stent with mild pneumobilia.  Mild intrahepatic biliary ductal  dilatation.     Pancreas: Stable appearance of multiloculated cystic lesion adjacent to the pancreatic body measuring 3.0 x 3.8 x 4.0 cm on today's exam (axial series 2, image 95 and coronal series 601, image 53).    Immunization History   Administered Date(s) Administered    Influenza - High Dose - PF (65 years and older) 11/13/2014, 10/01/2015, 08/18/2017, 09/24/2018    Influenza - Quadrivalent - PF *Preferred* (6 months and older) 09/16/2019    PPD Test 10/02/2020    Pneumococcal Conjugate - 13 Valent 05/11/2017    Pneumococcal Polysaccharide - 23 Valent 06/14/2007         Assessment:     86 yo female with hx of gallstone pancreatitis s/p multiple ERCP c/b Ecoli septicemia and pyogenic liver abscess s/p IR drain placed 9/26 currently on CTX/flagyl here for follow up. Tolerated 5 wk course of CTX/flagyl without incident -repeat imaging with improving hepatic fluid collection - though radiologic abnormalities may resolve more slowly than biochemical and clinical markers as patient's LFTs improved and resolution of patient's initial symptoms.     Will transition to augmentin given above sensitivities and pull PICC/stop IV abx. Discussed with patient and son that if patient has increased abdo pain, n/v/d or fevers, to go to ED for further evaluation. Discussed common side effects with augmentin. Pancreatic cystic lesion present - stable on most recent CT on 10/23.     Plan:     1. Hepatic abscess  - start amoxicillin-clavulanate 875-125mg (AUGMENTIN) 875-125 mg per tablet; Take 1 tablet by mouth 2 (two) times daily. for 14 days  Dispense: 28 tablet; Refill: 0  - Nursing communication - pull PICC today, stop ceftriaxone and flagyl  -drain mgmt/repeat ERCP per IR/[facility], has follow up with [facility] next week  -unclear etiology of cystic lesion - largely stable in size despite prolonged course of abx - unclear if infected (no cx data from lesion) vs neoplasm. May need aspiration/biopsy for further evaluation  -discussed  signs/symptoms with patient to go to ED for further evaluation including fever, chills, n/v/d or worsening abdo pain    2. E. coli septicemia c/b hepatic abscess s/p IR drain 9/26  -as above    3. Vaccine counseling  - Influenza - Quadrivalent (Adjuvanted)    4. Immunization due  - Influenza - Quadrivalent (Adjuvanted)    5. Fungal dermatitis  -discharged from SNF with miconazole cream  -fungal dermatitis seen under breasts - discussed with patient to continue using miconazole as directed     6. SSS s/p PM  -continue home meds          These recommendations have been sent to and/or discussed with the following providers:   - Kai Montez MD    Follow up in 2 weeks    Anne De La Cruz MD  Infectious Disease

## 2020-11-02 NOTE — LETTER
November 2, 2020      Kai Montez MD  [address]  [address]  [facility] [address]           [facility] - Infectious Disease 1st Fl  [address]  [place] [address]  Phone: [phone]  Fax: [phone]          Patient: Gisselle Ortiz   MR Number: [id number]   YOB: 1933   Date of Visit: 11/2/2020       Dear Dr. Kai Montez:    Thank you for referring Gisselle Ortiz to me for evaluation. Attached you will find relevant portions of my assessment and plan of care.    If you have questions, please do not hesitate to call me. I look forward to following Gisselle Ortiz along with you.    Sincerely,    Anne De La Cruz MD    Enclosure  CC:  No Recipients    If you would like to receive this communication electronically, please contact [email or web address] or [phone] to request more information on [employer] Link access.    For providers and/or their staff who would like to refer a patient to [facility], please contact us through our one-stop-shop provider referral line, [facility], at [phone].    If you feel you have received this communication in error or would no longer like to receive these types of communications, please e-mail [email or web address]

## 2020-11-02 NOTE — PLAN OF CARE
11/02/20 0825   Final Note   Assessment Type Final Discharge Note   Anticipated Discharge Disposition Home-Health     Patient discharging today. ID appointment scheduled at 10 am. [facility] will follow up. Juanito ESPINO provided patient teaching on home IV antibiotic therapy. Walker delivered to the bedside. Daughter will arrive by 9:30 am.

## 2020-11-02 NOTE — PLAN OF CARE
POC reviewed with pt, pt verbalized understanding. Safety maintained throughout shift, bed locked and in lowest position, call light in reach, Side rails up X 2. Non skid sock on when OOB. Pt remained free of fall/trauma. VSS. . Pt denies pain throughout shift. PICC line in place to Right upper arm at discharge, dressing clean, dry and intact.  Discharge instruction, follow up appointments and medication instructions given to pt, pt verbalized understanding Pt son transporting pt to doctors appointment his AM.  All item gathered and taken at the time of discharge. Wheelchair assistance provided to the car.

## 2020-11-02 NOTE — PROGRESS NOTES
PICC line removed from right upper arm. PICC catheter tip visualized and intact. Pressure dressing applied with vaseline occlusive dressing , 2x2 gauze and secured with coban.  No redness, ecchymosis, edema, swelling, or drainage noted at site. Pt. instructed to leave dressing in place for 24 hours. Pt. also instructed not to immerse in water for three days.  Patient verbalized understanding.  Observed patient for 30 minutes with no signs of bleeding to right upper arm.  Patient left in NAD.

## 2020-11-03 NOTE — HOSPITAL COURSE
Patient progressed well with PT and OT. Patient had no significant events during their stay at SNF. Home health was set up. DME was ordered if needed. Patient discharged to ID follow up. Follow up with GI scheduled for 11/12. Order in for IR drain check/removal, clinic to contact patient with appointment date. Patient discharged with PICC in place, home infusions set up. All prescriptions and discharge instructions were ordered to be given to the patient prior to discharge.       PEx  Constitutional: Patient appears debilitated.  No distress noted  HENT:   Head: Normocephalic and atraumatic.   Eyes: Pupils are equal, round  Neck: Normal range of motion. Neck supple.   Cardiovascular: Normal rate, regular rhythm and normal heart sounds.    Pulmonary/Chest: Effort normal and breath sounds are clear  Abdominal: Soft. Bowel sounds are normal.   Musculoskeletal:+ weakness 4/5 on all extremities  Normal range of motion.   Neurological: Alert and oriented to person, place, and time.   Psychiatric: Normal mood and affect. Behavior is normal.   Skin: Skin is warm and dry.  RUQ drain in place, dressing CDI   Stage 2 pressure injury- gluteal cleft followed weekly by wound care nurse  Date First Assessed/Time First Assessed: 10/02/20 6872   Altered Skin Integrity Present on Admission: yes  Orientation: midline  Location: Gluteal cleft

## 2020-11-03 NOTE — DISCHARGE SUMMARY
[facility] [place], [place] [facility] - Skilled Nursing Care  Department of [facility] Medicine  Discharge Summary      Patient Name: Gisselle Ortiz  MRN: [id number]  Admission Date: 10/2/2020  Hospital Length of Stay: 31 days  Discharge Date and Time: 11/2/2020 10:19 AM  Attending Physician: Dr. Grajeda   Discharging Provider: Chad Downing NP  Primary Care Provider: Kai Montez MD    HPI:   Patient is an 86 yo female PMHx AFib on Eliquis, SSS s/p pacemaker placement (1/2019), HTN, hyperthyroidism s/p thyroidectomy  who presents to SNF following hospitalization for hepatic abscess/bacteremia.         Patient presented to hospital on 9/11 complaining of abdominal pain and was found to have gallstone pancreatitis. She had CBD dilation and a gallbladder with sludge. She underwent EUS and ERCP with stone and sludge seen on the cholangiogram. Sphincterotomy was performed and stone/sludge removed with no stents placed. Per notes, ERCP was concerning for choledocholithiasis.  Surgery was consulted and deemed the patient to be high risk for cholecystectomy. She was seen today in GI clinic for follow-up. She initially recovered well from the pancreatitis and ERCP and was eating prior to discharge. A few days after discharge she started having severe fatigue that progressively worsened to the point where the patient could not walk. She also reported having decreased appetite and nausea.   Patient presented to GI clinic on 9/25 with hypotension and elevated LFTs. Patient was sent to the ED for evaluation.      In the ED, she was found to be afebrile and her BP improved to 130/61 with IVF. HR 63 with NSR and existing RBBB. Qtc 512. She was breathing 20bpm with no O2 requirement. She c/o some nausea but no abd pain. On exam, she had mild discomfort to deep palpation to RUQ abdomen.  CT scan with contrast following pancreatic protocol showed hepatic abscess. Blood culture grew gram negative cristofer; urine culture was positive enterococcus faecalis.     Pt had IR intervention for her hepatic abscess 9/26. Blood culture collected on 9/26  grew E.Coli susceptible to multiple abx. Urine enterococcus faecalis also shown to have susceptibility to multiple organism. Patient was started on Zosyn and ID consulted who recommended ceftriaxone with metronidazole for 4 weeks of treatment.  She had PICC line placed 9/30 for receiving IV antibiotics for 4 weeks.  IR to leave drain in and re-evaluate in 1 week with CT scan. Patient deemed medically stable and discharged to SNF for secondary weakness, debility and IV antibiotics      * No surgery found *      Hospital Course:   Patient progressed well with PT and OT. Patient had no significant events during their stay at SNF. Home health was set up. DME was ordered if needed. Patient discharged to ID follow up. Follow up with GI scheduled for 11/12. Order in for IR drain check/removal, clinic to contact patient with appointment date. Patient discharged with PICC in place, home infusions set up. All prescriptions and discharge instructions were ordered to be given to the patient prior to discharge.       PEx  Constitutional: Patient appears debilitated.  No distress noted  HENT:   Head: Normocephalic and atraumatic.   Eyes: Pupils are equal, round  Neck: Normal range of motion. Neck supple.   Cardiovascular: Normal rate, regular rhythm and normal heart sounds.    Pulmonary/Chest: Effort normal and breath sounds are clear  Abdominal: Soft. Bowel sounds are normal.   Musculoskeletal:+ weakness 4/5 on all extremities  Normal range of motion.   Neurological: Alert and oriented to person, place, and time.   Psychiatric: Normal mood and affect. Behavior is normal.   Skin: Skin is warm and dry.  RUQ drain in place, dressing CDI   Stage 2 pressure injury- gluteal cleft followed weekly by wound care nurse  Date First Assessed/Time First Assessed: 10/02/20 8014   Altered Skin Integrity Present on Admission: yes  Orientation: midline   Location: Gluteal cleft     Consults (From admission, onward)        Status Ordering Provider     Inpatient consult to Registered Dietitian/Nutritionist  Once     Provider:  (Not yet assigned)    Completed VIKTOR CASTILLO          Significant Diagnostic Studies: Labs:   BMP:   Recent Labs   Lab 11/02/20  0450   GLU 80      K 3.7      CO2 27   BUN 10   CREATININE 0.8   CALCIUM 8.0*   MG 1.6   , CMP   Recent Labs   Lab 11/02/20  0450      K 3.7      CO2 27   GLU 80   BUN 10   CREATININE 0.8   CALCIUM 8.0*   PROT 5.3*  5.3*   ALBUMIN 2.5*  2.5*   BILITOT 0.4  0.4   ALKPHOS 52*  52*   AST 14  14   ALT 7*  7*   ANIONGAP 8   ESTGFRAFRICA >60.0   EGFRNONAA >60.0    and CBC   Recent Labs   Lab 11/02/20  0450   WBC 4.08   HGB 9.3*   HCT 30.5*          Pending Diagnostic Studies:     None        Final Active Diagnoses:    Diagnosis Date Noted POA    PRINCIPAL PROBLEM:  Hepatic abscess [K75.0] 09/26/2020 Yes    Leukocytosis [D72.829] 10/05/2020 Yes    Abnormal liver enzymes [R74.8] 10/05/2020 Yes    Abdominal pain [R10.9] 10/02/2020 Yes    E. 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] 06/11/2020 Yes    Long term current use of antiarrhythmic drug [Z79.899] 11/14/2017 Not Applicable    Current use of long term anticoagulation [Z79.01] 11/14/2017 Not Applicable    Vitamin D deficiency [E55.9] 11/13/2017 Yes    Insomnia [G47.00] 05/09/2016 Yes    Gastroesophageal reflux disease [K21.9] 11/09/2015 Yes    Hypothyroidism [E03.9] 05/19/2015 Yes    Paroxysmal atrial fibrillation [I48.0] 05/19/2015 Yes    Hypertension [I10] 05/19/2015 Yes    Pacemaker [Z95.0] 05/19/2015 Yes      Problems Resolved During this Admission:      No new Assessment & Plan notes have been filed under this hospital service since the last note was generated.  Service: Skilled Nursing Facility      Discharged Condition: good    Disposition: Home or  "Self Care    Follow Up:    Patient Instructions:      WALKER FOR HOME USE     Order Specific Question Answer Comments   Type of Walker: Sage (4'4"-5'6")    With wheels? Yes    Height: 5' 2.99" (1.6 m)    Weight: 63 kg (138 lb 14.2 oz)    Length of need (1-99 months): 99    Does patient have medical equipment at home? none    Please check all that apply: Patient's condition impairs ambulation.    Please check all that apply: Patient is unable to safely ambulate without equipment.    Please check all that apply: Patient needs help to get in and out of chair.    Vendor: [facility] delivered to bedside   Expected Date of Delivery: 10/30/2020      Ambulatory referral/consult  to [facility] Interventional RAD   Standing Status: Future   Referral Priority: Routine Referral Type: Consultation   Number of Visits Requested: 1     No driving until:     Notify your health care provider if you experience any of the following:  temperature >100.4     Notify your health care provider if you experience any of the following:  persistent nausea and vomiting or diarrhea     Notify your health care provider if you experience any of the following:  severe uncontrolled pain     Notify your health care provider if you experience any of the following:  redness, tenderness, or signs of infection (pain, swelling, redness, odor or green/yellow discharge around incision site)     Notify your health care provider if you experience any of the following:  difficulty breathing or increased cough     Notify your health care provider if you experience any of the following:  persistent dizziness, light-headedness, or visual disturbances     Notify your health care provider if you experience any of the following:  increased confusion or weakness     Notify your health care provider if you experience any of the following:     Activity as tolerated     Medications:  Reconciled Home Medications:      Medication List      START taking these " medications    miconazole NITRATE 2 % 2 % top powder  Commonly known as: MICOTIN  Apply topically 2 (two) times daily.     * sodium chloride 0.9% injection  Commonly known as: NORMAL SALINE FLUSH  Inject 10 mLs into the vein every 6 (six) hours.     * sodium chloride 0.9% injection  Commonly known as: NORMAL SALINE FLUSH  Inject 10 mLs into the vein as needed.         * This list has 2 medication(s) that are the same as other medications prescribed for you. Read the directions carefully, and ask your doctor or other care provider to review them with you.            CONTINUE taking these medications    amiodarone 200 MG Tab  Commonly known as: PACERONE  TAKE 1 TABLET ONCE DAILY . START TAKING ONLY 1 TABLET EVERY DAY ON 4-19-19     apixaban 2.5 mg Tab  Commonly known as: ELIQUIS  Take 1 tablet (2.5 mg total) by mouth 2 (two) times daily.     aspirin 81 MG EC tablet  Commonly known as: ECOTRIN  Take 1 tablet (81 mg total) by mouth once daily.     atenoloL 25 MG tablet  Commonly known as: TENORMIN  TAKE 2 TABLETS EVERY MORNING AND 1 TABLET EVERY EVENING     atorvastatin 10 MG tablet  Commonly known as: LIPITOR  TAKE 1 TABLET DAILY     BIOTIN ORAL  Take 1 capsule by mouth once daily.     diclofenac sodium 1 % Gel  Commonly known as: VOLTAREN  Apply 4 g topically 3 (three) times daily. Apply to right side pain     diltiaZEM 180 MG 24 hr capsule  Commonly known as: CARDIZEM CD  Take 1 capsule (180 mg total) by mouth once daily.     ergocalciferol 50,000 unit Cap  Commonly known as: VITAMIN D2  Take 1 capsule (50,000 Units total) by mouth every 7 days.     eszopiclone 2 MG Tab  Commonly known as: LUNESTA  Take 2 mg by mouth every evening.     Lactobacillus rhamnosus GG 10 billion cell capsule  Commonly known as: CULTURELLE  Take 1 capsule by mouth once daily.     levothyroxine 137 MCG Tab tablet  Commonly known as: SYNTHROID  Take 1 tablet (137 mcg total) by mouth once daily.     spironolactone 25 MG tablet  Commonly known  as: ALDACTONE  Take 25 mg by mouth 2 (two) times daily.     VITAMIN D ORAL  Take 1 capsule by mouth once daily.        STOP taking these medications    CEFTRIAXONE 2 G/50 ML D5W (READY TO MIX)     metroNIDAZOLE 500 MG tablet  Commonly known as: FLAGYL          Time spent on the discharge of patient: 39 minutes    Chad Downing NP  Department of Hospital Medicine  [facility] [place] [facility] - Skilled Nursing Care

## 2020-11-03 NOTE — HPI
Patient is an 86 yo female PMHx AFib on Eliquis, SSS s/p pacemaker placement (1/2019), HTN, hyperthyroidism s/p thyroidectomy  who presents to SNF following hospitalization for hepatic abscess/bacteremia.         Patient presented to hospital on 9/11 complaining of abdominal pain and was found to have gallstone pancreatitis. She had CBD dilation and a gallbladder with sludge. She underwent EUS and ERCP with stone and sludge seen on the cholangiogram. Sphincterotomy was performed and stone/sludge removed with no stents placed. Per notes, ERCP was concerning for choledocholithiasis.  Surgery was consulted and deemed the patient to be high risk for cholecystectomy. She was seen today in GI clinic for follow-up. She initially recovered well from the pancreatitis and ERCP and was eating prior to discharge. A few days after discharge she started having severe fatigue that progressively worsened to the point where the patient could not walk. She also reported having decreased appetite and nausea.   Patient presented to GI clinic on 9/25 with hypotension and elevated LFTs. Patient was sent to the ED for evaluation.      In the ED, she was found to be afebrile and her BP improved to 130/61 with IVF. HR 63 with NSR and existing RBBB. Qtc 512. She was breathing 20bpm with no O2 requirement. She c/o some nausea but no abd pain. On exam, she had mild discomfort to deep palpation to RUQ abdomen.  CT scan with contrast following pancreatic protocol showed hepatic abscess. Blood culture grew gram negative cristofer; urine culture was positive enterococcus faecalis.      Pt had IR intervention for her hepatic abscess 9/26. Blood culture collected on 9/26  grew E.Coli susceptible to multiple abx. Urine enterococcus faecalis also shown to have susceptibility to multiple organism. Patient was started on Zosyn and ID consulted who recommended ceftriaxone with metronidazole for 4 weeks of treatment.  She had PICC line placed 9/30 for receiving  IV antibiotics for 4 weeks.  IR to leave drain in and re-evaluate in 1 week with CT scan. Patient deemed medically stable and discharged to SNF for secondary weakness, debility and IV antibiotics

## 2020-11-04 ENCOUNTER — TELEPHONE (OUTPATIENT)
Dept: ENDOSCOPY | Facility: HOSPITAL | Age: 85
End: 2020-11-04

## 2020-11-04 ENCOUNTER — HOSPITAL ENCOUNTER (OUTPATIENT)
Dept: INTERVENTIONAL RADIOLOGY/VASCULAR | Facility: HOSPITAL | Age: 85
Discharge: HOME | End: 2020-11-04
Attending: FAMILY MEDICINE
Payer: MEDICARE

## 2020-11-04 DIAGNOSIS — K75.0 HEPATIC ABSCESS: ICD-10-CM

## 2020-11-04 PROCEDURE — 76080 IR ABSCESS TUBE CHECK (XPD): ICD-10-PCS | Mod: 26,,, | Performed by: RADIOLOGY

## 2020-11-04 PROCEDURE — 49424 ASSESS CYST CONTRAST INJECT: CPT

## 2020-11-04 PROCEDURE — 49424 ASSESS CYST CONTRAST INJECT: CPT | Mod: ,,, | Performed by: RADIOLOGY

## 2020-11-04 PROCEDURE — 49424 IR ABSCESS TUBE CHECK (XPD): ICD-10-PCS | Mod: ,,, | Performed by: RADIOLOGY

## 2020-11-04 PROCEDURE — 76080 X-RAY EXAM OF FISTULA: CPT | Mod: 26,,, | Performed by: RADIOLOGY

## 2020-11-04 NOTE — BRIEF OP NOTE
Radiology Post-Procedure Note    Pre Op Diagnosis: Intra-hepatic abscess with no further noted output via indwelling drain  Post Op Diagnosis: Same    Procedure: 1. Contrast injection/evaluation of abscess drainage catheter  2. Removal of right flank approach intra-hepatic abscess drainage catheter    Procedure performed by: Reinier Reynolds MD    Written Informed Consent Obtained: Yes  Specimen Removed: NO  Estimated Blood Loss: none    Findings:   Pre-existing catheter flushes with ease however, unable to aspirate any more than the 5-cc of clear normal saline that I instilled suggesting resolution of intra-hepatic abscess cavity.     Injection of a small amount of contrast shows no significant residual hepatic intra-parenchymal cavity also suggesting resolution of previously noted sizable abscess cavity s/p successful removal of the drainage catheter. Patient tolerated the procedure well. No immediate post-procedural complications noted.     Thank you for considering IR for the care of your patient.     Reinier Reynolds MD  Interventional Radiology

## 2020-11-04 NOTE — DISCHARGE SUMMARY
Radiology Discharge Summary      Hospital Course: No complications    Admit Date: 11/4/2020  Discharge Date: 11/04/2020     Instructions Given to Patient: Yes    Diet: Resume prior diet     Activity: activity as tolerated    Description of Condition on Discharge: Stable    Vital Signs (Most Recent):      Discharge Disposition: Home    Discharge Diagnosis:  87 y.o. female with right flank approach percutaneous intra-hepatic abscess drainage catheter placed on 9/26/20 which reportedly has no significant further drainage of fluid from the catheter over the past week suggesting resolution of the intra-hepatic abscess.     Pt now s/p successful fluoroscopic-guided evaluation/contrast injection of the catheter. Catheter flushes with ease however, unable to aspirate any more than the 5-cc of clear normal saline that I instilled suggesting resolution of intra-hepatic abscess cavity.      Injection of a small amount of contrast shows no significant residual hepatic intra-parenchymal cavity also suggesting resolution of previously noted sizable abscess cavity s/p successful removal of the drainage catheter without sedation.

## 2020-11-04 NOTE — TELEPHONE ENCOUNTER
Spoke with patient's son. ERCP scheduled for 11/25 at . Reviewed prep instructions. Verbalized understanding.

## 2020-11-04 NOTE — H&P
Radiology History & Physical      SUBJECTIVE:     Chief Complaint: Intra-hepatic abscess with indwelling drain    History of Present Illness:  Gisselle Ortiz is a 87 y.o. female with pertinent PMHx of intra-hepatic abscess s/p IR placement of a right flank approach percutaneous intra-hepatic abscess drainage catheter on 9/26/20 which reportedly has no significant further drainage of fluid from the catheter over the past week suggesting resolution of the intra-hepatic abscess.    A new outpatient IR consult received for evaluation/contrast injection of the catheter +/- drainage catheter removal.     Past Medical History:   Diagnosis Date    A-fib     Arthritis     Hypertension     Thyroid disease      Past Surgical History:   Procedure Laterality Date    CATARACT EXTRACTION      CATARACT EXTRACTION W/  INTRAOCULAR LENS IMPLANT Bilateral 2004     [place]    ENDOSCOPIC ULTRASOUND OF UPPER GASTROINTESTINAL TRACT N/A 9/14/2020    Procedure: ULTRASOUND, UPPER GI TRACT, ENDOSCOPIC;  Surgeon: Esha Howard MD;  Location: [address]);  Service: Endoscopy;  Laterality: N/A;    ERCP N/A 9/14/2020    Procedure: ERCP (ENDOSCOPIC RETROGRADE CHOLANGIOPANCREATOGRAPHY);  Surgeon: Esha Howard MD;  Location: [address]);  Service: Endoscopy;  Laterality: N/A;    ERCP N/A 9/25/2020    Procedure: ERCP (ENDOSCOPIC RETROGRADE CHOLANGIOPANCREATOGRAPHY);  Surgeon: Esha Howard MD;  Location: [facility] ([address] [place] [address]);  Service: Endoscopy;  Laterality: N/A;    EYE SURGERY      HIP REPLACEMENT ARTHROPLASTY      HYSTERECTOMY      JOINT REPLACEMENT      REVISION OF SKIN POCKET FOR PACEMAKER N/A 1/21/2019    Procedure: REVISION-POCKET-PACEMAKER;  Surgeon: Asher Watts MD;  Location: [facility] EP LAB;  Service: Cardiology;  Laterality: N/A;  MODERATE SEDATION, sedation issues in the past    THYROIDECTOMY      TREATMENT OF CARDIAC ARRHYTHMIA N/A 3/18/2019    Procedure: CARDIOVERSION  OR DEFIBRILLATION;  Surgeon: Asher Watts MD;  Location: [facility] EP LAB;  Service: Cardiology;  Laterality: N/A;  AF, JILL-cx if SR, DCCV, MAC, FAS, 3PREP    TREATMENT OF CARDIAC ARRHYTHMIA N/A 5/14/2019    Procedure: Cardioversion or Defibrillation;  Surgeon: Derick Vizcarra MD;  Location: [facility] EP LAB;  Service: Cardiology;  Laterality: N/A;  AF, JILL, DCCV, MAC, DM, 3PREP     Home Meds:   Prior to Admission medications    Medication Sig Start Date End Date Taking? Authorizing Provider   amiodarone (PACERONE) 200 MG Tab TAKE 1 TABLET ONCE DAILY . START TAKING ONLY 1 TABLET EVERY DAY ON 4-19-19 3/3/20   Asher Watts MD   amoxicillin-clavulanate 875-125mg (AUGMENTIN) 875-125 mg per tablet Take 1 tablet by mouth 2 (two) times daily. for 14 days 11/2/20 11/16/20  Anne De La Cruz MD   apixaban (ELIQUIS) 2.5 mg Tab Take 1 tablet (2.5 mg total) by mouth 2 (two) times daily. 4/23/20   Fadi Chavez MD   aspirin (ECOTRIN) 81 MG EC tablet Take 1 tablet (81 mg total) by mouth once daily. 10/29/20   Chad Downing NP   atenoloL (TENORMIN) 25 MG tablet TAKE 2 TABLETS EVERY MORNING AND 1 TABLET EVERY EVENING 4/23/20   Favian Colmenares MD   atorvastatin (LIPITOR) 10 MG tablet TAKE 1 TABLET DAILY 7/22/20   Kai Montez MD   BIOTIN ORAL Take 1 capsule by mouth once daily.    Historical Provider   diclofenac sodium (VOLTAREN) 1 % Gel Apply 4 g topically 3 (three) times daily. Apply to right side pain 10/1/20   Lorenzo Marlow MD   diltiaZEM (CARDIZEM CD) 180 MG 24 hr capsule Take 1 capsule (180 mg total) by mouth once daily. 10/29/20   Chad Downing NP   ergocalciferol (VITAMIN D2) 50,000 unit Cap Take 1 capsule (50,000 Units total) by mouth every 7 days. 9/15/20   Otf Mason MD   ergocalciferol, vitamin D2, (VITAMIN D ORAL) Take 1 capsule by mouth once daily.    Historical Provider   eszopiclone (LUNESTA) 2 MG Tab Take 2 mg by mouth every evening.    Historical Provider   Lactobacillus  rhamnosus GG (CULTURELLE) 10 billion cell capsule Take 1 capsule by mouth once daily. 10/3/20   Lorenzo Marlow MD   levothyroxine (SYNTHROID) 137 MCG Tab tablet Take 1 tablet (137 mcg total) by mouth once daily. 7/27/20 7/27/21  Kai Montez MD   miconazole NITRATE 2 % (MICOTIN) 2 % top powder Apply topically 2 (two) times daily. 10/29/20   Chad Downing NP   sodium chloride 0.9% (NORMAL SALINE FLUSH) injection Inject 10 mLs into the vein every 6 (six) hours. 10/29/20   Chad Downing NP   sodium chloride 0.9% (NORMAL SALINE FLUSH) injection Inject 10 mLs into the vein as needed. 10/29/20   Chad Downing NP   spironolactone (ALDACTONE) 25 MG tablet Take 25 mg by mouth 2 (two) times daily.  1/6/20   Historical Provider     Anticoagulants/Antiplatelets: Apixaban    Allergies:   Review of patient's allergies indicates:   Allergen Reactions    Ciprofloxacin Nausea And Vomiting     Sedation History:  no adverse reactions    Review of Systems:   Hematological: no known coagulopathies  Respiratory: no cough, shortness of breath, or wheezing  Cardiovascular: no chest pain or dyspnea on exertion  Gastrointestinal: no abdominal pain, change in bowel habits, or black or bloody stools  Genito-Urinary: no dysuria, trouble voiding, or hematuria  Musculoskeletal: negative  Neurological: no TIA or stroke symptoms       OBJECTIVE:     Vital Signs (Most Recent)     Physical Exam:  General: no acute distress  Mental Status: alert and oriented to person, place and time  HEENT: normocephalic, atraumatic  Chest: unlabored breathing  Heart: regular heart rate  Abdomen: nondistended  Extremity: moves all extremities  Right flank drainage catheter: flushes without resistance; unable to aspirate any more than the 5-cc of saline that I flushed prior to attempting aspiration.    Laboratory  Lab Results   Component Value Date    INR 1.0 09/26/2020       Lab Results   Component Value Date    WBC 4.08 11/02/2020    HGB 9.3  (L) 11/02/2020    HCT 30.5 (L) 11/02/2020    MCV 99 (H) 11/02/2020     11/02/2020      Lab Results   Component Value Date    GLU 80 11/02/2020     11/02/2020    K 3.7 11/02/2020     11/02/2020    CO2 27 11/02/2020    BUN 10 11/02/2020    CREATININE 0.8 11/02/2020    CALCIUM 8.0 (L) 11/02/2020    MG 1.6 11/02/2020    ALT 7 (L) 11/02/2020    ALT 7 (L) 11/02/2020    AST 14 11/02/2020    AST 14 11/02/2020    ALBUMIN 2.5 (L) 11/02/2020    ALBUMIN 2.5 (L) 11/02/2020    BILITOT 0.4 11/02/2020    BILITOT 0.4 11/02/2020    BILIDIR 0.3 11/02/2020     ASSESSMENT/PLAN:     87 y.o. female with pertinent PMHx of intra-hepatic abscess s/p IR placement of a right flank approach percutaneous intra-hepatic abscess drainage catheter on 9/26/20 which reportedly has no significant further drainage of fluid from the catheter over the past week suggesting resolution of the intra-hepatic abscess.    1. H/o Intra-hepatic abscess with current indwelling drain with no drainage over past week - Will attempt fluoroscopic-guided evaluation/contrast injection of the catheter +/- drainage catheter removal without sedation.     Risks (including, but not limited to, pain, bleeding, infection, damage to nearby structures, failure to obtain sufficient material for a diagnosis, the need for additional procedures, and death), benefits, and alternatives were discussed with the patient. All questions were answered to the best of my abilities. The patient wishes to proceed with the procedure. Written informed consent was obtained.    Thank you for considering IR for the care of your patient.     Reinier Reynolds MD  Interventional Radiology

## 2020-11-05 ENCOUNTER — PATIENT OUTREACH (OUTPATIENT)
Dept: ADMINISTRATIVE | Facility: CLINIC | Age: 85
End: 2020-11-05

## 2020-11-05 DIAGNOSIS — K75.0 HEPATIC ABSCESS: Primary | ICD-10-CM

## 2020-11-05 NOTE — PATIENT INSTRUCTIONS
Understanding Liver Abscess    A liver abscess is a pocket of pus that forms in the liver. It must be treated right away to prevent serious problems.  How does a liver abscess happen?  A liver abscess most often occurs because of infection from germs such as bacteria, parasites, or fungi. The type of germ determines what type of liver abscess you have:  · Bacteria cause a pyogenic liver abscess  · Parasites cause an amebic liver abscess  · Fungi cause a fungal liver abscess  The infection may have:  · Spread to your liver from other structures in your belly (abdomen), such as the gallbladder, bile ducts, bowels, or appendix  · Traveled in your bloodstream to your liver from more distant areas of the body  · Happened after surgery or an injury to your liver  Symptoms of liver abscess  Symptoms of a liver abscess may take 2 to 4 weeks to appear. They may include:  · Fever, chills, sweats  · General discomfort or sick feeling  · Pain in the upper right part of the belly  · Weight loss  · Nausea, vomiting, or both  · Diarrhea, constipation, or both  · Cough  · Chest pain or shoulder pain  · Yellowish eyes and skin (jaundice)  Treatment for liver abscess  Treatment depends on the type of liver abscess that you have. To find this out, your provider may put a needle through your skin into the abscess. A sample of pus will be removed and checked. In many cases, treatment begins in the hospital and continues at home. Treatment may include:  · Taking medicines, such as antibiotics. These may be given by an IV (intravenous) line placed in a vein in your arm or hand. Or you may take them by mouth. You may need the medicines for a few weeks or longer.  · Draining the pus from the abscess. The doctor may do this using a needle or tube (catheter) put through your skin. In severe cases, the doctor may make a cut (incision) through your skin to reach your liver.  · Having imaging tests. In many cases, your healthcare provider will  order follow-up tests of your liver after treatment. This is often done by ultrasound, CT scan, or MRI.  Possible complications of liver abscess  These can include:  · Burst (ruptured) abscess  · Infection spreading into other parts of the body such as the belly, lungs, heart, brain, and eyes  · Death  When to call your healthcare provider  Call your healthcare provider right away if you have any of these:  · Symptoms, such as fever or pain, that dont get better with treatment or get worse  · Yellowish skin or eyes (jaundice)  · New symptoms, such as trouble breathing   Date Last Reviewed: 5/1/2016  © 5710-4369 [employer]. [address] [place] [address], [place] [address]. All rights reserved. This information is not intended as a substitute for professional medical care. Always follow your healthcare professional's instructions.

## 2020-11-08 ENCOUNTER — HOSPITAL ENCOUNTER (EMERGENCY)
Facility: HOSPITAL | Age: 85
Discharge: HOME | End: 2020-11-08
Attending: EMERGENCY MEDICINE
Payer: MEDICARE

## 2020-11-08 VITALS
TEMPERATURE: 98 F | BODY MASS INDEX: 22.15 KG/M2 | HEIGHT: 63 IN | DIASTOLIC BLOOD PRESSURE: 63 MMHG | HEART RATE: 67 BPM | OXYGEN SATURATION: 98 % | WEIGHT: 125 LBS | RESPIRATION RATE: 20 BRPM | SYSTOLIC BLOOD PRESSURE: 132 MMHG

## 2020-11-08 DIAGNOSIS — R11.2 NAUSEA & VOMITING: ICD-10-CM

## 2020-11-08 DIAGNOSIS — K59.00 CONSTIPATION, UNSPECIFIED CONSTIPATION TYPE: Primary | ICD-10-CM

## 2020-11-08 DIAGNOSIS — K57.10 SMALL BOWEL DIVERTICULAR DISEASE: ICD-10-CM

## 2020-11-08 LAB
ALBUMIN SERPL BCP-MCNC: 3.1 G/DL (ref 3.5–5.2)
ALP SERPL-CCNC: 86 U/L (ref 55–135)
ALT SERPL W/O P-5'-P-CCNC: 5 U/L (ref 10–44)
ANION GAP SERPL CALC-SCNC: 10 MMOL/L (ref 8–16)
AST SERPL-CCNC: 12 U/L (ref 10–40)
BACTERIA #/AREA URNS AUTO: ABNORMAL /HPF
BASOPHILS # BLD AUTO: 0.05 K/UL (ref 0–0.2)
BASOPHILS NFR BLD: 0.6 % (ref 0–1.9)
BILIRUB SERPL-MCNC: 0.6 MG/DL (ref 0.1–1)
BILIRUB UR QL STRIP: NEGATIVE
BUN SERPL-MCNC: 10 MG/DL (ref 6–30)
BUN SERPL-MCNC: 11 MG/DL (ref 8–23)
CALCIUM SERPL-MCNC: 9.1 MG/DL (ref 8.7–10.5)
CHLORIDE SERPL-SCNC: 100 MMOL/L (ref 95–110)
CHLORIDE SERPL-SCNC: 102 MMOL/L (ref 95–110)
CLARITY UR REFRACT.AUTO: CLEAR
CO2 SERPL-SCNC: 25 MMOL/L (ref 23–29)
COLOR UR AUTO: YELLOW
CREAT SERPL-MCNC: 0.8 MG/DL (ref 0.5–1.4)
CREAT SERPL-MCNC: 0.8 MG/DL (ref 0.5–1.4)
DIFFERENTIAL METHOD: ABNORMAL
EOSINOPHIL # BLD AUTO: 0 K/UL (ref 0–0.5)
EOSINOPHIL NFR BLD: 0.3 % (ref 0–8)
ERYTHROCYTE [DISTWIDTH] IN BLOOD BY AUTOMATED COUNT: 15 % (ref 11.5–14.5)
EST. GFR  (AFRICAN AMERICAN): >60 ML/MIN/1.73 M^2
EST. GFR  (NON AFRICAN AMERICAN): >60 ML/MIN/1.73 M^2
GLUCOSE SERPL-MCNC: 119 MG/DL (ref 70–110)
GLUCOSE SERPL-MCNC: 120 MG/DL (ref 70–110)
GLUCOSE UR QL STRIP: NEGATIVE
HCT VFR BLD AUTO: 38.1 % (ref 37–48.5)
HCT VFR BLD CALC: 39 %PCV (ref 36–54)
HGB BLD-MCNC: 12 G/DL (ref 12–16)
HGB UR QL STRIP: NEGATIVE
HYALINE CASTS UR QL AUTO: 5 /LPF
IMM GRANULOCYTES # BLD AUTO: 0.05 K/UL (ref 0–0.04)
IMM GRANULOCYTES NFR BLD AUTO: 0.6 % (ref 0–0.5)
KETONES UR QL STRIP: NEGATIVE
LACTATE SERPL-SCNC: 1 MMOL/L (ref 0.5–2.2)
LEUKOCYTE ESTERASE UR QL STRIP: ABNORMAL
LIPASE SERPL-CCNC: 69 U/L (ref 4–60)
LYMPHOCYTES # BLD AUTO: 0.8 K/UL (ref 1–4.8)
LYMPHOCYTES NFR BLD: 9.2 % (ref 18–48)
MCH RBC QN AUTO: 31.3 PG (ref 27–31)
MCHC RBC AUTO-ENTMCNC: 31.5 G/DL (ref 32–36)
MCV RBC AUTO: 99 FL (ref 82–98)
MICROSCOPIC COMMENT: ABNORMAL
MONOCYTES # BLD AUTO: 0.6 K/UL (ref 0.3–1)
MONOCYTES NFR BLD: 7.2 % (ref 4–15)
NEUTROPHILS # BLD AUTO: 7.2 K/UL (ref 1.8–7.7)
NEUTROPHILS NFR BLD: 82.1 % (ref 38–73)
NITRITE UR QL STRIP: NEGATIVE
NRBC BLD-RTO: 0 /100 WBC
PH UR STRIP: 7 [PH] (ref 5–8)
PLATELET # BLD AUTO: 295 K/UL (ref 150–350)
PMV BLD AUTO: 9.2 FL (ref 9.2–12.9)
POC IONIZED CALCIUM: 1.19 MMOL/L (ref 1.06–1.42)
POC TCO2 (MEASURED): 27 MMOL/L (ref 23–29)
POTASSIUM BLD-SCNC: 5.1 MMOL/L (ref 3.5–5.1)
POTASSIUM SERPL-SCNC: 5.3 MMOL/L (ref 3.5–5.1)
PROT SERPL-MCNC: 6.9 G/DL (ref 6–8.4)
PROT UR QL STRIP: NEGATIVE
RBC # BLD AUTO: 3.84 M/UL (ref 4–5.4)
RBC #/AREA URNS AUTO: 2 /HPF (ref 0–4)
SAMPLE: ABNORMAL
SODIUM BLD-SCNC: 136 MMOL/L (ref 136–145)
SODIUM SERPL-SCNC: 137 MMOL/L (ref 136–145)
SP GR UR STRIP: >=1.03 (ref 1–1.03)
SQUAMOUS #/AREA URNS AUTO: 1 /HPF
URN SPEC COLLECT METH UR: ABNORMAL
WBC # BLD AUTO: 8.73 K/UL (ref 3.9–12.7)
WBC #/AREA URNS AUTO: 2 /HPF (ref 0–5)

## 2020-11-08 PROCEDURE — 99284 PR EMERGENCY DEPT VISIT,LEVEL IV: ICD-10-PCS | Mod: ,,, | Performed by: EMERGENCY MEDICINE

## 2020-11-08 PROCEDURE — 25000003 PHARM REV CODE 250: Performed by: EMERGENCY MEDICINE

## 2020-11-08 PROCEDURE — 85025 COMPLETE CBC W/AUTO DIFF WBC: CPT

## 2020-11-08 PROCEDURE — 81001 URINALYSIS AUTO W/SCOPE: CPT

## 2020-11-08 PROCEDURE — 96360 HYDRATION IV INFUSION INIT: CPT

## 2020-11-08 PROCEDURE — 93010 ELECTROCARDIOGRAM REPORT: CPT | Mod: ,,, | Performed by: INTERNAL MEDICINE

## 2020-11-08 PROCEDURE — 80053 COMPREHEN METABOLIC PANEL: CPT

## 2020-11-08 PROCEDURE — 25500020 PHARM REV CODE 255: Performed by: EMERGENCY MEDICINE

## 2020-11-08 PROCEDURE — 93005 ELECTROCARDIOGRAM TRACING: CPT

## 2020-11-08 PROCEDURE — 99284 EMERGENCY DEPT VISIT MOD MDM: CPT | Mod: ,,, | Performed by: EMERGENCY MEDICINE

## 2020-11-08 PROCEDURE — 83605 ASSAY OF LACTIC ACID: CPT

## 2020-11-08 PROCEDURE — 83690 ASSAY OF LIPASE: CPT

## 2020-11-08 PROCEDURE — 96361 HYDRATE IV INFUSION ADD-ON: CPT

## 2020-11-08 PROCEDURE — 99285 EMERGENCY DEPT VISIT HI MDM: CPT | Mod: 25

## 2020-11-08 PROCEDURE — 93010 EKG 12-LEAD: ICD-10-PCS | Mod: ,,, | Performed by: INTERNAL MEDICINE

## 2020-11-08 RX ADMIN — SODIUM CHLORIDE 1000 ML: 0.9 INJECTION, SOLUTION INTRAVENOUS at 01:11

## 2020-11-08 RX ADMIN — IOHEXOL 75 ML: 350 INJECTION, SOLUTION INTRAVENOUS at 02:11

## 2020-11-08 NOTE — ED PROVIDER NOTES
"Encounter Date: 11/8/2020       History     Chief Complaint   Patient presents with    Abdominal Swelling     hx of e coli septicemia. abd pain, swelling, and vomiting "bile"     Vomiting     HPI     This is an 87-year-old woman with a history of atrial fibrillation on Eliquis, sick sinus syndrome status post pacemaker, hypertension, and recent hospitalization for hepatic abscess associated with bacteremia, status post right upper quadrant drain.  She was discharged from her skilled nursing facility on November 2nd, after months of rehabilitation.  She presents now with acute onset nausea and vomiting, associated with acute abdominal distention.  This began yesterday.  She was able to take p.o. yesterday, has been able to take liquids today, but did not take her medications today because she felt like it would exacerbate her nausea.  She right now she feels okay now that she has not taken anything.  She is still passing flatus.  She denies any fevers or chills, though her family member at bedside reports that she was shaking last night while vomiting.  Total episodes of emesis x2, described as bright green bile.  Review of patient's allergies indicates:   Allergen Reactions    Ciprofloxacin Nausea And Vomiting     Past Medical History:   Diagnosis Date    A-fib     Arthritis     Hypertension     Thyroid disease      Past Surgical History:   Procedure Laterality Date    CATARACT EXTRACTION      CATARACT EXTRACTION W/  INTRAOCULAR LENS IMPLANT Bilateral 2004     IN [place]    ENDOSCOPIC ULTRASOUND OF UPPER GASTROINTESTINAL TRACT N/A 9/14/2020    Procedure: ULTRASOUND, UPPER GI TRACT, ENDOSCOPIC;  Surgeon: Esha Howard MD;  Location: [address];  Service: Endoscopy;  Laterality: N/A;    ERCP N/A 9/14/2020    Procedure: ERCP (ENDOSCOPIC RETROGRADE CHOLANGIOPANCREATOGRAPHY);  Surgeon: Esha Howard MD;  Location: [address]);  Service: Endoscopy;  Laterality: N/A;    ERCP " N/A 9/25/2020    Procedure: ERCP (ENDOSCOPIC RETROGRADE CHOLANGIOPANCREATOGRAPHY);  Surgeon: Esha Howard MD;  Location: [facility] ([address] [place] [address]);  Service: Endoscopy;  Laterality: N/A;    EYE SURGERY      HIP REPLACEMENT ARTHROPLASTY      HYSTERECTOMY      JOINT REPLACEMENT      REVISION OF SKIN POCKET FOR PACEMAKER N/A 1/21/2019    Procedure: REVISION-POCKET-PACEMAKER;  Surgeon: Asher Watts MD;  Location: [facility] EP [facility];  Service: Cardiology;  Laterality: N/A;  MODERATE SEDATION, sedation issues in the past    THYROIDECTOMY      TREATMENT OF CARDIAC ARRHYTHMIA N/A 3/18/2019    Procedure: CARDIOVERSION OR DEFIBRILLATION;  Surgeon: Asher Watts MD;  Location: [facility] EP [facility];  Service: Cardiology;  Laterality: N/A;  AF, JILL-cx if SR, DCCV, MAC, FAS, 3PREP    TREATMENT OF CARDIAC ARRHYTHMIA N/A 5/14/2019    Procedure: Cardioversion or Defibrillation;  Surgeon: Derick Vizcarra MD;  Location: [facility] EP [facility];  Service: Cardiology;  Laterality: N/A;  AF, JILL, DCCV, MAC, DM, 3PREP     Family History   Problem Relation Age of Onset    Heart attack Mother     Heart disease Mother     Heart failure Mother     Hypertension Mother     Stroke Maternal Grandmother     Hypertension Maternal Grandmother     Heart disease Maternal Grandmother     Heart attack Maternal Grandmother     Heart failure Maternal Grandmother      Social History     Tobacco Use    Smoking status: Former Smoker     Start date: 5/19/1964    Smokeless tobacco: Never Used   Substance Use Topics    Alcohol use: No    Drug use: No     Review of Systems   Constitutional: Negative for chills, diaphoresis, fatigue and fever.   HENT: Negative for congestion, rhinorrhea and sore throat.    Eyes: Negative for visual disturbance.   Respiratory: Negative for cough, chest tightness and shortness of breath.    Cardiovascular: Negative for chest pain.   Gastrointestinal: Positive for abdominal pain, nausea and vomiting. Negative for blood  in stool, constipation and diarrhea.   Genitourinary: Negative for dysuria, hematuria and urgency.   Musculoskeletal: Negative for back pain.   Skin: Negative for rash.   Neurological: Negative for seizures and syncope.   Hematological: Does not bruise/bleed easily.   Psychiatric/Behavioral: Negative for agitation and hallucinations.       Physical Exam     Initial Vitals [11/08/20 1211]   BP Pulse Resp Temp SpO2   (!) 130/57 61 17 97.7 °F (36.5 °C) 98 %      MAP       --         Physical Exam  Gen: AxOx3, NAD, well nourished, appears stated age  Eye: EOMI, no scleral icterus, no periorbital edema or ecchymosis  Head: normocephalic, atraumatic, no lesions, scalp appears normal  ENT: neck supple, no stridor, no masses, no drooling or voice changes  CVS: RRR, no m/r/g, distal pulses intact/symmetric  Pulm: CTAB, no wheezes, rales or rhonchi, no increased work of breathing  Abd: soft, tender to palpation in the bilateral lower quadrants with guarding and moderate distension, right upper quadrant prior drain site is clean, dry, intact, without any surrounding erythema, induration or purulent drainage, no organomegaly, no CVAT, rectal exam without fecal impaction  Ext: no edema, no lesions, rashes, or deformity  Neuro: GCS15, moving all extremities, gait intact, face grossly symmetric  Psych: normal affect, cooperative, well groomed, makes good eye contact  ED Course   Procedures  Labs Reviewed   CBC W/ AUTO DIFFERENTIAL - Abnormal; Notable for the following components:       Result Value    RBC 3.84 (*)     MCV 99 (*)     MCH 31.3 (*)     MCHC 31.5 (*)     RDW 15.0 (*)     Immature Granulocytes 0.6 (*)     Immature Grans (Abs) 0.05 (*)     Lymph # 0.8 (*)     Gran % 82.1 (*)     Lymph % 9.2 (*)     All other components within normal limits   COMPREHENSIVE METABOLIC PANEL - Abnormal; Notable for the following components:    Potassium 5.3 (*)     Glucose 120 (*)     Albumin 3.1 (*)     ALT 5 (*)     All other components  within normal limits   LIPASE - Abnormal; Notable for the following components:    Lipase 69 (*)     All other components within normal limits   URINALYSIS, REFLEX TO URINE CULTURE - Abnormal; Notable for the following components:    Specific Gravity, UA >=1.030 (*)     Leukocytes, UA Trace (*)     All other components within normal limits    Narrative:     Specimen Source->Urine   URINALYSIS MICROSCOPIC - Abnormal; Notable for the following components:    Hyaline Casts, UA 5 (*)     All other components within normal limits    Narrative:     Specimen Source->Urine   ISTAT PROCEDURE - Abnormal; Notable for the following components:    POC Glucose 119 (*)     All other components within normal limits   LACTIC ACID, PLASMA          Imaging Results           CT Abdomen Pelvis With Contrast (Final result)  Result time 11/08/20 17:16:26    Final result by Matt Gomez MD (11/08/20 17:16:26)                 Impression:      Focal wall outpouching of the proximal jejunum with associated inflammatory stranding, nonspecific.  Potential etiology to include small bowel diverticulitis noting additional small bowel diverticula at the duodenal level.  Focal dilatation related to underlying stricture less favored as no definitive narrowing is identified.  Component of marginal wall ulceration with contained perforation cannot be entirely excluded.  Continued follow-up suggested.    Additional nonspecific thickening of the gastric antrum could relate to partially decompressed state versus gastritis.    Bibasilar atelectasis with small left greater than right pleural effusions.    Redemonstration of multiloculated cystic collection within the pancreatic body, not significantly changed.    Sequela of previous right percutaneous drain with tiny residual intrahepatic collection as described.    Bilateral non-obstructing renal calculi.    Additional findings, as above.    This report was flagged in Epic as  abnormal.    Electronically signed by resident: Yan Claros  Date:    11/08/2020  Time:    14:54    Electronically signed by: Matt Gomez  Date:    11/08/2020  Time:    17:16             Narrative:    EXAMINATION:  CT ABDOMEN PELVIS WITH CONTRAST    CLINICAL HISTORY:  Abdominal distension;Abdominal infection suspected;Nausea/vomiting;    TECHNIQUE:  Routine axial CT images of the abdomen were obtained after administration 75cc of IV Omnipaque 350.  Coronal and Sagittal reformatted images were also obtained.    COMPARISON:  CT abdomen pelvis: 10/23/2020.    FINDINGS:  Partially visualized pacer leads.  Coronary calcific atherosclerosis.  Hiatal hernia.  Bibasilar atelectasis with small left greater than right pleural effusions.    Abdomen and pelvis: Liver is enlarged.  Similar appearance of biliary stent with mild pneumobilia in the left lobe.  Similar mild central intrahepatic ductal prominence in the right lobe.  Sequela of previous right percutaneous drain with tiny residual intrahepatic collection measuring 1.9 x 1.0 cm (series 2, image 40).    No calcified gallstones.  The spleen and adrenal glands appear normal.  Redemonstration of multiloculated cystic lesion within the pancreatic body measuring 2.6 x 1.5 cm (series 2, image 50), previously 2.8 x 1.7 cm.    Similar bilateral nonobstructing renal calculi.  No hydronephrosis.  Left lower pole cyst.  Urinary bladder is unremarkable though partially obscured by right hip arthroplasty.  Several intrapelvic phleboliths.    The GI tract is normal in caliber.  Scattered stool retention throughout the colon.  The appendix is not well visualized.  There is nonspecific thickening of the gastric antrum.  Areas of focal outpouching involving the proximal small bowel most suggestive of diverticula noted at the level of the descending and transverse duodenum (series 602, image 82 and 95).  Additional area of focal outpouching at the level of the proximal jejunum  with adjacent inflammatory stranding measuring 2.9 x 4.0 cm (series 2, image 77).  Aside from the descending transverse duodenal diverticula, additional areas of outpouching are not well correlated on comparison studies.    Scattered abdominal aortic atherosclerosis.  Similar prominent matt hepatic nodes.  Generalized body wall edema.  Postsurgical changes of right hip arthroplasty.  Similar subcortical cystic change at the level of the right acetabulum.  Stable sclerotic focus of the right iliac bone.  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                                 Medical Decision Making:   History:   I obtained history from: someone other than patient.       <> Summary of History: Patient's son at bedside  Old Medical Records: I decided to obtain old medical records.  Old Records Summarized: records from clinic visits.  Initial Assessment:   This is an 87-year-old woman with a recent prolonged hospitalization for hepatic abscess, who is on the road to recovery, who presents with acute onset of nausea and vomiting and lower abdominal pain.  Her symptoms are not consistent with bowel obstruction, given that she is still passing flatus and had a bowel movement yesterday, though she could have an ileus.  I think less likely mesenteric ischemia.  She could have a recurrent abscess infection causing irritation of the bowel.  Plan for symptom control, IV fluids, and CT scan of the abdomen and pelvis with contrast to further evaluate her symptoms.  Clinical Tests:   Lab Tests: Ordered and Reviewed  Radiological Study: Ordered and Reviewed  ED Management:  Patient's labs are reassuring, with a normal white blood cell count, no left shift.  Her lactate is normal.  Her CT scan by my independent interpretation shows small bowel diverticulosis and possible diverticulitis versus a possible marginal ulcer.  She has not had any bypass surgeries, so it is unclear to me to what the ulcer would be on the margin of.   Given this I discussed the patient with General surgery, who reviewed her images with me, and felt that it was unlikely to represent a contained perforation.  Patient has no tenderness in the upper abdomen, is not febrile, does not have leukocytosis, I think this is unlikely to represent infection, and may represent gastritis versus small-bowel enteritis.  I counseled the patient and her son on the potential diagnoses, and that she needs to follow up with her GI doctor this week as scheduled.  Her symptoms improved with IV fluids, and she was able to take p.o. without any recurrence of symptoms.  I have deferred antibiotics at this time, I do not think this represents an acute bacterial infection, and if the patient were to clinically decline with fever or upper abdominal pain or any other new or concerning symptoms, I would want her to return immediately.  She and her son voiced understanding of this and she was discharged in improved condition.  Other:   I have discussed this case with another health care provider.                             Clinical Impression:     ICD-10-CM ICD-9-CM   1. Constipation, unspecified constipation type  K59.00 564.00   2. Nausea & vomiting  R11.2 787.01   3. Small bowel diverticular disease  K57.10 562.00                          ED Disposition Condition    Discharge Stable        ED Prescriptions     None        Follow-up Information    None                                      Madonna Malik MD  11/08/20 5080

## 2020-11-12 ENCOUNTER — OFFICE VISIT (OUTPATIENT)
Dept: GASTROENTEROLOGY | Facility: CLINIC | Age: 85
End: 2020-11-12
Payer: MEDICARE

## 2020-11-12 DIAGNOSIS — R74.8 ABNORMAL LIVER ENZYMES: ICD-10-CM

## 2020-11-12 DIAGNOSIS — K75.0 HEPATIC ABSCESS: Primary | ICD-10-CM

## 2020-11-12 PROCEDURE — 99213 OFFICE O/P EST LOW 20 MIN: CPT | Mod: S$PBB,,, | Performed by: INTERNAL MEDICINE

## 2020-11-12 PROCEDURE — 99999 PR PBB SHADOW E&M-EST. PATIENT-LVL III: ICD-10-PCS | Mod: PBBFAC,,,

## 2020-11-12 PROCEDURE — 99213 PR OFFICE/OUTPT VISIT, EST, LEVL III, 20-29 MIN: ICD-10-PCS | Mod: S$PBB,,, | Performed by: INTERNAL MEDICINE

## 2020-11-12 PROCEDURE — 99213 OFFICE O/P EST LOW 20 MIN: CPT | Mod: PBBFAC,PO

## 2020-11-12 PROCEDURE — 99999 PR PBB SHADOW E&M-EST. PATIENT-LVL III: CPT | Mod: PBBFAC,,,

## 2020-11-12 NOTE — PROGRESS NOTES
SUBJECTIVE:         Chief Complaint:   Chief Complaint   Patient presents with    GI Problem     hepatic abscess       History of Present Illness:  Patient is a 87 y.o. female presents with a recent visit to the ED for nausea.  Since that visit she's been doing well with no significant issues. She's able to eat what she would like.  She denies fevers or nausea.  She continues on augmentin but can stop in a week   She has her appointment for her ERCP in a week or so.           (Not in a hospital admission)      Review of patient's allergies indicates:   Allergen Reactions    Ciprofloxacin Nausea And Vomiting        Past Medical History:   Diagnosis Date    A-fib     Arthritis     Hypertension     Thyroid disease      Past Surgical History:   Procedure Laterality Date    CATARACT EXTRACTION      CATARACT EXTRACTION W/  INTRAOCULAR LENS IMPLANT Bilateral 2004     IN [place]    ENDOSCOPIC ULTRASOUND OF UPPER GASTROINTESTINAL TRACT N/A 9/14/2020    Procedure: ULTRASOUND, UPPER GI TRACT, ENDOSCOPIC;  Surgeon: Esha Howard MD;  Location: [address]);  Service: Endoscopy;  Laterality: N/A;    ERCP N/A 9/14/2020    Procedure: ERCP (ENDOSCOPIC RETROGRADE CHOLANGIOPANCREATOGRAPHY);  Surgeon: Esha Howard MD;  Location: [address]);  Service: Endoscopy;  Laterality: N/A;    ERCP N/A 9/25/2020    Procedure: ERCP (ENDOSCOPIC RETROGRADE CHOLANGIOPANCREATOGRAPHY);  Surgeon: Esha Howard MD;  Location: [address]);  Service: Endoscopy;  Laterality: N/A;    EYE SURGERY      HIP REPLACEMENT ARTHROPLASTY      HYSTERECTOMY      JOINT REPLACEMENT      REVISION OF SKIN POCKET FOR PACEMAKER N/A 1/21/2019    Procedure: REVISION-POCKET-PACEMAKER;  Surgeon: Asher Watts MD;  Location: [facility] EP LAB;  Service: Cardiology;  Laterality: N/A;  MODERATE SEDATION, sedation issues in the past    THYROIDECTOMY      TREATMENT OF CARDIAC ARRHYTHMIA N/A 3/18/2019    Procedure:  CARDIOVERSION OR DEFIBRILLATION;  Surgeon: Asher Watts MD;  Location: [facility] EP [facility];  Service: Cardiology;  Laterality: N/A;  AF, JILL-cx if SR, DCCV, MAC, FAS, 3PREP    TREATMENT OF CARDIAC ARRHYTHMIA N/A 5/14/2019    Procedure: Cardioversion or Defibrillation;  Surgeon: Derick Vizcarra MD;  Location: [facility] EP LAB;  Service: Cardiology;  Laterality: N/A;  AF, JILL, DCCV, MAC, DM, 3PREP     Family History   Problem Relation Age of Onset    Heart attack Mother     Heart disease Mother     Heart failure Mother     Hypertension Mother     Stroke Maternal Grandmother     Hypertension Maternal Grandmother     Heart disease Maternal Grandmother     Heart attack Maternal Grandmother     Heart failure Maternal Grandmother      Social History     Tobacco Use    Smoking status: Former Smoker     Start date: 5/19/1964    Smokeless tobacco: Never Used   Substance Use Topics    Alcohol use: No    Drug use: No       Review of Systems:  A complete review of systems was performed and other than described in the HPI and below is negative       OBJECTIVE:     Vital Signs (Most Recent)       Abdomen: soft, non-tender    Diagnostic Results:  CT: Reviewed      ASSESSMENT/PLAN:     S/p treatment for pancreatitis complicated by cholangitis and hepatic abscess.  Hepatic abscess drained has been pulled.  Her ERCP is in a few weeks to remove biliary stent.  She has one week of antibiotics soon. She tells me the surgeons feel she's a poor candidate for a cholecystectomy.    She'll be seen at the time of her ERCP

## 2020-11-16 ENCOUNTER — OFFICE VISIT (OUTPATIENT)
Dept: INFECTIOUS DISEASES | Facility: CLINIC | Age: 85
End: 2020-11-16
Payer: MEDICARE

## 2020-11-16 VITALS
WEIGHT: 120.13 LBS | DIASTOLIC BLOOD PRESSURE: 72 MMHG | HEIGHT: 63 IN | BODY MASS INDEX: 21.29 KG/M2 | HEART RATE: 60 BPM | TEMPERATURE: 98 F | SYSTOLIC BLOOD PRESSURE: 148 MMHG

## 2020-11-16 DIAGNOSIS — A41.51 E. COLI SEPTICEMIA: ICD-10-CM

## 2020-11-16 DIAGNOSIS — Z95.0 PACEMAKER: ICD-10-CM

## 2020-11-16 DIAGNOSIS — K75.0 HEPATIC ABSCESS: Primary | ICD-10-CM

## 2020-11-16 PROCEDURE — 99214 OFFICE O/P EST MOD 30 MIN: CPT | Mod: PBBFAC | Performed by: STUDENT IN AN ORGANIZED HEALTH CARE EDUCATION/TRAINING PROGRAM

## 2020-11-16 PROCEDURE — 99213 OFFICE O/P EST LOW 20 MIN: CPT | Mod: S$PBB,,, | Performed by: STUDENT IN AN ORGANIZED HEALTH CARE EDUCATION/TRAINING PROGRAM

## 2020-11-16 PROCEDURE — 99213 PR OFFICE/OUTPT VISIT, EST, LEVL III, 20-29 MIN: ICD-10-PCS | Mod: S$PBB,,, | Performed by: STUDENT IN AN ORGANIZED HEALTH CARE EDUCATION/TRAINING PROGRAM

## 2020-11-16 PROCEDURE — 99999 PR PBB SHADOW E&M-EST. PATIENT-LVL IV: CPT | Mod: PBBFAC,,, | Performed by: STUDENT IN AN ORGANIZED HEALTH CARE EDUCATION/TRAINING PROGRAM

## 2020-11-16 PROCEDURE — 99999 PR PBB SHADOW E&M-EST. PATIENT-LVL IV: ICD-10-PCS | Mod: PBBFAC,,, | Performed by: STUDENT IN AN ORGANIZED HEALTH CARE EDUCATION/TRAINING PROGRAM

## 2020-11-16 NOTE — PROGRESS NOTES
INFECTIOUS DISEASE CLINIC  11/16/2020     Subjective:      Chief Complaint:   Chief Complaint   Patient presents with    Follow-up       History of Present Illness:    This is a 87 y.o. female with Afib on AC, SSS s/p PM, HTN, gallstone pancreatitis s/p ERCP 9/14, who was recently admitted for Ecoli bacteremia c/b hepatic pyogenic abscess is referred to my clinic for hospital follow up/OPAT.  Patient is new to me.     Please see initial HPI/consult note for full details - though briefly: pt was sent from GI clinic for elevated LFTs/hypotension found to have Efaecalis bacteriuria, Ecoli bacteremia and pyogenic hepatic abscess s/p ERCP 9/25 (see below) and s/p IR drain placed on 9/26 (cx with Ecoli). Pt was evaluated by surgery and was not a surgical candidate. Pt was maintained on zosyn and was discharged to SNF for IV abx with R PICC - CTX 2g IV daily and PO flagyl to cover empiric anaerobes) to complete 4-6w course ([facility] 10/26 for 4 wk course). Repeat blcx prior to discharge ngtd. Repeat CT abdo w contrast on 10/23 with interval improvement of complex fluid collection (2.6 cm - prior 3.2) with no evidence of new hepatic collection. Pt was also seen with multiloculated pancreatic cystic lesion though was difficult to measure (noted to be similar to prior exam).       ERCP 9/25:  Impression:   - The major papilla was located partially within a                         large diverticulum.                         - The entire main bile duct was dilated.                         - The biliary tree was swept and nothing was found.                         - One plastic stent was placed into the common bile                         duct.     ERCP 9/14:   Impression:   - The major papilla was on the rim of a                         diverticulum.                         - A filling defect consistent with a stone and                         sludge was seen on the cholangiogram.                         - The entire biliary tree  was dilated, acquired.                         - The examination was suspicious for                         choledocholithiasis.                         - A biliary sphincterotomy was performed.                         - Bile duct orifice was successfully dilated.                         - The biliary tree was swept and sludge was found.       Patient comes in today for follow up with son, Yimi.  Pt was discharged from [facility] this morning. Labs notable for normal WBC and resolving LFTs. Pt states that she feels well, though weakness from debility is improving. Reports appetite is slowly coming back. Tolerated CTX/flagyl at [facility] without issues - states that she had some loose stools initially, though now more formed. Denies fevers or chills. States that drain is present but bulb removed for several weeks and denies drainage from it.     Interval history:   11/16: IR drain removed 11/4. Patient reports that she has been doing well. Comes to clinic with her son. Denies fevers, chills, abdominal complaints, and fatigue. States her strength is improving and appetite is better. Has 1 more augmentin left - tolerated without issues. Was seen in ED 11/8 for constipation and abdominal distention.  Reviewed CT abdo from 11/8 - minimal residual collection seen around liver (1.9 x 1cm)  and stable cystic lesion along head. Also had lab work done at that time - LFTs largely normalized. Patient states she has an appt for ERCP around 11/24 to remove stent.           Review of Systems   Constitution: Negative for chills, decreased appetite, fever and night sweats.   Gastrointestinal: Negative for bloating, abdominal pain, constipation, diarrhea, nausea and vomiting.   Genitourinary: Negative for dysuria.   All other systems reviewed and are negative.          Past Medical History:   Diagnosis Date    A-fib     Arthritis     Hypertension     Thyroid disease      Past Surgical History:   Procedure Laterality Date    CATARACT  EXTRACTION      CATARACT EXTRACTION W/  INTRAOCULAR LENS IMPLANT Bilateral 2004    DR IN [place]    ENDOSCOPIC ULTRASOUND OF UPPER GASTROINTESTINAL TRACT N/A 9/14/2020    Procedure: ULTRASOUND, UPPER GI TRACT, ENDOSCOPIC;  Surgeon: Esha Howard MD;  Location: [facility] (2ND FLR);  Service: Endoscopy;  Laterality: N/A;    ERCP N/A 9/14/2020    Procedure: ERCP (ENDOSCOPIC RETROGRADE CHOLANGIOPANCREATOGRAPHY);  Surgeon: Esha Howard MD;  Location: [facility] (2ND FLR);  Service: Endoscopy;  Laterality: N/A;    ERCP N/A 9/25/2020    Procedure: ERCP (ENDOSCOPIC RETROGRADE CHOLANGIOPANCREATOGRAPHY);  Surgeon: Esha Howard MD;  Location: [facility] (2ND FLR);  Service: Endoscopy;  Laterality: N/A;    EYE SURGERY      HIP REPLACEMENT ARTHROPLASTY      HYSTERECTOMY      JOINT REPLACEMENT      REVISION OF SKIN POCKET FOR PACEMAKER N/A 1/21/2019    Procedure: REVISION-POCKET-PACEMAKER;  Surgeon: Asher Watts MD;  Location: [facility] EP LAB;  Service: Cardiology;  Laterality: N/A;  MODERATE SEDATION, sedation issues in the past    THYROIDECTOMY      TREATMENT OF CARDIAC ARRHYTHMIA N/A 3/18/2019    Procedure: CARDIOVERSION OR DEFIBRILLATION;  Surgeon: Asher Watts MD;  Location: [facility] EP [facility];  Service: Cardiology;  Laterality: N/A;  AF, JILL-cx if SR, DCCV, MAC, FAS, 3PREP    TREATMENT OF CARDIAC ARRHYTHMIA N/A 5/14/2019    Procedure: Cardioversion or Defibrillation;  Surgeon: Derick Vizcarra MD;  Location: [facility] EP [facility];  Service: Cardiology;  Laterality: N/A;  AF, JILL, DCCV, MAC, DM, 3PREP     Family History   Problem Relation Age of Onset    Heart attack Mother     Heart disease Mother     Heart failure Mother     Hypertension Mother     Stroke Maternal Grandmother     Hypertension Maternal Grandmother     Heart disease Maternal Grandmother     Heart attack Maternal Grandmother     Heart failure Maternal Grandmother      Social History     Tobacco Use    Smoking status:  Former Smoker     Start date: 5/19/1964    Smokeless tobacco: Never Used   Substance Use Topics    Alcohol use: No    Drug use: No       Review of patient's allergies indicates:   Allergen Reactions    Ciprofloxacin Nausea And Vomiting         Objective:   VS (24h):   Vitals:    11/16/20 1003   BP: (!) 148/72   Pulse: 60   Temp: 97.8 °F (36.6 °C)         Physical Exam  Vitals signs reviewed.   Constitutional:       General: She is not in acute distress.     Appearance: She is not toxic-appearing.      Comments: In wheelchair   HENT:      Head: Normocephalic and atraumatic.   Eyes:      General: No scleral icterus.        Right eye: No discharge.         Left eye: No discharge.   Neck:      Musculoskeletal: Neck supple.   Cardiovascular:      Rate and Rhythm: Normal rate and regular rhythm.      Pulses: Normal pulses.      Heart sounds: Normal heart sounds.      Comments: L PM site - nontender, no erythema  Pulmonary:      Effort: Pulmonary effort is normal. No respiratory distress.   Abdominal:      General: Bowel sounds are normal. There is no distension.      Palpations: Abdomen is soft.      Tenderness: There is no abdominal tenderness.      Comments: Previous drain site - no erythema or induration     Musculoskeletal:         General: No swelling or tenderness.      Left lower leg: No edema.      Comments: +scoliosis   Lymphadenopathy:      Cervical: No cervical adenopathy.   Skin:     General: Skin is warm and dry.      Coloration: Skin is not jaundiced.      Comments: Fungal dermatitis under breasts resolved   Neurological:      General: No focal deficit present.      Mental Status: She is alert and oriented to person, place, and time.   Psychiatric:         Mood and Affect: Mood normal.           All data including recent labs, radiology, microbiology and pathology have been independently reviewed.    Labs:    Recent Labs   Lab Result Units 09/26/20  1349  10/29/20  0535 11/02/20  0450 11/08/20  1332  11/08/20  1340   WBC K/uL  --    < > 4.86 4.08 8.73  --    Hemoglobin g/dL  --    < > 9.4* 9.3* 12.0  --    POC Hematocrit %PCV  --   --   --   --   --  39   Hematocrit %  --    < > 30.4* 30.5* 38.1  --    Sodium mmol/L  --    < > 135* 137 137  --    Potassium mmol/L  --    < > 4.3 3.7 5.3*  --    Chloride mmol/L  --    < > 101 102 102  --    BUN mg/dL  --    < > 7* 10 11  --    Creatinine mg/dL  --    < > 0.8 0.8 0.8  --    AST U/L  --    < > 16  16 14  14 12  --    ALT U/L  --    < > 6*  6* 7*  7* 5*  --    Alkaline Phosphatase U/L  --    < > 59  59 52*  52* 86  --    Total Bilirubin mg/dL  --    < > 0.5  0.5 0.4  0.4 0.6  --    INR  1.0  --   --   --   --   --     < > = values in this interval not displayed.       Medications:  Current Outpatient Medications on File Prior to Visit   Medication Sig Dispense Refill    amiodarone (PACERONE) 200 MG Tab TAKE 1 TABLET ONCE DAILY . START TAKING ONLY 1 TABLET EVERY DAY ON 4-19-19 90 tablet 4    amoxicillin-clavulanate 875-125mg (AUGMENTIN) 875-125 mg per tablet Take 1 tablet by mouth 2 (two) times daily. for 14 days 28 tablet 0    apixaban (ELIQUIS) 2.5 mg Tab Take 1 tablet (2.5 mg total) by mouth 2 (two) times daily. 180 tablet 3    aspirin (ECOTRIN) 81 MG EC tablet Take 1 tablet (81 mg total) by mouth once daily.  0    atenoloL (TENORMIN) 25 MG tablet TAKE 2 TABLETS EVERY MORNING AND 1 TABLET EVERY EVENING 270 tablet 3    atorvastatin (LIPITOR) 10 MG tablet TAKE 1 TABLET DAILY 90 tablet 3    BIOTIN ORAL Take 1 capsule by mouth once daily.      diclofenac sodium (VOLTAREN) 1 % Gel Apply 4 g topically 3 (three) times daily. Apply to right side pain 200 g 0    diltiaZEM (CARDIZEM CD) 180 MG 24 hr capsule Take 1 capsule (180 mg total) by mouth once daily. 90 capsule 3    ergocalciferol (VITAMIN D2) 50,000 unit Cap Take 1 capsule (50,000 Units total) by mouth every 7 days. (Patient not taking: Reported on 11/12/2020)      ergocalciferol, vitamin D2,  (VITAMIN D ORAL) Take 1 capsule by mouth once daily.      eszopiclone (LUNESTA) 2 MG Tab Take 2 mg by mouth every evening.      Lactobacillus rhamnosus GG (CULTURELLE) 10 billion cell capsule Take 1 capsule by mouth once daily. (Patient not taking: Reported on 11/12/2020)      levothyroxine (SYNTHROID) 137 MCG Tab tablet Take 1 tablet (137 mcg total) by mouth once daily. 90 tablet 3    miconazole NITRATE 2 % (MICOTIN) 2 % top powder Apply topically 2 (two) times daily.  0    sodium chloride 0.9% (NORMAL SALINE FLUSH) injection Inject 10 mLs into the vein every 6 (six) hours.      sodium chloride 0.9% (NORMAL SALINE FLUSH) injection Inject 10 mLs into the vein as needed.      spironolactone (ALDACTONE) 25 MG tablet Take 25 mg by mouth 2 (two) times daily.        No current facility-administered medications on file prior to visit.        Antibiotics:   CTX 2g IV daily + PO flagyl  -> augmentin x 14 days    Micro:   9/26 Hepatic abscess cx: Ecoli (S: augmentin, cefaz, cefepime, cipro, levo, tetra; R: tmpsmx, ampicillin, I: amp/sulb)  9/24 Ucx Efaecalis >100k CFU   9/24 Blcx: Ecoli (S: CTX, augmentin, cefepime, FQ, tetra; R: tmpsmx, amp, amp/sulb)    Radiology:     10/23 CT abdo/pelvis w/ contrast:  Liver: Hepatomegaly.  Stable positioning of drainage catheter within a complex fluid collection in the right hepatic lobe, collection demonstrates mild interval decrease in size measuring up to 2.6 cm (axial image 30).  Previously measured up to 3.2 cm.  No new hepatic collection.     Gallbladder: Contracted.  No calcified gallstones.     Bile Ducts: Common bile duct stent remains in place with stable mild pneumobilia.  Stable mild intrahepatic biliary duct dilatation.     Pancreas: Multiloculated cystic lesion re-identified within the pancreatic body.  Collection is difficult to measure, measures approximately 2.8 x 1.7 x 2.7 cm (axial image 43) and previously 3.0 x 3.8 x 4.0 cm.  No new pancreatic lesions.  No  peripancreatic inflammation.    10/9 CT A/P w/contrast:   Liver: Enlarged measuring 21 cm in craniocaudal dimension.  Normal attenuation.  There is a drain via a right upper abdominal approach within a complex fluid collection in the right hepatic lobe, segment 8, that appears smaller measuring 3.2 cm in maximum dimension (previously 3.7 cm).  No new hepatic fluid collections identified.     Gallbladder: No calcified gallstones.     Bile Ducts: Stable position of common bile duct stent with mild pneumobilia.  Mild intrahepatic biliary ductal dilatation.     Pancreas: Stable appearance of multiloculated cystic lesion adjacent to the pancreatic body measuring 3.0 x 3.8 x 4.0 cm on today's exam (axial series 2, image 95 and coronal series 601, image 53).    11/8/20 CT abdo w contrast     Impression:     Focal wall outpouching of the proximal jejunum with associated inflammatory stranding, nonspecific.  Potential etiology to include small bowel diverticulitis noting additional small bowel diverticula at the duodenal level.  Focal dilatation related to underlying stricture less favored as no definitive narrowing is identified.  Component of marginal wall ulceration with contained perforation cannot be entirely excluded.  Continued follow-up suggested.     Additional nonspecific thickening of the gastric antrum could relate to partially decompressed state versus gastritis.     Bibasilar atelectasis with small left greater than right pleural effusions.     Redemonstration of multiloculated cystic collection within the pancreatic body, not significantly changed.     Sequela of previous right percutaneous drain with tiny residual intrahepatic collection as described.     Bilateral non-obstructing renal calculi.       Immunization History   Administered Date(s) Administered    Influenza 10/03/2015    Influenza (FLUAD) - Quadrivalent - Adjuvanted - PF *Preferred* (65+) 11/02/2020    Influenza - High Dose - PF (65 years and  older) 11/13/2014, 10/03/2015, 11/04/2016, 08/18/2017, 09/24/2018    Influenza - Quadrivalent - PF *Preferred* (6 months and older) 09/16/2019    Influenza - Trivalent - PF (ADULT) 10/24/2012    Influenza Split 11/04/2008    Influenza Whole 11/23/2005    PPD Test 10/02/2020    Pneumococcal Conjugate - 13 Valent 05/11/2017    Pneumococcal Polysaccharide - 23 Valent 11/06/2003, 06/14/2007    Td (ADULT) 11/18/2004         Assessment:     86 yo female with hx of gallstone pancreatitis s/p multiple ERCP c/b Ecoli septicemia and pyogenic liver abscess s/p IR drain placed 9/26  (removed 11/4) here for follow up. Completed 5 wk course of IV abx (CTX/flagyl) and currently completed an additional 2 wk course of augmentin. Symptoms improved and patient reports feeling back to baseline. Repeat CT on 11/8 with improvement in size of known hepatic abscess (though stable pancreatic cystic findings). Discussed with patient that repeat imaging with improving hepatic fluid collection - though radiologic abnormalities may resolve more slowly than biochemical and clinical markers as patient's LFTs improved and resolution of patient's initial symptoms.     Discussed with patient that pancreatic cystic lesion is still present and stable from previous imaging. Pt reports tentative ERCP for stent removal on 11/24.        Plan:     1. Hepatic abscess  -complete augmentin as directed (last day today)  -after last dose, stop abx and monitor off  -unclear etiology of cystic lesion - largely stable in size despite prolonged course of abx - unclear if infected (no cx data from lesion) vs neoplasm. May need aspiration/biopsy for further evaluation  -discussed signs/symptoms with patient to go to ED for further evaluation including fever, chills, n/v/d or worsening abdo pain    2. E. coli septicemia c/b hepatic abscess s/p IR drain 9/26  -as above    3. SSS s/p PM  -continue home meds    Patient and son were given ample time for questions  - all questions answered.     These recommendations have been sent to and/or discussed with the following providers:   - Kai Montez MD    Follow up PRN     Anne De La Cruz MD  Infectious Disease

## 2020-11-17 ENCOUNTER — TELEPHONE (OUTPATIENT)
Dept: ENDOSCOPY | Facility: HOSPITAL | Age: 85
End: 2020-11-17

## 2020-11-17 DIAGNOSIS — K85.10 GALLSTONE PANCREATITIS: ICD-10-CM

## 2020-11-17 NOTE — TELEPHONE ENCOUNTER
Spoke with patient about instructions for ERCP scheduled 11/25/20 at 1300 and covid-19 test 11/22/20 at 1110 at [place].  Plan to hold Eliquis for 2 days (as was done for recent procedures) with permission from Dr Favian Colmenares.  Instructions emailed and mailed.

## 2020-11-17 NOTE — TELEPHONE ENCOUNTER
Dr Favian Colmenares, may we hold Eliquis for 2 days prior to ERCP scheduled 11/25/20?  Thank you.

## 2020-11-22 ENCOUNTER — LAB VISIT (OUTPATIENT)
Dept: SPORTS MEDICINE | Facility: CLINIC | Age: 85
End: 2020-11-22
Payer: MEDICARE

## 2020-11-22 DIAGNOSIS — K85.10 GALLSTONE PANCREATITIS: ICD-10-CM

## 2020-11-22 PROCEDURE — U0003 INFECTIOUS AGENT DETECTION BY NUCLEIC ACID (DNA OR RNA); SEVERE ACUTE RESPIRATORY SYNDROME CORONAVIRUS 2 (SARS-COV-2) (CORONAVIRUS DISEASE [COVID-19]), AMPLIFIED PROBE TECHNIQUE, MAKING USE OF HIGH THROUGHPUT TECHNOLOGIES AS DESCRIBED BY CMS-2020-01-R: HCPCS

## 2020-11-23 LAB — SARS-COV-2 RNA RESP QL NAA+PROBE: NOT DETECTED

## 2020-11-25 ENCOUNTER — ANESTHESIA EVENT (OUTPATIENT)
Dept: ENDOSCOPY | Facility: HOSPITAL | Age: 85
End: 2020-11-25
Payer: MEDICARE

## 2020-11-25 ENCOUNTER — HOSPITAL ENCOUNTER (OUTPATIENT)
Facility: HOSPITAL | Age: 85
Discharge: HOME | End: 2020-11-25
Attending: INTERNAL MEDICINE | Admitting: INTERNAL MEDICINE
Payer: MEDICARE

## 2020-11-25 ENCOUNTER — ANESTHESIA (OUTPATIENT)
Dept: ENDOSCOPY | Facility: HOSPITAL | Age: 85
End: 2020-11-25
Payer: MEDICARE

## 2020-11-25 VITALS
TEMPERATURE: 98 F | RESPIRATION RATE: 18 BRPM | SYSTOLIC BLOOD PRESSURE: 115 MMHG | HEART RATE: 61 BPM | DIASTOLIC BLOOD PRESSURE: 58 MMHG | HEIGHT: 63 IN | WEIGHT: 115 LBS | BODY MASS INDEX: 20.38 KG/M2 | OXYGEN SATURATION: 99 %

## 2020-11-25 DIAGNOSIS — K83.09 CHOLANGITIS: ICD-10-CM

## 2020-11-25 PROCEDURE — D9220A PRA ANESTHESIA: ICD-10-PCS | Mod: CRNA,,, | Performed by: NURSE ANESTHETIST, CERTIFIED REGISTERED

## 2020-11-25 PROCEDURE — D9220A PRA ANESTHESIA: ICD-10-PCS | Mod: ANES,,, | Performed by: ANESTHESIOLOGY

## 2020-11-25 PROCEDURE — 74328 PR  X-RAY FOR BILE DUCT ENDOSCOPY: ICD-10-PCS | Mod: 26,,, | Performed by: INTERNAL MEDICINE

## 2020-11-25 PROCEDURE — 74328 X-RAY BILE DUCT ENDOSCOPY: CPT | Mod: 26,,, | Performed by: INTERNAL MEDICINE

## 2020-11-25 PROCEDURE — 27201089 HC SNARE, DISP (ANY): Performed by: INTERNAL MEDICINE

## 2020-11-25 PROCEDURE — 25500020 PHARM REV CODE 255: Performed by: INTERNAL MEDICINE

## 2020-11-25 PROCEDURE — 43259 EGD US EXAM DUODENUM/JEJUNUM: CPT | Mod: 51,,, | Performed by: INTERNAL MEDICINE

## 2020-11-25 PROCEDURE — 43264 PR ERCP,W/REMOVAL STONE,BIL/PANCR DUCTS: ICD-10-PCS | Mod: ,,, | Performed by: INTERNAL MEDICINE

## 2020-11-25 PROCEDURE — 43259 EGD US EXAM DUODENUM/JEJUNUM: CPT | Performed by: INTERNAL MEDICINE

## 2020-11-25 PROCEDURE — D9220A PRA ANESTHESIA: Mod: ANES,,, | Performed by: ANESTHESIOLOGY

## 2020-11-25 PROCEDURE — 25000003 PHARM REV CODE 250: Performed by: NURSE ANESTHETIST, CERTIFIED REGISTERED

## 2020-11-25 PROCEDURE — 63600175 PHARM REV CODE 636 W HCPCS: Performed by: NURSE ANESTHETIST, CERTIFIED REGISTERED

## 2020-11-25 PROCEDURE — 74328 X-RAY BILE DUCT ENDOSCOPY: CPT | Performed by: INTERNAL MEDICINE

## 2020-11-25 PROCEDURE — 25000003 PHARM REV CODE 250: Performed by: INTERNAL MEDICINE

## 2020-11-25 PROCEDURE — 27202125 HC BALLOON, EXTRACTION (ANY): Performed by: INTERNAL MEDICINE

## 2020-11-25 PROCEDURE — 43264 ERCP REMOVE DUCT CALCULI: CPT | Mod: ,,, | Performed by: INTERNAL MEDICINE

## 2020-11-25 PROCEDURE — 43264 ERCP REMOVE DUCT CALCULI: CPT | Performed by: INTERNAL MEDICINE

## 2020-11-25 PROCEDURE — C1769 GUIDE WIRE: HCPCS | Performed by: INTERNAL MEDICINE

## 2020-11-25 PROCEDURE — 37000009 HC ANESTHESIA EA ADD 15 MINS: Performed by: INTERNAL MEDICINE

## 2020-11-25 PROCEDURE — 37000008 HC ANESTHESIA 1ST 15 MINUTES: Performed by: INTERNAL MEDICINE

## 2020-11-25 PROCEDURE — D9220A PRA ANESTHESIA: Mod: CRNA,,, | Performed by: NURSE ANESTHETIST, CERTIFIED REGISTERED

## 2020-11-25 PROCEDURE — 43259 PR ENDOSCOPIC ULTRASOUND EXAM: ICD-10-PCS | Mod: 51,,, | Performed by: INTERNAL MEDICINE

## 2020-11-25 RX ORDER — SODIUM CHLORIDE 9 MG/ML
INJECTION, SOLUTION INTRAVENOUS CONTINUOUS
Status: DISCONTINUED | OUTPATIENT
Start: 2020-11-25 | End: 2020-11-25 | Stop reason: HOSPADM

## 2020-11-25 RX ORDER — PROPOFOL 10 MG/ML
VIAL (ML) INTRAVENOUS CONTINUOUS PRN
Status: DISCONTINUED | OUTPATIENT
Start: 2020-11-25 | End: 2020-11-25

## 2020-11-25 RX ORDER — SODIUM CHLORIDE 0.9 % (FLUSH) 0.9 %
3 SYRINGE (ML) INJECTION
Status: DISCONTINUED | OUTPATIENT
Start: 2020-11-25 | End: 2020-11-25 | Stop reason: HOSPADM

## 2020-11-25 RX ORDER — SODIUM CHLORIDE 0.9 % (FLUSH) 0.9 %
10 SYRINGE (ML) INJECTION
Status: DISCONTINUED | OUTPATIENT
Start: 2020-11-25 | End: 2020-11-25 | Stop reason: HOSPADM

## 2020-11-25 RX ORDER — GLUCAGON 1 MG
KIT INJECTION
Status: DISCONTINUED | OUTPATIENT
Start: 2020-11-25 | End: 2020-11-25

## 2020-11-25 RX ORDER — LIDOCAINE HYDROCHLORIDE 20 MG/ML
INJECTION INTRAVENOUS
Status: DISCONTINUED | OUTPATIENT
Start: 2020-11-25 | End: 2020-11-25

## 2020-11-25 RX ORDER — PROPOFOL 10 MG/ML
VIAL (ML) INTRAVENOUS
Status: DISCONTINUED | OUTPATIENT
Start: 2020-11-25 | End: 2020-11-25

## 2020-11-25 RX ADMIN — IOHEXOL 4 ML: 300 INJECTION, SOLUTION INTRAVENOUS at 01:11

## 2020-11-25 RX ADMIN — PROPOFOL 40 MG: 10 INJECTION, EMULSION INTRAVENOUS at 01:11

## 2020-11-25 RX ADMIN — LIDOCAINE HYDROCHLORIDE 50 MG: 20 INJECTION, SOLUTION INTRAVENOUS at 01:11

## 2020-11-25 RX ADMIN — PROPOFOL 100 MCG/KG/MIN: 10 INJECTION, EMULSION INTRAVENOUS at 01:11

## 2020-11-25 RX ADMIN — SODIUM CHLORIDE: 0.9 INJECTION, SOLUTION INTRAVENOUS at 01:11

## 2020-11-25 RX ADMIN — PROPOFOL 20 MG: 10 INJECTION, EMULSION INTRAVENOUS at 01:11

## 2020-11-25 RX ADMIN — GLUCAGON HYDROCHLORIDE 1 MG: KIT at 01:11

## 2020-11-25 NOTE — [employer] PATIENT INSTRUCTIONS
Discharge Summary/Instructions after an Endoscopic Procedure  Patient Name: Gisselle Ortiz  Patient MRN: [id number]  Patient YOB: 1933 Wednesday, November 25, 2020  Esha Howard MD  RESTRICTIONS:  During your procedure today, you received medications for sedation.  These   medications may affect your judgment, balance and coordination.  Therefore,   for 24 hours, you have the following restrictions:   - DO NOT drive a car, operate machinery, make legal/financial decisions,   sign important papers or drink alcohol.    ACTIVITY:  Today: no heavy lifting, straining or running due to procedural   sedation/anesthesia.  The following day: return to full activity including work.  DIET:  Eat and drink normally unless instructed otherwise.     TREATMENT FOR COMMON SIDE EFFECTS:  - Mild abdominal pain, nausea, belching, bloating or excessive gas:  rest,   eat lightly and use a heating pad.  - Sore Throat: treat with throat lozenges and/or gargle with warm salt   water.  - Because air was used during the procedure, expelling large amounts of air   from your rectum or belching is normal.  - If a bowel prep was taken, you may not have a bowel movement for 1-3 days.    This is normal.  SYMPTOMS TO WATCH FOR AND REPORT TO YOUR PHYSICIAN:  1. Abdominal pain or bloating, other than gas cramps.  2. Chest pain.  3. Back pain.  4. Signs of infection such as: chills or fever occurring within 24 hours   after the procedure.  5. Rectal bleeding, which would show as bright red, maroon, or black stools.   (A tablespoon of blood from the rectum is not serious, especially if   hemorrhoids are present.)  6. Vomiting.  7. Weakness or dizziness.  GO DIRECTLY TO THE NEAREST EMERGENCY ROOM IF YOU HAVE ANY OF THE FOLLOWING:      Difficulty breathing              Chills and/or fever over 101 F   Persistent vomiting and/or vomiting blood   Severe abdominal pain   Severe chest pain   Black, tarry stools   Bleeding- more than  one tablespoon   Any other symptom or condition that you feel may need urgent attention  Your doctor recommends these additional instructions:  If any biopsies were taken, your doctors clinic will contact you in 1 to 2   weeks with any results.  - Proceed with ERCP  - Resume previous diet.   - Continue present medications.   - Return to referring physician.   - Patient has a contact number available for emergencies.  The signs and   symptoms of potential delayed complications were discussed with the   patient.  Return to normal activities tomorrow.  Written discharge   instructions were provided to the patient.   - MRCP in 3 months to follow.  For questions, problems or results please call your physician - Esha Howard MD at Work:  [phone].  [facility], EMERGENCY ROOM PHONE NUMBER: [phone]  IF A COMPLICATION OR EMERGENCY SITUATION ARISES AND YOU ARE UNABLE TO REACH   YOUR PHYSICIAN - GO DIRECTLY TO THE EMERGENCY ROOM.  Esha Howard MD  11/25/2020 2:16:59 PM  This report has been verified and signed electronically.  [employer]

## 2020-11-25 NOTE — [employer]ATION PATIENT INSTRUCTIONS
Discharge Summary/Instructions after an Endoscopic Procedure  Patient Name: Gisselle Ortiz  Patient MRN: [id number]  Patient YOB: 1933 Wednesday, November 25, 2020  Esha Howard MD  RESTRICTIONS:  During your procedure today, you received medications for sedation.  These   medications may affect your judgment, balance and coordination.  Therefore,   for 24 hours, you have the following restrictions:   - DO NOT drive a car, operate machinery, make legal/financial decisions,   sign important papers or drink alcohol.    ACTIVITY:  Today: no heavy lifting, straining or running due to procedural   sedation/anesthesia.  The following day: return to full activity including work.  DIET:  Eat and drink normally unless instructed otherwise.     TREATMENT FOR COMMON SIDE EFFECTS:  - Mild abdominal pain, nausea, belching, bloating or excessive gas:  rest,   eat lightly and use a heating pad.  - Sore Throat: treat with throat lozenges and/or gargle with warm salt   water.  - Because air was used during the procedure, expelling large amounts of air   from your rectum or belching is normal.  - If a bowel prep was taken, you may not have a bowel movement for 1-3 days.    This is normal.  SYMPTOMS TO WATCH FOR AND REPORT TO YOUR PHYSICIAN:  1. Abdominal pain or bloating, other than gas cramps.  2. Chest pain.  3. Back pain.  4. Signs of infection such as: chills or fever occurring within 24 hours   after the procedure.  5. Rectal bleeding, which would show as bright red, maroon, or black stools.   (A tablespoon of blood from the rectum is not serious, especially if   hemorrhoids are present.)  6. Vomiting.  7. Weakness or dizziness.  GO DIRECTLY TO THE NEAREST EMERGENCY ROOM IF YOU HAVE ANY OF THE FOLLOWING:      Difficulty breathing              Chills and/or fever over 101 F   Persistent vomiting and/or vomiting blood   Severe abdominal pain   Severe chest pain   Black, tarry stools   Bleeding- more than  one tablespoon   Any other symptom or condition that you feel may need urgent attention  Your doctor recommends these additional instructions:  If any biopsies were taken, your doctors clinic will contact you in 1 to 2   weeks with any results.  - Discharge patient to home.   - Resume previous diet.   - Continue present medications.   - Return to referring physician.   - Patient has a contact number available for emergencies.  The signs and   symptoms of potential delayed complications were discussed with the   patient.  Return to normal activities tomorrow.  Written discharge   instructions were provided to the patient.  For questions, problems or results please call your physician - Esha Howard MD at Work:  [phone].  [facility], EMERGENCY ROOM PHONE NUMBER: [phone]  IF A COMPLICATION OR EMERGENCY SITUATION ARISES AND YOU ARE UNABLE TO REACH   YOUR PHYSICIAN - GO DIRECTLY TO THE EMERGENCY ROOM.  Esha Howard MD  11/25/2020 2:32:45 PM  This report has been verified and signed electronically.  [employer]

## 2020-11-25 NOTE — ANESTHESIA PREPROCEDURE EVALUATION
11/25/2020  Gisselle Ortiz is a 87 y.o., female.    Anesthesia Evaluation    I have reviewed the Patient Summary Reports.      I have reviewed the Medications.     Review of Systems  Anesthesia Hx:  History of prior surgery of interest to airway management or planning:  Denies Personal Hx of Anesthesia complications.   Cardiovascular:   Pacemaker Hypertension Dysrhythmias atrial fibrillation NL BiV Fxn on Echo   Renal/:   Chronic Renal Disease, CRI    Hepatic/GI:   GERD Liver Disease,    Musculoskeletal:   Arthritis     Endocrine:   Hypothyroidism        Physical Exam  General:  Well nourished    Airway/Jaw/Neck:  Airway Findings: Mouth Opening: Normal Tongue: Normal  General Airway Assessment: Adult  Mallampati: III  Improves to II with phonation.  TM Distance: Normal, at least 6 cm  Jaw/Neck Findings:  Neck ROM: Normal ROM       Chest/Lungs:  Chest/Lungs Findings: Normal Respiratory Rate     Heart/Vascular:  Heart Findings: Rate: Normal        Mental Status:  Mental Status Findings:  Alert and Oriented         Anesthesia Plan  Type of Anesthesia, risks & benefits discussed:  Anesthesia Type:  general  Patient's Preference:   Intra-op Monitoring Plan: standard ASA monitors  Intra-op Monitoring Plan Comments:   Post Op Pain Control Plan: per primary service following discharge from PACU  Post Op Pain Control Plan Comments:   Induction:   IV  Beta Blocker:         Informed Consent: Patient understands risks and agrees with Anesthesia plan.  Questions answered. Anesthesia consent signed with patient.  ASA Score: 3     Day of Surgery Review of History & Physical:    H&P update referred to the surgeon.     Anesthesia Plan Notes: NPO confirmed.   Recent Ecoli infection had her extremely debilitated recently.   Pacemaker noted.         Ready For Surgery From Anesthesia Perspective.

## 2020-11-25 NOTE — TRANSFER OF CARE
"Anesthesia Transfer of Care Note    Patient: Gisselle Ortiz    Procedure(s) Performed: Procedure(s) (LRB):  ERCP (ENDOSCOPIC RETROGRADE CHOLANGIOPANCREATOGRAPHY) (N/A)  ULTRASOUND, UPPER GI TRACT, ENDOSCOPIC    Patient location: [facility]    Anesthesia Type: general    Transport from OR: Transported from OR on room air with adequate spontaneous ventilation    Post pain: adequate analgesia    Post assessment: no apparent anesthetic complications and tolerated procedure well    Post vital signs: stable    Level of consciousness: responds to stimulation and awake    Nausea/Vomiting: no nausea/vomiting    Complications: none    Transfer of care protocol was followed      Last vitals:   Visit Vitals  BP (!) 168/71 (BP Location: Left arm, Patient Position: Lying)   Temp (!) 30.7 °C (87.2 °F) (Temporal)   Resp 16   Ht 5' 3" (1.6 m)   Wt 52.2 kg (115 lb)   LMP  (LMP Unknown)   SpO2 100%   Breastfeeding No   BMI 20.37 kg/m²     "

## 2020-11-25 NOTE — OR NURSING
Discharge instructions reviewed at this time with pt, son, denied questions. PIV removed with catheter intact. Drinking sprite   needs dialysis, BIPAP, BP control, urgent dialysis

## 2020-11-25 NOTE — DISCHARGE INSTRUCTIONS
Discharge Instructions for ERCP (Endoscopic Retrograde Cholangiopancreatography)  You had a procedure known as an ERCP. Your healthcare provider performed the ERCP to look at your bile or pancreatic ducts, and to locate and treat blockages in the ducts. This procedure is used to diagnose diseases of the pancreas, bile ducts, and pancreatic duct, liver, and gallbladder. Heres what you need to do following your ERCP.  Home care  · Dont take aspirin or any other blood-thinning medicines (anticoagulants) until your provider says its OK.  · Your provider may prescribe an antibiotic, depending on what was done during the ERCP.  · You may have a sore throat for 1 to 2 days after the procedure. Use lozenges or gargle with salt water for your sore throat. If you're not better in a few days, call your provider.  · Rest, drink fluids, and eat light meals. If you feel bloated or have too much gas, use a heating pad on your belly to help reduce the discomfort. This should help you feel better. But if it doesn't, call your provider.  · Dont drink alcohol for 2 days after the procedure.  Follow-up care  Make a follow-up appointment as directed by our staff.     When to seek medical care  Call your provider right away if you have any of the following:  · Trouble swallowing or throat pain that gets worse   · Chest pain or severe belly or abdominal pain  · Fever above 100°F (37.7°C) or chills  · Upset stomach (nausea) and vomiting  · Black or tarry stools   Date Last Reviewed: 6/13/2015  © 4670-4713 [employer]. [address] [place] [address], [place] [address]. All rights reserved. This information is not intended as a substitute for professional medical care. Always follow your healthcare professional's instructions.

## 2020-11-25 NOTE — ANESTHESIA POSTPROCEDURE EVALUATION
Anesthesia Post Evaluation    Patient: Gisselle Ortiz    Procedure(s) Performed: Procedure(s) (LRB):  ERCP (ENDOSCOPIC RETROGRADE CHOLANGIOPANCREATOGRAPHY) (N/A)  ULTRASOUND, UPPER GI TRACT, ENDOSCOPIC    Final Anesthesia Type: general    Patient location during evaluation: PACU  Patient participation: Yes- Able to Participate  Level of consciousness: awake and alert and oriented  Post-procedure vital signs: reviewed and stable  Pain management: adequate  Airway patency: patent  SCOTT mitigation strategies: Intraoperative administration of CPAP, nasopharyngeal airway, or oral appliance during sedation and Multimodal analgesia  PONV status at discharge: No PONV  Anesthetic complications: no      Cardiovascular status: hemodynamically stable  Respiratory status: unassisted  Hydration status: euvolemic  Follow-up not needed.          Vitals Value Taken Time   /58 11/25/20 1431   Temp 36.5 °C (97.7 °F) 11/25/20 1406   Pulse 60 11/25/20 1440   Resp 36 11/25/20 1440   SpO2 96 % 11/25/20 1440   Vitals shown include unvalidated device data.      No case tracking events are documented in the log.      Pain/Shalonda Score: Shalonda Score: 9 (11/25/2020  3:08 PM)

## 2020-11-25 NOTE — H&P
History & Physical - Short Stay  Gastroenterology      SUBJECTIVE:     Procedure: ERCP    Chief Complaint/Indication for Procedure: Cholangitis    History of Present Illness:  Patient is a 87 y.o. female with hx of cholangitis, liver abscess  tcoming for ERCP.     PTA Medications   Medication Sig    amiodarone (PACERONE) 200 MG Tab TAKE 1 TABLET ONCE DAILY . START TAKING ONLY 1 TABLET EVERY DAY ON 4-19-19    apixaban (ELIQUIS) 2.5 mg Tab Take 1 tablet (2.5 mg total) by mouth 2 (two) times daily.    aspirin (ECOTRIN) 81 MG EC tablet Take 1 tablet (81 mg total) by mouth once daily.    atenoloL (TENORMIN) 25 MG tablet TAKE 2 TABLETS EVERY MORNING AND 1 TABLET EVERY EVENING    atorvastatin (LIPITOR) 10 MG tablet TAKE 1 TABLET DAILY    BIOTIN ORAL Take 1 capsule by mouth once daily.    diltiaZEM (CARDIZEM CD) 180 MG 24 hr capsule Take 1 capsule (180 mg total) by mouth once daily.    ergocalciferol, vitamin D2, (VITAMIN D ORAL) Take 1 capsule by mouth once daily.    levothyroxine (SYNTHROID) 137 MCG Tab tablet Take 1 tablet (137 mcg total) by mouth once daily.    spironolactone (ALDACTONE) 25 MG tablet Take 25 mg by mouth 2 (two) times daily.     diclofenac sodium (VOLTAREN) 1 % Gel Apply 4 g topically 3 (three) times daily. Apply to right side pain    ergocalciferol (VITAMIN D2) 50,000 unit Cap Take 1 capsule (50,000 Units total) by mouth every 7 days.    eszopiclone (LUNESTA) 2 MG Tab Take 2 mg by mouth every evening.    Lactobacillus rhamnosus GG (CULTURELLE) 10 billion cell capsule Take 1 capsule by mouth once daily.    miconazole NITRATE 2 % (MICOTIN) 2 % top powder Apply topically 2 (two) times daily.    sodium chloride 0.9% (NORMAL SALINE FLUSH) injection Inject 10 mLs into the vein every 6 (six) hours.    sodium chloride 0.9% (NORMAL SALINE FLUSH) injection Inject 10 mLs into the vein as needed.       Review of patient's allergies indicates:   Allergen Reactions    Ciprofloxacin Nausea And  Vomiting        Past Medical History:   Diagnosis Date    A-fib     Arthritis     Gallstone pancreatitis     Hypertension     Thyroid disease      Past Surgical History:   Procedure Laterality Date    CATARACT EXTRACTION      CATARACT EXTRACTION W/  INTRAOCULAR LENS IMPLANT Bilateral 2004     IN [place]    ENDOSCOPIC ULTRASOUND OF UPPER GASTROINTESTINAL TRACT N/A 9/14/2020    Procedure: ULTRASOUND, UPPER GI TRACT, ENDOSCOPIC;  Surgeon: Esha Howard MD;  [facility]  Service: Endoscopy;  Laterality: N/A;    ERCP N/A 9/14/2020    Procedure: ERCP (ENDOSCOPIC RETROGRADE CHOLANGIOPANCREATOGRAPHY);  Surgeon: Esha Howard MD;  [facility]  Service: Endoscopy;  Laterality: N/A;    ERCP N/A 9/25/2020    Procedure: ERCP (ENDOSCOPIC RETROGRADE CHOLANGIOPANCREATOGRAPHY);  Surgeon: Esha Howard MD;  [facility]  Service: Endoscopy;  Laterality: N/A;    EYE SURGERY      HIP REPLACEMENT ARTHROPLASTY      HYSTERECTOMY      JOINT REPLACEMENT      REVISION OF SKIN POCKET FOR PACEMAKER N/A 1/21/2019    Procedure: REVISION-POCKET-PACEMAKER;  Surgeon: Asher Watts MD;  Location: [facility];  Service: Cardiology;  Laterality: N/A;  MODERATE SEDATION, sedation issues in the past    THYROIDECTOMY      TREATMENT OF CARDIAC ARRHYTHMIA N/A 3/18/2019    Procedure: CARDIOVERSION OR DEFIBRILLATION;  Surgeon: Asher Watts MD;  Location: [facility];  Service: Cardiology;  Laterality: N/A;  AF, JILL-cx if SR, DCCV, MAC, FAS, 3PREP    TREATMENT OF CARDIAC ARRHYTHMIA N/A 5/14/2019    Procedure: Cardioversion or Defibrillation;  Surgeon: Derick Vizcarra MD;  Location: [facility];  Service: Cardiology;  Laterality: N/A;  AF, JILL, DCCV, MAC, DM, 3PREP     Family History   Problem Relation Age of Onset    Heart attack Mother     Heart disease Mother     Heart failure Mother     Hypertension Mother     Stroke Maternal  Grandmother     Hypertension Maternal Grandmother     Heart disease Maternal Grandmother     Heart attack Maternal Grandmother     Heart failure Maternal Grandmother      Social History     Tobacco Use    Smoking status: Former Smoker     Start date: 5/19/1964    Smokeless tobacco: Never Used   Substance Use Topics    Alcohol use: No    Drug use: No            OBJECTIVE:     Vital Signs (Most Recent)  Temp: (!) 87.2 °F (30.7 °C) (11/25/20 1200)  Resp: 16 (11/25/20 1200)  BP: (!) 168/71 (11/25/20 1200)  SpO2: 100 % (11/25/20 1200)         ASSESSMENT/PLAN:     Patient is a 87 y.o. female with hx of cholangitis, liver abscess  tcoming for ERCP.     Plan: EUS and ERCP    Anesthesia Plan: Moderate Sedation    ASA Grade: ASA 2 - Patient with mild systemic disease with no functional limitations

## 2020-12-03 ENCOUNTER — TELEPHONE (OUTPATIENT)
Dept: ENDOSCOPY | Facility: HOSPITAL | Age: 85
End: 2020-12-03

## 2020-12-03 DIAGNOSIS — R93.3 ABNORMAL FINDINGS ON DIAGNOSTIC IMAGING OF OTHER PARTS OF DIGESTIVE TRACT: ICD-10-CM

## 2020-12-03 DIAGNOSIS — K86.2 PANCREATIC CYST: ICD-10-CM

## 2020-12-08 ENCOUNTER — TELEPHONE (OUTPATIENT)
Dept: ADMINISTRATIVE | Facility: CLINIC | Age: 85
End: 2020-12-08

## 2020-12-11 PROBLEM — R10.9 ABDOMINAL PAIN: Status: RESOLVED | Noted: 2020-10-02 | Resolved: 2020-12-11

## 2020-12-11 PROBLEM — R74.8 ABNORMAL LIVER ENZYMES: Status: RESOLVED | Noted: 2020-10-05 | Resolved: 2020-12-11

## 2020-12-11 PROBLEM — D72.829 LEUKOCYTOSIS: Status: RESOLVED | Noted: 2020-10-05 | Resolved: 2020-12-11

## 2020-12-12 PROBLEM — K83.09 CHOLANGITIS: Status: RESOLVED | Noted: 2020-11-25 | Resolved: 2020-12-12

## 2020-12-12 PROBLEM — N25.81 HYPERPARATHYROIDISM DUE TO RENAL INSUFFICIENCY: Chronic | Status: ACTIVE | Noted: 2020-06-11

## 2020-12-12 PROBLEM — D69.2 OTHER NONTHROMBOCYTOPENIC PURPURA: Chronic | Status: ACTIVE | Noted: 2020-12-12

## 2020-12-12 PROBLEM — D69.2 OTHER NONTHROMBOCYTOPENIC PURPURA: Status: ACTIVE | Noted: 2020-12-12

## 2020-12-12 PROBLEM — I49.5 SSS (SICK SINUS SYNDROME): Chronic | Status: ACTIVE | Noted: 2019-01-17

## 2020-12-12 PROBLEM — N39.0 UTI (URINARY TRACT INFECTION): Status: RESOLVED | Noted: 2020-09-26 | Resolved: 2020-12-12

## 2020-12-12 PROBLEM — A41.51 E. COLI SEPTICEMIA: Status: RESOLVED | Noted: 2020-09-26 | Resolved: 2020-12-12

## 2021-01-20 ENCOUNTER — TELEPHONE (OUTPATIENT)
Dept: ENDOSCOPY | Facility: HOSPITAL | Age: 86
End: 2021-01-20

## 2021-01-20 DIAGNOSIS — K86.2 PANCREATIC CYST: Primary | ICD-10-CM

## 2021-01-29 ENCOUNTER — CLINICAL SUPPORT (OUTPATIENT)
Dept: CARDIOLOGY | Facility: HOSPITAL | Age: 86
End: 2021-01-29
Payer: MEDICARE

## 2021-01-29 DIAGNOSIS — Z95.0 PRESENCE OF CARDIAC PACEMAKER: ICD-10-CM

## 2021-01-29 PROCEDURE — 93294 REM INTERROG EVL PM/LDLS PM: CPT | Mod: ,,, | Performed by: INTERNAL MEDICINE

## 2021-01-29 PROCEDURE — 93294 CARDIAC DEVICE CHECK - REMOTE: ICD-10-PCS | Mod: ,,, | Performed by: INTERNAL MEDICINE

## 2021-01-31 ENCOUNTER — HOSPITAL ENCOUNTER (INPATIENT)
Facility: HOSPITAL | Age: 86
LOS: 2 days | Discharge: NURSING, REHAB OR LONG-TERM CARE | DRG: 536 | End: 2021-02-03
Attending: EMERGENCY MEDICINE | Admitting: INTERNAL MEDICINE
Payer: MEDICARE
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
## 2021-01-31 DIAGNOSIS — I49.5 SSS (SICK SINUS SYNDROME): Chronic | ICD-10-CM

## 2021-01-31 DIAGNOSIS — M97.01XD PERIPROSTHETIC FRACTURE AROUND INTERNAL PROSTHETIC RIGHT HIP JOINT, SUBSEQUENT ENCOUNTER: ICD-10-CM

## 2021-01-31 DIAGNOSIS — Z79.01 CURRENT USE OF LONG TERM ANTICOAGULATION: ICD-10-CM

## 2021-01-31 DIAGNOSIS — Z96.649 PERIPROSTHETIC FRACTURE OF PROXIMAL END OF FEMUR: Primary | ICD-10-CM

## 2021-01-31 DIAGNOSIS — I50.32 CHRONIC DIASTOLIC HEART FAILURE: ICD-10-CM

## 2021-01-31 DIAGNOSIS — M25.551 RIGHT HIP PAIN: ICD-10-CM

## 2021-01-31 DIAGNOSIS — I10 ESSENTIAL HYPERTENSION: Chronic | ICD-10-CM

## 2021-01-31 DIAGNOSIS — Z95.0 CARDIAC PACEMAKER IN SITU: ICD-10-CM

## 2021-01-31 DIAGNOSIS — Z74.09 IMPAIRED FUNCTIONAL MOBILITY AND ENDURANCE: ICD-10-CM

## 2021-01-31 DIAGNOSIS — M97.8XXA PERIPROSTHETIC FRACTURE OF PROXIMAL END OF FEMUR: Primary | ICD-10-CM

## 2021-01-31 DIAGNOSIS — D63.8 ANEMIA OF CHRONIC DISEASE: ICD-10-CM

## 2021-01-31 DIAGNOSIS — Z79.899 LONG TERM CURRENT USE OF ANTIARRHYTHMIC DRUG: ICD-10-CM

## 2021-01-31 DIAGNOSIS — M97.01XA PERIPROSTHETIC FRACTURE AROUND INTERNAL PROSTHETIC RIGHT HIP JOINT, INITIAL ENCOUNTER: ICD-10-CM

## 2021-01-31 DIAGNOSIS — E03.9 ACQUIRED HYPOTHYROIDISM: ICD-10-CM

## 2021-01-31 DIAGNOSIS — I48.0 PAROXYSMAL ATRIAL FIBRILLATION: Chronic | ICD-10-CM

## 2021-01-31 DIAGNOSIS — E78.00 PURE HYPERCHOLESTEROLEMIA: ICD-10-CM

## 2021-01-31 LAB
ALBUMIN SERPL BCP-MCNC: 3.1 G/DL (ref 3.5–5.2)
ALP SERPL-CCNC: 138 U/L (ref 55–135)
ALT SERPL W/O P-5'-P-CCNC: 20 U/L (ref 10–44)
ANION GAP SERPL CALC-SCNC: 10 MMOL/L (ref 8–16)
AST SERPL-CCNC: 23 U/L (ref 10–40)
BASOPHILS # BLD AUTO: 0.03 K/UL (ref 0–0.2)
BASOPHILS NFR BLD: 0.3 % (ref 0–1.9)
BILIRUB SERPL-MCNC: 0.4 MG/DL (ref 0.1–1)
BUN SERPL-MCNC: 19 MG/DL (ref 8–23)
CALCIUM SERPL-MCNC: 8 MG/DL (ref 8.7–10.5)
CHLORIDE SERPL-SCNC: 107 MMOL/L (ref 95–110)
CO2 SERPL-SCNC: 22 MMOL/L (ref 23–29)
CREAT SERPL-MCNC: 1.1 MG/DL (ref 0.5–1.4)
CTP QC/QA: YES
DIFFERENTIAL METHOD: ABNORMAL
EOSINOPHIL # BLD AUTO: 0.1 K/UL (ref 0–0.5)
EOSINOPHIL NFR BLD: 0.4 % (ref 0–8)
ERYTHROCYTE [DISTWIDTH] IN BLOOD BY AUTOMATED COUNT: 13.2 % (ref 11.5–14.5)
EST. GFR  (AFRICAN AMERICAN): 52.2 ML/MIN/1.73 M^2
EST. GFR  (NON AFRICAN AMERICAN): 45.3 ML/MIN/1.73 M^2
GLUCOSE SERPL-MCNC: 143 MG/DL (ref 70–110)
HCT VFR BLD AUTO: 35.3 % (ref 37–48.5)
HGB BLD-MCNC: 11.4 G/DL (ref 12–16)
IMM GRANULOCYTES # BLD AUTO: 0.07 K/UL (ref 0–0.04)
IMM GRANULOCYTES NFR BLD AUTO: 0.6 % (ref 0–0.5)
INR PPP: 1 (ref 0.8–1.2)
LYMPHOCYTES # BLD AUTO: 1.3 K/UL (ref 1–4.8)
LYMPHOCYTES NFR BLD: 11.1 % (ref 18–48)
MCH RBC QN AUTO: 31 PG (ref 27–31)
MCHC RBC AUTO-ENTMCNC: 32.3 G/DL (ref 32–36)
MCV RBC AUTO: 96 FL (ref 82–98)
MONOCYTES # BLD AUTO: 1 K/UL (ref 0.3–1)
MONOCYTES NFR BLD: 8.6 % (ref 4–15)
NEUTROPHILS # BLD AUTO: 9 K/UL (ref 1.8–7.7)
NEUTROPHILS NFR BLD: 79 % (ref 38–73)
NRBC BLD-RTO: 0 /100 WBC
PLATELET # BLD AUTO: 202 K/UL (ref 150–350)
PMV BLD AUTO: 9.4 FL (ref 9.2–12.9)
POTASSIUM SERPL-SCNC: 5.1 MMOL/L (ref 3.5–5.1)
PROT SERPL-MCNC: 6.2 G/DL (ref 6–8.4)
PROTHROMBIN TIME: 10.9 SEC (ref 9–12.5)
RBC # BLD AUTO: 3.68 M/UL (ref 4–5.4)
SARS-COV-2 RDRP RESP QL NAA+PROBE: NEGATIVE
SODIUM SERPL-SCNC: 139 MMOL/L (ref 136–145)
WBC # BLD AUTO: 11.44 K/UL (ref 3.9–12.7)

## 2021-01-31 PROCEDURE — 99285 EMERGENCY DEPT VISIT HI MDM: CPT | Mod: ,,, | Performed by: PHYSICIAN ASSISTANT

## 2021-01-31 PROCEDURE — 63600175 PHARM REV CODE 636 W HCPCS: Performed by: PHYSICIAN ASSISTANT

## 2021-01-31 PROCEDURE — 80053 COMPREHEN METABOLIC PANEL: CPT

## 2021-01-31 PROCEDURE — 93010 ELECTROCARDIOGRAM REPORT: CPT | Mod: ,,, | Performed by: INTERNAL MEDICINE

## 2021-01-31 PROCEDURE — 99285 EMERGENCY DEPT VISIT HI MDM: CPT | Mod: 25

## 2021-01-31 PROCEDURE — 93010 EKG 12-LEAD: ICD-10-PCS | Mod: ,,, | Performed by: INTERNAL MEDICINE

## 2021-01-31 PROCEDURE — 93005 ELECTROCARDIOGRAM TRACING: CPT

## 2021-01-31 PROCEDURE — 25000003 PHARM REV CODE 250: Performed by: PHYSICIAN ASSISTANT

## 2021-01-31 PROCEDURE — 51702 INSERT TEMP BLADDER CATH: CPT

## 2021-01-31 PROCEDURE — 99285 PR EMERGENCY DEPT VISIT,LEVEL V: ICD-10-PCS | Mod: ,,, | Performed by: PHYSICIAN ASSISTANT

## 2021-01-31 PROCEDURE — 85025 COMPLETE CBC W/AUTO DIFF WBC: CPT

## 2021-01-31 PROCEDURE — U0002 COVID-19 LAB TEST NON-CDC: HCPCS | Performed by: PHYSICIAN ASSISTANT

## 2021-01-31 PROCEDURE — 85610 PROTHROMBIN TIME: CPT

## 2021-01-31 RX ORDER — ACETAMINOPHEN 500 MG
1000 TABLET ORAL
Status: COMPLETED | OUTPATIENT
Start: 2021-01-31 | End: 2021-01-31

## 2021-01-31 RX ORDER — FENTANYL CITRATE 50 UG/ML
25 INJECTION, SOLUTION INTRAMUSCULAR; INTRAVENOUS
Status: COMPLETED | OUTPATIENT
Start: 2021-01-31 | End: 2021-01-31

## 2021-01-31 RX ADMIN — FENTANYL CITRATE 25 MCG: 50 INJECTION, SOLUTION INTRAMUSCULAR; INTRAVENOUS at 09:01

## 2021-01-31 RX ADMIN — ACETAMINOPHEN 1000 MG: 500 TABLET ORAL at 08:01

## 2021-02-01 PROBLEM — M25.551 RIGHT HIP PAIN: Status: ACTIVE | Noted: 2021-02-01

## 2021-02-01 PROBLEM — M97.8XXA PERIPROSTHETIC FRACTURE OF PROXIMAL END OF FEMUR: Status: ACTIVE | Noted: 2021-02-01

## 2021-02-01 PROBLEM — M97.01XA PERIPROSTHETIC FRACTURE AROUND INTERNAL PROSTHETIC RIGHT HIP JOINT: Status: ACTIVE | Noted: 2021-02-01

## 2021-02-01 PROBLEM — Z96.649 PERIPROSTHETIC FRACTURE OF PROXIMAL END OF FEMUR: Status: ACTIVE | Noted: 2021-02-01

## 2021-02-01 LAB
ABO + RH BLD: NORMAL
BILIRUB UR QL STRIP: NEGATIVE
BLD GP AB SCN CELLS X3 SERPL QL: NORMAL
CLARITY UR REFRACT.AUTO: CLEAR
COLOR UR AUTO: YELLOW
ESTIMATED AVG GLUCOSE: 105 MG/DL (ref 68–131)
GLUCOSE UR QL STRIP: NEGATIVE
HBA1C MFR BLD: 5.3 % (ref 4–5.6)
HGB UR QL STRIP: NEGATIVE
KETONES UR QL STRIP: NEGATIVE
LEUKOCYTE ESTERASE UR QL STRIP: NEGATIVE
MAGNESIUM SERPL-MCNC: 1.7 MG/DL (ref 1.6–2.6)
NITRITE UR QL STRIP: NEGATIVE
PH UR STRIP: 6 [PH] (ref 5–8)
PHOSPHATE SERPL-MCNC: 3.7 MG/DL (ref 2.7–4.5)
PREALB SERPL-MCNC: 21 MG/DL (ref 20–43)
PROT UR QL STRIP: NEGATIVE
SP GR UR STRIP: 1.01 (ref 1–1.03)
TRANSFERRIN SERPL-MCNC: 171 MG/DL (ref 200–375)
URN SPEC COLLECT METH UR: NORMAL

## 2021-02-01 PROCEDURE — 99223 PR INITIAL HOSPITAL CARE,LEVL III: ICD-10-PCS | Mod: ,,, | Performed by: HOSPITALIST

## 2021-02-01 PROCEDURE — 84466 ASSAY OF TRANSFERRIN: CPT

## 2021-02-01 PROCEDURE — 25000003 PHARM REV CODE 250: Performed by: HOSPITALIST

## 2021-02-01 PROCEDURE — 99223 PR INITIAL HOSPITAL CARE,LEVL III: ICD-10-PCS | Mod: GC,,, | Performed by: ORTHOPAEDIC SURGERY

## 2021-02-01 PROCEDURE — 83036 HEMOGLOBIN GLYCOSYLATED A1C: CPT

## 2021-02-01 PROCEDURE — 84134 ASSAY OF PREALBUMIN: CPT

## 2021-02-01 PROCEDURE — 86920 COMPATIBILITY TEST SPIN: CPT

## 2021-02-01 PROCEDURE — 84100 ASSAY OF PHOSPHORUS: CPT

## 2021-02-01 PROCEDURE — 83735 ASSAY OF MAGNESIUM: CPT

## 2021-02-01 PROCEDURE — 81003 URINALYSIS AUTO W/O SCOPE: CPT

## 2021-02-01 PROCEDURE — 86900 BLOOD TYPING SEROLOGIC ABO: CPT

## 2021-02-01 PROCEDURE — 99223 1ST HOSP IP/OBS HIGH 75: CPT | Mod: GC,,, | Performed by: ORTHOPAEDIC SURGERY

## 2021-02-01 PROCEDURE — 11000001 HC ACUTE MED/SURG PRIVATE ROOM

## 2021-02-01 PROCEDURE — 99223 1ST HOSP IP/OBS HIGH 75: CPT | Mod: ,,, | Performed by: HOSPITALIST

## 2021-02-01 RX ORDER — CEFAZOLIN SODIUM 1 G/3ML
2 INJECTION, POWDER, FOR SOLUTION INTRAMUSCULAR; INTRAVENOUS
Status: CANCELLED | OUTPATIENT
Start: 2021-02-01 | End: 2021-02-01

## 2021-02-01 RX ORDER — METHOCARBAMOL 500 MG/1
500 TABLET, FILM COATED ORAL EVERY 6 HOURS PRN
Status: CANCELLED | OUTPATIENT
Start: 2021-02-01

## 2021-02-01 RX ORDER — ROPIVACAINE HYDROCHLORIDE 2 MG/ML
10 INJECTION, SOLUTION EPIDURAL; INFILTRATION; PERINEURAL CONTINUOUS
Status: CANCELLED | OUTPATIENT
Start: 2021-02-01

## 2021-02-01 RX ORDER — AMIODARONE HYDROCHLORIDE 200 MG/1
200 TABLET ORAL DAILY
Status: DISCONTINUED | OUTPATIENT
Start: 2021-02-01 | End: 2021-02-03 | Stop reason: HOSPADM

## 2021-02-01 RX ORDER — POLYETHYLENE GLYCOL 3350 17 G/17G
17 POWDER, FOR SOLUTION ORAL DAILY
Status: CANCELLED | OUTPATIENT
Start: 2021-02-01

## 2021-02-01 RX ORDER — ATORVASTATIN CALCIUM 10 MG/1
10 TABLET, FILM COATED ORAL DAILY
Status: DISCONTINUED | OUTPATIENT
Start: 2021-02-01 | End: 2021-02-03 | Stop reason: HOSPADM

## 2021-02-01 RX ORDER — VANCOMYCIN HCL IN 5 % DEXTROSE 1G/250ML
1000 PLASTIC BAG, INJECTION (ML) INTRAVENOUS
Status: CANCELLED | OUTPATIENT
Start: 2021-02-01

## 2021-02-01 RX ORDER — ACETAMINOPHEN 500 MG
1000 TABLET ORAL EVERY 6 HOURS
Status: CANCELLED | OUTPATIENT
Start: 2021-02-01 | End: 2021-02-02

## 2021-02-01 RX ORDER — SODIUM CHLORIDE 0.9 % (FLUSH) 0.9 %
10 SYRINGE (ML) INJECTION
Status: DISCONTINUED | OUTPATIENT
Start: 2021-02-01 | End: 2021-02-03 | Stop reason: HOSPADM

## 2021-02-01 RX ORDER — ATENOLOL 25 MG/1
25 TABLET ORAL NIGHTLY
Status: DISCONTINUED | OUTPATIENT
Start: 2021-02-01 | End: 2021-02-03 | Stop reason: HOSPADM

## 2021-02-01 RX ORDER — MIDAZOLAM HYDROCHLORIDE 1 MG/ML
0.5 INJECTION INTRAMUSCULAR; INTRAVENOUS
Status: CANCELLED | OUTPATIENT
Start: 2021-02-01

## 2021-02-01 RX ORDER — FENTANYL CITRATE 50 UG/ML
25 INJECTION, SOLUTION INTRAMUSCULAR; INTRAVENOUS EVERY 5 MIN PRN
Status: CANCELLED | OUTPATIENT
Start: 2021-02-01

## 2021-02-01 RX ORDER — MUPIROCIN 20 MG/G
1 OINTMENT TOPICAL
Status: CANCELLED | OUTPATIENT
Start: 2021-02-01

## 2021-02-01 RX ORDER — MORPHINE SULFATE 2 MG/ML
2 INJECTION, SOLUTION INTRAMUSCULAR; INTRAVENOUS
Status: DISCONTINUED | OUTPATIENT
Start: 2021-02-01 | End: 2021-02-03 | Stop reason: HOSPADM

## 2021-02-01 RX ORDER — ACETAMINOPHEN 500 MG
1000 TABLET ORAL EVERY 8 HOURS
Status: COMPLETED | OUTPATIENT
Start: 2021-02-01 | End: 2021-02-01

## 2021-02-01 RX ORDER — SODIUM CHLORIDE 0.9 % (FLUSH) 0.9 %
10 SYRINGE (ML) INJECTION
Status: CANCELLED | OUTPATIENT
Start: 2021-02-01

## 2021-02-01 RX ORDER — TRANEXAMIC ACID 100 MG/ML
1000 INJECTION, SOLUTION INTRAVENOUS
Status: CANCELLED | OUTPATIENT
Start: 2021-02-01

## 2021-02-01 RX ORDER — TALC
6 POWDER (GRAM) TOPICAL NIGHTLY PRN
Status: DISCONTINUED | OUTPATIENT
Start: 2021-02-01 | End: 2021-02-03 | Stop reason: HOSPADM

## 2021-02-01 RX ORDER — ATENOLOL 50 MG/1
50 TABLET ORAL DAILY
Status: DISCONTINUED | OUTPATIENT
Start: 2021-02-01 | End: 2021-02-03 | Stop reason: HOSPADM

## 2021-02-01 RX ORDER — ONDANSETRON 2 MG/ML
4 INJECTION INTRAMUSCULAR; INTRAVENOUS EVERY 12 HOURS PRN
Status: DISCONTINUED | OUTPATIENT
Start: 2021-02-01 | End: 2021-02-03 | Stop reason: HOSPADM

## 2021-02-01 RX ORDER — MUPIROCIN 20 MG/G
1 OINTMENT TOPICAL 2 TIMES DAILY
Status: CANCELLED | OUTPATIENT
Start: 2021-02-01 | End: 2021-02-06

## 2021-02-01 RX ORDER — BISACODYL 10 MG
10 SUPPOSITORY, RECTAL RECTAL DAILY PRN
Status: DISCONTINUED | OUTPATIENT
Start: 2021-02-01 | End: 2021-02-03 | Stop reason: HOSPADM

## 2021-02-01 RX ORDER — MUPIROCIN 20 MG/G
OINTMENT TOPICAL
Status: CANCELLED | OUTPATIENT
Start: 2021-02-01

## 2021-02-01 RX ORDER — PREGABALIN 75 MG/1
75 CAPSULE ORAL NIGHTLY
Status: DISCONTINUED | OUTPATIENT
Start: 2021-02-01 | End: 2021-02-03 | Stop reason: HOSPADM

## 2021-02-01 RX ORDER — CEFAZOLIN SODIUM 1 G/3ML
2 INJECTION, POWDER, FOR SOLUTION INTRAMUSCULAR; INTRAVENOUS
Status: CANCELLED | OUTPATIENT
Start: 2021-02-01

## 2021-02-01 RX ORDER — AMOXICILLIN 250 MG
1 CAPSULE ORAL 2 TIMES DAILY
Status: CANCELLED | OUTPATIENT
Start: 2021-02-01

## 2021-02-01 RX ORDER — LIDOCAINE HYDROCHLORIDE 10 MG/ML
1 INJECTION, SOLUTION EPIDURAL; INFILTRATION; INTRACAUDAL; PERINEURAL
Status: CANCELLED | OUTPATIENT
Start: 2021-02-01

## 2021-02-01 RX ORDER — OXYCODONE HYDROCHLORIDE 10 MG/1
10 TABLET ORAL
Status: DISCONTINUED | OUTPATIENT
Start: 2021-02-01 | End: 2021-02-03 | Stop reason: HOSPADM

## 2021-02-01 RX ORDER — OXYCODONE HYDROCHLORIDE 5 MG/1
5 TABLET ORAL
Status: DISCONTINUED | OUTPATIENT
Start: 2021-02-01 | End: 2021-02-03 | Stop reason: HOSPADM

## 2021-02-01 RX ADMIN — ACETAMINOPHEN 1000 MG: 500 TABLET ORAL at 09:02

## 2021-02-01 RX ADMIN — ATENOLOL 25 MG: 25 TABLET ORAL at 09:02

## 2021-02-01 RX ADMIN — LEVOTHYROXINE SODIUM 137 MCG: 25 TABLET ORAL at 09:02

## 2021-02-01 RX ADMIN — AMIODARONE HYDROCHLORIDE 200 MG: 200 TABLET ORAL at 09:02

## 2021-02-01 RX ADMIN — ACETAMINOPHEN 1000 MG: 500 TABLET ORAL at 02:02

## 2021-02-01 RX ADMIN — ACETAMINOPHEN 1000 MG: 500 TABLET ORAL at 05:02

## 2021-02-01 RX ADMIN — ATORVASTATIN CALCIUM 10 MG: 10 TABLET, FILM COATED ORAL at 09:02

## 2021-02-01 RX ADMIN — PREGABALIN 75 MG: 75 CAPSULE ORAL at 09:02

## 2021-02-01 RX ADMIN — ATENOLOL 50 MG: 50 TABLET ORAL at 09:02

## 2021-02-02 PROBLEM — F43.21 GRIEF: Status: RESOLVED | Noted: 2020-07-28 | Resolved: 2021-02-02

## 2021-02-02 PROBLEM — I50.32 CHRONIC DIASTOLIC HEART FAILURE: Status: ACTIVE | Noted: 2021-02-02

## 2021-02-02 PROBLEM — I49.3 PVC (PREMATURE VENTRICULAR CONTRACTION): Status: RESOLVED | Noted: 2019-01-17 | Resolved: 2021-02-02

## 2021-02-02 PROBLEM — E80.6 HYPERBILIRUBINEMIA: Status: RESOLVED | Noted: 2020-09-24 | Resolved: 2021-02-02

## 2021-02-02 PROBLEM — D63.8 ANEMIA OF CHRONIC DISEASE: Status: ACTIVE | Noted: 2021-02-02

## 2021-02-02 PROBLEM — K83.8 DILATED BILE DUCT: Status: RESOLVED | Noted: 2020-09-25 | Resolved: 2021-02-02

## 2021-02-02 PROBLEM — D69.2 OTHER NONTHROMBOCYTOPENIC PURPURA: Chronic | Status: RESOLVED | Noted: 2020-12-12 | Resolved: 2021-02-02

## 2021-02-02 PROBLEM — K85.90 PANCREATIC ABSCESS: Status: RESOLVED | Noted: 2020-09-26 | Resolved: 2021-02-02

## 2021-02-02 PROBLEM — K75.0 HEPATIC ABSCESS: Status: RESOLVED | Noted: 2020-09-26 | Resolved: 2021-02-02

## 2021-02-02 LAB
ANION GAP SERPL CALC-SCNC: 6 MMOL/L (ref 8–16)
BASOPHILS # BLD AUTO: 0.03 K/UL (ref 0–0.2)
BASOPHILS NFR BLD: 0.5 % (ref 0–1.9)
BUN SERPL-MCNC: 18 MG/DL (ref 8–23)
CALCIUM SERPL-MCNC: 7.9 MG/DL (ref 8.7–10.5)
CHLORIDE SERPL-SCNC: 107 MMOL/L (ref 95–110)
CO2 SERPL-SCNC: 22 MMOL/L (ref 23–29)
CREAT SERPL-MCNC: 0.8 MG/DL (ref 0.5–1.4)
DIFFERENTIAL METHOD: ABNORMAL
EOSINOPHIL # BLD AUTO: 0.2 K/UL (ref 0–0.5)
EOSINOPHIL NFR BLD: 3.2 % (ref 0–8)
ERYTHROCYTE [DISTWIDTH] IN BLOOD BY AUTOMATED COUNT: 13 % (ref 11.5–14.5)
EST. GFR  (AFRICAN AMERICAN): >60 ML/MIN/1.73 M^2
EST. GFR  (NON AFRICAN AMERICAN): >60 ML/MIN/1.73 M^2
GLUCOSE SERPL-MCNC: 84 MG/DL (ref 70–110)
HCT VFR BLD AUTO: 34.6 % (ref 37–48.5)
HGB BLD-MCNC: 10.7 G/DL (ref 12–16)
IMM GRANULOCYTES # BLD AUTO: 0.04 K/UL (ref 0–0.04)
IMM GRANULOCYTES NFR BLD AUTO: 0.6 % (ref 0–0.5)
LYMPHOCYTES # BLD AUTO: 1.8 K/UL (ref 1–4.8)
LYMPHOCYTES NFR BLD: 29.1 % (ref 18–48)
MAGNESIUM SERPL-MCNC: 1.8 MG/DL (ref 1.6–2.6)
MCH RBC QN AUTO: 29.3 PG (ref 27–31)
MCHC RBC AUTO-ENTMCNC: 30.9 G/DL (ref 32–36)
MCV RBC AUTO: 95 FL (ref 82–98)
MONOCYTES # BLD AUTO: 0.8 K/UL (ref 0.3–1)
MONOCYTES NFR BLD: 12.7 % (ref 4–15)
NEUTROPHILS # BLD AUTO: 3.4 K/UL (ref 1.8–7.7)
NEUTROPHILS NFR BLD: 53.9 % (ref 38–73)
NRBC BLD-RTO: 0 /100 WBC
PHOSPHATE SERPL-MCNC: 3.9 MG/DL (ref 2.7–4.5)
PLATELET # BLD AUTO: 181 K/UL (ref 150–350)
PMV BLD AUTO: 9.2 FL (ref 9.2–12.9)
POTASSIUM SERPL-SCNC: 4.4 MMOL/L (ref 3.5–5.1)
RBC # BLD AUTO: 3.65 M/UL (ref 4–5.4)
SODIUM SERPL-SCNC: 135 MMOL/L (ref 136–145)
WBC # BLD AUTO: 6.28 K/UL (ref 3.9–12.7)

## 2021-02-02 PROCEDURE — 36415 COLL VENOUS BLD VENIPUNCTURE: CPT

## 2021-02-02 PROCEDURE — 85025 COMPLETE CBC W/AUTO DIFF WBC: CPT

## 2021-02-02 PROCEDURE — 84100 ASSAY OF PHOSPHORUS: CPT

## 2021-02-02 PROCEDURE — 97165 OT EVAL LOW COMPLEX 30 MIN: CPT

## 2021-02-02 PROCEDURE — 99232 SBSQ HOSP IP/OBS MODERATE 35: CPT | Mod: ,,, | Performed by: INTERNAL MEDICINE

## 2021-02-02 PROCEDURE — 80048 BASIC METABOLIC PNL TOTAL CA: CPT

## 2021-02-02 PROCEDURE — 25000003 PHARM REV CODE 250: Performed by: INTERNAL MEDICINE

## 2021-02-02 PROCEDURE — 97161 PT EVAL LOW COMPLEX 20 MIN: CPT

## 2021-02-02 PROCEDURE — 99221 1ST HOSP IP/OBS SF/LOW 40: CPT | Mod: ,,, | Performed by: NURSE PRACTITIONER

## 2021-02-02 PROCEDURE — 83735 ASSAY OF MAGNESIUM: CPT

## 2021-02-02 PROCEDURE — 97116 GAIT TRAINING THERAPY: CPT

## 2021-02-02 PROCEDURE — 25000003 PHARM REV CODE 250: Performed by: HOSPITALIST

## 2021-02-02 PROCEDURE — 99232 PR SUBSEQUENT HOSPITAL CARE,LEVL II: ICD-10-PCS | Mod: ,,, | Performed by: INTERNAL MEDICINE

## 2021-02-02 PROCEDURE — 97535 SELF CARE MNGMENT TRAINING: CPT

## 2021-02-02 PROCEDURE — 11000001 HC ACUTE MED/SURG PRIVATE ROOM

## 2021-02-02 PROCEDURE — 99221 PR INITIAL HOSPITAL CARE,LEVL I: ICD-10-PCS | Mod: ,,, | Performed by: NURSE PRACTITIONER

## 2021-02-02 RX ORDER — ASPIRIN 81 MG/1
81 TABLET ORAL DAILY
Status: DISCONTINUED | OUTPATIENT
Start: 2021-02-02 | End: 2021-02-03 | Stop reason: HOSPADM

## 2021-02-02 RX ORDER — METHOCARBAMOL 500 MG/1
500 TABLET, FILM COATED ORAL 4 TIMES DAILY PRN
Status: DISCONTINUED | OUTPATIENT
Start: 2021-02-02 | End: 2021-02-03 | Stop reason: HOSPADM

## 2021-02-02 RX ORDER — MUPIROCIN 20 MG/G
OINTMENT TOPICAL 2 TIMES DAILY
Status: DISCONTINUED | OUTPATIENT
Start: 2021-02-02 | End: 2021-02-03

## 2021-02-02 RX ORDER — SPIRONOLACTONE 25 MG/1
25 TABLET ORAL 2 TIMES DAILY
Status: DISCONTINUED | OUTPATIENT
Start: 2021-02-02 | End: 2021-02-03 | Stop reason: HOSPADM

## 2021-02-02 RX ORDER — ACETAMINOPHEN 500 MG
1000 TABLET ORAL EVERY 8 HOURS
Status: DISCONTINUED | OUTPATIENT
Start: 2021-02-02 | End: 2021-02-03 | Stop reason: HOSPADM

## 2021-02-02 RX ADMIN — APIXABAN 2.5 MG: 2.5 TABLET, FILM COATED ORAL at 10:02

## 2021-02-02 RX ADMIN — ACETAMINOPHEN 1000 MG: 500 TABLET ORAL at 10:02

## 2021-02-02 RX ADMIN — LEVOTHYROXINE SODIUM 137 MCG: 25 TABLET ORAL at 01:02

## 2021-02-02 RX ADMIN — ATENOLOL 50 MG: 50 TABLET ORAL at 11:02

## 2021-02-02 RX ADMIN — ATORVASTATIN CALCIUM 10 MG: 10 TABLET, FILM COATED ORAL at 11:02

## 2021-02-02 RX ADMIN — ASPIRIN 81 MG: 81 TABLET, COATED ORAL at 11:02

## 2021-02-02 RX ADMIN — AMIODARONE HYDROCHLORIDE 200 MG: 200 TABLET ORAL at 11:02

## 2021-02-02 RX ADMIN — ATENOLOL 25 MG: 25 TABLET ORAL at 10:02

## 2021-02-02 RX ADMIN — SPIRONOLACTONE 25 MG: 25 TABLET, FILM COATED ORAL at 01:02

## 2021-02-02 RX ADMIN — APIXABAN 2.5 MG: 2.5 TABLET, FILM COATED ORAL at 01:02

## 2021-02-02 RX ADMIN — PREGABALIN 75 MG: 75 CAPSULE ORAL at 10:02

## 2021-02-02 RX ADMIN — ACETAMINOPHEN 1000 MG: 500 TABLET ORAL at 11:02

## 2021-02-02 RX ADMIN — MUPIROCIN: 20 OINTMENT TOPICAL at 10:02

## 2021-02-02 RX ADMIN — MELATONIN TAB 3 MG 6 MG: 3 TAB at 10:02

## 2021-02-02 RX ADMIN — SPIRONOLACTONE 25 MG: 25 TABLET, FILM COATED ORAL at 10:02

## 2021-02-03 VITALS
BODY MASS INDEX: 20.38 KG/M2 | SYSTOLIC BLOOD PRESSURE: 144 MMHG | OXYGEN SATURATION: 95 % | RESPIRATION RATE: 16 BRPM | DIASTOLIC BLOOD PRESSURE: 66 MMHG | WEIGHT: 115 LBS | HEIGHT: 63 IN | TEMPERATURE: 97 F | HEART RATE: 60 BPM

## 2021-02-03 PROBLEM — Z74.09 IMPAIRED FUNCTIONAL MOBILITY AND ENDURANCE: Status: ACTIVE | Noted: 2021-02-03

## 2021-02-03 PROBLEM — M25.551 RIGHT HIP PAIN: Status: RESOLVED | Noted: 2021-02-01 | Resolved: 2021-02-03

## 2021-02-03 LAB
ANION GAP SERPL CALC-SCNC: 8 MMOL/L (ref 8–16)
BASOPHILS # BLD AUTO: 0.03 K/UL (ref 0–0.2)
BASOPHILS NFR BLD: 0.5 % (ref 0–1.9)
BUN SERPL-MCNC: 20 MG/DL (ref 8–23)
CALCIUM SERPL-MCNC: 7.9 MG/DL (ref 8.7–10.5)
CHLORIDE SERPL-SCNC: 105 MMOL/L (ref 95–110)
CO2 SERPL-SCNC: 23 MMOL/L (ref 23–29)
CREAT SERPL-MCNC: 1 MG/DL (ref 0.5–1.4)
DIFFERENTIAL METHOD: ABNORMAL
EOSINOPHIL # BLD AUTO: 0.1 K/UL (ref 0–0.5)
EOSINOPHIL NFR BLD: 2.3 % (ref 0–8)
ERYTHROCYTE [DISTWIDTH] IN BLOOD BY AUTOMATED COUNT: 13 % (ref 11.5–14.5)
EST. GFR  (AFRICAN AMERICAN): 58.5 ML/MIN/1.73 M^2
EST. GFR  (NON AFRICAN AMERICAN): 50.8 ML/MIN/1.73 M^2
GLUCOSE SERPL-MCNC: 86 MG/DL (ref 70–110)
HCT VFR BLD AUTO: 33.9 % (ref 37–48.5)
HGB BLD-MCNC: 10.6 G/DL (ref 12–16)
IMM GRANULOCYTES # BLD AUTO: 0.07 K/UL (ref 0–0.04)
IMM GRANULOCYTES NFR BLD AUTO: 1.2 % (ref 0–0.5)
LYMPHOCYTES # BLD AUTO: 1.8 K/UL (ref 1–4.8)
LYMPHOCYTES NFR BLD: 29.5 % (ref 18–48)
MAGNESIUM SERPL-MCNC: 1.8 MG/DL (ref 1.6–2.6)
MCH RBC QN AUTO: 29.6 PG (ref 27–31)
MCHC RBC AUTO-ENTMCNC: 31.3 G/DL (ref 32–36)
MCV RBC AUTO: 95 FL (ref 82–98)
MONOCYTES # BLD AUTO: 0.8 K/UL (ref 0.3–1)
MONOCYTES NFR BLD: 12.7 % (ref 4–15)
NEUTROPHILS # BLD AUTO: 3.3 K/UL (ref 1.8–7.7)
NEUTROPHILS NFR BLD: 53.8 % (ref 38–73)
NRBC BLD-RTO: 0 /100 WBC
PHOSPHATE SERPL-MCNC: 3.3 MG/DL (ref 2.7–4.5)
PLATELET # BLD AUTO: 185 K/UL (ref 150–350)
PMV BLD AUTO: 9.4 FL (ref 9.2–12.9)
POTASSIUM SERPL-SCNC: 4.1 MMOL/L (ref 3.5–5.1)
RBC # BLD AUTO: 3.58 M/UL (ref 4–5.4)
SODIUM SERPL-SCNC: 136 MMOL/L (ref 136–145)
WBC # BLD AUTO: 6.07 K/UL (ref 3.9–12.7)

## 2021-02-03 PROCEDURE — 99222 PR INITIAL HOSPITAL CARE,LEVL II: ICD-10-PCS | Mod: ,,, | Performed by: PHYSICAL MEDICINE & REHABILITATION

## 2021-02-03 PROCEDURE — 80048 BASIC METABOLIC PNL TOTAL CA: CPT

## 2021-02-03 PROCEDURE — 25000003 PHARM REV CODE 250: Performed by: HOSPITALIST

## 2021-02-03 PROCEDURE — 84100 ASSAY OF PHOSPHORUS: CPT

## 2021-02-03 PROCEDURE — 99222 1ST HOSP IP/OBS MODERATE 55: CPT | Mod: ,,, | Performed by: PHYSICAL MEDICINE & REHABILITATION

## 2021-02-03 PROCEDURE — 85025 COMPLETE CBC W/AUTO DIFF WBC: CPT

## 2021-02-03 PROCEDURE — 99232 PR SUBSEQUENT HOSPITAL CARE,LEVL II: ICD-10-PCS | Mod: ,,, | Performed by: NURSE PRACTITIONER

## 2021-02-03 PROCEDURE — 99232 SBSQ HOSP IP/OBS MODERATE 35: CPT | Mod: ,,, | Performed by: NURSE PRACTITIONER

## 2021-02-03 PROCEDURE — 83735 ASSAY OF MAGNESIUM: CPT

## 2021-02-03 PROCEDURE — 25000003 PHARM REV CODE 250: Performed by: INTERNAL MEDICINE

## 2021-02-03 PROCEDURE — 99239 HOSP IP/OBS DSCHRG MGMT >30: CPT | Mod: ,,, | Performed by: INTERNAL MEDICINE

## 2021-02-03 PROCEDURE — 36415 COLL VENOUS BLD VENIPUNCTURE: CPT

## 2021-02-03 PROCEDURE — 99239 PR HOSPITAL DISCHARGE DAY,>30 MIN: ICD-10-PCS | Mod: ,,, | Performed by: INTERNAL MEDICINE

## 2021-02-03 RX ORDER — OXYCODONE HYDROCHLORIDE 5 MG/1
5 TABLET ORAL EVERY 4 HOURS PRN
Start: 2021-02-03 | End: 2021-04-01

## 2021-02-03 RX ORDER — CHOLECALCIFEROL (VITAMIN D3) 25 MCG
1000 TABLET ORAL DAILY
COMMUNITY
Start: 2021-02-03 | End: 2023-01-01

## 2021-02-03 RX ORDER — AMIODARONE HYDROCHLORIDE 200 MG/1
200 TABLET ORAL DAILY
Start: 2021-02-03 | End: 2021-08-04 | Stop reason: SDUPTHER

## 2021-02-03 RX ORDER — PREGABALIN 75 MG/1
75 CAPSULE ORAL NIGHTLY
Start: 2021-02-03 | End: 2021-04-01

## 2021-02-03 RX ORDER — ACETAMINOPHEN 500 MG
1000 TABLET ORAL EVERY 8 HOURS PRN
Refills: 0 | COMMUNITY
Start: 2021-02-03 | End: 2023-01-01

## 2021-02-03 RX ORDER — METHOCARBAMOL 500 MG/1
500 TABLET, FILM COATED ORAL 4 TIMES DAILY PRN
Start: 2021-02-03 | End: 2021-02-13

## 2021-02-03 RX ADMIN — ATORVASTATIN CALCIUM 10 MG: 10 TABLET, FILM COATED ORAL at 08:02

## 2021-02-03 RX ADMIN — APIXABAN 2.5 MG: 2.5 TABLET, FILM COATED ORAL at 08:02

## 2021-02-03 RX ADMIN — ATENOLOL 50 MG: 50 TABLET ORAL at 08:02

## 2021-02-03 RX ADMIN — LEVOTHYROXINE SODIUM 137 MCG: 25 TABLET ORAL at 06:02

## 2021-02-03 RX ADMIN — ACETAMINOPHEN 1000 MG: 500 TABLET ORAL at 06:02

## 2021-02-03 RX ADMIN — AMIODARONE HYDROCHLORIDE 200 MG: 200 TABLET ORAL at 08:02

## 2021-02-03 RX ADMIN — SPIRONOLACTONE 25 MG: 25 TABLET, FILM COATED ORAL at 08:02

## 2021-02-03 RX ADMIN — ASPIRIN 81 MG: 81 TABLET, COATED ORAL at 08:02

## 2021-02-05 LAB
BLD PROD TYP BPU: NORMAL
BLD PROD TYP BPU: NORMAL
BLOOD UNIT EXPIRATION DATE: NORMAL
BLOOD UNIT EXPIRATION DATE: NORMAL
BLOOD UNIT TYPE CODE: 9500
BLOOD UNIT TYPE CODE: 9500
BLOOD UNIT TYPE: NORMAL
BLOOD UNIT TYPE: NORMAL
CODING SYSTEM: NORMAL
CODING SYSTEM: NORMAL
DISPENSE STATUS: NORMAL
DISPENSE STATUS: NORMAL
TRANS ERYTHROCYTES VOL PATIENT: NORMAL ML
TRANS ERYTHROCYTES VOL PATIENT: NORMAL ML

## 2021-02-15 DIAGNOSIS — M97.01XD PERIPROSTHETIC FRACTURE AROUND INTERNAL PROSTHETIC RIGHT HIP JOINT, SUBSEQUENT ENCOUNTER: Primary | ICD-10-CM

## 2021-02-17 ENCOUNTER — HOSPITAL ENCOUNTER (OUTPATIENT)
Dept: RADIOLOGY | Facility: HOSPITAL | Age: 86
Discharge: HOME | End: 2021-02-17
Attending: ORTHOPAEDIC SURGERY
Payer: MEDICARE

## 2021-02-17 ENCOUNTER — OFFICE VISIT (OUTPATIENT)
Dept: ORTHOPEDICS | Facility: CLINIC | Age: 86
End: 2021-02-17
Payer: MEDICARE

## 2021-02-17 ENCOUNTER — SPECIALTY PHARMACY (OUTPATIENT)
Dept: PHARMACY | Facility: CLINIC | Age: 86
End: 2021-02-17

## 2021-02-17 VITALS — BODY MASS INDEX: 21.26 KG/M2 | WEIGHT: 120 LBS | HEIGHT: 63 IN

## 2021-02-17 DIAGNOSIS — M80.00XA AGE-RELATED OSTEOPOROSIS WITH CURRENT PATHOLOGICAL FRACTURE, INITIAL ENCOUNTER: Primary | ICD-10-CM

## 2021-02-17 DIAGNOSIS — M97.01XD PERIPROSTHETIC FRACTURE AROUND INTERNAL PROSTHETIC RIGHT HIP JOINT, SUBSEQUENT ENCOUNTER: Primary | ICD-10-CM

## 2021-02-17 DIAGNOSIS — M97.01XA PERIPROSTHETIC FRACTURE AROUND INTERNAL PROSTHETIC RIGHT HIP JOINT, INITIAL ENCOUNTER: ICD-10-CM

## 2021-02-17 DIAGNOSIS — M97.01XD PERIPROSTHETIC FRACTURE AROUND INTERNAL PROSTHETIC RIGHT HIP JOINT, SUBSEQUENT ENCOUNTER: ICD-10-CM

## 2021-02-17 DIAGNOSIS — E03.9 ACQUIRED HYPOTHYROIDISM: ICD-10-CM

## 2021-02-17 PROCEDURE — 73502 XR HIP 2 VIEW RIGHT: ICD-10-PCS | Mod: 26,RT,, | Performed by: RADIOLOGY

## 2021-02-17 PROCEDURE — 73552 X-RAY EXAM OF FEMUR 2/>: CPT | Mod: TC,RT

## 2021-02-17 PROCEDURE — 99999 PR PBB SHADOW E&M-EST. PATIENT-LVL III: ICD-10-PCS | Mod: PBBFAC,,, | Performed by: ORTHOPAEDIC SURGERY

## 2021-02-17 PROCEDURE — 73552 X-RAY EXAM OF FEMUR 2/>: CPT | Mod: 26,RT,, | Performed by: RADIOLOGY

## 2021-02-17 PROCEDURE — 73502 X-RAY EXAM HIP UNI 2-3 VIEWS: CPT | Mod: 26,RT,, | Performed by: RADIOLOGY

## 2021-02-17 PROCEDURE — 73552 XR FEMUR 2 VIEW RIGHT: ICD-10-PCS | Mod: 26,RT,, | Performed by: RADIOLOGY

## 2021-02-17 PROCEDURE — 99214 OFFICE O/P EST MOD 30 MIN: CPT | Mod: S$PBB,,, | Performed by: ORTHOPAEDIC SURGERY

## 2021-02-17 PROCEDURE — 99999 PR PBB SHADOW E&M-EST. PATIENT-LVL III: CPT | Mod: PBBFAC,,, | Performed by: ORTHOPAEDIC SURGERY

## 2021-02-17 PROCEDURE — 73502 X-RAY EXAM HIP UNI 2-3 VIEWS: CPT | Mod: TC,RT

## 2021-02-17 PROCEDURE — 99214 PR OFFICE/OUTPT VISIT, EST, LEVL IV, 30-39 MIN: ICD-10-PCS | Mod: S$PBB,,, | Performed by: ORTHOPAEDIC SURGERY

## 2021-02-17 PROCEDURE — 99213 OFFICE O/P EST LOW 20 MIN: CPT | Mod: PBBFAC,25 | Performed by: ORTHOPAEDIC SURGERY

## 2021-02-17 RX ORDER — TERIPARATIDE 250 UG/ML
20 INJECTION, SOLUTION SUBCUTANEOUS DAILY
Qty: 2.4 ML | Refills: 11 | Status: SHIPPED | OUTPATIENT
Start: 2021-02-17 | End: 2021-06-10 | Stop reason: SDUPTHER

## 2021-03-03 ENCOUNTER — OFFICE VISIT (OUTPATIENT)
Dept: ORTHOPEDICS | Facility: CLINIC | Age: 86
End: 2021-03-03
Payer: MEDICARE

## 2021-03-03 ENCOUNTER — HOSPITAL ENCOUNTER (OUTPATIENT)
Dept: RADIOLOGY | Facility: HOSPITAL | Age: 86
Discharge: HOME | End: 2021-03-03
Attending: ORTHOPAEDIC SURGERY
Payer: MEDICARE

## 2021-03-03 VITALS
RESPIRATION RATE: 18 BRPM | SYSTOLIC BLOOD PRESSURE: 123 MMHG | TEMPERATURE: 98 F | HEART RATE: 59 BPM | DIASTOLIC BLOOD PRESSURE: 61 MMHG

## 2021-03-03 DIAGNOSIS — M97.01XD PERIPROSTHETIC FRACTURE AROUND INTERNAL PROSTHETIC RIGHT HIP JOINT, SUBSEQUENT ENCOUNTER: Primary | ICD-10-CM

## 2021-03-03 DIAGNOSIS — M97.01XD PERIPROSTHETIC FRACTURE AROUND INTERNAL PROSTHETIC RIGHT HIP JOINT, SUBSEQUENT ENCOUNTER: ICD-10-CM

## 2021-03-03 PROCEDURE — 73502 X-RAY EXAM HIP UNI 2-3 VIEWS: CPT | Mod: 26,RT,, | Performed by: RADIOLOGY

## 2021-03-03 PROCEDURE — 99214 PR OFFICE/OUTPT VISIT, EST, LEVL IV, 30-39 MIN: ICD-10-PCS | Mod: S$PBB,,, | Performed by: ORTHOPAEDIC SURGERY

## 2021-03-03 PROCEDURE — 73502 X-RAY EXAM HIP UNI 2-3 VIEWS: CPT | Mod: TC,RT

## 2021-03-03 PROCEDURE — 99999 PR PBB SHADOW E&M-EST. PATIENT-LVL IV: ICD-10-PCS | Mod: PBBFAC,,, | Performed by: ORTHOPAEDIC SURGERY

## 2021-03-03 PROCEDURE — 99214 OFFICE O/P EST MOD 30 MIN: CPT | Mod: S$PBB,,, | Performed by: ORTHOPAEDIC SURGERY

## 2021-03-03 PROCEDURE — 99999 PR PBB SHADOW E&M-EST. PATIENT-LVL IV: CPT | Mod: PBBFAC,,, | Performed by: ORTHOPAEDIC SURGERY

## 2021-03-03 PROCEDURE — 99214 OFFICE O/P EST MOD 30 MIN: CPT | Mod: PBBFAC,25 | Performed by: ORTHOPAEDIC SURGERY

## 2021-03-03 PROCEDURE — 73502 XR HIP 2 VIEW RIGHT: ICD-10-PCS | Mod: 26,RT,, | Performed by: RADIOLOGY

## 2021-03-24 ENCOUNTER — DOCUMENT SCAN (OUTPATIENT)
Dept: HOME HEALTH SERVICES | Facility: HOSPITAL | Age: 86
End: 2021-03-24
Payer: MEDICARE

## 2021-04-14 ENCOUNTER — HOSPITAL ENCOUNTER (OUTPATIENT)
Dept: RADIOLOGY | Facility: HOSPITAL | Age: 86
Discharge: HOME | End: 2021-04-14
Attending: ORTHOPAEDIC SURGERY
Payer: MEDICARE

## 2021-04-14 ENCOUNTER — OFFICE VISIT (OUTPATIENT)
Dept: ORTHOPEDICS | Facility: CLINIC | Age: 86
End: 2021-04-14
Payer: MEDICARE

## 2021-04-14 ENCOUNTER — SPECIALTY PHARMACY (OUTPATIENT)
Dept: PHARMACY | Facility: CLINIC | Age: 86
End: 2021-04-14

## 2021-04-14 VITALS — HEIGHT: 63 IN | WEIGHT: 120 LBS | BODY MASS INDEX: 21.26 KG/M2

## 2021-04-14 DIAGNOSIS — M97.01XD PERIPROSTHETIC FRACTURE AROUND INTERNAL PROSTHETIC RIGHT HIP JOINT, SUBSEQUENT ENCOUNTER: ICD-10-CM

## 2021-04-14 DIAGNOSIS — M97.01XD PERIPROSTHETIC FRACTURE AROUND INTERNAL PROSTHETIC RIGHT HIP JOINT, SUBSEQUENT ENCOUNTER: Primary | ICD-10-CM

## 2021-04-14 PROCEDURE — 99214 OFFICE O/P EST MOD 30 MIN: CPT | Mod: S$PBB,,, | Performed by: ORTHOPAEDIC SURGERY

## 2021-04-14 PROCEDURE — 99999 PR PBB SHADOW E&M-EST. PATIENT-LVL III: CPT | Mod: PBBFAC,,, | Performed by: ORTHOPAEDIC SURGERY

## 2021-04-14 PROCEDURE — 99999 PR PBB SHADOW E&M-EST. PATIENT-LVL III: ICD-10-PCS | Mod: PBBFAC,,, | Performed by: ORTHOPAEDIC SURGERY

## 2021-04-14 PROCEDURE — 73502 XR HIP 2 VIEW RIGHT: ICD-10-PCS | Mod: 26,RT,, | Performed by: RADIOLOGY

## 2021-04-14 PROCEDURE — 99213 OFFICE O/P EST LOW 20 MIN: CPT | Mod: PBBFAC,25 | Performed by: ORTHOPAEDIC SURGERY

## 2021-04-14 PROCEDURE — 73502 X-RAY EXAM HIP UNI 2-3 VIEWS: CPT | Mod: 26,RT,, | Performed by: RADIOLOGY

## 2021-04-14 PROCEDURE — 99214 PR OFFICE/OUTPT VISIT, EST, LEVL IV, 30-39 MIN: ICD-10-PCS | Mod: S$PBB,,, | Performed by: ORTHOPAEDIC SURGERY

## 2021-04-14 PROCEDURE — 73502 X-RAY EXAM HIP UNI 2-3 VIEWS: CPT | Mod: TC,RT

## 2021-04-16 DIAGNOSIS — M97.01XD PERIPROSTHETIC FRACTURE AROUND INTERNAL PROSTHETIC RIGHT HIP JOINT, SUBSEQUENT ENCOUNTER: Primary | ICD-10-CM

## 2021-04-29 ENCOUNTER — CLINICAL SUPPORT (OUTPATIENT)
Dept: CARDIOLOGY | Facility: HOSPITAL | Age: 86
End: 2021-04-29
Payer: MEDICARE

## 2021-04-29 DIAGNOSIS — Z95.0 PRESENCE OF CARDIAC PACEMAKER: ICD-10-CM

## 2021-04-29 PROCEDURE — 93294 CARDIAC DEVICE CHECK - REMOTE: ICD-10-PCS | Mod: ,,, | Performed by: INTERNAL MEDICINE

## 2021-04-29 PROCEDURE — 93294 REM INTERROG EVL PM/LDLS PM: CPT | Mod: ,,, | Performed by: INTERNAL MEDICINE

## 2021-05-22 ENCOUNTER — SPECIALTY PHARMACY (OUTPATIENT)
Dept: PHARMACY | Facility: CLINIC | Age: 86
End: 2021-05-22

## 2021-05-25 ENCOUNTER — TELEPHONE (OUTPATIENT)
Dept: ELECTROPHYSIOLOGY | Facility: CLINIC | Age: 86
End: 2021-05-25

## 2021-05-25 DIAGNOSIS — I48.0 PAROXYSMAL ATRIAL FIBRILLATION: Primary | ICD-10-CM

## 2021-06-09 ENCOUNTER — HOSPITAL ENCOUNTER (OUTPATIENT)
Dept: RADIOLOGY | Facility: HOSPITAL | Age: 86
Discharge: HOME | End: 2021-06-09
Attending: ORTHOPAEDIC SURGERY
Payer: MEDICARE

## 2021-06-09 ENCOUNTER — OFFICE VISIT (OUTPATIENT)
Dept: ORTHOPEDICS | Facility: CLINIC | Age: 86
End: 2021-06-09
Payer: MEDICARE

## 2021-06-09 VITALS — HEIGHT: 63 IN | WEIGHT: 119.94 LBS | BODY MASS INDEX: 21.25 KG/M2

## 2021-06-09 DIAGNOSIS — M41.50 DEGENERATIVE SCOLIOSIS IN ADULT PATIENT: ICD-10-CM

## 2021-06-09 DIAGNOSIS — M97.01XD PERIPROSTHETIC FRACTURE AROUND INTERNAL PROSTHETIC RIGHT HIP JOINT, SUBSEQUENT ENCOUNTER: Primary | ICD-10-CM

## 2021-06-09 DIAGNOSIS — M97.01XD PERIPROSTHETIC FRACTURE AROUND INTERNAL PROSTHETIC RIGHT HIP JOINT, SUBSEQUENT ENCOUNTER: ICD-10-CM

## 2021-06-09 DIAGNOSIS — S39.012A STRAIN OF LUMBAR REGION, INITIAL ENCOUNTER: ICD-10-CM

## 2021-06-09 PROCEDURE — 73502 X-RAY EXAM HIP UNI 2-3 VIEWS: CPT | Mod: TC,RT

## 2021-06-09 PROCEDURE — 73502 XR HIP 2 VIEW RIGHT: ICD-10-PCS | Mod: 26,RT,, | Performed by: RADIOLOGY

## 2021-06-09 PROCEDURE — 73502 X-RAY EXAM HIP UNI 2-3 VIEWS: CPT | Mod: 26,RT,, | Performed by: RADIOLOGY

## 2021-06-09 PROCEDURE — 99214 PR OFFICE/OUTPT VISIT, EST, LEVL IV, 30-39 MIN: ICD-10-PCS | Mod: S$PBB,,, | Performed by: ORTHOPAEDIC SURGERY

## 2021-06-09 PROCEDURE — 99999 PR PBB SHADOW E&M-EST. PATIENT-LVL III: CPT | Mod: PBBFAC,,, | Performed by: ORTHOPAEDIC SURGERY

## 2021-06-09 PROCEDURE — 99213 OFFICE O/P EST LOW 20 MIN: CPT | Mod: PBBFAC,25 | Performed by: ORTHOPAEDIC SURGERY

## 2021-06-09 PROCEDURE — 99214 OFFICE O/P EST MOD 30 MIN: CPT | Mod: S$PBB,,, | Performed by: ORTHOPAEDIC SURGERY

## 2021-06-09 PROCEDURE — 99999 PR PBB SHADOW E&M-EST. PATIENT-LVL III: ICD-10-PCS | Mod: PBBFAC,,, | Performed by: ORTHOPAEDIC SURGERY

## 2021-06-10 DIAGNOSIS — M80.00XA AGE-RELATED OSTEOPOROSIS WITH CURRENT PATHOLOGICAL FRACTURE, INITIAL ENCOUNTER: ICD-10-CM

## 2021-06-10 RX ORDER — TERIPARATIDE 250 UG/ML
20 INJECTION, SOLUTION SUBCUTANEOUS DAILY
Qty: 2.4 ML | Refills: 11 | Status: SHIPPED | OUTPATIENT
Start: 2021-06-10 | End: 2021-06-10

## 2021-06-10 RX ORDER — TERIPARATIDE 250 UG/ML
20 INJECTION, SOLUTION SUBCUTANEOUS DAILY
Qty: 2.4 ML | Refills: 11 | Status: SHIPPED | OUTPATIENT
Start: 2021-06-10 | End: 2021-08-04 | Stop reason: SDUPTHER

## 2021-07-01 ENCOUNTER — LAB VISIT (OUTPATIENT)
Dept: LAB | Facility: HOSPITAL | Age: 86
End: 2021-07-01
Attending: FAMILY MEDICINE
Payer: MEDICARE

## 2021-07-01 DIAGNOSIS — D64.9 ANEMIA, UNSPECIFIED TYPE: ICD-10-CM

## 2021-07-01 DIAGNOSIS — E03.9 ACQUIRED HYPOTHYROIDISM: ICD-10-CM

## 2021-07-01 DIAGNOSIS — I10 ESSENTIAL HYPERTENSION: ICD-10-CM

## 2021-07-01 DIAGNOSIS — N18.31 STAGE 3A CHRONIC KIDNEY DISEASE: ICD-10-CM

## 2021-07-01 DIAGNOSIS — Z79.899 OTHER LONG TERM (CURRENT) DRUG THERAPY: ICD-10-CM

## 2021-07-01 LAB
ALBUMIN SERPL BCP-MCNC: 3.9 G/DL (ref 3.5–5.2)
ALP SERPL-CCNC: 143 U/L (ref 55–135)
ALT SERPL W/O P-5'-P-CCNC: 25 U/L (ref 10–44)
ANION GAP SERPL CALC-SCNC: 11 MMOL/L (ref 8–16)
AST SERPL-CCNC: 23 U/L (ref 10–40)
BASOPHILS # BLD AUTO: 0.04 K/UL (ref 0–0.2)
BASOPHILS NFR BLD: 0.5 % (ref 0–1.9)
BILIRUB SERPL-MCNC: 0.7 MG/DL (ref 0.1–1)
BUN SERPL-MCNC: 20 MG/DL (ref 8–23)
CALCIUM SERPL-MCNC: 9.3 MG/DL (ref 8.7–10.5)
CHLORIDE SERPL-SCNC: 98 MMOL/L (ref 95–110)
CHOLEST SERPL-MCNC: 132 MG/DL (ref 120–199)
CHOLEST/HDLC SERPL: 2.9 {RATIO} (ref 2–5)
CO2 SERPL-SCNC: 25 MMOL/L (ref 23–29)
CREAT SERPL-MCNC: 1.5 MG/DL (ref 0.5–1.4)
DIFFERENTIAL METHOD: ABNORMAL
EOSINOPHIL # BLD AUTO: 0.1 K/UL (ref 0–0.5)
EOSINOPHIL NFR BLD: 0.8 % (ref 0–8)
ERYTHROCYTE [DISTWIDTH] IN BLOOD BY AUTOMATED COUNT: 12.8 % (ref 11.5–14.5)
EST. GFR  (AFRICAN AMERICAN): 36 ML/MIN/1.73 M^2
EST. GFR  (NON AFRICAN AMERICAN): 31 ML/MIN/1.73 M^2
FERRITIN SERPL-MCNC: 303 NG/ML (ref 20–300)
GLUCOSE SERPL-MCNC: 110 MG/DL (ref 70–110)
HCT VFR BLD AUTO: 38.1 % (ref 37–48.5)
HDLC SERPL-MCNC: 46 MG/DL (ref 40–75)
HDLC SERPL: 34.8 % (ref 20–50)
HGB BLD-MCNC: 12.6 G/DL (ref 12–16)
IMM GRANULOCYTES # BLD AUTO: 0.14 K/UL (ref 0–0.04)
IMM GRANULOCYTES NFR BLD AUTO: 1.6 % (ref 0–0.5)
IRON SERPL-MCNC: 115 UG/DL (ref 30–160)
LDLC SERPL CALC-MCNC: 69.8 MG/DL (ref 63–159)
LYMPHOCYTES # BLD AUTO: 1.9 K/UL (ref 1–4.8)
LYMPHOCYTES NFR BLD: 21.4 % (ref 18–48)
MCH RBC QN AUTO: 31.4 PG (ref 27–31)
MCHC RBC AUTO-ENTMCNC: 33.1 G/DL (ref 32–36)
MCV RBC AUTO: 95 FL (ref 82–98)
MONOCYTES # BLD AUTO: 0.9 K/UL (ref 0.3–1)
MONOCYTES NFR BLD: 10.5 % (ref 4–15)
NEUTROPHILS # BLD AUTO: 5.7 K/UL (ref 1.8–7.7)
NEUTROPHILS NFR BLD: 65.2 % (ref 38–73)
NONHDLC SERPL-MCNC: 86 MG/DL
NRBC BLD-RTO: 0 /100 WBC
PLATELET # BLD AUTO: 283 K/UL (ref 150–450)
PMV BLD AUTO: 8.8 FL (ref 9.2–12.9)
POTASSIUM SERPL-SCNC: 4.6 MMOL/L (ref 3.5–5.1)
PROT SERPL-MCNC: 7.5 G/DL (ref 6–8.4)
RBC # BLD AUTO: 4.01 M/UL (ref 4–5.4)
SATURATED IRON: 33 % (ref 20–50)
SODIUM SERPL-SCNC: 134 MMOL/L (ref 136–145)
TOTAL IRON BINDING CAPACITY: 351 UG/DL (ref 250–450)
TRANSFERRIN SERPL-MCNC: 237 MG/DL (ref 200–375)
TRIGL SERPL-MCNC: 81 MG/DL (ref 30–150)
TSH SERPL DL<=0.005 MIU/L-ACNC: 0.73 UIU/ML (ref 0.4–4)
WBC # BLD AUTO: 8.75 K/UL (ref 3.9–12.7)

## 2021-07-01 PROCEDURE — 82746 ASSAY OF FOLIC ACID SERUM: CPT | Performed by: FAMILY MEDICINE

## 2021-07-01 PROCEDURE — 85025 COMPLETE CBC W/AUTO DIFF WBC: CPT | Performed by: FAMILY MEDICINE

## 2021-07-01 PROCEDURE — 80061 LIPID PANEL: CPT | Performed by: FAMILY MEDICINE

## 2021-07-01 PROCEDURE — 80053 COMPREHEN METABOLIC PANEL: CPT | Performed by: FAMILY MEDICINE

## 2021-07-01 PROCEDURE — 83540 ASSAY OF IRON: CPT | Performed by: FAMILY MEDICINE

## 2021-07-01 PROCEDURE — 36415 COLL VENOUS BLD VENIPUNCTURE: CPT | Performed by: FAMILY MEDICINE

## 2021-07-01 PROCEDURE — 82728 ASSAY OF FERRITIN: CPT | Performed by: FAMILY MEDICINE

## 2021-07-01 PROCEDURE — 82607 VITAMIN B-12: CPT | Performed by: FAMILY MEDICINE

## 2021-07-01 PROCEDURE — 84443 ASSAY THYROID STIM HORMONE: CPT | Performed by: FAMILY MEDICINE

## 2021-07-02 LAB
FOLATE SERPL-MCNC: 13.6 NG/ML (ref 4–24)
VIT B12 SERPL-MCNC: 176 PG/ML (ref 210–950)

## 2021-07-28 ENCOUNTER — CLINICAL SUPPORT (OUTPATIENT)
Dept: CARDIOLOGY | Facility: HOSPITAL | Age: 86
End: 2021-07-28
Payer: MEDICARE

## 2021-07-28 DIAGNOSIS — Z95.0 PRESENCE OF CARDIAC PACEMAKER: ICD-10-CM

## 2021-07-28 PROCEDURE — 93294 REM INTERROG EVL PM/LDLS PM: CPT | Mod: ,,, | Performed by: INTERNAL MEDICINE

## 2021-07-28 PROCEDURE — 93294 CARDIAC DEVICE CHECK - REMOTE: ICD-10-PCS | Mod: ,,, | Performed by: INTERNAL MEDICINE

## 2021-08-04 ENCOUNTER — TELEPHONE (OUTPATIENT)
Dept: ELECTROPHYSIOLOGY | Facility: CLINIC | Age: 86
End: 2021-08-04

## 2021-08-04 DIAGNOSIS — M80.00XA AGE-RELATED OSTEOPOROSIS WITH CURRENT PATHOLOGICAL FRACTURE, INITIAL ENCOUNTER: Primary | ICD-10-CM

## 2021-08-04 RX ORDER — TERIPARATIDE 250 UG/ML
20 INJECTION, SOLUTION SUBCUTANEOUS DAILY
Qty: 7.2 ML | Refills: 3 | OUTPATIENT
Start: 2021-08-04 | End: 2021-08-04

## 2021-08-04 RX ORDER — TERIPARATIDE 250 UG/ML
20 INJECTION, SOLUTION SUBCUTANEOUS DAILY
Qty: 2.4 ML | Refills: 11 | Status: SHIPPED | OUTPATIENT
Start: 2021-08-04 | End: 2021-10-11 | Stop reason: SDUPTHER

## 2021-08-06 ENCOUNTER — TELEPHONE (OUTPATIENT)
Dept: OPTOMETRY | Facility: CLINIC | Age: 86
End: 2021-08-06

## 2021-08-11 ENCOUNTER — HOSPITAL ENCOUNTER (OUTPATIENT)
Dept: CARDIOLOGY | Facility: CLINIC | Age: 86
Discharge: HOME | End: 2021-08-11
Payer: MEDICARE

## 2021-08-11 ENCOUNTER — CLINICAL SUPPORT (OUTPATIENT)
Dept: CARDIOLOGY | Facility: HOSPITAL | Age: 86
End: 2021-08-11
Attending: INTERNAL MEDICINE
Payer: MEDICARE

## 2021-08-11 ENCOUNTER — OFFICE VISIT (OUTPATIENT)
Dept: ELECTROPHYSIOLOGY | Facility: CLINIC | Age: 86
End: 2021-08-11
Payer: MEDICARE

## 2021-08-11 VITALS
BODY MASS INDEX: 21.99 KG/M2 | SYSTOLIC BLOOD PRESSURE: 110 MMHG | WEIGHT: 124.13 LBS | DIASTOLIC BLOOD PRESSURE: 85 MMHG | HEART RATE: 60 BPM | HEIGHT: 63 IN

## 2021-08-11 DIAGNOSIS — I48.0 PAROXYSMAL ATRIAL FIBRILLATION: Chronic | ICD-10-CM

## 2021-08-11 DIAGNOSIS — Z79.01 CURRENT USE OF LONG TERM ANTICOAGULATION: ICD-10-CM

## 2021-08-11 DIAGNOSIS — I49.5 SSS (SICK SINUS SYNDROME): Chronic | ICD-10-CM

## 2021-08-11 DIAGNOSIS — I10 ESSENTIAL HYPERTENSION: Primary | Chronic | ICD-10-CM

## 2021-08-11 DIAGNOSIS — I48.0 PAROXYSMAL ATRIAL FIBRILLATION: ICD-10-CM

## 2021-08-11 DIAGNOSIS — Z79.899 LONG TERM CURRENT USE OF ANTIARRHYTHMIC DRUG: ICD-10-CM

## 2021-08-11 PROCEDURE — 93010 RHYTHM STRIP: ICD-10-PCS | Mod: S$PBB,,, | Performed by: INTERNAL MEDICINE

## 2021-08-11 PROCEDURE — 99214 OFFICE O/P EST MOD 30 MIN: CPT | Mod: S$PBB,,, | Performed by: INTERNAL MEDICINE

## 2021-08-11 PROCEDURE — 99999 PR PBB SHADOW E&M-EST. PATIENT-LVL III: ICD-10-PCS | Mod: PBBFAC,,, | Performed by: INTERNAL MEDICINE

## 2021-08-11 PROCEDURE — 93010 ELECTROCARDIOGRAM REPORT: CPT | Mod: S$PBB,,, | Performed by: INTERNAL MEDICINE

## 2021-08-11 PROCEDURE — 99213 OFFICE O/P EST LOW 20 MIN: CPT | Mod: PBBFAC | Performed by: INTERNAL MEDICINE

## 2021-08-11 PROCEDURE — 93280 CARDIAC DEVICE CHECK - IN CLINIC & HOSPITAL: ICD-10-PCS | Mod: 26,,, | Performed by: INTERNAL MEDICINE

## 2021-08-11 PROCEDURE — 99999 PR PBB SHADOW E&M-EST. PATIENT-LVL III: CPT | Mod: PBBFAC,,, | Performed by: INTERNAL MEDICINE

## 2021-08-11 PROCEDURE — 93280 PM DEVICE PROGR EVAL DUAL: CPT

## 2021-08-11 PROCEDURE — 99214 PR OFFICE/OUTPT VISIT, EST, LEVL IV, 30-39 MIN: ICD-10-PCS | Mod: S$PBB,,, | Performed by: INTERNAL MEDICINE

## 2021-08-11 PROCEDURE — 93005 ELECTROCARDIOGRAM TRACING: CPT | Mod: PBBFAC | Performed by: INTERNAL MEDICINE

## 2021-08-11 PROCEDURE — 93280 PM DEVICE PROGR EVAL DUAL: CPT | Mod: 26,,, | Performed by: INTERNAL MEDICINE

## 2021-08-17 ENCOUNTER — TELEPHONE (OUTPATIENT)
Dept: OPTOMETRY | Facility: CLINIC | Age: 86
End: 2021-08-17

## 2021-08-19 ENCOUNTER — OFFICE VISIT (OUTPATIENT)
Dept: OPTOMETRY | Facility: CLINIC | Age: 86
End: 2021-08-19
Payer: MEDICARE

## 2021-08-19 DIAGNOSIS — H10.13 ALLERGIC CONJUNCTIVITIS OF BOTH EYES: Primary | ICD-10-CM

## 2021-08-19 DIAGNOSIS — H04.123 DRY EYE SYNDROME OF BOTH EYES: ICD-10-CM

## 2021-08-19 DIAGNOSIS — H52.7 REFRACTIVE ERROR: ICD-10-CM

## 2021-08-19 DIAGNOSIS — Z96.1 PSEUDOPHAKIA OF BOTH EYES: ICD-10-CM

## 2021-08-19 PROCEDURE — 99213 OFFICE O/P EST LOW 20 MIN: CPT | Mod: PBBFAC | Performed by: OPTOMETRIST

## 2021-08-19 PROCEDURE — 92014 PR EYE EXAM, EST PATIENT,COMPREHESV: ICD-10-PCS | Mod: S$PBB,,, | Performed by: OPTOMETRIST

## 2021-08-19 PROCEDURE — 99999 PR PBB SHADOW E&M-EST. PATIENT-LVL III: ICD-10-PCS | Mod: PBBFAC,,, | Performed by: OPTOMETRIST

## 2021-08-19 PROCEDURE — 99999 PR PBB SHADOW E&M-EST. PATIENT-LVL III: CPT | Mod: PBBFAC,,, | Performed by: OPTOMETRIST

## 2021-08-19 PROCEDURE — 92014 COMPRE OPH EXAM EST PT 1/>: CPT | Mod: S$PBB,,, | Performed by: OPTOMETRIST

## 2021-08-19 RX ORDER — OLOPATADINE HYDROCHLORIDE 1 MG/ML
1 SOLUTION/ DROPS OPHTHALMIC 2 TIMES DAILY
Qty: 5 ML | Refills: 3 | Status: SHIPPED | OUTPATIENT
Start: 2021-08-19 | End: 2021-11-18 | Stop reason: SDUPTHER

## 2021-10-18 ENCOUNTER — LAB VISIT (OUTPATIENT)
Dept: LAB | Facility: HOSPITAL | Age: 86
End: 2021-10-18
Attending: FAMILY MEDICINE
Payer: MEDICARE

## 2021-10-18 DIAGNOSIS — N18.31 STAGE 3A CHRONIC KIDNEY DISEASE: Chronic | ICD-10-CM

## 2021-10-18 LAB
ANION GAP SERPL CALC-SCNC: 9 MMOL/L (ref 8–16)
BUN SERPL-MCNC: 20 MG/DL (ref 8–23)
CALCIUM SERPL-MCNC: 9.5 MG/DL (ref 8.7–10.5)
CHLORIDE SERPL-SCNC: 100 MMOL/L (ref 95–110)
CO2 SERPL-SCNC: 26 MMOL/L (ref 23–29)
CREAT SERPL-MCNC: 1.5 MG/DL (ref 0.5–1.4)
EST. GFR  (AFRICAN AMERICAN): 36 ML/MIN/1.73 M^2
EST. GFR  (NON AFRICAN AMERICAN): 31 ML/MIN/1.73 M^2
GLUCOSE SERPL-MCNC: 120 MG/DL (ref 70–110)
POTASSIUM SERPL-SCNC: 5.3 MMOL/L (ref 3.5–5.1)
PTH-INTACT SERPL-MCNC: 30.7 PG/ML (ref 9–77)
SODIUM SERPL-SCNC: 135 MMOL/L (ref 136–145)

## 2021-10-18 PROCEDURE — 36415 COLL VENOUS BLD VENIPUNCTURE: CPT | Performed by: FAMILY MEDICINE

## 2021-10-18 PROCEDURE — 80048 BASIC METABOLIC PNL TOTAL CA: CPT | Performed by: FAMILY MEDICINE

## 2021-10-18 PROCEDURE — 83970 ASSAY OF PARATHORMONE: CPT | Performed by: FAMILY MEDICINE

## 2022-01-01 ENCOUNTER — LAB VISIT (OUTPATIENT)
Dept: LAB | Facility: HOSPITAL | Age: 87
End: 2022-01-01
Attending: FAMILY MEDICINE
Payer: MEDICARE

## 2022-01-01 ENCOUNTER — CLINICAL SUPPORT (OUTPATIENT)
Dept: CARDIOLOGY | Facility: HOSPITAL | Age: 87
End: 2022-01-01
Payer: MEDICARE

## 2022-01-01 DIAGNOSIS — R30.0 DYSURIA: ICD-10-CM

## 2022-01-01 DIAGNOSIS — R00.1 BRADYCARDIA, UNSPECIFIED: ICD-10-CM

## 2022-01-01 DIAGNOSIS — I48.91 UNSPECIFIED ATRIAL FIBRILLATION: ICD-10-CM

## 2022-01-01 DIAGNOSIS — R53.1 WEAKNESS: ICD-10-CM

## 2022-01-01 DIAGNOSIS — Z95.0 PRESENCE OF CARDIAC PACEMAKER: ICD-10-CM

## 2022-01-01 DIAGNOSIS — D64.9 ANEMIA, UNSPECIFIED TYPE: ICD-10-CM

## 2022-01-01 LAB
BACTERIA UR CULT: ABNORMAL
BACTERIA UR CULT: ABNORMAL
FERRITIN SERPL-MCNC: 24 NG/ML (ref 20–300)
IRON SERPL-MCNC: 12 UG/DL (ref 30–160)
SATURATED IRON: 3 % (ref 20–50)
TOTAL IRON BINDING CAPACITY: 465 UG/DL (ref 250–450)
TRANSFERRIN SERPL-MCNC: 314 MG/DL (ref 200–375)

## 2022-01-01 PROCEDURE — 84466 ASSAY OF TRANSFERRIN: CPT | Performed by: FAMILY MEDICINE

## 2022-01-01 PROCEDURE — 87086 URINE CULTURE/COLONY COUNT: CPT | Performed by: FAMILY MEDICINE

## 2022-01-01 PROCEDURE — 82728 ASSAY OF FERRITIN: CPT | Performed by: FAMILY MEDICINE

## 2022-01-01 PROCEDURE — 87186 SC STD MICRODIL/AGAR DIL: CPT | Performed by: FAMILY MEDICINE

## 2022-01-01 PROCEDURE — 87088 URINE BACTERIA CULTURE: CPT | Performed by: FAMILY MEDICINE

## 2022-01-01 PROCEDURE — 36415 COLL VENOUS BLD VENIPUNCTURE: CPT | Performed by: FAMILY MEDICINE

## 2022-01-01 PROCEDURE — 87077 CULTURE AEROBIC IDENTIFY: CPT | Mod: 59 | Performed by: FAMILY MEDICINE

## 2022-01-07 ENCOUNTER — HOSPITAL ENCOUNTER (INPATIENT)
Facility: HOSPITAL | Age: 87
LOS: 8 days | Discharge: HOME HEALTH | DRG: 178 | End: 2022-01-16
Attending: EMERGENCY MEDICINE | Admitting: STUDENT IN AN ORGANIZED HEALTH CARE EDUCATION/TRAINING PROGRAM
Payer: MEDICARE

## 2022-01-07 DIAGNOSIS — R11.2 NAUSEA & VOMITING: ICD-10-CM

## 2022-01-07 DIAGNOSIS — R74.01 TRANSAMINITIS: ICD-10-CM

## 2022-01-07 DIAGNOSIS — U07.1 COVID-19: ICD-10-CM

## 2022-01-07 DIAGNOSIS — R11.2 INTRACTABLE VOMITING WITH NAUSEA, UNSPECIFIED VOMITING TYPE: Primary | ICD-10-CM

## 2022-01-07 DIAGNOSIS — K83.8 BILIARY SLUDGE DETERMINED BY ULTRASOUND: ICD-10-CM

## 2022-01-07 DIAGNOSIS — R11.14: ICD-10-CM

## 2022-01-07 DIAGNOSIS — R53.1 WEAKNESS: ICD-10-CM

## 2022-01-07 PROBLEM — R79.89 ELEVATED LFTS: Status: ACTIVE | Noted: 2022-01-07

## 2022-01-07 LAB
ALBUMIN SERPL BCP-MCNC: 3.5 G/DL (ref 3.5–5.2)
ALP SERPL-CCNC: 122 U/L (ref 55–135)
ALT SERPL W/O P-5'-P-CCNC: 304 U/L (ref 10–44)
ANION GAP SERPL CALC-SCNC: 13 MMOL/L (ref 8–16)
AST SERPL-CCNC: 295 U/L (ref 10–40)
BACTERIA #/AREA URNS AUTO: ABNORMAL /HPF
BASOPHILS # BLD AUTO: 0.02 K/UL (ref 0–0.2)
BASOPHILS NFR BLD: 0.4 % (ref 0–1.9)
BILIRUB SERPL-MCNC: 1.6 MG/DL (ref 0.1–1)
BILIRUB UR QL STRIP: NEGATIVE
BNP SERPL-MCNC: 385 PG/ML (ref 0–99)
BUN SERPL-MCNC: 20 MG/DL (ref 8–23)
CALCIUM SERPL-MCNC: 8.8 MG/DL (ref 8.7–10.5)
CHLORIDE SERPL-SCNC: 97 MMOL/L (ref 95–110)
CLARITY UR REFRACT.AUTO: ABNORMAL
CO2 SERPL-SCNC: 23 MMOL/L (ref 23–29)
COLOR UR AUTO: YELLOW
CREAT SERPL-MCNC: 1.2 MG/DL (ref 0.5–1.4)
CRP SERPL-MCNC: 26.9 MG/L (ref 0–8.2)
CTP QC/QA: YES
DIFFERENTIAL METHOD: ABNORMAL
EOSINOPHIL # BLD AUTO: 0 K/UL (ref 0–0.5)
EOSINOPHIL NFR BLD: 0 % (ref 0–8)
ERYTHROCYTE [DISTWIDTH] IN BLOOD BY AUTOMATED COUNT: 12.6 % (ref 11.5–14.5)
EST. GFR  (AFRICAN AMERICAN): 46.6 ML/MIN/1.73 M^2
EST. GFR  (NON AFRICAN AMERICAN): 40.4 ML/MIN/1.73 M^2
FERRITIN SERPL-MCNC: 1886 NG/ML (ref 20–300)
GLUCOSE SERPL-MCNC: 107 MG/DL (ref 70–110)
GLUCOSE UR QL STRIP: NEGATIVE
HCT VFR BLD AUTO: 40.4 % (ref 37–48.5)
HGB BLD-MCNC: 13.3 G/DL (ref 12–16)
HGB UR QL STRIP: NEGATIVE
HYALINE CASTS UR QL AUTO: 5 /LPF
IMM GRANULOCYTES # BLD AUTO: 0.04 K/UL (ref 0–0.04)
IMM GRANULOCYTES NFR BLD AUTO: 0.8 % (ref 0–0.5)
KETONES UR QL STRIP: ABNORMAL
LDH SERPL L TO P-CCNC: 402 U/L (ref 110–260)
LEUKOCYTE ESTERASE UR QL STRIP: NEGATIVE
LIPASE SERPL-CCNC: 19 U/L (ref 4–60)
LYMPHOCYTES # BLD AUTO: 0.5 K/UL (ref 1–4.8)
LYMPHOCYTES NFR BLD: 9.5 % (ref 18–48)
MCH RBC QN AUTO: 30.9 PG (ref 27–31)
MCHC RBC AUTO-ENTMCNC: 32.9 G/DL (ref 32–36)
MCV RBC AUTO: 94 FL (ref 82–98)
MICROSCOPIC COMMENT: ABNORMAL
MONOCYTES # BLD AUTO: 0.6 K/UL (ref 0.3–1)
MONOCYTES NFR BLD: 12.3 % (ref 4–15)
NEUTROPHILS # BLD AUTO: 3.9 K/UL (ref 1.8–7.7)
NEUTROPHILS NFR BLD: 77 % (ref 38–73)
NITRITE UR QL STRIP: NEGATIVE
NRBC BLD-RTO: 0 /100 WBC
PH UR STRIP: 5 [PH] (ref 5–8)
PLATELET # BLD AUTO: 192 K/UL (ref 150–450)
PMV BLD AUTO: 9.1 FL (ref 9.2–12.9)
POTASSIUM SERPL-SCNC: 4.5 MMOL/L (ref 3.5–5.1)
PROT SERPL-MCNC: 7.1 G/DL (ref 6–8.4)
PROT UR QL STRIP: ABNORMAL
RBC # BLD AUTO: 4.31 M/UL (ref 4–5.4)
RBC #/AREA URNS AUTO: 1 /HPF (ref 0–4)
SARS-COV-2 RDRP RESP QL NAA+PROBE: POSITIVE
SODIUM SERPL-SCNC: 133 MMOL/L (ref 136–145)
SP GR UR STRIP: 1.02 (ref 1–1.03)
SQUAMOUS #/AREA URNS AUTO: 2 /HPF
TROPONIN I SERPL DL<=0.01 NG/ML-MCNC: 0.03 NG/ML (ref 0–0.03)
TROPONIN I SERPL DL<=0.01 NG/ML-MCNC: 0.04 NG/ML (ref 0–0.03)
URN SPEC COLLECT METH UR: ABNORMAL
WBC # BLD AUTO: 5.06 K/UL (ref 3.9–12.7)
WBC #/AREA URNS AUTO: 8 /HPF (ref 0–5)

## 2022-01-07 PROCEDURE — 96374 THER/PROPH/DIAG INJ IV PUSH: CPT

## 2022-01-07 PROCEDURE — G0378 HOSPITAL OBSERVATION PER HR: HCPCS

## 2022-01-07 PROCEDURE — 99285 EMERGENCY DEPT VISIT HI MDM: CPT | Mod: CS,,, | Performed by: PHYSICIAN ASSISTANT

## 2022-01-07 PROCEDURE — 93010 ELECTROCARDIOGRAM REPORT: CPT | Mod: ,,, | Performed by: INTERNAL MEDICINE

## 2022-01-07 PROCEDURE — 83880 ASSAY OF NATRIURETIC PEPTIDE: CPT | Performed by: PHYSICIAN ASSISTANT

## 2022-01-07 PROCEDURE — 63600175 PHARM REV CODE 636 W HCPCS: Performed by: PHYSICIAN ASSISTANT

## 2022-01-07 PROCEDURE — 85025 COMPLETE CBC W/AUTO DIFF WBC: CPT | Performed by: PHYSICIAN ASSISTANT

## 2022-01-07 PROCEDURE — 82728 ASSAY OF FERRITIN: CPT | Performed by: PHYSICIAN ASSISTANT

## 2022-01-07 PROCEDURE — 99220 PR INITIAL OBSERVATION CARE,LEVL III: CPT | Mod: CR,,, | Performed by: HOSPITALIST

## 2022-01-07 PROCEDURE — 83615 LACTATE (LD) (LDH) ENZYME: CPT | Performed by: PHYSICIAN ASSISTANT

## 2022-01-07 PROCEDURE — 83690 ASSAY OF LIPASE: CPT | Performed by: PHYSICIAN ASSISTANT

## 2022-01-07 PROCEDURE — 86140 C-REACTIVE PROTEIN: CPT | Performed by: PHYSICIAN ASSISTANT

## 2022-01-07 PROCEDURE — 99220 PR INITIAL OBSERVATION CARE,LEVL III: ICD-10-PCS | Mod: CR,,, | Performed by: HOSPITALIST

## 2022-01-07 PROCEDURE — 99285 EMERGENCY DEPT VISIT HI MDM: CPT | Mod: 25

## 2022-01-07 PROCEDURE — 99285 PR EMERGENCY DEPT VISIT,LEVEL V: ICD-10-PCS | Mod: CS,,, | Performed by: PHYSICIAN ASSISTANT

## 2022-01-07 PROCEDURE — 25000003 PHARM REV CODE 250: Performed by: PHYSICIAN ASSISTANT

## 2022-01-07 PROCEDURE — 81001 URINALYSIS AUTO W/SCOPE: CPT | Performed by: PHYSICIAN ASSISTANT

## 2022-01-07 PROCEDURE — 84484 ASSAY OF TROPONIN QUANT: CPT | Performed by: PHYSICIAN ASSISTANT

## 2022-01-07 PROCEDURE — 93010 EKG 12-LEAD: ICD-10-PCS | Mod: ,,, | Performed by: INTERNAL MEDICINE

## 2022-01-07 PROCEDURE — 93005 ELECTROCARDIOGRAM TRACING: CPT

## 2022-01-07 PROCEDURE — 96376 TX/PRO/DX INJ SAME DRUG ADON: CPT

## 2022-01-07 PROCEDURE — U0002 COVID-19 LAB TEST NON-CDC: HCPCS | Performed by: PHYSICIAN ASSISTANT

## 2022-01-07 PROCEDURE — 25000003 PHARM REV CODE 250: Performed by: HOSPITALIST

## 2022-01-07 PROCEDURE — 80053 COMPREHEN METABOLIC PANEL: CPT | Performed by: PHYSICIAN ASSISTANT

## 2022-01-07 PROCEDURE — 96375 TX/PRO/DX INJ NEW DRUG ADDON: CPT

## 2022-01-07 RX ORDER — ONDANSETRON 2 MG/ML
4 INJECTION INTRAMUSCULAR; INTRAVENOUS EVERY 8 HOURS PRN
Status: DISCONTINUED | OUTPATIENT
Start: 2022-01-07 | End: 2022-01-16 | Stop reason: HOSPADM

## 2022-01-07 RX ORDER — ONDANSETRON 2 MG/ML
4 INJECTION INTRAMUSCULAR; INTRAVENOUS ONCE
Status: COMPLETED | OUTPATIENT
Start: 2022-01-07 | End: 2022-01-07

## 2022-01-07 RX ORDER — SODIUM CHLORIDE 0.9 % (FLUSH) 0.9 %
10 SYRINGE (ML) INJECTION
Status: DISCONTINUED | OUTPATIENT
Start: 2022-01-07 | End: 2022-01-16 | Stop reason: HOSPADM

## 2022-01-07 RX ORDER — ONDANSETRON 2 MG/ML
4 INJECTION INTRAMUSCULAR; INTRAVENOUS
Status: COMPLETED | OUTPATIENT
Start: 2022-01-07 | End: 2022-01-07

## 2022-01-07 RX ORDER — METOCLOPRAMIDE HYDROCHLORIDE 5 MG/ML
10 INJECTION INTRAMUSCULAR; INTRAVENOUS ONCE
Status: COMPLETED | OUTPATIENT
Start: 2022-01-07 | End: 2022-01-07

## 2022-01-07 RX ORDER — ACETAMINOPHEN 325 MG/1
650 TABLET ORAL EVERY 4 HOURS PRN
Status: DISCONTINUED | OUTPATIENT
Start: 2022-01-07 | End: 2022-01-07 | Stop reason: ALTCHOICE

## 2022-01-07 RX ORDER — LOPERAMIDE HYDROCHLORIDE 2 MG/1
2 CAPSULE ORAL EVERY 6 HOURS PRN
Status: DISCONTINUED | OUTPATIENT
Start: 2022-01-07 | End: 2022-01-16 | Stop reason: HOSPADM

## 2022-01-07 RX ORDER — TALC
6 POWDER (GRAM) TOPICAL NIGHTLY PRN
Status: DISCONTINUED | OUTPATIENT
Start: 2022-01-07 | End: 2022-01-16 | Stop reason: HOSPADM

## 2022-01-07 RX ORDER — ALBUTEROL SULFATE 90 UG/1
2 AEROSOL, METERED RESPIRATORY (INHALATION) EVERY 6 HOURS PRN
Status: DISCONTINUED | OUTPATIENT
Start: 2022-01-07 | End: 2022-01-16 | Stop reason: HOSPADM

## 2022-01-07 RX ORDER — ASCORBIC ACID 500 MG
500 TABLET ORAL 2 TIMES DAILY
Status: DISCONTINUED | OUTPATIENT
Start: 2022-01-07 | End: 2022-01-13

## 2022-01-07 RX ORDER — IBUPROFEN 200 MG
24 TABLET ORAL
Status: DISCONTINUED | OUTPATIENT
Start: 2022-01-07 | End: 2022-01-16 | Stop reason: HOSPADM

## 2022-01-07 RX ORDER — ACETAMINOPHEN 325 MG/1
650 TABLET ORAL
Status: COMPLETED | OUTPATIENT
Start: 2022-01-07 | End: 2022-01-07

## 2022-01-07 RX ORDER — IBUPROFEN 200 MG
16 TABLET ORAL
Status: DISCONTINUED | OUTPATIENT
Start: 2022-01-07 | End: 2022-01-16 | Stop reason: HOSPADM

## 2022-01-07 RX ORDER — BENZONATATE 100 MG/1
100 CAPSULE ORAL 3 TIMES DAILY PRN
Status: DISCONTINUED | OUTPATIENT
Start: 2022-01-07 | End: 2022-01-16 | Stop reason: HOSPADM

## 2022-01-07 RX ORDER — ASPIRIN 325 MG
325 TABLET ORAL
Status: COMPLETED | OUTPATIENT
Start: 2022-01-07 | End: 2022-01-07

## 2022-01-07 RX ADMIN — Medication 500 MG: at 08:01

## 2022-01-07 RX ADMIN — ACETAMINOPHEN 650 MG: 325 TABLET ORAL at 11:01

## 2022-01-07 RX ADMIN — ASPIRIN 325 MG ORAL TABLET 325 MG: 325 PILL ORAL at 11:01

## 2022-01-07 RX ADMIN — ONDANSETRON 4 MG: 2 INJECTION INTRAMUSCULAR; INTRAVENOUS at 11:01

## 2022-01-07 RX ADMIN — ONDANSETRON 4 MG: 2 INJECTION INTRAMUSCULAR; INTRAVENOUS at 02:01

## 2022-01-07 RX ADMIN — METOCLOPRAMIDE 10 MG: 5 INJECTION, SOLUTION INTRAMUSCULAR; INTRAVENOUS at 04:01

## 2022-01-07 NOTE — ED NOTES
Patient placed on cardiac monitor, bp cuff, and continuous pulse ox. Call light within reach. Family at bedside.

## 2022-01-07 NOTE — H&P
[facility] Medicine  History and Physical Exam    Team: [facility] Roderick Mcmahon MD  Admit Date: 1/7/2022  Principal Problem:  COVID-19   Patient information was obtained from patient, past medical records and ER records.   Primary care Physician: Kai Montez MD  Code status: Full Code    HPI: 89 yo F with PMHx AFib on Eliquis, HFpEF,  SSS s/p pacemaker placement (1/2019), HTN, CKD3 and hypothyroidism who presents to the ED complaining of nausea/vomiting with poor appetite x3 days. Pt. States that she started having nausea on Tuesday with some associated chills Over the next few days, she started having vomiting, poor appeitte, and generalized fatigue. She reports that she has been unable to tolerate much PO intake and has developed crampy abdominal pain which concerned her that she may be having pancreatitis (pt. Reports a distant history of biliary pancreatitis prior to her cholecystectomy). Pt. Came to the ED today after having subjective fever and an episode of vomiting. She denies any SOB, but she she does report developing a dry cough yesterday which has been intermittent.    Hemoglobin A1C   Date Value Ref Range Status   02/01/2021 5.3 4.0 - 5.6 % Final     Comment:     ADA Screening Guidelines:  5.7-6.4%  Consistent with prediabetes  >or=6.5%  Consistent with diabetes    High levels of fetal hemoglobin interfere with the HbA1C  assay. Heterozygous hemoglobin variants (HbS, HgC, etc)do  not significantly interfere with this assay.   However, presence of multiple variants may affect accuracy.         Past Medical History: Patient has a past medical history of A-fib, Anemia of chronic disease (2/2/2021), Arthritis, Chronic diastolic heart failure (2/2/2021), Gallstone pancreatitis, Hypertension, Pancreatic abscess (9/26/2020), and Thyroid disease.    Past Surgical History: Patient has a past surgical history that includes Cataract extraction; Cataract extraction w/  intraocular lens implant  (Bilateral, 2004); Revision of skin pocket for pacemaker (N/A, 1/21/2019); Eye surgery; Hysterectomy; Hip replacement arthroplasty (Right, 2016); Treatment of cardiac arrhythmia (N/A, 3/18/2019); Thyroidectomy; Treatment of cardiac arrhythmia (N/A, 5/14/2019); ERCP (N/A, 9/14/2020); Endoscopic ultrasound of upper gastrointestinal tract (N/A, 9/14/2020); ERCP (N/A, 9/25/2020); ERCP (N/A, 11/25/2020); and Endoscopic ultrasound of upper gastrointestinal tract (11/25/2020).    Social History: Patient reports that she has quit smoking. She started smoking about 57 years ago. She has never used smokeless tobacco. She reports that she does not drink alcohol and does not use drugs.    Family History: family history includes Heart attack in her maternal grandmother and mother; Heart disease in her maternal grandmother and mother; Heart failure in her maternal grandmother and mother; Hypertension in her maternal grandmother and mother; Stroke in her maternal grandmother.     Medications: reviewed     Allergies: Patient is allergic to ciprofloxacin.    ROS  Pain Scale: 0/10   Constitutional: no fever or chills  Respiratory: Positive for cough shortness of breath  Cardiovascular: no chest pain or palpitations  Gastrointestinal: Positive for nausea, vomiting, abdominal pain or change in bowel habits  Genitourinary: no hematuria or dysuria  Integument/Breast: no rash or pruritis  Hematologic/Lymphatic: no easy bruising or lymphadenopathy  Musculoskeletal: no arthralgias or myalgias  Neurological: no seizures or tremors  Behavioral/Psych: no depression or anxiety    PEx  Temp:  [98.4 °F (36.9 °C)-98.5 °F (36.9 °C)]   Pulse:  [60]   Resp:  [16-20]   BP: (129-172)/()   SpO2:  [96 %-98 %]   Body mass index is 21.26 kg/m².   No intake or output data in the 24 hours ending 01/07/22 1724    General appearance: no distress, pt. Resting comfortably  Mental status: Alert and oriented x 3  HEENT:  conjunctivae/corneas clear,  PERRL  Neck: supple, thyroid not enlarged  Pulm:   normal respiratory effort, CTA B, no c/w/r  Card: RRR, S1, S2 normal, no murmur, click, rub or gallop  Abd: soft, NT, ND, BS present; no masses, no organomegaly  Ext: no c/c/e  Pulses: 2+, symmetric  Skin: color, texture, turgor normal. No rashes or lesions  Neuro: CN II-XII grossly intact, no focal numbness or weakness, normal strength and tone     Recent Results (from the past 24 hour(s))   POCT COVID-19 Rapid Screening    Collection Time: 01/07/22 11:33 AM   Result Value Ref Range    POC Rapid COVID Positive (A) Negative     Acceptable Yes    CBC auto differential    Collection Time: 01/07/22 11:37 AM   Result Value Ref Range    WBC 5.06 3.90 - 12.70 K/uL    RBC 4.31 4.00 - 5.40 M/uL    Hemoglobin 13.3 12.0 - 16.0 g/dL    Hematocrit 40.4 37.0 - 48.5 %    MCV 94 82 - 98 fL    MCH 30.9 27.0 - 31.0 pg    MCHC 32.9 32.0 - 36.0 g/dL    RDW 12.6 11.5 - 14.5 %    Platelets 192 150 - 450 K/uL    MPV 9.1 (L) 9.2 - 12.9 fL    Immature Granulocytes 0.8 (H) 0.0 - 0.5 %    Gran # (ANC) 3.9 1.8 - 7.7 K/uL    Immature Grans (Abs) 0.04 0.00 - 0.04 K/uL    Lymph # 0.5 (L) 1.0 - 4.8 K/uL    Mono # 0.6 0.3 - 1.0 K/uL    Eos # 0.0 0.0 - 0.5 K/uL    Baso # 0.02 0.00 - 0.20 K/uL    nRBC 0 0 /100 WBC    Gran % 77.0 (H) 38.0 - 73.0 %    Lymph % 9.5 (L) 18.0 - 48.0 %    Mono % 12.3 4.0 - 15.0 %    Eosinophil % 0.0 0.0 - 8.0 %    Basophil % 0.4 0.0 - 1.9 %    Differential Method Automated    Comprehensive metabolic panel    Collection Time: 01/07/22 11:37 AM   Result Value Ref Range    Sodium 133 (L) 136 - 145 mmol/L    Potassium 4.5 3.5 - 5.1 mmol/L    Chloride 97 95 - 110 mmol/L    CO2 23 23 - 29 mmol/L    Glucose 107 70 - 110 mg/dL    BUN 20 8 - 23 mg/dL    Creatinine 1.2 0.5 - 1.4 mg/dL    Calcium 8.8 8.7 - 10.5 mg/dL    Total Protein 7.1 6.0 - 8.4 g/dL    Albumin 3.5 3.5 - 5.2 g/dL    Total Bilirubin 1.6 (H) 0.1 - 1.0 mg/dL    Alkaline Phosphatase 122 55 - 135 U/L      (H) 10 - 40 U/L     (H) 10 - 44 U/L    Anion Gap 13 8 - 16 mmol/L    eGFR if African American 46.6 (A) >60 mL/min/1.73 m^2    eGFR if non African American 40.4 (A) >60 mL/min/1.73 m^2   Troponin I    Collection Time: 01/07/22 11:37 AM   Result Value Ref Range    Troponin I 0.031 (H) 0.000 - 0.026 ng/mL   Brain natriuretic peptide    Collection Time: 01/07/22 11:37 AM   Result Value Ref Range     (H) 0 - 99 pg/mL   Lipase    Collection Time: 01/07/22 11:37 AM   Result Value Ref Range    Lipase 19 4 - 60 U/L   Troponin I    Collection Time: 01/07/22  2:41 PM   Result Value Ref Range    Troponin I 0.035 (H) 0.000 - 0.026 ng/mL       No results for input(s): POCTGLUCOSE in the last 168 hours.    There are no hospital problems to display for this patient.        Assessment and Plan:  Elevated LFTs  Nausea and Vomiting  -N/V, malaise reported, suspect to be secondary to Covid-19 infection, but pt. Also noted to have elevated LFTs with AST//304, Tbili 1.6. Prior hx of pancreatitis, lipase WNL on this admit  -Abdominal U/S shows Mild intrahepatic biliary ductal dilatation but this is chronic. Biliary sludge noted but no evidence of acute cholecystitis  -Continue to trend LFTs, consider HIDA scan +/- AES consult for further work-up if LFTs continue to rise in biliary pattern or pt. Has persistent symptoms  -Continue anti-emetics PRN, IV fluids as needed while monitoring volume status in this pt. With hx of HF    COVID-19 Virus Infection  Patient is identified as High risk for severe complications of COVID 19 based on COVID risk score of 6     Pt. Reported cough. No current hypoxia and CXR without any acute fidings. Will hold off on remdesivir given the patient's already significantly elevated LFTs as this may cloud the picture. Further treatment as below.    Diagnostics: (leukopenia, hyponatremia, hyperferritinemia, elevated troponin, elevated d-dimer, age, and comorbidities are significant  predictors of poor clinical outcome)  CBC, CMP, Ferritin, CRP, LDH, BNP, ECG and Portable CXR    Management: Maintain oxygen saturations 92-96% via and monitor with intermittent pulse oximetry.  and Inhaled bronchodilators as needed for shortness of breath.    Oxygen Device: room air    Micronutrients   Multivitamin PO daily   Vitamin D 1000IU PO daily (if deficient)   Magnesium IV/PO (if deficient)   Ascorbic acid 500mg po BID     Trend inflammatory markers, LDH, D-Dimer, ferritin, and CRP Q48hrs    Advance Care Planning  Current advance care plan has been discussed with patient/family/POA and patient currently wishes Full Code.      Paroxysmal Afib  Long Term Use of Anticoagulants  -Pt. In NSR on admit  -Continue amiodarone, diltiazem, and atenolol for rate control  -Continue eliquis for AC     Essential HTN  -Continue atenolol and diltiazem. Hold aldactone due to N/V with hypovolemia     Stage 3 CKD  -Cr slightly elevated at 1.2 from baseline of 1.3, suspect in setting of decreased PO intake/N/V  -Gently hydrate, continue to monitor BMP  -Renally dose meds     Chronic Diastolic Heart Failure  -2D echo 2019 with EF 55% and DD  -Continue home aldactone and B-blocker     HLD  -Continue Lipitor 10mg PO daily     Hypothyroidism  -Continue Synthroid 137mcg PO daily    DVT PPx: Aliya Mcmahon MD  Hospital Medicine Staff  [phone] pager

## 2022-01-07 NOTE — ED PROVIDER NOTES
Encounter Date: 1/7/2022       History     Chief Complaint   Patient presents with    Abdominal Pain     N/v; hx pancreatitis     11:24 AM  Patient is a 88-year-old female with a history CHF, CKD, osteoporosis, anemia, gallstone pancreatitis presents to [facility] ED with nausea, vomiting, constipation.  Patient states her symptoms started Tuesday night around 20:00, approximately 3 days ago.  She has had nausea and vomiting.  She has decreased appetite and is barely drinking water.  Her urine is decreased without dysuria or hematuria.  She last vomited prior to arrival and was bright yellow like bowel.  No blood.  Her last bowel movement was Tuesday.  No diarrhea.  She endorses subjective fever.  Was burning up and had diaphoresis at 09:00.  She denies having any abdominal pain.  She also denies having any chest pain or shortness of breath.  She started coughing yesterday.  This is the extent of her medical complaints at this time.        Review of patient's allergies indicates:   Allergen Reactions    Ciprofloxacin Nausea And Vomiting     Past Medical History:   Diagnosis Date    A-fib     Anemia of chronic disease 2/2/2021    Arthritis     Chronic diastolic heart failure 2/2/2021    Gallstone pancreatitis     Hypertension     Pancreatic abscess 9/26/2020    Thyroid disease      Past Surgical History:   Procedure Laterality Date    CATARACT EXTRACTION      CATARACT EXTRACTION W/  INTRAOCULAR LENS IMPLANT Bilateral 2004     [place]    ENDOSCOPIC ULTRASOUND OF UPPER GASTROINTESTINAL TRACT N/A 9/14/2020    Procedure: ULTRASOUND, UPPER GI TRACT, ENDOSCOPIC;  Surgeon: Esha Howard MD;  Location: [address];  Service: Endoscopy;  Laterality: N/A;    ENDOSCOPIC ULTRASOUND OF UPPER GASTROINTESTINAL TRACT  11/25/2020    Procedure: ULTRASOUND, UPPER GI TRACT, ENDOSCOPIC;  Surgeon: Esha Howard MD;  [facility] ([address] [place] [address]);  Service: Endoscopy;;    ERCP N/A 9/14/2020     Procedure: ERCP (ENDOSCOPIC RETROGRADE CHOLANGIOPANCREATOGRAPHY);  Surgeon: Esha Howard MD;  Location: [facility] ENDO (2ND FLR);  Service: Endoscopy;  Laterality: N/A;    ERCP N/A 9/25/2020    Procedure: ERCP (ENDOSCOPIC RETROGRADE CHOLANGIOPANCREATOGRAPHY);  Surgeon: Esha Howard MD;  Location: [facility] ENDO (2ND FLR);  Service: Endoscopy;  Laterality: N/A;    ERCP N/A 11/25/2020    Procedure: ERCP (ENDOSCOPIC RETROGRADE CHOLANGIOPANCREATOGRAPHY);  Surgeon: Esha Howard MD;  Location: [facility] ENDO (2ND FLR);  Service: Endoscopy;  Laterality: N/A;  Covid-19 test 11/22/20 at [facility] pg  2 day hold Eliquis, Dr Favian Colmenares - pg  PM prep    EYE SURGERY      HIP REPLACEMENT ARTHROPLASTY Right 2016    HYSTERECTOMY      REVISION OF SKIN POCKET FOR PACEMAKER N/A 1/21/2019    Procedure: REVISION-POCKET-PACEMAKER;  Surgeon: Asher Watts MD;  Location: [facility] EP LAB;  Service: Cardiology;  Laterality: N/A;  MODERATE SEDATION, sedation issues in the past    THYROIDECTOMY      TREATMENT OF CARDIAC ARRHYTHMIA N/A 3/18/2019    Procedure: CARDIOVERSION OR DEFIBRILLATION;  Surgeon: Asher Watts MD;  Location: [facility] EP LAB;  Service: Cardiology;  Laterality: N/A;  AF, JILL-cx if SR, DCCV, MAC, FAS, 3PREP    TREATMENT OF CARDIAC ARRHYTHMIA N/A 5/14/2019    Procedure: Cardioversion or Defibrillation;  Surgeon: Derick Vizcarra MD;  Location: [facility] EP LAB;  Service: Cardiology;  Laterality: N/A;  AF, JILL, DCCV, MAC, DM, 3PREP     Family History   Problem Relation Age of Onset    Heart attack Mother     Heart disease Mother     Heart failure Mother     Hypertension Mother     Stroke Maternal Grandmother     Hypertension Maternal Grandmother     Heart disease Maternal Grandmother     Heart attack Maternal Grandmother     Heart failure Maternal Grandmother      Social History     Tobacco Use    Smoking status: Former Smoker     Start date: 5/19/1964    Smokeless tobacco: Never Used   Substance  Use Topics    Alcohol use: No    Drug use: No     Review of Systems   Constitutional: Positive for activity change, appetite change, chills, diaphoresis and fever.   HENT: Negative for sore throat.    Respiratory: Positive for cough. Negative for shortness of breath.    Cardiovascular: Negative for chest pain.   Gastrointestinal: Positive for constipation, nausea and vomiting. Negative for abdominal pain and diarrhea.   Genitourinary: Negative for dysuria.   Musculoskeletal: Negative for back pain and myalgias.   Skin: Negative for rash.   Neurological: Negative for weakness and headaches.   Hematological: Does not bruise/bleed easily.       Physical Exam     Initial Vitals [01/07/22 1040]   BP Pulse Resp Temp SpO2   (!) 168/102 60 16 98.4 °F (36.9 °C) 98 %      MAP       --         Physical Exam    Vitals reviewed.  Constitutional: She appears well-developed and well-nourished. She is not diaphoretic. She is cooperative.  Non-toxic appearance. She has a sickly appearance. She does not appear ill. No distress. Face mask in place.   HENT:   Head: Normocephalic and atraumatic.   Nose: Nose normal.   Eyes: Conjunctivae and EOM are normal.   Neck:   Normal range of motion.  Cardiovascular: Normal rate.   Pulmonary/Chest: Breath sounds normal. No respiratory distress. She has no wheezes. She has no rales.   Abdominal: Abdomen is soft. She exhibits no distension. There is no abdominal tenderness. There is no rebound, no guarding, no tenderness at McBurney's point and negative Rosario's sign.   Musculoskeletal:         General: Normal range of motion.      Cervical back: Normal range of motion.     Neurological: She is alert. She has normal strength.   Skin: Skin is warm and dry. No erythema. No pallor.         ED Course   Procedures  Labs Reviewed   CBC W/ AUTO DIFFERENTIAL - Abnormal; Notable for the following components:       Result Value    MPV 9.1 (*)     Immature Granulocytes 0.8 (*)     Lymph # 0.5 (*)     Gran %  77.0 (*)     Lymph % 9.5 (*)     All other components within normal limits   COMPREHENSIVE METABOLIC PANEL - Abnormal; Notable for the following components:    Sodium 133 (*)     Total Bilirubin 1.6 (*)      (*)      (*)     eGFR if  46.6 (*)     eGFR if non  40.4 (*)     All other components within normal limits   TROPONIN I - Abnormal; Notable for the following components:    Troponin I 0.031 (*)     All other components within normal limits   B-TYPE NATRIURETIC PEPTIDE - Abnormal; Notable for the following components:     (*)     All other components within normal limits   TROPONIN I - Abnormal; Notable for the following components:    Troponin I 0.035 (*)     All other components within normal limits   SARS-COV-2 RDRP GENE - Abnormal; Notable for the following components:    POC Rapid COVID Positive (*)     All other components within normal limits    Narrative:     This test utilizes isothermal nucleic acid amplification   technology to detect the SARS-CoV-2 RdRp nucleic acid segment.   The analytical sensitivity (limit of detection) is 125 genome   equivalents/mL.   A POSITIVE result implies infection with the SARS-CoV-2 virus;   the patient is presumed to be contagious.     A NEGATIVE result means that SARS-CoV-2 nucleic acids are not   present above the limit of detection. A NEGATIVE result should be   treated as presumptive. It does not rule out the possibility of   COVID-19 and should not be the sole basis for treatment decisions.   If COVID-19 is strongly suspected based on clinical and exposure   history, re-testing using an alternate molecular assay should be   considered.   This test is only for use under the Food and Drug   Administration s Emergency Use Authorization (EUA).   Commercial kits are provided by [employer].   Performance characteristics of the EUA have been independently   verified by [facility] Department of    Pathology and Laboratory Medicine.   _________________________________________________________________   The authorized Fact Sheet for Healthcare Providers and the authorized Fact   Sheet for Patients of the ID NOW COVID-19 are available on the FDA   website:     https://www.fda.gov/media/717602/download  https://www.fda.gov/media/598664/download       LIPASE   URINALYSIS, REFLEX TO URINE CULTURE        ECG Results          EKG 12-lead (Final result)  Result time 01/07/22 12:45:48    Final result by Interface, Lab In [facility] (01/07/22 12:45:48)                 Narrative:    Test Reason : R11.2,    Vent. Rate : 060 BPM     Atrial Rate : 059 BPM     P-R Int : 000 ms          QRS Dur : 136 ms      QT Int : 468 ms       P-R-T Axes : 000 105 070 degrees     QTc Int : 468 ms    Wide QRS rhythm  probably  sinus rhythm  Right bundle branch block  Abnormal ECG  When compared with ECG of 11-AUG-2021 09:23,  Sinus rhythm has replaced Electronic atrial pacemaker  Confirmed by ROLANDA GAYTAN, DAJUAN ([id number]) on 1/7/2022 12:45:40 PM    Referred By: [email or web address]   SELF           Confirmed By:DAJUAN ORANTES MD                            Imaging Results          US Abdomen Limited (Gallbladder) (Final result)  Result time 01/07/22 14:47:37    Final result by Nils Yuan MD (01/07/22 14:47:37)                 Impression:      Biliary sludge.  No wall thickening or wall hyperemia.  No evidence of acute cholecystitis.    Mild intrahepatic biliary ductal dilatation, similar to prior CT 11/08/2020.    Heterogeneous liver parenchyma and trace ascites.    Electronically signed by resident: Ailyn Kirkland  Date:    01/07/2022  Time:    14:29    Electronically signed by: Nils Yuan MD  Date:    01/07/2022  Time:    14:47             Narrative:    EXAMINATION:  US ABDOMEN LIMITED    CLINICAL HISTORY:  Bilious vomiting    TECHNIQUE:  Limited ultrasound evaluation right upper quadrant.    COMPARISON:  CT abdomen pelvis with contrast  11/08/2020; ultrasound abdomen limited 09/29/2020    FINDINGS:  The liver demonstrates a heterogeneous echotexture.    The common duct measures 4 mm and trumpets to 8 mm distally.  Mild intrahepatic ductal dilatation.    The gallbladder contains mobile sludge.  No wall thickening or wall hyperemia, pericholecystic fluid or sonographic Rosario sign.    Pancreas is obscured by overlying bowel gas.    There is trace free fluid within the visualized abdomen.                               X-Ray Chest AP Portable (Final result)  Result time 01/07/22 12:09:59    Final result by Bernard Reina MD (01/07/22 12:09:59)                 Impression:      Stable chest findings to earlier exam as noted      Electronically signed by: Bernard Reina  Date:    01/07/2022  Time:    12:09             Narrative:    EXAMINATION:  XR CHEST AP PORTABLE    CLINICAL HISTORY:  cough;    TECHNIQUE:  Single frontal view of the chest was performed.    COMPARISON:  January 31, 2021    FINDINGS:  Reconfirmed upper normal heart size, mildly tortuous thoracic aorta, arch calcification, and left dual lead pacing device.  Of pulmonary vasculature is felt within limits of normal.  No focal infiltrates.  No pleural fluid.  No pneumothorax.  No free air beneath hemidiaphragms.  Redemonstrated thoracolumbar levocurvature.                                 Medications   aspirin tablet 325 mg (325 mg Oral Given 1/7/22 1141)   ondansetron injection 4 mg (4 mg Intravenous Given 1/7/22 1142)   acetaminophen tablet 650 mg (650 mg Oral Given 1/7/22 1158)   ondansetron injection 4 mg (4 mg Intravenous Given 1/7/22 1444)   metoclopramide HCl injection 10 mg (10 mg Intravenous Given 1/7/22 1652)     Medical Decision Making:   History:   Old Medical Records: I decided to obtain old medical records.  Initial Assessment:   Patient is a 88-year-old female with a history CHF, CKD, osteoporosis, anemia, gallstone pancreatitis presents [place] [facility] [place] with nausea, vomiting,  "constipation.   Differential Diagnosis:   Includes but is not limited to COVID-19, other viral syndrome, viral gastroenteritis, pancreatitis, UTI, ACS.  Her abdomen is soft, nontender nondistended.  She has a history of gallstone pancreatitis, but without peritoneal signs, I have a low suspicion for cholecystitis, choledocholithiasis, but will keep on differential.  Independently Interpreted Test(s):   I have ordered and independently interpreted EKG Reading(s) - see summary below  Clinical Tests:   Lab Tests: Ordered and Reviewed  Radiological Study: Reviewed and Ordered  Medical Tests: Ordered and Reviewed  ED Management:  Will initiate workup, give medication for symptomatic relief, and continue monitor.             ED Course as of 01/07/22 1715   Fri Jan 07, 2022   1139 BP(!): 172/72 [CL]   1139 Temp: 98.4 °F (36.9 °C) [CL]   1139 Pulse: 60 [CL]   1139 Resp: 16 [CL]   1139 SpO2: 97 % [CL]   1139 SARS-CoV-2 RNA, Amplification, Qual(!): Positive [CL]   1139 On my independent interpretation, ECG with wide QRS rhythm at 60 beats per minute.  Right bundle branch block, see on previous ECG.  No STEMI. [CL]   1209 WBC: 5.06 [CL]   1209 Hemoglobin: 13.3 [CL]   1209 Platelets: 192 [CL]   1230 Sodium(!): 133 [CL]   1231 Potassium: 4.5 [CL]   1231 Chloride: 97 [CL]   1231 Glucose: 107 [CL]   1231 BUN: 20 [CL]   1231 Creatinine: 1.2 [CL]   1231 BILIRUBIN TOTAL(!): 1.6 [CL]   1231 AST(!): 295 [CL]   1231 ALT(!): 304 [CL]   1231 Lipase: 19 [CL]   1231 BNP(!): 385 [CL]   1231 Troponin I(!): 0.031 [CL]   1232 Patient updated with results.  She reports improvement in her nausea.  Declines any more antiemetics.  She is agreeable to ultrasound. [CL]   1454 US Abdomen Limited (Gallbladder)  "Biliary sludge.  No wall thickening or wall hyperemia.  No evidence of acute cholecystitis.     Mild intrahepatic biliary ductal dilatation, similar to prior CT 11/08/2020.     Heterogeneous liver parenchyma and trace ascites." [CL]   1643 Just " attempted 1 minute walking high knees. She states that she does not feel well at all. Did not desat after, but continues to say she feels unwell. Will give another dose of antipyretic.  [CL]      ED Course User Index  [CL] Rere Resendiz PA-C           Case was discussed with Hospital Medicine who will place in observation for further evaluation and management of intractable nausea and weakness.  She does have active COVID-19 infection discovered today.  She is not hypoxic.  Ultrasound shows biliary sludge with no signs of cholecystitis.  There is mild intrahepatic biliary ductal dilatation.  I do not think that this is biliary colic because she has not had any abdominal pain in 3 days, however should keep on differential if she does not improve.        I have reviewed patient's chart and discussed this case with my supervising MD.     Rere Resendiz PA-C  Emergent Department  [facility]  Spectralink #[id number] or #[phone]    Clinical Impression:   Final diagnoses:  [R11.2] Nausea & vomiting  [R11.14] Vomiting bile  [R11.2] Intractable vomiting with nausea, unspecified vomiting type (Primary)  [U07.1] COVID-19  [K83.8] Biliary sludge determined by ultrasound  [R53.1] Weakness          ED Disposition Condition    Observation         Future Appointments   Date Time Provider Department Center   1/19/2022 11:15 AM MD ALEJO Wheat [facility]   1/24/2022 10:00 AM HOME MONITOR DEVICE CHECK, [facility] [place]   3/16/2022  2:00 PM Karen Colmenares, OD [facility] [place] [facility]               Rere Resendiz PA-C  01/07/22 8130

## 2022-01-08 VITALS
RESPIRATION RATE: 19 BRPM | HEART RATE: 60 BPM | SYSTOLIC BLOOD PRESSURE: 166 MMHG | OXYGEN SATURATION: 97 % | DIASTOLIC BLOOD PRESSURE: 70 MMHG | TEMPERATURE: 99 F

## 2022-01-08 LAB
ALBUMIN SERPL BCP-MCNC: 3 G/DL (ref 3.5–5.2)
ALP SERPL-CCNC: 103 U/L (ref 55–135)
ALT SERPL W/O P-5'-P-CCNC: 573 U/L (ref 10–44)
ANION GAP SERPL CALC-SCNC: 8 MMOL/L (ref 8–16)
APAP SERPL-MCNC: <3 UG/ML (ref 10–20)
AST SERPL-CCNC: 532 U/L (ref 10–40)
BASOPHILS # BLD AUTO: 0.01 K/UL (ref 0–0.2)
BASOPHILS NFR BLD: 0.2 % (ref 0–1.9)
BILIRUB SERPL-MCNC: 1.2 MG/DL (ref 0.1–1)
BUN SERPL-MCNC: 21 MG/DL (ref 8–23)
CALCIUM SERPL-MCNC: 8 MG/DL (ref 8.7–10.5)
CHLORIDE SERPL-SCNC: 99 MMOL/L (ref 95–110)
CO2 SERPL-SCNC: 24 MMOL/L (ref 23–29)
CREAT SERPL-MCNC: 1 MG/DL (ref 0.5–1.4)
DIFFERENTIAL METHOD: NORMAL
EOSINOPHIL # BLD AUTO: 0 K/UL (ref 0–0.5)
EOSINOPHIL NFR BLD: 0.2 % (ref 0–8)
ERYTHROCYTE [DISTWIDTH] IN BLOOD BY AUTOMATED COUNT: 12.7 % (ref 11.5–14.5)
EST. GFR  (AFRICAN AMERICAN): 58.1 ML/MIN/1.73 M^2
EST. GFR  (NON AFRICAN AMERICAN): 50.4 ML/MIN/1.73 M^2
GLUCOSE SERPL-MCNC: 69 MG/DL (ref 70–110)
HCT VFR BLD AUTO: 38 % (ref 37–48.5)
HGB BLD-MCNC: 12.2 G/DL (ref 12–16)
IMM GRANULOCYTES # BLD AUTO: 0.02 K/UL (ref 0–0.04)
IMM GRANULOCYTES NFR BLD AUTO: 0.5 % (ref 0–0.5)
LYMPHOCYTES # BLD AUTO: 1 K/UL (ref 1–4.8)
LYMPHOCYTES NFR BLD: 24.1 % (ref 18–48)
MCH RBC QN AUTO: 30.4 PG (ref 27–31)
MCHC RBC AUTO-ENTMCNC: 32.1 G/DL (ref 32–36)
MCV RBC AUTO: 95 FL (ref 82–98)
MONOCYTES # BLD AUTO: 0.5 K/UL (ref 0.3–1)
MONOCYTES NFR BLD: 13.2 % (ref 4–15)
NEUTROPHILS # BLD AUTO: 2.5 K/UL (ref 1.8–7.7)
NEUTROPHILS NFR BLD: 61.8 % (ref 38–73)
NRBC BLD-RTO: 0 /100 WBC
PLATELET # BLD AUTO: 171 K/UL (ref 150–450)
PMV BLD AUTO: 9.3 FL (ref 9.2–12.9)
POTASSIUM SERPL-SCNC: 4.4 MMOL/L (ref 3.5–5.1)
PROT SERPL-MCNC: 6 G/DL (ref 6–8.4)
RBC # BLD AUTO: 4.01 M/UL (ref 4–5.4)
SODIUM SERPL-SCNC: 131 MMOL/L (ref 136–145)
WBC # BLD AUTO: 4.02 K/UL (ref 3.9–12.7)

## 2022-01-08 PROCEDURE — 80053 COMPREHEN METABOLIC PANEL: CPT | Performed by: HOSPITALIST

## 2022-01-08 PROCEDURE — 85025 COMPLETE CBC W/AUTO DIFF WBC: CPT | Performed by: HOSPITALIST

## 2022-01-08 PROCEDURE — 25000003 PHARM REV CODE 250: Performed by: HOSPITALIST

## 2022-01-08 PROCEDURE — 27000207 HC ISOLATION

## 2022-01-08 PROCEDURE — 36415 COLL VENOUS BLD VENIPUNCTURE: CPT | Performed by: STUDENT IN AN ORGANIZED HEALTH CARE EDUCATION/TRAINING PROGRAM

## 2022-01-08 PROCEDURE — 36415 COLL VENOUS BLD VENIPUNCTURE: CPT | Performed by: HOSPITALIST

## 2022-01-08 PROCEDURE — 11000001 HC ACUTE MED/SURG PRIVATE ROOM

## 2022-01-08 PROCEDURE — 99233 PR SUBSEQUENT HOSPITAL CARE,LEVL III: ICD-10-PCS | Mod: CR,,, | Performed by: STUDENT IN AN ORGANIZED HEALTH CARE EDUCATION/TRAINING PROGRAM

## 2022-01-08 PROCEDURE — 80143 DRUG ASSAY ACETAMINOPHEN: CPT | Performed by: STUDENT IN AN ORGANIZED HEALTH CARE EDUCATION/TRAINING PROGRAM

## 2022-01-08 PROCEDURE — 99233 SBSQ HOSP IP/OBS HIGH 50: CPT | Mod: CR,,, | Performed by: STUDENT IN AN ORGANIZED HEALTH CARE EDUCATION/TRAINING PROGRAM

## 2022-01-08 PROCEDURE — 80074 ACUTE HEPATITIS PANEL: CPT | Performed by: HOSPITALIST

## 2022-01-08 RX ORDER — ASPIRIN 81 MG/1
81 TABLET ORAL DAILY
Status: DISCONTINUED | OUTPATIENT
Start: 2022-01-08 | End: 2022-01-13

## 2022-01-08 RX ORDER — AMIODARONE HYDROCHLORIDE 200 MG/1
200 TABLET ORAL DAILY
Status: DISCONTINUED | OUTPATIENT
Start: 2022-01-08 | End: 2022-01-16 | Stop reason: HOSPADM

## 2022-01-08 RX ORDER — ATORVASTATIN CALCIUM 10 MG/1
10 TABLET, FILM COATED ORAL DAILY
Status: DISCONTINUED | OUTPATIENT
Start: 2022-01-08 | End: 2022-01-16 | Stop reason: HOSPADM

## 2022-01-08 RX ORDER — ATENOLOL 25 MG/1
25 TABLET ORAL 2 TIMES DAILY
Status: DISCONTINUED | OUTPATIENT
Start: 2022-01-08 | End: 2022-01-16 | Stop reason: HOSPADM

## 2022-01-08 RX ORDER — DILTIAZEM HYDROCHLORIDE 180 MG/1
180 CAPSULE, COATED, EXTENDED RELEASE ORAL DAILY
Refills: 3 | Status: DISCONTINUED | OUTPATIENT
Start: 2022-01-08 | End: 2022-01-16 | Stop reason: HOSPADM

## 2022-01-08 RX ORDER — SPIRONOLACTONE 25 MG/1
25 TABLET ORAL 2 TIMES DAILY
Status: DISCONTINUED | OUTPATIENT
Start: 2022-01-08 | End: 2022-01-12

## 2022-01-08 RX ADMIN — APIXABAN 2.5 MG: 2.5 TABLET, FILM COATED ORAL at 09:01

## 2022-01-08 RX ADMIN — MULTIPLE VITAMINS W/ MINERALS TAB 1 TABLET: TAB at 09:01

## 2022-01-08 RX ADMIN — ASPIRIN 81 MG: 81 TABLET, COATED ORAL at 09:01

## 2022-01-08 RX ADMIN — Medication 500 MG: at 09:01

## 2022-01-08 RX ADMIN — LEVOTHYROXINE SODIUM 137 MCG: 0.03 TABLET ORAL at 04:01

## 2022-01-08 RX ADMIN — SODIUM CHLORIDE 1000 ML: 0.9 INJECTION, SOLUTION INTRAVENOUS at 04:01

## 2022-01-08 RX ADMIN — AMIODARONE HYDROCHLORIDE 200 MG: 200 TABLET ORAL at 09:01

## 2022-01-08 RX ADMIN — ATENOLOL 25 MG: 25 TABLET ORAL at 09:01

## 2022-01-08 RX ADMIN — SPIRONOLACTONE 25 MG: 25 TABLET ORAL at 09:01

## 2022-01-08 RX ADMIN — DILTIAZEM HYDROCHLORIDE 180 MG: 180 CAPSULE, COATED, EXTENDED RELEASE ORAL at 09:01

## 2022-01-08 RX ADMIN — ATORVASTATIN CALCIUM 10 MG: 10 TABLET, FILM COATED ORAL at 09:01

## 2022-01-08 NOTE — PLAN OF CARE
Problem: Adult Inpatient Plan of Care  Goal: Patient-Specific Goal (Individualized)  Outcome: Ongoing, Progressing  Goal: Absence of Hospital-Acquired Illness or Injury  1/8/2022 0447 by Alex Astorga RN  Outcome: Ongoing, Progressing  1/8/2022 0446 by Alex Astorga RN  Outcome: Ongoing, Progressing  Goal: Optimal Comfort and Wellbeing  1/8/2022 0447 by Alex Astorga RN  Outcome: Ongoing, Progressing  1/8/2022 0446 by Alex Astorga RN  Outcome: Ongoing, Progressing     Problem: Fall Injury Risk  Goal: Absence of Fall and Fall-Related Injury  1/8/2022 0447 by Alex Astorga RN  Outcome: Ongoing, Progressing  1/8/2022 0446 by Alex Astorga RN  Outcome: Ongoing, Progressing     Problem: Fatigue  Goal: Improved Activity Tolerance  Outcome: Ongoing, Progressing     Problem: Nausea and Vomiting  Goal: Fluid and Electrolyte Balance  Outcome: Ongoing, Progressing   Pt admitted to [facility] in no acute distress. Pt updated on plan of care. No acute events to report. Safety of environment maintained.

## 2022-01-08 NOTE — PROGRESS NOTES
[facility] [facility]  Progress Note    Patient Name: Gisselle Ortiz  MRN: [id number]  Patient Class: IP- Inpatient   Admission Date: 1/7/2022  Length of Stay: 0 days  Attending Physician: Aly Chery MD  Primary Care Provider: Kai Montez MD        Subjective:     Principal Problem:COVID-19        HPI:  87 yo F with PMHx AFib on Eliquis, HFpEF,  SSS s/p pacemaker placement (1/2019), HTN, CKD3 and hypothyroidism who presents to the ED complaining of nausea/vomiting with poor appetite x3 days. Pt. States that she started having nausea on Tuesday with some associated chills Over the next few days, she started having vomiting, poor appeitte, and generalized fatigue. She reports that she has been unable to tolerate much PO intake and has developed crampy abdominal pain which concerned her that she may be having pancreatitis (pt. Reports a distant history of biliary pancreatitis prior to her cholecystectomy). Pt. Came to the ED today after having subjective fever and an episode of vomiting. She denies any SOB, but she she does report developing a dry cough yesterday which has been intermittent.      Overview/Hospital Course:  1/8: AST/ALT rising, Bili downtrending      Interval History: Patient feels tired, still fatigued, generally unwell with poor appetite, no abdominal pain    Review of Systems   Constitutional: Positive for fatigue. Negative for fever.   Respiratory: Positive for cough. Negative for shortness of breath.    Cardiovascular: Negative for chest pain and leg swelling.   Gastrointestinal: Negative for abdominal pain.   Genitourinary: Negative for difficulty urinating and dysuria.   Musculoskeletal: Negative for back pain.     Objective:     Vital Signs (Most Recent):  Temp: 97 °F (36.1 °C) (01/08/22 1216)  Pulse: 67 (01/08/22 1216)  Resp: 18 (01/08/22 1216)  BP: (!) 164/72 (01/08/22 1216)  SpO2: 95 % (01/08/22 1216) Vital Signs (24h Range):  Temp:   [97 °F (36.1 °C)-98.7 °F (37.1 °C)] 97 °F (36.1 °C)  Pulse:  [60-67] 67  Resp:  [16-24] 18  SpO2:  [94 %-98 %] 95 %  BP: (123-167)/(64-97) 164/72     Weight: 56.3 kg (124 lb 3.2 oz)  Body mass index is 22 kg/m².  No intake or output data in the 24 hours ending 01/08/22 1507   Physical Exam  Constitutional:       Appearance: Normal appearance.   Cardiovascular:      Rate and Rhythm: Normal rate and regular rhythm.      Pulses: Normal pulses.      Heart sounds: Normal heart sounds.   Pulmonary:      Effort: Pulmonary effort is normal.      Breath sounds: Normal breath sounds.   Abdominal:      General: Abdomen is flat. Bowel sounds are normal.      Palpations: Abdomen is soft.      Comments: No RUQ tenderness   Musculoskeletal:      Right lower leg: No edema.      Left lower leg: No edema.   Neurological:      General: No focal deficit present.      Mental Status: She is alert and oriented to person, place, and time.         Significant Labs:   All pertinent labs within the past 24 hours have been reviewed.  CBC:   Recent Labs   Lab 01/07/22  1137 01/08/22  0848   WBC 5.06 4.02   HGB 13.3 12.2   HCT 40.4 38.0    171     CMP:   Recent Labs   Lab 01/07/22  1137 01/08/22  0848   * 131*   K 4.5 4.4   CL 97 99   CO2 23 24    69*   BUN 20 21   CREATININE 1.2 1.0   CALCIUM 8.8 8.0*   PROT 7.1 6.0   ALBUMIN 3.5 3.0*   BILITOT 1.6* 1.2*   ALKPHOS 122 103   * 532*   * 573*   ANIONGAP 13 8   EGFRNONAA 40.4* 50.4*       Significant Imaging: I have reviewed all pertinent imaging results/findings within the past 24 hours.      Assessment/Plan:      * COVID-19  Patient is identified as High risk for severe complications of COVID 19 based on COVID risk score of 6      Pt. Reported cough. No current hypoxia and CXR without any acute fidings. Will hold off on remdesivir given the patient's already significantly elevated LFTs as this may cloud the picture. Further treatment as below.     Diagnostics:  (leukopenia, hyponatremia, hyperferritinemia, elevated troponin, elevated d-dimer, age, and comorbidities are significant predictors of poor clinical outcome)  CBC, CMP, Ferritin, CRP, LDH, BNP, ECG and Portable CXR     Management: Maintain oxygen saturations 92-96% via and monitor with intermittent pulse oximetry.  and Inhaled bronchodilators as needed for shortness of breath.     Oxygen Device: room air     Micronutrients   Multivitamin PO daily   Vitamin D 1000IU PO daily (if deficient)   Magnesium IV/PO (if deficient)   Ascorbic acid 500mg po BID     Trend inflammatory markers, LDH, D-Dimer, ferritin, and CRP Q48hrs     Advance Care Planning  Current advance care plan has been discussed with patient/family/POA and patient currently wishes Full Code.        Elevated LFTs  Patient with LFT elevation, initialyl bili 1.6 improved to 1.2, however AST and ALT are trending up, RUQ US without any new findings just stable mild intrahepatic biliary ductal dilation. No acute cholecystitis. On admission AST ALT were 295/304 and on 1/8 they were 532/573. No clear etiology, unclear if related to COVID and her acute illness. Patient denies supplements, ingestion, tylenol, will get hepatitis panel and tylenol level, if still rising on 1/9 can consider hepatology consult  - Acute hepatitis panel  - Tylenol level  - If still rising on 1/9 consider hepatology consult      Chronic diastolic heart failure  -2D echo 2019 with EF 55% and DD  -Continue home aldactone and B-blocker      Stage 3 chronic kidney disease  Cr at baseline  -Renally dose meds      Pure hypercholesterolemia  -Continue Lipitor 10mg PO daily      Acquired hypothyroidism  -Continue Synthroid 137mcg PO daily      Essential hypertension  -Continue atenolol and diltiazem. Hold aldactone due to N/V with hypovolemia      Paroxysmal atrial fibrillation  -Pt. In NSR on admit  -Continue amiodarone, diltiazem, and atenolol for rate control  -Continue eliquis for  AC        VTE Risk Mitigation (From admission, onward)         Ordered     apixaban tablet 2.5 mg  2 times daily         01/08/22 0039                Discharge Planning   FRANCESCA:      Code Status: Full Code   Is the patient medically ready for discharge?: No    Reason for patient still in hospital (select all that apply): Patient trending condition, Treatment and Consult recommendations                     Aly Chery MD  Department of Hospital Medicine   [facility] - Telemetry Stepdown ([place]

## 2022-01-08 NOTE — HPI
89 yo F with PMHx AFib on Eliquis, HFpEF,  SSS s/p pacemaker placement (1/2019), HTN, CKD3 and hypothyroidism who presents to the ED complaining of nausea/vomiting with poor appetite x3 days. Pt. States that she started having nausea on Tuesday with some associated chills Over the next few days, she started having vomiting, poor appeitte, and generalized fatigue. She reports that she has been unable to tolerate much PO intake and has developed crampy abdominal pain which concerned her that she may be having pancreatitis (pt. Reports a distant history of biliary pancreatitis prior to her cholecystectomy). Pt. Came to the ED today after having subjective fever and an episode of vomiting. She denies any SOB, but she she does report developing a dry cough yesterday which has been intermittent.

## 2022-01-08 NOTE — SUBJECTIVE & OBJECTIVE
Interval History: Patient feels tired, still fatigued, generally unwell with poor appetite, no abdominal pain    Review of Systems   Constitutional: Positive for fatigue. Negative for fever.   Respiratory: Positive for cough. Negative for shortness of breath.    Cardiovascular: Negative for chest pain and leg swelling.   Gastrointestinal: Negative for abdominal pain.   Genitourinary: Negative for difficulty urinating and dysuria.   Musculoskeletal: Negative for back pain.     Objective:     Vital Signs (Most Recent):  Temp: 97 °F (36.1 °C) (01/08/22 1216)  Pulse: 67 (01/08/22 1216)  Resp: 18 (01/08/22 1216)  BP: (!) 164/72 (01/08/22 1216)  SpO2: 95 % (01/08/22 1216) Vital Signs (24h Range):  Temp:  [97 °F (36.1 °C)-98.7 °F (37.1 °C)] 97 °F (36.1 °C)  Pulse:  [60-67] 67  Resp:  [16-24] 18  SpO2:  [94 %-98 %] 95 %  BP: (123-167)/(64-97) 164/72     Weight: 56.3 kg (124 lb 3.2 oz)  Body mass index is 22 kg/m².  No intake or output data in the 24 hours ending 01/08/22 1507   Physical Exam  Constitutional:       Appearance: Normal appearance.   Cardiovascular:      Rate and Rhythm: Normal rate and regular rhythm.      Pulses: Normal pulses.      Heart sounds: Normal heart sounds.   Pulmonary:      Effort: Pulmonary effort is normal.      Breath sounds: Normal breath sounds.   Abdominal:      General: Abdomen is flat. Bowel sounds are normal.      Palpations: Abdomen is soft.      Comments: No RUQ tenderness   Musculoskeletal:      Right lower leg: No edema.      Left lower leg: No edema.   Neurological:      General: No focal deficit present.      Mental Status: She is alert and oriented to person, place, and time.         Significant Labs:   All pertinent labs within the past 24 hours have been reviewed.  CBC:   Recent Labs   Lab 01/07/22  1137 01/08/22  0848   WBC 5.06 4.02   HGB 13.3 12.2   HCT 40.4 38.0    171     CMP:   Recent Labs   Lab 01/07/22  1137 01/08/22  0848   * 131*   K 4.5 4.4   CL 97 99   CO2  23 24    69*   BUN 20 21   CREATININE 1.2 1.0   CALCIUM 8.8 8.0*   PROT 7.1 6.0   ALBUMIN 3.5 3.0*   BILITOT 1.6* 1.2*   ALKPHOS 122 103   * 532*   * 573*   ANIONGAP 13 8   EGFRNONAA 40.4* 50.4*       Significant Imaging: I have reviewed all pertinent imaging results/findings within the past 24 hours.

## 2022-01-08 NOTE — ASSESSMENT & PLAN NOTE
-Pt. In NSR on admit  -Continue amiodarone, diltiazem, and atenolol for rate control  -Continue eliquis for AC

## 2022-01-08 NOTE — ASSESSMENT & PLAN NOTE
Patient is identified as High risk for severe complications of COVID 19 based on COVID risk score of 6      Pt. Reported cough. No current hypoxia and CXR without any acute fidings. Will hold off on remdesivir given the patient's already significantly elevated LFTs as this may cloud the picture. Further treatment as below.     Diagnostics: (leukopenia, hyponatremia, hyperferritinemia, elevated troponin, elevated d-dimer, age, and comorbidities are significant predictors of poor clinical outcome)  CBC, CMP, Ferritin, CRP, LDH, BNP, ECG and Portable CXR     Management: Maintain oxygen saturations 92-96% via and monitor with intermittent pulse oximetry.  and Inhaled bronchodilators as needed for shortness of breath.     Oxygen Device: room air     Micronutrients   Multivitamin PO daily   Vitamin D 1000IU PO daily (if deficient)   Magnesium IV/PO (if deficient)   Ascorbic acid 500mg po BID     Trend inflammatory markers, LDH, D-Dimer, ferritin, and CRP Q48hrs     Advance Care Planning  Current advance care plan has been discussed with patient/family/POA and patient currently wishes Full Code.

## 2022-01-08 NOTE — ASSESSMENT & PLAN NOTE
Patient with LFT elevation, initialyl bili 1.6 improved to 1.2, however AST and ALT are trending up, RUQ US without any new findings just stable mild intrahepatic biliary ductal dilation. No acute cholecystitis. On admission AST ALT were 295/304 and on 1/8 they were 532/573. No clear etiology, unclear if related to COVID and her acute illness. Patient denies supplements, ingestion, tylenol, will get hepatitis panel and tylenol level, if still rising on 1/9 can consider hepatology consult  - Acute hepatitis panel  - Tylenol level  - If still rising on 1/9 consider hepatology consult

## 2022-01-09 LAB
ALBUMIN SERPL BCP-MCNC: 3.1 G/DL (ref 3.5–5.2)
ALP SERPL-CCNC: 112 U/L (ref 55–135)
ALT SERPL W/O P-5'-P-CCNC: 669 U/L (ref 10–44)
ANION GAP SERPL CALC-SCNC: 10 MMOL/L (ref 8–16)
AST SERPL-CCNC: 562 U/L (ref 10–40)
BASOPHILS # BLD AUTO: 0.01 K/UL (ref 0–0.2)
BASOPHILS NFR BLD: 0.2 % (ref 0–1.9)
BILIRUB SERPL-MCNC: 1.1 MG/DL (ref 0.1–1)
BUN SERPL-MCNC: 25 MG/DL (ref 8–23)
CALCIUM SERPL-MCNC: 8.2 MG/DL (ref 8.7–10.5)
CHLORIDE SERPL-SCNC: 99 MMOL/L (ref 95–110)
CO2 SERPL-SCNC: 24 MMOL/L (ref 23–29)
CREAT SERPL-MCNC: 1.1 MG/DL (ref 0.5–1.4)
DIFFERENTIAL METHOD: ABNORMAL
EOSINOPHIL # BLD AUTO: 0 K/UL (ref 0–0.5)
EOSINOPHIL NFR BLD: 0.5 % (ref 0–8)
ERYTHROCYTE [DISTWIDTH] IN BLOOD BY AUTOMATED COUNT: 12.9 % (ref 11.5–14.5)
EST. GFR  (AFRICAN AMERICAN): 51.8 ML/MIN/1.73 M^2
EST. GFR  (NON AFRICAN AMERICAN): 44.9 ML/MIN/1.73 M^2
GLUCOSE SERPL-MCNC: 60 MG/DL (ref 70–110)
HCT VFR BLD AUTO: 39.4 % (ref 37–48.5)
HGB BLD-MCNC: 12.4 G/DL (ref 12–16)
IMM GRANULOCYTES # BLD AUTO: 0.02 K/UL (ref 0–0.04)
IMM GRANULOCYTES NFR BLD AUTO: 0.5 % (ref 0–0.5)
LYMPHOCYTES # BLD AUTO: 1.2 K/UL (ref 1–4.8)
LYMPHOCYTES NFR BLD: 29.9 % (ref 18–48)
MCH RBC QN AUTO: 30.4 PG (ref 27–31)
MCHC RBC AUTO-ENTMCNC: 31.5 G/DL (ref 32–36)
MCV RBC AUTO: 97 FL (ref 82–98)
MONOCYTES # BLD AUTO: 0.6 K/UL (ref 0.3–1)
MONOCYTES NFR BLD: 14.3 % (ref 4–15)
NEUTROPHILS # BLD AUTO: 2.2 K/UL (ref 1.8–7.7)
NEUTROPHILS NFR BLD: 54.6 % (ref 38–73)
NRBC BLD-RTO: 0 /100 WBC
PLATELET # BLD AUTO: 184 K/UL (ref 150–450)
PMV BLD AUTO: 9.6 FL (ref 9.2–12.9)
POTASSIUM SERPL-SCNC: 5 MMOL/L (ref 3.5–5.1)
PROT SERPL-MCNC: 6.1 G/DL (ref 6–8.4)
RBC # BLD AUTO: 4.08 M/UL (ref 4–5.4)
SODIUM SERPL-SCNC: 133 MMOL/L (ref 136–145)
WBC # BLD AUTO: 4.05 K/UL (ref 3.9–12.7)

## 2022-01-09 PROCEDURE — 99233 PR SUBSEQUENT HOSPITAL CARE,LEVL III: ICD-10-PCS | Mod: CR,,, | Performed by: INTERNAL MEDICINE

## 2022-01-09 PROCEDURE — 36415 COLL VENOUS BLD VENIPUNCTURE: CPT | Performed by: STUDENT IN AN ORGANIZED HEALTH CARE EDUCATION/TRAINING PROGRAM

## 2022-01-09 PROCEDURE — 63600175 PHARM REV CODE 636 W HCPCS: Performed by: HOSPITALIST

## 2022-01-09 PROCEDURE — 85025 COMPLETE CBC W/AUTO DIFF WBC: CPT | Performed by: STUDENT IN AN ORGANIZED HEALTH CARE EDUCATION/TRAINING PROGRAM

## 2022-01-09 PROCEDURE — 97165 OT EVAL LOW COMPLEX 30 MIN: CPT

## 2022-01-09 PROCEDURE — 27000207 HC ISOLATION

## 2022-01-09 PROCEDURE — 25000003 PHARM REV CODE 250: Performed by: HOSPITALIST

## 2022-01-09 PROCEDURE — 25000003 PHARM REV CODE 250: Performed by: INTERNAL MEDICINE

## 2022-01-09 PROCEDURE — 80053 COMPREHEN METABOLIC PANEL: CPT | Performed by: STUDENT IN AN ORGANIZED HEALTH CARE EDUCATION/TRAINING PROGRAM

## 2022-01-09 PROCEDURE — 97161 PT EVAL LOW COMPLEX 20 MIN: CPT

## 2022-01-09 PROCEDURE — 99233 SBSQ HOSP IP/OBS HIGH 50: CPT | Mod: CR,,, | Performed by: INTERNAL MEDICINE

## 2022-01-09 PROCEDURE — 11000001 HC ACUTE MED/SURG PRIVATE ROOM

## 2022-01-09 RX ORDER — PANTOPRAZOLE SODIUM 40 MG/1
40 TABLET, DELAYED RELEASE ORAL DAILY
Status: DISCONTINUED | OUTPATIENT
Start: 2022-01-09 | End: 2022-01-16 | Stop reason: HOSPADM

## 2022-01-09 RX ADMIN — ATENOLOL 25 MG: 25 TABLET ORAL at 08:01

## 2022-01-09 RX ADMIN — BENZONATATE 100 MG: 100 CAPSULE ORAL at 08:01

## 2022-01-09 RX ADMIN — MULTIPLE VITAMINS W/ MINERALS TAB 1 TABLET: TAB at 08:01

## 2022-01-09 RX ADMIN — ATORVASTATIN CALCIUM 10 MG: 10 TABLET, FILM COATED ORAL at 08:01

## 2022-01-09 RX ADMIN — SPIRONOLACTONE 25 MG: 25 TABLET ORAL at 08:01

## 2022-01-09 RX ADMIN — DILTIAZEM HYDROCHLORIDE 180 MG: 180 CAPSULE, COATED, EXTENDED RELEASE ORAL at 08:01

## 2022-01-09 RX ADMIN — PANTOPRAZOLE SODIUM 40 MG: 40 TABLET, DELAYED RELEASE ORAL at 03:01

## 2022-01-09 RX ADMIN — APIXABAN 5 MG: 5 TABLET, FILM COATED ORAL at 08:01

## 2022-01-09 RX ADMIN — APIXABAN 2.5 MG: 2.5 TABLET, FILM COATED ORAL at 08:01

## 2022-01-09 RX ADMIN — LEVOTHYROXINE SODIUM 137 MCG: 0.03 TABLET ORAL at 06:01

## 2022-01-09 RX ADMIN — Medication 500 MG: at 08:01

## 2022-01-09 RX ADMIN — AMIODARONE HYDROCHLORIDE 200 MG: 200 TABLET ORAL at 08:01

## 2022-01-09 RX ADMIN — ASPIRIN 81 MG: 81 TABLET, COATED ORAL at 08:01

## 2022-01-09 RX ADMIN — ONDANSETRON 4 MG: 2 INJECTION INTRAMUSCULAR; INTRAVENOUS at 08:01

## 2022-01-09 NOTE — PLAN OF CARE
Problem: Physical Therapy Goal  Goal: Physical Therapy Goal  Description: Goals to be met by: 22     Patient will increase functional independence with mobility by performin. Supine to sit with Modified [place]  2. Sit to supine with Modified [place]  3. Sit to stand transfer with Modified [place]  4. Gait  x 50 feet with Modified [place] using LRAD, if needed.   5. Stand for 5 minutes with Modified [place] using LRAD, if needed    Outcome: Ongoing, Progressing     Initial evaluation completed. Patient tolerated assessment well. Established POC and goals. Patient would continue to benefit from skilled PT services in order to improve functional mobility independence.     Kae Bobo, PT, DPT  2022

## 2022-01-09 NOTE — TREATMENT PLAN
Hepatology Treatment Plan    Gisselle Ortiz is a 88 y.o. female admitted to hospital 1/7/2022 (Hospital Day: 3) due to COVID-19.     History  Briefly, 88 F w/ PMH biliary pancreatitis s/p ERCP in past, admitted with abdominal pain and cough, found to be COVID-19 positive.  On admission found to have elevated liver chemistries with elevated bilirubin which improved since admission but AST/ALT rising.  Cr normal.  Acute Hepatitis panel ordered.    Objective  Temp:  [97 °F (36.1 °C)-99.1 °F (37.3 °C)] 97.4 °F (36.3 °C) (01/09 0740)  Pulse:  [60-67] 60 (01/09 1031)  BP: (115-164)/(55-72) 145/66 (01/09 0740)  Resp:  [16-20] 16 (01/09 0740)  SpO2:  [94 %-97 %] 94 % (01/09 0740)    Laboratory    Lab Results   Component Value Date    WBC 4.05 01/09/2022    HGB 12.4 01/09/2022    HCT 39.4 01/09/2022    MCV 97 01/09/2022     01/09/2022       Lab Results   Component Value Date     (L) 01/09/2022    K 5.0 01/09/2022    CL 99 01/09/2022    CO2 24 01/09/2022    BUN 25 (H) 01/09/2022    CREATININE 1.1 01/09/2022    CALCIUM 8.2 (L) 01/09/2022       Lab Results   Component Value Date    ALBUMIN 3.1 (L) 01/09/2022     (H) 01/09/2022     (H) 01/09/2022    ALKPHOS 112 01/09/2022    BILITOT 1.1 (H) 01/09/2022       Lab Results   Component Value Date    INR 1.0 01/31/2021    INR 1.0 09/26/2020    INR 1.1 09/24/2020       MELD-Na score: 6 at 2/2/2021  1:45 AM  MELD score: 6 at 2/2/2021  1:45 AM  Calculated from:  Serum Creatinine: 0.8 mg/dL (Using min of 1 mg/dL) at 2/2/2021  1:45 AM  Serum Sodium: 135 mmol/L at 2/2/2021  1:45 AM  Total Bilirubin: 0.4 mg/dL (Using min of 1 mg/dL) at 1/31/2021  7:53 PM  INR(ratio): 1.0 at 1/31/2021  7:53 PM  Age: 87 years    Plan  - continue to trend liver chemistries  - f/u acute hepatitis panel    - please obtain daily CBC, BMP, LFT, INR  - full consult note to follow    Thank you for involving us in the care of Gisselle Ortiz. Please call with any additional  concerns or questions.    Antolin Ortega MD  Gastroenterology Fellow

## 2022-01-09 NOTE — PT/OT/SLP EVAL
Occupational Therapy  Co- Evaluation    Name: Gisselle Ortiz  MRN: [id number]  Admitting Diagnosis:  COVID-19  Recent Surgery: * No surgery found *    Co-treatment performed due to patient's multiple deficits requiring two skilled therapists to appropriately and safely assess patient's strength and endurance while facilitating functional tasks in addition to accommodating for patient's activity tolerance.     Recommendations:     Discharge Recommendations: home health OT  Discharge Equipment Recommendations:  none  Barriers to discharge:  None    Assessment:     Gisselle Ortiz is a 88 y.o. female with a medical diagnosis of COVID-19.  She presents with deficits in self-care tasks as well as functional mobility and endurance. Pt. With nausea and vomiting during evaluation on this date. Pt. Would benefit from continued OT services to maximize safety and I with ADL task performance on d/c. . Performance deficits affecting function: weakness,impaired endurance,impaired self care skills,impaired functional mobilty,gait instability,other (comment),impaired cardiopulmonary response to activity (nausea).      Rehab Prognosis: Good; patient would benefit from acute skilled OT services to address these deficits and reach maximum level of function.       Plan:     Patient to be seen 3 x/week to address the above listed problems via self-care/home management,therapeutic activities,therapeutic exercises  · Plan of Care Expires: 02/08/22  · Plan of Care Reviewed with: patient    Subjective     Chief Complaint: Feeling awful; nauseated once sitting EOB  Patient/Family Comments/goals: to get better     Occupational Profile:  Living Environment: pt. Resides in ss house with no steps ; son or daughter are typically with pt. Pt. Has a WIS with a seat and grab bars.   Previous level of function: Pt. Reports being able to ambulate Mod I in house with RW and performs her own ADL tasks.   Roles and Routines: caretaker of self,  mother   Equipment Used at Home:  bedside commode,walker, rolling,wheelchair,rollator,grab bar,shower chair  Assistance upon Discharge: children    Pain/Comfort:  · Pain Rating 1: 0/10  · Pain Rating Post-Intervention 1: 0/10    Patients cultural, spiritual, Nondenominational conflicts given the current situation: no    Objective:     Communicated with: nurse prior to session.  Patient found supine with telemetry,pulse ox (continuous),peripheral IV upon OT entry to room.    General Precautions: Standard, contact,fall,airborne,droplet   Orthopedic Precautions:N/A   Braces: N/A  Respiratory Status: Room air    Occupational Performance:    Bed Mobility:    · Patient completed Supine to Sit with stand by assistance  · Patient completed Sit to Supine with stand by assistance    Functional Mobility/Transfers:  · Patient completed Sit <> Stand Transfer with stand by assistance  with  no assistive device   · Functional Mobility: Pt. Performed ~ 3 to 4 side steps to HOB with CGA and no AD    Activities of Daily Living:  · Lower Body Dressing: Pt. was  able to slip on shoes with SBA seated EOB    Cognitive/Visual Perceptual:  Cognitive/Psychosocial Skills:     -       Oriented to: Person, Place, Time and Situation   -       Follows Commands/attention:Follows multistep  commands  -       Communication: clear/fluent  -       Memory: No Deficits noted  -       Safety awareness/insight to disability: intact   -       Mood/Affect/Coping skills/emotional control: Appropriate to situation  Visual/Perceptual:      -Intact .    Physical Exam:  Balance:    -       sit: SBA and stand : CGA  Postural examination/scapula alignment:    -       Rounded shoulders  -       Posterior pelvic tilt  Skin integrity: Visible skin intact ; dry with some bruising noted  Dominant hand: -       right  Upper Extremity Range of Motion:     -       Right Upper Extremity: WFL  -       Left Upper Extremity: WFL    AMPAC 6 Click ADL:  AMPAC Total Score:  20    Treatment & Education:  Pt. Educated on role of OT and pOC  Nurse notified of pt. vomiting    Education:    Patient left supine with all lines intact, call button in reach, bed alarm on, nurse notified and cool cloth on pt. forehead    GOALS:   Multidisciplinary Problems     Occupational Therapy Goals        Problem: Occupational Therapy Goal    Goal Priority Disciplines Outcome Interventions   Occupational Therapy Goal     OT, PT/OT     Description: Goals to be met by: 01-19-22     Patient will increase functional independence with ADLs by performing:    UE Dressing with Set-up Assistance.  LE Dressing with Stand-by Assistance.  Grooming while standing at sink with Stand-by Assistance.  Toileting from bedside commode with Stand-by Assistance for hygiene and clothing management.   Supine to sit with [place].  Stand pivot transfers with Stand-by Assistance.  Toilet transfer to bedside commode with Stand-by Assistance.  Pt. To be I with HEP to BUE to improve level of endurance  Pt. To be able to state 3 energy conservation techniques to incorporate with ADL/IADl task performance..                     History:     Past Medical History:   Diagnosis Date    A-fib     Anemia of chronic disease 2/2/2021    Arthritis     Chronic diastolic heart failure 2/2/2021    Gallstone pancreatitis     Hypertension     Pancreatic abscess 9/26/2020    Thyroid disease        Past Surgical History:   Procedure Laterality Date    CATARACT EXTRACTION      CATARACT EXTRACTION W/  INTRAOCULAR LENS IMPLANT Bilateral 2004    DR IN [place]    ENDOSCOPIC ULTRASOUND OF UPPER GASTROINTESTINAL TRACT N/A 9/14/2020    Procedure: ULTRASOUND, UPPER GI TRACT, ENDOSCOPIC;  Surgeon: Esha Howard MD;  Location: [address];  Service: Endoscopy;  Laterality: N/A;    ENDOSCOPIC ULTRASOUND OF UPPER GASTROINTESTINAL TRACT  11/25/2020    Procedure: ULTRASOUND, UPPER GI TRACT, ENDOSCOPIC;  Surgeon: Esha Howard MD;   Location: [facility] ENDO (2ND FLR);  Service: Endoscopy;;    ERCP N/A 9/14/2020    Procedure: ERCP (ENDOSCOPIC RETROGRADE CHOLANGIOPANCREATOGRAPHY);  Surgeon: Esha Howard MD;  Location: [facility] ENDO (2ND FLR);  Service: Endoscopy;  Laterality: N/A;    ERCP N/A 9/25/2020    Procedure: ERCP (ENDOSCOPIC RETROGRADE CHOLANGIOPANCREATOGRAPHY);  Surgeon: Esha Howard MD;  Location: [facility] ENDO (2ND FLR);  Service: Endoscopy;  Laterality: N/A;    ERCP N/A 11/25/2020    Procedure: ERCP (ENDOSCOPIC RETROGRADE CHOLANGIOPANCREATOGRAPHY);  Surgeon: Esha Howard MD;  Location: [facility] ENDO (2ND FLR);  Service: Endoscopy;  Laterality: N/A;  Covid-19 test 11/22/20 at [facility] -   2 day hold Eliquis, Dr Favian Colmenares - pg  PM prep    EYE SURGERY      HIP REPLACEMENT ARTHROPLASTY Right 2016    HYSTERECTOMY      REVISION OF SKIN POCKET FOR PACEMAKER N/A 1/21/2019    Procedure: REVISION-POCKET-PACEMAKER;  Surgeon: Asher Watts MD;  Location: [facility] EP LAB;  Service: Cardiology;  Laterality: N/A;  MODERATE SEDATION, sedation issues in the past    THYROIDECTOMY      TREATMENT OF CARDIAC ARRHYTHMIA N/A 3/18/2019    Procedure: CARDIOVERSION OR DEFIBRILLATION;  Surgeon: Asher Watts MD;  Location: [facility] EP LAB;  Service: Cardiology;  Laterality: N/A;  AF, JILL-cx if SR, DCCV, MAC, FAS, 3PREP    TREATMENT OF CARDIAC ARRHYTHMIA N/A 5/14/2019    Procedure: Cardioversion or Defibrillation;  Surgeon: Derick Vizcarra MD;  Location: [facility] EP LAB;  Service: Cardiology;  Laterality: N/A;  AF, JILL, DCCV, MAC, DM, 3PREP       Time Tracking:     OT Date of Treatment: 01/09/22  OT Start Time: 0759  OT Stop Time: 0814  OT Total Time (min): 15 min    Billable Minutes:Evaluation 15    1/9/2022

## 2022-01-09 NOTE — PLAN OF CARE
Pt progressing towards goals.Telemetry maintained,a-paced at 60/min.denies nausea,vomiting or abd pain.r/a sats 95-97% on r/a.continue COVID Isolation.safety precautions maintained.pt free of falls or injuries.bed alarm in use for pt safety.pt remains weak and must be assisted to br.continue plan of care.

## 2022-01-09 NOTE — PT/OT/SLP EVAL
Physical Therapy Evaluation  Co-Eval with OT    Patient Name:  Gisselle Ortiz   MRN:  [id number]    Co-treatment performed due to patient's multiple deficits requiring two skilled therapists to appropriately and safely assess patient's strength and endurance while facilitating functional tasks in addition to accommodating for patient's activity tolerance.   Recommendations:     Discharge Recommendations:  home health PT   Discharge Equipment Recommendations: none   Barriers to discharge: None    Assessment:     Gisselle Ortiz is a 88 y.o. female admitted with a medical diagnosis of COVID-19.  She presents with the following impairments/functional limitations:  weakness,impaired endurance,impaired self care skills,gait instability,impaired functional mobilty,impaired balance . Patient tolerated session fairly well. Limited this date due to nausea and vomiting - RN notified. Patient reports feeling functionally near her baseline.  Patient will benefit from PT services to address the mentioned deficits in order to promote an improve functional mobility status. Upon d/c, rec of HHPT for Gisselle Ortiz in order to progress towards an improved level of functional mobility independence.     Rehab Prognosis: Good; patient would benefit from acute skilled PT services to address these deficits and reach maximum level of function.    Recent Surgery: * No surgery found *      Plan:     During this hospitalization, patient to be seen 3 x/week to address the identified rehab impairments via gait training,therapeutic activities,therapeutic exercises,neuromuscular re-education and progress toward the following goals:    · Plan of Care Expires:  02/08/22    History:     Past Medical History:   Diagnosis Date    A-fib     Anemia of chronic disease 2/2/2021    Arthritis     Chronic diastolic heart failure 2/2/2021    Gallstone pancreatitis     Hypertension     Pancreatic abscess 9/26/2020    Thyroid disease         Past Surgical History:   Procedure Laterality Date    CATARACT EXTRACTION      CATARACT EXTRACTION W/  INTRAOCULAR LENS IMPLANT Bilateral 2004    DR IN [place]    ENDOSCOPIC ULTRASOUND OF UPPER GASTROINTESTINAL TRACT N/A 9/14/2020    Procedure: ULTRASOUND, UPPER GI TRACT, ENDOSCOPIC;  Surgeon: Esha Howard MD;  Location: [facility] ENDO (2ND FLR);  Service: Endoscopy;  Laterality: N/A;    ENDOSCOPIC ULTRASOUND OF UPPER GASTROINTESTINAL TRACT  11/25/2020    Procedure: ULTRASOUND, UPPER GI TRACT, ENDOSCOPIC;  Surgeon: Esha Howard MD;  Location: [facility] ENDO (2ND FLR);  Service: Endoscopy;;    ERCP N/A 9/14/2020    Procedure: ERCP (ENDOSCOPIC RETROGRADE CHOLANGIOPANCREATOGRAPHY);  Surgeon: Esha Howard MD;  Location: [facility] ENDO (2ND FLR);  Service: Endoscopy;  Laterality: N/A;    ERCP N/A 9/25/2020    Procedure: ERCP (ENDOSCOPIC RETROGRADE CHOLANGIOPANCREATOGRAPHY);  Surgeon: Esha Howard MD;  Location: [facility] ENDO (2ND FLR);  Service: Endoscopy;  Laterality: N/A;    ERCP N/A 11/25/2020    Procedure: ERCP (ENDOSCOPIC RETROGRADE CHOLANGIOPANCREATOGRAPHY);  Surgeon: Esha Howard MD;  [facility] F[facility]);  Service: Endoscopy;  Laterality: N/A;  Covid-19 test 11/22/20 at [facility] -   2 day hold Dr Favian Pisano - pg  PM prep    EYE SURGERY      HIP REPLACEMENT ARTHROPLASTY Right 2016    HYSTERECTOMY      REVISION OF SKIN POCKET FOR PACEMAKER N/A 1/21/2019    Procedure: REVISION-POCKET-PACEMAKER;  Surgeon: Asher Watts MD;  Location: [facility] EP LAB;  Service: Cardiology;  Laterality: N/A;  MODERATE SEDATION, sedation issues in the past    THYROIDECTOMY      TREATMENT OF CARDIAC ARRHYTHMIA N/A 3/18/2019    Procedure: CARDIOVERSION OR DEFIBRILLATION;  Surgeon: Asher Watts MD;  Location: [facility] EP LAB;  Service: Cardiology;  Laterality: N/A;  AF, JILL-cx if SR, DCCV, MAC, FAS, 3PREP    TREATMENT OF CARDIAC ARRHYTHMIA N/A 5/14/2019    Procedure:  "Cardioversion or Defibrillation;  Surgeon: Derick Vizcarra MD;  Location: [facility] EP LAB;  Service: Cardiology;  Laterality: N/A;  AF, JILL, DCCV, MAC, DM, 3PREP       Subjective     Chief Complaint: nausea   Patient/Family Comments/goals: "I just feel terrible."  Pain/Comfort:  · Pain Rating 1: 0/10  · Pain Rating Post-Intervention 1: 0/10    Patients cultural, spiritual, Voodoo conflicts given the current situation: no    Living Environment:  Patient's living environment is as follows:  Home type Home    1 or 2 stories  SSH   Number of MANUEL/ rails 0 MANUEL   AD used?/Owned?  BSC, w/c, RW, rollator, grab bars, shower chair    Bathroom set-up  WIS   Working? no   Driving? no   Individuals living with patient:  Alone - but son and dtr always present    Hobbies/Roles: None stated    Prior to admission, patients level of function was (I)-Mod(I) for ADLs and mobility. Patient reports 0 falls. Equipment used at home: bedside commode,wheelchair,walker, rolling,rollator,grab bar,shower chair.  DME owned (not currently used): none.  Upon discharge, patient will have assistance from son and dtr - (dtr lives 2 houses down).    Objective:     Communicated with RN prior to session.  Patient found HOB elevated with telemetry,peripheral IV,pulse ox (continuous)  upon PT entry to room. See below for detailed functional assessment. Patient agreeable to participate in initial evaluation.    General Precautions: Standard, fall,contact,airborne,droplet   Orthopedic Precautions:N/A   Braces: N/A     Exams:  · Cognitive Exam:  Patient is oriented to Person, Place, Time and Situation  · Patient is follows 100% of one -step commands   · Fine Motor Coordination:  Test:  Intact Impaired  Comments    Rapid ankle DF/PF  X     · Postural Exam:  Patient presented with the following abnormalities:    · -       Rounded shoulders  · -       Forward head  · Sensation:    LEFT LE: -       Intact  light/touch LE RIGHT LE:-       Intact  light/touch " LE      ROM and Strength   Right Lower Extremity  Left Lower Extremity    Hip Flexion (Iliopsoas)  WFL WFL   Knee Extension (Quadriceps) WFL WFL   Knee Flexion (Hamstrings) WFL WFL   Ankle DF (Ant. Tib) WFL WFL   Ankle PF (Gastrocnemius)  WFL WFL     Functional Mobility:  · Bed Mobility:     · Rolling Right: stand by assistance  · Scooting: stand by assistance  · Supine to Sit: stand by assistance  · Sit to Supine: stand by assistance  · Transfers:     · Sit to Stand:  contact guard assistance with rolling walker  · Gait: 6 lateral side steps with CGA and RW  · decreased tae, FFP, no LOB or otherwise overt gait deviations   · Limited this date due to patients nausea and vomiting        Balance   Static Sitting (I)   Dynamic Sitting (I)   Static Standing SBA-CGA   Dynamic Standing       CGA     Therapeutic Activities and Education:  -Patient educated on the role and goal of PT services during acute care LOS. Question and concerns acknowledged and answered as appropriate.   -Will continue to educated as needed.      - Educated on the importance of OOB mobility within safe range in order to decreased adverse effects of prolonged bedrest.     -White board updated in patients room to reflect level of assistance needed with nursing.       - Patient is CGA + RW (left in room) for OOB mobility with RN.     AM-PAC 6 CLICK MOBILITY  Total Score:20     Patient left HOB elevated with all lines intact, call button in reach, bed alarm on and RN notified.  White board updated in patient room to reflect level of function with nursing.     GOALS:   Multidisciplinary Problems     Physical Therapy Goals        Problem: Physical Therapy Goal    Goal Priority Disciplines Outcome Goal Variances Interventions   Physical Therapy Goal     PT, PT/OT Ongoing, Progressing     Description: Goals to be met by: 22     Patient will increase functional independence with mobility by performin. Supine to sit with Modified  [place]  2. Sit to supine with Modified [place]  3. Sit to stand transfer with Modified [place]  4. Gait  x 50 feet with Modified [place] using LRAD, if needed.   5. Stand for 5 minutes with Modified [place] using LRAD, if needed                     Time Tracking:     PT Received On: 01/09/22  PT Start Time: 0759     PT Stop Time: 0814  PT Total Time (min): 15 min     Billable Minutes: Evaluation 10      Kae Bobo, PT  01/09/2022

## 2022-01-10 PROBLEM — K59.00 CONSTIPATION: Status: ACTIVE | Noted: 2022-01-10

## 2022-01-10 LAB
ALBUMIN SERPL BCP-MCNC: 3.1 G/DL (ref 3.5–5.2)
ALP SERPL-CCNC: 126 U/L (ref 55–135)
ALT SERPL W/O P-5'-P-CCNC: 710 U/L (ref 10–44)
ANION GAP SERPL CALC-SCNC: 10 MMOL/L (ref 8–16)
AST SERPL-CCNC: 541 U/L (ref 10–40)
BASOPHILS # BLD AUTO: 0.01 K/UL (ref 0–0.2)
BASOPHILS NFR BLD: 0.3 % (ref 0–1.9)
BILIRUB SERPL-MCNC: 1.1 MG/DL (ref 0.1–1)
BUN SERPL-MCNC: 28 MG/DL (ref 8–23)
CALCIUM SERPL-MCNC: 8.1 MG/DL (ref 8.7–10.5)
CHLORIDE SERPL-SCNC: 101 MMOL/L (ref 95–110)
CO2 SERPL-SCNC: 24 MMOL/L (ref 23–29)
CREAT SERPL-MCNC: 1.1 MG/DL (ref 0.5–1.4)
DIFFERENTIAL METHOD: ABNORMAL
EOSINOPHIL # BLD AUTO: 0 K/UL (ref 0–0.5)
EOSINOPHIL NFR BLD: 0.8 % (ref 0–8)
ERYTHROCYTE [DISTWIDTH] IN BLOOD BY AUTOMATED COUNT: 12.9 % (ref 11.5–14.5)
EST. GFR  (AFRICAN AMERICAN): 51.8 ML/MIN/1.73 M^2
EST. GFR  (NON AFRICAN AMERICAN): 44.9 ML/MIN/1.73 M^2
GLUCOSE SERPL-MCNC: 84 MG/DL (ref 70–110)
HAV IGM SERPL QL IA: NEGATIVE
HBV CORE IGM SERPL QL IA: NEGATIVE
HBV SURFACE AG SERPL QL IA: NEGATIVE
HCT VFR BLD AUTO: 38.9 % (ref 37–48.5)
HCV AB SERPL QL IA: NEGATIVE
HGB BLD-MCNC: 12.5 G/DL (ref 12–16)
IMM GRANULOCYTES # BLD AUTO: 0.01 K/UL (ref 0–0.04)
IMM GRANULOCYTES NFR BLD AUTO: 0.3 % (ref 0–0.5)
INR PPP: 1 (ref 0.8–1.2)
LYMPHOCYTES # BLD AUTO: 1.3 K/UL (ref 1–4.8)
LYMPHOCYTES NFR BLD: 33.9 % (ref 18–48)
MCH RBC QN AUTO: 30.5 PG (ref 27–31)
MCHC RBC AUTO-ENTMCNC: 32.1 G/DL (ref 32–36)
MCV RBC AUTO: 95 FL (ref 82–98)
MONOCYTES # BLD AUTO: 0.6 K/UL (ref 0.3–1)
MONOCYTES NFR BLD: 15.5 % (ref 4–15)
NEUTROPHILS # BLD AUTO: 1.9 K/UL (ref 1.8–7.7)
NEUTROPHILS NFR BLD: 49.2 % (ref 38–73)
NRBC BLD-RTO: 0 /100 WBC
PLATELET # BLD AUTO: 181 K/UL (ref 150–450)
PMV BLD AUTO: 9.4 FL (ref 9.2–12.9)
POTASSIUM SERPL-SCNC: 4.6 MMOL/L (ref 3.5–5.1)
PROT SERPL-MCNC: 5.7 G/DL (ref 6–8.4)
PROTHROMBIN TIME: 11 SEC (ref 9–12.5)
RBC # BLD AUTO: 4.1 M/UL (ref 4–5.4)
SODIUM SERPL-SCNC: 135 MMOL/L (ref 136–145)
WBC # BLD AUTO: 3.87 K/UL (ref 3.9–12.7)

## 2022-01-10 PROCEDURE — 86235 NUCLEAR ANTIGEN ANTIBODY: CPT | Performed by: INTERNAL MEDICINE

## 2022-01-10 PROCEDURE — 85610 PROTHROMBIN TIME: CPT | Performed by: INTERNAL MEDICINE

## 2022-01-10 PROCEDURE — 99223 PR INITIAL HOSPITAL CARE,LEVL III: ICD-10-PCS | Mod: CR,GC,, | Performed by: INTERNAL MEDICINE

## 2022-01-10 PROCEDURE — 86256 FLUORESCENT ANTIBODY TITER: CPT | Mod: 91 | Performed by: INTERNAL MEDICINE

## 2022-01-10 PROCEDURE — 27000207 HC ISOLATION

## 2022-01-10 PROCEDURE — 36415 COLL VENOUS BLD VENIPUNCTURE: CPT | Performed by: STUDENT IN AN ORGANIZED HEALTH CARE EDUCATION/TRAINING PROGRAM

## 2022-01-10 PROCEDURE — 85025 COMPLETE CBC W/AUTO DIFF WBC: CPT | Performed by: STUDENT IN AN ORGANIZED HEALTH CARE EDUCATION/TRAINING PROGRAM

## 2022-01-10 PROCEDURE — 11000001 HC ACUTE MED/SURG PRIVATE ROOM

## 2022-01-10 PROCEDURE — 99233 SBSQ HOSP IP/OBS HIGH 50: CPT | Mod: CR,,, | Performed by: INTERNAL MEDICINE

## 2022-01-10 PROCEDURE — 36415 COLL VENOUS BLD VENIPUNCTURE: CPT | Performed by: INTERNAL MEDICINE

## 2022-01-10 PROCEDURE — 25000003 PHARM REV CODE 250: Performed by: HOSPITALIST

## 2022-01-10 PROCEDURE — 99233 PR SUBSEQUENT HOSPITAL CARE,LEVL III: ICD-10-PCS | Mod: CR,,, | Performed by: INTERNAL MEDICINE

## 2022-01-10 PROCEDURE — 80053 COMPREHEN METABOLIC PANEL: CPT | Performed by: STUDENT IN AN ORGANIZED HEALTH CARE EDUCATION/TRAINING PROGRAM

## 2022-01-10 PROCEDURE — 99223 1ST HOSP IP/OBS HIGH 75: CPT | Mod: CR,GC,, | Performed by: INTERNAL MEDICINE

## 2022-01-10 PROCEDURE — 86038 ANTINUCLEAR ANTIBODIES: CPT | Performed by: INTERNAL MEDICINE

## 2022-01-10 PROCEDURE — 25000003 PHARM REV CODE 250: Performed by: INTERNAL MEDICINE

## 2022-01-10 PROCEDURE — 81001 URINALYSIS AUTO W/SCOPE: CPT | Performed by: HOSPITALIST

## 2022-01-10 RX ORDER — METOCLOPRAMIDE 10 MG/1
10 TABLET ORAL
Status: DISPENSED | OUTPATIENT
Start: 2022-01-10 | End: 2022-01-12

## 2022-01-10 RX ORDER — CHOLECALCIFEROL (VITAMIN D3) 25 MCG
2000 TABLET ORAL DAILY
Status: DISCONTINUED | OUTPATIENT
Start: 2022-01-11 | End: 2022-01-16 | Stop reason: HOSPADM

## 2022-01-10 RX ORDER — AMOXICILLIN 250 MG
2 CAPSULE ORAL 2 TIMES DAILY
Status: DISCONTINUED | OUTPATIENT
Start: 2022-01-10 | End: 2022-01-12

## 2022-01-10 RX ORDER — BISACODYL 10 MG
10 SUPPOSITORY, RECTAL RECTAL ONCE
Status: DISCONTINUED | OUTPATIENT
Start: 2022-01-10 | End: 2022-01-12

## 2022-01-10 RX ADMIN — SENNOSIDES AND DOCUSATE SODIUM 2 TABLET: 50; 8.6 TABLET ORAL at 01:01

## 2022-01-10 RX ADMIN — ATENOLOL 25 MG: 25 TABLET ORAL at 09:01

## 2022-01-10 RX ADMIN — PANTOPRAZOLE SODIUM 40 MG: 40 TABLET, DELAYED RELEASE ORAL at 08:01

## 2022-01-10 RX ADMIN — MULTIPLE VITAMINS W/ MINERALS TAB 1 TABLET: TAB at 08:01

## 2022-01-10 RX ADMIN — Medication 500 MG: at 08:01

## 2022-01-10 RX ADMIN — METOCLOPRAMIDE 10 MG: 10 TABLET ORAL at 05:01

## 2022-01-10 RX ADMIN — SPIRONOLACTONE 25 MG: 25 TABLET ORAL at 08:01

## 2022-01-10 RX ADMIN — AMIODARONE HYDROCHLORIDE 200 MG: 200 TABLET ORAL at 08:01

## 2022-01-10 RX ADMIN — ATENOLOL 25 MG: 25 TABLET ORAL at 08:01

## 2022-01-10 RX ADMIN — DILTIAZEM HYDROCHLORIDE 180 MG: 180 CAPSULE, COATED, EXTENDED RELEASE ORAL at 08:01

## 2022-01-10 RX ADMIN — SENNOSIDES AND DOCUSATE SODIUM 2 TABLET: 50; 8.6 TABLET ORAL at 09:01

## 2022-01-10 RX ADMIN — SPIRONOLACTONE 25 MG: 25 TABLET ORAL at 09:01

## 2022-01-10 RX ADMIN — METOCLOPRAMIDE 10 MG: 10 TABLET ORAL at 01:01

## 2022-01-10 RX ADMIN — APIXABAN 5 MG: 5 TABLET, FILM COATED ORAL at 09:01

## 2022-01-10 RX ADMIN — LEVOTHYROXINE SODIUM 137 MCG: 0.03 TABLET ORAL at 06:01

## 2022-01-10 RX ADMIN — APIXABAN 5 MG: 5 TABLET, FILM COATED ORAL at 08:01

## 2022-01-10 RX ADMIN — Medication 500 MG: at 09:01

## 2022-01-10 RX ADMIN — ATORVASTATIN CALCIUM 10 MG: 10 TABLET, FILM COATED ORAL at 08:01

## 2022-01-10 RX ADMIN — ASPIRIN 81 MG: 81 TABLET, COATED ORAL at 08:01

## 2022-01-10 RX ADMIN — Medication 6 MG: at 09:01

## 2022-01-10 NOTE — CONSULTS
[facility] ([place] [address])  Hepatology  Consult Note    Patient Name: Gisselle Ortiz  MRN: [id number]  Admission Date: 1/7/2022  Hospital Length of Stay: 2 days  Attending Provider: Elsi Lucio MD   Primary Care Physician: Kai Montez MD  Principal Problem:COVID-19    Inpatient consult to Hepatology  Consult performed by: Antolin Ortega MD  Consult ordered by: Elsi Lucio MD        Subjective:     Transplant status: Pre-transplant    HPI:  88 F w/ PMH biliary pancreatitis s/p ERCP in past, admitted with abdominal pain and cough, found to be COVID-19 positive.  Patient reported nausea and vomiting, poor appetite.  She presented to the ED for further evaluation where she was found to be HDS and afebrile.  She was incidentally found to be COVID positive and admitted for further management.  On lab evaluation, noted to have Cr at baseline, Bili 1.6, , .  Bili has subsequently downtrended to 1.1, , .  RUQ showing biliary sludge and mild intrahepatic biliary dilation.      Review of Systems   Constitutional: Positive for fatigue. Negative for fever.   HENT: Negative.    Eyes: Negative.    Respiratory: Negative.    Cardiovascular: Negative.    Gastrointestinal: Positive for nausea and vomiting.   Endocrine: Negative.    Genitourinary: Negative.    Musculoskeletal: Negative.    Skin: Negative.    Allergic/Immunologic: Negative.    Neurological: Negative.    Hematological: Negative.    Psychiatric/Behavioral: Negative.        Past Medical History:   Diagnosis Date    A-fib     Anemia of chronic disease 2/2/2021    Arthritis     Chronic diastolic heart failure 2/2/2021    Gallstone pancreatitis     Hypertension     Pancreatic abscess 9/26/2020    Thyroid disease        Past Surgical History:   Procedure Laterality Date    CATARACT EXTRACTION      CATARACT EXTRACTION W/  INTRAOCULAR LENS IMPLANT Bilateral 2004     IN [place]    ENDOSCOPIC  ULTRASOUND OF UPPER GASTROINTESTINAL TRACT N/A 9/14/2020    Procedure: ULTRASOUND, UPPER GI TRACT, ENDOSCOPIC;  Surgeon: Esha Howard MD;  Location: [facility] ENDO (2ND FLR);  Service: Endoscopy;  Laterality: N/A;    ENDOSCOPIC ULTRASOUND OF UPPER GASTROINTESTINAL TRACT  11/25/2020    Procedure: ULTRASOUND, UPPER GI TRACT, ENDOSCOPIC;  Surgeon: Esha Howard MD;  Location: [facility] ENDO (2ND FLR);  Service: Endoscopy;;    ERCP N/A 9/14/2020    Procedure: ERCP (ENDOSCOPIC RETROGRADE CHOLANGIOPANCREATOGRAPHY);  Surgeon: Esha Howard MD;  Location: [facility] ENDO (2ND FLR);  Service: Endoscopy;  Laterality: N/A;    ERCP N/A 9/25/2020    Procedure: ERCP (ENDOSCOPIC RETROGRADE CHOLANGIOPANCREATOGRAPHY);  Surgeon: Esha Howard MD;  Location: [facility] ENDO (2ND FLR);  Service: Endoscopy;  Laterality: N/A;    ERCP N/A 11/25/2020    Procedure: ERCP (ENDOSCOPIC RETROGRADE CHOLANGIOPANCREATOGRAPHY);  Surgeon: Esha Howard MD;  Location: [facility] ENDO (2ND FLR);  Service: Endoscopy;  Laterality: N/A;  Covid-19 test 11/22/20 at [place] - pg  2 day hold Dr Favian Pisano - pg  PM prep    EYE SURGERY      HIP REPLACEMENT ARTHROPLASTY Right 2016    HYSTERECTOMY      REVISION OF SKIN POCKET FOR PACEMAKER N/A 1/21/2019    Procedure: REVISION-POCKET-PACEMAKER;  Surgeon: Asher Watts MD;  Location: [facility] EP LAB;  Service: Cardiology;  Laterality: N/A;  MODERATE SEDATION, sedation issues in the past    THYROIDECTOMY      TREATMENT OF CARDIAC ARRHYTHMIA N/A 3/18/2019    Procedure: CARDIOVERSION OR DEFIBRILLATION;  Surgeon: Asher Watts MD;  Location: [facility] EP LAB;  Service: Cardiology;  Laterality: N/A;  AF, JILL-cx if SR, DCCV, MAC, FAS, 3PREP    TREATMENT OF CARDIAC ARRHYTHMIA N/A 5/14/2019    Procedure: Cardioversion or Defibrillation;  Surgeon: Derick Vizcarra MD;  Location: [facility] EP LAB;  Service: Cardiology;  Laterality: N/A;  AF, JILL, DCCV, MAC, DM, 3PREP       Family history of liver  disease: No    Review of patient's allergies indicates:   Allergen Reactions    Ciprofloxacin Nausea And Vomiting       Tobacco Use    Smoking status: Former Smoker     Start date: 5/19/1964    Smokeless tobacco: Never Used   Substance and Sexual Activity    Alcohol use: No    Drug use: No    Sexual activity: Not Currently     Partners: Male       Medications Prior to Admission   Medication Sig Dispense Refill Last Dose    acetaminophen (TYLENOL) 500 MG tablet Take 2 tablets (1,000 mg total) by mouth every 8 (eight) hours as needed for Pain.  0     amiodarone (PACERONE) 200 MG Tab Take 1 tablet (200 mg total) by mouth once daily. 90 tablet 3     aspirin (ECOTRIN) 81 MG EC tablet Take 1 tablet (81 mg total) by mouth once daily.  0     atenoloL (TENORMIN) 25 MG tablet TAKE 2 TABLETS EVERY MORNING AND 1 TABLET EVERY EVENING 270 tablet 3     atorvastatin (LIPITOR) 10 MG tablet Take 1 tablet (10 mg total) by mouth once daily. 90 tablet 3     diltiaZEM (TIAZAC) 180 MG Cs24 Take 1 capsule (180 mg total) by mouth once daily. 90 capsule 3     ELIQUIS 2.5 mg Tab TAKE 1 TABLET TWICE A  tablet 3     eszopiclone (LUNESTA) 2 MG Tab Take 1 tablet (2 mg total) by mouth nightly as needed (sleep). 90 tablet 1     olopatadine (PATANOL) 0.1 % ophthalmic solution Place 1 drop into both eyes 2 (two) times daily. 5 mL 3     spironolactone (ALDACTONE) 25 MG tablet Take 1 tablet (25 mg total) by mouth 2 (two) times a day. 180 tablet 3     SYNTHROID 137 mcg Tab tablet TAKE 1 TABLET DAILY 90 tablet 3     teriparatide (FORTEO) 20 mcg/dose (600mcg/2.4mL) PnIj Inject 0.08 mLs (20 mcg total) into the skin once daily. 7.2 mL 3     vitamin D (VITAMIN D3) 1000 units Tab Take 1 tablet (1,000 Units total) by mouth once daily.          Objective:     Vital Signs (Most Recent):  Temp: 97.2 °F (36.2 °C) (01/10/22 1112)  Pulse: 60 (01/10/22 1112)  Resp: 18 (01/10/22 1112)  BP: (!) 148/68 (01/10/22 1112)  SpO2: (!) 93 % (01/10/22  1112) Vital Signs (24h Range):  Temp:  [97.2 °F (36.2 °C)-98.6 °F (37 °C)] 97.2 °F (36.2 °C)  Pulse:  [59-78] 60  Resp:  [18-20] 18  SpO2:  [93 %-97 %] 93 %  BP: (109-151)/(57-72) 148/68     Weight: 55.5 kg (122 lb 5.7 oz) (01/09/22 0614)  Body mass index is 21.67 kg/m².    Physical Exam    Gen: NAD, lying comfortably  HENT: NCAT, oropharynx clear  Eyes: anicteric sclerae, EOMI grossly  Neck: supple, no visible masses/goiter  Cardiac: RRR, no M/R/G, S1/S2 present  Lungs: CTAB, no crackles, no wheezes  Abd: soft, NT/ND, normoactive BS, no rebound  Ext: no LE edema, warm, well perfused  Skin: skin intact on exposed body parts, no visible rashes, lesions  Neuro: A&Ox4, neuro exam grossly intact, moves all extremities  Psych: appropriate mood, affect      MELD-Na score: 8 at 1/10/2022 10:18 AM  MELD score: 8 at 1/10/2022 10:18 AM  Calculated from:  Serum Creatinine: 1.1 mg/dL at 1/10/2022  2:37 AM  Serum Sodium: 135 mmol/L at 1/10/2022  2:37 AM  Total Bilirubin: 1.1 mg/dL at 1/10/2022  2:37 AM  INR(ratio): 1.0 at 1/10/2022 10:18 AM  Age: 88 years    Significant Labs:  CBC:   Recent Labs   Lab 01/10/22  0237   WBC 3.87*   RBC 4.10   HGB 12.5   HCT 38.9        CMP:   Recent Labs   Lab 01/10/22  0237   GLU 84   CALCIUM 8.1*   ALBUMIN 3.1*   PROT 5.7*   *   K 4.6   CO2 24      BUN 28*   CREATININE 1.1   ALKPHOS 126   *   *   BILITOT 1.1*     Coagulation:   Recent Labs   Lab 01/10/22  1018   INR 1.0       Significant Imaging:  Labs: Reviewed  X-Ray: Reviewed  US: Reviewed    Assessment/Plan:     Elevated liver enzymes  Patient with history of biliary pancreatitis s/p ERCP in 2020 presenting with elevated liver chemistries.  Patient incidentally found to have COVID positive which could be contributing to elevated liver chemistries.  Otherwise, would recommend f/u acute hepatitis panel and add on autoimmune serologies.  Pending above studies and trend in liver chemistries may need to pursue  biopsy.    Recommendations:  - f/u hepatitis panel  - send POLO, AMA, ASMA  - trend liver chemistry        Thank you for your consult. I will follow-up with patient. Please contact us if you have any additional questions.    Antolin Ortega MD  Hepatology  [facility] ([place])

## 2022-01-10 NOTE — PROGRESS NOTES
[facility] Medicine  Progress note    Team: [facility] HOSP MED Z Elsi Lucio MD  Admit Date: 1/7/2022  FRANCESCA   Code status: Full Code    Principal Problem:  COVID-19    Interval hx:  No new complaints    ROS   Respiratory: neg for cough neg for shortness of breath  Cardiovascular: neg for chest pain neg for palpitations  Gastrointestinal: neg for nausea neg for vomiting, neg for abdominal pain neg for diarrhea neg for constipation   Behavioral/Psych: neg for depression neg for anxiety    PEx  Temp:  [97.4 °F (36.3 °C)-98.6 °F (37 °C)]   Pulse:  [60-62]   Resp:  [16-20]   BP: (114-161)/(57-70)   SpO2:  [94 %-96 %]     Intake/Output Summary (Last 24 hours) at 1/10/2022 0321  Last data filed at 1/9/2022 2000  Gross per 24 hour   Intake 240 ml   Output --   Net 240 ml       General Appearance: no acute distress   Heart: regular rate and rhythm, no heave  Respiratory: Normal respiratory effort, symmetric excursion, bilateral vesicular breath sounds   Abdomen: Soft, non-tender; bowel sounds active  Skin: intact, no rash, no ulcers  Neurologic:  No focal numbness or weakness  Mental status: Alert, oriented x 4, affect appropriate     Recent Labs   Lab 01/07/22 1137 01/08/22  0848 01/09/22  0418   WBC 5.06 4.02 4.05   HGB 13.3 12.2 12.4   HCT 40.4 38.0 39.4    171 184     Recent Labs   Lab 01/07/22  1137 01/08/22  0848 01/09/22  0418   * 131* 133*   K 4.5 4.4 5.0   CL 97 99 99   CO2 23 24 24   BUN 20 21 25*   CREATININE 1.2 1.0 1.1    69* 60*   CALCIUM 8.8 8.0* 8.2*   LIPASE 19  --   --      Recent Labs   Lab 01/07/22  1137 01/08/22  0848 01/09/22  0418   ALKPHOS 122 103 112   * 573* 669*   * 532* 562*   ALBUMIN 3.5 3.0* 3.1*   PROT 7.1 6.0 6.1   BILITOT 1.6* 1.2* 1.1*        No results for input(s): POCTGLUCOSE in the last 168 hours.    Scheduled Meds:   amiodarone  200 mg Oral Daily    apixaban  5 mg Oral BID    ascorbic acid (vitamin C)  500 mg Oral BID    aspirin  81 mg Oral Daily     atenoloL  25 mg Oral BID    atorvastatin  10 mg Oral Daily    diltiaZEM  180 mg Oral Daily    levothyroxine  137 mcg Oral Before breakfast    multivitamin  1 tablet Oral Daily    pantoprazole  40 mg Oral Daily    spironolactone  25 mg Oral BID     Continuous Infusions:  As Needed:  albuterol **AND** MDI Q6H PRN, benzonatate, glucose, glucose, loperamide, melatonin, ondansetron, sodium chloride 0.9%    Assessment and Plan  / Problems managed today    * COVID-19  Patient is identified as High risk for severe complications of COVID 19 based on COVID risk score of 6      Pt. Reported cough. No current hypoxia and CXR without any acute fidings. Will hold off on remdesivir given the patient's already significantly elevated LFTs as this may cloud the picture. Further treatment as below.     Diagnostics: (leukopenia, hyponatremia, hyperferritinemia, elevated troponin, elevated d-dimer, age, and comorbidities are significant predictors of poor clinical outcome)  CBC, CMP, Ferritin, CRP, LDH, BNP, ECG and Portable CXR     Management: Maintain oxygen saturations 92-96% via and monitor with intermittent pulse oximetry.  and Inhaled bronchodilators as needed for shortness of breath.     Oxygen Device: room air     Micronutrients   Multivitamin PO daily   Vitamin D 1000IU PO daily (if deficient)   Magnesium IV/PO (if deficient)   Ascorbic acid 500mg po BID     Trend inflammatory markers, LDH, D-Dimer, ferritin, and CRP Q48hrs     Advance Care Planning  Current advance care plan has been discussed with patient/family/POA and patient currently wishes Full Code.        Elevated LFTs  Patient with LFT elevation, initialyl bili 1.6 improved to 1.2, however AST and ALT are trending up, RUQ US without any new findings just stable mild intrahepatic biliary ductal dilation. No acute cholecystitis. On admission AST ALT were 295/304 and on 1/8 they were 532/573. No clear etiology, unclear if related to COVID and her acute illness.  Patient denies supplements, ingestion, tylenol, will get hepatitis panel and tylenol level, if still rising on 1/9 can consider hepatology consult  - Acute hepatitis panel  - Tylenol level  - If still rising on 1/9 consider hepatology consult      Chronic diastolic heart failure  -2D echo 2019 with EF 55% and DD  -Continue home aldactone and B-blocker      Stage 3 chronic kidney disease  Cr at baseline  -Renally dose meds      Pure hypercholesterolemia  -Continue Lipitor 10mg PO daily      Acquired hypothyroidism  -Continue Synthroid 137mcg PO daily      Essential hypertension  -Continue atenolol and diltiazem. Hold aldactone due to N/V with hypovolemia      Paroxysmal atrial fibrillation  -Pt. In NSR on admit  -Continue amiodarone, diltiazem, and atenolol for rate control  -Continue eliquis for AC      Diet:  regular diet  GI PPx: protonix  DVT PPx:  apixaban  Airways: room air  Wounds: none    Goals of Care:  Return to prior functional status     Discharge plan: home health    Time (minutes) spent in care of the patient (Greater than 1/2 spent in direct face-to-face contact)  35 min    Elsi Lucio MD

## 2022-01-10 NOTE — PLAN OF CARE
[facility] ([place])  Initial Discharge Assessment       Primary Care Provider: Kai Montez MD    Admission Diagnosis: Weakness [R53.1]  Vomiting bile [R11.14]  Nausea & vomiting [R11.2]  Biliary sludge determined by ultrasound [K83.8]  Intractable vomiting with nausea, unspecified vomiting type [R11.2]  COVID-19 [U07.1]    Admission Date: 1/7/2022  Expected Discharge Date:           Payor: MEDICARE / Plan: MEDICARE PART A & B / Product Type: Government /     Extended Emergency Contact Information  Primary Emergency Contact: Génesis Araiza   [place]  Mobile Phone: [phone]  Relation: Daughter  Secondary Emergency Contact: CHANO GORDILLO  Mobile Phone: [phone]  Relation: Son  Preferred language: English   needed? No    Discharge Plan A: (P) Home with family  Discharge Plan B: (P) Home with family      [id number] - [place] - [address] [facility]  [address] [place] [address]  Phone: [phone] Fax: [phone]    [id number] - [place] - [address] AT [facility] [address] [place]  [address] [place] [address]  Phone: [phone] Fax: [phone]    [facility] - [place], [place] - [address] [place] [address]  Phone: [phone] Fax: [phone]      Initial Assessment (most recent)       Adult Discharge Assessment - 01/10/22 1518          Discharge Assessment    Assessment Type Discharge Planning Assessment (P)      Confirmed/corrected address, phone number and insurance Yes (P)      Confirmed Demographics Correct on Facesheet (P)      Source of Information patient (P)      Lives With child(maciej), adult (P)      Do you expect to return to your current living situation? Yes (P)      Prior to hospitilization cognitive status: Alert/Oriented (P)      Current cognitive status: Alert/Oriented (P)      Walking or Climbing Stairs Difficulty  ambulation difficulty, requires equipment (P)      Dressing/Bathing Difficulty none (P)      Home Layout Able to live on 1st floor (P)      Equipment Currently Used at Home walker, standard;shower chair (P)      Readmission within 30 days? No (P)      Patient currently being followed by outpatient case management? No (P)      Do you take prescription medications? Yes (P)      Do you have prescription coverage? Yes (P)      Do you have any problems affording any of your prescribed medications? TBD (P)      Are you on dialysis? No (P)      Do you take coumadin? No (P)      Discharge Plan A Home with family (P)      Discharge Plan B Home with family (P)                          SW completed Discharge Planning Assessment with patient via telephone. Discharge planning booklet given to patient/family and whiteboard updated with FRANCESCA and phone #. All questions answered.    Patient reported that family will provide transportation upon discharge.     Patient reported prior to hospitalization she was independent with her ADL's. Patient reported that her children split the time that they live in the home with her and that way someone is always with her. Patient reported that she uses a walker and has a shower bench. Patient is not on dialysis and does not go to a Coumadin clinic.     Patient lives in a single story home with 0 steps to enter.    Brittany Piper LMSW  [facility]  Ext. [phone]

## 2022-01-10 NOTE — HPI
88 F w/ PMH biliary pancreatitis s/p ERCP in past, admitted with abdominal pain and cough, found to be COVID-19 positive.  Patient reported nausea and vomiting, poor appetite.  She presented to the ED for further evaluation where she was found to be HDS and afebrile.  She was incidentally found to be COVID positive and admitted for further management.  On lab evaluation, noted to have Cr at baseline, Bili 1.6, , .  Bili has subsequently downtrended to 1.1, , .  RUQ showing biliary sludge and mild intrahepatic biliary dilation.

## 2022-01-10 NOTE — ASSESSMENT & PLAN NOTE
Patient with history of biliary pancreatitis s/p ERCP in 2020 presenting with elevated liver chemistries.  Patient incidentally found to have COVID positive which could be contributing to elevated liver chemistries.  Otherwise, would recommend f/u acute hepatitis panel and add on autoimmune serologies.  Pending above studies and trend in liver chemistries may need to pursue biopsy.    Recommendations:  - f/u hepatitis panel  - send POLO, AMA, ASMA  - trend liver chemistry

## 2022-01-10 NOTE — SUBJECTIVE & OBJECTIVE
Review of Systems   Constitutional: Positive for fatigue. Negative for fever.   HENT: Negative.    Eyes: Negative.    Respiratory: Negative.    Cardiovascular: Negative.    Gastrointestinal: Positive for nausea and vomiting.   Endocrine: Negative.    Genitourinary: Negative.    Musculoskeletal: Negative.    Skin: Negative.    Allergic/Immunologic: Negative.    Neurological: Negative.    Hematological: Negative.    Psychiatric/Behavioral: Negative.        Past Medical History:   Diagnosis Date    A-fib     Anemia of chronic disease 2/2/2021    Arthritis     Chronic diastolic heart failure 2/2/2021    Gallstone pancreatitis     Hypertension     Pancreatic abscess 9/26/2020    Thyroid disease        Past Surgical History:   Procedure Laterality Date    CATARACT EXTRACTION      CATARACT EXTRACTION W/  INTRAOCULAR LENS IMPLANT Bilateral 2004     IN [place]    ENDOSCOPIC ULTRASOUND OF UPPER GASTROINTESTINAL TRACT N/A 9/14/2020    Procedure: ULTRASOUND, UPPER GI TRACT, ENDOSCOPIC;  Surgeon: Esha Howard MD;  Location: [address]);  Service: Endoscopy;  Laterality: N/A;    ENDOSCOPIC ULTRASOUND OF UPPER GASTROINTESTINAL TRACT  11/25/2020    Procedure: ULTRASOUND, UPPER GI TRACT, ENDOSCOPIC;  Surgeon: Esha Howard MD;  Location: [address]);  Service: Endoscopy;;    ERCP N/A 9/14/2020    Procedure: ERCP (ENDOSCOPIC RETROGRADE CHOLANGIOPANCREATOGRAPHY);  Surgeon: Esha Howard MD;  [address]  Service: Endoscopy;  Laterality: N/A;    ERCP N/A 9/25/2020    Procedure: ERCP (ENDOSCOPIC RETROGRADE CHOLANGIOPANCREATOGRAPHY);  Surgeon: Esha Howard MD;  [address]  Service: Endoscopy;  Laterality: N/A;    ERCP N/A 11/25/2020    Procedure: ERCP (ENDOSCOPIC RETROGRADE CHOLANGIOPANCREATOGRAPHY);  Surgeon: Esha Howard MD;  [facility]  Service: Endoscopy;  Laterality: N/A;  Covid-19 test 11/22/20 at [place]  - pg  2 day Dr Favian Palomares - pg  PM prep    EYE SURGERY      HIP REPLACEMENT ARTHROPLASTY Right 2016    HYSTERECTOMY      REVISION OF SKIN POCKET FOR PACEMAKER N/A 1/21/2019    Procedure: REVISION-POCKET-PACEMAKER;  Surgeon: Asher Watts MD;  Location: [facility];  Service: Cardiology;  Laterality: N/A;  MODERATE SEDATION, sedation issues in the past    THYROIDECTOMY      TREATMENT OF CARDIAC ARRHYTHMIA N/A 3/18/2019    Procedure: CARDIOVERSION OR DEFIBRILLATION;  Surgeon: Asher Watts MD;  Location: [facility];  Service: Cardiology;  Laterality: N/A;  AF, JILL-cx if SR, DCCV, MAC, FAS, 3PREP    TREATMENT OF CARDIAC ARRHYTHMIA N/A 5/14/2019    Procedure: Cardioversion or Defibrillation;  Surgeon: Derick Vizcarra MD;  Location: [facility];  Service: Cardiology;  Laterality: N/A;  AF, JILL, DCCV, MAC, DM, 3PREP       Family history of liver disease: No    Review of patient's allergies indicates:   Allergen Reactions    Ciprofloxacin Nausea And Vomiting       Tobacco Use    Smoking status: Former Smoker     Start date: 5/19/1964    Smokeless tobacco: Never Used   Substance and Sexual Activity    Alcohol use: No    Drug use: No    Sexual activity: Not Currently     Partners: Male       Medications Prior to Admission   Medication Sig Dispense Refill Last Dose    acetaminophen (TYLENOL) 500 MG tablet Take 2 tablets (1,000 mg total) by mouth every 8 (eight) hours as needed for Pain.  0     amiodarone (PACERONE) 200 MG Tab Take 1 tablet (200 mg total) by mouth once daily. 90 tablet 3     aspirin (ECOTRIN) 81 MG EC tablet Take 1 tablet (81 mg total) by mouth once daily.  0     atenoloL (TENORMIN) 25 MG tablet TAKE 2 TABLETS EVERY MORNING AND 1 TABLET EVERY EVENING 270 tablet 3     atorvastatin (LIPITOR) 10 MG tablet Take 1 tablet (10 mg total) by mouth once daily. 90 tablet 3     diltiaZEM (TIAZAC) 180 MG Cs24 Take 1 capsule (180 mg total) by mouth once daily. 90 capsule  3     ELIQUIS 2.5 mg Tab TAKE 1 TABLET TWICE A  tablet 3     eszopiclone (LUNESTA) 2 MG Tab Take 1 tablet (2 mg total) by mouth nightly as needed (sleep). 90 tablet 1     olopatadine (PATANOL) 0.1 % ophthalmic solution Place 1 drop into both eyes 2 (two) times daily. 5 mL 3     spironolactone (ALDACTONE) 25 MG tablet Take 1 tablet (25 mg total) by mouth 2 (two) times a day. 180 tablet 3     SYNTHROID 137 mcg Tab tablet TAKE 1 TABLET DAILY 90 tablet 3     teriparatide (FORTEO) 20 mcg/dose (600mcg/2.4mL) PnIj Inject 0.08 mLs (20 mcg total) into the skin once daily. 7.2 mL 3     vitamin D (VITAMIN D3) 1000 units Tab Take 1 tablet (1,000 Units total) by mouth once daily.          Objective:     Vital Signs (Most Recent):  Temp: 97.2 °F (36.2 °C) (01/10/22 1112)  Pulse: 60 (01/10/22 1112)  Resp: 18 (01/10/22 1112)  BP: (!) 148/68 (01/10/22 1112)  SpO2: (!) 93 % (01/10/22 1112) Vital Signs (24h Range):  Temp:  [97.2 °F (36.2 °C)-98.6 °F (37 °C)] 97.2 °F (36.2 °C)  Pulse:  [59-78] 60  Resp:  [18-20] 18  SpO2:  [93 %-97 %] 93 %  BP: (109-151)/(57-72) 148/68     Weight: 55.5 kg (122 lb 5.7 oz) (01/09/22 0614)  Body mass index is 21.67 kg/m².    Physical Exam    Gen: NAD, lying comfortably  HENT: NCAT, oropharynx clear  Eyes: anicteric sclerae, EOMI grossly  Neck: supple, no visible masses/goiter  Cardiac: RRR, no M/R/G, S1/S2 present  Lungs: CTAB, no crackles, no wheezes  Abd: soft, NT/ND, normoactive BS, no rebound  Ext: no LE edema, warm, well perfused  Skin: skin intact on exposed body parts, no visible rashes, lesions  Neuro: A&Ox4, neuro exam grossly intact, moves all extremities  Psych: appropriate mood, affect      MELD-Na score: 8 at 1/10/2022 10:18 AM  MELD score: 8 at 1/10/2022 10:18 AM  Calculated from:  Serum Creatinine: 1.1 mg/dL at 1/10/2022  2:37 AM  Serum Sodium: 135 mmol/L at 1/10/2022  2:37 AM  Total Bilirubin: 1.1 mg/dL at 1/10/2022  2:37 AM  INR(ratio): 1.0 at 1/10/2022 10:18 AM  Age: 88  years    Significant Labs:  CBC:   Recent Labs   Lab 01/10/22  0237   WBC 3.87*   RBC 4.10   HGB 12.5   HCT 38.9        CMP:   Recent Labs   Lab 01/10/22  0237   GLU 84   CALCIUM 8.1*   ALBUMIN 3.1*   PROT 5.7*   *   K 4.6   CO2 24      BUN 28*   CREATININE 1.1   ALKPHOS 126   *   *   BILITOT 1.1*     Coagulation:   Recent Labs   Lab 01/10/22  1018   INR 1.0       Significant Imaging:  Labs: Reviewed  X-Ray: Reviewed  US: Reviewed

## 2022-01-11 LAB
ALBUMIN SERPL BCP-MCNC: 3.1 G/DL (ref 3.5–5.2)
ALP SERPL-CCNC: 126 U/L (ref 55–135)
ALT SERPL W/O P-5'-P-CCNC: 722 U/L (ref 10–44)
ANA SER QL IF: NORMAL
ANION GAP SERPL CALC-SCNC: 9 MMOL/L (ref 8–16)
AST SERPL-CCNC: 505 U/L (ref 10–40)
BACTERIA #/AREA URNS AUTO: ABNORMAL /HPF
BASOPHILS # BLD AUTO: 0.01 K/UL (ref 0–0.2)
BASOPHILS NFR BLD: 0.2 % (ref 0–1.9)
BILIRUB SERPL-MCNC: 1.1 MG/DL (ref 0.1–1)
BILIRUB UR QL STRIP: NEGATIVE
BUN SERPL-MCNC: 35 MG/DL (ref 8–23)
CALCIUM SERPL-MCNC: 8.2 MG/DL (ref 8.7–10.5)
CHLORIDE SERPL-SCNC: 101 MMOL/L (ref 95–110)
CLARITY UR REFRACT.AUTO: ABNORMAL
CO2 SERPL-SCNC: 23 MMOL/L (ref 23–29)
COLOR UR AUTO: ABNORMAL
CREAT SERPL-MCNC: 1.2 MG/DL (ref 0.5–1.4)
DIFFERENTIAL METHOD: NORMAL
EOSINOPHIL # BLD AUTO: 0 K/UL (ref 0–0.5)
EOSINOPHIL NFR BLD: 0.4 % (ref 0–8)
ERYTHROCYTE [DISTWIDTH] IN BLOOD BY AUTOMATED COUNT: 12.8 % (ref 11.5–14.5)
EST. GFR  (AFRICAN AMERICAN): 46.6 ML/MIN/1.73 M^2
EST. GFR  (NON AFRICAN AMERICAN): 40.4 ML/MIN/1.73 M^2
GLUCOSE SERPL-MCNC: 85 MG/DL (ref 70–110)
GLUCOSE UR QL STRIP: NEGATIVE
HCT VFR BLD AUTO: 38.4 % (ref 37–48.5)
HGB BLD-MCNC: 12.3 G/DL (ref 12–16)
HGB UR QL STRIP: NEGATIVE
HYALINE CASTS UR QL AUTO: 2 /LPF
IMM GRANULOCYTES # BLD AUTO: 0.02 K/UL (ref 0–0.04)
IMM GRANULOCYTES NFR BLD AUTO: 0.4 % (ref 0–0.5)
KETONES UR QL STRIP: NEGATIVE
LEUKOCYTE ESTERASE UR QL STRIP: ABNORMAL
LYMPHOCYTES # BLD AUTO: 1.3 K/UL (ref 1–4.8)
LYMPHOCYTES NFR BLD: 23.1 % (ref 18–48)
MAGNESIUM SERPL-MCNC: 1.9 MG/DL (ref 1.6–2.6)
MCH RBC QN AUTO: 30.1 PG (ref 27–31)
MCHC RBC AUTO-ENTMCNC: 32 G/DL (ref 32–36)
MCV RBC AUTO: 94 FL (ref 82–98)
MICROSCOPIC COMMENT: ABNORMAL
MONOCYTES # BLD AUTO: 0.8 K/UL (ref 0.3–1)
MONOCYTES NFR BLD: 13.8 % (ref 4–15)
NEUTROPHILS # BLD AUTO: 3.4 K/UL (ref 1.8–7.7)
NEUTROPHILS NFR BLD: 62.1 % (ref 38–73)
NITRITE UR QL STRIP: NEGATIVE
NRBC BLD-RTO: 0 /100 WBC
PH UR STRIP: 8 [PH] (ref 5–8)
PHOSPHATE SERPL-MCNC: 2.4 MG/DL (ref 2.7–4.5)
PLATELET # BLD AUTO: 173 K/UL (ref 150–450)
PMV BLD AUTO: 9.9 FL (ref 9.2–12.9)
POCT GLUCOSE: 125 MG/DL (ref 70–110)
POCT GLUCOSE: 140 MG/DL (ref 70–110)
POTASSIUM SERPL-SCNC: 4.8 MMOL/L (ref 3.5–5.1)
PROT SERPL-MCNC: 5.7 G/DL (ref 6–8.4)
PROT UR QL STRIP: ABNORMAL
RBC # BLD AUTO: 4.08 M/UL (ref 4–5.4)
RBC #/AREA URNS AUTO: 2 /HPF (ref 0–4)
SODIUM SERPL-SCNC: 133 MMOL/L (ref 136–145)
SP GR UR STRIP: 1.02 (ref 1–1.03)
SQUAMOUS #/AREA URNS AUTO: 3 /HPF
URN SPEC COLLECT METH UR: ABNORMAL
WBC # BLD AUTO: 5.5 K/UL (ref 3.9–12.7)
WBC #/AREA URNS AUTO: 10 /HPF (ref 0–5)
WBC CLUMPS UR QL AUTO: ABNORMAL

## 2022-01-11 PROCEDURE — 97535 SELF CARE MNGMENT TRAINING: CPT | Mod: CO

## 2022-01-11 PROCEDURE — 99233 SBSQ HOSP IP/OBS HIGH 50: CPT | Mod: CR,,, | Performed by: INTERNAL MEDICINE

## 2022-01-11 PROCEDURE — 25000003 PHARM REV CODE 250: Performed by: INTERNAL MEDICINE

## 2022-01-11 PROCEDURE — 84100 ASSAY OF PHOSPHORUS: CPT | Performed by: INTERNAL MEDICINE

## 2022-01-11 PROCEDURE — 25000003 PHARM REV CODE 250: Performed by: HOSPITALIST

## 2022-01-11 PROCEDURE — 99233 PR SUBSEQUENT HOSPITAL CARE,LEVL III: ICD-10-PCS | Mod: CR,,, | Performed by: INTERNAL MEDICINE

## 2022-01-11 PROCEDURE — 11000001 HC ACUTE MED/SURG PRIVATE ROOM

## 2022-01-11 PROCEDURE — 27000207 HC ISOLATION

## 2022-01-11 PROCEDURE — 97530 THERAPEUTIC ACTIVITIES: CPT | Mod: CO

## 2022-01-11 PROCEDURE — 83735 ASSAY OF MAGNESIUM: CPT | Performed by: INTERNAL MEDICINE

## 2022-01-11 PROCEDURE — 94761 N-INVAS EAR/PLS OXIMETRY MLT: CPT

## 2022-01-11 PROCEDURE — 36415 COLL VENOUS BLD VENIPUNCTURE: CPT | Performed by: STUDENT IN AN ORGANIZED HEALTH CARE EDUCATION/TRAINING PROGRAM

## 2022-01-11 PROCEDURE — 80053 COMPREHEN METABOLIC PANEL: CPT | Performed by: STUDENT IN AN ORGANIZED HEALTH CARE EDUCATION/TRAINING PROGRAM

## 2022-01-11 PROCEDURE — 85025 COMPLETE CBC W/AUTO DIFF WBC: CPT | Performed by: STUDENT IN AN ORGANIZED HEALTH CARE EDUCATION/TRAINING PROGRAM

## 2022-01-11 RX ORDER — SODIUM CHLORIDE 9 MG/ML
INJECTION, SOLUTION INTRAVENOUS CONTINUOUS
Status: ACTIVE | OUTPATIENT
Start: 2022-01-11 | End: 2022-01-12

## 2022-01-11 RX ADMIN — PANTOPRAZOLE SODIUM 40 MG: 40 TABLET, DELAYED RELEASE ORAL at 09:01

## 2022-01-11 RX ADMIN — SPIRONOLACTONE 25 MG: 25 TABLET ORAL at 10:01

## 2022-01-11 RX ADMIN — METOCLOPRAMIDE 10 MG: 10 TABLET ORAL at 10:01

## 2022-01-11 RX ADMIN — METOCLOPRAMIDE 10 MG: 10 TABLET ORAL at 01:01

## 2022-01-11 RX ADMIN — MULTIPLE VITAMINS W/ MINERALS TAB 1 TABLET: TAB at 09:01

## 2022-01-11 RX ADMIN — Medication 500 MG: at 09:01

## 2022-01-11 RX ADMIN — APIXABAN 5 MG: 5 TABLET, FILM COATED ORAL at 09:01

## 2022-01-11 RX ADMIN — ATENOLOL 25 MG: 25 TABLET ORAL at 10:01

## 2022-01-11 RX ADMIN — ATORVASTATIN CALCIUM 10 MG: 10 TABLET, FILM COATED ORAL at 10:01

## 2022-01-11 RX ADMIN — DILTIAZEM HYDROCHLORIDE 180 MG: 180 CAPSULE, COATED, EXTENDED RELEASE ORAL at 10:01

## 2022-01-11 RX ADMIN — SPIRONOLACTONE 25 MG: 25 TABLET ORAL at 09:01

## 2022-01-11 RX ADMIN — AMIODARONE HYDROCHLORIDE 200 MG: 200 TABLET ORAL at 09:01

## 2022-01-11 RX ADMIN — ATENOLOL 25 MG: 25 TABLET ORAL at 09:01

## 2022-01-11 RX ADMIN — SENNOSIDES AND DOCUSATE SODIUM 2 TABLET: 50; 8.6 TABLET ORAL at 10:01

## 2022-01-11 RX ADMIN — ASPIRIN 81 MG: 81 TABLET, COATED ORAL at 09:01

## 2022-01-11 RX ADMIN — LEVOTHYROXINE SODIUM 137 MCG: 0.03 TABLET ORAL at 05:01

## 2022-01-11 RX ADMIN — APIXABAN 5 MG: 5 TABLET, FILM COATED ORAL at 10:01

## 2022-01-11 RX ADMIN — SODIUM CHLORIDE: 0.9 INJECTION, SOLUTION INTRAVENOUS at 06:01

## 2022-01-11 RX ADMIN — SENNOSIDES AND DOCUSATE SODIUM 2 TABLET: 50; 8.6 TABLET ORAL at 09:01

## 2022-01-11 RX ADMIN — CHOLECALCIFEROL TAB 25 MCG (1000 UNIT) 2000 UNITS: 25 TAB at 10:01

## 2022-01-11 NOTE — PLAN OF CARE
Problem: Adult Inpatient Plan of Care  Goal: Patient-Specific Goal (Individualized)  Outcome: Ongoing, Progressing  Goal: Absence of Hospital-Acquired Illness or Injury  Outcome: Ongoing, Progressing  Goal: Optimal Comfort and Wellbeing  Outcome: Ongoing, Progressing  Goal: Readiness for Transition of Care  Outcome: Ongoing, Progressing     Problem: Impaired Wound Healing  Goal: Optimal Wound Healing  Outcome: Ongoing, Progressing     Problem: Fall Injury Risk  Goal: Absence of Fall and Fall-Related Injury  Outcome: Ongoing, Progressing    Fall risk in place. Pt ambulates with a walker. Pt denies SOB, CP, N/V today. She appears fatigued, remains in bed all day. No acute events to report. Safety of environment in place.

## 2022-01-11 NOTE — ASSESSMENT & PLAN NOTE
Patient with LFT elevation, initialyl bili 1.6 improved to 1.2, however AST and ALT are trending up, RUQ US without any new findings just stable mild intrahepatic biliary ductal dilation. No acute cholecystitis. On admission AST ALT were 295/304 and on 1/8 they were 532/573. No clear etiology, unclear if related to COVID and her acute illness. Patient denies supplements, ingestion, tylenol, will get hepatitis panel and tylenol level  - hepatitis consulted - autoimmune work up thus far negative  - maybe COVID related  -worsening transaminitis - liver biopsy ordered  -liver biopsy deferred due to needing five days off anti-platelet therapy  -transaminitis with enzymes in 1000s, improving today

## 2022-01-11 NOTE — NURSING
pt walked to the [facility] and appeared to slump over. She was minimally responsive. She was assisted back into bed, bp 99/57 HR 60. She appears weaker than earlier with delayed responses, she is following commands. MD sent a secure chat.

## 2022-01-11 NOTE — PT/OT/SLP PROGRESS
"Occupational Therapy   Treatment    Name: Gisselle Ortiz  MRN: [id number]  Admitting Diagnosis:  COVID-19       Recommendations:     Discharge Recommendations: home health OT  Discharge Equipment Recommendations:  none  Barriers to discharge:  None    Assessment:     Gisselle Ortiz is a 88 y.o. female with a medical diagnosis of COVID-19.   Performance deficits affecting function are weakness,impaired endurance,impaired self care skills,impaired functional mobilty,gait instability,impaired cardiopulmonary response to activity. Pt. Required SBA with RW for all transfers and New with bed mobility. Pt. Required rest breaks after task 2* to endurance but motivated throughout session.Patient would benefit from continued skilled acute OT 3x/wk to improve functional mobility, increase independence with ADLs, and address established goals.    This patient was treated, per MD order, according to the Contact and/or Special Contact Isolation Operational Standard inside of their skilled nursing room.     Rehab Prognosis:  Good; patient would benefit from acute skilled OT services to address these deficits and reach maximum level of function.       Plan:     Patient to be seen 3 x/week to address the above listed problems via self-care/home management,therapeutic activities,therapeutic exercises  · Plan of Care Expires: 02/08/22  · Plan of Care Reviewed with: patient    Subjective   "I remember you from SNF"  Pain/Comfort:  · Pain Rating 1: 0/10  · Pain Addressed 1: Pre-medicate for activity  · Pain Rating Post-Intervention 1: 0/10  · Pain Addressed 2: Nurse notified,Pre-medicate for activity    Objective:     Communicated with: nsg prior to session.  Patient found right sidelying with telemetry,pulse ox (continuous),peripheral IV upon OT entry to room.    General Precautions: Standard, contact,fall,airborne,droplet   Orthopedic Precautions:N/A   Braces: N/A  Respiratory Status: Room air     Occupational Performance: "     Bed Mobility:    · Patient completed Rolling/Turning to Right with modified independence  · Patient completed Scooting/Bridging with modified independence  · Patient completed Supine to Sit with modified independence     Functional Mobility/Transfers:  · Patient completed Sit <> Stand Transfer with stand by assistance  with  rolling walker   · Patient completed Bed <> Chair Transfer using Step Transfer technique with stand by assistance with rolling walker  · Functional Mobility: Pt. With fxl mobility throughout room and bathroom with RW and CGA. NO LOB noted    Activities of Daily Living:  · Feeding:  independence with breakfast  · Grooming: modified independence with grooming aspects while seated  · Upper Body Dressing: supervision to doff/rosario pullover shirt   · Lower Body Dressing: supervision to doff/rosario BLE socks       [facility] 6 Click ADL: 20    Treatment & Education:  Patient performed B UE ROM exercises in all planes and joints focusing to improve strength and endurance to increase independence with ADLs.     Role of OT and POC  ADL retraining  Functional mobility training  Safety  Discharge planning    Pt. With fxl mobility throughout room with RW and CGA. NO LOB noted         Patient left up in chair with all lines intact, call button in reach and PCT notifiedEducation:      GOALS:   Multidisciplinary Problems     Occupational Therapy Goals        Problem: Occupational Therapy Goal    Goal Priority Disciplines Outcome Interventions   Occupational Therapy Goal     OT, PT/OT Ongoing, Progressing    Description: Goals to be met by: 01-19-22     Patient will increase functional independence with ADLs by performing:    UE Dressing with Set-up Assistance.=MET  LE Dressing with Stand-by Assistance.  Grooming while standing at sink with Stand-by Assistance.  Toileting from bedside commode with Stand-by Assistance for hygiene and clothing management.   Supine to sit with [place].  Stand pivot transfers with  Stand-by Assistance.  Toilet transfer to bedside commode with Stand-by Assistance.  Pt. To be I with HEP to BUE to improve level of endurance  Pt. To be able to state 3 energy conservation techniques to incorporate with ADL/IADl task performance..                     Time Tracking:     OT Date of Treatment: 01/11/22  OT Start Time: 0852  OT Stop Time: 0922  OT Total Time (min): 30 min    Billable Minutes:Self Care/Home Management 20  Therapeutic Exercise 10    OT/MINERVA: MINERVA     MINERVA Visit Number: 1    1/11/2022   OT and CARDENAS have discussed the above patients goals and status in collaboration with Plan of Care.

## 2022-01-11 NOTE — TREATMENT PLAN
Hepatology Treatment Plan    Gisselle Ortiz is a 88 y.o. female admitted to hospital 1/7/2022 (Hospital Day: 5) due to COVID-19.     Interval History  Liver chemistries stable but not improving.      Objective  Temp:  [96.8 °F (36 °C)-98.1 °F (36.7 °C)] 98 °F (36.7 °C) (01/11 0800)  Pulse:  [60-61] 61 (01/11 1116)  BP: (123-146)/(59-67) 133/63 (01/11 0800)  Resp:  [18-20] 18 (01/11 0800)  SpO2:  [93 %-97 %] 96 % (01/11 0800)    Laboratory    Lab Results   Component Value Date    WBC 5.50 01/11/2022    HGB 12.3 01/11/2022    HCT 38.4 01/11/2022    MCV 94 01/11/2022     01/11/2022       Lab Results   Component Value Date     (L) 01/11/2022    K 4.8 01/11/2022     01/11/2022    CO2 23 01/11/2022    BUN 35 (H) 01/11/2022    CREATININE 1.2 01/11/2022    CALCIUM 8.2 (L) 01/11/2022       Lab Results   Component Value Date    ALBUMIN 3.1 (L) 01/11/2022     (H) 01/11/2022     (H) 01/11/2022    ALKPHOS 126 01/11/2022    BILITOT 1.1 (H) 01/11/2022       Lab Results   Component Value Date    INR 1.0 01/10/2022    INR 1.0 01/31/2021    INR 1.0 09/26/2020       MELD-Na score: 9 at 1/11/2022  2:24 AM  MELD score: 9 at 1/11/2022  2:24 AM  Calculated from:  Serum Creatinine: 1.2 mg/dL at 1/11/2022  2:24 AM  Serum Sodium: 133 mmol/L at 1/11/2022  2:24 AM  Total Bilirubin: 1.1 mg/dL at 1/11/2022  2:24 AM  INR(ratio): 1.0 at 1/10/2022 10:18 AM  Age: 88 years    Plan  - f/u autoimmune serologies  - pending liver chemistries and autoimmune serologies, may need to request IR consult for liver biopsy  - please obtain daily CBC, BMP, LFT, INR  - Plan of care was discussed with primary team    Thank you for involving us in the care of Gisselle ART Jacobeliezerjessicaneida. Please call with any additional concerns or questions.    Antolin Ortega MD  Gastroenterology Fellow

## 2022-01-11 NOTE — PROGRESS NOTES
[facility] Medicine  Progress note    Team: [facility]OSP MED Z Elsi Lucio MD  Admit Date: 1/7/2022  FRANCESCA 1/13/2022  Code status: Full Code    Principal Problem:  COVID-19    Interval hx:  No new complaints    ROS   Respiratory: neg for cough neg for shortness of breath  Cardiovascular: neg for chest pain neg for palpitations  Gastrointestinal: neg for nausea neg for vomiting, neg for abdominal pain neg for diarrhea neg for constipation   Behavioral/Psych: neg for depression neg for anxiety    PEx  Temp:  [96.8 °F (36 °C)-98.1 °F (36.7 °C)]   Pulse:  [60-61]   Resp:  [18-20]   BP: (123-147)/(59-67)   SpO2:  [95 %-97 %]     Intake/Output Summary (Last 24 hours) at 1/11/2022 1707  Last data filed at 1/11/2022 0800  Gross per 24 hour   Intake --   Output 400 ml   Net -400 ml       General Appearance: no acute distress   Heart: regular rate and rhythm, no heave  Respiratory: Normal respiratory effort, symmetric excursion, bilateral vesicular breath sounds   Abdomen: Soft, non-tender; bowel sounds active  Skin: intact, no rash, no ulcers  Neurologic:  No focal numbness or weakness  Mental status: Alert, oriented x 4, affect appropriate     Recent Labs   Lab 01/09/22  0418 01/10/22  0237 01/11/22  0225   WBC 4.05 3.87* 5.50   HGB 12.4 12.5 12.3   HCT 39.4 38.9 38.4    181 173     Recent Labs   Lab 01/07/22  1137 01/08/22  0848 01/09/22  0418 01/10/22  0237 01/11/22  0224   *   < > 133* 135* 133*   K 4.5   < > 5.0 4.6 4.8   CL 97   < > 99 101 101   CO2 23   < > 24 24 23   BUN 20   < > 25* 28* 35*   CREATININE 1.2   < > 1.1 1.1 1.2      < > 60* 84 85   CALCIUM 8.8   < > 8.2* 8.1* 8.2*   MG  --   --   --   --  1.9   PHOS  --   --   --   --  2.4*   LIPASE 19  --   --   --   --     < > = values in this interval not displayed.     Recent Labs   Lab 01/09/22  0418 01/10/22  0237 01/10/22  1018 01/11/22  0224   ALKPHOS 112 126  --  126   * 710*  --  722*   * 541*  --  505*   ALBUMIN 3.1* 3.1*  --   3.1*   PROT 6.1 5.7*  --  5.7*   BILITOT 1.1* 1.1*  --  1.1*   INR  --   --  1.0  --       Scheduled Meds:   amiodarone  200 mg Oral Daily    apixaban  5 mg Oral BID    ascorbic acid (vitamin C)  500 mg Oral BID    aspirin  81 mg Oral Daily    atenoloL  25 mg Oral BID    atorvastatin  10 mg Oral Daily    bisacodyL  10 mg Rectal Once    diltiaZEM  180 mg Oral Daily    levothyroxine  137 mcg Oral Before breakfast    metoclopramide HCl  10 mg Oral TID AC    multivitamin  1 tablet Oral Daily    pantoprazole  40 mg Oral Daily    senna-docusate 8.6-50 mg  2 tablet Oral BID    spironolactone  25 mg Oral BID    vitamin D  2,000 Units Oral Daily     Continuous Infusions:  As Needed:  albuterol **AND** MDI Q6H PRN, benzonatate, glucose, glucose, loperamide, melatonin, ondansetron, sodium chloride 0.9%    Assessment and Plan  / Problems managed today    * COVID-19  Patient is identified as High risk for severe complications of COVID 19 based on COVID risk score of 6      Pt. Reported cough. No current hypoxia and CXR without any acute fidings. Will hold off on remdesivir given the patient's already significantly elevated LFTs as this may cloud the picture. Further treatment as below.     Diagnostics: (leukopenia, hyponatremia, hyperferritinemia, elevated troponin, elevated d-dimer, age, and comorbidities are significant predictors of poor clinical outcome)  CBC, CMP, Ferritin, CRP, LDH, BNP, ECG and Portable CXR     Management: Maintain oxygen saturations 92-96% via and monitor with intermittent pulse oximetry.  and Inhaled bronchodilators as needed for shortness of breath.     Oxygen Device: room air     Micronutrients   Multivitamin PO daily   Vitamin D 1000IU PO daily (if deficient)   Magnesium IV/PO (if deficient)   Ascorbic acid 500mg po BID     Trend inflammatory markers, LDH, D-Dimer, ferritin, and CRP Q48hrs     Advance Care Planning  Current advance care plan has been discussed with  patient/family/POA and patient currently wishes Full Code.        Elevated LFTs  Patient with LFT elevation, initialyl bili 1.6 improved to 1.2, however AST and ALT are trending up, RUQ US without any new findings just stable mild intrahepatic biliary ductal dilation. No acute cholecystitis. On admission AST ALT were 295/304 and on 1/8 they were 532/573. No clear etiology, unclear if related to COVID and her acute illness. Patient denies supplements, ingestion, tylenol, will get hepatitis panel and tylenol level, if still rising on 1/9 can consider hepatology consult  - Acute hepatitis panel negative  - Tylenol level  - hepatitis consulted - autoimmune work up negative    Constipation  Start on senokot-S, miralax and pr bisacodyl    Nausea & vomiting  zofran prn  reglan 10 mg TID prn nausea  Treat constipation    Chronic diastolic heart failure  -2D echo 2019 with EF 55% and DD  -Continue home aldactone and B-blocker      Stage 3 chronic kidney disease  Cr at baseline  -Renally dose meds      Pure hypercholesterolemia  -Continue Lipitor 10mg PO daily      Acquired hypothyroidism  -Continue Synthroid 137mcg PO daily      Essential hypertension  -Continue atenolol and diltiazem. Hold aldactone due to N/V with hypovolemia      Paroxysmal atrial fibrillation  -Pt. In NSR on admit  -Continue amiodarone, diltiazem, and atenolol for rate control  -Continue eliquis for AC    Diet:  regular diet  GI PPx: protonix  DVT PPx:  apixaban  Airways: room air  Wounds: none    Goals of Care:  Return to prior functional status     Discharge plan: home health    Time (minutes) spent in care of the patient (Greater than 1/2 spent in direct face-to-face contact)  35 min    Elsi Lucio MD

## 2022-01-11 NOTE — PROGRESS NOTES
[facility] Medicine  Progress note    Team: [facility] [place] Elsi Lucio MD  Admit Date: 1/7/2022  FRANCESCA 1/13/2022  Code status: Full Code    Principal Problem:  COVID-19    Interval hx:  No new complaints    ROS   Respiratory: neg for cough neg for shortness of breath  Cardiovascular: neg for chest pain neg for palpitations  Gastrointestinal: neg for nausea neg for vomiting, neg for abdominal pain neg for diarrhea neg for constipation   Behavioral/Psych: neg for depression neg for anxiety    PEx  Temp:  [97.2 °F (36.2 °C)-98.4 °F (36.9 °C)]   Pulse:  [59-78]   Resp:  [18-20]   BP: (109-151)/(57-72)   SpO2:  [93 %-97 %]     Intake/Output Summary (Last 24 hours) at 1/10/2022 1927  Last data filed at 1/10/2022 0600  Gross per 24 hour   Intake 330 ml   Output 600 ml   Net -270 ml       General Appearance: no acute distress   Heart: regular rate and rhythm, no heave  Respiratory: Normal respiratory effort, symmetric excursion, bilateral vesicular breath sounds   Abdomen: Soft, non-tender; bowel sounds active  Skin: intact, no rash, no ulcers  Neurologic:  No focal numbness or weakness  Mental status: Alert, oriented x 4, affect appropriate     Recent Labs   Lab 01/08/22 0848 01/09/22 0418 01/10/22  0237   WBC 4.02 4.05 3.87*   HGB 12.2 12.4 12.5   HCT 38.0 39.4 38.9    184 181     Recent Labs   Lab 01/07/22  1137 01/07/22  1137 01/08/22  0848 01/09/22 0418 01/10/22  0237   *   < > 131* 133* 135*   K 4.5   < > 4.4 5.0 4.6   CL 97   < > 99 99 101   CO2 23   < > 24 24 24   BUN 20   < > 21 25* 28*   CREATININE 1.2   < > 1.0 1.1 1.1      < > 69* 60* 84   CALCIUM 8.8   < > 8.0* 8.2* 8.1*   LIPASE 19  --   --   --   --     < > = values in this interval not displayed.     Recent Labs   Lab 01/08/22  0848 01/09/22 0418 01/10/22  0237 01/10/22  1018   ALKPHOS 103 112 126  --    * 669* 710*  --    * 562* 541*  --    ALBUMIN 3.0* 3.1* 3.1*  --    PROT 6.0 6.1 5.7*  --    BILITOT 1.2* 1.1* 1.1*  --     INR  --   --   --  1.0        No results for input(s): POCTGLUCOSE in the last 168 hours.    Scheduled Meds:   amiodarone  200 mg Oral Daily    apixaban  5 mg Oral BID    ascorbic acid (vitamin C)  500 mg Oral BID    aspirin  81 mg Oral Daily    atenoloL  25 mg Oral BID    atorvastatin  10 mg Oral Daily    bisacodyL  10 mg Rectal Once    diltiaZEM  180 mg Oral Daily    levothyroxine  137 mcg Oral Before breakfast    metoclopramide HCl  10 mg Oral TID AC    multivitamin  1 tablet Oral Daily    pantoprazole  40 mg Oral Daily    senna-docusate 8.6-50 mg  2 tablet Oral BID    spironolactone  25 mg Oral BID     Continuous Infusions:  As Needed:  albuterol **AND** MDI Q6H PRN, benzonatate, glucose, glucose, loperamide, melatonin, ondansetron, sodium chloride 0.9%    Assessment and Plan  / Problems managed today    * COVID-19  Patient is identified as High risk for severe complications of COVID 19 based on COVID risk score of 6      Pt. Reported cough. No current hypoxia and CXR without any acute fidings. Will hold off on remdesivir given the patient's already significantly elevated LFTs as this may cloud the picture. Further treatment as below.     Diagnostics: (leukopenia, hyponatremia, hyperferritinemia, elevated troponin, elevated d-dimer, age, and comorbidities are significant predictors of poor clinical outcome)  CBC, CMP, Ferritin, CRP, LDH, BNP, ECG and Portable CXR     Management: Maintain oxygen saturations 92-96% via and monitor with intermittent pulse oximetry.  and Inhaled bronchodilators as needed for shortness of breath.     Oxygen Device: room air     Micronutrients   Multivitamin PO daily   Vitamin D 1000IU PO daily (if deficient)   Magnesium IV/PO (if deficient)   Ascorbic acid 500mg po BID     Trend inflammatory markers, LDH, D-Dimer, ferritin, and CRP Q48hrs     Advance Care Planning  Current advance care plan has been discussed with patient/family/POA and patient currently wishes  Full Code.        Elevated LFTs  Patient with LFT elevation, initialyl bili 1.6 improved to 1.2, however AST and ALT are trending up, RUQ US without any new findings just stable mild intrahepatic biliary ductal dilation. No acute cholecystitis. On admission AST ALT were 295/304 and on 1/8 they were 532/573. No clear etiology, unclear if related to COVID and her acute illness. Patient denies supplements, ingestion, tylenol, will get hepatitis panel and tylenol level, if still rising on 1/9 can consider hepatology consult  - Acute hepatitis panel  - Tylenol level  - If still rising on 1/9 consider hepatology consult      Constipation  Start on senokot-S, miralax and pr bisacodyl    Nausea & vomiting  zofran prn  reglan 10 mg TID prn nausea  Treat constipation    Chronic diastolic heart failure  -2D echo 2019 with EF 55% and DD  -Continue home aldactone and B-blocker      Stage 3 chronic kidney disease  Cr at baseline  -Renally dose meds      Pure hypercholesterolemia  -Continue Lipitor 10mg PO daily      Acquired hypothyroidism  -Continue Synthroid 137mcg PO daily      Essential hypertension  -Continue atenolol and diltiazem. Hold aldactone due to N/V with hypovolemia      Paroxysmal atrial fibrillation  -Pt. In NSR on admit  -Continue amiodarone, diltiazem, and atenolol for rate control  -Continue eliquis for AC    Diet:  regular diet  GI PPx: protonix  DVT PPx:  apixaban  Airways: room air  Wounds: none    Goals of Care:  Return to prior functional status     Discharge plan: home health    Time (minutes) spent in care of the patient (Greater than 1/2 spent in direct face-to-face contact)  35 min    Elsi Lucio MD

## 2022-01-11 NOTE — PLAN OF CARE
Problem: Occupational Therapy Goal  Goal: Occupational Therapy Goal  Description: Goals to be met by: 01-19-22     Patient will increase functional independence with ADLs by performing:    UE Dressing with Set-up Assistance.=MET  LE Dressing with Stand-by Assistance.  Grooming while standing at sink with Stand-by Assistance.  Toileting from bedside commode with Stand-by Assistance for hygiene and clothing management.   Supine to sit with [place].  Stand pivot transfers with Stand-by Assistance.  Toilet transfer to bedside commode with Stand-by Assistance.  Pt. To be I with HEP to BUE to improve level of endurance  Pt. To be able to state 3 energy conservation techniques to incorporate with ADL/IADl task performance..    Outcome: Ongoing, Progressing

## 2022-01-12 LAB
ALBUMIN SERPL BCP-MCNC: 3 G/DL (ref 3.5–5.2)
ALP SERPL-CCNC: 181 U/L (ref 55–135)
ALT SERPL W/O P-5'-P-CCNC: 690 U/L (ref 10–44)
ANION GAP SERPL CALC-SCNC: 7 MMOL/L (ref 8–16)
AST SERPL-CCNC: 418 U/L (ref 10–40)
BASOPHILS # BLD AUTO: 0.02 K/UL (ref 0–0.2)
BASOPHILS NFR BLD: 0.2 % (ref 0–1.9)
BILIRUB SERPL-MCNC: 1.2 MG/DL (ref 0.1–1)
BUN SERPL-MCNC: 40 MG/DL (ref 8–23)
CALCIUM SERPL-MCNC: 8.4 MG/DL (ref 8.7–10.5)
CHLORIDE SERPL-SCNC: 105 MMOL/L (ref 95–110)
CO2 SERPL-SCNC: 25 MMOL/L (ref 23–29)
CREAT SERPL-MCNC: 1.3 MG/DL (ref 0.5–1.4)
DIFFERENTIAL METHOD: ABNORMAL
EOSINOPHIL # BLD AUTO: 0 K/UL (ref 0–0.5)
EOSINOPHIL NFR BLD: 0.1 % (ref 0–8)
ERYTHROCYTE [DISTWIDTH] IN BLOOD BY AUTOMATED COUNT: 12.9 % (ref 11.5–14.5)
EST. GFR  (AFRICAN AMERICAN): 42.3 ML/MIN/1.73 M^2
EST. GFR  (NON AFRICAN AMERICAN): 36.7 ML/MIN/1.73 M^2
GLUCOSE SERPL-MCNC: 103 MG/DL (ref 70–110)
HCT VFR BLD AUTO: 39.6 % (ref 37–48.5)
HGB BLD-MCNC: 12.7 G/DL (ref 12–16)
IMM GRANULOCYTES # BLD AUTO: 0.04 K/UL (ref 0–0.04)
IMM GRANULOCYTES NFR BLD AUTO: 0.4 % (ref 0–0.5)
LYMPHOCYTES # BLD AUTO: 1.3 K/UL (ref 1–4.8)
LYMPHOCYTES NFR BLD: 12.5 % (ref 18–48)
MAGNESIUM SERPL-MCNC: 2 MG/DL (ref 1.6–2.6)
MCH RBC QN AUTO: 30 PG (ref 27–31)
MCHC RBC AUTO-ENTMCNC: 32.1 G/DL (ref 32–36)
MCV RBC AUTO: 94 FL (ref 82–98)
MONOCYTES # BLD AUTO: 1 K/UL (ref 0.3–1)
MONOCYTES NFR BLD: 9.1 % (ref 4–15)
NEUTROPHILS # BLD AUTO: 8.2 K/UL (ref 1.8–7.7)
NEUTROPHILS NFR BLD: 77.7 % (ref 38–73)
NRBC BLD-RTO: 0 /100 WBC
PHOSPHATE SERPL-MCNC: 2.9 MG/DL (ref 2.7–4.5)
PLATELET # BLD AUTO: 172 K/UL (ref 150–450)
PMV BLD AUTO: 10.1 FL (ref 9.2–12.9)
POTASSIUM SERPL-SCNC: 5.3 MMOL/L (ref 3.5–5.1)
PROT SERPL-MCNC: 5.9 G/DL (ref 6–8.4)
RBC # BLD AUTO: 4.23 M/UL (ref 4–5.4)
SODIUM SERPL-SCNC: 137 MMOL/L (ref 136–145)
WBC # BLD AUTO: 10.55 K/UL (ref 3.9–12.7)

## 2022-01-12 PROCEDURE — 80053 COMPREHEN METABOLIC PANEL: CPT | Performed by: STUDENT IN AN ORGANIZED HEALTH CARE EDUCATION/TRAINING PROGRAM

## 2022-01-12 PROCEDURE — 94761 N-INVAS EAR/PLS OXIMETRY MLT: CPT

## 2022-01-12 PROCEDURE — 99233 SBSQ HOSP IP/OBS HIGH 50: CPT | Mod: CR,,, | Performed by: INTERNAL MEDICINE

## 2022-01-12 PROCEDURE — 85025 COMPLETE CBC W/AUTO DIFF WBC: CPT | Performed by: STUDENT IN AN ORGANIZED HEALTH CARE EDUCATION/TRAINING PROGRAM

## 2022-01-12 PROCEDURE — 27000207 HC ISOLATION

## 2022-01-12 PROCEDURE — 99233 PR SUBSEQUENT HOSPITAL CARE,LEVL III: ICD-10-PCS | Mod: CR,,, | Performed by: INTERNAL MEDICINE

## 2022-01-12 PROCEDURE — 11000001 HC ACUTE MED/SURG PRIVATE ROOM

## 2022-01-12 PROCEDURE — 25000003 PHARM REV CODE 250: Performed by: INTERNAL MEDICINE

## 2022-01-12 PROCEDURE — 97110 THERAPEUTIC EXERCISES: CPT | Mod: CQ

## 2022-01-12 PROCEDURE — 25000003 PHARM REV CODE 250: Performed by: HOSPITALIST

## 2022-01-12 PROCEDURE — 84100 ASSAY OF PHOSPHORUS: CPT | Performed by: INTERNAL MEDICINE

## 2022-01-12 PROCEDURE — 83735 ASSAY OF MAGNESIUM: CPT | Performed by: INTERNAL MEDICINE

## 2022-01-12 PROCEDURE — 36415 COLL VENOUS BLD VENIPUNCTURE: CPT | Performed by: STUDENT IN AN ORGANIZED HEALTH CARE EDUCATION/TRAINING PROGRAM

## 2022-01-12 RX ORDER — AMOXICILLIN 250 MG
2 CAPSULE ORAL DAILY
Status: DISCONTINUED | OUTPATIENT
Start: 2022-01-13 | End: 2022-01-16 | Stop reason: HOSPADM

## 2022-01-12 RX ADMIN — Medication 500 MG: at 09:01

## 2022-01-12 RX ADMIN — ASPIRIN 81 MG: 81 TABLET, COATED ORAL at 09:01

## 2022-01-12 RX ADMIN — CHOLECALCIFEROL TAB 25 MCG (1000 UNIT) 2000 UNITS: 25 TAB at 09:01

## 2022-01-12 RX ADMIN — SPIRONOLACTONE 25 MG: 25 TABLET ORAL at 09:01

## 2022-01-12 RX ADMIN — MULTIPLE VITAMINS W/ MINERALS TAB 1 TABLET: TAB at 09:01

## 2022-01-12 RX ADMIN — METOCLOPRAMIDE 10 MG: 10 TABLET ORAL at 06:01

## 2022-01-12 RX ADMIN — DIBASIC SODIUM PHOSPHATE, MONOBASIC POTASSIUM PHOSPHATE AND MONOBASIC SODIUM PHOSPHATE 2 TABLET: 852; 155; 130 TABLET ORAL at 05:01

## 2022-01-12 RX ADMIN — DILTIAZEM HYDROCHLORIDE 180 MG: 180 CAPSULE, COATED, EXTENDED RELEASE ORAL at 09:01

## 2022-01-12 RX ADMIN — APIXABAN 5 MG: 5 TABLET, FILM COATED ORAL at 09:01

## 2022-01-12 RX ADMIN — PANTOPRAZOLE SODIUM 40 MG: 40 TABLET, DELAYED RELEASE ORAL at 09:01

## 2022-01-12 RX ADMIN — LEVOTHYROXINE SODIUM 137 MCG: 0.03 TABLET ORAL at 06:01

## 2022-01-12 RX ADMIN — AMIODARONE HYDROCHLORIDE 200 MG: 200 TABLET ORAL at 09:01

## 2022-01-12 RX ADMIN — ATENOLOL 25 MG: 25 TABLET ORAL at 09:01

## 2022-01-12 RX ADMIN — ATENOLOL 25 MG: 25 TABLET ORAL at 10:01

## 2022-01-12 RX ADMIN — APIXABAN 5 MG: 5 TABLET, FILM COATED ORAL at 10:01

## 2022-01-12 RX ADMIN — Medication 500 MG: at 10:01

## 2022-01-12 RX ADMIN — ATORVASTATIN CALCIUM 10 MG: 10 TABLET, FILM COATED ORAL at 09:01

## 2022-01-12 NOTE — TREATMENT PLAN
Hepatology Treatment Plan    Gisselle Ortiz is a 88 y.o. female admitted to hospital 1/7/2022 (Hospital Day: 6) due to COVID-19.     Interval History  Liver chemistries stable.  POLO negative.    Objective  Temp:  [96.9 °F (36.1 °C)-98.7 °F (37.1 °C)] 98.6 °F (37 °C) (01/12 0725)  Pulse:  [60-63] 60 (01/12 1059)  BP: ()/(54-67) 139/62 (01/12 0725)  Resp:  [15-18] 16 (01/12 0725)  SpO2:  [93 %-98 %] 96 % (01/12 0738)    Laboratory    Lab Results   Component Value Date    WBC 10.55 01/12/2022    HGB 12.7 01/12/2022    HCT 39.6 01/12/2022    MCV 94 01/12/2022     01/12/2022       Lab Results   Component Value Date     (L) 01/11/2022    K 4.8 01/11/2022     01/11/2022    CO2 23 01/11/2022    BUN 35 (H) 01/11/2022    CREATININE 1.2 01/11/2022    CALCIUM 8.2 (L) 01/11/2022       Lab Results   Component Value Date    ALBUMIN 3.1 (L) 01/11/2022     (H) 01/11/2022     (H) 01/11/2022    ALKPHOS 126 01/11/2022    BILITOT 1.1 (H) 01/11/2022       Lab Results   Component Value Date    INR 1.0 01/10/2022    INR 1.0 01/31/2021    INR 1.0 09/26/2020       MELD-Na score: 9 at 1/11/2022  2:24 AM  MELD score: 9 at 1/11/2022  2:24 AM  Calculated from:  Serum Creatinine: 1.2 mg/dL at 1/11/2022  2:24 AM  Serum Sodium: 133 mmol/L at 1/11/2022  2:24 AM  Total Bilirubin: 1.1 mg/dL at 1/11/2022  2:24 AM  INR(ratio): 1.0 at 1/10/2022 10:18 AM  Age: 88 years    Plan  - f/u autoimmune serologies  - pending liver chemistries and autoimmune serologies, may need to request IR consult for liver biopsy  - please obtain daily CBC, BMP, LFT, INR  - Plan of care was discussed with primary team    Thank you for involving us in the care of Gisselle Ortiz. Please call with any additional concerns or questions.    Antolin Ortega MD  Gastroenterology Fellow

## 2022-01-12 NOTE — PT/OT/SLP PROGRESS
"Physical Therapy Treatment    Patient Name:  Gisselle Ortiz   MRN:  [id number]    Recommendations:     Discharge Recommendations:  home health PT   Discharge Equipment Recommendations: none   Barriers to discharge: decrease functional mobility    Assessment:     Gisselle Ortiz is a 88 y.o. female admitted with a medical diagnosis of COVID-19.  She presents with the following impairments/functional limitations:  weakness,impaired endurance,impaired sensation,impaired functional mobilty,decreased coordination,impaired cardiopulmonary response to activity.    Pt was agreeable to therapy, but treatment session was limited secondary to feeling lightheaded while sitting EOB. Pt required SBA with bed mobility with HOB elevated. Pt was able to complete seated therex, but report lightheaded not getting better. Pt was returned to supine and BP was 137/63.     Rehab Prognosis: Good; patient would benefit from acute skilled PT services to address these deficits and reach maximum level of function.    Recent Surgery: * No surgery found *      Plan:     During this hospitalization, patient to be seen 3 x/week to address the identified rehab impairments via gait training,therapeutic activities,therapeutic exercises,neuromuscular re-education and progress toward the following goals:    · Plan of Care Expires:  02/08/22    Subjective     Patient/Family Comments/goals: I passed out in the bathroom yesterday" and "they told me not to get out of bed"  Pain/Comfort:  Pain Rating 1: 0/10      Objective:     Communicated with nurse prior to session.  Patient found HOB elevated with telemetry,pulse ox (continuous),peripheral IV upon PT entry to room.     General Precautions: Standard, contact,fall,airborne,droplet   Orthopedic Precautions:N/A   Braces: N/A  Respiratory Status: Room air     Functional Mobility:  · Bed Mobility:     · Supine to Sit: stand by assistance with HOB elevated  · Scooting to EOB: stand by assistance  · Sit " to Supine:stand by assistance with HOB elevated  · Transfers:   Not assessed, not safe at this time.       AM-PAC 6 CLICK MOBILITY  Turning over in bed (including adjusting bedclothes, sheets and blankets)?: 4  Sitting down on and standing up from a chair with arms (e.g., wheelchair, bedside commode, etc.): 3  Moving from lying on back to sitting on the side of the bed?: 4  Moving to and from a bed to a chair (including a wheelchair)?: 3  Need to walk in hospital room?: 3  Climbing 3-5 steps with a railing?: 3  Basic Mobility Total Score: 20       Therapeutic Activities and Exercises:   Pt demonstrated good sitting EOB balance while performing seated therex.   Seated BLE therex: x10 LAQ, marching, and toe raises  Pt educated about performing therex throughout the day. Pt verbalized understanding.     Pt reports feeling lightheaded with no relief with rest or activity.   PCT took vitals and BP.   BP supine with HOB elevated: 137/63    Patient left HOB elevated with all lines intact and call button in reach..    GOALS:   Multidisciplinary Problems     Physical Therapy Goals        Problem: Physical Therapy Goal    Goal Priority Disciplines Outcome Goal Variances Interventions   Physical Therapy Goal     PT, PT/OT Ongoing, Progressing     Description: Goals to be met by: 22     Patient will increase functional independence with mobility by performin. Supine to sit with Modified [place]  2. Sit to supine with Modified [place]  3. Sit to stand transfer with Modified [place]  4. Gait  x 50 feet with Modified [place] using LRAD, if needed.   5. Stand for 5 minutes with Modified [place] using LRAD, if needed                     Time Tracking:     PT Received On: 22  PT Start Time: 1539     PT Stop Time: 1553  PT Total Time (min): 14 min     Billable Minutes: Therapeutic Exercise 14    Treatment Type: Treatment  PT/PTA: PTA     PTA Visit Number: 2022

## 2022-01-12 NOTE — PLAN OF CARE
Problem: Adult Inpatient Plan of Care  Goal: Patient-Specific Goal (Individualized)  Outcome: Ongoing, Progressing  Goal: Absence of Hospital-Acquired Illness or Injury  Outcome: Ongoing, Progressing  Goal: Optimal Comfort and Wellbeing  Outcome: Ongoing, Progressing  Goal: Readiness for Transition of Care  Outcome: Ongoing, Progressing     Problem: Fall Injury Risk  Goal: Absence of Fall and Fall-Related Injury  Outcome: Ongoing, Progressing     Problem: Fatigue  Goal: Improved Activity Tolerance  Outcome: Ongoing, Progressing     Problem: Nausea and Vomiting  Goal: Fluid and Electrolyte Balance  Outcome: Ongoing, Progressing

## 2022-01-12 NOTE — PLAN OF CARE
Problem: Adult Inpatient Plan of Care  Goal: Plan of Care Review  Outcome: Ongoing, Progressing  Goal: Patient-Specific Goal (Individualized)  Outcome: Ongoing, Progressing  Goal: Absence of Hospital-Acquired Illness or Injury  Outcome: Ongoing, Progressing  Goal: Optimal Comfort and Wellbeing  Outcome: Ongoing, Progressing  Goal: Readiness for Transition of Care  Outcome: Ongoing, Progressing     Problem: Impaired Wound Healing  Goal: Optimal Wound Healing  Outcome: Ongoing, Progressing     Problem: Fall Injury Risk  Goal: Absence of Fall and Fall-Related Injury  Outcome: Ongoing, Progressing     Problem: Fatigue  Goal: Improved Activity Tolerance  Outcome: Ongoing, Progressing     Problem: Nausea and Vomiting  Goal: Fluid and Electrolyte Balance  Outcome: Ongoing, Progressing     Problem: Infection  Goal: Absence of Infection Signs and Symptoms  Outcome: Ongoing, Progressing     Problem: Pain Acute  Goal: Acceptable Pain Control and Functional Ability  Outcome: Ongoing, Progressing     Problem: Skin Injury Risk Increased  Goal: Skin Health and Integrity  Outcome: Ongoing, Progressing

## 2022-01-13 PROBLEM — E44.0 MALNUTRITION OF MODERATE DEGREE: Status: ACTIVE | Noted: 2022-01-13

## 2022-01-13 PROBLEM — R63.0 ANOREXIA: Status: RESOLVED | Noted: 2022-01-13 | Resolved: 2022-01-13

## 2022-01-13 PROBLEM — R63.0 ANOREXIA: Status: ACTIVE | Noted: 2022-01-13

## 2022-01-13 LAB
ALBUMIN SERPL BCP-MCNC: 2.7 G/DL (ref 3.5–5.2)
ALP SERPL-CCNC: 174 U/L (ref 55–135)
ALT SERPL W/O P-5'-P-CCNC: 1739 U/L (ref 10–44)
ANION GAP SERPL CALC-SCNC: 6 MMOL/L (ref 8–16)
AST SERPL-CCNC: 1581 U/L (ref 10–40)
BASOPHILS # BLD AUTO: 0.01 K/UL (ref 0–0.2)
BASOPHILS NFR BLD: 0.1 % (ref 0–1.9)
BILIRUB SERPL-MCNC: 2.2 MG/DL (ref 0.1–1)
BUN SERPL-MCNC: 36 MG/DL (ref 8–23)
CALCIUM SERPL-MCNC: 8.1 MG/DL (ref 8.7–10.5)
CHLORIDE SERPL-SCNC: 106 MMOL/L (ref 95–110)
CO2 SERPL-SCNC: 24 MMOL/L (ref 23–29)
CREAT SERPL-MCNC: 1.2 MG/DL (ref 0.5–1.4)
DIFFERENTIAL METHOD: ABNORMAL
EOSINOPHIL # BLD AUTO: 0.1 K/UL (ref 0–0.5)
EOSINOPHIL NFR BLD: 0.9 % (ref 0–8)
ERYTHROCYTE [DISTWIDTH] IN BLOOD BY AUTOMATED COUNT: 13.2 % (ref 11.5–14.5)
EST. GFR  (AFRICAN AMERICAN): 46.6 ML/MIN/1.73 M^2
EST. GFR  (NON AFRICAN AMERICAN): 40.4 ML/MIN/1.73 M^2
GLUCOSE SERPL-MCNC: 117 MG/DL (ref 70–110)
HCT VFR BLD AUTO: 36.3 % (ref 37–48.5)
HGB BLD-MCNC: 12 G/DL (ref 12–16)
IMM GRANULOCYTES # BLD AUTO: 0.04 K/UL (ref 0–0.04)
IMM GRANULOCYTES NFR BLD AUTO: 0.5 % (ref 0–0.5)
LYMPHOCYTES # BLD AUTO: 1.2 K/UL (ref 1–4.8)
LYMPHOCYTES NFR BLD: 15.6 % (ref 18–48)
MAGNESIUM SERPL-MCNC: 1.9 MG/DL (ref 1.6–2.6)
MCH RBC QN AUTO: 30.9 PG (ref 27–31)
MCHC RBC AUTO-ENTMCNC: 33.1 G/DL (ref 32–36)
MCV RBC AUTO: 94 FL (ref 82–98)
MONOCYTES # BLD AUTO: 0.5 K/UL (ref 0.3–1)
MONOCYTES NFR BLD: 6.8 % (ref 4–15)
NEUTROPHILS # BLD AUTO: 5.9 K/UL (ref 1.8–7.7)
NEUTROPHILS NFR BLD: 76.1 % (ref 38–73)
NRBC BLD-RTO: 0 /100 WBC
PHOSPHATE SERPL-MCNC: 2.8 MG/DL (ref 2.7–4.5)
PLATELET # BLD AUTO: 156 K/UL (ref 150–450)
PMV BLD AUTO: 10.1 FL (ref 9.2–12.9)
POTASSIUM SERPL-SCNC: 4.1 MMOL/L (ref 3.5–5.1)
PROT SERPL-MCNC: 5.5 G/DL (ref 6–8.4)
RBC # BLD AUTO: 3.88 M/UL (ref 4–5.4)
SODIUM SERPL-SCNC: 136 MMOL/L (ref 136–145)
WBC # BLD AUTO: 7.75 K/UL (ref 3.9–12.7)

## 2022-01-13 PROCEDURE — 25000003 PHARM REV CODE 250: Performed by: HOSPITALIST

## 2022-01-13 PROCEDURE — 36415 COLL VENOUS BLD VENIPUNCTURE: CPT | Performed by: STUDENT IN AN ORGANIZED HEALTH CARE EDUCATION/TRAINING PROGRAM

## 2022-01-13 PROCEDURE — 27000207 HC ISOLATION

## 2022-01-13 PROCEDURE — 85025 COMPLETE CBC W/AUTO DIFF WBC: CPT | Performed by: STUDENT IN AN ORGANIZED HEALTH CARE EDUCATION/TRAINING PROGRAM

## 2022-01-13 PROCEDURE — 99233 SBSQ HOSP IP/OBS HIGH 50: CPT | Mod: CR,,, | Performed by: INTERNAL MEDICINE

## 2022-01-13 PROCEDURE — 84100 ASSAY OF PHOSPHORUS: CPT | Performed by: INTERNAL MEDICINE

## 2022-01-13 PROCEDURE — 80053 COMPREHEN METABOLIC PANEL: CPT | Performed by: STUDENT IN AN ORGANIZED HEALTH CARE EDUCATION/TRAINING PROGRAM

## 2022-01-13 PROCEDURE — 11000001 HC ACUTE MED/SURG PRIVATE ROOM

## 2022-01-13 PROCEDURE — 99233 PR SUBSEQUENT HOSPITAL CARE,LEVL III: ICD-10-PCS | Mod: CR,,, | Performed by: INTERNAL MEDICINE

## 2022-01-13 PROCEDURE — 25000003 PHARM REV CODE 250: Performed by: INTERNAL MEDICINE

## 2022-01-13 PROCEDURE — 83735 ASSAY OF MAGNESIUM: CPT | Performed by: INTERNAL MEDICINE

## 2022-01-13 RX ORDER — SODIUM CHLORIDE 9 MG/ML
INJECTION, SOLUTION INTRAVENOUS CONTINUOUS
Status: DISCONTINUED | OUTPATIENT
Start: 2022-01-13 | End: 2022-01-14

## 2022-01-13 RX ADMIN — CHOLECALCIFEROL TAB 25 MCG (1000 UNIT) 2000 UNITS: 25 TAB at 10:01

## 2022-01-13 RX ADMIN — SENNOSIDES AND DOCUSATE SODIUM 2 TABLET: 50; 8.6 TABLET ORAL at 10:01

## 2022-01-13 RX ADMIN — APIXABAN 5 MG: 5 TABLET, FILM COATED ORAL at 10:01

## 2022-01-13 RX ADMIN — LEVOTHYROXINE SODIUM 137 MCG: 0.03 TABLET ORAL at 06:01

## 2022-01-13 RX ADMIN — PANTOPRAZOLE SODIUM 40 MG: 40 TABLET, DELAYED RELEASE ORAL at 10:01

## 2022-01-13 RX ADMIN — ASPIRIN 81 MG: 81 TABLET, COATED ORAL at 10:01

## 2022-01-13 RX ADMIN — Medication 500 MG: at 10:01

## 2022-01-13 RX ADMIN — SODIUM CHLORIDE 100 ML/HR: 0.9 INJECTION, SOLUTION INTRAVENOUS at 02:01

## 2022-01-13 RX ADMIN — ATENOLOL 25 MG: 25 TABLET ORAL at 10:01

## 2022-01-13 RX ADMIN — DIBASIC SODIUM PHOSPHATE, MONOBASIC POTASSIUM PHOSPHATE AND MONOBASIC SODIUM PHOSPHATE 2 TABLET: 852; 155; 130 TABLET ORAL at 02:01

## 2022-01-13 RX ADMIN — MULTIPLE VITAMINS W/ MINERALS TAB 1 TABLET: TAB at 10:01

## 2022-01-13 RX ADMIN — ATORVASTATIN CALCIUM 10 MG: 10 TABLET, FILM COATED ORAL at 10:01

## 2022-01-13 RX ADMIN — DIBASIC SODIUM PHOSPHATE, MONOBASIC POTASSIUM PHOSPHATE AND MONOBASIC SODIUM PHOSPHATE 2 TABLET: 852; 155; 130 TABLET ORAL at 10:01

## 2022-01-13 RX ADMIN — AMIODARONE HYDROCHLORIDE 200 MG: 200 TABLET ORAL at 10:01

## 2022-01-13 RX ADMIN — SODIUM CHLORIDE: 0.9 INJECTION, SOLUTION INTRAVENOUS at 11:01

## 2022-01-13 RX ADMIN — DILTIAZEM HYDROCHLORIDE 180 MG: 180 CAPSULE, COATED, EXTENDED RELEASE ORAL at 10:01

## 2022-01-13 RX ADMIN — DIBASIC SODIUM PHOSPHATE, MONOBASIC POTASSIUM PHOSPHATE AND MONOBASIC SODIUM PHOSPHATE 2 TABLET: 852; 155; 130 TABLET ORAL at 05:01

## 2022-01-13 NOTE — PROGRESS NOTES
[facility] Medicine  Progress note    Team: [facility] [place] Elsi Lucio MD  Admit Date: 1/7/2022  FRANCESCA 1/13/2022  Code status: Full Code    Principal Problem:  COVID-19    Interval hx:  No new complaints. Still feeling poorly    ROS   Respiratory: neg for cough neg for shortness of breath  Cardiovascular: neg for chest pain neg for palpitations  Gastrointestinal: neg for nausea neg for vomiting, neg for abdominal pain neg for diarrhea neg for constipation   Behavioral/Psych: neg for depression neg for anxiety    PEx  Temp:  [97.4 °F (36.3 °C)-98.7 °F (37.1 °C)]   Pulse:  [60-63]   Resp:  [16-19]   BP: (115-139)/(54-63)   SpO2:  [94 %-98 %]     Intake/Output Summary (Last 24 hours) at 1/12/2022 2119  Last data filed at 1/12/2022 1725  Gross per 24 hour   Intake --   Output 250 ml   Net -250 ml       General Appearance: no acute distress   Heart: regular rate and rhythm, no heave  Respiratory: Normal respiratory effort, symmetric excursion, bilateral vesicular breath sounds   Abdomen: Soft, non-tender; bowel sounds active  Skin: intact, no rash, no ulcers  Neurologic:  No focal numbness or weakness  Mental status: Alert, oriented x 4, affect appropriate     Recent Labs   Lab 01/10/22  0237 01/11/22 0225 01/12/22  0327   WBC 3.87* 5.50 10.55   HGB 12.5 12.3 12.7   HCT 38.9 38.4 39.6    173 172     Recent Labs   Lab 01/07/22  1137 01/08/22  0848 01/10/22  0237 01/11/22  0224 01/12/22  0327   *   < > 135* 133* 137   K 4.5   < > 4.6 4.8 5.3*   CL 97   < > 101 101 105   CO2 23   < > 24 23 25   BUN 20   < > 28* 35* 40*   CREATININE 1.2   < > 1.1 1.2 1.3      < > 84 85 103   CALCIUM 8.8   < > 8.1* 8.2* 8.4*   MG  --   --   --  1.9 2.0   PHOS  --   --   --  2.4* 2.9   LIPASE 19  --   --   --   --     < > = values in this interval not displayed.     Recent Labs   Lab 01/10/22  0237 01/10/22  1018 01/11/22  0224 01/12/22  0327   ALKPHOS 126  --  126 181*   *  --  722* 690*   *  --  505* 418*    ALBUMIN 3.1*  --  3.1* 3.0*   PROT 5.7*  --  5.7* 5.9*   BILITOT 1.1*  --  1.1* 1.2*   INR  --  1.0  --   --       Scheduled Meds:   amiodarone  200 mg Oral Daily    apixaban  5 mg Oral BID    ascorbic acid (vitamin C)  500 mg Oral BID    aspirin  81 mg Oral Daily    atenoloL  25 mg Oral BID    atorvastatin  10 mg Oral Daily    diltiaZEM  180 mg Oral Daily    k phos di & mono-sod phos mono  2 tablet Oral TID WM    levothyroxine  137 mcg Oral Before breakfast    multivitamin  1 tablet Oral Daily    pantoprazole  40 mg Oral Daily    [START ON 1/13/2022] senna-docusate 8.6-50 mg  2 tablet Oral Daily    vitamin D  2,000 Units Oral Daily     Continuous Infusions:  As Needed:  albuterol **AND** MDI Q6H PRN, benzonatate, glucose, glucose, loperamide, melatonin, ondansetron, sodium chloride 0.9%    Assessment and Plan  / Problems managed today    * COVID-19  Patient is identified as High risk for severe complications of COVID 19 based on COVID risk score of 6      Pt. Reported cough. No current hypoxia and CXR without any acute fidings. Will hold off on remdesivir given the patient's already significantly elevated LFTs as this may cloud the picture. Further treatment as below.     Diagnostics: (leukopenia, hyponatremia, hyperferritinemia, elevated troponin, elevated d-dimer, age, and comorbidities are significant predictors of poor clinical outcome)  CBC, CMP, Ferritin, CRP, LDH, BNP, ECG and Portable CXR     Management: Maintain oxygen saturations 92-96% via and monitor with intermittent pulse oximetry.  and Inhaled bronchodilators as needed for shortness of breath.     Oxygen Device: room air     Micronutrients   Multivitamin PO daily   Vitamin D 1000IU PO daily (if deficient)   Magnesium IV/PO (if deficient)   Ascorbic acid 500mg po BID     Trend inflammatory markers, LDH, D-Dimer, ferritin, and CRP Q48hrs     Advance Care Planning  Current advance care plan has been discussed with patient/family/POA  and patient currently wishes Full Code.        Elevated LFTs  Patient with LFT elevation, initialyl bili 1.6 improved to 1.2, however AST and ALT are trending up, RUQ US without any new findings just stable mild intrahepatic biliary ductal dilation. No acute cholecystitis. On admission AST ALT were 295/304 and on 1/8 they were 532/573. No clear etiology, unclear if related to COVID and her acute illness. Patient denies supplements, ingestion, tylenol, will get hepatitis panel and tylenol level, if still rising on 1/9 can consider hepatology consult  - Acute hepatitis panel negative  - Tylenol level  - hepatitis consulted - autoimmune work up negative    Constipation  Start on senokot-S, miralax and pr bisacodyl    Nausea & vomiting  zofran prn  reglan 10 mg TID prn nausea  Treat constipation    Chronic diastolic heart failure  -2D echo 2019 with EF 55% and DD  -Continue home aldactone and B-blocker      Stage 3 chronic kidney disease  Cr at baseline  -Renally dose meds      Pure hypercholesterolemia  -Continue Lipitor 10mg PO daily      Acquired hypothyroidism  -Continue Synthroid 137mcg PO daily      Essential hypertension  -Continue atenolol and diltiazem. Hold aldactone due to N/V with hypovolemia      Paroxysmal atrial fibrillation  -Pt. In NSR on admit  -Continue amiodarone, diltiazem, and atenolol for rate control  -Continue eliquis for AC    Diet:  regular diet  GI PPx: protonix  DVT PPx:  apixaban  Airways: room air  Wounds: none    Goals of Care:  Return to prior functional status     Discharge plan: home health    Time (minutes) spent in care of the patient (Greater than 1/2 spent in direct face-to-face contact)  35 min    Elsi Lucio MD

## 2022-01-13 NOTE — PLAN OF CARE
Pt progressing towards goals.Telemetry maintained, a-paced.pt remains weak with a poor po intact.monitor liver enzymes.safety precautions maintained.bed alarm in use for pt safety.bed in low position.rails up x3.call bell in reach.continue plan of care.

## 2022-01-13 NOTE — PROGRESS NOTES
[facility] Medicine  Progress note    [place] [facility] [place] [facility] [place] Elsi Lucio MD  Admit Date: 1/7/2022  FRANCESCA 1/17/2022  Code status: Full Code    Principal Problem:  COVID-19    Interval hx:  No new complaints. Feeling improved, but still feeling poorly. Nursing reports concentrated foul smelling urine.     ROS   Respiratory: neg for cough neg for shortness of breath  Cardiovascular: neg for chest pain neg for palpitations  Gastrointestinal: neg for nausea neg for vomiting, neg for abdominal pain neg for diarrhea neg for constipation   Behavioral/Psych: neg for depression neg for anxiety    PEx  Temp:  [97.4 °F (36.3 °C)-98.5 °F (36.9 °C)]   Pulse:  [60-62]   Resp:  [16-19]   BP: (121-137)/(56-63)   SpO2:  [94 %-98 %]     Intake/Output Summary (Last 24 hours) at 1/13/2022 1145  Last data filed at 1/13/2022 0646  Gross per 24 hour   Intake 240 ml   Output 1000 ml   Net -760 ml       General Appearance: no acute distress   Heart: regular rate and rhythm, no heave  Respiratory: Normal respiratory effort, symmetric excursion, bilateral vesicular breath sounds   Abdomen: Soft, non-tender; bowel sounds active  Skin: intact, no rash, no ulcers  Neurologic:  No focal numbness or weakness  Mental status: Alert, oriented x 4, affect appropriate     Recent Labs   Lab 01/11/22 0225 01/12/22 0327 01/13/22 0317   WBC 5.50 10.55 7.75   HGB 12.3 12.7 12.0   HCT 38.4 39.6 36.3*    172 156     Recent Labs   Lab 01/07/22  1137 01/08/22  0848 01/11/22  0224 01/12/22  0327 01/13/22  0317   *   < > 133* 137 136   K 4.5   < > 4.8 5.3* 4.1   CL 97   < > 101 105 106   CO2 23   < > 23 25 24   BUN 20   < > 35* 40* 36*   CREATININE 1.2   < > 1.2 1.3 1.2      < > 85 103 117*   CALCIUM 8.8   < > 8.2* 8.4* 8.1*   MG  --   --  1.9 2.0 1.9   PHOS  --   --  2.4* 2.9 2.8   LIPASE 19  --   --   --   --     < > = values in this interval not displayed.     Recent Labs   Lab 01/10/22  0237 01/10/22  1018 01/11/22  0224 01/12/22  0327  01/13/22  0317   ALKPHOS   < >  --  126 181* 174*   ALT   < >  --  722* 690* 1,739*   AST   < >  --  505* 418* 1,581*   ALBUMIN   < >  --  3.1* 3.0* 2.7*   PROT   < >  --  5.7* 5.9* 5.5*   BILITOT   < >  --  1.1* 1.2* 2.2*   INR  --  1.0  --   --   --     < > = values in this interval not displayed.      Scheduled Meds:   amiodarone  200 mg Oral Daily    apixaban  5 mg Oral BID    ascorbic acid (vitamin C)  500 mg Oral BID    aspirin  81 mg Oral Daily    atenoloL  25 mg Oral BID    atorvastatin  10 mg Oral Daily    diltiaZEM  180 mg Oral Daily    k phos di & mono-sod phos mono  2 tablet Oral TID WM    levothyroxine  137 mcg Oral Before breakfast    multivitamin  1 tablet Oral Daily    pantoprazole  40 mg Oral Daily    senna-docusate 8.6-50 mg  2 tablet Oral Daily    vitamin D  2,000 Units Oral Daily     Continuous Infusions:   sodium chloride 0.9%       As Needed:  albuterol **AND** MDI Q6H PRN, benzonatate, glucose, glucose, loperamide, melatonin, ondansetron, sodium chloride 0.9%    Assessment and Plan  / Problems managed today    * COVID-19  Patient is identified as High risk for severe complications of COVID 19 based on COVID risk score of 6      Pt. Reported cough. No current hypoxia and CXR without any acute fidings. Will hold off on remdesivir given the patient's already significantly elevated LFTs as this may cloud the picture. Further treatment as below.     Diagnostics: (leukopenia, hyponatremia, hyperferritinemia, elevated troponin, elevated d-dimer, age, and comorbidities are significant predictors of poor clinical outcome)  CBC, CMP, Ferritin, CRP, LDH, BNP, ECG and Portable CXR     Management: Maintain oxygen saturations 92-96% via and monitor with intermittent pulse oximetry.  and Inhaled bronchodilators as needed for shortness of breath.     Oxygen Device: room air     Micronutrients   Multivitamin PO daily   Vitamin D 1000IU PO daily (if deficient)   Magnesium IV/PO (if  deficient)   Ascorbic acid 500mg po BID     Trend inflammatory markers, LDH, D-Dimer, ferritin, and CRP Q48hrs     Advance Care Planning  Current advance care plan has been discussed with patient/family/POA and patient currently wishes Full Code.      Unable to start COVID therapy due to elevated LFTs.       Elevated LFTs  Patient with LFT elevation, initialyl bili 1.6 improved to 1.2, however AST and ALT are trending up, RUQ US without any new findings just stable mild intrahepatic biliary ductal dilation. No acute cholecystitis. On admission AST ALT were 295/304 and on 1/8 they were 532/573. No clear etiology, unclear if related to COVID and her acute illness. Patient denies supplements, ingestion, tylenol, will get hepatitis panel and tylenol level, if still rising on 1/9 can consider hepatology consult  - Acute hepatitis panel negative  - Tylenol level  - hepatitis consulted - autoimmune work up thus far negative  - maybe COVID related  -worsening transaminitis - liver biopsy ordered    Malnutrition of moderate degree  Poor oral intake  NS at 100 mL/ for one liter    Anorexia  Se malnutrition    Constipation  Start on senokot-S, miralax and pr bisacodyl    Nausea & vomiting  zofran prn  reglan 10 mg TID prn nausea  Treat constipation    Chronic diastolic heart failure  -2D echo 2019 with EF 55% and DD  -Continue home aldactone and B-blocker      Stage 3 chronic kidney disease  Cr at baseline  -Renally dose meds      Pure hypercholesterolemia  -Continue Lipitor 10mg PO daily      Acquired hypothyroidism  -Continue Synthroid 137mcg PO daily      Essential hypertension  -Continue atenolol and diltiazem. Hold aldactone due to N/V with hypovolemia      Paroxysmal atrial fibrillation  -Pt. In NSR on admit  -Continue amiodarone, diltiazem, and atenolol for rate control  -Continue eliquis for AC    Diet:  regular diet  GI PPx: protonix  DVT PPx:  apixaban  Airways: room air  Wounds: none    Goals of Care:  Return to  prior functional status     Discharge plan: home health    Time (minutes) spent in care of the patient (Greater than 1/2 spent in direct face-to-face contact)  35 min    Elsi Lucio MD

## 2022-01-13 NOTE — TREATMENT PLAN
Hepatology Treatment Plan    Gisselle Ortiz is a 88 y.o. female admitted to hospital 1/7/2022 (Hospital Day: 7) due to COVID-19.     Interval History  Liver chemistries  Rising today.  Will proceed with biopsy.    Objective  Temp:  [97.4 °F (36.3 °C)-98.5 °F (36.9 °C)] 98.2 °F (36.8 °C) (01/13 1211)  Pulse:  [60-62] 60 (01/13 1211)  BP: (121-142)/(56-63) 142/63 (01/13 1211)  Resp:  [16-19] 18 (01/13 1211)  SpO2:  [94 %-98 %] 98 % (01/13 1211)    Laboratory    Lab Results   Component Value Date    WBC 7.75 01/13/2022    HGB 12.0 01/13/2022    HCT 36.3 (L) 01/13/2022    MCV 94 01/13/2022     01/13/2022       Lab Results   Component Value Date     01/13/2022    K 4.1 01/13/2022     01/13/2022    CO2 24 01/13/2022    BUN 36 (H) 01/13/2022    CREATININE 1.2 01/13/2022    CALCIUM 8.1 (L) 01/13/2022       Lab Results   Component Value Date    ALBUMIN 2.7 (L) 01/13/2022    ALT 1,739 (H) 01/13/2022    AST 1,581 (H) 01/13/2022    ALKPHOS 174 (H) 01/13/2022    BILITOT 2.2 (H) 01/13/2022       Lab Results   Component Value Date    INR 1.0 01/10/2022    INR 1.0 01/31/2021    INR 1.0 09/26/2020       MELD-Na score: 10 at 1/12/2022  3:27 AM  MELD score: 10 at 1/12/2022  3:27 AM  Calculated from:  Serum Creatinine: 1.3 mg/dL at 1/12/2022  3:27 AM  Serum Sodium: 137 mmol/L at 1/12/2022  3:27 AM  Total Bilirubin: 1.2 mg/dL at 1/12/2022  3:27 AM  INR(ratio): 1.0 at 1/10/2022 10:18 AM  Age: 88 years    Plan  - f/u autoimmune serologies  - Request IR consult for liver biopsy  - please obtain daily CBC, BMP, LFT, INR  - Plan of care was discussed with primary team    Thank you for involving us in the care of Gisselle Ortiz. Please call with any additional concerns or questions.    Antolin Ortega MD  Gastroenterology Fellow

## 2022-01-13 NOTE — ASSESSMENT & PLAN NOTE
Patient is identified as High risk for severe complications of COVID 19 based on COVID risk score of 6      Pt. Reported cough. No current hypoxia and CXR without any acute fidings. Will hold off on remdesivir given the patient's already significantly elevated LFTs as this may cloud the picture. Further treatment as below.     Diagnostics: (leukopenia, hyponatremia, hyperferritinemia, elevated troponin, elevated d-dimer, age, and comorbidities are significant predictors of poor clinical outcome)  CBC, CMP, Ferritin, CRP, LDH, BNP, ECG and Portable CXR     Management: Maintain oxygen saturations 92-96% via and monitor with intermittent pulse oximetry.  and Inhaled bronchodilators as needed for shortness of breath.     Oxygen Device: room air     Micronutrients   Multivitamin PO daily   Vitamin D 1000IU PO daily (if deficient)   Magnesium IV/PO (if deficient)   Ascorbic acid 500mg po BID     Trend inflammatory markers, LDH, D-Dimer, ferritin, and CRP Q48hrs     Advance Care Planning  Current advance care plan has been discussed with patient/family/POA and patient currently wishes Full Code.      Unable to start COVID therapy due to elevated LFTs.

## 2022-01-13 NOTE — H&P
Vascular and Interventional Radiology   Consult History & Physical      Date: 1/13/2022   [place] [facility] [place] [facility] [place] Elsi Lucio MD   Room/bed: 7092/7092 A    Inpatient consult to Interventional Radiology  Consult performed by: Momo Davila MD  Consult ordered by: Elsi Lucio MD        Reason for Consult:   Abnormal liver function    History of Present Illness:  Gisselle Ortiz is a 88 y.o. female with a history of COVID-19 and abnormal liver function with transaminases >1500 with unclear etiology.  IR was consulted for percutaneous liver biopsy.    Past Medical History:  Past Medical History:   Diagnosis Date    A-fib     Anemia of chronic disease 2/2/2021    Arthritis     Chronic diastolic heart failure 2/2/2021    Gallstone pancreatitis     Hypertension     Pancreatic abscess 9/26/2020    Thyroid disease        Past Surgical History:  Past Surgical History:   Procedure Laterality Date    CATARACT EXTRACTION      CATARACT EXTRACTION W/  INTRAOCULAR LENS IMPLANT Bilateral 2004     [place]    ENDOSCOPIC ULTRASOUND OF UPPER GASTROINTESTINAL TRACT N/A 9/14/2020    Procedure: ULTRASOUND, UPPER GI TRACT, ENDOSCOPIC;  Surgeon: Esha Howard MD;  Location: [address]);  Service: Endoscopy;  Laterality: N/A;    ENDOSCOPIC ULTRASOUND OF UPPER GASTROINTESTINAL TRACT  11/25/2020    Procedure: ULTRASOUND, UPPER GI TRACT, ENDOSCOPIC;  Surgeon: Esha Howard MD;  Location: [facility] ([address] [place] [address]);  Service: Endoscopy;;    ERCP N/A 9/14/2020    Procedure: ERCP (ENDOSCOPIC RETROGRADE CHOLANGIOPANCREATOGRAPHY);  Surgeon: Esha Howard MD;  [facility]  Service: Endoscopy;  Laterality: N/A;    ERCP N/A 9/25/2020    Procedure: ERCP (ENDOSCOPIC RETROGRADE CHOLANGIOPANCREATOGRAPHY);  Surgeon: Esha Howard MD;  Location: [facility] ENDO (2ND FLR);  Service: Endoscopy;  Laterality: N/A;    ERCP N/A 11/25/2020    Procedure: ERCP (ENDOSCOPIC RETROGRADE  CHOLANGIOPANCREATOGRAPHY);  Surgeon: Esha Howard MD;  Location: [facility] ENDO ([address] [place] [address]);  Service: Endoscopy;  Laterality: N/A;  Covid-19 test 11/22/20 at [place] - pg  2 day hold Eliquis, Dr Favian Colmenares - pg  PM prep    EYE SURGERY      HIP REPLACEMENT ARTHROPLASTY Right 2016    HYSTERECTOMY      REVISION OF SKIN POCKET FOR PACEMAKER N/A 1/21/2019    Procedure: REVISION-POCKET-PACEMAKER;  Surgeon: Asher Watts MD;  Location: [facility] EP LAB;  Service: Cardiology;  Laterality: N/A;  MODERATE SEDATION, sedation issues in the past    THYROIDECTOMY      TREATMENT OF CARDIAC ARRHYTHMIA N/A 3/18/2019    Procedure: CARDIOVERSION OR DEFIBRILLATION;  Surgeon: Asher Watts MD;  Location: [facility] EP LAB;  Service: Cardiology;  Laterality: N/A;  AF, JILL-cx if SR, DCCV, MAC, FAS, 3PREP    TREATMENT OF CARDIAC ARRHYTHMIA N/A 5/14/2019    Procedure: Cardioversion or Defibrillation;  Surgeon: Derick Vizcarra MD;  Location: [facility] EP LAB;  Service: Cardiology;  Laterality: N/A;  AF, JILL, DCCV, MAC, DM, 3PREP        Sedation History:    No known adverse reactions.     Social History:  Social History     Tobacco Use    Smoking status: Former Smoker     Start date: 5/19/1964    Smokeless tobacco: Never Used   Substance Use Topics    Alcohol use: No    Drug use: No        Home Medications:   Prior to Admission medications    Medication Sig Start Date End Date Taking? Authorizing Provider   acetaminophen (TYLENOL) 500 MG tablet Take 2 tablets (1,000 mg total) by mouth every 8 (eight) hours as needed for Pain. 2/3/21   Lilliam Sanchez MD   amiodarone (PACERONE) 200 MG Tab Take 1 tablet (200 mg total) by mouth once daily. 9/15/21   Kai Montez MD   aspirin (ECOTRIN) 81 MG EC tablet Take 1 tablet (81 mg total) by mouth once daily. 10/29/20   Chad Downing NP   atenoloL (TENORMIN) 25 MG tablet TAKE 2 TABLETS EVERY MORNING AND 1 TABLET EVERY EVENING 6/10/21   Flor Lawson NP    atorvastatin (LIPITOR) 10 MG tablet Take 1 tablet (10 mg total) by mouth once daily. 6/2/21   Kai Montez MD   diltiaZEM (TIAZAC) 180 MG Cs24 Take 1 capsule (180 mg total) by mouth once daily. 6/2/21 6/2/22  Kai Montez MD   ELIQUIS 2.5 mg Tab TAKE 1 TABLET TWICE A DAY 6/10/21   Flor Lawson NP   eszopiclone (LUNESTA) 2 MG Tab Take 1 tablet (2 mg total) by mouth nightly as needed (sleep). 7/6/21   Kai Montez MD   olopatadine (PATANOL) 0.1 % ophthalmic solution Place 1 drop into both eyes 2 (two) times daily. 11/18/21   Kai Montez MD   spironolactone (ALDACTONE) 25 MG tablet Take 1 tablet (25 mg total) by mouth 2 (two) times a day. 6/2/21   Kai Montez MD   SYNTHROID 137 mcg Tab tablet TAKE 1 TABLET DAILY 8/5/21   Kai Montez MD   teriparatide (FORTEO) 20 mcg/dose (600mcg/2.4mL) PnIj Inject 0.08 mLs (20 mcg total) into the skin once daily. 10/11/21 10/11/22  Kai Montez MD   vitamin D (VITAMIN D3) 1000 units Tab Take 1 tablet (1,000 Units total) by mouth once daily. 2/3/21   Lilliam Sanchez MD       Inpatient Medications:    Current Facility-Administered Medications:     albuterol inhaler 2 puff, 2 puff, Inhalation, Q6H PRN **AND** MDI Q6H PRN, , , Q6H PRN, Roderick Mcmahon MD    amiodarone tablet 200 mg, 200 mg, Oral, Daily, Roderick Mcmahon MD, 200 mg at 01/13/22 1000    apixaban tablet 5 mg, 5 mg, Oral, BID, Elsi Lucio MD, 5 mg at 01/13/22 1000    ascorbic acid (vitamin C) tablet 500 mg, 500 mg, Oral, BID, Roderick Mcmahon MD, 500 mg at 01/13/22 1000    aspirin EC tablet 81 mg, 81 mg, Oral, Daily, Roderick Mcmahon MD, 81 mg at 01/13/22 1000    atenoloL tablet 25 mg, 25 mg, Oral, BID, Roderick Mcmahon MD, 25 mg at 01/13/22 1000    atorvastatin tablet 10 mg, 10 mg, Oral, Daily, Roderick Mcmahon MD, 10 mg at 01/13/22 1000    benzonatate capsule 100 mg, 100 mg, Oral, TID PRN, Roderick Mcmahon MD, 100 mg at 01/09/22  2006    diltiaZEM 24 hr capsule 180 mg, 180 mg, Oral, Daily, Roderick Mcmahon MD, 180 mg at 01/13/22 1000    glucose chewable tablet 16 g, 16 g, Oral, PRN, Roderick Mcmahon MD    glucose chewable tablet 24 g, 24 g, Oral, PRN, Roderick Mcmahon MD    k phos di & mono-sod phos mono 250 mg tablet 2 tablet, 2 tablet, Oral, TID WM, Elsi Lucio MD, 2 tablet at 01/13/22 1006    levothyroxine tablet 137 mcg, 137 mcg, Oral, Before breakfast, Roderick Mcmahon MD, 137 mcg at 01/13/22 0635    loperamide capsule 2 mg, 2 mg, Oral, Q6H PRN, Roderick Mcmahon MD    melatonin tablet 6 mg, 6 mg, Oral, Nightly PRN, Roderick Mcmahon MD, 6 mg at 01/10/22 2109    multivitamin tablet, 1 tablet, Oral, Daily, Roderick Mcmahon MD, 1 tablet at 01/13/22 1000    ondansetron injection 4 mg, 4 mg, Intravenous, Q8H PRN, Roderick Mcmahon MD, 4 mg at 01/09/22 0831    pantoprazole EC tablet 40 mg, 40 mg, Oral, Daily, Elsi Lucio MD, 40 mg at 01/13/22 1000    senna-docusate 8.6-50 mg per tablet 2 tablet, 2 tablet, Oral, Daily, Elsi Lucio MD, 2 tablet at 01/13/22 1000    sodium chloride 0.9% flush 10 mL, 10 mL, Intravenous, PRN, Roderick Mcmahon MD    vitamin D 1000 units tablet 2,000 Units, 2,000 Units, Oral, Daily, Elsi Lucio MD, 2,000 Units at 01/13/22 1000     Anticoagulants/Antiplatelets:   Apixaban    Allergies:   Review of patient's allergies indicates:   Allergen Reactions    Ciprofloxacin Nausea And Vomiting       Review of Systems:   As documented in primary provider H&P.    Vital Signs:  Temp: 98.5 °F (36.9 °C) (01/13/22 0827)  Pulse: 60 (01/13/22 1039)  Resp: 16 (01/13/22 0827)  BP: 132/63 (01/13/22 0827)  SpO2: 95 % (01/13/22 0827)    Temp:  [97.4 °F (36.3 °C)-98.5 °F (36.9 °C)]   Pulse:  [60-62]   Resp:  [16-19]   BP: (121-137)/(56-63)   SpO2:  [94 %-98 %]      Physical Exam:  No acute distress, laying comfortably in bed, pleasant and cooperative  Regular rate and rhythm  Breathing unlabored  Abdomen benign  Extremities  warm and well perfused    Sedation Exam:  ASA: II - Patient appears to have mild systemic disease, adequately controlled  Mallampati score: II (hard and soft palate, upper portion of tonsils anduvula visible)    Laboratory:  Lab Results   Component Value Date    INR 1.0 01/10/2022       Lab Results   Component Value Date    WBC 7.75 01/13/2022    HGB 12.0 01/13/2022    HCT 36.3 (L) 01/13/2022    MCV 94 01/13/2022     01/13/2022      Lab Results   Component Value Date     (H) 01/13/2022     01/13/2022    K 4.1 01/13/2022     01/13/2022    CO2 24 01/13/2022    BUN 36 (H) 01/13/2022    CREATININE 1.2 01/13/2022    CALCIUM 8.1 (L) 01/13/2022    MG 1.9 01/13/2022    ALT 1,739 (H) 01/13/2022    AST 1,581 (H) 01/13/2022    ALBUMIN 2.7 (L) 01/13/2022    BILITOT 2.2 (H) 01/13/2022    BILIDIR 0.3 11/02/2020       Imaging:   Reviewed.      ASSESSMENT/PLAN/RECOMMENDATIONS:   88F with COVID-19 and liver dysfunction of unclear etiology presents for liver biopsy    Sedation Plan: Moderate  Patient will undergo: Image-guided percutaneous liver biopsy    Given patient on current aspirin and apixaban, will need to hold both for at least 5 days.  Biopsy tentatively scheduled for Wednesday, 01/19 assuming anticoagulation/antiplatelets are held from now and patient remains inpatient.     Momo Davila M.D.  Resident, Diagnostic and Interventional Radiology  Department of Radiology  [facility]

## 2022-01-14 LAB
ALBUMIN SERPL BCP-MCNC: 2.7 G/DL (ref 3.5–5.2)
ALP SERPL-CCNC: 137 U/L (ref 55–135)
ALT SERPL W/O P-5'-P-CCNC: 1261 U/L (ref 10–44)
ANION GAP SERPL CALC-SCNC: 10 MMOL/L (ref 8–16)
AST SERPL-CCNC: 746 U/L (ref 10–40)
BASOPHILS # BLD AUTO: 0.02 K/UL (ref 0–0.2)
BASOPHILS NFR BLD: 0.3 % (ref 0–1.9)
BILIRUB SERPL-MCNC: 1.6 MG/DL (ref 0.1–1)
BUN SERPL-MCNC: 36 MG/DL (ref 8–23)
CALCIUM SERPL-MCNC: 7.8 MG/DL (ref 8.7–10.5)
CHLORIDE SERPL-SCNC: 106 MMOL/L (ref 95–110)
CO2 SERPL-SCNC: 23 MMOL/L (ref 23–29)
CREAT SERPL-MCNC: 1 MG/DL (ref 0.5–1.4)
DIFFERENTIAL METHOD: ABNORMAL
EOSINOPHIL # BLD AUTO: 0.1 K/UL (ref 0–0.5)
EOSINOPHIL NFR BLD: 0.9 % (ref 0–8)
ERYTHROCYTE [DISTWIDTH] IN BLOOD BY AUTOMATED COUNT: 13.4 % (ref 11.5–14.5)
EST. GFR  (AFRICAN AMERICAN): 58.1 ML/MIN/1.73 M^2
EST. GFR  (NON AFRICAN AMERICAN): 50.4 ML/MIN/1.73 M^2
GLUCOSE SERPL-MCNC: 95 MG/DL (ref 70–110)
HCT VFR BLD AUTO: 37.5 % (ref 37–48.5)
HGB BLD-MCNC: 12 G/DL (ref 12–16)
IMM GRANULOCYTES # BLD AUTO: 0.1 K/UL (ref 0–0.04)
IMM GRANULOCYTES NFR BLD AUTO: 1.7 % (ref 0–0.5)
LYMPHOCYTES # BLD AUTO: 1.2 K/UL (ref 1–4.8)
LYMPHOCYTES NFR BLD: 20.3 % (ref 18–48)
MAGNESIUM SERPL-MCNC: 1.8 MG/DL (ref 1.6–2.6)
MCH RBC QN AUTO: 30.8 PG (ref 27–31)
MCHC RBC AUTO-ENTMCNC: 32 G/DL (ref 32–36)
MCV RBC AUTO: 96 FL (ref 82–98)
MONOCYTES # BLD AUTO: 0.5 K/UL (ref 0.3–1)
MONOCYTES NFR BLD: 8.9 % (ref 4–15)
NEUTROPHILS # BLD AUTO: 4 K/UL (ref 1.8–7.7)
NEUTROPHILS NFR BLD: 67.9 % (ref 38–73)
NRBC BLD-RTO: 0 /100 WBC
PHOSPHATE SERPL-MCNC: 3.6 MG/DL (ref 2.7–4.5)
PLATELET # BLD AUTO: 168 K/UL (ref 150–450)
PMV BLD AUTO: 10.4 FL (ref 9.2–12.9)
POTASSIUM SERPL-SCNC: 4.4 MMOL/L (ref 3.5–5.1)
PROT SERPL-MCNC: 5.4 G/DL (ref 6–8.4)
RBC # BLD AUTO: 3.9 M/UL (ref 4–5.4)
SODIUM SERPL-SCNC: 139 MMOL/L (ref 136–145)
WBC # BLD AUTO: 5.86 K/UL (ref 3.9–12.7)

## 2022-01-14 PROCEDURE — 80053 COMPREHEN METABOLIC PANEL: CPT | Performed by: STUDENT IN AN ORGANIZED HEALTH CARE EDUCATION/TRAINING PROGRAM

## 2022-01-14 PROCEDURE — 97116 GAIT TRAINING THERAPY: CPT

## 2022-01-14 PROCEDURE — 25000003 PHARM REV CODE 250: Performed by: INTERNAL MEDICINE

## 2022-01-14 PROCEDURE — 99239 HOSP IP/OBS DSCHRG MGMT >30: CPT | Mod: CR,,, | Performed by: INTERNAL MEDICINE

## 2022-01-14 PROCEDURE — 36415 COLL VENOUS BLD VENIPUNCTURE: CPT | Performed by: STUDENT IN AN ORGANIZED HEALTH CARE EDUCATION/TRAINING PROGRAM

## 2022-01-14 PROCEDURE — 85025 COMPLETE CBC W/AUTO DIFF WBC: CPT | Performed by: STUDENT IN AN ORGANIZED HEALTH CARE EDUCATION/TRAINING PROGRAM

## 2022-01-14 PROCEDURE — 84100 ASSAY OF PHOSPHORUS: CPT | Performed by: INTERNAL MEDICINE

## 2022-01-14 PROCEDURE — 11000001 HC ACUTE MED/SURG PRIVATE ROOM

## 2022-01-14 PROCEDURE — 83735 ASSAY OF MAGNESIUM: CPT | Performed by: INTERNAL MEDICINE

## 2022-01-14 PROCEDURE — 97535 SELF CARE MNGMENT TRAINING: CPT

## 2022-01-14 PROCEDURE — 27000207 HC ISOLATION

## 2022-01-14 PROCEDURE — 94761 N-INVAS EAR/PLS OXIMETRY MLT: CPT

## 2022-01-14 PROCEDURE — 25000003 PHARM REV CODE 250: Performed by: HOSPITALIST

## 2022-01-14 PROCEDURE — 99239 PR HOSPITAL DISCHARGE DAY,>30 MIN: ICD-10-PCS | Mod: CR,,, | Performed by: INTERNAL MEDICINE

## 2022-01-14 RX ORDER — ENOXAPARIN SODIUM 100 MG/ML
40 INJECTION SUBCUTANEOUS EVERY 24 HOURS
Status: DISCONTINUED | OUTPATIENT
Start: 2022-01-14 | End: 2022-01-16 | Stop reason: HOSPADM

## 2022-01-14 RX ADMIN — CHOLECALCIFEROL TAB 25 MCG (1000 UNIT) 2000 UNITS: 25 TAB at 10:01

## 2022-01-14 RX ADMIN — DIBASIC SODIUM PHOSPHATE, MONOBASIC POTASSIUM PHOSPHATE AND MONOBASIC SODIUM PHOSPHATE 2 TABLET: 852; 155; 130 TABLET ORAL at 02:01

## 2022-01-14 RX ADMIN — DILTIAZEM HYDROCHLORIDE 180 MG: 180 CAPSULE, COATED, EXTENDED RELEASE ORAL at 10:01

## 2022-01-14 RX ADMIN — AMIODARONE HYDROCHLORIDE 200 MG: 200 TABLET ORAL at 10:01

## 2022-01-14 RX ADMIN — ATORVASTATIN CALCIUM 10 MG: 10 TABLET, FILM COATED ORAL at 10:01

## 2022-01-14 RX ADMIN — SENNOSIDES AND DOCUSATE SODIUM 2 TABLET: 50; 8.6 TABLET ORAL at 10:01

## 2022-01-14 RX ADMIN — PANTOPRAZOLE SODIUM 40 MG: 40 TABLET, DELAYED RELEASE ORAL at 10:01

## 2022-01-14 RX ADMIN — ATENOLOL 25 MG: 25 TABLET ORAL at 08:01

## 2022-01-14 RX ADMIN — DIBASIC SODIUM PHOSPHATE, MONOBASIC POTASSIUM PHOSPHATE AND MONOBASIC SODIUM PHOSPHATE 2 TABLET: 852; 155; 130 TABLET ORAL at 07:01

## 2022-01-14 RX ADMIN — MULTIPLE VITAMINS W/ MINERALS TAB 1 TABLET: TAB at 10:01

## 2022-01-14 RX ADMIN — LEVOTHYROXINE SODIUM 137 MCG: 0.03 TABLET ORAL at 06:01

## 2022-01-14 RX ADMIN — ATENOLOL 25 MG: 25 TABLET ORAL at 10:01

## 2022-01-14 NOTE — PROGRESS NOTES
[facility] Medicine  Progress note    Team: [facility] Elsi Lucio MD  Admit Date: 1/7/2022  FRANCESCA 1/21/2022  Code status: Full Code    Principal Problem:  COVID-19    Interval hx:  No new complaints. Feeling improved, but still feeling poorly. Nursing reports concentrated foul smelling urine.     ROS   Respiratory: neg for cough neg for shortness of breath  Cardiovascular: neg for chest pain neg for palpitations  Gastrointestinal: neg for nausea neg for vomiting, neg for abdominal pain neg for diarrhea neg for constipation   Behavioral/Psych: neg for depression neg for anxiety    PEx  Temp:  [97.4 °F (36.3 °C)-98.6 °F (37 °C)]   Pulse:  [60-63]   Resp:  [16-20]   BP: (119-156)/(57-74)   SpO2:  [97 %-98 %]     Intake/Output Summary (Last 24 hours) at 1/14/2022 1540  Last data filed at 1/14/2022 0600  Gross per 24 hour   Intake 120 ml   Output 650 ml   Net -530 ml       General Appearance: no acute distress   Heart: regular rate and rhythm, no heave  Respiratory: Normal respiratory effort, symmetric excursion, bilateral vesicular breath sounds   Abdomen: Soft, non-tender; bowel sounds active  Skin: intact, no rash, no ulcers  Neurologic:  No focal numbness or weakness  Mental status: Alert, oriented x 4, affect appropriate     Recent Labs   Lab 01/12/22 0327 01/13/22 0317 01/14/22  0339   WBC 10.55 7.75 5.86   HGB 12.7 12.0 12.0   HCT 39.6 36.3* 37.5    156 168     Recent Labs   Lab 01/12/22 0327 01/13/22 0317 01/14/22  0339    136 139   K 5.3* 4.1 4.4    106 106   CO2 25 24 23   BUN 40* 36* 36*   CREATININE 1.3 1.2 1.0    117* 95   CALCIUM 8.4* 8.1* 7.8*   MG 2.0 1.9 1.8   PHOS 2.9 2.8 3.6     Recent Labs   Lab 01/10/22  1018 01/11/22  0224 01/12/22 0327 01/13/22 0317 01/14/22  0339   ALKPHOS  --    < > 181* 174* 137*   ALT  --    < > 690* 1,739* 1,261*   AST  --    < > 418* 1,581* 746*   ALBUMIN  --    < > 3.0* 2.7* 2.7*   PROT  --    < > 5.9* 5.5* 5.4*   BILITOT  --    < > 1.2*  2.2* 1.6*   INR 1.0  --   --   --   --     < > = values in this interval not displayed.      Scheduled Meds:   amiodarone  200 mg Oral Daily    atenoloL  25 mg Oral BID    atorvastatin  10 mg Oral Daily    diltiaZEM  180 mg Oral Daily    enoxaparin  40 mg Subcutaneous Daily    k phos di & mono-sod phos mono  2 tablet Oral TID WM    levothyroxine  137 mcg Oral Before breakfast    multivitamin  1 tablet Oral Daily    pantoprazole  40 mg Oral Daily    senna-docusate 8.6-50 mg  2 tablet Oral Daily    vitamin D  2,000 Units Oral Daily     Continuous Infusions:    As Needed:  albuterol **AND** MDI Q6H PRN, benzonatate, glucose, glucose, loperamide, melatonin, ondansetron, sodium chloride 0.9%    Assessment and Plan  / Problems managed today    * COVID-19  Patient is identified as High risk for severe complications of COVID 19 based on COVID risk score of 6      Pt. Reported cough. No current hypoxia and CXR without any acute fidings. Will hold off on remdesivir given the patient's already significantly elevated LFTs as this may cloud the picture. Further treatment as below.     Diagnostics: (leukopenia, hyponatremia, hyperferritinemia, elevated troponin, elevated d-dimer, age, and comorbidities are significant predictors of poor clinical outcome)  CBC, CMP, Ferritin, CRP, LDH, BNP, ECG and Portable CXR     Management: Maintain oxygen saturations 92-96% via and monitor with intermittent pulse oximetry.  and Inhaled bronchodilators as needed for shortness of breath.     Oxygen Device: room air     Micronutrients   Multivitamin PO daily   Vitamin D 1000IU PO daily (if deficient)   Magnesium IV/PO (if deficient)   Ascorbic acid 500mg po BID     Trend inflammatory markers, LDH, D-Dimer, ferritin, and CRP Q48hrs     Advance Care Planning  Current advance care plan has been discussed with patient/family/POA and patient currently wishes Full Code.      Unable to start COVID therapy due to elevated LFTs.      Elevated LFTs  Patient with LFT elevation, initialyl bili 1.6 improved to 1.2, however AST and ALT are trending up, RUQ US without any new findings just stable mild intrahepatic biliary ductal dilation. No acute cholecystitis. On admission AST ALT were 295/304 and on 1/8 they were 532/573. No clear etiology, unclear if related to COVID and her acute illness. Patient denies supplements, ingestion, tylenol, will get hepatitis panel and tylenol level  - hepatitis consulted - autoimmune work up thus far negative  - maybe COVID related  -worsening transaminitis - liver biopsy ordered  -liver biopsy deferred due to needing five days off anti-platelet therapy  -transaminitis with enzymes in 1000s, improving today    Malnutrition of moderate degree  Poor oral intake  NS at 100 mL/ for one liter    Anorexia  Se malnutrition    Nausea & vomiting  zofran prn  reglan 10 mg TID prn nausea  Treat constipation    Constipation  Start on senokot-S, miralax and pr bisacodyl    Chronic diastolic heart failure  -2D echo 2019 with EF 55% and DD  -Continue home aldactone and B-blocker      Stage 3 chronic kidney disease  Cr at baseline  -Renally dose meds      Pure hypercholesterolemia  -Continue Lipitor 10mg PO daily      Acquired hypothyroidism  -Continue Synthroid 137mcg PO daily      Essential hypertension  -Continue atenolol and diltiazem. Hold aldactone due to N/V with hypovolemia      Paroxysmal atrial fibrillation  -Pt. In NSR on admit  -Continue amiodarone, diltiazem, and atenolol for rate control  -Continue eliquis for AC    Diet:  regular diet  GI PPx: protonix  DVT PPx:  enoxaparin  Airways: room air  Wounds: none    Goals of Care:  Return to prior functional status     Discharge plan: home health    Time (minutes) spent in care of the patient (Greater than 1/2 spent in direct face-to-face contact)  35 min    Elsi Lucio MD

## 2022-01-14 NOTE — TREATMENT PLAN
Hepatology Treatment Plan    Gisselle Ortiz is a 88 y.o. female admitted to hospital 1/7/2022 (Hospital Day: 8) due to COVID-19.     Interval History  Liver enzymes down trending this morning.  Since the patient is on anticoagulation, can't do a liver biopsy until next Wednesday.    Objective  Temp:  [97.4 °F (36.3 °C)-98.6 °F (37 °C)] 98.6 °F (37 °C) (01/14 0824)  Pulse:  [60-63] 63 (01/14 0824)  BP: (119-156)/(57-74) 140/64 (01/14 0824)  Resp:  [16-20] 16 (01/14 0824)  SpO2:  [98 %] 98 % (01/14 0824)    Laboratory    Lab Results   Component Value Date    WBC 5.86 01/14/2022    HGB 12.0 01/14/2022    HCT 37.5 01/14/2022    MCV 96 01/14/2022     01/14/2022       Lab Results   Component Value Date     01/14/2022    K 4.4 01/14/2022     01/14/2022    CO2 23 01/14/2022    BUN 36 (H) 01/14/2022    CREATININE 1.0 01/14/2022    CALCIUM 7.8 (L) 01/14/2022       Lab Results   Component Value Date    ALBUMIN 2.7 (L) 01/14/2022    ALT 1,261 (H) 01/14/2022     (H) 01/14/2022    ALKPHOS 137 (H) 01/14/2022    BILITOT 1.6 (H) 01/14/2022       Lab Results   Component Value Date    INR 1.0 01/10/2022    INR 1.0 01/31/2021    INR 1.0 09/26/2020       MELD-Na score: 10 at 1/12/2022  3:27 AM  MELD score: 10 at 1/12/2022  3:27 AM  Calculated from:  Serum Creatinine: 1.3 mg/dL at 1/12/2022  3:27 AM  Serum Sodium: 137 mmol/L at 1/12/2022  3:27 AM  Total Bilirubin: 1.2 mg/dL at 1/12/2022  3:27 AM  INR(ratio): 1.0 at 1/10/2022 10:18 AM  Age: 88 years     The cause of liver enzyme elevation is unclear, differential includes autoimmune hepatitis, COVID, drug-induced liver injury, other less common infections.  Recommend a liver biopsy, however it cannot be done until next week when off anticoagulation for 5 days.  Recommend monitoring liver enzymes in the meantime.    Plan  - f/u autoimmune serologies  - liver biopsy when off anticoagulation, per IR  - please obtain daily CBC, BMP, LFT, INR    Thank you for  involving us in the care of Gisselle Ortiz. Please call with any additional concerns or questions.    Hetal Zapata MD  Gastroenterology Fellow

## 2022-01-14 NOTE — PT/OT/SLP PROGRESS
Occupational Therapy  Co-Treatment    Name: Gisselle Ortiz  MRN: [id number]  Admitting Diagnosis:  COVID-19       Recommendations:     Discharge Recommendations: home health OT  Discharge Equipment Recommendations:  none  Barriers to discharge:  None    Assessment:     Gisselle Ortiz is a 88 y.o. female with a medical diagnosis of COVID-19.  Performance deficits affecting function are weakness,impaired endurance,impaired self care skills,impaired functional mobilty,gait instability,impaired sensation,decreased coordination,impaired cardiopulmonary response to activity,impaired balance. Patient would benefit from continued skilled acute OT 3x/wk to improve functional mobility, increase independence with ADLs, and address established goals. Recommending  HHOT once medically appropriate for discharge to increase maximal independence, reduce burden of care, and ensure safety.     Rehab Prognosis:  Good; patient would benefit from acute skilled OT services to address these deficits and reach maximum level of function.       Plan:     Patient to be seen 3 x/week to address the above listed problems via self-care/home management,therapeutic activities,therapeutic exercises  · Plan of Care Expires: 02/08/22  · Plan of Care Reviewed with: patient    Subjective     Pain/Comfort:  · Pain Rating 1: 0/10  · Pain Rating Post-Intervention 1: 0/10    Objective:     Communicated with: NSG prior to session.  Patient found HOB elevated with telemetry,pulse ox (continuous),peripheral IV upon OT entry to room.    General Precautions: Standard, fall,contact,airborne,droplet (COVID +)   Orthopedic Precautions:N/A   Braces: N/A  Respiratory Status: Room air     Occupational Performance:     Bed Mobility:    · Patient completed Scooting/Bridging with stand by assistance  · Patient completed Supine to Sit with stand by assistance     Functional Mobility/Transfers:  · Patient completed Sit <> Stand Transfer with stand by assistance   with  rolling walker   · Patient completed Toilet Transfer bed>toilet; toilet>bedside chair with functional ambulation technique with stand by assistance with  rolling walker (patient stood at sink for g/h tasks after sitting on toilet and prior to sitting in bedside chair)    Activities of Daily Living:  · Grooming: stand by assistance standing at sink to perform oral care, wash face, and wash/dry hands    [facility] 6 Click ADL: 19    Treatment & Education:  Role of OT and POC  ADL retraining  Functional mobility training  Safety  Discharge planning  Importance of OOB activity    Co-treatment performed due to patient's multiple deficits requiring two skilled therapists to appropriately and safely assess patient's strength and endurance while facilitating functional tasks in addition to accommodating for patient's activity tolerance.     Patient left HOB elevated with call button in reach and all needs met. Education:      GOALS:   Multidisciplinary Problems     Occupational Therapy Goals        Problem: Occupational Therapy Goal    Goal Priority Disciplines Outcome Interventions   Occupational Therapy Goal     OT, PT/OT Ongoing, Progressing    Description: Goals to be met by: 01-19-22     Patient will increase functional independence with ADLs by performing:    UE Dressing with Set-up Assistance.=MET  LE Dressing with Stand-by Assistance.  Grooming while standing at sink with Stand-by Assistance. Met  Grooming while standing at sink with modified independence.   Toileting from bedside commode with Stand-by Assistance for hygiene and clothing management.   Supine to sit with [place].  Stand pivot transfers with Stand-by Assistance.  Toilet transfer to bedside commode with Stand-by Assistance. Met to toilet.   Toilet transfer to toilet with modified independence.   Pt. To be I with HEP to BUE to improve level of endurance  Pt. To be able to state 3 energy conservation techniques to incorporate with ADL/IADl task  performance..                     Time Tracking:     OT Date of Treatment: 01/14/22  OT Start Time: 1428  OT Stop Time: 1448  OT Total Time (min): 20 min    Billable Minutes:Self Care/Home Management 20    OT/MINERVA: OT     MINERVA Visit Number: 0    1/14/2022

## 2022-01-14 NOTE — PT/OT/SLP PROGRESS
Physical Therapy Treatment    Patient Name:  Gisselle Ortiz   MRN:  [id number]  Admit Date: 2022  Admitting Diagnosis:  COVID-19   Length of Stay: 6 days  Recent Surgery: * No surgery found *      Recommendations:     Discharge Recommendations:  home health PT   Discharge Equipment Recommendations: none   Barriers to discharge: None    Plan:     During this hospitalization, patient to be seen 3 x/week to address the listed problems via gait training,therapeutic activities,therapeutic exercises,neuromuscular re-education  · Plan of Care Expires:  22   Plan of Care Reviewed with: patient    Assessment:     Gisselle Ortiz is a 88 y.o. female admitted with a medical diagnosis of COVID-19. Pt progressing towards goals, but not at PLOF. Pt tolerated session well but continues with impaired endurance.  Pt is improving with therapy evidenced by increased gait distance and increased activity tolerance with VSS. Pt would benefit from continued PT services to improve overall functional mobility. Recommend d/c to HHPT to maximize functional independence.        Problem List: weakness,impaired endurance,impaired functional mobilty,impaired cardiopulmonary response to activity.  Rehab Prognosis: Good     GOALS:   Multidisciplinary Problems     Physical Therapy Goals        Problem: Physical Therapy Goal    Goal Priority Disciplines Outcome Goal Variances Interventions   Physical Therapy Goal     PT, PT/OT Ongoing, Progressing     Description: Goals to be met by: 22     Patient will increase functional independence with mobility by performin. Supine to sit with Modified [place]  2. Sit to supine with Modified [place]  3. Sit to stand transfer with Modified [place]  4. Gait  x 50 feet with Modified [place] using LRAD, if needed.   5. Stand for 5 minutes with Modified [place] using LRAD, if needed                     Subjective   Communicated with RN prior to session.  Patient  "found HOB elevated upon PT entry to room, agreeable to evaluation. Gisselle Ortiz's alone during session.    Chief Complaint: none reported   Patient/Family Comments/goals: to get better and return home   Pain/Comfort:  · Pain Rating 1: 0/10  · Pain Rating Post-Intervention 1: 0/10    Objective:   Patient found with: telemetry,pulse ox (continuous),blood pressure cuff,peripheral IV   General Precautions: Standard, Cardiac airborne,contact,droplet,fall   Orthopedic Precautions:N/A   Braces: N/A   Oxygen Device: Room Air  Vitals: /74 (Patient Position: Lying)   Pulse 60   Temp 98.5 °F (36.9 °C) (Oral)   Resp 16   Ht 5' 3" (1.6 m)   Wt 55.5 kg (122 lb 5.7 oz)   LMP  (LMP Unknown)   SpO2 98%   BMI 21.67 kg/m²     Outcome Measures:  AM-PAC 6 CLICK MOBILITY  Turning over in bed (including adjusting bedclothes, sheets and blankets)?: 3  Sitting down on and standing up from a chair with arms (e.g., wheelchair, bedside commode, etc.): 3  Moving from lying on back to sitting on the side of the bed?: 3  Moving to and from a bed to a chair (including a wheelchair)?: 3  Need to walk in hospital room?: 3  Climbing 3-5 steps with a railing?: 3  Basic Mobility Total Score: 18         Functional Mobility:  Additional staff present: OT - severe lightheadedness/dizziness last session  Bed Mobility:    Supine to Sit: stand by assistance; HOB elevated   Scooting anteriorly to EOB to have both feet planted on floor: stand by assistance      Sitting Balance at Edge of Bed:   Assistance Level Required: Stand-by Assistance      Transfers:    Sit <> Stand Transfer: stand by assistance with rolling walker x 2 trials (EOB, toilet)      Gait:  · Patient ambulated: 10ft + 20ft (seated rest break and standing ADLs between trials)   · Patient required: standy by assistance  · Patient used: rolling walker  · Gait Pattern observed: reciprocal gait  · Gait Deviation(s): decreased step length, flexed posture and decreased " tae  · Impairments due to: decreased strength and decreased endurance  · Comments:   · Verbal cuing for upright posture and pacing       Therapeutic Activities, Exercises, and Education:   Educated pt on PT role/POC  Educated pt on importance of OOB activity and daily ambulation   Pt verbalized understanding    Standing at sink to perform ADLs with OT and to increase standing tolerance and work on dynamic standing balance  Pt demo'd SOB but SpO2 remained >95%    T/f to chair to increase tolerance to OOB activity and to create optimal positioning for lung expansion       Patient left up in chair with all lines intact, call button in reach and RN notified..    Time Tracking:     PT Received On: 01/14/22  PT Start Time: 1428     PT Stop Time: 1448  PT Total Time (min): 20 min       Billable Minutes:   · Gait Training 10    Treatment Type: Treatment  PT/PTA: PT

## 2022-01-15 LAB
ALBUMIN SERPL BCP-MCNC: 2.8 G/DL (ref 3.5–5.2)
ALP SERPL-CCNC: 148 U/L (ref 55–135)
ALT SERPL W/O P-5'-P-CCNC: 867 U/L (ref 10–44)
ANION GAP SERPL CALC-SCNC: 7 MMOL/L (ref 8–16)
AST SERPL-CCNC: 347 U/L (ref 10–40)
BASOPHILS # BLD AUTO: 0.04 K/UL (ref 0–0.2)
BASOPHILS NFR BLD: 0.7 % (ref 0–1.9)
BILIRUB SERPL-MCNC: 1.2 MG/DL (ref 0.1–1)
BUN SERPL-MCNC: 35 MG/DL (ref 8–23)
CALCIUM SERPL-MCNC: 8.2 MG/DL (ref 8.7–10.5)
CHLORIDE SERPL-SCNC: 111 MMOL/L (ref 95–110)
CO2 SERPL-SCNC: 22 MMOL/L (ref 23–29)
CREAT SERPL-MCNC: 1 MG/DL (ref 0.5–1.4)
DIFFERENTIAL METHOD: ABNORMAL
EOSINOPHIL # BLD AUTO: 0.1 K/UL (ref 0–0.5)
EOSINOPHIL NFR BLD: 1.4 % (ref 0–8)
ERYTHROCYTE [DISTWIDTH] IN BLOOD BY AUTOMATED COUNT: 13.6 % (ref 11.5–14.5)
EST. GFR  (AFRICAN AMERICAN): 58.1 ML/MIN/1.73 M^2
EST. GFR  (NON AFRICAN AMERICAN): 50.4 ML/MIN/1.73 M^2
GLUCOSE SERPL-MCNC: 127 MG/DL (ref 70–110)
HCT VFR BLD AUTO: 34.9 % (ref 37–48.5)
HGB BLD-MCNC: 11.5 G/DL (ref 12–16)
IMM GRANULOCYTES # BLD AUTO: 0.18 K/UL (ref 0–0.04)
IMM GRANULOCYTES NFR BLD AUTO: 3.1 % (ref 0–0.5)
LYMPHOCYTES # BLD AUTO: 1.3 K/UL (ref 1–4.8)
LYMPHOCYTES NFR BLD: 22.9 % (ref 18–48)
MAGNESIUM SERPL-MCNC: 1.7 MG/DL (ref 1.6–2.6)
MCH RBC QN AUTO: 31.3 PG (ref 27–31)
MCHC RBC AUTO-ENTMCNC: 33 G/DL (ref 32–36)
MCV RBC AUTO: 95 FL (ref 82–98)
MONOCYTES # BLD AUTO: 0.7 K/UL (ref 0.3–1)
MONOCYTES NFR BLD: 12.5 % (ref 4–15)
NEUTROPHILS # BLD AUTO: 3.4 K/UL (ref 1.8–7.7)
NEUTROPHILS NFR BLD: 59.4 % (ref 38–73)
NRBC BLD-RTO: 0 /100 WBC
PHOSPHATE SERPL-MCNC: 3 MG/DL (ref 2.7–4.5)
PLATELET # BLD AUTO: 204 K/UL (ref 150–450)
PMV BLD AUTO: 9.9 FL (ref 9.2–12.9)
POTASSIUM SERPL-SCNC: 3.6 MMOL/L (ref 3.5–5.1)
PROT SERPL-MCNC: 5.6 G/DL (ref 6–8.4)
RBC # BLD AUTO: 3.67 M/UL (ref 4–5.4)
SODIUM SERPL-SCNC: 140 MMOL/L (ref 136–145)
WBC # BLD AUTO: 5.77 K/UL (ref 3.9–12.7)

## 2022-01-15 PROCEDURE — 99233 SBSQ HOSP IP/OBS HIGH 50: CPT | Mod: CR,,, | Performed by: INTERNAL MEDICINE

## 2022-01-15 PROCEDURE — 99233 PR SUBSEQUENT HOSPITAL CARE,LEVL III: ICD-10-PCS | Mod: CR,,, | Performed by: INTERNAL MEDICINE

## 2022-01-15 PROCEDURE — 25000003 PHARM REV CODE 250: Performed by: HOSPITALIST

## 2022-01-15 PROCEDURE — 25000003 PHARM REV CODE 250: Performed by: INTERNAL MEDICINE

## 2022-01-15 PROCEDURE — 85025 COMPLETE CBC W/AUTO DIFF WBC: CPT | Performed by: STUDENT IN AN ORGANIZED HEALTH CARE EDUCATION/TRAINING PROGRAM

## 2022-01-15 PROCEDURE — 11000001 HC ACUTE MED/SURG PRIVATE ROOM

## 2022-01-15 PROCEDURE — 36415 COLL VENOUS BLD VENIPUNCTURE: CPT | Performed by: STUDENT IN AN ORGANIZED HEALTH CARE EDUCATION/TRAINING PROGRAM

## 2022-01-15 PROCEDURE — 83735 ASSAY OF MAGNESIUM: CPT | Performed by: INTERNAL MEDICINE

## 2022-01-15 PROCEDURE — 63600175 PHARM REV CODE 636 W HCPCS: Performed by: INTERNAL MEDICINE

## 2022-01-15 PROCEDURE — 84100 ASSAY OF PHOSPHORUS: CPT | Performed by: INTERNAL MEDICINE

## 2022-01-15 PROCEDURE — 80053 COMPREHEN METABOLIC PANEL: CPT | Performed by: STUDENT IN AN ORGANIZED HEALTH CARE EDUCATION/TRAINING PROGRAM

## 2022-01-15 PROCEDURE — 27000207 HC ISOLATION

## 2022-01-15 RX ADMIN — DIBASIC SODIUM PHOSPHATE, MONOBASIC POTASSIUM PHOSPHATE AND MONOBASIC SODIUM PHOSPHATE 2 TABLET: 852; 155; 130 TABLET ORAL at 06:01

## 2022-01-15 RX ADMIN — AMIODARONE HYDROCHLORIDE 200 MG: 200 TABLET ORAL at 08:01

## 2022-01-15 RX ADMIN — SENNOSIDES AND DOCUSATE SODIUM 2 TABLET: 50; 8.6 TABLET ORAL at 08:01

## 2022-01-15 RX ADMIN — LEVOTHYROXINE SODIUM 137 MCG: 0.03 TABLET ORAL at 05:01

## 2022-01-15 RX ADMIN — ATENOLOL 25 MG: 25 TABLET ORAL at 07:01

## 2022-01-15 RX ADMIN — PANTOPRAZOLE SODIUM 40 MG: 40 TABLET, DELAYED RELEASE ORAL at 08:01

## 2022-01-15 RX ADMIN — MULTIPLE VITAMINS W/ MINERALS TAB 1 TABLET: TAB at 08:01

## 2022-01-15 RX ADMIN — ENOXAPARIN SODIUM 40 MG: 100 INJECTION SUBCUTANEOUS at 04:01

## 2022-01-15 RX ADMIN — ATENOLOL 25 MG: 25 TABLET ORAL at 08:01

## 2022-01-15 RX ADMIN — DIBASIC SODIUM PHOSPHATE, MONOBASIC POTASSIUM PHOSPHATE AND MONOBASIC SODIUM PHOSPHATE 2 TABLET: 852; 155; 130 TABLET ORAL at 08:01

## 2022-01-15 RX ADMIN — DIBASIC SODIUM PHOSPHATE, MONOBASIC POTASSIUM PHOSPHATE AND MONOBASIC SODIUM PHOSPHATE 2 TABLET: 852; 155; 130 TABLET ORAL at 04:01

## 2022-01-15 RX ADMIN — CHOLECALCIFEROL TAB 25 MCG (1000 UNIT) 2000 UNITS: 25 TAB at 08:01

## 2022-01-15 RX ADMIN — ATORVASTATIN CALCIUM 10 MG: 10 TABLET, FILM COATED ORAL at 08:01

## 2022-01-15 RX ADMIN — DILTIAZEM HYDROCHLORIDE 180 MG: 180 CAPSULE, COATED, EXTENDED RELEASE ORAL at 08:01

## 2022-01-15 NOTE — TREATMENT PLAN
Hepatology Treatment Plan    Gisselle Ortiz is a 88 y.o. female admitted to hospital 1/7/2022 (Hospital Day: 9) due to COVID-19.     Interval History  Liver enzymes continue to downtrend.    Objective  Temp:  [97.9 °F (36.6 °C)-98.5 °F (36.9 °C)] 98.2 °F (36.8 °C) (01/15 0721)  Pulse:  [60] 60 (01/15 0721)  BP: (127-167)/(62-74) 151/69 (01/15 0721)  Resp:  [16-18] 18 (01/15 0721)  SpO2:  [94 %-98 %] 98 % (01/15 0721)    Laboratory    Lab Results   Component Value Date    WBC 5.77 01/15/2022    HGB 11.5 (L) 01/15/2022    HCT 34.9 (L) 01/15/2022    MCV 95 01/15/2022     01/15/2022       Lab Results   Component Value Date     01/15/2022    K 3.6 01/15/2022     (H) 01/15/2022    CO2 22 (L) 01/15/2022    BUN 35 (H) 01/15/2022    CREATININE 1.0 01/15/2022    CALCIUM 8.2 (L) 01/15/2022       Lab Results   Component Value Date    ALBUMIN 2.8 (L) 01/15/2022     (H) 01/15/2022     (H) 01/15/2022    ALKPHOS 148 (H) 01/15/2022    BILITOT 1.2 (H) 01/15/2022       Lab Results   Component Value Date    INR 1.0 01/10/2022    INR 1.0 01/31/2021    INR 1.0 09/26/2020       MELD-Na score: 10 at 1/12/2022  3:27 AM  MELD score: 10 at 1/12/2022  3:27 AM  Calculated from:  Serum Creatinine: 1.3 mg/dL at 1/12/2022  3:27 AM  Serum Sodium: 137 mmol/L at 1/12/2022  3:27 AM  Total Bilirubin: 1.2 mg/dL at 1/12/2022  3:27 AM  INR(ratio): 1.0 at 1/10/2022 10:18 AM  Age: 88 years     The cause of liver enzyme elevation is unclear, differential includes autoimmune hepatitis, COVID, drug-induced liver injury, other less common infections.  Recommend a liver biopsy, however it cannot be done until next week when off anticoagulation for 5 days.  Will monitor liver enzyme trend and reassess for the need for liver biopsy.    Plan  - f/u autoimmune serologies  - liver biopsy when off anticoagulation, per IR  - please obtain daily CBC, BMP, LFT, INR    Thank you for involving us in the care of Gisselle Ortiz.  Please call with any additional concerns or questions.    Hetal Zapata MD  Gastroenterology Fellow

## 2022-01-15 NOTE — PROGRESS NOTES
[facility] Medicine  Progress note    Team: [facility] HOSP MED Z Elsi Lucio MD  Admit Date: 1/7/2022  FRANCESCA 1/21/2022  Code status: Full Code    Principal Problem:  COVID-19    Interval hx:  No new complaints. Feeling improved and feeling less poorly.    ROS   Respiratory: neg for cough neg for shortness of breath  Cardiovascular: neg for chest pain neg for palpitations  Gastrointestinal: neg for nausea neg for vomiting, neg for abdominal pain neg for diarrhea neg for constipation   Behavioral/Psych: neg for depression neg for anxiety    PEx  Temp:  [97.9 °F (36.6 °C)-98.3 °F (36.8 °C)]   Pulse:  [60-63]   Resp:  [16-18]   BP: (138-167)/(62-74)   SpO2:  [94 %-98 %]     Intake/Output Summary (Last 24 hours) at 1/15/2022 1521  Last data filed at 1/15/2022 0323  Gross per 24 hour   Intake --   Output 450 ml   Net -450 ml       General Appearance: no acute distress   Heart: regular rate and rhythm, no heave  Respiratory: Normal respiratory effort, symmetric excursion, bilateral vesicular breath sounds   Abdomen: Soft, non-tender; bowel sounds active  Skin: intact, no rash, no ulcers  Neurologic:  No focal numbness or weakness  Mental status: Alert, oriented x 4, affect appropriate     Recent Labs   Lab 01/13/22  0317 01/14/22  0339 01/15/22  0340   WBC 7.75 5.86 5.77   HGB 12.0 12.0 11.5*   HCT 36.3* 37.5 34.9*    168 204     Recent Labs   Lab 01/13/22 0317 01/14/22  0339 01/15/22  0340    139 140   K 4.1 4.4 3.6    106 111*   CO2 24 23 22*   BUN 36* 36* 35*   CREATININE 1.2 1.0 1.0   * 95 127*   CALCIUM 8.1* 7.8* 8.2*   MG 1.9 1.8 1.7   PHOS 2.8 3.6 3.0     Recent Labs   Lab 01/10/22  1018 01/11/22  0224 01/13/22 0317 01/14/22  0339 01/15/22  0340   ALKPHOS  --    < > 174* 137* 148*   ALT  --    < > 1,739* 1,261* 867*   AST  --    < > 1,581* 746* 347*   ALBUMIN  --    < > 2.7* 2.7* 2.8*   PROT  --    < > 5.5* 5.4* 5.6*   BILITOT  --    < > 2.2* 1.6* 1.2*   INR 1.0  --   --   --   --     < > =  values in this interval not displayed.      Scheduled Meds:   amiodarone  200 mg Oral Daily    atenoloL  25 mg Oral BID    atorvastatin  10 mg Oral Daily    diltiaZEM  180 mg Oral Daily    enoxaparin  40 mg Subcutaneous Daily    k phos di & mono-sod phos mono  2 tablet Oral TID WM    levothyroxine  137 mcg Oral Before breakfast    multivitamin  1 tablet Oral Daily    pantoprazole  40 mg Oral Daily    senna-docusate 8.6-50 mg  2 tablet Oral Daily    vitamin D  2,000 Units Oral Daily     Continuous Infusions:    As Needed:  albuterol **AND** MDI Q6H PRN, benzonatate, glucose, glucose, loperamide, melatonin, ondansetron, sodium chloride 0.9%    Assessment and Plan  / Problems managed today    * COVID-19  Patient is identified as High risk for severe complications of COVID 19 based on COVID risk score of 6      Pt. Reported cough. No current hypoxia and CXR without any acute fidings. Will hold off on remdesivir given the patient's already significantly elevated LFTs as this may cloud the picture. Further treatment as below.     Diagnostics: (leukopenia, hyponatremia, hyperferritinemia, elevated troponin, elevated d-dimer, age, and comorbidities are significant predictors of poor clinical outcome)  CBC, CMP, Ferritin, CRP, LDH, BNP, ECG and Portable CXR     Management: Maintain oxygen saturations 92-96% via and monitor with intermittent pulse oximetry.  and Inhaled bronchodilators as needed for shortness of breath.     Oxygen Device: room air     Micronutrients   Multivitamin PO daily   Vitamin D 1000IU PO daily (if deficient)   Magnesium IV/PO (if deficient)   Ascorbic acid 500mg po BID     Trend inflammatory markers, LDH, D-Dimer, ferritin, and CRP Q48hrs     Advance Care Planning  Current advance care plan has been discussed with patient/family/POA and patient currently wishes Full Code.      Unable to start COVID therapy due to elevated LFTs.     Elevated LFTs  Patient with LFT elevation, initialyl  bili 1.6 improved to 1.2, however AST and ALT are trending up, RUQ US without any new findings just stable mild intrahepatic biliary ductal dilation. No acute cholecystitis. On admission AST ALT were 295/304 and on 1/8 they were 532/573. No clear etiology, unclear if related to COVID and her acute illness. Patient denies supplements, ingestion, tylenol, will get hepatitis panel and tylenol level  - hepatitis consulted - autoimmune work up thus far negative  - maybe COVID related  -worsening transaminitis - liver biopsy ordered  -liver biopsy deferred due to needing five days off anti-platelet therapy  -transaminitis with enzymes in 1000s, improving today    Malnutrition of moderate degree  Poor oral intake  NS at 100 mL/ for one liter    Anorexia  Se malnutrition    Nausea & vomiting  zofran prn  reglan 10 mg TID prn nausea  Treat constipation    Constipation  Start on senokot-S, miralax and pr bisacodyl    Chronic diastolic heart failure  -2D echo 2019 with EF 55% and DD  -Continue home aldactone and B-blocker      Stage 3 chronic kidney disease  Cr at baseline  -Renally dose meds      Pure hypercholesterolemia  -Continue Lipitor 10mg PO daily      Acquired hypothyroidism  -Continue Synthroid 137mcg PO daily      Essential hypertension  -Continue atenolol and diltiazem. Hold aldactone due to N/V with hypovolemia      Paroxysmal atrial fibrillation  -Pt. In NSR on admit  -Continue amiodarone, diltiazem, and atenolol for rate control  -Continue eliquis for AC    Diet:  regular diet  GI PPx: protonix  DVT PPx:  enoxaparin  Airways: room air  Wounds: none    Goals of Care:  Return to prior functional status     Discharge plan: home health    Time (minutes) spent in care of the patient (Greater than 1/2 spent in direct face-to-face contact)  35 min    Elsi Lucio MD

## 2022-01-16 VITALS
TEMPERATURE: 98 F | HEIGHT: 63 IN | WEIGHT: 122.38 LBS | SYSTOLIC BLOOD PRESSURE: 139 MMHG | RESPIRATION RATE: 24 BRPM | HEART RATE: 60 BPM | DIASTOLIC BLOOD PRESSURE: 96 MMHG | OXYGEN SATURATION: 97 % | BODY MASS INDEX: 21.68 KG/M2

## 2022-01-16 LAB
ALBUMIN SERPL BCP-MCNC: 2.8 G/DL (ref 3.5–5.2)
ALP SERPL-CCNC: 129 U/L (ref 55–135)
ALT SERPL W/O P-5'-P-CCNC: 600 U/L (ref 10–44)
ANION GAP SERPL CALC-SCNC: 7 MMOL/L (ref 8–16)
AST SERPL-CCNC: 170 U/L (ref 10–40)
BASOPHILS # BLD AUTO: 0.05 K/UL (ref 0–0.2)
BASOPHILS NFR BLD: 1 % (ref 0–1.9)
BILIRUB SERPL-MCNC: 1.2 MG/DL (ref 0.1–1)
BUN SERPL-MCNC: 27 MG/DL (ref 8–23)
CALCIUM SERPL-MCNC: 8.3 MG/DL (ref 8.7–10.5)
CHLORIDE SERPL-SCNC: 111 MMOL/L (ref 95–110)
CO2 SERPL-SCNC: 26 MMOL/L (ref 23–29)
CREAT SERPL-MCNC: 1 MG/DL (ref 0.5–1.4)
DIFFERENTIAL METHOD: ABNORMAL
EOSINOPHIL # BLD AUTO: 0.1 K/UL (ref 0–0.5)
EOSINOPHIL NFR BLD: 2 % (ref 0–8)
ERYTHROCYTE [DISTWIDTH] IN BLOOD BY AUTOMATED COUNT: 13.4 % (ref 11.5–14.5)
EST. GFR  (AFRICAN AMERICAN): 58.1 ML/MIN/1.73 M^2
EST. GFR  (NON AFRICAN AMERICAN): 50.4 ML/MIN/1.73 M^2
GLUCOSE SERPL-MCNC: 98 MG/DL (ref 70–110)
HCT VFR BLD AUTO: 35.7 % (ref 37–48.5)
HGB BLD-MCNC: 11.2 G/DL (ref 12–16)
IMM GRANULOCYTES # BLD AUTO: 0.24 K/UL (ref 0–0.04)
IMM GRANULOCYTES NFR BLD AUTO: 4.8 % (ref 0–0.5)
LYMPHOCYTES # BLD AUTO: 1.2 K/UL (ref 1–4.8)
LYMPHOCYTES NFR BLD: 23.2 % (ref 18–48)
MAGNESIUM SERPL-MCNC: 1.7 MG/DL (ref 1.6–2.6)
MCH RBC QN AUTO: 30.1 PG (ref 27–31)
MCHC RBC AUTO-ENTMCNC: 31.4 G/DL (ref 32–36)
MCV RBC AUTO: 96 FL (ref 82–98)
MONOCYTES # BLD AUTO: 0.9 K/UL (ref 0.3–1)
MONOCYTES NFR BLD: 17.6 % (ref 4–15)
NEUTROPHILS # BLD AUTO: 2.6 K/UL (ref 1.8–7.7)
NEUTROPHILS NFR BLD: 51.4 % (ref 38–73)
NRBC BLD-RTO: 0 /100 WBC
PHOSPHATE SERPL-MCNC: 3 MG/DL (ref 2.7–4.5)
PLATELET # BLD AUTO: 186 K/UL (ref 150–450)
PMV BLD AUTO: 10.5 FL (ref 9.2–12.9)
POTASSIUM SERPL-SCNC: 4.6 MMOL/L (ref 3.5–5.1)
PROT SERPL-MCNC: 5.5 G/DL (ref 6–8.4)
RBC # BLD AUTO: 3.72 M/UL (ref 4–5.4)
SODIUM SERPL-SCNC: 144 MMOL/L (ref 136–145)
WBC # BLD AUTO: 5.05 K/UL (ref 3.9–12.7)

## 2022-01-16 PROCEDURE — 36415 COLL VENOUS BLD VENIPUNCTURE: CPT | Performed by: STUDENT IN AN ORGANIZED HEALTH CARE EDUCATION/TRAINING PROGRAM

## 2022-01-16 PROCEDURE — 99239 HOSP IP/OBS DSCHRG MGMT >30: CPT | Mod: CR,,, | Performed by: INTERNAL MEDICINE

## 2022-01-16 PROCEDURE — 83735 ASSAY OF MAGNESIUM: CPT | Performed by: INTERNAL MEDICINE

## 2022-01-16 PROCEDURE — 85025 COMPLETE CBC W/AUTO DIFF WBC: CPT | Performed by: STUDENT IN AN ORGANIZED HEALTH CARE EDUCATION/TRAINING PROGRAM

## 2022-01-16 PROCEDURE — 84100 ASSAY OF PHOSPHORUS: CPT | Performed by: INTERNAL MEDICINE

## 2022-01-16 PROCEDURE — 80053 COMPREHEN METABOLIC PANEL: CPT | Performed by: STUDENT IN AN ORGANIZED HEALTH CARE EDUCATION/TRAINING PROGRAM

## 2022-01-16 PROCEDURE — 99239 PR HOSPITAL DISCHARGE DAY,>30 MIN: ICD-10-PCS | Mod: CR,,, | Performed by: INTERNAL MEDICINE

## 2022-01-16 PROCEDURE — 25000003 PHARM REV CODE 250: Performed by: HOSPITALIST

## 2022-01-16 PROCEDURE — 25000003 PHARM REV CODE 250: Performed by: INTERNAL MEDICINE

## 2022-01-16 RX ADMIN — DIBASIC SODIUM PHOSPHATE, MONOBASIC POTASSIUM PHOSPHATE AND MONOBASIC SODIUM PHOSPHATE 2 TABLET: 852; 155; 130 TABLET ORAL at 01:01

## 2022-01-16 RX ADMIN — MULTIPLE VITAMINS W/ MINERALS TAB 1 TABLET: TAB at 09:01

## 2022-01-16 RX ADMIN — AMIODARONE HYDROCHLORIDE 200 MG: 200 TABLET ORAL at 09:01

## 2022-01-16 RX ADMIN — DIBASIC SODIUM PHOSPHATE, MONOBASIC POTASSIUM PHOSPHATE AND MONOBASIC SODIUM PHOSPHATE 2 TABLET: 852; 155; 130 TABLET ORAL at 09:01

## 2022-01-16 RX ADMIN — ATENOLOL 25 MG: 25 TABLET ORAL at 09:01

## 2022-01-16 RX ADMIN — ATORVASTATIN CALCIUM 10 MG: 10 TABLET, FILM COATED ORAL at 09:01

## 2022-01-16 RX ADMIN — DILTIAZEM HYDROCHLORIDE 180 MG: 180 CAPSULE, COATED, EXTENDED RELEASE ORAL at 09:01

## 2022-01-16 RX ADMIN — CHOLECALCIFEROL TAB 25 MCG (1000 UNIT) 2000 UNITS: 25 TAB at 09:01

## 2022-01-16 RX ADMIN — PANTOPRAZOLE SODIUM 40 MG: 40 TABLET, DELAYED RELEASE ORAL at 09:01

## 2022-01-16 RX ADMIN — LEVOTHYROXINE SODIUM 137 MCG: 0.03 TABLET ORAL at 05:01

## 2022-01-16 NOTE — PLAN OF CARE
[facility]      HOME HEALTH ORDERS  FACE TO FACE ENCOUNTER    Patient Name: Gisselle Ortiz  YOB: 1933    PCP: Kai Montez MD   PCP Address: [address] / [place] [address]  PCP Phone Number: [phone]  PCP Fax: [phone]    Encounter Date: 1/7/22    Admit to Home Health    Diagnoses:  Problem List Items Addressed This Visit     * (Principal) COVID-19     Patient is identified as High risk for severe complications of COVID 19 based on COVID risk score of 6      Pt. Reported cough. No current hypoxia and CXR without any acute fidings. Will hold off on remdesivir given the patient's already significantly elevated LFTs as this may cloud the picture. Further treatment as below.     Diagnostics: (leukopenia, hyponatremia, hyperferritinemia, elevated troponin, elevated d-dimer, age, and comorbidities are significant predictors of poor clinical outcome)  CBC, CMP, Ferritin, CRP, LDH, BNP, ECG and Portable CXR     Management: Maintain oxygen saturations 92-96% via and monitor with intermittent pulse oximetry.  and Inhaled bronchodilators as needed for shortness of breath.     Oxygen Device: room air     Micronutrients   Multivitamin PO daily   Vitamin D 1000IU PO daily (if deficient)   Magnesium IV/PO (if deficient)   Ascorbic acid 500mg po BID     Trend inflammatory markers, LDH, D-Dimer, ferritin, and CRP Q48hrs     Advance Care Planning  Current advance care plan has been discussed with patient/family/POA and patient currently wishes Full Code.      Unable to start COVID therapy due to elevated LFTs.          Nausea & vomiting     zofran prn  reglan 10 mg TID prn nausea  Treat constipation         Relevant Orders    EKG 12-lead (Completed)      Other Visit Diagnoses     Intractable vomiting with nausea, unspecified vomiting type    -  Primary    Vomiting bile        Relevant Orders    US Abdomen Limited (Gallbladder) (Completed)    Biliary sludge  determined by ultrasound        Weakness        Transaminitis        Relevant Orders    Ambulatory referral/consult to Hepatology          Follow Up Appointments:  Future Appointments   Date Time Provider Department Center   1/24/2022 10:00 AM HOME MONITOR DEVICE CHECK, [facility] AR[facility]IN [place]   3/16/2022  2:00 PM Karen Colmenares OD [facility] [address] [facility]       Allergies:  Review of patient's allergies indicates:   Allergen Reactions    Ciprofloxacin Nausea And Vomiting       Medications: Review discharge medications with patient and family and provide education.  Current Discharge Medication List      CONTINUE these medications which have NOT CHANGED    Details   acetaminophen (TYLENOL) 500 MG tablet Take 2 tablets (1,000 mg total) by mouth every 8 (eight) hours as needed for Pain.  Refills: 0      amiodarone (PACERONE) 200 MG Tab Take 1 tablet (200 mg total) by mouth once daily.  Qty: 90 tablet, Refills: 3    Associated Diagnoses: Paroxysmal atrial fibrillation      aspirin (ECOTRIN) 81 MG EC tablet Take 1 tablet (81 mg total) by mouth once daily.  Refills: 0    Comments: Hold until directed by PCP      atenoloL (TENORMIN) 25 MG tablet TAKE 2 TABLETS EVERY MORNING AND 1 TABLET EVERY EVENING  Qty: 270 tablet, Refills: 3    Associated Diagnoses: Paroxysmal atrial fibrillation      atorvastatin (LIPITOR) 10 MG tablet Take 1 tablet (10 mg total) by mouth once daily.  Qty: 90 tablet, Refills: 3    Associated Diagnoses: Pure hypercholesterolemia      diltiaZEM (TIAZAC) 180 MG Cs24 Take 1 capsule (180 mg total) by mouth once daily.  Qty: 90 capsule, Refills: 3    Associated Diagnoses: Essential hypertension      ELIQUIS 2.5 mg Tab TAKE 1 TABLET TWICE A DAY  Qty: 180 tablet, Refills: 3    Comments: YOUR PATIENT HAS REQUESTED A REFILL OF THIS MEDICATION, PREVIOUSLY AUTHORIZED BY ANOTHER PRESCRIBER.      eszopiclone (LUNESTA) 2 MG Tab Take 1 tablet (2 mg total) by mouth nightly as needed (sleep).  Qty: 90 tablet,  Refills: 1    Associated Diagnoses: Insomnia, unspecified type      olopatadine (PATANOL) 0.1 % ophthalmic solution Place 1 drop into both eyes 2 (two) times daily.  Qty: 5 mL, Refills: 3    Comments: GENERIC OK  Associated Diagnoses: Seasonal allergies      spironolactone (ALDACTONE) 25 MG tablet Take 1 tablet (25 mg total) by mouth 2 (two) times a day.  Qty: 180 tablet, Refills: 3    Comments: .  Associated Diagnoses: Essential hypertension      SYNTHROID 137 mcg Tab tablet TAKE 1 TABLET DAILY  Qty: 90 tablet, Refills: 3    Associated Diagnoses: Acquired hypothyroidism      teriparatide (FORTEO) 20 mcg/dose (600mcg/2.4mL) PnIj Inject 0.08 mLs (20 mcg total) into the skin once daily.  Qty: 7.2 mL, Refills: 3    Associated Diagnoses: Age-related osteoporosis with current pathological fracture, initial encounter      vitamin D (VITAMIN D3) 1000 units Tab Take 1 tablet (1,000 Units total) by mouth once daily.               I have seen and examined this patient within the last 30 days. My clinical findings that support the need for the home health skilled services and home bound status are the following:  Weakness/numbness causing balance and gait disturbance due to Weakness/Debility making it taxing to leave home.  Requiring assistive device to leave home due to unsteady gait caused by  Infection and Weakness/Debility.     Diet:   regular diet  Need to drink at least 6-8 cups of fluid a day    Labs:  SN to perform labs:  CMP: Weekly; 3 week(s)    Referrals/ Consults  Physical Therapy to evaluate and treat. Evaluate for home safety and equipment needs; Establish/upgrade home exercise program. Perform / instruct on therapeutic exercises, gait training, transfer training, and Range of Motion.  Occupational Therapy to evaluate and treat. Evaluate home environment for safety and equipment needs. Perform/Instruct on transfers, ADL training, ROM, and therapeutic exercises.    Activities:   activity as tolerated    Nursing:    Agency to admit patient within 24 hours of hospital discharge unless specified on physician order or at patient request    SN to complete comprehensive assessment including routine vital signs. Instruct on disease process and s/s of complications to report to MD. Review/verify medication list sent home with the patient at time of discharge  and instruct patient/caregiver as needed. Frequency may be adjusted depending on start of care date.     Skilled nurse to perform up to 3 visits PRN for symptoms related to diagnosis    Notify MD if SBP > 160 or < 90; DBP > 90 or < 50; HR > 120 or < 50; Temp > 101; O2 < 88%    Ok to schedule additional visits based on staff availability and patient request on consecutive days within the home health episode.    Miscellaneous   N/A    Wound Care Orders  n/a    I certify that this patient is confined to her home and needs intermittent skilled nursing care, physical therapy and occupational therapy.

## 2022-01-16 NOTE — PLAN OF CARE
01/16/22 1040   Post-Acute Status   Post-Acute Authorization Placement   Post-Acute Placement Status Referrals Sent        referral sent to Jd Becker , per pt request. Company notified via telephone of referral.     SW will con't to follow.     Nuvia Willett LMSW  Case Management Social Worker   [facility] [address]

## 2022-01-16 NOTE — TREATMENT PLAN
Hepatology Treatment Plan    Gisselle Ortiz is a 88 y.o. female admitted to hospital 1/7/2022 (Hospital Day: 10) due to COVID-19.     Interval History  Liver enzymes improving.    Objective  Temp:  [98.1 °F (36.7 °C)-98.4 °F (36.9 °C)] 98.4 °F (36.9 °C) (01/16 0700)  Pulse:  [60-71] 60 (01/16 0700)  BP: (140-170)/(63-80) 151/65 (01/16 0700)  Resp:  [14-20] 14 (01/16 0700)  SpO2:  [95 %-98 %] 95 % (01/16 0700)    Laboratory    Lab Results   Component Value Date    WBC 5.05 01/16/2022    HGB 11.2 (L) 01/16/2022    HCT 35.7 (L) 01/16/2022    MCV 96 01/16/2022     01/16/2022       Lab Results   Component Value Date     01/16/2022    K 4.6 01/16/2022     (H) 01/16/2022    CO2 26 01/16/2022    BUN 27 (H) 01/16/2022    CREATININE 1.0 01/16/2022    CALCIUM 8.3 (L) 01/16/2022       Lab Results   Component Value Date    ALBUMIN 2.8 (L) 01/16/2022     (H) 01/16/2022     (H) 01/16/2022    ALKPHOS 129 01/16/2022    BILITOT 1.2 (H) 01/16/2022       Lab Results   Component Value Date    INR 1.0 01/10/2022    INR 1.0 01/31/2021    INR 1.0 09/26/2020       MELD-Na score: 10 at 1/12/2022  3:27 AM  MELD score: 10 at 1/12/2022  3:27 AM  Calculated from:  Serum Creatinine: 1.3 mg/dL at 1/12/2022  3:27 AM  Serum Sodium: 137 mmol/L at 1/12/2022  3:27 AM  Total Bilirubin: 1.2 mg/dL at 1/12/2022  3:27 AM  INR(ratio): 1.0 at 1/10/2022 10:18 AM  Age: 88 years     The cause of liver enzyme elevation is unclear, differential includes autoimmune hepatitis, COVID, drug-induced liver injury, other less common infections.  Since liver enzymes are improving, the patient can be discharged from the hepatology standpoint if otherwise stable per the Medicine team.  We will arrange for close follow-up with liver enzymes on Tuesday, and decide whether the patient will need a liver biopsy based on that.    Plan  - f/u autoimmune serologies  - can follow up with outpatient LFTs early next week and determine the need  for liver biopsy based on those results  - please obtain daily CBC, BMP, LFT, INR    Thank you for involving us in the care of Gisselle ART Jacobeliezerjessicaneida. Please call with any additional concerns or questions.    Hetal Zapata MD  Gastroenterology Fellow

## 2022-01-17 NOTE — DISCHARGE SUMMARY
Discharge Summary  Hospital Medicine    Attending Provider on Discharge: Elsi Lucio MD  [facility] Medicine Team: [facility] HOSP MED Z  Date of Admission:  1/7/2022     Date of Discharge:  1/16/2022  Code status: Full Code    Active Hospital Problems    Diagnosis  POA    *COVID-19 [U07.1]  Yes     Priority: 1 - High    Elevated LFTs [R79.89]  Yes     Priority: 2     Anorexia [R63.0]  Yes     Priority: 3     Malnutrition of moderate degree [E44.0]  Yes     Priority: 3     Nausea & vomiting [R11.2]  Yes     Priority: 3     Constipation [K59.00]  No     Priority: 4     Chronic diastolic heart failure [I50.32]  Yes    Stage 3 chronic kidney disease [N18.30]  Yes     Chronic    Acquired hypothyroidism [E03.9]  Yes    Essential hypertension [I10]  Yes     Chronic    Paroxysmal atrial fibrillation [I48.0]  Yes     Chronic    Pure hypercholesterolemia [E78.00]  Yes      Resolved Hospital Problems   No resolved problems to display.     HPI  87 yo F with PMHx AFib on Eliquis, HFpEF,  SSS s/p pacemaker placement (1/2019), HTN, CKD3 and hypothyroidism who presents to the ED complaining of nausea/vomiting with poor appetite x3 days. Pt. States that she started having nausea on Tuesday with some associated chills Over the next few days, she started having vomiting, poor appeitte, and generalized fatigue. She reports that she has been unable to tolerate much PO intake and has developed crampy abdominal pain which concerned her that she may be having pancreatitis (pt. Reports a distant history of biliary pancreatitis prior to her cholecystectomy). Pt. Came to the ED today after having subjective fever and an episode of vomiting. She denies any SOB, but she she does report developing a dry cough yesterday which has been intermittent.    Hospital Course  * COVID-19  Patient is identified as High risk for severe complications of COVID 19 based on COVID risk score of 6      Pt. Reported cough. No current hypoxia and CXR without  any acute fidings. Will hold off on remdesivir given the patient's already significantly elevated LFTs as this may cloud the picture. Further treatment as below.     Diagnostics: (leukopenia, hyponatremia, hyperferritinemia, elevated troponin, elevated d-dimer, age, and comorbidities are significant predictors of poor clinical outcome)  CBC, CMP, Ferritin, CRP, LDH, BNP, ECG and Portable CXR     Management: Maintain oxygen saturations 92-96% via and monitor with intermittent pulse oximetry.  and Inhaled bronchodilators as needed for shortness of breath.     Oxygen Device: room air     Micronutrients   Multivitamin PO daily   Vitamin D 1000IU PO daily (if deficient)   Magnesium IV/PO (if deficient)   Ascorbic acid 500mg po BID     Trend inflammatory markers, LDH, D-Dimer, ferritin, and CRP Q48hrs     Advance Care Planning  Current advance care plan has been discussed with patient/family/POA and patient currently wishes Full Code.      Unable to start COVID therapy due to elevated LFTs. Patient remained on room air throughout her stay.    Patient informed she should isolate for 21 days from her first positive COVID test.    Elevated LFTs  Patient with LFT elevation, initialyl bili 1.6 improved to 1.2, however AST and ALT are trending up, RUQ US without any new findings just stable mild intrahepatic biliary ductal dilation. No acute cholecystitis. On admission AST ALT were 295/304 and on 1/8 they were 532/573. No clear etiology, unclear if related to COVID and her acute illness. Patient denies supplements, ingestion, tylenol, will get hepatitis panel and tylenol level  - hepatitis consulted - autoimmune work up thus far negative  - maybe COVID related  -worsening transaminitis - liver biopsy ordered   -liver biopsy deferred due to needing five days off anti-platelet therapy  -transaminitis with enzymes in 1000s peaked 1/13 and has been down trending since  -home health to draw CMP once a week for three  weeks  -hepatology follow up as otuaptient    Malnutrition of moderate degree  Poor oral intake  NS at 100 mL/ for one liter  Improved during stay    Anorexia  Se malnutrition    Nausea & vomiting  zofran prn  reglan 10 mg TID prn nausea  Treat constipation    Constipation  Start on senokot-S, miralax and pr bisacodyl    Chronic diastolic heart failure  -2D echo 2019 with EF 55% and DD  -Continue home aldactone and B-blocker    Stage 3 chronic kidney disease  Cr at baseline  -Renally dose meds    Pure hypercholesterolemia  -Continue Lipitor 10mg PO daily    Acquired hypothyroidism  -Continue Synthroid 137mcg PO daily    Essential hypertension  -Continue atenolol and diltiazem. Hold aldactone due to N/V with hypovolemia      Paroxysmal atrial fibrillation  -Pt. In NSR on admit  -Continue amiodarone, diltiazem, and atenolol for rate control  -Continue eliquis for AC    Procedures: none    Consultants: hepatology    Discharge Medication List as of 1/16/2022  2:11 PM      CONTINUE these medications which have NOT CHANGED    Details   acetaminophen (TYLENOL) 500 MG tablet Take 2 tablets (1,000 mg total) by mouth every 8 (eight) hours as needed for Pain., Starting Wed 2/3/2021, OTC      amiodarone (PACERONE) 200 MG Tab Take 1 tablet (200 mg total) by mouth once daily., Starting Wed 9/15/2021, Normal      aspirin (ECOTRIN) 81 MG EC tablet Take 1 tablet (81 mg total) by mouth once daily., Starting Thu 10/29/2020, No Print      atenoloL (TENORMIN) 25 MG tablet TAKE 2 TABLETS EVERY MORNING AND 1 TABLET EVERY EVENING, Normal      atorvastatin (LIPITOR) 10 MG tablet Take 1 tablet (10 mg total) by mouth once daily., Starting Wed 6/2/2021, Normal      diltiaZEM (TIAZAC) 180 MG Cs24 Take 1 capsule (180 mg total) by mouth once daily., Starting Wed 6/2/2021, Until Thu 6/2/2022, Normal      ELIQUIS 2.5 mg Tab TAKE 1 TABLET TWICE A DAY, Normal      eszopiclone (LUNESTA) 2 MG Tab Take 1 tablet (2 mg total) by mouth nightly as needed  (sleep)., Starting Tue 7/6/2021, Normal      olopatadine (PATANOL) 0.1 % ophthalmic solution Place 1 drop into both eyes 2 (two) times daily., Starting Thu 11/18/2021, Normal      spironolactone (ALDACTONE) 25 MG tablet Take 1 tablet (25 mg total) by mouth 2 (two) times a day., Starting Wed 6/2/2021, Normal      SYNTHROID 137 mcg Tab tablet TAKE 1 TABLET DAILY, Normal      teriparatide (FORTEO) 20 mcg/dose (600mcg/2.4mL) PnIj Inject 0.08 mLs (20 mcg total) into the skin once daily., Starting Mon 10/11/2021, Until Tue 10/11/2022, Normal      vitamin D (VITAMIN D3) 1000 units Tab Take 1 tablet (1,000 Units total) by mouth once daily., Starting Wed 2/3/2021, OTC             Discharge Diet:regular diet     Activity: activity as tolerated    Discharge Condition: Good    Disposition: Home-Health Care c   PCP telephone visit    Home health to draw CMP once a week for three weeks    Tests pending at the time of discharge: none      Time spent  on the discharge of the patient including review of hospital course with the patient. reviewing discharge medications and arranging follow-up care 35 min    Discharge examination Patient was seen and examined on the date of discharge and determined to be suitable for discharge.    Discharge plan     Future Appointments   Date Time Provider Department Center   1/24/2022 10:00 AM HOME MONITOR DEVICE CHECK, [facility] GEOVANI [place]   3/16/2022  2:00 PM Karen Colmenares, [facility] Lucio MD

## 2022-01-18 ENCOUNTER — TELEPHONE (OUTPATIENT)
Dept: HEPATOLOGY | Facility: CLINIC | Age: 87
End: 2022-01-18
Payer: MEDICARE

## 2022-01-18 ENCOUNTER — PATIENT OUTREACH (OUTPATIENT)
Dept: ADMINISTRATIVE | Facility: CLINIC | Age: 87
End: 2022-01-18
Payer: MEDICARE

## 2022-01-18 DIAGNOSIS — R79.89 ELEVATED LFTS: Primary | ICD-10-CM

## 2022-01-18 LAB
MITOCHONDRIA AB TITR SER IF: NORMAL {TITER}
SMOOTH MUSCLE AB TITR SER IF: NORMAL {TITER}

## 2022-01-18 NOTE — TELEPHONE ENCOUNTER
----- Message from Faye Vaughn, RN sent at 1/18/2022  2:55 PM CST -----  Regarding: Hospital discharge in need of labs tomorrow  Lia Vear  So my Gi Fellow informed me Ms. Dash needs labs done today or tomorrow. I said well tomorrow. I will put orders in under Dr. MATHEWS And I guess lets see when we can get her follow up in hepatology clinic soon.   Yesy

## 2022-01-18 NOTE — PROGRESS NOTES
C3 nurse spoke with Gisselle Ortiz for a TCC post hospital discharge follow up call. The patient has a scheduled appointment with Kai Montez MD on 1/19/2022.  Message to PCP staff to confirm with patient.

## 2022-01-18 NOTE — PLAN OF CARE
Yimi Edmond - Telemetry Stepdown ([facility])  Discharge Final Note    Primary Care Provider: Kai Montez MD    Expected Discharge Date: 1/16/2022    Patient discharged to home via personal transportation.     Patient's bedside nurse and patient notified of the above.      Final Discharge Note (most recent)       Final Note - 01/18/22 0916          Final Note    Assessment Type Final Discharge Note     Anticipated Discharge Disposition Home-Health Care Svc        Post-Acute Status    Post-Acute Authorization Home Health     Post-Acute Placement Status Referrals Sent                     Important Message from Medicare             Contact Info       Kai Montez MD   Specialty: Family Medicine   Relationship: PCP - General    [address]  [address]  [facility] [place] [facility] [address]   Phone: [phone]       Next Steps: Schedule an appointment as soon as possible for a visit in 1 week(s)    Instructions: hospital follow up    Hepatology Clinic - [facility]   Specialty: Hepatology    [address]  [facility] [address]   Phone: [phone]       Next Steps: Schedule an appointment as soon as possible for a visit in 1 week(s)    Instructions: acute liver injury            Future Appointments   Date Time Provider Department Center   1/24/2022 10:00 AM HOME MONITOR DEVICE CHECK, [facility] GEOVANI [facility]   3/16/2022  2:00 PM Karen Colmenares OD [facility]        scheduled post-discharge follow-up appointment and information added to AVS.     Brittany Piper LMSW  [facility]  Ext. [phone]

## 2022-01-18 NOTE — PATIENT INSTRUCTIONS
Patient Education       COVID-19 Discharge Instructions   About this topic   Coronavirus disease 2019 is also known as COVID-19. It is a viral illness that infects the lungs. It is caused by a virus called SARS-associated coronavirus (SARS-CoV-2).  The signs of COVID-19 most often start a few days after you have been infected. In some people, it takes longer to show signs. Others never show signs of the infection. You may have a cough, fever, shaking chills and it may be hard to breathe. You may be very tired, have muscle aches, a headache or sore throat. Some people have an upset stomach or loose stools. Others lose their sense of smell or taste. You may not have these signs all the time and they may come and go while you are sick.  The virus spreads easily through droplets when you talk, sneeze, or cough. You can pass the virus to others when you are talking close together, singing, hugging, sharing food, or shaking hands. Doctors believe the germs also survive on surfaces like tables, door handles, and telephones. However, this is not a common way that COVID-19 spreads. Doctors believe you can also spread the infection even if you dont have any symptoms, but they do not know how that happens. This is why getting vaccinated is one of the best ways to keep you healthy and slow the spread of the virus.  Some people have a mild case of COVID-19 and are able to stay at home and away from others until they feel better. Others may need to be in the hospital if they are very sick. Some people with COVID-19 can have some symptoms for weeks or months. People with COVID-19 must isolate themselves. You can start to be around others when your doctor says it is safe to do so.       What care is needed at home?   · Ask your doctor what you need to do when you go home. Make sure you ask questions if you do not understand what the doctor says.  · Drink lots of water, juice, or broth to replace fluids lost from a fever.  · You  may use cool mist humidifiers to help ease congestion and coughing.  · Use 2 to 3 pillows to prop yourself up when you lie down to make it easier to breathe and sleep.  · Do not smoke and do not drink beer, wine, and mixed drinks (alcohol).  · To lower the chance of passing the infection to others, get a COVID-19 vaccine after your infection has resolved.  · If you have not been fully vaccinated:  ? Wear a mask over your mouth and nose if you are around others who are not sick. Cloth masks work best if they have more than one layer of fabric.  ? Wash your hands often.  ? Stay home in a separate room, if possible, away from others. Only go out to get medical care.  ? Use a separate bathroom if possible.  ? Do not make food for others.  What follow-up care is needed?   · Your doctor may ask you to make visits to the office to check on your progress. Be sure to keep these visits. Make sure you wear a mask at these visits.  · If you can, tell the staff you have COVID-19 ahead of time so they can take extra care to stop the disease from spreading.  · It may take a few weeks before your health returns to normal.  What drugs may be needed?   The doctor may order drugs to:  · Help with breathing  · Help with fever  · Help with swelling in your airways and lungs  · Control coughing  · Ease a sore throat  · Help a runny or stuffy nose  Will physical activity be limited?   You may have to limit your physical activity. Talk to your doctor about the right amount of activity for you. If you have been very sick with COVID-19, it can take some time to get your strength back.  Will there be any other care needed?   Doctors do not know how long you can pass the virus on to others after you are sick. This is why it is important to stay in a separate room, if possible, when you are sick. For now, doctors are giving general guidelines for you to follow after you have been sick. Before you go around other people, you should:  · Be fever  free for 24 hours without taking any drugs to lower the fever  · Have no symptoms of cough or shortness of breath  · Wait at least 10 days after first having symptoms or your first positive test, and you need to be symptom free as above. Some experts suggest waiting 20 days if you have had a more severe infection.  Talk with your doctor about getting a COVID-19 vaccine.  What problems could happen?   · Fluid loss. This is dehydration.  · Short-term or long-term lung damage  · Heart problems  · Death  When do I need to call the doctor?   · You are having so much trouble breathing that you can only say one or two words at a time.  · You need to sit upright at all times to be able to breathe and/or cannot lie down.  · You are very confused or cannot stay awake.  · Your lips or skin start to turn blue or grey.  · You think you might be having a medical emergency. Some examples of medical emergencies are:  ? Severe chest pain.  ? Not able to speak or move normally.  · You have trouble breathing when talking or sitting still.  · You have new shortness of breath.  · You become weak or dizzy.  · You have very dark urine or do not pass urine for more than 8 hours.  · You have new or worsening COVID-19 symptoms like:  ? Fever  ? Cough  ? Feeling very tired  ? Shaking chills  ? Headache  ? Trouble swallowing  ? Throwing up  ? Loose stools  ? Reddish purple spots on your fingers or toes  Teach Back: Helping You Understand   The Teach Back Method helps you understand the information we are giving you. After you talk with the staff, tell them in your own words what you learned. This helps to make sure the staff has described each thing clearly. It also helps to explain things that may have been confusing. Before going home, make sure you can do these:  · I can tell you about my condition.  · I can tell you what may help ease my breathing.  · I can tell you what I can do to help avoid passing the infection to others.  · I can tell  you what I will do if I have trouble breathing; feel sleepy or confused; or my fingertips, fingernails, skin, or lips are blue.  Where can I learn more?   Centers for Disease Control and Prevention  https://www.cdc.gov/coronavirus/2019-ncov/about/index.html   Centers for Disease Control and Prevention  https://www.cdc.gov/coronavirus/2019-ncov/hcp/disposition-in-home-patients.html   World Health Organization  https://www.who.int/news-room/q-a-detail/h-x-whkjiqlbodtzr   Last Reviewed Date   2021-10-05  Consumer Information Use and Disclaimer   This information is not specific medical advice and does not replace information you receive from your health care provider. This is only a brief summary of general information. It does NOT include all information about conditions, illnesses, injuries, tests, procedures, treatments, therapies, discharge instructions or life-style choices that may apply to you. You must talk with your health care provider for complete information about your health and treatment options. This information should not be used to decide whether or not to accept your health care providers advice, instructions or recommendations. Only your health care provider has the knowledge and training to provide advice that is right for you.  Copyright   Copyright © 2022 [employer] and its affiliates and/or licensors. All rights reserved.  A[employer] teaching reviewed with Gisselle Ortiz . She verbalized understanding.    Education was provided based on the patient's discharge diagnosis using the attached [employer] patient education as a reference.

## 2022-01-18 NOTE — TELEPHONE ENCOUNTER
Advised pt appt schd with Dr. Disla 2/8/2022 at 9:00 am and labs at [place] on tomorrow. NEFTALI TEIXEIRA

## 2022-01-21 ENCOUNTER — DOCUMENTATION ONLY (OUTPATIENT)
Dept: TRANSPLANT | Facility: CLINIC | Age: 87
End: 2022-01-21
Payer: MEDICARE

## 2022-01-21 NOTE — LETTER
January 21, 2022    Gisselle Ortiz  [address] [place] [facility] [address] [address]      Dear Gisselle Ortiz:    Your doctor has referred you to the [facility]. We are sending this letter to remind you to make an appointment with us to complete the referral process.     Please call us at [phone] to schedule an appointment. We look forward to seeing you soon.     If you received a call and have been scheduled, please disregard this letter.       Sincerely,        [facility]   [address] [place] [id number]  [phone]

## 2022-01-21 NOTE — NURSING
Pt records reviewed.   Pt will be referred to Hepatology.    Initial referral received  from the [facility]que.   Referring provider  Elsi Lucio MD  [facility]       Referral letter sent to patient.

## 2022-01-24 ENCOUNTER — CLINICAL SUPPORT (OUTPATIENT)
Dept: CARDIOLOGY | Facility: HOSPITAL | Age: 87
End: 2022-01-24
Payer: MEDICARE

## 2022-01-24 DIAGNOSIS — I48.91 UNSPECIFIED ATRIAL FIBRILLATION: ICD-10-CM

## 2022-01-24 DIAGNOSIS — R00.1 BRADYCARDIA, UNSPECIFIED: ICD-10-CM

## 2022-01-24 DIAGNOSIS — Z95.0 PRESENCE OF CARDIAC PACEMAKER: ICD-10-CM

## 2022-01-24 PROCEDURE — 93294 CARDIAC DEVICE CHECK - REMOTE: ICD-10-PCS | Mod: ,,, | Performed by: INTERNAL MEDICINE

## 2022-01-24 PROCEDURE — 93294 REM INTERROG EVL PM/LDLS PM: CPT | Mod: ,,, | Performed by: INTERNAL MEDICINE

## 2022-01-28 ENCOUNTER — EXTERNAL HOME HEALTH (OUTPATIENT)
Dept: HOME HEALTH SERVICES | Facility: HOSPITAL | Age: 87
End: 2022-01-28
Payer: MEDICARE

## 2022-02-18 ENCOUNTER — LAB VISIT (OUTPATIENT)
Dept: LAB | Facility: HOSPITAL | Age: 87
End: 2022-02-18
Attending: FAMILY MEDICINE
Payer: MEDICARE

## 2022-02-18 DIAGNOSIS — R74.8 ELEVATED LIVER ENZYMES: ICD-10-CM

## 2022-02-18 DIAGNOSIS — N18.31 STAGE 3A CHRONIC KIDNEY DISEASE: Chronic | ICD-10-CM

## 2022-02-18 DIAGNOSIS — E80.6 HYPERBILIRUBINEMIA: ICD-10-CM

## 2022-02-18 DIAGNOSIS — D51.9 ANEMIA DUE TO VITAMIN B12 DEFICIENCY, UNSPECIFIED B12 DEFICIENCY TYPE: ICD-10-CM

## 2022-02-18 DIAGNOSIS — I50.32 CHRONIC DIASTOLIC HEART FAILURE: ICD-10-CM

## 2022-02-18 DIAGNOSIS — E88.09 HYPOALBUMINEMIA: ICD-10-CM

## 2022-02-18 PROBLEM — R11.2 NAUSEA & VOMITING: Status: RESOLVED | Noted: 2022-01-07 | Resolved: 2022-02-18

## 2022-02-18 PROBLEM — U07.1 COVID-19: Status: RESOLVED | Noted: 2022-01-07 | Resolved: 2022-02-18

## 2022-02-18 LAB
ALBUMIN SERPL BCP-MCNC: 3.5 G/DL (ref 3.5–5.2)
ALP SERPL-CCNC: 120 U/L (ref 55–135)
ALT SERPL W/O P-5'-P-CCNC: 16 U/L (ref 10–44)
ANION GAP SERPL CALC-SCNC: 8 MMOL/L (ref 8–16)
AST SERPL-CCNC: 22 U/L (ref 10–40)
BASOPHILS # BLD AUTO: 0.04 K/UL (ref 0–0.2)
BASOPHILS NFR BLD: 0.6 % (ref 0–1.9)
BILIRUB DIRECT SERPL-MCNC: 0.4 MG/DL (ref 0.1–0.3)
BILIRUB SERPL-MCNC: 0.6 MG/DL (ref 0.1–1)
BNP SERPL-MCNC: 175 PG/ML (ref 0–99)
BUN SERPL-MCNC: 20 MG/DL (ref 8–23)
CALCIUM SERPL-MCNC: 9 MG/DL (ref 8.7–10.5)
CHLORIDE SERPL-SCNC: 104 MMOL/L (ref 95–110)
CO2 SERPL-SCNC: 26 MMOL/L (ref 23–29)
CREAT SERPL-MCNC: 1.3 MG/DL (ref 0.5–1.4)
DIFFERENTIAL METHOD: ABNORMAL
EOSINOPHIL # BLD AUTO: 0.1 K/UL (ref 0–0.5)
EOSINOPHIL NFR BLD: 1.1 % (ref 0–8)
ERYTHROCYTE [DISTWIDTH] IN BLOOD BY AUTOMATED COUNT: 13.5 % (ref 11.5–14.5)
EST. GFR  (AFRICAN AMERICAN): 42 ML/MIN/1.73 M^2
EST. GFR  (NON AFRICAN AMERICAN): 37 ML/MIN/1.73 M^2
GLUCOSE SERPL-MCNC: 96 MG/DL (ref 70–110)
HCT VFR BLD AUTO: 39.1 % (ref 37–48.5)
HGB BLD-MCNC: 12.4 G/DL (ref 12–16)
IMM GRANULOCYTES # BLD AUTO: 0.06 K/UL (ref 0–0.04)
IMM GRANULOCYTES NFR BLD AUTO: 0.9 % (ref 0–0.5)
LYMPHOCYTES # BLD AUTO: 1.4 K/UL (ref 1–4.8)
LYMPHOCYTES NFR BLD: 20.8 % (ref 18–48)
MCH RBC QN AUTO: 30.5 PG (ref 27–31)
MCHC RBC AUTO-ENTMCNC: 31.7 G/DL (ref 32–36)
MCV RBC AUTO: 96 FL (ref 82–98)
MONOCYTES # BLD AUTO: 0.6 K/UL (ref 0.3–1)
MONOCYTES NFR BLD: 8.5 % (ref 4–15)
NEUTROPHILS # BLD AUTO: 4.4 K/UL (ref 1.8–7.7)
NEUTROPHILS NFR BLD: 68.1 % (ref 38–73)
NRBC BLD-RTO: 0 /100 WBC
PLATELET # BLD AUTO: 217 K/UL (ref 150–450)
PMV BLD AUTO: 9.9 FL (ref 9.2–12.9)
POTASSIUM SERPL-SCNC: 5 MMOL/L (ref 3.5–5.1)
PROT SERPL-MCNC: 7 G/DL (ref 6–8.4)
PTH-INTACT SERPL-MCNC: 68.1 PG/ML (ref 9–77)
RBC # BLD AUTO: 4.06 M/UL (ref 4–5.4)
SODIUM SERPL-SCNC: 138 MMOL/L (ref 136–145)
VIT B12 SERPL-MCNC: 587 PG/ML (ref 210–950)
WBC # BLD AUTO: 6.5 K/UL (ref 3.9–12.7)

## 2022-02-18 PROCEDURE — 83970 ASSAY OF PARATHORMONE: CPT | Performed by: FAMILY MEDICINE

## 2022-02-18 PROCEDURE — 85025 COMPLETE CBC W/AUTO DIFF WBC: CPT | Performed by: FAMILY MEDICINE

## 2022-02-18 PROCEDURE — 82248 BILIRUBIN DIRECT: CPT | Performed by: FAMILY MEDICINE

## 2022-02-18 PROCEDURE — 83880 ASSAY OF NATRIURETIC PEPTIDE: CPT | Performed by: FAMILY MEDICINE

## 2022-02-18 PROCEDURE — 82607 VITAMIN B-12: CPT | Performed by: FAMILY MEDICINE

## 2022-02-18 PROCEDURE — 80053 COMPREHEN METABOLIC PANEL: CPT | Performed by: FAMILY MEDICINE

## 2022-02-21 LAB
ANNOTATION COMMENT IMP: NORMAL
IF BLOCK AB SER QL: NEGATIVE

## 2022-02-23 ENCOUNTER — OFFICE VISIT (OUTPATIENT)
Dept: HEPATOLOGY | Facility: CLINIC | Age: 87
End: 2022-02-23
Payer: MEDICARE

## 2022-02-23 VITALS
HEIGHT: 63 IN | BODY MASS INDEX: 23.04 KG/M2 | RESPIRATION RATE: 18 BRPM | DIASTOLIC BLOOD PRESSURE: 72 MMHG | HEART RATE: 62 BPM | SYSTOLIC BLOOD PRESSURE: 160 MMHG | OXYGEN SATURATION: 99 % | WEIGHT: 130.06 LBS | TEMPERATURE: 99 F

## 2022-02-23 DIAGNOSIS — R74.01 TRANSAMINITIS: ICD-10-CM

## 2022-02-23 PROCEDURE — 99214 PR OFFICE/OUTPT VISIT, EST, LEVL IV, 30-39 MIN: ICD-10-PCS | Mod: S$PBB,,, | Performed by: INTERNAL MEDICINE

## 2022-02-23 PROCEDURE — 99214 OFFICE O/P EST MOD 30 MIN: CPT | Mod: PBBFAC | Performed by: INTERNAL MEDICINE

## 2022-02-23 PROCEDURE — 99999 PR PBB SHADOW E&M-EST. PATIENT-LVL IV: CPT | Mod: PBBFAC,,, | Performed by: INTERNAL MEDICINE

## 2022-02-23 PROCEDURE — 99999 PR PBB SHADOW E&M-EST. PATIENT-LVL IV: ICD-10-PCS | Mod: PBBFAC,,, | Performed by: INTERNAL MEDICINE

## 2022-02-23 PROCEDURE — 99214 OFFICE O/P EST MOD 30 MIN: CPT | Mod: S$PBB,,, | Performed by: INTERNAL MEDICINE

## 2022-02-23 NOTE — PROGRESS NOTES
Subjective:       Patient ID: Gisselle Ortiz is a 88 y.o. female.    Chief Complaint: No chief complaint on file.    HPI  I saw this 88 y.o. lady who came to clinic with her son.  She was in hospital in mid January with Covid 19 infection and was quite unwell at that time.    It was noted that she had very elevated transaminases which has since completely normalized.    In Oct 2020, she had gallstone pancreatitis and also a hepatic abscess that was drained percutaneously.      HBV/HCV and HAV neg    Abdo US: 1/7/2022  Biliary sludge.  No wall thickening or wall hyperemia.  No evidence of acute cholecystitis.  Mild intrahepatic biliary ductal dilatation, similar to prior CT 11/08/2020.  Heterogeneous liver parenchyma and trace ascites    PMH:  Hypertension  A fib  Pacemaker  CKD 3    Review of Systems   Constitutional: Negative for activity change, appetite change, chills, fatigue, fever and unexpected weight change.   HENT: Negative for ear pain, hearing loss, nosebleeds, sore throat and trouble swallowing.    Eyes: Negative for redness and visual disturbance.   Respiratory: Negative for cough, chest tightness, shortness of breath and wheezing.    Cardiovascular: Negative for chest pain and palpitations.   Gastrointestinal: Negative for abdominal distention, abdominal pain, blood in stool, constipation, diarrhea, nausea and vomiting.   Genitourinary: Negative for difficulty urinating, dysuria, frequency, hematuria and urgency.   Musculoskeletal: Negative for arthralgias, back pain, gait problem, joint swelling and myalgias.   Skin: Negative for rash.   Neurological: Negative for tremors, seizures, speech difficulty, weakness and headaches.   Hematological: Negative for adenopathy.   Psychiatric/Behavioral: Negative for confusion, decreased concentration and sleep disturbance. The patient is not nervous/anxious.            Lab Results   Component Value Date    ALT 16 02/18/2022    AST 22 02/18/2022    ALKPHOS  120 02/18/2022    BILITOT 0.6 02/18/2022     Past Medical History:   Diagnosis Date    A-fib     Anemia of chronic disease 2/2/2021    Arthritis     Chronic diastolic heart failure 2/2/2021    COVID-19 1/7/2022    Gallstone pancreatitis     Hypertension     Pancreatic abscess 9/26/2020    Thyroid disease      Past Surgical History:   Procedure Laterality Date    CATARACT EXTRACTION      CATARACT EXTRACTION W/  INTRAOCULAR LENS IMPLANT Bilateral 2004    DR IN [place]    ENDOSCOPIC ULTRASOUND OF UPPER GASTROINTESTINAL TRACT N/A 9/14/2020    Procedure: ULTRASOUND, UPPER GI TRACT, ENDOSCOPIC;  Surgeon: Esha Howard MD;  Location: [facility] ENDO (2ND FLR);  Service: Endoscopy;  Laterality: N/A;    ENDOSCOPIC ULTRASOUND OF UPPER GASTROINTESTINAL TRACT  11/25/2020    Procedure: ULTRASOUND, UPPER GI TRACT, ENDOSCOPIC;  Surgeon: Esha Howard MD;  Location: [facility] ENDO (2ND FLR);  Service: Endoscopy;;    ERCP N/A 9/14/2020    Procedure: ERCP (ENDOSCOPIC RETROGRADE CHOLANGIOPANCREATOGRAPHY);  Surgeon: Esha Howard MD;  Location: [facility] (2ND [facility]);  Service: Endoscopy;  Laterality: N/A;    ERCP N/A 9/25/2020    Procedure: ERCP (ENDOSCOPIC RETROGRADE CHOLANGIOPANCREATOGRAPHY);  Surgeon: Esha Howard MD;  Location: [facility] ENDO (2ND [facility]);  Service: Endoscopy;  Laterality: N/A;    ERCP N/A 11/25/2020    Procedure: ERCP (ENDOSCOPIC RETROGRADE CHOLANGIOPANCREATOGRAPHY);  Surgeon: Esha Howard MD;  Location: [facility] ENDO (2ND FLR);  Service: Endoscopy;  Laterality: N/A;  Covid-19 test 11/22/20 at [place] - pg  2 day hold Dr Favian Pisano - pg  PM prep    EYE SURGERY      HIP REPLACEMENT ARTHROPLASTY Right 2016    HYSTERECTOMY      REVISION OF SKIN POCKET FOR PACEMAKER N/A 1/21/2019    Procedure: REVISION-POCKET-PACEMAKER;  Surgeon: Asher Watts MD;  Location: [facility] EP LAB;  Service: Cardiology;  Laterality: N/A;  MODERATE SEDATION, sedation issues in the past     THYROIDECTOMY      TREATMENT OF CARDIAC ARRHYTHMIA N/A 3/18/2019    Procedure: CARDIOVERSION OR DEFIBRILLATION;  Surgeon: Asher Watts MD;  Location: [facility] EP LAB;  Service: Cardiology;  Laterality: N/A;  AF, JILL-cx if SR, DCCV, MAC, FAS, 3PREP    TREATMENT OF CARDIAC ARRHYTHMIA N/A 5/14/2019    Procedure: Cardioversion or Defibrillation;  Surgeon: Derick Vizcarra MD;  Location: [facility] EP LAB;  Service: Cardiology;  Laterality: N/A;  AF, JILL, DCCV, MAC, DM, 3PREP     Current Outpatient Medications   Medication Sig    acetaminophen (TYLENOL) 500 MG tablet Take 2 tablets (1,000 mg total) by mouth every 8 (eight) hours as needed for Pain.    amiodarone (PACERONE) 200 MG Tab Take 1 tablet (200 mg total) by mouth once daily.    aspirin (ECOTRIN) 81 MG EC tablet Take 1 tablet (81 mg total) by mouth once daily.    atenoloL (TENORMIN) 25 MG tablet TAKE 2 TABLETS EVERY MORNING AND 1 TABLET EVERY EVENING    atorvastatin (LIPITOR) 10 MG tablet Take 1 tablet (10 mg total) by mouth once daily.    diclofenac sodium (VOLTAREN) 1 % Gel Apply 2 g topically 4 (four) times daily.    diltiaZEM (TIAZAC) 180 MG Cs24 Take 1 capsule (180 mg total) by mouth once daily.    ELIQUIS 2.5 mg Tab TAKE 1 TABLET TWICE A DAY    eszopiclone (LUNESTA) 2 MG Tab Take 1 tablet (2 mg total) by mouth nightly as needed (sleep).    olopatadine (PATANOL) 0.1 % ophthalmic solution Place 1 drop into both eyes 2 (two) times daily.    spironolactone (ALDACTONE) 25 MG tablet Take 1 tablet (25 mg total) by mouth 2 (two) times a day.    SYNTHROID 137 mcg Tab tablet TAKE 1 TABLET DAILY    teriparatide (FORTEO) 20 mcg/dose (600mcg/2.4mL) PnIj Inject 0.08 mLs (20 mcg total) into the skin once daily.    vitamin D (VITAMIN D3) 1000 units Tab Take 1 tablet (1,000 Units total) by mouth once daily.     No current facility-administered medications for this visit.       Objective:      Physical Exam  Constitutional:       General: She is not in acute  distress.  HENT:      Head: Normocephalic.   Eyes:      Pupils: Pupils are equal, round, and reactive to light.   Neck:      Thyroid: No thyromegaly.      Vascular: No JVD.      Trachea: No tracheal deviation.   Cardiovascular:      Rate and Rhythm: Normal rate and regular rhythm.      Heart sounds: Normal heart sounds. No murmur heard.  Pulmonary:      Effort: Pulmonary effort is normal.      Breath sounds: Normal breath sounds. No stridor.   Abdominal:      Palpations: Abdomen is soft.   Lymphadenopathy:      Head:      Right side of head: No submental, submandibular, tonsillar, preauricular, posterior auricular or occipital adenopathy.      Left side of head: No submental, submandibular, tonsillar, preauricular, posterior auricular or occipital adenopathy.      Cervical: No cervical adenopathy.   Neurological:      Mental Status: She is alert. She is not disoriented.      Cranial Nerves: No cranial nerve deficit.      Sensory: No sensory deficit.         Assessment:       1. Transaminitis        Plan:   She currently feels well and since her previous investigations did not show anything serious with regards to her liver, I do not think she merits further testing.    Her transaminitis may have been caused by her COVID infection or may have been related to some transient hypotension when she was ill.    - reassured  - no further follow up.

## 2022-03-14 ENCOUNTER — PATIENT OUTREACH (OUTPATIENT)
Dept: ADMINISTRATIVE | Facility: HOSPITAL | Age: 87
End: 2022-03-14
Payer: MEDICARE

## 2022-04-24 ENCOUNTER — CLINICAL SUPPORT (OUTPATIENT)
Dept: CARDIOLOGY | Facility: HOSPITAL | Age: 87
End: 2022-04-24
Payer: MEDICARE

## 2022-04-24 DIAGNOSIS — Z95.0 PRESENCE OF CARDIAC PACEMAKER: ICD-10-CM

## 2022-05-18 PROBLEM — R79.89 ELEVATED LFTS: Status: RESOLVED | Noted: 2022-01-07 | Resolved: 2022-05-18

## 2022-05-18 PROBLEM — N25.81 HYPERPARATHYROIDISM DUE TO RENAL INSUFFICIENCY: Chronic | Status: RESOLVED | Noted: 2020-06-11 | Resolved: 2022-05-18

## 2022-05-18 PROBLEM — R63.0 ANOREXIA: Status: RESOLVED | Noted: 2022-01-13 | Resolved: 2022-05-18

## 2022-06-13 ENCOUNTER — TELEPHONE (OUTPATIENT)
Dept: ELECTROPHYSIOLOGY | Facility: CLINIC | Age: 87
End: 2022-06-13
Payer: MEDICARE

## 2022-06-13 ENCOUNTER — TELEPHONE (OUTPATIENT)
Dept: CARDIOLOGY | Facility: HOSPITAL | Age: 87
End: 2022-06-13
Payer: MEDICARE

## 2022-06-13 DIAGNOSIS — Z79.899 LONG TERM CURRENT USE OF ANTIARRHYTHMIC DRUG: Primary | ICD-10-CM

## 2022-06-13 DIAGNOSIS — I49.5 SSS (SICK SINUS SYNDROME): Primary | ICD-10-CM

## 2022-06-13 NOTE — TELEPHONE ENCOUNTER
I contacted patient regarding recent remote received from [facility] monitor. I explained to her that we would like to bring her in for a device interrogation and possible programming changes. I explained to her that it looked as if she was having some atrial arrhythmias but we cannot see all of it due to current programming. She stated that she has been feeling her heart racing (especially at night) and is more SOB than usual. I have offered an appt to her but she wants Friday, 6/17 at 3 pm for appt. She has been advised to seek medical attention if her symptoms worsen. She verbalized understanding.

## 2022-06-17 ENCOUNTER — HOSPITAL ENCOUNTER (EMERGENCY)
Facility: HOSPITAL | Age: 87
Discharge: HOME | End: 2022-06-17
Attending: EMERGENCY MEDICINE
Payer: MEDICARE

## 2022-06-17 ENCOUNTER — CLINICAL SUPPORT (OUTPATIENT)
Dept: CARDIOLOGY | Facility: HOSPITAL | Age: 87
End: 2022-06-17
Attending: INTERNAL MEDICINE
Payer: MEDICARE

## 2022-06-17 ENCOUNTER — TELEPHONE (OUTPATIENT)
Dept: OPHTHALMOLOGY | Facility: CLINIC | Age: 87
End: 2022-06-17
Payer: MEDICARE

## 2022-06-17 VITALS
TEMPERATURE: 98 F | HEIGHT: 63 IN | SYSTOLIC BLOOD PRESSURE: 113 MMHG | OXYGEN SATURATION: 98 % | WEIGHT: 128 LBS | HEART RATE: 75 BPM | DIASTOLIC BLOOD PRESSURE: 57 MMHG | BODY MASS INDEX: 22.68 KG/M2 | RESPIRATION RATE: 16 BRPM

## 2022-06-17 DIAGNOSIS — I48.19 PERSISTENT ATRIAL FIBRILLATION: Primary | ICD-10-CM

## 2022-06-17 DIAGNOSIS — Z71.1 NO PROBLEM, FEARED COMPLAINT UNFOUNDED: Primary | ICD-10-CM

## 2022-06-17 DIAGNOSIS — I49.5 SSS (SICK SINUS SYNDROME): ICD-10-CM

## 2022-06-17 PROCEDURE — 99283 EMERGENCY DEPT VISIT LOW MDM: CPT | Mod: ,,, | Performed by: EMERGENCY MEDICINE

## 2022-06-17 PROCEDURE — 93280 CARDIAC DEVICE CHECK - IN CLINIC & HOSPITAL: ICD-10-PCS | Mod: 26,,, | Performed by: INTERNAL MEDICINE

## 2022-06-17 PROCEDURE — 99283 PR EMERGENCY DEPT VISIT,LEVEL III: ICD-10-PCS | Mod: ,,, | Performed by: EMERGENCY MEDICINE

## 2022-06-17 PROCEDURE — 93280 PM DEVICE PROGR EVAL DUAL: CPT

## 2022-06-17 PROCEDURE — 99283 EMERGENCY DEPT VISIT LOW MDM: CPT

## 2022-06-17 PROCEDURE — 93280 PM DEVICE PROGR EVAL DUAL: CPT | Mod: 26,,, | Performed by: INTERNAL MEDICINE

## 2022-06-17 NOTE — ED PROVIDER NOTES
Source of History   Patient and daughter    Chief Complaint   Eye Problem (More jaundice, eyes more blurry, + liver prob)      History Of Present Illness   Gisselle Ortiz is a 88 y.o. female presenting with a yellow appearance to her eyes that was present earlier today but has seemingly mostly resolved.  The patient has a history of some liver issues caused by COVID infection which took a long time to clear up.  She was nervous that it has returned, but she states that her eyes seem better.  She otherwise feels well.  She denies any blurry vision at this time.    Review Of Systems   As per HPI and below:  General: No fever.  No chills.  Eyes: Notes visual changes.  Head: No headache.    Integument: No rashes or lesions.  Chest: No shortness of breath.  Cardiovascular: No chest pain.  Abdomen: No abdominal pain.  No nausea or vomiting.  Neurologic: No focal weakness.  No numbness.  Endocrine: No excessive thirst or urination.      Review of patient's allergies indicates:   Allergen Reactions    Ciprofloxacin Nausea And Vomiting       No current facility-administered medications on file prior to encounter.     Current Outpatient Medications on File Prior to Encounter   Medication Sig Dispense Refill    acetaminophen (TYLENOL) 500 MG tablet Take 2 tablets (1,000 mg total) by mouth every 8 (eight) hours as needed for Pain.  0    amiodarone (PACERONE) 200 MG Tab Take 1 tablet (200 mg total) by mouth once daily. 90 tablet 3    aspirin (ECOTRIN) 81 MG EC tablet Take 1 tablet (81 mg total) by mouth once daily.  0    atenoloL (TENORMIN) 25 MG tablet TAKE 2 TABLETS EVERY MORNING AND 1 TABLET EVERY EVENING 270 tablet 0    atorvastatin (LIPITOR) 10 MG tablet Take 1 tablet (10 mg total) by mouth once daily. 90 tablet 3    diclofenac sodium (VOLTAREN) 1 % Gel Apply 2 g topically 4 (four) times daily. 50 g 3    diltiaZEM (TIAZAC) 180 MG Cs24 Take 1 capsule (180 mg total) by mouth once daily. 90 capsule 3     ELIQUIS 2.5 mg Tab TAKE 1 TABLET TWICE A  tablet 3    eszopiclone (LUNESTA) 2 MG Tab Take 1 tablet (2 mg total) by mouth nightly as needed (sleep). 90 tablet 1    levothyroxine (SYNTHROID) 137 MCG Tab tablet Take 1 tablet (137 mcg total) by mouth before breakfast. 90 tablet 3    olopatadine (PATANOL) 0.1 % ophthalmic solution Place 1 drop into both eyes 2 (two) times daily. 5 mL 3    spironolactone (ALDACTONE) 25 MG tablet Take 1 tablet (25 mg total) by mouth 2 (two) times a day. 180 tablet 3    sulfamethoxazole-trimethoprim 800-160mg (BACTRIM DS) 800-160 mg Tab Take 1 tablet by mouth 2 (two) times daily. 6 tablet 0    vitamin D (VITAMIN D3) 1000 units Tab Take 1 tablet (1,000 Units total) by mouth once daily.         Past History   As per HPI and below:  Past Medical History:   Diagnosis Date    A-fib     Anemia of chronic disease 02/02/2021    Chronic diastolic heart failure 02/02/2021    COVID-19 01/07/2022    Gallstone pancreatitis     Hypertension     Pancreatic abscess 09/26/2020    Thyroid disease      Past Surgical History:   Procedure Laterality Date    CATARACT EXTRACTION      CATARACT EXTRACTION W/  INTRAOCULAR LENS IMPLANT Bilateral 2004    DR IN [place]    ENDOSCOPIC ULTRASOUND OF UPPER GASTROINTESTINAL TRACT N/A 9/14/2020    Procedure: ULTRASOUND, UPPER GI TRACT, ENDOSCOPIC;  Surgeon: Esha Howard MD;  Location: [address]);  Service: Endoscopy;  Laterality: N/A;    ENDOSCOPIC ULTRASOUND OF UPPER GASTROINTESTINAL TRACT  11/25/2020    Procedure: ULTRASOUND, UPPER GI TRACT, ENDOSCOPIC;  Surgeon: Esha Howard MD;  Location: [address]);  Service: Endoscopy;;    ERCP N/A 9/14/2020    Procedure: ERCP (ENDOSCOPIC RETROGRADE CHOLANGIOPANCREATOGRAPHY);  Surgeon: Esha Howard MD;  Location: [address]);  Service: Endoscopy;  Laterality: N/A;    ERCP N/A 9/25/2020    Procedure: ERCP (ENDOSCOPIC RETROGRADE CHOLANGIOPANCREATOGRAPHY);   Surgeon: Esha Howard MD;  Location: [facility] ENDO (2ND FLR);  Service: Endoscopy;  Laterality: N/A;    ERCP N/A 11/25/2020    Procedure: ERCP (ENDOSCOPIC RETROGRADE CHOLANGIOPANCREATOGRAPHY);  Surgeon: Esha Howard MD;  Location: [facility] ENDO (2ND FLR);  Service: Endoscopy;  Laterality: N/A;  Covid-19 test 11/22/20 at [facility]  2 day hold Dr Favian Pisano - pg  PM prep    EYE SURGERY      HIP REPLACEMENT ARTHROPLASTY Right 2016    HYSTERECTOMY      REVISION OF SKIN POCKET FOR PACEMAKER N/A 1/21/2019    Procedure: REVISION-POCKET-PACEMAKER;  Surgeon: Asher Watts MD;  Location: [facility] EP LAB;  Service: Cardiology;  Laterality: N/A;  MODERATE SEDATION, sedation issues in the past    THYROIDECTOMY      TREATMENT OF CARDIAC ARRHYTHMIA N/A 3/18/2019    Procedure: CARDIOVERSION OR DEFIBRILLATION;  Surgeon: Asher Watts MD;  Location: [facility] EP LAB;  Service: Cardiology;  Laterality: N/A;  AF, JILL-cx if SR, DCCV, MAC, FAS, 3PREP    TREATMENT OF CARDIAC ARRHYTHMIA N/A 5/14/2019    Procedure: Cardioversion or Defibrillation;  Surgeon: Derick Vizcarra MD;  Location: [facility] EP LAB;  Service: Cardiology;  Laterality: N/A;  AF, JILL, DCCV, MAC, DM, 3PREP       Social History     Socioeconomic History    Marital status:    Tobacco Use    Smoking status: Former Smoker     Start date: 5/19/1964    Smokeless tobacco: Never Used   Substance and Sexual Activity    Alcohol use: No    Drug use: No    Sexual activity: Not Currently     Partners: Male       Family History   Problem Relation Age of Onset    Heart attack Mother     Heart disease Mother     Heart failure Mother     Hypertension Mother     Stroke Maternal Grandmother     Hypertension Maternal Grandmother     Heart disease Maternal Grandmother     Heart attack Maternal Grandmother     Heart failure Maternal Grandmother        Physical Exam     Vitals:    06/17/22 1613 06/17/22 1939   BP: (!) 113/57 (!) 113/57  "  Pulse: 75 75   Resp: 16 16   Temp: 97.9 °F (36.6 °C) 97.9 °F (36.6 °C)   TempSrc: Oral Oral   SpO2: 98% 98%   Weight: 58.1 kg (128 lb)    Height: 5' 3" (1.6 m)      Appearance: No acute distress.  Skin: No rashes seen.  Good turgor.  No abrasions.  No ecchymoses.  Eyes: No conjunctival injection.  ENT: Oropharynx clear.    Chest: Clear to auscultation bilaterally.  Good air movement.  No wheezes.  No rhonchi.  Cardiovascular: Regular rate and rhythm.  No murmurs. No gallops. No rubs.  Abdomen: Soft.  Not distended.  Nontender.  No guarding.  No rebound.  Musculoskeletal: Good range of motion all joints.  No deformities.  Neck supple.  No meningismus.  Neurologic: Motor intact.  Sensation intact.  Cerebellar intact.  Cranial nerves intact.  Mental Status:  Alert and oriented x 3.  Appropriate, conversant.      Initial MDM   Reported yellow sclera that is seemingly resolved.  Will check liver function tests.  Patient denies seeing any particularly dark urine.  She feels well and otherwise would not be here today.    I decided to obtain the patient's medical records.    Medications - No data to display    Results and Course     Labs Reviewed   HEPATIC FUNCTION PANEL       Imaging Results    None            TBili 1.0, DBili 0.6 on panel.  She did not have jaundice earlier today.       MDM, Impression and Plan   88 y.o. female with worries about elevated bilirubin and LFTs, and her LFTs are slightly increased but not significantly so.  Her total bili is 1.0.  What she saw was not jaundiced today.  Recommend that she follow-up with her PCP in 1 week for further checks, but no acute issues today.         Final diagnoses:  [Z71.1] No problem, feared complaint unfounded (Primary)          ED Disposition Condition    Discharge Stable        ED Prescriptions     None        Follow-up Information     Follow up With Specialties Details Why Contact Info    Kai Montez MD Family Medicine In 1 week  [address] " [facility]  [address]  [facility] [address]  [phone]             Fabiano Desir MD  06/18/22 1385

## 2022-06-17 NOTE — TELEPHONE ENCOUNTER
----- Message from Elisabet Hernandez sent at 6/17/2022 12:45 PM CDT -----  Regarding: Urgent  Contact: Gisselle Nash  Both of pt eyes are yellow and bothering her. Pt stated this started over night. Pt stated she's having trouble seeing but no pain. Pt is needing to be seen soon. Pt call back number is [phone].

## 2022-06-20 ENCOUNTER — TELEPHONE (OUTPATIENT)
Dept: ELECTROPHYSIOLOGY | Facility: CLINIC | Age: 87
End: 2022-06-20
Payer: MEDICARE

## 2022-06-21 ENCOUNTER — ANESTHESIA (OUTPATIENT)
Dept: MEDSURG UNIT | Facility: HOSPITAL | Age: 87
End: 2022-06-21
Payer: MEDICARE

## 2022-06-21 ENCOUNTER — ANESTHESIA EVENT (OUTPATIENT)
Dept: MEDSURG UNIT | Facility: HOSPITAL | Age: 87
End: 2022-06-21
Payer: MEDICARE

## 2022-06-21 ENCOUNTER — HOSPITAL ENCOUNTER (OUTPATIENT)
Facility: HOSPITAL | Age: 87
Discharge: HOME | End: 2022-06-21
Attending: INTERNAL MEDICINE | Admitting: INTERNAL MEDICINE
Payer: MEDICARE

## 2022-06-21 ENCOUNTER — HOSPITAL ENCOUNTER (OUTPATIENT)
Dept: CARDIOLOGY | Facility: HOSPITAL | Age: 87
Discharge: HOME | End: 2022-06-21
Attending: INTERNAL MEDICINE | Admitting: INTERNAL MEDICINE
Payer: MEDICARE

## 2022-06-21 VITALS
RESPIRATION RATE: 30 BRPM | DIASTOLIC BLOOD PRESSURE: 59 MMHG | BODY MASS INDEX: 22.86 KG/M2 | SYSTOLIC BLOOD PRESSURE: 118 MMHG | WEIGHT: 129 LBS | OXYGEN SATURATION: 100 % | HEART RATE: 60 BPM | TEMPERATURE: 98 F | HEIGHT: 63 IN

## 2022-06-21 VITALS
DIASTOLIC BLOOD PRESSURE: 71 MMHG | BODY MASS INDEX: 22.86 KG/M2 | SYSTOLIC BLOOD PRESSURE: 139 MMHG | WEIGHT: 129 LBS | HEIGHT: 63 IN

## 2022-06-21 DIAGNOSIS — I48.91 ATRIAL FIBRILLATION: ICD-10-CM

## 2022-06-21 DIAGNOSIS — I48.0 PAROXYSMAL ATRIAL FIBRILLATION: Primary | Chronic | ICD-10-CM

## 2022-06-21 DIAGNOSIS — I48.19 PERSISTENT ATRIAL FIBRILLATION: ICD-10-CM

## 2022-06-21 LAB
ASCENDING AORTA: 3.3 CM
BSA FOR ECHO PROCEDURE: 1.61 M2
EJECTION FRACTION: 50 %
SINUS: 3.1 CM
STJ: 2.7 CM

## 2022-06-21 PROCEDURE — 93005 ELECTROCARDIOGRAM TRACING: CPT

## 2022-06-21 PROCEDURE — 93312 TRANSESOPHAGEAL ECHO (TEE) (CUPID ONLY): ICD-10-PCS | Mod: 26,,, | Performed by: INTERNAL MEDICINE

## 2022-06-21 PROCEDURE — 93325 DOPPLER ECHO COLOR FLOW MAPG: CPT

## 2022-06-21 PROCEDURE — 92960 PR CARDIOVERSION, ELECTIVE;EXTERN: ICD-10-PCS | Mod: ,,, | Performed by: INTERNAL MEDICINE

## 2022-06-21 PROCEDURE — D9220A PRA ANESTHESIA: Mod: ANES,,, | Performed by: ANESTHESIOLOGY

## 2022-06-21 PROCEDURE — 92960 CARDIOVERSION ELECTRIC EXT: CPT | Performed by: INTERNAL MEDICINE

## 2022-06-21 PROCEDURE — 93010 EKG 12-LEAD: ICD-10-PCS | Mod: ,,, | Performed by: INTERNAL MEDICINE

## 2022-06-21 PROCEDURE — 92960 CARDIOVERSION ELECTRIC EXT: CPT | Mod: ,,, | Performed by: INTERNAL MEDICINE

## 2022-06-21 PROCEDURE — 93320 DOPPLER ECHO COMPLETE: CPT | Mod: 26,,, | Performed by: INTERNAL MEDICINE

## 2022-06-21 PROCEDURE — 93320 TRANSESOPHAGEAL ECHO (TEE) (CUPID ONLY): ICD-10-PCS | Mod: 26,,, | Performed by: INTERNAL MEDICINE

## 2022-06-21 PROCEDURE — 37000009 HC ANESTHESIA EA ADD 15 MINS: Performed by: INTERNAL MEDICINE

## 2022-06-21 PROCEDURE — 93312 ECHO TRANSESOPHAGEAL: CPT | Mod: 26,,, | Performed by: INTERNAL MEDICINE

## 2022-06-21 PROCEDURE — D9220A PRA ANESTHESIA: Mod: CRNA,,, | Performed by: NURSE ANESTHETIST, CERTIFIED REGISTERED

## 2022-06-21 PROCEDURE — D9220A PRA ANESTHESIA: ICD-10-PCS | Mod: ANES,,, | Performed by: ANESTHESIOLOGY

## 2022-06-21 PROCEDURE — 93010 ELECTROCARDIOGRAM REPORT: CPT | Mod: ,,, | Performed by: INTERNAL MEDICINE

## 2022-06-21 PROCEDURE — 25000003 PHARM REV CODE 250: Performed by: NURSE ANESTHETIST, CERTIFIED REGISTERED

## 2022-06-21 PROCEDURE — 37000008 HC ANESTHESIA 1ST 15 MINUTES: Performed by: INTERNAL MEDICINE

## 2022-06-21 PROCEDURE — 93325 DOPPLER ECHO COLOR FLOW MAPG: CPT | Mod: 26,,, | Performed by: INTERNAL MEDICINE

## 2022-06-21 PROCEDURE — D9220A PRA ANESTHESIA: ICD-10-PCS | Mod: CRNA,,, | Performed by: NURSE ANESTHETIST, CERTIFIED REGISTERED

## 2022-06-21 PROCEDURE — 63600175 PHARM REV CODE 636 W HCPCS: Performed by: NURSE ANESTHETIST, CERTIFIED REGISTERED

## 2022-06-21 PROCEDURE — 93005 ELECTROCARDIOGRAM TRACING: CPT | Mod: 59

## 2022-06-21 PROCEDURE — 93325 TRANSESOPHAGEAL ECHO (TEE) (CUPID ONLY): ICD-10-PCS | Mod: 26,,, | Performed by: INTERNAL MEDICINE

## 2022-06-21 RX ORDER — PROPOFOL 10 MG/ML
VIAL (ML) INTRAVENOUS CONTINUOUS PRN
Status: DISCONTINUED | OUTPATIENT
Start: 2022-06-21 | End: 2022-06-21

## 2022-06-21 RX ORDER — LIDOCAINE HYDROCHLORIDE 20 MG/ML
INJECTION INTRAVENOUS
Status: DISCONTINUED | OUTPATIENT
Start: 2022-06-21 | End: 2022-06-21

## 2022-06-21 RX ORDER — LIDOCAINE HYDROCHLORIDE 20 MG/ML
SOLUTION OROPHARYNGEAL
Status: DISCONTINUED | OUTPATIENT
Start: 2022-06-21 | End: 2022-06-21

## 2022-06-21 RX ORDER — FENTANYL CITRATE 50 UG/ML
25 INJECTION, SOLUTION INTRAMUSCULAR; INTRAVENOUS EVERY 5 MIN PRN
Status: DISCONTINUED | OUTPATIENT
Start: 2022-06-21 | End: 2022-06-21 | Stop reason: HOSPADM

## 2022-06-21 RX ORDER — SODIUM CHLORIDE 0.9 % (FLUSH) 0.9 %
10 SYRINGE (ML) INJECTION
Status: DISCONTINUED | OUTPATIENT
Start: 2022-06-21 | End: 2022-06-21 | Stop reason: HOSPADM

## 2022-06-21 RX ORDER — PROPOFOL 10 MG/ML
VIAL (ML) INTRAVENOUS
Status: DISCONTINUED | OUTPATIENT
Start: 2022-06-21 | End: 2022-06-21

## 2022-06-21 RX ADMIN — SODIUM CHLORIDE: 0.9 INJECTION, SOLUTION INTRAVENOUS at 09:06

## 2022-06-21 RX ADMIN — LIDOCAINE HYDROCHLORIDE 100 MG: 20 INJECTION, SOLUTION INTRAVENOUS at 09:06

## 2022-06-21 RX ADMIN — LIDOCAINE HYDROCHLORIDE 3 ML: 20 SOLUTION ORAL; TOPICAL at 09:06

## 2022-06-21 RX ADMIN — PROPOFOL 80 MG: 10 INJECTION, EMULSION INTRAVENOUS at 09:06

## 2022-06-21 RX ADMIN — Medication 150 MCG/KG/MIN: at 09:06

## 2022-06-21 NOTE — DISCHARGE SUMMARY
[facility]  Cardiology  Discharge Summary      Patient Name: Gisselle Ortiz  MRN: [id number]  Admission Date: 6/21/2022  Hospital Length of Stay: 0 days  Discharge Date and Time:  06/21/2022 10:17 AM  Attending Physician: Favian Colmenares MD    Discharging Provider: Jaguar Ferrer MD  Primary Care Physician: Kai Montez MD    HPI:   89 yo F with PMHx AFib on Eliquis, HFpEF, SSS s/p pacemaker placement (1/2019), HTN, CKD3 and hypothyroidism who presents for JILL/DCCV. Recent PPM interrogation showed increasing episodes of atrial arrhythmia. Pt reports palpitations and more SOB than usual.     TTE 9/26/2019  · Normal left ventricular systolic function. The estimated ejection fraction is 55%  · Grade II (moderate) left ventricular diastolic dysfunction consistent with pseudonormalization.  · Eccentric left ventricular hypertrophy. Mild left ventricular enlargement.  · Mild mitral regurgitation.  · Normal right ventricular systolic function.  · Mild tricuspid regurgitation.  · Severe left atrial enlargement.    Anticoagulant/antiplatelets: Eliquis 2.5 mg bid and ASA 81 mg daily   ECG: Afib with frequent vent pacing    Dysphagia or odynophagia:  No  Liver Disease, esophageal disease, or known varices:  No  Upper GI Bleeding: No  Snoring:  No  Sleep Apnea:  No  Prior neck surgery or radiation:  No  History of anesthetic difficulties:  No  Family history of anesthetic difficulties:  No  Last oral intake: yesterday before midnight  Able to move neck in all directions:  Yes  Platelet count: 319k  INR: 1.1        Procedure(s) (LRB):  Cardioversion or Defibrillation (N/A)  Transesophageal echo (JILL) intra-procedure log documentation (N/A)     Indwelling Lines/Drains at time of discharge:  Lines/Drains/Airways     None                 Hospital Course:  Successful JILL without evidence of EAGLE thrombus. Successful DCCV with restoration of NSR in 60s. Change sensing to unipolar ring (from bipolar) due to  under-sensing/over-pacing. With change to unipolar, sensing threshold improved from 0.2 mV to 1.2 mV. Will continue Amiodarone and follow-up as outpt.     Goals of Care Treatment Preferences:  Code Status: Full Code    Living Will: Yes            Significant Diagnostic Studies: JILL formal read pending    Pending Diagnostic Studies:     Procedure Component Value Units Date/Time    EKG 12-lead [[id number]]     Order Status: Sent Lab Status: No result           Final Active Diagnoses:    Diagnosis Date Noted POA    Paroxysmal atrial fibrillation [I48.0] 05/19/2015 Yes     Chronic      Problems Resolved During this Admission:     No new Assessment & Plan notes have been filed under this hospital service since the last note was generated.  Service: Cardiology      Discharged Condition: stable    Disposition: Home or Self Care    Follow Up: Dr Colmenares    Patient Instructions:   No changes to home meds    Medications:  Reconciled Home Medications:      Medication List      CONTINUE taking these medications    acetaminophen 500 MG tablet  Commonly known as: TYLENOL  Take 2 tablets (1,000 mg total) by mouth every 8 (eight) hours as needed for Pain.     amiodarone 200 MG Tab  Commonly known as: PACERONE  Take 1 tablet (200 mg total) by mouth once daily.     aspirin 81 MG EC tablet  Commonly known as: ECOTRIN  Take 1 tablet (81 mg total) by mouth once daily.     atenoloL 25 MG tablet  Commonly known as: TENORMIN  TAKE 2 TABLETS EVERY MORNING AND 1 TABLET EVERY EVENING     atorvastatin 10 MG tablet  Commonly known as: LIPITOR  TAKE 1 TABLET DAILY     diclofenac sodium 1 % Gel  Commonly known as: VOLTAREN  Apply 2 g topically 4 (four) times daily.     diltiaZEM 180 MG Cs24  Commonly known as: TIAZAC  Take 1 capsule (180 mg total) by mouth once daily.     ELIQUIS 2.5 mg Tab  Generic drug: apixaban  TAKE 1 TABLET TWICE A DAY     eszopiclone 2 MG Tab  Commonly known as: LUNESTA  Take 1 tablet (2 mg total) by mouth nightly as needed  (sleep).     levothyroxine 137 MCG Tab tablet  Commonly known as: SYNTHROID  Take 1 tablet (137 mcg total) by mouth before breakfast.     olopatadine 0.1 % ophthalmic solution  Commonly known as: PATANOL  Place 1 drop into both eyes 2 (two) times daily.     spironolactone 25 MG tablet  Commonly known as: ALDACTONE  Take 1 tablet (25 mg total) by mouth 2 (two) times a day.     sulfamethoxazole-trimethoprim 800-160mg 800-160 mg Tab  Commonly known as: BACTRIM DS  Take 1 tablet by mouth 2 (two) times daily.     vitamin D 1000 units Tab  Commonly known as: VITAMIN D3  Take 1 tablet (1,000 Units total) by mouth once daily.            Time spent on the discharge of patient: 25 minutes    Jaguar Ferrer MD  Cardiology  [facility]

## 2022-06-21 NOTE — PLAN OF CARE
Received pt to floor from home accompanied by son.  AAO x 4. Denies pain or discomfort. Respirations even and unlabored. No distress noted. Pt stable.  Admit assessment complete. IV x 1 placed.  Pt oriented to room and call bell placed within reach.  Will continue to monitor.

## 2022-06-21 NOTE — TRANSFER OF CARE
"Anesthesia Transfer of Care Note    Patient: Gisselle Ortiz    Procedure(s) Performed: Procedure(s) (LRB):  Cardioversion or Defibrillation (N/A)  Transesophageal echo (JILL) intra-procedure log documentation (N/A)    Patient location: PACU    Anesthesia Type: general    Transport from OR: Transported from OR on 2-3 L/min O2 by NC with adequate spontaneous ventilation    Post pain: adequate analgesia    Post assessment: no apparent anesthetic complications and tolerated procedure well    Post vital signs: stable    Level of consciousness: sedated and responds to stimulation    Nausea/Vomiting: no nausea/vomiting    Complications: none    Transfer of care protocol was followed      Last vitals:   Visit Vitals  /62 (BP Location: Right arm, Patient Position: Sitting)   Pulse 72   Temp 36.6 °C (97.9 °F) (Temporal)   Resp 16   Ht 5' 3" (1.6 m)   Wt 58.5 kg (129 lb)   LMP  (LMP Unknown)   SpO2 98%   Breastfeeding No   BMI 22.85 kg/m²     "

## 2022-06-21 NOTE — HPI
87 yo F with PMHx AFib on Eliquis, HFpEF, SSS s/p pacemaker placement (1/2019), HTN, CKD3 and hypothyroidism who presents for JILL/DCCV. Recent PPM interrogation showed increasing episodes of atrial arrhythmia. Pt reports palpitations and more SOB than usual.     TTE 9/26/2019  Normal left ventricular systolic function. The estimated ejection fraction is 55%  Grade II (moderate) left ventricular diastolic dysfunction consistent with pseudonormalization.  Eccentric left ventricular hypertrophy. Mild left ventricular enlargement.  Mild mitral regurgitation.  Normal right ventricular systolic function.  Mild tricuspid regurgitation.  Severe left atrial enlargement.    Anticoagulant/antiplatelets: Eliquis 2.5 mg bid and ASA 81 mg daily   ECG: Afib with frequent vent pacing    Dysphagia or odynophagia:  No  Liver Disease, esophageal disease, or known varices:  No  Upper GI Bleeding: No  Snoring:  No  Sleep Apnea:  No  Prior neck surgery or radiation:  No  History of anesthetic difficulties:  No  Family history of anesthetic difficulties:  No  Last oral intake: yesterday before midnight  Able to move neck in all directions:  Yes  Platelet count: 319k  INR: 1.1

## 2022-06-21 NOTE — ANESTHESIA POSTPROCEDURE EVALUATION
Anesthesia Post Evaluation    Patient: Gisselle Ortiz    Procedure(s) Performed: Procedure(s) (LRB):  Cardioversion or Defibrillation (N/A)  Transesophageal echo (JILL) intra-procedure log documentation (N/A)    Final Anesthesia Type: general      Patient location during evaluation: PACU  Patient participation: Yes- Able to Participate  Level of consciousness: awake and alert, awake and oriented  Post-procedure vital signs: reviewed and stable  Pain management: adequate  Airway patency: patent    PONV status at discharge: No PONV  Anesthetic complications: no      Cardiovascular status: blood pressure returned to baseline, stable and hemodynamically stable  Respiratory status: unassisted, spontaneous ventilation and room air  Hydration status: euvolemic  Follow-up not needed.          Vitals Value Taken Time   /59 06/21/22 1101   Temp 36.4 °C (97.5 °F) 06/21/22 1014   Pulse 60 06/21/22 1110   Resp 40 06/21/22 1110   SpO2 99 % 06/21/22 1106   Vitals shown include unvalidated device data.      No case tracking events are documented in the log.      Pain/Shalonda Score: Shalonda Score: 10 (6/21/2022 11:00 AM)

## 2022-06-21 NOTE — ANESTHESIA PREPROCEDURE EVALUATION
06/21/2022  Gisselle Ortiz is a 88 y.o., female.      Pre-op Assessment    I have reviewed the Patient Summary Reports.     I have reviewed the Nursing Notes. I have reviewed the NPO Status.   I have reviewed the Medications.     Review of Systems  Anesthesia Hx:  History of prior surgery of interest to airway management or planning: Denies Family Hx of Anesthesia complications.   Denies Personal Hx of Anesthesia complications.   Cardiovascular:   Pacemaker Hypertension Dysrhythmias atrial fibrillation CHF NL BiV Fxn on Echo   Renal/:   Chronic Renal Disease, CRI    Hepatic/GI:   GERD Liver Disease,    Musculoskeletal:   Arthritis     Endocrine:   Hypothyroidism        Physical Exam  General: Well nourished, Cooperative, Alert and Oriented    Airway:  Mallampati: II / I  TM Distance: Normal  Tongue: Normal  Neck ROM: Normal ROM    Chest/Lungs:  Clear to auscultation, Normal Respiratory Rate    Heart:  Rate: Normal  Rhythm: Regular Rhythm  Sounds: Normal        Anesthesia Plan  Type of Anesthesia, risks & benefits discussed:    Anesthesia Type: Gen Natural Airway  Intra-op Monitoring Plan: Standard ASA Monitors  Post Op Pain Control Plan: multimodal analgesia and IV/PO Opioids PRN  Induction:  IV  Airway Plan: Direct, Post-Induction  Informed Consent: Informed consent signed with the Patient and all parties understand the risks and agree with anesthesia plan.  All questions answered. Patient consented to blood products? No  ASA Score: 3  Day of Surgery Review of History & Physical: H&P Update referred to the surgeon/provider.    Ready For Surgery From Anesthesia Perspective.     .

## 2022-06-21 NOTE — SUBJECTIVE & OBJECTIVE
Past Medical History:   Diagnosis Date    A-fib     Anemia of chronic disease 02/02/2021    Anticoagulant long-term use     CHF (congestive heart failure)     Chronic diastolic heart failure 02/02/2021    COVID-19 01/07/2022    Gallstone pancreatitis     Hypertension     Pancreatic abscess 09/26/2020    Thyroid disease        Past Surgical History:   Procedure Laterality Date    CATARACT EXTRACTION      CATARACT EXTRACTION W/  INTRAOCULAR LENS IMPLANT Bilateral 2004     IN [place]    ENDOSCOPIC ULTRASOUND OF UPPER GASTROINTESTINAL TRACT N/A 9/14/2020    Procedure: ULTRASOUND, UPPER GI TRACT, ENDOSCOPIC;  Surgeon: Esha Howard MD;  [facility]  Service: Endoscopy;  Laterality: N/A;    ENDOSCOPIC ULTRASOUND OF UPPER GASTROINTESTINAL TRACT  11/25/2020    Procedure: ULTRASOUND, UPPER GI TRACT, ENDOSCOPIC;  Surgeon: Esha Howard MD;  Location: [facility] ENDO (2ND FLR);  Service: Endoscopy;;    ERCP N/A 9/14/2020    Procedure: ERCP (ENDOSCOPIC RETROGRADE CHOLANGIOPANCREATOGRAPHY);  Surgeon: Esha Howard MD;  [facility]  Service: Endoscopy;  Laterality: N/A;    ERCP N/A 9/25/2020    Procedure: ERCP (ENDOSCOPIC RETROGRADE CHOLANGIOPANCREATOGRAPHY);  Surgeon: Esha Howard MD;  [facility]  Service: Endoscopy;  Laterality: N/A;    ERCP N/A 11/25/2020    Procedure: ERCP (ENDOSCOPIC RETROGRADE CHOLANGIOPANCREATOGRAPHY);  Surgeon: Esha Howard MD;  [facility]  Service: Endoscopy;  Laterality: N/A;  Covid-19 test 11/22/20 at [place] - pg  2 day hold Dr Favian Pisano - pg  PM prep    EYE SURGERY      HIP REPLACEMENT ARTHROPLASTY Right 2016    HYSTERECTOMY      REVISION OF SKIN POCKET FOR PACEMAKER N/A 1/21/2019    Procedure: REVISION-POCKET-PACEMAKER;  Surgeon: Asher Watts MD;  Location: [facility] EP LAB;  Service: Cardiology;  Laterality: N/A;  MODERATE SEDATION, sedation issues in the past     THYROIDECTOMY      TREATMENT OF CARDIAC ARRHYTHMIA N/A 3/18/2019    Procedure: CARDIOVERSION OR DEFIBRILLATION;  Surgeon: Asher Watts MD;  Location: [facility] EP LAB;  Service: Cardiology;  Laterality: N/A;  AF, JILL-cx if SR, DCCV, MAC, FAS, 3PREP    TREATMENT OF CARDIAC ARRHYTHMIA N/A 5/14/2019    Procedure: Cardioversion or Defibrillation;  Surgeon: Derick Vizcarra MD;  Location: [facility] EP LAB;  Service: Cardiology;  Laterality: N/A;  AF, JILL, DCCV, MAC, DM, 3PREP       Review of patient's allergies indicates:   Allergen Reactions    Ciprofloxacin Nausea And Vomiting       No current facility-administered medications on file prior to encounter.     Current Outpatient Medications on File Prior to Encounter   Medication Sig    amiodarone (PACERONE) 200 MG Tab Take 1 tablet (200 mg total) by mouth once daily.    aspirin (ECOTRIN) 81 MG EC tablet Take 1 tablet (81 mg total) by mouth once daily.    atenoloL (TENORMIN) 25 MG tablet TAKE 2 TABLETS EVERY MORNING AND 1 TABLET EVERY EVENING    ELIQUIS 2.5 mg Tab TAKE 1 TABLET TWICE A DAY    eszopiclone (LUNESTA) 2 MG Tab Take 1 tablet (2 mg total) by mouth nightly as needed (sleep).    levothyroxine (SYNTHROID) 137 MCG Tab tablet Take 1 tablet (137 mcg total) by mouth before breakfast.    spironolactone (ALDACTONE) 25 MG tablet Take 1 tablet (25 mg total) by mouth 2 (two) times a day.    sulfamethoxazole-trimethoprim 800-160mg (BACTRIM DS) 800-160 mg Tab Take 1 tablet by mouth 2 (two) times daily.    vitamin D (VITAMIN D3) 1000 units Tab Take 1 tablet (1,000 Units total) by mouth once daily.    acetaminophen (TYLENOL) 500 MG tablet Take 2 tablets (1,000 mg total) by mouth every 8 (eight) hours as needed for Pain.    diclofenac sodium (VOLTAREN) 1 % Gel Apply 2 g topically 4 (four) times daily.    olopatadine (PATANOL) 0.1 % ophthalmic solution Place 1 drop into both eyes 2 (two) times daily.     Family History       Problem Relation (Age of Onset)    Heart attack  Mother, Maternal Grandmother    Heart disease Mother, Maternal Grandmother    Heart failure Mother, Maternal Grandmother    Hypertension Mother, Maternal Grandmother    Stroke Maternal Grandmother          Tobacco Use    Smoking status: Former Smoker     Start date: 5/19/1964    Smokeless tobacco: Never Used   Substance and Sexual Activity    Alcohol use: No    Drug use: No    Sexual activity: Not Currently     Partners: Male     Review of Systems   Constitutional: Negative for diaphoresis, malaise/fatigue, weight gain and weight loss.   HENT:  Negative for nosebleeds.    Eyes:  Negative for vision loss in left eye, vision loss in right eye and visual disturbance.   Cardiovascular:  Positive for palpitations. Negative for chest pain, claudication, dyspnea on exertion, irregular heartbeat, leg swelling, near-syncope, orthopnea, paroxysmal nocturnal dyspnea and syncope.   Respiratory:  Positive for shortness of breath. Negative for cough, sleep disturbances due to breathing, snoring and wheezing.    Hematologic/Lymphatic: Negative for bleeding problem. Does not bruise/bleed easily.   Skin:  Negative for poor wound healing and rash.   Musculoskeletal:  Negative for muscle cramps and myalgias.   Gastrointestinal:  Negative for bloating, abdominal pain, diarrhea, heartburn, melena, nausea and vomiting.   Genitourinary:  Negative for hematuria and nocturia.   Neurological:  Negative for brief paralysis, dizziness, headaches, light-headedness, numbness and weakness.   Psychiatric/Behavioral:  Negative for depression.    Allergic/Immunologic: Negative for hives.   Objective:     Vital Signs (Most Recent):  Temp: 97.9 °F (36.6 °C) (06/21/22 0738)  Pulse: 72 (06/21/22 0738)  Resp: 16 (06/21/22 0738)  BP: 119/62 (06/21/22 0739)  SpO2: 98 % (06/21/22 0738) Vital Signs (24h Range):  Temp:  [97.9 °F (36.6 °C)] 97.9 °F (36.6 °C)  Pulse:  [72] 72  Resp:  [16] 16  SpO2:  [98 %] 98 %  BP: (118-119)/(57-62) 119/62     Weight: 58.5 kg  (129 lb)  Body mass index is 22.85 kg/m².    SpO2: 98 %  O2 Device (Oxygen Therapy): room air    No intake or output data in the 24 hours ending 06/21/22 0835    Lines/Drains/Airways       Peripheral Intravenous Line  Duration                  Peripheral IV - Single Lumen 06/21/22 0759 20 G Right Forearm <1 day                    Physical Exam  Vitals and nursing note reviewed.   Constitutional:       Appearance: She is well-developed. She is not diaphoretic.   HENT:      Head: Normocephalic and atraumatic.   Eyes:      Conjunctiva/sclera: Conjunctivae normal.   Neck:      Vascular: No carotid bruit or JVD.   Cardiovascular:      Rate and Rhythm: Normal rate. Rhythm irregular.      Pulses: Normal pulses and intact distal pulses.      Heart sounds: Normal heart sounds. No murmur heard.    No friction rub. No gallop.   Pulmonary:      Effort: Pulmonary effort is normal.      Breath sounds: Normal breath sounds. No rales.   Chest:      Chest wall: No tenderness.   Abdominal:      General: There is no distension.      Palpations: Abdomen is soft. There is no mass.      Tenderness: There is no abdominal tenderness.   Skin:     General: Skin is warm and dry.      Coloration: Skin is not pale.   Neurological:      Mental Status: She is alert and oriented to person, place, and time.       Significant Labs: All pertinent lab results from the last 24 hours have been reviewed.

## 2022-06-21 NOTE — H&P
[facility] - Short Stay Cardiac Unit  Cardiology  History and Physical     Patient Name: Gisselle Ortiz  MRN: [id number]  Admission Date: 6/21/2022  Code Status: Prior   Attending Provider: Favian Colmenares MD   Primary Care Physician: Kai Montez MD  Principal Problem:<principal problem not specified>    Patient information was obtained from patient and past medical records.     Subjective:     Chief Complaint:  Palpitations    HPI:  89 yo F with PMHx AFib on Eliquis, HFpEF, SSS s/p pacemaker placement (1/2019), HTN, CKD3 and hypothyroidism who presents for JILL/DCCV. Recent PPM interrogation showed increasing episodes of atrial arrhythmia. Pt reports palpitations and more SOB than usual.     TTE 9/26/2019  · Normal left ventricular systolic function. The estimated ejection fraction is 55%  · Grade II (moderate) left ventricular diastolic dysfunction consistent with pseudonormalization.  · Eccentric left ventricular hypertrophy. Mild left ventricular enlargement.  · Mild mitral regurgitation.  · Normal right ventricular systolic function.  · Mild tricuspid regurgitation.  · Severe left atrial enlargement.    Anticoagulant/antiplatelets: Eliquis 2.5 mg bid and ASA 81 mg daily   ECG: Afib with frequent vent pacing    Dysphagia or odynophagia:  No  Liver Disease, esophageal disease, or known varices:  No  Upper GI Bleeding: No  Snoring:  No  Sleep Apnea:  No  Prior neck surgery or radiation:  No  History of anesthetic difficulties:  No  Family history of anesthetic difficulties:  No  Last oral intake: yesterday before midnight  Able to move neck in all directions:  Yes  Platelet count: 319k  INR: 1.1        Past Medical History:   Diagnosis Date    A-fib     Anemia of chronic disease 02/02/2021    Anticoagulant long-term use     CHF (congestive heart failure)     Chronic diastolic heart failure 02/02/2021    COVID-19 01/07/2022    Gallstone pancreatitis     Hypertension     Pancreatic abscess  09/26/2020    Thyroid disease        Past Surgical History:   Procedure Laterality Date    CATARACT EXTRACTION      CATARACT EXTRACTION W/  INTRAOCULAR LENS IMPLANT Bilateral 2004    DR IN [place]    ENDOSCOPIC ULTRASOUND OF UPPER GASTROINTESTINAL TRACT N/A 9/14/2020    Procedure: ULTRASOUND, UPPER GI TRACT, ENDOSCOPIC;  Surgeon: Esha Howard MD;  Location: [facility] ENDO (2ND FLR);  Service: Endoscopy;  Laterality: N/A;    ENDOSCOPIC ULTRASOUND OF UPPER GASTROINTESTINAL TRACT  11/25/2020    Procedure: ULTRASOUND, UPPER GI TRACT, ENDOSCOPIC;  Surgeon: Esha Howard MD;  Location: [facility] ENDO (2ND FLR);  Service: Endoscopy;;    ERCP N/A 9/14/2020    Procedure: ERCP (ENDOSCOPIC RETROGRADE CHOLANGIOPANCREATOGRAPHY);  Surgeon: Esha Howard MD;  Location: [facility] ENDO (2ND FLR);  Service: Endoscopy;  Laterality: N/A;    ERCP N/A 9/25/2020    Procedure: ERCP (ENDOSCOPIC RETROGRADE CHOLANGIOPANCREATOGRAPHY);  Surgeon: Esha Howard MD;  Location: [facility] ENDO (2ND FLR);  Service: Endoscopy;  Laterality: N/A;    ERCP N/A 11/25/2020    Procedure: ERCP (ENDOSCOPIC RETROGRADE CHOLANGIOPANCREATOGRAPHY);  Surgeon: Esha Howard MD;  Location: [facility] ENDO (2ND FLR);  Service: Endoscopy;  Laterality: N/A;  Covid-19 test 11/22/20 at [facility]  2 day hold Dr Favian Pisano - pg  PM prep    EYE SURGERY      HIP REPLACEMENT ARTHROPLASTY Right 2016    HYSTERECTOMY      REVISION OF SKIN POCKET FOR PACEMAKER N/A 1/21/2019    Procedure: REVISION-POCKET-PACEMAKER;  Surgeon: Asher Watts MD;  Location: [facility] EP LAB;  Service: Cardiology;  Laterality: N/A;  MODERATE SEDATION, sedation issues in the past    THYROIDECTOMY      TREATMENT OF CARDIAC ARRHYTHMIA N/A 3/18/2019    Procedure: CARDIOVERSION OR DEFIBRILLATION;  Surgeon: Asher Watts MD;  Location: [facility] EP LAB;  Service: Cardiology;  Laterality: N/A;  AF, JILL-cx if SR, DCCV, MAC, FAS, 3PREP    TREATMENT OF CARDIAC ARRHYTHMIA  N/A 5/14/2019    Procedure: Cardioversion or Defibrillation;  Surgeon: Derick Vizcarra MD;  Location: [facility] EP LAB;  Service: Cardiology;  Laterality: N/A;  AF, JILL, DCCV, MAC, DM, 3PREP       Review of patient's allergies indicates:   Allergen Reactions    Ciprofloxacin Nausea And Vomiting       No current facility-administered medications on file prior to encounter.     Current Outpatient Medications on File Prior to Encounter   Medication Sig    amiodarone (PACERONE) 200 MG Tab Take 1 tablet (200 mg total) by mouth once daily.    aspirin (ECOTRIN) 81 MG EC tablet Take 1 tablet (81 mg total) by mouth once daily.    atenoloL (TENORMIN) 25 MG tablet TAKE 2 TABLETS EVERY MORNING AND 1 TABLET EVERY EVENING    ELIQUIS 2.5 mg Tab TAKE 1 TABLET TWICE A DAY    eszopiclone (LUNESTA) 2 MG Tab Take 1 tablet (2 mg total) by mouth nightly as needed (sleep).    levothyroxine (SYNTHROID) 137 MCG Tab tablet Take 1 tablet (137 mcg total) by mouth before breakfast.    spironolactone (ALDACTONE) 25 MG tablet Take 1 tablet (25 mg total) by mouth 2 (two) times a day.    sulfamethoxazole-trimethoprim 800-160mg (BACTRIM DS) 800-160 mg Tab Take 1 tablet by mouth 2 (two) times daily.    vitamin D (VITAMIN D3) 1000 units Tab Take 1 tablet (1,000 Units total) by mouth once daily.    acetaminophen (TYLENOL) 500 MG tablet Take 2 tablets (1,000 mg total) by mouth every 8 (eight) hours as needed for Pain.    diclofenac sodium (VOLTAREN) 1 % Gel Apply 2 g topically 4 (four) times daily.    olopatadine (PATANOL) 0.1 % ophthalmic solution Place 1 drop into both eyes 2 (two) times daily.     Family History       Problem Relation (Age of Onset)    Heart attack Mother, Maternal Grandmother    Heart disease Mother, Maternal Grandmother    Heart failure Mother, Maternal Grandmother    Hypertension Mother, Maternal Grandmother    Stroke Maternal Grandmother          Tobacco Use    Smoking status: Former Smoker     Start date: 5/19/1964     Smokeless tobacco: Never Used   Substance and Sexual Activity    Alcohol use: No    Drug use: No    Sexual activity: Not Currently     Partners: Male     Review of Systems   Constitutional: Negative for diaphoresis, malaise/fatigue, weight gain and weight loss.   HENT:  Negative for nosebleeds.    Eyes:  Negative for vision loss in left eye, vision loss in right eye and visual disturbance.   Cardiovascular:  Positive for palpitations. Negative for chest pain, claudication, dyspnea on exertion, irregular heartbeat, leg swelling, near-syncope, orthopnea, paroxysmal nocturnal dyspnea and syncope.   Respiratory:  Positive for shortness of breath. Negative for cough, sleep disturbances due to breathing, snoring and wheezing.    Hematologic/Lymphatic: Negative for bleeding problem. Does not bruise/bleed easily.   Skin:  Negative for poor wound healing and rash.   Musculoskeletal:  Negative for muscle cramps and myalgias.   Gastrointestinal:  Negative for bloating, abdominal pain, diarrhea, heartburn, melena, nausea and vomiting.   Genitourinary:  Negative for hematuria and nocturia.   Neurological:  Negative for brief paralysis, dizziness, headaches, light-headedness, numbness and weakness.   Psychiatric/Behavioral:  Negative for depression.    Allergic/Immunologic: Negative for hives.   Objective:     Vital Signs (Most Recent):  Temp: 97.9 °F (36.6 °C) (06/21/22 0738)  Pulse: 72 (06/21/22 0738)  Resp: 16 (06/21/22 0738)  BP: 119/62 (06/21/22 0739)  SpO2: 98 % (06/21/22 0738) Vital Signs (24h Range):  Temp:  [97.9 °F (36.6 °C)] 97.9 °F (36.6 °C)  Pulse:  [72] 72  Resp:  [16] 16  SpO2:  [98 %] 98 %  BP: (118-119)/(57-62) 119/62     Weight: 58.5 kg (129 lb)  Body mass index is 22.85 kg/m².    SpO2: 98 %  O2 Device (Oxygen Therapy): room air    No intake or output data in the 24 hours ending 06/21/22 0835    Lines/Drains/Airways       Peripheral Intravenous Line  Duration                  Peripheral IV - Single Lumen  06/21/22 0759 20 G Right Forearm <1 day                    Physical Exam  Vitals and nursing note reviewed.   Constitutional:       Appearance: She is well-developed. She is not diaphoretic.   HENT:      Head: Normocephalic and atraumatic.   Eyes:      Conjunctiva/sclera: Conjunctivae normal.   Neck:      Vascular: No carotid bruit or JVD.   Cardiovascular:      Rate and Rhythm: Normal rate. Rhythm irregular.      Pulses: Normal pulses and intact distal pulses.      Heart sounds: Normal heart sounds. No murmur heard.    No friction rub. No gallop.   Pulmonary:      Effort: Pulmonary effort is normal.      Breath sounds: Normal breath sounds. No rales.   Chest:      Chest wall: No tenderness.   Abdominal:      General: There is no distension.      Palpations: Abdomen is soft. There is no mass.      Tenderness: There is no abdominal tenderness.   Skin:     General: Skin is warm and dry.      Coloration: Skin is not pale.   Neurological:      Mental Status: She is alert and oriented to person, place, and time.       Significant Labs: All pertinent lab results from the last 24 hours have been reviewed.      Assessment and Plan:     Paroxysmal atrial fibrillation  Here today for JILL/DCCV   -No absolute contraindications of esophageal stricture, tumor, perforation, laceration,or diverticulum and/or active GI bleed  -The risks, benefits & alternatives of the procedure were explained to the patient.   -The risks of transesophageal echo include but are not limited to:  Dental trauma, esophageal trauma/perforation, bleeding, laryngospasm/brochospasm, aspiration, sore throat/hoarseness, & dislodgement of the endotracheal tube/nasogastric tube (where applicable).    -The risks of ANES monitored sedation include hypotension, respiratory depression, arrhythmias, bronchospasm, & death.    -Informed consent was obtained. The patient is agreeable to proceed with the procedure and all questions and concerns addressed.    Case  discussed with an attending in echocardiography lab.    Further recommendations per attending addendum          VTE Risk Mitigation (From admission, onward)    None          Marisel Jeff PA-C  Cardiology   [facility] - Short Stay Cardiac Unit

## 2022-06-21 NOTE — PROGRESS NOTES
Received patient from  Anesthesia and RN; Patient is sleepy, un arousable due to sedation; discontinued oxygen-sats were 100% on room air bed is locked and low with side rails up

## 2022-06-21 NOTE — ASSESSMENT & PLAN NOTE
Here today for JILL/DCCV   -No absolute contraindications of esophageal stricture, tumor, perforation, laceration,or diverticulum and/or active GI bleed  -The risks, benefits & alternatives of the procedure were explained to the patient.   -The risks of transesophageal echo include but are not limited to:  Dental trauma, esophageal trauma/perforation, bleeding, laryngospasm/brochospasm, aspiration, sore throat/hoarseness, & dislodgement of the endotracheal tube/nasogastric tube (where applicable).    -The risks of ANES monitored sedation include hypotension, respiratory depression, arrhythmias, bronchospasm, & death.    -Informed consent was obtained. The patient is agreeable to proceed with the procedure and all questions and concerns addressed.    Case discussed with an attending in echocardiography lab.    Further recommendations per attending addendum

## 2022-07-23 ENCOUNTER — CLINICAL SUPPORT (OUTPATIENT)
Dept: CARDIOLOGY | Facility: HOSPITAL | Age: 87
End: 2022-07-23
Payer: MEDICARE

## 2022-07-23 DIAGNOSIS — I50.9 HEART FAILURE, UNSPECIFIED: ICD-10-CM

## 2022-07-23 DIAGNOSIS — Z95.0 PRESENCE OF CARDIAC PACEMAKER: ICD-10-CM

## 2022-07-23 DIAGNOSIS — I48.91 UNSPECIFIED ATRIAL FIBRILLATION: ICD-10-CM

## 2022-07-23 PROCEDURE — 93294 REM INTERROG EVL PM/LDLS PM: CPT | Mod: ,,, | Performed by: INTERNAL MEDICINE

## 2022-07-23 PROCEDURE — 93294 CARDIAC DEVICE CHECK - REMOTE: ICD-10-PCS | Mod: ,,, | Performed by: INTERNAL MEDICINE

## 2022-08-22 NOTE — PROGRESS NOTES
Ms. Ortiz is a patient of Dr. Colmenares and was last seen in clinic 8/11/2021.      Subjective:   Patient ID:  Gisselle Ortiz is an 89 y.o. female who presents for follow-up of Atrial Fibrillation and Pacemaker Check  .     HPI:    Ms. Ortiz is an 89 y.o. female with pAF, hyperthyroid hx (resected, now on synthroid), PPM here for follow up     Background:    Prior pt of Dr Marlen Corado  PAF on meds  hyperthyroid, resected, on synthroid  PPM for SB  Gen change 1/2019. AF/AFL noted on f/u. Propafenone increased. Still had AF with RVR after that.  Changed to flecainide, planned for DCCV; at some point changed to amio. Unclear timing of these changes.  TSH found to be abnormal, but T4 normal.  In past, following DCCV, she felt great! -- until AF recurred.  On amio now, with great effect.    Had a fall in Jan; broke hip. Hosp stay c/b E. coli bacteremia. She's recovering with therapy.  PPM: no AMS. AP 99%,  1%  ECG is APVS 60 bpm. RBBB.  Doing well on amio. Check LFTs, TSH yearly.  Eliquis at therapeutic dose: 2.5 bid.  Return in 1 year with LFT, TSH, or earlier prn.    Update (08/26/2022):    Pt with palpitations and SOB. Was in persistent AF.  6/21/2022: Cardioversion was successfully performed with restoration of normal sinus rhythm.  Change sensing to unipolar ring (from bipolar) due to under-sensing/over-pacing. With change to unipolar, sensing threshold improved from 0.2 mV to 1.2 mV.     Today her primary complaint is tiredness.  Some SOB but it resolves quickly with rest. No CP, syncope.    She is currently taking eliquis 2.5mg BID (age, wt) for stroke prophylaxis and denies significant bleeding episodes. She is currently being treated with amiodarone 200mg daily for rhythm control and atenolol 50mg AM/25mg PM and diltiazem 180mg daily for HR control.  Kidney function is stable, with a creatinine of 1.4 on 6/17/2022. LFTs mildly elevated 6/2022. TSH WNL 7/2021.    Device Interrogation (8/26/2022)  "reveals an intrinsic SB with stable lead and device function. No arrhythmias or treated episodes were noted.  She paces 94% in the RA and 3.4% in the RV. Estimated battery longevity 1-1.6 years.     I have personally reviewed the patient's EKG today, which shows APVS reverting to  (after PVC) at 70bpm. APVS on rhythm strip.    Relevant Cardiac Test Results:    JILL (6/21/2022):  · Normal appearing left atrial appendage. EAGLE velocities around 0.4 m/s with presence of spontaneous contrast ("smoke") in EAGLE, but no thrombus is present in the appendage.  · The estimated ejection fraction is 50%.  · The left ventricle is normal in size with low normal systolic function.  · Normal right ventricular size with normal right ventricular systolic function.  · Mild mitral regurgitation.  · Right atrial enlargement.  · Grade 2 plaque present in the transverse aorta.    Current Outpatient Medications   Medication Sig    amiodarone (PACERONE) 200 MG Tab Take 1 tablet (200 mg total) by mouth once daily.    aspirin (ECOTRIN) 81 MG EC tablet Take 1 tablet (81 mg total) by mouth once daily.    atenoloL (TENORMIN) 25 MG tablet TAKE 2 TABLETS EVERY MORNING AND 1 TABLET EVERY EVENING    atorvastatin (LIPITOR) 10 MG tablet TAKE 1 TABLET DAILY    diltiaZEM (TIAZAC) 180 MG Cs24 Take 1 capsule (180 mg total) by mouth once daily.    ELIQUIS 2.5 mg Tab TAKE 1 TABLET TWICE A DAY    eszopiclone (LUNESTA) 2 MG Tab Take 1 tablet (2 mg total) by mouth nightly as needed (sleep).    levothyroxine (SYNTHROID) 137 MCG Tab tablet Take 1 tablet (137 mcg total) by mouth before breakfast.    olopatadine (PATANOL) 0.1 % ophthalmic solution Place 1 drop into both eyes 2 (two) times daily.    spironolactone (ALDACTONE) 25 MG tablet Take 1 tablet (25 mg total) by mouth 2 (two) times a day.    vitamin D (VITAMIN D3) 1000 units Tab Take 1 tablet (1,000 Units total) by mouth once daily.    acetaminophen (TYLENOL) 500 MG tablet Take 2 tablets (1,000 mg " "total) by mouth every 8 (eight) hours as needed for Pain. (Patient not taking: Reported on 8/26/2022)    diclofenac sodium (VOLTAREN) 1 % Gel Apply 2 g topically 4 (four) times daily.    sulfamethoxazole-trimethoprim 800-160mg (BACTRIM DS) 800-160 mg Tab Take 1 tablet by mouth 2 (two) times daily. (Patient not taking: Reported on 8/26/2022)     No current facility-administered medications for this visit.       Review of Systems   Constitutional: Positive for malaise/fatigue.   Cardiovascular: Positive for dyspnea on exertion (mild, chronic). Negative for chest pain, irregular heartbeat, leg swelling and palpitations.   Respiratory: Negative for shortness of breath.    Hematologic/Lymphatic: Negative for bleeding problem.   Skin: Negative for rash.   Musculoskeletal: Negative for myalgias.   Gastrointestinal: Negative for hematemesis, hematochezia and nausea.   Genitourinary: Negative for hematuria.   Neurological: Negative for light-headedness.   Psychiatric/Behavioral: Negative for altered mental status.   Allergic/Immunologic: Negative for persistent infections.       Objective:          /60   Pulse 70   Ht 5' 3" (1.6 m)   Wt 58.5 kg (128 lb 15.5 oz)   LMP  (LMP Unknown)   BMI 22.85 kg/m²     Physical Exam  Vitals and nursing note reviewed.   Constitutional:       Appearance: Normal appearance. She is well-developed.   HENT:      Head: Normocephalic.      Nose: Nose normal.   Eyes:      Pupils: Pupils are equal, round, and reactive to light.   Cardiovascular:      Rate and Rhythm: Normal rate and regular rhythm.   Pulmonary:      Effort: No respiratory distress.      Breath sounds: Normal breath sounds.   Chest:      Comments: Device to LUCW. Incision and pocket in good repair.    Musculoskeletal:         General: Normal range of motion.   Skin:     General: Skin is warm and dry.      Findings: No erythema.   Neurological:      Mental Status: She is alert and oriented to person, place, and time. "   Psychiatric:         Speech: Speech normal.         Behavior: Behavior normal.           Lab Results   Component Value Date     06/17/2022    K 4.8 06/17/2022    MG 1.7 01/16/2022    BUN 13 06/17/2022    CREATININE 1.4 06/17/2022    ALT 49 (H) 06/17/2022    AST 41 (H) 06/17/2022    HGB 9.5 (L) 06/17/2022    HCT 30.4 (L) 06/17/2022    HCT 39 11/08/2020    TSH 0.727 07/01/2021    LDLCALC 69.8 07/01/2021       Recent Labs   Lab 09/26/20  1349 01/31/21  1953 01/10/22  1018 06/17/22  1552   INR 1.0 1.0 1.0 1.1         Assessment:     1. Cardiac pacemaker in situ    2. Paroxysmal atrial fibrillation    3. Essential hypertension    4. Chronic diastolic heart failure    5. Right bundle-branch block    6. SSS (sick sinus syndrome)    7. Current use of long term anticoagulation    8. Long term current use of amiodarone      Plan:     In summary, Ms. Ortiz is an 89 y.o. female with pAF, hyperthyroid hx (resected, now on synthroid), PPM here for follow up   She is 2 mo s/p cardioversion. No AF since procedure identified on device.  Ms. Ortiz is doing well from a device perspective with stable lead and device function. No arrhythmia noted. RV pacing 3.4%.   On eliquis at appropriate dose. On amiodarone. Amio labs ordered.    Continue current medication regimen and device settings.   Follow up in device clinic as scheduled.   Follow up in EP clinic in 1 year, sooner as needed.     *A copy of this note has been sent to Dr. Colmenares*    Follow up in about 1 year (around 8/26/2023).    ------------------------------------------------------------------    MATTHEW Jiang, NP-C  Cardiac Electrophysiology

## 2022-08-24 ENCOUNTER — OFFICE VISIT (OUTPATIENT)
Dept: OPTOMETRY | Facility: CLINIC | Age: 87
End: 2022-08-24
Payer: MEDICARE

## 2022-08-24 DIAGNOSIS — H52.203 ASTIGMATISM OF BOTH EYES, UNSPECIFIED TYPE: ICD-10-CM

## 2022-08-24 DIAGNOSIS — Z13.5 SCREENING FOR EYE CONDITION: ICD-10-CM

## 2022-08-24 DIAGNOSIS — Z98.41 S/P BILATERAL CATARACT EXTRACTION: ICD-10-CM

## 2022-08-24 DIAGNOSIS — Z96.1 PSEUDOPHAKIA OF BOTH EYES: ICD-10-CM

## 2022-08-24 DIAGNOSIS — Z98.42 S/P BILATERAL CATARACT EXTRACTION: ICD-10-CM

## 2022-08-24 DIAGNOSIS — Z01.00 EXAMINATION OF EYES AND VISION: Primary | ICD-10-CM

## 2022-08-24 PROCEDURE — 92014 PR EYE EXAM, EST PATIENT,COMPREHESV: ICD-10-PCS | Mod: S$PBB,,, | Performed by: OPTOMETRIST

## 2022-08-24 PROCEDURE — 99213 OFFICE O/P EST LOW 20 MIN: CPT | Mod: PBBFAC | Performed by: OPTOMETRIST

## 2022-08-24 PROCEDURE — 99999 PR PBB SHADOW E&M-EST. PATIENT-LVL III: ICD-10-PCS | Mod: PBBFAC,,, | Performed by: OPTOMETRIST

## 2022-08-24 PROCEDURE — 99999 PR PBB SHADOW E&M-EST. PATIENT-LVL III: CPT | Mod: PBBFAC,,, | Performed by: OPTOMETRIST

## 2022-08-24 PROCEDURE — 92014 COMPRE OPH EXAM EST PT 1/>: CPT | Mod: S$PBB,,, | Performed by: OPTOMETRIST

## 2022-08-24 PROCEDURE — 92015 DETERMINE REFRACTIVE STATE: CPT | Mod: ,,, | Performed by: OPTOMETRIST

## 2022-08-24 PROCEDURE — 92015 PR REFRACTION: ICD-10-PCS | Mod: ,,, | Performed by: OPTOMETRIST

## 2022-08-24 NOTE — PROGRESS NOTES
HPI     eye examination      Comments: Decreased VA at distance and at near since she had COVID-19   infection - near the beginning of the year 2022.  Typically uses glasses for reading and other near tasks.  Does not drive.  Does not feel need for glasses for watching TV, although she does state   that she feels her distance VA has decreased.               Comments     Patient's age: 89 y.o. WF  Occupation: retired   Approximate date of last eye examination:  08/19/2021  Name of last eye doctor seen:    City/State: [facility]   Wears glasses? Yes      If yes, wears  Full-time or part-time?  Pat-time   Present glasses are: Bifocal, SV Distance, SV Reading?  Reading   Approximate age of present glasses:  1 year    Got new glasses following last exam, or subsequently?:  yes    Any problem with VA with glasses?  no  Wears CLs?:  no  Headaches?  no  Eye pain/discomfort?  no                                                                                     Flashes?  no  Floaters?  Yes   Diplopia/Double vision?  no  Patient's Ocular History:         Any eye surgeries? Cataract sx OU         Any eye injury?  no         Any treatment for eye disease?  no  Family history of eye disease?  no  Significant patient medical history:         1. Diabetes?  no       If yes, IDDM or NIDDM?    2. HBP?  Yes controlled with medication               #. Other?  History of COVID-19 infection in early 2022  OTC eyedrops currently using:  artifical tears prn  Prescription eye meds currently using:  no  Any history of allergy/adverse reaction to any eye meds used previously?    no   Any history of allergy/adverse reaction to eyedrops used during prior eye   exam(s)? no   Any history of allergy/adverse reaction to Novacaine or similar meds? no  Any history of allergy/adverse reaction to Epinephrine or similar meds?   no   Patient okay with use of anesthetic eyedrops to check eye pressure?  Yes           Patient okay with use of eyedrops to  "dilate pupils today?  Yes   Allergies/Medications/Medical History/Family History reviewed today?  Yes       PD =   65/61  Desired reading distance =  17.5"                                                                      Last edited by Simón Raymond, OD on 8/24/2022  3:19 PM. (History)            Assessment /Plan     For exam results, see Encounter Report.    There are no diagnoses linked to this encounter.           S/P cataract surgery in both eyes, with bilateral posterior chamber intraocular lens.  S/P YAG laser posterior capsulotomy in each eye.    Mild pigment mottling at macula of both eyes.  Monitor only.    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  New spectacle lens Rx issued for use as needed.    Recheck in one year - or prior, if any problems or bothersome changes in vision noted in the interim       "

## 2022-08-24 NOTE — PATIENT INSTRUCTIONS
S/P cataract surgery in both eyes, with bilateral posterior chamber intraocular lens.  S/P YAG laser posterior capsulotomy in each eye.    Mild pigment mottling at macula of both eyes.  Monitor only.    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  New spectacle lens Rx issued for use as needed.    Recheck in one year - or prior, if any problems or bothersome changes in vision noted in the interim

## 2022-08-26 ENCOUNTER — OFFICE VISIT (OUTPATIENT)
Dept: ELECTROPHYSIOLOGY | Facility: CLINIC | Age: 87
End: 2022-08-26
Payer: MEDICARE

## 2022-08-26 ENCOUNTER — CLINICAL SUPPORT (OUTPATIENT)
Dept: CARDIOLOGY | Facility: HOSPITAL | Age: 87
End: 2022-08-26
Attending: INTERNAL MEDICINE
Payer: MEDICARE

## 2022-08-26 ENCOUNTER — HOSPITAL ENCOUNTER (OUTPATIENT)
Dept: CARDIOLOGY | Facility: CLINIC | Age: 87
Discharge: HOME | End: 2022-08-26
Payer: MEDICARE

## 2022-08-26 VITALS
HEIGHT: 63 IN | DIASTOLIC BLOOD PRESSURE: 60 MMHG | SYSTOLIC BLOOD PRESSURE: 122 MMHG | WEIGHT: 129 LBS | BODY MASS INDEX: 22.86 KG/M2 | HEART RATE: 70 BPM

## 2022-08-26 DIAGNOSIS — Z95.0 CARDIAC PACEMAKER IN SITU: Primary | ICD-10-CM

## 2022-08-26 DIAGNOSIS — Z79.899 LONG TERM CURRENT USE OF AMIODARONE: ICD-10-CM

## 2022-08-26 DIAGNOSIS — I49.5 SSS (SICK SINUS SYNDROME): ICD-10-CM

## 2022-08-26 DIAGNOSIS — Z79.01 CURRENT USE OF LONG TERM ANTICOAGULATION: ICD-10-CM

## 2022-08-26 DIAGNOSIS — I49.5 SSS (SICK SINUS SYNDROME): Chronic | ICD-10-CM

## 2022-08-26 DIAGNOSIS — I50.32 CHRONIC DIASTOLIC HEART FAILURE: ICD-10-CM

## 2022-08-26 DIAGNOSIS — I48.0 PAROXYSMAL ATRIAL FIBRILLATION: Chronic | ICD-10-CM

## 2022-08-26 DIAGNOSIS — I45.10 RIGHT BUNDLE-BRANCH BLOCK: ICD-10-CM

## 2022-08-26 DIAGNOSIS — I10 ESSENTIAL HYPERTENSION: Chronic | ICD-10-CM

## 2022-08-26 PROCEDURE — 93010 ELECTROCARDIOGRAM REPORT: CPT | Mod: S$PBB,,, | Performed by: INTERNAL MEDICINE

## 2022-08-26 PROCEDURE — 93005 ELECTROCARDIOGRAM TRACING: CPT | Mod: PBBFAC | Performed by: INTERNAL MEDICINE

## 2022-08-26 PROCEDURE — 99999 PR PBB SHADOW E&M-EST. PATIENT-LVL IV: ICD-10-PCS | Mod: PBBFAC,,, | Performed by: NURSE PRACTITIONER

## 2022-08-26 PROCEDURE — 93280 CARDIAC DEVICE CHECK - IN CLINIC & HOSPITAL: ICD-10-PCS | Mod: 26,,, | Performed by: INTERNAL MEDICINE

## 2022-08-26 PROCEDURE — 93010 RHYTHM STRIP: ICD-10-PCS | Mod: S$PBB,,, | Performed by: INTERNAL MEDICINE

## 2022-08-26 PROCEDURE — 99214 PR OFFICE/OUTPT VISIT, EST, LEVL IV, 30-39 MIN: ICD-10-PCS | Mod: S$PBB,,, | Performed by: NURSE PRACTITIONER

## 2022-08-26 PROCEDURE — 99214 OFFICE O/P EST MOD 30 MIN: CPT | Mod: S$PBB,,, | Performed by: NURSE PRACTITIONER

## 2022-08-26 PROCEDURE — 93280 PM DEVICE PROGR EVAL DUAL: CPT | Mod: 26,,, | Performed by: INTERNAL MEDICINE

## 2022-08-26 PROCEDURE — 99999 PR PBB SHADOW E&M-EST. PATIENT-LVL IV: CPT | Mod: PBBFAC,,, | Performed by: NURSE PRACTITIONER

## 2022-08-26 PROCEDURE — 93280 PM DEVICE PROGR EVAL DUAL: CPT

## 2022-08-26 PROCEDURE — 99214 OFFICE O/P EST MOD 30 MIN: CPT | Mod: PBBFAC | Performed by: NURSE PRACTITIONER

## 2022-10-15 PROBLEM — E44.0 MALNUTRITION OF MODERATE DEGREE: Status: RESOLVED | Noted: 2022-01-13 | Resolved: 2022-01-01

## 2023-01-01 ENCOUNTER — TELEPHONE (OUTPATIENT)
Dept: PALLIATIVE MEDICINE | Facility: CLINIC | Age: 88
End: 2023-01-01
Payer: MEDICARE

## 2023-01-01 ENCOUNTER — OFFICE VISIT (OUTPATIENT)
Dept: GASTROENTEROLOGY | Facility: CLINIC | Age: 88
End: 2023-01-01
Payer: MEDICARE

## 2023-01-01 ENCOUNTER — HOSPITAL ENCOUNTER (INPATIENT)
Facility: HOSPITAL | Age: 88
LOS: 2 days | DRG: 951 | End: 2023-09-22
Attending: STUDENT IN AN ORGANIZED HEALTH CARE EDUCATION/TRAINING PROGRAM | Admitting: EMERGENCY MEDICINE
Payer: OTHER MISCELLANEOUS

## 2023-01-01 ENCOUNTER — DOCUMENTATION ONLY (OUTPATIENT)
Dept: ELECTROPHYSIOLOGY | Facility: CLINIC | Age: 88
End: 2023-01-01
Payer: MEDICARE

## 2023-01-01 ENCOUNTER — DOCUMENTATION ONLY (OUTPATIENT)
Dept: CARDIOLOGY | Facility: HOSPITAL | Age: 88
End: 2023-01-01
Payer: MEDICARE

## 2023-01-01 ENCOUNTER — HOSPITAL ENCOUNTER (INPATIENT)
Facility: HOSPITAL | Age: 88
LOS: 31 days | Discharge: HOME | DRG: 682 | End: 2023-06-23
Attending: HOSPITALIST | Admitting: HOSPITALIST
Payer: MEDICARE

## 2023-01-01 ENCOUNTER — TELEPHONE (OUTPATIENT)
Dept: ELECTROPHYSIOLOGY | Facility: CLINIC | Age: 88
End: 2023-01-01
Payer: MEDICARE

## 2023-01-01 ENCOUNTER — DOCUMENTATION ONLY (OUTPATIENT)
Dept: CARDIOLOGY | Facility: CLINIC | Age: 88
End: 2023-01-01
Payer: MEDICARE

## 2023-01-01 ENCOUNTER — OUTPATIENT CASE MANAGEMENT (OUTPATIENT)
Dept: ADMINISTRATIVE | Facility: OTHER | Age: 88
End: 2023-01-01
Payer: MEDICARE

## 2023-01-01 ENCOUNTER — TELEPHONE (OUTPATIENT)
Dept: CARDIOLOGY | Facility: CLINIC | Age: 88
End: 2023-01-01
Payer: MEDICARE

## 2023-01-01 ENCOUNTER — TELEPHONE (OUTPATIENT)
Dept: CARDIOLOGY | Facility: HOSPITAL | Age: 88
End: 2023-01-01
Payer: MEDICARE

## 2023-01-01 ENCOUNTER — LAB VISIT (OUTPATIENT)
Dept: LAB | Facility: HOSPITAL | Age: 88
End: 2023-01-01
Attending: FAMILY MEDICINE
Payer: MEDICARE

## 2023-01-01 ENCOUNTER — HOSPITAL ENCOUNTER (INPATIENT)
Facility: HOSPITAL | Age: 88
LOS: 5 days | Discharge: NURSING, REHAB OR LONG-TERM CARE | DRG: 291 | End: 2023-06-29
Attending: EMERGENCY MEDICINE | Admitting: STUDENT IN AN ORGANIZED HEALTH CARE EDUCATION/TRAINING PROGRAM
Payer: MEDICARE

## 2023-01-01 ENCOUNTER — CLINICAL SUPPORT (OUTPATIENT)
Dept: CARDIOLOGY | Facility: HOSPITAL | Age: 88
End: 2023-01-01
Payer: MEDICARE

## 2023-01-01 ENCOUNTER — OFFICE VISIT (OUTPATIENT)
Dept: NEPHROLOGY | Facility: CLINIC | Age: 88
End: 2023-01-01
Payer: MEDICARE

## 2023-01-01 ENCOUNTER — HOSPITAL ENCOUNTER (INPATIENT)
Facility: HOSPITAL | Age: 88
LOS: 8 days | Discharge: NURSING, REHAB OR LONG-TERM CARE | DRG: 690 | End: 2023-05-23
Attending: EMERGENCY MEDICINE | Admitting: STUDENT IN AN ORGANIZED HEALTH CARE EDUCATION/TRAINING PROGRAM
Payer: MEDICARE

## 2023-01-01 ENCOUNTER — HOSPITAL ENCOUNTER (INPATIENT)
Facility: HOSPITAL | Age: 88
LOS: 8 days | Discharge: HOSPICE | DRG: 871 | End: 2023-09-20
Attending: EMERGENCY MEDICINE | Admitting: EMERGENCY MEDICINE
Payer: MEDICARE

## 2023-01-01 VITALS
HEIGHT: 63 IN | WEIGHT: 109.81 LBS | DIASTOLIC BLOOD PRESSURE: 56 MMHG | RESPIRATION RATE: 18 BRPM | HEART RATE: 61 BPM | OXYGEN SATURATION: 96 % | TEMPERATURE: 98 F | BODY MASS INDEX: 19.46 KG/M2 | SYSTOLIC BLOOD PRESSURE: 125 MMHG

## 2023-01-01 VITALS
WEIGHT: 147 LBS | RESPIRATION RATE: 18 BRPM | BODY MASS INDEX: 26.05 KG/M2 | DIASTOLIC BLOOD PRESSURE: 56 MMHG | HEART RATE: 93 BPM | SYSTOLIC BLOOD PRESSURE: 107 MMHG | TEMPERATURE: 98 F | HEIGHT: 63 IN | OXYGEN SATURATION: 98 %

## 2023-01-01 VITALS
WEIGHT: 130.06 LBS | TEMPERATURE: 96 F | RESPIRATION RATE: 8 BRPM | DIASTOLIC BLOOD PRESSURE: 36 MMHG | SYSTOLIC BLOOD PRESSURE: 52 MMHG | HEIGHT: 63 IN | BODY MASS INDEX: 23.04 KG/M2 | HEART RATE: 92 BPM | OXYGEN SATURATION: 86 %

## 2023-01-01 VITALS
HEART RATE: 80 BPM | DIASTOLIC BLOOD PRESSURE: 46 MMHG | BODY MASS INDEX: 23.04 KG/M2 | HEIGHT: 63 IN | OXYGEN SATURATION: 95 % | TEMPERATURE: 97 F | WEIGHT: 130.06 LBS | SYSTOLIC BLOOD PRESSURE: 78 MMHG | RESPIRATION RATE: 14 BRPM

## 2023-01-01 VITALS
DIASTOLIC BLOOD PRESSURE: 60 MMHG | WEIGHT: 147.06 LBS | TEMPERATURE: 98 F | HEART RATE: 60 BPM | RESPIRATION RATE: 16 BRPM | SYSTOLIC BLOOD PRESSURE: 129 MMHG | BODY MASS INDEX: 26.06 KG/M2 | HEIGHT: 63 IN | OXYGEN SATURATION: 96 %

## 2023-01-01 VITALS
HEART RATE: 71 BPM | BODY MASS INDEX: 23.04 KG/M2 | DIASTOLIC BLOOD PRESSURE: 60 MMHG | WEIGHT: 130.06 LBS | SYSTOLIC BLOOD PRESSURE: 120 MMHG | OXYGEN SATURATION: 99 %

## 2023-01-01 VITALS
WEIGHT: 131 LBS | SYSTOLIC BLOOD PRESSURE: 138 MMHG | HEART RATE: 98 BPM | HEIGHT: 63 IN | BODY MASS INDEX: 23.21 KG/M2 | DIASTOLIC BLOOD PRESSURE: 69 MMHG

## 2023-01-01 DIAGNOSIS — I48.0 PAROXYSMAL ATRIAL FIBRILLATION: ICD-10-CM

## 2023-01-01 DIAGNOSIS — R42 POSTURAL DIZZINESS WITH PRESYNCOPE: ICD-10-CM

## 2023-01-01 DIAGNOSIS — N30.01 ACUTE CYSTITIS WITH HEMATURIA: Primary | ICD-10-CM

## 2023-01-01 DIAGNOSIS — I48.91 UNSPECIFIED ATRIAL FIBRILLATION: ICD-10-CM

## 2023-01-01 DIAGNOSIS — M16.11 OSTEOARTHRITIS OF RIGHT HIP, UNSPECIFIED OSTEOARTHRITIS TYPE: ICD-10-CM

## 2023-01-01 DIAGNOSIS — R74.01 TRANSAMINITIS: ICD-10-CM

## 2023-01-01 DIAGNOSIS — G47.00 INSOMNIA, UNSPECIFIED TYPE: ICD-10-CM

## 2023-01-01 DIAGNOSIS — N17.9 AKI (ACUTE KIDNEY INJURY): ICD-10-CM

## 2023-01-01 DIAGNOSIS — N18.30 BENIGN HYPERTENSION WITH CKD (CHRONIC KIDNEY DISEASE) STAGE III: ICD-10-CM

## 2023-01-01 DIAGNOSIS — I50.30 (HFPEF) HEART FAILURE WITH PRESERVED EJECTION FRACTION: ICD-10-CM

## 2023-01-01 DIAGNOSIS — Z95.0 PRESENCE OF CARDIAC PACEMAKER: ICD-10-CM

## 2023-01-01 DIAGNOSIS — D63.1 ANEMIA IN CHRONIC KIDNEY DISEASE, UNSPECIFIED CKD STAGE: ICD-10-CM

## 2023-01-01 DIAGNOSIS — I49.5 SICK SINUS SYNDROME: ICD-10-CM

## 2023-01-01 DIAGNOSIS — R53.83 FATIGUE: ICD-10-CM

## 2023-01-01 DIAGNOSIS — I50.9 HEART FAILURE, UNSPECIFIED: ICD-10-CM

## 2023-01-01 DIAGNOSIS — R07.9 CHEST PAIN: ICD-10-CM

## 2023-01-01 DIAGNOSIS — R11.2 INTRACTABLE NAUSEA AND VOMITING: ICD-10-CM

## 2023-01-01 DIAGNOSIS — R79.89 ELEVATED TROPONIN: Primary | ICD-10-CM

## 2023-01-01 DIAGNOSIS — E44.0 MALNUTRITION OF MODERATE DEGREE: ICD-10-CM

## 2023-01-01 DIAGNOSIS — R63.0 DECREASED APPETITE: Primary | ICD-10-CM

## 2023-01-01 DIAGNOSIS — Z51.5 PALLIATIVE CARE ENCOUNTER: ICD-10-CM

## 2023-01-01 DIAGNOSIS — R42 LIGHTHEADEDNESS: ICD-10-CM

## 2023-01-01 DIAGNOSIS — D50.8 IRON DEFICIENCY ANEMIA SECONDARY TO INADEQUATE DIETARY IRON INTAKE: ICD-10-CM

## 2023-01-01 DIAGNOSIS — E83.51 HYPOCALCEMIA: ICD-10-CM

## 2023-01-01 DIAGNOSIS — I50.9 CHF EXACERBATION: ICD-10-CM

## 2023-01-01 DIAGNOSIS — R11.2 NAUSEA AND VOMITING, UNSPECIFIED VOMITING TYPE: ICD-10-CM

## 2023-01-01 DIAGNOSIS — N17.9 SEPSIS WITH ACUTE RENAL FAILURE WITHOUT SEPTIC SHOCK, DUE TO UNSPECIFIED ORGANISM, UNSPECIFIED ACUTE RENAL FAILURE TYPE: ICD-10-CM

## 2023-01-01 DIAGNOSIS — R94.31 PROLONGED Q-T INTERVAL ON ECG: ICD-10-CM

## 2023-01-01 DIAGNOSIS — R65.20 SEPSIS WITH ACUTE RENAL FAILURE WITHOUT SEPTIC SHOCK, DUE TO UNSPECIFIED ORGANISM, UNSPECIFIED ACUTE RENAL FAILURE TYPE: ICD-10-CM

## 2023-01-01 DIAGNOSIS — N18.31 ACUTE RENAL FAILURE SUPERIMPOSED ON STAGE 3A CHRONIC KIDNEY DISEASE, UNSPECIFIED ACUTE RENAL FAILURE TYPE: Primary | ICD-10-CM

## 2023-01-01 DIAGNOSIS — N39.0 URINARY TRACT INFECTION WITHOUT HEMATURIA, SITE UNSPECIFIED: Primary | ICD-10-CM

## 2023-01-01 DIAGNOSIS — I12.9 BENIGN HYPERTENSION WITH CKD (CHRONIC KIDNEY DISEASE) STAGE III: ICD-10-CM

## 2023-01-01 DIAGNOSIS — N18.31 ACUTE RENAL FAILURE WITH ACUTE TUBULAR NECROSIS SUPERIMPOSED ON STAGE 3A CHRONIC KIDNEY DISEASE: ICD-10-CM

## 2023-01-01 DIAGNOSIS — N18.9 ANEMIA IN CHRONIC KIDNEY DISEASE, UNSPECIFIED CKD STAGE: ICD-10-CM

## 2023-01-01 DIAGNOSIS — R00.1 BRADYCARDIA: ICD-10-CM

## 2023-01-01 DIAGNOSIS — E86.0 DEHYDRATION: ICD-10-CM

## 2023-01-01 DIAGNOSIS — K21.9 GASTROESOPHAGEAL REFLUX DISEASE WITHOUT ESOPHAGITIS: ICD-10-CM

## 2023-01-01 DIAGNOSIS — R06.02 SOB (SHORTNESS OF BREATH): Primary | ICD-10-CM

## 2023-01-01 DIAGNOSIS — E87.5 HYPERKALEMIA: ICD-10-CM

## 2023-01-01 DIAGNOSIS — N17.0 ACUTE RENAL FAILURE WITH ACUTE TUBULAR NECROSIS SUPERIMPOSED ON STAGE 3A CHRONIC KIDNEY DISEASE: ICD-10-CM

## 2023-01-01 DIAGNOSIS — R11.10 VOMITING: ICD-10-CM

## 2023-01-01 DIAGNOSIS — A41.9 SEPSIS WITH ACUTE RENAL FAILURE WITHOUT SEPTIC SHOCK, DUE TO UNSPECIFIED ORGANISM, UNSPECIFIED ACUTE RENAL FAILURE TYPE: ICD-10-CM

## 2023-01-01 DIAGNOSIS — N17.9 ACUTE RENAL FAILURE SUPERIMPOSED ON STAGE 3A CHRONIC KIDNEY DISEASE, UNSPECIFIED ACUTE RENAL FAILURE TYPE: Primary | ICD-10-CM

## 2023-01-01 DIAGNOSIS — R55 POSTURAL DIZZINESS WITH PRESYNCOPE: ICD-10-CM

## 2023-01-01 DIAGNOSIS — R06.02 SHORTNESS OF BREATH: ICD-10-CM

## 2023-01-01 DIAGNOSIS — K52.9 COLITIS: ICD-10-CM

## 2023-01-01 DIAGNOSIS — R53.81 DEBILITY: ICD-10-CM

## 2023-01-01 DIAGNOSIS — I50.9 CHF (CONGESTIVE HEART FAILURE): ICD-10-CM

## 2023-01-01 DIAGNOSIS — R53.83 LETHARGY: ICD-10-CM

## 2023-01-01 DIAGNOSIS — I48.91 ATRIAL FIBRILLATION WITH RVR: ICD-10-CM

## 2023-01-01 DIAGNOSIS — N17.9 ACUTE KIDNEY FAILURE, UNSPECIFIED: ICD-10-CM

## 2023-01-01 DIAGNOSIS — N39.0 URINARY TRACT INFECTION WITHOUT HEMATURIA, SITE UNSPECIFIED: ICD-10-CM

## 2023-01-01 LAB
ALBUMIN SERPL BCP-MCNC: 1.2 G/DL (ref 3.5–5.2)
ALBUMIN SERPL BCP-MCNC: 1.3 G/DL (ref 3.5–5.2)
ALBUMIN SERPL BCP-MCNC: 1.3 G/DL (ref 3.5–5.2)
ALBUMIN SERPL BCP-MCNC: 1.4 G/DL (ref 3.5–5.2)
ALBUMIN SERPL BCP-MCNC: 1.5 G/DL (ref 3.5–5.2)
ALBUMIN SERPL BCP-MCNC: 1.6 G/DL (ref 3.5–5.2)
ALBUMIN SERPL BCP-MCNC: 1.6 G/DL (ref 3.5–5.2)
ALBUMIN SERPL BCP-MCNC: 1.7 G/DL (ref 3.5–5.2)
ALBUMIN SERPL BCP-MCNC: 1.8 G/DL (ref 3.5–5.2)
ALBUMIN SERPL BCP-MCNC: 2 G/DL (ref 3.5–5.2)
ALBUMIN SERPL BCP-MCNC: 2.1 G/DL (ref 3.5–5.2)
ALBUMIN SERPL BCP-MCNC: 2.2 G/DL (ref 3.5–5.2)
ALBUMIN SERPL BCP-MCNC: 2.2 G/DL (ref 3.5–5.2)
ALBUMIN SERPL BCP-MCNC: 2.3 G/DL (ref 3.5–5.2)
ALBUMIN SERPL BCP-MCNC: 2.4 G/DL (ref 3.5–5.2)
ALBUMIN SERPL BCP-MCNC: 2.4 G/DL (ref 3.5–5.2)
ALBUMIN SERPL BCP-MCNC: 2.7 G/DL (ref 3.5–5.2)
ALBUMIN SERPL BCP-MCNC: 3.4 G/DL (ref 3.5–5.2)
ALLENS TEST: NORMAL
ALP SERPL-CCNC: 100 U/L (ref 55–135)
ALP SERPL-CCNC: 104 U/L (ref 55–135)
ALP SERPL-CCNC: 105 U/L (ref 55–135)
ALP SERPL-CCNC: 106 U/L (ref 55–135)
ALP SERPL-CCNC: 108 U/L (ref 55–135)
ALP SERPL-CCNC: 109 U/L (ref 55–135)
ALP SERPL-CCNC: 114 U/L (ref 55–135)
ALP SERPL-CCNC: 117 U/L (ref 55–135)
ALP SERPL-CCNC: 117 U/L (ref 55–135)
ALP SERPL-CCNC: 118 U/L (ref 55–135)
ALP SERPL-CCNC: 118 U/L (ref 55–135)
ALP SERPL-CCNC: 119 U/L (ref 55–135)
ALP SERPL-CCNC: 120 U/L (ref 55–135)
ALP SERPL-CCNC: 121 U/L (ref 55–135)
ALP SERPL-CCNC: 122 U/L (ref 55–135)
ALP SERPL-CCNC: 125 U/L (ref 55–135)
ALP SERPL-CCNC: 127 U/L (ref 55–135)
ALP SERPL-CCNC: 130 U/L (ref 55–135)
ALP SERPL-CCNC: 134 U/L (ref 55–135)
ALP SERPL-CCNC: 134 U/L (ref 55–135)
ALP SERPL-CCNC: 147 U/L (ref 55–135)
ALP SERPL-CCNC: 148 U/L (ref 55–135)
ALP SERPL-CCNC: 148 U/L (ref 55–135)
ALP SERPL-CCNC: 159 U/L (ref 55–135)
ALP SERPL-CCNC: 182 U/L (ref 55–135)
ALP SERPL-CCNC: 88 U/L (ref 55–135)
ALP SERPL-CCNC: 89 U/L (ref 55–135)
ALP SERPL-CCNC: 90 U/L (ref 55–135)
ALP SERPL-CCNC: 91 U/L (ref 55–135)
ALP SERPL-CCNC: 91 U/L (ref 55–135)
ALP SERPL-CCNC: 97 U/L (ref 55–135)
ALP SERPL-CCNC: 98 U/L (ref 55–135)
ALT SERPL W/O P-5'-P-CCNC: 119 U/L (ref 10–44)
ALT SERPL W/O P-5'-P-CCNC: 121 U/L (ref 10–44)
ALT SERPL W/O P-5'-P-CCNC: 142 U/L (ref 10–44)
ALT SERPL W/O P-5'-P-CCNC: 158 U/L (ref 10–44)
ALT SERPL W/O P-5'-P-CCNC: 160 U/L (ref 10–44)
ALT SERPL W/O P-5'-P-CCNC: 163 U/L (ref 10–44)
ALT SERPL W/O P-5'-P-CCNC: 177 U/L (ref 10–44)
ALT SERPL W/O P-5'-P-CCNC: 24 U/L (ref 10–44)
ALT SERPL W/O P-5'-P-CCNC: 27 U/L (ref 10–44)
ALT SERPL W/O P-5'-P-CCNC: 28 U/L (ref 10–44)
ALT SERPL W/O P-5'-P-CCNC: 28 U/L (ref 10–44)
ALT SERPL W/O P-5'-P-CCNC: 29 U/L (ref 10–44)
ALT SERPL W/O P-5'-P-CCNC: 31 U/L (ref 10–44)
ALT SERPL W/O P-5'-P-CCNC: 31 U/L (ref 10–44)
ALT SERPL W/O P-5'-P-CCNC: 36 U/L (ref 10–44)
ALT SERPL W/O P-5'-P-CCNC: 44 U/L (ref 10–44)
ALT SERPL W/O P-5'-P-CCNC: 57 U/L (ref 10–44)
ALT SERPL W/O P-5'-P-CCNC: 60 U/L (ref 10–44)
ALT SERPL W/O P-5'-P-CCNC: 61 U/L (ref 10–44)
ALT SERPL W/O P-5'-P-CCNC: 64 U/L (ref 10–44)
ALT SERPL W/O P-5'-P-CCNC: 67 U/L (ref 10–44)
ALT SERPL W/O P-5'-P-CCNC: 69 U/L (ref 10–44)
ALT SERPL W/O P-5'-P-CCNC: 69 U/L (ref 10–44)
ALT SERPL W/O P-5'-P-CCNC: 75 U/L (ref 10–44)
ALT SERPL W/O P-5'-P-CCNC: 75 U/L (ref 10–44)
ALT SERPL W/O P-5'-P-CCNC: 76 U/L (ref 10–44)
ALT SERPL W/O P-5'-P-CCNC: 76 U/L (ref 10–44)
ALT SERPL W/O P-5'-P-CCNC: 77 U/L (ref 10–44)
ALT SERPL W/O P-5'-P-CCNC: 84 U/L (ref 10–44)
ALT SERPL W/O P-5'-P-CCNC: 87 U/L (ref 10–44)
ALT SERPL W/O P-5'-P-CCNC: 93 U/L (ref 10–44)
ALT SERPL W/O P-5'-P-CCNC: 96 U/L (ref 10–44)
AMMONIA PLAS-SCNC: 20 UMOL/L (ref 10–50)
ANION GAP SERPL CALC-SCNC: 11 MMOL/L (ref 8–16)
ANION GAP SERPL CALC-SCNC: 12 MMOL/L (ref 8–16)
ANION GAP SERPL CALC-SCNC: 13 MMOL/L (ref 8–16)
ANION GAP SERPL CALC-SCNC: 15 MMOL/L (ref 8–16)
ANION GAP SERPL CALC-SCNC: 16 MMOL/L (ref 8–16)
ANION GAP SERPL CALC-SCNC: 4 MMOL/L (ref 8–16)
ANION GAP SERPL CALC-SCNC: 5 MMOL/L (ref 8–16)
ANION GAP SERPL CALC-SCNC: 6 MMOL/L (ref 8–16)
ANION GAP SERPL CALC-SCNC: 7 MMOL/L (ref 8–16)
ANION GAP SERPL CALC-SCNC: 8 MMOL/L (ref 8–16)
ANION GAP SERPL CALC-SCNC: 9 MMOL/L (ref 8–16)
ANISOCYTOSIS BLD QL SMEAR: SLIGHT
ASCENDING AORTA: 2.97 CM
AST SERPL-CCNC: 104 U/L (ref 10–40)
AST SERPL-CCNC: 123 U/L (ref 10–40)
AST SERPL-CCNC: 132 U/L (ref 10–40)
AST SERPL-CCNC: 140 U/L (ref 10–40)
AST SERPL-CCNC: 154 U/L (ref 10–40)
AST SERPL-CCNC: 26 U/L (ref 10–40)
AST SERPL-CCNC: 29 U/L (ref 10–40)
AST SERPL-CCNC: 30 U/L (ref 10–40)
AST SERPL-CCNC: 31 U/L (ref 10–40)
AST SERPL-CCNC: 34 U/L (ref 10–40)
AST SERPL-CCNC: 34 U/L (ref 10–40)
AST SERPL-CCNC: 35 U/L (ref 10–40)
AST SERPL-CCNC: 35 U/L (ref 10–40)
AST SERPL-CCNC: 43 U/L (ref 10–40)
AST SERPL-CCNC: 49 U/L (ref 10–40)
AST SERPL-CCNC: 52 U/L (ref 10–40)
AST SERPL-CCNC: 58 U/L (ref 10–40)
AST SERPL-CCNC: 58 U/L (ref 10–40)
AST SERPL-CCNC: 61 U/L (ref 10–40)
AST SERPL-CCNC: 63 U/L (ref 10–40)
AST SERPL-CCNC: 64 U/L (ref 10–40)
AST SERPL-CCNC: 67 U/L (ref 10–40)
AST SERPL-CCNC: 68 U/L (ref 10–40)
AST SERPL-CCNC: 77 U/L (ref 10–40)
AST SERPL-CCNC: 80 U/L (ref 10–40)
AST SERPL-CCNC: 80 U/L (ref 10–40)
AST SERPL-CCNC: 82 U/L (ref 10–40)
AST SERPL-CCNC: 82 U/L (ref 10–40)
AST SERPL-CCNC: 85 U/L (ref 10–40)
AST SERPL-CCNC: 86 U/L (ref 10–40)
AST SERPL-CCNC: 86 U/L (ref 10–40)
AST SERPL-CCNC: 96 U/L (ref 10–40)
AV INDEX (PROSTH): 0.71
AV MEAN GRADIENT: 6 MMHG
AV PEAK GRADIENT: 9 MMHG
AV VALVE AREA: 2.22 CM2
AV VELOCITY RATIO: 0.65
BACTERIA #/AREA URNS AUTO: ABNORMAL /HPF
BACTERIA #/AREA URNS AUTO: NORMAL /HPF
BACTERIA BLD CULT: ABNORMAL
BACTERIA BLD CULT: NORMAL
BACTERIA UR CULT: ABNORMAL
BASOPHILS # BLD AUTO: 0.03 K/UL (ref 0–0.2)
BASOPHILS # BLD AUTO: 0.04 K/UL (ref 0–0.2)
BASOPHILS # BLD AUTO: 0.05 K/UL (ref 0–0.2)
BASOPHILS # BLD AUTO: 0.06 K/UL (ref 0–0.2)
BASOPHILS # BLD AUTO: 0.07 K/UL (ref 0–0.2)
BASOPHILS # BLD AUTO: 0.08 K/UL (ref 0–0.2)
BASOPHILS # BLD AUTO: ABNORMAL K/UL (ref 0–0.2)
BASOPHILS # BLD AUTO: ABNORMAL K/UL (ref 0–0.2)
BASOPHILS NFR BLD: 0 % (ref 0–1.9)
BASOPHILS NFR BLD: 0.3 % (ref 0–1.9)
BASOPHILS NFR BLD: 0.3 % (ref 0–1.9)
BASOPHILS NFR BLD: 0.4 % (ref 0–1.9)
BASOPHILS NFR BLD: 0.4 % (ref 0–1.9)
BASOPHILS NFR BLD: 0.5 % (ref 0–1.9)
BASOPHILS NFR BLD: 0.6 % (ref 0–1.9)
BASOPHILS NFR BLD: 0.7 % (ref 0–1.9)
BASOPHILS NFR BLD: 0.8 % (ref 0–1.9)
BASOPHILS NFR BLD: 0.9 % (ref 0–1.9)
BASOPHILS NFR BLD: 1 % (ref 0–1.9)
BASOPHILS NFR BLD: 1 % (ref 0–1.9)
BASOPHILS NFR BLD: 1.3 % (ref 0–1.9)
BASOPHILS NFR BLD: 1.4 % (ref 0–1.9)
BASOPHILS NFR BLD: 2 % (ref 0–1.9)
BILIRUB DIRECT SERPL-MCNC: 0.1 MG/DL (ref 0.1–0.3)
BILIRUB DIRECT SERPL-MCNC: 0.2 MG/DL (ref 0.1–0.3)
BILIRUB DIRECT SERPL-MCNC: 0.2 MG/DL (ref 0.1–0.3)
BILIRUB DIRECT SERPL-MCNC: 0.3 MG/DL (ref 0.1–0.3)
BILIRUB SERPL-MCNC: 0.3 MG/DL (ref 0.1–1)
BILIRUB SERPL-MCNC: 0.4 MG/DL (ref 0.1–1)
BILIRUB SERPL-MCNC: 0.5 MG/DL (ref 0.1–1)
BILIRUB SERPL-MCNC: 0.6 MG/DL (ref 0.1–1)
BILIRUB SERPL-MCNC: 0.7 MG/DL (ref 0.1–1)
BILIRUB SERPL-MCNC: 0.8 MG/DL (ref 0.1–1)
BILIRUB SERPL-MCNC: 0.8 MG/DL (ref 0.1–1)
BILIRUB SERPL-MCNC: 0.9 MG/DL (ref 0.1–1)
BILIRUB SERPL-MCNC: 1.3 MG/DL (ref 0.1–1)
BILIRUB SERPL-MCNC: 1.4 MG/DL (ref 0.1–1)
BILIRUB UR QL STRIP: NEGATIVE
BNP SERPL-MCNC: 341 PG/ML (ref 0–99)
BNP SERPL-MCNC: 546 PG/ML (ref 0–99)
BSA FOR ECHO PROCEDURE: 1.72 M2
BSA FOR ECHO PROCEDURE: 1.72 M2
BUN SERPL-MCNC: 18 MG/DL (ref 8–23)
BUN SERPL-MCNC: 18 MG/DL (ref 8–23)
BUN SERPL-MCNC: 19 MG/DL (ref 8–23)
BUN SERPL-MCNC: 20 MG/DL (ref 8–23)
BUN SERPL-MCNC: 23 MG/DL (ref 8–23)
BUN SERPL-MCNC: 24 MG/DL (ref 8–23)
BUN SERPL-MCNC: 29 MG/DL (ref 8–23)
BUN SERPL-MCNC: 30 MG/DL (ref 8–23)
BUN SERPL-MCNC: 30 MG/DL (ref 8–23)
BUN SERPL-MCNC: 31 MG/DL (ref 8–23)
BUN SERPL-MCNC: 32 MG/DL (ref 8–23)
BUN SERPL-MCNC: 33 MG/DL (ref 8–23)
BUN SERPL-MCNC: 34 MG/DL (ref 8–23)
BUN SERPL-MCNC: 34 MG/DL (ref 8–23)
BUN SERPL-MCNC: 35 MG/DL (ref 8–23)
BUN SERPL-MCNC: 36 MG/DL (ref 8–23)
BUN SERPL-MCNC: 39 MG/DL (ref 8–23)
BUN SERPL-MCNC: 40 MG/DL (ref 8–23)
BUN SERPL-MCNC: 40 MG/DL (ref 8–23)
BUN SERPL-MCNC: 41 MG/DL (ref 8–23)
BUN SERPL-MCNC: 42 MG/DL (ref 8–23)
BUN SERPL-MCNC: 43 MG/DL (ref 8–23)
BUN SERPL-MCNC: 44 MG/DL (ref 8–23)
BUN SERPL-MCNC: 45 MG/DL (ref 8–23)
BUN SERPL-MCNC: 46 MG/DL (ref 8–23)
BUN SERPL-MCNC: 46 MG/DL (ref 8–23)
BUN SERPL-MCNC: 47 MG/DL (ref 8–23)
BUN SERPL-MCNC: 55 MG/DL (ref 8–23)
BUN SERPL-MCNC: 58 MG/DL (ref 8–23)
BUN SERPL-MCNC: 83 MG/DL (ref 8–23)
BURR CELLS BLD QL SMEAR: ABNORMAL
CALCIUM SERPL-MCNC: 6.6 MG/DL (ref 8.7–10.5)
CALCIUM SERPL-MCNC: 6.9 MG/DL (ref 8.7–10.5)
CALCIUM SERPL-MCNC: 6.9 MG/DL (ref 8.7–10.5)
CALCIUM SERPL-MCNC: 7 MG/DL (ref 8.7–10.5)
CALCIUM SERPL-MCNC: 7.1 MG/DL (ref 8.7–10.5)
CALCIUM SERPL-MCNC: 7.1 MG/DL (ref 8.7–10.5)
CALCIUM SERPL-MCNC: 7.2 MG/DL (ref 8.7–10.5)
CALCIUM SERPL-MCNC: 7.3 MG/DL (ref 8.7–10.5)
CALCIUM SERPL-MCNC: 7.4 MG/DL (ref 8.7–10.5)
CALCIUM SERPL-MCNC: 7.5 MG/DL (ref 8.7–10.5)
CALCIUM SERPL-MCNC: 7.6 MG/DL (ref 8.7–10.5)
CALCIUM SERPL-MCNC: 7.6 MG/DL (ref 8.7–10.5)
CALCIUM SERPL-MCNC: 7.7 MG/DL (ref 8.7–10.5)
CALCIUM SERPL-MCNC: 7.8 MG/DL (ref 8.7–10.5)
CALCIUM SERPL-MCNC: 7.9 MG/DL (ref 8.7–10.5)
CALCIUM SERPL-MCNC: 7.9 MG/DL (ref 8.7–10.5)
CALCIUM SERPL-MCNC: 8 MG/DL (ref 8.7–10.5)
CALCIUM SERPL-MCNC: 8.1 MG/DL (ref 8.7–10.5)
CALCIUM SERPL-MCNC: 8.2 MG/DL (ref 8.7–10.5)
CALCIUM SERPL-MCNC: 8.3 MG/DL (ref 8.7–10.5)
CALCIUM SERPL-MCNC: 8.6 MG/DL (ref 8.7–10.5)
CHLORIDE SERPL-SCNC: 104 MMOL/L (ref 95–110)
CHLORIDE SERPL-SCNC: 105 MMOL/L (ref 95–110)
CHLORIDE SERPL-SCNC: 106 MMOL/L (ref 95–110)
CHLORIDE SERPL-SCNC: 107 MMOL/L (ref 95–110)
CHLORIDE SERPL-SCNC: 108 MMOL/L (ref 95–110)
CHLORIDE SERPL-SCNC: 110 MMOL/L (ref 95–110)
CHLORIDE SERPL-SCNC: 110 MMOL/L (ref 95–110)
CHLORIDE SERPL-SCNC: 111 MMOL/L (ref 95–110)
CHLORIDE SERPL-SCNC: 112 MMOL/L (ref 95–110)
CHLORIDE SERPL-SCNC: 112 MMOL/L (ref 95–110)
CHLORIDE SERPL-SCNC: 113 MMOL/L (ref 95–110)
CHLORIDE SERPL-SCNC: 114 MMOL/L (ref 95–110)
CHLORIDE SERPL-SCNC: 115 MMOL/L (ref 95–110)
CHLORIDE SERPL-SCNC: 116 MMOL/L (ref 95–110)
CHOLEST SERPL-MCNC: 95 MG/DL (ref 120–199)
CHOLEST/HDLC SERPL: 5 {RATIO} (ref 2–5)
CK SERPL-CCNC: 36 U/L (ref 20–180)
CLARITY UR REFRACT.AUTO: ABNORMAL
CLARITY UR REFRACT.AUTO: CLEAR
CO2 SERPL-SCNC: 12 MMOL/L (ref 23–29)
CO2 SERPL-SCNC: 15 MMOL/L (ref 23–29)
CO2 SERPL-SCNC: 15 MMOL/L (ref 23–29)
CO2 SERPL-SCNC: 17 MMOL/L (ref 23–29)
CO2 SERPL-SCNC: 17 MMOL/L (ref 23–29)
CO2 SERPL-SCNC: 18 MMOL/L (ref 23–29)
CO2 SERPL-SCNC: 19 MMOL/L (ref 23–29)
CO2 SERPL-SCNC: 20 MMOL/L (ref 23–29)
CO2 SERPL-SCNC: 21 MMOL/L (ref 23–29)
CO2 SERPL-SCNC: 22 MMOL/L (ref 23–29)
CO2 SERPL-SCNC: 23 MMOL/L (ref 23–29)
CO2 SERPL-SCNC: 24 MMOL/L (ref 23–29)
CO2 SERPL-SCNC: 25 MMOL/L (ref 23–29)
CO2 SERPL-SCNC: 26 MMOL/L (ref 23–29)
COLOR UR AUTO: ABNORMAL
COLOR UR AUTO: ABNORMAL
COLOR UR AUTO: YELLOW
CORTIS SERPL-MCNC: 13.7 UG/DL (ref 4.3–22.4)
CREAT SERPL-MCNC: 1 MG/DL (ref 0.5–1.4)
CREAT SERPL-MCNC: 1 MG/DL (ref 0.5–1.4)
CREAT SERPL-MCNC: 1.1 MG/DL (ref 0.5–1.4)
CREAT SERPL-MCNC: 1.2 MG/DL (ref 0.5–1.4)
CREAT SERPL-MCNC: 1.3 MG/DL (ref 0.5–1.4)
CREAT SERPL-MCNC: 1.5 MG/DL (ref 0.5–1.4)
CREAT SERPL-MCNC: 1.6 MG/DL (ref 0.5–1.4)
CREAT SERPL-MCNC: 1.6 MG/DL (ref 0.5–1.4)
CREAT SERPL-MCNC: 1.7 MG/DL (ref 0.5–1.4)
CREAT SERPL-MCNC: 1.8 MG/DL (ref 0.5–1.4)
CREAT SERPL-MCNC: 1.9 MG/DL (ref 0.5–1.4)
CREAT SERPL-MCNC: 2 MG/DL (ref 0.5–1.4)
CREAT SERPL-MCNC: 2.1 MG/DL (ref 0.5–1.4)
CREAT SERPL-MCNC: 2.1 MG/DL (ref 0.5–1.4)
CREAT SERPL-MCNC: 2.2 MG/DL (ref 0.5–1.4)
CREAT SERPL-MCNC: 2.3 MG/DL (ref 0.5–1.4)
CREAT SERPL-MCNC: 2.5 MG/DL (ref 0.5–1.4)
CREAT SERPL-MCNC: 2.6 MG/DL (ref 0.5–1.4)
CREAT SERPL-MCNC: 2.7 MG/DL (ref 0.5–1.4)
CREAT SERPL-MCNC: 2.8 MG/DL (ref 0.5–1.4)
CREAT SERPL-MCNC: 2.8 MG/DL (ref 0.5–1.4)
CREAT SERPL-MCNC: 2.9 MG/DL (ref 0.5–1.4)
CREAT SERPL-MCNC: 7.2 MG/DL (ref 0.5–1.4)
CREAT UR-MCNC: 106 MG/DL (ref 15–325)
CREAT UR-MCNC: 161 MG/DL (ref 15–325)
CREAT UR-MCNC: 29 MG/DL (ref 15–325)
CREAT UR-MCNC: 29 MG/DL (ref 15–325)
CV ECHO LV RWT: 0.5 CM
DIFFERENTIAL METHOD: ABNORMAL
DIGOXIN SERPL-MCNC: 0.5 NG/ML (ref 0.8–2)
DOP CALC AO PEAK VEL: 1.53 M/S
DOP CALC AO VTI: 31.12 CM
DOP CALC LVOT AREA: 3.1 CM2
DOP CALC LVOT DIAMETER: 2 CM
DOP CALC LVOT PEAK VEL: 1 M/S
DOP CALC LVOT STROKE VOLUME: 69.08 CM3
DOP CALCLVOT PEAK VEL VTI: 22 CM
E WAVE DECELERATION TIME: 179.03 MSEC
E/A RATIO: 2.07
E/E' RATIO: 11.6 M/S
ECHO LV POSTERIOR WALL: 1.04 CM (ref 0.6–1.1)
EJECTION FRACTION: 65 %
EOSINOPHIL # BLD AUTO: 0 K/UL (ref 0–0.5)
EOSINOPHIL # BLD AUTO: 0 K/UL (ref 0–0.5)
EOSINOPHIL # BLD AUTO: 0.1 K/UL (ref 0–0.5)
EOSINOPHIL # BLD AUTO: 0.2 K/UL (ref 0–0.5)
EOSINOPHIL # BLD AUTO: 0.3 K/UL (ref 0–0.5)
EOSINOPHIL # BLD AUTO: 0.4 K/UL (ref 0–0.5)
EOSINOPHIL # BLD AUTO: 0.5 K/UL (ref 0–0.5)
EOSINOPHIL # BLD AUTO: ABNORMAL K/UL (ref 0–0.5)
EOSINOPHIL # BLD AUTO: ABNORMAL K/UL (ref 0–0.5)
EOSINOPHIL NFR BLD: 0.2 % (ref 0–8)
EOSINOPHIL NFR BLD: 0.5 % (ref 0–8)
EOSINOPHIL NFR BLD: 0.6 % (ref 0–8)
EOSINOPHIL NFR BLD: 0.7 % (ref 0–8)
EOSINOPHIL NFR BLD: 0.7 % (ref 0–8)
EOSINOPHIL NFR BLD: 0.9 % (ref 0–8)
EOSINOPHIL NFR BLD: 1 % (ref 0–8)
EOSINOPHIL NFR BLD: 1.1 % (ref 0–8)
EOSINOPHIL NFR BLD: 1.2 % (ref 0–8)
EOSINOPHIL NFR BLD: 1.2 % (ref 0–8)
EOSINOPHIL NFR BLD: 1.4 % (ref 0–8)
EOSINOPHIL NFR BLD: 1.5 % (ref 0–8)
EOSINOPHIL NFR BLD: 1.7 % (ref 0–8)
EOSINOPHIL NFR BLD: 1.7 % (ref 0–8)
EOSINOPHIL NFR BLD: 1.8 % (ref 0–8)
EOSINOPHIL NFR BLD: 1.9 % (ref 0–8)
EOSINOPHIL NFR BLD: 2 % (ref 0–8)
EOSINOPHIL NFR BLD: 2 % (ref 0–8)
EOSINOPHIL NFR BLD: 2.2 % (ref 0–8)
EOSINOPHIL NFR BLD: 2.5 % (ref 0–8)
EOSINOPHIL NFR BLD: 2.6 % (ref 0–8)
EOSINOPHIL NFR BLD: 2.7 % (ref 0–8)
EOSINOPHIL NFR BLD: 2.8 % (ref 0–8)
EOSINOPHIL NFR BLD: 3 % (ref 0–8)
EOSINOPHIL NFR BLD: 3.2 % (ref 0–8)
EOSINOPHIL NFR BLD: 3.5 % (ref 0–8)
EOSINOPHIL NFR BLD: 3.8 % (ref 0–8)
EOSINOPHIL NFR BLD: 4.8 % (ref 0–8)
EOSINOPHIL NFR BLD: 5.6 % (ref 0–8)
EOSINOPHIL NFR BLD: 7 % (ref 0–8)
ERYTHROCYTE [DISTWIDTH] IN BLOOD BY AUTOMATED COUNT: 13.3 % (ref 11.5–14.5)
ERYTHROCYTE [DISTWIDTH] IN BLOOD BY AUTOMATED COUNT: 13.4 % (ref 11.5–14.5)
ERYTHROCYTE [DISTWIDTH] IN BLOOD BY AUTOMATED COUNT: 13.6 % (ref 11.5–14.5)
ERYTHROCYTE [DISTWIDTH] IN BLOOD BY AUTOMATED COUNT: 13.8 % (ref 11.5–14.5)
ERYTHROCYTE [DISTWIDTH] IN BLOOD BY AUTOMATED COUNT: 14 % (ref 11.5–14.5)
ERYTHROCYTE [DISTWIDTH] IN BLOOD BY AUTOMATED COUNT: 14.1 % (ref 11.5–14.5)
ERYTHROCYTE [DISTWIDTH] IN BLOOD BY AUTOMATED COUNT: 14.2 % (ref 11.5–14.5)
ERYTHROCYTE [DISTWIDTH] IN BLOOD BY AUTOMATED COUNT: 14.3 % (ref 11.5–14.5)
ERYTHROCYTE [DISTWIDTH] IN BLOOD BY AUTOMATED COUNT: 14.3 % (ref 11.5–14.5)
ERYTHROCYTE [DISTWIDTH] IN BLOOD BY AUTOMATED COUNT: 14.5 % (ref 11.5–14.5)
ERYTHROCYTE [DISTWIDTH] IN BLOOD BY AUTOMATED COUNT: 14.6 % (ref 11.5–14.5)
ERYTHROCYTE [DISTWIDTH] IN BLOOD BY AUTOMATED COUNT: 14.7 % (ref 11.5–14.5)
ERYTHROCYTE [DISTWIDTH] IN BLOOD BY AUTOMATED COUNT: 14.7 % (ref 11.5–14.5)
ERYTHROCYTE [DISTWIDTH] IN BLOOD BY AUTOMATED COUNT: 15 % (ref 11.5–14.5)
ERYTHROCYTE [DISTWIDTH] IN BLOOD BY AUTOMATED COUNT: 15.1 % (ref 11.5–14.5)
ERYTHROCYTE [DISTWIDTH] IN BLOOD BY AUTOMATED COUNT: 15.3 % (ref 11.5–14.5)
ERYTHROCYTE [DISTWIDTH] IN BLOOD BY AUTOMATED COUNT: 15.3 % (ref 11.5–14.5)
ERYTHROCYTE [DISTWIDTH] IN BLOOD BY AUTOMATED COUNT: 15.4 % (ref 11.5–14.5)
ERYTHROCYTE [DISTWIDTH] IN BLOOD BY AUTOMATED COUNT: 15.6 % (ref 11.5–14.5)
ERYTHROCYTE [DISTWIDTH] IN BLOOD BY AUTOMATED COUNT: 15.6 % (ref 11.5–14.5)
ERYTHROCYTE [DISTWIDTH] IN BLOOD BY AUTOMATED COUNT: 15.7 % (ref 11.5–14.5)
ERYTHROCYTE [DISTWIDTH] IN BLOOD BY AUTOMATED COUNT: 15.7 % (ref 11.5–14.5)
ERYTHROCYTE [DISTWIDTH] IN BLOOD BY AUTOMATED COUNT: 15.8 % (ref 11.5–14.5)
ERYTHROCYTE [DISTWIDTH] IN BLOOD BY AUTOMATED COUNT: 15.8 % (ref 11.5–14.5)
ERYTHROCYTE [DISTWIDTH] IN BLOOD BY AUTOMATED COUNT: 15.9 % (ref 11.5–14.5)
ERYTHROCYTE [DISTWIDTH] IN BLOOD BY AUTOMATED COUNT: 16.2 % (ref 11.5–14.5)
ERYTHROCYTE [DISTWIDTH] IN BLOOD BY AUTOMATED COUNT: 16.3 % (ref 11.5–14.5)
ERYTHROCYTE [DISTWIDTH] IN BLOOD BY AUTOMATED COUNT: 17 % (ref 11.5–14.5)
EST. GFR  (NO RACE VARIABLE): 15 ML/MIN/1.73 M^2
EST. GFR  (NO RACE VARIABLE): 15.7 ML/MIN/1.73 M^2
EST. GFR  (NO RACE VARIABLE): 15.7 ML/MIN/1.73 M^2
EST. GFR  (NO RACE VARIABLE): 16.4 ML/MIN/1.73 M^2
EST. GFR  (NO RACE VARIABLE): 17.1 ML/MIN/1.73 M^2
EST. GFR  (NO RACE VARIABLE): 17.9 ML/MIN/1.73 M^2
EST. GFR  (NO RACE VARIABLE): 19.8 ML/MIN/1.73 M^2
EST. GFR  (NO RACE VARIABLE): 20.9 ML/MIN/1.73 M^2
EST. GFR  (NO RACE VARIABLE): 22 ML/MIN/1.73 M^2
EST. GFR  (NO RACE VARIABLE): 22.1 ML/MIN/1.73 M^2
EST. GFR  (NO RACE VARIABLE): 23.4 ML/MIN/1.73 M^2
EST. GFR  (NO RACE VARIABLE): 24.9 ML/MIN/1.73 M^2
EST. GFR  (NO RACE VARIABLE): 26.4 ML/MIN/1.73 M^2
EST. GFR  (NO RACE VARIABLE): 26.6 ML/MIN/1.73 M^2
EST. GFR  (NO RACE VARIABLE): 28.3 ML/MIN/1.73 M^2
EST. GFR  (NO RACE VARIABLE): 28.5 ML/MIN/1.73 M^2
EST. GFR  (NO RACE VARIABLE): 28.5 ML/MIN/1.73 M^2
EST. GFR  (NO RACE VARIABLE): 30.6 ML/MIN/1.73 M^2
EST. GFR  (NO RACE VARIABLE): 30.6 ML/MIN/1.73 M^2
EST. GFR  (NO RACE VARIABLE): 32.9 ML/MIN/1.73 M^2
EST. GFR  (NO RACE VARIABLE): 33.1 ML/MIN/1.73 M^2
EST. GFR  (NO RACE VARIABLE): 39.1 ML/MIN/1.73 M^2
EST. GFR  (NO RACE VARIABLE): 39.1 ML/MIN/1.73 M^2
EST. GFR  (NO RACE VARIABLE): 39.3 ML/MIN/1.73 M^2
EST. GFR  (NO RACE VARIABLE): 43 ML/MIN/1.73 M^2
EST. GFR  (NO RACE VARIABLE): 43.3 ML/MIN/1.73 M^2
EST. GFR  (NO RACE VARIABLE): 48 ML/MIN/1.73 M^2
EST. GFR  (NO RACE VARIABLE): 5 ML/MIN/1.73 M^2
EST. GFR  (NO RACE VARIABLE): 53.9 ML/MIN/1.73 M^2
EST. GFR  (NO RACE VARIABLE): 53.9 ML/MIN/1.73 M^2
FACT X PPP CHRO-ACNC: 0.74 IU/ML (ref 0.3–0.7)
FACT X PPP CHRO-ACNC: 0.9 IU/ML (ref 0.3–0.7)
FERRITIN SERPL-MCNC: 1121 NG/ML (ref 20–300)
FERRITIN SERPL-MCNC: 191 NG/ML (ref 20–300)
FOLATE SERPL-MCNC: 3.5 NG/ML (ref 4–24)
FRACTIONAL SHORTENING: 40 % (ref 28–44)
GGT SERPL-CCNC: 18 U/L (ref 8–55)
GLUCOSE SERPL-MCNC: 100 MG/DL (ref 70–110)
GLUCOSE SERPL-MCNC: 106 MG/DL (ref 70–110)
GLUCOSE SERPL-MCNC: 106 MG/DL (ref 70–110)
GLUCOSE SERPL-MCNC: 109 MG/DL (ref 70–110)
GLUCOSE SERPL-MCNC: 110 MG/DL (ref 70–110)
GLUCOSE SERPL-MCNC: 113 MG/DL (ref 70–110)
GLUCOSE SERPL-MCNC: 152 MG/DL (ref 70–110)
GLUCOSE SERPL-MCNC: 46 MG/DL (ref 70–110)
GLUCOSE SERPL-MCNC: 51 MG/DL (ref 70–110)
GLUCOSE SERPL-MCNC: 51 MG/DL (ref 70–110)
GLUCOSE SERPL-MCNC: 54 MG/DL (ref 70–110)
GLUCOSE SERPL-MCNC: 57 MG/DL (ref 70–110)
GLUCOSE SERPL-MCNC: 58 MG/DL (ref 70–110)
GLUCOSE SERPL-MCNC: 60 MG/DL (ref 70–110)
GLUCOSE SERPL-MCNC: 61 MG/DL (ref 70–110)
GLUCOSE SERPL-MCNC: 63 MG/DL (ref 70–110)
GLUCOSE SERPL-MCNC: 65 MG/DL (ref 70–110)
GLUCOSE SERPL-MCNC: 67 MG/DL (ref 70–110)
GLUCOSE SERPL-MCNC: 68 MG/DL (ref 70–110)
GLUCOSE SERPL-MCNC: 69 MG/DL (ref 70–110)
GLUCOSE SERPL-MCNC: 69 MG/DL (ref 70–110)
GLUCOSE SERPL-MCNC: 70 MG/DL (ref 70–110)
GLUCOSE SERPL-MCNC: 71 MG/DL (ref 70–110)
GLUCOSE SERPL-MCNC: 73 MG/DL (ref 70–110)
GLUCOSE SERPL-MCNC: 75 MG/DL (ref 70–110)
GLUCOSE SERPL-MCNC: 76 MG/DL (ref 70–110)
GLUCOSE SERPL-MCNC: 77 MG/DL (ref 70–110)
GLUCOSE SERPL-MCNC: 78 MG/DL (ref 70–110)
GLUCOSE SERPL-MCNC: 80 MG/DL (ref 70–110)
GLUCOSE SERPL-MCNC: 82 MG/DL (ref 70–110)
GLUCOSE SERPL-MCNC: 82 MG/DL (ref 70–110)
GLUCOSE SERPL-MCNC: 84 MG/DL (ref 70–110)
GLUCOSE SERPL-MCNC: 85 MG/DL (ref 70–110)
GLUCOSE SERPL-MCNC: 86 MG/DL (ref 70–110)
GLUCOSE SERPL-MCNC: 86 MG/DL (ref 70–110)
GLUCOSE SERPL-MCNC: 87 MG/DL (ref 70–110)
GLUCOSE SERPL-MCNC: 87 MG/DL (ref 70–110)
GLUCOSE SERPL-MCNC: 91 MG/DL (ref 70–110)
GLUCOSE SERPL-MCNC: 94 MG/DL (ref 70–110)
GLUCOSE SERPL-MCNC: 94 MG/DL (ref 70–110)
GLUCOSE UR QL STRIP: NEGATIVE
HAV IGM SERPL QL IA: NORMAL
HBV CORE IGM SERPL QL IA: NORMAL
HBV SURFACE AG SERPL QL IA: NORMAL
HCT VFR BLD AUTO: 24.8 % (ref 37–48.5)
HCT VFR BLD AUTO: 25 % (ref 37–48.5)
HCT VFR BLD AUTO: 25.1 % (ref 37–48.5)
HCT VFR BLD AUTO: 25.2 % (ref 37–48.5)
HCT VFR BLD AUTO: 25.3 % (ref 37–48.5)
HCT VFR BLD AUTO: 25.5 % (ref 37–48.5)
HCT VFR BLD AUTO: 26 % (ref 37–48.5)
HCT VFR BLD AUTO: 27 % (ref 37–48.5)
HCT VFR BLD AUTO: 27.1 % (ref 37–48.5)
HCT VFR BLD AUTO: 27.5 % (ref 37–48.5)
HCT VFR BLD AUTO: 27.5 % (ref 37–48.5)
HCT VFR BLD AUTO: 27.6 % (ref 37–48.5)
HCT VFR BLD AUTO: 27.8 % (ref 37–48.5)
HCT VFR BLD AUTO: 28.8 % (ref 37–48.5)
HCT VFR BLD AUTO: 29.7 % (ref 37–48.5)
HCT VFR BLD AUTO: 30.1 % (ref 37–48.5)
HCT VFR BLD AUTO: 30.1 % (ref 37–48.5)
HCT VFR BLD AUTO: 30.5 % (ref 37–48.5)
HCT VFR BLD AUTO: 30.7 % (ref 37–48.5)
HCT VFR BLD AUTO: 30.8 % (ref 37–48.5)
HCT VFR BLD AUTO: 30.8 % (ref 37–48.5)
HCT VFR BLD AUTO: 31 % (ref 37–48.5)
HCT VFR BLD AUTO: 31.7 % (ref 37–48.5)
HCT VFR BLD AUTO: 31.8 % (ref 37–48.5)
HCT VFR BLD AUTO: 32 % (ref 37–48.5)
HCT VFR BLD AUTO: 32.5 % (ref 37–48.5)
HCT VFR BLD AUTO: 32.5 % (ref 37–48.5)
HCT VFR BLD AUTO: 32.8 % (ref 37–48.5)
HCT VFR BLD AUTO: 33.1 % (ref 37–48.5)
HCT VFR BLD AUTO: 33.4 % (ref 37–48.5)
HCT VFR BLD AUTO: 34 % (ref 37–48.5)
HCT VFR BLD AUTO: 34.5 % (ref 37–48.5)
HCT VFR BLD AUTO: 35 % (ref 37–48.5)
HCT VFR BLD AUTO: 35.7 % (ref 37–48.5)
HCT VFR BLD AUTO: 38 % (ref 37–48.5)
HCT VFR BLD AUTO: 38.4 % (ref 37–48.5)
HCT VFR BLD AUTO: 39.1 % (ref 37–48.5)
HCT VFR BLD AUTO: 43.1 % (ref 37–48.5)
HCV AB SERPL QL IA: NORMAL
HCV AB SERPL QL IA: NORMAL
HDLC SERPL-MCNC: 19 MG/DL (ref 40–75)
HDLC SERPL: 20 % (ref 20–50)
HGB BLD-MCNC: 10 G/DL (ref 12–16)
HGB BLD-MCNC: 10.1 G/DL (ref 12–16)
HGB BLD-MCNC: 10.1 G/DL (ref 12–16)
HGB BLD-MCNC: 10.2 G/DL (ref 12–16)
HGB BLD-MCNC: 10.5 G/DL (ref 12–16)
HGB BLD-MCNC: 11 G/DL (ref 12–16)
HGB BLD-MCNC: 11.2 G/DL (ref 12–16)
HGB BLD-MCNC: 11.2 G/DL (ref 12–16)
HGB BLD-MCNC: 11.8 G/DL (ref 12–16)
HGB BLD-MCNC: 12.6 G/DL (ref 12–16)
HGB BLD-MCNC: 12.6 G/DL (ref 12–16)
HGB BLD-MCNC: 13.3 G/DL (ref 12–16)
HGB BLD-MCNC: 7.4 G/DL (ref 12–16)
HGB BLD-MCNC: 7.6 G/DL (ref 12–16)
HGB BLD-MCNC: 7.6 G/DL (ref 12–16)
HGB BLD-MCNC: 7.7 G/DL (ref 12–16)
HGB BLD-MCNC: 7.7 G/DL (ref 12–16)
HGB BLD-MCNC: 7.9 G/DL (ref 12–16)
HGB BLD-MCNC: 8.1 G/DL (ref 12–16)
HGB BLD-MCNC: 8.3 G/DL (ref 12–16)
HGB BLD-MCNC: 8.3 G/DL (ref 12–16)
HGB BLD-MCNC: 8.4 G/DL (ref 12–16)
HGB BLD-MCNC: 8.5 G/DL (ref 12–16)
HGB BLD-MCNC: 8.8 G/DL (ref 12–16)
HGB BLD-MCNC: 8.9 G/DL (ref 12–16)
HGB BLD-MCNC: 9.1 G/DL (ref 12–16)
HGB BLD-MCNC: 9.2 G/DL (ref 12–16)
HGB BLD-MCNC: 9.4 G/DL (ref 12–16)
HGB BLD-MCNC: 9.9 G/DL (ref 12–16)
HGB UR QL STRIP: ABNORMAL
HGB UR QL STRIP: NEGATIVE
HIV 1+2 AB+HIV1 P24 AG SERPL QL IA: NORMAL
HYALINE CASTS UR QL AUTO: 0 /LPF
HYALINE CASTS UR QL AUTO: 0 /LPF
HYPOCHROMIA BLD QL SMEAR: ABNORMAL
HYPOCHROMIA BLD QL SMEAR: ABNORMAL
IMM GRANULOCYTES # BLD AUTO: 0.04 K/UL (ref 0–0.04)
IMM GRANULOCYTES # BLD AUTO: 0.07 K/UL (ref 0–0.04)
IMM GRANULOCYTES # BLD AUTO: 0.08 K/UL (ref 0–0.04)
IMM GRANULOCYTES # BLD AUTO: 0.1 K/UL (ref 0–0.04)
IMM GRANULOCYTES # BLD AUTO: 0.11 K/UL (ref 0–0.04)
IMM GRANULOCYTES # BLD AUTO: 0.11 K/UL (ref 0–0.04)
IMM GRANULOCYTES # BLD AUTO: 0.12 K/UL (ref 0–0.04)
IMM GRANULOCYTES # BLD AUTO: 0.14 K/UL (ref 0–0.04)
IMM GRANULOCYTES # BLD AUTO: 0.14 K/UL (ref 0–0.04)
IMM GRANULOCYTES # BLD AUTO: 0.15 K/UL (ref 0–0.04)
IMM GRANULOCYTES # BLD AUTO: 0.15 K/UL (ref 0–0.04)
IMM GRANULOCYTES # BLD AUTO: 0.16 K/UL (ref 0–0.04)
IMM GRANULOCYTES # BLD AUTO: 0.16 K/UL (ref 0–0.04)
IMM GRANULOCYTES # BLD AUTO: 0.17 K/UL (ref 0–0.04)
IMM GRANULOCYTES # BLD AUTO: 0.18 K/UL (ref 0–0.04)
IMM GRANULOCYTES # BLD AUTO: 0.19 K/UL (ref 0–0.04)
IMM GRANULOCYTES # BLD AUTO: 0.2 K/UL (ref 0–0.04)
IMM GRANULOCYTES # BLD AUTO: 0.21 K/UL (ref 0–0.04)
IMM GRANULOCYTES # BLD AUTO: 0.22 K/UL (ref 0–0.04)
IMM GRANULOCYTES # BLD AUTO: 0.23 K/UL (ref 0–0.04)
IMM GRANULOCYTES # BLD AUTO: 0.24 K/UL (ref 0–0.04)
IMM GRANULOCYTES # BLD AUTO: 0.28 K/UL (ref 0–0.04)
IMM GRANULOCYTES # BLD AUTO: ABNORMAL K/UL (ref 0–0.04)
IMM GRANULOCYTES NFR BLD AUTO: 0.7 % (ref 0–0.5)
IMM GRANULOCYTES NFR BLD AUTO: 1 % (ref 0–0.5)
IMM GRANULOCYTES NFR BLD AUTO: 1.1 % (ref 0–0.5)
IMM GRANULOCYTES NFR BLD AUTO: 1.1 % (ref 0–0.5)
IMM GRANULOCYTES NFR BLD AUTO: 1.2 % (ref 0–0.5)
IMM GRANULOCYTES NFR BLD AUTO: 1.4 % (ref 0–0.5)
IMM GRANULOCYTES NFR BLD AUTO: 1.4 % (ref 0–0.5)
IMM GRANULOCYTES NFR BLD AUTO: 1.6 % (ref 0–0.5)
IMM GRANULOCYTES NFR BLD AUTO: 1.6 % (ref 0–0.5)
IMM GRANULOCYTES NFR BLD AUTO: 1.7 % (ref 0–0.5)
IMM GRANULOCYTES NFR BLD AUTO: 1.8 % (ref 0–0.5)
IMM GRANULOCYTES NFR BLD AUTO: 2 % (ref 0–0.5)
IMM GRANULOCYTES NFR BLD AUTO: 2 % (ref 0–0.5)
IMM GRANULOCYTES NFR BLD AUTO: 2.1 % (ref 0–0.5)
IMM GRANULOCYTES NFR BLD AUTO: 2.1 % (ref 0–0.5)
IMM GRANULOCYTES NFR BLD AUTO: 2.2 % (ref 0–0.5)
IMM GRANULOCYTES NFR BLD AUTO: 2.2 % (ref 0–0.5)
IMM GRANULOCYTES NFR BLD AUTO: 2.3 % (ref 0–0.5)
IMM GRANULOCYTES NFR BLD AUTO: 2.3 % (ref 0–0.5)
IMM GRANULOCYTES NFR BLD AUTO: 2.4 % (ref 0–0.5)
IMM GRANULOCYTES NFR BLD AUTO: 2.5 % (ref 0–0.5)
IMM GRANULOCYTES NFR BLD AUTO: 2.5 % (ref 0–0.5)
IMM GRANULOCYTES NFR BLD AUTO: 2.9 % (ref 0–0.5)
IMM GRANULOCYTES NFR BLD AUTO: 2.9 % (ref 0–0.5)
IMM GRANULOCYTES NFR BLD AUTO: 3 % (ref 0–0.5)
IMM GRANULOCYTES NFR BLD AUTO: 3.3 % (ref 0–0.5)
IMM GRANULOCYTES NFR BLD AUTO: ABNORMAL % (ref 0–0.5)
INR PPP: 1.2 (ref 0.8–1.2)
INTERVENTRICULAR SEPTUM: 1.11 CM (ref 0.6–1.1)
IRON SERPL-MCNC: 88 UG/DL (ref 30–160)
IRON SERPL-MCNC: 98 UG/DL (ref 30–160)
IVRT: 85.63 MSEC
KETONES UR QL STRIP: ABNORMAL
KETONES UR QL STRIP: ABNORMAL
KETONES UR QL STRIP: NEGATIVE
LA MAJOR: 6.33 CM
LA MINOR: 6.45 CM
LA WIDTH: 5.37 CM
LACTATE SERPL-SCNC: 0.6 MMOL/L (ref 0.5–2.2)
LACTATE SERPL-SCNC: 0.6 MMOL/L (ref 0.5–2.2)
LACTATE SERPL-SCNC: 1 MMOL/L (ref 0.5–2.2)
LACTATE SERPL-SCNC: 1.4 MMOL/L (ref 0.5–2.2)
LACTATE SERPL-SCNC: 1.5 MMOL/L (ref 0.5–2.2)
LACTATE SERPL-SCNC: 1.6 MMOL/L (ref 0.5–2.2)
LACTATE SERPL-SCNC: 2.2 MMOL/L (ref 0.5–2.2)
LDH SERPL L TO P-CCNC: 1.29 MMOL/L (ref 0.5–2.2)
LDLC SERPL CALC-MCNC: 55.4 MG/DL (ref 63–159)
LEFT ATRIUM SIZE: 4.21 CM
LEFT ATRIUM VOLUME INDEX MOD: 58 ML/M2
LEFT ATRIUM VOLUME INDEX: 72.2 ML/M2
LEFT ATRIUM VOLUME MOD: 98.67 CM3
LEFT ATRIUM VOLUME: 122.78 CM3
LEFT INTERNAL DIMENSION IN SYSTOLE: 2.52 CM (ref 2.1–4)
LEFT VENTRICLE DIASTOLIC VOLUME INDEX: 46.21 ML/M2
LEFT VENTRICLE DIASTOLIC VOLUME: 78.56 ML
LEFT VENTRICLE MASS INDEX: 89 G/M2
LEFT VENTRICLE SYSTOLIC VOLUME INDEX: 13.4 ML/M2
LEFT VENTRICLE SYSTOLIC VOLUME: 22.79 ML
LEFT VENTRICULAR INTERNAL DIMENSION IN DIASTOLE: 4.2 CM (ref 3.5–6)
LEFT VENTRICULAR MASS: 151.99 G
LEUKOCYTE ESTERASE UR QL STRIP: ABNORMAL
LEUKOCYTE ESTERASE UR QL STRIP: NEGATIVE
LV LATERAL E/E' RATIO: 8.7 M/S
LV SEPTAL E/E' RATIO: 17.4 M/S
LYMPHOCYTES # BLD AUTO: 0.9 K/UL (ref 1–4.8)
LYMPHOCYTES # BLD AUTO: 1 K/UL (ref 1–4.8)
LYMPHOCYTES # BLD AUTO: 1 K/UL (ref 1–4.8)
LYMPHOCYTES # BLD AUTO: 1.2 K/UL (ref 1–4.8)
LYMPHOCYTES # BLD AUTO: 1.2 K/UL (ref 1–4.8)
LYMPHOCYTES # BLD AUTO: 1.3 K/UL (ref 1–4.8)
LYMPHOCYTES # BLD AUTO: 1.4 K/UL (ref 1–4.8)
LYMPHOCYTES # BLD AUTO: 1.5 K/UL (ref 1–4.8)
LYMPHOCYTES # BLD AUTO: 1.6 K/UL (ref 1–4.8)
LYMPHOCYTES # BLD AUTO: 1.7 K/UL (ref 1–4.8)
LYMPHOCYTES # BLD AUTO: 1.8 K/UL (ref 1–4.8)
LYMPHOCYTES # BLD AUTO: 1.8 K/UL (ref 1–4.8)
LYMPHOCYTES # BLD AUTO: 1.9 K/UL (ref 1–4.8)
LYMPHOCYTES # BLD AUTO: 1.9 K/UL (ref 1–4.8)
LYMPHOCYTES # BLD AUTO: 2.1 K/UL (ref 1–4.8)
LYMPHOCYTES # BLD AUTO: 2.3 K/UL (ref 1–4.8)
LYMPHOCYTES # BLD AUTO: 2.5 K/UL (ref 1–4.8)
LYMPHOCYTES # BLD AUTO: ABNORMAL K/UL (ref 1–4.8)
LYMPHOCYTES # BLD AUTO: ABNORMAL K/UL (ref 1–4.8)
LYMPHOCYTES NFR BLD: 10.7 % (ref 18–48)
LYMPHOCYTES NFR BLD: 11 % (ref 18–48)
LYMPHOCYTES NFR BLD: 11.9 % (ref 18–48)
LYMPHOCYTES NFR BLD: 12.1 % (ref 18–48)
LYMPHOCYTES NFR BLD: 13.4 % (ref 18–48)
LYMPHOCYTES NFR BLD: 13.5 % (ref 18–48)
LYMPHOCYTES NFR BLD: 13.7 % (ref 18–48)
LYMPHOCYTES NFR BLD: 13.9 % (ref 18–48)
LYMPHOCYTES NFR BLD: 14.8 % (ref 18–48)
LYMPHOCYTES NFR BLD: 14.8 % (ref 18–48)
LYMPHOCYTES NFR BLD: 15.3 % (ref 18–48)
LYMPHOCYTES NFR BLD: 16.1 % (ref 18–48)
LYMPHOCYTES NFR BLD: 17.3 % (ref 18–48)
LYMPHOCYTES NFR BLD: 17.3 % (ref 18–48)
LYMPHOCYTES NFR BLD: 17.5 % (ref 18–48)
LYMPHOCYTES NFR BLD: 19 % (ref 18–48)
LYMPHOCYTES NFR BLD: 19.3 % (ref 18–48)
LYMPHOCYTES NFR BLD: 20 % (ref 18–48)
LYMPHOCYTES NFR BLD: 21 % (ref 18–48)
LYMPHOCYTES NFR BLD: 22.5 % (ref 18–48)
LYMPHOCYTES NFR BLD: 22.9 % (ref 18–48)
LYMPHOCYTES NFR BLD: 23.2 % (ref 18–48)
LYMPHOCYTES NFR BLD: 23.9 % (ref 18–48)
LYMPHOCYTES NFR BLD: 25 % (ref 18–48)
LYMPHOCYTES NFR BLD: 25.5 % (ref 18–48)
LYMPHOCYTES NFR BLD: 26.9 % (ref 18–48)
LYMPHOCYTES NFR BLD: 29 % (ref 18–48)
LYMPHOCYTES NFR BLD: 29 % (ref 18–48)
LYMPHOCYTES NFR BLD: 30.1 % (ref 18–48)
LYMPHOCYTES NFR BLD: 30.9 % (ref 18–48)
LYMPHOCYTES NFR BLD: 32 % (ref 18–48)
LYMPHOCYTES NFR BLD: 35.6 % (ref 18–48)
LYMPHOCYTES NFR BLD: 36 % (ref 18–48)
LYMPHOCYTES NFR BLD: 40.6 % (ref 18–48)
MAGNESIUM SERPL-MCNC: 1.5 MG/DL (ref 1.6–2.6)
MAGNESIUM SERPL-MCNC: 1.6 MG/DL (ref 1.6–2.6)
MAGNESIUM SERPL-MCNC: 1.7 MG/DL (ref 1.6–2.6)
MAGNESIUM SERPL-MCNC: 1.8 MG/DL (ref 1.6–2.6)
MAGNESIUM SERPL-MCNC: 1.9 MG/DL (ref 1.6–2.6)
MAGNESIUM SERPL-MCNC: 2 MG/DL (ref 1.6–2.6)
MAGNESIUM SERPL-MCNC: 2.1 MG/DL (ref 1.6–2.6)
MAGNESIUM SERPL-MCNC: 2.1 MG/DL (ref 1.6–2.6)
MAGNESIUM SERPL-MCNC: 2.2 MG/DL (ref 1.6–2.6)
MAGNESIUM SERPL-MCNC: 2.5 MG/DL (ref 1.6–2.6)
MCH RBC QN AUTO: 29.6 PG (ref 27–31)
MCH RBC QN AUTO: 29.6 PG (ref 27–31)
MCH RBC QN AUTO: 29.8 PG (ref 27–31)
MCH RBC QN AUTO: 29.8 PG (ref 27–31)
MCH RBC QN AUTO: 29.9 PG (ref 27–31)
MCH RBC QN AUTO: 30 PG (ref 27–31)
MCH RBC QN AUTO: 30.1 PG (ref 27–31)
MCH RBC QN AUTO: 30.2 PG (ref 27–31)
MCH RBC QN AUTO: 30.2 PG (ref 27–31)
MCH RBC QN AUTO: 30.3 PG (ref 27–31)
MCH RBC QN AUTO: 30.4 PG (ref 27–31)
MCH RBC QN AUTO: 30.5 PG (ref 27–31)
MCH RBC QN AUTO: 30.6 PG (ref 27–31)
MCH RBC QN AUTO: 30.7 PG (ref 27–31)
MCH RBC QN AUTO: 30.7 PG (ref 27–31)
MCH RBC QN AUTO: 30.8 PG (ref 27–31)
MCH RBC QN AUTO: 30.9 PG (ref 27–31)
MCH RBC QN AUTO: 30.9 PG (ref 27–31)
MCH RBC QN AUTO: 31 PG (ref 27–31)
MCH RBC QN AUTO: 31.1 PG (ref 27–31)
MCH RBC QN AUTO: 31.2 PG (ref 27–31)
MCH RBC QN AUTO: 31.3 PG (ref 27–31)
MCH RBC QN AUTO: 31.4 PG (ref 27–31)
MCHC RBC AUTO-ENTMCNC: 28.9 G/DL (ref 32–36)
MCHC RBC AUTO-ENTMCNC: 29.3 G/DL (ref 32–36)
MCHC RBC AUTO-ENTMCNC: 29.5 G/DL (ref 32–36)
MCHC RBC AUTO-ENTMCNC: 29.6 G/DL (ref 32–36)
MCHC RBC AUTO-ENTMCNC: 29.6 G/DL (ref 32–36)
MCHC RBC AUTO-ENTMCNC: 29.9 G/DL (ref 32–36)
MCHC RBC AUTO-ENTMCNC: 29.9 G/DL (ref 32–36)
MCHC RBC AUTO-ENTMCNC: 30 G/DL (ref 32–36)
MCHC RBC AUTO-ENTMCNC: 30.2 G/DL (ref 32–36)
MCHC RBC AUTO-ENTMCNC: 30.4 G/DL (ref 32–36)
MCHC RBC AUTO-ENTMCNC: 30.5 G/DL (ref 32–36)
MCHC RBC AUTO-ENTMCNC: 30.5 G/DL (ref 32–36)
MCHC RBC AUTO-ENTMCNC: 30.6 G/DL (ref 32–36)
MCHC RBC AUTO-ENTMCNC: 30.7 G/DL (ref 32–36)
MCHC RBC AUTO-ENTMCNC: 30.8 G/DL (ref 32–36)
MCHC RBC AUTO-ENTMCNC: 30.8 G/DL (ref 32–36)
MCHC RBC AUTO-ENTMCNC: 30.9 G/DL (ref 32–36)
MCHC RBC AUTO-ENTMCNC: 31.4 G/DL (ref 32–36)
MCHC RBC AUTO-ENTMCNC: 31.4 G/DL (ref 32–36)
MCHC RBC AUTO-ENTMCNC: 31.8 G/DL (ref 32–36)
MCHC RBC AUTO-ENTMCNC: 31.9 G/DL (ref 32–36)
MCHC RBC AUTO-ENTMCNC: 31.9 G/DL (ref 32–36)
MCHC RBC AUTO-ENTMCNC: 32.2 G/DL (ref 32–36)
MCHC RBC AUTO-ENTMCNC: 32.8 G/DL (ref 32–36)
MCHC RBC AUTO-ENTMCNC: 32.8 G/DL (ref 32–36)
MCHC RBC AUTO-ENTMCNC: 32.9 G/DL (ref 32–36)
MCHC RBC AUTO-ENTMCNC: 33.1 G/DL (ref 32–36)
MCHC RBC AUTO-ENTMCNC: 33.7 G/DL (ref 32–36)
MCV RBC AUTO: 100 FL (ref 82–98)
MCV RBC AUTO: 101 FL (ref 82–98)
MCV RBC AUTO: 102 FL (ref 82–98)
MCV RBC AUTO: 103 FL (ref 82–98)
MCV RBC AUTO: 103 FL (ref 82–98)
MCV RBC AUTO: 104 FL (ref 82–98)
MCV RBC AUTO: 104 FL (ref 82–98)
MCV RBC AUTO: 93 FL (ref 82–98)
MCV RBC AUTO: 93 FL (ref 82–98)
MCV RBC AUTO: 94 FL (ref 82–98)
MCV RBC AUTO: 95 FL (ref 82–98)
MCV RBC AUTO: 96 FL (ref 82–98)
MCV RBC AUTO: 97 FL (ref 82–98)
MCV RBC AUTO: 98 FL (ref 82–98)
MCV RBC AUTO: 99 FL (ref 82–98)
METAMYELOCYTES NFR BLD MANUAL: 4 %
METAMYELOCYTES NFR BLD MANUAL: 7 %
MICROSCOPIC COMMENT: ABNORMAL
MICROSCOPIC COMMENT: NORMAL
MONOCYTES # BLD AUTO: 0.4 K/UL (ref 0.3–1)
MONOCYTES # BLD AUTO: 0.5 K/UL (ref 0.3–1)
MONOCYTES # BLD AUTO: 0.6 K/UL (ref 0.3–1)
MONOCYTES # BLD AUTO: 0.7 K/UL (ref 0.3–1)
MONOCYTES # BLD AUTO: 0.8 K/UL (ref 0.3–1)
MONOCYTES # BLD AUTO: 0.9 K/UL (ref 0.3–1)
MONOCYTES # BLD AUTO: 1 K/UL (ref 0.3–1)
MONOCYTES # BLD AUTO: 1.1 K/UL (ref 0.3–1)
MONOCYTES # BLD AUTO: 1.2 K/UL (ref 0.3–1)
MONOCYTES # BLD AUTO: 1.3 K/UL (ref 0.3–1)
MONOCYTES # BLD AUTO: 1.3 K/UL (ref 0.3–1)
MONOCYTES # BLD AUTO: ABNORMAL K/UL (ref 0.3–1)
MONOCYTES # BLD AUTO: ABNORMAL K/UL (ref 0.3–1)
MONOCYTES NFR BLD: 10 % (ref 4–15)
MONOCYTES NFR BLD: 10.1 % (ref 4–15)
MONOCYTES NFR BLD: 10.2 % (ref 4–15)
MONOCYTES NFR BLD: 10.4 % (ref 4–15)
MONOCYTES NFR BLD: 10.7 % (ref 4–15)
MONOCYTES NFR BLD: 10.8 % (ref 4–15)
MONOCYTES NFR BLD: 11.3 % (ref 4–15)
MONOCYTES NFR BLD: 11.5 % (ref 4–15)
MONOCYTES NFR BLD: 11.9 % (ref 4–15)
MONOCYTES NFR BLD: 12 % (ref 4–15)
MONOCYTES NFR BLD: 12.5 % (ref 4–15)
MONOCYTES NFR BLD: 12.7 % (ref 4–15)
MONOCYTES NFR BLD: 12.9 % (ref 4–15)
MONOCYTES NFR BLD: 13.2 % (ref 4–15)
MONOCYTES NFR BLD: 13.4 % (ref 4–15)
MONOCYTES NFR BLD: 13.9 % (ref 4–15)
MONOCYTES NFR BLD: 14.3 % (ref 4–15)
MONOCYTES NFR BLD: 14.3 % (ref 4–15)
MONOCYTES NFR BLD: 15 % (ref 4–15)
MONOCYTES NFR BLD: 6.2 % (ref 4–15)
MONOCYTES NFR BLD: 7 % (ref 4–15)
MONOCYTES NFR BLD: 7 % (ref 4–15)
MONOCYTES NFR BLD: 7.2 % (ref 4–15)
MONOCYTES NFR BLD: 7.6 % (ref 4–15)
MONOCYTES NFR BLD: 7.6 % (ref 4–15)
MONOCYTES NFR BLD: 7.9 % (ref 4–15)
MONOCYTES NFR BLD: 8.3 % (ref 4–15)
MONOCYTES NFR BLD: 8.6 % (ref 4–15)
MONOCYTES NFR BLD: 8.7 % (ref 4–15)
MONOCYTES NFR BLD: 8.8 % (ref 4–15)
MONOCYTES NFR BLD: 9.2 % (ref 4–15)
MONOCYTES NFR BLD: 9.2 % (ref 4–15)
MONOCYTES NFR BLD: 9.3 % (ref 4–15)
MONOCYTES NFR BLD: 9.4 % (ref 4–15)
MONOCYTES NFR BLD: 9.5 % (ref 4–15)
MONOCYTES NFR BLD: 9.9 % (ref 4–15)
MRSA ID BY PCR: NEGATIVE
MV PEAK A VEL: 0.42 M/S
MV PEAK E VEL: 0.87 M/S
MV STENOSIS PRESSURE HALF TIME: 51.92 MS
MV VALVE AREA P 1/2 METHOD: 4.24 CM2
MYELOCYTES NFR BLD MANUAL: 1 %
MYELOCYTES NFR BLD MANUAL: 1 %
MYELOCYTES NFR BLD MANUAL: 4 %
MYELOCYTES NFR BLD MANUAL: 5 %
NEUTROPHILS # BLD AUTO: 2.8 K/UL (ref 1.8–7.7)
NEUTROPHILS # BLD AUTO: 2.8 K/UL (ref 1.8–7.7)
NEUTROPHILS # BLD AUTO: 3 K/UL (ref 1.8–7.7)
NEUTROPHILS # BLD AUTO: 3.2 K/UL (ref 1.8–7.7)
NEUTROPHILS # BLD AUTO: 3.7 K/UL (ref 1.8–7.7)
NEUTROPHILS # BLD AUTO: 3.9 K/UL (ref 1.8–7.7)
NEUTROPHILS # BLD AUTO: 4 K/UL (ref 1.8–7.7)
NEUTROPHILS # BLD AUTO: 4.4 K/UL (ref 1.8–7.7)
NEUTROPHILS # BLD AUTO: 4.8 K/UL (ref 1.8–7.7)
NEUTROPHILS # BLD AUTO: 4.9 K/UL (ref 1.8–7.7)
NEUTROPHILS # BLD AUTO: 4.9 K/UL (ref 1.8–7.7)
NEUTROPHILS # BLD AUTO: 5 K/UL (ref 1.8–7.7)
NEUTROPHILS # BLD AUTO: 5 K/UL (ref 1.8–7.7)
NEUTROPHILS # BLD AUTO: 5.1 K/UL (ref 1.8–7.7)
NEUTROPHILS # BLD AUTO: 5.2 K/UL (ref 1.8–7.7)
NEUTROPHILS # BLD AUTO: 5.2 K/UL (ref 1.8–7.7)
NEUTROPHILS # BLD AUTO: 5.3 K/UL (ref 1.8–7.7)
NEUTROPHILS # BLD AUTO: 5.5 K/UL (ref 1.8–7.7)
NEUTROPHILS # BLD AUTO: 5.8 K/UL (ref 1.8–7.7)
NEUTROPHILS # BLD AUTO: 6.4 K/UL (ref 1.8–7.7)
NEUTROPHILS # BLD AUTO: 6.9 K/UL (ref 1.8–7.7)
NEUTROPHILS # BLD AUTO: 7.2 K/UL (ref 1.8–7.7)
NEUTROPHILS # BLD AUTO: 7.5 K/UL (ref 1.8–7.7)
NEUTROPHILS # BLD AUTO: 7.9 K/UL (ref 1.8–7.7)
NEUTROPHILS # BLD AUTO: 8.3 K/UL (ref 1.8–7.7)
NEUTROPHILS # BLD AUTO: 8.6 K/UL (ref 1.8–7.7)
NEUTROPHILS # BLD AUTO: 8.8 K/UL (ref 1.8–7.7)
NEUTROPHILS # BLD AUTO: 8.9 K/UL (ref 1.8–7.7)
NEUTROPHILS # BLD AUTO: 9.3 K/UL (ref 1.8–7.7)
NEUTROPHILS # BLD AUTO: 9.8 K/UL (ref 1.8–7.7)
NEUTROPHILS NFR BLD: 43 % (ref 38–73)
NEUTROPHILS NFR BLD: 45.4 % (ref 38–73)
NEUTROPHILS NFR BLD: 46 % (ref 38–73)
NEUTROPHILS NFR BLD: 50 % (ref 38–73)
NEUTROPHILS NFR BLD: 50.9 % (ref 38–73)
NEUTROPHILS NFR BLD: 52.3 % (ref 38–73)
NEUTROPHILS NFR BLD: 52.8 % (ref 38–73)
NEUTROPHILS NFR BLD: 54.6 % (ref 38–73)
NEUTROPHILS NFR BLD: 56.2 % (ref 38–73)
NEUTROPHILS NFR BLD: 57.7 % (ref 38–73)
NEUTROPHILS NFR BLD: 58.2 % (ref 38–73)
NEUTROPHILS NFR BLD: 59 % (ref 38–73)
NEUTROPHILS NFR BLD: 59.5 % (ref 38–73)
NEUTROPHILS NFR BLD: 61.4 % (ref 38–73)
NEUTROPHILS NFR BLD: 61.8 % (ref 38–73)
NEUTROPHILS NFR BLD: 62.1 % (ref 38–73)
NEUTROPHILS NFR BLD: 62.3 % (ref 38–73)
NEUTROPHILS NFR BLD: 62.4 % (ref 38–73)
NEUTROPHILS NFR BLD: 63 % (ref 38–73)
NEUTROPHILS NFR BLD: 64.3 % (ref 38–73)
NEUTROPHILS NFR BLD: 67.3 % (ref 38–73)
NEUTROPHILS NFR BLD: 68.1 % (ref 38–73)
NEUTROPHILS NFR BLD: 69.8 % (ref 38–73)
NEUTROPHILS NFR BLD: 70.2 % (ref 38–73)
NEUTROPHILS NFR BLD: 70.5 % (ref 38–73)
NEUTROPHILS NFR BLD: 70.5 % (ref 38–73)
NEUTROPHILS NFR BLD: 71.6 % (ref 38–73)
NEUTROPHILS NFR BLD: 73 % (ref 38–73)
NEUTROPHILS NFR BLD: 73.2 % (ref 38–73)
NEUTROPHILS NFR BLD: 74.2 % (ref 38–73)
NEUTROPHILS NFR BLD: 74.3 % (ref 38–73)
NEUTROPHILS NFR BLD: 74.7 % (ref 38–73)
NEUTROPHILS NFR BLD: 74.8 % (ref 38–73)
NEUTROPHILS NFR BLD: 76.4 % (ref 38–73)
NEUTROPHILS NFR BLD: 77.6 % (ref 38–73)
NEUTROPHILS NFR BLD: 79.1 % (ref 38–73)
NEUTS BAND NFR BLD MANUAL: 1 %
NEUTS BAND NFR BLD MANUAL: 4 %
NEUTS BAND NFR BLD MANUAL: 4 %
NITRITE UR QL STRIP: NEGATIVE
NITRITE UR QL STRIP: POSITIVE
NONHDLC SERPL-MCNC: 76 MG/DL
NRBC BLD-RTO: 0 /100 WBC
PATH REV BLD -IMP: NORMAL
PATH REV BLD -IMP: NORMAL
PH UR STRIP: 5 [PH] (ref 5–8)
PH UR STRIP: 7 [PH] (ref 5–8)
PH UR STRIP: 8 [PH] (ref 5–8)
PHOSPHATE SERPL-MCNC: 2.4 MG/DL (ref 2.7–4.5)
PHOSPHATE SERPL-MCNC: 2.4 MG/DL (ref 2.7–4.5)
PHOSPHATE SERPL-MCNC: 2.6 MG/DL (ref 2.7–4.5)
PHOSPHATE SERPL-MCNC: 2.7 MG/DL (ref 2.7–4.5)
PHOSPHATE SERPL-MCNC: 2.8 MG/DL (ref 2.7–4.5)
PHOSPHATE SERPL-MCNC: 2.9 MG/DL (ref 2.7–4.5)
PHOSPHATE SERPL-MCNC: 3.1 MG/DL (ref 2.7–4.5)
PHOSPHATE SERPL-MCNC: 3.1 MG/DL (ref 2.7–4.5)
PHOSPHATE SERPL-MCNC: 3.2 MG/DL (ref 2.7–4.5)
PHOSPHATE SERPL-MCNC: 3.2 MG/DL (ref 2.7–4.5)
PHOSPHATE SERPL-MCNC: 3.3 MG/DL (ref 2.7–4.5)
PHOSPHATE SERPL-MCNC: 3.3 MG/DL (ref 2.7–4.5)
PHOSPHATE SERPL-MCNC: 3.5 MG/DL (ref 2.7–4.5)
PHOSPHATE SERPL-MCNC: 3.5 MG/DL (ref 2.7–4.5)
PHOSPHATE SERPL-MCNC: 3.6 MG/DL (ref 2.7–4.5)
PHOSPHATE SERPL-MCNC: 3.8 MG/DL (ref 2.7–4.5)
PHOSPHATE SERPL-MCNC: 3.8 MG/DL (ref 2.7–4.5)
PHOSPHATE SERPL-MCNC: 3.9 MG/DL (ref 2.7–4.5)
PHOSPHATE SERPL-MCNC: 4.1 MG/DL (ref 2.7–4.5)
PHOSPHATE SERPL-MCNC: 4.2 MG/DL (ref 2.7–4.5)
PHOSPHATE SERPL-MCNC: 5.2 MG/DL (ref 2.7–4.5)
PISA TR MAX VEL: 2.78 M/S
PISA TR MAX VEL: 3.59 M/S
PLATELET # BLD AUTO: 113 K/UL (ref 150–450)
PLATELET # BLD AUTO: 136 K/UL (ref 150–450)
PLATELET # BLD AUTO: 159 K/UL (ref 150–450)
PLATELET # BLD AUTO: 176 K/UL (ref 150–450)
PLATELET # BLD AUTO: 177 K/UL (ref 150–450)
PLATELET # BLD AUTO: 199 K/UL (ref 150–450)
PLATELET # BLD AUTO: 209 K/UL (ref 150–450)
PLATELET # BLD AUTO: 214 K/UL (ref 150–450)
PLATELET # BLD AUTO: 215 K/UL (ref 150–450)
PLATELET # BLD AUTO: 217 K/UL (ref 150–450)
PLATELET # BLD AUTO: 219 K/UL (ref 150–450)
PLATELET # BLD AUTO: 222 K/UL (ref 150–450)
PLATELET # BLD AUTO: 226 K/UL (ref 150–450)
PLATELET # BLD AUTO: 231 K/UL (ref 150–450)
PLATELET # BLD AUTO: 233 K/UL (ref 150–450)
PLATELET # BLD AUTO: 236 K/UL (ref 150–450)
PLATELET # BLD AUTO: 244 K/UL (ref 150–450)
PLATELET # BLD AUTO: 250 K/UL (ref 150–450)
PLATELET # BLD AUTO: 251 K/UL (ref 150–450)
PLATELET # BLD AUTO: 252 K/UL (ref 150–450)
PLATELET # BLD AUTO: 252 K/UL (ref 150–450)
PLATELET # BLD AUTO: 253 K/UL (ref 150–450)
PLATELET # BLD AUTO: 259 K/UL (ref 150–450)
PLATELET # BLD AUTO: 261 K/UL (ref 150–450)
PLATELET # BLD AUTO: 262 K/UL (ref 150–450)
PLATELET # BLD AUTO: 267 K/UL (ref 150–450)
PLATELET # BLD AUTO: 268 K/UL (ref 150–450)
PLATELET # BLD AUTO: 278 K/UL (ref 150–450)
PLATELET # BLD AUTO: 288 K/UL (ref 150–450)
PLATELET # BLD AUTO: 312 K/UL (ref 150–450)
PLATELET # BLD AUTO: 312 K/UL (ref 150–450)
PLATELET # BLD AUTO: 314 K/UL (ref 150–450)
PLATELET # BLD AUTO: 348 K/UL (ref 150–450)
PLATELET # BLD AUTO: 63 K/UL (ref 150–450)
PLATELET # BLD AUTO: 68 K/UL (ref 150–450)
PLATELET # BLD AUTO: 70 K/UL (ref 150–450)
PLATELET # BLD AUTO: 76 K/UL (ref 150–450)
PLATELET # BLD AUTO: 79 K/UL (ref 150–450)
PLATELET BLD QL SMEAR: ABNORMAL
PMV BLD AUTO: 10 FL (ref 9.2–12.9)
PMV BLD AUTO: 10.2 FL (ref 9.2–12.9)
PMV BLD AUTO: 10.3 FL (ref 9.2–12.9)
PMV BLD AUTO: 10.4 FL (ref 9.2–12.9)
PMV BLD AUTO: 10.4 FL (ref 9.2–12.9)
PMV BLD AUTO: 10.5 FL (ref 9.2–12.9)
PMV BLD AUTO: 10.6 FL (ref 9.2–12.9)
PMV BLD AUTO: 10.7 FL (ref 9.2–12.9)
PMV BLD AUTO: 10.8 FL (ref 9.2–12.9)
PMV BLD AUTO: 10.9 FL (ref 9.2–12.9)
PMV BLD AUTO: 11 FL (ref 9.2–12.9)
PMV BLD AUTO: 11.1 FL (ref 9.2–12.9)
PMV BLD AUTO: 11.3 FL (ref 9.2–12.9)
PMV BLD AUTO: 11.4 FL (ref 9.2–12.9)
PMV BLD AUTO: 11.7 FL (ref 9.2–12.9)
PMV BLD AUTO: 11.7 FL (ref 9.2–12.9)
PMV BLD AUTO: 12.1 FL (ref 9.2–12.9)
PMV BLD AUTO: 9 FL (ref 9.2–12.9)
PMV BLD AUTO: 9 FL (ref 9.2–12.9)
PMV BLD AUTO: 9.2 FL (ref 9.2–12.9)
PMV BLD AUTO: 9.3 FL (ref 9.2–12.9)
PMV BLD AUTO: 9.3 FL (ref 9.2–12.9)
PMV BLD AUTO: 9.6 FL (ref 9.2–12.9)
PMV BLD AUTO: 9.8 FL (ref 9.2–12.9)
PMV BLD AUTO: 9.9 FL (ref 9.2–12.9)
PMV BLD AUTO: 9.9 FL (ref 9.2–12.9)
POCT GLUCOSE: 101 MG/DL (ref 70–110)
POCT GLUCOSE: 116 MG/DL (ref 70–110)
POCT GLUCOSE: 125 MG/DL (ref 70–110)
POCT GLUCOSE: 129 MG/DL (ref 70–110)
POCT GLUCOSE: 133 MG/DL (ref 70–110)
POCT GLUCOSE: 136 MG/DL (ref 70–110)
POCT GLUCOSE: 140 MG/DL (ref 70–110)
POCT GLUCOSE: 141 MG/DL (ref 70–110)
POCT GLUCOSE: 142 MG/DL (ref 70–110)
POCT GLUCOSE: 170 MG/DL (ref 70–110)
POCT GLUCOSE: 70 MG/DL (ref 70–110)
POCT GLUCOSE: 73 MG/DL (ref 70–110)
POCT GLUCOSE: 74 MG/DL (ref 70–110)
POCT GLUCOSE: 75 MG/DL (ref 70–110)
POCT GLUCOSE: 76 MG/DL (ref 70–110)
POCT GLUCOSE: 78 MG/DL (ref 70–110)
POCT GLUCOSE: 79 MG/DL (ref 70–110)
POCT GLUCOSE: 81 MG/DL (ref 70–110)
POCT GLUCOSE: 85 MG/DL (ref 70–110)
POCT GLUCOSE: 86 MG/DL (ref 70–110)
POCT GLUCOSE: 86 MG/DL (ref 70–110)
POCT GLUCOSE: 87 MG/DL (ref 70–110)
POCT GLUCOSE: 87 MG/DL (ref 70–110)
POCT GLUCOSE: 88 MG/DL (ref 70–110)
POCT GLUCOSE: 89 MG/DL (ref 70–110)
POCT GLUCOSE: 94 MG/DL (ref 70–110)
POCT GLUCOSE: 96 MG/DL (ref 70–110)
POIKILOCYTOSIS BLD QL SMEAR: SLIGHT
POLYCHROMASIA BLD QL SMEAR: ABNORMAL
POTASSIUM SERPL-SCNC: 3.1 MMOL/L (ref 3.5–5.1)
POTASSIUM SERPL-SCNC: 3.2 MMOL/L (ref 3.5–5.1)
POTASSIUM SERPL-SCNC: 3.2 MMOL/L (ref 3.5–5.1)
POTASSIUM SERPL-SCNC: 3.5 MMOL/L (ref 3.5–5.1)
POTASSIUM SERPL-SCNC: 3.7 MMOL/L (ref 3.5–5.1)
POTASSIUM SERPL-SCNC: 3.8 MMOL/L (ref 3.5–5.1)
POTASSIUM SERPL-SCNC: 3.9 MMOL/L (ref 3.5–5.1)
POTASSIUM SERPL-SCNC: 4.1 MMOL/L (ref 3.5–5.1)
POTASSIUM SERPL-SCNC: 4.2 MMOL/L (ref 3.5–5.1)
POTASSIUM SERPL-SCNC: 4.3 MMOL/L (ref 3.5–5.1)
POTASSIUM SERPL-SCNC: 4.4 MMOL/L (ref 3.5–5.1)
POTASSIUM SERPL-SCNC: 4.4 MMOL/L (ref 3.5–5.1)
POTASSIUM SERPL-SCNC: 4.5 MMOL/L (ref 3.5–5.1)
POTASSIUM SERPL-SCNC: 4.5 MMOL/L (ref 3.5–5.1)
POTASSIUM SERPL-SCNC: 4.6 MMOL/L (ref 3.5–5.1)
POTASSIUM SERPL-SCNC: 4.7 MMOL/L (ref 3.5–5.1)
POTASSIUM SERPL-SCNC: 4.8 MMOL/L (ref 3.5–5.1)
POTASSIUM SERPL-SCNC: 4.9 MMOL/L (ref 3.5–5.1)
POTASSIUM SERPL-SCNC: 5.1 MMOL/L (ref 3.5–5.1)
POTASSIUM SERPL-SCNC: 5.2 MMOL/L (ref 3.5–5.1)
POTASSIUM SERPL-SCNC: 5.3 MMOL/L (ref 3.5–5.1)
PROCALCITONIN SERPL IA-MCNC: 0.25 NG/ML
PROT SERPL-MCNC: 3.7 G/DL (ref 6–8.4)
PROT SERPL-MCNC: 3.8 G/DL (ref 6–8.4)
PROT SERPL-MCNC: 3.8 G/DL (ref 6–8.4)
PROT SERPL-MCNC: 3.9 G/DL (ref 6–8.4)
PROT SERPL-MCNC: 4 G/DL (ref 6–8.4)
PROT SERPL-MCNC: 4.1 G/DL (ref 6–8.4)
PROT SERPL-MCNC: 4.2 G/DL (ref 6–8.4)
PROT SERPL-MCNC: 4.4 G/DL (ref 6–8.4)
PROT SERPL-MCNC: 4.5 G/DL (ref 6–8.4)
PROT SERPL-MCNC: 4.6 G/DL (ref 6–8.4)
PROT SERPL-MCNC: 4.7 G/DL (ref 6–8.4)
PROT SERPL-MCNC: 4.8 G/DL (ref 6–8.4)
PROT SERPL-MCNC: 4.8 G/DL (ref 6–8.4)
PROT SERPL-MCNC: 4.9 G/DL (ref 6–8.4)
PROT SERPL-MCNC: 5 G/DL (ref 6–8.4)
PROT SERPL-MCNC: 5.2 G/DL (ref 6–8.4)
PROT SERPL-MCNC: 5.4 G/DL (ref 6–8.4)
PROT UR QL STRIP: ABNORMAL
PROT UR QL STRIP: NEGATIVE
PROT UR QL STRIP: NEGATIVE
PROT UR-MCNC: 14 MG/DL (ref 0–15)
PROT UR-MCNC: 14 MG/DL (ref 0–15)
PROT UR-MCNC: <7 MG/DL (ref 0–15)
PROT/CREAT UR: 0.13 MG/G{CREAT} (ref 0–0.2)
PROT/CREAT UR: NORMAL MG/G{CREAT} (ref 0–0.2)
PROTHROMBIN TIME: 12.4 SEC (ref 9–12.5)
RA MAJOR: 5.19 CM
RA WIDTH: 4.02 CM
RBC # BLD AUTO: 2.45 M/UL (ref 4–5.4)
RBC # BLD AUTO: 2.46 M/UL (ref 4–5.4)
RBC # BLD AUTO: 2.47 M/UL (ref 4–5.4)
RBC # BLD AUTO: 2.47 M/UL (ref 4–5.4)
RBC # BLD AUTO: 2.5 M/UL (ref 4–5.4)
RBC # BLD AUTO: 2.58 M/UL (ref 4–5.4)
RBC # BLD AUTO: 2.63 M/UL (ref 4–5.4)
RBC # BLD AUTO: 2.7 M/UL (ref 4–5.4)
RBC # BLD AUTO: 2.72 M/UL (ref 4–5.4)
RBC # BLD AUTO: 2.72 M/UL (ref 4–5.4)
RBC # BLD AUTO: 2.73 M/UL (ref 4–5.4)
RBC # BLD AUTO: 2.75 M/UL (ref 4–5.4)
RBC # BLD AUTO: 2.76 M/UL (ref 4–5.4)
RBC # BLD AUTO: 2.91 M/UL (ref 4–5.4)
RBC # BLD AUTO: 2.97 M/UL (ref 4–5.4)
RBC # BLD AUTO: 2.98 M/UL (ref 4–5.4)
RBC # BLD AUTO: 3.02 M/UL (ref 4–5.4)
RBC # BLD AUTO: 3.02 M/UL (ref 4–5.4)
RBC # BLD AUTO: 3.03 M/UL (ref 4–5.4)
RBC # BLD AUTO: 3.08 M/UL (ref 4–5.4)
RBC # BLD AUTO: 3.1 M/UL (ref 4–5.4)
RBC # BLD AUTO: 3.23 M/UL (ref 4–5.4)
RBC # BLD AUTO: 3.24 M/UL (ref 4–5.4)
RBC # BLD AUTO: 3.3 M/UL (ref 4–5.4)
RBC # BLD AUTO: 3.31 M/UL (ref 4–5.4)
RBC # BLD AUTO: 3.32 M/UL (ref 4–5.4)
RBC # BLD AUTO: 3.32 M/UL (ref 4–5.4)
RBC # BLD AUTO: 3.33 M/UL (ref 4–5.4)
RBC # BLD AUTO: 3.35 M/UL (ref 4–5.4)
RBC # BLD AUTO: 3.36 M/UL (ref 4–5.4)
RBC # BLD AUTO: 3.42 M/UL (ref 4–5.4)
RBC # BLD AUTO: 3.62 M/UL (ref 4–5.4)
RBC # BLD AUTO: 3.64 M/UL (ref 4–5.4)
RBC # BLD AUTO: 3.71 M/UL (ref 4–5.4)
RBC # BLD AUTO: 3.76 M/UL (ref 4–5.4)
RBC # BLD AUTO: 4.03 M/UL (ref 4–5.4)
RBC # BLD AUTO: 4.1 M/UL (ref 4–5.4)
RBC # BLD AUTO: 4.5 M/UL (ref 4–5.4)
RBC #/AREA URNS AUTO: 0 /HPF (ref 0–4)
RBC #/AREA URNS AUTO: 3 /HPF (ref 0–4)
RBC #/AREA URNS AUTO: 36 /HPF (ref 0–4)
RBC #/AREA URNS AUTO: 57 /HPF (ref 0–4)
RIGHT VENTRICULAR END-DIASTOLIC DIMENSION: 3.2 CM
SAMPLE: NORMAL
SARS-COV-2 RNA RESP QL NAA+PROBE: NOT DETECTED
SARS-COV-2 RNA RESP QL NAA+PROBE: NOT DETECTED
SATURATED IRON: 35 % (ref 20–50)
SATURATED IRON: 67 % (ref 20–50)
SCHISTOCYTES BLD QL SMEAR: PRESENT
SINUS: 3.32 CM
SITE: NORMAL
SMUDGE CELLS BLD QL SMEAR: PRESENT
SODIUM SERPL-SCNC: 132 MMOL/L (ref 136–145)
SODIUM SERPL-SCNC: 133 MMOL/L (ref 136–145)
SODIUM SERPL-SCNC: 133 MMOL/L (ref 136–145)
SODIUM SERPL-SCNC: 135 MMOL/L (ref 136–145)
SODIUM SERPL-SCNC: 136 MMOL/L (ref 136–145)
SODIUM SERPL-SCNC: 137 MMOL/L (ref 136–145)
SODIUM SERPL-SCNC: 138 MMOL/L (ref 136–145)
SODIUM SERPL-SCNC: 138 MMOL/L (ref 136–145)
SODIUM SERPL-SCNC: 139 MMOL/L (ref 136–145)
SODIUM SERPL-SCNC: 139 MMOL/L (ref 136–145)
SODIUM SERPL-SCNC: 140 MMOL/L (ref 136–145)
SODIUM SERPL-SCNC: 141 MMOL/L (ref 136–145)
SODIUM SERPL-SCNC: 142 MMOL/L (ref 136–145)
SODIUM SERPL-SCNC: 143 MMOL/L (ref 136–145)
SODIUM SERPL-SCNC: 144 MMOL/L (ref 136–145)
SODIUM UR-SCNC: 100 MMOL/L (ref 20–250)
SODIUM UR-SCNC: 102 MMOL/L (ref 20–250)
SP GR UR STRIP: 1.01 (ref 1–1.03)
SP GR UR STRIP: 1.02 (ref 1–1.03)
SP GR UR STRIP: 1.02 (ref 1–1.03)
SPHEROCYTES BLD QL SMEAR: ABNORMAL
SQUAMOUS #/AREA URNS AUTO: 0 /HPF
SQUAMOUS #/AREA URNS AUTO: 1 /HPF
SQUAMOUS #/AREA URNS AUTO: 2 /HPF
SQUAMOUS #/AREA URNS AUTO: 3 /HPF
STAPH AUREUS ID BY PCR: NEGATIVE
STJ: 2.84 CM
TB INDURATION 48 - 72 HR READ: 0 MM
TDI LATERAL: 0.1 M/S
TDI SEPTAL: 0.05 M/S
TDI: 0.08 M/S
TOTAL IRON BINDING CAPACITY: 132 UG/DL (ref 250–450)
TOTAL IRON BINDING CAPACITY: 284 UG/DL (ref 250–450)
TR MAX PG: 31 MMHG
TR MAX PG: 52 MMHG
TRANSFERRIN SERPL-MCNC: 192 MG/DL (ref 200–375)
TRANSFERRIN SERPL-MCNC: 89 MG/DL (ref 200–375)
TRICUSPID ANNULAR PLANE SYSTOLIC EXCURSION: 2.24 CM
TRIGL SERPL-MCNC: 103 MG/DL (ref 30–150)
TROPONIN I SERPL DL<=0.01 NG/ML-MCNC: 0.03 NG/ML (ref 0–0.03)
TROPONIN I SERPL DL<=0.01 NG/ML-MCNC: 0.04 NG/ML (ref 0–0.03)
TROPONIN I SERPL DL<=0.01 NG/ML-MCNC: 0.04 NG/ML (ref 0–0.03)
TROPONIN I SERPL DL<=0.01 NG/ML-MCNC: 0.15 NG/ML (ref 0–0.03)
TROPONIN I SERPL DL<=0.01 NG/ML-MCNC: 0.15 NG/ML (ref 0–0.03)
TROPONIN I SERPL DL<=0.01 NG/ML-MCNC: 0.16 NG/ML (ref 0–0.03)
TROPONIN I SERPL DL<=0.01 NG/ML-MCNC: 0.16 NG/ML (ref 0–0.03)
TSH SERPL DL<=0.005 MIU/L-ACNC: 0.53 UIU/ML (ref 0.4–4)
TSH SERPL DL<=0.005 MIU/L-ACNC: 0.88 UIU/ML (ref 0.4–4)
TSH SERPL DL<=0.005 MIU/L-ACNC: 1.1 UIU/ML (ref 0.4–4)
URN SPEC COLLECT METH UR: ABNORMAL
URN SPEC COLLECT METH UR: NORMAL
VANCOMYCIN SERPL-MCNC: 12.5 UG/ML
VANCOMYCIN SERPL-MCNC: 16 UG/ML
VANCOMYCIN SERPL-MCNC: 18.8 UG/ML
VANCOMYCIN SERPL-MCNC: 20.6 UG/ML
VANCOMYCIN SERPL-MCNC: 9.4 UG/ML
VIT B12 SERPL-MCNC: 336 PG/ML (ref 210–950)
WBC # BLD AUTO: 10.1 K/UL (ref 3.9–12.7)
WBC # BLD AUTO: 10.58 K/UL (ref 3.9–12.7)
WBC # BLD AUTO: 11.03 K/UL (ref 3.9–12.7)
WBC # BLD AUTO: 11.39 K/UL (ref 3.9–12.7)
WBC # BLD AUTO: 11.54 K/UL (ref 3.9–12.7)
WBC # BLD AUTO: 11.73 K/UL (ref 3.9–12.7)
WBC # BLD AUTO: 11.75 K/UL (ref 3.9–12.7)
WBC # BLD AUTO: 11.84 K/UL (ref 3.9–12.7)
WBC # BLD AUTO: 12.6 K/UL (ref 3.9–12.7)
WBC # BLD AUTO: 5.39 K/UL (ref 3.9–12.7)
WBC # BLD AUTO: 5.4 K/UL (ref 3.9–12.7)
WBC # BLD AUTO: 5.56 K/UL (ref 3.9–12.7)
WBC # BLD AUTO: 5.74 K/UL (ref 3.9–12.7)
WBC # BLD AUTO: 5.87 K/UL (ref 3.9–12.7)
WBC # BLD AUTO: 6.09 K/UL (ref 3.9–12.7)
WBC # BLD AUTO: 6.37 K/UL (ref 3.9–12.7)
WBC # BLD AUTO: 6.67 K/UL (ref 3.9–12.7)
WBC # BLD AUTO: 6.75 K/UL (ref 3.9–12.7)
WBC # BLD AUTO: 6.93 K/UL (ref 3.9–12.7)
WBC # BLD AUTO: 6.95 K/UL (ref 3.9–12.7)
WBC # BLD AUTO: 6.97 K/UL (ref 3.9–12.7)
WBC # BLD AUTO: 7.01 K/UL (ref 3.9–12.7)
WBC # BLD AUTO: 7.04 K/UL (ref 3.9–12.7)
WBC # BLD AUTO: 7.18 K/UL (ref 3.9–12.7)
WBC # BLD AUTO: 7.29 K/UL (ref 3.9–12.7)
WBC # BLD AUTO: 7.61 K/UL (ref 3.9–12.7)
WBC # BLD AUTO: 7.66 K/UL (ref 3.9–12.7)
WBC # BLD AUTO: 7.75 K/UL (ref 3.9–12.7)
WBC # BLD AUTO: 7.88 K/UL (ref 3.9–12.7)
WBC # BLD AUTO: 8.27 K/UL (ref 3.9–12.7)
WBC # BLD AUTO: 8.38 K/UL (ref 3.9–12.7)
WBC # BLD AUTO: 8.41 K/UL (ref 3.9–12.7)
WBC # BLD AUTO: 8.49 K/UL (ref 3.9–12.7)
WBC # BLD AUTO: 8.52 K/UL (ref 3.9–12.7)
WBC # BLD AUTO: 8.59 K/UL (ref 3.9–12.7)
WBC # BLD AUTO: 8.77 K/UL (ref 3.9–12.7)
WBC # BLD AUTO: 9.01 K/UL (ref 3.9–12.7)
WBC # BLD AUTO: 9.81 K/UL (ref 3.9–12.7)
WBC #/AREA URNS AUTO: 0 /HPF (ref 0–5)
WBC #/AREA URNS AUTO: 8 /HPF (ref 0–5)
WBC #/AREA URNS AUTO: 8 /HPF (ref 0–5)
WBC #/AREA URNS AUTO: >100 /HPF (ref 0–5)
WBC CLUMPS UR QL AUTO: ABNORMAL

## 2023-01-01 PROCEDURE — 36415 COLL VENOUS BLD VENIPUNCTURE: CPT | Performed by: INTERNAL MEDICINE

## 2023-01-01 PROCEDURE — 83735 ASSAY OF MAGNESIUM: CPT | Performed by: INTERNAL MEDICINE

## 2023-01-01 PROCEDURE — 83735 ASSAY OF MAGNESIUM: CPT

## 2023-01-01 PROCEDURE — 97116 GAIT TRAINING THERAPY: CPT | Mod: CQ

## 2023-01-01 PROCEDURE — 93005 ELECTROCARDIOGRAM TRACING: CPT

## 2023-01-01 PROCEDURE — G0378 HOSPITAL OBSERVATION PER HR: HCPCS

## 2023-01-01 PROCEDURE — 25000003 PHARM REV CODE 250: Performed by: NURSE PRACTITIONER

## 2023-01-01 PROCEDURE — 93294 CARDIAC DEVICE CHECK - REMOTE: ICD-10-PCS | Mod: ,,, | Performed by: INTERNAL MEDICINE

## 2023-01-01 PROCEDURE — 97530 THERAPEUTIC ACTIVITIES: CPT | Mod: CQ

## 2023-01-01 PROCEDURE — 93010 EKG 12-LEAD: ICD-10-PCS | Mod: ,,, | Performed by: INTERNAL MEDICINE

## 2023-01-01 PROCEDURE — 94761 N-INVAS EAR/PLS OXIMETRY MLT: CPT

## 2023-01-01 PROCEDURE — 63600175 PHARM REV CODE 636 W HCPCS: Performed by: PHYSICIAN ASSISTANT

## 2023-01-01 PROCEDURE — 63600175 PHARM REV CODE 636 W HCPCS: Performed by: STUDENT IN AN ORGANIZED HEALTH CARE EDUCATION/TRAINING PROGRAM

## 2023-01-01 PROCEDURE — 87186 SC STD MICRODIL/AGAR DIL: CPT | Mod: 59 | Performed by: EMERGENCY MEDICINE

## 2023-01-01 PROCEDURE — 25000003 PHARM REV CODE 250: Performed by: HOSPITALIST

## 2023-01-01 PROCEDURE — 63600175 PHARM REV CODE 636 W HCPCS

## 2023-01-01 PROCEDURE — 25000003 PHARM REV CODE 250: Performed by: INTERNAL MEDICINE

## 2023-01-01 PROCEDURE — 25000003 PHARM REV CODE 250: Performed by: FAMILY MEDICINE

## 2023-01-01 PROCEDURE — 20600001 HC STEP DOWN PRIVATE ROOM

## 2023-01-01 PROCEDURE — 80202 ASSAY OF VANCOMYCIN: CPT | Performed by: STUDENT IN AN ORGANIZED HEALTH CARE EDUCATION/TRAINING PROGRAM

## 2023-01-01 PROCEDURE — 87389 HIV-1 AG W/HIV-1&-2 AB AG IA: CPT | Performed by: PHYSICIAN ASSISTANT

## 2023-01-01 PROCEDURE — 85025 COMPLETE CBC W/AUTO DIFF WBC: CPT | Performed by: FAMILY MEDICINE

## 2023-01-01 PROCEDURE — 80053 COMPREHEN METABOLIC PANEL: CPT | Mod: 91 | Performed by: EMERGENCY MEDICINE

## 2023-01-01 PROCEDURE — 25000003 PHARM REV CODE 250: Performed by: STUDENT IN AN ORGANIZED HEALTH CARE EDUCATION/TRAINING PROGRAM

## 2023-01-01 PROCEDURE — 11000001 HC ACUTE MED/SURG PRIVATE ROOM

## 2023-01-01 PROCEDURE — 97116 GAIT TRAINING THERAPY: CPT

## 2023-01-01 PROCEDURE — 83735 ASSAY OF MAGNESIUM: CPT | Performed by: FAMILY MEDICINE

## 2023-01-01 PROCEDURE — 87077 CULTURE AEROBIC IDENTIFY: CPT | Performed by: EMERGENCY MEDICINE

## 2023-01-01 PROCEDURE — 80076 HEPATIC FUNCTION PANEL: CPT | Performed by: INTERNAL MEDICINE

## 2023-01-01 PROCEDURE — 99239 HOSP IP/OBS DSCHRG MGMT >30: CPT | Mod: GC,,, | Performed by: STUDENT IN AN ORGANIZED HEALTH CARE EDUCATION/TRAINING PROGRAM

## 2023-01-01 PROCEDURE — 11000004 HC SNF PRIVATE

## 2023-01-01 PROCEDURE — 99233 SBSQ HOSP IP/OBS HIGH 50: CPT | Mod: ,,,

## 2023-01-01 PROCEDURE — 25000003 PHARM REV CODE 250

## 2023-01-01 PROCEDURE — 80053 COMPREHEN METABOLIC PANEL: CPT | Performed by: STUDENT IN AN ORGANIZED HEALTH CARE EDUCATION/TRAINING PROGRAM

## 2023-01-01 PROCEDURE — G0316 PR PROLONG INPT EVAL EA ADDL 15 MIN: HCPCS | Mod: ,,, | Performed by: STUDENT IN AN ORGANIZED HEALTH CARE EDUCATION/TRAINING PROGRAM

## 2023-01-01 PROCEDURE — 87086 URINE CULTURE/COLONY COUNT: CPT | Performed by: EMERGENCY MEDICINE

## 2023-01-01 PROCEDURE — 99999 PR PBB SHADOW E&M-EST. PATIENT-LVL III: CPT | Mod: PBBFAC,,,

## 2023-01-01 PROCEDURE — 99233 PR SUBSEQUENT HOSPITAL CARE,LEVL III: ICD-10-PCS | Mod: GC,,, | Performed by: STUDENT IN AN ORGANIZED HEALTH CARE EDUCATION/TRAINING PROGRAM

## 2023-01-01 PROCEDURE — 81001 URINALYSIS AUTO W/SCOPE: CPT | Performed by: STUDENT IN AN ORGANIZED HEALTH CARE EDUCATION/TRAINING PROGRAM

## 2023-01-01 PROCEDURE — 80053 COMPREHEN METABOLIC PANEL: CPT

## 2023-01-01 PROCEDURE — 82570 ASSAY OF URINE CREATININE: CPT | Performed by: STUDENT IN AN ORGANIZED HEALTH CARE EDUCATION/TRAINING PROGRAM

## 2023-01-01 PROCEDURE — 93294 REM INTERROG EVL PM/LDLS PM: CPT | Mod: ,,, | Performed by: INTERNAL MEDICINE

## 2023-01-01 PROCEDURE — 97110 THERAPEUTIC EXERCISES: CPT | Mod: CQ

## 2023-01-01 PROCEDURE — 85007 BL SMEAR W/DIFF WBC COUNT: CPT | Performed by: INTERNAL MEDICINE

## 2023-01-01 PROCEDURE — 36415 COLL VENOUS BLD VENIPUNCTURE: CPT

## 2023-01-01 PROCEDURE — 86580 TB INTRADERMAL TEST: CPT

## 2023-01-01 PROCEDURE — 81001 URINALYSIS AUTO W/SCOPE: CPT

## 2023-01-01 PROCEDURE — 63600175 PHARM REV CODE 636 W HCPCS: Performed by: FAMILY MEDICINE

## 2023-01-01 PROCEDURE — 97535 SELF CARE MNGMENT TRAINING: CPT | Mod: CO

## 2023-01-01 PROCEDURE — 21400001 HC TELEMETRY ROOM

## 2023-01-01 PROCEDURE — 85610 PROTHROMBIN TIME: CPT | Performed by: STUDENT IN AN ORGANIZED HEALTH CARE EDUCATION/TRAINING PROGRAM

## 2023-01-01 PROCEDURE — 27000207 HC ISOLATION

## 2023-01-01 PROCEDURE — 99239 PR HOSPITAL DISCHARGE DAY,>30 MIN: ICD-10-PCS | Mod: GV,,, | Performed by: INTERNAL MEDICINE

## 2023-01-01 PROCEDURE — 85060 BLOOD SMEAR INTERPRETATION: CPT | Mod: ,,, | Performed by: PATHOLOGY

## 2023-01-01 PROCEDURE — 36415 COLL VENOUS BLD VENIPUNCTURE: CPT | Performed by: HOSPITALIST

## 2023-01-01 PROCEDURE — 84466 ASSAY OF TRANSFERRIN: CPT

## 2023-01-01 PROCEDURE — S0030 INJECTION, METRONIDAZOLE: HCPCS

## 2023-01-01 PROCEDURE — 83735 ASSAY OF MAGNESIUM: CPT | Performed by: NURSE PRACTITIONER

## 2023-01-01 PROCEDURE — 99232 SBSQ HOSP IP/OBS MODERATE 35: CPT | Mod: GC,,, | Performed by: STUDENT IN AN ORGANIZED HEALTH CARE EDUCATION/TRAINING PROGRAM

## 2023-01-01 PROCEDURE — 93010 ELECTROCARDIOGRAM REPORT: CPT | Mod: ,,, | Performed by: INTERNAL MEDICINE

## 2023-01-01 PROCEDURE — 80053 COMPREHEN METABOLIC PANEL: CPT | Performed by: NURSE PRACTITIONER

## 2023-01-01 PROCEDURE — 96375 TX/PRO/DX INJ NEW DRUG ADDON: CPT

## 2023-01-01 PROCEDURE — 80048 BASIC METABOLIC PNL TOTAL CA: CPT | Performed by: HOSPITALIST

## 2023-01-01 PROCEDURE — 99214 OFFICE O/P EST MOD 30 MIN: CPT | Mod: S$PBB,,, | Performed by: NURSE PRACTITIONER

## 2023-01-01 PROCEDURE — 84100 ASSAY OF PHOSPHORUS: CPT | Performed by: FAMILY MEDICINE

## 2023-01-01 PROCEDURE — 99900035 HC TECH TIME PER 15 MIN (STAT)

## 2023-01-01 PROCEDURE — 85025 COMPLETE CBC W/AUTO DIFF WBC: CPT

## 2023-01-01 PROCEDURE — 83605 ASSAY OF LACTIC ACID: CPT | Mod: 91

## 2023-01-01 PROCEDURE — 80048 BASIC METABOLIC PNL TOTAL CA: CPT | Performed by: INTERNAL MEDICINE

## 2023-01-01 PROCEDURE — 80074 ACUTE HEPATITIS PANEL: CPT | Performed by: NURSE PRACTITIONER

## 2023-01-01 PROCEDURE — 84100 ASSAY OF PHOSPHORUS: CPT | Performed by: INTERNAL MEDICINE

## 2023-01-01 PROCEDURE — 99223 PR INITIAL HOSPITAL CARE,LEVL III: ICD-10-PCS | Mod: ,,, | Performed by: PHYSICIAN ASSISTANT

## 2023-01-01 PROCEDURE — 99233 PR SUBSEQUENT HOSPITAL CARE,LEVL III: ICD-10-PCS | Mod: ,,,

## 2023-01-01 PROCEDURE — 80061 LIPID PANEL: CPT

## 2023-01-01 PROCEDURE — 87088 URINE BACTERIA CULTURE: CPT | Performed by: EMERGENCY MEDICINE

## 2023-01-01 PROCEDURE — 84100 ASSAY OF PHOSPHORUS: CPT

## 2023-01-01 PROCEDURE — 97162 PT EVAL MOD COMPLEX 30 MIN: CPT

## 2023-01-01 PROCEDURE — 97535 SELF CARE MNGMENT TRAINING: CPT

## 2023-01-01 PROCEDURE — 82533 TOTAL CORTISOL: CPT | Performed by: STUDENT IN AN ORGANIZED HEALTH CARE EDUCATION/TRAINING PROGRAM

## 2023-01-01 PROCEDURE — 36415 COLL VENOUS BLD VENIPUNCTURE: CPT | Performed by: STUDENT IN AN ORGANIZED HEALTH CARE EDUCATION/TRAINING PROGRAM

## 2023-01-01 PROCEDURE — 99239 PR HOSPITAL DISCHARGE DAY,>30 MIN: ICD-10-PCS | Mod: GC,,, | Performed by: STUDENT IN AN ORGANIZED HEALTH CARE EDUCATION/TRAINING PROGRAM

## 2023-01-01 PROCEDURE — 80162 ASSAY OF DIGOXIN TOTAL: CPT

## 2023-01-01 PROCEDURE — 25500020 PHARM REV CODE 255

## 2023-01-01 PROCEDURE — 99233 SBSQ HOSP IP/OBS HIGH 50: CPT | Mod: GV,,, | Performed by: STUDENT IN AN ORGANIZED HEALTH CARE EDUCATION/TRAINING PROGRAM

## 2023-01-01 PROCEDURE — 99497 ADVNCD CARE PLAN 30 MIN: CPT | Mod: ,,,

## 2023-01-01 PROCEDURE — 99499 NO LOS: ICD-10-PCS | Mod: GC,,, | Performed by: STUDENT IN AN ORGANIZED HEALTH CARE EDUCATION/TRAINING PROGRAM

## 2023-01-01 PROCEDURE — 84484 ASSAY OF TROPONIN QUANT: CPT | Performed by: EMERGENCY MEDICINE

## 2023-01-01 PROCEDURE — 63600175 PHARM REV CODE 636 W HCPCS: Performed by: EMERGENCY MEDICINE

## 2023-01-01 PROCEDURE — 99233 SBSQ HOSP IP/OBS HIGH 50: CPT | Mod: ,,, | Performed by: STUDENT IN AN ORGANIZED HEALTH CARE EDUCATION/TRAINING PROGRAM

## 2023-01-01 PROCEDURE — 80053 COMPREHEN METABOLIC PANEL: CPT | Mod: 91

## 2023-01-01 PROCEDURE — 99233 SBSQ HOSP IP/OBS HIGH 50: CPT | Mod: ,,, | Performed by: PHYSICIAN ASSISTANT

## 2023-01-01 PROCEDURE — 99223 PR INITIAL HOSPITAL CARE,LEVL III: ICD-10-PCS | Mod: ,,, | Performed by: STUDENT IN AN ORGANIZED HEALTH CARE EDUCATION/TRAINING PROGRAM

## 2023-01-01 PROCEDURE — 97530 THERAPEUTIC ACTIVITIES: CPT | Mod: CO

## 2023-01-01 PROCEDURE — 99233 PR SUBSEQUENT HOSPITAL CARE,LEVL III: ICD-10-PCS | Mod: ,,, | Performed by: STUDENT IN AN ORGANIZED HEALTH CARE EDUCATION/TRAINING PROGRAM

## 2023-01-01 PROCEDURE — 97165 OT EVAL LOW COMPLEX 30 MIN: CPT

## 2023-01-01 PROCEDURE — G0316 PR PROLONG INPT EVAL EA ADDL 15 MIN: ICD-10-PCS | Mod: ,,, | Performed by: STUDENT IN AN ORGANIZED HEALTH CARE EDUCATION/TRAINING PROGRAM

## 2023-01-01 PROCEDURE — 96367 TX/PROPH/DG ADDL SEQ IV INF: CPT

## 2023-01-01 PROCEDURE — 85025 COMPLETE CBC W/AUTO DIFF WBC: CPT | Performed by: NURSE PRACTITIONER

## 2023-01-01 PROCEDURE — 80048 BASIC METABOLIC PNL TOTAL CA: CPT | Performed by: FAMILY MEDICINE

## 2023-01-01 PROCEDURE — 97530 THERAPEUTIC ACTIVITIES: CPT

## 2023-01-01 PROCEDURE — 82746 ASSAY OF FOLIC ACID SERUM: CPT

## 2023-01-01 PROCEDURE — 84100 ASSAY OF PHOSPHORUS: CPT | Performed by: HOSPITALIST

## 2023-01-01 PROCEDURE — 84484 ASSAY OF TROPONIN QUANT: CPT

## 2023-01-01 PROCEDURE — 99223 PR INITIAL HOSPITAL CARE,LEVL III: ICD-10-PCS | Mod: ,,, | Performed by: INTERNAL MEDICINE

## 2023-01-01 PROCEDURE — 99233 PR SUBSEQUENT HOSPITAL CARE,LEVL III: ICD-10-PCS | Mod: ,,, | Performed by: PHYSICIAN ASSISTANT

## 2023-01-01 PROCEDURE — 51798 US URINE CAPACITY MEASURE: CPT

## 2023-01-01 PROCEDURE — 80202 ASSAY OF VANCOMYCIN: CPT | Performed by: HOSPITALIST

## 2023-01-01 PROCEDURE — 36415 COLL VENOUS BLD VENIPUNCTURE: CPT | Performed by: FAMILY MEDICINE

## 2023-01-01 PROCEDURE — 92526 ORAL FUNCTION THERAPY: CPT

## 2023-01-01 PROCEDURE — 85027 COMPLETE CBC AUTOMATED: CPT | Performed by: INTERNAL MEDICINE

## 2023-01-01 PROCEDURE — 97112 NEUROMUSCULAR REEDUCATION: CPT

## 2023-01-01 PROCEDURE — 97110 THERAPEUTIC EXERCISES: CPT

## 2023-01-01 PROCEDURE — 99231 PR SUBSEQUENT HOSPITAL CARE,LEVL I: ICD-10-PCS | Mod: ,,, | Performed by: INTERNAL MEDICINE

## 2023-01-01 PROCEDURE — 63600175 PHARM REV CODE 636 W HCPCS: Performed by: NURSE PRACTITIONER

## 2023-01-01 PROCEDURE — 99239 PR HOSPITAL DISCHARGE DAY,>30 MIN: ICD-10-PCS | Mod: ,,,

## 2023-01-01 PROCEDURE — 96366 THER/PROPH/DIAG IV INF ADDON: CPT

## 2023-01-01 PROCEDURE — 99233 SBSQ HOSP IP/OBS HIGH 50: CPT | Mod: GC,,, | Performed by: STUDENT IN AN ORGANIZED HEALTH CARE EDUCATION/TRAINING PROGRAM

## 2023-01-01 PROCEDURE — 99233 SBSQ HOSP IP/OBS HIGH 50: CPT | Mod: 95,,, | Performed by: INTERNAL MEDICINE

## 2023-01-01 PROCEDURE — 99223 PR INITIAL HOSPITAL CARE,LEVL III: ICD-10-PCS | Mod: GC,,, | Performed by: HOSPITALIST

## 2023-01-01 PROCEDURE — 85025 COMPLETE CBC W/AUTO DIFF WBC: CPT | Performed by: EMERGENCY MEDICINE

## 2023-01-01 PROCEDURE — 85025 COMPLETE CBC W/AUTO DIFF WBC: CPT | Performed by: INTERNAL MEDICINE

## 2023-01-01 PROCEDURE — 99223 1ST HOSP IP/OBS HIGH 75: CPT | Mod: ,,, | Performed by: NURSE PRACTITIONER

## 2023-01-01 PROCEDURE — 25000242 PHARM REV CODE 250 ALT 637 W/ HCPCS: Performed by: FAMILY MEDICINE

## 2023-01-01 PROCEDURE — 84443 ASSAY THYROID STIM HORMONE: CPT

## 2023-01-01 PROCEDURE — 83605 ASSAY OF LACTIC ACID: CPT

## 2023-01-01 PROCEDURE — 99214 PR OFFICE/OUTPT VISIT, EST, LEVL IV, 30-39 MIN: ICD-10-PCS | Mod: S$PBB,,,

## 2023-01-01 PROCEDURE — 99900031 HC PATIENT EDUCATION (STAT)

## 2023-01-01 PROCEDURE — 84145 PROCALCITONIN (PCT): CPT | Performed by: INTERNAL MEDICINE

## 2023-01-01 PROCEDURE — 85027 COMPLETE CBC AUTOMATED: CPT | Performed by: EMERGENCY MEDICINE

## 2023-01-01 PROCEDURE — 82728 ASSAY OF FERRITIN: CPT

## 2023-01-01 PROCEDURE — 85025 COMPLETE CBC W/AUTO DIFF WBC: CPT | Performed by: STUDENT IN AN ORGANIZED HEALTH CARE EDUCATION/TRAINING PROGRAM

## 2023-01-01 PROCEDURE — 99499 UNLISTED E&M SERVICE: CPT | Mod: ,,, | Performed by: PHYSICIAN ASSISTANT

## 2023-01-01 PROCEDURE — 99223 1ST HOSP IP/OBS HIGH 75: CPT | Mod: ,,, | Performed by: PHYSICIAN ASSISTANT

## 2023-01-01 PROCEDURE — 99223 PR INITIAL HOSPITAL CARE,LEVL III: ICD-10-PCS | Mod: 95,GC,, | Performed by: OBSTETRICS & GYNECOLOGY

## 2023-01-01 PROCEDURE — 99223 1ST HOSP IP/OBS HIGH 75: CPT | Mod: ,,, | Performed by: STUDENT IN AN ORGANIZED HEALTH CARE EDUCATION/TRAINING PROGRAM

## 2023-01-01 PROCEDURE — 27000221 HC OXYGEN, UP TO 24 HOURS

## 2023-01-01 PROCEDURE — 83880 ASSAY OF NATRIURETIC PEPTIDE: CPT

## 2023-01-01 PROCEDURE — 97161 PT EVAL LOW COMPLEX 20 MIN: CPT

## 2023-01-01 PROCEDURE — 99223 PR INITIAL HOSPITAL CARE,LEVL III: ICD-10-PCS | Mod: ,,, | Performed by: NURSE PRACTITIONER

## 2023-01-01 PROCEDURE — 99285 EMERGENCY DEPT VISIT HI MDM: CPT | Mod: 25

## 2023-01-01 PROCEDURE — 99285 EMERGENCY DEPT VISIT HI MDM: CPT | Mod: ,,, | Performed by: EMERGENCY MEDICINE

## 2023-01-01 PROCEDURE — 99214 OFFICE O/P EST MOD 30 MIN: CPT | Mod: S$PBB,,,

## 2023-01-01 PROCEDURE — 99233 PR SUBSEQUENT HOSPITAL CARE,LEVL III: ICD-10-PCS | Mod: GC,,, | Performed by: HOSPITALIST

## 2023-01-01 PROCEDURE — 99232 PR SUBSEQUENT HOSPITAL CARE,LEVL II: ICD-10-PCS | Mod: GC,,, | Performed by: STUDENT IN AN ORGANIZED HEALTH CARE EDUCATION/TRAINING PROGRAM

## 2023-01-01 PROCEDURE — 36415 COLL VENOUS BLD VENIPUNCTURE: CPT | Performed by: NURSE PRACTITIONER

## 2023-01-01 PROCEDURE — 87040 BLOOD CULTURE FOR BACTERIA: CPT | Mod: 59 | Performed by: EMERGENCY MEDICINE

## 2023-01-01 PROCEDURE — 99223 1ST HOSP IP/OBS HIGH 75: CPT | Mod: GC,,, | Performed by: HOSPITALIST

## 2023-01-01 PROCEDURE — 82550 ASSAY OF CK (CPK): CPT

## 2023-01-01 PROCEDURE — 99306 PR NURSING FACILITY CARE, INIT, HIGH SEVERITY: ICD-10-PCS | Mod: AI,,, | Performed by: HOSPITALIST

## 2023-01-01 PROCEDURE — 84100 ASSAY OF PHOSPHORUS: CPT | Performed by: STUDENT IN AN ORGANIZED HEALTH CARE EDUCATION/TRAINING PROGRAM

## 2023-01-01 PROCEDURE — 87040 BLOOD CULTURE FOR BACTERIA: CPT | Performed by: INTERNAL MEDICINE

## 2023-01-01 PROCEDURE — 80053 COMPREHEN METABOLIC PANEL: CPT | Performed by: EMERGENCY MEDICINE

## 2023-01-01 PROCEDURE — 82977 ASSAY OF GGT: CPT | Performed by: FAMILY MEDICINE

## 2023-01-01 PROCEDURE — 99999 PR PBB SHADOW E&M-EST. PATIENT-LVL III: CPT | Mod: PBBFAC,,, | Performed by: NURSE PRACTITIONER

## 2023-01-01 PROCEDURE — 97110 THERAPEUTIC EXERCISES: CPT | Mod: CO

## 2023-01-01 PROCEDURE — 82728 ASSAY OF FERRITIN: CPT | Performed by: FAMILY MEDICINE

## 2023-01-01 PROCEDURE — 85027 COMPLETE CBC AUTOMATED: CPT | Performed by: FAMILY MEDICINE

## 2023-01-01 PROCEDURE — 99232 SBSQ HOSP IP/OBS MODERATE 35: CPT | Mod: ,,, | Performed by: INTERNAL MEDICINE

## 2023-01-01 PROCEDURE — 80053 COMPREHEN METABOLIC PANEL: CPT | Performed by: FAMILY MEDICINE

## 2023-01-01 PROCEDURE — 30200315 PPD INTRADERMAL TEST REV CODE 302

## 2023-01-01 PROCEDURE — 99231 SBSQ HOSP IP/OBS SF/LOW 25: CPT | Mod: ,,, | Performed by: INTERNAL MEDICINE

## 2023-01-01 PROCEDURE — 83605 ASSAY OF LACTIC ACID: CPT | Performed by: INTERNAL MEDICINE

## 2023-01-01 PROCEDURE — 99213 OFFICE O/P EST LOW 20 MIN: CPT | Mod: PBBFAC

## 2023-01-01 PROCEDURE — 84300 ASSAY OF URINE SODIUM: CPT | Performed by: STUDENT IN AN ORGANIZED HEALTH CARE EDUCATION/TRAINING PROGRAM

## 2023-01-01 PROCEDURE — 99233 PR SUBSEQUENT HOSPITAL CARE,LEVL III: ICD-10-PCS | Mod: 95,,, | Performed by: INTERNAL MEDICINE

## 2023-01-01 PROCEDURE — 63600175 PHARM REV CODE 636 W HCPCS: Performed by: INTERNAL MEDICINE

## 2023-01-01 PROCEDURE — 83605 ASSAY OF LACTIC ACID: CPT | Performed by: HOSPITALIST

## 2023-01-01 PROCEDURE — 99239 HOSP IP/OBS DSCHRG MGMT >30: CPT | Mod: ,,,

## 2023-01-01 PROCEDURE — 99497 PR ADVNCD CARE PLAN 30 MIN: ICD-10-PCS | Mod: ,,,

## 2023-01-01 PROCEDURE — 85025 COMPLETE CBC W/AUTO DIFF WBC: CPT | Mod: 91

## 2023-01-01 PROCEDURE — 96361 HYDRATE IV INFUSION ADD-ON: CPT

## 2023-01-01 PROCEDURE — 85520 HEPARIN ASSAY: CPT | Performed by: HOSPITALIST

## 2023-01-01 PROCEDURE — 99499 UNLISTED E&M SERVICE: CPT | Mod: GC,,, | Performed by: STUDENT IN AN ORGANIZED HEALTH CARE EDUCATION/TRAINING PROGRAM

## 2023-01-01 PROCEDURE — 81001 URINALYSIS AUTO W/SCOPE: CPT | Performed by: EMERGENCY MEDICINE

## 2023-01-01 PROCEDURE — 84156 ASSAY OF PROTEIN URINE: CPT | Performed by: FAMILY MEDICINE

## 2023-01-01 PROCEDURE — 83735 ASSAY OF MAGNESIUM: CPT | Performed by: STUDENT IN AN ORGANIZED HEALTH CARE EDUCATION/TRAINING PROGRAM

## 2023-01-01 PROCEDURE — 84443 ASSAY THYROID STIM HORMONE: CPT | Performed by: INTERNAL MEDICINE

## 2023-01-01 PROCEDURE — 99232 PR SUBSEQUENT HOSPITAL CARE,LEVL II: ICD-10-PCS | Mod: ,,, | Performed by: INTERNAL MEDICINE

## 2023-01-01 PROCEDURE — 96365 THER/PROPH/DIAG IV INF INIT: CPT

## 2023-01-01 PROCEDURE — 99233 PR SUBSEQUENT HOSPITAL CARE,LEVL III: ICD-10-PCS | Mod: GV,,, | Performed by: STUDENT IN AN ORGANIZED HEALTH CARE EDUCATION/TRAINING PROGRAM

## 2023-01-01 PROCEDURE — 84484 ASSAY OF TROPONIN QUANT: CPT | Mod: 91

## 2023-01-01 PROCEDURE — 83735 ASSAY OF MAGNESIUM: CPT | Performed by: HOSPITALIST

## 2023-01-01 PROCEDURE — S5010 5% DEXTROSE AND 0.45% SALINE: HCPCS | Performed by: FAMILY MEDICINE

## 2023-01-01 PROCEDURE — 80069 RENAL FUNCTION PANEL: CPT | Performed by: STUDENT IN AN ORGANIZED HEALTH CARE EDUCATION/TRAINING PROGRAM

## 2023-01-01 PROCEDURE — 99999 PR PBB SHADOW E&M-EST. PATIENT-LVL III: ICD-10-PCS | Mod: PBBFAC,,, | Performed by: NURSE PRACTITIONER

## 2023-01-01 PROCEDURE — 82607 VITAMIN B-12: CPT

## 2023-01-01 PROCEDURE — 63600175 PHARM REV CODE 636 W HCPCS: Performed by: HOSPITALIST

## 2023-01-01 PROCEDURE — 99223 1ST HOSP IP/OBS HIGH 75: CPT | Mod: ,,, | Performed by: INTERNAL MEDICINE

## 2023-01-01 PROCEDURE — 96376 TX/PRO/DX INJ SAME DRUG ADON: CPT

## 2023-01-01 PROCEDURE — 84100 ASSAY OF PHOSPHORUS: CPT | Performed by: NURSE PRACTITIONER

## 2023-01-01 PROCEDURE — 99285 PR EMERGENCY DEPT VISIT,LEVEL V: ICD-10-PCS | Mod: ,,, | Performed by: EMERGENCY MEDICINE

## 2023-01-01 PROCEDURE — 80048 BASIC METABOLIC PNL TOTAL CA: CPT | Mod: XB

## 2023-01-01 PROCEDURE — 81003 URINALYSIS AUTO W/O SCOPE: CPT | Performed by: FAMILY MEDICINE

## 2023-01-01 PROCEDURE — 87635 SARS-COV-2 COVID-19 AMP PRB: CPT

## 2023-01-01 PROCEDURE — 99499 NO LOS: ICD-10-PCS | Mod: ,,, | Performed by: PHYSICIAN ASSISTANT

## 2023-01-01 PROCEDURE — 99223 1ST HOSP IP/OBS HIGH 75: CPT | Mod: 95,GC,, | Performed by: OBSTETRICS & GYNECOLOGY

## 2023-01-01 PROCEDURE — 85520 HEPARIN ASSAY: CPT | Performed by: STUDENT IN AN ORGANIZED HEALTH CARE EDUCATION/TRAINING PROGRAM

## 2023-01-01 PROCEDURE — 96374 THER/PROPH/DIAG INJ IV PUSH: CPT | Mod: 59

## 2023-01-01 PROCEDURE — 99999 PR PBB SHADOW E&M-EST. PATIENT-LVL III: ICD-10-PCS | Mod: PBBFAC,,,

## 2023-01-01 PROCEDURE — 99306 1ST NF CARE HIGH MDM 50: CPT | Mod: AI,,, | Performed by: HOSPITALIST

## 2023-01-01 PROCEDURE — 25000003 PHARM REV CODE 250: Performed by: EMERGENCY MEDICINE

## 2023-01-01 PROCEDURE — 82140 ASSAY OF AMMONIA: CPT | Performed by: INTERNAL MEDICINE

## 2023-01-01 PROCEDURE — 87150 DNA/RNA AMPLIFIED PROBE: CPT | Mod: 59 | Performed by: EMERGENCY MEDICINE

## 2023-01-01 PROCEDURE — 83735 ASSAY OF MAGNESIUM: CPT | Performed by: EMERGENCY MEDICINE

## 2023-01-01 PROCEDURE — 99239 HOSP IP/OBS DSCHRG MGMT >30: CPT | Mod: GV,,, | Performed by: INTERNAL MEDICINE

## 2023-01-01 PROCEDURE — 87635 SARS-COV-2 COVID-19 AMP PRB: CPT | Performed by: STUDENT IN AN ORGANIZED HEALTH CARE EDUCATION/TRAINING PROGRAM

## 2023-01-01 PROCEDURE — 84466 ASSAY OF TRANSFERRIN: CPT | Performed by: FAMILY MEDICINE

## 2023-01-01 PROCEDURE — 86803 HEPATITIS C AB TEST: CPT | Performed by: PHYSICIAN ASSISTANT

## 2023-01-01 PROCEDURE — 85060 PATHOLOGIST REVIEW: ICD-10-PCS | Mod: ,,, | Performed by: PATHOLOGY

## 2023-01-01 PROCEDURE — 80053 COMPREHEN METABOLIC PANEL: CPT | Mod: 91 | Performed by: NURSE PRACTITIONER

## 2023-01-01 PROCEDURE — 99213 OFFICE O/P EST LOW 20 MIN: CPT | Mod: PBBFAC | Performed by: NURSE PRACTITIONER

## 2023-01-01 PROCEDURE — 99233 SBSQ HOSP IP/OBS HIGH 50: CPT | Mod: GC,,, | Performed by: HOSPITALIST

## 2023-01-01 PROCEDURE — 92610 EVALUATE SWALLOWING FUNCTION: CPT

## 2023-01-01 PROCEDURE — 84443 ASSAY THYROID STIM HORMONE: CPT | Performed by: EMERGENCY MEDICINE

## 2023-01-01 PROCEDURE — 84132 ASSAY OF SERUM POTASSIUM: CPT

## 2023-01-01 PROCEDURE — 87040 BLOOD CULTURE FOR BACTERIA: CPT | Mod: 59

## 2023-01-01 PROCEDURE — 83605 ASSAY OF LACTIC ACID: CPT | Performed by: EMERGENCY MEDICINE

## 2023-01-01 PROCEDURE — 96374 THER/PROPH/DIAG INJ IV PUSH: CPT

## 2023-01-01 PROCEDURE — 87077 CULTURE AEROBIC IDENTIFY: CPT | Mod: 59 | Performed by: EMERGENCY MEDICINE

## 2023-01-01 PROCEDURE — 99214 PR OFFICE/OUTPT VISIT, EST, LEVL IV, 30-39 MIN: ICD-10-PCS | Mod: S$PBB,,, | Performed by: NURSE PRACTITIONER

## 2023-01-01 RX ORDER — MIDODRINE HYDROCHLORIDE 5 MG/1
5 TABLET ORAL EVERY 8 HOURS
Qty: 90 TABLET | Refills: 11
Start: 2023-01-01 | End: 2023-01-01 | Stop reason: SDUPTHER

## 2023-01-01 RX ORDER — ALBUMIN HUMAN 50 G/1000ML
25 SOLUTION INTRAVENOUS ONCE
Status: DISCONTINUED | OUTPATIENT
Start: 2023-01-01 | End: 2023-01-01

## 2023-01-01 RX ORDER — LOPERAMIDE HYDROCHLORIDE 2 MG/1
2 CAPSULE ORAL ONCE
Status: COMPLETED | OUTPATIENT
Start: 2023-01-01 | End: 2023-01-01

## 2023-01-01 RX ORDER — SODIUM CHLORIDE, SODIUM LACTATE, POTASSIUM CHLORIDE, CALCIUM CHLORIDE 600; 310; 30; 20 MG/100ML; MG/100ML; MG/100ML; MG/100ML
INJECTION, SOLUTION INTRAVENOUS CONTINUOUS
Status: ACTIVE | OUTPATIENT
Start: 2023-01-01 | End: 2023-01-01

## 2023-01-01 RX ORDER — DEXTROSE 40 %
15 GEL (GRAM) ORAL
Status: DISCONTINUED | OUTPATIENT
Start: 2023-01-01 | End: 2023-01-01 | Stop reason: HOSPADM

## 2023-01-01 RX ORDER — POLYETHYLENE GLYCOL 3350 17 G/17G
17 POWDER, FOR SOLUTION ORAL DAILY
Status: DISCONTINUED | OUTPATIENT
Start: 2023-01-01 | End: 2023-01-01 | Stop reason: HOSPADM

## 2023-01-01 RX ORDER — DEXTROSE MONOHYDRATE AND SODIUM CHLORIDE 5; .9 G/100ML; G/100ML
INJECTION, SOLUTION INTRAVENOUS CONTINUOUS
Status: DISCONTINUED | OUTPATIENT
Start: 2023-01-01 | End: 2023-01-01

## 2023-01-01 RX ORDER — METRONIDAZOLE 500 MG/100ML
500 INJECTION, SOLUTION INTRAVENOUS
Status: DISCONTINUED | OUTPATIENT
Start: 2023-01-01 | End: 2023-01-01

## 2023-01-01 RX ORDER — POLYETHYLENE GLYCOL 3350 17 G/17G
17 POWDER, FOR SOLUTION ORAL DAILY
Status: DISCONTINUED | OUTPATIENT
Start: 2023-01-01 | End: 2023-01-01

## 2023-01-01 RX ORDER — GLUCAGON 1 MG
1 KIT INJECTION
Status: DISCONTINUED | OUTPATIENT
Start: 2023-01-01 | End: 2023-01-01

## 2023-01-01 RX ORDER — CIPROFLOXACIN 500 MG/1
500 TABLET ORAL EVERY 12 HOURS
Qty: 4 TABLET | Refills: 0 | Status: SHIPPED | OUTPATIENT
Start: 2023-01-01 | End: 2023-01-01 | Stop reason: SDUPTHER

## 2023-01-01 RX ORDER — DICLOFENAC SODIUM 10 MG/G
2 GEL TOPICAL 4 TIMES DAILY
Status: DISCONTINUED | OUTPATIENT
Start: 2023-01-01 | End: 2023-01-01 | Stop reason: HOSPADM

## 2023-01-01 RX ORDER — ONDANSETRON 2 MG/ML
4 INJECTION INTRAMUSCULAR; INTRAVENOUS EVERY 8 HOURS PRN
Status: DISCONTINUED | OUTPATIENT
Start: 2023-01-01 | End: 2023-01-01

## 2023-01-01 RX ORDER — POLYETHYLENE GLYCOL 3350 17 G/17G
17 POWDER, FOR SOLUTION ORAL 3 TIMES DAILY
Status: DISCONTINUED | OUTPATIENT
Start: 2023-01-01 | End: 2023-01-01

## 2023-01-01 RX ORDER — LANOLIN ALCOHOL/MO/W.PET/CERES
1000 CREAM (GRAM) TOPICAL DAILY
Status: ON HOLD
Start: 2023-01-01 | End: 2023-01-01 | Stop reason: HOSPADM

## 2023-01-01 RX ORDER — MIRTAZAPINE 15 MG/1
15 TABLET, FILM COATED ORAL NIGHTLY
Qty: 30 TABLET | Refills: 11 | Status: SHIPPED | OUTPATIENT
Start: 2023-01-01 | End: 2023-01-01 | Stop reason: HOSPADM

## 2023-01-01 RX ORDER — MAG HYDROX/ALUMINUM HYD/SIMETH 200-200-20
30 SUSPENSION, ORAL (FINAL DOSE FORM) ORAL
Status: COMPLETED | OUTPATIENT
Start: 2023-01-01 | End: 2023-01-01

## 2023-01-01 RX ORDER — ENOXAPARIN SODIUM 100 MG/ML
30 INJECTION SUBCUTANEOUS ONCE
Status: COMPLETED | OUTPATIENT
Start: 2023-01-01 | End: 2023-01-01

## 2023-01-01 RX ORDER — PROCHLORPERAZINE EDISYLATE 5 MG/ML
5 INJECTION INTRAMUSCULAR; INTRAVENOUS EVERY 6 HOURS PRN
Status: DISCONTINUED | OUTPATIENT
Start: 2023-01-01 | End: 2023-01-01

## 2023-01-01 RX ORDER — LANOLIN ALCOHOL/MO/W.PET/CERES
400 CREAM (GRAM) TOPICAL 2 TIMES DAILY
Status: COMPLETED | OUTPATIENT
Start: 2023-01-01 | End: 2023-01-01

## 2023-01-01 RX ORDER — FOLIC ACID 1 MG/1
1 TABLET ORAL DAILY
Refills: 0 | Status: ON HOLD
Start: 2023-01-01 | End: 2023-01-01 | Stop reason: HOSPADM

## 2023-01-01 RX ORDER — NALOXONE HCL 0.4 MG/ML
0.02 VIAL (ML) INJECTION
Status: DISCONTINUED | OUTPATIENT
Start: 2023-01-01 | End: 2023-01-01

## 2023-01-01 RX ORDER — MIRTAZAPINE 7.5 MG/1
7.5 TABLET, FILM COATED ORAL NIGHTLY
Status: DISCONTINUED | OUTPATIENT
Start: 2023-01-01 | End: 2023-01-01 | Stop reason: HOSPADM

## 2023-01-01 RX ORDER — ATENOLOL 25 MG/1
TABLET ORAL
Qty: 270 TABLET | Refills: 3 | Status: ON HOLD
Start: 2023-01-01 | End: 2023-01-01 | Stop reason: HOSPADM

## 2023-01-01 RX ORDER — METOPROLOL TARTRATE 1 MG/ML
5 INJECTION, SOLUTION INTRAVENOUS
Status: DISCONTINUED | OUTPATIENT
Start: 2023-01-01 | End: 2023-01-01

## 2023-01-01 RX ORDER — BISMUTH SUBSALICYLATE 525 MG/30ML
LIQUID ORAL EVERY 8 HOURS PRN
Status: ON HOLD | COMMUNITY
End: 2023-01-01 | Stop reason: HOSPADM

## 2023-01-01 RX ORDER — LACTULOSE 10 G/15ML
20 SOLUTION ORAL 2 TIMES DAILY PRN
Status: DISCONTINUED | OUTPATIENT
Start: 2023-01-01 | End: 2023-01-01 | Stop reason: HOSPADM

## 2023-01-01 RX ORDER — FOLIC ACID 1 MG/1
1 TABLET ORAL DAILY
Status: DISCONTINUED | OUTPATIENT
Start: 2023-01-01 | End: 2023-01-01 | Stop reason: HOSPADM

## 2023-01-01 RX ORDER — MUPIROCIN 20 MG/G
OINTMENT TOPICAL 2 TIMES DAILY
Status: DISCONTINUED | OUTPATIENT
Start: 2023-01-01 | End: 2023-01-01 | Stop reason: HOSPADM

## 2023-01-01 RX ORDER — MIDODRINE HYDROCHLORIDE 5 MG/1
5 TABLET ORAL EVERY 8 HOURS
Qty: 90 TABLET | Refills: 11 | Status: ON HOLD
Start: 2023-01-01 | End: 2023-01-01 | Stop reason: HOSPADM

## 2023-01-01 RX ORDER — ACETAMINOPHEN 325 MG/1
650 TABLET ORAL EVERY 4 HOURS PRN
Status: CANCELLED | OUTPATIENT
Start: 2023-01-01

## 2023-01-01 RX ORDER — PROCHLORPERAZINE EDISYLATE 5 MG/ML
5 INJECTION INTRAMUSCULAR; INTRAVENOUS EVERY 6 HOURS PRN
Status: CANCELLED | OUTPATIENT
Start: 2023-01-01

## 2023-01-01 RX ORDER — SODIUM CHLORIDE, SODIUM LACTATE, POTASSIUM CHLORIDE, CALCIUM CHLORIDE 600; 310; 30; 20 MG/100ML; MG/100ML; MG/100ML; MG/100ML
INJECTION, SOLUTION INTRAVENOUS CONTINUOUS
Status: DISCONTINUED | OUTPATIENT
Start: 2023-01-01 | End: 2023-01-01

## 2023-01-01 RX ORDER — DILTIAZEM HYDROCHLORIDE 180 MG/1
180 CAPSULE, COATED, EXTENDED RELEASE ORAL DAILY
Status: DISCONTINUED | OUTPATIENT
Start: 2023-01-01 | End: 2023-01-01

## 2023-01-01 RX ORDER — ASPIRIN 81 MG/1
81 TABLET ORAL DAILY
Status: DISCONTINUED | OUTPATIENT
Start: 2023-01-01 | End: 2023-01-01 | Stop reason: HOSPADM

## 2023-01-01 RX ORDER — HYDRALAZINE HYDROCHLORIDE 25 MG/1
25 TABLET, FILM COATED ORAL 2 TIMES DAILY PRN
Qty: 90 TABLET | Refills: 11 | Status: ON HOLD
Start: 2023-01-01 | End: 2023-01-01 | Stop reason: SDUPTHER

## 2023-01-01 RX ORDER — PANTOPRAZOLE SODIUM 40 MG/1
40 TABLET, DELAYED RELEASE ORAL DAILY
Status: DISCONTINUED | OUTPATIENT
Start: 2023-01-01 | End: 2023-01-01 | Stop reason: HOSPADM

## 2023-01-01 RX ORDER — BALSAM PERU/CASTOR OIL
OINTMENT (GRAM) TOPICAL 2 TIMES DAILY
Status: DISCONTINUED | OUTPATIENT
Start: 2023-01-01 | End: 2023-01-01

## 2023-01-01 RX ORDER — MIDODRINE HYDROCHLORIDE 5 MG/1
5 TABLET ORAL 2 TIMES DAILY
Status: DISCONTINUED | OUTPATIENT
Start: 2023-01-01 | End: 2023-01-01

## 2023-01-01 RX ORDER — MIDODRINE HYDROCHLORIDE 5 MG/1
5 TABLET ORAL EVERY 8 HOURS
Status: DISCONTINUED | OUTPATIENT
Start: 2023-01-01 | End: 2023-01-01 | Stop reason: HOSPADM

## 2023-01-01 RX ORDER — ONDANSETRON 2 MG/ML
4 INJECTION INTRAMUSCULAR; INTRAVENOUS EVERY 8 HOURS PRN
Status: DISCONTINUED | OUTPATIENT
Start: 2023-01-01 | End: 2023-01-01 | Stop reason: HOSPADM

## 2023-01-01 RX ORDER — CALCIUM CARBONATE 200(500)MG
500 TABLET,CHEWABLE ORAL 2 TIMES DAILY PRN
Status: DISCONTINUED | OUTPATIENT
Start: 2023-01-01 | End: 2023-01-01 | Stop reason: HOSPADM

## 2023-01-01 RX ORDER — AMOXICILLIN 250 MG
1 CAPSULE ORAL 2 TIMES DAILY
Status: DISCONTINUED | OUTPATIENT
Start: 2023-01-01 | End: 2023-01-01

## 2023-01-01 RX ORDER — CALCIUM GLUCONATE 20 MG/ML
1 INJECTION, SOLUTION INTRAVENOUS ONCE
Status: COMPLETED | OUTPATIENT
Start: 2023-01-01 | End: 2023-01-01

## 2023-01-01 RX ORDER — MAG HYDROX/ALUMINUM HYD/SIMETH 200-200-20
30 SUSPENSION, ORAL (FINAL DOSE FORM) ORAL EVERY 8 HOURS PRN
Status: DISCONTINUED | OUTPATIENT
Start: 2023-01-01 | End: 2023-01-01

## 2023-01-01 RX ORDER — MIRTAZAPINE 15 MG/1
15 TABLET, FILM COATED ORAL NIGHTLY
Status: DISCONTINUED | OUTPATIENT
Start: 2023-01-01 | End: 2023-01-01

## 2023-01-01 RX ORDER — MORPHINE SULFATE 4 MG/ML
4 INJECTION, SOLUTION INTRAMUSCULAR; INTRAVENOUS EVERY 4 HOURS PRN
Status: CANCELLED | OUTPATIENT
Start: 2023-01-01

## 2023-01-01 RX ORDER — ONDANSETRON 2 MG/ML
4 INJECTION INTRAMUSCULAR; INTRAVENOUS
Status: COMPLETED | OUTPATIENT
Start: 2023-01-01 | End: 2023-01-01

## 2023-01-01 RX ORDER — SODIUM CHLORIDE 9 MG/ML
INJECTION, SOLUTION INTRAVENOUS CONTINUOUS
Status: ACTIVE | OUTPATIENT
Start: 2023-01-01 | End: 2023-01-01

## 2023-01-01 RX ORDER — ATENOLOL 50 MG/1
50 TABLET ORAL DAILY
Status: DISCONTINUED | OUTPATIENT
Start: 2023-01-01 | End: 2023-01-01 | Stop reason: HOSPADM

## 2023-01-01 RX ORDER — GLYCOPYRROLATE 0.2 MG/ML
0.1 INJECTION INTRAMUSCULAR; INTRAVENOUS 3 TIMES DAILY PRN
Status: DISCONTINUED | OUTPATIENT
Start: 2023-01-01 | End: 2023-01-01 | Stop reason: HOSPADM

## 2023-01-01 RX ORDER — PROCHLORPERAZINE EDISYLATE 5 MG/ML
5 INJECTION INTRAMUSCULAR; INTRAVENOUS EVERY 6 HOURS PRN
Status: DISCONTINUED | OUTPATIENT
Start: 2023-01-01 | End: 2023-01-01 | Stop reason: HOSPADM

## 2023-01-01 RX ORDER — SODIUM CHLORIDE 0.9 % (FLUSH) 0.9 %
10 SYRINGE (ML) INJECTION EVERY 12 HOURS PRN
Status: DISCONTINUED | OUTPATIENT
Start: 2023-01-01 | End: 2023-01-01 | Stop reason: HOSPADM

## 2023-01-01 RX ORDER — LEVOTHYROXINE SODIUM 137 UG/1
137 TABLET ORAL
Status: DISCONTINUED | OUTPATIENT
Start: 2023-01-01 | End: 2023-01-01

## 2023-01-01 RX ORDER — IBUPROFEN 200 MG
16 TABLET ORAL
Status: DISCONTINUED | OUTPATIENT
Start: 2023-01-01 | End: 2023-01-01

## 2023-01-01 RX ORDER — LANOLIN ALCOHOL/MO/W.PET/CERES
1 CREAM (GRAM) TOPICAL DAILY
Status: DISCONTINUED | OUTPATIENT
Start: 2023-01-01 | End: 2023-01-01 | Stop reason: HOSPADM

## 2023-01-01 RX ORDER — ATENOLOL 50 MG/1
50 TABLET ORAL DAILY
Status: DISCONTINUED | OUTPATIENT
Start: 2023-01-01 | End: 2023-01-01

## 2023-01-01 RX ORDER — CIPROFLOXACIN 2 MG/ML
400 INJECTION, SOLUTION INTRAVENOUS ONCE
Status: COMPLETED | OUTPATIENT
Start: 2023-01-01 | End: 2023-01-01

## 2023-01-01 RX ORDER — AMIODARONE HYDROCHLORIDE 200 MG/1
200 TABLET ORAL DAILY
Status: DISCONTINUED | OUTPATIENT
Start: 2023-01-01 | End: 2023-01-01 | Stop reason: HOSPADM

## 2023-01-01 RX ORDER — LORAZEPAM 2 MG/ML
1 INJECTION INTRAMUSCULAR EVERY 4 HOURS PRN
Status: DISCONTINUED | OUTPATIENT
Start: 2023-01-01 | End: 2023-01-01 | Stop reason: HOSPADM

## 2023-01-01 RX ORDER — DEXTROSE 40 %
30 GEL (GRAM) ORAL
Status: DISCONTINUED | OUTPATIENT
Start: 2023-01-01 | End: 2023-01-01 | Stop reason: HOSPADM

## 2023-01-01 RX ORDER — IBUPROFEN 200 MG
24 TABLET ORAL
Status: DISCONTINUED | OUTPATIENT
Start: 2023-01-01 | End: 2023-01-01

## 2023-01-01 RX ORDER — LANOLIN ALCOHOL/MO/W.PET/CERES
1000 CREAM (GRAM) TOPICAL DAILY
Status: DISCONTINUED | OUTPATIENT
Start: 2023-01-01 | End: 2023-01-01 | Stop reason: HOSPADM

## 2023-01-01 RX ORDER — ONDANSETRON 4 MG/1
4 TABLET, ORALLY DISINTEGRATING ORAL EVERY 6 HOURS PRN
Qty: 28 TABLET | Refills: 0 | Status: SHIPPED | OUTPATIENT
Start: 2023-01-01 | End: 2023-01-01

## 2023-01-01 RX ORDER — MORPHINE SULFATE 4 MG/ML
4 INJECTION, SOLUTION INTRAMUSCULAR; INTRAVENOUS EVERY 4 HOURS PRN
Status: DISCONTINUED | OUTPATIENT
Start: 2023-01-01 | End: 2023-01-01 | Stop reason: HOSPADM

## 2023-01-01 RX ORDER — MIDODRINE HYDROCHLORIDE 5 MG/1
5 TABLET ORAL 3 TIMES DAILY
Status: DISCONTINUED | OUTPATIENT
Start: 2023-01-01 | End: 2023-01-01 | Stop reason: HOSPADM

## 2023-01-01 RX ORDER — DEXTROSE MONOHYDRATE AND SODIUM CHLORIDE 5; .9 G/100ML; G/100ML
INJECTION, SOLUTION INTRAVENOUS CONTINUOUS
Status: ACTIVE | OUTPATIENT
Start: 2023-01-01 | End: 2023-01-01

## 2023-01-01 RX ORDER — DIGOXIN 0.25 MG/ML
2 INJECTION INTRAMUSCULAR; INTRAVENOUS ONCE
Status: COMPLETED | OUTPATIENT
Start: 2023-01-01 | End: 2023-01-01

## 2023-01-01 RX ORDER — METOCLOPRAMIDE HYDROCHLORIDE 5 MG/ML
10 INJECTION INTRAMUSCULAR; INTRAVENOUS EVERY 6 HOURS
Status: DISCONTINUED | OUTPATIENT
Start: 2023-01-01 | End: 2023-01-01

## 2023-01-01 RX ORDER — SODIUM CHLORIDE 0.9 % (FLUSH) 0.9 %
10 SYRINGE (ML) INJECTION
Status: DISCONTINUED | OUTPATIENT
Start: 2023-01-01 | End: 2023-01-01 | Stop reason: HOSPADM

## 2023-01-01 RX ORDER — CYPROHEPTADINE HYDROCHLORIDE 4 MG/1
4 TABLET ORAL 3 TIMES DAILY
Status: DISCONTINUED | OUTPATIENT
Start: 2023-01-01 | End: 2023-01-01

## 2023-01-01 RX ORDER — LEVOTHYROXINE SODIUM 137 UG/1
137 TABLET ORAL
Status: DISCONTINUED | OUTPATIENT
Start: 2023-01-01 | End: 2023-01-01 | Stop reason: HOSPADM

## 2023-01-01 RX ORDER — TALC
6 POWDER (GRAM) TOPICAL NIGHTLY PRN
Status: DISCONTINUED | OUTPATIENT
Start: 2023-01-01 | End: 2023-01-01

## 2023-01-01 RX ORDER — DEXTROSE MONOHYDRATE AND SODIUM CHLORIDE 5; .45 G/100ML; G/100ML
INJECTION, SOLUTION INTRAVENOUS CONTINUOUS
Status: DISCONTINUED | OUTPATIENT
Start: 2023-01-01 | End: 2023-01-01

## 2023-01-01 RX ORDER — MIDODRINE HYDROCHLORIDE 5 MG/1
10 TABLET ORAL EVERY 8 HOURS
Status: DISCONTINUED | OUTPATIENT
Start: 2023-01-01 | End: 2023-01-01

## 2023-01-01 RX ORDER — MAGNESIUM SULFATE HEPTAHYDRATE 40 MG/ML
2 INJECTION, SOLUTION INTRAVENOUS ONCE
Status: COMPLETED | OUTPATIENT
Start: 2023-01-01 | End: 2023-01-01

## 2023-01-01 RX ORDER — GLYCOPYRROLATE 0.2 MG/ML
0.1 INJECTION INTRAMUSCULAR; INTRAVENOUS 3 TIMES DAILY PRN
Status: CANCELLED | OUTPATIENT
Start: 2023-01-01

## 2023-01-01 RX ORDER — BISMUTH SUBSALICYLATE 525 MG/30ML
30 LIQUID ORAL EVERY 8 HOURS PRN
Status: DISCONTINUED | OUTPATIENT
Start: 2023-01-01 | End: 2023-01-01 | Stop reason: HOSPADM

## 2023-01-01 RX ORDER — MIDODRINE HYDROCHLORIDE 5 MG/1
5 TABLET ORAL EVERY 8 HOURS
Qty: 90 TABLET | Refills: 11 | Status: SHIPPED | OUTPATIENT
Start: 2023-01-01 | End: 2023-01-01 | Stop reason: SDUPTHER

## 2023-01-01 RX ORDER — LANOLIN ALCOHOL/MO/W.PET/CERES
400 CREAM (GRAM) TOPICAL ONCE
Status: DISCONTINUED | OUTPATIENT
Start: 2023-01-01 | End: 2023-01-01

## 2023-01-01 RX ORDER — HYDRALAZINE HYDROCHLORIDE 25 MG/1
25 TABLET, FILM COATED ORAL 2 TIMES DAILY PRN
Qty: 90 TABLET | Refills: 11 | Status: ON HOLD
Start: 2023-01-01 | End: 2023-01-01 | Stop reason: HOSPADM

## 2023-01-01 RX ORDER — CYANOCOBALAMIN (VITAMIN B-12) 250 MCG
1000 TABLET ORAL DAILY
Status: DISCONTINUED | OUTPATIENT
Start: 2023-01-01 | End: 2023-01-01 | Stop reason: HOSPADM

## 2023-01-01 RX ORDER — METOCLOPRAMIDE HYDROCHLORIDE 5 MG/ML
5 INJECTION INTRAMUSCULAR; INTRAVENOUS EVERY 6 HOURS
Status: DISCONTINUED | OUTPATIENT
Start: 2023-01-01 | End: 2023-01-01

## 2023-01-01 RX ORDER — MIRTAZAPINE 7.5 MG/1
7.5 TABLET, FILM COATED ORAL NIGHTLY
Qty: 30 TABLET | Refills: 11 | Status: ON HOLD
Start: 2023-01-01 | End: 2023-01-01 | Stop reason: HOSPADM

## 2023-01-01 RX ORDER — ONDANSETRON 2 MG/ML
4 INJECTION INTRAMUSCULAR; INTRAVENOUS EVERY 8 HOURS PRN
Status: CANCELLED | OUTPATIENT
Start: 2023-01-01

## 2023-01-01 RX ORDER — ACETAMINOPHEN 325 MG/1
650 TABLET ORAL EVERY 6 HOURS PRN
Status: DISCONTINUED | OUTPATIENT
Start: 2023-01-01 | End: 2023-01-01 | Stop reason: HOSPADM

## 2023-01-01 RX ORDER — DILTIAZEM HYDROCHLORIDE 180 MG/1
180 CAPSULE, COATED, EXTENDED RELEASE ORAL DAILY
Status: DISCONTINUED | OUTPATIENT
Start: 2023-01-01 | End: 2023-01-01 | Stop reason: HOSPADM

## 2023-01-01 RX ORDER — MORPHINE SULFATE 1 MG/ML
0-10 INJECTION, SOLUTION INTRAVENOUS CONTINUOUS
Status: CANCELLED | OUTPATIENT
Start: 2023-01-01

## 2023-01-01 RX ORDER — METRONIDAZOLE 500 MG/1
500 TABLET ORAL EVERY 8 HOURS
Qty: 3 TABLET | Refills: 0 | Status: SHIPPED | OUTPATIENT
Start: 2023-01-01 | End: 2023-01-01 | Stop reason: SDUPTHER

## 2023-01-01 RX ORDER — ENOXAPARIN SODIUM 100 MG/ML
60 INJECTION SUBCUTANEOUS EVERY 24 HOURS
Status: DISCONTINUED | OUTPATIENT
Start: 2023-01-01 | End: 2023-01-01

## 2023-01-01 RX ORDER — MORPHINE SULFATE 1 MG/ML
0-10 INJECTION, SOLUTION INTRAVENOUS CONTINUOUS
Status: DISCONTINUED | OUTPATIENT
Start: 2023-01-01 | End: 2023-01-01 | Stop reason: HOSPADM

## 2023-01-01 RX ORDER — METRONIDAZOLE 500 MG/1
500 TABLET ORAL EVERY 8 HOURS
Qty: 6 TABLET | Refills: 0 | Status: SHIPPED | OUTPATIENT
Start: 2023-01-01 | End: 2023-01-01 | Stop reason: SDUPTHER

## 2023-01-01 RX ORDER — POLYETHYLENE GLYCOL 3350 17 G/17G
17 POWDER, FOR SOLUTION ORAL 2 TIMES DAILY
Status: DISCONTINUED | OUTPATIENT
Start: 2023-01-01 | End: 2023-01-01 | Stop reason: HOSPADM

## 2023-01-01 RX ORDER — ATENOLOL 25 MG/1
25 TABLET ORAL NIGHTLY
Status: DISCONTINUED | OUTPATIENT
Start: 2023-01-01 | End: 2023-01-01

## 2023-01-01 RX ORDER — FUROSEMIDE 10 MG/ML
80 INJECTION INTRAMUSCULAR; INTRAVENOUS
Status: COMPLETED | OUTPATIENT
Start: 2023-01-01 | End: 2023-01-01

## 2023-01-01 RX ORDER — POLYETHYLENE GLYCOL 3350 17 G/17G
17 POWDER, FOR SOLUTION ORAL DAILY
Refills: 0 | Status: ON HOLD
Start: 2023-01-01 | End: 2023-01-01 | Stop reason: SDUPTHER

## 2023-01-01 RX ORDER — AMOXICILLIN 250 MG
1 CAPSULE ORAL DAILY
Status: DISCONTINUED | OUTPATIENT
Start: 2023-01-01 | End: 2023-01-01 | Stop reason: HOSPADM

## 2023-01-01 RX ORDER — PANTOPRAZOLE SODIUM 40 MG/1
40 TABLET, DELAYED RELEASE ORAL DAILY
Qty: 30 TABLET | Refills: 11 | Status: ON HOLD
Start: 2023-01-01 | End: 2023-01-01 | Stop reason: HOSPADM

## 2023-01-01 RX ORDER — FUROSEMIDE 10 MG/ML
40 INJECTION INTRAMUSCULAR; INTRAVENOUS
Status: DISCONTINUED | OUTPATIENT
Start: 2023-01-01 | End: 2023-01-01

## 2023-01-01 RX ORDER — LORAZEPAM 2 MG/ML
1 INJECTION INTRAMUSCULAR EVERY 4 HOURS PRN
Status: CANCELLED | OUTPATIENT
Start: 2023-01-01

## 2023-01-01 RX ORDER — SODIUM CHLORIDE 0.9 % (FLUSH) 0.9 %
10 SYRINGE (ML) INJECTION EVERY 12 HOURS PRN
Status: CANCELLED | OUTPATIENT
Start: 2023-01-01

## 2023-01-01 RX ORDER — GLUCAGON 1 MG
1 KIT INJECTION
Status: DISCONTINUED | OUTPATIENT
Start: 2023-01-01 | End: 2023-01-01 | Stop reason: HOSPADM

## 2023-01-01 RX ORDER — POLYETHYLENE GLYCOL 3350 17 G/17G
17 POWDER, FOR SOLUTION ORAL DAILY PRN
Refills: 0 | Status: ON HOLD
Start: 2023-01-01 | End: 2023-01-01 | Stop reason: HOSPADM

## 2023-01-01 RX ORDER — DIGOXIN 0.25 MG/ML
1 INJECTION INTRAMUSCULAR; INTRAVENOUS ONCE
Status: COMPLETED | OUTPATIENT
Start: 2023-01-01 | End: 2023-01-01

## 2023-01-01 RX ORDER — MIDODRINE HYDROCHLORIDE 5 MG/1
5 TABLET ORAL EVERY 8 HOURS
Qty: 90 TABLET | Refills: 11 | Status: ON HOLD | OUTPATIENT
Start: 2023-01-01 | End: 2023-01-01 | Stop reason: SDUPTHER

## 2023-01-01 RX ORDER — DICLOFENAC SODIUM 10 MG/G
GEL TOPICAL
Qty: 50 G | Refills: 3 | Status: ON HOLD
Start: 2023-01-01 | End: 2023-01-01 | Stop reason: HOSPADM

## 2023-01-01 RX ORDER — POTASSIUM CHLORIDE 7.45 MG/ML
10 INJECTION INTRAVENOUS
Status: DISCONTINUED | OUTPATIENT
Start: 2023-01-01 | End: 2023-01-01

## 2023-01-01 RX ORDER — ONDANSETRON 4 MG/1
4 TABLET, ORALLY DISINTEGRATING ORAL ONCE
Status: COMPLETED | OUTPATIENT
Start: 2023-01-01 | End: 2023-01-01

## 2023-01-01 RX ORDER — LOPERAMIDE HYDROCHLORIDE 2 MG/1
4 CAPSULE ORAL ONCE
Status: COMPLETED | OUTPATIENT
Start: 2023-01-01 | End: 2023-01-01

## 2023-01-01 RX ORDER — MAGNESIUM SULFATE 1 G/100ML
1 INJECTION INTRAVENOUS ONCE
Status: COMPLETED | OUTPATIENT
Start: 2023-01-01 | End: 2023-01-01

## 2023-01-01 RX ORDER — HYDRALAZINE HYDROCHLORIDE 25 MG/1
25 TABLET, FILM COATED ORAL 2 TIMES DAILY PRN
Status: DISCONTINUED | OUTPATIENT
Start: 2023-01-01 | End: 2023-01-01 | Stop reason: HOSPADM

## 2023-01-01 RX ORDER — AMOXICILLIN 250 MG
1 CAPSULE ORAL 2 TIMES DAILY
Status: DISCONTINUED | OUTPATIENT
Start: 2023-01-01 | End: 2023-01-01 | Stop reason: HOSPADM

## 2023-01-01 RX ORDER — ACETAMINOPHEN 325 MG/1
650 TABLET ORAL EVERY 8 HOURS PRN
Status: DISCONTINUED | OUTPATIENT
Start: 2023-01-01 | End: 2023-01-01 | Stop reason: HOSPADM

## 2023-01-01 RX ORDER — DILTIAZEM HYDROCHLORIDE 240 MG/1
240 CAPSULE, COATED, EXTENDED RELEASE ORAL DAILY
Status: DISCONTINUED | OUTPATIENT
Start: 2023-01-01 | End: 2023-01-01

## 2023-01-01 RX ORDER — ATENOLOL 25 MG/1
25 TABLET ORAL NIGHTLY
Status: DISCONTINUED | OUTPATIENT
Start: 2023-01-01 | End: 2023-01-01 | Stop reason: HOSPADM

## 2023-01-01 RX ORDER — TALC
6 POWDER (GRAM) TOPICAL NIGHTLY PRN
Status: DISCONTINUED | OUTPATIENT
Start: 2023-01-01 | End: 2023-01-01 | Stop reason: HOSPADM

## 2023-01-01 RX ORDER — FUROSEMIDE 20 MG/1
20 TABLET ORAL DAILY
Qty: 30 TABLET | Refills: 11 | Status: ON HOLD | OUTPATIENT
Start: 2023-01-01 | End: 2023-01-01 | Stop reason: HOSPADM

## 2023-01-01 RX ORDER — CHOLECALCIFEROL (VITAMIN D3) 25 MCG
1000 TABLET ORAL DAILY
Status: DISCONTINUED | OUTPATIENT
Start: 2023-01-01 | End: 2023-01-01 | Stop reason: HOSPADM

## 2023-01-01 RX ORDER — CYPROHEPTADINE HYDROCHLORIDE 4 MG/1
4 TABLET ORAL 3 TIMES DAILY
Qty: 90 TABLET | Refills: 6 | Status: ON HOLD | OUTPATIENT
Start: 2023-01-01 | End: 2023-01-01 | Stop reason: HOSPADM

## 2023-01-01 RX ORDER — ATENOLOL 25 MG/1
25 TABLET ORAL DAILY
Status: DISCONTINUED | OUTPATIENT
Start: 2023-01-01 | End: 2023-01-01

## 2023-01-01 RX ORDER — ENOXAPARIN SODIUM 100 MG/ML
30 INJECTION SUBCUTANEOUS EVERY 24 HOURS
Status: DISCONTINUED | OUTPATIENT
Start: 2023-01-01 | End: 2023-01-01

## 2023-01-01 RX ORDER — MICONAZOLE NITRATE 2 %
POWDER (GRAM) TOPICAL 2 TIMES DAILY
Status: DISCONTINUED | OUTPATIENT
Start: 2023-01-01 | End: 2023-01-01 | Stop reason: HOSPADM

## 2023-01-01 RX ORDER — DEXTROSE MONOHYDRATE AND SODIUM CHLORIDE 5; .45 G/100ML; G/100ML
INJECTION, SOLUTION INTRAVENOUS CONTINUOUS
Status: ACTIVE | OUTPATIENT
Start: 2023-01-01 | End: 2023-01-01

## 2023-01-01 RX ORDER — FUROSEMIDE 10 MG/ML
40 INJECTION INTRAMUSCULAR; INTRAVENOUS ONCE
Status: COMPLETED | OUTPATIENT
Start: 2023-01-01 | End: 2023-01-01

## 2023-01-01 RX ORDER — PANTOPRAZOLE SODIUM 40 MG/1
40 TABLET, DELAYED RELEASE ORAL DAILY
Qty: 30 TABLET | Refills: 11 | Status: SHIPPED | OUTPATIENT
Start: 2023-01-01 | End: 2023-01-01 | Stop reason: SDUPTHER

## 2023-01-01 RX ORDER — ONDANSETRON 4 MG/1
4 TABLET, ORALLY DISINTEGRATING ORAL EVERY 6 HOURS PRN
Status: DISCONTINUED | OUTPATIENT
Start: 2023-01-01 | End: 2023-01-01 | Stop reason: HOSPADM

## 2023-01-01 RX ORDER — POTASSIUM CHLORIDE 20 MEQ/1
40 TABLET, EXTENDED RELEASE ORAL ONCE
Status: COMPLETED | OUTPATIENT
Start: 2023-01-01 | End: 2023-01-01

## 2023-01-01 RX ORDER — MORPHINE SULFATE 4 MG/ML
4 INJECTION, SOLUTION INTRAMUSCULAR; INTRAVENOUS
Status: DISCONTINUED | OUTPATIENT
Start: 2023-01-01 | End: 2023-01-01

## 2023-01-01 RX ORDER — LANOLIN ALCOHOL/MO/W.PET/CERES
400 CREAM (GRAM) TOPICAL DAILY
Status: DISCONTINUED | OUTPATIENT
Start: 2023-01-01 | End: 2023-01-01

## 2023-01-01 RX ORDER — AMIODARONE HYDROCHLORIDE 200 MG/1
200 TABLET ORAL DAILY
Status: DISCONTINUED | OUTPATIENT
Start: 2023-01-01 | End: 2023-01-01

## 2023-01-01 RX ORDER — CIPROFLOXACIN 2 MG/ML
400 INJECTION, SOLUTION INTRAVENOUS
Status: DISCONTINUED | OUTPATIENT
Start: 2023-01-01 | End: 2023-01-01

## 2023-01-01 RX ORDER — DIGOXIN 0.25 MG/ML
0.06 INJECTION INTRAMUSCULAR; INTRAVENOUS EVERY OTHER DAY
Status: DISCONTINUED | OUTPATIENT
Start: 2023-01-01 | End: 2023-01-01

## 2023-01-01 RX ORDER — METRONIDAZOLE 500 MG/1
500 TABLET ORAL EVERY 8 HOURS
Qty: 3 TABLET | Refills: 0 | Status: SHIPPED | OUTPATIENT
Start: 2023-01-01 | End: 2023-01-01 | Stop reason: HOSPADM

## 2023-01-01 RX ORDER — MAG HYDROX/ALUMINUM HYD/SIMETH 200-200-20
SUSPENSION, ORAL (FINAL DOSE FORM) ORAL EVERY 8 HOURS PRN
Status: ON HOLD | COMMUNITY
End: 2023-01-01 | Stop reason: HOSPADM

## 2023-01-01 RX ORDER — FAMOTIDINE 20 MG/1
20 TABLET, FILM COATED ORAL DAILY
Status: DISCONTINUED | OUTPATIENT
Start: 2023-01-01 | End: 2023-01-01 | Stop reason: HOSPADM

## 2023-01-01 RX ORDER — CIPROFLOXACIN 500 MG/1
500 TABLET ORAL DAILY
Qty: 2 TABLET | Refills: 0 | Status: SHIPPED | OUTPATIENT
Start: 2023-01-01 | End: 2023-01-01 | Stop reason: HOSPADM

## 2023-01-01 RX ORDER — ACETAMINOPHEN 325 MG/1
650 TABLET ORAL EVERY 4 HOURS PRN
Status: DISCONTINUED | OUTPATIENT
Start: 2023-01-01 | End: 2023-01-01 | Stop reason: HOSPADM

## 2023-01-01 RX ORDER — ALBUTEROL SULFATE 2.5 MG/.5ML
2.5 SOLUTION RESPIRATORY (INHALATION) EVERY 4 HOURS PRN
Status: DISCONTINUED | OUTPATIENT
Start: 2023-01-01 | End: 2023-01-01 | Stop reason: HOSPADM

## 2023-01-01 RX ORDER — HYDRALAZINE HYDROCHLORIDE 25 MG/1
25 TABLET, FILM COATED ORAL EVERY 8 HOURS
Status: DISCONTINUED | OUTPATIENT
Start: 2023-01-01 | End: 2023-01-01 | Stop reason: HOSPADM

## 2023-01-01 RX ORDER — OLOPATADINE HYDROCHLORIDE 2 MG/ML
1 SOLUTION/ DROPS OPHTHALMIC DAILY
Status: DISCONTINUED | OUTPATIENT
Start: 2023-01-01 | End: 2023-01-01

## 2023-01-01 RX ORDER — POTASSIUM CHLORIDE 7.45 MG/ML
10 INJECTION INTRAVENOUS
Status: COMPLETED | OUTPATIENT
Start: 2023-01-01 | End: 2023-01-01

## 2023-01-01 RX ORDER — LOPERAMIDE HYDROCHLORIDE 2 MG/1
2 CAPSULE ORAL 4 TIMES DAILY PRN
Status: DISCONTINUED | OUTPATIENT
Start: 2023-01-01 | End: 2023-01-01 | Stop reason: HOSPADM

## 2023-01-01 RX ORDER — POTASSIUM CHLORIDE 7.45 MG/ML
10 INJECTION INTRAVENOUS
Status: DISPENSED | OUTPATIENT
Start: 2023-01-01 | End: 2023-01-01

## 2023-01-01 RX ADMIN — Medication: at 09:05

## 2023-01-01 RX ADMIN — LEVOTHYROXINE SODIUM 137 MCG: 25 TABLET ORAL at 06:05

## 2023-01-01 RX ADMIN — VANCOMYCIN HYDROCHLORIDE 125 MG: KIT at 05:06

## 2023-01-01 RX ADMIN — LEVOTHYROXINE SODIUM 137 MCG: 25 TABLET ORAL at 05:06

## 2023-01-01 RX ADMIN — MIDODRINE HYDROCHLORIDE 10 MG: 5 TABLET ORAL at 09:06

## 2023-01-01 RX ADMIN — AMIODARONE HYDROCHLORIDE 200 MG: 200 TABLET ORAL at 10:05

## 2023-01-01 RX ADMIN — DICLOFENAC SODIUM 2 G: 10 GEL TOPICAL at 04:06

## 2023-01-01 RX ADMIN — APIXABAN 2.5 MG: 2.5 TABLET, FILM COATED ORAL at 09:05

## 2023-01-01 RX ADMIN — PANTOPRAZOLE SODIUM 40 MG: 40 TABLET, DELAYED RELEASE ORAL at 09:06

## 2023-01-01 RX ADMIN — DILTIAZEM HYDROCHLORIDE 180 MG: 180 CAPSULE, COATED, EXTENDED RELEASE ORAL at 08:05

## 2023-01-01 RX ADMIN — CEFTRIAXONE 1 G: 1 INJECTION, POWDER, FOR SOLUTION INTRAMUSCULAR; INTRAVENOUS at 08:05

## 2023-01-01 RX ADMIN — CHOLECALCIFEROL TAB 25 MCG (1000 UNIT) 1000 UNITS: 25 TAB at 09:05

## 2023-01-01 RX ADMIN — MIDODRINE HYDROCHLORIDE 5 MG: 5 TABLET ORAL at 10:06

## 2023-01-01 RX ADMIN — MICONAZOLE NITRATE 2 % TOPICAL POWDER: at 09:06

## 2023-01-01 RX ADMIN — THERA TABS 1 TABLET: TAB at 09:06

## 2023-01-01 RX ADMIN — AMIODARONE HYDROCHLORIDE 200 MG: 200 TABLET ORAL at 08:05

## 2023-01-01 RX ADMIN — APIXABAN 2.5 MG: 2.5 TABLET, FILM COATED ORAL at 08:06

## 2023-01-01 RX ADMIN — LEVOTHYROXINE SODIUM 137 MCG: 25 TABLET ORAL at 05:05

## 2023-01-01 RX ADMIN — MIRTAZAPINE 7.5 MG: 7.5 TABLET, FILM COATED ORAL at 09:05

## 2023-01-01 RX ADMIN — ASPIRIN 81 MG: 81 TABLET, COATED ORAL at 09:06

## 2023-01-01 RX ADMIN — ATENOLOL 25 MG: 25 TABLET ORAL at 08:05

## 2023-01-01 RX ADMIN — MIDODRINE HYDROCHLORIDE 5 MG: 5 TABLET ORAL at 03:06

## 2023-01-01 RX ADMIN — PIPERACILLIN SODIUM AND TAZOBACTAM SODIUM 4.5 G: 4; .5 INJECTION, POWDER, FOR SOLUTION INTRAVENOUS at 11:09

## 2023-01-01 RX ADMIN — APIXABAN 2.5 MG: 2.5 TABLET, FILM COATED ORAL at 09:06

## 2023-01-01 RX ADMIN — MIRTAZAPINE 7.5 MG: 7.5 TABLET, FILM COATED ORAL at 08:06

## 2023-01-01 RX ADMIN — FAMOTIDINE 20 MG: 20 TABLET ORAL at 09:05

## 2023-01-01 RX ADMIN — ATENOLOL 50 MG: 25 TABLET ORAL at 09:05

## 2023-01-01 RX ADMIN — METRONIDAZOLE 500 MG: 500 INJECTION, SOLUTION INTRAVENOUS at 03:06

## 2023-01-01 RX ADMIN — APIXABAN 2.5 MG: 2.5 TABLET, FILM COATED ORAL at 08:05

## 2023-01-01 RX ADMIN — THERA TABS 1 TABLET: TAB at 08:05

## 2023-01-01 RX ADMIN — CYANOCOBALAMIN TAB 1000 MCG 1000 MCG: 1000 TAB at 10:05

## 2023-01-01 RX ADMIN — CYANOCOBALAMIN TAB 1000 MCG 1000 MCG: 1000 TAB at 09:06

## 2023-01-01 RX ADMIN — DIGOXIN 0.06 MG: 250 INJECTION, SOLUTION INTRAMUSCULAR; INTRAVENOUS at 08:09

## 2023-01-01 RX ADMIN — AMIODARONE HYDROCHLORIDE 200 MG: 200 TABLET ORAL at 08:06

## 2023-01-01 RX ADMIN — MIDODRINE HYDROCHLORIDE 5 MG: 5 TABLET ORAL at 08:09

## 2023-01-01 RX ADMIN — MIDODRINE HYDROCHLORIDE 10 MG: 5 TABLET ORAL at 06:06

## 2023-01-01 RX ADMIN — MIRTAZAPINE 7.5 MG: 7.5 TABLET, FILM COATED ORAL at 11:06

## 2023-01-01 RX ADMIN — AMIODARONE HYDROCHLORIDE 200 MG: 200 TABLET ORAL at 09:06

## 2023-01-01 RX ADMIN — SENNOSIDES AND DOCUSATE SODIUM 1 TABLET: 50; 8.6 TABLET ORAL at 10:05

## 2023-01-01 RX ADMIN — MIRTAZAPINE 7.5 MG: 7.5 TABLET, FILM COATED ORAL at 10:06

## 2023-01-01 RX ADMIN — DICLOFENAC SODIUM 2 G: 10 GEL TOPICAL at 09:06

## 2023-01-01 RX ADMIN — HYDRALAZINE HYDROCHLORIDE 25 MG: 25 TABLET, FILM COATED ORAL at 05:05

## 2023-01-01 RX ADMIN — MIDODRINE HYDROCHLORIDE 5 MG: 5 TABLET ORAL at 01:06

## 2023-01-01 RX ADMIN — FAMOTIDINE 20 MG: 20 TABLET ORAL at 10:05

## 2023-01-01 RX ADMIN — DICLOFENAC SODIUM 2 G: 10 GEL TOPICAL at 01:05

## 2023-01-01 RX ADMIN — Medication 1 CAPSULE: at 08:05

## 2023-01-01 RX ADMIN — METRONIDAZOLE 500 MG: 500 INJECTION, SOLUTION INTRAVENOUS at 06:06

## 2023-01-01 RX ADMIN — PIPERACILLIN SODIUM AND TAZOBACTAM SODIUM 4.5 G: 4; .5 INJECTION, POWDER, FOR SOLUTION INTRAVENOUS at 12:09

## 2023-01-01 RX ADMIN — LEVOTHYROXINE SODIUM 137 MCG: 137 TABLET ORAL at 05:05

## 2023-01-01 RX ADMIN — METOCLOPRAMIDE 10 MG: 5 INJECTION, SOLUTION INTRAMUSCULAR; INTRAVENOUS at 09:05

## 2023-01-01 RX ADMIN — SENNOSIDES AND DOCUSATE SODIUM 1 TABLET: 50; 8.6 TABLET ORAL at 08:05

## 2023-01-01 RX ADMIN — SODIUM CHLORIDE, POTASSIUM CHLORIDE, SODIUM LACTATE AND CALCIUM CHLORIDE 1000 ML: 600; 310; 30; 20 INJECTION, SOLUTION INTRAVENOUS at 04:05

## 2023-01-01 RX ADMIN — SENNOSIDES AND DOCUSATE SODIUM 1 TABLET: 50; 8.6 TABLET ORAL at 09:05

## 2023-01-01 RX ADMIN — MIDODRINE HYDROCHLORIDE 5 MG: 5 TABLET ORAL at 05:06

## 2023-01-01 RX ADMIN — VANCOMYCIN HYDROCHLORIDE 125 MG: KIT at 12:06

## 2023-01-01 RX ADMIN — APIXABAN 2.5 MG: 2.5 TABLET, FILM COATED ORAL at 10:05

## 2023-01-01 RX ADMIN — MIDODRINE HYDROCHLORIDE 10 MG: 5 TABLET ORAL at 05:06

## 2023-01-01 RX ADMIN — ONDANSETRON 4 MG: 4 TABLET, ORALLY DISINTEGRATING ORAL at 12:06

## 2023-01-01 RX ADMIN — SENNOSIDES AND DOCUSATE SODIUM 1 TABLET: 50; 8.6 TABLET ORAL at 09:06

## 2023-01-01 RX ADMIN — CYANOCOBALAMIN TAB 1000 MCG 1000 MCG: 1000 TAB at 08:05

## 2023-01-01 RX ADMIN — VANCOMYCIN HYDROCHLORIDE 125 MG: KIT at 11:06

## 2023-01-01 RX ADMIN — DICLOFENAC SODIUM 2 G: 10 GEL TOPICAL at 12:05

## 2023-01-01 RX ADMIN — THERA TABS 1 TABLET: TAB at 08:06

## 2023-01-01 RX ADMIN — DICLOFENAC SODIUM 2 G: 10 GEL TOPICAL at 02:06

## 2023-01-01 RX ADMIN — DIGOXIN 132.5 MCG: 0.25 INJECTION INTRAMUSCULAR; INTRAVENOUS at 06:09

## 2023-01-01 RX ADMIN — Medication 1 CAPSULE: at 10:06

## 2023-01-01 RX ADMIN — FOLIC ACID 1 MG: 1 TABLET ORAL at 09:06

## 2023-01-01 RX ADMIN — DICLOFENAC SODIUM 2 G: 10 GEL TOPICAL at 12:06

## 2023-01-01 RX ADMIN — DICLOFENAC SODIUM 2 G: 10 GEL TOPICAL at 08:05

## 2023-01-01 RX ADMIN — PANTOPRAZOLE SODIUM 40 MG: 40 TABLET, DELAYED RELEASE ORAL at 08:06

## 2023-01-01 RX ADMIN — DICLOFENAC SODIUM 2 G: 10 GEL TOPICAL at 08:06

## 2023-01-01 RX ADMIN — FOLIC ACID 1 MG: 1 TABLET ORAL at 10:06

## 2023-01-01 RX ADMIN — DILTIAZEM HYDROCHLORIDE 180 MG: 180 CAPSULE, COATED, EXTENDED RELEASE ORAL at 09:05

## 2023-01-01 RX ADMIN — POLYETHYLENE GLYCOL 3350 17 G: 17 POWDER, FOR SOLUTION ORAL at 09:05

## 2023-01-01 RX ADMIN — CYANOCOBALAMIN TAB 1000 MCG 1000 MCG: 1000 TAB at 08:06

## 2023-01-01 RX ADMIN — ALUMINUM HYDROXIDE, MAGNESIUM HYDROXIDE, AND SIMETHICONE 30 ML: 200; 200; 20 SUSPENSION ORAL at 06:06

## 2023-01-01 RX ADMIN — DICLOFENAC SODIUM 2 G: 10 GEL TOPICAL at 05:05

## 2023-01-01 RX ADMIN — MICONAZOLE NITRATE 2 % TOPICAL POWDER: at 08:06

## 2023-01-01 RX ADMIN — ATENOLOL 25 MG: 25 TABLET ORAL at 09:05

## 2023-01-01 RX ADMIN — PANTOPRAZOLE SODIUM 40 MG: 40 TABLET, DELAYED RELEASE ORAL at 08:05

## 2023-01-01 RX ADMIN — ATENOLOL 50 MG: 50 TABLET ORAL at 08:05

## 2023-01-01 RX ADMIN — VANCOMYCIN HYDROCHLORIDE 125 MG: KIT at 06:06

## 2023-01-01 RX ADMIN — DEXTROSE MONOHYDRATE AND SODIUM CHLORIDE: 5; .45 INJECTION, SOLUTION INTRAVENOUS at 03:05

## 2023-01-01 RX ADMIN — SENNOSIDES AND DOCUSATE SODIUM 1 TABLET: 50; 8.6 TABLET ORAL at 08:06

## 2023-01-01 RX ADMIN — DICLOFENAC SODIUM 2 G: 10 GEL TOPICAL at 05:06

## 2023-01-01 RX ADMIN — MIRTAZAPINE 7.5 MG: 7.5 TABLET, FILM COATED ORAL at 09:06

## 2023-01-01 RX ADMIN — SENNOSIDES AND DOCUSATE SODIUM 1 TABLET: 50; 8.6 TABLET ORAL at 09:09

## 2023-01-01 RX ADMIN — LEVOTHYROXINE SODIUM 137 MCG: 137 TABLET ORAL at 06:09

## 2023-01-01 RX ADMIN — PSYLLIUM HUSK 1 PACKET: 3.4 POWDER ORAL at 02:06

## 2023-01-01 RX ADMIN — CYANOCOBALAMIN TAB 250 MCG 1000 MCG: 250 TAB at 03:05

## 2023-01-01 RX ADMIN — FOLIC ACID 1 MG: 1 TABLET ORAL at 08:06

## 2023-01-01 RX ADMIN — MIDODRINE HYDROCHLORIDE 5 MG: 5 TABLET ORAL at 02:06

## 2023-01-01 RX ADMIN — DOXYCYCLINE 100 MG: 100 INJECTION, POWDER, LYOPHILIZED, FOR SOLUTION INTRAVENOUS at 10:09

## 2023-01-01 RX ADMIN — DICLOFENAC SODIUM 2 G: 10 GEL TOPICAL at 10:06

## 2023-01-01 RX ADMIN — ASPIRIN 81 MG: 81 TABLET, COATED ORAL at 09:05

## 2023-01-01 RX ADMIN — SENNOSIDES AND DOCUSATE SODIUM 1 TABLET: 50; 8.6 TABLET ORAL at 08:09

## 2023-01-01 RX ADMIN — APIXABAN 2.5 MG: 2.5 TABLET, FILM COATED ORAL at 10:06

## 2023-01-01 RX ADMIN — DICLOFENAC SODIUM 2 G: 10 GEL TOPICAL at 09:05

## 2023-01-01 RX ADMIN — ALUMINUM HYDROXIDE, MAGNESIUM HYDROXIDE, AND SIMETHICONE 30 ML: 200; 200; 20 SUSPENSION ORAL at 11:06

## 2023-01-01 RX ADMIN — MIDODRINE HYDROCHLORIDE 10 MG: 5 TABLET ORAL at 01:06

## 2023-01-01 RX ADMIN — POLYETHYLENE GLYCOL 3350 17 G: 17 POWDER, FOR SOLUTION ORAL at 10:05

## 2023-01-01 RX ADMIN — FAMOTIDINE 20 MG: 20 TABLET ORAL at 08:05

## 2023-01-01 RX ADMIN — DOXYCYCLINE 100 MG: 100 INJECTION, POWDER, LYOPHILIZED, FOR SOLUTION INTRAVENOUS at 09:09

## 2023-01-01 RX ADMIN — Medication 1 CAPSULE: at 09:06

## 2023-01-01 RX ADMIN — Medication 6 MG: at 01:05

## 2023-01-01 RX ADMIN — DIGOXIN 0.06 MG: 250 INJECTION, SOLUTION INTRAMUSCULAR; INTRAVENOUS at 09:09

## 2023-01-01 RX ADMIN — PANTOPRAZOLE SODIUM 40 MG: 40 TABLET, DELAYED RELEASE ORAL at 10:05

## 2023-01-01 RX ADMIN — FOLIC ACID 1 MG: 1 TABLET ORAL at 09:05

## 2023-01-01 RX ADMIN — Medication 400 MG: at 11:06

## 2023-01-01 RX ADMIN — METOCLOPRAMIDE 10 MG: 5 INJECTION, SOLUTION INTRAMUSCULAR; INTRAVENOUS at 05:05

## 2023-01-01 RX ADMIN — ENOXAPARIN SODIUM 30 MG: 30 INJECTION SUBCUTANEOUS at 08:09

## 2023-01-01 RX ADMIN — VANCOMYCIN HYDROCHLORIDE 1000 MG: 1 INJECTION, POWDER, LYOPHILIZED, FOR SOLUTION INTRAVENOUS at 11:05

## 2023-01-01 RX ADMIN — LOPERAMIDE HYDROCHLORIDE 2 MG: 2 CAPSULE ORAL at 02:06

## 2023-01-01 RX ADMIN — ATENOLOL 12.5 MG: 25 TABLET ORAL at 08:06

## 2023-01-01 RX ADMIN — THERA TABS 1 TABLET: TAB at 09:05

## 2023-01-01 RX ADMIN — Medication: at 08:05

## 2023-01-01 RX ADMIN — SODIUM CHLORIDE 250 ML: 9 INJECTION, SOLUTION INTRAVENOUS at 12:06

## 2023-01-01 RX ADMIN — DIGOXIN 0.06 MG: 250 INJECTION, SOLUTION INTRAMUSCULAR; INTRAVENOUS at 10:09

## 2023-01-01 RX ADMIN — CEFTRIAXONE 1 G: 1 INJECTION, POWDER, FOR SOLUTION INTRAMUSCULAR; INTRAVENOUS at 10:05

## 2023-01-01 RX ADMIN — AMIODARONE HYDROCHLORIDE 200 MG: 200 TABLET ORAL at 09:05

## 2023-01-01 RX ADMIN — DIGOXIN 67.5 MCG: 0.25 INJECTION INTRAMUSCULAR; INTRAVENOUS at 12:09

## 2023-01-01 RX ADMIN — Medication 1 CAPSULE: at 10:05

## 2023-01-01 RX ADMIN — ASPIRIN 81 MG: 81 TABLET, COATED ORAL at 08:06

## 2023-01-01 RX ADMIN — LEVOTHYROXINE SODIUM 137 MCG: 137 TABLET ORAL at 06:05

## 2023-01-01 RX ADMIN — CHOLECALCIFEROL TAB 25 MCG (1000 UNIT) 1000 UNITS: 25 TAB at 10:05

## 2023-01-01 RX ADMIN — CEFTRIAXONE 1 G: 1 INJECTION, POWDER, FOR SOLUTION INTRAMUSCULAR; INTRAVENOUS at 09:05

## 2023-01-01 RX ADMIN — METOCLOPRAMIDE 10 MG: 5 INJECTION, SOLUTION INTRAMUSCULAR; INTRAVENOUS at 06:05

## 2023-01-01 RX ADMIN — ASPIRIN 81 MG: 81 TABLET, COATED ORAL at 10:06

## 2023-01-01 RX ADMIN — MIDODRINE HYDROCHLORIDE 10 MG: 5 TABLET ORAL at 12:06

## 2023-01-01 RX ADMIN — ATENOLOL 12.5 MG: 25 TABLET ORAL at 09:06

## 2023-01-01 RX ADMIN — MIDODRINE HYDROCHLORIDE 10 MG: 5 TABLET ORAL at 02:06

## 2023-01-01 RX ADMIN — FERROUS SULFATE TAB 325 MG (65 MG ELEMENTAL FE) 1 EACH: 325 (65 FE) TAB at 08:05

## 2023-01-01 RX ADMIN — FOLIC ACID 1 MG: 1 TABLET ORAL at 10:05

## 2023-01-01 RX ADMIN — ATENOLOL 12.5 MG: 25 TABLET ORAL at 11:06

## 2023-01-01 RX ADMIN — METRONIDAZOLE 500 MG: 500 INJECTION, SOLUTION INTRAVENOUS at 08:06

## 2023-01-01 RX ADMIN — MIDODRINE HYDROCHLORIDE 5 MG: 5 TABLET ORAL at 08:06

## 2023-01-01 RX ADMIN — SODIUM CHLORIDE, POTASSIUM CHLORIDE, SODIUM LACTATE AND CALCIUM CHLORIDE 1000 ML: 600; 310; 30; 20 INJECTION, SOLUTION INTRAVENOUS at 06:05

## 2023-01-01 RX ADMIN — LEVOTHYROXINE SODIUM 137 MCG: 25 TABLET ORAL at 06:06

## 2023-01-01 RX ADMIN — SODIUM CHLORIDE 500 ML: 0.9 INJECTION, SOLUTION INTRAVENOUS at 01:09

## 2023-01-01 RX ADMIN — ATENOLOL 12.5 MG: 25 TABLET ORAL at 10:06

## 2023-01-01 RX ADMIN — POTASSIUM CHLORIDE 10 MEQ: 7.46 INJECTION, SOLUTION INTRAVENOUS at 02:09

## 2023-01-01 RX ADMIN — MIDODRINE HYDROCHLORIDE 5 MG: 5 TABLET ORAL at 10:09

## 2023-01-01 RX ADMIN — HYDRALAZINE HYDROCHLORIDE 25 MG: 25 TABLET, FILM COATED ORAL at 09:05

## 2023-01-01 RX ADMIN — POLYETHYLENE GLYCOL 3350 17 G: 17 POWDER, FOR SOLUTION ORAL at 10:09

## 2023-01-01 RX ADMIN — FERROUS SULFATE TAB 325 MG (65 MG ELEMENTAL FE) 1 EACH: 325 (65 FE) TAB at 09:05

## 2023-01-01 RX ADMIN — FOLIC ACID 1 MG: 1 TABLET ORAL at 08:05

## 2023-01-01 RX ADMIN — MORPHINE SULFATE 4 MG: 4 INJECTION INTRAVENOUS at 09:09

## 2023-01-01 RX ADMIN — POTASSIUM CHLORIDE 10 MEQ: 7.46 INJECTION, SOLUTION INTRAVENOUS at 08:09

## 2023-01-01 RX ADMIN — MIDODRINE HYDROCHLORIDE 10 MG: 5 TABLET ORAL at 10:06

## 2023-01-01 RX ADMIN — METRONIDAZOLE 500 MG: 500 INJECTION, SOLUTION INTRAVENOUS at 12:06

## 2023-01-01 RX ADMIN — MIDODRINE HYDROCHLORIDE 5 MG: 5 TABLET ORAL at 01:09

## 2023-01-01 RX ADMIN — SODIUM CHLORIDE, POTASSIUM CHLORIDE, SODIUM LACTATE AND CALCIUM CHLORIDE: 600; 310; 30; 20 INJECTION, SOLUTION INTRAVENOUS at 10:09

## 2023-01-01 RX ADMIN — FERROUS SULFATE TAB 325 MG (65 MG ELEMENTAL FE) 1 EACH: 325 (65 FE) TAB at 10:05

## 2023-01-01 RX ADMIN — PANTOPRAZOLE SODIUM 40 MG: 40 TABLET, DELAYED RELEASE ORAL at 09:05

## 2023-01-01 RX ADMIN — VANCOMYCIN HYDROCHLORIDE 750 MG: 750 INJECTION, POWDER, LYOPHILIZED, FOR SOLUTION INTRAVENOUS at 12:09

## 2023-01-01 RX ADMIN — ENOXAPARIN SODIUM 30 MG: 30 INJECTION SUBCUTANEOUS at 05:09

## 2023-01-01 RX ADMIN — METRONIDAZOLE 500 MG: 500 INJECTION, SOLUTION INTRAVENOUS at 04:06

## 2023-01-01 RX ADMIN — SODIUM CHLORIDE: 9 INJECTION, SOLUTION INTRAVENOUS at 06:05

## 2023-01-01 RX ADMIN — MIDODRINE HYDROCHLORIDE 5 MG: 5 TABLET ORAL at 09:09

## 2023-01-01 RX ADMIN — ASPIRIN 81 MG: 81 TABLET, COATED ORAL at 08:05

## 2023-01-01 RX ADMIN — MORPHINE SULFATE 4 MG: 4 INJECTION INTRAVENOUS at 06:09

## 2023-01-01 RX ADMIN — SODIUM CHLORIDE: 9 INJECTION, SOLUTION INTRAVENOUS at 09:05

## 2023-01-01 RX ADMIN — ONDANSETRON 4 MG: 2 INJECTION INTRAMUSCULAR; INTRAVENOUS at 04:05

## 2023-01-01 RX ADMIN — ERTAPENEM 500 MG: 1 INJECTION INTRAMUSCULAR; INTRAVENOUS at 12:09

## 2023-01-01 RX ADMIN — ERTAPENEM 500 MG: 1 INJECTION INTRAMUSCULAR; INTRAVENOUS at 09:09

## 2023-01-01 RX ADMIN — Medication: at 10:05

## 2023-01-01 RX ADMIN — Medication 6 MG: at 10:06

## 2023-01-01 RX ADMIN — DICLOFENAC SODIUM 2 G: 10 GEL TOPICAL at 01:06

## 2023-01-01 RX ADMIN — METRONIDAZOLE 500 MG: 500 INJECTION, SOLUTION INTRAVENOUS at 11:06

## 2023-01-01 RX ADMIN — MIDODRINE HYDROCHLORIDE 5 MG: 5 TABLET ORAL at 09:06

## 2023-01-01 RX ADMIN — MUPIROCIN: 20 OINTMENT TOPICAL at 10:06

## 2023-01-01 RX ADMIN — MORPHINE SULFATE 4 MG: 4 INJECTION INTRAVENOUS at 05:09

## 2023-01-01 RX ADMIN — Medication 1 CAPSULE: at 09:05

## 2023-01-01 RX ADMIN — CYANOCOBALAMIN TAB 1000 MCG 1000 MCG: 1000 TAB at 10:06

## 2023-01-01 RX ADMIN — MIRTAZAPINE 7.5 MG: 7.5 TABLET, FILM COATED ORAL at 08:05

## 2023-01-01 RX ADMIN — MICONAZOLE NITRATE 2 % TOPICAL POWDER: at 01:06

## 2023-01-01 RX ADMIN — CYANOCOBALAMIN TAB 250 MCG 1000 MCG: 250 TAB at 09:05

## 2023-01-01 RX ADMIN — ACETAMINOPHEN 650 MG: 325 TABLET ORAL at 10:06

## 2023-01-01 RX ADMIN — POTASSIUM CHLORIDE 10 MEQ: 7.46 INJECTION, SOLUTION INTRAVENOUS at 12:09

## 2023-01-01 RX ADMIN — SENNOSIDES AND DOCUSATE SODIUM 1 TABLET: 50; 8.6 TABLET ORAL at 10:09

## 2023-01-01 RX ADMIN — FUROSEMIDE 40 MG: 10 INJECTION, SOLUTION INTRAMUSCULAR; INTRAVENOUS at 09:06

## 2023-01-01 RX ADMIN — DIGOXIN 67.5 MCG: 250 INJECTION, SOLUTION INTRAMUSCULAR; INTRAVENOUS at 12:09

## 2023-01-01 RX ADMIN — MIDODRINE HYDROCHLORIDE 10 MG: 5 TABLET ORAL at 11:06

## 2023-01-01 RX ADMIN — Medication 1 MG/HR: at 04:09

## 2023-01-01 RX ADMIN — Medication: at 09:06

## 2023-01-01 RX ADMIN — THERA TABS 1 TABLET: TAB at 10:05

## 2023-01-01 RX ADMIN — DOXYCYCLINE 100 MG: 100 INJECTION, POWDER, LYOPHILIZED, FOR SOLUTION INTRAVENOUS at 08:09

## 2023-01-01 RX ADMIN — ENOXAPARIN SODIUM 60 MG: 60 INJECTION SUBCUTANEOUS at 04:09

## 2023-01-01 RX ADMIN — SODIUM CHLORIDE, SODIUM LACTATE, POTASSIUM CHLORIDE, AND CALCIUM CHLORIDE: .6; .31; .03; .02 INJECTION, SOLUTION INTRAVENOUS at 03:06

## 2023-01-01 RX ADMIN — MIDODRINE HYDROCHLORIDE 10 MG: 5 TABLET ORAL at 03:06

## 2023-01-01 RX ADMIN — HYDRALAZINE HYDROCHLORIDE 25 MG: 25 TABLET, FILM COATED ORAL at 02:05

## 2023-01-01 RX ADMIN — METOCLOPRAMIDE 10 MG: 5 INJECTION, SOLUTION INTRAMUSCULAR; INTRAVENOUS at 12:05

## 2023-01-01 RX ADMIN — POLYETHYLENE GLYCOL 3350 17 G: 17 POWDER, FOR SOLUTION ORAL at 08:05

## 2023-01-01 RX ADMIN — Medication 1 CAPSULE: at 03:05

## 2023-01-01 RX ADMIN — VANCOMYCIN HYDROCHLORIDE 1000 MG: 1 INJECTION, POWDER, LYOPHILIZED, FOR SOLUTION INTRAVENOUS at 12:05

## 2023-01-01 RX ADMIN — FUROSEMIDE 80 MG: 10 INJECTION, SOLUTION INTRAMUSCULAR; INTRAVENOUS at 12:06

## 2023-01-01 RX ADMIN — MAGNESIUM SULFATE HEPTAHYDRATE 1 G: 500 INJECTION, SOLUTION INTRAMUSCULAR; INTRAVENOUS at 01:05

## 2023-01-01 RX ADMIN — ALUMINUM HYDROXIDE, MAGNESIUM HYDROXIDE, AND SIMETHICONE 30 ML: 200; 200; 20 SUSPENSION ORAL at 04:06

## 2023-01-01 RX ADMIN — THERA TABS 1 TABLET: TAB at 10:06

## 2023-01-01 RX ADMIN — Medication 1 CAPSULE: at 08:06

## 2023-01-01 RX ADMIN — CEFEPIME 1 G: 1 INJECTION, POWDER, FOR SOLUTION INTRAMUSCULAR; INTRAVENOUS at 04:05

## 2023-01-01 RX ADMIN — POTASSIUM CHLORIDE 10 MEQ: 7.46 INJECTION, SOLUTION INTRAVENOUS at 07:09

## 2023-01-01 RX ADMIN — SODIUM CHLORIDE, POTASSIUM CHLORIDE, SODIUM LACTATE AND CALCIUM CHLORIDE: 600; 310; 30; 20 INJECTION, SOLUTION INTRAVENOUS at 04:05

## 2023-01-01 RX ADMIN — CIPROFLOXACIN 400 MG: 2 INJECTION, SOLUTION INTRAVENOUS at 03:06

## 2023-01-01 RX ADMIN — VANCOMYCIN HYDROCHLORIDE 750 MG: 750 INJECTION, POWDER, LYOPHILIZED, FOR SOLUTION INTRAVENOUS at 11:09

## 2023-01-01 RX ADMIN — SODIUM CHLORIDE: 9 INJECTION, SOLUTION INTRAVENOUS at 12:05

## 2023-01-01 RX ADMIN — DICLOFENAC SODIUM 2 G: 10 GEL TOPICAL at 10:05

## 2023-01-01 RX ADMIN — SODIUM ZIRCONIUM CYCLOSILICATE 10 G: 5 POWDER, FOR SUSPENSION ORAL at 11:06

## 2023-01-01 RX ADMIN — Medication 400 MG: at 09:06

## 2023-01-01 RX ADMIN — CIPROFLOXACIN 400 MG: 2 INJECTION, SOLUTION INTRAVENOUS at 02:06

## 2023-01-01 RX ADMIN — POLYETHYLENE GLYCOL 3350 17 G: 17 POWDER, FOR SOLUTION ORAL at 08:06

## 2023-01-01 RX ADMIN — SODIUM CHLORIDE, POTASSIUM CHLORIDE, SODIUM LACTATE AND CALCIUM CHLORIDE 1000 ML: 600; 310; 30; 20 INJECTION, SOLUTION INTRAVENOUS at 10:05

## 2023-01-01 RX ADMIN — FOLIC ACID 1 MG: 1 TABLET ORAL at 03:05

## 2023-01-01 RX ADMIN — ERTAPENEM 500 MG: 1 INJECTION INTRAMUSCULAR; INTRAVENOUS at 11:09

## 2023-01-01 RX ADMIN — ASPIRIN 81 MG: 81 TABLET, COATED ORAL at 10:05

## 2023-01-01 RX ADMIN — DEXTROSE MONOHYDRATE 125 ML: 100 INJECTION, SOLUTION INTRAVENOUS at 05:05

## 2023-01-01 RX ADMIN — MIRTAZAPINE 15 MG: 15 TABLET, FILM COATED ORAL at 08:05

## 2023-01-01 RX ADMIN — Medication 6 MG: at 09:06

## 2023-01-01 RX ADMIN — AMIODARONE HYDROCHLORIDE 200 MG: 200 TABLET ORAL at 10:06

## 2023-01-01 RX ADMIN — POTASSIUM CHLORIDE 10 MEQ: 7.46 INJECTION, SOLUTION INTRAVENOUS at 01:09

## 2023-01-01 RX ADMIN — Medication 400 MG: at 02:06

## 2023-01-01 RX ADMIN — CALCIUM GLUCONATE 1 G: 20 INJECTION, SOLUTION INTRAVENOUS at 10:05

## 2023-01-01 RX ADMIN — PANTOPRAZOLE SODIUM 40 MG: 40 TABLET, DELAYED RELEASE ORAL at 10:06

## 2023-01-01 RX ADMIN — SODIUM CHLORIDE, POTASSIUM CHLORIDE, SODIUM LACTATE AND CALCIUM CHLORIDE 1000 ML: 600; 310; 30; 20 INJECTION, SOLUTION INTRAVENOUS at 05:09

## 2023-01-01 RX ADMIN — ATENOLOL 50 MG: 50 TABLET ORAL at 09:05

## 2023-01-01 RX ADMIN — ENOXAPARIN SODIUM 60 MG: 60 INJECTION SUBCUTANEOUS at 05:09

## 2023-01-01 RX ADMIN — POTASSIUM CHLORIDE 10 MEQ: 7.46 INJECTION, SOLUTION INTRAVENOUS at 04:09

## 2023-01-01 RX ADMIN — HYDRALAZINE HYDROCHLORIDE 25 MG: 25 TABLET, FILM COATED ORAL at 03:05

## 2023-01-01 RX ADMIN — ATENOLOL 50 MG: 25 TABLET ORAL at 10:05

## 2023-01-01 RX ADMIN — PROCHLORPERAZINE EDISYLATE 5 MG: 5 INJECTION INTRAMUSCULAR; INTRAVENOUS at 08:05

## 2023-01-01 RX ADMIN — SODIUM CHLORIDE, POTASSIUM CHLORIDE, SODIUM LACTATE AND CALCIUM CHLORIDE: 600; 310; 30; 20 INJECTION, SOLUTION INTRAVENOUS at 08:05

## 2023-01-01 RX ADMIN — DILTIAZEM HYDROCHLORIDE 180 MG: 180 CAPSULE, COATED, EXTENDED RELEASE ORAL at 10:05

## 2023-01-01 RX ADMIN — VANCOMYCIN HYDROCHLORIDE 750 MG: 750 INJECTION, POWDER, LYOPHILIZED, FOR SOLUTION INTRAVENOUS at 01:09

## 2023-01-01 RX ADMIN — POTASSIUM CHLORIDE 10 MEQ: 7.46 INJECTION, SOLUTION INTRAVENOUS at 06:09

## 2023-01-01 RX ADMIN — POTASSIUM CHLORIDE 10 MEQ: 7.46 INJECTION, SOLUTION INTRAVENOUS at 09:09

## 2023-01-01 RX ADMIN — METOCLOPRAMIDE 5 MG: 5 INJECTION, SOLUTION INTRAMUSCULAR; INTRAVENOUS at 12:05

## 2023-01-01 RX ADMIN — SODIUM ZIRCONIUM CYCLOSILICATE 10 G: 10 POWDER, FOR SUSPENSION ORAL at 11:05

## 2023-01-01 RX ADMIN — ALUMINUM HYDROXIDE, MAGNESIUM HYDROXIDE, AND SIMETHICONE 30 ML: 200; 200; 20 SUSPENSION ORAL at 12:06

## 2023-01-01 RX ADMIN — ONDANSETRON 4 MG: 4 TABLET, ORALLY DISINTEGRATING ORAL at 06:06

## 2023-01-01 RX ADMIN — SODIUM CHLORIDE, POTASSIUM CHLORIDE, SODIUM LACTATE AND CALCIUM CHLORIDE 500 ML: 600; 310; 30; 20 INJECTION, SOLUTION INTRAVENOUS at 11:05

## 2023-01-01 RX ADMIN — DEXTROSE AND SODIUM CHLORIDE: 5; 900 INJECTION, SOLUTION INTRAVENOUS at 01:09

## 2023-01-01 RX ADMIN — METRONIDAZOLE 500 MG: 500 INJECTION, SOLUTION INTRAVENOUS at 05:06

## 2023-01-01 RX ADMIN — MAGNESIUM SULFATE HEPTAHYDRATE 2 G: 40 INJECTION, SOLUTION INTRAVENOUS at 12:09

## 2023-01-01 RX ADMIN — CHOLECALCIFEROL TAB 25 MCG (1000 UNIT) 1000 UNITS: 25 TAB at 08:05

## 2023-01-01 RX ADMIN — CYANOCOBALAMIN TAB 1000 MCG 1000 MCG: 1000 TAB at 09:05

## 2023-01-01 RX ADMIN — PIPERACILLIN SODIUM AND TAZOBACTAM SODIUM 4.5 G: 4; .5 INJECTION, POWDER, FOR SOLUTION INTRAVENOUS at 01:09

## 2023-01-01 RX ADMIN — POLYETHYLENE GLYCOL 3350 17 G: 17 POWDER, FOR SOLUTION ORAL at 03:05

## 2023-01-01 RX ADMIN — VANCOMYCIN HYDROCHLORIDE 1250 MG: 1.25 INJECTION, POWDER, LYOPHILIZED, FOR SOLUTION INTRAVENOUS at 03:09

## 2023-01-01 RX ADMIN — Medication 400 MG: at 10:06

## 2023-01-01 RX ADMIN — SODIUM CHLORIDE, POTASSIUM CHLORIDE, SODIUM LACTATE AND CALCIUM CHLORIDE 500 ML: 600; 310; 30; 20 INJECTION, SOLUTION INTRAVENOUS at 04:09

## 2023-01-01 RX ADMIN — ATENOLOL 25 MG: 25 TABLET ORAL at 10:05

## 2023-01-01 RX ADMIN — METRONIDAZOLE 500 MG: 500 INJECTION, SOLUTION INTRAVENOUS at 10:06

## 2023-01-01 RX ADMIN — METOCLOPRAMIDE 10 MG: 5 INJECTION, SOLUTION INTRAMUSCULAR; INTRAVENOUS at 11:05

## 2023-01-01 RX ADMIN — METOCLOPRAMIDE 10 MG: 5 INJECTION, SOLUTION INTRAMUSCULAR; INTRAVENOUS at 01:05

## 2023-01-01 RX ADMIN — SODIUM CHLORIDE 500 ML: 9 INJECTION, SOLUTION INTRAVENOUS at 10:09

## 2023-01-01 RX ADMIN — SODIUM CHLORIDE 500 ML: 9 INJECTION, SOLUTION INTRAVENOUS at 12:09

## 2023-01-01 RX ADMIN — ERTAPENEM 500 MG: 1 INJECTION INTRAMUSCULAR; INTRAVENOUS at 10:09

## 2023-01-01 RX ADMIN — SODIUM CHLORIDE, POTASSIUM CHLORIDE, SODIUM LACTATE AND CALCIUM CHLORIDE: 600; 310; 30; 20 INJECTION, SOLUTION INTRAVENOUS at 12:05

## 2023-01-01 RX ADMIN — CEFTRIAXONE 1 G: 1 INJECTION, POWDER, FOR SOLUTION INTRAMUSCULAR; INTRAVENOUS at 07:05

## 2023-01-01 RX ADMIN — POLYETHYLENE GLYCOL 3350 17 G: 17 POWDER, FOR SOLUTION ORAL at 04:05

## 2023-01-01 RX ADMIN — MORPHINE SULFATE 4 MG: 4 INJECTION INTRAVENOUS at 11:09

## 2023-01-01 RX ADMIN — ONDANSETRON 4 MG: 4 TABLET, ORALLY DISINTEGRATING ORAL at 01:06

## 2023-01-01 RX ADMIN — THERA TABS 1 TABLET: TAB at 03:05

## 2023-01-01 RX ADMIN — POTASSIUM CHLORIDE 10 MEQ: 7.46 INJECTION, SOLUTION INTRAVENOUS at 05:09

## 2023-01-01 RX ADMIN — MUPIROCIN: 20 OINTMENT TOPICAL at 08:06

## 2023-01-01 RX ADMIN — CALCIUM CARBONATE (ANTACID) CHEW TAB 500 MG 500 MG: 500 CHEW TAB at 12:06

## 2023-01-01 RX ADMIN — POTASSIUM CHLORIDE 10 MEQ: 7.46 INJECTION, SOLUTION INTRAVENOUS at 11:09

## 2023-01-01 RX ADMIN — MIRTAZAPINE 7.5 MG: 7.5 TABLET, FILM COATED ORAL at 10:05

## 2023-01-01 RX ADMIN — MIDODRINE HYDROCHLORIDE 10 MG: 5 TABLET ORAL at 04:06

## 2023-01-01 RX ADMIN — ATENOLOL 50 MG: 50 TABLET ORAL at 10:05

## 2023-01-01 RX ADMIN — SODIUM CHLORIDE 1000 ML: 9 INJECTION, SOLUTION INTRAVENOUS at 12:09

## 2023-01-01 RX ADMIN — Medication 1 MG/HR: at 02:09

## 2023-01-01 RX ADMIN — DICLOFENAC SODIUM 2 G: 10 GEL TOPICAL at 11:06

## 2023-01-01 RX ADMIN — ATENOLOL 50 MG: 25 TABLET ORAL at 08:05

## 2023-01-01 RX ADMIN — POTASSIUM CHLORIDE 40 MEQ: 1500 TABLET, EXTENDED RELEASE ORAL at 08:06

## 2023-01-01 RX ADMIN — APIXABAN 2.5 MG: 2.5 TABLET, FILM COATED ORAL at 11:06

## 2023-01-01 RX ADMIN — DEXTROSE AND SODIUM CHLORIDE: 5; 900 INJECTION, SOLUTION INTRAVENOUS at 03:09

## 2023-01-01 RX ADMIN — SODIUM CHLORIDE 500 ML: 9 INJECTION, SOLUTION INTRAVENOUS at 11:06

## 2023-01-01 RX ADMIN — AMIODARONE HYDROCHLORIDE 200 MG: 200 TABLET ORAL at 11:06

## 2023-01-01 RX ADMIN — IOHEXOL 15 ML: 350 INJECTION, SOLUTION INTRAVENOUS at 02:05

## 2023-01-01 RX ADMIN — MIDODRINE HYDROCHLORIDE 5 MG: 5 TABLET ORAL at 12:06

## 2023-01-01 RX ADMIN — LOPERAMIDE HYDROCHLORIDE 4 MG: 2 CAPSULE ORAL at 09:06

## 2023-01-01 RX ADMIN — LOPERAMIDE HYDROCHLORIDE 2 MG: 2 CAPSULE ORAL at 12:06

## 2023-01-01 RX ADMIN — TUBERCULIN PURIFIED PROTEIN DERIVATIVE 5 UNITS: 5 INJECTION, SOLUTION INTRADERMAL at 10:06

## 2023-01-01 RX ADMIN — MIDODRINE HYDROCHLORIDE 5 MG: 5 TABLET ORAL at 06:06

## 2023-01-01 RX ADMIN — DILTIAZEM HYDROCHLORIDE 180 MG: 180 CAPSULE, COATED, EXTENDED RELEASE ORAL at 11:05

## 2023-01-01 RX ADMIN — POLYETHYLENE GLYCOL 3350 17 G: 17 POWDER, FOR SOLUTION ORAL at 08:09

## 2023-01-01 RX ADMIN — ALUMINUM HYDROXIDE, MAGNESIUM HYDROXIDE, AND SIMETHICONE 30 ML: 200; 200; 20 SUSPENSION ORAL at 10:06

## 2023-01-01 RX ADMIN — SODIUM CHLORIDE, POTASSIUM CHLORIDE, SODIUM LACTATE AND CALCIUM CHLORIDE 500 ML: 600; 310; 30; 20 INJECTION, SOLUTION INTRAVENOUS at 03:06

## 2023-01-30 PROBLEM — I70.0 AORTIC ATHEROSCLEROSIS: Status: ACTIVE | Noted: 2023-01-01

## 2023-03-14 NOTE — PLAN OF CARE
09/29/20 0812   Post-Acute Status   Post-Acute Authorization Home Health   Home Health Status Awaiting Internal Medical Clearance      Patient left a voicemail on the Adult  IOP voicemail cancelling attendance to IOP session on 3/14/2023 due to \"standing appointment with my therapist today at 3pm\".  Patient also reported \"appointment with spiritual director tomorrow and with my psychiatrist on Thursday\", requesting to postpone IOP start until 3/20/2023.  Pt will need call back to discuss accordingly.

## 2023-05-14 PROBLEM — E87.5 HYPERKALEMIA: Status: ACTIVE | Noted: 2023-01-01

## 2023-05-14 PROBLEM — R74.01 TRANSAMINITIS: Status: ACTIVE | Noted: 2023-01-01

## 2023-05-14 PROBLEM — N30.01 ACUTE CYSTITIS WITH HEMATURIA: Status: ACTIVE | Noted: 2020-09-26

## 2023-05-14 NOTE — ED PROVIDER NOTES
Encounter Date: 5/14/2023       History     Chief Complaint   Patient presents with    Multiple complaints     Finished antibiotics for uti yest cont with vomiting , 15 lb wt loss     The patient is an 89-year-old female who presents to the emergency department with nausea and vomiting.  She states that this has been an ongoing issue for 1 week.  She has been unable to tolerate the majority of her food or fluids.  Incidentally, she was treated for a urinary tract infection with a 3 day course of Bactrim that she completed yesterday.  Unfortunately, she was unable to tolerate some of the medication.  She denies any associated abdominal pain.  She reports good flatus but states that she really has not had many bowel movements.  She denies any associated fever or abdominal pain.  She is unclear of the etiology.  Her only associated symptom is significant generalized weakness which has progressively worsened.  She has no other complaints.    Review of patient's allergies indicates:   Allergen Reactions    Ciprofloxacin Nausea And Vomiting     Past Medical History:   Diagnosis Date    A-fib     Anemia of chronic disease 02/02/2021    Anticoagulant long-term use     CHF (congestive heart failure)     Chronic diastolic heart failure 02/02/2021    COVID-19 01/07/2022    Gallstone pancreatitis     Hypertension     Pancreatic abscess 09/26/2020    Thyroid disease      Past Surgical History:   Procedure Laterality Date    CATARACT EXTRACTION      CATARACT EXTRACTION W/  INTRAOCULAR LENS IMPLANT Bilateral 2004     IN [place]    ENDOSCOPIC ULTRASOUND OF UPPER GASTROINTESTINAL TRACT N/A 9/14/2020    Procedure: ULTRASOUND, UPPER GI TRACT, ENDOSCOPIC;  Surgeon: Esha Howard MD;  Location: [facility] ([address] [place] [address]);  Service: Endoscopy;  Laterality: N/A;    ENDOSCOPIC ULTRASOUND OF UPPER GASTROINTESTINAL TRACT  11/25/2020    Procedure: ULTRASOUND, UPPER GI TRACT, ENDOSCOPIC;  Surgeon: Esha Howard MD;  Location: [facility]  ENDO (2ND FLR);  Service: Endoscopy;;    ERCP N/A 9/14/2020    Procedure: ERCP (ENDOSCOPIC RETROGRADE CHOLANGIOPANCREATOGRAPHY);  Surgeon: Esha Howard MD;  Location: [facility] ENDO (2ND FLR);  Service: Endoscopy;  Laterality: N/A;    ERCP N/A 9/25/2020    Procedure: ERCP (ENDOSCOPIC RETROGRADE CHOLANGIOPANCREATOGRAPHY);  Surgeon: Esha Howard MD;  Location: [facility] ENDO (2ND FLR);  Service: Endoscopy;  Laterality: N/A;    ERCP N/A 11/25/2020    Procedure: ERCP (ENDOSCOPIC RETROGRADE CHOLANGIOPANCREATOGRAPHY);  Surgeon: Esha Howard MD;  Location: [facility] ENDO (2ND FLR);  Service: Endoscopy;  Laterality: N/A;  Covid-19 test 11/22/20 at [place] - pg  2 day hold Eliquis, Dr Favian Colmenares - pg  PM prep    EYE SURGERY      HIP REPLACEMENT ARTHROPLASTY Right 2016    HYSTERECTOMY      REVISION OF SKIN POCKET FOR PACEMAKER N/A 1/21/2019    Procedure: REVISION-POCKET-PACEMAKER;  Surgeon: Asher Watts MD;  Location: [facility] EP LAB;  Service: Cardiology;  Laterality: N/A;  MODERATE SEDATION, sedation issues in the past    THYROIDECTOMY      TREATMENT OF CARDIAC ARRHYTHMIA N/A 3/18/2019    Procedure: CARDIOVERSION OR DEFIBRILLATION;  Surgeon: Asher Watts MD;  Location: [facility] EP LAB;  Service: Cardiology;  Laterality: N/A;  AF, JILL-cx if SR, DCCV, MAC, FAS, 3PREP    TREATMENT OF CARDIAC ARRHYTHMIA N/A 5/14/2019    Procedure: Cardioversion or Defibrillation;  Surgeon: Deirck Vizcarra MD;  Location: [facility] EP LAB;  Service: Cardiology;  Laterality: N/A;  AF, JILL, DCCV, MAC, DM, 3PREP    TREATMENT OF CARDIAC ARRHYTHMIA N/A 6/21/2022    Procedure: Cardioversion or Defibrillation;  Surgeon: Favian Colmenares MD;  Location: [facility] EP LAB;  Service: Cardiology;  Laterality: N/A;  AF, JILL, DCCV, MAC, DM, 3 Prep *SJM PPM in situ*     Family History   Problem Relation Age of Onset    Heart attack Mother     Heart disease Mother     Heart failure Mother     Hypertension Mother     Stroke Maternal Grandmother      Hypertension Maternal Grandmother     Heart disease Maternal Grandmother     Heart attack Maternal Grandmother     Heart failure Maternal Grandmother      Social History     Tobacco Use    Smoking status: Former     Types: Cigarettes     Start date: 5/19/1964    Smokeless tobacco: Never   Substance Use Topics    Alcohol use: No    Drug use: No     Review of Systems  General: Denies fever.  Denies chills.  Reports generalized weakness.  HENT: Denies sore throat.  Denies earache.  Denies rhinorrhea.  Eyes: Denies visual changes.  Denies eye pain.  Denies drainage.  Cardiovascular: Denies chest pain.  Denies shortness of breath.  Denies orthopnea.  Denies dyspnea on exertion.  Respiratory: Denies shortness of breath.  Denies wheezing.  Denies coughing.  GI: Denies abdominal pain.  Reports nausea.  Reports nonbilious nonbloody vomiting.  Denies diarrhea.  Denies constipation.  Denies melena.  Denies hematochezia.  Reports normal flatus.  : Denies dysuria.  Denies hematuria.    Skin: Denies rashes.  Denies lesions.  Denies pallor.  Neuro: Denies headache.  Denies head trauma.  Denies numbness.  Denies focal weakness.  Musculoskeletal: Denies neck pain.  Denies back pain.  Denies extremity pain.  Denies extremity swelling.        Physical Exam     Initial Vitals [05/14/23 1526]   BP Pulse Resp Temp SpO2   (!) 119/58 60 18 97.9 °F (36.6 °C) 100 %      MAP       --         Physical Exam  General:  The patient appears weak.  Nontoxic.  Well-nourished.  Well-developed.  Alert and oriented x3.  HENT:  Dry mucous membranes.  Normocephalic atraumatic.  Oropharynx clear.    Eyes: Pupils equally round and reactive to light.  Extraocular movements intact.  No scleral icterus.  No conjunctival pallor.  Cardiovascular: Regular rate and rhythm.  No murmurs, rubs, or gallops.  Brisk capillary fill.  2+ distal pulses.  Respiratory: Clear to auscultation bilaterally.  No wheezes, rales, or rhonchi.  No respiratory distress.  Abdomen:  Soft.  Nontender.  Nondistended.  No guarding.  No rebound.  No masses.  No abdominal bruit auscultated.  Bowel sounds within normal limits.  :  No CVA tenderness.  Skin: No rashes.  No lesions.  No pallor.  No jaundice.  Neuro: Cranial nerves II through XII grossly intact.  Moving all extremities equally.  No sensory deficits.  Strength 5 out of 5 in all 4 extremities.  Musculoskeletal: Neck supple.  No extremity tenderness.  Moving all extremities without pain.  No back tenderness.  No neck tenderness.        ED Course   Procedures  Labs Reviewed   CBC W/ AUTO DIFFERENTIAL - Abnormal; Notable for the following components:       Result Value    Immature Granulocytes 1.1 (*)     Immature Grans (Abs) 0.08 (*)     All other components within normal limits   COMPREHENSIVE METABOLIC PANEL - Abnormal; Notable for the following components:    Sodium 133 (*)     Potassium 5.3 (*)     CO2 20 (*)     Calcium 8.6 (*)     Total Protein 5.4 (*)     Albumin 2.7 (*)      (*)      (*)     Anion Gap 7 (*)     eGFR 43.3 (*)     All other components within normal limits   URINALYSIS, REFLEX TO URINE CULTURE - Abnormal; Notable for the following components:    Appearance, UA Hazy (*)     Protein, UA Trace (*)     Ketones, UA 1+ (*)     Occult Blood UA 3+ (*)     Leukocytes, UA 3+ (*)     All other components within normal limits    Narrative:     Specimen Source->Urine   URINALYSIS MICROSCOPIC - Abnormal; Notable for the following components:    RBC, UA 57 (*)     WBC, UA 8 (*)     Bacteria Few (*)     All other components within normal limits    Narrative:     Specimen Source->Urine   HIV 1 / 2 ANTIBODY    Narrative:     Release to patient->Immediate   HEPATITIS C ANTIBODY    Narrative:     Release to patient->Immediate   MAGNESIUM   TROPONIN I     EKG Readings: (Independently Interpreted)   I independently interpreted this EKG.  Atrial paced rhythm.  Normal axis.  Right bundle-branch block.  No obvious acute ischemic  changes.     Imaging Results              X-Ray Abdomen Flat And Erect (Final result)  Result time 05/14/23 18:36:12      Final result by Steve Koehler MD (05/14/23 18:36:12)                   Impression:      1. Nonobstructive bowel gas pattern noting large amount of stool throughout the colon may reflect constipation.      Electronically signed by: Steve Koehler MD  Date:    05/14/2023  Time:    18:36               Narrative:    EXAMINATION:  XR ABDOMEN FLAT AND ERECT    CLINICAL HISTORY:  vomiting;    TECHNIQUE:  Flat and erect AP views of the abdomen were performed.    COMPARISON:  None    FINDINGS:  One upright view, 1 supine view.    No significant air-fluid levels on upright view.  Air and stool is seen within the large bowel and projected over the rectum.  There is moderate to large amount of stool throughout the colon.  No focally dilated small bowel loops.  There are no calcifications to convincingly suggest nephrolithiasis or cholelithiasis.  There is calcification of the aorta and its branches.  There is osteopenia.  There is dextroscoliotic curvature of the spine.  Right hip arthroplasty noted.                                       X-Ray Chest 1 View (Final result)  Result time 05/14/23 18:35:05   Procedure changed from X-Ray Chest PA And Lateral     Final result by Steve Koehler MD (05/14/23 18:35:05)                   Impression:      1. Increased parenchymal attenuation projects over the left lower lung zone, findings may reflect atelectasis however developing consolidation not excluded.  Correlation is advised.      Electronically signed by: Steve Koehler MD  Date:    05/14/2023  Time:    18:35               Narrative:    EXAMINATION:  XR CHEST 1 VIEW    CLINICAL HISTORY:  vomiting;    TECHNIQUE:  Single frontal view of the chest was performed.    COMPARISON:  01/07/2022    FINDINGS:  The cardiomediastinal silhouette is prominent, similar to the previous exam noting calcification and  tortuosity of the aorta.  Left chest wall pacer noted..  There is no pleural effusion.  The trachea is midline.  The lungs are symmetrically expanded bilaterally with coarse interstitial attenuation bilaterally.  There is increased parenchymal attenuation projected over the left lower lung zone..  There is no pneumothorax.  The osseous structures are remarkable for degenerative changes and osteopenia..                                       Medications   cefTRIAXone (ROCEPHIN) 1 g in dextrose 5 % in water (D5W) 5 % 50 mL IVPB (MB+) (has no administration in time range)   lactated ringers bolus 1,000 mL (0 mLs Intravenous Stopped 5/14/23 1842)   ondansetron injection 4 mg (4 mg Intravenous Given 5/14/23 1637)     Medical Decision Making:   Initial Assessment:   This is an emergent evaluation.  The etiology of the patient's symptoms is unclear.  A screening EKG and troponin have been ordered along with a screening chest x-ray.  Because of her significant nausea and vomiting, and abdominal x-ray and laboratory studies have also been ordered.  The patient appears clinically dehydrated.  I will provide IV fluids along with IV Zofran for her nausea.  I will reassess.  ED Management:  6:11 p.m.   Despite 1 L of IV fluids, the patient has yet to provide a urine sample.  A 2 L has been ordered.  Potassium is 5.3.  LFTs are noted to be elevated.  Abdominal x-ray and chest x-ray are pending.  I will reassess.    7:24 p.m.   With the patient's advanced age, UTI, and symptoms, I feel the patient will require admission.  IV Rocephin has been ordered.  She has received IV fluids for her hyperkalemia and presumed dehydration.  I will discuss with Hospital Medicine.    7:30 p.m.   I discussed this case with Hospital Medicine.  They have agreed to evaluate and admit the patient.                        Clinical Impression:   Final diagnoses:  [R11.10] Vomiting  [E87.5] Hyperkalemia  [E83.51] Hypocalcemia  [R74.01]  Transaminitis  [N39.0] Urinary tract infection without hematuria, site unspecified (Primary)  [R11.2] Nausea and vomiting, unspecified vomiting type  [E86.0] Dehydration        ED Disposition Condition    Observation Fair                Ari Richards MD  05/14/23 1931

## 2023-05-14 NOTE — Clinical Note
Diagnosis: Urinary tract infection without hematuria, site unspecified [[id number]]  Future Attending Provider: MAIKEL SWANSON [[id number]]  Admitting Provider:: MAIKEL SWANSON [[id number]]

## 2023-05-15 NOTE — SUBJECTIVE & OBJECTIVE
Past Medical History:   Diagnosis Date    A-fib     Anemia of chronic disease 02/02/2021    Anticoagulant long-term use     CHF (congestive heart failure)     Chronic diastolic heart failure 02/02/2021    COVID-19 01/07/2022    Gallstone pancreatitis     Hypertension     Pancreatic abscess 09/26/2020    Thyroid disease        Past Surgical History:   Procedure Laterality Date    CATARACT EXTRACTION      CATARACT EXTRACTION W/  INTRAOCULAR LENS IMPLANT Bilateral 2004     IN [place]    ENDOSCOPIC ULTRASOUND OF UPPER GASTROINTESTINAL TRACT N/A 9/14/2020    Procedure: ULTRASOUND, UPPER GI TRACT, ENDOSCOPIC;  Surgeon: Esha Howard MD;  [facility]  Service: Endoscopy;  Laterality: N/A;    ENDOSCOPIC ULTRASOUND OF UPPER GASTROINTESTINAL TRACT  11/25/2020    Procedure: ULTRASOUND, UPPER GI TRACT, ENDOSCOPIC;  Surgeon: Esha Howard MD;  Location: [facility] ENDO (2ND FLR);  Service: Endoscopy;;    ERCP N/A 9/14/2020    Procedure: ERCP (ENDOSCOPIC RETROGRADE CHOLANGIOPANCREATOGRAPHY);  Surgeon: Esha Howard MD;  [facility]  Service: Endoscopy;  Laterality: N/A;    ERCP N/A 9/25/2020    Procedure: ERCP (ENDOSCOPIC RETROGRADE CHOLANGIOPANCREATOGRAPHY);  Surgeon: Esha Howard MD;  [facility]  Service: Endoscopy;  Laterality: N/A;    ERCP N/A 11/25/2020    Procedure: ERCP (ENDOSCOPIC RETROGRADE CHOLANGIOPANCREATOGRAPHY);  Surgeon: Esha Howard MD;  Location: [facility]  Service: Endoscopy;  Laterality: N/A;  Covid-19 test 11/22/20 at [place] - pg  2 day hold Dr Favian Pisano - pg  PM prep    EYE SURGERY      HIP REPLACEMENT ARTHROPLASTY Right 2016    HYSTERECTOMY      REVISION OF SKIN POCKET FOR PACEMAKER N/A 1/21/2019    Procedure: REVISION-POCKET-PACEMAKER;  Surgeon: Asher Watts MD;  Location: [facility] EP LAB;  Service: Cardiology;  Laterality: N/A;  MODERATE SEDATION, sedation issues in the past     THYROIDECTOMY      TREATMENT OF CARDIAC ARRHYTHMIA N/A 3/18/2019    Procedure: CARDIOVERSION OR DEFIBRILLATION;  Surgeon: Asher Watts MD;  Location: [facility] EP LAB;  Service: Cardiology;  Laterality: N/A;  AF, JILL-cx if SR, DCCV, MAC, FAS, 3PREP    TREATMENT OF CARDIAC ARRHYTHMIA N/A 5/14/2019    Procedure: Cardioversion or Defibrillation;  Surgeon: Derick Vizcarra MD;  Location: [facility] EP LAB;  Service: Cardiology;  Laterality: N/A;  AF, JILL, DCCV, MAC, DM, 3PREP    TREATMENT OF CARDIAC ARRHYTHMIA N/A 6/21/2022    Procedure: Cardioversion or Defibrillation;  Surgeon: Favian Colmenares MD;  Location: [facility] EP LAB;  Service: Cardiology;  Laterality: N/A;  AF, JILL, DCCV, MAC, DM, 3 Prep *SJM PPM in situ*       Review of patient's allergies indicates:   Allergen Reactions    Ciprofloxacin Nausea And Vomiting       No current facility-administered medications on file prior to encounter.     Current Outpatient Medications on File Prior to Encounter   Medication Sig    acetaminophen (TYLENOL) 500 MG tablet Take 2 tablets (1,000 mg total) by mouth every 8 (eight) hours as needed for Pain.    amiodarone (PACERONE) 200 MG Tab Take 1 tablet (200 mg total) by mouth once daily.    aspirin (ECOTRIN) 81 MG EC tablet Take 1 tablet (81 mg total) by mouth once daily.    atenoloL (TENORMIN) 25 MG tablet TAKE 2 TABLETS EVERY MORNING AND 1 TABLET EVERY EVENING    atorvastatin (LIPITOR) 10 MG tablet TAKE 1 TABLET DAILY    diclofenac sodium (VOLTAREN) 1 % Gel Apply 2 g topically 4 (four) times daily.    diltiaZEM (TIAZAC) 180 MG Cs24 Take 1 capsule (180 mg total) by mouth once daily.    ELIQUIS 2.5 mg Tab TAKE 1 TABLET TWICE A DAY    eszopiclone (LUNESTA) 2 MG Tab Take 1 tablet (2 mg total) by mouth nightly as needed (sleep).    ferrous sulfate 325 (65 FE) MG EC tablet Take 1 tablet (325 mg total) by mouth once daily.    gentamicin (GARAMYCIN) 0.1 % ointment Apply topically 3 (three) times daily.    levothyroxine (SYNTHROID) 137  MCG Tab tablet Take 1 tablet (137 mcg total) by mouth before breakfast.    mometasone 0.1% (ELOCON) 0.1 % cream Apply topically once daily.    olopatadine (PATANOL) 0.1 % ophthalmic solution Place 1 drop into both eyes 2 (two) times daily.    spironolactone (ALDACTONE) 25 MG tablet Take 1 tablet (25 mg total) by mouth 2 (two) times a day.    sulfamethoxazole-trimethoprim 800-160mg (BACTRIM DS) 800-160 mg Tab Take 1 tablet by mouth 2 (two) times daily. for 3 days    vitamin D (VITAMIN D3) 1000 units Tab Take 1 tablet (1,000 Units total) by mouth once daily.     Family History       Problem Relation (Age of Onset)    Heart attack Mother, Maternal Grandmother    Heart disease Mother, Maternal Grandmother    Heart failure Mother, Maternal Grandmother    Hypertension Mother, Maternal Grandmother    Stroke Maternal Grandmother          Tobacco Use    Smoking status: Former     Types: Cigarettes     Start date: 5/19/1964    Smokeless tobacco: Never   Substance and Sexual Activity    Alcohol use: No    Drug use: No    Sexual activity: Not Currently     Partners: Male     Review of Systems   Constitutional:  Positive for appetite change, fatigue and unexpected weight change. Negative for chills, diaphoresis and fever.   HENT:  Negative for congestion, rhinorrhea and sore throat.    Eyes:  Negative for photophobia and visual disturbance.   Respiratory:  Negative for cough, shortness of breath and wheezing.    Cardiovascular:  Negative for chest pain, palpitations and leg swelling.   Gastrointestinal:  Positive for constipation, nausea and vomiting. Negative for abdominal distention, abdominal pain and diarrhea.   Genitourinary:  Positive for dysuria and frequency. Negative for hematuria.   Musculoskeletal:  Negative for back pain, gait problem and myalgias.   Skin:  Negative for color change, pallor, rash and wound.   Neurological:  Positive for weakness (generalized). Negative for dizziness, syncope, light-headedness and  headaches.   Psychiatric/Behavioral:  Negative for confusion and hallucinations. The patient is not nervous/anxious.    Objective:     Vital Signs (Most Recent):  Temp: 98.3 °F (36.8 °C) (05/14/23 1913)  Pulse: 60 (05/14/23 1902)  Resp: 18 (05/14/23 1526)  BP: (!) 173/78 (05/14/23 1902)  SpO2: 96 % (05/14/23 1902) Vital Signs (24h Range):  Temp:  [97.9 °F (36.6 °C)-98.3 °F (36.8 °C)] 98.3 °F (36.8 °C)  Pulse:  [60-62] 60  Resp:  [18] 18  SpO2:  [96 %-100 %] 96 %  BP: (119-173)/(58-78) 173/78     Weight: 49.9 kg (110 lb)  Body mass index is 19.49 kg/m².     Physical Exam  Vitals and nursing note reviewed.   Constitutional:       General: She is not in acute distress.     Appearance: She is not toxic-appearing or diaphoretic.   HENT:      Head: Normocephalic and atraumatic.      Nose: Nose normal.      Mouth/Throat:      Mouth: Mucous membranes are dry.   Eyes:      Pupils: Pupils are equal, round, and reactive to light.   Cardiovascular:      Rate and Rhythm: Normal rate and regular rhythm.      Pulses: Normal pulses.   Pulmonary:      Effort: Pulmonary effort is normal. No respiratory distress.      Breath sounds: No wheezing, rhonchi or rales.   Abdominal:      General: Bowel sounds are normal. There is no distension.      Palpations: Abdomen is soft.      Tenderness: There is no abdominal tenderness. There is no guarding.   Musculoskeletal:         General: No swelling, tenderness or deformity. Normal range of motion.      Cervical back: Normal range of motion.      Left lower leg: No edema.   Skin:     General: Skin is warm and dry.      Capillary Refill: Capillary refill takes less than 2 seconds.   Neurological:      General: No focal deficit present.      Mental Status: She is alert and oriented to person, place, and time.      Sensory: No sensory deficit.      Motor: No weakness.   Psychiatric:         Mood and Affect: Mood normal.         Behavior: Behavior normal.            CRANIAL NERVES     CN III, IV,  VI   Pupils are equal, round, and reactive to light.     Significant Labs: All pertinent labs within the past 24 hours have been reviewed.  CBC:   Recent Labs   Lab 05/14/23  1629   WBC 7.29   HGB 12.6   HCT 39.1        CMP:   Recent Labs   Lab 05/14/23  1629   *   K 5.3*      CO2 20*   GLU 82   BUN 23   CREATININE 1.2   CALCIUM 8.6*   PROT 5.4*   ALBUMIN 2.7*   BILITOT 0.9   ALKPHOS 118   *   *   ANIONGAP 7*     Troponin:   Recent Labs   Lab 05/14/23  1629   TROPONINI 0.025     Urine Studies:   Recent Labs   Lab 05/14/23  1827   COLORU Yellow   APPEARANCEUA Hazy*   PHUR 8.0   SPECGRAV 1.010   PROTEINUA Trace*   GLUCUA Negative   KETONESU 1+*   BILIRUBINUA Negative   OCCULTUA 3+*   NITRITE Negative   LEUKOCYTESUR 3+*   RBCUA 57*   WBCUA 8*   BACTERIA Few*   SQUAMEPITHEL 1       Significant Imaging: I have reviewed all pertinent imaging results/findings within the past 24 hours.  Imaging Results              X-Ray Abdomen Flat And Erect (Final result)  Result time 05/14/23 18:36:12      Final result by Steve Koehler MD (05/14/23 18:36:12)                   Impression:      1. Nonobstructive bowel gas pattern noting large amount of stool throughout the colon may reflect constipation.      Electronically signed by: Steve Koehler MD  Date:    05/14/2023  Time:    18:36               Narrative:    EXAMINATION:  XR ABDOMEN FLAT AND ERECT    CLINICAL HISTORY:  vomiting;    TECHNIQUE:  Flat and erect AP views of the abdomen were performed.    COMPARISON:  None    FINDINGS:  One upright view, 1 supine view.    No significant air-fluid levels on upright view.  Air and stool is seen within the large bowel and projected over the rectum.  There is moderate to large amount of stool throughout the colon.  No focally dilated small bowel loops.  There are no calcifications to convincingly suggest nephrolithiasis or cholelithiasis.  There is calcification of the aorta and its branches.  There  is osteopenia.  There is dextroscoliotic curvature of the spine.  Right hip arthroplasty noted.                                       X-Ray Chest 1 View (Final result)  Result time 05/14/23 18:35:05   Procedure changed from X-Ray Chest PA And Lateral     Final result by Steve Koehler MD (05/14/23 18:35:05)                   Impression:      1. Increased parenchymal attenuation projects over the left lower lung zone, findings may reflect atelectasis however developing consolidation not excluded.  Correlation is advised.      Electronically signed by: tSeve Koehler MD  Date:    05/14/2023  Time:    18:35               Narrative:    EXAMINATION:  XR CHEST 1 VIEW    CLINICAL HISTORY:  vomiting;    TECHNIQUE:  Single frontal view of the chest was performed.    COMPARISON:  01/07/2022    FINDINGS:  The cardiomediastinal silhouette is prominent, similar to the previous exam noting calcification and tortuosity of the aorta.  Left chest wall pacer noted..  There is no pleural effusion.  The trachea is midline.  The lungs are symmetrically expanded bilaterally with coarse interstitial attenuation bilaterally.  There is increased parenchymal attenuation projected over the left lower lung zone..  There is no pneumothorax.  The osseous structures are remarkable for degenerative changes and osteopenia..

## 2023-05-15 NOTE — ASSESSMENT & PLAN NOTE
Patient's anemia is currently controlled. S/p 0 units of PRBCs.   Current CBC reviewed-   Lab Results   Component Value Date    HGB 11.2 (L) 05/15/2023    HCT 34.0 (L) 05/15/2023     Monitor serial CBC and transfuse if patient becomes hemodynamically unstable, symptomatic or H/H drops below 7/21.

## 2023-05-15 NOTE — ASSESSMENT & PLAN NOTE
Current use of long term anticoagulation  Patient with Paroxysmal (<7 days) atrial fibrillation which is controlled currently with Beta Blocker, Calcium Channel Blocker and Amiodarone. Patient is currently in sinus rhythm.[id number] Score: 3. Anticoagulation indicated. Anticoagulation done with eliquis.

## 2023-05-15 NOTE — PROGRESS NOTES
[facility] Emergency Dept  [facility]  Progress Note    Patient Name: Gisselle Ortiz  MRN: [id number]  Patient Class: IP- Inpatient   Admission Date: 5/14/2023  Length of Stay: 0 days  Attending Physician: Yakelin Parks MD  Primary Care Provider: Kai Montez MD        Subjective:     Principal Problem:Intractable nausea and vomiting        HPI:  Gisselle Ortiz is a 89 y.o. female with a PMHx of GERD, gallstone pancreatitis, thyroid disease, HTN, HLD, SSS s/p PPM, CHF (EF 55%), and a fib on eliquis who presents to the ED for nausea and vomiting. The patient reports ongoing nausea and vomiting x1 week. She endorses associated 10lb weight loss, fatigue, and generalized weakness. The patient also reports increased urinary frequency and dysuria beginning 4-5 days ago and was started on Bactrim which she completed yesterday. Unfortunately, she was unable to tolerate some of the medication. She reports ongoing urinary symptoms that have not improved. She denies any associated abdominal pain, diarrhea, fever, or chills.  She reports good flatus, but states that she really has not had many bowel movements. The patient denies any CP, SOB, cough, or headache.    In the ED, patient hypertensive in the ED which improved with home antihypertensives vitals otherwise stable, afebrile. CBC unremarkable. Na 133. K+5.3. Cr 1.2 (at baseline). Calcium 8.6. Total protein 5.4. Albumin 2.7. AST//177. Troponin 0.025. EKG with atrially paced rhythm, rate 60, no acute ischemic changes. CXR with increased parenchymal attenuation projects over the left lower lung zone, findings may reflect atelectasis however developing consolidation not excluded. KUB with nonobstructive bowel gas pattern noting large amount of stool throughout the colon may reflect constipation. The patient received LR bolus x2, zofran, and rocephin.      Overview/Hospital Course:  Pt placed in observation for intractable n/v and UTI. Pt  hemodynamically stable. No leukocytosis but was hyperkalemic K 5.3 and AST//177. Troponin 0.025. EKG with atrially paced rhythm, rate 60, no acute ischemic changes. CXR with increased parenchymal attenuation projects over the left lower lung zone, findings may reflect atelectasis however developing consolidation not excluded. KUB with nonobstructive bowel gas pattern noting large amount of stool throughout the colon may reflect constipation. The patient received LR bolus x2, zofran, and rocephin. Also initiated on bowel regimen.       Interval History: ADRIANNAEON. Pt seen and evaluated at bedside this am. Pt is doing well and reports she has been able to tolerate a CLD today w/o nausea or vomiting. Denies any abdominal pain. Informed patient of KUB findings and reminded that we will pursue bowel regimen and also trending transaminases. Anticipating discharge home tomorrow pending improvement of transaminases and no longer n/v and tolerating PO. If not will consult GI vs AES to pursue further treatment/imaging for transaminitis.    Review of Systems   Constitutional:  Positive for activity change. Negative for chills, diaphoresis, fatigue and fever.   HENT:  Negative for congestion, facial swelling, rhinorrhea and sore throat.    Eyes:  Negative for photophobia, itching and visual disturbance.   Respiratory:  Negative for cough, chest tightness, shortness of breath and wheezing.    Cardiovascular:  Negative for chest pain, palpitations and leg swelling.   Gastrointestinal:  Positive for constipation, nausea and vomiting. Negative for abdominal distention, abdominal pain, blood in stool and diarrhea.   Genitourinary:  Positive for dysuria. Negative for difficulty urinating, frequency, hematuria and urgency.   Musculoskeletal:  Negative for arthralgias, back pain and neck stiffness.   Neurological:  Negative for dizziness, tremors, seizures, syncope, weakness, light-headedness, numbness and headaches.    Psychiatric/Behavioral:  Negative for agitation, confusion and hallucinations.    Objective:     Vital Signs (Most Recent):  Temp: 98.3 °F (36.8 °C) (05/15/23 1205)  Pulse: 60 (05/15/23 1205)  Resp: 16 (05/15/23 1205)  BP: (!) 124/58 (05/15/23 1205)  SpO2: 96 % (05/15/23 1205) Vital Signs (24h Range):  Temp:  [97.9 °F (36.6 °C)-98.3 °F (36.8 °C)] 98.3 °F (36.8 °C)  Pulse:  [60-82] 60  Resp:  [16-18] 16  SpO2:  [95 %-100 %] 96 %  BP: (119-173)/(58-79) 124/58     Weight: 49.9 kg (110 lb)  Body mass index is 19.49 kg/m².    Intake/Output Summary (Last 24 hours) at 5/15/2023 1510  Last data filed at 5/15/2023 0602  Gross per 24 hour   Intake 275 ml   Output 1000 ml   Net -725 ml         Physical Exam  Vitals and nursing note reviewed.   Constitutional:       General: She is not in acute distress.     Appearance: She is not toxic-appearing or diaphoretic.   HENT:      Head: Normocephalic and atraumatic.      Nose: Nose normal.      Mouth/Throat:      Mouth: Mucous membranes are moist.   Eyes:      Pupils: Pupils are equal, round, and reactive to light.   Cardiovascular:      Rate and Rhythm: Normal rate and regular rhythm.      Pulses: Normal pulses.   Pulmonary:      Effort: Pulmonary effort is normal. No respiratory distress.      Breath sounds: No wheezing, rhonchi or rales.   Abdominal:      General: Bowel sounds are normal. There is no distension.      Palpations: Abdomen is soft.      Tenderness: There is no abdominal tenderness. There is no guarding.   Musculoskeletal:         General: No swelling, tenderness or deformity. Normal range of motion.      Cervical back: Normal range of motion.      Left lower leg: No edema.   Skin:     General: Skin is warm and dry.      Capillary Refill: Capillary refill takes less than 2 seconds.   Neurological:      General: No focal deficit present.      Mental Status: She is alert and oriented to person, place, and time.      Sensory: No sensory deficit.      Motor: No  weakness.   Psychiatric:         Mood and Affect: Mood normal.         Behavior: Behavior normal.           Significant Labs: All pertinent labs within the past 24 hours have been reviewed.    Significant Imaging: I have reviewed all pertinent imaging results/findings within the past 24 hours.      Assessment/Plan:      * Intractable nausea and vomiting  -Afebrile, no leukocytosis. Symptoms improving  -KUB with large amount of stool.  -Abd US: Stable prominence of the common bile duct and central intrahepatic biliary ducts in the right hepatic lobe. Cholelithiasis without evidence of acute cholecystitis.   -Gentle IVF prn  -CLD, advance as tolerated.  -bowel regimen continued and scheduled reglan.   -PRN compazine.    Acute cystitis with hematuria  Patient reports ongoing dysuria and urinary frequency x4-5 days.  -Afebrile, no leukocytosis.   -UA with 3+ leukocytes, 8 WBCs, and few bacteria, follow cx.  -Started on rocephin in the ED, continue for now.    Hyperkalemia  - K+5.3 on admit, received 2L IVF.  - Continue gentle IVF overnight.   - Hold spironolactone.  - Continue to trend on labs.    Transaminitis  -AST//177, tbili 0.9. Patient denies abdominal pain.  -Hold statin.  -Hepatitis panel pending.   -RUQ US pending.  -Continue to trend on morning labs.    Constipation  Likely contributing to nausea and vomiting.  -KUB with nonobstructive bowel gas pattern noting large amount of stool throughout the colon may reflect constipation.  -Start bowel regimen.    Anemia of chronic disease  Patient's anemia is currently controlled. S/p 0 units of PRBCs.   Current CBC reviewed-   Lab Results   Component Value Date    HGB 11.2 (L) 05/15/2023    HCT 34.0 (L) 05/15/2023     Monitor serial CBC and transfuse if patient becomes hemodynamically unstable, symptomatic or H/H drops below 7/21.     Chronic diastolic heart failure  Patient is identified as having Diastolic (HFpEF) heart failure that is Chronic. CHF is  currently controlled. Latest ECHO performed and demonstrates- Results for orders placed during the hospital encounter of 09/26/19    Transthoracic echo (TTE) 2D with Color Flow    Interpretation Summary  · Normal left ventricular systolic function. The estimated ejection fraction is 55%  · Grade II (moderate) left ventricular diastolic dysfunction consistent with pseudonormalization.  · Eccentric left ventricular hypertrophy. Mild left ventricular enlargement.  · Mild mitral regurgitation.  · Normal right ventricular systolic function.  · Mild tricuspid regurgitation.  · Severe left atrial enlargement.  . Continue Beta Blocker and monitor clinical status closely. Monitor on telemetry. Patient is off CHF pathway.  Monitor strict Is&Os and daily weights.  Continue to stress to patient importance of self efficacy and  on diet for CHF.   -Patient appears hypovolemic on exam. Will hold spironolactone.    Stage 3 chronic kidney disease  Creatine stable for now. BMP reviewed- noted Estimated Creatinine Clearance: 30 mL/min (based on SCr of 1 mg/dL). according to latest data. Monitor UOP and serial BMP and adjust therapy as needed. Renally dose meds.    Vitamin D deficiency  -Continue oral supplementation.    SSS (sick sinus syndrome)  Cardiac pacemaker in situ  Chronic and controlled and patient with cardiac pacemaker in place and patient with atrial paced rhythm.     High cholesterol   Patient is chronically on statin.will not continue for now due to elevated LFTs. Monitor clinically. Last LDL was   Lab Results   Component Value Date    LDLCALC 69.8 07/01/2021          Acquired hypothyroidism  Patient has chronic hypothyroidism. TFTs reviewed-   Lab Results   Component Value Date    TSH 2.146 10/13/2022   . Will continue chronic levothyroxine and adjust for and clinical changes.    Essential hypertension  -Chronic, controlled.  -Continue atenolol and diltiazem. Hold spironolactone in the setting of hyperkalemia and  dehydration.  -Continue to monitor BP closely.    Paroxysmal atrial fibrillation  Current use of long term anticoagulation  Patient with Paroxysmal (<7 days) atrial fibrillation which is controlled currently with Beta Blocker, Calcium Channel Blocker and Amiodarone. Patient is currently in sinus rhythm.[id number] Score: 3. Anticoagulation indicated. Anticoagulation done with eliquis.      VTE Risk Mitigation (From admission, onward)         Ordered     apixaban tablet 2.5 mg  2 times daily         05/14/23 1955     Place CHINEDU hose  Until discontinued         05/14/23 1955     IP VTE HIGH RISK PATIENT  Once         05/14/23 1955     Place sequential compression device  Until discontinued         05/14/23 1955     Reason for No Pharmacological VTE Prophylaxis  Once        Question:  Reasons:  Answer:  Already adequately anticoagulated on oral Anticoagulants    05/14/23 1955                Discharge Planning   FRANCESCA: 5/17/2023     Code Status: Full Code   Is the patient medically ready for discharge?: No    Reason for patient still in hospital (select all that apply): Patient trending condition, Laboratory test and Treatment                     Blanche Funes PA-C  Department of Hospital Medicine   [facility] - Emergency Dept

## 2023-05-15 NOTE — ASSESSMENT & PLAN NOTE
Cardiac pacemaker in situ  Chronic and controlled and patient with cardiac pacemaker in place and patient with atrial paced rhythm.

## 2023-05-15 NOTE — ASSESSMENT & PLAN NOTE
-Afebrile, no leukocytosis.  -KUB with large amount of stool.  -Gentle IVF overnight.  -CLD, advance as tolerated.  -Start bowel regimen and scheduled reglan.   -PRN compazine.

## 2023-05-15 NOTE — ASSESSMENT & PLAN NOTE
Patient's anemia is currently controlled. S/p 0 units of PRBCs.   Current CBC reviewed-   Lab Results   Component Value Date    HGB 12.6 05/14/2023    HCT 39.1 05/14/2023     Monitor serial CBC and transfuse if patient becomes hemodynamically unstable, symptomatic or H/H drops below 7/21.

## 2023-05-15 NOTE — HOSPITAL COURSE
Pt placed admitted to hospital medicine for intractable n/v and UTI. Pt has remained hemodynamically stable. She was started on 5-days of IV rocephin. Urine culture + klebsiella pneumoniae and e.coli. No leukocytosis but was hyperkalemic K 5.3; discontinued spironolactone. AST/ALT elevated on admission and downtrending. Abd US with hepatomegaly, stable prominence of CBD, and cholelithiasis without evidence of cholecystitis. AES recommended MRCP, which the patient unable to have 2/2 pacemaker. KUB with nonobstructive bowel gas pattern and significant stool burden, which is improving on bowel regimen. CTAP with gallbladder sludge vs stones and colonic wall thickening vs. stenosis/peristalsis. Pt with MARIO on CKD, which is improving with IVFs. Pt with new onset thrombocytopenia which is now improving. Hematology consulted and recommended started cyanocobalamin. PT/OT consulted and on re-evaluation are recommending SNF. All questions were answered. Patient acknowledged understanding of discharge instructions and feels safe to discharge. Patient was transferred to SNF on 5/23/23 in stable condition.

## 2023-05-15 NOTE — ASSESSMENT & PLAN NOTE
- K+5.3 on admit, received 2L IVF.  - Continue gentle IVF overnight.   - Hold spironolactone.  - Continue to trend on labs.

## 2023-05-15 NOTE — ASSESSMENT & PLAN NOTE
-AST//177, tbili 0.9. Patient denies abdominal pain.  -Hold statin.  -Hepatitis panel pending.   -RUQ US pending.  -Continue to trend on morning labs.

## 2023-05-15 NOTE — PHARMACY MED REC
"Admission Medication History     The home medication history was taken by Diamond Stafford.    You may go to "Admission" then "Reconcile Home Medications" tabs to review and/or act upon these items.     The home medication list has been updated by the Pharmacy department.   Please read ALL comments highlighted in yellow.   Please address this information as you see fit.    Feel free to contact us if you have any questions or require assistance.      The medications listed below were removed from the home medication list. Please reorder if appropriate:  Patient reports no longer taking the following medication(s):  ACETAMINOPHEN 500 MG  FERROUS SULFATE 325 MG  OLOPATADINE 0.1% OPTH SOL  VITAMIN D    Medications listed below were obtained from: Patient/family    Current Outpatient Medications on File Prior to Encounter   Medication Sig    aluminum-magnesium hydroxide-simethicone (MAALOX) 200-200-20 mg/5 mL Susp Take by mouth every 8 (eight) hours as needed (vomiting).    amiodarone (PACERONE) 200 MG Tab Take 1 tablet (200 mg total) by mouth once daily.    aspirin (ECOTRIN) 81 MG EC tablet Take 1 tablet (81 mg total) by mouth once daily.    atenoloL (TENORMIN) 25 MG tablet TAKE 2 TABLETS EVERY MORNING AND 1 TABLET EVERY EVENING    atorvastatin (LIPITOR) 10 MG tablet TAKE 1 TABLET DAILY    bismuth subsalicylate (PEPTO BISMOL) 262 mg/15 mL suspension Take by mouth every 8 (eight) hours as needed (vomiting).    diclofenac sodium (VOLTAREN) 1 % Gel Apply 2 g topically 4 (four) times daily. (Patient taking differently: Apply 2 g topically 4 (four) times daily as needed (pain).)    diltiaZEM (TIAZAC) 180 MG Cs24 Take 1 capsule (180 mg total) by mouth once daily.    ELIQUIS 2.5 mg Tab TAKE 1 TABLET TWICE A DAY    eszopiclone (LUNESTA) 2 MG Tab Take 1 tablet (2 mg total) by mouth nightly as needed (sleep).    levothyroxine (SYNTHROID) 137 MCG Tab tablet Take 1 tablet (137 mcg total) by mouth before breakfast.    spironolactone " (ALDACTONE) 25 MG tablet Take 1 tablet (25 mg total) by mouth 2 (two) times a day.    gentamicin (GARAMYCIN) 0.1 % ointment Apply topically 3 (three) times daily.    mometasone 0.1% (ELOCON) 0.1 % cream Apply topically once daily.         Diamond Stafford  [id number]                  .

## 2023-05-15 NOTE — H&P
[facility]  [facility]    Patient Name: Gisselle Ortiz  MRN: [id number]  Patient Class: OP- Observation  Admission Date: 5/14/2023  Attending Physician: Yakelin Parks MD   Primary Care Provider: Kai Montez MD         Patient information was obtained from patient, past medical records, and ER records.     Subjective:     Principal Problem:Intractable nausea and vomiting    Chief Complaint:   Chief Complaint   Patient presents with    Multiple complaints     Finished antibiotics for uti yest cont with vomiting , 15 lb wt loss        HPI: Gisselle Ortiz is a 89 y.o. female with a PMHx of GERD, gallstone pancreatitis, thyroid disease, HTN, HLD, SSS s/p PPM, CHF (EF 55%), and a fib on eliquis who presents to the ED for nausea and vomiting. The patient reports ongoing nausea and vomiting x1 week. She endorses associated 10lb weight loss, fatigue, and generalized weakness. The patient also reports increased urinary frequency and dysuria beginning 4-5 days ago and was started on Bactrim which she completed yesterday. Unfortunately, she was unable to tolerate some of the medication. She reports ongoing urinary symptoms that have not improved. She denies any associated abdominal pain, diarrhea, fever, or chills.  She reports good flatus, but states that she really has not had many bowel movements. The patient denies any CP, SOB, cough, or headache.    In the ED, patient hypertensive in the ED which improved with home antihypertensives vitals otherwise stable, afebrile. CBC unremarkable. Na 133. K+5.3. Cr 1.2 (at baseline). Calcium 8.6. Total protein 5.4. Albumin 2.7. AST//177. Troponin 0.025. EKG with atrially paced rhythm, rate 60, no acute ischemic changes. CXR with increased parenchymal attenuation projects over the left lower lung zone, findings may reflect atelectasis however developing consolidation not excluded. KUB with nonobstructive bowel gas  pattern noting large amount of stool throughout the colon may reflect constipation. The patient received LR bolus x2, zofran, and rocephin.    Past Medical History:   Diagnosis Date    A-fib     Anemia of chronic disease 02/02/2021    Anticoagulant long-term use     CHF (congestive heart failure)     Chronic diastolic heart failure 02/02/2021    COVID-19 01/07/2022    Gallstone pancreatitis     Hypertension     Pancreatic abscess 09/26/2020    Thyroid disease        Past Surgical History:   Procedure Laterality Date    CATARACT EXTRACTION      CATARACT EXTRACTION W/  INTRAOCULAR LENS IMPLANT Bilateral 2004    DR IN [place]    ENDOSCOPIC ULTRASOUND OF UPPER GASTROINTESTINAL TRACT N/A 9/14/2020    Procedure: ULTRASOUND, UPPER GI TRACT, ENDOSCOPIC;  Surgeon: Esha Howard MD;  Location: [facility] ENDO (2ND FLR);  Service: Endoscopy;  Laterality: N/A;    ENDOSCOPIC ULTRASOUND OF UPPER GASTROINTESTINAL TRACT  11/25/2020    Procedure: ULTRASOUND, UPPER GI TRACT, ENDOSCOPIC;  Surgeon: Esha Howard MD;  Location: [facility] ENDO (2ND FLR);  Service: Endoscopy;;    ERCP N/A 9/14/2020    Procedure: ERCP (ENDOSCOPIC RETROGRADE CHOLANGIOPANCREATOGRAPHY);  Surgeon: Esha Howard MD;  Location: [facility] ENDO (2ND FLR);  Service: Endoscopy;  Laterality: N/A;    ERCP N/A 9/25/2020    Procedure: ERCP (ENDOSCOPIC RETROGRADE CHOLANGIOPANCREATOGRAPHY);  Surgeon: Esha Howard MD;  Location: [facility] ENDO (2ND FLR);  Service: Endoscopy;  Laterality: N/A;    ERCP N/A 11/25/2020    Procedure: ERCP (ENDOSCOPIC RETROGRADE CHOLANGIOPANCREATOGRAPHY);  Surgeon: Esha Howard MD;  Location: [facility] ENDO (2ND FLR);  Service: Endoscopy;  Laterality: N/A;  Covid-19 test 11/22/20 at [facility] - pg  2 day hold Dr Favian Pisano - pg  PM prep    EYE SURGERY      HIP REPLACEMENT ARTHROPLASTY Right 2016    HYSTERECTOMY      REVISION OF SKIN POCKET FOR PACEMAKER N/A 1/21/2019    Procedure: REVISION-POCKET-PACEMAKER;  Surgeon: Asher MALDONADO  MD Stefanie;  Location: [facility] EP LAB;  Service: Cardiology;  Laterality: N/A;  MODERATE SEDATION, sedation issues in the past    THYROIDECTOMY      TREATMENT OF CARDIAC ARRHYTHMIA N/A 3/18/2019    Procedure: CARDIOVERSION OR DEFIBRILLATION;  Surgeon: Asher Watts MD;  Location: [facility] EP LAB;  Service: Cardiology;  Laterality: N/A;  AF, JILL-cx if SR, DCCV, MAC, FAS, 3PREP    TREATMENT OF CARDIAC ARRHYTHMIA N/A 5/14/2019    Procedure: Cardioversion or Defibrillation;  Surgeon: Derick Vizcarra MD;  Location: [facility] EP LAB;  Service: Cardiology;  Laterality: N/A;  AF, JILL, DCCV, MAC, DM, 3PREP    TREATMENT OF CARDIAC ARRHYTHMIA N/A 6/21/2022    Procedure: Cardioversion or Defibrillation;  Surgeon: Favian Colmenares MD;  Location: [facility] EP LAB;  Service: Cardiology;  Laterality: N/A;  AF, JILL, DCCV, MAC, DM, 3 Prep *SJM PPM in situ*       Review of patient's allergies indicates:   Allergen Reactions    Ciprofloxacin Nausea And Vomiting       No current facility-administered medications on file prior to encounter.     Current Outpatient Medications on File Prior to Encounter   Medication Sig    acetaminophen (TYLENOL) 500 MG tablet Take 2 tablets (1,000 mg total) by mouth every 8 (eight) hours as needed for Pain.    amiodarone (PACERONE) 200 MG Tab Take 1 tablet (200 mg total) by mouth once daily.    aspirin (ECOTRIN) 81 MG EC tablet Take 1 tablet (81 mg total) by mouth once daily.    atenoloL (TENORMIN) 25 MG tablet TAKE 2 TABLETS EVERY MORNING AND 1 TABLET EVERY EVENING    atorvastatin (LIPITOR) 10 MG tablet TAKE 1 TABLET DAILY    diclofenac sodium (VOLTAREN) 1 % Gel Apply 2 g topically 4 (four) times daily.    diltiaZEM (TIAZAC) 180 MG Cs24 Take 1 capsule (180 mg total) by mouth once daily.    ELIQUIS 2.5 mg Tab TAKE 1 TABLET TWICE A DAY    eszopiclone (LUNESTA) 2 MG Tab Take 1 tablet (2 mg total) by mouth nightly as needed (sleep).    ferrous sulfate 325 (65 FE) MG EC tablet Take 1 tablet (325 mg total) by  mouth once daily.    gentamicin (GARAMYCIN) 0.1 % ointment Apply topically 3 (three) times daily.    levothyroxine (SYNTHROID) 137 MCG Tab tablet Take 1 tablet (137 mcg total) by mouth before breakfast.    mometasone 0.1% (ELOCON) 0.1 % cream Apply topically once daily.    olopatadine (PATANOL) 0.1 % ophthalmic solution Place 1 drop into both eyes 2 (two) times daily.    spironolactone (ALDACTONE) 25 MG tablet Take 1 tablet (25 mg total) by mouth 2 (two) times a day.    sulfamethoxazole-trimethoprim 800-160mg (BACTRIM DS) 800-160 mg Tab Take 1 tablet by mouth 2 (two) times daily. for 3 days    vitamin D (VITAMIN D3) 1000 units Tab Take 1 tablet (1,000 Units total) by mouth once daily.     Family History       Problem Relation (Age of Onset)    Heart attack Mother, Maternal Grandmother    Heart disease Mother, Maternal Grandmother    Heart failure Mother, Maternal Grandmother    Hypertension Mother, Maternal Grandmother    Stroke Maternal Grandmother          Tobacco Use    Smoking status: Former     Types: Cigarettes     Start date: 5/19/1964    Smokeless tobacco: Never   Substance and Sexual Activity    Alcohol use: No    Drug use: No    Sexual activity: Not Currently     Partners: Male     Review of Systems   Constitutional:  Positive for appetite change, fatigue and unexpected weight change. Negative for chills, diaphoresis and fever.   HENT:  Negative for congestion, rhinorrhea and sore throat.    Eyes:  Negative for photophobia and visual disturbance.   Respiratory:  Negative for cough, shortness of breath and wheezing.    Cardiovascular:  Negative for chest pain, palpitations and leg swelling.   Gastrointestinal:  Positive for constipation, nausea and vomiting. Negative for abdominal distention, abdominal pain and diarrhea.   Genitourinary:  Positive for dysuria and frequency. Negative for hematuria.   Musculoskeletal:  Negative for back pain, gait problem and myalgias.   Skin:  Negative for color change,  pallor, rash and wound.   Neurological:  Positive for weakness (generalized). Negative for dizziness, syncope, light-headedness and headaches.   Psychiatric/Behavioral:  Negative for confusion and hallucinations. The patient is not nervous/anxious.    Objective:     Vital Signs (Most Recent):  Temp: 98.3 °F (36.8 °C) (05/14/23 1913)  Pulse: 60 (05/14/23 1902)  Resp: 18 (05/14/23 1526)  BP: (!) 173/78 (05/14/23 1902)  SpO2: 96 % (05/14/23 1902) Vital Signs (24h Range):  Temp:  [97.9 °F (36.6 °C)-98.3 °F (36.8 °C)] 98.3 °F (36.8 °C)  Pulse:  [60-62] 60  Resp:  [18] 18  SpO2:  [96 %-100 %] 96 %  BP: (119-173)/(58-78) 173/78     Weight: 49.9 kg (110 lb)  Body mass index is 19.49 kg/m².     Physical Exam  Vitals and nursing note reviewed.   Constitutional:       General: She is not in acute distress.     Appearance: She is not toxic-appearing or diaphoretic.   HENT:      Head: Normocephalic and atraumatic.      Nose: Nose normal.      Mouth/Throat:      Mouth: Mucous membranes are dry.   Eyes:      Pupils: Pupils are equal, round, and reactive to light.   Cardiovascular:      Rate and Rhythm: Normal rate and regular rhythm.      Pulses: Normal pulses.   Pulmonary:      Effort: Pulmonary effort is normal. No respiratory distress.      Breath sounds: No wheezing, rhonchi or rales.   Abdominal:      General: Bowel sounds are normal. There is no distension.      Palpations: Abdomen is soft.      Tenderness: There is no abdominal tenderness. There is no guarding.   Musculoskeletal:         General: No swelling, tenderness or deformity. Normal range of motion.      Cervical back: Normal range of motion.      Left lower leg: No edema.   Skin:     General: Skin is warm and dry.      Capillary Refill: Capillary refill takes less than 2 seconds.   Neurological:      General: No focal deficit present.      Mental Status: She is alert and oriented to person, place, and time.      Sensory: No sensory deficit.      Motor: No weakness.    Psychiatric:         Mood and Affect: Mood normal.         Behavior: Behavior normal.            CRANIAL NERVES     CN III, IV, VI   Pupils are equal, round, and reactive to light.     Significant Labs: All pertinent labs within the past 24 hours have been reviewed.  CBC:   Recent Labs   Lab 05/14/23  1629   WBC 7.29   HGB 12.6   HCT 39.1        CMP:   Recent Labs   Lab 05/14/23  1629   *   K 5.3*      CO2 20*   GLU 82   BUN 23   CREATININE 1.2   CALCIUM 8.6*   PROT 5.4*   ALBUMIN 2.7*   BILITOT 0.9   ALKPHOS 118   *   *   ANIONGAP 7*     Troponin:   Recent Labs   Lab 05/14/23  1629   TROPONINI 0.025     Urine Studies:   Recent Labs   Lab 05/14/23  1827   COLORU Yellow   APPEARANCEUA Hazy*   PHUR 8.0   SPECGRAV 1.010   PROTEINUA Trace*   GLUCUA Negative   KETONESU 1+*   BILIRUBINUA Negative   OCCULTUA 3+*   NITRITE Negative   LEUKOCYTESUR 3+*   RBCUA 57*   WBCUA 8*   BACTERIA Few*   SQUAMEPITHEL 1       Significant Imaging: I have reviewed all pertinent imaging results/findings within the past 24 hours.  Imaging Results              X-Ray Abdomen Flat And Erect (Final result)  Result time 05/14/23 18:36:12      Final result by Steve Koehler MD (05/14/23 18:36:12)                   Impression:      1. Nonobstructive bowel gas pattern noting large amount of stool throughout the colon may reflect constipation.      Electronically signed by: Steve Koehler MD  Date:    05/14/2023  Time:    18:36               Narrative:    EXAMINATION:  XR ABDOMEN FLAT AND ERECT    CLINICAL HISTORY:  vomiting;    TECHNIQUE:  Flat and erect AP views of the abdomen were performed.    COMPARISON:  None    FINDINGS:  One upright view, 1 supine view.    No significant air-fluid levels on upright view.  Air and stool is seen within the large bowel and projected over the rectum.  There is moderate to large amount of stool throughout the colon.  No focally dilated small bowel loops.  There are no  calcifications to convincingly suggest nephrolithiasis or cholelithiasis.  There is calcification of the aorta and its branches.  There is osteopenia.  There is dextroscoliotic curvature of the spine.  Right hip arthroplasty noted.                                       X-Ray Chest 1 View (Final result)  Result time 05/14/23 18:35:05   Procedure changed from X-Ray Chest PA And Lateral     Final result by Steve Koehler MD (05/14/23 18:35:05)                   Impression:      1. Increased parenchymal attenuation projects over the left lower lung zone, findings may reflect atelectasis however developing consolidation not excluded.  Correlation is advised.      Electronically signed by: Steve Koehler MD  Date:    05/14/2023  Time:    18:35               Narrative:    EXAMINATION:  XR CHEST 1 VIEW    CLINICAL HISTORY:  vomiting;    TECHNIQUE:  Single frontal view of the chest was performed.    COMPARISON:  01/07/2022    FINDINGS:  The cardiomediastinal silhouette is prominent, similar to the previous exam noting calcification and tortuosity of the aorta.  Left chest wall pacer noted..  There is no pleural effusion.  The trachea is midline.  The lungs are symmetrically expanded bilaterally with coarse interstitial attenuation bilaterally.  There is increased parenchymal attenuation projected over the left lower lung zone..  There is no pneumothorax.  The osseous structures are remarkable for degenerative changes and osteopenia..                                      Assessment/Plan:     * Intractable nausea and vomiting  -Afebrile, no leukocytosis.  -KUB with large amount of stool.  -Gentle IVF overnight.  -CLD, advance as tolerated.  -Start bowel regimen and scheduled reglan.   -PRN compazine.    Acute cystitis with hematuria  Patient reports ongoing dysuria and urinary frequency x4-5 days.  -Afebrile, no leukocytosis.   -UA with 3+ leukocytes, 8 WBCs, and few bacteria, follow cx.  -Started on rocephin in the ED,  continue for now.    Hyperkalemia  - K+5.3 on admit, received 2L IVF.  - Continue gentle IVF overnight.   - Hold spironolactone.  - Continue to trend on labs.    Transaminitis  -AST//177, tbili 0.9. Patient denies abdominal pain.  -Hold statin.  -Hepatitis panel pending.   -RUQ US pending.  -Continue to trend on morning labs.    Constipation  Likely contributing to nausea and vomiting.  -KUB with nonobstructive bowel gas pattern noting large amount of stool throughout the colon may reflect constipation.  -Start bowel regimen.    Anemia of chronic disease  Patient's anemia is currently controlled. S/p 0 units of PRBCs.   Current CBC reviewed-   Lab Results   Component Value Date    HGB 12.6 05/14/2023    HCT 39.1 05/14/2023     Monitor serial CBC and transfuse if patient becomes hemodynamically unstable, symptomatic or H/H drops below 7/21.     Chronic diastolic heart failure  Patient is identified as having Diastolic (HFpEF) heart failure that is Chronic. CHF is currently controlled. Latest ECHO performed and demonstrates- Results for orders placed during the hospital encounter of 09/26/19    Transthoracic echo (TTE) 2D with Color Flow    Interpretation Summary  · Normal left ventricular systolic function. The estimated ejection fraction is 55%  · Grade II (moderate) left ventricular diastolic dysfunction consistent with pseudonormalization.  · Eccentric left ventricular hypertrophy. Mild left ventricular enlargement.  · Mild mitral regurgitation.  · Normal right ventricular systolic function.  · Mild tricuspid regurgitation.  · Severe left atrial enlargement.  . Continue Beta Blocker and monitor clinical status closely. Monitor on telemetry. Patient is off CHF pathway.  Monitor strict Is&Os and daily weights.  Continue to stress to patient importance of self efficacy and  on diet for CHF.   -Patient appears hypovolemic on exam. Will hold spironolactone.    Stage 3 chronic kidney disease  Creatine  stable for now. BMP reviewed- noted Estimated Creatinine Clearance: 30 mL/min (based on SCr of 1 mg/dL). according to latest data. Monitor UOP and serial BMP and adjust therapy as needed. Renally dose meds.    Vitamin D deficiency  -Continue oral supplementation.    SSS (sick sinus syndrome)  Cardiac pacemaker in situ  Chronic and controlled and patient with cardiac pacemaker in place and patient with atrial paced rhythm.     High cholesterol   Patient is chronically on statin.will not continue for now due to elevated LFTs. Monitor clinically. Last LDL was   Lab Results   Component Value Date    LDLCALC 69.8 07/01/2021          Acquired hypothyroidism  Patient has chronic hypothyroidism. TFTs reviewed-   Lab Results   Component Value Date    TSH 2.146 10/13/2022   . Will continue chronic levothyroxine and adjust for and clinical changes.    Essential hypertension  -Chronic, controlled.  -Continue atenolol and diltiazem. Hold spironolactone in the setting of hyperkalemia and dehydration.  -Continue to monitor BP closely.    Paroxysmal atrial fibrillation  Current use of long term anticoagulation  Patient with Paroxysmal (<7 days) atrial fibrillation which is controlled currently with Beta Blocker, Calcium Channel Blocker and Amiodarone. Patient is currently in sinus rhythm.[id number] Score: 3. Anticoagulation indicated. Anticoagulation done with eliquis.      VTE Risk Mitigation (From admission, onward)           Ordered     apixaban tablet 2.5 mg  2 times daily         05/14/23 1955     Place CHINEDU hose  Until discontinued         05/14/23 1955     IP VTE HIGH RISK PATIENT  Once         05/14/23 1955     Place sequential compression device  Until discontinued         05/14/23 1955     Reason for No Pharmacological VTE Prophylaxis  Once        Question:  Reasons:  Answer:  Already adequately anticoagulated on oral Anticoagulants    05/14/23 1955                         On 05/14/2023, patient should be placed in  hospital observation services under my care in collaboration with Roderick Mcmahon.      Monik Humphrey NP  Department of Hospital Medicine  [facility] - Emergency Dept

## 2023-05-15 NOTE — ASSESSMENT & PLAN NOTE
-Chronic, controlled.  -Continue atenolol and diltiazem. Hold spironolactone in the setting of hyperkalemia and dehydration.  -Continue to monitor BP closely.

## 2023-05-15 NOTE — ASSESSMENT & PLAN NOTE
Patient is identified as having Diastolic (HFpEF) heart failure that is Chronic. CHF is currently controlled. Latest ECHO performed and demonstrates- Results for orders placed during the hospital encounter of 09/26/19    Transthoracic echo (TTE) 2D with Color Flow    Interpretation Summary  · Normal left ventricular systolic function. The estimated ejection fraction is 55%  · Grade II (moderate) left ventricular diastolic dysfunction consistent with pseudonormalization.  · Eccentric left ventricular hypertrophy. Mild left ventricular enlargement.  · Mild mitral regurgitation.  · Normal right ventricular systolic function.  · Mild tricuspid regurgitation.  · Severe left atrial enlargement.  . Continue Beta Blocker and monitor clinical status closely. Monitor on telemetry. Patient is off CHF pathway.  Monitor strict Is&Os and daily weights.  Continue to stress to patient importance of self efficacy and  on diet for CHF.   -Patient appears hypovolemic on exam. Will hold spironolactone.

## 2023-05-15 NOTE — ASSESSMENT & PLAN NOTE
-Afebrile, no leukocytosis. Symptoms improving  -KUB with large amount of stool.  -Abd US: Stable prominence of the common bile duct and central intrahepatic biliary ducts in the right hepatic lobe. Cholelithiasis without evidence of acute cholecystitis.   -Gentle IVF prn  -CLD, advance as tolerated.  -bowel regimen continued and scheduled reglan.   -PRN compazine.

## 2023-05-15 NOTE — ASSESSMENT & PLAN NOTE
Patient has chronic hypothyroidism. TFTs reviewed-   Lab Results   Component Value Date    TSH 2.146 10/13/2022   . Will continue chronic levothyroxine and adjust for and clinical changes.

## 2023-05-15 NOTE — ASSESSMENT & PLAN NOTE
Patient reports ongoing dysuria and urinary frequency x4-5 days.  -Afebrile, no leukocytosis.   -UA with 3+ leukocytes, 8 WBCs, and few bacteria, follow cx.  -Started on rocephin in the ED, continue for now.

## 2023-05-15 NOTE — SUBJECTIVE & OBJECTIVE
Interval History: NAEON. Pt seen and evaluated at bedside this am. Pt is doing well and reports she has been able to tolerate a CLD today w/o nausea or vomiting. Denies any abdominal pain. Informed patient of KUB findings and reminded that we will pursue bowel regimen and also trending transaminases. Anticipating discharge home tomorrow pending improvement of transaminases and no longer n/v and tolerating PO. If not will consult GI vs [facility] to pursue further treatment/imaging for transaminitis.    Review of Systems   Constitutional:  Positive for activity change. Negative for chills, diaphoresis, fatigue and fever.   HENT:  Negative for congestion, facial swelling, rhinorrhea and sore throat.    Eyes:  Negative for photophobia, itching and visual disturbance.   Respiratory:  Negative for cough, chest tightness, shortness of breath and wheezing.    Cardiovascular:  Negative for chest pain, palpitations and leg swelling.   Gastrointestinal:  Positive for constipation, nausea and vomiting. Negative for abdominal distention, abdominal pain, blood in stool and diarrhea.   Genitourinary:  Positive for dysuria. Negative for difficulty urinating, frequency, hematuria and urgency.   Musculoskeletal:  Negative for arthralgias, back pain and neck stiffness.   Neurological:  Negative for dizziness, tremors, seizures, syncope, weakness, light-headedness, numbness and headaches.   Psychiatric/Behavioral:  Negative for agitation, confusion and hallucinations.    Objective:     Vital Signs (Most Recent):  Temp: 98.3 °F (36.8 °C) (05/15/23 1205)  Pulse: 60 (05/15/23 1205)  Resp: 16 (05/15/23 1205)  BP: (!) 124/58 (05/15/23 1205)  SpO2: 96 % (05/15/23 1205) Vital Signs (24h Range):  Temp:  [97.9 °F (36.6 °C)-98.3 °F (36.8 °C)] 98.3 °F (36.8 °C)  Pulse:  [60-82] 60  Resp:  [16-18] 16  SpO2:  [95 %-100 %] 96 %  BP: (119-173)/(58-79) 124/58     Weight: 49.9 kg (110 lb)  Body mass index is 19.49 kg/m².    Intake/Output Summary (Last 24  hours) at 5/15/2023 1510  Last data filed at 5/15/2023 0602  Gross per 24 hour   Intake 275 ml   Output 1000 ml   Net -725 ml         Physical Exam  Vitals and nursing note reviewed.   Constitutional:       General: She is not in acute distress.     Appearance: She is not toxic-appearing or diaphoretic.   HENT:      Head: Normocephalic and atraumatic.      Nose: Nose normal.      Mouth/Throat:      Mouth: Mucous membranes are moist.   Eyes:      Pupils: Pupils are equal, round, and reactive to light.   Cardiovascular:      Rate and Rhythm: Normal rate and regular rhythm.      Pulses: Normal pulses.   Pulmonary:      Effort: Pulmonary effort is normal. No respiratory distress.      Breath sounds: No wheezing, rhonchi or rales.   Abdominal:      General: Bowel sounds are normal. There is no distension.      Palpations: Abdomen is soft.      Tenderness: There is no abdominal tenderness. There is no guarding.   Musculoskeletal:         General: No swelling, tenderness or deformity. Normal range of motion.      Cervical back: Normal range of motion.      Left lower leg: No edema.   Skin:     General: Skin is warm and dry.      Capillary Refill: Capillary refill takes less than 2 seconds.   Neurological:      General: No focal deficit present.      Mental Status: She is alert and oriented to person, place, and time.      Sensory: No sensory deficit.      Motor: No weakness.   Psychiatric:         Mood and Affect: Mood normal.         Behavior: Behavior normal.           Significant Labs: All pertinent labs within the past 24 hours have been reviewed.    Significant Imaging: I have reviewed all pertinent imaging results/findings within the past 24 hours.

## 2023-05-15 NOTE — ASSESSMENT & PLAN NOTE
Likely contributing to nausea and vomiting.  -KUB with nonobstructive bowel gas pattern noting large amount of stool throughout the colon may reflect constipation.  -Start bowel regimen.

## 2023-05-15 NOTE — ED NOTES
Assumed care of  patient, Gisselle Ortiz, a 89 y.o. female     Triage note:  Chief Complaint   Patient presents with    Multiple complaints     Finished antibiotics for uti yest cont with vomiting , 15 lb wt loss     Review of patient's allergies indicates:   Allergen Reactions    Ciprofloxacin Nausea And Vomiting     Past Medical History:   Diagnosis Date    A-fib     Anemia of chronic disease 02/02/2021    Anticoagulant long-term use     CHF (congestive heart failure)     Chronic diastolic heart failure 02/02/2021    COVID-19 01/07/2022    Gallstone pancreatitis     Hypertension     Pancreatic abscess 09/26/2020    Thyroid disease

## 2023-05-15 NOTE — HPI
Gisselle Ortiz is a 89 y.o. female with a PMHx of GERD, gallstone pancreatitis, thyroid disease, HTN, HLD, SSS s/p PPM, CHF (EF 55%), and a fib on eliquis who presents to the ED for nausea and vomiting. The patient reports ongoing nausea and vomiting x1 week. She endorses associated 10lb weight loss, fatigue, and generalized weakness. The patient also reports increased urinary frequency and dysuria beginning 4-5 days ago and was started on Bactrim which she completed yesterday. Unfortunately, she was unable to tolerate some of the medication. She reports ongoing urinary symptoms that have not improved. She denies any associated abdominal pain, diarrhea, fever, or chills.  She reports good flatus, but states that she really has not had many bowel movements. The patient denies any CP, SOB, cough, or headache.    In the ED, patient hypertensive in the ED which improved with home antihypertensives vitals otherwise stable, afebrile. CBC unremarkable. Na 133. K+5.3. Cr 1.2 (at baseline). Calcium 8.6. Total protein 5.4. Albumin 2.7. AST//177. Troponin 0.025. EKG with atrially paced rhythm, rate 60, no acute ischemic changes. CXR with increased parenchymal attenuation projects over the left lower lung zone, findings may reflect atelectasis however developing consolidation not excluded. KUB with nonobstructive bowel gas pattern noting large amount of stool throughout the colon may reflect constipation. The patient received LR bolus x2, zofran, and rocephin.

## 2023-05-15 NOTE — ASSESSMENT & PLAN NOTE
Current use of long term anticoagulation  Patient with Paroxysmal (<7 days) atrial fibrillation which is controlled currently with Beta Blocker, Calcium Channel Blocker and Amiodarone. Patient is currently in sinus rhythm.[id number] Score: 3. Anticoagulation indicated. Anticoagulation done with eliquis.

## 2023-05-15 NOTE — ASSESSMENT & PLAN NOTE
Creatine stable for now. BMP reviewed- noted Estimated Creatinine Clearance: 30 mL/min (based on SCr of 1 mg/dL). according to latest data. Monitor UOP and serial BMP and adjust therapy as needed. Renally dose meds.

## 2023-05-15 NOTE — ASSESSMENT & PLAN NOTE
Patient is chronically on statin.will not continue for now due to elevated LFTs. Monitor clinically. Last LDL was   Lab Results   Component Value Date    LDLCALC 69.8 07/01/2021

## 2023-05-16 NOTE — PROGRESS NOTES
[facility] Medicine  Progress Note    Patient Name: Gisselle Ortiz  MRN: [id number]  Patient Class: IP- Inpatient   Admission Date: 5/14/2023  Length of Stay: 1 days  Attending Physician: Yakelin Parks MD  Primary Care Provider: Kai Montez MD        Subjective:     Principal Problem:Intractable nausea and vomiting        HPI:  Gisselle Ortiz is a 89 y.o. female with a PMHx of GERD, gallstone pancreatitis, thyroid disease, HTN, HLD, SSS s/p PPM, CHF (EF 55%), and a fib on eliquis who presents to the ED for nausea and vomiting. The patient reports ongoing nausea and vomiting x1 week. She endorses associated 10lb weight loss, fatigue, and generalized weakness. The patient also reports increased urinary frequency and dysuria beginning 4-5 days ago and was started on Bactrim which she completed yesterday. Unfortunately, she was unable to tolerate some of the medication. She reports ongoing urinary symptoms that have not improved. She denies any associated abdominal pain, diarrhea, fever, or chills.  She reports good flatus, but states that she really has not had many bowel movements. The patient denies any CP, SOB, cough, or headache.    In the ED, patient hypertensive in the ED which improved with home antihypertensives vitals otherwise stable, afebrile. CBC unremarkable. Na 133. K+5.3. Cr 1.2 (at baseline). Calcium 8.6. Total protein 5.4. Albumin 2.7. AST//177. Troponin 0.025. EKG with atrially paced rhythm, rate 60, no acute ischemic changes. CXR with increased parenchymal attenuation projects over the left lower lung zone, findings may reflect atelectasis however developing consolidation not excluded. KUB with nonobstructive bowel gas pattern noting large amount of stool throughout the colon may reflect constipation. The patient received LR bolus x2, zofran, and rocephin.      Overview/Hospital Course:  Pt placed in observation for intractable n/v and UTI. Pt  hemodynamically stable. UA infectious; started on IV CTX. Following urine cx; + GNR. No leukocytosis but was hyperkalemic K 5.3; discontinued spironolactone. AST//177; trending with daily cmp. Holding statin. Abd US with hepatomegaly, stable prominence of CBD, and cholelithiasis without evidence of cholecystitis. Discussed with on call [facility], who recommended MRCP, but patient unable to get bc of pacemaker. KUB with nonobstructive bowel gas pattern noting large amount of stool throughout the colon may reflect constipation. Initiated bowel regimen. Plan to discharge home when medically ready with hepatology f/u.       Interval History: NAEON. VSSAF. Patient reports feeling overall unwell with fatigue and generalized weakness. She is nauseous but did not have any episodes of emesis yesterday or overnight. She reports decreased appetite and was only able to tolerate small sips of sprite this morning. She began dry heaving during the interview and had a small episode of nonbloody emesis. She also states that she had severe abdominal pain after eating a popsickle yesterday, but denies abd pain at the time of my interview. She still has not had a BM; will increase bowel regimen.     Discussed patient with on call [facility], who recommended MRCP. MRCP ordered, but then notified that patient will not be able to get due to her PPM.     Review of Systems   Constitutional:  Positive for fatigue. Negative for chills and fever.   Respiratory:  Negative for chest tightness and shortness of breath.    Cardiovascular:  Negative for chest pain and leg swelling.   Gastrointestinal:  Positive for constipation, nausea and vomiting. Negative for abdominal pain.   Neurological:  Positive for weakness. Negative for dizziness.   Objective:     Vital Signs (Most Recent):  Temp: 97.5 °F (36.4 °C) (05/16/23 1602)  Pulse: 60 (05/16/23 1602)  Resp: 18 (05/16/23 1602)  BP: 130/61 (05/16/23 1602)  SpO2: 98 % (05/16/23 1602) Vital Signs (24h  Range):  Temp:  [96.5 °F (35.8 °C)-99.1 °F (37.3 °C)] 97.5 °F (36.4 °C)  Pulse:  [60-65] 60  Resp:  [16-18] 18  SpO2:  [95 %-99 %] 98 %  BP: (105-155)/(55-70) 130/61     Weight: 49.8 kg (109 lb 12.6 oz)  Body mass index is 19.45 kg/m².    Intake/Output Summary (Last 24 hours) at 5/16/2023 1720  Last data filed at 5/15/2023 2233  Gross per 24 hour   Intake 50 ml   Output --   Net 50 ml         Physical Exam  Vitals and nursing note reviewed.   Constitutional:       Appearance: She is well-developed. She is ill-appearing.      Comments: Appears uncomfortable and dry heaving throughout interview   Eyes:      Pupils: Pupils are equal, round, and reactive to light.   Cardiovascular:      Rate and Rhythm: Normal rate and regular rhythm.   Pulmonary:      Effort: Pulmonary effort is normal.      Breath sounds: Normal breath sounds.   Abdominal:      Palpations: Abdomen is soft.      Tenderness: There is abdominal tenderness (RUQ).   Musculoskeletal:         General: No tenderness.   Skin:     General: Skin is warm and dry.   Neurological:      Mental Status: She is alert and oriented to person, place, and time.   Psychiatric:         Behavior: Behavior normal.           Significant Labs: All pertinent labs within the past 24 hours have been reviewed.  BMP:   Recent Labs   Lab 05/16/23  0506   GLU 80      K 4.6      CO2 22*   BUN 19   CREATININE 1.3   CALCIUM 8.2*   MG 2.0     CBC:   Recent Labs   Lab 05/15/23  0349 05/16/23  0506   WBC 11.73 11.54   HGB 11.2* 12.6   HCT 34.0* 38.4    214       Significant Imaging: I have reviewed all pertinent imaging results/findings within the past 24 hours.      Assessment/Plan:      * Intractable nausea and vomiting  -Afebrile, no leukocytosis.    - likely 2/2 UTI vs. Constipation but choledocholithiasis still a consideration  -KUB with large amount of stool.  -Abd US: Stable prominence of the common bile duct and central intrahepatic biliary ducts in the right  hepatic lobe. Cholelithiasis without evidence of acute cholecystitis.   - discussed with [facility] who suggested MRCP. Patient unable to get MRCP with PPM. If no improvement by the morning, can reach out to [facility] for further recs  -Gentle IVF prn  -CLD, advance as tolerated.  -bowel regimen continued and scheduled reglan.   -PRN compazine.    Acute cystitis with hematuria  Patient reports ongoing dysuria and urinary frequency x4-5 days.  -Afebrile, no leukocytosis.   -UA with 3+ leukocytes, 8 WBCs, and few bacteria  -urine cx + GNR; follow for susceptibilities  -Started on rocephin in the ED, continue for now.    Transaminitis  -AST//177, tbili 0.9. Patient denies abdominal pain.  -Hold statin.  -Hepatitis panel negative  -RUQ US- see above  -Continue to trend on morning labs.  - can f/u with PCP on when to restart statin  - will need hepatology f/u OP    Constipation  Likely contributing to nausea and vomiting.  -KUB with nonobstructive bowel gas pattern noting large amount of stool throughout the colon may reflect constipation.  -Start bowel regimen.  - still no BM. Increased miralax to TID. Continue senna BID    Hyperkalemia  - K+5.3 on admit, received 2L IVF.  - Continue gentle IVF overnight.   - discontinue spironolactone.  - Continue to trend on labs.    Anemia of chronic disease  Patient's anemia is currently controlled. S/p 0 units of PRBCs.   Current CBC reviewed-   Lab Results   Component Value Date    HGB 11.2 (L) 05/15/2023    HCT 34.0 (L) 05/15/2023     Monitor serial CBC and transfuse if patient becomes hemodynamically unstable, symptomatic or H/H drops below 7/21.     Chronic diastolic heart failure  Patient is identified as having Diastolic (HFpEF) heart failure that is Chronic. CHF is currently controlled. Latest ECHO performed and demonstrates- Results for orders placed during the hospital encounter of 09/26/19    Transthoracic echo (TTE) 2D with Color Flow    Interpretation Summary  · Normal left  ventricular systolic function. The estimated ejection fraction is 55%  · Grade II (moderate) left ventricular diastolic dysfunction consistent with pseudonormalization.  · Eccentric left ventricular hypertrophy. Mild left ventricular enlargement.  · Mild mitral regurgitation.  · Normal right ventricular systolic function.  · Mild tricuspid regurgitation.  · Severe left atrial enlargement.  . Continue Beta Blocker and monitor clinical status closely. Monitor on telemetry. Patient is off CHF pathway.  Monitor strict Is&Os and daily weights.  Continue to stress to patient importance of self efficacy and  on diet for CHF.   -Patient appears hypovolemic on exam. Will hold spironolactone.    Stage 3 chronic kidney disease  Creatine stable for now. BMP reviewed- noted Estimated Creatinine Clearance: 30 mL/min (based on SCr of 1 mg/dL). according to latest data. Monitor UOP and serial BMP and adjust therapy as needed. Renally dose meds.    Vitamin D deficiency  -Continue oral supplementation.    SSS (sick sinus syndrome)  Cardiac pacemaker in situ  Chronic and controlled and patient with cardiac pacemaker in place and patient with atrial paced rhythm.     High cholesterol   Patient is chronically on statin.will not continue for now due to elevated LFTs. Monitor clinically. Last LDL was   Lab Results   Component Value Date    LDLCALC 69.8 07/01/2021          Acquired hypothyroidism  Patient has chronic hypothyroidism. TFTs reviewed-   Lab Results   Component Value Date    TSH 2.146 10/13/2022   . Will continue chronic levothyroxine and adjust for and clinical changes.    Essential hypertension  -Chronic, controlled.  -Continue atenolol and diltiazem.   - Hold spironolactone in the setting of hyperkalemia and dehydration.  -Continue to monitor BP closely.    Paroxysmal atrial fibrillation  Current use of long term anticoagulation  Patient with Paroxysmal (<7 days) atrial fibrillation which is controlled currently with  Beta Blocker, Calcium Channel Blocker and Amiodarone. Patient is currently in sinus rhythm.[id number] Score: 3. Anticoagulation indicated. Anticoagulation done with eliquis.      VTE Risk Mitigation (From admission, onward)         Ordered     apixaban tablet 2.5 mg  2 times daily         05/14/23 1955     Place CHINEDU hose  Until discontinued         05/14/23 1955     IP VTE HIGH RISK PATIENT  Once         05/14/23 1955     Place sequential compression device  Until discontinued         05/14/23 1955     Reason for No Pharmacological VTE Prophylaxis  Once        Question:  Reasons:  Answer:  Already adequately anticoagulated on oral Anticoagulants    05/14/23 1955                Discharge Planning   FRANCESCA: 5/17/2023     Code Status: Full Code   Is the patient medically ready for discharge?: No    Reason for patient still in hospital (select all that apply): Patient trending condition and Treatment  Discharge Plan A: Home, Home with family                  Rosita Price PA-C  Department of Hospital Medicine   [facility]

## 2023-05-16 NOTE — NURSING
Nurses Note -- 4 Eyes      5/16/2023   2:03 AM      Skin assessed during: Admit      [x] No Altered Skin Integrity Present    []Prevention Measures Documented      [] Yes- Altered Skin Integrity Present or Discovered   [] LDA Added if Not in Epic (Describe Wound)   [] New Altered Skin Integrity was Present on Admit and Documented in LDA   [] Wound Image Taken    Wound Care Consulted? No    Attending Nurse:  Ce Mitchell RN     Second RN/Staff Member:  MARIA M PEDRO RN

## 2023-05-16 NOTE — SUBJECTIVE & OBJECTIVE
Interval History: NAEON. VSSAF. Patient reports feeling overall unwell with fatigue and generalized weakness. She is nauseous but did not have any episodes of emesis yesterday or overnight. She reports decreased appetite and was only able to tolerate small sips of sprite this morning. She began dry heaving during the interview and had a small episode of nonbloody emesis. She also states that she had severe abdominal pain after eating a popsickle yesterday, but denies abd pain at the time of my interview. She still has not had a BM; will increase bowel regimen.     Discussed patient with on call [facility], who recommended MRCP. MRCP ordered, but then notified that patient will not be able to get due to her PPM.     Review of Systems   Constitutional:  Positive for fatigue. Negative for chills and fever.   Respiratory:  Negative for chest tightness and shortness of breath.    Cardiovascular:  Negative for chest pain and leg swelling.   Gastrointestinal:  Positive for constipation, nausea and vomiting. Negative for abdominal pain.   Neurological:  Positive for weakness. Negative for dizziness.   Objective:     Vital Signs (Most Recent):  Temp: 97.5 °F (36.4 °C) (05/16/23 1602)  Pulse: 60 (05/16/23 1602)  Resp: 18 (05/16/23 1602)  BP: 130/61 (05/16/23 1602)  SpO2: 98 % (05/16/23 1602) Vital Signs (24h Range):  Temp:  [96.5 °F (35.8 °C)-99.1 °F (37.3 °C)] 97.5 °F (36.4 °C)  Pulse:  [60-65] 60  Resp:  [16-18] 18  SpO2:  [95 %-99 %] 98 %  BP: (105-155)/(55-70) 130/61     Weight: 49.8 kg (109 lb 12.6 oz)  Body mass index is 19.45 kg/m².    Intake/Output Summary (Last 24 hours) at 5/16/2023 1720  Last data filed at 5/15/2023 2233  Gross per 24 hour   Intake 50 ml   Output --   Net 50 ml         Physical Exam  Vitals and nursing note reviewed.   Constitutional:       Appearance: She is well-developed. She is ill-appearing.      Comments: Appears uncomfortable and dry heaving throughout interview   Eyes:      Pupils: Pupils are  equal, round, and reactive to light.   Cardiovascular:      Rate and Rhythm: Normal rate and regular rhythm.   Pulmonary:      Effort: Pulmonary effort is normal.      Breath sounds: Normal breath sounds.   Abdominal:      Palpations: Abdomen is soft.      Tenderness: There is abdominal tenderness (RUQ).   Musculoskeletal:         General: No tenderness.   Skin:     General: Skin is warm and dry.   Neurological:      Mental Status: She is alert and oriented to person, place, and time.   Psychiatric:         Behavior: Behavior normal.           Significant Labs: All pertinent labs within the past 24 hours have been reviewed.  BMP:   Recent Labs   Lab 05/16/23  0506   GLU 80      K 4.6      CO2 22*   BUN 19   CREATININE 1.3   CALCIUM 8.2*   MG 2.0     CBC:   Recent Labs   Lab 05/15/23  0349 05/16/23  0506   WBC 11.73 11.54   HGB 11.2* 12.6   HCT 34.0* 38.4    214       Significant Imaging: I have reviewed all pertinent imaging results/findings within the past 24 hours.

## 2023-05-16 NOTE — ASSESSMENT & PLAN NOTE
Patient reports ongoing dysuria and urinary frequency x4-5 days.  -Afebrile, no leukocytosis.   -UA with 3+ leukocytes, 8 WBCs, and few bacteria  -urine cx + GNR; follow for susceptibilities  -Started on rocephin in the ED, continue for now.

## 2023-05-16 NOTE — ASSESSMENT & PLAN NOTE
- K+5.3 on admit, received 2L IVF.  - Continue gentle IVF overnight.   - discontinue spironolactone.  - Continue to trend on labs.

## 2023-05-16 NOTE — ASSESSMENT & PLAN NOTE
-AST//177, tbili 0.9. Patient denies abdominal pain.  -Hold statin.  -Hepatitis panel negative  -RUQ US- see above  -Continue to trend on morning labs.  - can f/u with PCP on when to restart statin  - will need hepatology f/u OP

## 2023-05-16 NOTE — NURSING
Late entry.   5/15/23. Pt was received from the ED awake and alert, skin warm to touch. She denies any SOB but complains of stomach ache, will medicate patient following plan of care. Head to toe assessment done, skin is clean, warm and touch and dry. No wounds noted. Patient is oriented to the room, call light within reach and side rails up. No vomiting noted at this time. Patient is made comfortable in bed. Will continue to monitor.

## 2023-05-16 NOTE — ED NOTES
Pt resting comfortably and voices no complaints at this time. Pt breathing regular, unlabored, and symmetrical with no accessory muscle use. Pt abdomen soft, non distended, and non tender. Pt AAOx4, ambulatory, and VSS.

## 2023-05-16 NOTE — ASSESSMENT & PLAN NOTE
Likely contributing to nausea and vomiting.  -KUB with nonobstructive bowel gas pattern noting large amount of stool throughout the colon may reflect constipation.  -Start bowel regimen.  - still no BM. Increased miralax to TID. Continue senna BID

## 2023-05-16 NOTE — ASSESSMENT & PLAN NOTE
-Chronic, controlled.  -Continue atenolol and diltiazem.   - Hold spironolactone in the setting of hyperkalemia and dehydration.  -Continue to monitor BP closely.

## 2023-05-16 NOTE — ASSESSMENT & PLAN NOTE
-Afebrile, no leukocytosis.    - likely 2/2 UTI vs. Constipation but choledocholithiasis still a consideration  -KUB with large amount of stool.  -Abd US: Stable prominence of the common bile duct and central intrahepatic biliary ducts in the right hepatic lobe. Cholelithiasis without evidence of acute cholecystitis.   - discussed with [facility] who suggested MRCP. Patient unable to get MRCP with PPM. If no improvement by the morning, can reach out to [facility] for further recs  -Gentle IVF prn  -CLD, advance as tolerated.  -bowel regimen continued and scheduled reglan.   -PRN compazine.

## 2023-05-16 NOTE — PLAN OF CARE
[facility] - Med Surg  Initial Discharge Assessment       Primary Care Provider: Kai Montez MD    Admission Diagnosis: Hypocalcemia [E83.51]  Dehydration [E86.0]  Hyperkalemia [E87.5]  Vomiting [R11.10]  Transaminitis [R74.01]  Chest pain [R07.9]  Urinary tract infection without hematuria, site unspecified [N39.0]  Nausea and vomiting, unspecified vomiting type [R11.2]    Admission Date: 5/14/2023  Expected Discharge Date: 5/16/2023    Transition of Care Barriers: None    Payor: MEDICARE / Plan: MEDICARE PART A & B / Product Type: Government /     Extended Emergency Contact Information  Primary Emergency Contact: Génesis Araiza   [place]  Mobile Phone: [phone]  Relation: Daughter  Secondary Emergency Contact: CHANO GORDILLO  Mobile Phone: [phone]  Relation: Son  Preferred language: English   needed? No    Discharge Plan A: Home, Home with family  Discharge Plan B: Home      [id number] - [place] - [address] [facility]  [address] [place] [address]  Phone: [phone] Fax: [phone]    [id number] - [place] - [address] AT [facility] [place]  [address] [place] [address]  Phone: [phone] Fax: [phone]    [facility] - [address] [address]  Phone: [phone] Fax: [phone]      Initial Assessment (most recent)       Adult Discharge Assessment - 05/16/23 1122          Discharge Assessment    Assessment Type Discharge Planning Assessment     Confirmed/corrected address, phone number and insurance Yes     Confirmed Demographics Correct on Facesheet     Source of Information patient     Communicated FRANCESCA with patient/caregiver Yes     Reason For Admission Hyperkalemia     People in Home alone   Lives alone but states her kids stay with her 4 days at a time.    Do you expect to return to your current living situation? Yes      Do you have help at home or someone to help you manage your care at home? Yes     Who are your caregiver(s) and their phone number(s)? Génesis AraizaTrsprvbn-Rnfybuaf-[phone] and son-Gustavo Ortiz-[phone]     Current cognitive status: Alert/Oriented     Walking or Climbing Stairs ambulation difficulty, requires equipment     Mobility Management cane, rollator, RW, wheelchair     Equipment Currently Used at Home cane, straight;wheelchair;walker, rolling;rollator;bedside commode     Readmission within 30 days? No     Patient currently being followed by outpatient case management? No     Do you currently have service(s) that help you manage your care at home? No     Do you take prescription medications? Yes     Do you have prescription coverage? Yes     Coverage Medicare A & B     Do you have any problems affording any of your prescribed medications? No     Is the patient taking medications as prescribed? yes     Who is going to help you get home at discharge? Pt's daughter will provide transportation home.     How do you get to doctors appointments? family or friend will provide     Are you on dialysis? No     Do you take coumadin? No     Discharge Plan A Home;Home with family     Discharge Plan B Home     DME Needed Upon Discharge  none     Discharge Plan discussed with: Patient     Transition of Care Barriers None        Physical Activity    On average, how many days per week do you engage in moderate to strenuous exercise (like a brisk walk)? 0 days     On average, how many minutes do you engage in exercise at this level? 0 min        Financial Resource Strain    How hard is it for you to pay for the very basics like food, housing, medical care, and heating? Not hard at all        Housing Stability    In the last 12 months, was there a time when you were not able to pay the mortgage or rent on time? No     In the last 12 months, was there a time when you did not have a steady place to sleep or slept in a  shelter (including now)? No        Transportation Needs    In the past 12 months, has lack of transportation kept you from medical appointments or from getting medications? No     In the past 12 months, has lack of transportation kept you from meetings, work, or from getting things needed for daily living? No        Food Insecurity    Within the past 12 months, you worried that your food would run out before you got the money to buy more. Never true     Within the past 12 months, the food you bought just didn't last and you didn't have money to get more. Never true        Stress    Do you feel stress - tense, restless, nervous, or anxious, or unable to sleep at night because your mind is troubled all the time - these days? Not at all        Social Connections    In a typical week, how many times do you talk on the phone with family, friends, or neighbors? More than three times a week     How often do you get together with friends or relatives? More than three times a week     How often do you attend Hoahaoism or Zoroastrianism services? 1 to 4 times per year     Do you belong to any clubs or organizations such as Hoahaoism groups, unions, fraternal or athletic groups, or school groups? No     How often do you attend meetings of the clubs or organizations you belong to? Never     Are you , , , , never , or living with a partner?         Alcohol Use    Q1: How often do you have a drink containing alcohol? Never     Q2: How many drinks containing alcohol do you have on a typical day when you are drinking? Patient does not drink     Q3: How often do you have six or more drinks on one occasion? Never                   SW completed initial discharge assessment with pt.  No hospital readmissions within the last 30 days.  Pt's daughter will provide transportation home.      Pt lives alone but reported her adult children alternate staying with her 4 days each.  Pt reported her children  assist with cooking and cleaning.  Pt reported she is able to bath and dress herself.  Pt does requires DME to help with mobility.  Pt uses the following DME: rolling walker, cane, wheelchair, rollator and BSC.  Pt's family available to provide help at home.     Pt does not receive coumadin, dialysis or HH.  Pt denied any substance abuse or mental health.     Disp:  Home, no needs.     Tiana Barrios LMSW  PRN-  [facility]  Ext. [phone]

## 2023-05-17 PROBLEM — N17.9 ACUTE RENAL FAILURE SUPERIMPOSED ON STAGE 3 CHRONIC KIDNEY DISEASE: Status: ACTIVE | Noted: 2023-01-01

## 2023-05-17 PROBLEM — N18.30 ACUTE RENAL FAILURE SUPERIMPOSED ON STAGE 3 CHRONIC KIDNEY DISEASE: Status: ACTIVE | Noted: 2023-01-01

## 2023-05-17 NOTE — ASSESSMENT & PLAN NOTE
-Afebrile, no leukocytosis.    - likely 2/2 UTI vs. Constipation but choledocholithiasis still a consideration  -KUB with large amount of stool.  -Abd US: Stable prominence of the common bile duct and central intrahepatic biliary ducts in the right hepatic lobe. Cholelithiasis without evidence of acute cholecystitis.   - discussed with AES who suggested MRCP. Patient unable to get MRCP with PPM. Will obtain CT abd/pelvis. If no improvement by the morning, can reach out to AES for further recs  -Gentle IVF prn  -CLD, advance as tolerated.  -bowel regimen continued and scheduled reglan.   -PRN compazine.

## 2023-05-17 NOTE — ASSESSMENT & PLAN NOTE
Patient's anemia is currently controlled. S/p 0 units of PRBCs.   Current CBC reviewed-   Lab Results   Component Value Date    HGB 11.8 (L) 05/17/2023    HCT 35.0 (L) 05/17/2023     Monitor serial CBC and transfuse if patient becomes hemodynamically unstable, symptomatic or H/H drops below 7/21.

## 2023-05-17 NOTE — PT/OT/SLP EVAL
Occupational Therapy   Evaluation    Name: Gisselle Ortiz  MRN: [id number]  Admitting Diagnosis: Intractable nausea and vomiting  Recent Surgery: * No surgery found *      Recommendations:     Discharge Recommendations: home health OT  Discharge Equipment Recommendations:  none  Barriers to discharge:  None    Assessment:     Gisselle Ortiz is a 89 y.o. female with a medical diagnosis of Intractable nausea and vomiting. Pt. currently demonstrates decreased (I) with ADLs, functional mobility & t/fs decreased overall strength, ROM, endurance and balance. Pt would benefit from skilled OT services to address these deficits and to facilitate improving (I) with daily tasks. Performance deficits affecting function: weakness, impaired endurance, decreased coordination, impaired self care skills, impaired functional mobility, gait instability, impaired balance.      Rehab Prognosis: Good; patient would benefit from acute skilled OT services to address these deficits and reach maximum level of function.       Plan:     Patient to be seen 3 x/week to address the above listed problems via self-care/home management, therapeutic activities, therapeutic exercises  Plan of Care Expires: 06/16/23  Plan of Care Reviewed with: patient    Subjective     Occupational Profile:  Living Environment: Pt lives alone but her son/daughter alternate staying with her so she is not alone; SSH, walk-in shower, 1 step.  Previous level of function: (I) with ADLs; uses a rollator; had a slip fall in the bathroom a week ago.  Equipment Used at Home: walker, rolling, shower chair, rollator, wheelchair, bedside commode  Assistance upon Discharge: family    Pain/Comfort:  Pain Rating 1: 0/10    Patients cultural, spiritual, Hindu conflicts given the current situation:      Objective:     Communicated with: rn prior to session.  Patient found supine with peripheral IV upon OT entry to room.    General Precautions: Standard, fall  Orthopedic  Precautions:    Braces:    Respiratory Status: Room air    Occupational Performance:    Bed Mobility:    Patient completed Rolling/Turning to Left with  supervision  Patient completed Scooting/Bridging with stand by assistance  Patient completed Supine to Sit with stand by assistance    Functional Mobility/Transfers:  Patient completed Sit <> Stand Transfer with contact guard assistance  with  rolling walker   Patient completed Bed <> Chair Transfer using Step Transfer technique with minimum assistance with rolling walker  Patient completed Toilet Transfer Step Transfer technique with minimum assistance with  rolling walker  Functional Mobility: Pt walked from the bed>bathroom>chair with CGA using a RW.    Activities of Daily Living:  Feeding:  independence   Grooming: stand by assistance standing at the sink.  Lower Body Dressing: stand by assistance     Cognitive/Visual Perceptual:  Cognitive/Psychosocial Skills:     -       Oriented to: Person, Place, Time, and Situation   -       Safety awareness/insight to disability: intact     Physical Exam:  BUE AROM/MMT: WNL    AMPAC 6 Click ADL:  AMPAC Total Score: 21    Treatment & Education:  UE ROM/MMT  Bed mobility training / assessment  Functional mobility assessment  Sit/standing balance assessment  Educated on importance of sitting OOB in bedside chair to promote increased strength, endurance & breathing.  Discussed OT POC / Post-acute plan    Patient left up in chair with all lines intact and call button in reach    GOALS:   Multidisciplinary Problems       Occupational Therapy Goals          Problem: Occupational Therapy    Goal Priority Disciplines Outcome Interventions   Occupational Therapy Goal     OT, PT/OT Ongoing, Progressing    Description: Goals to be met by: 5/24/23    Patient will increase functional independence with ADLs by performing:    Grooming while standing at sink with Supervision.  Toileting from toilet with Stand-by Assistance for hygiene and  clothing management.   Supine to sit with [place].  Toilet transfer to toilet with Stand-by Assistance.                         History:     Past Medical History:   Diagnosis Date    A-fib     Anemia of chronic disease 02/02/2021    Anticoagulant long-term use     CHF (congestive heart failure)     Chronic diastolic heart failure 02/02/2021    COVID-19 01/07/2022    Gallstone pancreatitis     Hypertension     Pancreatic abscess 09/26/2020    Thyroid disease          Past Surgical History:   Procedure Laterality Date    CATARACT EXTRACTION      CATARACT EXTRACTION W/  INTRAOCULAR LENS IMPLANT Bilateral 2004    DR IN [place]    ENDOSCOPIC ULTRASOUND OF UPPER GASTROINTESTINAL TRACT N/A 9/14/2020    Procedure: ULTRASOUND, UPPER GI TRACT, ENDOSCOPIC;  Surgeon: Esha Howard MD;  Location: [facility] (2ND FLR);  Service: Endoscopy;  Laterality: N/A;    ENDOSCOPIC ULTRASOUND OF UPPER GASTROINTESTINAL TRACT  11/25/2020    Procedure: ULTRASOUND, UPPER GI TRACT, ENDOSCOPIC;  Surgeon: Esha Howard MD;  Location: [facility] ENDO (2ND FLR);  Service: Endoscopy;;    ERCP N/A 9/14/2020    Procedure: ERCP (ENDOSCOPIC RETROGRADE CHOLANGIOPANCREATOGRAPHY);  Surgeon: Esha Howard MD;  Location: [facility] ENDO (2ND FLR);  Service: Endoscopy;  Laterality: N/A;    ERCP N/A 9/25/2020    Procedure: ERCP (ENDOSCOPIC RETROGRADE CHOLANGIOPANCREATOGRAPHY);  Surgeon: Esha Howard MD;  Location: [facility] ENDO (2ND FLR);  Service: Endoscopy;  Laterality: N/A;    ERCP N/A 11/25/2020    Procedure: ERCP (ENDOSCOPIC RETROGRADE CHOLANGIOPANCREATOGRAPHY);  Surgeon: Esha Howard MD;  Location: [facility] (2ND FLR);  Service: Endoscopy;  Laterality: N/A;  Covid-19 test 11/22/20 at [facility] - pg  2 day hold Dr Favian Pisano - pg  PM prep    EYE SURGERY      HIP REPLACEMENT ARTHROPLASTY Right 2016    HYSTERECTOMY      REVISION OF SKIN POCKET FOR PACEMAKER N/A 1/21/2019    Procedure: REVISION-POCKET-PACEMAKER;  Surgeon:  Asher Watts MD;  Location: [facility] EP LAB;  Service: Cardiology;  Laterality: N/A;  MODERATE SEDATION, sedation issues in the past    THYROIDECTOMY      TREATMENT OF CARDIAC ARRHYTHMIA N/A 3/18/2019    Procedure: CARDIOVERSION OR DEFIBRILLATION;  Surgeon: Asher Watts MD;  Location: [facility] EP LAB;  Service: Cardiology;  Laterality: N/A;  AF, JILL-cx if SR, DCCV, MAC, FAS, 3PREP    TREATMENT OF CARDIAC ARRHYTHMIA N/A 5/14/2019    Procedure: Cardioversion or Defibrillation;  Surgeon: Derick Vizcarra MD;  Location: [facility] EP [facility];  Service: Cardiology;  Laterality: N/A;  AF, JILL, DCCV, MAC, DM, 3PREP    TREATMENT OF CARDIAC ARRHYTHMIA N/A 6/21/2022    Procedure: Cardioversion or Defibrillation;  Surgeon: Favian Colmenares MD;  Location: [facility] EP LAB;  Service: Cardiology;  Laterality: N/A;  AF, JILL, DCCV, MAC, DM, 3 Prep *SJM PPM in situ*       Time Tracking:     OT Date of Treatment: 05/17/23  OT Start Time: 1050  OT Stop Time: 1115  OT Total Time (min): 25 min    Billable Minutes:Evaluation 10  Self Care/Home Management 15    5/17/2023

## 2023-05-17 NOTE — ASSESSMENT & PLAN NOTE
Likely contributing to nausea and vomiting.  -KUB with nonobstructive bowel gas pattern noting large amount of stool throughout the colon may reflect constipation.  -Start bowel regimen.  -still no BM. Increased miralax to TID. Continue senna BID

## 2023-05-17 NOTE — PLAN OF CARE
CHW met with patient/family at bedside. Patient experience rounding completed and reviewed the following.     Do you know your discharge plan? Yes Home    If you are discharging home, do you have help at home? Yes    Do you think you will need help at home at discharge? Yes     Have you discussed your needs and preferences with your SW/CM? Yes    Assigned SW/CM notified of any patient/family needs or concerns.

## 2023-05-17 NOTE — PLAN OF CARE
Problem: Adult Inpatient Plan of Care  Goal: Plan of Care Review  Outcome: Ongoing, Progressing     Problem: Fall Injury Risk  Goal: Absence of Fall and Fall-Related Injury  Outcome: Ongoing, Progressing     Problem: Skin Injury Risk Increased  Goal: Skin Health and Integrity  Outcome: Ongoing, Progressing     Problem: Fluid and Electrolyte Imbalance (Acute Kidney Injury/Impairment)  Goal: Fluid and Electrolyte Balance  Outcome: Ongoing, Progressing

## 2023-05-17 NOTE — ASSESSMENT & PLAN NOTE
Current use of long term anticoagulation  Patient with Paroxysmal (<7 days) atrial fibrillation which is controlled currently with Beta Blocker, Calcium Channel Blocker and Amiodarone. Patient is currently in sinus rhythm.[id number] Score: 3. Anticoagulation indicated. Anticoagulation done with eliquis.

## 2023-05-17 NOTE — ASSESSMENT & PLAN NOTE
Creatine stable for now. BMP reviewed- noted Estimated Creatinine Clearance: 10.3 mL/min (A) (based on SCr of 2.9 mg/dL (H)). according to latest data. Monitor UOP and serial BMP and adjust therapy as needed. Renally dose meds.

## 2023-05-17 NOTE — PROGRESS NOTES
Pharmacist Renal Dose Adjustment Note    Gisselle Ortiz is a 89 y.o. female being treated with the medication metoclopramide.     Patient Data:    Vital Signs (Most Recent):  Temp: 98.4 °F (36.9 °C) (05/17/23 0718)  Pulse: 60 (05/17/23 0718)  Resp: 18 (05/17/23 0718)  BP: 122/61 (05/17/23 0718)  SpO2: 95 % (05/17/23 0718) Vital Signs (72h Range):  Temp:  [96.3 °F (35.7 °C)-99.1 °F (37.3 °C)]   Pulse:  [60-82]   Resp:  [16-18]   BP: (100-173)/(55-79)   SpO2:  [94 %-100 %]      Recent Labs   Lab 05/15/23  0349 05/16/23  0506 05/17/23  0526   CREATININE 1.0 1.3 2.9*     Serum creatinine: 2.9 mg/dL (H) 05/17/23 0526  Estimated creatinine clearance: 10.3 mL/min (A)    Metoclopramide 10mg Q6H will be changed to metoclopramide 5mg Q6H.    Pharmacist's Name: Williams Herrera  Pharmacist's Extension: [phone]

## 2023-05-17 NOTE — PLAN OF CARE
Problem: Occupational Therapy  Goal: Occupational Therapy Goal  Description: Goals to be met by: 5/24/23    Patient will increase functional independence with ADLs by performing:    Grooming while standing at sink with Supervision.  Toileting from toilet with Stand-by Assistance for hygiene and clothing management.   Supine to sit with [place].  Toilet transfer to toilet with Stand-by Assistance.    Outcome: Ongoing, Progressing

## 2023-05-17 NOTE — SUBJECTIVE & OBJECTIVE
Interval History: ADRIANNAEON. Pt seen and evaluated at bedside this am. Pt is feeling weak and fatigued this am and reports she has not been eating or drinking much for the last several days. No longer having any nausea or vomiting. On exam she appears dry but there's no abdominal pain to palpation. Cr elevated this am, will bolus IVF and add continuous IVF. Urine lytes and renal US ordered.    Review of Systems   Constitutional:  Positive for fatigue. Negative for chills and fever.   Respiratory:  Negative for chest tightness and shortness of breath.    Cardiovascular:  Negative for chest pain and leg swelling.   Gastrointestinal:  Positive for constipation. Negative for abdominal pain, nausea and vomiting.   Neurological:  Positive for weakness. Negative for dizziness.   Objective:     Vital Signs (Most Recent):  Temp: 98.4 °F (36.9 °C) (05/17/23 0718)  Pulse: 60 (05/17/23 1024)  Resp: 18 (05/17/23 0718)  BP: (!) 149/68 (05/17/23 1024)  SpO2: 95 % (05/17/23 0718) Vital Signs (24h Range):  Temp:  [96.3 °F (35.7 °C)-98.4 °F (36.9 °C)] 98.4 °F (36.9 °C)  Pulse:  [60-63] 60  Resp:  [16-18] 18  SpO2:  [94 %-98 %] 95 %  BP: (100-149)/(55-68) 149/68     Weight: 49.8 kg (109 lb 12.6 oz)  Body mass index is 19.45 kg/m².    Intake/Output Summary (Last 24 hours) at 5/17/2023 1037  Last data filed at 5/17/2023 0539  Gross per 24 hour   Intake 100 ml   Output 50 ml   Net 50 ml         Physical Exam  Vitals and nursing note reviewed.   Constitutional:       General: She is not in acute distress.     Appearance: She is not toxic-appearing or diaphoretic.   HENT:      Head: Normocephalic and atraumatic.      Nose: Nose normal.      Mouth/Throat:      Mouth: Mucous membranes are dry.   Eyes:      Pupils: Pupils are equal, round, and reactive to light.   Cardiovascular:      Rate and Rhythm: Normal rate and regular rhythm.      Pulses: Normal pulses.   Pulmonary:      Effort: Pulmonary effort is normal. No respiratory distress.       Breath sounds: No wheezing, rhonchi or rales.   Abdominal:      General: Bowel sounds are normal. There is no distension.      Palpations: Abdomen is soft.      Tenderness: There is no abdominal tenderness. There is no guarding.   Musculoskeletal:         General: No swelling, tenderness or deformity. Normal range of motion.      Cervical back: Normal range of motion.      Left lower leg: No edema.   Skin:     General: Skin is warm and dry.      Capillary Refill: Capillary refill takes less than 2 seconds.   Neurological:      General: No focal deficit present.      Mental Status: She is alert and oriented to person, place, and time.      Sensory: No sensory deficit.      Motor: No weakness.   Psychiatric:         Mood and Affect: Mood normal.         Behavior: Behavior normal.           Significant Labs: All pertinent labs within the past 24 hours have been reviewed.    Significant Imaging: I have reviewed all pertinent imaging results/findings within the past 24 hours.

## 2023-05-17 NOTE — ASSESSMENT & PLAN NOTE
-AST//177, tbili 0.9. Patient denies abdominal pain.  -Hold statin.  -Hepatitis panel negative  -RUQ US- see above  -Continue to trend on morning labs.  -can f/u with PCP on when to restart statin  -will need hepatology f/u OP

## 2023-05-17 NOTE — PROGRESS NOTES
[facility] Medicine  Progress Note    Patient Name: Gisselle Ortiz  MRN: [id number]  Patient Class: IP- Inpatient   Admission Date: 5/14/2023  Length of Stay: 2 days  Attending Physician: Yakelin Parks MD  Primary Care Provider: Kai Montez MD        Subjective:     Principal Problem:Intractable nausea and vomiting        HPI:  Gisselle Ortiz is a 89 y.o. female with a PMHx of GERD, gallstone pancreatitis, thyroid disease, HTN, HLD, SSS s/p PPM, CHF (EF 55%), and a fib on eliquis who presents to the ED for nausea and vomiting. The patient reports ongoing nausea and vomiting x1 week. She endorses associated 10lb weight loss, fatigue, and generalized weakness. The patient also reports increased urinary frequency and dysuria beginning 4-5 days ago and was started on Bactrim which she completed yesterday. Unfortunately, she was unable to tolerate some of the medication. She reports ongoing urinary symptoms that have not improved. She denies any associated abdominal pain, diarrhea, fever, or chills.  She reports good flatus, but states that she really has not had many bowel movements. The patient denies any CP, SOB, cough, or headache.    In the ED, patient hypertensive in the ED which improved with home antihypertensives vitals otherwise stable, afebrile. CBC unremarkable. Na 133. K+5.3. Cr 1.2 (at baseline). Calcium 8.6. Total protein 5.4. Albumin 2.7. AST//177. Troponin 0.025. EKG with atrially paced rhythm, rate 60, no acute ischemic changes. CXR with increased parenchymal attenuation projects over the left lower lung zone, findings may reflect atelectasis however developing consolidation not excluded. KUB with nonobstructive bowel gas pattern noting large amount of stool throughout the colon may reflect constipation. The patient received LR bolus x2, zofran, and rocephin.      Overview/Hospital Course:  Pt placed in observation for intractable n/v and UTI. Pt  hemodynamically stable. UA infectious; started on IV CTX. Following urine cx; + GNR. No leukocytosis but was hyperkalemic K 5.3; discontinued spironolactone. AST//177; trending with daily cmp. Holding statin. Abd US with hepatomegaly, stable prominence of CBD, and cholelithiasis without evidence of cholecystitis. Discussed with on call AES, who recommended MRCP, but patient unable to get bc of pacemaker. KUB with nonobstructive bowel gas pattern noting large amount of stool throughout the colon may reflect constipation. Initiated bowel regimen. Plan to discharge home when medically ready with hepatology f/u.       Interval History: NAEON. Pt seen and evaluated at bedside this am. Pt is feeling weak and fatigued this am and reports she has not been eating or drinking much for the last several days. No longer having any nausea or vomiting. On exam she appears dry but there's no abdominal pain to palpation. Cr elevated this am, will bolus IVF and add continuous IVF. Urine lytes and renal US ordered.    Review of Systems   Constitutional:  Positive for fatigue. Negative for chills and fever.   Respiratory:  Negative for chest tightness and shortness of breath.    Cardiovascular:  Negative for chest pain and leg swelling.   Gastrointestinal:  Positive for constipation. Negative for abdominal pain, nausea and vomiting.   Neurological:  Positive for weakness. Negative for dizziness.   Objective:     Vital Signs (Most Recent):  Temp: 98.4 °F (36.9 °C) (05/17/23 0718)  Pulse: 60 (05/17/23 1024)  Resp: 18 (05/17/23 0718)  BP: (!) 149/68 (05/17/23 1024)  SpO2: 95 % (05/17/23 0718) Vital Signs (24h Range):  Temp:  [96.3 °F (35.7 °C)-98.4 °F (36.9 °C)] 98.4 °F (36.9 °C)  Pulse:  [60-63] 60  Resp:  [16-18] 18  SpO2:  [94 %-98 %] 95 %  BP: (100-149)/(55-68) 149/68     Weight: 49.8 kg (109 lb 12.6 oz)  Body mass index is 19.45 kg/m².    Intake/Output Summary (Last 24 hours) at 5/17/2023 1037  Last data filed at 5/17/2023  0539  Gross per 24 hour   Intake 100 ml   Output 50 ml   Net 50 ml         Physical Exam  Vitals and nursing note reviewed.   Constitutional:       General: She is not in acute distress.     Appearance: She is not toxic-appearing or diaphoretic.   HENT:      Head: Normocephalic and atraumatic.      Nose: Nose normal.      Mouth/Throat:      Mouth: Mucous membranes are dry.   Eyes:      Pupils: Pupils are equal, round, and reactive to light.   Cardiovascular:      Rate and Rhythm: Normal rate and regular rhythm.      Pulses: Normal pulses.   Pulmonary:      Effort: Pulmonary effort is normal. No respiratory distress.      Breath sounds: No wheezing, rhonchi or rales.   Abdominal:      General: Bowel sounds are normal. There is no distension.      Palpations: Abdomen is soft.      Tenderness: There is no abdominal tenderness. There is no guarding.   Musculoskeletal:         General: No swelling, tenderness or deformity. Normal range of motion.      Cervical back: Normal range of motion.      Left lower leg: No edema.   Skin:     General: Skin is warm and dry.      Capillary Refill: Capillary refill takes less than 2 seconds.   Neurological:      General: No focal deficit present.      Mental Status: She is alert and oriented to person, place, and time.      Sensory: No sensory deficit.      Motor: No weakness.   Psychiatric:         Mood and Affect: Mood normal.         Behavior: Behavior normal.           Significant Labs: All pertinent labs within the past 24 hours have been reviewed.    Significant Imaging: I have reviewed all pertinent imaging results/findings within the past 24 hours.      Assessment/Plan:      * Intractable nausea and vomiting  -Afebrile, no leukocytosis.    - likely 2/2 UTI vs. Constipation but choledocholithiasis still a consideration  -KUB with large amount of stool.  -Abd US: Stable prominence of the common bile duct and central intrahepatic biliary ducts in the right hepatic lobe.  Cholelithiasis without evidence of acute cholecystitis.   - discussed with AES who suggested MRCP. Patient unable to get MRCP with PPM. Will obtain CT abd/pelvis. If no improvement by the morning, can reach out to AES for further recs  -Gentle IVF prn  -CLD, advance as tolerated.  -bowel regimen continued and scheduled reglan.   -PRN compazine.    Acute cystitis with hematuria  Patient reports ongoing dysuria and urinary frequency x4-5 days.  -Afebrile, no leukocytosis.   -UA with 3+ leukocytes, 8 WBCs, and few bacteria  -urine cx + GNR; follow for susceptibilities  -Started on rocephin in the ED, continue for now.    Hyperkalemia  - K+5.3 on admit, received 2L IVF.  - Continue gentle IVF overnight.   - discontinue spironolactone.  - Continue to trend on labs.    Transaminitis  -AST//177, tbili 0.9. Patient denies abdominal pain.  -Hold statin.  -Hepatitis panel negative  -RUQ US- see above  -Continue to trend on morning labs.  -can f/u with PCP on when to restart statin  -will need hepatology f/u OP    Constipation  Likely contributing to nausea and vomiting.  -KUB with nonobstructive bowel gas pattern noting large amount of stool throughout the colon may reflect constipation.  -Start bowel regimen.  -still no BM. Increased miralax to TID. Continue senna BID    Anemia of chronic disease  Patient's anemia is currently controlled. S/p 0 units of PRBCs.   Current CBC reviewed-   Lab Results   Component Value Date    HGB 11.8 (L) 05/17/2023    HCT 35.0 (L) 05/17/2023     Monitor serial CBC and transfuse if patient becomes hemodynamically unstable, symptomatic or H/H drops below 7/21.     Chronic diastolic heart failure  Patient is identified as having Diastolic (HFpEF) heart failure that is Chronic. CHF is currently controlled. Latest ECHO performed and demonstrates- Results for orders placed during the hospital encounter of 09/26/19    Transthoracic echo (TTE) 2D with Color Flow    Interpretation Summary  ·  Normal left ventricular systolic function. The estimated ejection fraction is 55%  · Grade II (moderate) left ventricular diastolic dysfunction consistent with pseudonormalization.  · Eccentric left ventricular hypertrophy. Mild left ventricular enlargement.  · Mild mitral regurgitation.  · Normal right ventricular systolic function.  · Mild tricuspid regurgitation.  · Severe left atrial enlargement.  . Continue Beta Blocker and monitor clinical status closely. Monitor on telemetry. Patient is off CHF pathway.  Monitor strict Is&Os and daily weights.  Continue to stress to patient importance of self efficacy and  on diet for CHF.   -Patient appears hypovolemic on exam. Will hold spironolactone.    Stage 3 chronic kidney disease  Creatine stable for now. BMP reviewed- noted Estimated Creatinine Clearance: 10.3 mL/min (A) (based on SCr of 2.9 mg/dL (H)). according to latest data. Monitor UOP and serial BMP and adjust therapy as needed. Renally dose meds.    Vitamin D deficiency  -Continue oral supplementation.    SSS (sick sinus syndrome)  Cardiac pacemaker in situ  Chronic and controlled and patient with cardiac pacemaker in place and patient with atrial paced rhythm.     High cholesterol   Patient is chronically on statin.will not continue for now due to elevated LFTs. Monitor clinically. Last LDL was   Lab Results   Component Value Date    LDLCALC 69.8 07/01/2021          Acquired hypothyroidism  Patient has chronic hypothyroidism. TFTs reviewed-   Lab Results   Component Value Date    TSH 2.146 10/13/2022   . Will continue chronic levothyroxine and adjust for and clinical changes.    Essential hypertension  -Chronic, controlled.  -Continue atenolol and diltiazem.   - Hold spironolactone in the setting of hyperkalemia and dehydration.  -Continue to monitor BP closely.    Paroxysmal atrial fibrillation  Current use of long term anticoagulation  Patient with Paroxysmal (<7 days) atrial fibrillation which is  controlled currently with Beta Blocker, Calcium Channel Blocker and Amiodarone. Patient is currently in sinus rhythm.[id number] Score: 3. Anticoagulation indicated. Anticoagulation done with eliquis.      VTE Risk Mitigation (From admission, onward)         Ordered     apixaban tablet 2.5 mg  2 times daily         05/14/23 1955     Place CHINEDU hose  Until discontinued         05/14/23 1955     IP VTE HIGH RISK PATIENT  Once         05/14/23 1955     Place sequential compression device  Until discontinued         05/14/23 1955     Reason for No Pharmacological VTE Prophylaxis  Once        Question:  Reasons:  Answer:  Already adequately anticoagulated on oral Anticoagulants    05/14/23 1955                Discharge Planning   FRANCESCA: 5/18/2023     Code Status: Full Code   Is the patient medically ready for discharge?: No    Reason for patient still in hospital (select all that apply): Patient trending condition, Laboratory test, Treatment and Imaging  Discharge Plan A: Home, Home with family                  Blanche Funes PA-C  Department of Hospital Medicine   [facility]

## 2023-05-17 NOTE — NURSING
Notified on call Vivian Ruiz PA-C of patients low intake and output. No new orders received. Patient is in no apparent distress at this moment.

## 2023-05-18 PROBLEM — N18.9 ANEMIA DUE TO CHRONIC KIDNEY DISEASE: Status: ACTIVE | Noted: 2021-02-02

## 2023-05-18 PROBLEM — D63.1 ANEMIA DUE TO CHRONIC KIDNEY DISEASE: Status: ACTIVE | Noted: 2021-02-02

## 2023-05-18 PROBLEM — E87.8 ELECTROLYTE DISTURBANCE: Status: ACTIVE | Noted: 2023-01-01

## 2023-05-18 PROBLEM — D69.6 THROMBOCYTOPENIA: Status: ACTIVE | Noted: 2023-01-01

## 2023-05-18 NOTE — ASSESSMENT & PLAN NOTE
- Patient is chronically on statin  - Hold for now due to elevated LFTs.  - Repeat lipid panel pending

## 2023-05-18 NOTE — ASSESSMENT & PLAN NOTE
-Afebrile, no leukocytosis.    -Likely 2/2 UTI vs. constipation but choledocholithiasis still a consideration  -KUB with large amount of stool - improving on bowel regimen  -Abd US: Stable prominence of the common bile duct and central intrahepatic biliary ducts in the right hepatic lobe. Cholelithiasis without evidence of acute cholecystitis.   -Discussed with AES who suggested MRCP. Patient unable to get MRCP with PPM.  -CT abd/pelvis pending  -Consider AES consult pending CTAP   -Gentle IVF prn  -CLD, advance as tolerated  -Continue bowel regimen  -PRN compazine

## 2023-05-18 NOTE — ASSESSMENT & PLAN NOTE
Patient's anemia is currently controlled. S/p 0 units of PRBCs.   Current CBC reviewed-   Lab Results   Component Value Date    HGB 10.5 (L) 05/18/2023    HCT 31.7 (L) 05/18/2023   - Monitor serial CBC and transfuse if patient becomes hemodynamically unstable, symptomatic or H/H drops below 7/21.   - Anemia panel pending

## 2023-05-18 NOTE — PLAN OF CARE
Problem: Adult Inpatient Plan of Care  Goal: Plan of Care Review  Outcome: Ongoing, Progressing     Problem: Fall Injury Risk  Goal: Absence of Fall and Fall-Related Injury  Outcome: Ongoing, Progressing     Problem: Skin Injury Risk Increased  Goal: Skin Health and Integrity  Outcome: Ongoing, Progressing     Problem: Fluid and Electrolyte Imbalance (Acute Kidney Injury/Impairment)  Goal: Fluid and Electrolyte Balance  Outcome: Ongoing, Progressing  Assumed care at 0700. Pt A&Ox4; able to make needs known. On clear liquids; poor appetite. LR infusing @ 100ml/hr. PRN antiemetic given with some relief. Pt having loose stools; miralax held this afternoon. Barrier cream applied to coccyx. Denies any other pain. Fall precautions maintained.

## 2023-05-18 NOTE — ASSESSMENT & PLAN NOTE
- Creatinine today Estimated Creatinine Clearance: 11.1 mL/min (A) (based on SCr of 2.7 mg/dL (H)). (baseline 1-1.3)  - BUN/Cr 83/7.2 on 5/17 which improved to 33/2.8 s/p 2 IVF boluses and continuous IVFs  - BUN/Cr ratio 10-20, therefore suspect pt has intrinsic renal azotemia  - FeNa 1.3% and consistent with intrinsic renal azotemia   - Improving with IVFs, continue at 100 cc/hr  - UA infectious and improving on IV antibiotics    - Monitor BMP daily, replete electrolytes if necessary  - Renally adjust drugs to CrCl; avoid nephrotoxic drugs   -  Estimated Creatinine Clearance: 11.1 mL/min (A) (based on SCr of 2.7 mg/dL (H)).   - Monitor I/Os  - Renal ultrasound consistent with bilateral medical renal disease & simple renal cysts; no hydronephrosis

## 2023-05-18 NOTE — ASSESSMENT & PLAN NOTE
-AST//177, tbili 0.9 on admission and downtrending.  -Patient denies abdominal pain.  -Hold statin.  -Hepatitis panel negative  -RUQ US reviewed and described above  -Continue to trend on morning labs.  -Plan to f/u with PCP on when to restart statin  -Will need hepatology f/u OP

## 2023-05-18 NOTE — ACP (ADVANCE CARE PLANNING)
Advance Care Planning     Date: 05/18/2023    Today a meeting took place: Bedside    Patient Participation: Patient is able to participate     Attendees (Name and  Relationship to patient):  Jaun Araiza, Daughter    Staff attendees (Name and  Role): Madonna Boogie PA-C ([facility] Medicine)    ACP Conversation (General): Understanding of current condition and expectations for improvement in chronic conditions & malnutrition    Code Status:   In light of the patients advanced illness, we reviewed what the patients preferences for care would be at the very end of life. The patient wishes to have a death with limited interventions. Along those lines, the patient does not wish to have intubation but is open to CPR when her heart and/or breathing stops. I communicated to the patient that a DNI order would be placed in her medical record to reflect this preference.      ACP Documents: Other Documents (specify): Living Will reviewed    Goals of care: The patient endorses that what is most important right now is to focus on improvement in condition but with limits to invasive therapies    Accordingly, we have decided that the best plan to meet the patient's goals includes continuing with treatment    Length of ACP   conversation in minutes: 20 minutes      Madonna Boogie PA-C  [facility]

## 2023-05-18 NOTE — ASSESSMENT & PLAN NOTE
-Likely contributing to nausea and vomiting  -KUB with nonobstructive bowel gas pattern noting large amount of stool throughout the colon may reflect constipation  - Last BM on am of 5/18  - Continue bowel regimen with miralax TID & senna BID

## 2023-05-18 NOTE — ASSESSMENT & PLAN NOTE
Patient is identified as having Diastolic (HFpEF) heart failure that is Chronic. CHF is currently controlled. Latest ECHO performed and demonstrates- Results for orders placed during the hospital encounter of 09/26/19  Transthoracic echo (TTE) 2D with Color Flow  Interpretation Summary  · Normal left ventricular systolic function. The estimated ejection fraction is 55%  · Grade II (moderate) left ventricular diastolic dysfunction consistent with pseudonormalization.  · Eccentric left ventricular hypertrophy. Mild left ventricular enlargement.  · Mild mitral regurgitation.  · Normal right ventricular systolic function.  · Mild tricuspid regurgitation.  · Severe left atrial enlargement.  Continue Beta Blocker and monitor clinical status closely. Monitor on telemetry. Patient is off CHF pathway.  Monitor strict Is&Os and daily weights.  Continue to stress to patient importance of self efficacy and  on diet for CHF.   -Patient appears hypovolemic on exam.

## 2023-05-18 NOTE — SUBJECTIVE & OBJECTIVE
Interval History: Pt seen and examined by me this morning with daughter, Génesis at bedside. DAVI ENRIQUEZ. Pt reports persistent weakness and fatigue, as well as 1 episode of nausea with associated dry-heaving this morning. The patient and daughter report she has not had adequate PO intake in many weeks, and she feels this is related to aging and dying. When encouraged to drink body armor or boost supplements at bedside, she refuses, reporting no appetite or desire to eat or drink.       Review of Systems   Constitutional:  Positive for fatigue. Negative for chills and fever.   Respiratory:  Negative for chest tightness and shortness of breath.    Cardiovascular:  Negative for chest pain and leg swelling.   Gastrointestinal:  Positive for constipation. Negative for abdominal pain, nausea and vomiting.   Neurological:  Positive for weakness. Negative for dizziness.   Objective:     Vital Signs (Most Recent):  Temp: 97.3 °F (36.3 °C) (05/18/23 1143)  Pulse: 60 (05/18/23 1424)  Resp: 18 (05/18/23 1143)  BP: (!) 158/68 (05/18/23 1143)  SpO2: 96 % (05/18/23 1143) Vital Signs (24h Range):  Temp:  [97.3 °F (36.3 °C)-98.1 °F (36.7 °C)] 97.3 °F (36.3 °C)  Pulse:  [59-63] 60  Resp:  [18] 18  SpO2:  [91 %-96 %] 96 %  BP: (125-161)/(58-72) 158/68     Weight: 49.8 kg (109 lb 12.6 oz)  Body mass index is 19.45 kg/m².    Intake/Output Summary (Last 24 hours) at 5/18/2023 1539  Last data filed at 5/18/2023 1451  Gross per 24 hour   Intake 340 ml   Output 150 ml   Net 190 ml           Physical Exam  Vitals and nursing note reviewed.   Constitutional:       General: She is not in acute distress.     Appearance: She is not toxic-appearing or diaphoretic.   HENT:      Head: Normocephalic and atraumatic.      Nose: Nose normal.      Mouth/Throat:      Mouth: Mucous membranes are dry.   Eyes:      Pupils: Pupils are equal, round, and reactive to light.   Cardiovascular:      Rate and Rhythm: Normal rate and regular rhythm.      Pulses:  Normal pulses.   Pulmonary:      Effort: Pulmonary effort is normal. No respiratory distress.      Breath sounds: No wheezing, rhonchi or rales.   Abdominal:      General: Bowel sounds are normal. There is no distension.      Palpations: Abdomen is soft.      Tenderness: There is no abdominal tenderness. There is no guarding.   Musculoskeletal:         General: No swelling, tenderness or deformity. Normal range of motion.      Cervical back: Normal range of motion.      Left lower leg: No edema.   Skin:     General: Skin is warm and dry.      Capillary Refill: Capillary refill takes less than 2 seconds.   Neurological:      General: No focal deficit present.      Mental Status: She is alert and oriented to person, place, and time.      Sensory: No sensory deficit.      Motor: No weakness.   Psychiatric:         Mood and Affect: Mood normal.         Behavior: Behavior normal.           Significant Labs: All pertinent labs within the past 24 hours have been reviewed.    Significant Imaging: I have reviewed all pertinent imaging results/findings within the past 24 hours.

## 2023-05-18 NOTE — ASSESSMENT & PLAN NOTE
BMI of 19 or less in adult  - Pt reports poor PO intake for many weeks despite encouragement & good family support   - Nutrition consulted. Most recent weight and BMI monitored-   Measurements:  Wt Readings from Last 1 Encounters:   05/16/23 49.8 kg (109 lb 12.6 oz)   Body mass index is 19.45 kg/m².   - Start mirtazapine 15 mg nightly

## 2023-05-18 NOTE — ASSESSMENT & PLAN NOTE
Resolved  - K+5.3 on admit, received 2L IVF.  - Continue gentle IVF overnight.   - Spironolactone discontinued  - Continue to trend on labs.

## 2023-05-18 NOTE — PROGRESS NOTES
[facility]  Progress Note    Patient Name: Gisselle Ortiz  MRN: [id number]  Patient Class: IP- Inpatient   Admission Date: 5/14/2023  Length of Stay: 3 days  Attending Physician: Boy Carlton MD  Primary Care Provider: Kai Montez MD        Subjective:     Principal Problem:Intractable nausea and vomiting        HPI:  Gisselle Ortiz is a 89 y.o. female with a PMHx of GERD, gallstone pancreatitis, thyroid disease, HTN, HLD, SSS s/p PPM, CHF (EF 55%), and a fib on eliquis who presents to the ED for nausea and vomiting. The patient reports ongoing nausea and vomiting x1 week. She endorses associated 10lb weight loss, fatigue, and generalized weakness. The patient also reports increased urinary frequency and dysuria beginning 4-5 days ago and was started on Bactrim which she completed yesterday. Unfortunately, she was unable to tolerate some of the medication. She reports ongoing urinary symptoms that have not improved. She denies any associated abdominal pain, diarrhea, fever, or chills.  She reports good flatus, but states that she really has not had many bowel movements. The patient denies any CP, SOB, cough, or headache.    In the ED, patient hypertensive in the ED which improved with home antihypertensives vitals otherwise stable, afebrile. CBC unremarkable. Na 133. K+5.3. Cr 1.2 (at baseline). Calcium 8.6. Total protein 5.4. Albumin 2.7. AST//177. Troponin 0.025. EKG with atrially paced rhythm, rate 60, no acute ischemic changes. CXR with increased parenchymal attenuation projects over the left lower lung zone, findings may reflect atelectasis however developing consolidation not excluded. KUB with nonobstructive bowel gas pattern noting large amount of stool throughout the colon may reflect constipation. The patient received LR bolus x2, zofran, and rocephin.      Overview/Hospital Course:  Pt placed admitted to hospital medicine for intractable n/v and  UTI. Pt has remained hemodynamically stable. She was started on 5-days of IV rocephin. Urine culture + klebsiella pneumoniae and e.coli. No leukocytosis but was hyperkalemic K 5.3; discontinued spironolactone. AST/ALT elevated on admission and downtrending. Abd US with hepatomegaly, stable prominence of CBD, and cholelithiasis without evidence of cholecystitis. AES recommended MRCP, which the patient unable to have 2/2 pacemaker. KUB with nonobstructive bowel gas pattern and significant stool burden, which is improving on bowel regimen. CTAP ordered given persistent N/V. Plan to discharge home when medically ready with PCP & hepatology f/u, HH, care at home, and ready responders.       Interval History: Pt seen and examined by me this morning with daughter, Génesis at bedside. DAVI ENRIQUEZ. Pt reports persistent weakness and fatigue, as well as 1 episode of nausea with associated dry-heaving this morning. The patient and daughter report she has not had adequate PO intake in many weeks, and she feels this is related to aging and dying. When encouraged to drink body armor or boost supplements at bedside, she refuses, reporting no appetite or desire to eat or drink.       Review of Systems   Constitutional:  Positive for fatigue. Negative for chills and fever.   Respiratory:  Negative for chest tightness and shortness of breath.    Cardiovascular:  Negative for chest pain and leg swelling.   Gastrointestinal:  Positive for constipation. Negative for abdominal pain, nausea and vomiting.   Neurological:  Positive for weakness. Negative for dizziness.   Objective:     Vital Signs (Most Recent):  Temp: 97.3 °F (36.3 °C) (05/18/23 1143)  Pulse: 60 (05/18/23 1424)  Resp: 18 (05/18/23 1143)  BP: (!) 158/68 (05/18/23 1143)  SpO2: 96 % (05/18/23 1143) Vital Signs (24h Range):  Temp:  [97.3 °F (36.3 °C)-98.1 °F (36.7 °C)] 97.3 °F (36.3 °C)  Pulse:  [59-63] 60  Resp:  [18] 18  SpO2:  [91 %-96 %] 96 %  BP: (125-161)/(58-72) 158/68      Weight: 49.8 kg (109 lb 12.6 oz)  Body mass index is 19.45 kg/m².    Intake/Output Summary (Last 24 hours) at 5/18/2023 1539  Last data filed at 5/18/2023 1451  Gross per 24 hour   Intake 340 ml   Output 150 ml   Net 190 ml           Physical Exam  Vitals and nursing note reviewed.   Constitutional:       General: She is not in acute distress.     Appearance: She is not toxic-appearing or diaphoretic.   HENT:      Head: Normocephalic and atraumatic.      Nose: Nose normal.      Mouth/Throat:      Mouth: Mucous membranes are dry.   Eyes:      Pupils: Pupils are equal, round, and reactive to light.   Cardiovascular:      Rate and Rhythm: Normal rate and regular rhythm.      Pulses: Normal pulses.   Pulmonary:      Effort: Pulmonary effort is normal. No respiratory distress.      Breath sounds: No wheezing, rhonchi or rales.   Abdominal:      General: Bowel sounds are normal. There is no distension.      Palpations: Abdomen is soft.      Tenderness: There is no abdominal tenderness. There is no guarding.   Musculoskeletal:         General: No swelling, tenderness or deformity. Normal range of motion.      Cervical back: Normal range of motion.      Left lower leg: No edema.   Skin:     General: Skin is warm and dry.      Capillary Refill: Capillary refill takes less than 2 seconds.   Neurological:      General: No focal deficit present.      Mental Status: She is alert and oriented to person, place, and time.      Sensory: No sensory deficit.      Motor: No weakness.   Psychiatric:         Mood and Affect: Mood normal.         Behavior: Behavior normal.           Significant Labs: All pertinent labs within the past 24 hours have been reviewed.    Significant Imaging: I have reviewed all pertinent imaging results/findings within the past 24 hours.      Assessment/Plan:      * Intractable nausea and vomiting  -Afebrile, no leukocytosis.    -Likely 2/2 UTI vs. constipation but choledocholithiasis still a  consideration  -KUB with large amount of stool - improving on bowel regimen  -Abd US: Stable prominence of the common bile duct and central intrahepatic biliary ducts in the right hepatic lobe. Cholelithiasis without evidence of acute cholecystitis.   -Discussed with AES who suggested MRCP. Patient unable to get MRCP with PPM.  -CT abd/pelvis pending  -Consider AES consult pending CTAP   -Gentle IVF prn  -CLD, advance as tolerated  -Continue bowel regimen  -PRN compazine    Acute renal failure superimposed on stage 3 chronic kidney disease  - Creatinine today Estimated Creatinine Clearance: 11.1 mL/min (A) (based on SCr of 2.7 mg/dL (H)). (baseline 1-1.3)  - BUN/Cr 83/7.2 on 5/17 which improved to 33/2.8 s/p 2 IVF boluses and continuous IVFs  - BUN/Cr ratio 10-20, therefore suspect pt has intrinsic renal azotemia  - FeNa 1.3% and consistent with intrinsic renal azotemia   - Improving with IVFs, continue at 100 cc/hr  - UA infectious and improving on IV antibiotics    - Monitor BMP daily, replete electrolytes if necessary  - Renally adjust drugs to CrCl; avoid nephrotoxic drugs   -  Estimated Creatinine Clearance: 11.1 mL/min (A) (based on SCr of 2.7 mg/dL (H)).   - Monitor I/Os  - Renal ultrasound consistent with bilateral medical renal disease & simple renal cysts; no hydronephrosis    Electrolyte disturbance  - Replace electrolytes and monitor on daily labs    Thrombocytopenia  - Platelets 214 -> 136 -> 79  - Continue to monitor on daily labs    Hyperkalemia  Resolved  - K+5.3 on admit, received 2L IVF.  - Continue gentle IVF overnight.   - Spironolactone discontinued  - Continue to trend on labs.    Transaminitis  -AST//177, tbili 0.9 on admission and downtrending.  -Patient denies abdominal pain.  -Hold statin.  -Hepatitis panel negative  -RUQ US reviewed and described above  -Continue to trend on morning labs.  -Plan to f/u with PCP on when to restart statin  -Will need hepatology f/u OP    Malnutrition  of moderate degree  BMI of 19 or less in adult  - Pt reports poor PO intake for many weeks despite encouragement & good family support   - Nutrition consulted. Most recent weight and BMI monitored-   Measurements:  Wt Readings from Last 1 Encounters:   05/16/23 49.8 kg (109 lb 12.6 oz)   Body mass index is 19.45 kg/m².   - Start mirtazapine 15 mg nightly     Constipation  -Likely contributing to nausea and vomiting  -KUB with nonobstructive bowel gas pattern noting large amount of stool throughout the colon may reflect constipation  - Last BM on am of 5/18  - Continue bowel regimen with miralax TID & senna BID    Anemia due to chronic kidney disease  Patient's anemia is currently controlled. S/p 0 units of PRBCs.   Current CBC reviewed-   Lab Results   Component Value Date    HGB 10.5 (L) 05/18/2023    HCT 31.7 (L) 05/18/2023   - Monitor serial CBC and transfuse if patient becomes hemodynamically unstable, symptomatic or H/H drops below 7/21.   - Anemia panel pending     Chronic diastolic heart failure  Patient is identified as having Diastolic (HFpEF) heart failure that is Chronic. CHF is currently controlled. Latest ECHO performed and demonstrates- Results for orders placed during the hospital encounter of 09/26/19  Transthoracic echo (TTE) 2D with Color Flow  Interpretation Summary  · Normal left ventricular systolic function. The estimated ejection fraction is 55%  · Grade II (moderate) left ventricular diastolic dysfunction consistent with pseudonormalization.  · Eccentric left ventricular hypertrophy. Mild left ventricular enlargement.  · Mild mitral regurgitation.  · Normal right ventricular systolic function.  · Mild tricuspid regurgitation.  · Severe left atrial enlargement.  Continue Beta Blocker and monitor clinical status closely. Monitor on telemetry. Patient is off CHF pathway.  Monitor strict Is&Os and daily weights.  Continue to stress to patient importance of self efficacy and  on diet for  CHF.   -Patient appears hypovolemic on exam.    Acute cystitis with hematuria  Patient reports ongoing dysuria and urinary frequency x4-5 days.  -Afebrile, no leukocytosis.   -UA with 3+ leukocytes, 8 WBCs, and few bacteria  -Urine culture + klebsiella pneumoniae & e.coli, sensitive to rocephin  -Continue IV rocephin  -Repeat UA improved     Vitamin D deficiency  -Continue oral supplementation.    SSS (sick sinus syndrome)  Cardiac pacemaker in situ  Chronic and controlled and patient with cardiac pacemaker in place and patient with atrial paced rhythm.     High cholesterol  - Patient is chronically on statin  - Hold for now due to elevated LFTs.  - Repeat lipid panel pending     Acquired hypothyroidism  Patient has chronic hypothyroidism. TFTs reviewed-   Lab Results   Component Value Date    TSH 2.146 10/13/2022   - Will continue chronic levothyroxine and adjust for and clinical changes.    Essential hypertension  -Chronic, controlled.  -Continue atenolol and diltiazem.   -Discontinued spironolactone in the setting of hyperkalemia and dehydration.  -Continue to monitor BP closely.    Paroxysmal atrial fibrillation  Current use of long term anticoagulation  Patient with Paroxysmal (<7 days) atrial fibrillation which is controlled currently with Beta Blocker, Calcium Channel Blocker and Amiodarone. Patient is currently in sinus rhythm.[id number] Score: 3. Anticoagulation indicated. Anticoagulation done with eliquis.    VTE Risk Mitigation (From admission, onward)         Ordered     apixaban tablet 2.5 mg  2 times daily         05/14/23 1955     Place CHINEDU hose  Until discontinued         05/14/23 1955     IP VTE HIGH RISK PATIENT  Once         05/14/23 1955     Place sequential compression device  Until discontinued         05/14/23 1955     Reason for No Pharmacological VTE Prophylaxis  Once        Question:  Reasons:  Answer:  Already adequately anticoagulated on oral Anticoagulants    05/14/23 1955                 Discharge Planning   FRANCESCA: 5/20/2023     Code Status: Full Code   Is the patient medically ready for discharge?: No    Reason for patient still in hospital (select all that apply): Patient trending condition, Laboratory test, Treatment and Imaging  Discharge Plan A: Home, Home with family                  Madonna Boogie PA-C  Department of Hospital Medicine   [facility]

## 2023-05-18 NOTE — ASSESSMENT & PLAN NOTE
-Chronic, controlled.  -Continue atenolol and diltiazem.   -Discontinued spironolactone in the setting of hyperkalemia and dehydration.  -Continue to monitor BP closely.

## 2023-05-18 NOTE — ASSESSMENT & PLAN NOTE
Patient reports ongoing dysuria and urinary frequency x4-5 days.  -Afebrile, no leukocytosis.   -UA with 3+ leukocytes, 8 WBCs, and few bacteria  -Urine culture + klebsiella pneumoniae & e.coli, sensitive to rocephin  -Continue IV rocephin  -Repeat UA improved

## 2023-05-18 NOTE — ASSESSMENT & PLAN NOTE
Patient has chronic hypothyroidism. TFTs reviewed-   Lab Results   Component Value Date    TSH 2.146 10/13/2022   - Will continue chronic levothyroxine and adjust for and clinical changes.

## 2023-05-19 PROBLEM — E44.0 MALNUTRITION OF MODERATE DEGREE: Status: RESOLVED | Noted: 2022-01-13 | Resolved: 2023-01-01

## 2023-05-19 PROBLEM — Z51.5 PALLIATIVE CARE ENCOUNTER: Status: ACTIVE | Noted: 2023-01-01

## 2023-05-19 PROBLEM — E44.1 MALNUTRITION OF MILD DEGREE: Status: ACTIVE | Noted: 2023-01-01

## 2023-05-19 NOTE — ASSESSMENT & PLAN NOTE
89 year old female with multiple comorbidities admitted with UTI, decreased appetite, and functional decline. Palliative consulted to clarify code status.     Code status: DNR/DNI following conversation with patient 5/19/23     Surrogate decision maker: Patient has documentation naming her children as co HCPOAs     Anorexia  -Patient recently started on mirtazapine 15mg qhs. Set expectations with patient and daughter. Normalized eating less as part of the aging process. Also seems like patient's taste never returned to normal following covid. Recommend surrounding patient with her favorite foods     GOC/ACP  -Met with patient at bedside. Later called patient's daughter. Introduced palliative medicine. Patient wishes her quality of life was better but she is not at the point where she would want to stop coming to the hospital or stop disease directed therapies. She is hopeful that she will be able to remain in her home for as long as possible. She still enjoys spending time with family. Discussed what it would look like to transition to comfort focused care when she is ready.   In discussing code status recommended against intubation, chest compressions, or shocks. Recommended allowing a natural and peaceful death. Patient and daughter both in agreement with DNR/DNI code status     -Code status changed to DNR/DNI  -Goal to continue disease directed therapies up until the point of cardiac or respiratory arrest   -Daughter hoping patient will go to SNF to get stronger   -Patient aware that transitioning to comfort focused care is an option     Palliative medicine will sign off          Discussed with JOSH Boogie

## 2023-05-19 NOTE — SUBJECTIVE & OBJECTIVE
Interval History: Pt seen and examined by me this morning. Hypertensive otherwise VSSAF. NAEON. Pt reports persistent weakness and fatigue, which she reports has worsened since admission. She denies abdominal pain or repeat episodes of N/V. She continues to report no appetite or desire to eat or drink and is not interested at TPN at this time.     Review of Systems   Constitutional:  Positive for fatigue. Negative for chills and fever.   Respiratory:  Negative for chest tightness and shortness of breath.    Cardiovascular:  Negative for chest pain and leg swelling.   Gastrointestinal:  Positive for constipation. Negative for abdominal pain, nausea and vomiting.   Neurological:  Positive for weakness. Negative for dizziness.   Objective:     Vital Signs (Most Recent):  Temp: 97.1 °F (36.2 °C) (05/19/23 1119)  Pulse: 60 (05/19/23 1119)  Resp: 18 (05/19/23 1119)  BP: (!) 152/76 (05/19/23 1119)  SpO2: 96 % (05/19/23 1119) Vital Signs (24h Range):  Temp:  [97.1 °F (36.2 °C)-98.4 °F (36.9 °C)] 97.1 °F (36.2 °C)  Pulse:  [60-63] 60  Resp:  [18] 18  SpO2:  [94 %-98 %] 96 %  BP: (133-175)/(59-85) 152/76     Weight: 49.8 kg (109 lb 12.6 oz)  Body mass index is 19.45 kg/m².    Intake/Output Summary (Last 24 hours) at 5/19/2023 1249  Last data filed at 5/19/2023 0249  Gross per 24 hour   Intake 440 ml   Output 100 ml   Net 340 ml           Physical Exam  Vitals and nursing note reviewed.   Constitutional:       General: She is not in acute distress.     Appearance: She is not toxic-appearing or diaphoretic.   HENT:      Head: Normocephalic and atraumatic.      Nose: Nose normal.      Mouth/Throat:      Mouth: Mucous membranes are dry.   Eyes:      Pupils: Pupils are equal, round, and reactive to light.   Cardiovascular:      Rate and Rhythm: Normal rate and regular rhythm.      Pulses: Normal pulses.   Pulmonary:      Effort: Pulmonary effort is normal. No respiratory distress.      Breath sounds: No wheezing, rhonchi or rales.    Abdominal:      General: Bowel sounds are normal. There is no distension.      Palpations: Abdomen is soft.      Tenderness: There is no abdominal tenderness. There is no guarding.   Musculoskeletal:         General: No swelling, tenderness or deformity. Normal range of motion.      Cervical back: Normal range of motion.      Left lower leg: No edema.   Skin:     General: Skin is warm and dry.      Capillary Refill: Capillary refill takes less than 2 seconds.   Neurological:      General: No focal deficit present.      Mental Status: She is alert and oriented to person, place, and time.      Sensory: No sensory deficit.      Motor: No weakness.   Psychiatric:         Mood and Affect: Mood normal.         Behavior: Behavior normal.           Significant Labs: All pertinent labs within the past 24 hours have been reviewed.    Significant Imaging: I have reviewed all pertinent imaging results/findings within the past 24 hours.

## 2023-05-19 NOTE — ASSESSMENT & PLAN NOTE
-Afebrile, no leukocytosis.    -Likely 2/2 UTI vs. constipation but choledocholithiasis still a consideration  -KUB with large amount of stool - improving on bowel regimen  -Abd US: Stable prominence of the common bile duct and central intrahepatic biliary ducts in the right hepatic lobe. Cholelithiasis without evidence of acute cholecystitis.   -Discussed with AES who suggested MRCP. Patient unable to get MRCP with PPM.  -CT abd/pelvis with gallbladder sludge vs stones and colonic wall thickening vs stenosis/peristalsis   -Gentle IVF prn  -Full liquid diet, advance as tolerated  -Continue bowel regimen  -PRN compazine

## 2023-05-19 NOTE — PLAN OF CARE
Recommendations    1. ADAT to Regular Diet, as tolerated-- texture per SLP. Encourage PO/fluid intake.   2. Continue ONS Boost Breeze with all meals. If advanced to Regular diet modify ONS to Boost Plus (flavor per patient) with all meals.   3. Recommend MVI, probiotic supplementation.   4. RD to monitor and follow-up.    If TPN warranted recommend: 179 g D + 63 g AA + IV Lipids to provide 1370 kcal, 63 g PRO 2.50 GIR    If more aggressive nutrition intervention warranted, please re-consult as needed.    Goals: Meet % EEN/EPN by follow-up date.  Nutrition Goal Status: new  Communication of RD Recs: other (comment) (POC)

## 2023-05-19 NOTE — PLAN OF CARE
Problem: Adult Inpatient Plan of Care  Goal: Plan of Care Review  Outcome: Ongoing, Progressing  Goal: Patient-Specific Goal (Individualized)  Outcome: Ongoing, Progressing  Goal: Absence of Hospital-Acquired Illness or Injury  Outcome: Ongoing, Progressing  Goal: Optimal Comfort and Wellbeing  Outcome: Ongoing, Progressing  Goal: Readiness for Transition of Care  Outcome: Ongoing, Progressing     Problem: Fall Injury Risk  Goal: Absence of Fall and Fall-Related Injury  Outcome: Ongoing, Progressing     Problem: Skin Injury Risk Increased  Goal: Skin Health and Integrity  Outcome: Ongoing, Progressing     Problem: Fluid and Electrolyte Imbalance (Acute Kidney Injury/Impairment)  Goal: Fluid and Electrolyte Balance  Outcome: Ongoing, Progressing     Problem: Oral Intake Inadequate (Acute Kidney Injury/Impairment)  Goal: Optimal Nutrition Intake  Outcome: Ongoing, Progressing

## 2023-05-19 NOTE — ASSESSMENT & PLAN NOTE
BMI of 19 or less in adult  - Pt reports poor PO intake for many weeks despite encouragement & good family support   - Nutrition consulted. Most recent weight and BMI monitored-   Measurements:  Wt Readings from Last 1 Encounters:   05/16/23 49.8 kg (109 lb 12.6 oz)   Body mass index is 19.45 kg/m².   - Start mirtazapine 7.5 mg nightly

## 2023-05-19 NOTE — ASSESSMENT & PLAN NOTE
Patient's anemia is currently controlled. S/p 0 units of PRBCs.   Current CBC reviewed-   Lab Results   Component Value Date    HGB 10.1 (L) 05/19/2023    HCT 31.8 (L) 05/19/2023   - Monitor serial CBC and transfuse if patient becomes hemodynamically unstable, symptomatic or H/H drops below 7/21.

## 2023-05-19 NOTE — HPI
"Per hospital medicine H&P  "Gisselle Ortiz is a 89 y.o. female with a PMHx of GERD, gallstone pancreatitis, thyroid disease, HTN, HLD, SSS s/p PPM, CHF (EF 55%), and a fib on eliquis who presents to the ED for nausea and vomiting. The patient reports ongoing nausea and vomiting x1 week. She endorses associated 10lb weight loss, fatigue, and generalized weakness. The patient also reports increased urinary frequency and dysuria beginning 4-5 days ago and was started on Bactrim which she completed yesterday. Unfortunately, she was unable to tolerate some of the medication. She reports ongoing urinary symptoms that have not improved. She denies any associated abdominal pain, diarrhea, fever, or chills.  She reports good flatus, but states that she really has not had many bowel movements. The patient denies any CP, SOB, cough, or headache.     In the ED, patient hypertensive in the ED which improved with home antihypertensives vitals otherwise stable, afebrile. CBC unremarkable. Na 133. K+5.3. Cr 1.2 (at baseline). Calcium 8.6. Total protein 5.4. Albumin 2.7. AST//177. Troponin 0.025. EKG with atrially paced rhythm, rate 60, no acute ischemic changes. CXR with increased parenchymal attenuation projects over the left lower lung zone, findings may reflect atelectasis however developing consolidation not excluded. KUB with nonobstructive bowel gas pattern noting large amount of stool throughout the colon may reflect constipation. The patient received LR bolus x2, zofran, and rocephin.        Overview/Hospital Course:  Pt placed admitted to hospital medicine for intractable n/v and UTI. Pt has remained hemodynamically stable. She was started on 5-days of IV rocephin. Urine culture + klebsiella pneumoniae and e.coli. No leukocytosis but was hyperkalemic K 5.3; discontinued spironolactone. AST/ALT elevated on admission and downtrending. Abd US with hepatomegaly, stable prominence of CBD, and cholelithiasis without " "evidence of cholecystitis. AES recommended MRCP, which the patient unable to have 2/2 pacemaker. KUB with nonobstructive bowel gas pattern and significant stool burden, which is improving on bowel regimen. CTAP ordered given persistent N/V. Plan to discharge home when medically ready with PCP & hepatology f/u, HH, care at home, and ready responders."    Palliative consulted to assist with clarifying code status and GOC. Patient denies pain. Reports that food does not taste good anymore.   "

## 2023-05-19 NOTE — PT/OT/SLP PROGRESS
"Occupational Therapy   Treatment    Name: Gisselle Ortiz  MRN: [id number]  Admitting Diagnosis:  Intractable nausea and vomiting     Pre-op Diagnosis: * No surgery found *    Recommendations:     Discharge Recommendations: nursing facility, skilled  Discharge Equipment Recommendations:  none  Barriers to discharge:  None    Assessment:     Gisselle Ortiz is a 89 y.o. female with a medical diagnosis of Intractable nausea and vomiting.  She presents with the following performance deficits affecting function are weakness, impaired endurance, impaired self care skills, impaired functional mobility, gait instability, impaired balance, decreased safety awareness, decreased lower extremity function. Patient agreeable to OT session and tolerated poorly secondary to c/o dizziness and severe fatigue. Patient would benefit from continued OT services to address deficits and progress towards goals. Continue OT POC.    Rehab Prognosis:  Good; patient would benefit from acute skilled OT services to address these deficits and reach maximum level of function.       Plan:     Patient to be seen 3 x/week to address the above listed problems via self-care/home management, therapeutic activities, therapeutic exercises  Plan of Care Expires: 06/16/23  Plan of Care Reviewed with: patient    Subjective     Chief Complaint: "I've never felt this way before. I feel terrible and so weak."  Patient/Family Comments/goals: be able to tolerate food  Pain/Comfort:  Pain Rating 1: 0/10    Objective:     Communicated with: nurse brooklynn and OTR prior to session.  Patient found HOB elevated with telemetry, peripheral IV upon OT entry to room.  A client care conference was completed by the OTR and the CARDENAS prior to treatment by the CARDENAS to discuss the patient's POC and current status.    General Precautions: Standard, fall    Orthopedic Precautions: N/A  Braces: N/A  Respiratory Status: Room air     Occupational Performance:     Bed Mobility:  "   Patient completed Supine to Sit with contact guard assistance  Patient completed Sit to Supine with stand by assistance     Functional Mobility/Transfers:  Patient completed Sit <> Stand Transfer with minimum assistance  with  rolling walker   X2 trials from EOB  Patient spontaneously sat on 2nd trial in preparation for functional mobility  Functional Mobility: deferred 2/2 c/o dizziness and difficulty maintaining arousal     Activities of Daily Living:  Upper Body Dressing: contact guard assistance donning clean gown seated EOB  Lower Body Dressing: maximal assistance to don B socks using fig 8 technique      [facility] 6 Click ADL: 19    Treatment & Education:  Pt educated on OT POC, goals, and current progress  Pt participated in functional transfer training as indicated above  Nurse notified of patient actively having BM end of therapy.  Addressed all patient questions/concerns within CARDENAS scope of practice.     Patient left HOB elevated with all lines intact, call button in reach, and nurse present    GOALS:   Multidisciplinary Problems       Occupational Therapy Goals          Problem: Occupational Therapy    Goal Priority Disciplines Outcome Interventions   Occupational Therapy Goal     OT, PT/OT Ongoing, Progressing    Description: Goals to be met by: 5/24/23    Patient will increase functional independence with ADLs by performing:    Grooming while standing at sink with Supervision.  Toileting from toilet with Stand-by Assistance for hygiene and clothing management.   Supine to sit with [place].  Toilet transfer to toilet with Stand-by Assistance.                         Time Tracking:     OT Date of Treatment: 05/19/23  OT Start Time: 1146  OT Stop Time: 1209  OT Total Time (min): 23 min    Billable Minutes:Self Care/Home Management 10  Therapeutic Activity 13    OT/MINERVA: MINERVA     Number of MINERVA visits since last OT visit: 1    5/19/2023

## 2023-05-19 NOTE — ASSESSMENT & PLAN NOTE
Resolved  Patient reports ongoing dysuria and urinary frequency x4-5 days.  -Afebrile, no leukocytosis.   -UA with 3+ leukocytes, 8 WBCs, and few bacteria  -Urine culture + klebsiella pneumoniae & e.coli, sensitive to rocephin  -s/p 5 day course of IV rocephin  -Repeat UA improved

## 2023-05-19 NOTE — ASSESSMENT & PLAN NOTE
- Patient is chronically on statin  - Hold for now due to elevated LFTs.  - Repeat lipid panel wnl   - Statin discontinued

## 2023-05-19 NOTE — ASSESSMENT & PLAN NOTE
- Chronic issue with acute worsening  - PT/OT recommending SNF  - CM following for placement assistance

## 2023-05-19 NOTE — ASSESSMENT & PLAN NOTE
- Creatinine today Estimated Creatinine Clearance: 11.5 mL/min (A) (based on SCr of 2.6 mg/dL (H)). (baseline 1-1.3)  - BUN/Cr 83/7.2 on 5/17 which improved to 33/2.8 s/p 2 IVF boluses and continuous IVFs  - BUN/Cr ratio 10-20, therefore suspect pt has intrinsic renal azotemia  - FeNa 1.3% and consistent with intrinsic renal azotemia   - Improving with IVFs, continue at 100 cc/hr  - UA infectious and improving on IV antibiotics    - Monitor BMP daily, replete electrolytes if necessary  - Renally adjust drugs to CrCl; avoid nephrotoxic drugs   -  Estimated Creatinine Clearance: 11.5 mL/min (A) (based on SCr of 2.6 mg/dL (H)).   - Monitor I/Os  - Renal ultrasound consistent with bilateral medical renal disease & simple renal cysts; no hydronephrosis

## 2023-05-19 NOTE — PLAN OF CARE
[facility]  Discharge Reassessment    Primary Care Provider: Kai Montez MD    Expected Discharge Date: 5/19/2023    PT recomending SNF placement.   CM provided a list of SNF facility options.   Patient did not voice a preference at this time.   SNF referrals sent to [facility], [facility], [facility], [facility].    Reassessment (most recent)       Discharge Reassessment - 05/19/23 4546          Discharge Reassessment    Assessment Type Discharge Planning Reassessment     Did the patient's condition or plan change since previous assessment? Yes     Discharge Plan discussed with: Patient     Communicated FRANCESCA with patient/caregiver Yes     Discharge Plan A Skilled Nursing Facility     Discharge Plan B Home Health     DME Needed Upon Discharge  none     Transition of Care Barriers None        Post-Acute Status    Coverage Medicare A&B

## 2023-05-19 NOTE — PLAN OF CARE
[facility]      HOME HEALTH ORDERS  FACE TO FACE ENCOUNTER    Patient Name: Gisselle Ortiz  YOB: 1933    PCP: Kai Montez MD   PCP Address: [address] / [place] [address]  PCP Phone Number: [phone]  PCP Fax: [phone]    Encounter Date: 5/14/23    Admit to Home Health    Diagnoses:  Active Hospital Problems    Diagnosis  POA    *Intractable nausea and vomiting [R11.2]  Yes    Acute renal failure superimposed on stage 3 chronic kidney disease [N17.9, N18.30]  Yes     Priority: 2     Body mass index (BMI) of 19 or less in adult [Z68.1]  Yes    Thrombocytopenia [D69.6]  Yes    Electrolyte disturbance [E87.8]  Yes    Transaminitis [R74.01]  Yes    Hyperkalemia [E87.5]  Yes    Malnutrition of moderate degree [E44.0]  Yes    Constipation [K59.00]  Yes    Chronic diastolic heart failure [I50.32]  Yes    Anemia due to chronic kidney disease [N18.9, D63.1]  Unknown    Acute cystitis with hematuria [N30.01]  Yes    SSS (sick sinus syndrome) [I49.5]  Yes     Chronic    Current use of long term anticoagulation [Z79.01]  Not Applicable    Vitamin D deficiency [E55.9]  Yes    Paroxysmal atrial fibrillation [I48.0]  Yes     Chronic    Acquired hypothyroidism [E03.9]  Yes    Essential hypertension [I10]  Yes     Chronic    High cholesterol [E78.00]  Yes    Cardiac pacemaker in situ [Z95.0]  Yes      Resolved Hospital Problems    Diagnosis Date Resolved POA    Stage 3 chronic kidney disease [N18.30] 05/18/2023 Yes     Chronic       Follow Up Appointments:  Future Appointments   Date Time Provider Department Center   5/26/2023  1:40 PM Cherelle Bentley NP [facility]   5/31/2023  9:30 AM Kai Montez MD [facility]   7/18/2023 10:00 AM HOME MONITOR DEVICE CHECK, [facility]   8/15/2023  9:00 AM Sunil Naidu MD [facility]   10/31/2023  3:00 PM Kai Montez MD [facility]       Allergies:  Review of patient's  allergies indicates:   Allergen Reactions    Ciprofloxacin Nausea And Vomiting       Medications: Review discharge medications with patient and family and provide education.    Current Facility-Administered Medications   Medication Dose Route Frequency Provider Last Rate Last Admin    0.9%  NaCl infusion   Intravenous Continuous Madonna Boogie PA-C        acetaminophen tablet 650 mg  650 mg Oral Q8H PRN Monik Humphrey NP        amiodarone tablet 200 mg  200 mg Oral Daily Monik Humphrey NP   200 mg at 05/19/23 0958    apixaban tablet 2.5 mg  2.5 mg Oral BID Monik Humphrey NP   2.5 mg at 05/19/23 0958    aspirin EC tablet 81 mg  81 mg Oral Daily Monik Humphrey NP   81 mg at 05/19/23 0958    atenoloL tablet 50 mg  50 mg Oral Daily Monik Humphrey NP   50 mg at 05/19/23 0959    And    atenoloL tablet 25 mg  25 mg Oral QHS Monik Humphrey NP   25 mg at 05/18/23 2054    dextrose 10% bolus 125 mL 125 mL  12.5 g Intravenous PRN Monik Humphrey NP   Stopped at 05/15/23 0602    dextrose 10% bolus 250 mL 250 mL  25 g Intravenous PRN Monik Humphrey NP        dextrose 40 % gel 15,000 mg  15 g Oral PRN Monik Humphrey NP        dextrose 40 % gel 30,000 mg  30 g Oral PRN Monik Humphrey NP        diltiaZEM 24 hr capsule 180 mg  180 mg Oral Daily Monik Humphrey NP   180 mg at 05/19/23 0959    famotidine tablet 20 mg  20 mg Oral Daily Monik Humphrey NP   20 mg at 05/19/23 0959    ferrous sulfate tablet 1 each  1 tablet Oral Daily Monik Humphrey NP   1 each at 05/19/23 0959    glucagon (human recombinant) injection 1 mg  1 mg Intramuscular PRN Monik Humphrey NP        iohexol (OMNIPAQUE) oral solution 15 mL  15 mL Oral PRN aRdha Chester MD   15 mL at 05/18/23 1430    lactulose 20 gram/30 mL solution Soln 20 g  20 g Oral BID PRN Monik Humphrey NP        levothyroxine tablet 137 mcg  137 mcg Oral Before breakfast Monik Humphrey NP   137 mcg at 05/19/23 0536    melatonin  tablet 6 mg  6 mg Oral Nightly PRN Ari Richards MD   6 mg at 05/16/23 0108    mirtazapine tablet 15 mg  15 mg Oral QHS Madonna oBogie PA-C   15 mg at 05/18/23 2055    polyethylene glycol packet 17 g  17 g Oral TID Rostia Price PA-C   17 g at 05/19/23 1010    prochlorperazine injection Soln 5 mg  5 mg Intravenous Q6H PRN Monik Humphrey NP   5 mg at 05/18/23 0818    senna-docusate 8.6-50 mg per tablet 1 tablet  1 tablet Oral BID Monik Humphrey NP   1 tablet at 05/19/23 0959    sodium chloride 0.9% flush 10 mL  10 mL Intravenous Q12H PRN Monik Humphrey NP        vitamin D 1000 units tablet 1,000 Units  1,000 Units Oral Daily Monik Humphrey NP   1,000 Units at 05/19/23 0958     Current Discharge Medication List        START taking these medications    Details   mirtazapine (REMERON) 15 MG tablet Take 1 tablet (15 mg total) by mouth every evening.  Qty: 30 tablet, Refills: 11      pantoprazole (PROTONIX) 40 MG tablet Take 1 tablet (40 mg total) by mouth once daily.  Qty: 30 tablet, Refills: 11           CONTINUE these medications which have NOT CHANGED    Details   aluminum-magnesium hydroxide-simethicone (MAALOX) 200-200-20 mg/5 mL Susp Take by mouth every 8 (eight) hours as needed (vomiting).      amiodarone (PACERONE) 200 MG Tab Take 1 tablet (200 mg total) by mouth once daily.  Qty: 90 tablet, Refills: 3    Associated Diagnoses: Paroxysmal atrial fibrillation      aspirin (ECOTRIN) 81 MG EC tablet Take 1 tablet (81 mg total) by mouth once daily.  Refills: 0    Comments: Hold until directed by PCP      atenoloL (TENORMIN) 25 MG tablet TAKE 2 TABLETS EVERY MORNING AND 1 TABLET EVERY EVENING  Qty: 270 tablet, Refills: 3    Comments: .  Associated Diagnoses: Paroxysmal atrial fibrillation      bismuth subsalicylate (PEPTO BISMOL) 262 mg/15 mL suspension Take by mouth every 8 (eight) hours as needed (vomiting).      diclofenac sodium (VOLTAREN) 1 % Gel Apply 2 g topically 4 (four) times  daily.  Qty: 50 g, Refills: 3    Associated Diagnoses: Osteoarthritis of right hip, unspecified osteoarthritis type      diltiaZEM (TIAZAC) 180 MG Cs24 Take 1 capsule (180 mg total) by mouth once daily.  Qty: 90 capsule, Refills: 3    Associated Diagnoses: Essential hypertension      ELIQUIS 2.5 mg Tab TAKE 1 TABLET TWICE A DAY  Qty: 180 tablet, Refills: 3      eszopiclone (LUNESTA) 2 MG Tab Take 1 tablet (2 mg total) by mouth nightly as needed (sleep).  Qty: 90 tablet, Refills: 1    Associated Diagnoses: Insomnia, unspecified type      levothyroxine (SYNTHROID) 137 MCG Tab tablet Take 1 tablet (137 mcg total) by mouth before breakfast.  Qty: 90 tablet, Refills: 3    Associated Diagnoses: Acquired hypothyroidism      gentamicin (GARAMYCIN) 0.1 % ointment Apply topically 3 (three) times daily.  Qty: 30 g, Refills: 1    Associated Diagnoses: Rash      mometasone 0.1% (ELOCON) 0.1 % cream Apply topically once daily.  Qty: 45 g, Refills: 1    Associated Diagnoses: Rash           STOP taking these medications       atorvastatin (LIPITOR) 10 MG tablet Comments:   Reason for Stopping:         spironolactone (ALDACTONE) 25 MG tablet Comments:   Reason for Stopping:         ferrous sulfate 325 (65 FE) MG EC tablet Comments:   Reason for Stopping:         vitamin D (VITAMIN D3) 1000 units Tab Comments:   Reason for Stopping:                 I have seen and examined this patient within the last 30 days. My clinical findings that support the need for the home health skilled services and home bound status are the following:no   Weakness/numbness causing balance and gait disturbance due to Weakness/Debility making it taxing to leave home.     Diet:   regular diet and encourage fluids    Labs:  SN to perform labs:  CMP: Once; 5/25/23    Referrals/ Consults  Physical Therapy to evaluate and treat. Evaluate for home safety and equipment needs; Establish/upgrade home exercise program. Perform / instruct on therapeutic exercises, gait  training, transfer training, and Range of Motion.  Occupational Therapy to evaluate and treat. Evaluate home environment for safety and equipment needs. Perform/Instruct on transfers, ADL training, ROM, and therapeutic exercises.   to evaluate for community resources/long-range planning.  Aide to provide assistance with personal care, ADLs, and vital signs.    Activities:   activity as tolerated    Nursing:   Agency to admit patient within 24 hours of hospital discharge unless specified on physician order or at patient request    SN to complete comprehensive assessment including routine vital signs. Instruct on disease process and s/s of complications to report to MD. Review/verify medication list sent home with the patient at time of discharge  and instruct patient/caregiver as needed. Frequency may be adjusted depending on start of care date.     Skilled nurse to perform up to 3 visits PRN for symptoms related to diagnosis    Notify MD if SBP > 160 or < 90; DBP > 90 or < 50; HR > 120 or < 50; Temp > 101; O2 < 88%    Ok to schedule additional visits based on staff availability and patient request on consecutive days within the home health episode.    When multiple disciplines ordered:    Start of Care occurs on Sunday - Wednesday schedule remaining discipline evaluations as ordered on separate consecutive days following the start of care.    Thursday SOC -schedule subsequent evaluations Friday and Monday the following week.     Friday - Saturday SOC - schedule subsequent discipline evaluations on consecutive days starting Monday of the following week.    For all post-discharge communication and subsequent orders please contact patient's primary care physician.     Miscellaneous   Routine Skin for Bedridden Patients: Instruct patient/caregiver to apply moisture barrier cream to all skin folds and wet areas in perineal area daily and after baths and all bowel movements.    Home Health Aide:  Nursing  Weekly, Physical Therapy Three times weekly, Occupational Therapy Three times weekly, Medical Social Work Weekly, and Home Health Aide Daily    Wound Care Orders  no    I certify that this patient is confined to her home and needs intermittent skilled nursing care, physical therapy, and occupational therapy.

## 2023-05-19 NOTE — ASSESSMENT & PLAN NOTE
-AST//177, tbili 0.9 on admission and downtrending.  -Patient denies abdominal pain.  -Repeat lipid panel wnl--discontinue statin  -Hepatitis panel negative  -RUQ US reviewed and described above  -Continue to trend on morning labs.  -Plan to f/u with PCP on when to restart statin  -Will need hepatology f/u OP

## 2023-05-19 NOTE — PLAN OF CARE
PT Evaluation completed and PT POC established.    Problem: Physical Therapy  Goal: Physical Therapy Goal  Description: Goals to be met by: 2023     Patient will increase functional independence with mobility by performin. Supine to sit with [place]  2. Sit to supine with [place]  3. Sit to stand transfer with Supervision  4. Bed to chair transfer with Supervision using LRAD  5. Gait  x 50 feet with Stand-by Assistance using LRAD.     Outcome: Ongoing, Progressing

## 2023-05-19 NOTE — CONSULTS
[facility]  Adult Nutrition  Consult Note    SUMMARY     Recommendations    1. ADAT to Regular Diet, as tolerated-- texture per SLP. Encourage PO/fluid intake.   2. Continue ONS Boost Breeze with all meals. If advanced to Regular diet modify ONS to Boost Plus (flavor per patient) with all meals.   3. Recommend MVI, probiotic supplementation.   4. RD to monitor and follow-up.    If TPN warranted recommend: 179 g D + 63 g AA + IV Lipids to provide 1370 kcal, 63 g PRO 2.50 GIR    If more aggressive nutrition intervention warranted, please re-consult as needed.    Goals: Meet % EEN/EPN by follow-up date.  Nutrition Goal Status: new  Communication of RD Recs: other (comment) (POC)    Assessment and Plan    Endocrine  Malnutrition of mild degree  Malnutrition Type:  Context: acute illness or injury  Level: mild    Related to (etiology):   Decreased ability consume sufficient energy    Signs and Symptoms (as evidenced by):   Intractable nausea and vomiting,   Weight Loss 9% x 5 months  Mild/Moderate fat/muscle wasting    Malnutrition Characteristic Summary:  Weight Loss (Malnutrition): other (see comments)  Energy Intake (Malnutrition): less than 75% for greater than 7 days  Subcutaneous Fat (Malnutrition): mild depletion  Muscle Mass (Malnutrition): mild depletion  Hand  Strength, Left (Malnutrition): Good  Hand  Strength, Right (Malnutrition): Good      Interventions/Recommendations (treatment strategy):  Collaboration of nutrition care with other providers  Commercial Beverages  TPN  Vitamin supplementation    Nutrition Diagnosis Status:   New         Malnutrition Assessment  Malnutrition Context: acute illness or injury  Malnutrition Level: mild  Skin (Micronutrient): bruised, thinned, scaly, turgor reduced  Nails (Micronutrient): none  Hair/Scalp (Micronutrient): dry, dull, other (see comments) (Hair loss)  Eyes (Micronutrient): conjunctiva dry  Extraoral (Micronutrient): none  Gums  (Micronutrient): none  Teeth (Micronutrient): broken dentition, yellow-brown pigment  Tongue (Micronutrient): none  Neck/Chest (Micronutrient): none  Musculoskeletal/Lower Extremities: none   Micronutrient Evaluation Summary: suspected deficiency   Weight Loss (Malnutrition): other (see comments)  Energy Intake (Malnutrition): less than 75% for greater than 7 days  Subcutaneous Fat (Malnutrition): mild depletion  Muscle Mass (Malnutrition): mild depletion  Hand  Strength, Left (Malnutrition): Good  Hand  Strength, Right (Malnutrition): Good   Orbital Region (Subcutaneous Fat Loss): mild depletion  Upper Arm Region (Subcutaneous Fat Loss): mild depletion  Thoracic and Lumbar Region: well nourished   Jain Region (Muscle Loss): moderate depletion  Clavicle Bone Region (Muscle Loss): well nourished  Clavicle and Acromion Bone Region (Muscle Loss): well nourished  Scapular Bone Region (Muscle Loss): well nourished  Dorsal Hand (Muscle Loss): mild depletion  Patellar Region (Muscle Loss): well nourished  Anterior Thigh Region (Muscle Loss): well nourished  Posterior Calf Region (Muscle Loss): well nourished                 Reason for Assessment    Reason For Assessment: consult  Diagnosis: other (see comments) (Intractable nausea and vomiting)  Relevant Medical History: GERD, gallstone pancreatitis, thyroid disease, HTN, HLD, SSS s/p PPM, CHF (EF 55%), and a fib  Interdisciplinary Rounds: did not attend  General Information Comments: RD consulted for malnutrition. RD spoke with patient at bedside. Patient reports decreased PO intake x 2 weeks. UBW ~120-25lbs.  Denies difficulty chewing/swallowing. Weight loss per chart 11 lbs (9%) x 5 months. Patient tolerating only liquids at this time. ONS Boost Breeze ordered with all meals. Weight loss per chart 11 lbs (9%) x 5 months. Patient tolerating only liquids at this time. ONS Boost Breeze ordered with all meals. NFPE complete 5/19-- mild/moderate fat/muscle  "wasting.  Nutrition Discharge Planning: Pending Clinical Course    Nutrition Risk Screen    Nutrition Risk Screen: no indicators present    Nutrition/Diet History    Patient Reported Diet/Restrictions/Preferences: general  Food Allergies: NKFA  Factors Affecting Nutritional Intake: decreased appetite, nausea/vomiting    Anthropometrics    Temp: 97.1 °F (36.2 °C)  Height Method: Stated  Height: 5' 3" (160 cm)  Height (inches): 63 in  Weight Method: Bed Scale  Weight: 49.8 kg (109 lb 12.6 oz)  Weight (lb): 109.79 lb  Ideal Body Weight (IBW), Female: 115 lb  % Ideal Body Weight, Female (lb): 95.47 %  BMI (Calculated): 19.5  BMI Grade: 18.5-24.9 - normal       Lab/Procedures/Meds    Pertinent Labs Reviewed: reviewed  Pertinent Labs Comments: Na 135, Creat 2.6, Ca 8.2, BUN 36, GFR 12.1, AST 63, ALT 93  Pertinent Medications Reviewed: reviewed  Pertinent Medications Comments: famotidine, ferrous sulfate, vit D, senna-docusate, polyethylene glycol, mirtazapine, NaCl      Estimated/Assessed Needs    Weight Used For Calorie Calculations: 52.2 kg (115 lb)  Energy Calorie Requirements (kcal): 4240-7419 kcal/d (30-35 kcal/kg, IBW)  Energy Need Method: Kcal/kg  Protein Requirements: 63 g/d (1.2 g/kg)  Weight Used For Protein Calculations: 52.2 kg (115 lb)        RDA Method (mL): 1565         Nutrition Prescription Ordered    Current Diet Order: Clear Liquid Diet  Oral Nutrition Supplement: Boost Breeze (All Meals)    Evaluation of Received Nutrient/Fluid Intake    I/O: +190 mL  Comments: LBM: 5/19  Tolerance: tolerating (Liquids Only)  % Intake of Estimated Energy Needs: 75 - 100 %  % Meal Intake: 0 - 25 %    Nutrition Risk    Level of Risk/Frequency of Follow-up: moderate (1-2x/ week)       Monitor and Evaluation    Food and Nutrient Intake: energy intake, food and beverage intake  Food and Nutrient Adminstration: diet order  Knowledge/Beliefs/Attitudes: food and nutrition knowledge/skill, beliefs and attitudes  Physical " Activity and Function: nutrition-related ADLs and IADLs, factors affecting access to physical activity  Anthropometric Measurements: weight, weight change, body mass index  Biochemical Data, Medical Tests and Procedures: inflammatory profile, glucose/endocrine profile, gastrointestinal profile, electrolyte and renal panel, lipid profile  Nutrition-Focused Physical Findings: head and eyes, extremities, muscles and bones, overall appearance, skin       Nutrition Follow-Up    RD Follow-up?: Yes    [place] Gal[place] Registration Eligible, Provisional LDN

## 2023-05-19 NOTE — PT/OT/SLP EVAL
"Physical Therapy Evaluation    Patient Name:  Gisselle Ortiz   MRN:  [id number]    Recommendations:     Discharge Recommendations: nursing facility, skilled   Discharge Equipment Recommendations: to be determined by next level of care   Barriers to discharge: Pt currently requiring increased level of assistance with mobility    Assessment:     Gisselle Ortiz is a 89 y.o. female admitted with a medical diagnosis of Intractable nausea and vomiting.  She presents with the following impairments/functional limitations: weakness, impaired endurance, impaired functional mobility, gait instability, impaired balance, decreased lower extremity function, impaired cognition, decreased safety awareness.    AAOx3, cooperative. Pt required additional time for all mobility with 1 person assist. Pt amb 6' RW with mild unsteadiness and decreased tae noted. At EOB, pt attempted to increase tae however had 1 mild LOB requiring assist to lower self. Pt reported 5 falls in last 12 months, indicating pt is at high fall risk. Pt will benefit from skilled PT services while in house in order to address the aforementioned deficits.    Rehab Prognosis: Good; patient would benefit from acute skilled PT services to address these deficits and reach maximum level of function.    Recent Surgery: * No surgery found *      Plan:     During this hospitalization, patient to be seen 3 x/week to address the identified rehab impairments via therapeutic exercises, therapeutic activities, gait training, neuromuscular re-education and progress toward the following goals:    Plan of Care Expires:  06/19/23    Subjective     "I'm cold all the time"    Pain/Comfort:  Pain Rating 1: 0/10  Pain Rating Post-Intervention 1: 0/10    Patients cultural, spiritual, Christian conflicts given the current situation: no    Living Environment:  Per pt, pt resides in [facility] with 1 MANUEL. Pt has a walk-in shower with seat in place. Pt's daughter and son alternate " days, so pt will have assist from family.  Prior to admission, patients level of function was supvn RW.  Equipment used at home: rollator, walker, rolling, shower chair, wheelchair, bedside commode.  DME owned (not currently used): none.  Upon discharge, patient will have assistance from family.    Objective:     Communicated with RN prior to session.  Patient found HOB elevated with telemetry  upon PT entry to room.    General Precautions: Standard, fall  Orthopedic Precautions:N/A   Braces: N/A  Respiratory Status: Room air    Exams:  Sensation:    -       Impaired  light/touch B feet  RLE ROM: WFL  RLE Strength: WFL  LLE ROM: WFL  LLE Strength: WFL    Functional Mobility:  Bed Mobility:     Supine to Sit: moderate assistance  Sit to Supine: minimum assistance  Transfers:     Sit to Stand:  minimum assistance with rolling walker  Gait: amb 6' RW CGA-Jamey and presented with B shortened step, mild unsteadiness, decreased standing tolerance. Upon returning to bed, pt has mild LOB requiring Jamey to lower self.  Balance:   Good sitting balance  Fair standing balance      AM-PAC 6 CLICK MOBILITY  Total Score:16       Treatment & Education:  Educated pt on PT role/POC  Educated pt on importance of OOB activity and daily ambulation   Pt educated on proper body mechanics, safety techniques, and energy conservation with PT facilitation and cueing throughout session   Pt verbalized understanding      Patient left HOB elevated with all lines intact, call button in reach, and RN notified.    GOALS:   Multidisciplinary Problems       Physical Therapy Goals          Problem: Physical Therapy    Goal Priority Disciplines Outcome Goal Variances Interventions   Physical Therapy Goal     PT, PT/OT Ongoing, Progressing     Description: Goals to be met by: 2023     Patient will increase functional independence with mobility by performin. Supine to sit with [place]  2. Sit to supine with [place]  3. Sit to stand  transfer with Supervision  4. Bed to chair transfer with Supervision using LRAD  5. Gait  x 50 feet with Stand-by Assistance using LRAD.                          History:     Past Medical History:   Diagnosis Date    A-fib     Anemia of chronic disease 02/02/2021    Anticoagulant long-term use     CHF (congestive heart failure)     Chronic diastolic heart failure 02/02/2021    COVID-19 01/07/2022    Gallstone pancreatitis     Hypertension     Pancreatic abscess 09/26/2020    Stage 3 chronic kidney disease 5/11/2016    Thyroid disease        Past Surgical History:   Procedure Laterality Date    CATARACT EXTRACTION      CATARACT EXTRACTION W/  INTRAOCULAR LENS IMPLANT Bilateral 2004    DR IN [place]    ENDOSCOPIC ULTRASOUND OF UPPER GASTROINTESTINAL TRACT N/A 9/14/2020    Procedure: ULTRASOUND, UPPER GI TRACT, ENDOSCOPIC;  Surgeon: Esha Howard MD;  Location: [facility] (2ND FLR);  Service: Endoscopy;  Laterality: N/A;    ENDOSCOPIC ULTRASOUND OF UPPER GASTROINTESTINAL TRACT  11/25/2020    Procedure: ULTRASOUND, UPPER GI TRACT, ENDOSCOPIC;  Surgeon: Esha Howard MD;  Location: [facility] (2ND FLR);  Service: Endoscopy;;    ERCP N/A 9/14/2020    Procedure: ERCP (ENDOSCOPIC RETROGRADE CHOLANGIOPANCREATOGRAPHY);  Surgeon: Esha Howard MD;  Location: [facility] (2ND FLR);  Service: Endoscopy;  Laterality: N/A;    ERCP N/A 9/25/2020    Procedure: ERCP (ENDOSCOPIC RETROGRADE CHOLANGIOPANCREATOGRAPHY);  Surgeon: Esha Howard MD;  Location: [facility] (2ND FLR);  Service: Endoscopy;  Laterality: N/A;    ERCP N/A 11/25/2020    Procedure: ERCP (ENDOSCOPIC RETROGRADE CHOLANGIOPANCREATOGRAPHY);  Surgeon: Esha Howard MD;  Location: [facility] (2ND FLR);  Service: Endoscopy;  Laterality: N/A;  Covid-19 test 11/22/20 at [place] - [facility]et  2 day hold Dr Favian Pisano - pg  PM prep    EYE SURGERY      HIP REPLACEMENT ARTHROPLASTY Right 2016    HYSTERECTOMY      REVISION OF SKIN POCKET FOR  PACEMAKER N/A 1/21/2019    Procedure: REVISION-POCKET-PACEMAKER;  Surgeon: Asher Watts MD;  Location: [facility] EP LAB;  Service: Cardiology;  Laterality: N/A;  MODERATE SEDATION, sedation issues in the past    THYROIDECTOMY      TREATMENT OF CARDIAC ARRHYTHMIA N/A 3/18/2019    Procedure: CARDIOVERSION OR DEFIBRILLATION;  Surgeon: Asher Watts MD;  Location: [facility] EP LAB;  Service: Cardiology;  Laterality: N/A;  AF, JILL-cx if SR, DCCV, MAC, FAS, 3PREP    TREATMENT OF CARDIAC ARRHYTHMIA N/A 5/14/2019    Procedure: Cardioversion or Defibrillation;  Surgeon: Derick Vizcarra MD;  Location: [facility] EP LAB;  Service: Cardiology;  Laterality: N/A;  AF, JILL, DCCV, MAC, DM, 3PREP    TREATMENT OF CARDIAC ARRHYTHMIA N/A 6/21/2022    Procedure: Cardioversion or Defibrillation;  Surgeon: Favian Colmenares MD;  Location: [facility] EP LAB;  Service: Cardiology;  Laterality: N/A;  AF, IJLL, DCCV, MAC, DM, 3 Prep *SJM PPM in situ*       Time Tracking:     PT Received On: 05/19/23  PT Start Time: 1043     PT Stop Time: 1105  PT Total Time (min): 22 min     Billable Minutes: Evaluation 11 and Gait Training 11    05/19/2023

## 2023-05-19 NOTE — ASSESSMENT & PLAN NOTE
BMI of 19 or less in adult  - Pt reports poor PO intake for many weeks despite encouragement & good family support  Measurements:  Wt Readings from Last 1 Encounters:   05/16/23 49.8 kg (109 lb 12.6 oz)   Body mass index is 19.45 kg/m².   - Start mirtazapine 7.5 mg nightly   - Nutrition consulted, appreciate recs:   1. ADAT to Regular Diet, as tolerated-- texture per SLP. Encourage PO/fluid intake.   2. Continue ONS Boost Breeze with all meals. If advanced to Regular diet modify ONS to Boost Plus (flavor per patient) with all meals.   3. Recommend MVI, probiotic supplementation.   4. RD to monitor and follow-up.

## 2023-05-19 NOTE — PLAN OF CARE
APPOINTMENT:    Patient Appointment(s) scheduled with  Kai Montez M.D., on Wednesday May 31, 2023 9:30 AM    Necessary Specialty Appointments: Nephrology    Provider requires schedule review by Office Nurse - Office to call patient with date and time.     Necessary Specialty Appointments: Hepatology    Hospital Follow Up with Sunil Naidu MD, Tuesday Aug 15, 2023 9:00 AM    Provider requires schedule review by Office Nurse - Office to call patient with date and time.     Necessary Specialty Appointments: Gastroenterology     GASTROENTEROLOGY ESTABLISHED PATIENT with Cherelle Bentley NP on Friday May 26, 2023 1:40 PM

## 2023-05-19 NOTE — CONSULTS
[facility] - Med Surg  Palliative Medicine  Consult Note    Patient Name: Gisselle Ortiz  MRN: [id number]  Admission Date: 5/14/2023  Hospital Length of Stay: 4 days  Code Status: DNR   Attending Provider: Boy Carlton MD  Consulting Provider: Jovanna Viveros MD  Primary Care Physician: Kai Montez MD  Principal Problem:Intractable nausea and vomiting    Patient information was obtained from patient, relative(s) and primary team.      Inpatient consult to Palliative Care  Consult performed by: Jovanna Viveros MD  Consult ordered by: Madonna Boogie PA-C        Assessment/Plan:     Palliative Care  Palliative care encounter  89 year old female with multiple comorbidities admitted with UTI, decreased appetite, and functional decline. Palliative consulted to clarify code status.     Code status: DNR/DNI following conversation with patient 5/19/23     Surrogate decision maker: Patient has documentation naming her children as co HCPOAs     Anorexia  -Patient recently started on mirtazapine 15mg qhs. Set expectations with patient and daughter. Normalized eating less as part of the aging process. Also seems like patient's taste never returned to normal following covid. Recommend surrounding patient with her favorite foods     GOC/ACP  -Met with patient at bedside. Later called patient's daughter. Introduced palliative medicine. Patient wishes her quality of life was better but she is not at the point where she would want to stop coming to the hospital or stop disease directed therapies. She is hopeful that she will be able to remain in her home for as long as possible. She still enjoys spending time with family. Discussed what it would look like to transition to comfort focused care when she is ready.   In discussing code status recommended against intubation, chest compressions, or shocks. Recommended allowing a natural and peaceful death. Patient and daughter both in agreement with DNR/DNI code status  "    -Code status changed to DNR/DNI  -Goal to continue disease directed therapies up until the point of cardiac or respiratory arrest   -Daughter hoping patient will go to SNF to get stronger   -Patient aware that transitioning to comfort focused care is an option     Palliative medicine will sign off          Discussed with JOSH Boogie         Thank you for your consult. I will sign off. Please contact us if you have any additional questions.    Subjective:     HPI:   Per hospital medicine H&P  "Gisselle Ortiz is a 89 y.o. female with a PMHx of GERD, gallstone pancreatitis, thyroid disease, HTN, HLD, SSS s/p PPM, CHF (EF 55%), and a fib on eliquis who presents to the ED for nausea and vomiting. The patient reports ongoing nausea and vomiting x1 week. She endorses associated 10lb weight loss, fatigue, and generalized weakness. The patient also reports increased urinary frequency and dysuria beginning 4-5 days ago and was started on Bactrim which she completed yesterday. Unfortunately, she was unable to tolerate some of the medication. She reports ongoing urinary symptoms that have not improved. She denies any associated abdominal pain, diarrhea, fever, or chills.  She reports good flatus, but states that she really has not had many bowel movements. The patient denies any CP, SOB, cough, or headache.     In the ED, patient hypertensive in the ED which improved with home antihypertensives vitals otherwise stable, afebrile. CBC unremarkable. Na 133. K+5.3. Cr 1.2 (at baseline). Calcium 8.6. Total protein 5.4. Albumin 2.7. AST//177. Troponin 0.025. EKG with atrially paced rhythm, rate 60, no acute ischemic changes. CXR with increased parenchymal attenuation projects over the left lower lung zone, findings may reflect atelectasis however developing consolidation not excluded. KUB with nonobstructive bowel gas pattern noting large amount of stool throughout the colon may reflect constipation. The patient " "received LR bolus x2, zofran, and rocephin.        Overview/Hospital Course:  Pt placed admitted to hospital medicine for intractable n/v and UTI. Pt has remained hemodynamically stable. She was started on 5-days of IV rocephin. Urine culture + klebsiella pneumoniae and e.coli. No leukocytosis but was hyperkalemic K 5.3; discontinued spironolactone. AST/ALT elevated on admission and downtrending. Abd US with hepatomegaly, stable prominence of CBD, and cholelithiasis without evidence of cholecystitis. AES recommended MRCP, which the patient unable to have 2/2 pacemaker. KUB with nonobstructive bowel gas pattern and significant stool burden, which is improving on bowel regimen. CTAP ordered given persistent N/V. Plan to discharge home when medically ready with PCP & hepatology f/u, HH, care at home, and ready responders."    Palliative consulted to assist with clarifying code status and GOC. Patient denies pain. Reports that food does not taste good anymore.       Hospital Course:  No notes on file        Past Medical History:   Diagnosis Date    A-fib     Anemia of chronic disease 02/02/2021    Anticoagulant long-term use     CHF (congestive heart failure)     Chronic diastolic heart failure 02/02/2021    COVID-19 01/07/2022    Gallstone pancreatitis     Hypertension     Pancreatic abscess 09/26/2020    Stage 3 chronic kidney disease 5/11/2016    Thyroid disease        Past Surgical History:   Procedure Laterality Date    CATARACT EXTRACTION      CATARACT EXTRACTION W/  INTRAOCULAR LENS IMPLANT Bilateral 2004     IN [place]    ENDOSCOPIC ULTRASOUND OF UPPER GASTROINTESTINAL TRACT N/A 9/14/2020    Procedure: ULTRASOUND, UPPER GI TRACT, ENDOSCOPIC;  Surgeon: Esha Howard MD;  Location: [facility] ([address] [place] [address]);  Service: Endoscopy;  Laterality: N/A;    ENDOSCOPIC ULTRASOUND OF UPPER GASTROINTESTINAL TRACT  11/25/2020    Procedure: ULTRASOUND, UPPER GI TRACT, ENDOSCOPIC;  Surgeon: Esha Elizondo " MD Lee;  Location: [facility] ENDO (2ND FLR);  Service: Endoscopy;;    ERCP N/A 9/14/2020    Procedure: ERCP (ENDOSCOPIC RETROGRADE CHOLANGIOPANCREATOGRAPHY);  Surgeon: Esha Howard MD;  Location: [facility] END[facility] (2ND FLR);  Service: Endoscopy;  Laterality: N/A;    ERCP N/A 9/25/2020    Procedure: ERCP (ENDOSCOPIC RETROGRADE CHOLANGIOPANCREATOGRAPHY);  Surgeon: Esha Howard MD;  Location: [facility] END[facility] (2ND FLR);  Service: Endoscopy;  Laterality: N/A;    ERCP N/A 11/25/2020    Procedure: ERCP (ENDOSCOPIC RETROGRADE CHOLANGIOPANCREATOGRAPHY);  Surgeon: Esha Howard MD;  Location: [facility] END[facility] (2ND FLR);  Service: Endoscopy;  Laterality: N/A;  Covid-19 test 11/22/20 at [place] -   2 day hold Eliquis, Dr Favian Colmenares - pg  PM prep    EYE SURGERY      HIP REPLACEMENT ARTHROPLASTY Right 2016    HYSTERECTOMY      REVISION OF SKIN POCKET FOR PACEMAKER N/A 1/21/2019    Procedure: REVISION-POCKET-PACEMAKER;  Surgeon: Asher Watts MD;  Location: [facility] EP LAB;  Service: Cardiology;  Laterality: N/A;  MODERATE SEDATION, sedation issues in the past    THYROIDECTOMY      TREATMENT OF CARDIAC ARRHYTHMIA N/A 3/18/2019    Procedure: CARDIOVERSION OR DEFIBRILLATION;  Surgeon: Asher Watts MD;  Location: [facility] EP LAB;  Service: Cardiology;  Laterality: N/A;  AF, JILL-cx if SR, DCCV, MAC, FAS, 3PREP    TREATMENT OF CARDIAC ARRHYTHMIA N/A 5/14/2019    Procedure: Cardioversion or Defibrillation;  Surgeon: Derick Vizcarra MD;  Location: [facility] EP LAB;  Service: Cardiology;  Laterality: N/A;  AF, JILL, DCCV, MAC, DM, 3PREP    TREATMENT OF CARDIAC ARRHYTHMIA N/A 6/21/2022    Procedure: Cardioversion or Defibrillation;  Surgeon: Favian Colmenares MD;  Location: [facility] EP LAB;  Service: Cardiology;  Laterality: N/A;  AF, JILL, DCCV, MAC, DM, 3 Prep *SJM PPM in situ*       Review of patient's allergies indicates:   Allergen Reactions    Ciprofloxacin Nausea And Vomiting       Medications:  Continuous Infusions:    sodium chloride 0.9%       Scheduled Meds:   amiodarone  200 mg Oral Daily    apixaban  2.5 mg Oral BID    aspirin  81 mg Oral Daily    atenoloL  50 mg Oral Daily    And    atenoloL  25 mg Oral QHS    diltiaZEM  180 mg Oral Daily    famotidine  20 mg Oral Daily    ferrous sulfate  1 tablet Oral Daily    levothyroxine  137 mcg Oral Before breakfast    mirtazapine  15 mg Oral QHS    polyethylene glycol  17 g Oral TID    senna-docusate 8.6-50 mg  1 tablet Oral BID    vitamin D  1,000 Units Oral Daily     PRN Meds:acetaminophen, dextrose 10%, dextrose 10%, dextrose, dextrose, glucagon (human recombinant), iohexol, lactulose, melatonin, prochlorperazine, sodium chloride 0.9%    Family History       Problem Relation (Age of Onset)    Heart attack Mother, Maternal Grandmother    Heart disease Mother, Maternal Grandmother    Heart failure Mother, Maternal Grandmother    Hypertension Mother, Maternal Grandmother    Stroke Maternal Grandmother          Tobacco Use    Smoking status: Former     Types: Cigarettes     Start date: 5/19/1964    Smokeless tobacco: Never   Substance and Sexual Activity    Alcohol use: No    Drug use: No    Sexual activity: Not Currently     Partners: Male       Review of Systems   Constitutional:  Positive for activity change, appetite change and fatigue.   Eyes: Negative.    Respiratory:  Negative for shortness of breath.    Cardiovascular: Negative.    Gastrointestinal: Negative.    Endocrine: Negative.    Skin: Negative.    Allergic/Immunologic: Negative.    Psychiatric/Behavioral: Negative.     Objective:     Vital Signs (Most Recent):  Temp: 97.1 °F (36.2 °C) (05/19/23 1119)  Pulse: 60 (05/19/23 1119)  Resp: 18 (05/19/23 1119)  BP: (!) 152/76 (05/19/23 1119)  SpO2: 96 % (05/19/23 1119) Vital Signs (24h Range):  Temp:  [97.1 °F (36.2 °C)-98.4 °F (36.9 °C)] 97.1 °F (36.2 °C)  Pulse:  [60-63] 60  Resp:  [18] 18  SpO2:  [94 %-98 %] 96 %  BP: (133-175)/(59-85) 152/76     Weight:  49.8 kg (109 lb 12.6 oz)  Body mass index is 19.45 kg/m².       Physical Exam  Vitals and nursing note reviewed.   Constitutional:       General: She is not in acute distress.  HENT:      Head: Normocephalic.      Right Ear: External ear normal.      Left Ear: External ear normal.      Mouth/Throat:      Mouth: Mucous membranes are moist.   Eyes:      Pupils: Pupils are equal, round, and reactive to light.   Cardiovascular:      Rate and Rhythm: Normal rate.   Pulmonary:      Effort: No respiratory distress.   Abdominal:      General: There is no distension.   Musculoskeletal:         General: Normal range of motion.      Cervical back: Normal range of motion.   Neurological:      Mental Status: She is alert and oriented to person, place, and time.   Psychiatric:         Mood and Affect: Mood normal.          Review of Symptoms      Symptom Assessment (ESAS 0-10 Scale)  Pain:  0  Dyspnea:  0  Anxiety:  0  Nausea:  0  Depression:  0  Anorexia:  7  Fatigue:  6  Insomnia:  0  Restlessness:  0  Agitation:  0     CAM / Delirium:  Negative  Constipation:  Negative  Diarrhea:  Negative      Bowel Management Plan (BMP):  Yes      Modified Aria Scale:  0    Performance Status:  60    Living Arrangements:  Lives with family    Psychosocial/Cultural:   See Palliative Psychosocial Note: No   in 2020. Per patient son and daughter take turns staying with her. Able to get around with a walker. Reports a good day is where she is able to get around on her own. Enjoys watching TV  **Primary  to Follow**  Palliative Care  Consult: No    Spiritual:  F - Piedad and Belief:  Yes  I - Importance:  Yes  A - Address in Care:  Yes      Advance Care Planning   Advance Directives:   Living Will: Yes        Copy on chart: Yes    [place]: No    Do Not Resuscitate Status: Yes    Medical Power of : Yes    Agent's Name:  Génesis Araiza   Agent's Contact Number:  [phone]    Decision Making:  Patient  answered questions  Goals of Care: What is most important right now is to focus on improvement in condition but with limits to invasive therapies. Accordingly, we have decided that the best plan to meet the patient's goals includes continuing with treatment.       Significant Labs: All pertinent labs within the past 24 hours have been reviewed.  CBC:   Recent Labs   Lab 05/19/23  0555   WBC 7.75   HGB 10.1*   HCT 31.8*   MCV 96   PLT 76*     BMP:  Recent Labs   Lab 05/19/23  0555   GLU 70   *   K 4.5      CO2 23   BUN 36*   CREATININE 2.6*   CALCIUM 8.2*   MG 1.8     LFT:  Lab Results   Component Value Date    AST 63 (H) 05/19/2023    ALKPHOS 98 05/19/2023    BILITOT 0.8 05/19/2023     Albumin:   Albumin   Date Value Ref Range Status   05/19/2023 2.2 (L) 3.5 - 5.2 g/dL Final     Protein:   Total Protein   Date Value Ref Range Status   05/19/2023 4.5 (L) 6.0 - 8.4 g/dL Final     Lactic acid:   Lab Results   Component Value Date    LACTATE 1.0 11/08/2020    LACTATE 0.4 (L) 10/01/2020       Significant Imaging: I have reviewed all pertinent imaging results/findings within the past 24 hours.    CT A/P 5/18/23  Impression:     Bilateral nonobstructing nephrolithiasis.     Suspected gallbladder sludge and/or tiny stones.     Colonic diverticulosis without convincing evidence of acute diverticulitis.     Descending and sigmoid colonic multifocal areas of short-segment wall thickening versus stenosis/peristalsis.  Clinical correlation advised.  Further evaluation/follow-up as warranted.     Small bilateral pleural effusions.            Jovanna Viveros MD  Palliative Medicine  [facility] S[facility]

## 2023-05-19 NOTE — ASSESSMENT & PLAN NOTE
Malnutrition Type:  Context: acute illness or injury  Level: mild    Related to (etiology):   Decreased ability consume sufficient energy    Signs and Symptoms (as evidenced by):   Intractable nausea and vomiting,   Weight Loss 9% x 5 months  Mild/Moderate fat/muscle wasting    Malnutrition Characteristic Summary:  Weight Loss (Malnutrition): other (see comments)  Energy Intake (Malnutrition): less than 75% for greater than 7 days  Subcutaneous Fat (Malnutrition): mild depletion  Muscle Mass (Malnutrition): mild depletion  Hand  Strength, Left (Malnutrition): Good  Hand  Strength, Right (Malnutrition): Good      Interventions/Recommendations (treatment strategy):  Collaboration of nutrition care with other providers  [employer]  TPN  Vitamin supplementation    Nutrition Diagnosis Status:   New

## 2023-05-19 NOTE — SUBJECTIVE & OBJECTIVE
Past Medical History:   Diagnosis Date    A-fib     Anemia of chronic disease 02/02/2021    Anticoagulant long-term use     CHF (congestive heart failure)     Chronic diastolic heart failure 02/02/2021    COVID-19 01/07/2022    Gallstone pancreatitis     Hypertension     Pancreatic abscess 09/26/2020    Stage 3 chronic kidney disease 5/11/2016    Thyroid disease        Past Surgical History:   Procedure Laterality Date    CATARACT EXTRACTION      CATARACT EXTRACTION W/  INTRAOCULAR LENS IMPLANT Bilateral 2004    DR IN [place]    ENDOSCOPIC ULTRASOUND OF UPPER GASTROINTESTINAL TRACT N/A 9/14/2020    Procedure: ULTRASOUND, UPPER GI TRACT, ENDOSCOPIC;  Surgeon: Esha Howard MD;  Location: [facility] ENDO (2ND FLR);  Service: Endoscopy;  Laterality: N/A;    ENDOSCOPIC ULTRASOUND OF UPPER GASTROINTESTINAL TRACT  11/25/2020    Procedure: ULTRASOUND, UPPER GI TRACT, ENDOSCOPIC;  Surgeon: Esha Howard MD;  Location: [facility] ENDO (2ND FLR);  Service: Endoscopy;;    ERCP N/A 9/14/2020    Procedure: ERCP (ENDOSCOPIC RETROGRADE CHOLANGIOPANCREATOGRAPHY);  Surgeon: Esha Howard MD;  Location: [facility] (2ND [facility]);  Service: Endoscopy;  Laterality: N/A;    ERCP N/A 9/25/2020    Procedure: ERCP (ENDOSCOPIC RETROGRADE CHOLANGIOPANCREATOGRAPHY);  Surgeon: Esha Howard MD;  Location: [facility] ENDO (2ND FLR);  Service: Endoscopy;  Laterality: N/A;    ERCP N/A 11/25/2020    Procedure: ERCP (ENDOSCOPIC RETROGRADE CHOLANGIOPANCREATOGRAPHY);  Surgeon: Esha Howard MD;  Location: [facility] ENDO (2ND FLR);  Service: Endoscopy;  Laterality: N/A;  Covid-19 test 11/22/20 at [place] - pg  2 day hold Dr Favian Pisano - pg  PM prep    EYE SURGERY      HIP REPLACEMENT ARTHROPLASTY Right 2016    HYSTERECTOMY      REVISION OF SKIN POCKET FOR PACEMAKER N/A 1/21/2019    Procedure: REVISION-POCKET-PACEMAKER;  Surgeon: Asher Watts MD;  Location: [facility] EP LAB;  Service: Cardiology;  Laterality: N/A;  MODERATE  SEDATION, sedation issues in the past    THYROIDECTOMY      TREATMENT OF CARDIAC ARRHYTHMIA N/A 3/18/2019    Procedure: CARDIOVERSION OR DEFIBRILLATION;  Surgeon: Asher Watts MD;  Location: [facility] EP LAB;  Service: Cardiology;  Laterality: N/A;  AF, JILL-cx if SR, DCCV, MAC, FAS, 3PREP    TREATMENT OF CARDIAC ARRHYTHMIA N/A 5/14/2019    Procedure: Cardioversion or Defibrillation;  Surgeon: Derick Vizcarra MD;  Location: [facility] EP LAB;  Service: Cardiology;  Laterality: N/A;  AF, JILL, DCCV, MAC, DM, 3PREP    TREATMENT OF CARDIAC ARRHYTHMIA N/A 6/21/2022    Procedure: Cardioversion or Defibrillation;  Surgeon: Favian Colmenares MD;  Location: [facility] EP LAB;  Service: Cardiology;  Laterality: N/A;  AF, JILL, DCCV, MAC, DM, 3 Prep *SJM PPM in situ*       Review of patient's allergies indicates:   Allergen Reactions    Ciprofloxacin Nausea And Vomiting       Medications:  Continuous Infusions:   sodium chloride 0.9%       Scheduled Meds:   amiodarone  200 mg Oral Daily    apixaban  2.5 mg Oral BID    aspirin  81 mg Oral Daily    atenoloL  50 mg Oral Daily    And    atenoloL  25 mg Oral QHS    diltiaZEM  180 mg Oral Daily    famotidine  20 mg Oral Daily    ferrous sulfate  1 tablet Oral Daily    levothyroxine  137 mcg Oral Before breakfast    mirtazapine  15 mg Oral QHS    polyethylene glycol  17 g Oral TID    senna-docusate 8.6-50 mg  1 tablet Oral BID    vitamin D  1,000 Units Oral Daily     PRN Meds:acetaminophen, dextrose 10%, dextrose 10%, dextrose, dextrose, glucagon (human recombinant), iohexol, lactulose, melatonin, prochlorperazine, sodium chloride 0.9%    Family History       Problem Relation (Age of Onset)    Heart attack Mother, Maternal Grandmother    Heart disease Mother, Maternal Grandmother    Heart failure Mother, Maternal Grandmother    Hypertension Mother, Maternal Grandmother    Stroke Maternal Grandmother          Tobacco Use    Smoking status: Former     Types: Cigarettes     Start date:  5/19/1964    Smokeless tobacco: Never   Substance and Sexual Activity    Alcohol use: No    Drug use: No    Sexual activity: Not Currently     Partners: Male       Review of Systems   Constitutional:  Positive for activity change, appetite change and fatigue.   Eyes: Negative.    Respiratory:  Negative for shortness of breath.    Cardiovascular: Negative.    Gastrointestinal: Negative.    Endocrine: Negative.    Skin: Negative.    Allergic/Immunologic: Negative.    Psychiatric/Behavioral: Negative.     Objective:     Vital Signs (Most Recent):  Temp: 97.1 °F (36.2 °C) (05/19/23 1119)  Pulse: 60 (05/19/23 1119)  Resp: 18 (05/19/23 1119)  BP: (!) 152/76 (05/19/23 1119)  SpO2: 96 % (05/19/23 1119) Vital Signs (24h Range):  Temp:  [97.1 °F (36.2 °C)-98.4 °F (36.9 °C)] 97.1 °F (36.2 °C)  Pulse:  [60-63] 60  Resp:  [18] 18  SpO2:  [94 %-98 %] 96 %  BP: (133-175)/(59-85) 152/76     Weight: 49.8 kg (109 lb 12.6 oz)  Body mass index is 19.45 kg/m².       Physical Exam  Vitals and nursing note reviewed.   Constitutional:       General: She is not in acute distress.  HENT:      Head: Normocephalic.      Right Ear: External ear normal.      Left Ear: External ear normal.      Mouth/Throat:      Mouth: Mucous membranes are moist.   Eyes:      Pupils: Pupils are equal, round, and reactive to light.   Cardiovascular:      Rate and Rhythm: Normal rate.   Pulmonary:      Effort: No respiratory distress.   Abdominal:      General: There is no distension.   Musculoskeletal:         General: Normal range of motion.      Cervical back: Normal range of motion.   Neurological:      Mental Status: She is alert and oriented to person, place, and time.   Psychiatric:         Mood and Affect: Mood normal.          Review of Symptoms      Symptom Assessment (ESAS 0-10 Scale)  Pain:  0  Dyspnea:  0  Anxiety:  0  Nausea:  0  Depression:  0  Anorexia:  7  Fatigue:  6  Insomnia:  0  Restlessness:  0  Agitation:  0     CAM / Delirium:   Negative  Constipation:  Negative  Diarrhea:  Negative      Bowel Management Plan (BMP):  Yes      Modified Aria Scale:  0    Performance Status:  60    Living Arrangements:  Lives with family    Psychosocial/Cultural:   See Palliative Psychosocial Note: No   in 2020. Per patient son and daughter take turns staying with her. Able to get around with a walker. Reports a good day is where she is able to get around on her own. Enjoys watching TV  **Primary  to Follow**  Palliative Care  Consult: No    Spiritual:  F - Piedad and Belief:  Yes  I - Importance:  Yes  A - Address in Care:  Yes      Advance Care Planning   Advance Directives:   Living Will: Yes        Copy on chart: Yes    LaPOST: No    Do Not Resuscitate Status: Yes    Medical Power of : Yes    Agent's Name:  Génesis Araiza   Agent's Contact Number:  [phone]    Decision Making:  Patient answered questions  Goals of Care: What is most important right now is to focus on improvement in condition but with limits to invasive therapies. Accordingly, we have decided that the best plan to meet the patient's goals includes continuing with treatment.       Significant Labs: All pertinent labs within the past 24 hours have been reviewed.  CBC:   Recent Labs   Lab 05/19/23  0555   WBC 7.75   HGB 10.1*   HCT 31.8*   MCV 96   PLT 76*     BMP:  Recent Labs   Lab 05/19/23  0555   GLU 70   *   K 4.5      CO2 23   BUN 36*   CREATININE 2.6*   CALCIUM 8.2*   MG 1.8     LFT:  Lab Results   Component Value Date    AST 63 (H) 05/19/2023    ALKPHOS 98 05/19/2023    BILITOT 0.8 05/19/2023     Albumin:   Albumin   Date Value Ref Range Status   05/19/2023 2.2 (L) 3.5 - 5.2 g/dL Final     Protein:   Total Protein   Date Value Ref Range Status   05/19/2023 4.5 (L) 6.0 - 8.4 g/dL Final     Lactic acid:   Lab Results   Component Value Date    LACTATE 1.0 11/08/2020    LACTATE 0.4 (L) 10/01/2020       Significant Imaging: I have  reviewed all pertinent imaging results/findings within the past 24 hours.    CT A/P 5/18/23  Impression:     Bilateral nonobstructing nephrolithiasis.     Suspected gallbladder sludge and/or tiny stones.     Colonic diverticulosis without convincing evidence of acute diverticulitis.     Descending and sigmoid colonic multifocal areas of short-segment wall thickening versus stenosis/peristalsis.  Clinical correlation advised.  Further evaluation/follow-up as warranted.     Small bilateral pleural effusions.

## 2023-05-19 NOTE — PROGRESS NOTES
[facility] Medicine  Progress Note    Patient Name: Gisselle Ortiz  MRN: [id number]  Patient Class: IP- Inpatient   Admission Date: 5/14/2023  Length of Stay: 4 days  Attending Physician: Boy Carlton MD  Primary Care Provider: Kai Montez MD        Subjective:     Principal Problem:Intractable nausea and vomiting        HPI:  Gisselle Ortiz is a 89 y.o. female with a PMHx of GERD, gallstone pancreatitis, thyroid disease, HTN, HLD, SSS s/p PPM, CHF (EF 55%), and a fib on eliquis who presents to the ED for nausea and vomiting. The patient reports ongoing nausea and vomiting x1 week. She endorses associated 10lb weight loss, fatigue, and generalized weakness. The patient also reports increased urinary frequency and dysuria beginning 4-5 days ago and was started on Bactrim which she completed yesterday. Unfortunately, she was unable to tolerate some of the medication. She reports ongoing urinary symptoms that have not improved. She denies any associated abdominal pain, diarrhea, fever, or chills.  She reports good flatus, but states that she really has not had many bowel movements. The patient denies any CP, SOB, cough, or headache.    In the ED, patient hypertensive in the ED which improved with home antihypertensives vitals otherwise stable, afebrile. CBC unremarkable. Na 133. K+5.3. Cr 1.2 (at baseline). Calcium 8.6. Total protein 5.4. Albumin 2.7. AST//177. Troponin 0.025. EKG with atrially paced rhythm, rate 60, no acute ischemic changes. CXR with increased parenchymal attenuation projects over the left lower lung zone, findings may reflect atelectasis however developing consolidation not excluded. KUB with nonobstructive bowel gas pattern noting large amount of stool throughout the colon may reflect constipation. The patient received LR bolus x2, zofran, and rocephin.      Overview/Hospital Course:  Pt placed admitted to hospital medicine for intractable n/v and  UTI. Pt has remained hemodynamically stable. She was started on 5-days of IV rocephin. Urine culture + klebsiella pneumoniae and e.coli. No leukocytosis but was hyperkalemic K 5.3; discontinued spironolactone. AST/ALT elevated on admission and downtrending. Abd US with hepatomegaly, stable prominence of CBD, and cholelithiasis without evidence of cholecystitis. AES recommended MRCP, which the patient unable to have 2/2 pacemaker. KUB with nonobstructive bowel gas pattern and significant stool burden, which is improving on bowel regimen. CTAP with gallbladder sludge vs stones and colonic wall thickening vs. stenosis/peristalsis. Pt with MARIO on CKD, which is improving with IVFs. PT/OT consulted and on re-evaluation today are recommending SNF. Plan to discharge to SNF when medically ready with PCP & hepatology f/u, HH, care at home, and ready responders.       Interval History: Pt seen and examined by me this morning. Hypertensive otherwise VSSAF. NAEON. Pt reports persistent weakness and fatigue, which she reports has worsened since admission. She denies abdominal pain or repeat episodes of N/V. She continues to report no appetite or desire to eat or drink and is not interested at TPN at this time.     Review of Systems   Constitutional:  Positive for fatigue. Negative for chills and fever.   Respiratory:  Negative for chest tightness and shortness of breath.    Cardiovascular:  Negative for chest pain and leg swelling.   Gastrointestinal:  Positive for constipation. Negative for abdominal pain, nausea and vomiting.   Neurological:  Positive for weakness. Negative for dizziness.   Objective:     Vital Signs (Most Recent):  Temp: 97.1 °F (36.2 °C) (05/19/23 1119)  Pulse: 60 (05/19/23 1119)  Resp: 18 (05/19/23 1119)  BP: (!) 152/76 (05/19/23 1119)  SpO2: 96 % (05/19/23 1119) Vital Signs (24h Range):  Temp:  [97.1 °F (36.2 °C)-98.4 °F (36.9 °C)] 97.1 °F (36.2 °C)  Pulse:  [60-63] 60  Resp:  [18] 18  SpO2:  [94 %-98 %] 96  %  BP: (133-175)/(59-85) 152/76     Weight: 49.8 kg (109 lb 12.6 oz)  Body mass index is 19.45 kg/m².    Intake/Output Summary (Last 24 hours) at 5/19/2023 1249  Last data filed at 5/19/2023 0249  Gross per 24 hour   Intake 440 ml   Output 100 ml   Net 340 ml           Physical Exam  Vitals and nursing note reviewed.   Constitutional:       General: She is not in acute distress.     Appearance: She is not toxic-appearing or diaphoretic.   HENT:      Head: Normocephalic and atraumatic.      Nose: Nose normal.      Mouth/Throat:      Mouth: Mucous membranes are dry.   Eyes:      Pupils: Pupils are equal, round, and reactive to light.   Cardiovascular:      Rate and Rhythm: Normal rate and regular rhythm.      Pulses: Normal pulses.   Pulmonary:      Effort: Pulmonary effort is normal. No respiratory distress.      Breath sounds: No wheezing, rhonchi or rales.   Abdominal:      General: Bowel sounds are normal. There is no distension.      Palpations: Abdomen is soft.      Tenderness: There is no abdominal tenderness. There is no guarding.   Musculoskeletal:         General: No swelling, tenderness or deformity. Normal range of motion.      Cervical back: Normal range of motion.      Left lower leg: No edema.   Skin:     General: Skin is warm and dry.      Capillary Refill: Capillary refill takes less than 2 seconds.   Neurological:      General: No focal deficit present.      Mental Status: She is alert and oriented to person, place, and time.      Sensory: No sensory deficit.      Motor: No weakness.   Psychiatric:         Mood and Affect: Mood normal.         Behavior: Behavior normal.           Significant Labs: All pertinent labs within the past 24 hours have been reviewed.    Significant Imaging: I have reviewed all pertinent imaging results/findings within the past 24 hours.      Assessment/Plan:      * Intractable nausea and vomiting  -Afebrile, no leukocytosis.    -Likely 2/2 UTI vs. constipation but  choledocholithiasis still a consideration  -KUB with large amount of stool - improving on bowel regimen  -Abd US: Stable prominence of the common bile duct and central intrahepatic biliary ducts in the right hepatic lobe. Cholelithiasis without evidence of acute cholecystitis.   -Discussed with AES who suggested MRCP. Patient unable to get MRCP with PPM.  -CT abd/pelvis with gallbladder sludge vs stones and colonic wall thickening vs stenosis/peristalsis   -Gentle IVF prn  -Full liquid diet, advance as tolerated  -Continue bowel regimen  -PRN compazine    Acute renal failure superimposed on stage 3 chronic kidney disease  - Creatinine today Estimated Creatinine Clearance: 11.5 mL/min (A) (based on SCr of 2.6 mg/dL (H)). (baseline 1-1.3)  - BUN/Cr 83/7.2 on 5/17 which improved to 33/2.8 s/p 2 IVF boluses and continuous IVFs  - BUN/Cr ratio 10-20, therefore suspect pt has intrinsic renal azotemia  - FeNa 1.3% and consistent with intrinsic renal azotemia   - Improving with IVFs, continue at 100 cc/hr  - UA infectious and improving on IV antibiotics    - Monitor BMP daily, replete electrolytes if necessary  - Renally adjust drugs to CrCl; avoid nephrotoxic drugs   -  Estimated Creatinine Clearance: 11.5 mL/min (A) (based on SCr of 2.6 mg/dL (H)).   - Monitor I/Os  - Renal ultrasound consistent with bilateral medical renal disease & simple renal cysts; no hydronephrosis    Malnutrition of mild degree  BMI of 19 or less in adult  - Pt reports poor PO intake for many weeks despite encouragement & good family support  Measurements:  Wt Readings from Last 1 Encounters:   05/16/23 49.8 kg (109 lb 12.6 oz)   Body mass index is 19.45 kg/m².   - Start mirtazapine 7.5 mg nightly   - Nutrition consulted, appreciate recs:   1. ADAT to Regular Diet, as tolerated-- texture per SLP. Encourage PO/fluid intake.   2. Continue ONS Boost Breeze with all meals. If advanced to Regular diet modify ONS to Boost Plus (flavor per patient) with  all meals.   3. Recommend MVI, probiotic supplementation.   4. RD to monitor and follow-up.    Electrolyte disturbance  - Replace electrolytes and monitor on daily labs    Thrombocytopenia  - Platelets 214 -> 136 -> 79  - Continue to monitor on daily labs    Hyperkalemia  Resolved  - K+5.3 on admit, received 2L IVF.  - Continue gentle IVF overnight.   - Spironolactone discontinued  - Continue to trend on labs.    Transaminitis  -AST//177, tbili 0.9 on admission and downtrending.  -Patient denies abdominal pain.  -Repeat lipid panel wnl--discontinue statin  -Hepatitis panel negative  -RUQ US reviewed and described above  -Continue to trend on morning labs.  -Plan to f/u with PCP on when to restart statin  -Will need hepatology f/u OP    Constipation  -Likely contributing to nausea and vomiting  -KUB with nonobstructive bowel gas pattern noting large amount of stool throughout the colon may reflect constipation  - Last BM on am of 5/18  - Continue bowel regimen with miralax TID & senna BID    Impaired functional mobility and endurance  - Chronic issue with acute worsening  - PT/OT recommending SNF  - CM following for placement assistance     Anemia due to chronic kidney disease  Patient's anemia is currently controlled. S/p 0 units of PRBCs.   Current CBC reviewed-   Lab Results   Component Value Date    HGB 10.1 (L) 05/19/2023    HCT 31.8 (L) 05/19/2023   - Monitor serial CBC and transfuse if patient becomes hemodynamically unstable, symptomatic or H/H drops below 7/21.     Chronic diastolic heart failure  Patient is identified as having Diastolic (HFpEF) heart failure that is Chronic. CHF is currently controlled. Latest ECHO performed and demonstrates- Results for orders placed during the hospital encounter of 09/26/19  Transthoracic echo (TTE) 2D with Color Flow  Interpretation Summary  · Normal left ventricular systolic function. The estimated ejection fraction is 55%  · Grade II (moderate) left ventricular  diastolic dysfunction consistent with pseudonormalization.  · Eccentric left ventricular hypertrophy. Mild left ventricular enlargement.  · Mild mitral regurgitation.  · Normal right ventricular systolic function.  · Mild tricuspid regurgitation.  · Severe left atrial enlargement.  Continue Beta Blocker and monitor clinical status closely. Monitor on telemetry. Patient is off CHF pathway.  Monitor strict Is&Os and daily weights.  Continue to stress to patient importance of self efficacy and  on diet for CHF.   -Patient appears hypovolemic on exam.    Acute cystitis with hematuria  Resolved  Patient reports ongoing dysuria and urinary frequency x4-5 days.  -Afebrile, no leukocytosis.   -UA with 3+ leukocytes, 8 WBCs, and few bacteria  -Urine culture + klebsiella pneumoniae & e.coli, sensitive to rocephin  -s/p 5 day course of IV rocephin  -Repeat UA improved     Vitamin D deficiency  -Continue oral supplementation.    SSS (sick sinus syndrome)  Cardiac pacemaker in situ  Chronic and controlled and patient with cardiac pacemaker in place and patient with atrial paced rhythm.     High cholesterol  - Patient is chronically on statin  - Hold for now due to elevated LFTs.  - Repeat lipid panel wnl   - Statin discontinued     Acquired hypothyroidism  Patient has chronic hypothyroidism. TFTs reviewed-   Lab Results   Component Value Date    TSH 2.146 10/13/2022   - Will continue chronic levothyroxine and adjust for and clinical changes.    Essential hypertension  -Chronic, controlled.  -Continue atenolol and diltiazem.   -Discontinued spironolactone in the setting of hyperkalemia and dehydration.  -Continue to monitor BP closely.    Paroxysmal atrial fibrillation  Current use of long term anticoagulation  Patient with Paroxysmal (<7 days) atrial fibrillation which is controlled currently with Beta Blocker, Calcium Channel Blocker and Amiodarone. Patient is currently in sinus rhythm.[id number] Score: 3.  Anticoagulation indicated. Anticoagulation done with eliquis.      VTE Risk Mitigation (From admission, onward)         Ordered     apixaban tablet 2.5 mg  2 times daily         05/14/23 1955     Place CHINEDU hose  Until discontinued         05/14/23 1955     IP VTE HIGH RISK PATIENT  Once         05/14/23 1955     Place sequential compression device  Until discontinued         05/14/23 1955     Reason for No Pharmacological VTE Prophylaxis  Once        Question:  Reasons:  Answer:  Already adequately anticoagulated on oral Anticoagulants    05/14/23 1955                Discharge Planning   FRANCESCA: 5/19/2023     Code Status: DNR   Is the patient medically ready for discharge?: Yes    Reason for patient still in hospital (select all that apply): Patient trending condition, Treatment, PT / OT recommendations and Pending disposition  Discharge Plan A: Home, Home with family                  Madnona Boogie PA-C  Department of Hospital Medicine   [facility]

## 2023-05-20 NOTE — PLAN OF CARE
Problem: Adult Inpatient Plan of Care  Goal: Plan of Care Review  Outcome: Ongoing, Progressing  Goal: Patient-Specific Goal (Individualized)  Outcome: Ongoing, Progressing  Goal: Absence of Hospital-Acquired Illness or Injury  Outcome: Ongoing, Progressing  Goal: Optimal Comfort and Wellbeing  Outcome: Ongoing, Progressing  Goal: Readiness for Transition of Care  Outcome: Ongoing, Progressing     Problem: Fall Injury Risk  Goal: Absence of Fall and Fall-Related Injury  Outcome: Ongoing, Progressing     Problem: Skin Injury Risk Increased  Goal: Skin Health and Integrity  Outcome: Ongoing, Progressing     Problem: Fluid and Electrolyte Imbalance (Acute Kidney Injury/Impairment)  Goal: Fluid and Electrolyte Balance  Outcome: Ongoing, Progressing     Problem: Oral Intake Inadequate (Acute Kidney Injury/Impairment)  Goal: Optimal Nutrition Intake  Outcome: Ongoing, Progressing     Problem: Renal Function Impairment (Acute Kidney Injury/Impairment)  Goal: Effective Renal Function  Outcome: Ongoing, Progressing     Problem: Coping Ineffective  Goal: Effective Coping  Outcome: Ongoing, Progressing

## 2023-05-20 NOTE — SUBJECTIVE & OBJECTIVE
Interval History: Pt seen and examined by me this morning. Hypertensive otherwise VSSAF. NAEON. Pt reports persistent weakness and fatigue but is in great spirits this morning. She denies abdominal pain or repeat episodes of N/V. She reports her appetite is improving and she is eager to advance her diet.    Review of Systems   Constitutional:  Positive for fatigue. Negative for chills and fever.   Respiratory:  Negative for chest tightness and shortness of breath.    Cardiovascular:  Negative for chest pain and leg swelling.   Gastrointestinal:  Negative for abdominal pain, constipation, nausea and vomiting.   Neurological:  Positive for weakness. Negative for dizziness.   Objective:     Vital Signs (Most Recent):  Temp: 97.6 °F (36.4 °C) (05/20/23 1104)  Pulse: 63 (05/20/23 1120)  Resp: 18 (05/20/23 1104)  BP: (!) 162/74 (05/20/23 1104)  SpO2: 96 % (05/20/23 1104) Vital Signs (24h Range):  Temp:  [97.6 °F (36.4 °C)-98.4 °F (36.9 °C)] 97.6 °F (36.4 °C)  Pulse:  [59-63] 63  Resp:  [18] 18  SpO2:  [94 %-96 %] 96 %  BP: (151-167)/(71-75) 162/74     Weight: 49.8 kg (109 lb 12.6 oz)  Body mass index is 19.45 kg/m².    Intake/Output Summary (Last 24 hours) at 5/20/2023 1335  Last data filed at 5/20/2023 0449  Gross per 24 hour   Intake --   Output 800 ml   Net -800 ml           Physical Exam  Vitals and nursing note reviewed.   Constitutional:       General: She is not in acute distress.     Appearance: She is not toxic-appearing or diaphoretic.   HENT:      Head: Normocephalic and atraumatic.      Nose: Nose normal.      Mouth/Throat:      Mouth: Mucous membranes are moist.   Eyes:      Pupils: Pupils are equal, round, and reactive to light.   Cardiovascular:      Rate and Rhythm: Normal rate and regular rhythm.      Pulses: Normal pulses.   Pulmonary:      Effort: Pulmonary effort is normal. No respiratory distress.      Breath sounds: No wheezing, rhonchi or rales.   Abdominal:      General: Bowel sounds are normal.  There is no distension.      Palpations: Abdomen is soft.      Tenderness: There is no abdominal tenderness. There is no guarding.   Musculoskeletal:         General: No swelling, tenderness or deformity. Normal range of motion.      Cervical back: Normal range of motion.      Left lower leg: No edema.   Skin:     General: Skin is warm and dry.      Capillary Refill: Capillary refill takes less than 2 seconds.   Neurological:      General: No focal deficit present.      Mental Status: She is alert and oriented to person, place, and time.      Sensory: No sensory deficit.      Motor: No weakness.   Psychiatric:         Mood and Affect: Mood normal.         Behavior: Behavior normal.           Significant Labs: All pertinent labs within the past 24 hours have been reviewed.    Significant Imaging: I have reviewed all pertinent imaging results/findings within the past 24 hours.

## 2023-05-20 NOTE — ASSESSMENT & PLAN NOTE
Resolved  -Afebrile, no leukocytosis.    -Likely 2/2 UTI vs. constipation but choledocholithiasis still a consideration  -KUB with large amount of stool - improving on bowel regimen  -Abd US: Stable prominence of the common bile duct and central intrahepatic biliary ducts in the right hepatic lobe. Cholelithiasis without evidence of acute cholecystitis.   -Discussed with AES who suggested MRCP. Patient unable to get MRCP with PPM.  -CT abd/pelvis with gallbladder sludge vs stones and colonic wall thickening vs stenosis/peristalsis   -Gentle IVF prn  -Full liquid diet, advance as tolerated  -Continue bowel regimen  -PRN compazine

## 2023-05-20 NOTE — PROGRESS NOTES
[facility] Medicine  Progress Note    Patient Name: Gisselle Ortiz  MRN: [id number]  Patient Class: IP- Inpatient   Admission Date: 5/14/2023  Length of Stay: 5 days  Attending Physician: Boy Carlton MD  Primary Care Provider: Kai Montez MD        Subjective:     Principal Problem:Impaired functional mobility and endurance        HPI:  Gisselle Ortiz is a 89 y.o. female with a PMHx of GERD, gallstone pancreatitis, thyroid disease, HTN, HLD, SSS s/p PPM, CHF (EF 55%), and a fib on eliquis who presents to the ED for nausea and vomiting. The patient reports ongoing nausea and vomiting x1 week. She endorses associated 10lb weight loss, fatigue, and generalized weakness. The patient also reports increased urinary frequency and dysuria beginning 4-5 days ago and was started on Bactrim which she completed yesterday. Unfortunately, she was unable to tolerate some of the medication. She reports ongoing urinary symptoms that have not improved. She denies any associated abdominal pain, diarrhea, fever, or chills.  She reports good flatus, but states that she really has not had many bowel movements. The patient denies any CP, SOB, cough, or headache.    In the ED, patient hypertensive in the ED which improved with home antihypertensives vitals otherwise stable, afebrile. CBC unremarkable. Na 133. K+5.3. Cr 1.2 (at baseline). Calcium 8.6. Total protein 5.4. Albumin 2.7. AST//177. Troponin 0.025. EKG with atrially paced rhythm, rate 60, no acute ischemic changes. CXR with increased parenchymal attenuation projects over the left lower lung zone, findings may reflect atelectasis however developing consolidation not excluded. KUB with nonobstructive bowel gas pattern noting large amount of stool throughout the colon may reflect constipation. The patient received LR bolus x2, zofran, and rocephin.      Overview/Hospital Course:  Pt placed admitted to hospital medicine for intractable  n/v and UTI. Pt has remained hemodynamically stable. She was started on 5-days of IV rocephin. Urine culture + klebsiella pneumoniae and e.coli. No leukocytosis but was hyperkalemic K 5.3; discontinued spironolactone. AST/ALT elevated on admission and downtrending. Abd US with hepatomegaly, stable prominence of CBD, and cholelithiasis without evidence of cholecystitis. AES recommended MRCP, which the patient unable to have 2/2 pacemaker. KUB with nonobstructive bowel gas pattern and significant stool burden, which is improving on bowel regimen. CTAP with gallbladder sludge vs stones and colonic wall thickening vs. stenosis/peristalsis. Pt with MARIO on CKD, which is improving with IVFs. PT/OT consulted and on re-evaluation today are recommending SNF. Plan to discharge to SNF when medically ready with PCP & hepatology f/u, HH, care at home, and ready responders.       Interval History: Pt seen and examined by me this morning. Hypertensive otherwise VSSAF. NAEON. Pt reports persistent weakness and fatigue but is in great spirits this morning. She denies abdominal pain or repeat episodes of N/V. She reports her appetite is improving and she is eager to advance her diet.    Review of Systems   Constitutional:  Positive for fatigue. Negative for chills and fever.   Respiratory:  Negative for chest tightness and shortness of breath.    Cardiovascular:  Negative for chest pain and leg swelling.   Gastrointestinal:  Negative for abdominal pain, constipation, nausea and vomiting.   Neurological:  Positive for weakness. Negative for dizziness.   Objective:     Vital Signs (Most Recent):  Temp: 97.6 °F (36.4 °C) (05/20/23 1104)  Pulse: 63 (05/20/23 1120)  Resp: 18 (05/20/23 1104)  BP: (!) 162/74 (05/20/23 1104)  SpO2: 96 % (05/20/23 1104) Vital Signs (24h Range):  Temp:  [97.6 °F (36.4 °C)-98.4 °F (36.9 °C)] 97.6 °F (36.4 °C)  Pulse:  [59-63] 63  Resp:  [18] 18  SpO2:  [94 %-96 %] 96 %  BP: (151-167)/(71-75) 162/74     Weight:  49.8 kg (109 lb 12.6 oz)  Body mass index is 19.45 kg/m².    Intake/Output Summary (Last 24 hours) at 5/20/2023 1335  Last data filed at 5/20/2023 0449  Gross per 24 hour   Intake --   Output 800 ml   Net -800 ml           Physical Exam  Vitals and nursing note reviewed.   Constitutional:       General: She is not in acute distress.     Appearance: She is not toxic-appearing or diaphoretic.   HENT:      Head: Normocephalic and atraumatic.      Nose: Nose normal.      Mouth/Throat:      Mouth: Mucous membranes are moist.   Eyes:      Pupils: Pupils are equal, round, and reactive to light.   Cardiovascular:      Rate and Rhythm: Normal rate and regular rhythm.      Pulses: Normal pulses.   Pulmonary:      Effort: Pulmonary effort is normal. No respiratory distress.      Breath sounds: No wheezing, rhonchi or rales.   Abdominal:      General: Bowel sounds are normal. There is no distension.      Palpations: Abdomen is soft.      Tenderness: There is no abdominal tenderness. There is no guarding.   Musculoskeletal:         General: No swelling, tenderness or deformity. Normal range of motion.      Cervical back: Normal range of motion.      Left lower leg: No edema.   Skin:     General: Skin is warm and dry.      Capillary Refill: Capillary refill takes less than 2 seconds.   Neurological:      General: No focal deficit present.      Mental Status: She is alert and oriented to person, place, and time.      Sensory: No sensory deficit.      Motor: No weakness.   Psychiatric:         Mood and Affect: Mood normal.         Behavior: Behavior normal.           Significant Labs: All pertinent labs within the past 24 hours have been reviewed.    Significant Imaging: I have reviewed all pertinent imaging results/findings within the past 24 hours.      Assessment/Plan:      * Impaired functional mobility and endurance  - Chronic issue with acute worsening  - PT/OT recommending SNF  - CM following for placement assistance      Acute renal failure superimposed on stage 3 chronic kidney disease  - Creatinine today Estimated Creatinine Clearance: 12 mL/min (A) (based on SCr of 2.5 mg/dL (H)). (baseline 1-1.3)  - BUN/Cr 83/7.2 on 5/17 which improved to 33/2.8 s/p 2 IVF boluses and continuous IVFs  - BUN/Cr ratio 10-20, therefore suspect pt has intrinsic renal azotemia  - FeNa 1.3% and consistent with intrinsic renal azotemia   - Improving with IVFs, continue at 100 cc/hr  - UA infectious and improving on IV antibiotics    - Monitor BMP daily, replete electrolytes if necessary  - Renally adjust drugs to CrCl; avoid nephrotoxic drugs   -  Estimated Creatinine Clearance: 12 mL/min (A) (based on SCr of 2.5 mg/dL (H)).   - Monitor I/Os  - Renal ultrasound consistent with bilateral medical renal disease & simple renal cysts; no hydronephrosis    Malnutrition of mild degree  BMI of 19 or less in adult  - Pt reports poor PO intake for many weeks despite encouragement & good family support  Measurements:  Wt Readings from Last 1 Encounters:   05/16/23 49.8 kg (109 lb 12.6 oz)   Body mass index is 19.45 kg/m².   - Start mirtazapine 7.5 mg nightly   - Nutrition consulted, appreciate recs:   1. ADAT to Regular Diet, as tolerated-- texture per SLP. Encourage PO/fluid intake.   2. Continue ONS Boost Breeze with all meals. If advanced to Regular diet modify ONS to Boost Plus (flavor per patient) with all meals.   3. Recommend MVI, probiotic supplementation.   4. RD to monitor and follow-up.    Electrolyte disturbance  - Replace electrolytes and monitor on daily labs    Thrombocytopenia  - Platelets 214 -> 136 -> 79 -> 63  - Continue to monitor on daily labs    Hyperkalemia  Resolved  - K+5.3 on admit, received 2L IVF.  - Continue gentle IVF overnight.   - Spironolactone discontinued  - Continue to trend on labs.    Transaminitis  -AST//177, tbili 0.9 on admission and downtrending.  -Patient denies abdominal pain.  -Repeat lipid panel  wnl--discontinue statin  -Hepatitis panel negative  -RUQ US reviewed and described above  -Continue to trend on morning labs.  -Plan to f/u with PCP on when to restart statin  -Will need hepatology f/u OP    Constipation  Resolved  -Likely contributing to nausea and vomiting  -KUB with nonobstructive bowel gas pattern noting large amount of stool throughout the colon may reflect constipation  - Last BM on am of 5/20  - Continue bowel regimen with miralax TID & senna BID    Intractable nausea and vomiting  Resolved  -Afebrile, no leukocytosis.    -Likely 2/2 UTI vs. constipation but choledocholithiasis still a consideration  -KUB with large amount of stool - improving on bowel regimen  -Abd US: Stable prominence of the common bile duct and central intrahepatic biliary ducts in the right hepatic lobe. Cholelithiasis without evidence of acute cholecystitis.   -Discussed with AES who suggested MRCP. Patient unable to get MRCP with PPM.  -CT abd/pelvis with gallbladder sludge vs stones and colonic wall thickening vs stenosis/peristalsis   -Gentle IVF prn  -Full liquid diet, advance as tolerated  -Continue bowel regimen  -PRN compazine    Anemia due to chronic kidney disease  Folate deficiency  Patient's anemia is currently controlled. S/p 0 units of PRBCs.   Current CBC reviewed-   Lab Results   Component Value Date    HGB 9.9 (L) 05/20/2023    HCT 31.0 (L) 05/20/2023   - Anemia panel demonstrates mixed anemia with ACD and folate deficiency  - Replace folate  - Monitor serial CBC and transfuse if patient becomes hemodynamically unstable, symptomatic or H/H drops below 7/21.     Chronic diastolic heart failure  Patient is identified as having Diastolic (HFpEF) heart failure that is Chronic. CHF is currently controlled. Latest ECHO performed and demonstrates- Results for orders placed during the hospital encounter of 09/26/19  Transthoracic echo (TTE) 2D with Color Flow  Interpretation Summary  · Normal left ventricular  systolic function. The estimated ejection fraction is 55%  · Grade II (moderate) left ventricular diastolic dysfunction consistent with pseudonormalization.  · Eccentric left ventricular hypertrophy. Mild left ventricular enlargement.  · Mild mitral regurgitation.  · Normal right ventricular systolic function.  · Mild tricuspid regurgitation.  · Severe left atrial enlargement.  Continue Beta Blocker and monitor clinical status closely. Monitor on telemetry. Patient is off CHF pathway.  Monitor strict Is&Os and daily weights.  Continue to stress to patient importance of self efficacy and  on diet for CHF.   -Patient appears hypovolemic on exam.    Acute cystitis with hematuria  Resolved  Patient reports ongoing dysuria and urinary frequency x4-5 days.  -Afebrile, no leukocytosis.   -UA with 3+ leukocytes, 8 WBCs, and few bacteria  -Urine culture + klebsiella pneumoniae & e.coli, sensitive to rocephin  -s/p 5 day course of IV rocephin  -Repeat UA improved     Vitamin D deficiency  -Continue oral supplementation.    SSS (sick sinus syndrome)  Cardiac pacemaker in situ  Chronic and controlled and patient with cardiac pacemaker in place and patient with atrial paced rhythm.     High cholesterol  - Patient is chronically on statin  - Hold for now due to elevated LFTs.  - Repeat lipid panel wnl   - Statin discontinued     Acquired hypothyroidism  Patient has chronic hypothyroidism. TFTs reviewed-   Lab Results   Component Value Date    TSH 2.146 10/13/2022   - Will continue chronic levothyroxine and adjust for and clinical changes.    Essential hypertension  -Chronic issue   -Continue atenolol and diltiazem  -Discontinued spironolactone in the setting of hyperkalemia and dehydration  -Add scheduled hydralazine   -Continue to monitor BP closely    Paroxysmal atrial fibrillation  Current use of long term anticoagulation  Patient with Paroxysmal (<7 days) atrial fibrillation which is controlled currently with Beta  Blocker, Calcium Channel Blocker and Amiodarone. Patient is currently in sinus rhythm.[id number] Score: 3. Anticoagulation indicated. Anticoagulation done with eliquis.      VTE Risk Mitigation (From admission, onward)         Ordered     apixaban tablet 2.5 mg  2 times daily         05/14/23 1955     Place CHINEDU hose  Until discontinued         05/14/23 1955     IP VTE HIGH RISK PATIENT  Once         05/14/23 1955     Place sequential compression device  Until discontinued         05/14/23 1955     Reason for No Pharmacological VTE Prophylaxis  Once        Question:  Reasons:  Answer:  Already adequately anticoagulated on oral Anticoagulants    05/14/23 1955                Discharge Planning   FRANCESCA: 5/23/2023     Code Status: DNR   Is the patient medically ready for discharge?: Yes    Reason for patient still in hospital (select all that apply): Patient trending condition and Pending disposition  Discharge Plan A: Skilled Nursing Facility                  Madonna Boogie PA-C  Department of Hospital Medicine   [facility]

## 2023-05-20 NOTE — ASSESSMENT & PLAN NOTE
- Creatinine today Estimated Creatinine Clearance: 12 mL/min (A) (based on SCr of 2.5 mg/dL (H)). (baseline 1-1.3)  - BUN/Cr 83/7.2 on 5/17 which improved to 33/2.8 s/p 2 IVF boluses and continuous IVFs  - BUN/Cr ratio 10-20, therefore suspect pt has intrinsic renal azotemia  - FeNa 1.3% and consistent with intrinsic renal azotemia   - Improving with IVFs, continue at 100 cc/hr  - UA infectious and improving on IV antibiotics    - Monitor BMP daily, replete electrolytes if necessary  - Renally adjust drugs to CrCl; avoid nephrotoxic drugs   -  Estimated Creatinine Clearance: 12 mL/min (A) (based on SCr of 2.5 mg/dL (H)).   - Monitor I/Os  - Renal ultrasound consistent with bilateral medical renal disease & simple renal cysts; no hydronephrosis

## 2023-05-20 NOTE — ASSESSMENT & PLAN NOTE
-Chronic issue   -Continue atenolol and diltiazem  -Discontinued spironolactone in the setting of hyperkalemia and dehydration  -Add scheduled hydralazine   -Continue to monitor BP closely

## 2023-05-20 NOTE — PLAN OF CARE
NAEON. VSS and WNL. Patient lethargic but arouses to voice and answers questions appropriately. Minimal PO intake. NS infusing at 125 cc/hr. POC discussed and all questions/concerns addressed. Patient is without complaints and in NAD at this time. Refer to flowsheets for full assessment and VS. POC on going.          Problem: Adult Inpatient Plan of Care  Goal: Plan of Care Review  Outcome: Ongoing, Progressing  Goal: Patient-Specific Goal (Individualized)  Outcome: Ongoing, Progressing  Goal: Absence of Hospital-Acquired Illness or Injury  Outcome: Ongoing, Progressing  Goal: Optimal Comfort and Wellbeing  Outcome: Ongoing, Progressing  Goal: Readiness for Transition of Care  Outcome: Ongoing, Progressing     Problem: Fall Injury Risk  Goal: Absence of Fall and Fall-Related Injury  Outcome: Ongoing, Progressing     Problem: Skin Injury Risk Increased  Goal: Skin Health and Integrity  Outcome: Ongoing, Progressing     Problem: Fluid and Electrolyte Imbalance (Acute Kidney Injury/Impairment)  Goal: Fluid and Electrolyte Balance  Outcome: Ongoing, Progressing     Problem: Oral Intake Inadequate (Acute Kidney Injury/Impairment)  Goal: Optimal Nutrition Intake  Outcome: Ongoing, Progressing     Problem: Renal Function Impairment (Acute Kidney Injury/Impairment)  Goal: Effective Renal Function  Outcome: Ongoing, Progressing     Problem: Coping Ineffective  Goal: Effective Coping  Outcome: Ongoing, Progressing

## 2023-05-20 NOTE — CONSULTS
Thank you for your consult to [facility]. We have reviewed the patient chart. This patient does not meet criteria for [facility] service at this time due to [facility] service capacity limitations. Will hand back to In-house service.    Karoline Schulz MD

## 2023-05-20 NOTE — ASSESSMENT & PLAN NOTE
Folate deficiency  Patient's anemia is currently controlled. S/p 0 units of PRBCs.   Current CBC reviewed-   Lab Results   Component Value Date    HGB 9.9 (L) 05/20/2023    HCT 31.0 (L) 05/20/2023   - Anemia panel demonstrates mixed anemia with ACD and folate deficiency  - Replace folate  - Monitor serial CBC and transfuse if patient becomes hemodynamically unstable, symptomatic or H/H drops below 7/21.

## 2023-05-20 NOTE — ASSESSMENT & PLAN NOTE
Resolved  -Likely contributing to nausea and vomiting  -KUB with nonobstructive bowel gas pattern noting large amount of stool throughout the colon may reflect constipation  - Last BM on am of 5/20  - Continue bowel regimen with miralax TID & senna BID

## 2023-05-20 NOTE — CONSULTS
Thank you for your consult to [facility]. We have reviewed the patient chart. This patient does not meet criteria for [facility] service at this time due to [facility] service capacity limitations. Will hand back to In-house service..

## 2023-05-21 PROBLEM — R53.83 LETHARGY: Status: ACTIVE | Noted: 2023-01-01

## 2023-05-21 PROBLEM — Z75.8 DISCHARGE PLANNING ISSUES: Status: ACTIVE | Noted: 2023-01-01

## 2023-05-21 PROBLEM — L03.114 CELLULITIS OF LEFT UPPER EXTREMITY: Status: ACTIVE | Noted: 2023-01-01

## 2023-05-21 NOTE — ASSESSMENT & PLAN NOTE
Present since admitted.  -will order repeat TSH and also ammonia given hx of thyroid disease and transaminitis  -continue multivitamin

## 2023-05-21 NOTE — ASSESSMENT & PLAN NOTE
Folate deficiency  Patient's anemia is currently controlled. S/p 0 units of PRBCs.   Current CBC reviewed-   Lab Results   Component Value Date    HGB 10.0 (L) 05/21/2023    HCT 32.5 (L) 05/21/2023   - Anemia panel demonstrates mixed anemia with ACD and folate deficiency  - Replace folate  - Monitor serial CBC and transfuse if patient becomes hemodynamically unstable, symptomatic or H/H drops below 7/21.

## 2023-05-21 NOTE — ASSESSMENT & PLAN NOTE
Patient has chronic hypothyroidism. TFTs reviewed-   Lab Results   Component Value Date    TSH 2.146 10/13/2022   -repeat TSH due to increased fatigue, lethargy and suspected depression  - Will continue chronic levothyroxine and adjust for and clinical changes.

## 2023-05-21 NOTE — PROGRESS NOTES
Pharmacokinetic Initial Assessment: IV Vancomycin    Assessment/Plan:    Give vancomycin 1000 mg x1  Goal trough concentration is 10 - 15 mcg/mL  Will pulse dose given MARIO, Scr 2.5 > 2.6 mg/dL (baseline 1 - 1.3)  Draw random level on 5/22 with AM labs   Pharmacy will continue to follow and monitor vancomycin.      Please contact pharmacy at extension [phone] with any questions regarding this assessment.     Thank you for the consult,   Sepideh Avila       Patient brief summary:  Gisselle Ortiz is a 89 y.o. female initiated on antimicrobial therapy with IV Vancomycin for treatment of suspected skin & soft tissue infection    Drug Allergies:   Review of patient's allergies indicates:   Allergen Reactions    Ciprofloxacin Nausea And Vomiting       Actual Body Weight:   49.8 kg    Renal Function:   Estimated Creatinine Clearance: 11.5 mL/min (A) (based on SCr of 2.6 mg/dL (H)).     CBC (last 72 hours):  Recent Labs   Lab Result Units 05/19/23  0555 05/20/23  0308 05/21/23  0523   WBC K/uL 7.75 7.88 6.95   Hemoglobin g/dL 10.1* 9.9* 10.0*   Hematocrit % 31.8* 31.0* 32.5*   Platelets K/uL 76* 63* 68*   Gran % % 64.3 62.4 63.0   Lymph % % 16.1* 17.5* 17.3*   Mono % % 13.9 14.3 12.7   Eosinophil % % 2.6 2.5 3.2   Basophil % % 0.9 1.0 0.9   Differential Method  Automated Automated Automated       Metabolic Panel (last 72 hours):  Recent Labs   Lab Result Units 05/19/23  0555 05/19/23  1140 05/20/23  0308 05/21/23  0523   Sodium mmol/L 135* 136 137 137   Potassium mmol/L 4.5 4.7 4.7 4.8   Chloride mmol/L 107 106 111* 111*   CO2 mmol/L 23 24 19* 21*   Glucose mg/dL 70 87 57* 78   BUN mg/dL 36* 34* 34* 33*   Creatinine mg/dL 2.6* 2.6* 2.5* 2.6*   Albumin g/dL 2.2*  --  2.0* 2.0*   Total Bilirubin mg/dL 0.8  --  0.9 0.6   Alkaline Phosphatase U/L 98  --  89 91   AST U/L 63*  --  58* 49*   ALT U/L 93*  --  75* 64*   Magnesium mg/dL 1.8  --  1.6 1.8   Phosphorus mg/dL 2.9  --  2.8 3.2       Drug levels (last 3 results):  No  results for input(s): VANCOMYCINRA, VANCORANDOM, VANCOMYCINPE, VANCOPEAK, VANCOMYCINTR, VANCOTROUGH in the last 72 hours.    Microbiologic Results:  Microbiology Results (last 7 days)       Procedure Component Value Units Date/Time    Blood culture [[id number]] Collected: 05/21/23 1140    Order Status: Sent Specimen: Blood Updated: 05/21/23 1149    Narrative:      Rt AC    Blood culture [[id number]] Collected: 05/21/23 1139    Order Status: Sent Specimen: Blood Updated: 05/21/23 1149    Narrative:      Rt hand    Urine culture [[id number]]  (Abnormal)  (Susceptibility) Collected: 05/14/23 1827    Order Status: Completed Specimen: Urine from Clean Catch Updated: 05/17/23 0438     Urine Culture, Routine KLEBSIELLA PNEUMONIAE  > 100,000 cfu/ml  No other significant isolate        ESCHERICHIA COLI  > 100,000 cfu/ml      Narrative:      Specimen Source->Urine    PER VAINA PABLO NP REQUEST ADD ON URINE CULTURE (ORDER   [id number])  02:33  05/15/2023     Urine culture [[id number]]     Order Status: Completed Specimen: Urine

## 2023-05-21 NOTE — CONSULTS
Thank you for your consult to [facility]. We have reviewed the patient chart. This patient does meet criteria for [facility] service at this time. Will assume care on 05/21/23 at 7AM.    Karoline Schulz MD

## 2023-05-21 NOTE — ASSESSMENT & PLAN NOTE
- Platelets 214 -> 136 -> 79 -> 63 and 68 on 5/21  - no heparin products this admit  - no evidence of thrombocytopenia in past labs  - had severe transaminitis in 1/2022 during COVID infection - other elevations also noted - may be underlying liver dz as an association  - Continue to monitor on daily labs

## 2023-05-21 NOTE — PLAN OF CARE
Problem: Adult Inpatient Plan of Care  Goal: Plan of Care Review  Outcome: Ongoing, Progressing  Goal: Patient-Specific Goal (Individualized)  Outcome: Ongoing, Progressing  Goal: Absence of Hospital-Acquired Illness or Injury  Outcome: Ongoing, Progressing  Goal: Optimal Comfort and Wellbeing  Outcome: Ongoing, Progressing  Goal: Readiness for Transition of Care  Outcome: Ongoing, Progressing     Problem: Fall Injury Risk  Goal: Absence of Fall and Fall-Related Injury  Outcome: Ongoing, Progressing     Problem: Skin Injury Risk Increased  Goal: Skin Health and Integrity  Outcome: Ongoing, Progressing     Problem: Fluid and Electrolyte Imbalance (Acute Kidney Injury/Impairment)  Goal: Fluid and Electrolyte Balance  Outcome: Ongoing, Progressing     Problem: Oral Intake Inadequate (Acute Kidney Injury/Impairment)  Goal: Optimal Nutrition Intake  Outcome: Ongoing, Progressing     Problem: Renal Function Impairment (Acute Kidney Injury/Impairment)  Goal: Effective Renal Function  Outcome: Ongoing, Progressing     Problem: Coping Ineffective  Goal: Effective Coping  Outcome: Ongoing, Progressing     Problem: Fatigue  Goal: Improved Activity Tolerance  Outcome: Ongoing, Progressing     Problem: Infection  Goal: Absence of Infection Signs and Symptoms  Outcome: Ongoing, Progressing

## 2023-05-21 NOTE — ASSESSMENT & PLAN NOTE
- Creatinine today Estimated Creatinine Clearance: 11.5 mL/min (A) (based on SCr of 2.6 mg/dL (H)). (baseline 1-1.3)  - BUN/Cr 83/7.2 on 5/17 which improved to 33/2.8 s/p 2 IVF boluses and continuous IVFs  - BUN/Cr ratio 10-20, therefore suspect pt has intrinsic renal azotemia  - FeNa 1.3% and consistent with intrinsic renal azotemia   - initally improving with IVFs  - UA infectious and improving on IV antibiotics    - Monitor BMP daily, replete electrolytes if necessary  - Renally adjust drugs to CrCl; avoid nephrotoxic drugs   -  Estimated Creatinine Clearance: 11.5 mL/min (A) (based on SCr of 2.6 mg/dL (H)).   - Monitor I/Os  - Renal ultrasound consistent with bilateral medical renal disease & simple renal cysts; no hydronephrosis  -continues to clinically appear hypovolemic with kenoturia and persistently elevated Cr; gentle iv hydration on 5/21

## 2023-05-21 NOTE — SUBJECTIVE & OBJECTIVE
This follow-up encounter was provided through telemedicine to address  Impaired functional mobility and endurance present on admission.  Patient was transferred to the telemedicine service on:  05/21/2023   The patient location is: 629/629 A admitted 5/14/2023  3:48 PM.      Interval History/Overnight Events:   Clinical record since admit reviewed.    Patient is able to provide adequate history.    Patient was feeling well yesterday but feels horrible this am with debilitating fatigue; no specific complaint of pain or discomfort to any area; nurse noted her left UE with increased erythema and edema from yesterday - she had bruising and skin tear previously; lactate ordered and normal; blood cultures also ordered; patient reports history of blood clot in her arm previously and is currently on chronic apixaban therapy.  Denies abd pain as she has had previously.  Poor appetite and has not eaten today.  Has been having BM's daily. She has not taken senokot-s or miralax in . 24 hours.    Labs reviewed and essentially unchanged in last 24 hours.  Vital signs have been stable.    Discussed plan of care with patient (labs, US of UE and Abx) and she wishes to proceed with all recommended.     Review of Systems   Constitutional:  Positive for activity change and fatigue. Negative for fever.   Respiratory:  Negative for shortness of breath.    Cardiovascular:  Negative for chest pain and leg swelling.   Gastrointestinal:  Negative for abdominal pain, diarrhea and vomiting.   Genitourinary:  Negative for dysuria.   Skin:  Positive for color change and wound. Negative for rash.        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)    [x]   Cardiology Procedure reports TEE6/2022 - EF 50%    []   Outside  records/CareEverywhere     []  Independently viewed:         Inpatient Medications reviewed and prescribed for management of current problems:  Scheduled Meds:   amiodarone  200 mg Oral Daily    apixaban  2.5 mg Oral BID    aspirin  81 mg Oral Daily    atenoloL  50 mg Oral Daily    And    atenoloL  25 mg Oral QHS    ceFEPime (MAXIPIME) IVPB  1 g Intravenous Q12H    diltiaZEM  180 mg Oral Daily    famotidine  20 mg Oral Daily    ferrous sulfate  1 tablet Oral Daily    folic acid  1 mg Oral Daily    hydrALAZINE  25 mg Oral Q8H    Lactobacillus rhamnosus GG  1 capsule Oral Daily    levothyroxine  137 mcg Oral Before breakfast    mirtazapine  7.5 mg Oral QHS    multivitamin  1 tablet Oral Daily    polyethylene glycol  17 g Oral TID    senna-docusate 8.6-50 mg  1 tablet Oral BID    vitamin D  1,000 Units Oral Daily     Continuous Infusions:  PRN Meds:.acetaminophen, dextrose 10%, dextrose 10%, dextrose, dextrose, glucagon (human recombinant), iohexol, lactulose, prochlorperazine, sodium chloride 0.9%, Pharmacy to dose Vancomycin consult **AND** vancomycin - pharmacy to dose      Objective:     Temp:  [95.9 °F (35.5 °C)-98.2 °F (36.8 °C)] 98.2 °F (36.8 °C)  Pulse:  [60-63] 60  Resp:  [16-18] 16  SpO2:  [95 %-97 %] 96 %  BP: (128-168)/(62-81) 128/81      Intake/Output Summary (Last 24 hours) at 5/21/2023 1219  Last data filed at 5/21/2023 0641  Gross per 24 hour   Intake 120 ml   Output 400 ml   Net -280 ml        Body mass index is 19.45 kg/m².    Physical Exam  Vitals and nursing note reviewed.   Constitutional:       General: She is not in acute distress.     Appearance: She is ill-appearing.   HENT:      Head: Normocephalic and atraumatic.   Eyes:      Extraocular Movements: Extraocular movements intact.   Cardiovascular:      Rate and Rhythm: Normal rate.   Pulmonary:      Effort: Pulmonary effort is normal. No tachypnea or respiratory distress.   Musculoskeletal:      Comments: Left UE with large area of medial  erythema ; 2-3+ edema to entire UE; bruising to forearm; good ROM; no edema or skin changes to RUE   Neurological:      General: No focal deficit present.      Mental Status: She is oriented to person, place, and time and easily aroused. Mental status is at baseline. She is lethargic.      Cranial Nerves: No cranial nerve deficit.      Motor: Weakness (Generalized with poor bed mobility) present.   Psychiatric:         Attention and Perception: Attention normal.         Mood and Affect: Mood is depressed. Affect is flat.         Speech: Speech is delayed.         Behavior: Behavior is cooperative.        Labs: All labs within the last 24 hours were reviewed.   Recent Results (from the past 24 hour(s))   CBC Auto Differential    Collection Time: 05/21/23  5:23 AM   Result Value Ref Range    WBC 6.95 3.90 - 12.70 K/uL    RBC 3.30 (L) 4.00 - 5.40 M/uL    Hemoglobin 10.0 (L) 12.0 - 16.0 g/dL    Hematocrit 32.5 (L) 37.0 - 48.5 %    MCV 99 (H) 82 - 98 fL    MCH 30.3 27.0 - 31.0 pg    MCHC 30.8 (L) 32.0 - 36.0 g/dL    RDW 14.6 (H) 11.5 - 14.5 %    Platelets 68 (L) 150 - 450 K/uL    MPV 10.6 9.2 - 12.9 fL    Immature Granulocytes 2.9 (H) 0.0 - 0.5 %    Gran # (ANC) 4.4 1.8 - 7.7 K/uL    Immature Grans (Abs) 0.20 (H) 0.00 - 0.04 K/uL    Lymph # 1.2 1.0 - 4.8 K/uL    Mono # 0.9 0.3 - 1.0 K/uL    Eos # 0.2 0.0 - 0.5 K/uL    Baso # 0.06 0.00 - 0.20 K/uL    nRBC 0 0 /100 WBC    Gran % 63.0 38.0 - 73.0 %    Lymph % 17.3 (L) 18.0 - 48.0 %    Mono % 12.7 4.0 - 15.0 %    Eosinophil % 3.2 0.0 - 8.0 %    Basophil % 0.9 0.0 - 1.9 %    Differential Method Automated    Comprehensive Metabolic Panel    Collection Time: 05/21/23  5:23 AM   Result Value Ref Range    Sodium 137 136 - 145 mmol/L    Potassium 4.8 3.5 - 5.1 mmol/L    Chloride 111 (H) 95 - 110 mmol/L    CO2 21 (L) 23 - 29 mmol/L    Glucose 78 70 - 110 mg/dL    BUN 33 (H) 8 - 23 mg/dL    Creatinine 2.6 (H) 0.5 - 1.4 mg/dL    Calcium 8.2 (L) 8.7 - 10.5 mg/dL    Total Protein 4.5  (L) 6.0 - 8.4 g/dL    Albumin 2.0 (L) 3.5 - 5.2 g/dL    Total Bilirubin 0.6 0.1 - 1.0 mg/dL    Alkaline Phosphatase 91 55 - 135 U/L    AST 49 (H) 10 - 40 U/L    ALT 64 (H) 10 - 44 U/L    Anion Gap 5 (L) 8 - 16 mmol/L    eGFR 17.1 (A) >60 mL/min/1.73 m^2   Magnesium    Collection Time: 05/21/23  5:23 AM   Result Value Ref Range    Magnesium 1.8 1.6 - 2.6 mg/dL   Phosphorus    Collection Time: 05/21/23  5:23 AM   Result Value Ref Range    Phosphorus 3.2 2.7 - 4.5 mg/dL   Lactic acid, plasma    Collection Time: 05/21/23 11:40 AM   Result Value Ref Range    Lactate (Lactic Acid) 0.6 0.5 - 2.2 mmol/L        Lab Results   Component Value Date    [id number] Positive (A) 01/07/2022       Recent Labs   Lab 05/19/23  0555 05/20/23  0308 05/21/23  0523   WBC 7.75 7.88 6.95   LYMPH 16.1*  1.3 17.5*  1.4 17.3*  1.2   HGB 10.1* 9.9* 10.0*   HCT 31.8* 31.0* 32.5*   PLT 76* 63* 68*     Recent Labs   Lab 05/19/23  0555 05/19/23  1140 05/20/23  0308 05/21/23  0523   * 136 137 137   K 4.5 4.7 4.7 4.8    106 111* 111*   CO2 23 24 19* 21*   BUN 36* 34* 34* 33*   CREATININE 2.6* 2.6* 2.5* 2.6*   GLU 70 87 57* 78   CALCIUM 8.2* 8.0* 7.4* 8.2*   MG 1.8  --  1.6 1.8   PHOS 2.9  --  2.8 3.2     Recent Labs   Lab 05/19/23  0555 05/20/23  0308 05/21/23  0523   ALKPHOS 98 89 91   ALT 93* 75* 64*   AST 63* 58* 49*   ALBUMIN 2.2* 2.0* 2.0*   PROT 4.5* 4.5* 4.5*   BILITOT 0.8 0.9 0.6        Recent Labs     05/19/23  0555   FERRITIN 1,121*   CPK 36           Microbiology: All microbiology updates for the past 24 hours have been reviewed.  Microbiology Results (last 7 days)       Procedure Component Value Units Date/Time    Blood culture [[id number]] Collected: 05/21/23 1140    Order Status: Sent Specimen: Blood Updated: 05/21/23 1149    Narrative:      Rt AC    Blood culture [[id number]] Collected: 05/21/23 1139    Order Status: Sent Specimen: Blood Updated: 05/21/23 1149    Narrative:      Rt hand    Urine culture [[id number]]   (Abnormal)  (Susceptibility) Collected: 05/14/23 1827    Order Status: Completed Specimen: Urine from Clean Catch Updated: 05/17/23 8208     Urine Culture, Routine KLEBSIELLA PNEUMONIAE  > 100,000 cfu/ml  No other significant isolate        ESCHERICHIA COLI  > 100,000 cfu/ml      Narrative:      Specimen Source->Urine    PER VANIA PABLO NP REQUEST ADD ON URINE CULTURE (ORDER   [id number])  02:33  05/15/2023     Urine culture [[id number]]     Order Status: Completed Specimen: Urine               Imaging All imaging within the last 24 hours was reviewed.   ECG Results              EKG 12-lead (Final result)  Result time 05/16/23 09:50:53      Final result by Interface, Lab In [facility] (05/16/23 09:50:53)                   Narrative:    Test Reason : R11.10,    Vent. Rate : 060 BPM     Atrial Rate : 060 BPM     P-R Int : 212 ms          QRS Dur : 130 ms      QT Int : 486 ms       P-R-T Axes : 000 087 030 degrees     QTc Int : 486 ms    Atrial-paced rhythm with prolonged AV conduction  Right bundle branch block  Abnormal ECG  When compared with ECG of 26-AUG-2022 12:06,  Electronic atrial pacemaker has replaced Electronic ventricular pacemaker  Confirmed by Phylicia Judd MD ([id number]) on 5/16/2023 9:50:40 AM    Referred By: [email or web address]   SELF           Confirmed By:Phylicia Judd MD                                    Results for orders placed during the hospital encounter of 09/26/19    Transthoracic echo (TTE) 2D with Color Flow    Interpretation Summary  · Normal left ventricular systolic function. The estimated ejection fraction is 55%  · Grade II (moderate) left ventricular diastolic dysfunction consistent with pseudonormalization.  · Eccentric left ventricular hypertrophy. Mild left ventricular enlargement.  · Mild mitral regurgitation.  · Normal right ventricular systolic function.  · Mild tricuspid regurgitation.  · Severe left atrial enlargement.      CT Abdomen Pelvis  Without Contrast  Narrative:  EXAMINATION:  CT ABDOMEN PELVIS WITHOUT CONTRAST    CLINICAL HISTORY:  Nausea/vomiting;    TECHNIQUE:  Axial images of the abdomen and pelvis were acquired without the use of IV contrast. Oral contrast media was utilized.  Coronal and sagittal reconstructions were also obtained.    COMPARISON:  CT abdomen pelvis 11/08/2020    FINDINGS:  Heart: Normal in size. No pericardial effusion. Multivessel calcific coronary atherosclerosis.  Partially visualized pacemaker leads in place.    Lungs: Bibasilar band like opacities, likely subsegmental atelectasis.  Stable 0.4 cm right lower lobe nodule (series 2, image 18).  Small bilateral layering pleural effusions which appear new from 05/14/2023 chest radiograph.    Liver: Hepatomegaly.  No focal hepatic lesion.  Small volume of perihepatic fluid, new from priors.    Gallbladder: Subtle layering hyperattenuation within the gallbladder suggesting sludge and/or tiny stones.    Bile Ducts: No evidence of dilated ducts.    Pancreas: No peripancreatic fat stranding.  The previous multiloculated cystic lesion within the pancreatic body is not well visualized on this noncontrast exam.    Spleen: Unremarkable.    Stomach and duodenum: Small hiatal hernia.  Stomach and duodenum are otherwise within normal limits.    Adrenals: Unremarkable.    Kidneys/ Ureters: Normal in location, both borderline small in size with areas of cortical thinning and or scarring.  3.0 cm left lower pole renal cyst.  Bilateral nonobstructing nephrolithiasis, with a few punctate stones new from prior.  Bilateral renal vascular calcifications noted.  No hydronephrosis.  No ureteral dilatation.    Bladder: No evidence of wall thickening.    Reproductive organs: Uterus is surgically absent.    Bowel/Mesentery: Small bowel is normal in caliber with no evidence of obstruction.  No convincing evidence of acute inflammation.  Multifocal short-segment foci wall thickening versus underdistention/peristalsis of the  descending and sigmoid colon.  Few scattered colonic diverticula without convincing evidence of acute diverticulitis.    Peritoneum: No intraperitoneal free air.    Lymph nodes: No retroperitoneal lymphadenopathy.    Vasculature: No aneurysm. Severe calcific atherosclerosis.    Abdominal wall:  Unremarkable.    Bones: No acute fracture. No suspicious osseous lesions.  Postoperative changes of right hip arthroplasty.  Scoliosis and degenerative changes of the spine similar to prior.  Similar subcortical cystic change of the right acetabulum.  Stable sclerotic focus of the right iliac bone.  Impression: Bilateral nonobstructing nephrolithiasis.    Suspected gallbladder sludge and/or tiny stones.    Colonic diverticulosis without convincing evidence of acute diverticulitis.    Descending and sigmoid colonic multifocal areas of short-segment wall thickening versus stenosis/peristalsis.  Clinical correlation advised.  Further evaluation/follow-up as warranted.    Small bilateral pleural effusions.    Additional findings as above.    Electronically signed by resident: Magaly Guo  Date:    05/18/2023  Time:    18:34    Electronically signed by: Geovany Machuca MD  Date:    05/18/2023  Time:    19:28

## 2023-05-21 NOTE — ASSESSMENT & PLAN NOTE
Erythema to LUE with edema  US ordered; continue apixaban  Elevate extremity  Lactic acid WNL; blood cx ordered  Vanc/cefepime started  Continue to monitor

## 2023-05-21 NOTE — NURSING
Redness and swelling have increased on patients arm. Bruising is developing on the hand about half way up the fingers and into the wrist. MD notified.

## 2023-05-21 NOTE — ASSESSMENT & PLAN NOTE
- Patient is chronically on statin  - Hold for now due to elevated LFTs which are improving  - Repeat lipid panel wnl   - Statin discontinued

## 2023-05-21 NOTE — PROGRESS NOTES
Pharmacist Renal Dose Adjustment Note    Gisselle Ortiz is a 89 y.o. female being treated with the medication cefepime    Patient Data:    Vital Signs (Most Recent):  Temp: 98.2 °F (36.8 °C) (05/21/23 1146)  Pulse: 60 (05/21/23 1146)  Resp: 16 (05/21/23 1146)  BP: 128/81 (05/21/23 1146)  SpO2: 96 % (05/21/23 1146) Vital Signs (72h Range):  Temp:  [95.9 °F (35.5 °C)-98.4 °F (36.9 °C)]   Pulse:  [59-63]   Resp:  [16-18]   BP: (128-175)/(59-85)   SpO2:  [94 %-98 %]      Recent Labs   Lab 05/19/23  1140 05/20/23  0308 05/21/23  0523   CREATININE 2.6* 2.5* 2.6*     Serum creatinine: 2.6 mg/dL (H) 05/21/23 0523  Estimated creatinine clearance: 11.5 mL/min (A)    Cefepime 1 g q12h will be changed to cefepime 1 g q24h    Pharmacist's Name: Sepideh Avila  Pharmacist's Extension: [phone]

## 2023-05-21 NOTE — PROGRESS NOTES
[facility]  Telemedicine Progress Note    Patient Name: Gisselle Ortiz  MRN: [id number]  Patient Class: IP- Inpatient   Admission Date: 5/14/2023  Length of Stay: 6 days  Attending Physician: Karoline Schulz MD  Primary Care Provider: Kai Montez MD          Subjective:     Principal Problem:Impaired functional mobility and endurance        HPI:  Gisselle Ortiz is a 89 y.o. female with a PMHx of GERD, gallstone pancreatitis, thyroid disease, HTN, HLD, SSS s/p PPM, CHF (EF 55%), and a fib on eliquis who presents to the ED for nausea and vomiting. The patient reports ongoing nausea and vomiting x1 week. She endorses associated 10lb weight loss, fatigue, and generalized weakness. The patient also reports increased urinary frequency and dysuria beginning 4-5 days ago and was started on Bactrim which she completed yesterday. Unfortunately, she was unable to tolerate some of the medication. She reports ongoing urinary symptoms that have not improved. She denies any associated abdominal pain, diarrhea, fever, or chills.  She reports good flatus, but states that she really has not had many bowel movements. The patient denies any CP, SOB, cough, or headache.    In the ED, patient hypertensive in the ED which improved with home antihypertensives vitals otherwise stable, afebrile. CBC unremarkable. Na 133. K+5.3. Cr 1.2 (at baseline). Calcium 8.6. Total protein 5.4. Albumin 2.7. AST//177. Troponin 0.025. EKG with atrially paced rhythm, rate 60, no acute ischemic changes. CXR with increased parenchymal attenuation projects over the left lower lung zone, findings may reflect atelectasis however developing consolidation not excluded. KUB with nonobstructive bowel gas pattern noting large amount of stool throughout the colon may reflect constipation. The patient received LR bolus x2, zofran, and rocephin.      Overview/Hospital Course:  Pt placed admitted to hospital  medicine for intractable n/v and UTI. Pt has remained hemodynamically stable. She was started on 5-days of IV rocephin. Urine culture + klebsiella pneumoniae and e.coli. No leukocytosis but was hyperkalemic K 5.3; discontinued spironolactone. AST/ALT elevated on admission and downtrending. Abd US with hepatomegaly, stable prominence of CBD, and cholelithiasis without evidence of cholecystitis. AES recommended MRCP, which the patient unable to have 2/2 pacemaker. KUB with nonobstructive bowel gas pattern and significant stool burden, which is improving on bowel regimen. CTAP with gallbladder sludge vs stones and colonic wall thickening vs. stenosis/peristalsis. Pt with MARIO on CKD, which is improving with IVFs. PT/OT consulted and on re-evaluation today are recommending SNF. Plan to discharge to SNF when medically ready with PCP & hepatology f/u, HH, care at home, and ready responders.         This follow-up encounter was provided through telemedicine to address  Impaired functional mobility and endurance present on admission.  Patient was transferred to the telemedicine service on:  05/21/2023   The patient location is: [address] [place] [address] A admitted 5/14/2023  3:48 PM.      Interval History/Overnight Events:   Clinical record since admit reviewed.    Patient is able to provide adequate history.    Patient was feeling well yesterday but feels horrible this am with debilitating fatigue; no specific complaint of pain or discomfort to any area; nurse noted her left UE with increased erythema and edema from yesterday - she had bruising and skin tear previously; lactate ordered and normal; blood cultures also ordered; patient reports history of blood clot in her arm previously and is currently on chronic apixaban therapy.  Denies abd pain as she has had previously.  Poor appetite and has not eaten today.  Has been having BM's daily. She has not taken senokot-s or miralax in . 24 hours.    Labs reviewed and essentially unchanged in  last 24 hours.  Vital signs have been stable.    Discussed plan of care with patient (labs, US of UE and Abx) and she wishes to proceed with all recommended.     Review of Systems   Constitutional:  Positive for activity change and fatigue. Negative for fever.   Respiratory:  Negative for shortness of breath.    Cardiovascular:  Negative for chest pain and leg swelling.   Gastrointestinal:  Negative for abdominal pain, diarrhea and vomiting.   Genitourinary:  Negative for dysuria.   Skin:  Positive for color change and wound. Negative for rash.        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)    [x]   Cardiology Procedure reports TEE6/2022 - EF 50%    []   Outside records/CareEverywhere     []  Independently viewed:         Inpatient Medications reviewed and prescribed for management of current problems:  Scheduled Meds:   amiodarone  200 mg Oral Daily    apixaban  2.5 mg Oral BID    aspirin  81 mg Oral Daily    atenoloL  50 mg Oral Daily    And    atenoloL  25 mg Oral QHS    ceFEPime (MAXIPIME) IVPB  1 g Intravenous Q12H    diltiaZEM  180 mg Oral Daily    famotidine  20 mg Oral Daily    ferrous sulfate  1 tablet Oral Daily    folic acid  1 mg Oral Daily    hydrALAZINE  25 mg Oral Q8H    Lactobacillus rhamnosus GG  1 capsule Oral Daily    levothyroxine  137 mcg Oral Before breakfast    mirtazapine  7.5 mg Oral QHS    multivitamin  1 tablet Oral Daily    polyethylene glycol  17 g Oral TID    senna-docusate 8.6-50 mg  1 tablet Oral BID    vitamin D  1,000 Units Oral Daily     Continuous Infusions:  PRN Meds:.acetaminophen, dextrose 10%, dextrose 10%, dextrose, dextrose, glucagon (human recombinant), iohexol, lactulose, prochlorperazine, sodium chloride 0.9%, Pharmacy to dose  Vancomycin consult **AND** vancomycin - pharmacy to dose      Objective:     Temp:  [95.9 °F (35.5 °C)-98.2 °F (36.8 °C)] 98.2 °F (36.8 °C)  Pulse:  [60-63] 60  Resp:  [16-18] 16  SpO2:  [95 %-97 %] 96 %  BP: (128-168)/(62-81) 128/81      Intake/Output Summary (Last 24 hours) at 5/21/2023 1219  Last data filed at 5/21/2023 0641  Gross per 24 hour   Intake 120 ml   Output 400 ml   Net -280 ml        Body mass index is 19.45 kg/m².    Physical Exam  Vitals and nursing note reviewed.   Constitutional:       General: She is not in acute distress.     Appearance: She is ill-appearing.   HENT:      Head: Normocephalic and atraumatic.   Eyes:      Extraocular Movements: Extraocular movements intact.   Cardiovascular:      Rate and Rhythm: Normal rate.   Pulmonary:      Effort: Pulmonary effort is normal. No tachypnea or respiratory distress.   Musculoskeletal:      Comments: Left UE with large area of medial erythema ; 2-3+ edema to entire UE; bruising to forearm; good ROM; no edema or skin changes to RUE   Neurological:      General: No focal deficit present.      Mental Status: She is oriented to person, place, and time and easily aroused. Mental status is at baseline. She is lethargic.      Cranial Nerves: No cranial nerve deficit.      Motor: Weakness (Generalized with poor bed mobility) present.   Psychiatric:         Attention and Perception: Attention normal.         Mood and Affect: Mood is depressed. Affect is flat.         Speech: Speech is delayed.         Behavior: Behavior is cooperative.        Labs: All labs within the last 24 hours were reviewed.   Recent Results (from the past 24 hour(s))   CBC Auto Differential    Collection Time: 05/21/23  5:23 AM   Result Value Ref Range    WBC 6.95 3.90 - 12.70 K/uL    RBC 3.30 (L) 4.00 - 5.40 M/uL    Hemoglobin 10.0 (L) 12.0 - 16.0 g/dL    Hematocrit 32.5 (L) 37.0 - 48.5 %    MCV 99 (H) 82 - 98 fL    MCH 30.3 27.0 - 31.0 pg    MCHC 30.8 (L) 32.0 - 36.0 g/dL    RDW  14.6 (H) 11.5 - 14.5 %    Platelets 68 (L) 150 - 450 K/uL    MPV 10.6 9.2 - 12.9 fL    Immature Granulocytes 2.9 (H) 0.0 - 0.5 %    Gran # (ANC) 4.4 1.8 - 7.7 K/uL    Immature Grans (Abs) 0.20 (H) 0.00 - 0.04 K/uL    Lymph # 1.2 1.0 - 4.8 K/uL    Mono # 0.9 0.3 - 1.0 K/uL    Eos # 0.2 0.0 - 0.5 K/uL    Baso # 0.06 0.00 - 0.20 K/uL    nRBC 0 0 /100 WBC    Gran % 63.0 38.0 - 73.0 %    Lymph % 17.3 (L) 18.0 - 48.0 %    Mono % 12.7 4.0 - 15.0 %    Eosinophil % 3.2 0.0 - 8.0 %    Basophil % 0.9 0.0 - 1.9 %    Differential Method Automated    Comprehensive Metabolic Panel    Collection Time: 05/21/23  5:23 AM   Result Value Ref Range    Sodium 137 136 - 145 mmol/L    Potassium 4.8 3.5 - 5.1 mmol/L    Chloride 111 (H) 95 - 110 mmol/L    CO2 21 (L) 23 - 29 mmol/L    Glucose 78 70 - 110 mg/dL    BUN 33 (H) 8 - 23 mg/dL    Creatinine 2.6 (H) 0.5 - 1.4 mg/dL    Calcium 8.2 (L) 8.7 - 10.5 mg/dL    Total Protein 4.5 (L) 6.0 - 8.4 g/dL    Albumin 2.0 (L) 3.5 - 5.2 g/dL    Total Bilirubin 0.6 0.1 - 1.0 mg/dL    Alkaline Phosphatase 91 55 - 135 U/L    AST 49 (H) 10 - 40 U/L    ALT 64 (H) 10 - 44 U/L    Anion Gap 5 (L) 8 - 16 mmol/L    eGFR 17.1 (A) >60 mL/min/1.73 m^2   Magnesium    Collection Time: 05/21/23  5:23 AM   Result Value Ref Range    Magnesium 1.8 1.6 - 2.6 mg/dL   Phosphorus    Collection Time: 05/21/23  5:23 AM   Result Value Ref Range    Phosphorus 3.2 2.7 - 4.5 mg/dL   Lactic acid, plasma    Collection Time: 05/21/23 11:40 AM   Result Value Ref Range    Lactate (Lactic Acid) 0.6 0.5 - 2.2 mmol/L        Lab Results   Component Value Date    [id number] Positive (A) 01/07/2022       Recent Labs   Lab 05/19/23  0555 05/20/23  0308 05/21/23  0523   WBC 7.75 7.88 6.95   LYMPH 16.1*  1.3 17.5*  1.4 17.3*  1.2   HGB 10.1* 9.9* 10.0*   HCT 31.8* 31.0* 32.5*   PLT 76* 63* 68*     Recent Labs   Lab 05/19/23  0555 05/19/23  1140 05/20/23  0308 05/21/23  0523   * 136 137 137   K 4.5 4.7 4.7 4.8    106 111* 111*    CO2 23 24 19* 21*   BUN 36* 34* 34* 33*   CREATININE 2.6* 2.6* 2.5* 2.6*   GLU 70 87 57* 78   CALCIUM 8.2* 8.0* 7.4* 8.2*   MG 1.8  --  1.6 1.8   PHOS 2.9  --  2.8 3.2     Recent Labs   Lab 05/19/23  0555 05/20/23  0308 05/21/23  0523   ALKPHOS 98 89 91   ALT 93* 75* 64*   AST 63* 58* 49*   ALBUMIN 2.2* 2.0* 2.0*   PROT 4.5* 4.5* 4.5*   BILITOT 0.8 0.9 0.6        Recent Labs     05/19/23  0555   FERRITIN 1,121*   CPK 36           Microbiology: All microbiology updates for the past 24 hours have been reviewed.  Microbiology Results (last 7 days)       Procedure Component Value Units Date/Time    Blood culture [[id number]] Collected: 05/21/23 1140    Order Status: Sent Specimen: Blood Updated: 05/21/23 1149    Narrative:      Rt AC    Blood culture [[id number]] Collected: 05/21/23 1139    Order Status: Sent Specimen: Blood Updated: 05/21/23 1149    Narrative:      Rt hand    Urine culture [[id number]]  (Abnormal)  (Susceptibility) Collected: 05/14/23 1827    Order Status: Completed Specimen: Urine from Clean Catch Updated: 05/17/23 0438     Urine Culture, Routine KLEBSIELLA PNEUMONIAE  > 100,000 cfu/ml  No other significant isolate        ESCHERICHIA COLI  > 100,000 cfu/ml      Narrative:      Specimen Source->Urine    PER VANIA PABLO NP REQUEST ADD ON URINE CULTURE (ORDER   [id number])  02:33  05/15/2023     Urine culture [[id number]]     Order Status: Completed Specimen: Urine               Imaging All imaging within the last 24 hours was reviewed.   ECG Results              EKG 12-lead (Final result)  Result time 05/16/23 09:50:53      Final result by Interface, Lab [facility] (05/16/23 09:50:53)                   Narrative:    Test Reason : R11.10,    Vent. Rate : 060 BPM     Atrial Rate : 060 BPM     P-R Int : 212 ms          QRS Dur : 130 ms      QT Int : 486 ms       P-R-T Axes : 000 087 030 degrees     QTc Int : 486 ms    Atrial-paced rhythm with prolonged AV conduction  Right bundle branch block  Abnormal  ECG  When compared with ECG of 26-AUG-2022 12:06,  Electronic atrial pacemaker has replaced Electronic ventricular pacemaker  Confirmed by Phylicia Judd MD ([id number]) on 5/16/2023 9:50:40 AM    Referred By: BUSTERERR   SELF           Confirmed By:Phylicia Judd MD                                    Results for orders placed during the hospital encounter of 09/26/19    Transthoracic echo (TTE) 2D with Color Flow    Interpretation Summary  · Normal left ventricular systolic function. The estimated ejection fraction is 55%  · Grade II (moderate) left ventricular diastolic dysfunction consistent with pseudonormalization.  · Eccentric left ventricular hypertrophy. Mild left ventricular enlargement.  · Mild mitral regurgitation.  · Normal right ventricular systolic function.  · Mild tricuspid regurgitation.  · Severe left atrial enlargement.      CT Abdomen Pelvis  Without Contrast  Narrative: EXAMINATION:  CT ABDOMEN PELVIS WITHOUT CONTRAST    CLINICAL HISTORY:  Nausea/vomiting;    TECHNIQUE:  Axial images of the abdomen and pelvis were acquired without the use of IV contrast. Oral contrast media was utilized.  Coronal and sagittal reconstructions were also obtained.    COMPARISON:  CT abdomen pelvis 11/08/2020    FINDINGS:  Heart: Normal in size. No pericardial effusion. Multivessel calcific coronary atherosclerosis.  Partially visualized pacemaker leads in place.    Lungs: Bibasilar band like opacities, likely subsegmental atelectasis.  Stable 0.4 cm right lower lobe nodule (series 2, image 18).  Small bilateral layering pleural effusions which appear new from 05/14/2023 chest radiograph.    Liver: Hepatomegaly.  No focal hepatic lesion.  Small volume of perihepatic fluid, new from priors.    Gallbladder: Subtle layering hyperattenuation within the gallbladder suggesting sludge and/or tiny stones.    Bile Ducts: No evidence of dilated ducts.    Pancreas: No peripancreatic fat stranding.  The previous multiloculated  cystic lesion within the pancreatic body is not well visualized on this noncontrast exam.    Spleen: Unremarkable.    Stomach and duodenum: Small hiatal hernia.  Stomach and duodenum are otherwise within normal limits.    Adrenals: Unremarkable.    Kidneys/ Ureters: Normal in location, both borderline small in size with areas of cortical thinning and or scarring.  3.0 cm left lower pole renal cyst.  Bilateral nonobstructing nephrolithiasis, with a few punctate stones new from prior.  Bilateral renal vascular calcifications noted.  No hydronephrosis.  No ureteral dilatation.    Bladder: No evidence of wall thickening.    Reproductive organs: Uterus is surgically absent.    Bowel/Mesentery: Small bowel is normal in caliber with no evidence of obstruction.  No convincing evidence of acute inflammation.  Multifocal short-segment foci wall thickening versus underdistention/peristalsis of the descending and sigmoid colon.  Few scattered colonic diverticula without convincing evidence of acute diverticulitis.    Peritoneum: No intraperitoneal free air.    Lymph nodes: No retroperitoneal lymphadenopathy.    Vasculature: No aneurysm. Severe calcific atherosclerosis.    Abdominal wall:  Unremarkable.    Bones: No acute fracture. No suspicious osseous lesions.  Postoperative changes of right hip arthroplasty.  Scoliosis and degenerative changes of the spine similar to prior.  Similar subcortical cystic change of the right acetabulum.  Stable sclerotic focus of the right iliac bone.  Impression: Bilateral nonobstructing nephrolithiasis.    Suspected gallbladder sludge and/or tiny stones.    Colonic diverticulosis without convincing evidence of acute diverticulitis.    Descending and sigmoid colonic multifocal areas of short-segment wall thickening versus stenosis/peristalsis.  Clinical correlation advised.  Further evaluation/follow-up as warranted.    Small bilateral pleural effusions.    Additional findings as  above.    Electronically signed by resident: Magaly Guo  Date:    05/18/2023  Time:    18:34    Electronically signed by: Geovany Machuca MD  Date:    05/18/2023  Time:    19:28              Assessment/Plan:      * Impaired functional mobility and endurance  - Chronic issue with acute worsening  - PT/OT recommending SNF  - CM following for placement assistance     Discharge planning  The amount of time spent during, and associated with, this encounter was 50 min; the nature of the activities performed were:  Preparing to see the patient; chart review, Obtaining and/or receiving separately obtained history , Performing medically appropriate examination and/or evaluation, Counseling and educating the patient/family/caregiver, Communicating results to the patient/family/caregiver, Ordering medications, tests, or procedures, Referring to and communicating with other health care professionals, Documenting clinical information in the EHR, Independently interpreting results and Care coordination      FRANCESCA: 5/23/2023     Code Status: DNR   Is the patient medically ready for discharge?: Yes    Reason for patient still in hospital (select all that apply): Patient trending condition, Laboratory test, Treatment, Imaging and Pending disposition  Discharge Plan A: Skilled Nursing Facility        HIGH RISK CONDITION(S):   Patient is currently on drug therapy requiring intensive monitoring for toxicity: Vancomycin             Lethargy  Present since admitted.  -will order repeat TSH and also ammonia given hx of thyroid disease and transaminitis  -continue multivitamin      Cellulitis of left upper extremity  Erythema to LUE with edema  US ordered; continue apixaban  Elevate extremity  Lactic acid WNL; blood cx ordered  Vanc/cefepime started  Continue to monitor      Malnutrition of mild degree  BMI of 19 or less in adult  - Pt reports poor PO intake for many weeks despite encouragement & good family support  Measurements:  Wt  Readings from Last 1 Encounters:   05/16/23 49.8 kg (109 lb 12.6 oz)   Body mass index is 19.45 kg/m².   - Start mirtazapine 7.5 mg nightly   - Nutrition consulted, appreciate recs:   1. ADAT to Regular Diet, as tolerated-- texture per SLP. Encourage PO/fluid intake.   2. Continue ONS Boost Breeze with all meals. If advanced to Regular diet modify ONS to Boost Plus (flavor per patient) with all meals.   3. Recommend MVI, probiotic supplementation.   4. RD to monitor and follow-up.    Electrolyte disturbance  - Replace electrolytes and monitor on daily labs  - Mg and Phos,  Corrected Ca stable    Thrombocytopenia  - Platelets 214 -> 136 -> 79 -> 63 and 68 on 5/21  - no heparin products this admit  - no evidence of thrombocytopenia in past labs  - had severe transaminitis in 1/2022 during COVID infection - other elevations also noted - may be underlying liver dz as an association  - Continue to monitor on daily labs    Body mass index (BMI) of 19 or less in adult        Acute renal failure superimposed on stage 3 chronic kidney disease  - Creatinine today Estimated Creatinine Clearance: 11.5 mL/min (A) (based on SCr of 2.6 mg/dL (H)). (baseline 1-1.3)  - BUN/Cr 83/7.2 on 5/17 which improved to 33/2.8 s/p 2 IVF boluses and continuous IVFs  - BUN/Cr ratio 10-20, therefore suspect pt has intrinsic renal azotemia  - FeNa 1.3% and consistent with intrinsic renal azotemia   - initally improving with IVFs  - UA infectious and improving on IV antibiotics    - Monitor BMP daily, replete electrolytes if necessary  - Renally adjust drugs to CrCl; avoid nephrotoxic drugs   -  Estimated Creatinine Clearance: 11.5 mL/min (A) (based on SCr of 2.6 mg/dL (H)).   - Monitor I/Os  - Renal ultrasound consistent with bilateral medical renal disease & simple renal cysts; no hydronephrosis  -continues to clinically appear hypovolemic with kenoturia and persistently elevated Cr; gentle iv hydration on 5/21    Hyperkalemia  Resolved  - K+5.3  on admit, received 2L IVF.  - Continue gentle IVF overnight.   - Spironolactone discontinued  - Continue to trend on labs.    Transaminitis  -AST//177, tbili 0.9 on admission and downtrending.  -Patient denies abdominal pain.  -Repeat lipid panel wnl--discontinue statin  -Hepatitis panel negative  -RUQ US reviewed and described above  -Continue to trend on morning labs.  -Plan to f/u with PCP on when to restart statin  -Will need hepatology f/u OP    Constipation  Resolved  -Likely contributing to nausea and vomiting  -KUB with nonobstructive bowel gas pattern noting large amount of stool throughout the colon may reflect constipation  - Last BM on am of 5/20  - Continue bowel regimen with miralax TID & senna BID    Intractable nausea and vomiting  Resolved  -Afebrile, no leukocytosis.    -Likely 2/2 UTI vs. constipation but choledocholithiasis still a consideration  -KUB with large amount of stool - improving on bowel regimen  -Abd US: Stable prominence of the common bile duct and central intrahepatic biliary ducts in the right hepatic lobe. Cholelithiasis without evidence of acute cholecystitis.   -Discussed with AES who suggested MRCP. Patient unable to get MRCP with PPM.  -CT abd/pelvis with gallbladder sludge vs stones and colonic wall thickening vs stenosis/peristalsis   -Gentle IVF prn  -Full liquid diet, advance as tolerated  -Continue bowel regimen  -PRN compazine    Anemia due to chronic kidney disease  Folate deficiency  Patient's anemia is currently controlled. S/p 0 units of PRBCs.   Current CBC reviewed-   Lab Results   Component Value Date    HGB 10.0 (L) 05/21/2023    HCT 32.5 (L) 05/21/2023   - Anemia panel demonstrates mixed anemia with ACD and folate deficiency  - Replace folate  - Monitor serial CBC and transfuse if patient becomes hemodynamically unstable, symptomatic or H/H drops below 7/21.     Chronic diastolic heart failure  Patient is identified as having Diastolic (HFpEF) heart  failure that is Chronic. CHF is currently controlled. Latest ECHO performed and demonstrates- Results for orders placed during the hospital encounter of 09/26/19  Transthoracic echo (TTE) 2D with Color Flow  Interpretation Summary  · Normal left ventricular systolic function. The estimated ejection fraction is 55%  · Grade II (moderate) left ventricular diastolic dysfunction consistent with pseudonormalization.  · Eccentric left ventricular hypertrophy. Mild left ventricular enlargement.  · Mild mitral regurgitation.  · Normal right ventricular systolic function.  · Mild tricuspid regurgitation.  · Severe left atrial enlargement.  Continue Beta Blocker and monitor clinical status closely. Monitor on telemetry. Patient is off CHF pathway.  Monitor strict Is&Os and daily weights.  Continue to stress to patient importance of self efficacy and  on diet for CHF.   -Patient appears hypovolemic on exam.    Acute cystitis with hematuria  Resolved  Patient reports ongoing dysuria and urinary frequency x4-5 days.  -Afebrile, no leukocytosis.   -UA with 3+ leukocytes, 8 WBCs, and few bacteria  -Urine culture + klebsiella pneumoniae & e.coli, sensitive to rocephin  -s/p 5 day course of IV rocephin  -Repeat UA improved     Vitamin D deficiency  -Continue oral supplementation.    SSS (sick sinus syndrome)  Cardiac pacemaker in situ  Chronic and controlled and patient with cardiac pacemaker in place and patient with atrial paced rhythm.     Current use of long term anticoagulation  Chronic apixaban for A. Fib; previous UE thrombus was in superficial vein      High cholesterol  - Patient is chronically on statin  - Hold for now due to elevated LFTs which are improving  - Repeat lipid panel wnl   - Statin discontinued     Acquired hypothyroidism  Patient has chronic hypothyroidism. TFTs reviewed-   Lab Results   Component Value Date    TSH 2.146 10/13/2022   -repeat TSH due to increased fatigue, lethargy and suspected  depression  - Will continue chronic levothyroxine and adjust for and clinical changes.    Essential hypertension  -Chronic issue   -Continue atenolol and diltiazem  -Discontinued spironolactone in the setting of hyperkalemia and dehydration  -Add scheduled hydralazine   -Continue to monitor BP closely    Cardiac pacemaker in situ        Paroxysmal atrial fibrillation  Current use of long term anticoagulation  Patient with Paroxysmal (<7 days) atrial fibrillation which is controlled currently with Beta Blocker, Calcium Channel Blocker and Amiodarone. Patient is currently in sinus rhythm.[id number] Score: 3. Anticoagulation indicated. Anticoagulation done with eliquis.      VTE Risk Mitigation (From admission, onward)         Ordered     apixaban tablet 2.5 mg  2 times daily         05/14/23 1955     Place CHINEDU hose  Until discontinued         05/14/23 1955     IP VTE HIGH RISK PATIENT  Once         05/14/23 1955     Place sequential compression device  Until discontinued         05/14/23 1955     Reason for No Pharmacological VTE Prophylaxis  Once        Question:  Reasons:  Answer:  Already adequately anticoagulated on oral Anticoagulants    05/14/23 1955                  I have completed this tele-visit with the assistance of a telepresenter.  Bedside RN also present during encounter.    The attending portion of this evaluation, treatment, and documentation was performed per Karoline Schulz MD via Telemedicine AudioVisual using the secure [employer] software platform with 2 way audio/video. The provider was located off-site and the patient is located in the hospital. The aforementioned video software was utilized to document the relevant history and physical exam    Karoline Schulz MD  Department of Hospital Medicine   [facility]

## 2023-05-21 NOTE — NURSING
On assessment this morning (5/21) it was noted that patient had redness and swelling to left upper extremity. Patient reported no pain in area. MD was notified and came bedside to assess patient. Interventions have been ordered.

## 2023-05-21 NOTE — ASSESSMENT & PLAN NOTE
Current use of long term anticoagulation  Patient with Paroxysmal (<7 days) atrial fibrillation which is controlled currently with Beta Blocker, Calcium Channel Blocker and Amiodarone. Patient is currently in sinus rhythm.[id number] Score: 3. Anticoagulation indicated. Anticoagulation done with eliquis.

## 2023-05-21 NOTE — ASSESSMENT & PLAN NOTE
The amount of time spent during, and associated with, this encounter was 50 min; the nature of the activities performed were:  Preparing to see the patient; chart review, Obtaining and/or receiving separately obtained history , Performing medically appropriate examination and/or evaluation, Counseling and educating the patient/family/caregiver, Communicating results to the patient/family/caregiver, Ordering medications, tests, or procedures, Referring to and communicating with other health care professionals, Documenting clinical information in the EHR, Independently interpreting results and Care coordination      FRANCESCA: 5/23/2023     Code Status: DNR   Is the patient medically ready for discharge?: Yes    Reason for patient still in hospital (select all that apply): Patient trending condition, Laboratory test, Treatment, Imaging and Pending disposition  Discharge Plan A: Skilled Nursing Facility        HIGH RISK CONDITION(S):   Patient is currently on drug therapy requiring intensive monitoring for toxicity: Vancomycin

## 2023-05-22 PROBLEM — Z75.8 DISCHARGE PLANNING ISSUES: Status: RESOLVED | Noted: 2023-01-01 | Resolved: 2023-01-01

## 2023-05-22 PROBLEM — M79.89 LEFT UPPER EXTREMITY SWELLING: Status: ACTIVE | Noted: 2023-01-01

## 2023-05-22 NOTE — ASSESSMENT & PLAN NOTE
- Creatinine today Estimated Creatinine Clearance: 13 mL/min (A) (based on SCr of 2.3 mg/dL (H)). (baseline 1-1.3)  - BUN/Cr 83/7.2 on 5/17 which improved to 33/2.8 s/p 2 IVF boluses and continuous IVFs  - BUN/Cr ratio 10-20, therefore suspect pt has intrinsic renal azotemia  - FeNa 1.3% and consistent with intrinsic renal azotemia   - initally improving with IVFs  - UA infectious and improving on IV antibiotics    - Monitor BMP daily, replete electrolytes if necessary  - Renally adjust drugs to CrCl; avoid nephrotoxic drugs   -  Estimated Creatinine Clearance: 13 mL/min (A) (based on SCr of 2.3 mg/dL (H)).   - Monitor I/Os  - Renal ultrasound consistent with bilateral medical renal disease & simple renal cysts; no hydronephrosis  - Continues to clinically appear hypovolemic with kenoturia and persistently elevated Cr; continue gentle IVFs

## 2023-05-22 NOTE — ASSESSMENT & PLAN NOTE
Patient has chronic hypothyroidism. TFTs reviewed-   Lab Results   Component Value Date    TSH 1.099 05/22/2023   - Will continue chronic levothyroxine and adjust for and clinical changes.

## 2023-05-22 NOTE — ASSESSMENT & PLAN NOTE
- K+5.3 on admit, received 2L IVF and gentle IVFs overnight   - K+5.3 on 5/22, shifted with lokelma and albuterol  - Repeat K pending   - Spironolactone discontinued  - Continue to trend on labs

## 2023-05-22 NOTE — ASSESSMENT & PLAN NOTE
- Platelets 214 -> 136 -> 79 -> 63 -> 68 -> 70  - no heparin products this admit  - no evidence of thrombocytopenia in past labs  - had severe transaminitis in 1/2022 during COVID infection - other elevations also noted - may be underlying liver dz as an association  - Hematology consulted given advancing skin changes and petechia, appreciate recs:  -- Thrombocytopenia is likely mutlifactorial including infection, antibiotics, and nutrition. Will take time to improve.  -- Agree with folic acid supplementation  -- Start cyanocobalamin 1,000 mcg PO daily  - Continue to monitor on daily labs

## 2023-05-22 NOTE — SUBJECTIVE & OBJECTIVE
Interval History: Pt seen and examined by me this morning. Mildly hypertensive otherwise VSSAF. NAEON. The patient reports feeling poorly today with fatigue but is unable to clarify further. She denies pain, N/V/D. She endorses poor appetite with poor PO intake. She was evaluated at bedside for color change and edema to the LUE yesterday which she says is not painful. DVT ultrasound with resolution of previous L cephalic vein thrombosis, no evidence of DVT. No additional complaints at this time.       Review of Systems   Constitutional:  Positive for activity change and fatigue. Negative for fever.   Respiratory:  Negative for shortness of breath.    Cardiovascular:  Negative for chest pain and leg swelling.   Gastrointestinal:  Negative for abdominal pain, diarrhea and vomiting.   Genitourinary:  Negative for dysuria.   Skin:  Positive for color change and wound. Negative for rash.   Objective:     Vital Signs (Most Recent):  Temp: 97.8 °F (36.6 °C) (05/22/23 1300)  Pulse: 60 (05/22/23 1456)  Resp: 18 (05/22/23 1300)  BP: (!) 148/77 (05/22/23 1300)  SpO2: 97 % (05/22/23 1300) Vital Signs (24h Range):  Temp:  [97 °F (36.1 °C)-98 °F (36.7 °C)] 97.8 °F (36.6 °C)  Pulse:  [60-68] 60  Resp:  [14-18] 18  SpO2:  [93 %-97 %] 97 %  BP: (121-173)/(59-77) 148/77     Weight: 49.8 kg (109 lb 12.6 oz)  Body mass index is 19.45 kg/m².    Intake/Output Summary (Last 24 hours) at 5/22/2023 1536  Last data filed at 5/21/2023 2115  Gross per 24 hour   Intake 275 ml   Output --   Net 275 ml         Physical Exam  Vitals and nursing note reviewed.   Constitutional:       General: She is not in acute distress.     Appearance: She is ill-appearing.   HENT:      Head: Normocephalic and atraumatic.      Mouth/Throat:      Mouth: Mucous membranes are dry.   Eyes:      Extraocular Movements: Extraocular movements intact.      Conjunctiva/sclera: Conjunctivae normal.   Cardiovascular:      Rate and Rhythm: Normal rate and regular rhythm.       Heart sounds: Normal heart sounds.   Pulmonary:      Effort: Pulmonary effort is normal. No tachypnea or respiratory distress.   Abdominal:      General: Abdomen is flat. Bowel sounds are normal. There is no distension.      Palpations: Abdomen is soft.      Tenderness: There is no abdominal tenderness.   Musculoskeletal:         General: Normal range of motion.      Right lower leg: No edema.      Left lower leg: No edema.      Comments: Left UE with large area of medial erythema ; 3+ edema to the L hand; bruising to bilateral forearms; good ROM; no edema to RUE  Bilateral upper extremities with diffuse bruising and petechia   Skin:     Capillary Refill: Capillary refill takes less than 2 seconds.      Findings: Bruising, ecchymosis and petechiae present.   Neurological:      General: No focal deficit present.      Mental Status: She is oriented to person, place, and time and easily aroused. Mental status is at baseline. She is lethargic.      Cranial Nerves: No cranial nerve deficit.      Motor: Weakness (Generalized with poor bed mobility) present.   Psychiatric:         Attention and Perception: Attention normal.         Mood and Affect: Mood is depressed. Affect is flat.         Speech: Speech is delayed.         Behavior: Behavior is cooperative.           Significant Labs: All pertinent labs within the past 24 hours have been reviewed.    Significant Imaging: I have reviewed all pertinent imaging results/findings within the past 24 hours.   73-year-old female with past medical history of breast cancer status post right breast lumpectomy, bladder cancer with mets to the spine, in remission, hypertension, hyperlipidemia presents to the ER complaining of left ankle pain swelling and redness times approximately 1 week. Admit for IV abx for cellulitis.       #  LE / ankle Cellulitis :  -s/p skine nodule biopsy by derm   ID following   d/c zosyn 2/2 c/o stomach pain  / c/w vanco    Podiatry following , canceled I&D 2/2 improvment in cellulitis and MRI : neg for any collection      Anemia.   of ch dis   H/H stable     # HTN (hypertension).   controlled   -c/w amlodipine     #HLD (hyperlipidemia).   c/w statin     dispo: still spiking fever , clinically cellulitis improving , c/w IV abx

## 2023-05-22 NOTE — CONSULTS
VANCOMYCIN DOSING BY PHARMACY DISCONTINUATION NOTE    Gisselle Ortiz is a 89 y.o. female who had been consulted for vancomycin dosing.    The pharmacy consult for vancomycin dosing has been discontinued.     Vancomycin Dosing by Pharmacy Consult will sign-off. Please reconsult if necessary. Thank you for allowing us to participate in this patient's care.     Arleth Baugh, PharmD  Ext. [phone]

## 2023-05-22 NOTE — HPI
89 year old female with medical history of GERD, gallstone pancreatitis, thyroid disease, HTN, HLD, SSS s/p PPM, CHF (EF 55%), and a fib on eliquis admitted with UTI, nausea/vomiting, and debility. Was prescribed Bactrim for the UTI prior to this admission. Hematology consulted for thrombocytopenia. Pt developed thrombocytopenia between 5/16 and 5/17 along with worsening anemia, and MARIO. Pt states she feels generally unwell but unable to provide any specifics.

## 2023-05-22 NOTE — ASSESSMENT & PLAN NOTE
- Chronic issue   - Continue atenolol and diltiazem  - Discontinued spironolactone in the setting of hyperkalemia and dehydration  - Add scheduled hydralazine   - Continue to monitor BP closely

## 2023-05-22 NOTE — ASSESSMENT & PLAN NOTE
Folate deficiency  Patient's anemia is currently controlled. S/p 0 units of PRBCs.   Current CBC reviewed-   Lab Results   Component Value Date    HGB 10.2 (L) 05/22/2023    HCT 32.0 (L) 05/22/2023   - Anemia panel demonstrates mixed anemia with ACD and folate deficiency  - Replace folate  - Monitor serial CBC and transfuse if patient becomes hemodynamically unstable, symptomatic or H/H drops below 7/21.

## 2023-05-22 NOTE — CONSULTS
Pharmacokinetic Assessment Follow Up: IV Vancomycin    Vancomycin Regimen Assessment and Plan:    Vancomycin random level resulted 9.4 mcg/mL  Goal trough 10-15 mcg/ml  MARIO on CKD-3 (baseline SCr 1-1.3 mg/dL  Will continue pulse dosing  Administer Vancomycin 1000 mg IV x 1 today  Random level due 5/23 at 0400 with AM labs      Drug levels (last 3 results):  Recent Labs   Lab Result Units 05/22/23  0842   Vancomycin, Random ug/mL 9.4       Pharmacy will continue to follow and monitor vancomycin.    Please contact pharmacy at extension [phone] for questions regarding this assessment.    Thank you for the consult,   Arleth Baugh, PharmD       Patient brief summary:  Gisselle Ortiz is a 89 y.o. female initiated on antimicrobial therapy with IV Vancomycin for treatment of skin & soft tissue infection      Drug Allergies:   Review of patient's allergies indicates:   Allergen Reactions    Ciprofloxacin Nausea And Vomiting       Actual Body Weight:   49.8 kg    Renal Function:   Estimated Creatinine Clearance: 13 mL/min (A) (based on SCr of 2.3 mg/dL (H)).,     Dialysis Method (if applicable):  N/A    CBC (last 72 hours):  Recent Labs   Lab Result Units 05/20/23  0308 05/21/23  0523 05/22/23  0841   WBC K/uL 7.88 6.95 7.61   Hemoglobin g/dL 9.9* 10.0* 10.2*   Hematocrit % 31.0* 32.5* 32.0*   Platelets K/uL 63* 68* 70*   Gran % % 62.4 63.0 70.2   Lymph % % 17.5* 17.3* 13.5*   Mono % % 14.3 12.7 9.9   Eosinophil % % 2.5 3.2 2.6   Basophil % % 1.0 0.9 0.8   Differential Method  Automated Automated Automated       Metabolic Panel (last 72 hours):  Recent Labs   Lab Result Units 05/19/23  1140 05/20/23  0308 05/21/23  0523 05/22/23  0841   Sodium mmol/L 136 137 137 137   Potassium mmol/L 4.7 4.7 4.8 5.3*   Chloride mmol/L 106 111* 111* 111*   CO2 mmol/L 24 19* 21* 18*   Glucose mg/dL 87 57* 78 86   BUN mg/dL 34* 34* 33* 32*   Creatinine mg/dL 2.6* 2.5* 2.6* 2.3*   Albumin g/dL  --  2.0* 2.0* 2.1*   Total Bilirubin mg/dL  --   0.9 0.6 0.5   Alkaline Phosphatase U/L  --  89 91 91   AST U/L  --  58* 49* 77*   ALT U/L  --  75* 64* 76*   Magnesium mg/dL  --  1.6 1.8  --    Phosphorus mg/dL  --  2.8 3.2  --        Vancomycin Administrations:  vancomycin given in the last 96 hours                     vancomycin (VANCOCIN) 1,000 mg in dextrose 5 % (D5W) 250 mL IVPB (Vial-Mate) (mg) 1,000 mg New Bag 05/21/23 1259                    Microbiologic Results:  Microbiology Results (last 7 days)       Procedure Component Value Units Date/Time    Blood culture [[id number]] Collected: 05/21/23 1140    Order Status: Completed Specimen: Blood Updated: 05/21/23 1915     Blood Culture, Routine No Growth to date    Narrative:      Rt AC    Blood culture [[id number]] Collected: 05/21/23 1139    Order Status: Completed Specimen: Blood Updated: 05/21/23 1915     Blood Culture, Routine No Growth to date    Narrative:      Rt hand    Urine culture [[id number]]  (Abnormal)  (Susceptibility) Collected: 05/14/23 1827    Order Status: Completed Specimen: Urine from Clean Catch Updated: 05/17/23 0438     Urine Culture, Routine KLEBSIELLA PNEUMONIAE  > 100,000 cfu/ml  No other significant isolate        ESCHERICHIA COLI  > 100,000 cfu/ml      Narrative:      Specimen Source->Urine    PER VANIA PABLO NP REQUEST ADD ON URINE CULTURE (ORDER   [id number])  02:33  05/15/2023

## 2023-05-22 NOTE — ASSESSMENT & PLAN NOTE
89 year old female with medical history of GERD, gallstone pancreatitis, thyroid disease, HTN, HLD, SSS s/p PPM, CHF (EF 55%), and a fib on eliquis admitted with UTI, nausea/vomiting, and debility. Was prescribed Bactrim for the UTI prior to this admission. Pt developed thrombocytopenia between 5/16 and 5/17 along with worsening anemia, and MARIO. Unclear exactly what occurred at this time. Smear reviewed with acanthocytes and occasional helmet cells visualized. Folate 3.5, B12 336 which had previously been lower, negative intrinsic factor, HIV/hepatitis negative, TSH WNL. Thrombocytopenia is likely mutlifactorial including infection, antibiotics, and nutrition. Recommend folate and B12 supplementation.    Recs:  -- Thrombocytopenia is likely mutlifactorial including infection, antibiotics, and nutrition. Will take time to improve.  -- Agree with folic acid supplementation  -- Start cyanocobalamin 1,000 mcg PO daily

## 2023-05-22 NOTE — PROGRESS NOTES
[facility] Medicine  Progress Note    Patient Name: Gisselle Ortiz  MRN: [id number]  Patient Class: IP- Inpatient   Admission Date: 5/14/2023  Length of Stay: 7 days  Attending Physician: Boy Carlton MD  Primary Care Provider: Kai Montez MD        Subjective:     Principal Problem:Impaired functional mobility and endurance        HPI:  Gisselle Ortiz is a 89 y.o. female with a PMHx of GERD, gallstone pancreatitis, thyroid disease, HTN, HLD, SSS s/p PPM, CHF (EF 55%), and a fib on eliquis who presents to the ED for nausea and vomiting. The patient reports ongoing nausea and vomiting x1 week. She endorses associated 10lb weight loss, fatigue, and generalized weakness. The patient also reports increased urinary frequency and dysuria beginning 4-5 days ago and was started on Bactrim which she completed yesterday. Unfortunately, she was unable to tolerate some of the medication. She reports ongoing urinary symptoms that have not improved. She denies any associated abdominal pain, diarrhea, fever, or chills.  She reports good flatus, but states that she really has not had many bowel movements. The patient denies any CP, SOB, cough, or headache.    In the ED, patient hypertensive in the ED which improved with home antihypertensives vitals otherwise stable, afebrile. CBC unremarkable. Na 133. K+5.3. Cr 1.2 (at baseline). Calcium 8.6. Total protein 5.4. Albumin 2.7. AST//177. Troponin 0.025. EKG with atrially paced rhythm, rate 60, no acute ischemic changes. CXR with increased parenchymal attenuation projects over the left lower lung zone, findings may reflect atelectasis however developing consolidation not excluded. KUB with nonobstructive bowel gas pattern noting large amount of stool throughout the colon may reflect constipation. The patient received LR bolus x2, zofran, and rocephin.      Overview/Hospital Course:  Pt placed admitted to hospital medicine for intractable  n/v and UTI. Pt has remained hemodynamically stable. She was started on 5-days of IV rocephin. Urine culture + klebsiella pneumoniae and e.coli. No leukocytosis but was hyperkalemic K 5.3; discontinued spironolactone. AST/ALT elevated on admission and downtrending. Abd US with hepatomegaly, stable prominence of CBD, and cholelithiasis without evidence of cholecystitis. AES recommended MRCP, which the patient unable to have 2/2 pacemaker. KUB with nonobstructive bowel gas pattern and significant stool burden, which is improving on bowel regimen. CTAP with gallbladder sludge vs stones and colonic wall thickening vs. stenosis/peristalsis. Pt with MARIO on CKD, which is improving with IVFs. Pt with new onset thrombocytopenia which is progressively downtrending. Hematology consulted and recommended started cyanocobalamin. PT/OT consulted and on re-evaluation are recommending SNF. Plan to discharge to SNF when medically ready with PCP & hepatology f/u, HH, care at home, and ready responders.       Interval History: Pt seen and examined by me this morning. Mildly hypertensive otherwise VSSAF. NAEON. The patient reports feeling poorly today with fatigue but is unable to clarify further. She denies pain, N/V/D. She endorses poor appetite with poor PO intake. She was evaluated at bedside for color change and edema to the LUE yesterday which she says is not painful. DVT ultrasound with resolution of previous L cephalic vein thrombosis, no evidence of DVT. No additional complaints at this time.       Review of Systems   Constitutional:  Positive for activity change and fatigue. Negative for fever.   Respiratory:  Negative for shortness of breath.    Cardiovascular:  Negative for chest pain and leg swelling.   Gastrointestinal:  Negative for abdominal pain, diarrhea and vomiting.   Genitourinary:  Negative for dysuria.   Skin:  Positive for color change and wound. Negative for rash.   Objective:     Vital Signs (Most  Recent):  Temp: 97.8 °F (36.6 °C) (05/22/23 1300)  Pulse: 60 (05/22/23 1456)  Resp: 18 (05/22/23 1300)  BP: (!) 148/77 (05/22/23 1300)  SpO2: 97 % (05/22/23 1300) Vital Signs (24h Range):  Temp:  [97 °F (36.1 °C)-98 °F (36.7 °C)] 97.8 °F (36.6 °C)  Pulse:  [60-68] 60  Resp:  [14-18] 18  SpO2:  [93 %-97 %] 97 %  BP: (121-173)/(59-77) 148/77     Weight: 49.8 kg (109 lb 12.6 oz)  Body mass index is 19.45 kg/m².    Intake/Output Summary (Last 24 hours) at 5/22/2023 1536  Last data filed at 5/21/2023 2115  Gross per 24 hour   Intake 275 ml   Output --   Net 275 ml         Physical Exam  Vitals and nursing note reviewed.   Constitutional:       General: She is not in acute distress.     Appearance: She is ill-appearing.   HENT:      Head: Normocephalic and atraumatic.      Mouth/Throat:      Mouth: Mucous membranes are dry.   Eyes:      Extraocular Movements: Extraocular movements intact.      Conjunctiva/sclera: Conjunctivae normal.   Cardiovascular:      Rate and Rhythm: Normal rate and regular rhythm.      Heart sounds: Normal heart sounds.   Pulmonary:      Effort: Pulmonary effort is normal. No tachypnea or respiratory distress.   Abdominal:      General: Abdomen is flat. Bowel sounds are normal. There is no distension.      Palpations: Abdomen is soft.      Tenderness: There is no abdominal tenderness.   Musculoskeletal:         General: Normal range of motion.      Right lower leg: No edema.      Left lower leg: No edema.      Comments: Left UE with large area of medial erythema ; 3+ edema to the L hand; bruising to bilateral forearms; good ROM; no edema to RUE  Bilateral upper extremities with diffuse bruising and petechia   Skin:     Capillary Refill: Capillary refill takes less than 2 seconds.      Findings: Bruising, ecchymosis and petechiae present.   Neurological:      General: No focal deficit present.      Mental Status: She is oriented to person, place, and time and easily aroused. Mental status is at  baseline. She is lethargic.      Cranial Nerves: No cranial nerve deficit.      Motor: Weakness (Generalized with poor bed mobility) present.   Psychiatric:         Attention and Perception: Attention normal.         Mood and Affect: Mood is depressed. Affect is flat.         Speech: Speech is delayed.         Behavior: Behavior is cooperative.           Significant Labs: All pertinent labs within the past 24 hours have been reviewed.    Significant Imaging: I have reviewed all pertinent imaging results/findings within the past 24 hours.      Assessment/Plan:      * Impaired functional mobility and endurance  - Chronic issue with acute worsening  - PT/OT recommending SNF  - CM following for placement assistance     Acute renal failure superimposed on stage 3 chronic kidney disease  - Creatinine today Estimated Creatinine Clearance: 13 mL/min (A) (based on SCr of 2.3 mg/dL (H)). (baseline 1-1.3)  - BUN/Cr 83/7.2 on 5/17 which improved to 33/2.8 s/p 2 IVF boluses and continuous IVFs  - BUN/Cr ratio 10-20, therefore suspect pt has intrinsic renal azotemia  - FeNa 1.3% and consistent with intrinsic renal azotemia   - initally improving with IVFs  - UA infectious and improving on IV antibiotics    - Monitor BMP daily, replete electrolytes if necessary  - Renally adjust drugs to CrCl; avoid nephrotoxic drugs   -  Estimated Creatinine Clearance: 13 mL/min (A) (based on SCr of 2.3 mg/dL (H)).   - Monitor I/Os  - Renal ultrasound consistent with bilateral medical renal disease & simple renal cysts; no hydronephrosis  - Continues to clinically appear hypovolemic with kenoturia and persistently elevated Cr; continue gentle IVFs    Lethargy  Present since admitted.  -Repeat TSH and  ammonia wnl   -Continue multivitamin    Left upper extremity swelling  - Afebrile no leukocytosis  - Blood cultures no growth to date   - Lactic wnl  - Initially started on vancomycin and cefepime but infectious source less likely and abx were  subsequently d/c'd  - U/S with resolution of L cephalic vein thrombosis and no evidence of DVT  - Elevate extremity  - Continue to monitor    Malnutrition of mild degree  BMI of 19 or less in adult  - Pt reports poor PO intake for many weeks despite encouragement & good family support  Measurements:  Wt Readings from Last 1 Encounters:   05/16/23 49.8 kg (109 lb 12.6 oz)   Body mass index is 19.45 kg/m².   - Start mirtazapine 7.5 mg nightly   - Nutrition consulted, appreciate recs:   1. ADAT to Regular Diet, as tolerated-- texture per SLP. Encourage PO/fluid intake.   2. Continue ONS Boost Breeze with all meals. If advanced to Regular diet modify ONS to Boost Plus (flavor per patient) with all meals.   3. Recommend MVI, probiotic supplementation.   4. RD to monitor and follow-up.    Electrolyte disturbance  - Replace electrolytes and monitor on daily labs  - Mg and Phos, Corrected Ca stable    Thrombocytopenia  - Platelets 214 -> 136 -> 79 -> 63 -> 68 -> 70  - no heparin products this admit  - no evidence of thrombocytopenia in past labs  - had severe transaminitis in 1/2022 during COVID infection - other elevations also noted - may be underlying liver dz as an association  - Hematology consulted given advancing skin changes and petechia, appreciate recs:  -- Thrombocytopenia is likely mutlifactorial including infection, antibiotics, and nutrition. Will take time to improve.  -- Agree with folic acid supplementation  -- Start cyanocobalamin 1,000 mcg PO daily  - Continue to monitor on daily labs    Hyperkalemia  - K+5.3 on admit, received 2L IVF and gentle IVFs overnight   - K+5.3 on 5/22, shifted with lokelma and albuterol  - Repeat K pending   - Spironolactone discontinued  - Continue to trend on labs    Transaminitis  -AST//177, tbili 0.9 on admission and downtrending.  -Patient denies abdominal pain.  -Repeat lipid panel wnl--discontinue statin  -Hepatitis panel negative  -RUQ US reviewed and described  above  -Continue to trend on morning labs.  -Plan to f/u with PCP on when to restart statin  -Will need hepatology f/u OP    Constipation  Resolved  -Likely contributing to nausea and vomiting  -KUB with nonobstructive bowel gas pattern noting large amount of stool throughout the colon may reflect constipation  - Last BM on am of 5/20  - Continue bowel regimen with miralax TID & senna BID    Intractable nausea and vomiting  Resolved  -Afebrile, no leukocytosis.    -Likely 2/2 UTI vs. constipation but choledocholithiasis still a consideration  -KUB with large amount of stool - improving on bowel regimen  -Abd US: Stable prominence of the common bile duct and central intrahepatic biliary ducts in the right hepatic lobe. Cholelithiasis without evidence of acute cholecystitis.   -Discussed with AES who suggested MRCP. Patient unable to get MRCP with PPM.  -CT abd/pelvis with gallbladder sludge vs stones and colonic wall thickening vs stenosis/peristalsis   -Gentle IVF prn  -Full liquid diet, advance as tolerated  -Continue bowel regimen  -PRN compazine    Anemia due to chronic kidney disease  Folate deficiency  Patient's anemia is currently controlled. S/p 0 units of PRBCs.   Current CBC reviewed-   Lab Results   Component Value Date    HGB 10.2 (L) 05/22/2023    HCT 32.0 (L) 05/22/2023   - Anemia panel demonstrates mixed anemia with ACD and folate deficiency  - Replace folate  - Monitor serial CBC and transfuse if patient becomes hemodynamically unstable, symptomatic or H/H drops below 7/21.     Chronic diastolic heart failure  Patient is identified as having Diastolic (HFpEF) heart failure that is Chronic. CHF is currently controlled. Latest ECHO performed and demonstrates- Results for orders placed during the hospital encounter of 09/26/19  Transthoracic echo (TTE) 2D with Color Flow  Interpretation Summary  · Normal left ventricular systolic function. The estimated ejection fraction is 55%  · Grade II (moderate)  left ventricular diastolic dysfunction consistent with pseudonormalization.  · Eccentric left ventricular hypertrophy. Mild left ventricular enlargement.  · Mild mitral regurgitation.  · Normal right ventricular systolic function.  · Mild tricuspid regurgitation.  · Severe left atrial enlargement.  Continue Beta Blocker and monitor clinical status closely. Monitor on telemetry. Patient is off CHF pathway.  Monitor strict Is&Os and daily weights.  Continue to stress to patient importance of self efficacy and  on diet for CHF.   -Patient appears hypovolemic on exam.    Acute cystitis with hematuria  Resolved  Patient reports ongoing dysuria and urinary frequency x4-5 days.  -Afebrile, no leukocytosis.   -UA with 3+ leukocytes, 8 WBCs, and few bacteria  -Urine culture + klebsiella pneumoniae & e.coli, sensitive to rocephin  -s/p 5 day course of IV rocephin  -Repeat UA improved     Vitamin D deficiency  -Continue oral supplementation.    SSS (sick sinus syndrome)  Cardiac pacemaker in situ  Chronic and controlled and patient with cardiac pacemaker in place and patient with atrial paced rhythm.     Current use of long term anticoagulation  Chronic apixaban for A. Fib; previous UE thrombus was in superficial vein which has improved    High cholesterol  - Patient is chronically on statin  - Hold for now due to elevated LFTs which are improving  - Repeat lipid panel wnl   - Statin discontinued     Acquired hypothyroidism  Patient has chronic hypothyroidism. TFTs reviewed-   Lab Results   Component Value Date    TSH 1.099 05/22/2023   - Will continue chronic levothyroxine and adjust for and clinical changes.    Essential hypertension  - Chronic issue   - Continue atenolol and diltiazem  - Discontinued spironolactone in the setting of hyperkalemia and dehydration  - Add scheduled hydralazine   - Continue to monitor BP closely    Paroxysmal atrial fibrillation  Current use of long term anticoagulation  Patient with  Paroxysmal (<7 days) atrial fibrillation which is controlled currently with Beta Blocker, Calcium Channel Blocker and Amiodarone. Patient is currently in sinus rhythm.[id number] Score: 3. Anticoagulation indicated. Anticoagulation done with eliquis.    VTE Risk Mitigation (From admission, onward)         Ordered     apixaban tablet 2.5 mg  2 times daily         05/14/23 1955     Place CHINEDU hose  Until discontinued         05/14/23 1955     IP VTE HIGH RISK PATIENT  Once         05/14/23 1955     Place sequential compression device  Until discontinued         05/14/23 1955     Reason for No Pharmacological VTE Prophylaxis  Once        Question:  Reasons:  Answer:  Already adequately anticoagulated on oral Anticoagulants    05/14/23 1955                Discharge Planning   FRANCESCA: 5/23/2023     Code Status: DNR   Is the patient medically ready for discharge?: No    Reason for patient still in hospital (select all that apply): Patient trending condition, Treatment and Pending disposition  Discharge Plan A: Skilled Nursing Facility                  Madonna Boogie PA-C  Department of [facility]   [facility]

## 2023-05-22 NOTE — ASSESSMENT & PLAN NOTE
- Afebrile no leukocytosis  - Blood cultures no growth to date   - Lactic wnl  - Initially started on vancomycin and cefepime but infectious source less likely and abx were subsequently d/c'd  - U/S with resolution of L cephalic vein thrombosis and no evidence of DVT  - Elevate extremity  - Continue to monitor

## 2023-05-22 NOTE — ASSESSMENT & PLAN NOTE
Current use of long term anticoagulation  Patient with Paroxysmal (<7 days) atrial fibrillation which is controlled currently with Beta Blocker, Calcium Channel Blocker and Amiodarone. Patient is currently in sinus rhythm.[id number] Score: 3. Anticoagulation indicated. Anticoagulation done with eliquis.

## 2023-05-22 NOTE — SUBJECTIVE & OBJECTIVE
Oncology Treatment Plan:   [No matching plan found]    Medications:  Continuous Infusions:  Scheduled Meds:   amiodarone  200 mg Oral Daily    apixaban  2.5 mg Oral BID    aspirin  81 mg Oral Daily    atenoloL  50 mg Oral Daily    And    atenoloL  25 mg Oral QHS    diltiaZEM  180 mg Oral Daily    famotidine  20 mg Oral Daily    ferrous sulfate  1 tablet Oral Daily    folic acid  1 mg Oral Daily    hydrALAZINE  25 mg Oral Q8H    lactated ringers  500 mL Intravenous Once    Lactobacillus rhamnosus GG  1 capsule Oral Daily    levothyroxine  137 mcg Oral Before breakfast    mirtazapine  7.5 mg Oral QHS    multivitamin  1 tablet Oral Daily    polyethylene glycol  17 g Oral BID    senna-docusate 8.6-50 mg  1 tablet Oral BID    vitamin D  1,000 Units Oral Daily     PRN Meds:acetaminophen, albuterol sulfate, dextrose 10%, dextrose 10%, dextrose, dextrose, glucagon (human recombinant), iohexol, lactulose, prochlorperazine, sodium chloride 0.9%     Review of Systems   Constitutional:  Positive for appetite change and fatigue. Negative for fever and unexpected weight change.   HENT:  Negative for congestion and trouble swallowing.    Respiratory:  Negative for cough and shortness of breath.    Cardiovascular:  Negative for chest pain and leg swelling.   Gastrointestinal:  Negative for abdominal pain, diarrhea, nausea and vomiting.   Genitourinary:  Positive for dysuria.   Musculoskeletal:  Negative for arthralgias and back pain.   Neurological:  Negative for light-headedness and headaches.   Hematological:  Negative for adenopathy. Does not bruise/bleed easily.   Psychiatric/Behavioral:  Negative for confusion. The patient is not nervous/anxious.    Objective:     Vital Signs (Most Recent):  Temp: 97.4 °F (36.3 °C) (05/22/23 0430)  Pulse: 60 (05/22/23 1139)  Resp: 14 (05/22/23 0430)  BP:  (--) (05/22/23 0859)  SpO2: (!) 93 % (05/22/23 0430) Vital Signs (24h Range):  Temp:  [97 °F (36.1 °C)-97.4 °F (36.3 °C)] 97.4 °F (36.3  °C)  Pulse:  [60-68] 60  Resp:  [14-18] 14  SpO2:  [93 %-96 %] 93 %  BP: (130-173)/(69-74) 173/74     Weight: 49.8 kg (109 lb 12.6 oz)  Body mass index is 19.45 kg/m².  Body surface area is 1.49 meters squared.      Intake/Output Summary (Last 24 hours) at 5/22/2023 1349  Last data filed at 5/21/2023 2115  Gross per 24 hour   Intake 275 ml   Output --   Net 275 ml        Physical Exam  Vitals and nursing note reviewed.   Constitutional:       Appearance: Normal appearance.   HENT:      Head: Normocephalic and atraumatic.      Mouth/Throat:      Mouth: Mucous membranes are moist.      Pharynx: Oropharynx is clear.   Eyes:      Extraocular Movements: Extraocular movements intact.      Conjunctiva/sclera: Conjunctivae normal.   Cardiovascular:      Rate and Rhythm: Normal rate and regular rhythm.      Pulses: Normal pulses.   Pulmonary:      Effort: Pulmonary effort is normal. No respiratory distress.   Abdominal:      General: There is no distension.      Palpations: Abdomen is soft.      Tenderness: There is no abdominal tenderness.   Musculoskeletal:      Right lower leg: No edema.      Left lower leg: No edema.   Skin:     General: Skin is warm and dry.      Findings: Bruising present.   Neurological:      General: No focal deficit present.      Mental Status: She is alert and oriented to person, place, and time.   Psychiatric:         Mood and Affect: Mood normal.         Behavior: Behavior normal.        Significant Labs:   All pertinent labs from the last 24 hours have been reviewed.    Diagnostic Results:  I have reviewed all pertinent imaging results/findings within the past 24 hours.

## 2023-05-22 NOTE — CONSULTS
[facility] - [facility] Surg  Hematology/Oncology  Progress Note    Patient Name: Gisselle Ortiz  Admission Date: 5/14/2023  Hospital Length of Stay: 7 days  Code Status: DNR     Subjective:     HPI:  89 year old female with medical history of GERD, gallstone pancreatitis, thyroid disease, HTN, HLD, SSS s/p PPM, CHF (EF 55%), and a fib on eliquis admitted with UTI, nausea/vomiting, and debility. Was prescribed Bactrim for the UTI prior to this admission. Hematology consulted for thrombocytopenia. Pt developed thrombocytopenia between 5/16 and 5/17 along with worsening anemia, and MARIO. Pt states she feels generally unwell but unable to provide any specifics.                  Oncology Treatment Plan:   [No matching plan found]    Medications:  Continuous Infusions:  Scheduled Meds:   amiodarone  200 mg Oral Daily    apixaban  2.5 mg Oral BID    aspirin  81 mg Oral Daily    atenoloL  50 mg Oral Daily    And    atenoloL  25 mg Oral QHS    diltiaZEM  180 mg Oral Daily    famotidine  20 mg Oral Daily    ferrous sulfate  1 tablet Oral Daily    folic acid  1 mg Oral Daily    hydrALAZINE  25 mg Oral Q8H    lactated ringers  500 mL Intravenous Once    Lactobacillus rhamnosus GG  1 capsule Oral Daily    levothyroxine  137 mcg Oral Before breakfast    mirtazapine  7.5 mg Oral QHS    multivitamin  1 tablet Oral Daily    polyethylene glycol  17 g Oral BID    senna-docusate 8.6-50 mg  1 tablet Oral BID    vitamin D  1,000 Units Oral Daily     PRN Meds:acetaminophen, albuterol sulfate, dextrose 10%, dextrose 10%, dextrose, dextrose, glucagon (human recombinant), iohexol, lactulose, prochlorperazine, sodium chloride 0.9%     Review of Systems   Constitutional:  Positive for appetite change and fatigue. Negative for fever and unexpected weight change.   HENT:  Negative for congestion and trouble swallowing.    Respiratory:  Negative for cough and shortness of breath.    Cardiovascular:  Negative for chest pain and leg  swelling.   Gastrointestinal:  Negative for abdominal pain, diarrhea, nausea and vomiting.   Genitourinary:  Positive for dysuria.   Musculoskeletal:  Negative for arthralgias and back pain.   Neurological:  Negative for light-headedness and headaches.   Hematological:  Negative for adenopathy. Does not bruise/bleed easily.   Psychiatric/Behavioral:  Negative for confusion. The patient is not nervous/anxious.    Objective:     Vital Signs (Most Recent):  Temp: 97.4 °F (36.3 °C) (05/22/23 0430)  Pulse: 60 (05/22/23 1139)  Resp: 14 (05/22/23 0430)  BP:  (--) (05/22/23 0859)  SpO2: (!) 93 % (05/22/23 0430) Vital Signs (24h Range):  Temp:  [97 °F (36.1 °C)-97.4 °F (36.3 °C)] 97.4 °F (36.3 °C)  Pulse:  [60-68] 60  Resp:  [14-18] 14  SpO2:  [93 %-96 %] 93 %  BP: (130-173)/(69-74) 173/74     Weight: 49.8 kg (109 lb 12.6 oz)  Body mass index is 19.45 kg/m².  Body surface area is 1.49 meters squared.      Intake/Output Summary (Last 24 hours) at 5/22/2023 1349  Last data filed at 5/21/2023 2115  Gross per 24 hour   Intake 275 ml   Output --   Net 275 ml        Physical Exam  Vitals and nursing note reviewed.   Constitutional:       Appearance: Normal appearance.   HENT:      Head: Normocephalic and atraumatic.      Mouth/Throat:      Mouth: Mucous membranes are moist.      Pharynx: Oropharynx is clear.   Eyes:      Extraocular Movements: Extraocular movements intact.      Conjunctiva/sclera: Conjunctivae normal.   Cardiovascular:      Rate and Rhythm: Normal rate and regular rhythm.      Pulses: Normal pulses.   Pulmonary:      Effort: Pulmonary effort is normal. No respiratory distress.   Abdominal:      General: There is no distension.      Palpations: Abdomen is soft.      Tenderness: There is no abdominal tenderness.   Musculoskeletal:      Right lower leg: No edema.      Left lower leg: No edema.   Skin:     General: Skin is warm and dry.      Findings: Bruising present.   Neurological:      General: No focal deficit  present.      Mental Status: She is alert and oriented to person, place, and time.   Psychiatric:         Mood and Affect: Mood normal.         Behavior: Behavior normal.        Significant Labs:   All pertinent labs from the last 24 hours have been reviewed.    Diagnostic Results:  I have reviewed all pertinent imaging results/findings within the past 24 hours.    Assessment/Plan:     Thrombocytopenia  89 year old female with medical history of GERD, gallstone pancreatitis, thyroid disease, HTN, HLD, SSS s/p PPM, CHF (EF 55%), and a fib on eliquis admitted with UTI, nausea/vomiting, and debility. Was prescribed Bactrim for the UTI prior to this admission. Pt developed thrombocytopenia between 5/16 and 5/17 along with worsening anemia, and MARIO. Unclear exactly what occurred at this time. Smear reviewed with acanthocytes and occasional helmet cells visualized. Folate 3.5, B12 336 which had previously been lower, negative intrinsic factor, HIV/hepatitis negative, TSH WNL. Thrombocytopenia is likely mutlifactorial including infection, antibiotics, and nutrition. Recommend folate and B12 supplementation.    Recs:  -- Thrombocytopenia is likely mutlifactorial including infection, antibiotics, and nutrition. Will take time to improve.  -- Agree with folic acid supplementation  -- Start cyanocobalamin 1,000 mcg PO daily      Case discussed with Dr. Mcgee  Hematology will sign off. Thank you for this consult       Tim Robin MD  Hematology/Oncology  [facility]

## 2023-05-23 NOTE — PLAN OF CARE
[facility] - Med Surg  Discharge Reassessment    Primary Care Provider: Kai Montez MD    Expected Discharge Date: 5/24/2023    Pt accepted by [facility] for placement.  SW spoke to pt's daughter, Génesis [phone], advised had surgery procedure and request SW contact brother (Yimi) to discuss discharge plan.      SW spoke to Yimi [phone]; provided update on referrals.  Family agreeable to [facility].  Also advised [facility] and [facility] accepted pt.  Awaiting responses from [facility].     5:06 PM  Pt accepted by [facility].  Lab results were not previously received.  SW reached out to lab and escalated request due to placement.  Spoke to Flower with Lab and was advised she is processing COVID lab now.  Medical team notified.     Reassessment (most recent)       Discharge Reassessment - 05/23/23 4090          Discharge Reassessment    Assessment Type Discharge Planning Reassessment     Did the patient's condition or plan change since previous assessment? Yes     Discharge Plan discussed with: Adult children     Communicated FRANCESCA with patient/caregiver Yes     Discharge Plan A Skilled Nursing Facility     Discharge Plan B Home;Home with family;Home Health     DME Needed Upon Discharge  other (see comments)   TBD    Transition of Care Barriers None     Why the patient remains in the hospital Requires continued medical care        Post-Acute Status    Post-Acute Authorization Placement     Post-Acute Placement Status Pending post-acute provider review/more information requested     Coverage Medicare A & B     Discharge Delays None known at this time                   Tiana Barrios LMSW  PRN-  [facility]  Ext. [phone]

## 2023-05-23 NOTE — PLAN OF CARE
[facility] - Med Surg  Discharge Final Note    Primary Care Provider: Kai Montez MD    Expected Discharge Date: 5/23/2023    Final Discharge Note (most recent)       Final Note - 05/23/23 1810          Final Note    Assessment Type Final Discharge Note     Anticipated Discharge Disposition Skilled Nursing Facility     Hospital Resources/Appts/Education Provided Post-Acute resouces added to AVS        Post-Acute Status    Post-Acute Authorization Placement     Post-Acute Placement Status Set-up Complete/Auth obtained     Discharge Delays None known at this time                     Important Message from Medicare  Important Message from Medicare regarding Discharge Appeal Rights: Given to patient/caregiver, Explained to patient/caregiver, Signed/date by patient/caregiver     Date IMM was signed: 05/22/23  Time IMM was signed: 0902    Contact Info       Kai Montez MD   Specialty: Family Medicine   Relationship: PCP - General    [address]  [address]  [place] [address]   Phone: [phone]       Next Steps: Go on 5/31/2023    Instructions: hospital follow up at 9:30 am

## 2023-05-23 NOTE — PLAN OF CARE
NURSING HOME ORDERS    05/23/2023  [facility] - MED SURG  [address] [address]  Dept: [phone]  Loc: [phone]     Admit to Nursing Home:  Skilled Nursing Facility    Diagnoses:  Active Hospital Problems    Diagnosis  POA    *Left upper extremity swelling [M79.89]  No    Acute renal failure superimposed on stage 3 chronic kidney disease [N17.9, N18.30]  Yes     Priority: 2     Lethargy [R53.83]  Yes    Malnutrition of mild degree [E44.1]  Yes    Palliative care encounter [Z51.5]  Not Applicable    Body mass index (BMI) of 19 or less in adult [Z68.1]  Yes    Thrombocytopenia [D69.6]  Yes    Electrolyte disturbance [E87.8]  Yes    Transaminitis [R74.01]  Yes    Hyperkalemia [E87.5]  Yes    Constipation [K59.00]  Yes    Intractable nausea and vomiting [R11.2]  Yes    Impaired functional mobility and endurance [Z74.09]  Yes    Chronic diastolic heart failure [I50.32]  Yes    Anemia due to chronic kidney disease [N18.9, D63.1]  Yes    Acute cystitis with hematuria [N30.01]  Yes    SSS (sick sinus syndrome) [I49.5]  Yes     Chronic    Current use of long term anticoagulation [Z79.01]  Not Applicable    Vitamin D deficiency [E55.9]  Yes    Paroxysmal atrial fibrillation [I48.0]  Yes     Chronic    Acquired hypothyroidism [E03.9]  Yes    Essential hypertension [I10]  Yes     Chronic    High cholesterol [E78.00]  Yes    Cardiac pacemaker in situ [Z95.0]  Yes      Resolved Hospital Problems    Diagnosis Date Resolved POA    Discharge planning [Z02.9] 05/22/2023 Not Applicable    Malnutrition of moderate degree [E44.0] 05/19/2023 Yes    Stage 3 chronic kidney disease [N18.30] 05/18/2023 Yes     Chronic       Patient is homebound due to:  Left upper extremity swelling    Allergies:  Review of patient's allergies indicates:   Allergen Reactions    Ciprofloxacin Nausea And Vomiting       Vitals:  Every shift    Diet: regular diet    Activities:   Up in a chair each  morning as tolerated and Activity as tolerated    Goals of Care Treatment Preferences:  Code Status: DNR    Health care agent: Génesis Araiza  [facility] agent number: [phone]    Living Will: Yes     What is most important right now is to focus on improvement in condition but with limits to invasive therapies.  Accordingly, we have decided that the best plan to meet the patient's goals includes continuing with treatment.      Labs: Per protocol    Nursing Precautions:  Fall and Pressure ulcer prevention    Consults:   PT to evaluate and treat- 5 times a week, OT to evaluate and treat- 5 times a week, Wound Care, and Nutrition to evaluate and recommend diet     Miscellaneous Care: The left upper arm has a small area of skin opening, that has 100% redness- cleanse left upper forearm with normal saline then apply a mepilex border. The left arm and hand edema to be elevated (above the head if possible) when not using.      Medications: Discontinue all previous medication orders, if any. See new list below.     Medication List        START taking these medications      cyanocobalamin 1000 MCG tablet  Commonly known as: VITAMIN B-12  Take 1 tablet (1,000 mcg total) by mouth once daily.  Start taking on: May 24, 2023     folic acid 1 MG tablet  Commonly known as: FOLVITE  Take 1 tablet (1 mg total) by mouth once daily.  Start taking on: May 24, 2023     hydrALAZINE 25 MG tablet  Commonly known as: APRESOLINE  Take 1 tablet (25 mg total) by mouth 2 (two) times daily as needed (SBP >180).     Lactobacillus rhamnosus GG 10 billion cell capsule  Commonly known as: CULTURELLE  Take 1 capsule by mouth once daily.  Start taking on: May 24, 2023     mirtazapine 7.5 MG Tab  Commonly known as: REMERON  Take 1 tablet (7.5 mg total) by mouth every evening.     multivitamin Tab  Take 1 tablet by mouth once daily.  Start taking on: May 24, 2023     pantoprazole 40 MG tablet  Commonly known as: PROTONIX  Take 1 tablet (40 mg total)  by mouth once daily.     polyethylene glycol 17 gram Pwpk  Commonly known as: GLYCOLAX  Take 17 g by mouth once daily.            CHANGE how you take these medications      diclofenac sodium 1 % Gel  Commonly known as: VOLTAREN  Apply topically to affected joints as needed for pain  What changed:   how much to take  how to take this  when to take this  additional instructions            CONTINUE taking these medications      aluminum-magnesium hydroxide-simethicone 200-200-20 mg/5 mL Susp  Commonly known as: MAALOX  Take by mouth every 8 (eight) hours as needed (vomiting).     amiodarone 200 MG Tab  Commonly known as: PACERONE  Take 1 tablet (200 mg total) by mouth once daily.     aspirin 81 MG EC tablet  Commonly known as: ECOTRIN  Take 1 tablet (81 mg total) by mouth once daily.     atenoloL 25 MG tablet  Commonly known as: TENORMIN  TAKE 2 TABLETS EVERY MORNING AND 1 TABLET EVERY EVENING     bismuth subsalicylate 262 mg/15 mL suspension  Commonly known as: PEPTO BISMOL  Take by mouth every 8 (eight) hours as needed (vomiting).     diltiaZEM 180 MG Cs24  Commonly known as: TIAZAC  Take 1 capsule (180 mg total) by mouth once daily.     ELIQUIS 2.5 mg Tab  Generic drug: apixaban  TAKE 1 TABLET TWICE A DAY     levothyroxine 137 MCG Tab tablet  Commonly known as: SYNTHROID  Take 1 tablet (137 mcg total) by mouth before breakfast.            STOP taking these medications      atorvastatin 10 MG tablet  Commonly known as: LIPITOR     eszopiclone 2 MG Tab  Commonly known as: LUNESTA     gentamicin 0.1 % ointment  Commonly known as: GARAMYCIN     mometasone 0.1% 0.1 % cream  Commonly known as: ELOCON     spironolactone 25 MG tablet  Commonly known as: ALDACTONE                Immunizations Administered as of 5/23/2023       No immunizations on file.              Madonna Boogie PA-C  05/23/2023

## 2023-05-23 NOTE — PT/OT/SLP PROGRESS
"Occupational Therapy   Treatment    Name: Gisselle Ortiz  MRN: [id number]  Admitting Diagnosis:  Left upper extremity swelling       Recommendations:     Discharge Recommendations: nursing facility, skilled  Discharge Equipment Recommendations:  none  Barriers to discharge:  None    Assessment:     Gisselle Ortiz is a 89 y.o. female with a medical diagnosis of Left upper extremity swelling.  She presents with the following performance deficits affecting function are weakness, impaired endurance, impaired self care skills, impaired functional mobility, gait instability, impaired balance, decreased safety awareness, decreased lower extremity function. Patient agreeable to OT session and tolerated fair secondary to c/o fatigue and global weakness. Patient received with improved alertness and sitting balance today. Patient would benefit from continued OT services to address deficits and progress towards goals. Continue OT POC.    Rehab Prognosis:  Good; patient would benefit from acute skilled OT services to address these deficits and reach maximum level of function.       Plan:     Patient to be seen 3 x/week to address the above listed problems via self-care/home management, therapeutic activities, therapeutic exercises  Plan of Care Expires: 06/16/23  Plan of Care Reviewed with: patient    Subjective     Chief Complaint: "I'm just so weak."  Patient/Family Comments/goals: get stronger  Pain/Comfort:  Pain Rating 1: 0/10  Pain Rating Post-Intervention 1: 0/10    Objective:     Communicated with: nurse evelin prior to session.  Patient found HOB elevated with telemetry ([employer] monitoring system) upon OT entry to room.    General Precautions: Standard, fall    Orthopedic Precautions:N/A  Braces: N/A  Respiratory Status: Room air     Occupational Performance:     Bed Mobility:    Patient completed Scooting with contact guard assistance and BLE support  Pt performed x2 bridge scoots with CGA but required " dependence x2 using draw sheet for remainder scoot 2/2 fatigue  Patient completed Supine to Sit with contact guard assistance and increased time for mobility 2/2 fatigue  Patient completed Sit to Supine with contact guard assistance and increased time for mobility 2/2 fatigue     Functional Mobility/Transfers:  Patient completed Sit <> Stand Transfer with minimum assistance  with  hand-held assist   Functional Mobility: patient deferred 2/2 c/o dizziness during standing trial    Activities of Daily Living:  Feeding:  SET UP for opening containers limited ROM  with edema in L hand;  strength L      [facility] 6 Click ADL: 19    Treatment & Education:  Pt participated in L UE AROM seated EOB with focus on edema management to improve functional grasp during ADLs  Pt educated on OT POC, goals, and current progress  Pt educated on importance of OOB activity to increase activity tolerance, maximize recovery, and improve occupational performance. Pt verbalized understanding  Pt's food reheated end of therapy per pt request with hand off to wound care nurse  Addressed all patient questions/concerns within CARDENAS scope of practice.     Patient left HOB elevated with all lines intact, call button in reach, and wound care nurse present    GOALS:   Multidisciplinary Problems       Occupational Therapy Goals          Problem: Occupational Therapy    Goal Priority Disciplines Outcome Interventions   Occupational Therapy Goal     OT, PT/OT Ongoing, Progressing    Description: Goals to be met by: 5/24/23    Patient will increase functional independence with ADLs by performing:    Grooming while standing at sink with Supervision.  Toileting from toilet with Stand-by Assistance for hygiene and clothing management.   Supine to sit with [place].  Toilet transfer to toilet with Stand-by Assistance.                         Time Tracking:     OT Date of Treatment: 05/23/23  OT Start Time: 1010  OT Stop Time: 1039  OT Total Time (min):  29 min    Billable Minutes:Therapeutic Activity 20  Therapeutic Exercise 9    OT/MINERVA: MINERVA     Number of MINERVA visits since last OT visit: 2    5/23/2023

## 2023-05-23 NOTE — PROGRESS NOTES
05/23/23 0900   WOCN Assessment   WOCN Total Time (mins) 20   Visit Date 05/23/23   Visit Time 0900   Consult Type New   WOCN Speciality Wound   Intervention assessed;changed;applied;chart review;coordination of care;orders        Altered Skin Integrity 05/23/23 0900 Left anterior;upper Arm   Date First Assessed/Time First Assessed: 05/23/23 0900   Side: Left  Orientation: anterior;upper  Location: Arm   Wound Image    Description of Altered Skin Integrity Partial thickness tissue loss. Shallow open ulcer with a red or pink wound bed, without slough. Intact or Open/Ruptured Serum-filled blister.   Dressing Appearance Dried drainage   Drainage Amount Scant   Drainage Characteristics/Odor Serosanguineous;No odor   Red (%), Wound Tissue Color 100 %   Periwound Area Ecchymotic;Intact   Wound Edges Defined   Wound Length (cm) 1.5 cm   Wound Width (cm) 1.5 cm   Wound Depth (cm) 0.1 cm   Wound Volume (cm^3) 0.225 cm^3   Wound Surface Area (cm^2) 2.25 cm^2     [facility] Surg  Wound Care    Patient Name:  Gisselle Ortiz   MRN:  [id number]  Date: 5/23/2023  Diagnosis: Left upper extremity swelling    History:     Past Medical History:   Diagnosis Date    A-fib     Anemia of chronic disease 02/02/2021    Anticoagulant long-term use     CHF (congestive heart failure)     Chronic diastolic heart failure 02/02/2021    COVID-19 01/07/2022    Gallstone pancreatitis     Hypertension     Pancreatic abscess 09/26/2020    Stage 3 chronic kidney disease 5/11/2016    Thyroid disease        Social History     Socioeconomic History    Marital status:    Tobacco Use    Smoking status: Former     Types: Cigarettes     Start date: 5/19/1964    Smokeless tobacco: Never   Substance and Sexual Activity    Alcohol use: No    Drug use: No    Sexual activity: Not Currently     Partners: Male     Social Determinants of Health     Financial Resource Strain: Low Risk     Difficulty of Paying Living Expenses: Not hard at all   Food  Insecurity: No Food Insecurity    Worried About Running Out of Food in the Last Year: Never true    Ran Out of Food in the Last Year: Never true   Transportation Needs: No Transportation Needs    Lack of Transportation (Medical): No    Lack of Transportation (Non-Medical): No   Physical Activity: Inactive    Days of Exercise per Week: 0 days    Minutes of Exercise per Session: 0 min   Stress: No Stress Concern Present    Feeling of Stress : Not at all   Social Connections: Moderately Isolated    Frequency of Communication with Friends and Family: More than three times a week    Frequency of Social Gatherings with Friends and Family: More than three times a week    Attends [employer] Services: 1 to 4 times per year    Active Member of Clubs or Organizations: No    Attends Club or Organization Meetings: Never    Marital Status:    Housing Stability: Unknown    Unable to Pay for Housing in the Last Year: No    Unstable Housing in the Last Year: No       Precautions:     Allergies as of 05/14/2023 - Reviewed 05/14/2023   Allergen Reaction Noted    Ciprofloxacin Nausea And Vomiting 05/16/2019       [employer] Assessment Details/Treatment   Patient consult for wound care to left upper arm and the left forearm and left hand with edema. The left upper arm have a small area of skin opening, that has 100% redness and the treatment to cleanse left upper forearm with normal saline then apply a mepilex border. The left arm and hand edema treatment to elevated on pillows.     Recommendations made to primary team for  the above  Orders placed.     05/23/2023

## 2023-05-23 NOTE — PT/OT/SLP PROGRESS
Physical Therapy Treatment    Patient Name:  Gisselle Ortiz   MRN:  [id number]    Recommendations:     Discharge Recommendations: nursing facility, skilled  Discharge Equipment Recommendations:  (TBD)  Barriers to discharge:  Pt currently requiring increased level of assistance with mobility       Assessment:     Gisselle Ortiz is a 89 y.o. female admitted with a medical diagnosis of Left upper extremity swelling.  She presents with the following impairments/functional limitations: weakness, impaired endurance, impaired functional mobility, gait instability, impaired balance, decreased lower extremity function, impaired cognition, decreased safety awareness .Pt  tolerated treatment fairly well and is progressing slowly with mobility. pt limited due to dizziness and weakness upon sitting EOB. Patient remains appropriate for continued skilled services within the acute environment and goals remain appropriate.      Rehab Prognosis: Good; patient would benefit from acute skilled PT services to address these deficits and reach maximum level of function.    Recent Surgery: * No surgery found *      Plan:     During this hospitalization, patient to be seen 3 x/week to address the identified rehab impairments via gait training, therapeutic activities, therapeutic exercises, neuromuscular re-education and progress toward the following goals:    Plan of Care Expires:  06/19/23    Subjective     Chief Complaint: I feel weak and dizzy all of a sudden    Pain/Comfort:  Pain Rating 1: 0/10  Pain Rating Post-Intervention 1: 0/10      Objective:     Communicated with RN prior to session.  Patient found HOB elevated with telemetry upon PT entry to room.     General Precautions: Standard, fall  Orthopedic Precautions: N/A  Braces: N/A  Respiratory Status: Room air     Functional Mobility:  Bed Mobility:     Supine to Sit: moderate assistance  Sit to Supine: ,od assistance  Transfers:   pt declined to perform due to c/o of  dizziness while seated on EOB  Balance:   Good sitting balance      AM-PAC 6 CLICK MOBILITY  Turning over in bed (including adjusting bedclothes, sheets and blankets)?: 3  Sitting down on and standing up from a chair with arms (e.g., wheelchair, bedside commode, etc.): 3  Moving from lying on back to sitting on the side of the bed?: 3  Moving to and from a bed to a chair (including a wheelchair)?: 3  Need to walk in hospital room?: 3  Climbing 3-5 steps with a railing?: 1  Basic Mobility Total Score: 16       Treatment & Education:  Therapist provided instruction and educated of  patient on progress, safety,d/c,PT POC,   proper body mechanics, energy conservation, and fall prevention strategies during tasks listed above, on the effects of prolonged immobility and the importance of performing OOB activity and exercises to promote healing and reduce recovery time      Patient  facilitated therex  seated  on EOB B LE AROM AP, LAQ, Hip Flexion. Patient required skilled PTA for instruction of exercises and appropriate cues to perform exercises safely, sequencing and appropriately.   Exercises performed to develop and maintain pt's strength, endurance and flexibility.  Updated white board with appropriate PT mobility information for medical team notification     Donned an extra gown     Call nursing/pct to transfer to chair/use bathroom. Pt stated understanding      Bedside table in front of patient and area set up for function, convenience, and safety. RN aware of patient's mobility needs and status. Questions/concerns addressed within PTA scope of practice; patient  with no further questions. Time was provided for active listening, discussion of health disposition, and discussion of safe discharge. Pt?verbalized?agreement .    Patient left HOB elevated with all lines intact, call button in reach, and RN notified.  GOALS:   Multidisciplinary Problems       Physical Therapy Goals          Problem: Physical Therapy    Goal  Priority Disciplines Outcome Goal Variances Interventions   Physical Therapy Goal     PT, PT/OT Ongoing, Progressing     Description: Goals to be met by: 2023     Patient will increase functional independence with mobility by performin. Supine to sit with [place]  2. Sit to supine with [place]  3. Sit to stand transfer with Supervision  4. Bed to chair transfer with Supervision using LRAD  5. Gait  x 50 feet with Stand-by Assistance using LRAD.                          Time Tracking:     PT Received On: 23  PT Start Time: 1141     PT Stop Time: 1204  PT Total Time (min): 23 min     Billable Minutes: Therapeutic Activity 13 and Therapeutic Exercise 10    Treatment Type: Treatment  PT/PTA: PTA     Number of PTA visits since last PT visit: 1     2023

## 2023-05-24 NOTE — DISCHARGE SUMMARY
[facility] Medicine  Discharge Summary      Patient Name: Gisselle Ortiz  MRN: [id number]  [place]: [id number]  Patient Class: IP- Inpatient  Admission Date: 5/14/2023  Hospital Length of Stay: 8 days  Discharge Date and Time: 5/23/2023  8:10 PM  Attending Physician: Katie att. providers found   Discharging Provider: Madonna Boogie PA-C  Primary Care Provider: Kai Montez MD  [facility] Medicine Team: [facility] Madonna Boogie PA-C  Primary Care Team: [facility] MED E    HPI:   Gisselle Ortiz is a 89 y.o. female with a PMHx of GERD, gallstone pancreatitis, thyroid disease, HTN, HLD, SSS s/p PPM, CHF (EF 55%), and a fib on eliquis who presents to the ED for nausea and vomiting. The patient reports ongoing nausea and vomiting x1 week. She endorses associated 10lb weight loss, fatigue, and generalized weakness. The patient also reports increased urinary frequency and dysuria beginning 4-5 days ago and was started on Bactrim which she completed yesterday. Unfortunately, she was unable to tolerate some of the medication. She reports ongoing urinary symptoms that have not improved. She denies any associated abdominal pain, diarrhea, fever, or chills.  She reports good flatus, but states that she really has not had many bowel movements. The patient denies any CP, SOB, cough, or headache.    In the ED, patient hypertensive in the ED which improved with home antihypertensives vitals otherwise stable, afebrile. CBC unremarkable. Na 133. K+5.3. Cr 1.2 (at baseline). Calcium 8.6. Total protein 5.4. Albumin 2.7. AST//177. Troponin 0.025. EKG with atrially paced rhythm, rate 60, no acute ischemic changes. CXR with increased parenchymal attenuation projects over the left lower lung zone, findings may reflect atelectasis however developing consolidation not excluded. KUB with nonobstructive bowel gas pattern noting large amount of stool throughout the colon may reflect constipation. The  patient received LR bolus x2, zofran, and rocephin.      * No surgery found *      Hospital Course:   Pt placed admitted to hospital medicine for intractable n/v and UTI. Pt has remained hemodynamically stable. She was started on 5-days of IV rocephin. Urine culture + klebsiella pneumoniae and e.coli. No leukocytosis but was hyperkalemic K 5.3; discontinued spironolactone. AST/ALT elevated on admission and downtrending. Abd US with hepatomegaly, stable prominence of CBD, and cholelithiasis without evidence of cholecystitis. AES recommended MRCP, which the patient unable to have 2/2 pacemaker. KUB with nonobstructive bowel gas pattern and significant stool burden, which is improving on bowel regimen. CTAP with gallbladder sludge vs stones and colonic wall thickening vs. stenosis/peristalsis. Pt with MARIO on CKD, which is improving with IVFs. Pt with new onset thrombocytopenia which is now improving. Hematology consulted and recommended started cyanocobalamin. PT/OT consulted and on re-evaluation are recommending SNF. All questions were answered. Patient acknowledged understanding of discharge instructions and feels safe to discharge. Patient was transferred to SNF on 5/23/23 in stable condition.       Goals of Care Treatment Preferences:  Code Status: DNR    Health care agent: [facility] agent number: [phone]    Living Will: Yes     What is most important right now is to focus on improvement in condition but with limits to invasive therapies.  Accordingly, we have decided that the best plan to meet the patient's goals includes continuing with treatment.      Consults:   Consults (From admission, onward)        Status Ordering Provider     Inpatient consult to Hematology  Once        Provider:  (Not yet assigned)    Completed DIEGO NETTLES     Inpatient virtual consult to Hospital Medicine  Once        Provider:  (Not yet assigned)    Completed DIEGO NETTLES     Inpatient virtual consult to  Hospital Medicine  Once        Provider:  (Not yet assigned)    Completed CARLEE CALDWELL     Inpatient virtual consult to Hospital Medicine  Once        Provider:  (Not yet assigned)    Completed DIEGO NETTLES     Inpatient consult to Palliative Care  Once        Provider:  (Not yet assigned)    Completed DIEGO NETTLES     Inpatient consult to Registered Dietitian/Nutritionist  Once        Provider:  (Not yet assigned)    Completed DIEGO NETTLES          No new Assessment & Plan notes have been filed under this hospital service since the last note was generated.  Service: Hospital Medicine    Final Active Diagnoses:    Diagnosis Date Noted POA    PRINCIPAL PROBLEM:  Left upper extremity swelling [M79.89] 05/21/2023 No    Acute renal failure superimposed on stage 3 chronic kidney disease [N17.9, N18.30] 05/17/2023 Yes    Lethargy [R53.83] 05/21/2023 Yes    Malnutrition of mild degree [E44.1] 05/19/2023 Yes    Palliative care encounter [Z51.5] 05/19/2023 Not Applicable    Body mass index (BMI) of 19 or less in adult [Z68.1] 05/18/2023 Yes    Thrombocytopenia [D69.6] 05/18/2023 Yes    Electrolyte disturbance [E87.8] 05/18/2023 Yes    Transaminitis [R74.01] 05/14/2023 Yes    Hyperkalemia [E87.5] 05/14/2023 Yes    Constipation [K59.00] 01/10/2022 Yes    Intractable nausea and vomiting [R11.2] 01/07/2022 Yes    Impaired functional mobility and endurance [Z74.09] 02/03/2021 Yes    Chronic diastolic heart failure [I50.32] 02/02/2021 Yes    Anemia due to chronic kidney disease [N18.9, D63.1] 02/02/2021 Yes    Acute cystitis with hematuria [N30.01] 09/26/2020 Yes    SSS (sick sinus syndrome) [I49.5] 01/17/2019 Yes     Chronic    Current use of long term anticoagulation [Z79.01] 11/14/2017 Not Applicable    Vitamin D deficiency [E55.9] 11/13/2017 Yes    Paroxysmal atrial fibrillation [I48.0] 05/19/2015 Yes     Chronic    Acquired hypothyroidism [E03.9] 05/19/2015 Yes    Essential hypertension  [I10] 05/19/2015 Yes     Chronic    High cholesterol [E78.00] 05/19/2015 Yes    Cardiac pacemaker in situ [Z95.0] 05/19/2015 Yes      Problems Resolved During this Admission:    Diagnosis Date Noted Date Resolved POA    Discharge planning [Z02.9] 05/21/2023 05/22/2023 Not Applicable    Malnutrition of moderate degree [E44.0] 01/13/2022 05/19/2023 Yes    Stage 3 chronic kidney disease [N18.30] 05/11/2016 05/18/2023 Yes     Chronic       Discharged Condition: good    Disposition: Skilled Nursing Facility    Follow Up:   Follow-up Information     Kai Montez MD. Go on 5/31/2023.    Specialty: Family Medicine  Why: hospital follow up at 9:30 am  Contact information:  [address]  [address]  [place] [address]  [phone]                       Patient Instructions:      COMPREHENSIVE METABOLIC PANEL   Standing Status: Future Standing Exp. Date: 07/17/24     Ambulatory referral/consult to Outpatient Case Management   Referral Priority: Routine Referral Type: Consultation   Referral Reason: Specialty Services Required   Number of Visits Requested: 1     Ambulatory referral/consult to [facility]   Standing Status: Future   Referral Priority: Urgent Referral Type: Consultation   Referral Reason: Specialty Services Required   Number of Visits Requested: 1     Ambulatory referral/consult to Nephrology   Standing Status: Future   Referral Priority: Urgent Referral Type: Consultation   Referral Reason: Specialty Services Required   Referred to Provider: GUILLE TUBBS Requested Specialty: Nephrology   Number of Visits Requested: 1     Ambulatory referral/consult to Gastroenterology   Standing Status: Future   Referral Priority: Routine Referral Type: Consultation   Referral Reason: Specialty Services Required   Requested Specialty: Gastroenterology   Number of Visits Requested: 1     Ambulatory referral/consult to Hepatology   Standing Status: Future   Referral Priority:  Urgent Referral Type: Consultation   Referral Reason: Specialty Services Required   Requested Specialty: Hepatology   Number of Visits Requested: 1     Ambulatory referral/consult to Internal Medicine   Standing Status: Future   Referral Priority: Urgent Referral Type: Consultation   Referral Reason: Specialty Services Required   Requested Specialty: Internal Medicine   Number of Visits Requested: 1     Ambulatory referral/consult to CLINIC Palliative Care   Standing Status: Future   Referral Priority: Routine Referral Type: Consultation   Requested Specialty: Palliative Medicine   Number of Visits Requested: 1     Activity as tolerated     Ambulatory Request for Ready Responder Services   Standing Status: Future Standing Exp. Date: 05/19/24     Order Specific Question Answer Comments   Program referred to: COVID-19 Vaccine        Significant Diagnostic Studies: N/A    Pending Diagnostic Studies:     None         Medications:  Reconciled Home Medications:      Medication List      START taking these medications    cyanocobalamin 1000 MCG tablet  Commonly known as: VITAMIN B-12  Take 1 tablet (1,000 mcg total) by mouth once daily.     folic acid 1 MG tablet  Commonly known as: FOLVITE  Take 1 tablet (1 mg total) by mouth once daily.     hydrALAZINE 25 MG tablet  Commonly known as: APRESOLINE  Take 1 tablet (25 mg total) by mouth 2 (two) times daily as needed (SBP >180).     Lactobacillus rhamnosus GG 10 billion cell capsule  Commonly known as: CULTURELLE  Take 1 capsule by mouth once daily.     mirtazapine 7.5 MG Tab  Commonly known as: REMERON  Take 1 tablet (7.5 mg total) by mouth every evening.     multivitamin Tab  Take 1 tablet by mouth once daily.     pantoprazole 40 MG tablet  Commonly known as: PROTONIX  Take 1 tablet (40 mg total) by mouth once daily.     polyethylene glycol 17 gram Pwpk  Commonly known as: GLYCOLAX  Take 17 g by mouth once daily.        CHANGE how you take these medications    diclofenac  sodium 1 % Gel  Commonly known as: VOLTAREN  Apply topically to affected joints as needed for pain  What changed:   · how much to take  · how to take this  · when to take this  · additional instructions        CONTINUE taking these medications    aluminum-magnesium hydroxide-simethicone 200-200-20 mg/5 mL Susp  Commonly known as: MAALOX  Take by mouth every 8 (eight) hours as needed (vomiting).     amiodarone 200 MG Tab  Commonly known as: PACERONE  Take 1 tablet (200 mg total) by mouth once daily.     aspirin 81 MG EC tablet  Commonly known as: ECOTRIN  Take 1 tablet (81 mg total) by mouth once daily.     atenoloL 25 MG tablet  Commonly known as: TENORMIN  TAKE 2 TABLETS EVERY MORNING AND 1 TABLET EVERY EVENING     bismuth subsalicylate 262 mg/15 mL suspension  Commonly known as: PEPTO BISMOL  Take by mouth every 8 (eight) hours as needed (vomiting).     diltiaZEM 180 MG Cs24  Commonly known as: TIAZAC  Take 1 capsule (180 mg total) by mouth once daily.     ELIQUIS 2.5 mg Tab  Generic drug: apixaban  TAKE 1 TABLET TWICE A DAY     levothyroxine 137 MCG Tab tablet  Commonly known as: SYNTHROID  Take 1 tablet (137 mcg total) by mouth before breakfast.        STOP taking these medications    atorvastatin 10 MG tablet  Commonly known as: LIPITOR     eszopiclone 2 MG Tab  Commonly known as: LUNESTA     gentamicin 0.1 % ointment  Commonly known as: GARAMYCIN     mometasone 0.1% 0.1 % cream  Commonly known as: ELOCON     spironolactone 25 MG tablet  Commonly known as: ALDACTONE            Indwelling Lines/Drains at time of discharge:   Lines/Drains/Airways     None                 Time spent on the discharge of patient: 36 minutes         Madonna Boogie PA-C  Department of Hospital Medicine  [facility]

## 2023-05-24 NOTE — PLAN OF CARE
Problem: Adult Inpatient Plan of Care  Goal: Plan of Care Review  Outcome: Ongoing, Progressing  Goal: Patient-Specific Goal (Individualized)  Outcome: Ongoing, Progressing     Problem: Fluid and Electrolyte Imbalance (Acute Kidney Injury/Impairment)  Goal: Fluid and Electrolyte Balance  Outcome: Ongoing, Progressing     Problem: Oral Intake Inadequate (Acute Kidney Injury/Impairment)  Goal: Optimal Nutrition Intake  Outcome: Ongoing, Progressing     Problem: Renal Function Impairment (Acute Kidney Injury/Impairment)  Goal: Effective Renal Function  Outcome: Ongoing, Progressing     Problem: Impaired Wound Healing  Goal: Optimal Wound Healing  Outcome: Ongoing, Progressing     Problem: Fall Injury Risk  Goal: Absence of Fall and Fall-Related Injury  Outcome: Ongoing, Progressing     Problem: Skin Injury Risk Increased  Goal: Skin Health and Integrity  Outcome: Ongoing, Progressing

## 2023-05-24 NOTE — PLAN OF CARE
Evaluation completed. POC established.     Problem: Physical Therapy  Goal: Physical Therapy Goal  Description: Goals to be met by: 23     Patient will increase functional independence with mobility by performin. Supine to sit with [place]  2. Sit to supine with Contact Guard Assistance  3. Rolling to Left and Right with Supervision  4. Sit to stand transfer with Stand-by Assistance  5. Bed to chair transfer with Stand-by Assistance using Rolling Walker  6. Gait  x 50 feet with Contact Guard Assistance using Rolling Walker  7. Wheelchair propulsion x 50 feet with Modified [place] using bilateral upper extremities  8. Ascend/Descend 4 inch curb step with Minimal Assistance using Rolling Walker    Outcome: Ongoing, Progressing   2023

## 2023-05-24 NOTE — PT/OT/SLP EVAL
Physical Therapy Evaluation/Treatment Note    Patient Name:  Gisselle Ortiz   MRN:  [id number]  Admit Date: 5/23/2023  Admitting Diagnosis: acute kidney failure  Recent Surgeries: N/A    General Precautions: Standard, fall   Orthopedic Precautions: N/A   Braces: N/A    Recommendations:     Discharge Recommendations: home health PT   Level of Assistance Recommended: 24 hours significant assistance  Discharge Equipment Recommendations: none (Patient reports having all equipment needed at home.)   Barriers to discharge:  (Increased assistance for mobility)    Assessment:     Gisselle Ortiz is a 89 y.o. female admitted with a medical diagnosis of acute kidney failure. Performance deficits affecting function  weakness, impaired endurance, impaired self care skills, impaired functional mobility, gait instability, impaired balance, decreased lower extremity function, decreased safety awareness, impaired cardiopulmonary response to activity. Patient agreeable to PT evaluation and treatment this AM. Patient reports fatigue throughout session; limited tolerance overall for mobility. Patient reports ambulating using a RW with Supervision from family prior to recent hospitalization. Patient currently functioning below baseline level of mobility requiring SBA for supine to sit, Mercedes to roll to R, MaxA for sit to supine and Mercedes for functional transfers. Patient unable to engage in ambulation secondary to increased feelings of fatigue and dizziness; BP measured in sitting (72/44 mmHg, HR 62 bpm, O2 96%). Patient's nurse present in therapy gym and made aware. PT assisted patient back to bed with elevation of BLE. PCT and nurse made aware. PT to continue to assess mobility with progression of therapy. Patient will benefit from continued SNF rehabilitation services to address above mentioned deficits as well as progress mobility towards increased functional independence with focus on fall prevention and decreased caregiver  "burden for safe transition to home environment at time of discharge.    Rehab Potential is good     Activity Tolerance: Fair    Plan:     Patient to be seen 5 x/week to address the above listed problems via gait training, therapeutic activities, therapeutic exercises, neuromuscular re-education, wheelchair management/training     Plan of Care Expires: 06/23/23  Plan of Care Reviewed with: patient    Subjective     Chief Complaint: "I am just tired."  Patient/Family Comments/goals: Return home at [facility].  Pain/Comfort:  Pain Rating 1: 0/10  Pain Rating Post-Intervention 1: 0/10    Living Environment:   Patient resides home alone in [facility] with 1 MANUEL. Patient has a WIS with shower seat. Elevated toilet in bathroom. Patient reports having 9 grab bars in each bathroom.     Prior to admission, patients level of function was Supervision using RW. Patient reports having assist from son and daughter on alternating days.  Equipment used at home: RW, grab bars, shower chair .  DME owned (not currently used): bedside commode, wheelchair, and rollator .  Upon discharge, patient will have assistance from family.    Patients cultural, spiritual, Sabianism conflicts given the current situation: no    Objective:     Communicated with nursing staff prior to session.  Patient found supine with  (no active lines)  upon PT entry to room.    Exams:  Cognitive Exam:  Patient is oriented to Person, Place, Time, and Situation  RLE ROM: WFL  RLE Strength: Grossly 3/5 to 4-/5  LLE ROM: WFL  LLE Strength: Grossly 3/5 to 4-/5    Functional Mobility:     05/24/23 0939   Prior Functioning: Everyday Activities   Self Care Independent   Indoor Mobility (Ambulation) Independent   Stairs Independent   Functional Cognition Independent   Prior Device Use Walker   Roll Left and Right   Assistance Needed Physical assistance   Physical Assistance Level 25% or less   Comment Mercedes to roll R, SBA to roll L with HOB flat and use of bed rails.   CARE Score - " Roll Left and Right 3   Sit to Lying   Assistance Needed Physical assistance   Physical Assistance Level 51%-75%   Comment MaxA to return to supine positioning from EOB with HOB flat.   CARE Score - Sit to Lying 2   Lying to Sitting on Side of Bed   Assistance Needed Supervision   Physical Assistance Level No physical assistance   Comment SBA with HOB flat and use of bed rails.   CARE Score - Lying to Sitting on Side of Bed 4   Sit to Stand   Assistance Needed Physical assistance   Physical Assistance Level 25% or less   Comment Mercedes using RW   CARE Score - Sit to Stand 3   Sit to Stand Discharge Goal   Discharge Goal 4   Chair/Bed-to-Chair Transfer   Assistance Needed Physical assistance   Physical Assistance Level 25% or less   Comment Mercedes using RW via stand step transfer   CARE Score - Chair/Bed-to-Chair Transfer 3   Car Transfer   Reason if not Attempted Environmental limitations   CARE Score - Car Transfer 10   Walk 10 Feet   Comment Patient unable to advance BLE once in standing; patient reporting weakness and dizziness. BP measured.   Reason if not Attempted Safety concerns   CARE Score - Walk 10 Feet 88   Walk 50 Feet with Two Turns   Reason if not Attempted Safety concerns   CARE Score - Walk 50 Feet with Two Turns 88   Walk 150 Feet   Reason if not Attempted Safety concerns   CARE Score - Walk 150 Feet 88   Walking 10 Feet on Uneven Surfaces   Reason if not Attempted Safety concerns   CARE Score - Walking 10 Feet on Uneven Surfaces 88   1 Step (Curb)   Reason if not Attempted Safety concerns   CARE Score - 1 Step (Curb) 88   4 Steps   Reason if not Attempted Safety concerns   CARE Score - 4 Steps 88   12 Steps   Reason if not Attempted Safety concerns   CARE Score - 12 Steps 88   Picking Up Object   Reason if not Attempted Safety concerns   CARE Score - Picking Up Object 88   Uses a Wheelchair/Scooter?   Uses a Wheelchair/Scooter? Yes   Wheel 50 Feet with Two Turns   Comment Patient propelled W/C 35 feet  using BUE with SBA; arm rest removed to better reach wheels with hands. Patient reports UE fatigue limiting distance traveled.   Reason if not Attempted Safety concerns   CARE Score - Wheel 50 Feet with Two Turns 88   Type of Wheelchair/Scooter Manual   Wheel 150 Feet   Reason if not Attempted Safety concerns   CARE Score - Wheel 150 Feet 88     Education:  Patient provided with daily orientation and goals of this PT session.  Patient educated to transfer to bedside chair/bedside commode/bathroom with RN/PCT present.   Patient educated on assistive device use, bed mobility training, Fall risk, gait training, home safety, plan of care, and transfer training by explanation and demonstration.   Patient Verbalized Understanding and Demonstrated Understanding .  Time provided for therapeutic counseling and discussion of current health disposition. All questions answered to satisfaction, within scope of PT practice; voiced no other concerns. White board updated in patient's room, RN notified of session.    Therapeutic Activities and Exercises:   Repeated functional transfers performed throughout session. See above chart for details. Increased time spent with monitoring vitals and positioning patient at end of session in bed.     Vital Signs:  Position: Seated following sit to  therapy gym; patient reporting fatigue and dizziness  Heart Rate: 62 bpm  Blood Pressure: 72/44 mmHg  Pulse Ox: 96 % on RA      AM-PAC 6 CLICK MOBILITY  Total Score:14     Patient left supine with call button in reach and PCT and nurse notified.    GOALS:   Multidisciplinary Problems       Physical Therapy Goals          Problem: Physical Therapy    Goal Priority Disciplines Outcome Goal Variances Interventions   Physical Therapy Goal     PT, PT/OT Ongoing, Progressing     Description: Goals to be met by: 23     Patient will increase functional independence with mobility by performin. Supine to sit with Supervision  2. Sit to  supine with Contact Guard Assistance  3. Rolling to Left and Right with Supervision  4. Sit to stand transfer with Stand-by Assistance  5. Bed to chair transfer with Stand-by Assistance using Rolling Walker  6. Gait  x 50 feet with Contact Guard Assistance using Rolling Walker  7. Wheelchair propulsion x 50 feet with Modified Powell using bilateral upper extremities  8. Ascend/Descend 4 inch curb step with Minimal Assistance using Rolling Walker                         History:     Past Medical History:   Diagnosis Date    A-fib     Anemia of chronic disease 02/02/2021    Anticoagulant long-term use     CHF (congestive heart failure)     Chronic diastolic heart failure 02/02/2021    COVID-19 01/07/2022    Gallstone pancreatitis     Hypertension     Pancreatic abscess 09/26/2020    Stage 3 chronic kidney disease 5/11/2016    Thyroid disease        Past Surgical History:   Procedure Laterality Date    CATARACT EXTRACTION      CATARACT EXTRACTION W/  INTRAOCULAR LENS IMPLANT Bilateral 2004    DR IN [place]    ENDOSCOPIC ULTRASOUND OF UPPER GASTROINTESTINAL TRACT N/A 9/14/2020    Procedure: ULTRASOUND, UPPER GI TRACT, ENDOSCOPIC;  Surgeon: Esha Howard MD;  Location: [address]);  Service: Endoscopy;  Laterality: N/A;    ENDOSCOPIC ULTRASOUND OF UPPER GASTROINTESTINAL TRACT  11/25/2020    Procedure: ULTRASOUND, UPPER GI TRACT, ENDOSCOPIC;  Surgeon: Esha Howard MD;  Location: [address]);  Service: Endoscopy;;    ERCP N/A 9/14/2020    Procedure: ERCP (ENDOSCOPIC RETROGRADE CHOLANGIOPANCREATOGRAPHY);  Surgeon: Esha Howard MD;  Location: [address]);  Service: Endoscopy;  Laterality: N/A;    ERCP N/A 9/25/2020    Procedure: ERCP (ENDOSCOPIC RETROGRADE CHOLANGIOPANCREATOGRAPHY);  Surgeon: Esha Howard MD;  Location: [address]);  Service: Endoscopy;  Laterality: N/A;    ERCP N/A 11/25/2020    Procedure: ERCP (ENDOSCOPIC RETROGRADE  CHOLANGIOPANCREATOGRAPHY);  Surgeon: Esha Howard MD;  Location: [facility] ([address] [place] [address]);  Service: Endoscopy;  Laterality: N/A;  Covid-19 test 11/22/20 at [facility] - pg  2 day hold Eliquis, Dr Favian Colmenares - pg  PM prep    EYE SURGERY      HIP REPLACEMENT ARTHROPLASTY Right 2016    HYSTERECTOMY      REVISION OF SKIN POCKET FOR PACEMAKER N/A 1/21/2019    Procedure: REVISION-POCKET-PACEMAKER;  Surgeon: Asher Watts MD;  Location: [facility];  Service: Cardiology;  Laterality: N/A;  MODERATE SEDATION, sedation issues in the past    THYROIDECTOMY      TREATMENT OF CARDIAC ARRHYTHMIA N/A 3/18/2019    Procedure: CARDIOVERSION OR DEFIBRILLATION;  Surgeon: Asher Watts MD;  Location: [facility];  Service: Cardiology;  Laterality: N/A;  AF, JILL-cx if SR, DCCV, MAC, FAS, 3PREP    TREATMENT OF CARDIAC ARRHYTHMIA N/A 5/14/2019    Procedure: Cardioversion or Defibrillation;  Surgeon: Derick Vizcarra MD;  Location: [facility];  Service: Cardiology;  Laterality: N/A;  AF, JILL, DCCV, MAC, DM, 3PREP    TREATMENT OF CARDIAC ARRHYTHMIA N/A 6/21/2022    Procedure: Cardioversion or Defibrillation;  Surgeon: Favian Colmenares MD;  Location: [facility];  Service: Cardiology;  Laterality: N/A;  AF, JILL, DCCV, MAC, DM, 3 Prep *SJM PPM in situ*       Time Tracking:     PT Received On: 05/24/23  PT Start Time: 0939     PT Stop Time: 1023  PT Total Time (min): 44 min     Billable Minutes: Evaluation 20 and Therapeutic Activity 24 05/24/2023

## 2023-05-24 NOTE — NURSING
PT arrived to floor for skilled services with admitting diagnosis of acute kidney failure via stretcher. PT required max assistance from stretcher to bed. PT is AAOX4, incontinent of B/B. PT is on a regular diet. Skin assessment done: Left upper arm skin tear covered by foam dressing, left hand edema, facial bruising, and red nonblanchable buttocks. POC reviewed with patient. PT denies pain at this time and is educated to use call light and not get OOB w/o assistance.

## 2023-05-24 NOTE — PROGRESS NOTES
[facility]                                  Skilled Nursing Facility                   Progress Note     Patient Name: Gisselle Ortiz  YOB: 1933  MRN: [id number]  Room: [place] [address] [address] A     Admit Date: 5/23/2023   FRANCESCA:      Principal Problem: <principal problem not specified>    HPI obtained from patient interview and chart review     Chief Complaint:   Establish Care/ Initial Visit        HPI:   Gisselle Ortiz is a 89 y.o. female with PMH of GERD, gallstone pancreatitis, thyroid disease, HTN, HLD, SSS s/p PPM, CHF (EF 55%), and a fib on eliquis who presented to the ED for nausea and vomiting. Pt admitted to hospital medicine for intractable n/v and UTI. Pt has remained hemodynamically stable. She was started on 5-days of IV rocephin. Urine culture + klebsiella pneumoniae and e.coli. No leukocytosis but was hyperkalemic K 5.3; discontinued spironolactone. AST/ALT elevated on admission and downtrending. Abd US with hepatomegaly, stable prominence of CBD, and cholelithiasis without evidence of cholecystitis. AES recommended MRCP, which the patient unable to have 2/2 pacemaker. KUB with nonobstructive bowel gas pattern and significant stool burden, which is improving on bowel regimen. CTAP with gallbladder sludge vs stones and colonic wall thickening vs. stenosis/peristalsis. Pt with MARIO on CKD, which is improving with IVFs. Pt with new onset thrombocytopenia which is now improving. Hematology consulted and recommended started cyanocobalamin. PT/OT consulted and on re-evaluation are recommending SNF. All questions were answered. Patient acknowledged understanding of discharge instructions and feels safe to discharge. Patient was transferred to SNF on 5/23/23 in stable condition.    Patient will be treated at [facility] with PT and OT to improve functional status and ability to perform ADLs.     Interval History  All of  today's labs reviewed and are listed below.  24 hr vital sign ranges listed below.  Patient denies shortness of breath, abdominal discomfort, nausea, or vomiting.  Patient reports an adequate appetite.  Patient denies dysuria.  Patient reports having regular bowel movements.  Patient progessing with PT/OT. Continuing to follow and treat all acute and chronic conditions.    Past Medical History: Patient has a past medical history of A-fib, Anemia of chronic disease (02/02/2021), Anticoagulant long-term use, CHF (congestive heart failure), Chronic diastolic heart failure (02/02/2021), COVID-19 (01/07/2022), Gallstone pancreatitis, Hypertension, Pancreatic abscess (09/26/2020), Stage 3 chronic kidney disease (5/11/2016), and Thyroid disease.    Past Surgical History: Patient has a past surgical history that includes Cataract extraction; Cataract extraction w/  intraocular lens implant (Bilateral, 2004); Revision of skin pocket for pacemaker (N/A, 1/21/2019); Eye surgery; Hysterectomy; Hip replacement arthroplasty (Right, 2016); Treatment of cardiac arrhythmia (N/A, 3/18/2019); Thyroidectomy; Treatment of cardiac arrhythmia (N/A, 5/14/2019); ERCP (N/A, 9/14/2020); Endoscopic ultrasound of upper gastrointestinal tract (N/A, 9/14/2020); ERCP (N/A, 9/25/2020); ERCP (N/A, 11/25/2020); Endoscopic ultrasound of upper gastrointestinal tract (11/25/2020); and Treatment of cardiac arrhythmia (N/A, 6/21/2022).    Social History: Patient reports that she has quit smoking. She started smoking about 59 years ago. She has never used smokeless tobacco. She reports that she does not drink alcohol and does not use drugs.    Family History:  family history includes Heart attack in her maternal grandmother and mother; Heart disease in her maternal grandmother and mother; Heart failure in her maternal grandmother and mother; Hypertension in her maternal grandmother and mother; Stroke in her maternal grandmother.    Allergies: Patient is  allergic to ciprofloxacin.        Review of Systems   Constitutional:  Positive for malaise/fatigue. Negative for chills and fever.   HENT:  Negative for congestion, hearing loss and sore throat.    Eyes:  Negative for blurred vision and double vision.   Respiratory:  Negative for cough, sputum production, shortness of breath and wheezing.    Cardiovascular:  Negative for chest pain, palpitations and leg swelling.   Gastrointestinal:  Negative for abdominal pain, constipation, diarrhea, heartburn, nausea and vomiting.   Genitourinary:  Negative for dysuria and urgency.   Musculoskeletal:  Negative for back pain.   Skin:  Negative for rash.        + wound   Neurological:  Positive for weakness. Negative for dizziness and headaches.   Endo/Heme/Allergies:  Negative for polydipsia. Does not bruise/bleed easily.   Psychiatric/Behavioral:  Negative for depression and hallucinations. The patient is not nervous/anxious.        24 hour Vital Sign Range   Temp:  [97.4 °F (36.3 °C)-97.6 °F (36.4 °C)]   Pulse:  [60-63]   Resp:  [18]   BP: (125-139)/(56-62)   SpO2:  [94 %-97 %]       Physical Exam  Vitals and nursing note reviewed.   Constitutional:       General: She is not in acute distress.     Appearance: Normal appearance. She is not ill-appearing.   HENT:      Head: Normocephalic and atraumatic.      Nose: Nose normal. No congestion.      Mouth/Throat:      Mouth: Mucous membranes are moist.      Pharynx: Oropharynx is clear.   Eyes:      Extraocular Movements: Extraocular movements intact.      Conjunctiva/sclera: Conjunctivae normal.      Pupils: Pupils are equal, round, and reactive to light.   Cardiovascular:      Rate and Rhythm: Normal rate and regular rhythm.      Pulses: Normal pulses.      Heart sounds: Normal heart sounds. No murmur heard.  Pulmonary:      Effort: Pulmonary effort is normal. No respiratory distress.      Breath sounds: Normal breath sounds. No wheezing or rhonchi.   Abdominal:      General:  Bowel sounds are normal. There is no distension.      Palpations: Abdomen is soft. There is no mass.      Tenderness: There is no abdominal tenderness.   Musculoskeletal:         General: No swelling or tenderness.      Cervical back: Normal range of motion and neck supple. No tenderness.      Right lower leg: No edema.      Left lower leg: No edema.   Skin:     General: Skin is warm and dry.      Capillary Refill: Capillary refill takes less than 2 seconds.      Findings: No erythema or rash.   Neurological:      Mental Status: She is alert and oriented to person, place, and time. Mental status is at baseline.      Motor: Weakness present.   Psychiatric:         Mood and Affect: Mood normal.         Behavior: Behavior normal.         Thought Content: Thought content normal.         Judgment: Judgment normal.     Full skin assessment completed by this NP       Labs:  Recent Labs   Lab 05/21/23 0523 05/22/23  0841 05/23/23  0400   WBC 6.95 7.61 9.81   HGB 10.0* 10.2* 10.2*   HCT 32.5* 32.0* 32.5*   PLT 68* 70* 113*     Recent Labs   Lab 05/19/23  0555 05/19/23  1140 05/20/23  0308 05/21/23  0523 05/22/23  0841 05/22/23  1639 05/23/23  0400   *   < > 137 137 137  --  136   K 4.5   < > 4.7 4.8 5.3* 4.8 4.7      < > 111* 111* 111*  --  111*   CO2 23   < > 19* 21* 18*  --  19*   BUN 36*   < > 34* 33* 32*  --  29*   CREATININE 2.6*   < > 2.5* 2.6* 2.3*  --  2.1*   GLU 70   < > 57* 78 86  --  61*   CALCIUM 8.2*   < > 7.4* 8.2* 8.3*  --  8.1*   MG 1.8  --  1.6 1.8  --   --   --    PHOS 2.9  --  2.8 3.2  --   --   --     < > = values in this interval not displayed.     Recent Labs   Lab 05/21/23  0523 05/22/23  0841 05/23/23  0400   ALKPHOS 91 91 88   ALT 64* 76* 87*   AST 49* 77* 80*   ALBUMIN 2.0* 2.1* 2.1*   PROT 4.5* 5.2* 4.6*   BILITOT 0.6 0.5 0.4     No results for input(s): POCTGLUCOSE in the last 72 hours.    Meds Scheduled:   amiodarone  200 mg Oral Daily    apixaban  2.5 mg Oral BID    aspirin  81 mg  Oral Daily    atenoloL  25 mg Oral QHS    atenoloL  50 mg Oral Daily    cyanocobalamin  1,000 mcg Oral Daily    diclofenac sodium  2 g Topical (Top) QID    diltiaZEM  180 mg Oral Daily    folic acid  1 mg Oral Daily    Lactobacillus rhamnosus GG  1 capsule Oral Daily    levothyroxine  137 mcg Oral Before breakfast    mirtazapine  7.5 mg Oral QHS    multivitamin  1 tablet Oral Daily    pantoprazole  40 mg Oral Daily    polyethylene glycol  17 g Oral Daily    senna-docusate 8.6-50 mg  1 tablet Oral BID       PRN:   acetaminophen, acetaminophen, aluminum-magnesium hydroxide-simethicone, bismuth subsalicylate, calcium carbonate, hydrALAZINE, melatonin      Assessment and Plan:  Impaired functional mobility and endurance  - Chronic issue with acute worsening   - Continue with PT/OT for gait training and strengthening and restoration of ADL's   - Encourage mobility, OOB in chair, and early ambulation as appropriate  - Fall precautions   - Monitor for bowel and bladder dysfunction  - Monitor for and prevent skin breakdown and pressure ulcers  - Continue DVT prophylaxis with apixaban        Acute renal failure superimposed on stage 3 chronic kidney disease  - Creatinine  (baseline 1-1.3)  - Monitor BMP daily, replete electrolytes if necessary  - Renally adjust drugs to CrCl; avoid nephrotoxic drugs   - Monitor I/Os  - Renal ultrasound consistent with bilateral medical renal disease & simple renal cysts; no hydronephrosis     Left upper extremity swelling  - Afebrile no leukocytosis  - U/S with resolution of L cephalic vein thrombosis and no evidence of DVT  - Elevate extremity  - Continue to monitor     Malnutrition of mild degree  BMI of 19 or less in adult  Body mass index is 23.24 kg/m².      - Pt reports poor PO intake for many weeks despite encouragement & good    family support  - Start mirtazapine 7.5 mg nightly   - Nutrition consulted, appreciate recs:   -Regular with supplements  -Recommend MVI, probiotic  supplementation.         Thrombocytopenia  - Platelets 113 on admit to SNF  - no heparin products this admit  - no evidence of thrombocytopenia in past labs  - had severe transaminitis in 1/2022 during COVID infection - other elevations also noted - may be underlying liver dz as an association  - Hematology consulted given advancing skin changes and petechia, appreciate recs:  -- Agree with folic acid supplementation  -- Start cyanocobalamin 1,000 mcg PO daily  - Continue to monitor on daily labs        Transaminitis  -AST//177, tbili 0.9 on admission and downtrending.  -Patient denies abdominal pain.  -Repeat lipid panel wnl--discontinue statin  -Hepatitis panel negative  -Plan to f/u with PCP on when to restart statin  -Will need hepatology f/u OP     Constipation  -KUB with nonobstructive bowel gas pattern noting large amount of stool throughout the colon may reflect constipation  - Continue bowel regimen with miralax TID & senna BID     Anemia due to chronic kidney disease  Folate deficiency      -Monitor Hgb       -Transfuse for Hgb <7    Chronic diastolic heart failure   -Monitor strict Is&Os and daily weights.  Continue to stress to patient importance of self efficacy and  on diet for CHF.     Essential hypertension  -PRN  hydralazine   - Continue to monitor BP closely     Paroxysmal atrial fibrillation  Current use of long term anticoagulation  -Apixaban 2.5mg    Anticipate disposition:    Home with home health      Follow-up needed during SNF admission:       Follow-up needed after discharge from SNF:   - PCP within 1-2 weeks  - See appt scheduled below     Future Appointments   Date Time Provider Department Center   5/26/2023  1:40 PM Cherelle Bentley NP [facility] GASTRO [address] [facility]   5/31/2023  9:30 AM Kai Montez MD [facility]   6/2/2023  1:00 PM Roz Leon NP [facility] NEPHRO [address] [facility]   7/18/2023 10:00 AM HOME MONITOR DEVICE CHECK, [facility] ARRHPRO [address] [facility]   8/15/2023  9:00 AM  Sunil Naidu MD [facility] HEPAT [address]   10/31/2023  3:00 PM Kai Montez MD [facility]         I certify that SNF services are required to be given on an inpatient basis because Gisselle Ortiz needs for skilled nursing care and/or skilled rehabilitation are required on a daily basis and such services can only practically be provided in a skilled nursing facility setting and are for an ongoing condition for which she received inpatient care in the hospital.       Extended Visit:   Total time spent: 120 minutes  Non Face to Face Time: 80 minutes   Description of Time: counseling provided on clinical condition, therapies provided, plan of care, emotional support, coordinating patient care with other care team members, reviewing and interpreting labs and imaging, collaboration with physician, initiating new orders, chart review, and documentation. See interval hx.         Dianna Turner NP  Department of Hospital Medicine   [facility]     DOS: 5/24/2023       Patient note was created using MModal Dictation.  Any errors in syntax or even information may not have been identified and edited on initial review prior to signing this note.

## 2023-05-24 NOTE — PT/OT/SLP EVAL
"Occupational Therapy   Evaluation and Treatment    Name: Gisselle Ortiz  MRN: [id number]  Admit Date: 5/23/2023  Recent Surgeries: N/A     General Precautions: Standard, fall  Orthopedic Precautions:N/A   Braces: N/A    Recommendations:     Discharge Recommendations: home health OT  Level of Assistance Recommended: 24 hours significant assistance  Discharge Equipment Recommendations:  none (Pt reports having all needed equipment at home.)  Barriers to discharge:  Other (Comment) (increased assistance required for ADLs and mobility)    Assessment:     Gisselle Ortiz is a 89 y.o. female with a medical diagnosis of acute kidney failure, unspecified.  Pt demonstrated good participation but had fair tolerance of session due to hypotension.  While supine prior to activity, her BP measured 119/57.  After sitting/standing EOB to rosario pants, it dropped to 79/43.  She was immediately returned to supine, where it read 117/57.  She's performing below her functional baseline and requires assistance for ADLs and mobility.  Pt would continue to benefit from skilled OT services at SNF for maximal gains in functional independence and to ensure her safety upon returning home.  She presents with the following. Performance deficits affecting function: weakness, impaired endurance, impaired self care skills, impaired functional mobility, gait instability, impaired balance, decreased safety awareness, impaired cardiopulmonary response to activity, decreased lower extremity function, decreased ROM.      Rehab Potential is good    Activity Tolerance: Fair    Plan:     Patient to be seen 5 x/week to address the above listed problems via self-care/home management, therapeutic activities, therapeutic exercises  Plan of Care Expires: 06/22/23  Plan of Care Reviewed with: patient    Subjective     Chief Complaint: weakness, dizziness  Patient/Family Comments/goals: "I just don't feel good."    Occupational Profile:  Living Environment: " Pt lives alone in a [facility] with 1 MANUEL.  She has a walk-in shower with a built-in seat and a grab bar.  Pt reports falling twice in her bathroom a week ago.    Previous level of function: Independent with ADLs.  Mod I to ambulate with RW.  She reports gradual functional decline began ~3 weeks to one month ago.   Roles and Routines: mother; pt's  recently    Equipment Used at Home: walker, rolling, rollator, bedside commode, shower chair, wheelchair, grab bar (built-in shower seat)  Assistance upon Discharge: Pt's son and daughter alternate caring for her.      Pain/Comfort:  Pain Rating 1: 0/10  Pain Rating Post-Intervention 1: 0/10    Patients cultural, spiritual, Restorationist conflicts given the current situation: no    Objective:     Communicated with: nursing prior to session.  Patient found HOB elevated with Other (comments) (no active lines attached) upon OT entry to room.    Occupational Performance:     Bed Mobility:    Patient completed Rolling/Turning to Left with  stand by assistance  Patient completed Scooting/Bridging to EOB with stand by assistance; pt briefly bridged to adjust pants over her hips with SBA; pt scooted to HOB while in Trendelenburg with Mod A and cueing for hand placement on bed rails  Patient completed Supine to Sit with stand by assistance  Patient completed Sit to Supine with moderate assistance for BLE management due to dizziness and fatigue     Functional Mobility/Transfers:  Patient completed Sit <> Stand Transfer from EOB x 1 trial with minimum assistance with rolling walker   Functional Mobility: Not performed due to pt's hypotensive state.      Activities of Daily Living:  Grooming: stand by assistance with tray table setup to perform oral care while supine with HOB elevated  Lower Body Dressing: minimum assistance to rosario pants EOB with (A) to thread her LLE through pant leg while seated and for standing balance to pull them up over her hips.   Pt was unable to reach  non-skid socks while seated EOB.    Cognitive/Visual Perceptual:  Cognitive/Psychosocial Skills:     -       Oriented to: Person, Place, Time, and Situation   -       Follows Commands/attention:Follows multistep  commands  -       Communication: clear/fluent but with intermittent broken statements due to fatigue   -       Memory: pt said she has impaired memory but scored perfect 15 pts on BIMS    Physical Exam:  Dominant hand:    -       R-handed  Upper Extremity Range of Motion:     -       Right Upper Extremity: WFL  -       Left Upper Extremity: WFL  Upper Extremity Strength:    -       Right Upper Extremity: WFL  -       Left Upper Extremity: WFL   Strength:    -       Right Upper Extremity: WFL  -       Left Upper Extremity: WFL  Fine Motor Coordination:    -       Intact while performing oral care, but pt reports dropping objects recently.     AMPAC 6 Click ADL:  AMPAC Total Score: 16    Treatment & Education:  - Pt with noted R lateral lean and her head and neck slumped forward while seated EOB.     Pt edu on role of OT, POC, safety when performing self care tasks, benefit of performing EOB/OOB activity, and safety when performing functional transfers and mobility.  - White board updated  - Self care tasks completed-- as noted above      Patient left HOB elevated with call button in reach and nursing notified    GOALS:   Multidisciplinary Problems       Occupational Therapy Goals          Problem: Occupational Therapy    Goal Priority Disciplines Outcome Interventions   Occupational Therapy Goal     OT, PT/OT Ongoing, Progressing    Description: Goals to be met by: 6/22/2023     Patient will increase functional independence with ADLs by performing:    UE Dressing with Set-up Assistance.  LE Dressing with Stand-by Assistance using appropriate AE as needed.  Grooming while seated at sink with Set-up Assistance.  Toileting from 3-in-1 commode over toilet with Stand-by Assistance for hygiene and clothing  management.   Bathing from sitting/standing at sink with Minimal Assistance.  Supine to sit with Supervision.  Step transfer with Stand-by Assistance with RW.  Toilet transfer to 3-in-1 commode over toilet with Stand-by Assistance with RW.                         History:     Past Medical History:   Diagnosis Date    A-fib     Anemia of chronic disease 02/02/2021    Anticoagulant long-term use     CHF (congestive heart failure)     Chronic diastolic heart failure 02/02/2021    COVID-19 01/07/2022    Gallstone pancreatitis     Hypertension     Pancreatic abscess 09/26/2020    Stage 3 chronic kidney disease 5/11/2016    Thyroid disease          Past Surgical History:   Procedure Laterality Date    CATARACT EXTRACTION      CATARACT EXTRACTION W/  INTRAOCULAR LENS IMPLANT Bilateral 2004     IN [place]    ENDOSCOPIC ULTRASOUND OF UPPER GASTROINTESTINAL TRACT N/A 9/14/2020    Procedure: ULTRASOUND, UPPER GI TRACT, ENDOSCOPIC;  Surgeon: Esha Howard MD;  [facility]  Service: Endoscopy;  Laterality: N/A;    ENDOSCOPIC ULTRASOUND OF UPPER GASTROINTESTINAL TRACT  11/25/2020    Procedure: ULTRASOUND, UPPER GI TRACT, ENDOSCOPIC;  Surgeon: Esha Howard MD;  Location: [facility] (2ND FLR);  Service: Endoscopy;;    ERCP N/A 9/14/2020    Procedure: ERCP (ENDOSCOPIC RETROGRADE CHOLANGIOPANCREATOGRAPHY);  Surgeon: Esha Howard MD;  Location: [facility] (2ND FLR);  Service: Endoscopy;  Laterality: N/A;    ERCP N/A 9/25/2020    Procedure: ERCP (ENDOSCOPIC RETROGRADE CHOLANGIOPANCREATOGRAPHY);  Surgeon: Esha Howard MD;  Location: [facility] ENDO (2ND FLR);  Service: Endoscopy;  Laterality: N/A;    ERCP N/A 11/25/2020    Procedure: ERCP (ENDOSCOPIC RETROGRADE CHOLANGIOPANCREATOGRAPHY);  Surgeon: Esha Howard MD;  Location: [facility] ENDO (2ND FLR);  Service: Endoscopy;  Laterality: N/A;  Covid-19 test 11/22/20 at [place] - pg  2 day Dr Favian Palomares - pg  PM prep    EYE  SURGERY      HIP REPLACEMENT ARTHROPLASTY Right 2016    HYSTERECTOMY      REVISION OF SKIN POCKET FOR PACEMAKER N/A 1/21/2019    Procedure: REVISION-POCKET-PACEMAKER;  Surgeon: Asher Watts MD;  Location: [facility] EP LAB;  Service: Cardiology;  Laterality: N/A;  MODERATE SEDATION, sedation issues in the past    THYROIDECTOMY      TREATMENT OF CARDIAC ARRHYTHMIA N/A 3/18/2019    Procedure: CARDIOVERSION OR DEFIBRILLATION;  Surgeon: Asher Watts MD;  Location: [facility] EP LAB;  Service: Cardiology;  Laterality: N/A;  AF, JILL-cx if SR, DCCV, MAC, FAS, 3PREP    TREATMENT OF CARDIAC ARRHYTHMIA N/A 5/14/2019    Procedure: Cardioversion or Defibrillation;  Surgeon: Derick Vizcarra MD;  Location: [facility] EP LAB;  Service: Cardiology;  Laterality: N/A;  AF, JILL, DCCV, MAC, DM, 3PREP    TREATMENT OF CARDIAC ARRHYTHMIA N/A 6/21/2022    Procedure: Cardioversion or Defibrillation;  Surgeon: Favian Colmenares MD;  Location: [facility] EP LAB;  Service: Cardiology;  Laterality: N/A;  AF, JILL, DCCV, MAC, DM, 3 Prep *SJM PPM in situ*       Time Tracking:     OT Date of Treatment: 05/24/23  OT Start Time: 1317  OT Stop Time: 1400  OT Total Time (min): 43 min    Billable Minutes:Evaluation 10 min  Self Care/Home Management 16 min  Therapeutic Activity 17 min    5/24/2023

## 2023-05-24 NOTE — PLAN OF CARE
Problem: Occupational Therapy  Goal: Occupational Therapy Goal  Description: Goals to be met by: 6/22/2023     Patient will increase functional independence with ADLs by performing:    UE Dressing with Set-up Assistance.  LE Dressing with Stand-by Assistance using appropriate AE as needed.  Grooming while seated at sink with Set-up Assistance.  Toileting from 3-in-1 commode over toilet with Stand-by Assistance for hygiene and clothing management.   Bathing from sitting/standing at sink with Minimal Assistance.  Supine to sit with Supervision.  Step transfer with Stand-by Assistance with RW.  Toilet transfer to 3-in-1 commode over toilet with Stand-by Assistance with RW.    Outcome: Ongoing, Progressing     Pt evaluated and OT goals established.

## 2023-05-25 NOTE — CLINICAL REVIEW
Clinical Pharmacy Chart Review Note      Admit Date: 5/23/2023   LOS: 2 days       Gisselle Ortiz is a 89 y.o. female admitted to [facility] for PT/OT after hospitalization for impaired functional mobility and endurance.    Review of patient's allergies indicates:   Allergen Reactions    Ciprofloxacin Nausea And Vomiting     Patient Active Problem List    Diagnosis Date Noted    Left upper extremity swelling 05/21/2023    Lethargy 05/21/2023    Malnutrition of mild degree 05/19/2023    Palliative care encounter 05/19/2023    Body mass index (BMI) of 19 or less in adult 05/18/2023    Thrombocytopenia 05/18/2023    Electrolyte disturbance 05/18/2023    Acute renal failure superimposed on stage 3 chronic kidney disease 05/17/2023    Transaminitis 05/14/2023    Hyperkalemia 05/14/2023    Aortic atherosclerosis 01/30/2023    Constipation 01/10/2022    Intractable nausea and vomiting 01/07/2022    Impaired functional mobility and endurance 02/03/2021    Chronic diastolic heart failure 02/02/2021    Anemia due to chronic kidney disease 02/02/2021    Periprosthetic fracture around internal prosthetic right hip joint 02/01/2021    Acute cystitis with hematuria 09/26/2020    SSS (sick sinus syndrome) 01/17/2019    Long term current use of amiodarone 11/14/2017    Current use of long term anticoagulation 11/14/2017    Vitamin D deficiency 11/13/2017    Osteoarthritis of right hip 11/10/2016    Right bundle-branch block 08/12/2016    Insomnia 05/09/2016    Gastroesophageal reflux disease 11/09/2015    Paroxysmal atrial fibrillation 05/19/2015    Cardiac pacemaker in situ 05/19/2015    Essential hypertension 05/19/2015    Former smoker 05/19/2015    Acquired hypothyroidism 05/19/2015    High cholesterol 05/19/2015       Scheduled Meds:    amiodarone  200 mg Oral Daily    apixaban  2.5 mg Oral BID    aspirin  81 mg Oral Daily    atenoloL  25 mg Oral QHS    atenoloL  50 mg Oral Daily    balsam peru-castor oiL   Topical (Top) BID     cyanocobalamin  1,000 mcg Oral Daily    diclofenac sodium  2 g Topical (Top) QID    diltiaZEM  180 mg Oral Daily    folic acid  1 mg Oral Daily    Lactobacillus rhamnosus GG  1 capsule Oral Daily    levothyroxine  137 mcg Oral Before breakfast    mirtazapine  7.5 mg Oral QHS    multivitamin  1 tablet Oral Daily    pantoprazole  40 mg Oral Daily    polyethylene glycol  17 g Oral Daily    senna-docusate 8.6-50 mg  1 tablet Oral BID     Continuous Infusions:   PRN Meds: acetaminophen, acetaminophen, aluminum-magnesium hydroxide-simethicone, bismuth subsalicylate, calcium carbonate, hydrALAZINE, melatonin    OBJECTIVE:     Vital Signs (Last 24H)  Temp:  [97.9 °F (36.6 °C)-98 °F (36.7 °C)]   Pulse:  [60]   Resp:  [18]   BP: (101-119)/(54-57)   SpO2:  [95 %-97 %]     Laboratory:  CBC:   Recent Labs   Lab 05/21/23  0523 05/22/23  0841 05/23/23  0400   WBC 6.95 7.61 9.81   RBC 3.30* 3.31* 3.36*   HGB 10.0* 10.2* 10.2*   HCT 32.5* 32.0* 32.5*   PLT 68* 70* 113*   MCV 99* 97 97   MCH 30.3 30.8 30.4   MCHC 30.8* 31.9* 31.4*     BMP:   Recent Labs   Lab 05/20/23  0308 05/21/23  0523 05/22/23  0841 05/22/23  1639 05/23/23  0400 05/25/23  0515   GLU 57* 78 86  --  61* 87    137 137  --  136 139   K 4.7 4.8 5.3* 4.8 4.7 4.2   * 111* 111*  --  111* 110   CO2 19* 21* 18*  --  19* 21*   BUN 34* 33* 32*  --  29* 31*   CREATININE 2.5* 2.6* 2.3*  --  2.1* 2.2*   CALCIUM 7.4* 8.2* 8.3*  --  8.1* 8.0*   MG 1.6 1.8  --   --   --  1.7     CMP:   Recent Labs   Lab 05/21/23  0523 05/22/23  0841 05/22/23  1639 05/23/23  0400 05/25/23  0515   GLU 78 86  --  61* 87   CALCIUM 8.2* 8.3*  --  8.1* 8.0*   ALBUMIN 2.0* 2.1*  --  2.1*  --    PROT 4.5* 5.2*  --  4.6*  --     137  --  136 139   K 4.8 5.3* 4.8 4.7 4.2   CO2 21* 18*  --  19* 21*   * 111*  --  111* 110   BUN 33* 32*  --  29* 31*   CREATININE 2.6* 2.3*  --  2.1* 2.2*   ALKPHOS 91 91  --  88  --    ALT 64* 76*  --  87*  --    AST 49* 77*  --  80*  --    BILITOT 0.6  0.5  --  0.4  --      LFTs:   Recent Labs   Lab 05/21/23  0523 05/22/23  0841 05/23/23  0400   ALT 64* 76* 87*   AST 49* 77* 80*   ALKPHOS 91 91 88   BILITOT 0.6 0.5 0.4   PROT 4.5* 5.2* 4.6*   ALBUMIN 2.0* 2.1* 2.1*     Others:   Recent Labs   Lab 05/22/23  0841   TSH 1.099         ASSESSMENT/PLAN:     I have reviewed the medications in compliance with CMS Regulation F756 of the [place]. Based on information gathered, the following items may need to be addressed:    **Patient is taking mirtazapine (home med) recently started as outpatient for anorexia, malnutrition. A gradual dose reduction is not recommended at this time. Patient to follow-up with prescribing MD as outpatient.  Monitor: mental status for depression, suicide ideation, anxiety, serotonin syndrome, hyponatremia, dizziness, drowsiness, somnolence      Medications reviewed by PharmD, please re-consult if needed.      Flor Frey, Pharm. D.  Clinical Pharmacist  [facility]

## 2023-05-25 NOTE — PT/OT/SLP PROGRESS
"Occupational Therapy   Treatment    Name: Gisselle Ortiz  MRN: [id number]  Admit Date: 5/23/2023  Admitting Diagnosis:  <principal problem not specified>    General Precautions: Standard, fall   Orthopedic Precautions: N/A   Braces: N/A    Recommendations:     Discharge Recommendations:  home health OT  Level of Assistance Recommended at Discharge: 24 hours physical assistance for all ADL's and home management tasks  Discharge Equipment Recommendations: none  Barriers to discharge:  Other (Comment) (increased assitance needed for self care and mobility)    Assessment:     Gisselle Ortiz is a 89 y.o. female with a medical diagnosis of acute kidney failure, unspecified.  She presents with increased pain while supine in bed, head of bed elevated. Pt agreeable to participating in bed mobility and treatment focused on self care. However she continues to need max assist for rolling and total assist for donning/ doffing lower body clothes. Performance deficits affecting function are weakness, impaired endurance, impaired self care skills, impaired functional mobility, gait instability, impaired balance, decreased upper extremity function, decreased lower extremity function, decreased safety awareness, pain, impaired cardiopulmonary response to activity.     Rehab Potential is fair    Activity tolerance:  Fair    Plan:     Patient to be seen 5 x/week to address the above listed problems via self-care/home management, therapeutic activities, therapeutic exercises    Plan of Care Expires: 06/22/23  Plan of Care Reviewed with: patient    Subjective     Communicated with: nursing prior to session. Pt stated, " I think my time is up, I just hurt all over" .    Pain/Comfort:  Pain Rating 1:  (pt did not rate but stated that she hurt all over)  Pain Addressed 1: Reposition, Cessation of Activity, Nurse notified  Pain Rating Post-Intervention 1: 0/10    Patient's cultural, spiritual, Sabianism conflicts given the current " situation:  no    Objective:     Patient found supine in bed, head of bed elevated, B UE elevated with pillow with  (non active line) upon OT entry to room.     Bed Mobility:    Patient completed Rolling/Turning to Left with  maximal assistance  Patient completed Rolling/Turning to Right with maximal assistance     Functional Mobility/Transfers:  No completed today 2/2 to pain and fatigue    Activities of Daily Living:  Lower Body Dressing: total assistance rolling side to side to doff/ rosario brief  Pt needed max assist to complete hygiene following incontinent episode in brief.    [facility] 6 Click ADL: 16        Treatment & Education:  Pt educated on the importance of movement throughout the day, weight shifting in bed to prevent skin breakdown. OT applied barrier cream following dilcia hygiene to maintain good skin integrity.      Patient left supine with  nursing present    GOALS:   Multidisciplinary Problems       Occupational Therapy Goals          Problem: Occupational Therapy    Goal Priority Disciplines Outcome Interventions   Occupational Therapy Goal     OT, PT/OT Ongoing, Progressing    Description: Goals to be met by: 6/22/2023     Patient will increase functional independence with ADLs by performing:    UE Dressing with Set-up Assistance.  LE Dressing with Stand-by Assistance using appropriate AE as needed.  Grooming while seated at sink with Set-up Assistance.  Toileting from 3-in-1 commode over toilet with Stand-by Assistance for hygiene and clothing management.   Bathing from sitting/standing at sink with Minimal Assistance.  Supine to sit with Supervision.  Step transfer with Stand-by Assistance with RW.  Toilet transfer to 3-in-1 commode over toilet with Stand-by Assistance with RW.                         Time Tracking:     OT Date of Treatment: 05/25/23  OT Start Time: 1446    OT Stop Time: 1457  OT Total Time (min): 11 min    Billable Minutes:Self Care/Home Management 11    5/25/2023

## 2023-05-25 NOTE — PROGRESS NOTES
[facility] - Skilled Nursing  Wound Care    Patient Name:  Gisselle Ortiz   MRN:  [id number]  Date: 5/25/2023  Diagnosis: <principal problem not specified>    History:     Past Medical History:   Diagnosis Date    A-fib     Anemia of chronic disease 02/02/2021    Anticoagulant long-term use     CHF (congestive heart failure)     Chronic diastolic heart failure 02/02/2021    COVID-19 01/07/2022    Gallstone pancreatitis     Hypertension     Pancreatic abscess 09/26/2020    Stage 3 chronic kidney disease 5/11/2016    Thyroid disease        Social History     Socioeconomic History    Marital status:    Tobacco Use    Smoking status: Former     Types: Cigarettes     Start date: 5/19/1964    Smokeless tobacco: Never   Substance and Sexual Activity    Alcohol use: No    Drug use: No    Sexual activity: Not Currently     Partners: Male     Social Determinants of Health     Financial Resource Strain: Low Risk     Difficulty of Paying Living Expenses: Not hard at all   Food Insecurity: No Food Insecurity    Worried About Running Out of Food in the Last Year: Never true    Ran Out of Food in the Last Year: Never true   Transportation Needs: No Transportation Needs    Lack of Transportation (Medical): No    Lack of Transportation (Non-Medical): No   Physical Activity: Inactive    Days of Exercise per Week: 0 days    Minutes of Exercise per Session: 0 min   Stress: No Stress Concern Present    Feeling of Stress : Not at all   Social Connections: Moderately Isolated    Frequency of Communication with Friends and Family: More than three times a week    Frequency of Social Gatherings with Friends and Family: More than three times a week    Attends Mandaeism Services: 1 to 4 times per year    Active Member of Clubs or Organizations: No    Attends Club or Organization Meetings: Never    Marital Status:    Housing Stability: Unknown    Unable to Pay for Housing in the Last Year: No    Unstable Housing in the Last  Year: No       Precautions:     Allergies as of 05/23/2023 - Reviewed 05/23/2023   Allergen Reaction Noted    Ciprofloxacin Nausea And Vomiting 05/16/2019       [facility] Assessment Details/Treatment   Wound care consulted for sacral spine  Reports she fell on her tailbone and some distress when sitting.  The sacrum is red/blanches, partial thickness skin loss.  Discussed changing position frequently  Discussed wound care, verbalized understanding.    Plan:  Sacral spine- apply BPCO ointment to clean dry skin BID/prn cleansing  EHOB waffle overlay and chair cushion  Continue pressure prevention measures    Nursing to continue care, wound care will follow-up  Recommendations made to primary team for above plan per written report . Orders placed.        05/25/23 0815        Altered Skin Integrity 05/24/23 0546 Sacral spine Traumatic Intact skin with non-blanchable redness of localized area   Date First Assessed/Time First Assessed: 05/24/23 0546   Location: Sacral spine  Is this injury device related?: No  Primary Wound Type: (c) Traumatic  Description of Altered Skin Integrity: Intact skin with non-blanchable redness of localized area   Description of Altered Skin Integrity Partial thickness tissue loss. Shallow open ulcer with a red or pink wound bed, without slough. Intact or Open/Ruptured Serum-filled blister.   Dressing Appearance Open to air   Drainage Amount None   Drainage Characteristics/Odor No odor   Appearance Pink;Red;Dry   Tissue loss description Partial thickness   Periwound Area Intact;Dry;Pink   Wound Edges Open;Irregular   Wound Length (cm) 0.3 cm   Wound Width (cm) 0.3 cm   Wound Depth (cm) 0.1 cm   Wound Volume (cm^3) 0.009 cm^3   Wound Surface Area (cm^2) 0.09 cm^2   Care Cleansed with:;Soap and water;Applied:;Skin Barrier       05/25/2023

## 2023-05-25 NOTE — PT/OT/SLP PROGRESS
Physical Therapy      Patient Name:  Gisselle Ortiz   MRN:  [id number]    Patient not seen today secondary to  (Nsg reports that patient with low BP in supine position; hold treatment. Patient reports not feeling well in PM; treatment not rendered.). Will follow-up tomorrow per POC.    Blanca Wade, PT  5/25/2023

## 2023-05-25 NOTE — PLAN OF CARE
Problem: Occupational Therapy  Goal: Occupational Therapy Goal  Description: Goals to be met by: 6/22/2023     Patient will increase functional independence with ADLs by performing:    UE Dressing with Set-up Assistance.  LE Dressing with Stand-by Assistance using appropriate AE as needed.  Grooming while seated at sink with Set-up Assistance.  Toileting from 3-in-1 commode over toilet with Stand-by Assistance for hygiene and clothing management.   Bathing from sitting/standing at sink with Minimal Assistance.  Supine to sit with Supervision.  Step transfer with Stand-by Assistance with RW.  Toilet transfer to 3-in-1 commode over toilet with Stand-by Assistance with RW.    Outcome: Ongoing, Not Progressing

## 2023-05-26 NOTE — H&P
"[facility] Medicine  Skilled Nursing Facility   History and Physical Exam      Date of Service: 05/26/2023      Patient Name: Gisselle Ortiz  MRN: [id number]  Admission Date: 5/23/2023  Attending Physician: Rajiv Grajeda MD  Primary Care Provider: Kai Montez MD  Code Status: DNR (Do Not Resuscitate)      Principal problem:  Acute renal failure superimposed on stage 3 chronic kidney disease      Chief Complaint:   Chief Complaint   Patient presents with    Acute Renal Failure     Admitted to OS for rehabilitation following hospital stay for ARF on CKD          HPI:   "Gisselle Ortiz is a 89 y.o. female with PMH of GERD, gallstone pancreatitis, thyroid disease, HTN, HLD, SSS s/p PPM, CHF (EF 55%), and a fib on eliquis who presented to the ED for nausea and vomiting. Pt admitted to hospital medicine for intractable n/v and UTI. Pt has remained hemodynamically stable. She was started on 5-days of IV rocephin. Urine culture + klebsiella pneumoniae and e.coli. No leukocytosis but was hyperkalemic K 5.3; discontinued spironolactone. AST/ALT elevated on admission and downtrending. Abd US with hepatomegaly, stable prominence of CBD, and cholelithiasis without evidence of cholecystitis. AES recommended MRCP, which the patient unable to have 2/2 pacemaker. KUB with nonobstructive bowel gas pattern and significant stool burden, which is improving on bowel regimen. CTAP with gallbladder sludge vs stones and colonic wall thickening vs. stenosis/peristalsis. Pt with MARIO on CKD, which is improving with IVFs. Pt with new onset thrombocytopenia which is now improving. Hematology consulted and recommended started cyanocobalamin. PT/OT consulted and on re-evaluation are recommending SNF. All questions were answered. Patient acknowledged understanding of discharge instructions and feels safe to discharge. Patient was transferred to SNF on 5/23/23 in stable condition.     Patient will be treated at [facility] " "with PT and OT to improve functional status and ability to perform ADLs.      Interval History  All of today's labs reviewed and are listed below.  24 hr vital sign ranges listed below.  Patient denies shortness of breath, abdominal discomfort, nausea, or vomiting.  Patient reports an adequate appetite.  Patient denies dysuria.  Patient reports having regular bowel movements.  Patient progessing with PT/OT. Continuing to follow and treat all acute and chronic conditions." Per Dianna Turner NP on 5/24/23.     She says that she is doing okay today. Reports having some lightheadedness when she gets up with therapy. She says that she does not have an appetite currently, but is pushing herself to eat more. She denies any nausea or vomiting. She is having regular BMs. Her left hand is slightly swollen and bruised due to a prior IV stick. She is urinating well and denies any dysuria.     Patient admitted with skilled services with PT and OT to improve functional status and ability to perform ADLs.       Past Medical History:   Past Medical History:   Diagnosis Date    A-fib     Anemia of chronic disease 02/02/2021    Anticoagulant long-term use     CHF (congestive heart failure)     Chronic diastolic heart failure 02/02/2021    COVID-19 01/07/2022    Gallstone pancreatitis     Hypertension     Pancreatic abscess 09/26/2020    Stage 3 chronic kidney disease 5/11/2016    Thyroid disease        Past Surgical History:   Past Surgical History:   Procedure Laterality Date    CATARACT EXTRACTION      CATARACT EXTRACTION W/  INTRAOCULAR LENS IMPLANT Bilateral 2004     IN [place]    ENDOSCOPIC ULTRASOUND OF UPPER GASTROINTESTINAL TRACT N/A 9/14/2020    Procedure: ULTRASOUND, UPPER GI TRACT, ENDOSCOPIC;  Surgeon: Esha Howard MD;  Location: [facility] ([address] [place] [address]);  Service: Endoscopy;  Laterality: N/A;    ENDOSCOPIC ULTRASOUND OF UPPER GASTROINTESTINAL TRACT  11/25/2020    Procedure: ULTRASOUND, UPPER GI TRACT, " ENDOSCOPIC;  Surgeon: Esha Howard MD;  Location: [facility] ENDO (2ND FLR);  Service: Endoscopy;;    ERCP N/A 9/14/2020    Procedure: ERCP (ENDOSCOPIC RETROGRADE CHOLANGIOPANCREATOGRAPHY);  Surgeon: Esha Howard MD;  Location: [facility] ENDO (2ND FLR);  Service: Endoscopy;  Laterality: N/A;    ERCP N/A 9/25/2020    Procedure: ERCP (ENDOSCOPIC RETROGRADE CHOLANGIOPANCREATOGRAPHY);  Surgeon: Esha Howard MD;  Location: [facility] ENDO (2ND FLR);  Service: Endoscopy;  Laterality: N/A;    ERCP N/A 11/25/2020    Procedure: ERCP (ENDOSCOPIC RETROGRADE CHOLANGIOPANCREATOGRAPHY);  Surgeon: Esha Howard MD;  Location: [facility] ENDO (2ND FLR);  Service: Endoscopy;  Laterality: N/A;  Covid-19 test 11/22/20 at [facility]  2 day hold Eliquis, Dr Favian Colmenares - pg  PM prep    EYE SURGERY      HIP REPLACEMENT ARTHROPLASTY Right 2016    HYSTERECTOMY      REVISION OF SKIN POCKET FOR PACEMAKER N/A 1/21/2019    Procedure: REVISION-POCKET-PACEMAKER;  Surgeon: Asher Watts MD;  Location: [facility] EP LAB;  Service: Cardiology;  Laterality: N/A;  MODERATE SEDATION, sedation issues in the past    THYROIDECTOMY      TREATMENT OF CARDIAC ARRHYTHMIA N/A 3/18/2019    Procedure: CARDIOVERSION OR DEFIBRILLATION;  Surgeon: Asher Watts MD;  Location: [facility] EP LAB;  Service: Cardiology;  Laterality: N/A;  AF, JILL-cx if SR, DCCV, MAC, FAS, 3PREP    TREATMENT OF CARDIAC ARRHYTHMIA N/A 5/14/2019    Procedure: Cardioversion or Defibrillation;  Surgeon: Derick Vizcarra MD;  Location: [facility] EP LAB;  Service: Cardiology;  Laterality: N/A;  AF, JILL, DCCV, MAC, DM, 3PREP    TREATMENT OF CARDIAC ARRHYTHMIA N/A 6/21/2022    Procedure: Cardioversion or Defibrillation;  Surgeon: Favian Colmenares MD;  Location: [facility] EP LAB;  Service: Cardiology;  Laterality: N/A;  AF, JILL, DCCV, MAC, DM, 3 Prep *SJM PPM in situ*       Social History:   Tobacco Use    Smoking status: Former     Types: Cigarettes     Start date: 5/19/1964    Smokeless  tobacco: Never   Substance and Sexual Activity    Alcohol use: No    Drug use: No    Sexual activity: Not Currently     Partners: Male       Family History:   Family History       Problem Relation (Age of Onset)    Heart attack Mother, Maternal Grandmother    Heart disease Mother, Maternal Grandmother    Heart failure Mother, Maternal Grandmother    Hypertension Mother, Maternal Grandmother    Stroke Maternal Grandmother            No current facility-administered medications on file prior to encounter.     Current Outpatient Medications on File Prior to Encounter   Medication Sig    aluminum-magnesium hydroxide-simethicone (MAALOX) 200-200-20 mg/5 mL Susp Take by mouth every 8 (eight) hours as needed (vomiting).    amiodarone (PACERONE) 200 MG Tab Take 1 tablet (200 mg total) by mouth once daily.    aspirin (ECOTRIN) 81 MG EC tablet Take 1 tablet (81 mg total) by mouth once daily.    atenoloL (TENORMIN) 25 MG tablet TAKE 2 TABLETS EVERY MORNING AND 1 TABLET EVERY EVENING    bismuth subsalicylate (PEPTO BISMOL) 262 mg/15 mL suspension Take by mouth every 8 (eight) hours as needed (vomiting).    cyanocobalamin (VITAMIN B-12) 1000 MCG tablet Take 1 tablet (1,000 mcg total) by mouth once daily.    diclofenac sodium (VOLTAREN) 1 % Gel Apply topically to affected joints as needed for pain    diltiaZEM (TIAZAC) 180 MG Cs24 Take 1 capsule (180 mg total) by mouth once daily.    ELIQUIS 2.5 mg Tab TAKE 1 TABLET TWICE A DAY    folic acid (FOLVITE) 1 MG tablet Take 1 tablet (1 mg total) by mouth once daily.    hydrALAZINE (APRESOLINE) 25 MG tablet Take 1 tablet (25 mg total) by mouth 2 (two) times daily as needed (SBP >180).    Lactobacillus rhamnosus GG (CULTURELLE) 10 billion cell capsule Take 1 capsule by mouth once daily.    levothyroxine (SYNTHROID) 137 MCG Tab tablet Take 1 tablet (137 mcg total) by mouth before breakfast.    mirtazapine (REMERON) 7.5 MG Tab Take 1 tablet (7.5 mg total) by mouth every evening.     multivitamin Tab Take 1 tablet by mouth once daily.    pantoprazole (PROTONIX) 40 MG tablet Take 1 tablet (40 mg total) by mouth once daily.    polyethylene glycol (GLYCOLAX) 17 gram PwPk Take 17 g by mouth once daily.       Allergies:   Review of patient's allergies indicates:   Allergen Reactions    Ciprofloxacin Nausea And Vomiting       ROS:  Review of Systems   Constitutional:  Positive for appetite change. Negative for fever.   Respiratory:  Negative for cough and shortness of breath.    Cardiovascular:  Negative for chest pain and palpitations.   Gastrointestinal:  Negative for abdominal pain, nausea and vomiting.   Genitourinary:  Negative for difficulty urinating and dysuria.   Musculoskeletal:  Negative for arthralgias and back pain.   Neurological:  Positive for weakness and light-headedness. Negative for dizziness.   Psychiatric/Behavioral:  Negative for sleep disturbance. The patient is not nervous/anxious.        Objective:  Temp:  [98.2 °F (36.8 °C)]   Pulse:  [61-64]   Resp:  [18]   BP: (127-138)/(61-69)   SpO2:  [96 %]     Body mass index is 23.24 kg/m².      Physical Exam  Vitals and nursing note reviewed.   Constitutional:       General: She is not in acute distress.     Appearance: She is well-developed.   Cardiovascular:      Rate and Rhythm: Normal rate and regular rhythm.      Heart sounds: S1 normal and S2 normal. No murmur heard.  Pulmonary:      Effort: Pulmonary effort is normal. No respiratory distress.      Breath sounds: Normal breath sounds. No wheezing or rales.   Abdominal:      General: Bowel sounds are normal. There is no distension.      Palpations: Abdomen is soft.      Tenderness: There is no abdominal tenderness.   Musculoskeletal:         General: No tenderness.      Left hand: Swelling present.      Right lower leg: No edema.      Left lower leg: No edema.   Skin:     General: Skin is warm and dry.      Findings: Bruising present.   Neurological:      Mental Status: She is  alert and oriented to person, place, and time.   Psychiatric:         Mood and Affect: Mood normal.         Behavior: Behavior normal. Behavior is cooperative.         Thought Content: Thought content normal.       Significant Labs:   TSH:   Recent Labs   Lab 05/22/23  0841   TSH 1.099     CBC:  Recent Labs   Lab 05/26/23  0511   WBC 11.84   HGB 8.9*   HCT 28.8*      MCV 99*     BMP:  Recent Labs   Lab 05/25/23  0515 05/26/23  0511   GLU 87 113*    136   K 4.2 4.1    111*   CO2 21* 18*   BUN 31* 33*   CREATININE 2.2* 2.0*   CALCIUM 8.0* 7.8*   MG 1.7  --    PHOS 3.1  --        Significant Imaging: I have reviewed all pertinent imaging results/findings completed during prior hospitalization.      Assessment and Plan:  Active Diagnoses:    Diagnosis Date Noted POA    PRINCIPAL PROBLEM:  Acute renal failure superimposed on stage 3 chronic kidney disease [N17.9, N18.30] 05/17/2023 Yes    Thrombocytopenia [D69.6] 05/18/2023 Yes    Constipation [K59.00] 01/10/2022 Yes    Impaired functional mobility and endurance [Z74.09] 02/03/2021 Yes    Anemia due to chronic kidney disease [N18.9, D63.1] 02/02/2021 Yes    Chronic diastolic heart failure [I50.32] 02/02/2021 Yes    SSS (sick sinus syndrome) [I49.5] 01/17/2019 Yes     Chronic    Current use of long term anticoagulation [Z79.01] 11/14/2017 Not Applicable    Vitamin D deficiency [E55.9] 11/13/2017 Yes    Gastroesophageal reflux disease [K21.9] 11/09/2015 Yes    Acquired hypothyroidism [E03.9] 05/19/2015 Yes    Essential hypertension [I10] 05/19/2015 Yes     Chronic    High cholesterol [E78.00] 05/19/2015 Yes    Paroxysmal atrial fibrillation [I48.0] 05/19/2015 Yes     Chronic    Cardiac pacemaker in situ [Z95.0] 05/19/2015 Yes      Problems Resolved During this Admission:       Acute renal failure superimposed on stage 3 chronic kidney disease  - Creatinine  (baseline 1-1.3)  - Monitor BMP twice weekly  - Renally adjust drugs to CrCl  - avoid nephrotoxic  drugs   - Monitor I/Os  - Renal ultrasound consistent with bilateral medical renal disease & simple renal cysts; no hydronephrosis  - Creatinine peaked at 2.9 and is down to 2 most recently.     Impaired functional mobility and endurance  - Chronic issue with acute worsening   - Continue with PT/OT for gait training and strengthening and restoration of ADL's   - Encourage mobility, OOB in chair, and early ambulation as appropriate  - Fall precautions   - Monitor for bowel and bladder dysfunction  - Monitor for and prevent skin breakdown and pressure ulcers  - Continue DVT prophylaxis with apixaban       Left upper extremity swelling  - Afebrile no leukocytosis  - U/S with resolution of L cephalic vein thrombosis and no evidence of DVT  - Elevate extremity  - Continue to monitor     Malnutrition of mild degree  BMI of 19 or less in adult  Body mass index is 23.24 kg/m².      - Pt reports poor PO intake for many weeks despite encouragement and good family support  - Continue mirtazapine 7.5 mg nightly   - Nutrition consulted, appreciate recs:   - Regular diet with supplements  -  Continue MVI, probiotic supplementation.      Thrombocytopenia  - Platelets 113 on admit to SNF  - no heparin products this admit  - no evidence of thrombocytopenia in past labs  - Hematology consulted given advancing skin changes and petechia, appreciate recs:  -- Continue folic acid and cyanocobalamin 1,000 mcg daily  - Continue to monitor on twice weekly CBC      Abnormal liver enzymes  -AST//177, tbili 0.9 on admission and downtrending.  -Patient denies abdominal pain.  -Repeat lipid panel wnl--discontinuee statin  -Hepatitis panel negative  -Plan to f/u with PCP on when to restart statin  -Will need hepatology f/u OP     Constipation  -KUB with nonobstructive bowel gas pattern noting large amount of stool throughout the colon may reflect constipation  - Continue bowel regimen with miralax TID & senna BID     Anemia due to chronic  kidney disease  Folate deficiency  - Monitor Hgb   - Transfuse for Hgb <7    Essential hypertension  Hypotension   Chronic diastolic heart failure   -Monitor strict Is&Os and daily weights.   -PRN  hydralazine   - Continue to monitor BP closely  - Parameters added to BP meds as BP on lower side of normal.      Paroxysmal atrial fibrillation  - Continue amiodarone 200 mg daily.   - Continue Apixaban 2.5mg BID      Anticipated Disposition:  Home with home health      Future Appointments   Date Time Provider Department Center   5/26/2023  1:40 PM Cherelle Bentley NP [facility] GASTRO [facility]   5/31/2023  9:30 AM Kai Montez MD [facility]   6/2/2023  1:00 PM Roz Leon NP [facility] NEPHRO [facility]   7/18/2023 10:00 AM HOME MONITOR DEVICE CHECK, [facility] ARRHPRO [facility]   8/15/2023  9:00 AM Sunil Naidu MD [facility] HEPAT [facility]   10/31/2023  3:00 PM Kai Montez MD [facility]       I certify that SNF services are required to be given on an inpatient basis because Gisselle Ortiz needs for skilled nursing care and/or skilled rehabilitation are required on a daily basis and such services can only practically be provided in a skilled nursing facility setting and are for an ongoing condition for which she received inpatient care in the hospital.       Rajiv Grajeda MD  Department of [facility] Medicine   [facility] - Skilled Nursing

## 2023-05-26 NOTE — PROGRESS NOTES
Gastroenterology Clinic Consultation Note    Reason for Visit:  The encounter diagnosis was Decreased appetite.    PCP:   Kai Montez         Initial HPI   This is a 89 y.o. female presenting from referral from inpatient for decreased appetite. Presents with son. New to me. Came from SNF. Per son, patient was recently hospitalized for UTI and has been in SNF. UTI was from decreased water intake, patient with no desire to eat. Denies any abdominal pain. Controlled on Protonix. Depressed mood endorsed related to her decreased interest in eating. Son reports having to encourage her to eat and drink. Has palliative medicine consult, but has not scheduled visit yet.       ROS:  Review of Systems   Constitutional:  Positive for malaise/fatigue. Negative for chills, fever and weight loss.   HENT:  Negative for sore throat.    Eyes:  Negative for redness.   Gastrointestinal:  Negative for abdominal pain, blood in stool, constipation, diarrhea, heartburn, melena, nausea and vomiting.   Neurological:  Positive for weakness. Negative for dizziness.   Endo/Heme/Allergies:  Bruises/bleeds easily.   Psychiatric/Behavioral:  Positive for depression.       Medical History:  has a past medical history of A-fib, Anemia of chronic disease (02/02/2021), Anticoagulant long-term use, CHF (congestive heart failure), Chronic diastolic heart failure (02/02/2021), COVID-19 (01/07/2022), Gallstone pancreatitis, Hypertension, Pancreatic abscess (09/26/2020), Stage 3 chronic kidney disease (5/11/2016), and Thyroid disease.    Surgical History:  has a past surgical history that includes Cataract extraction; Cataract extraction w/  intraocular lens implant (Bilateral, 2004); Revision of skin pocket for pacemaker (N/A, 1/21/2019); Eye surgery; Hysterectomy; Hip replacement arthroplasty (Right, 2016); Treatment of cardiac arrhythmia (N/A, 3/18/2019); Thyroidectomy;  Treatment of cardiac arrhythmia (N/A, 5/14/2019); ERCP (N/A, 9/14/2020); Endoscopic ultrasound of upper gastrointestinal tract (N/A, 9/14/2020); ERCP (N/A, 9/25/2020); ERCP (N/A, 11/25/2020); Endoscopic ultrasound of upper gastrointestinal tract (11/25/2020); and Treatment of cardiac arrhythmia (N/A, 6/21/2022).    Family History: family history includes Heart attack in her maternal grandmother and mother; Heart disease in her maternal grandmother and mother; Heart failure in her maternal grandmother and mother; Hypertension in her maternal grandmother and mother; Stroke in her maternal grandmother..       Review of patient's allergies indicates:   Allergen Reactions    Ciprofloxacin Nausea And Vomiting       Current Facility-Administered Medications on File Prior to Visit   Medication Dose Route Frequency Provider Last Rate Last Admin    acetaminophen tablet 650 mg  650 mg Oral Q6H PRN Rajiv Grajeda MD        acetaminophen tablet 650 mg  650 mg Oral Q6H PRN Rajiv Grajeda MD        aluminum-magnesium hydroxide-simethicone 200-200-20 mg/5 mL suspension 30 mL  30 mL Oral Q8H PRN Rajiv Grajeda MD        amiodarone tablet 200 mg  200 mg Oral Daily Rajiv Grajeda MD   200 mg at 05/26/23 0927    apixaban tablet 2.5 mg  2.5 mg Oral BID Rajiv Grajeda MD   2.5 mg at 05/26/23 0927    aspirin EC tablet 81 mg  81 mg Oral Daily Rajiv Grajeda MD   81 mg at 05/26/23 0927    atenoloL tablet 25 mg  25 mg Oral QHS MATTHEW Carranza   25 mg at 05/25/23 2045    atenoloL tablet 50 mg  50 mg Oral Daily MATTHEW Carranza   50 mg at 05/26/23 0927    balsam peru-castor oiL Oint   Topical (Top) BID Nevaeh Walden NP   Given at 05/26/23 0930    bismuth subsalicylate 262 mg/15 mL suspension 30 mL  30 mL Oral Q8H PRN Rajiv Grajeda MD        calcium carbonate 200 mg calcium (500 mg) chewable tablet 500 mg  500 mg Oral BID PRN Rajiv Grajeda MD        cyanocobalamin tablet 1,000 mcg  1,000 mcg  Oral Daily Rajiv Grajeda MD   1,000 mcg at 23    [] dextrose 5 % and 0.45 % NaCl infusion   Intravenous Continuous MATTHEW Carranza 50 mL/hr at 23 1546 New Bag at 23 1546    diclofenac sodium 1 % gel 2 g  2 g Topical (Top) QID Rajiv Grajeda MD   2 g at 23 1224    diltiaZEM 24 hr capsule 180 mg  180 mg Oral Daily MATTHEW Carranza   180 mg at 23    folic acid tablet 1 mg  1 mg Oral Daily Rajiv Grajeda MD   1 mg at 23    hydrALAZINE tablet 25 mg  25 mg Oral BID PRN Rajiv Grajeda MD        Lactobacillus rhamnosus GG capsule 1 capsule  1 capsule Oral Daily Rajiv Grajeda MD   1 capsule at 23    levothyroxine tablet 137 mcg  137 mcg Oral Before breakfast Rajiv Grajeda MD   137 mcg at 23    melatonin tablet 6 mg  6 mg Oral Nightly PRN Rajiv Grajeda MD        mirtazapine tablet 7.5 mg  7.5 mg Oral QHS Rajiv Grajeda MD   7.5 mg at 23    multivitamin tablet  1 tablet Oral Daily Rajiv Grajeda MD   1 tablet at 23    pantoprazole EC tablet 40 mg  40 mg Oral Daily Rajiv Grajeda MD   40 mg at 23    polyethylene glycol packet 17 g  17 g Oral Daily Rajiv Grajeda MD   17 g at 23    senna-docusate 8.6-50 mg per tablet 1 tablet  1 tablet Oral BID Rajiv Grajeda MD   1 tablet at 23    [DISCONTINUED] dextrose 5 % and 0.45 % NaCl infusion   Intravenous Continuous MATTHEW Carranza         Current Outpatient Medications on File Prior to Visit   Medication Sig Dispense Refill    aluminum-magnesium hydroxide-simethicone (MAALOX) 200-200-20 mg/5 mL Susp Take by mouth every 8 (eight) hours as needed (vomiting).      amiodarone (PACERONE) 200 MG Tab Take 1 tablet (200 mg total) by mouth once daily. 90 tablet 3    aspirin (ECOTRIN) 81 MG EC tablet Take 1 tablet (81 mg total) by mouth once daily.  0    atenoloL (TENORMIN) 25 MG tablet TAKE  "2 TABLETS EVERY MORNING AND 1 TABLET EVERY EVENING 270 tablet 3    bismuth subsalicylate (PEPTO BISMOL) 262 mg/15 mL suspension Take by mouth every 8 (eight) hours as needed (vomiting).      cyanocobalamin (VITAMIN B-12) 1000 MCG tablet Take 1 tablet (1,000 mcg total) by mouth once daily.      diclofenac sodium (VOLTAREN) 1 % Gel Apply topically to affected joints as needed for pain 50 g 3    diltiaZEM (TIAZAC) 180 MG Cs24 Take 1 capsule (180 mg total) by mouth once daily. 90 capsule 3    ELIQUIS 2.5 mg Tab TAKE 1 TABLET TWICE A  tablet 3    folic acid (FOLVITE) 1 MG tablet Take 1 tablet (1 mg total) by mouth once daily.  0    hydrALAZINE (APRESOLINE) 25 MG tablet Take 1 tablet (25 mg total) by mouth 2 (two) times daily as needed (SBP >180). 90 tablet 11    Lactobacillus rhamnosus GG (CULTURELLE) 10 billion cell capsule Take 1 capsule by mouth once daily.      levothyroxine (SYNTHROID) 137 MCG Tab tablet Take 1 tablet (137 mcg total) by mouth before breakfast. 90 tablet 3    mirtazapine (REMERON) 7.5 MG Tab Take 1 tablet (7.5 mg total) by mouth every evening. 30 tablet 11    multivitamin Tab Take 1 tablet by mouth once daily.      pantoprazole (PROTONIX) 40 MG tablet Take 1 tablet (40 mg total) by mouth once daily. 30 tablet 11    polyethylene glycol (GLYCOLAX) 17 gram PwPk Take 17 g by mouth once daily.  0         Objective Findings:    Vital Signs:  /69   Pulse 98   Ht 5' 3" (1.6 m)   Wt 59.4 kg (131 lb)   LMP  (LMP Unknown)   BMI 23.21 kg/m²   Body mass index is 23.21 kg/m².    Physical Exam:  Physical Exam  Vitals reviewed.   Constitutional:       Appearance: She is ill-appearing.   HENT:      Mouth/Throat:      Mouth: Mucous membranes are dry.      Pharynx: Oropharynx is clear.   Eyes:      General: No scleral icterus.  Abdominal:      General: Bowel sounds are normal. There is no distension.      Palpations: Abdomen is soft. There is no mass.      Tenderness: There is no abdominal tenderness. "      Hernia: No hernia is present.   Musculoskeletal:         General: Signs of injury present.   Skin:     General: Skin is warm.      Capillary Refill: Capillary refill takes less than 2 seconds.      Coloration: Skin is pale. Skin is not jaundiced.      Findings: Bruising present.   Neurological:      Mental Status: She is alert and oriented to person, place, and time. Mental status is at baseline.   Psychiatric:         Mood and Affect: Affect is flat.         Behavior: Behavior is withdrawn.           Labs:  Lab Results   Component Value Date    WBC 11.84 05/26/2023    HGB 8.9 (L) 05/26/2023    HCT 28.8 (L) 05/26/2023     05/26/2023    CRP 26.9 (H) 01/07/2022    CHOL 95 (L) 05/19/2023    TRIG 103 05/19/2023    HDL 19 (L) 05/19/2023    ALKPHOS 88 05/23/2023    LIPASE 19 01/07/2022    ALT 87 (H) 05/23/2023    AST 80 (H) 05/23/2023     05/26/2023    K 4.1 05/26/2023     (H) 05/26/2023    CREATININE 2.0 (H) 05/26/2023    BUN 33 (H) 05/26/2023    CO2 18 (L) 05/26/2023    TSH 1.099 05/22/2023    INR 1.1 06/17/2022    HGBA1C 5.3 02/01/2021       Imaging reviewed: No pertinent imaging reviewed      Endoscopy reviewed: No pertinent endoscopy reviewed      Assessment:  1. Decreased appetite             Plan:  Prescribed Periactin 4mg TID to help stimulate appetite. Recommend consult appt with palliative medicine for further medication management of decreased appetite.  RTC PRN        Thank you for allowing me to participate in this patient's care.    Sincerely,     Cherelle Bentley NP  Gastroenterology Department  [facility]

## 2023-05-26 NOTE — PT/OT/SLP PROGRESS
"Physical Therapy Treatment    Patient Name:  Gisselle Ortiz   MRN:  [id number]  Admit Date: 5/23/2023  Admitting Diagnosis: Acute renal failure superimposed on stage 3 chronic kidney disease  Recent Surgeries: N/A    General Precautions: Standard, fall  Orthopedic Precautions: N/A  Braces: N/A    Recommendations:     Discharge Recommendations: home health PT  Level of Assistance Recommended at Discharge: 24 hours significant assistance  Discharge Equipment Recommendations: none (Patient reports having all equipment needed at home.)  Barriers to discharge:  (Increased assistance for mobility)    Assessment:     Gisselle Ortiz is a 89 y.o. female admitted with a medical diagnosis of Acute renal failure superimposed on stage 3 chronic kidney disease .     Pt reported being fatigued, but agreeable to therapy with encouragement. Pt continues to report dizziness with positional changes and noted decrease in BP. Pt continues to benefit from therapy to improve functional endurance, strength, and mobility.     Performance deficits affecting function: weakness, impaired endurance, impaired self care skills, impaired functional mobility, gait instability, impaired balance, decreased lower extremity function, decreased safety awareness, impaired cardiopulmonary response to activity.    Rehab Potential is fair    Activity Tolerance: Fair    Plan:     Patient to be seen 5 x/week to address the above listed problems via gait training, therapeutic activities, therapeutic exercises, neuromuscular re-education, wheelchair management/training    Plan of Care Expires: 06/23/23  Plan of Care Reviewed with: patient    Subjective     "I've been getting dizzy the last few weeks".     Pain/Comfort:  Pain Rating 1: 0/10  Pain Rating Post-Intervention 1: 0/10    Patient's cultural, spiritual, Holiness conflicts given the current situation:  no    Objective:     Communicated with nurse prior to session.  Patient found supine with " peripheral IV upon PT entry to room.     Therapeutic Activities and Exercises:   Seated BLE therex x10 reps:  ankle pumps  Patient educated on role of therapy, goals of session, and benefits of out of bed mobility.   Instructed on use of call button and importance of calling nursing staff for assistance with mobility   Questions/concerns addressed within PTA scope of practice    Vitals monitored: *dizziness reported   BP MAP HR   Supine 127/59 85 61   HOB elevated 125/59 85 60   Sitting EOB* error        Post therex* 105/64 80 63      After ~3 mins* 89/52 68 62   Supine  109/56 78 61     Functional Mobility:  Bed Mobility:   Scooting to EOB: moderate assistance  Scooting to HOB: moderate assistance and of 2 person with drawsheet  Supine to Sit: minimum assistance; HOB elevated  Increase time to complete   Assisted with trunk management  Sit to Supine: moderate assistance  Assisted with LE management  Transfers:   Sit <> Stand Transfer: minimum assistance with rolling walker   Gait:  Pt took~2 lateral steps to HOB with minimum assistance and rolling walker.  Limited by increase in dizziness  Balance:   Static/Dynamic sitting EOB balance: SBA-CGA with UE support  Tolerated ~3 mins  Kyphosis posture with downward gaze when fatigued      AM-PAC 6 CLICK MOBILITY  15    Patient left HOB elevated with  nurse notified and OT present.    GOALS:   Multidisciplinary Problems       Physical Therapy Goals          Problem: Physical Therapy    Goal Priority Disciplines Outcome Goal Variances Interventions   Physical Therapy Goal     PT, PT/OT Ongoing, Progressing     Description: Goals to be met by: 23     Patient will increase functional independence with mobility by performin. Supine to sit with Supervision  2. Sit to supine with Contact Guard Assistance  3. Rolling to Left and Right with Supervision  4. Sit to stand transfer with Stand-by Assistance  5. Bed to chair transfer with Stand-by Assistance using Rolling  Walker  6. Gait  x 50 feet with Contact Guard Assistance using Rolling Walker  7. Wheelchair propulsion x 50 feet with Modified [place] using bilateral upper extremities  8. Ascend/Descend 4 inch curb step with Minimal Assistance using Rolling Walker                         Time Tracking:     PT Received On: 05/26/23  PT Start Time: 1122  PT Stop Time: 1134  PT Total Time (min): 12 min    Billable Minutes: Therapeutic Activity 12    Treatment Type: Treatment  PT/PTA: PTA     Number of PTA visits since last PT visit: 1 05/26/2023

## 2023-05-26 NOTE — PT/OT/SLP PROGRESS
"Occupational Therapy   Treatment     Name: Gisselle Ortiz  MRN: [id number]  Admit Date: 5/23/2023  Admitting Diagnosis:  Acute renal failure superimposed on stage 3 chronic kidney disease    General Precautions: Standard, fall   Orthopedic Precautions: N/A   Braces: N/A    Recommendations:     Discharge Recommendations:  home health OT  Level of Assistance Recommended at Discharge: 24 hours light assistance for ADL's and homemaking tasks  Discharge Equipment Recommendations: none  Barriers to discharge:  Other (Comment) (increased assistance needed for self care and functional mobility)    Assessment:     Gisselle Ortiz is a 89 y.o. female with a medical diagnosis of Acute renal failure superimposed on stage 3 chronic kidney disease.  She presents with physical therapy sitting edge of bed upon Ot's arrival. BP cuff on L UE. OT treatment session focused on edema noted in L hand and relaxation.   Performance deficits affecting function are weakness, impaired endurance, impaired self care skills, impaired functional mobility, gait instability, impaired balance, decreased upper extremity function, decreased lower extremity function, decreased safety awareness, pain, decreased ROM, impaired coordination, impaired fine motor, impaired skin, edema.     Rehab Potential is fair    Activity tolerance:  Poor    Plan:     Patient to be seen 5 x/week to address the above listed problems via self-care/home management, therapeutic activities, therapeutic exercises    Plan of Care Expires: 06/22/23  Plan of Care Reviewed with: patient    Subjective     Communicated with: physical therapy prior to session. Pt stated, "  .    Pain/Comfort:  Pain Rating 1: 0/10 (pt did not rate pain but discomfort from edema in left hand)  Location - Side 1: Left  Location 1: hand  Pain Addressed 1: Reposition  Pain Rating Post-Intervention 1: 0/10    Patient's cultural, spiritual, Hindu conflicts given the current " situation:  no    Objective:     Patient found sitting edge of bed with peripheral IV and physical therapy upon OT entry to room.    Bed Mobility:    Patient completed Scooting/Bridging with total assistance and 2 persons       Activities of Daily Living:  Pt politely declined    [facility] 6 Click ADL: 16    OT Exercises: PROM Pt completed gentle joint rotations while supine in bed to improve edema and ROM for increased (I) with self care. She completed Mcps, wrist, and elbow 5x in both directions x 3 sets .    Pt also received L UE light and deep tactile input for ~ 10 min while positioned supine in bed for edema and relaxation. Pt tolerated well    Treatment & Education:  Pt completed B LE dorsiflexion prolonged stretches x 10 for 5 sec holds, while supine in bed, to decrease muscle tightness to improve functional mobility.     Patient left supine in bed with  call light within reach, nurse notified    GOALS:   Multidisciplinary Problems       Occupational Therapy Goals          Problem: Occupational Therapy    Goal Priority Disciplines Outcome Interventions   Occupational Therapy Goal     OT, PT/OT Ongoing, Not Progressing    Description: Goals to be met by: 6/22/2023     Patient will increase functional independence with ADLs by performing:    UE Dressing with Set-up Assistance.  LE Dressing with Stand-by Assistance using appropriate AE as needed.  Grooming while seated at sink with Set-up Assistance.  Toileting from 3-in-1 commode over toilet with Stand-by Assistance for hygiene and clothing management.   Bathing from sitting/standing at sink with Minimal Assistance.  Supine to sit with Supervision.  Step transfer with Stand-by Assistance with RW.  Toilet transfer to 3-in-1 commode over toilet with Stand-by Assistance with RW.                         Time Tracking:     OT Date of Treatment: 05/26/23  OT Start Time: 1134    OT Stop Time: 1203  OT Total Time (min): 29 min    Billable Minutes:Therapeutic Activity  29    5/26/2023

## 2023-05-26 NOTE — PROGRESS NOTES
[facility]                                  Skilled Nursing Facility                   Progress Note     Patient Name: Gisselle Ortiz  YOB: 1933  MRN: [id number]  Room: [place] [address] [address] A     Admit Date: 5/23/2023   FRANCESCA:      Principal Problem: <principal problem not specified>    HPI obtained from patient interview and chart review     Chief Complaint:   Hypotension      HPI:   Gisselle Ortiz is a 89 y.o. female with PMH of GERD, gallstone pancreatitis, thyroid disease, HTN, HLD, SSS s/p PPM, CHF (EF 55%), and a fib on eliquis who presented to the ED for nausea and vomiting. Pt admitted to hospital medicine for intractable n/v and UTI. Pt has remained hemodynamically stable. She was started on 5-days of IV rocephin. Urine culture + klebsiella pneumoniae and e.coli. No leukocytosis but was hyperkalemic K 5.3; discontinued spironolactone. AST/ALT elevated on admission and downtrending. Abd US with hepatomegaly, stable prominence of CBD, and cholelithiasis without evidence of cholecystitis. AES recommended MRCP, which the patient unable to have 2/2 pacemaker. KUB with nonobstructive bowel gas pattern and significant stool burden, which is improving on bowel regimen. CTAP with gallbladder sludge vs stones and colonic wall thickening vs. stenosis/peristalsis. Pt with MARIO on CKD, which is improving with IVFs. Pt with new onset thrombocytopenia which is now improving. Hematology consulted and recommended started cyanocobalamin. PT/OT consulted and on re-evaluation are recommending SNF. All questions were answered. Patient acknowledged understanding of discharge instructions and feels safe to discharge. Patient was transferred to SNF on 5/23/23 in stable condition.    Patient will be treated at [facility] with PT and OT to improve functional status and ability to perform ADLs.     Interval History  All of today's labs  reviewed and are listed below.  24 hr vital sign ranges listed below. /54 this AM, c/o weakness unable to do PT. Did OT in bed. Son at bedside says she has been like this since hospitalization. Parameters added to BP meds. Eating 25% of meals, give 1L D5 1/2 NS @50ml/hr for hypotension/MARIO. Patient denies shortness of breath, abdominal discomfort, nausea, or vomiting.  Patient denies dysuria.  Patient reports having regular bowel movements. Continuing to follow and treat all acute and chronic conditions.    Past Medical History: Patient has a past medical history of A-fib, Anemia of chronic disease (02/02/2021), Anticoagulant long-term use, CHF (congestive heart failure), Chronic diastolic heart failure (02/02/2021), COVID-19 (01/07/2022), Gallstone pancreatitis, Hypertension, Pancreatic abscess (09/26/2020), Stage 3 chronic kidney disease (5/11/2016), and Thyroid disease.    Past Surgical History: Patient has a past surgical history that includes Cataract extraction; Cataract extraction w/  intraocular lens implant (Bilateral, 2004); Revision of skin pocket for pacemaker (N/A, 1/21/2019); Eye surgery; Hysterectomy; Hip replacement arthroplasty (Right, 2016); Treatment of cardiac arrhythmia (N/A, 3/18/2019); Thyroidectomy; Treatment of cardiac arrhythmia (N/A, 5/14/2019); ERCP (N/A, 9/14/2020); Endoscopic ultrasound of upper gastrointestinal tract (N/A, 9/14/2020); ERCP (N/A, 9/25/2020); ERCP (N/A, 11/25/2020); Endoscopic ultrasound of upper gastrointestinal tract (11/25/2020); and Treatment of cardiac arrhythmia (N/A, 6/21/2022).    Social History: Patient reports that she has quit smoking. She started smoking about 59 years ago. She has never used smokeless tobacco. She reports that she does not drink alcohol and does not use drugs.    Family History:  family history includes Heart attack in her maternal grandmother and mother; Heart disease in her maternal grandmother and mother; Heart failure in her  maternal grandmother and mother; Hypertension in her maternal grandmother and mother; Stroke in her maternal grandmother.    Allergies: Patient is allergic to ciprofloxacin.        Review of Systems   Constitutional:  Positive for malaise/fatigue. Negative for chills and fever.   HENT:  Negative for congestion, hearing loss and sore throat.    Eyes:  Negative for blurred vision and double vision.   Respiratory:  Negative for cough, sputum production, shortness of breath and wheezing.    Cardiovascular:  Negative for chest pain, palpitations and leg swelling.   Gastrointestinal:  Negative for abdominal pain, constipation, diarrhea, heartburn, nausea and vomiting.   Genitourinary:  Negative for dysuria and urgency.   Musculoskeletal:  Negative for back pain.   Skin:  Negative for rash.        + wound   Neurological:  Positive for weakness. Negative for dizziness and headaches.   Endo/Heme/Allergies:  Negative for polydipsia. Does not bruise/bleed easily.   Psychiatric/Behavioral:  Negative for depression and hallucinations. The patient is not nervous/anxious.        24 hour Vital Sign Range   Temp:  [97.9 °F (36.6 °C)-98 °F (36.7 °C)]   Pulse:  [60-64]   Resp:  [18]   BP: (101-137)/(54-61)   SpO2:  [95 %-97 %]       Physical Exam  Vitals and nursing note reviewed.   Constitutional:       General: She is not in acute distress.     Appearance: Normal appearance. She is not ill-appearing.   HENT:      Head: Normocephalic and atraumatic.      Nose: Nose normal. No congestion.      Mouth/Throat:      Mouth: Mucous membranes are moist.      Pharynx: Oropharynx is clear.   Eyes:      Extraocular Movements: Extraocular movements intact.      Conjunctiva/sclera: Conjunctivae normal.      Pupils: Pupils are equal, round, and reactive to light.   Cardiovascular:      Rate and Rhythm: Normal rate and regular rhythm.      Pulses: Normal pulses.      Heart sounds: Normal heart sounds. No murmur heard.  Pulmonary:      Effort:  Pulmonary effort is normal. No respiratory distress.      Breath sounds: Normal breath sounds. No wheezing or rhonchi.   Abdominal:      General: Bowel sounds are normal. There is no distension.      Palpations: Abdomen is soft. There is no mass.      Tenderness: There is no abdominal tenderness.   Musculoskeletal:         General: No swelling or tenderness.      Cervical back: Normal range of motion and neck supple. No tenderness.      Right lower leg: No edema.      Left lower leg: No edema.   Skin:     General: Skin is warm and dry.      Capillary Refill: Capillary refill takes less than 2 seconds.      Findings: No erythema or rash.   Neurological:      Mental Status: She is alert and oriented to person, place, and time. Mental status is at baseline.      Motor: Weakness present.   Psychiatric:         Mood and Affect: Mood normal.         Behavior: Behavior normal.         Thought Content: Thought content normal.         Judgment: Judgment normal.     Full skin assessment completed by this NP       Labs:  Recent Labs   Lab 05/21/23 0523 05/22/23  0841 05/23/23  0400   WBC 6.95 7.61 9.81   HGB 10.0* 10.2* 10.2*   HCT 32.5* 32.0* 32.5*   PLT 68* 70* 113*       Recent Labs   Lab 05/20/23  0308 05/21/23  0523 05/22/23  0841 05/22/23  1639 05/23/23  0400 05/25/23  0515    137 137  --  136 139   K 4.7 4.8 5.3* 4.8 4.7 4.2   * 111* 111*  --  111* 110   CO2 19* 21* 18*  --  19* 21*   BUN 34* 33* 32*  --  29* 31*   CREATININE 2.5* 2.6* 2.3*  --  2.1* 2.2*   GLU 57* 78 86  --  61* 87   CALCIUM 7.4* 8.2* 8.3*  --  8.1* 8.0*   MG 1.6 1.8  --   --   --  1.7   PHOS 2.8 3.2  --   --   --  3.1       Recent Labs   Lab 05/21/23  0523 05/22/23  0841 05/23/23  0400   ALKPHOS 91 91 88   ALT 64* 76* 87*   AST 49* 77* 80*   ALBUMIN 2.0* 2.1* 2.1*   PROT 4.5* 5.2* 4.6*   BILITOT 0.6 0.5 0.4       No results for input(s): POCTGLUCOSE in the last 72 hours.    Meds Scheduled:   amiodarone  200 mg Oral Daily    apixaban  2.5  mg Oral BID    aspirin  81 mg Oral Daily    atenoloL  25 mg Oral QHS    atenoloL  50 mg Oral Daily    balsam peru-castor oiL   Topical (Top) BID    cyanocobalamin  1,000 mcg Oral Daily    diclofenac sodium  2 g Topical (Top) QID    diltiaZEM  180 mg Oral Daily    folic acid  1 mg Oral Daily    Lactobacillus rhamnosus GG  1 capsule Oral Daily    levothyroxine  137 mcg Oral Before breakfast    mirtazapine  7.5 mg Oral QHS    multivitamin  1 tablet Oral Daily    pantoprazole  40 mg Oral Daily    polyethylene glycol  17 g Oral Daily    senna-docusate 8.6-50 mg  1 tablet Oral BID       PRN:   acetaminophen, acetaminophen, aluminum-magnesium hydroxide-simethicone, bismuth subsalicylate, calcium carbonate, hydrALAZINE, melatonin      Assessment and Plan:  Impaired functional mobility and endurance  - Chronic issue with acute worsening   - Continue with PT/OT for gait training and strengthening and restoration of ADL's   - Encourage mobility, OOB in chair, and early ambulation as appropriate  - Fall precautions   - Monitor for bowel and bladder dysfunction  - Monitor for and prevent skin breakdown and pressure ulcers  - Continue DVT prophylaxis with apixaban        Acute renal failure superimposed on stage 3 chronic kidney disease  - Creatinine  (baseline 1-1.3)  - Monitor BMP daily, replete electrolytes if necessary  - Renally adjust drugs to CrCl; avoid nephrotoxic drugs   - Monitor I/Os  - Renal ultrasound consistent with bilateral medical renal disease & simple renal cysts; no hydronephrosis  5/25/2023  D5 1.2 NS @50ml/hr x 1L     Left upper extremity swelling  - Afebrile no leukocytosis  - U/S with resolution of L cephalic vein thrombosis and no evidence of DVT  - Elevate extremity  - Continue to monitor     Malnutrition of mild degree  BMI of 19 or less in adult  Body mass index is 23.24 kg/m².      - Pt reports poor PO intake for many weeks despite encouragement & good    family support  - Start mirtazapine 7.5 mg  nightly   - Nutrition consulted, appreciate recs:   -Regular with supplements  -Recommend MVI, probiotic supplementation.      Thrombocytopenia  - Platelets 113 on admit to SNF  - no heparin products this admit  - no evidence of thrombocytopenia in past labs  - had severe transaminitis in 1/2022 during COVID infection - other elevations also noted - may be underlying liver dz as an association  - Hematology consulted given advancing skin changes and petechia, appreciate recs:  -- Agree with folic acid supplementation  -- Start cyanocobalamin 1,000 mcg PO daily  - Continue to monitor on daily labs        Transaminitis  -AST//177, tbili 0.9 on admission and downtrending.  -Patient denies abdominal pain.  -Repeat lipid panel wnl--discontinue statin  -Hepatitis panel negative  -Plan to f/u with PCP on when to restart statin  -Will need hepatology f/u OP     Constipation  -KUB with nonobstructive bowel gas pattern noting large amount of stool throughout the colon may reflect constipation  - Continue bowel regimen with miralax TID & senna BID     Anemia due to chronic kidney disease  Folate deficiency      -Monitor Hgb       -Transfuse for Hgb <7    Chronic diastolic heart failure   -Monitor strict Is&Os and daily weights.  Continue to stress to patient importance of self efficacy and  on diet for CHF.     Essential hypertension  Hypotension  -PRN  hydralazine   - Continue to monitor BP closely  -parameters added to BP meds     Paroxysmal atrial fibrillation  Current use of long term anticoagulation  -Apixaban 2.5mg    Anticipate disposition:    Home with home health      Follow-up needed during SNF admission:       Follow-up needed after discharge from SNF:   - PCP within 1-2 weeks  - See appt scheduled below     Future Appointments   Date Time Provider Department Center   5/26/2023  1:40 PM Cherelle Bentley NP [facility] GASTRO [address]   5/31/2023  9:30 AM Kai Montez MD [facility]   6/2/2023   1:00 PM Roz Leon NP [facility] NEPHRO [address]   7/18/2023 10:00 AM HOME MONITOR DEVICE CHECK, [facility] ARRH[facility] [address]   8/15/2023  9:00 AM Sunil Naidu MD [facility] HEPAT [address]   10/31/2023  3:00 PM Kai Montez MD [facility]         I certify that SNF services are required to be given on an inpatient basis because Gisselle Ortiz needs for skilled nursing care and/or skilled rehabilitation are required on a daily basis and such services can only practically be provided in a skilled nursing facility setting and are for an ongoing condition for which she received inpatient care in the hospital.       Extended Visit:   >45 min  Description of Time: counseling provided on clinical condition, therapies provided, plan of care, emotional support, coordinating patient care with other care team members, reviewing and interpreting labs and imaging, collaboration with physician, initiating new orders, chart review, and documentation. See interval hx.         MATTHEW SALAZAR  Department of Hospital Medicine   [facility]     DOS: 5/25/2023       Patient note was created using MModal Dictation.  Any errors in syntax or even information may not have been identified and edited on initial review prior to signing this note.

## 2023-05-27 NOTE — PT/OT/SLP PROGRESS
"Occupational Therapy   Treatment    Name: Gisselle Ortiz  MRN: [id number]  Admit Date: 5/23/2023  Admitting Diagnosis:  Acute renal failure superimposed on stage 3 chronic kidney disease    General Precautions: Standard, fall   Orthopedic Precautions: N/A   Braces: N/A    Recommendations:     Discharge Recommendations:  home health OT  Level of Assistance Recommended at Discharge: 24 hours light assistance for ADL's and homemaking tasks  Discharge Equipment Recommendations: none (pt reports having all necessary equipment)  Barriers to discharge:  Other (Comment) (increased assistance required for ADLs and functional mobility)    Assessment:     Gisselle Ortiz is a 89 y.o. female with a medical diagnosis of Acute renal failure superimposed on stage 3 chronic kidney disease.   Pt demonstrated improved activity tolerance this date, but she continues to require assistance to perform self-care tasks and mobility.  She would continue to benefit from skilled OT services at SNF to maximize her gains in functional independence.  She presents with the following.  Performance deficits affecting function are weakness, impaired endurance, impaired balance, impaired self care skills, impaired functional mobility, gait instability, edema, pain, decreased safety awareness, decreased lower extremity function, impaired cardiopulmonary response to activity.     Rehab Potential is good    Activity tolerance:  Fair    Plan:     Patient to be seen 5 x/week to address the above listed problems via self-care/home management, therapeutic activities, therapeutic exercises    Plan of Care Expires: 06/22/23  Plan of Care Reviewed with: patient    Subjective   "I'm not feeling good."  Communicated with: nursing prior to session.    Pain/Comfort:  Pain Rating 1: other (see comments) (not rated)  Location - Orientation 1: generalized  Location 1: coccyx (and LUE)  Pain Addressed 1: Distraction, Reposition  Pain Rating Post-Intervention " "1: other (see comments) (not rated; "only when I sit on my tail bone is it a 10")    Patient's cultural, spiritual, Denominational conflicts given the current situation:  no    Objective:     Patient found reclined up in bedside chair with Other (comments) (no active lines attached) upon OT entry to room.    Bed Mobility:    N/A due to pt sitting in bedside chair at beginning and end of session    Functional Mobility/Transfers:  Patient completed Bedside Chair <> Wheelchair <. Bedside Chair Transfers using Stand Pivot technique with moderate assistance with hand-held assist and cueing for hand and foot placement    Activities of Daily Living:  Grooming: supervision to perform oral care while seated at sink    [facility] 6 Click ADL: 16    OT Exercises: UE ergometer performed for 11 min on moderate resistance to increase functional endurance and strength in order increase independence when performing self care tasks, functional ambulation, W/C propulsion, and functional standing activities .  One rest break required to monitor pt's BP.      Treatment & Education:  - Pt's BP measured as follows:     - 123/60 while reclined in bedside chair prior to activity    -  119/57 while seated at sink after brushing her teeth  -    119/56 while seated in w/c after performing 5 min and 30 sec on UE ergometer    Pt edu on role of OT, POC, safety when performing self care tasks, benefit of performing OOB activity, and safety when performing functional transfers and mobility.    - Self care tasks completed-- as noted above      Patient left reclined up in bedside chair with call button in reach    GOALS:   Multidisciplinary Problems       Occupational Therapy Goals          Problem: Occupational Therapy    Goal Priority Disciplines Outcome Interventions   Occupational Therapy Goal     OT, PT/OT Ongoing, Not Progressing    Description: Goals to be met by: 6/22/2023     Patient will increase functional independence with ADLs by " performing:    UE Dressing with Set-up Assistance.  LE Dressing with Stand-by Assistance using appropriate AE as needed.  Grooming while seated at sink with Set-up Assistance.  Toileting from 3-in-1 commode over toilet with Stand-by Assistance for hygiene and clothing management.   Bathing from sitting/standing at sink with Minimal Assistance.  Supine to sit with Supervision.  Step transfer with Stand-by Assistance with RW.  Toilet transfer to 3-in-1 commode over toilet with Stand-by Assistance with RW.                         Time Tracking:     OT Date of Treatment: 05/27/23  OT Start Time: 1057    OT Stop Time: 1130  OT Total Time (min): 33 min    Billable Minutes:Therapeutic Activity 20 min  Therapeutic Exercise 13 min    5/27/2023

## 2023-05-27 NOTE — PLAN OF CARE
Problem: Adult Inpatient Plan of Care  Goal: Absence of Hospital-Acquired Illness or Injury  Outcome: Ongoing, Progressing     Problem: Adult Inpatient Plan of Care  Goal: Optimal Comfort and Wellbeing  Outcome: Ongoing, Progressing     Problem: Adult Inpatient Plan of Care  Goal: Readiness for Transition of Care  Outcome: Ongoing, Progressing     Problem: Fluid and Electrolyte Imbalance (Acute Kidney Injury/Impairment)  Goal: Fluid and Electrolyte Balance  Outcome: Ongoing, Progressing     Problem: Renal Function Impairment (Acute Kidney Injury/Impairment)  Goal: Effective Renal Function  Outcome: Ongoing, Progressing     Problem: Fall Injury Risk  Goal: Absence of Fall and Fall-Related Injury  Outcome: Ongoing, Progressing

## 2023-05-27 NOTE — PLAN OF CARE
Continue regular diet to encourage PO intake, trial boost plus daily, recommend Vit D per home med list, RD following  Goals: PO to improve to 75% of EEN with ONS by next RD follow up  Nutrition Goal Status: new  Communication of RD Recs: other (comment) (POC)     Assessment and Plan     Endocrine  Malnutrition of mild degree  Malnutrition Type:  Context: acute illness or injury  Level: mild     Related to (etiology):   Taste changes, lack of interest in food     Malnutrition Characteristic Summary:  Energy Intake (Malnutrition): less than or equal to 50% for greater than or equal to 5 days  Muscle Mass (Malnutrition): moderate depletion        Interventions/Recommendations (treatment strategy):  Continue regular diet to encourage PO intake, trial boost plus daily, recommend Vit D per home med list, RD following     Nutrition Diagnosis Status:   Continues

## 2023-05-27 NOTE — CONSULTS
[facility] - Skilled Nursing  Adult Nutrition  Consult Note    SUMMARY   Recommendations  Continue regular diet to encourage PO intake, trial boost plus daily, recommend Vit D per home med list, RD following  Goals: PO to improve to 75% of EEN with ONS by next RD follow up  Nutrition Goal Status: new  Communication of RD Recs: other (comment) (POC)    Assessment and Plan    Endocrine  Malnutrition of mild degree  Malnutrition Type:  Context: acute illness or injury  Level: mild    Related to (etiology):   Taste changes, lack of interest in food    Malnutrition Characteristic Summary:  Energy Intake (Malnutrition): less than or equal to 50% for greater than or equal to 5 days  Muscle Mass (Malnutrition): moderate depletion      Interventions/Recommendations (treatment strategy):  Continue regular diet to encourage PO intake, trial boost plus daily, recommend Vit D per home med list, RD following    Nutrition Diagnosis Status:   Continues         Malnutrition Assessment 5/26  Malnutrition Context: acute illness or injury  Malnutrition Level: mild  Skin (Micronutrient): pallor, bruised, thinned  Eyes (Micronutrient): conjunctiva dull  Teeth (Micronutrient): broken dentition (no upper teeth, wont wear dentures)  Tongue (Micronutrient): magenta  Neck/Chest (Micronutrient): muscle wasting  Musculoskeletal/Lower Extremities: muscle wasting   Micronutrient Evaluation Summary: suspected deficiency  Micronutrient Evaluation Comments: folate, protein,   Energy Intake (Malnutrition): less than or equal to 50% for greater than or equal to 5 days  Muscle Mass (Malnutrition): moderate depletion   Orbital Region (Subcutaneous Fat Loss): severe depletion  Upper Arm Region (Subcutaneous Fat Loss): well nourished  Thoracic and Lumbar Region: well nourished   [place] Region (Muscle Loss): severe depletion  Clavicle and Acromion Bone Region (Muscle Loss): moderate depletion  Dorsal Hand (Muscle Loss): severe depletion  Anterior Thigh  "Region (Muscle Loss): moderate depletion                 Reason for Assessment    Reason For Assessment: consult  Diagnosis:  (acute kidney failure superimposed on CKD 3)  Relevant Medical History: CHF, HTN, HLD, Anemia, thyroid disease, pancreatic abcess, pacemaker, SSS, AFIB,  Interdisciplinary Rounds: did not attend  General Information Comments: Patient lives alone and family members take turns sitting with her. she has had recent poor PO intake, she eats two meals per day, does not report any special diet at home but family does meal prep and food shopping. She initially refuses boost but then agrees to try boost plus again, she did not like boost breeze. she has reddened buttocks, takes Centrum silver vitamins at home. NFPE completed. She has upper dentures but does not wear, denies need for soft diet.  Nutrition Discharge Planning: DC on regular no added salt diet.    Nutrition/Diet History    Patient Reported Diet/Restrictions/Preferences: general  Typical Food/Fluid Intake: two meals  Spiritual, Cultural Beliefs, Advent Practices, Values that Affect Care: no  Vitamin/Mineral/Herbal Supplements: centrum silver, Vit D  Food Allergies: NKFA  Factors Affecting Nutritional Intake: altered taste, decreased appetite    Anthropometrics    Temp: 97.9 °F (36.6 °C)  Height Method: Stated  Height: 5' 3" (160 cm)  Height (inches): 63 in  Weight Method: Bed Scale  Weight: 59.4 kg (130 lb 15.3 oz)  Weight (lb): 130.95 lb  Ideal Body Weight (IBW), Female: 115 lb  % Ideal Body Weight, Female (lb): 113.87 %  BMI (Calculated): 23.2  BMI Grade: 18.5-24.9 - normal  Usual Body Weight (UBW), k kg  Weight Change Amount:  (there is a weight descrepany on chart review with wt of 110 # if that were correct pt would have lost 15% but that weight is not reflected here)  % Usual Body Weight: 102.63  % Weight Change From Usual Weight: 2.41 %       Lab/Procedures/Meds    Pertinent Labs Reviewed: reviewed  Pertinent Labs Comments: " Hg 8.9, Hct 28.8, Cl 111, BUN 33, Ca 7.8, glucose 113, Cr 2.0  Pertinent Medications Reviewed: reviewed  Pertinent Medications Comments: amiodarone, apixaban, Vit B12, folic acid, levotlhyroxine, lactobacullis GG, MVI, mirtazapine, polyethylene glycol, pantoprazole, senna-docusate    Estimated/Assessed Needs    Weight Used For Calorie Calculations: 59.4 kg (130 lb 15.3 oz)  Energy Calorie Requirements (kcal): 1285  Energy Need Method: Chester-St Jeor (x 1.3(PAL))  Protein Requirements: 71g  Weight Used For Protein Calculations: 59.4 kg (130 lb 15.3 oz) (x 1.2g/kg)  Fluid Requirements (mL): 1285 or per MD  Estimated Fluid Requirement Method: RDA Method  RDA Method (mL): 1285  CHO Requirement: -      Nutrition Prescription Ordered    Current Diet Order: Regular  Nutrition Order Comments: < 50% , today intake was much better as reported by patient, she loved the fish at lunch .  Oral Nutrition Supplement: boost plus daily    Evaluation of Received Nutrient/Fluid Intake    I/O: no data  Energy Calories Required: not meeting needs  Protein Required: not meeting needs  Fluid Required: meeting needs  Comments: BM + 5/26  Tolerance: tolerating  % Intake of Estimated Energy Needs: 25 - 50 %  % Meal Intake: 0 - 25 %    Nutrition Risk    Level of Risk/Frequency of Follow-up: high (two times per week)       Monitor and Evaluation    Food and Nutrient Intake: food and beverage intake  Food and Nutrient Adminstration: diet order  Physical Activity and Function: nutrition-related ADLs and IADLs  Anthropometric Measurements: weight change  Biochemical Data, Medical Tests and Procedures: glucose/endocrine profile, gastrointestinal profile, electrolyte and renal panel  Nutrition-Focused Physical Findings: skin, overall appearance       Nutrition Follow-Up    RD Follow-up?: Yes

## 2023-05-27 NOTE — ASSESSMENT & PLAN NOTE
Malnutrition Type:  Context: acute illness or injury  Level: mild    Related to (etiology):   Taste changes, lack of interest in food    Malnutrition Characteristic Summary:  Energy Intake (Malnutrition): less than or equal to 50% for greater than or equal to 5 days  Muscle Mass (Malnutrition): moderate depletion      Interventions/Recommendations (treatment strategy):  Continue regular diet to encourage PO intake, trial boost plus daily, recommend Vit D per home med list, RD following    Nutrition Diagnosis Status:   Continues

## 2023-05-28 NOTE — PLAN OF CARE
Problem: Adult Inpatient Plan of Care  Goal: Patient-Specific Goal (Individualized)  Outcome: Ongoing, Progressing     Problem: Adult Inpatient Plan of Care  Goal: Optimal Comfort and Wellbeing  Outcome: Ongoing, Progressing     Problem: Adult Inpatient Plan of Care  Goal: Absence of Hospital-Acquired Illness or Injury  Outcome: Ongoing, Progressing     Problem: Adult Inpatient Plan of Care  Goal: Readiness for Transition of Care  Outcome: Ongoing, Progressing     Problem: Fluid and Electrolyte Imbalance (Acute Kidney Injury/Impairment)  Goal: Fluid and Electrolyte Balance  Outcome: Ongoing, Progressing

## 2023-05-28 NOTE — PROGRESS NOTES
[facility]                                  Skilled Nursing Facility                   Progress Note     Patient Name: Gisselle Ortiz  YOB: 1933  MRN: [id number]  Room: [place] [address] [address] A     Admit Date: 5/23/2023   FRANCESCA:      Principal Problem: Acute renal failure superimposed on stage 3 chronic kidney disease    HPI obtained from patient interview and chart review     Chief Complaint:   Hypotension      HPI:   Gisselle Ortiz is a 89 y.o. female with PMH of GERD, gallstone pancreatitis, thyroid disease, HTN, HLD, SSS s/p PPM, CHF (EF 55%), and a fib on eliquis who presented to the ED for nausea and vomiting. Pt admitted to hospital medicine for intractable n/v and UTI. Pt has remained hemodynamically stable. She was started on 5-days of IV rocephin. Urine culture + klebsiella pneumoniae and e.coli. No leukocytosis but was hyperkalemic K 5.3; discontinued spironolactone. AST/ALT elevated on admission and downtrending. Abd US with hepatomegaly, stable prominence of CBD, and cholelithiasis without evidence of cholecystitis. AES recommended MRCP, which the patient unable to have 2/2 pacemaker. KUB with nonobstructive bowel gas pattern and significant stool burden, which is improving on bowel regimen. CTAP with gallbladder sludge vs stones and colonic wall thickening vs. stenosis/peristalsis. Pt with MARIO on CKD, which is improving with IVFs. Pt with new onset thrombocytopenia which is now improving. Hematology consulted and recommended started cyanocobalamin. PT/OT consulted and on re-evaluation are recommending SNF. All questions were answered. Patient acknowledged understanding of discharge instructions and feels safe to discharge. Patient was transferred to SNF on 5/23/23 in stable condition.    Patient will be treated at [facility] with PT and OT to improve functional status and ability to perform ADLs.     Interval  History  Chart reviewed.  24 hr vital sign ranges listed below. BP 1106/48  this AM, c/o weakness. BP meds held. Patient denies Dizziness, shortness of breath, abdominal discomfort, nausea, or vomiting.  Patient denies dysuria.  Patient reports having regular bowel movements. Continuing to follow and treat all acute and chronic conditions.    Past Medical History: Patient has a past medical history of A-fib, Anemia of chronic disease (02/02/2021), Anticoagulant long-term use, CHF (congestive heart failure), Chronic diastolic heart failure (02/02/2021), COVID-19 (01/07/2022), Gallstone pancreatitis, Hypertension, Pancreatic abscess (09/26/2020), Stage 3 chronic kidney disease (5/11/2016), and Thyroid disease.    Past Surgical History: Patient has a past surgical history that includes Cataract extraction; Cataract extraction w/  intraocular lens implant (Bilateral, 2004); Revision of skin pocket for pacemaker (N/A, 1/21/2019); Eye surgery; Hysterectomy; Hip replacement arthroplasty (Right, 2016); Treatment of cardiac arrhythmia (N/A, 3/18/2019); Thyroidectomy; Treatment of cardiac arrhythmia (N/A, 5/14/2019); ERCP (N/A, 9/14/2020); Endoscopic ultrasound of upper gastrointestinal tract (N/A, 9/14/2020); ERCP (N/A, 9/25/2020); ERCP (N/A, 11/25/2020); Endoscopic ultrasound of upper gastrointestinal tract (11/25/2020); and Treatment of cardiac arrhythmia (N/A, 6/21/2022).    Social History: Patient reports that she has quit smoking. Her smoking use included cigarettes. She started smoking about 59 years ago. She has never used smokeless tobacco. She reports that she does not drink alcohol and does not use drugs.    Family History:  family history includes Heart attack in her maternal grandmother and mother; Heart disease in her maternal grandmother and mother; Heart failure in her maternal grandmother and mother; Hypertension in her maternal grandmother and mother; Stroke in her maternal grandmother.    Allergies: Patient  is allergic to ciprofloxacin.        Review of Systems   Constitutional:  Positive for malaise/fatigue. Negative for chills and fever.   HENT:  Negative for congestion, hearing loss and sore throat.    Eyes:  Negative for blurred vision and double vision.   Respiratory:  Negative for cough, sputum production, shortness of breath and wheezing.    Cardiovascular:  Negative for chest pain, palpitations and leg swelling.   Gastrointestinal:  Negative for abdominal pain, constipation, diarrhea, heartburn, nausea and vomiting.   Genitourinary:  Negative for dysuria and urgency.   Musculoskeletal:  Negative for back pain.   Skin:  Negative for rash.        + wound   Neurological:  Positive for weakness. Negative for dizziness and headaches.   Endo/Heme/Allergies:  Negative for polydipsia. Does not bruise/bleed easily.   Psychiatric/Behavioral:  Negative for depression and hallucinations. The patient is not nervous/anxious.        24 hour Vital Sign Range   Temp:  [97.9 °F (36.6 °C)-98.2 °F (36.8 °C)]   Pulse:  [60-63]   Resp:  [18]   BP: (106-124)/(48-56)   SpO2:  [95 %-97 %]       Physical Exam  Vitals and nursing note reviewed.   Constitutional:       General: She is not in acute distress.     Appearance: Normal appearance. She is not ill-appearing.   HENT:      Head: Normocephalic and atraumatic.      Nose: Nose normal. No congestion.      Mouth/Throat:      Mouth: Mucous membranes are moist.      Pharynx: Oropharynx is clear.   Eyes:      Extraocular Movements: Extraocular movements intact.      Conjunctiva/sclera: Conjunctivae normal.      Pupils: Pupils are equal, round, and reactive to light.   Cardiovascular:      Rate and Rhythm: Normal rate and regular rhythm.      Pulses: Normal pulses.      Heart sounds: Normal heart sounds. No murmur heard.  Pulmonary:      Effort: Pulmonary effort is normal. No respiratory distress.      Breath sounds: Normal breath sounds. No wheezing or rhonchi.   Abdominal:      General:  Bowel sounds are normal. There is no distension.      Palpations: Abdomen is soft. There is no mass.      Tenderness: There is no abdominal tenderness.   Musculoskeletal:         General: No swelling or tenderness.      Cervical back: Normal range of motion and neck supple. No tenderness.      Right lower leg: No edema.      Left lower leg: No edema.   Skin:     General: Skin is warm and dry.      Capillary Refill: Capillary refill takes less than 2 seconds.      Findings: No erythema or rash.   Neurological:      Mental Status: She is alert and oriented to person, place, and time. Mental status is at baseline.      Motor: Weakness present.   Psychiatric:         Mood and Affect: Mood normal.         Behavior: Behavior normal.         Thought Content: Thought content normal.         Judgment: Judgment normal.     Full skin assessment completed by this NP       Labs:  Recent Labs   Lab 05/22/23 0841 05/23/23  0400 05/26/23  0511   WBC 7.61 9.81 11.84   HGB 10.2* 10.2* 8.9*   HCT 32.0* 32.5* 28.8*   PLT 70* 113* 199       Recent Labs   Lab 05/21/23 0523 05/22/23  0841 05/23/23  0400 05/25/23  0515 05/26/23  0511      < > 136 139 136   K 4.8   < > 4.7 4.2 4.1   *   < > 111* 110 111*   CO2 21*   < > 19* 21* 18*   BUN 33*   < > 29* 31* 33*   CREATININE 2.6*   < > 2.1* 2.2* 2.0*   GLU 78   < > 61* 87 113*   CALCIUM 8.2*   < > 8.1* 8.0* 7.8*   MG 1.8  --   --  1.7  --    PHOS 3.2  --   --  3.1  --     < > = values in this interval not displayed.       Recent Labs   Lab 05/21/23 0523 05/22/23  0841 05/23/23  0400   ALKPHOS 91 91 88   ALT 64* 76* 87*   AST 49* 77* 80*   ALBUMIN 2.0* 2.1* 2.1*   PROT 4.5* 5.2* 4.6*   BILITOT 0.6 0.5 0.4       No results for input(s): POCTGLUCOSE in the last 72 hours.    Meds Scheduled:   amiodarone  200 mg Oral Daily    apixaban  2.5 mg Oral BID    aspirin  81 mg Oral Daily    atenoloL  25 mg Oral QHS    atenoloL  50 mg Oral Daily    balsam peru-castor oiL   Topical (Top) BID     cyanocobalamin  1,000 mcg Oral Daily    diclofenac sodium  2 g Topical (Top) QID    diltiaZEM  180 mg Oral Daily    folic acid  1 mg Oral Daily    Lactobacillus rhamnosus GG  1 capsule Oral Daily    levothyroxine  137 mcg Oral Before breakfast    mirtazapine  7.5 mg Oral QHS    multivitamin  1 tablet Oral Daily    pantoprazole  40 mg Oral Daily    polyethylene glycol  17 g Oral Daily    senna-docusate 8.6-50 mg  1 tablet Oral BID       PRN:   acetaminophen, acetaminophen, aluminum-magnesium hydroxide-simethicone, bismuth subsalicylate, calcium carbonate, hydrALAZINE, melatonin      Assessment and Plan:  Impaired functional mobility and endurance  - Chronic issue with acute worsening   - Continue with PT/OT for gait training and strengthening and restoration of ADL's   - Encourage mobility, OOB in chair, and early ambulation as appropriate  - Fall precautions   - Monitor for bowel and bladder dysfunction  - Monitor for and prevent skin breakdown and pressure ulcers  - Continue DVT prophylaxis with apixaban        Acute renal failure superimposed on stage 3 chronic kidney disease  - Creatinine  (baseline 1-1.3)  - Monitor BMP daily, replete electrolytes if necessary  - Renally adjust drugs to CrCl; avoid nephrotoxic drugs   - Monitor I/Os  - Renal ultrasound consistent with bilateral medical renal disease & simple renal cysts; no hydronephrosis  5/27/2023  D5 1.2 NS @50ml/hr x 1L     Left upper extremity swelling  - Afebrile no leukocytosis  - U/S with resolution of L cephalic vein thrombosis and no evidence of DVT  - Elevate extremity  - Continue to monitor     Malnutrition of mild degree  BMI of 19 or less in adult  Body mass index is 23.2 kg/m².      - Pt reports poor PO intake for many weeks despite encouragement & good    family support  - Start mirtazapine 7.5 mg nightly   - Nutrition consulted, appreciate recs:   -Regular with supplements  -Recommend MVI, probiotic supplementation.      Thrombocytopenia  -  Platelets 113 on admit to SNF  - no heparin products this admit  - no evidence of thrombocytopenia in past labs  - had severe transaminitis in 1/2022 during COVID infection - other elevations also noted - may be underlying liver dz as an association  - Hematology consulted given advancing skin changes and petechia, appreciate recs:  -- Agree with folic acid supplementation  -- Start cyanocobalamin 1,000 mcg PO daily  - Continue to monitor on daily labs        Transaminitis  -AST//177, tbili 0.9 on admission and downtrending.  -Patient denies abdominal pain.  -Repeat lipid panel wnl--discontinue statin  -Hepatitis panel negative  -Plan to f/u with PCP on when to restart statin  -Will need hepatology f/u OP     Constipation  -KUB with nonobstructive bowel gas pattern noting large amount of stool throughout the colon may reflect constipation  - Continue bowel regimen with miralax TID & senna BID     Anemia due to chronic kidney disease  Folate deficiency      -Monitor Hgb       -Transfuse for Hgb <7    Chronic diastolic heart failure   -Monitor strict Is&Os and daily weights.  Continue to stress to patient importance of self efficacy and  on diet for CHF.     Essential hypertension  Hypotension  -PRN  hydralazine   - Continue to monitor BP closely  -parameters added to BP meds     Paroxysmal atrial fibrillation  Current use of long term anticoagulation  -Apixaban 2.5mg    Anticipate disposition:    Home with home health      Follow-up needed during SNF admission:       Follow-up needed after discharge from SNF:   - PCP within 1-2 weeks  - See appt scheduled below     Future Appointments   Date Time Provider Department Center   5/31/2023  9:30 AM Kai Montez MD [facility]   5/31/2023 11:00 AM Joi Romeo MD [facility] PAL MED [address] [facility]   6/2/2023  1:00 PM Roz Leon NP [facility] NEPHRO [facility]   7/18/2023 10:00 AM HOME MONITOR DEVICE CHECK, [facility]   8/15/2023  9:00  AM Sunil Naidu MD [facility] HEPAT [address]   10/31/2023  3:00 PM Kai Montez MD [facility]         I certify that SNF services are required to be given on an inpatient basis because Gisselle Ortiz needs for skilled nursing care and/or skilled rehabilitation are required on a daily basis and such services can only practically be provided in a skilled nursing facility setting and are for an ongoing condition for which she received inpatient care in the hospital.       Extended Visit:   >35 min  Description of Time: counseling provided on clinical condition, therapies provided, plan of care, emotional support, coordinating patient care with other care team members, reviewing and interpreting labs and imaging, collaboration with physician, initiating new orders, chart review, and documentation. See interval hx.         Dianna Turner NP  Department of Hospital Medicine   [facility]     DOS: 5/27/2023       Patient note was created using MModal Dictation.  Any errors in syntax or even information may not have been identified and edited on initial review prior to signing this note.

## 2023-05-29 NOTE — PT/OT/SLP PROGRESS
"Physical Therapy Treatment    Patient Name:  Gisselle Ortiz   MRN:  [id number]  Admit Date: 5/23/2023  Admitting Diagnosis: Acute renal failure superimposed on stage 3 chronic kidney disease  Recent Surgeries: N/A    General Precautions: Standard, fall  Orthopedic Precautions: N/A  Braces: N/A    Recommendations:     Discharge Recommendations: home health PT  Level of Assistance Recommended at Discharge: 24 hours significant assistance  Discharge Equipment Recommendations: none (Patient reports having all equipment needed at home.)  Barriers to discharge:  (Increased assistance for mobility)    Assessment:     Gisselle Ortiz is a 89 y.o. female admitted with a medical diagnosis of Acute renal failure superimposed on stage 3 chronic kidney disease .   Pt was agreeable to seated therex today. Vitals monitored during session and no significant decrease with seated activities. Pt completed therex with occasional rest breaks and no issues. Pt continues to benefit from therapy to improve functional endurance, strength, and mobility.     Performance deficits affecting function: weakness, impaired endurance, impaired self care skills, impaired functional mobility, gait instability, impaired balance, decreased lower extremity function, decreased safety awareness, impaired cardiopulmonary response to activity.    Rehab Potential is fair    Activity Tolerance: Fair    Plan:     Patient to be seen 5 x/week to address the above listed problems via gait training, therapeutic activities, therapeutic exercises, neuromuscular re-education, wheelchair management/training    Plan of Care Expires: 06/23/23  Plan of Care Reviewed with: patient    Subjective     "Just not feeling great today".     Pain/Comfort:  Pain Rating 1: 0/10  Pain Rating Post-Intervention 1: 0/10    Patient's cultural, spiritual, Protestant conflicts given the current situation:  no    Objective:     Patient found up in chair with  (no lines) upon PT entry " to room.     Therapeutic Activities and Exercises:   Seated BLE therex x15 reps: ankle pumps, long arc quads, marches, and hip abd/add with pillow   Rest break in-between activities  Mini elliptical x10 mins (light resistance)  To improve BLE strengthening, endurance, and ROM  Patient educated on role of therapy, goals of session, and benefits of out of bed mobility.   Instructed on use of call button and importance of calling nursing staff for assistance with mobility   Questions/concerns addressed within PTA scope of practice  Pt verbalized understanding.    Vitals monitor during session. All vitals monitor taken seated in bedside chair    BP MAP HR   Beginning of Session  108/53 74 62   Post Therex 115/56 81 62   End of Session 101/56 80 62     Functional Mobility:   Pt refused activities    AM-PAC 6 CLICK MOBILITY  15    Patient left up in chair with call button in reach and PCT notified.    GOALS:   Multidisciplinary Problems       Physical Therapy Goals          Problem: Physical Therapy    Goal Priority Disciplines Outcome Goal Variances Interventions   Physical Therapy Goal     PT, PT/OT Ongoing, Progressing     Description: Goals to be met by: 23     Patient will increase functional independence with mobility by performin. Supine to sit with Supervision  2. Sit to supine with Contact Guard Assistance  3. Rolling to Left and Right with Supervision  4. Sit to stand transfer with Stand-by Assistance  5. Bed to chair transfer with Stand-by Assistance using Rolling Walker  6. Gait  x 50 feet with Contact Guard Assistance using Rolling Walker  7. Wheelchair propulsion x 50 feet with Modified [place] using bilateral upper extremities  8. Ascend/Descend 4 inch curb step with Minimal Assistance using Rolling Walker                         Time Tracking:     PT Received On: 23  PT Start Time: 1344  PT Stop Time: 1413  PT Total Time (min): 29 min    Billable Minutes: Therapeutic Exercise  29    Treatment Type: Treatment  PT/PTA: PTA     Number of PTA visits since last PT visit: 2     05/29/2023

## 2023-05-29 NOTE — PT/OT/SLP PROGRESS
Occupational Therapy   Treatment    Name: Gisselle Ortiz  MRN: [id number]  Admit Date: 5/23/2023  Admitting Diagnosis:  Acute renal failure superimposed on stage 3 chronic kidney disease    General Precautions: Standard, fall   Orthopedic Precautions: N/A   Braces: N/A    Recommendations:     Discharge Recommendations:  home health OT  Level of Assistance Recommended at Discharge: 24 hours light assistance for ADL's and homemaking tasks  Discharge Equipment Recommendations: none (pt reports having all necessary equipment)  Barriers to discharge:  Other (Comment) (increased assistance required for ADLs and functional mobility)    Assessment:     Gisselle Ortiz is a 89 y.o. female with a medical diagnosis of Acute renal failure superimposed on stage 3 chronic kidney disease.  She presents with  Performance deficits affecting function are weakness, impaired endurance, impaired balance, impaired self care skills, impaired functional mobility, gait instability, edema, pain, decreased safety awareness, decreased lower extremity function, impaired cardiopulmonary response to activity.     Pt. Was cooperative and  Tolerated  session  fair Pt. Initial BP prior to activity 132/75 hr 74 Pt. Then complaints of feeling weak nurse called in pt bp 49/29 hr 61  once acclimated with BLE elevated Pt. Bp 149/61 Pt  continues to demonstrate levels of physical deficits with  functional indep with daily management activities tasks, selfcare skills with balance,  functional mobility, UB strength and endurance. Pt. Will continue to benefit from continued OT to progress towards goals      Rehab Potential is fair    Activity tolerance:  Fair    Plan:     Patient to be seen 5 x/week to address the above listed problems via self-care/home management, therapeutic activities, therapeutic exercises    Plan of Care Expires: 06/22/23  Plan of Care Reviewed with: patient    Subjective     Communicated with: nsg and Pt. prior to session. Oh  no I made a mess Pt referring to BM     Pain/Comfort:  Pain Rating 1: 0/10  Pain Rating Post-Intervention 1: 0/10    Patient's cultural, spiritual, Gnosticism conflicts given the current situation:  no    Objective:     Patient found supine    upon OT entry to room.    Bed Mobility:    Patient completed Scooting/Bridging with minimum assistance and with side rail  Patient completed Supine to Sit with minimum assistance and with side rail     Functional Mobility/Transfers:  Patient completed Sit <> Stand Transfer with minimum assistance  with  no assistive device   Patient completed Bed <> Chair Transfer using Stand Pivot technique with minimum assistance with rolling walker  Patient completed Toilet Transfer Stand Pivot technique with minimum assistance, moderate assistance, and maximal assistance with  no AD  Pt. Initially with sit to stand from EOB with SPT with Min A and then to w/c and 3n1 with use of G bras and standing from 3n1- with RW for bal and then Pt. Became hypotensive 49/29 but still alert with Max A from w/c to bed side chair and then BLE elevated nsg notified Pt. Pt went back up to 149/61    Activities of Daily Living:  Grooming: stand by assistance to wipe face  Bathing: maximal assistance seated on 3n1  Upper Body Dressing: maximal assistance to doff gown and rosario pull over shirt   Lower Body Dressing: total assistance x2 to rosario pants seated and to manage over hips instance   Toileting: total assistance pt with assist to stand Pt. On first 2 trails Pt. Then became  hypotensive  with complains of weakness Pt bp taken see above Pt. Then with (A) for 2 for remainder of cleaning     [facility] 6 Click ADL: 16    Pt edu on role of OT, POC, safety when performing self care tasks , benefit of performing OOB activity, and safety when performing functional transfers and mobility management for preparation with goals to progress towards next level of care     Patient left up in chair with all lines intact, call  button in reach, and NP and nurse  notified about low bp    GOALS:   Multidisciplinary Problems       Occupational Therapy Goals          Problem: Occupational Therapy    Goal Priority Disciplines Outcome Interventions   Occupational Therapy Goal     OT, PT/OT Ongoing, Not Progressing    Description: Goals to be met by: 6/22/2023     Patient will increase functional independence with ADLs by performing:    UE Dressing with Set-up Assistance.  LE Dressing with Stand-by Assistance using appropriate AE as needed.  Grooming while seated at sink with Set-up Assistance.  Toileting from 3-in-1 commode over toilet with Stand-by Assistance for hygiene and clothing management.   Bathing from sitting/standing at sink with Minimal Assistance.  Supine to sit with Supervision.  Step transfer with Stand-by Assistance with RW.  Toilet transfer to 3-in-1 commode over toilet with Stand-by Assistance with RW.                         Time Tracking:     OT Date of Treatment: 05/29/23  OT Start Time: 0847    OT Stop Time: 0926  OT Total Time (min): 39 min    Billable Minutes:Self Care/Home Management 39    5/29/2023  A client care conference was performed between the SHAHIDA and RONALD, prior to treatment to discuss the patient's status, treatment plan and established goals.

## 2023-05-29 NOTE — PROGRESS NOTES
[facility]                                  Skilled Nursing Facility                   Progress Note     Patient Name: Gisselle Ortiz  YOB: 1933  MRN: [id number]  Room: [place] [address]/[place] [address] A     Admit Date: 5/23/2023   FRANCESCA:      Principal Problem: Acute renal failure superimposed on stage 3 chronic kidney disease    HPI obtained from patient interview and chart review     Chief Complaint: Re-evaluation of medical treatment and therapy status:  Hypotension, lab review      HPI:   Gisselle Ortiz is a 89 y.o. female with PMH of GERD, gallstone pancreatitis, thyroid disease, HTN, HLD, SSS s/p PPM, CHF (EF 55%), and a fib on eliquis who presented to the ED for nausea and vomiting. Pt admitted to hospital medicine for intractable n/v and UTI. Pt has remained hemodynamically stable. She was started on 5-days of IV rocephin. Urine culture + klebsiella pneumoniae and e.coli. No leukocytosis but was hyperkalemic K 5.3; discontinued spironolactone. AST/ALT elevated on admission and downtrending. Abd US with hepatomegaly, stable prominence of CBD, and cholelithiasis without evidence of cholecystitis. AES recommended MRCP, which the patient unable to have 2/2 pacemaker. KUB with nonobstructive bowel gas pattern and significant stool burden, which is improving on bowel regimen. CTAP with gallbladder sludge vs stones and colonic wall thickening vs. stenosis/peristalsis. Pt with MARIO on CKD, which is improving with IVFs. Pt with new onset thrombocytopenia which is now improving. Hematology consulted and recommended started cyanocobalamin. PT/OT consulted and on re-evaluation are recommending SNF. All questions were answered. Patient acknowledged understanding of discharge instructions and feels safe to discharge. Patient was transferred to SNF on 5/23/23 in stable condition.    Patient will be treated at [facility] with PT and OT to  improve functional status and ability to perform ADLs.     Interval History  24 hour chart review completed. NAEON. NAD.  Afebrile, vital signs stable. HTN. No symptomatic hypotension episodes thus far today.  All of today's labs reviewed; listed and addressed below.        Past Medical History: Patient has a past medical history of A-fib, Anemia of chronic disease (02/02/2021), Anticoagulant long-term use, CHF (congestive heart failure), Chronic diastolic heart failure (02/02/2021), COVID-19 (01/07/2022), Gallstone pancreatitis, Hypertension, Pancreatic abscess (09/26/2020), Stage 3 chronic kidney disease (5/11/2016), and Thyroid disease.    Past Surgical History: Patient has a past surgical history that includes Cataract extraction; Cataract extraction w/  intraocular lens implant (Bilateral, 2004); Revision of skin pocket for pacemaker (N/A, 1/21/2019); Eye surgery; Hysterectomy; Hip replacement arthroplasty (Right, 2016); Treatment of cardiac arrhythmia (N/A, 3/18/2019); Thyroidectomy; Treatment of cardiac arrhythmia (N/A, 5/14/2019); ERCP (N/A, 9/14/2020); Endoscopic ultrasound of upper gastrointestinal tract (N/A, 9/14/2020); ERCP (N/A, 9/25/2020); ERCP (N/A, 11/25/2020); Endoscopic ultrasound of upper gastrointestinal tract (11/25/2020); and Treatment of cardiac arrhythmia (N/A, 6/21/2022).    Social History: Patient reports that she has quit smoking. Her smoking use included cigarettes. She started smoking about 59 years ago. She has never used smokeless tobacco. She reports that she does not drink alcohol and does not use drugs.    Family History:  family history includes Heart attack in her maternal grandmother and mother; Heart disease in her maternal grandmother and mother; Heart failure in her maternal grandmother and mother; Hypertension in her maternal grandmother and mother; Stroke in her maternal grandmother.    Allergies: Patient is allergic to ciprofloxacin.        Review of Systems    Constitutional:  Positive for malaise/fatigue. Negative for chills and fever.   HENT:  Negative for congestion, hearing loss and sore throat.    Eyes:  Negative for blurred vision and double vision.   Respiratory:  Negative for cough, sputum production, shortness of breath and wheezing.    Cardiovascular:  Negative for chest pain, palpitations and leg swelling.   Gastrointestinal:  Negative for abdominal pain, constipation, diarrhea, heartburn, nausea and vomiting.   Genitourinary:  Negative for dysuria and urgency.   Musculoskeletal:  Negative for back pain.   Skin:  Negative for rash.        + wound   Neurological:  Positive for weakness. Negative for dizziness and headaches.   Endo/Heme/Allergies:  Negative for polydipsia. Does not bruise/bleed easily.   Psychiatric/Behavioral:  Negative for depression and hallucinations. The patient is not nervous/anxious.        24 hour Vital Sign Range   Temp:  [98.1 °F (36.7 °C)-98.2 °F (36.8 °C)]   Pulse:  [60]   Resp:  [18]   BP: ()/(29-61)   SpO2:  [96 %-97 %]       Physical Exam  Vitals and nursing note reviewed.   Constitutional:       General: She is not in acute distress.     Appearance: Normal appearance. She is not ill-appearing.   HENT:      Head: Normocephalic and atraumatic.      Nose: Nose normal. No congestion.      Mouth/Throat:      Mouth: Mucous membranes are moist.      Pharynx: Oropharynx is clear.   Eyes:      Extraocular Movements: Extraocular movements intact.      Conjunctiva/sclera: Conjunctivae normal.      Pupils: Pupils are equal, round, and reactive to light.   Cardiovascular:      Rate and Rhythm: Normal rate and regular rhythm.      Pulses: Normal pulses.      Heart sounds: Normal heart sounds. No murmur heard.  Pulmonary:      Effort: Pulmonary effort is normal. No respiratory distress.      Breath sounds: Normal breath sounds. No wheezing or rhonchi.   Abdominal:      General: Bowel sounds are normal. There is no distension.       Palpations: Abdomen is soft. There is no mass.      Tenderness: There is no abdominal tenderness.   Musculoskeletal:         General: No swelling or tenderness.      Cervical back: Normal range of motion and neck supple. No tenderness.      Right lower leg: No edema.      Left lower leg: No edema.   Skin:     General: Skin is warm and dry.      Capillary Refill: Capillary refill takes less than 2 seconds.      Findings: No erythema or rash.   Neurological:      Mental Status: She is alert and oriented to person, place, and time. Mental status is at baseline.      Motor: Weakness present.   Psychiatric:         Mood and Affect: Mood normal.         Behavior: Behavior normal.         Thought Content: Thought content normal.         Judgment: Judgment normal.     Full skin assessment completed by this NP       Labs:  Recent Labs   Lab 05/23/23  0400 05/26/23  0511 05/29/23  0721   WBC 9.81 11.84 9.01   HGB 10.2* 8.9* 8.4*   HCT 32.5* 28.8* 27.6*   * 199 314       Recent Labs   Lab 05/25/23  0515 05/26/23  0511 05/29/23  0721    136 144   K 4.2 4.1 4.8    111* 115*   CO2 21* 18* 22*   BUN 31* 33* 39*   CREATININE 2.2* 2.0* 1.8*   GLU 87 113* 100   CALCIUM 8.0* 7.8* 7.7*   MG 1.7  --  1.7   PHOS 3.1  --  2.7       Recent Labs   Lab 05/23/23  0400   ALKPHOS 88   ALT 87*   AST 80*   ALBUMIN 2.1*   PROT 4.6*   BILITOT 0.4       No results for input(s): POCTGLUCOSE in the last 72 hours.    Meds Scheduled:   amiodarone  200 mg Oral Daily    apixaban  2.5 mg Oral BID    aspirin  81 mg Oral Daily    atenoloL  25 mg Oral QHS    atenoloL  50 mg Oral Daily    balsam peru-castor oiL   Topical (Top) BID    cyanocobalamin  1,000 mcg Oral Daily    diclofenac sodium  2 g Topical (Top) QID    diltiaZEM  180 mg Oral Daily    folic acid  1 mg Oral Daily    Lactobacillus rhamnosus GG  1 capsule Oral Daily    levothyroxine  137 mcg Oral Before breakfast    mirtazapine  7.5 mg Oral QHS    multivitamin  1 tablet Oral Daily     pantoprazole  40 mg Oral Daily    polyethylene glycol  17 g Oral Daily    senna-docusate 8.6-50 mg  1 tablet Oral BID       PRN:   acetaminophen, acetaminophen, aluminum-magnesium hydroxide-simethicone, bismuth subsalicylate, calcium carbonate, hydrALAZINE, melatonin      Assessment and Plan:  Impaired functional mobility and endurance  - Chronic issue with acute worsening   - Continue with PT/OT for gait training and strengthening and restoration of ADL's   - Encourage mobility, OOB in chair, and early ambulation as appropriate  - Fall precautions   - Monitor for bowel and bladder dysfunction  - Monitor for and prevent skin breakdown and pressure ulcers  - Continue DVT prophylaxis with apixaban        Acute renal failure superimposed on stage 3 chronic kidney disease  - Creatinine  (baseline 1-1.3)  - Monitor BMP daily, replete electrolytes if necessary  - Renally adjust drugs to CrCl; avoid nephrotoxic drugs   - Monitor I/Os  - Renal ultrasound consistent with bilateral medical renal disease & simple renal cysts; no hydronephrosis  - given D5 1.2 NS @50ml/hr x 1L  5/29/2023 renal function improving     Left upper extremity swelling  - Afebrile no leukocytosis  - U/S with resolution of L cephalic vein thrombosis and no evidence of DVT  - Elevate extremity  - Continue to monitor     Malnutrition of mild degree  BMI of 19 or less in adult  Body mass index is 23.2 kg/m².      - Pt reports poor PO intake for many weeks despite encouragement & good    family support  - Start mirtazapine 7.5 mg nightly   - Nutrition consulted, appreciate recs:   -Regular with supplements  -Recommend MVI, probiotic supplementation.      Thrombocytopenia  - Platelets 113 on admit to SNF  - no heparin products this admit  - no evidence of thrombocytopenia in past labs  - had severe transaminitis in 1/2022 during COVID infection - other elevations also noted - may be underlying liver dz as an association  - Hematology consulted given  advancing skin changes and petechia, appreciate recs:  -- Agree with folic acid supplementation  -- Start cyanocobalamin 1,000 mcg PO daily  - Continue to monitor on daily labs        Transaminitis  -AST//177, tbili 0.9 on admission and downtrending.  -Patient denies abdominal pain.  -Repeat lipid panel wnl--discontinue statin  -Hepatitis panel negative  -Plan to f/u with PCP on when to restart statin  -Will need hepatology f/u OP     Constipation  -KUB with nonobstructive bowel gas pattern noting large amount of stool throughout the colon may reflect constipation  - Continue bowel regimen with miralax TID & senna BID     Anemia due to chronic kidney disease  Folate deficiency      -Monitor Hgb       -Transfuse for Hgb <7    Chronic diastolic heart failure   -Monitor strict Is&Os and daily weights.  Continue to stress to patient importance of self efficacy and  on diet for CHF.     Essential hypertension  Hypotension  -PRN  hydralazine   - Continue to monitor BP closely  -parameters added to BP meds     Paroxysmal atrial fibrillation  Current use of long term anticoagulation  -Apixaban 2.5mg    Anticipate disposition:    Home with home health      Follow-up needed during SNF admission:       Follow-up needed after discharge from SNF:   - PCP within 1-2 weeks  - See appt scheduled below     Future Appointments   Date Time Provider Department Center   5/31/2023  9:30 AM Kai Montez MD [facility]   5/31/2023 11:00 AM Joi Romeo MD [facility] PAL MED [address] [facility]   6/2/2023  1:00 PM Roz Leon NP [facility] [address]   7/18/2023 10:00 AM HOME MONITOR DEVICE CHECK, [facility] [address]   8/15/2023  9:00 AM Sunil Naidu MD [facility]   10/31/2023  3:00 PM Kai Montez MD [facility]         I certify that SNF services are required to be given on an inpatient basis because Gisselle Ortiz needs for skilled nursing care and/or skilled rehabilitation are  required on a daily basis and such services can only practically be provided in a skilled nursing facility setting and are for an ongoing condition for which she received inpatient care in the hospital.       Extended Visit  Total time spent: > 15 minutes  Description of Time: counseling patient on clinical condition, therapies provided, plan of care, emotional support, coordinating patient care with other care team members, reviewing and interpreting labs and imaging, collaboration with physician, initiating new orders, chart review, and documentation. See interval hx.           Vivian Mendes NP  Department of Hospital Medicine   [facility]     DOS: 5/29/2023       Patient note was created using MModal Dictation.  Any errors in syntax or even information may not have been identified and edited on initial review prior to signing this note.

## 2023-05-30 NOTE — TREATMENT PLAN
Rehab Services' DME recommendations    Gisselle Paigestephy  MRN: [id number]        [x]  No DME needed    [x] Home health PT, OT, and Aide    Dorys Grier, PTA 5/30/2023

## 2023-05-30 NOTE — PT/OT/SLP PROGRESS
Physical Therapy      Patient Name:  Gisselle Ortiz   MRN:  [id number]    Patient not seen today secondary to reports of not feeling well all day, I just can't declined seated therex, family present, nsg notified, Will follow-up next PT session  .

## 2023-05-30 NOTE — PLAN OF CARE
Problem: Adult Inpatient Plan of Care  Goal: Plan of Care Review  Outcome: Ongoing, Progressing  Goal: Patient-Specific Goal (Individualized)  Outcome: Ongoing, Progressing     Problem: Fluid and Electrolyte Imbalance (Acute Kidney Injury/Impairment)  Goal: Fluid and Electrolyte Balance  Outcome: Ongoing, Progressing     Problem: Oral Intake Inadequate (Acute Kidney Injury/Impairment)  Goal: Optimal Nutrition Intake  Outcome: Ongoing, Progressing     Problem: Renal Function Impairment (Acute Kidney Injury/Impairment)  Goal: Effective Renal Function  Outcome: Ongoing, Progressing     Problem: Impaired Wound Healing  Goal: Optimal Wound Healing  Outcome: Ongoing, Progressing     Problem: Fall Injury Risk  Goal: Absence of Fall and Fall-Related Injury  Outcome: Ongoing, Progressing     Problem: Skin Injury Risk Increased  Goal: Skin Health and Integrity  Outcome: Ongoing, Progressing     Problem: Malnutrition  Goal: Improved Nutritional Intake  Outcome: Ongoing, Progressing

## 2023-05-30 NOTE — NURSING
"Notified by Dorys with PTA pt refused therapy.Pt sitting in recliner with feet elevated. /56,60. Denies pain or discomfort. Pt stated " Im just tired".  "

## 2023-05-30 NOTE — PT/OT/SLP PROGRESS
Occupational Therapy   Treatment    Name: Gisselle Ortiz  MRN: [id number]  Admit Date: 5/23/2023  Admitting Diagnosis:  Acute renal failure superimposed on stage 3 chronic kidney disease    General Precautions: Standard, fall   Orthopedic Precautions: N/A   Braces: N/A    Recommendations:     Discharge Recommendations:  home health OT  Level of Assistance Recommended at Discharge: 24 hours light assistance for ADL's and homemaking tasks  Discharge Equipment Recommendations: none (pt reports having all necessary equipment)  Barriers to discharge:  Other (Comment) (increased assistance required for ADLs and functional mobility)    Assessment:     Gisselle Ortiz is a 89 y.o. female with a medical diagnosis of Acute renal failure superimposed on stage 3 chronic kidney disease.  She presents with  Performance deficits affecting function are weakness, impaired endurance, impaired balance, impaired self care skills, impaired functional mobility, gait instability, edema, pain, decreased safety awareness, decreased lower extremity function, impaired cardiopulmonary response to activity.     Pt. Was cooperative with session on this day., despite hypotensive episode on this day. Pt. BP starting 109/56 hr 71. On toilet Pt. Bp rechecked 97/52 hr 73 after toileting 109/56 hr 73. Pt  continues to demonstrate levels of physical deficits with  functional indep with daily management activities tasks, selfcare skills with balance,  functional mobility, UB strength and endurance. Pt. Will continue to benefit from continued OT to progress towards goals      Rehab Potential is fair    Activity tolerance:  Fair    Plan:     Patient to be seen 5 x/week to address the above listed problems via self-care/home management, therapeutic activities, therapeutic exercises    Plan of Care Expires: 06/22/23  Plan of Care Reviewed with: patient    Subjective     Communicated with: nsg and Pt prior to session.  I had a BM I didn't realized I  had one    Pain/Comfort:  Pain Rating 1: 0/10  Pain Rating Post-Intervention 1: 0/10    Patient's cultural, spiritual, Muslim conflicts given the current situation:  no    Objective:     Patient found up in chair with  BLE elevated   upon OT entry to room.    Bed Mobility:        Functional Mobility/Transfers:  Patient completed Sit <> Stand Transfer with moderate assistance  with  no assistive device and grab bars(s)   Patient completed Toilet Transfer Stand Pivot technique with moderate assistance with  grab bars    Activities of Daily Living:  Toileting: total assistance with cleaning and clothing management from 3n1 over toilet. Pt. With leaning forward to rest head on lap Pt. Instructed to sit up  not safe to forward lean while sitting on toilet     [facility] 6 Click ADL: 16    Treatment & Education:  Pt edu on role of OT, POC, safety when performing self care tasks , benefit of performing OOB activity, and safety when performing functional transfers and mobility management for preparation with goals to progress towards next level of care     Patient left up in chair with all lines intact, call button in reach, and BLE elevated     GOALS:   Multidisciplinary Problems       Occupational Therapy Goals          Problem: Occupational Therapy    Goal Priority Disciplines Outcome Interventions   Occupational Therapy Goal     OT, PT/OT Ongoing, Not Progressing    Description: Goals to be met by: 6/22/2023     Patient will increase functional independence with ADLs by performing:    UE Dressing with Set-up Assistance.  LE Dressing with Stand-by Assistance using appropriate AE as needed.  Grooming while seated at sink with Set-up Assistance.  Toileting from 3-in-1 commode over toilet with Stand-by Assistance for hygiene and clothing management.   Bathing from sitting/standing at sink with Minimal Assistance.  Supine to sit with Supervision.  Step transfer with Stand-by Assistance with RW.  Toilet transfer to 3-in-1  commode over toilet with Stand-by Assistance with RW.                         Time Tracking:     OT Date of Treatment: 05/30/23  OT Start Time: 1315    OT Stop Time: 1345  OT Total Time (min): 30 min    Billable Minutes:Self Care/Home Management 30 5/30/2023

## 2023-05-30 NOTE — PLAN OF CARE
Problem: Adult Inpatient Plan of Care  Goal: Plan of Care Review  Outcome: Ongoing, Progressing  Goal: Patient-Specific Goal (Individualized)  Outcome: Ongoing, Progressing  Goal: Absence of Hospital-Acquired Illness or Injury  Outcome: Ongoing, Progressing  Goal: Optimal Comfort and Wellbeing  Outcome: Ongoing, Progressing  Goal: Readiness for Transition of Care  Outcome: Ongoing, Progressing     Problem: Fluid and Electrolyte Imbalance (Acute Kidney Injury/Impairment)  Goal: Fluid and Electrolyte Balance  Outcome: Ongoing, Progressing     Problem: Oral Intake Inadequate (Acute Kidney Injury/Impairment)  Goal: Optimal Nutrition Intake  Outcome: Ongoing, Progressing     Problem: Renal Function Impairment (Acute Kidney Injury/Impairment)  Goal: Effective Renal Function  Outcome: Ongoing, Progressing     Problem: Impaired Wound Healing  Goal: Optimal Wound Healing  Outcome: Ongoing, Progressing     Problem: Fall Injury Risk  Goal: Absence of Fall and Fall-Related Injury  Outcome: Ongoing, Progressing     Problem: Skin Injury Risk Increased  Goal: Skin Health and Integrity  Outcome: Ongoing, Progressing     Problem: Malnutrition  Goal: Improved Nutritional Intake  Outcome: Ongoing, Progressing

## 2023-05-31 NOTE — PT/OT/SLP PROGRESS
"Physical Therapy Treatment    Patient Name:  Gisselle Ortiz   MRN:  [id number]  Admit Date: 5/23/2023  Admitting Diagnosis: Acute renal failure superimposed on stage 3 chronic kidney disease  Recent Surgeries: N/A    General Precautions: Standard, fall  Orthopedic Precautions: N/A  Braces: N/A    Recommendations:     Discharge Recommendations: home health PT  Level of Assistance Recommended at Discharge: 24 hours significant assistance  Discharge Equipment Recommendations: none  Barriers to discharge:  (Increased assistance for mobility.)    Assessment:     Gisselle Ortiz is a 89 y.o. female admitted with a medical diagnosis of Acute renal failure superimposed on stage 3 chronic kidney disease. Patient agreeable to PT treatment this AM. BP monitored throughout session; remained stable. Patient reporting fatigue related to limited OOB activity. Patient able to ambulate short distance this session x 2 trials using RW with Mercedes from PT and rehab tech following closely with W/C for safety; fatigue reported with seated rest break provided. Patient will benefit from continued SNF rehabilitation services to address deficits as well as progress mobility towards maximal functional potential for improved quality of life and decreased caregiver burden.      Performance deficits affecting function: weakness, impaired endurance, impaired self care skills, impaired functional mobility, gait instability, impaired balance, decreased lower extremity function, decreased safety awareness, decreased upper extremity function, edema, impaired cardiopulmonary response to activity.    Rehab Potential is good    Activity Tolerance: Fair    Plan:     Patient to be seen 5 x/week to address the above listed problems via gait training, therapeutic activities, therapeutic exercises, neuromuscular re-education, wheelchair management/training    Plan of Care Expires: 06/23/23  Plan of Care Reviewed with: patient    Subjective     "I just " "feel tired."     Pain/Comfort:  Pain Rating 1: 0/10  Pain Rating Post-Intervention 1: 0/10    Patient's cultural, spiritual, Jainism conflicts given the current situation:  no    Objective:     Communicated with nursing staff prior to session.  Patient found supine with  (no active lines) upon PT entry to room. PCT present in room.     Therapeutic Activities and Exercises:   LE ergometer x 10 minutes at minimal resistance for LE strengthening, ROM, and endurance; no rest break required.     Vital Signs:  Position: supine in bed  Heart Rate: 60 bpm  Blood Pressure: 123/58 mmHg    Position: seated EOB  Heart Rate: 62 bpm  Blood Pressure: 115/57 mmHg    Position: after ambulation  Heart Rate: 72 bpm  Blood Pressure: 114/53 mmHg    Functional Mobility:  Bed Mobility:     Rolling Left:  minimum assistance and HOB flat with use of bed rails  Rolling Right: minimum assistance and HOB flat with use of bed rails  Supine to Sit: minimum assistance and HOB flat with use of bed rails (PCT assist)  Transfers:     Sit to Stand:  minimum assistance with RW from W/C and without AD from bed  Bed to Chair: minimum assistance with  no AD  using  Stand Pivot  Gait: Patient ambulated 9 feet and 10 feet using RW with Mercedes from PT; rehab tech following closely with W/C for safety. Seated rest break between trials. No LOB. Cues to stand erect as patient presenting in flexion posture as knees, hips, trunk and cervical spine.   Wheelchair Propulsion:  Pt propelled Standard wheelchair x 25 feet on Level tile with  Bilateral upper extremity with Minimal Assistance.     AM-PAC 6 CLICK MOBILITY  15    Patient left up in chair with call button in reach and nursing staff notified.    GOALS:   Multidisciplinary Problems       Physical Therapy Goals          Problem: Physical Therapy    Goal Priority Disciplines Outcome Goal Variances Interventions   Physical Therapy Goal     PT, PT/OT Ongoing, Progressing     Description: Goals to be met by: " 23     Patient will increase functional independence with mobility by performin. Supine to sit with Supervision - not met  2. Sit to supine with Contact Guard Assistance - not met  3. Rolling to Left and Right with Supervision - not met  4. Sit to stand transfer with Stand-by Assistance - not met  5. Bed to chair transfer with Stand-by Assistance using Rolling Walker - not met  6. Gait  x 50 feet with Contact Guard Assistance using Rolling Walker - not met  7. Wheelchair propulsion x 50 feet with Modified [place] using bilateral upper extremities - not met  8. Ascend/Descend 4 inch curb step with Minimal Assistance using Rolling Walker - not met; unable to assess - assess when deemed safe                         Time Tracking:     PT Received On: 23  PT Start Time: 1135  PT Stop Time: 1205  PT Total Time (min): 30 min    Billable Minutes: Therapeutic Activity 20 and Therapeutic Exercise 10    Treatment Type: Treatment  PT/PTA: PT     Number of PTA visits since last PT visit: 0     2023

## 2023-05-31 NOTE — PLAN OF CARE
Continue with POC. Limited tolerance for mobility secondary to decreased blood pressure with most recent treatments.     Problem: Physical Therapy  Goal: Physical Therapy Goal  Description: Goals to be met by: 23     Patient will increase functional independence with mobility by performin. Supine to sit with Supervision - not met  2. Sit to supine with Contact Guard Assistance - not met  3. Rolling to Left and Right with Supervision - not met  4. Sit to stand transfer with Stand-by Assistance - not met  5. Bed to chair transfer with Stand-by Assistance using Rolling Walker - not met  6. Gait  x 50 feet with Contact Guard Assistance using Rolling Walker - not met  7. Wheelchair propulsion x 50 feet with Modified [place] using bilateral upper extremities - not met  8. Ascend/Descend 4 inch curb step with Minimal Assistance using Rolling Walker - not met; unable to assess - assess when deemed safe    Outcome: Ongoing, Progressing   2023

## 2023-05-31 NOTE — PROGRESS NOTES
[facility]                                  Skilled Nursing Facility                   Progress Note     Patient Name: Gisselle Ortiz  YOB: 1933  MRN: [id number]  Room: [place] [address]/[place] [address] A     Admit Date: 5/23/2023   FRANCESCA:      Principal Problem: Acute renal failure superimposed on stage 3 chronic kidney disease    HPI obtained from patient interview and chart review     Chief Complaint: Re-evaluation of medical treatment and therapy status:  Hypotension      HPI:   Gisselle Ortiz is a 89 y.o. female with PMH of GERD, gallstone pancreatitis, thyroid disease, HTN, HLD, SSS s/p PPM, CHF (EF 55%), and a fib on eliquis who presented to the ED for nausea and vomiting. Pt admitted to hospital medicine for intractable n/v and UTI. Pt has remained hemodynamically stable. She was started on 5-days of IV rocephin. Urine culture + klebsiella pneumoniae and e.coli. No leukocytosis but was hyperkalemic K 5.3; discontinued spironolactone. AST/ALT elevated on admission and downtrending. Abd US with hepatomegaly, stable prominence of CBD, and cholelithiasis without evidence of cholecystitis. AES recommended MRCP, which the patient unable to have 2/2 pacemaker. KUB with nonobstructive bowel gas pattern and significant stool burden, which is improving on bowel regimen. CTAP with gallbladder sludge vs stones and colonic wall thickening vs. stenosis/peristalsis. Pt with MARIO on CKD, which is improving with IVFs. Pt with new onset thrombocytopenia which is now improving. Hematology consulted and recommended started cyanocobalamin. PT/OT consulted and on re-evaluation are recommending SNF. All questions were answered. Patient acknowledged understanding of discharge instructions and feels safe to discharge. Patient was transferred to SNF on 5/23/23 in stable condition.    Patient will be treated at [facility] with PT and OT to improve  functional status and ability to perform ADLs.     Interval History  24 hour chart review completed. NAEON. NAD.  Afebrile, HR, RR stable.  Episode of hypotension on toilet this morning. Patient c/o weakness and dizziness with episode. Resolved after resting in bed with legs elevated.  Hold atenolol and diltiazem.          Past Medical History: Patient has a past medical history of A-fib, Anemia of chronic disease (02/02/2021), Anticoagulant long-term use, CHF (congestive heart failure), Chronic diastolic heart failure (02/02/2021), COVID-19 (01/07/2022), Gallstone pancreatitis, Hypertension, Pancreatic abscess (09/26/2020), Stage 3 chronic kidney disease (5/11/2016), and Thyroid disease.    Past Surgical History: Patient has a past surgical history that includes Cataract extraction; Cataract extraction w/  intraocular lens implant (Bilateral, 2004); Revision of skin pocket for pacemaker (N/A, 1/21/2019); Eye surgery; Hysterectomy; Hip replacement arthroplasty (Right, 2016); Treatment of cardiac arrhythmia (N/A, 3/18/2019); Thyroidectomy; Treatment of cardiac arrhythmia (N/A, 5/14/2019); ERCP (N/A, 9/14/2020); Endoscopic ultrasound of upper gastrointestinal tract (N/A, 9/14/2020); ERCP (N/A, 9/25/2020); ERCP (N/A, 11/25/2020); Endoscopic ultrasound of upper gastrointestinal tract (11/25/2020); and Treatment of cardiac arrhythmia (N/A, 6/21/2022).    Social History: Patient reports that she has quit smoking. Her smoking use included cigarettes. She started smoking about 59 years ago. She has never used smokeless tobacco. She reports that she does not drink alcohol and does not use drugs.    Family History:  family history includes Heart attack in her maternal grandmother and mother; Heart disease in her maternal grandmother and mother; Heart failure in her maternal grandmother and mother; Hypertension in her maternal grandmother and mother; Stroke in her maternal grandmother.    Allergies: Patient is allergic to  ciprofloxacin.        Review of Systems   Constitutional:  Positive for malaise/fatigue. Negative for chills and fever.   HENT:  Negative for congestion, hearing loss and sore throat.    Eyes:  Negative for blurred vision and double vision.   Respiratory:  Negative for cough, sputum production, shortness of breath and wheezing.    Cardiovascular:  Negative for chest pain, palpitations and leg swelling.   Gastrointestinal:  Negative for abdominal pain, constipation, diarrhea, heartburn, nausea and vomiting.   Genitourinary:  Negative for dysuria and urgency.   Musculoskeletal:  Negative for back pain.   Skin:  Negative for rash.        + wound   Neurological:  Positive for dizziness and weakness. Negative for headaches.   Endo/Heme/Allergies:  Negative for polydipsia. Does not bruise/bleed easily.   Psychiatric/Behavioral:  Negative for depression and hallucinations. The patient is not nervous/anxious.        24 hour Vital Sign Range   Temp:  [97.3 °F (36.3 °C)-97.6 °F (36.4 °C)]   Pulse:  [60-66]   Resp:  [16-17]   BP: (118-139)/(55-64)   SpO2:  [96 %-98 %]       Physical Exam  Vitals and nursing note reviewed.   Constitutional:       General: She is not in acute distress.     Appearance: Normal appearance. She is not ill-appearing.   HENT:      Head: Normocephalic and atraumatic.      Nose: Nose normal. No congestion.      Mouth/Throat:      Mouth: Mucous membranes are moist.      Pharynx: Oropharynx is clear.   Eyes:      Extraocular Movements: Extraocular movements intact.      Conjunctiva/sclera: Conjunctivae normal.      Pupils: Pupils are equal, round, and reactive to light.   Cardiovascular:      Rate and Rhythm: Normal rate and regular rhythm.      Pulses: Normal pulses.      Heart sounds: Normal heart sounds. No murmur heard.  Pulmonary:      Effort: Pulmonary effort is normal. No respiratory distress.      Breath sounds: Normal breath sounds. No wheezing or rhonchi.   Abdominal:      General: Bowel sounds  are normal. There is no distension.      Palpations: Abdomen is soft. There is no mass.      Tenderness: There is no abdominal tenderness.   Musculoskeletal:         General: No swelling or tenderness.      Cervical back: Normal range of motion and neck supple. No tenderness.      Right lower leg: No edema.      Left lower leg: No edema.   Skin:     General: Skin is warm and dry.      Capillary Refill: Capillary refill takes less than 2 seconds.      Findings: No erythema or rash.   Neurological:      Mental Status: She is alert and oriented to person, place, and time. Mental status is at baseline.      Motor: Weakness present.   Psychiatric:         Mood and Affect: Mood normal.         Behavior: Behavior normal.         Thought Content: Thought content normal.         Judgment: Judgment normal.     Full skin assessment completed by this NP       Labs:  Recent Labs   Lab 05/26/23  0511 05/29/23  0721   WBC 11.84 9.01   HGB 8.9* 8.4*   HCT 28.8* 27.6*    314       Recent Labs   Lab 05/25/23  0515 05/26/23  0511 05/29/23  0721    136 144   K 4.2 4.1 4.8    111* 115*   CO2 21* 18* 22*   BUN 31* 33* 39*   CREATININE 2.2* 2.0* 1.8*   GLU 87 113* 100   CALCIUM 8.0* 7.8* 7.7*   MG 1.7  --  1.7   PHOS 3.1  --  2.7       No results for input(s): ALKPHOS, ALT, AST, ALBUMIN, PROT, BILITOT, INR in the last 168 hours.    No results for input(s): POCTGLUCOSE in the last 72 hours.    Meds Scheduled:   amiodarone  200 mg Oral Daily    apixaban  2.5 mg Oral BID    aspirin  81 mg Oral Daily    atenoloL  25 mg Oral QHS    atenoloL  50 mg Oral Daily    balsam peru-castor oiL   Topical (Top) BID    cyanocobalamin  1,000 mcg Oral Daily    diclofenac sodium  2 g Topical (Top) QID    diltiaZEM  180 mg Oral Daily    folic acid  1 mg Oral Daily    Lactobacillus rhamnosus GG  1 capsule Oral Daily    levothyroxine  137 mcg Oral Before breakfast    mirtazapine  7.5 mg Oral QHS    multivitamin  1 tablet Oral Daily     pantoprazole  40 mg Oral Daily    polyethylene glycol  17 g Oral Daily    senna-docusate 8.6-50 mg  1 tablet Oral BID       PRN:   acetaminophen, acetaminophen, aluminum-magnesium hydroxide-simethicone, bismuth subsalicylate, calcium carbonate, hydrALAZINE, melatonin      Assessment and Plan:  Impaired functional mobility and endurance  - Chronic issue with acute worsening   - Continue with PT/OT for gait training and strengthening and restoration of ADL's   - Encourage mobility, OOB in chair, and early ambulation as appropriate  - Fall precautions   - Monitor for bowel and bladder dysfunction  - Monitor for and prevent skin breakdown and pressure ulcers  - Continue DVT prophylaxis with apixaban      Acute renal failure superimposed on stage 3 chronic kidney disease  - Creatinine  (baseline 1-1.3)  - Monitor BMP daily, replete electrolytes if necessary  - Renally adjust drugs to CrCl; avoid nephrotoxic drugs   - Monitor I/Os  - Renal ultrasound consistent with bilateral medical renal disease & simple renal cysts; no hydronephrosis  - given D5 1.2 NS @50ml/hr x 1L  5/30/2023 renal function improving     Left upper extremity swelling  - Afebrile no leukocytosis  - U/S with resolution of L cephalic vein thrombosis and no evidence of DVT  - Elevate extremity  - Continue to monitor     Malnutrition of mild degree  BMI of 19 or less in adult  Body mass index is 23.2 kg/m².      - Pt reports poor PO intake for many weeks despite encouragement & good    family support  - Start mirtazapine 7.5 mg nightly   - Nutrition consulted, appreciate recs:   -Regular with supplements  -Recommend MVI, probiotic supplementation.      Thrombocytopenia  - Platelets 113 on admit to SNF  - no heparin products this admit  - no evidence of thrombocytopenia in past labs  - had severe transaminitis in 1/2022 during COVID infection - other elevations also noted - may be underlying liver dz as an association  - Hematology consulted given advancing  skin changes and petechia, appreciate recs:  -- Agree with folic acid supplementation  -- Start cyanocobalamin 1,000 mcg PO daily  - Continue to monitor on daily labs        Transaminitis  -AST//177, tbili 0.9 on admission and downtrending.  -Patient denies abdominal pain.  -Repeat lipid panel wnl--discontinue statin  -Hepatitis panel negative  -Plan to f/u with PCP on when to restart statin  -Will need hepatology f/u OP     Constipation  -KUB with nonobstructive bowel gas pattern noting large amount of stool throughout the colon may reflect constipation  - Continue bowel regimen with miralax TID & senna BID     Anemia due to chronic kidney disease  Folate deficiency      -Monitor Hgb       -Transfuse for Hgb <7    Chronic diastolic heart failure   -Monitor strict Is&Os and daily weights.  Continue to stress to patient importance of self efficacy and  on diet for CHF.     Essential hypertension  Hypotension  - hold atenolol and diltiazem until Saturday 6/3 and monitor BP  - PRN  hydralazine   - Continue to monitor BP closely  -parameters added to BP meds     Paroxysmal atrial fibrillation  Current use of long term anticoagulation  -Apixaban 2.5 mg bid  - amiodarone 200 mg daily     Anticipate disposition:    Home with home health      Follow-up needed during SNF admission:       Follow-up needed after discharge from SNF:   - PCP within 1-2 weeks  - See appt scheduled below     Future Appointments   Date Time Provider Department Center   5/31/2023  9:30 AM Kai Montez MD [facility]   5/31/2023 11:00 AM Joi Romeo MD [facility] PAL MED [facility]   6/2/2023  1:00 PM Roz Leon NP [facility] NEPHRO [facility]   7/18/2023 10:00 AM HOME MONITOR DEVICE CHECK, [facility] ARRHPRO [facility] y   8/15/2023  9:00 AM Sunil Naidu MD [facility] HEPAT [facility]   10/31/2023  3:00 PM Kai Montez MD [facility]         I certify that SNF services are required to be given on an inpatient basis  because Gisselle Ortiz needs for skilled nursing care and/or skilled rehabilitation are required on a daily basis and such services can only practically be provided in a skilled nursing facility setting and are for an ongoing condition for which she received inpatient care in the hospital.       Extended Visit  Total time spent: > 30 minutes  Description of Time: counseling patient on clinical condition, therapies provided, plan of care, emotional support, coordinating patient care with other care team members, reviewing and interpreting labs and imaging, collaboration with physician, initiating new orders, chart review, and documentation. See interval hx.           Vivian Mnedes NP  Department of Hospital Medicine   [facility]     DOS: 5/30/2023       Patient note was created using MModal Dictation.  Any errors in syntax or even information may not have been identified and edited on initial review prior to signing this note.

## 2023-05-31 NOTE — PT/OT/SLP PROGRESS
Occupational Therapy   Treatment    Name: Gisselle Ortiz  MRN: [id number]  Admit Date: 5/23/2023  Admitting Diagnosis:  Acute renal failure superimposed on stage 3 chronic kidney disease    General Precautions: Standard, fall   Orthopedic Precautions: N/A   Braces: N/A    Recommendations:     Discharge Recommendations:  home health OT  Level of Assistance Recommended at Discharge: 24 hours light assistance for ADL's and homemaking tasks  Discharge Equipment Recommendations: none (Pt has all needed DME.)  Barriers to discharge:   (Increased assistance required for ADLs and functional mobility)    Assessment:     Gisselle Ortiz is a 89 y.o. female with a medical diagnosis of Acute renal failure superimposed on stage 3 chronic kidney disease. Performance deficits affecting function are weakness, impaired endurance, impaired self care skills, impaired functional mobility, gait instability, impaired balance, decreased upper extremity function, decreased lower extremity function, decreased safety awareness, decreased coordination, impaired cardiopulmonary response to activity, edema.     Pt pleasant and willing to participate in tx session this date. Pt limited by onset of SOB and dizziness post dilcia-care with BP of 88/61, MAP of 70, and HR 62 bpm. Pt was able to perform functional transfers w/ minimum assistance for completing toileting activity. Pt w/ fatigue by end of session in which she was assisted back to bed w/ a stable BP of 108/59. Pt tolerated tx session fairly and will continue to benefit from skilled acute OT services to maximize functional capacity for safe performance w/ ADLs and functional mobility.     Vitals are as follows:   -Post transfer to toilet: BP 90/54; HR 74; MAP 67  -Sitting on toilet, post complaints of dizziness: BP 88/61; MAP 70; 62 HR  -Post transfer back to bed: /59; MAP 79; 63 HR    Rehab Potential is fair    Activity tolerance:  Fair    Plan:     Patient to be seen 5  "x/week to address the above listed problems via self-care/home management, therapeutic activities, therapeutic exercises    Plan of Care Expires: 06/22/23  Plan of Care Reviewed with: patient, son    Subjective     Communicated with: Nurse prior to session.    Pain/Comfort:  Pain Rating 1: 0/10  Pain Rating Post-Intervention 1: 0/10    Patient's cultural, spiritual, Mormon conflicts given the current situation:  no    Objective:     Patient found up in chair with  (no active lines) upon OT entry to room.    Bed Mobility:    Patient completed Sit to Supine with moderate assistance     Functional Mobility/Transfers:  Patient completed Sit <> Stand Transfer x 2 trials from chair and x 1 trial from BSC (over toilet) with min-moderate assistance  with  grab bar   Patient completed WheelChair > Bed Step Transfer technique with minimum assistance with rolling walker  Patient completed  Wheelchair <> Toilet Transfer Step Transfer technique with minimum assistance with  grab bar    Activities of Daily Living: While on toilet, pt progressively becoming more fatigue w/ SOB and reporting that she was "getting more dizzy."   Toileting: stand by assistance while pt performed seated dilcia-care in which she became fatigued; OT provided total A for remainder of task to ensure thoroughness   Grooming: set-up assist for hand hygiene at W/C level     [facility] 6 Click ADL: 16    Treatment & Education:  -Pt performed ADLs and functional mobility as noted above  -Pt educated on safe functional mobility.   -Pt educated on PLB and energy conservation to prevent over-exertion.   -Questions and concerns addressed within scope.     Patient left HOB elevated with all lines intact and call button in reach    GOALS:   Multidisciplinary Problems       Occupational Therapy Goals          Problem: Occupational Therapy    Goal Priority Disciplines Outcome Interventions   Occupational Therapy Goal     OT, PT/OT Ongoing, Not Progressing    Description: " Goals to be met by: 6/22/2023     Patient will increase functional independence with ADLs by performing:    UE Dressing with Set-up Assistance.  LE Dressing with Stand-by Assistance using appropriate AE as needed.  Grooming while seated at sink with Set-up Assistance.  Toileting from 3-in-1 commode over toilet with Stand-by Assistance for hygiene and clothing management.   Bathing from sitting/standing at sink with Minimal Assistance.  Supine to sit with Supervision.  Step transfer with Stand-by Assistance with RW.  Toilet transfer to 3-in-1 commode over toilet with Stand-by Assistance with RW.                         Time Tracking:     OT Date of Treatment: 05/31/23  OT Start Time: 1319    OT Stop Time: 1343  OT Total Time (min): 24 min    Billable Minutes:Self Care/Home Management 12  Therapeutic Activity 12  Total Time 24    5/31/2023

## 2023-05-31 NOTE — PLAN OF CARE
Problem: Adult Inpatient Plan of Care  Goal: Plan of Care Review  Outcome: Ongoing, Progressing  Goal: Patient-Specific Goal (Individualized)  Outcome: Ongoing, Progressing     Problem: Oral Intake Inadequate (Acute Kidney Injury/Impairment)  Goal: Optimal Nutrition Intake  Outcome: Ongoing, Progressing     Problem: Renal Function Impairment (Acute Kidney Injury/Impairment)  Goal: Effective Renal Function  Outcome: Ongoing, Progressing     Problem: Impaired Wound Healing  Goal: Optimal Wound Healing  Outcome: Ongoing, Progressing     Problem: Fall Injury Risk  Goal: Absence of Fall and Fall-Related Injury  Outcome: Ongoing, Progressing     Problem: Skin Injury Risk Increased  Goal: Skin Health and Integrity  Outcome: Ongoing, Progressing     Problem: Malnutrition  Goal: Improved Nutritional Intake  Outcome: Ongoing, Progressing

## 2023-05-31 NOTE — NURSING
Patient had decreased urine output during shift.  Patient does not complain of bladder fullness. Abdomen is flat.  Bladder scan performed and showed 200 cc. Charge nurse made aware.

## 2023-06-01 NOTE — PLAN OF CARE
IDT meeting held with Philippe, DAMON, CITLALI Yuan, RN, BUZZ Mchugh RD, GUERRERO Castro, Tin and INGE Herrmann, Therapy Supervisor and resident. Estimated d/c date 6/20. No issues voiced.

## 2023-06-01 NOTE — PROGRESS NOTES
[facility] - Skilled Nursing  Wound Care    Patient Name:  Gisselle Ortiz   MRN:  [id number]  Date: 6/1/2023  Diagnosis: Acute renal failure superimposed on stage 3 chronic kidney disease    History:     Past Medical History:   Diagnosis Date    A-fib     Anemia of chronic disease 02/02/2021    Anticoagulant long-term use     CHF (congestive heart failure)     Chronic diastolic heart failure 02/02/2021    COVID-19 01/07/2022    Gallstone pancreatitis     Hypertension     Pancreatic abscess 09/26/2020    Stage 3 chronic kidney disease 5/11/2016    Thyroid disease        Social History     Socioeconomic History    Marital status:    Tobacco Use    Smoking status: Former     Types: Cigarettes     Start date: 5/19/1964    Smokeless tobacco: Never   Substance and Sexual Activity    Alcohol use: No    Drug use: No    Sexual activity: Not Currently     Partners: Male     Social Determinants of Health     Financial Resource Strain: Low Risk     Difficulty of Paying Living Expenses: Not hard at all   Food Insecurity: No Food Insecurity    Worried About Running Out of Food in the Last Year: Never true    Ran Out of Food in the Last Year: Never true   Transportation Needs: No Transportation Needs    Lack of Transportation (Medical): No    Lack of Transportation (Non-Medical): No   Physical Activity: Inactive    Days of Exercise per Week: 0 days    Minutes of Exercise per Session: 0 min   Stress: No Stress Concern Present    Feeling of Stress : Not at all   Social Connections: Moderately Isolated    Frequency of Communication with Friends and Family: More than three times a week    Frequency of Social Gatherings with Friends and Family: More than three times a week    Attends Sabianism Services: 1 to 4 times per year    Active Member of Clubs or Organizations: No    Attends Club or Organization Meetings: Never    Marital Status:    Housing Stability: Unknown    Unable to Pay for Housing in the Last Year: No     Unstable Housing in the Last Year: No       Precautions:     Allergies as of 05/23/2023 - Reviewed 05/23/2023   Allergen Reaction Noted    Ciprofloxacin Nausea And Vomiting 05/16/2019       [facility] Assessment Details/Treatment   Wound care follow-up  The sacral spine has IAD with scattered partial thickness  skin friction. Joi-wound is pink/blanchable redness.  EHOB waffle overlay in place, chair cushion, turns self in bed.   Wound care discussed, verbalized understanding.      Plan:  Sacral spine- discontinue BPCO, apply Triad ointment to clean dry skin BID/prn cleansing  Continue pressure prevention measures    Nursing to continue care, wound care will follow  Recommendations made to primary team for above plan per written report . Orders placed.      06/01/23 0815        Altered Skin Integrity 05/24/23 0546 Sacral spine Incontinence associated dermatitis   Date First Assessed/Time First Assessed: 05/24/23 0546   Altered Skin Integrity Present on Admission - Did Patient arrive to the hospital with altered skin?: yes  Location: Sacral spine  Is this injury device related?: No  Primary Wound Type: (c) [place]...   Wound Image    Dressing Appearance Open to air   Drainage Amount None   Drainage Characteristics/Odor No odor   Appearance Red;Pink;Moist;Epithelialization   Tissue loss description Partial thickness   Periwound Area Moist;[place]   Wound Edges Open;Jagged   Wound Length (cm) 5 cm   Wound Width (cm) 5 cm   Wound Depth (cm) 0.2 cm   Wound Volume (cm^3) 5 cm^3   Wound Surface Area (cm^2) 25 cm^2   Care Cleansed with:;Soap and water;Applied:;Skin Barrier     06/01/2023

## 2023-06-01 NOTE — PT/OT/SLP PROGRESS
"Occupational Therapy   Treatment    Name: Gisselle Ortiz  MRN: [id number]  Admit Date: 5/23/2023  Admitting Diagnosis:  Acute renal failure superimposed on stage 3 chronic kidney disease    General Precautions: Standard, fall   Orthopedic Precautions: N/A   Braces: N/A    Recommendations:     Discharge Recommendations:  home health OT  Level of Assistance Recommended at Discharge: 24 hours light assistance for ADL's and homemaking tasks  Discharge Equipment Recommendations: none (Pt has all needed DME.)  Barriers to discharge:   (Increased assistance required for ADLs and functional mobility)    Assessment:     Gisselle Ortiz is a 89 y.o. female with a medical diagnosis of Acute renal failure superimposed on stage 3 chronic kidney disease.  She presents with limitations in performance of self-care, functional mobility, and ADLs. Performance deficits affecting function are weakness, impaired endurance, impaired self care skills, impaired functional mobility, gait instability, impaired balance, decreased upper extremity function, decreased lower extremity function, decreased safety awareness, decreased coordination, impaired cardiopulmonary response to activity, edema. Pt tolerated Tx without incident and BP was monitored. Pt continues to require assist to perform self care tasks, functional mobility and functional transfers. Pt would continue to benefit from OT intervention to further functional (I)ce and safety.     BP readings  Supine in bed 134/60  Seated /57  Seated in wc approximately 5 min 109/58  Seated in chair after approximately 10 min 114/58    Rehab Potential is fair    Activity tolerance:  Fair    Plan:     Patient to be seen 5 x/week to address the above listed problems via self-care/home management, therapeutic activities, therapeutic exercises    Plan of Care Expires: 06/22/23  Plan of Care Reviewed with: patient, son    Subjective     "I feel awful"    Communicated with: Nursing prior " to session.      Pain/Comfort:  Pain Rating 1: 0/10  Pain Rating Post-Intervention 1: 0/10    Patient's cultural, spiritual, Sikhism conflicts given the current situation:  no    Objective:     Patient found HOB elevated with  (no active lines) upon OT entry to room.    Bed Mobility:    Patient completed Rolling/Turning to Left with  stand by assistance and with side rail  Patient completed Rolling/Turning to Right with stand by assistance and with side rail  Patient completed Scooting/Bridging with contact guard assistance  Patient completed Supine to Sit with minimum assistance and with side rail with HOB flat    Functional Mobility/Transfers:  Patient completed Sit <> Stand Transfer with minimum assistance  with  rolling walker   Patient completed Bed <> Chair Transfer using Stand Pivot technique with minimum assistance with rolling walker  Patient completed Chair <> Bedside Chair Transfer using Stand Pivot technique with minimum assistance with rolling walker    Activities of Daily Living:  Grooming: stand by assistance seated sinkside with set up  Upper Body Dressing: contact guard assistance seated EOB to doff/rosario pull over shirt  Lower Body Dressing: moderate assistance to rosario pants at bed level with (A) to thread BLE with pt bridging to manage pants over rear  Toileting: maximal assistance to doff/rosario pull tab brief at bed level and perform dilcia care    [facility] 6 Click ADL: 16    Treatment & Education:  Pt educated on:  - role of OT  - level of assistance  - energy conservation and task modification to maximized independence with ADL's and mobility   -  safety while performing functional transfers and self care tasks  - progress towards OT goals      Patient left  up in bedside chair  with call button in reach and PCT notified    GOALS:   Multidisciplinary Problems       Occupational Therapy Goals          Problem: Occupational Therapy    Goal Priority Disciplines Outcome Interventions   Occupational  Therapy Goal     OT, PT/OT Ongoing, Not Progressing    Description: Goals to be met by: 6/22/2023     Patient will increase functional independence with ADLs by performing:    UE Dressing with Set-up Assistance.  LE Dressing with Stand-by Assistance using appropriate AE as needed.  Grooming while seated at sink with Set-up Assistance.  Toileting from 3-in-1 commode over toilet with Stand-by Assistance for hygiene and clothing management.   Bathing from sitting/standing at sink with Minimal Assistance.  Supine to sit with Supervision.  Step transfer with Stand-by Assistance with RW.  Toilet transfer to 3-in-1 commode over toilet with Stand-by Assistance with RW.                         Time Tracking:     OT Date of Treatment: 06/01/23  OT Start Time: 1100    OT Stop Time: 1130  OT Total Time (min): 30 min    Billable Minutes:Self Care/Home Management 30    6/1/2023  A client care conference was performed between the LOTR and CARDENAS, prior to treatment by CARDENAS, to discuss the patient's status, treatment plan and established goals.

## 2023-06-01 NOTE — PLAN OF CARE
Problem: Occupational Therapy  Goal: Occupational Therapy Goal  Description: Goals to be met by: 6/22/2023     Patient will increase functional independence with ADLs by performing:    UE Dressing with Set-up Assistance.  LE Dressing with Stand-by Assistance using appropriate AE as needed.  Grooming while seated at sink with Set-up Assistance.  Toileting from 3-in-1 commode over toilet with Stand-by Assistance for hygiene and clothing management.   Bathing from sitting/standing at sink with Minimal Assistance.  Supine to sit with Supervision.  Step transfer with Stand-by Assistance with RW.  Toilet transfer to 3-in-1 commode over toilet with Stand-by Assistance with RW.    Outcome: Ongoing, Progressing

## 2023-06-01 NOTE — PROGRESS NOTES
[facility]                                  Skilled Nursing Facility                   Progress Note     Patient Name: Gisselle Ortiz  YOB: 1933  MRN: [id number]  Room: [place] [address]/[place] [address] A     Admit Date: 5/23/2023   FRANCESCA:      Principal Problem: Acute renal failure superimposed on stage 3 chronic kidney disease    HPI obtained from patient interview and chart review     Chief Complaint: Re-evaluation of medical treatment and therapy status:  Hypotension      HPI:   Gisselle Ortiz is a 89 y.o. female with PMH of GERD, gallstone pancreatitis, thyroid disease, HTN, HLD, SSS s/p PPM, CHF (EF 55%), and a fib on eliquis who presented to the ED for nausea and vomiting. Pt admitted to hospital medicine for intractable n/v and UTI. Pt has remained hemodynamically stable. She was started on 5-days of IV rocephin. Urine culture + klebsiella pneumoniae and e.coli. No leukocytosis but was hyperkalemic K 5.3; discontinued spironolactone. AST/ALT elevated on admission and downtrending. Abd US with hepatomegaly, stable prominence of CBD, and cholelithiasis without evidence of cholecystitis. AES recommended MRCP, which the patient unable to have 2/2 pacemaker. KUB with nonobstructive bowel gas pattern and significant stool burden, which is improving on bowel regimen. CTAP with gallbladder sludge vs stones and colonic wall thickening vs. stenosis/peristalsis. Pt with MARIO on CKD, which is improving with IVFs. Pt with new onset thrombocytopenia which is now improving. Hematology consulted and recommended started cyanocobalamin. PT/OT consulted and on re-evaluation are recommending SNF. All questions were answered. Patient acknowledged understanding of discharge instructions and feels safe to discharge. Patient was transferred to SNF on 5/23/23 in stable condition.    Patient will be treated at [facility] with PT and OT to improve  functional status and ability to perform ADLs.     Interval History  24 hour chart review completed. NAEON. NAD.  Afebrile, vital signs stable.   Holding atenolol and diltiazem.  Hypotension improving; BP range 112//64.   Patient was unable to participate in therapy today due to subjective weakness.  All of today's labs reviewed; listed and addressed below.         Past Medical History: Patient has a past medical history of A-fib, Anemia of chronic disease (02/02/2021), Anticoagulant long-term use, CHF (congestive heart failure), Chronic diastolic heart failure (02/02/2021), COVID-19 (01/07/2022), Gallstone pancreatitis, Hypertension, Pancreatic abscess (09/26/2020), Stage 3 chronic kidney disease (5/11/2016), and Thyroid disease.    Past Surgical History: Patient has a past surgical history that includes Cataract extraction; Cataract extraction w/  intraocular lens implant (Bilateral, 2004); Revision of skin pocket for pacemaker (N/A, 1/21/2019); Eye surgery; Hysterectomy; Hip replacement arthroplasty (Right, 2016); Treatment of cardiac arrhythmia (N/A, 3/18/2019); Thyroidectomy; Treatment of cardiac arrhythmia (N/A, 5/14/2019); ERCP (N/A, 9/14/2020); Endoscopic ultrasound of upper gastrointestinal tract (N/A, 9/14/2020); ERCP (N/A, 9/25/2020); ERCP (N/A, 11/25/2020); Endoscopic ultrasound of upper gastrointestinal tract (11/25/2020); and Treatment of cardiac arrhythmia (N/A, 6/21/2022).    Social History: Patient reports that she has quit smoking. Her smoking use included cigarettes. She started smoking about 59 years ago. She has never used smokeless tobacco. She reports that she does not drink alcohol and does not use drugs.    Family History:  family history includes Heart attack in her maternal grandmother and mother; Heart disease in her maternal grandmother and mother; Heart failure in her maternal grandmother and mother; Hypertension in her maternal grandmother and mother; Stroke in her maternal  grandmother.    Allergies: Patient is allergic to ciprofloxacin.        Review of Systems   Constitutional:  Positive for malaise/fatigue. Negative for chills and fever.   HENT:  Negative for congestion, hearing loss and sore throat.    Eyes:  Negative for blurred vision and double vision.   Respiratory:  Negative for cough, sputum production, shortness of breath and wheezing.    Cardiovascular:  Negative for chest pain, palpitations and leg swelling.   Gastrointestinal:  Negative for abdominal pain, constipation, diarrhea, heartburn, nausea and vomiting.   Genitourinary:  Negative for dysuria and urgency.   Musculoskeletal:  Negative for back pain.   Skin:  Negative for rash.        + wound   Neurological:  Positive for dizziness and weakness. Negative for headaches.   Endo/Heme/Allergies:  Negative for polydipsia. Does not bruise/bleed easily.   Psychiatric/Behavioral:  Negative for depression and hallucinations. The patient is not nervous/anxious.        24 hour Vital Sign Range   Temp:  [97.6 °F (36.4 °C)-97.8 °F (36.6 °C)]   Pulse:  [62-66]   Resp:  [16-17]   BP: (112-118)/(53-55)   SpO2:  [96 %-98 %]       Physical Exam  Vitals and nursing note reviewed.   Constitutional:       General: She is not in acute distress.     Appearance: Normal appearance. She is not ill-appearing.   HENT:      Head: Normocephalic and atraumatic.      Nose: Nose normal. No congestion.      Mouth/Throat:      Mouth: Mucous membranes are moist.      Pharynx: Oropharynx is clear.   Eyes:      Extraocular Movements: Extraocular movements intact.      Conjunctiva/sclera: Conjunctivae normal.      Pupils: Pupils are equal, round, and reactive to light.   Cardiovascular:      Rate and Rhythm: Normal rate and regular rhythm.      Pulses: Normal pulses.      Heart sounds: Normal heart sounds. No murmur heard.  Pulmonary:      Effort: Pulmonary effort is normal. No respiratory distress.      Breath sounds: Normal breath sounds. No wheezing or  rhonchi.   Abdominal:      General: Bowel sounds are normal. There is no distension.      Palpations: Abdomen is soft. There is no mass.      Tenderness: There is no abdominal tenderness.   Musculoskeletal:         General: No swelling or tenderness.      Cervical back: Normal range of motion and neck supple. No tenderness.      Right lower leg: No edema.      Left lower leg: No edema.   Skin:     General: Skin is warm and dry.      Capillary Refill: Capillary refill takes less than 2 seconds.      Findings: No erythema or rash.   Neurological:      Mental Status: She is alert and oriented to person, place, and time. Mental status is at baseline.      Motor: Weakness present.   Psychiatric:         Mood and Affect: Mood normal.         Behavior: Behavior normal.         Thought Content: Thought content normal.         Judgment: Judgment normal.     Full skin assessment completed by this NP       Labs:  Recent Labs   Lab 05/26/23  0511 05/29/23  0721 05/31/23  0836   WBC 11.84 9.01 8.27   HGB 8.9* 8.4* 9.2*   HCT 28.8* 27.6* 30.1*    314 348       Recent Labs   Lab 05/25/23  0515 05/26/23  0511 05/29/23  0721 05/31/23  0836    136 144 143   K 4.2 4.1 4.8 4.6    111* 115* 115*   CO2 21* 18* 22* 24   BUN 31* 33* 39* 39*   CREATININE 2.2* 2.0* 1.8* 1.6*   GLU 87 113* 100 94   CALCIUM 8.0* 7.8* 7.7* 7.9*   MG 1.7  --  1.7 1.8   PHOS 3.1  --  2.7 2.8       No results for input(s): ALKPHOS, ALT, AST, ALBUMIN, PROT, BILITOT, INR in the last 168 hours.    No results for input(s): POCTGLUCOSE in the last 72 hours.    Meds Scheduled:   amiodarone  200 mg Oral Daily    apixaban  2.5 mg Oral BID    aspirin  81 mg Oral Daily    [START ON 6/3/2023] atenoloL  25 mg Oral QHS    [START ON 6/3/2023] atenoloL  50 mg Oral Daily    balsam peru-castor oiL   Topical (Top) BID    cyanocobalamin  1,000 mcg Oral Daily    diclofenac sodium  2 g Topical (Top) QID    [START ON 6/3/2023] diltiaZEM  180 mg Oral Daily    folic  acid  1 mg Oral Daily    Lactobacillus rhamnosus GG  1 capsule Oral Daily    levothyroxine  137 mcg Oral Before breakfast    mirtazapine  7.5 mg Oral QHS    multivitamin  1 tablet Oral Daily    pantoprazole  40 mg Oral Daily    polyethylene glycol  17 g Oral Daily    senna-docusate 8.6-50 mg  1 tablet Oral BID       PRN:   acetaminophen, acetaminophen, aluminum-magnesium hydroxide-simethicone, bismuth subsalicylate, calcium carbonate, hydrALAZINE, melatonin      Assessment and Plan:  Impaired functional mobility and endurance  - Chronic issue with acute worsening   - Continue with PT/OT for gait training and strengthening and restoration of ADL's   - Encourage mobility, OOB in chair, and early ambulation as appropriate  - Fall precautions   - Monitor for bowel and bladder dysfunction  - Monitor for and prevent skin breakdown and pressure ulcers  - Continue DVT prophylaxis with apixaban      Acute renal failure superimposed on stage 3 chronic kidney disease  - Creatinine  (baseline 1-1.3)  - Monitor BMP daily, replete electrolytes if necessary  - Renally adjust drugs to CrCl; avoid nephrotoxic drugs   - Monitor I/Os  - Renal ultrasound consistent with bilateral medical renal disease & simple renal cysts; no hydronephrosis  - given D5 1.2 NS @50ml/hr x 1L  5/31/2023 renal function improving     Essential hypertension  Hypotension, improving  - hold atenolol and diltiazem until Saturday 6/3 and monitor BP  - PRN  hydralazine   - Continue to monitor BP closely    Left upper extremity swelling  - Afebrile no leukocytosis  - U/S with resolution of L cephalic vein thrombosis and no evidence of DVT  - Elevate extremity  - Continue to monitor     Malnutrition of mild degree  BMI of 19 or less in adult  Body mass index is 23.2 kg/m².      - Pt reports poor PO intake for many weeks despite encouragement & good    family support  - Start mirtazapine 7.5 mg nightly   - Nutrition consulted, appreciate recs:   -Regular with  supplements  -Recommend MVI, probiotic supplementation.      Thrombocytopenia  - Platelets 113 on admit to SNF  - no heparin products this admit  - no evidence of thrombocytopenia in past labs  - had severe transaminitis in 1/2022 during COVID infection - other elevations also noted - may be underlying liver dz as an association  - Hematology consulted given advancing skin changes and petechia, appreciate recs:  -- Agree with folic acid supplementation  -- Start cyanocobalamin 1,000 mcg PO daily  - Continue to monitor on daily labs     Transaminitis  -AST//177, tbili 0.9 on admission and downtrending.  -Patient denies abdominal pain.  -Repeat lipid panel wnl--discontinue statin  -Hepatitis panel negative  -Plan to f/u with PCP on when to restart statin  -Will need hepatology f/u OP     Constipation  -KUB with nonobstructive bowel gas pattern noting large amount of stool throughout the colon may reflect constipation  - Continue bowel regimen with miralax TID & senna BID     Anemia due to chronic kidney disease  Folate deficiency      -Monitor Hgb       -Transfuse for Hgb <7    Chronic diastolic heart failure   -Monitor strict Is&Os and daily weights.  Continue to stress to patient importance of self efficacy and  on diet for CHF.      Paroxysmal atrial fibrillation  Current use of long term anticoagulation  -Apixaban 2.5 mg bid  - amiodarone 200 mg daily     Anticipate disposition:    Home with home health      Follow-up needed during SNF admission:       Follow-up needed after discharge from SNF:   - PCP within 1-2 weeks  - See appt scheduled below     Future Appointments   Date Time Provider Department Center   6/2/2023  1:00 PM Roz Leon NP [facility] NEPHRO [place]   7/12/2023 11:00 AM Joi Romeo MD [facility] PAL MED [place] [facility]   7/18/2023 10:00 AM HOME MONITOR DEVICE CHECK, [facility] ARRHPRO [place]   8/15/2023  9:00 AM Sunil Naidu MD [facility] HEPAT [place]   10/31/2023  3:00 PM Kai  BUZZ Montez MD [facility]         I certify that SNF services are required to be given on an inpatient basis because Gisselle Ortiz needs for skilled nursing care and/or skilled rehabilitation are required on a daily basis and such services can only practically be provided in a skilled nursing facility setting and are for an ongoing condition for which she received inpatient care in the hospital.       Extended Visit  Total time spent: > 15 minutes  Description of Time: counseling patient on clinical condition, therapies provided, plan of care, emotional support, coordinating patient care with other care team members, reviewing and interpreting labs and imaging, collaboration with physician, initiating new orders, chart review, and documentation. See interval hx.           Vivian Mendes NP  Department of Hospital Medicine   [facility]     DOS: 5/31/2023       Patient note was created using MModal Dictation.  Any errors in syntax or even information may not have been identified and edited on initial review prior to signing this note.

## 2023-06-01 NOTE — PT/OT/SLP PROGRESS
"Physical Therapy Treatment    Patient Name:  Gisselle Ortiz   MRN:  [id number]  Admit Date: 5/23/2023  Admitting Diagnosis: Acute renal failure superimposed on stage 3 chronic kidney disease  Recent Surgeries: N/A    General Precautions: Standard, fall  Orthopedic Precautions: N/A  Braces: N/A    Recommendations:     Discharge Recommendations: home health PT  Level of Assistance Recommended at Discharge: 24 hours significant assistance  Discharge Equipment Recommendations: none  Barriers to discharge:  (Increased assistance for mobility.)    Assessment:     Gisselle Ortiz is a 89 y.o. female admitted with a medical diagnosis of Acute renal failure superimposed on stage 3 chronic kidney disease .     Pt was agreeable with encouragement. Pt completed multiple sit<>stand transfer when completing pericare cleaning and changing brief and pants. Pt ambulated, but limited by R knee buckling. No significant decrease in blood pressure today. Pt continues to benefit from therapy to improve functional endurance, strength, and mobility.      Performance deficits affecting function: weakness, impaired endurance, impaired self care skills, impaired functional mobility, gait instability, impaired balance, decreased lower extremity function, decreased safety awareness, decreased upper extremity function, edema, impaired cardiopulmonary response to activity.    Rehab Potential is fair    Activity Tolerance: Fair    Plan:     Patient to be seen 5 x/week to address the above listed problems via gait training, therapeutic activities, therapeutic exercises, neuromuscular re-education, wheelchair management/training    Plan of Care Expires: 06/23/23  Plan of Care Reviewed with: patient    Subjective     "Just not up for it"    Pain/Comfort:  Pain Rating 1:  (did not rate)  Location - Orientation 1: generalized  Location 1:  (wound site)  Pain Addressed 1: Reposition  Pain Rating Post-Intervention 1:  (report same as beginning of " session)    Patient's cultural, spiritual, Sikhism conflicts given the current situation:  no    Objective:     Patient found up in chair with  (no lines) upon PT entry to room.     Therapeutic Activities and Exercises:   Encouragement required to increase participation.   Increase time assisting with changing brief and pants and donning new brief and pants and monitoring vitals  Seated BLE therex x5 reps: ankle pumps, LAQ, marching  Mini elliptical x5 mins (light resistance)  To improve BLE endurance, strength, and ROM  Patient educated on role of therapy, goals of session, and benefits of out of bed mobility.   Instructed on use of call button and importance of calling nursing staff for assistance with mobility   Questions/concerns addressed within PTA scope of practice  Pt verbalized understanding.    *all vital taken seated in chair or wheelchair. No reports of increase or decrease in dizziness   BP HR MAP   Beginning of session 114/56 66 80   Post transfer 104/57 68 76   Post cleaning 106/53 68 77   Post w/c + therex 112/56 65 80   Post elliptical 131/60 64 87   Post gait 112/53 65 77     Functional Mobility:  Transfers:   Sit <> Stand Transfer: minimum assistance and moderate assistance with rolling walker   Multiple trials from wheelchair to completed changing brief and pants  Chair <> Wheelchair Transfer: minimum assistance with no assistive device using Stand Pivot technique   Gait:  Pt ambulated ~2+8 ft with moderate assistance and rolling walker. Rehab tech following with wheelchair   1 seated rest breaks  Limited by R knee buckling   Gait Deviation(s): unsteady gait with flexed posture, decrease tae, decrease step length  Verbal/tactile cues for RW management and upright posture  Wheelchair Propulsion:    Pt propelled Standard wheelchair x ~50 feet on Level tile with  Bilateral upper extremity with Supervision or Set-up Assistance.   Increase time to steer straight. Slight veer to the  left.    AM-PAC 6 CLICK MOBILITY  15    Patient left up in chair with call button in reach and PCT present.    GOALS:   Multidisciplinary Problems       Physical Therapy Goals          Problem: Physical Therapy    Goal Priority Disciplines Outcome Goal Variances Interventions   Physical Therapy Goal     PT, PT/OT Ongoing, Progressing     Description: Goals to be met by: 23     Patient will increase functional independence with mobility by performin. Supine to sit with Supervision - not met  2. Sit to supine with Contact Guard Assistance - not met  3. Rolling to Left and Right with Supervision - not met  4. Sit to stand transfer with Stand-by Assistance - not met  5. Bed to chair transfer with Stand-by Assistance using Rolling Walker - not met  6. Gait  x 50 feet with Contact Guard Assistance using Rolling Walker - not met  7. Wheelchair propulsion x 50 feet with Modified [place] using bilateral upper extremities - not met  8. Ascend/Descend 4 inch curb step with Minimal Assistance using Rolling Walker - not met; unable to assess - assess when deemed safe                         Time Tracking:     PT Received On: 23  PT Start Time: 1348  PT Stop Time: 1431  PT Total Time (min): 43 min    Billable Minutes: Gait Training 13, Therapeutic Activity 20, and Therapeutic Exercise 10    Treatment Type: Treatment  PT/PTA: PTA     Number of PTA visits since last PT visit: 2023

## 2023-06-02 NOTE — PROGRESS NOTES
[facility] - Skilled Nursing  Adult Nutrition  Progress Note    SUMMARY   Recommendations  Continue regular diet to encourage PO intake, trial boost plus daily, recommend Vit D per home med list, RD following  Goals: PO to improve to 75% of EEN with ONS by next RD follow up  Nutrition Goal Status: goal not met  Communication of RD Recs: other (comment) (POC)    Assessment and Plan  Malnutrition of mild degree  Malnutrition Type:  Context: acute illness or injury  Level: mild     Related to (etiology):   Taste changes, lack of interest in food     Malnutrition Characteristic Summary:  Energy Intake (Malnutrition): less than or equal to 50% for greater than or equal to 5 days  Muscle Mass (Malnutrition): moderate depletion        Interventions/Recommendations (treatment strategy):  Continue regular diet to encourage PO intake, trial boost plus daily, recommend Vit D per home med list, RD following     Nutrition Diagnosis Status:   Continues     Malnutrition Assessment  5/26  Malnutrition Context: acute illness or injury  Malnutrition Level: mild  Skin (Micronutrient): pallor, bruised, thinned  Eyes (Micronutrient): conjunctiva dull  Teeth (Micronutrient): broken dentition (no upper teeth, wont wear dentures)  Tongue (Micronutrient): magenta  Neck/Chest (Micronutrient): muscle wasting  Musculoskeletal/Lower Extremities: muscle wasting   Micronutrient Evaluation Summary: suspected deficiency  Micronutrient Evaluation Comments: folate, protein,   Energy Intake (Malnutrition): less than or equal to 50% for greater than or equal to 5 days  Muscle Mass (Malnutrition): moderate depletion   Orbital Region (Subcutaneous Fat Loss): severe depletion  Upper Arm Region (Subcutaneous Fat Loss): well nourished  Thoracic and Lumbar Region: well nourished   Quaker Region (Muscle Loss): severe depletion  Clavicle and Acromion Bone Region (Muscle Loss): moderate depletion  Dorsal Hand (Muscle Loss): severe depletion  Anterior Thigh  "Region (Muscle Loss): moderate depletion                 Reason for Assessment    Reason For Assessment: RD follow-up  Diagnosis:  (acute kidney failure superimposed on CKD 3)  Relevant Medical History: CHF, HTN, HLD, Anemia, thyroid disease, pancreatic abcess, pacemaker, SSS, AFIB,  Interdisciplinary Rounds: attended  General Information Comments: poor PO intake  Nutrition Discharge Planning: DC on regular no added salt diet.    Nutrition/Diet History    Patient Reported Diet/Restrictions/Preferences: general  Typical Food/Fluid Intake: two meals  Spiritual, Cultural Beliefs, Amish Practices, Values that Affect Care: no  Vitamin/Mineral/Herbal Supplements: centrum silver, Vit D  Food Allergies: NKFA  Factors Affecting Nutritional Intake: altered taste, decreased appetite    Anthropometrics    Temp: 97.2 °F (36.2 °C)  Height Method: Stated  Height: 5' 3" (160 cm)  Height (inches): 63 in  Weight Method: Bed Scale  Weight: 59.4 kg (130 lb 15.3 oz)  Weight (lb): 130.95 lb  Ideal Body Weight (IBW), Female: 115 lb  % Ideal Body Weight, Female (lb): 113.87 %  BMI (Calculated): 23.2  BMI Grade: 18.5-24.9 - normal  Usual Body Weight (UBW), k kg  Weight Change Amount:  (there is a weight descrepany on chart review with wt of 110 # if that were correct pt would have lost 15% but that weight is not reflected here)  % Usual Body Weight: 102.63  % Weight Change From Usual Weight: 2.41 %       Lab/Procedures/Meds    Pertinent Labs Reviewed: reviewed  Pertinent Labs Comments: Hg 8.4, Hct 27.5, K 5.2, Ca 7.7, Cr 1.5, PO4 2.4, BUN 39  Pertinent Medications Reviewed: reviewed  Pertinent Medications Comments: amiodarone, apixaban, Vit B12, folic acid, levotlhyroxine, lactobacullis GG, MVI, mirtazapine, polyethylene glycol, pantoprazole, senna-docusate    Estimated/Assessed Needs    Weight Used For Calorie Calculations: 59.4 kg (130 lb 15.3 oz)  Energy Calorie Requirements (kcal): 1285  Energy Need Method: [place] (x " 1.3(PAL))  Protein Requirements: 71g  Weight Used For Protein Calculations: 59.4 kg (130 lb 15.3 oz) (x 1.2g/kg)  Fluid Requirements (mL): 1285 or per MD  Estimated Fluid Requirement Method: RDA Method  RDA Method (mL): 1285  CHO Requirement: -      Nutrition Prescription Ordered    Current Diet Order: Regular  Nutrition Order Comments: PO 50%  Oral Nutrition Supplement: boost plus daily    Evaluation of Received Nutrient/Fluid Intake    I/O: no data  Energy Calories Required: not meeting needs  Protein Required: not meeting needs  Fluid Required: meeting needs  Comments: LBM 6/1  Tolerance: tolerating  % Intake of Estimated Energy Needs: 25 - 50 %  % Meal Intake: 25 - 50 %    Nutrition Risk    Level of Risk/Frequency of Follow-up: high     Monitor and Evaluation    Food and Nutrient Intake: food and beverage intake  Food and Nutrient Adminstration: diet order  Physical Activity and Function: nutrition-related ADLs and IADLs  Anthropometric Measurements: weight change  Biochemical Data, Medical Tests and Procedures: glucose/endocrine profile, gastrointestinal profile, electrolyte and renal panel  Nutrition-Focused Physical Findings: skin, overall appearance     Nutrition Follow-Up    RD Follow-up?: Yes

## 2023-06-02 NOTE — PROGRESS NOTES
[facility]                                  Skilled Nursing Facility                   Progress Note     Patient Name: Gisselle Ortiz  YOB: 1933  MRN: [id number]  Room: [place] [address] [address] [place]     Admit Date: 5/23/2023   FRANCESCA: 6/16/2023     Principal Problem: Acute renal failure superimposed on stage 3 chronic kidney disease    HPI obtained from patient interview and chart review     Chief Complaint: Re-evaluation of medical treatment and therapy status:  Hypotension, lab review      HPI:   Gisselle Ortiz is a 89 y.o. female with PMH of GERD, gallstone pancreatitis, thyroid disease, HTN, HLD, SSS s/p PPM, CHF (EF 55%), and a fib on eliquis who presented to the ED for nausea and vomiting. Pt admitted to hospital medicine for intractable n/v and UTI. Pt has remained hemodynamically stable. She was started on 5-days of IV rocephin. Urine culture + klebsiella pneumoniae and e.coli. No leukocytosis but was hyperkalemic K 5.3; discontinued spironolactone. AST/ALT elevated on admission and downtrending. Abd US with hepatomegaly, stable prominence of CBD, and cholelithiasis without evidence of cholecystitis. AES recommended MRCP, which the patient unable to have 2/2 pacemaker. KUB with nonobstructive bowel gas pattern and significant stool burden, which is improving on bowel regimen. CTAP with gallbladder sludge vs stones and colonic wall thickening vs. stenosis/peristalsis. Pt with MARIO on CKD, which is improving with IVFs. Pt with new onset thrombocytopenia which is now improving. Hematology consulted and recommended started cyanocobalamin. PT/OT consulted and on re-evaluation are recommending SNF. All questions were answered. Patient acknowledged understanding of discharge instructions and feels safe to discharge. Patient was transferred to SNF on 5/23/23 in stable condition.    Patient will be treated at [facility] with PT and  OT to improve functional status and ability to perform ADLs.     Interval History  24 hour chart review completed. NAEON. NAD.  Afebrile, vital signs stable. Hypotension with associated weakness slowly improving.  Holding atenolol 25 mg nightly and diltiazem 180 mg daily.  Will initiate resume atenolol at 12.5 mg nightly.  Amiodarone continued.   Most recent labs reviewed; listed below.   K 5.2 Initiate lokelma 10 g x 1 and obtain repeat BMP tomorrow morning.  phos low 2.4  Mag 1.6, initiate mag ox 500 mg x 2 doses and obtain mag level tomorrow morning.      Past Medical History: Patient has a past medical history of A-fib, Anemia of chronic disease (02/02/2021), Anticoagulant long-term use, CHF (congestive heart failure), Chronic diastolic heart failure (02/02/2021), COVID-19 (01/07/2022), Gallstone pancreatitis, Hypertension, Pancreatic abscess (09/26/2020), Stage 3 chronic kidney disease (5/11/2016), and Thyroid disease.    Past Surgical History: Patient has a past surgical history that includes Cataract extraction; Cataract extraction w/  intraocular lens implant (Bilateral, 2004); Revision of skin pocket for pacemaker (N/A, 1/21/2019); Eye surgery; Hysterectomy; Hip replacement arthroplasty (Right, 2016); Treatment of cardiac arrhythmia (N/A, 3/18/2019); Thyroidectomy; Treatment of cardiac arrhythmia (N/A, 5/14/2019); ERCP (N/A, 9/14/2020); Endoscopic ultrasound of upper gastrointestinal tract (N/A, 9/14/2020); ERCP (N/A, 9/25/2020); ERCP (N/A, 11/25/2020); Endoscopic ultrasound of upper gastrointestinal tract (11/25/2020); and Treatment of cardiac arrhythmia (N/A, 6/21/2022).    Social History: Patient reports that she has quit smoking. Her smoking use included cigarettes. She started smoking about 59 years ago. She has never used smokeless tobacco. She reports that she does not drink alcohol and does not use drugs.    Family History:  family history includes Heart attack in her maternal grandmother and  mother; Heart disease in her maternal grandmother and mother; Heart failure in her maternal grandmother and mother; Hypertension in her maternal grandmother and mother; Stroke in her maternal grandmother.    Allergies: Patient is allergic to ciprofloxacin.        Review of Systems   Constitutional:  Positive for malaise/fatigue. Negative for chills and fever.   HENT:  Negative for congestion, hearing loss and sore throat.    Eyes:  Negative for blurred vision and double vision.   Respiratory:  Negative for cough, sputum production, shortness of breath and wheezing.    Cardiovascular:  Negative for chest pain, palpitations and leg swelling.   Gastrointestinal:  Negative for abdominal pain, constipation, diarrhea, heartburn, nausea and vomiting.   Genitourinary:  Negative for dysuria and urgency.   Musculoskeletal:  Negative for back pain.   Skin:  Negative for rash.        + wound   Neurological:  Positive for dizziness and weakness. Negative for headaches.   Endo/Heme/Allergies:  Negative for polydipsia. Does not bruise/bleed easily.   Psychiatric/Behavioral:  Negative for depression and hallucinations. The patient is not nervous/anxious.        24 hour Vital Sign Range   Temp:  [97.6 °F (36.4 °C)-99.1 °F (37.3 °C)]   Pulse:  [64-80]   Resp:  [17-18]   BP: (110-120)/(53-60)   SpO2:  [95 %-99 %]       Physical Exam  Vitals and nursing note reviewed.   Constitutional:       General: She is not in acute distress.     Appearance: Normal appearance. She is not ill-appearing.   HENT:      Head: Normocephalic and atraumatic.      Nose: Nose normal. No congestion.      Mouth/Throat:      Mouth: Mucous membranes are moist.      Pharynx: Oropharynx is clear.   Eyes:      Extraocular Movements: Extraocular movements intact.      Conjunctiva/sclera: Conjunctivae normal.      Pupils: Pupils are equal, round, and reactive to light.   Cardiovascular:      Rate and Rhythm: Normal rate and regular rhythm.      Pulses: Normal pulses.       Heart sounds: Normal heart sounds. No murmur heard.  Pulmonary:      Effort: Pulmonary effort is normal. No respiratory distress.      Breath sounds: Normal breath sounds. No wheezing or rhonchi.   Abdominal:      General: Bowel sounds are normal. There is no distension.      Palpations: Abdomen is soft. There is no mass.      Tenderness: There is no abdominal tenderness.   Musculoskeletal:         General: No swelling or tenderness.      Cervical back: Normal range of motion and neck supple. No tenderness.      Right lower leg: No edema.      Left lower leg: No edema.   Skin:     General: Skin is warm and dry.      Capillary Refill: Capillary refill takes less than 2 seconds.      Findings: No erythema or rash.   Neurological:      Mental Status: She is alert and oriented to person, place, and time. Mental status is at baseline.      Motor: Weakness present.   Psychiatric:         Mood and Affect: Mood normal.         Behavior: Behavior normal.         Thought Content: Thought content normal.         Judgment: Judgment normal.     Full skin assessment completed by this NP       Labs:  Recent Labs   Lab 05/29/23  0721 05/31/23  0836 06/01/23  0436   WBC 9.01 8.27 8.38   HGB 8.4* 9.2* 8.4*   HCT 27.6* 30.1* 27.5*    348 312       Recent Labs   Lab 05/29/23  0721 05/31/23  0836 06/01/23  0436    143 144   K 4.8 4.6 5.2*   * 115* 116*   CO2 22* 24 23   BUN 39* 39* 39*   CREATININE 1.8* 1.6* 1.5*    94 86   CALCIUM 7.7* 7.9* 7.7*   MG 1.7 1.8 1.6   PHOS 2.7 2.8 2.4*       No results for input(s): ALKPHOS, ALT, AST, ALBUMIN, PROT, BILITOT, INR in the last 168 hours.    No results for input(s): POCTGLUCOSE in the last 72 hours.    Meds Scheduled:   amiodarone  200 mg Oral Daily    apixaban  2.5 mg Oral BID    aspirin  81 mg Oral Daily    [START ON 6/3/2023] atenoloL  25 mg Oral QHS    [START ON 6/3/2023] atenoloL  50 mg Oral Daily    cyanocobalamin  1,000 mcg Oral Daily    diclofenac sodium   2 g Topical (Top) QID    [START ON 6/3/2023] diltiaZEM  180 mg Oral Daily    folic acid  1 mg Oral Daily    Lactobacillus rhamnosus GG  1 capsule Oral Daily    levothyroxine  137 mcg Oral Before breakfast    magnesium oxide  400 mg Oral BID    mirtazapine  7.5 mg Oral QHS    multivitamin  1 tablet Oral Daily    pantoprazole  40 mg Oral Daily    polyethylene glycol  17 g Oral Daily    senna-docusate 8.6-50 mg  1 tablet Oral BID       PRN:   acetaminophen, acetaminophen, aluminum-magnesium hydroxide-simethicone, bismuth subsalicylate, calcium carbonate, hydrALAZINE, melatonin      Assessment and Plan:  Impaired functional mobility and endurance  - Chronic issue with acute worsening   - Continue with PT/OT for gait training and strengthening and restoration of ADL's   - Encourage mobility, OOB in chair, and early ambulation as appropriate  - Fall precautions   - Monitor for bowel and bladder dysfunction  - Monitor for and prevent skin breakdown and pressure ulcers  - Continue DVT prophylaxis with apixaban      Acute renal failure superimposed on stage 3 chronic kidney disease  - Creatinine  (baseline 1-1.3)  - Monitor BMP daily, replete electrolytes if necessary  - Renally adjust drugs to CrCl; avoid nephrotoxic drugs   - Monitor I/Os  - Renal ultrasound consistent with bilateral medical renal disease & simple renal cysts; no hydronephrosis  - given D5 1.2 NS @50ml/hr x 1L  6/2/2023 renal function improving     Essential hypertension  Hypotension, improving  Weakness  - PRN  hydralazine   - Hypotension with associated weakness slowly improving.  - Holding atenolol 25 mg nightly and diltiazem 180 mg daily.  Initiate order(s): resume atenolol at reduced dose of 12.5 mg nightly.    Hyperkalemia  Acute, new 6/2  - K 5.2   - Initiate order(s): Lokelma 10 g x 1 and obtain repeat BMP tomorrow morning.  phos low 2.4  - Continue to monitor BP closely    Hypomagnesemia  Acute, new 6/2  - Mag 1.6  - Initiate order(s): initiate  mag ox 500 mg x 2 doses and obtain mag level tomorrow morning    Left upper extremity swelling  - Afebrile no leukocytosis  - U/S with resolution of L cephalic vein thrombosis and no evidence of DVT  - Elevate extremity  - Continue to monitor     Malnutrition of mild degree  BMI of 19 or less in adult  Body mass index is 23.2 kg/m².      - Pt reports poor PO intake for many weeks despite encouragement & good    family support  - Start mirtazapine 7.5 mg nightly   - Nutrition consulted, appreciate recs:   -Regular with supplements  -Recommend MVI, probiotic supplementation.      Thrombocytopenia  - Platelets 113 on admit to [facility]  - no heparin products this admit  - no evidence of thrombocytopenia in past labs  - had severe transaminitis in 1/2022 during COVID infection - other elevations also noted - may be underlying liver dz as an association  - Hematology consulted given advancing skin changes and petechia, appreciate recs:  -- Agree with folic acid supplementation  -- Start cyanocobalamin 1,000 mcg PO daily  - Continue to monitor on daily labs     Transaminitis  -AST//177, tbili 0.9 on admission and downtrending.  -Patient denies abdominal pain.  -Repeat lipid panel wnl--discontinue statin  -Hepatitis panel negative  -Plan to f/u with PCP on when to restart statin  -Will need hepatology f/u OP     Constipation  -KUB with nonobstructive bowel gas pattern noting large amount of stool throughout the colon may reflect constipation  - Continue bowel regimen with miralax TID & senna BID     Anemia due to chronic kidney disease  Folate deficiency      -Monitor Hgb       -Transfuse for Hgb <7    Chronic diastolic heart failure   -Monitor strict Is&Os and daily weights.  Continue to stress to patient importance of self efficacy and  on diet for CHF.      Paroxysmal atrial fibrillation  Current use of long term anticoagulation  -Apixaban 2.5 mg bid  - amiodarone 200 mg daily     Anticipate disposition:    Home  with home health      Follow-up needed during SNF admission:       Follow-up needed after discharge from SNF:   - PCP within 1-2 weeks  - See appt scheduled below     Future Appointments   Date Time Provider Department Center   7/12/2023 11:00 AM Joi Romeo MD [facility] PAL MED [facility]   7/18/2023 10:00 AM HOME MONITOR DEVICE CHECK, [facility]   8/15/2023  9:00 AM Sunil Naidu MD [facility] HEPAT [facility]   10/31/2023  3:00 PM Kai Montez MD [facility]         I certify that SNF services are required to be given on an inpatient basis because Gisselle Ortiz needs for skilled nursing care and/or skilled rehabilitation are required on a daily basis and such services can only practically be provided in a skilled nursing facility setting and are for an ongoing condition for which she received inpatient care in the hospital.       Extended Visit  Total time spent: > 30 minutes  Description of Time: counseling patient on clinical condition, therapies provided, plan of care, emotional support, coordinating patient care with other care team members, reviewing and interpreting labs and imaging, collaboration with physician, initiating new orders, chart review, and documentation. See interval hx.           Vivian Mendes NP  Department of Hospital Medicine   [facility]     DOS: 6/2/2023       Patient note was created using MModal Dictation.  Any errors in syntax or even information may not have been identified and edited on initial review prior to signing this note.

## 2023-06-02 NOTE — PT/OT/SLP PROGRESS
Occupational Therapy   Treatment    Name: Gisselle Ortiz  MRN: [id number]  Admit Date: 5/23/2023  Admitting Diagnosis:  Acute renal failure superimposed on stage 3 chronic kidney disease    General Precautions: Standard, fall   Orthopedic Precautions: N/A   Braces: N/A    Recommendations:     Discharge Recommendations:  home health OT  Level of Assistance Recommended at Discharge: 24 hours light assistance for ADL's and homemaking tasks  Discharge Equipment Recommendations: none (Pt has all needed DME.)  Barriers to discharge:   (Increased assistance required for ADLs and functional mobility)    Assessment:     Gisselle Ortiz is a 89 y.o. female with a medical diagnosis of Acute renal failure superimposed on stage 3 chronic kidney disease.  She presents with limitations in performance of self-care, functional mobility, and ADLs. Performance deficits affecting function are weakness, impaired endurance, impaired self care skills, impaired functional mobility, gait instability, impaired balance, decreased upper extremity function, decreased lower extremity function, decreased safety awareness, decreased coordination, impaired cardiopulmonary response to activity, edema.  Pt tolerated Tx without incident and BP was monitored. Pt continues to require assist to perform self care tasks, functional mobility and functional transfers. Pt would continue to benefit from OT intervention to further functional (I)ce and safety.      BP readings  Legs elevated in bedside chair 143/65  After stand pivot transfer to wc 108/50  Seated in wc approximately 5 min 105/54  Seated in wc through out Tx 119/58  End of Tx seated in wc 125/61    Nursing notified     Rehab Potential is fair    Activity tolerance:  Fair    Plan:     Patient to be seen 5 x/week to address the above listed problems via self-care/home management, therapeutic activities, therapeutic exercises    Plan of Care Expires: 06/22/23  Plan of Care Reviewed with:  patient, son    Subjective     Communicated with: Nursing prior to session.     Pain/Comfort:  Pain Rating 1: 9/10  Location - Orientation 1: generalized  Location 1: gluteal  Pain Addressed 1: Reposition, Distraction  Pain Rating Post-Intervention 1: 9/10    Patient's cultural, spiritual, Mosque conflicts given the current situation:  no    Objective:     Patient found  up in bedside chair  with  (no active lines) upon OT entry to room.    Bed Mobility:    Pt seated in chair at onset of treatment session.     Functional Mobility/Transfers:  Patient completed Sit <> Stand Transfer with minimum assistance  with  rolling walker   Patient completed Bedside Chair <> Chair Transfer using Stand Pivot technique with contact guard assistance with rolling walker    Activities of Daily Living:  Grooming: supervision seated sinkside with set up    AMPAC 6 Click ADL: 16    OT Exercises: Pt performed UBE exercise for 10 minutes with Min resistance.  UE exercises performed to increase functional endurance and strength in order increase independence when performing self care tasks, functional ambulation, W/C propulsion, and functional standing activities .      Treatment & Education:  Pt educated on:  - role of OT  - level of assistance  - energy conservation and task modification to maximized independence with ADL's and mobility   -  safety while performing functional transfers and self care tasks  - progress towards OT goals    Patient left up in chair in therapy gym with  PTA present    GOALS:   Multidisciplinary Problems       Occupational Therapy Goals          Problem: Occupational Therapy    Goal Priority Disciplines Outcome Interventions   Occupational Therapy Goal     OT, PT/OT Ongoing, Progressing    Description: Goals to be met by: 6/22/2023     Patient will increase functional independence with ADLs by performing:    UE Dressing with Set-up Assistance.  LE Dressing with Stand-by Assistance using appropriate AE as  needed.  Grooming while seated at sink with Set-up Assistance.  Toileting from 3-in-1 commode over toilet with Stand-by Assistance for hygiene and clothing management.   Bathing from sitting/standing at sink with Minimal Assistance.  Supine to sit with Supervision.  Step transfer with Stand-by Assistance with RW.  Toilet transfer to 3-in-1 commode over toilet with Stand-by Assistance with RW.                         Time Tracking:     OT Date of Treatment: 06/02/23  OT Start Time: 0916    OT Stop Time: 0954  OT Total Time (min): 38 min    Billable Minutes:Self Care/Home Management 23  Therapeutic Exercise 15    6/2/2023

## 2023-06-02 NOTE — PT/OT/SLP PROGRESS
"Physical Therapy Treatment    Patient Name:  Gisselle Ortiz   MRN:  [id number]  Admit Date: 5/23/2023  Admitting Diagnosis: Acute renal failure superimposed on stage 3 chronic kidney disease  Recent Surgeries:     General Precautions: Standard, fall  Orthopedic Precautions: N/A  Braces: N/A    Recommendations:     Discharge Recommendations: home health PT  Level of Assistance Recommended at Discharge: 24 hours significant assistance  Discharge Equipment Recommendations: none  Barriers to discharge:  (Increased assistance for mobility.)    Assessment:     Gisselle Ortiz is a 89 y.o. female admitted with a medical diagnosis of Acute renal failure superimposed on stage 3 chronic kidney disease . Pt tolerated well,  BP monitored, nsg notified, also nsg requested to hold off on standing today 2* to BP issues, pt would continue to benefit from skilled PT services to improve overall functional mobility, strength and endurance.  .      Performance deficits affecting function: weakness, impaired endurance, impaired self care skills, impaired functional mobility, gait instability, impaired balance, decreased lower extremity function, decreased safety awareness, decreased upper extremity function, edema, impaired cardiopulmonary response to activity.    Rehab Potential is good    Activity Tolerance: Fair    Plan:     Patient to be seen 5 x/week to address the above listed problems via gait training, therapeutic activities, therapeutic exercises, neuromuscular re-education, wheelchair management/training    Plan of Care Expires: 06/23/23  Plan of Care Reviewed with: patient    Subjective     "I',m always dizzy" agreeable to therapy.     Pain/Comfort:  Pain Rating 1: 0/10  Pain Rating Post-Intervention 1: 0/10    Patient's cultural, spiritual, Judaism conflicts given the current situation:  no    Objective:     Communicated with nsg prior to session.nsg requested to hold off on standing today 2* to BP issues  Patient " foundwith   upon PT entry to room.     Therapeutic Activities and Exercises: 2x10 reps AP,GS, LAQ,hip flex , mini elliptical x 8 minutes  BP at start of session 125/61 HR 75  BP after seated exs 123/60 HR 65  BP after /54 HR 65  BP post session seated in recliner 155/66 HR 65 Nsg notified of all the above    Functional Mobility:  Transfers:     Bed to Chair: minimum assistance with  no AD  using  Stand Pivot and WC to BSC to elev LEs post session    AM-PAC 6 CLICK MOBILITY  15    Patient left up in chair with call button in reach and belonging sin r each .    GOALS:   Multidisciplinary Problems       Physical Therapy Goals          Problem: Physical Therapy    Goal Priority Disciplines Outcome Goal Variances Interventions   Physical Therapy Goal     PT, PT/OT Ongoing, Progressing     Description: Goals to be met by: 23     Patient will increase functional independence with mobility by performin. Supine to sit with Supervision - not met  2. Sit to supine with Contact Guard Assistance - not met  3. Rolling to Left and Right with Supervision - not met  4. Sit to stand transfer with Stand-by Assistance - not met  5. Bed to chair transfer with Stand-by Assistance using Rolling Walker - not met  6. Gait  x 50 feet with Contact Guard Assistance using Rolling Walker - not met  7. Wheelchair propulsion x 50 feet with Modified [place] using bilateral upper extremities - not met  8. Ascend/Descend 4 inch curb step with Minimal Assistance using Rolling Walker - not met; unable to assess - assess when deemed safe                         Time Tracking:     PT Received On: 23  PT Start Time: 0955  PT Stop Time: 1033  PT Total Time (min): 38 min    Billable Minutes: Therapeutic Activity 15 and Therapeutic Exercise 23    Treatment Type: Treatment  PT/PTA: PTA     Number of PTA visits since last PT visit: 2     2023

## 2023-06-02 NOTE — NURSING
Notified Vivian Mendes NP Pt returned from Appt with clinical recommendations to monitor BLE edema and compression stockings. Check Urinalysis, urine protein, and creatine ratio. Low potassium diet (no oranges, orange juice, bananas, tomatoes, and potatoes per Eliana Preciado NP.

## 2023-06-03 NOTE — PLAN OF CARE
No significant changes this shift. Continue with POC and monitor. Pt left lying semi fowlers bed in lowest position, with call light in reach, and all wheels locked X3 rails up.     Problem: Adult Inpatient Plan of Care  Goal: Plan of Care Review  Outcome: Ongoing, Progressing  Goal: Patient-Specific Goal (Individualized)  Outcome: Ongoing, Progressing  Goal: Absence of Hospital-Acquired Illness or Injury  Outcome: Ongoing, Progressing  Goal: Optimal Comfort and Wellbeing  Outcome: Ongoing, Progressing  Goal: Readiness for Transition of Care  Outcome: Ongoing, Progressing     Problem: Fluid and Electrolyte Imbalance (Acute Kidney Injury/Impairment)  Goal: Fluid and Electrolyte Balance  Outcome: Ongoing, Progressing     Problem: Oral Intake Inadequate (Acute Kidney Injury/Impairment)  Goal: Optimal Nutrition Intake  Outcome: Ongoing, Progressing     Problem: Renal Function Impairment (Acute Kidney Injury/Impairment)  Goal: Effective Renal Function  Outcome: Ongoing, Progressing     Problem: Impaired Wound Healing  Goal: Optimal Wound Healing  Outcome: Ongoing, Progressing     Problem: Fall Injury Risk  Goal: Absence of Fall and Fall-Related Injury  Outcome: Ongoing, Progressing     Problem: Skin Injury Risk Increased  Goal: Skin Health and Integrity  Outcome: Ongoing, Progressing     Problem: Malnutrition  Goal: Improved Nutritional Intake  Outcome: Ongoing, Progressing

## 2023-06-03 NOTE — PROGRESS NOTES
Subjective:       Patient ID: Gisselle Ortiz is a 89 y.o.  female who presents for hospital follow-up of MARIO.    HPI     Gisselle Ortiz is an 89 year old female with CKD 3a, pAF, HTN, SSS s/p PM, HLD, hypothyroidism, OA of right hip, GERD, chronic DHF, aortic atherosclerosis, and DNR status that presents with her daughter to the nephrology clinic for new evaluation hospital follow-up of MARIO.     [place] [facility] [place] intractable n/v and UTI. Urine culture +klebsiella pneumoniae and e.coli and she was treated with 5-day course of IV rocephin. Sprionolactone discontinued during admission for hyperkalemia of 5.3. CTAP with gallbladder sludge vs stones and colonic wall thickening vs. stenosis/peristalsis. Also with MARIO with peak Cr of 7.2 that improved to 2.8 later the same day. Cr continued to improve with IVF. She was discharged to [facility] with PT/OT. On current labs her sCr continues to improve to 1.5, near baseline of 1.0-1.3. She remains mildly hperkalemic on last labs. She reports no improvement in functionality with SNF admission and PT/OT. She denies cp, sob, further n/v/d, difficulty urinating, dysuria, hematuria, syncope. She notes worsening BLE edema over the last 3 days. Reports that she primarily sits most of the day. She has not tried compression stockings and is not maintained on any diuretics.     Significant family hx includes: No reported history of kidney disease    Last renal US: 5/17/23 , reviewed.  FINDINGS:  Right kidney: The right kidney measures 9.2 cm.  There is cortical thinning and loss of corticomedullary distinction. Resistive index measures 0.78.  Simple cyst within the superior pole measuring 0.9 cm.  No renal stone. No hydronephrosis.     Left kidney: The left kidney measures 9.6 cm.  There is cortical thinning and loss of corticomedullary distinction. Resistive index measures 0.5.  Simple cyst within the inferior pole measuring up to 2.4 cm.   No renal stone. No hydronephrosis.     Splenic resistive index measures 0.69.     The bladder is partially distended at the time of scanning and has an unremarkable appearance.     Impression:     Findings of bilateral chronic medical renal disease.  No hydronephrosis.     Bilateral simple renal cysts.    Review of Systems   Respiratory:  Negative for shortness of breath.    Cardiovascular:  Positive for leg swelling. Negative for chest pain.   Gastrointestinal:  Negative for diarrhea, nausea and vomiting.   Genitourinary:  Negative for difficulty urinating, dysuria and hematuria.   Neurological:  Negative for syncope.   All other systems reviewed and are negative.    Objective:       Blood pressure 120/60, pulse 71, weight 59 kg (130 lb 1.1 oz), SpO2 99 %.  Physical Exam  Vitals reviewed.   Constitutional:       General: She is not in acute distress.  HENT:      Head: Normocephalic and atraumatic.   Eyes:      General: No scleral icterus.  Cardiovascular:      Rate and Rhythm: Normal rate and regular rhythm.   Pulmonary:      Effort: Pulmonary effort is normal. No respiratory distress.      Breath sounds: No wheezing or rales.   Musculoskeletal:      Right lower leg: Edema (2+) present.      Left lower leg: Edema (2+) present.      Comments: +wheelchair   Skin:     General: Skin is warm and dry.   Neurological:      Mental Status: She is alert.   Psychiatric:         Mood and Affect: Mood normal.         Behavior: Behavior normal.         Lab Results   Component Value Date    CREATININE 1.5 (H) 06/03/2023     Prot/Creat Ratio, Urine   Date Value Ref Range Status   06/02/2023 0.13 0.00 - 0.20 Final     Lab Results   Component Value Date     06/03/2023    K 4.1 06/03/2023    CO2 23 06/03/2023     (H) 06/03/2023     Lab Results   Component Value Date    PTH 68.1 02/18/2022    CALCIUM 7.8 (L) 06/03/2023    PHOS 2.4 (L) 06/01/2023     Lab Results   Component Value Date    HGB 8.4 (L) 06/01/2023    WBC 8.38  06/01/2023    HCT 27.5 (L) 06/01/2023      Lab Results   Component Value Date    HGBA1C 5.3 02/01/2021     06/01/2023    BUN 39 (H) 06/03/2023     Lab Results   Component Value Date    LDLCALC 55.4 (L) 05/19/2023         Assessment:       1. Acute renal failure superimposed on stage 3a chronic kidney disease, unspecified acute renal failure type    2. Hypocalcemia    3. Anemia in chronic kidney disease, unspecified CKD stage    4. Hyperkalemia    5. Benign hypertension with CKD (chronic kidney disease) stage III        Plan:   MARIO on CKD stage 3a   - Baseline Cr ~1-1.3, clinically 2/2 to age-related changes, HTN, vascular disease  - Recent peak sCr of 7.2? in setting of GI losses, UTI, quickly improved with IVF  - Cr now slowly trending down near baseline  - Avoid NSAIDs, continue increased hydration    UPCR Monitor UPCR, aldactone on hold 2/2 hyperkalemia   Acid-base WNL   Renal osteodystrophy Ca low, however no albumin to correct, previously with hypoalbuminemia, continue to monitor corrected Ca, phos, PTH (last 68.1)   Anemia Below CKD goal, noted recs per hematology, continue B12   Lipid Management Controlled   ESRD planning Provided education about the stages of CKD, usual progression, and need to monitor for and treat CKD-related disease in an effort to delay progression of CKD.     No need for advanced CKD planning at this time given overall stable CKD 3a     Hyperkalemia  - Mild elevated, continue to hold aldactone  - Recommend low K diet    HTN   - Controlled on current regimen, continue     All questions patient had were answered.  Asked if further questions. None. F/u in clinic 3 months  with labs and urine prior to next visit or sooner if needed.  ER for emergency concerns.    Summary of Plan:  - Monitor UA UPCR  - recommend low K diet  - Recommend compression stockings and elevation for BLE edema, may need diuretic if no improvement

## 2023-06-03 NOTE — PROGRESS NOTES
Patient felt ill today and is not eating her meals.  Note elevated potassium with diet change to low potassium diet. RD changed oral nutrition supplement from boost plus to boost breeze which will help reduce potassium.

## 2023-06-03 NOTE — PROGRESS NOTES
[facility]                                  Skilled Nursing Facility                   Progress Note     Patient Name: Gisselle Ortiz  YOB: 1933  MRN: [id number]  Room: [place] [address] [address] A     Admit Date: 5/23/2023   FRANCESCA: 6/16/2023     Principal Problem: Acute renal failure superimposed on stage 3 chronic kidney disease    HPI obtained from patient interview and chart review     Chief Complaint: Re-evaluation of medical treatment and therapy status:  Hypotension, lab review      HPI:   Gisselle Ortiz is a 89 y.o. female with PMH of GERD, gallstone pancreatitis, thyroid disease, HTN, HLD, SSS s/p PPM, CHF (EF 55%), and a fib on eliquis who presented to the ED for nausea and vomiting. Pt admitted to hospital medicine for intractable n/v and UTI. Pt has remained hemodynamically stable. She was started on 5-days of IV rocephin. Urine culture + klebsiella pneumoniae and e.coli. No leukocytosis but was hyperkalemic K 5.3; discontinued spironolactone. AST/ALT elevated on admission and downtrending. Abd US with hepatomegaly, stable prominence of CBD, and cholelithiasis without evidence of cholecystitis. AES recommended MRCP, which the patient unable to have 2/2 pacemaker. KUB with nonobstructive bowel gas pattern and significant stool burden, which is improving on bowel regimen. CTAP with gallbladder sludge vs stones and colonic wall thickening vs. stenosis/peristalsis. Pt with MARIO on CKD, which is improving with IVFs. Pt with new onset thrombocytopenia which is now improving. Hematology consulted and recommended started cyanocobalamin. PT/OT consulted and on re-evaluation are recommending SNF. All questions were answered. Patient acknowledged understanding of discharge instructions and feels safe to discharge. Patient was transferred to SNF on 5/23/23 in stable condition.    Patient will be treated at [facility] with PT and  OT to improve functional status and ability to perform ADLs.     Interval History  24 hour chart review completed. Has malaise and weakness worse today  Episode of hypotension while on toilet with diarrhea 61/41 no syncope  Stopped atenolol 25 mg nightly and diltiazem 180 mg daily.  Resume atenolol at 12.5 mg nightly, amiodarone continued.   Mag 1.7 and K 4.1 today s/p replacement  Hold miralax and senna for loose stool. Initiate fiber daily  LR bolus 500ml    Past Medical History: Patient has a past medical history of A-fib, Anemia of chronic disease (02/02/2021), Anticoagulant long-term use, CHF (congestive heart failure), Chronic diastolic heart failure (02/02/2021), COVID-19 (01/07/2022), Gallstone pancreatitis, Hypertension, Pancreatic abscess (09/26/2020), Stage 3 chronic kidney disease (5/11/2016), and Thyroid disease.    Past Surgical History: Patient has a past surgical history that includes Cataract extraction; Cataract extraction w/  intraocular lens implant (Bilateral, 2004); Revision of skin pocket for pacemaker (N/A, 1/21/2019); Eye surgery; Hysterectomy; Hip replacement arthroplasty (Right, 2016); Treatment of cardiac arrhythmia (N/A, 3/18/2019); Thyroidectomy; Treatment of cardiac arrhythmia (N/A, 5/14/2019); ERCP (N/A, 9/14/2020); Endoscopic ultrasound of upper gastrointestinal tract (N/A, 9/14/2020); ERCP (N/A, 9/25/2020); ERCP (N/A, 11/25/2020); Endoscopic ultrasound of upper gastrointestinal tract (11/25/2020); and Treatment of cardiac arrhythmia (N/A, 6/21/2022).    Social History: Patient reports that she has quit smoking. Her smoking use included cigarettes. She started smoking about 59 years ago. She has never used smokeless tobacco. She reports that she does not drink alcohol and does not use drugs.    Family History:  family history includes Heart attack in her maternal grandmother and mother; Heart disease in her maternal grandmother and mother; Heart failure in her maternal grandmother  and mother; Hypertension in her maternal grandmother and mother; Stroke in her maternal grandmother.    Allergies: Patient is allergic to ciprofloxacin.        Review of Systems   Constitutional:  Positive for malaise/fatigue. Negative for chills and fever.   HENT:  Negative for congestion, hearing loss and sore throat.    Eyes:  Negative for blurred vision and double vision.   Respiratory:  Negative for cough, sputum production, shortness of breath and wheezing.    Cardiovascular:  Negative for chest pain, palpitations and leg swelling.   Gastrointestinal:  Negative for abdominal pain, constipation, diarrhea, heartburn, nausea and vomiting.   Genitourinary:  Negative for dysuria and urgency.   Musculoskeletal:  Negative for back pain.   Skin:  Negative for rash.        + wound   Neurological:  Positive for dizziness and weakness. Negative for headaches.   Endo/Heme/Allergies:  Negative for polydipsia. Does not bruise/bleed easily.   Psychiatric/Behavioral:  Negative for depression and hallucinations. The patient is not nervous/anxious.        24 hour Vital Sign Range   Temp:  [98.1 °F (36.7 °C)-99.4 °F (37.4 °C)]   Pulse:  [66-89]   Resp:  [16-17]   BP: ()/(41-60)   SpO2:  [95 %-100 %]       Physical Exam  Vitals and nursing note reviewed.   Constitutional:       General: She is not in acute distress.     Appearance: Normal appearance. She is not ill-appearing.   HENT:      Head: Normocephalic and atraumatic.      Nose: Nose normal. No congestion.      Mouth/Throat:      Mouth: Mucous membranes are moist.      Pharynx: Oropharynx is clear.   Eyes:      Extraocular Movements: Extraocular movements intact.      Conjunctiva/sclera: Conjunctivae normal.      Pupils: Pupils are equal, round, and reactive to light.   Cardiovascular:      Rate and Rhythm: Normal rate and regular rhythm.      Pulses: Normal pulses.      Heart sounds: Normal heart sounds. No murmur heard.  Pulmonary:      Effort: Pulmonary effort is  normal. No respiratory distress.      Breath sounds: Normal breath sounds. No wheezing or rhonchi.   Abdominal:      General: Bowel sounds are normal. There is no distension.      Palpations: Abdomen is soft. There is no mass.      Tenderness: There is no abdominal tenderness.   Musculoskeletal:         General: No swelling or tenderness.      Cervical back: Normal range of motion and neck supple. No tenderness.      Right lower leg: No edema.      Left lower leg: No edema.   Skin:     General: Skin is warm and dry.      Capillary Refill: Capillary refill takes less than 2 seconds.      Findings: No erythema or rash.   Neurological:      Mental Status: She is alert and oriented to person, place, and time. Mental status is at baseline.      Motor: Weakness present.   Psychiatric:         Mood and Affect: Mood normal.         Behavior: Behavior normal.         Thought Content: Thought content normal.         Judgment: Judgment normal.     Full skin assessment completed by this NP       Labs:  Recent Labs   Lab 05/29/23  0721 05/31/23  0836 06/01/23  0436   WBC 9.01 8.27 8.38   HGB 8.4* 9.2* 8.4*   HCT 27.6* 30.1* 27.5*    348 312       Recent Labs   Lab 05/29/23  0721 05/31/23  0836 06/01/23  0436 06/03/23  0547    143 144 142   K 4.8 4.6 5.2* 4.1   * 115* 116* 114*   CO2 22* 24 23 23   BUN 39* 39* 39* 39*   CREATININE 1.8* 1.6* 1.5* 1.5*    94 86 91   CALCIUM 7.7* 7.9* 7.7* 7.8*   MG 1.7 1.8 1.6 1.7   PHOS 2.7 2.8 2.4*  --        No results for input(s): ALKPHOS, ALT, AST, ALBUMIN, PROT, BILITOT, INR in the last 168 hours.    No results for input(s): POCTGLUCOSE in the last 72 hours.    Meds Scheduled:   amiodarone  200 mg Oral Daily    apixaban  2.5 mg Oral BID    aspirin  81 mg Oral Daily    atenoloL  12.5 mg Oral QHS    cyanocobalamin  1,000 mcg Oral Daily    diclofenac sodium  2 g Topical (Top) QID    folic acid  1 mg Oral Daily    lactated ringers  500 mL Intravenous Once     Lactobacillus rhamnosus GG  1 capsule Oral BID    levothyroxine  137 mcg Oral Before breakfast    loperamide  2 mg Oral Once    magnesium oxide  400 mg Oral Daily    mirtazapine  7.5 mg Oral QHS    multivitamin  1 tablet Oral Daily    pantoprazole  40 mg Oral Daily    polyethylene glycol  17 g Oral Daily    psyllium husk (aspartame)  1 packet Oral Daily    senna-docusate 8.6-50 mg  1 tablet Oral BID       PRN:   acetaminophen, acetaminophen, aluminum-magnesium hydroxide-simethicone, bismuth subsalicylate, calcium carbonate, hydrALAZINE, loperamide, melatonin      Assessment and Plan:  Impaired functional mobility and endurance  - Chronic issue with acute worsening   - Continue with PT/OT for gait training and strengthening and restoration of ADL's   - Encourage mobility, OOB in chair, and early ambulation as appropriate  - Fall precautions   - Monitor for bowel and bladder dysfunction  - Monitor for and prevent skin breakdown and pressure ulcers  - Continue DVT prophylaxis with apixaban      Acute renal failure superimposed on stage 3 chronic kidney disease  - Creatinine  (baseline 1-1.3)  - Monitor BMP daily, replete electrolytes if necessary  - Renally adjust drugs to CrCl; avoid nephrotoxic drugs   - Monitor I/Os  - Renal ultrasound consistent with bilateral medical renal disease & simple renal cysts; no hydronephrosis  - given D5 1.2 NS @50ml/hr x 1L  6/3/2023 renal function improving      Essential hypertension  Hypotension, improving  Weakness  - PRN  hydralazine   - Hypotension with associated weakness slowly improving.  - Holding atenolol 25 mg nightly and diltiazem 180 mg daily.  Resume atenolol at reduced dose of 12.5 mg nightly.  Initiate order(s): LR bolus 500ml    Hyperkalemia  Acute, new 6/2  - K 5.2   - Initiate order(s): Lokelma 10 g x 1 and obtain repeat BMP tomorrow morning.  phos low 2.4  - Continue to monitor BP closely  6/3/2023  K 4.1    Hypomagnesemia  Acute, new 6/2  - Mag 1.6  -  imag ox 500  mg x 2 doses and obtain mag level tomorrow morning  6/3/2023  Mag 1.7   Initiate order(s): mag ox 400mg daily    Left upper extremity swelling  - Afebrile no leukocytosis  - U/S with resolution of L cephalic vein thrombosis and no evidence of DVT  - Elevate extremity  - Continue to monitor     Malnutrition of mild degree  BMI of 19 or less in adult  Body mass index is 23.2 kg/m².      - Pt reports poor PO intake for many weeks despite encouragement & good    family support  - Start mirtazapine 7.5 mg nightly   - Nutrition consulted, appreciate recs:   -Regular with supplements  -Recommend MVI, probiotic supplementation.      Thrombocytopenia  - Platelets 113 on admit to [facility]  - no heparin products this admit  - no evidence of thrombocytopenia in past labs  - had severe transaminitis in 1/2022 during COVID infection - other elevations also noted - may be underlying liver dz as an association  - Hematology consulted given advancing skin changes and petechia, appreciate recs:  -- Agree with folic acid supplementation  -- Start cyanocobalamin 1,000 mcg PO daily  - Continue to monitor on daily labs     Transaminitis  -AST//177, tbili 0.9 on admission and downtrending.  -Patient denies abdominal pain.  -Repeat lipid panel wnl--discontinue statin  -Hepatitis panel negative  -Plan to f/u with PCP on when to restart statin  -Will need hepatology f/u OP     Constipation  Diarrhea  -KUB with nonobstructive bowel gas pattern noting large amount of stool throughout the colon may reflect constipation  - Continue bowel regimen with miralax TID & senna BID, hold for loose stool  Initiate order(s): fiber daily     Anemia due to chronic kidney disease  Folate deficiency      -Monitor Hgb       -Transfuse for Hgb <7    Chronic diastolic heart failure   -Monitor strict Is&Os and daily weights.  Continue to stress to patient importance of self efficacy and  on diet for CHF.      Paroxysmal atrial fibrillation  Current use  of long term anticoagulation  -Apixaban 2.5 mg bid  - amiodarone 200 mg daily     Anticipate disposition:    Home with home health      Follow-up needed during SNF admission:       Follow-up needed after discharge from SNF:   - PCP within 1-2 weeks  - See appt scheduled below     Future Appointments   Date Time Provider Department Center   7/12/2023 11:00 AM Joi Romeo MD [facility] PAL [facility]   7/18/2023 10:00 AM HOME MONITOR DEVICE CHECK, [facility] NOM [facility]   7/24/2023  4:00 PM Roz Leon NP [facility] NEPHRO [facility]   8/15/2023  9:00 AM Sunil Naidu MD [facility] HEPAT [facility]   10/31/2023  3:00 PM Kai Montez MD [facility]         I certify that SNF services are required to be given on an inpatient basis because Gisselle Ortiz needs for skilled nursing care and/or skilled rehabilitation are required on a daily basis and such services can only practically be provided in a skilled nursing facility setting and are for an ongoing condition for which she received inpatient care in the hospital.       Extended Visit  Total time spent: > 45 minutes  Description of Time: counseling patient on clinical condition, therapies provided, plan of care, emotional support, coordinating patient care with other care team members, reviewing and interpreting labs and imaging, collaboration with physician, initiating new orders, chart review, and documentation. See interval hx.           MATTHEW SALAZAR  Department of Hospital Medicine   [facility]     DOS: 6/3/2023       Patient note was created using MModal Dictation.  Any errors in syntax or even information may not have been identified and edited on initial review prior to signing this note.

## 2023-06-03 NOTE — PT/OT/SLP PROGRESS
Physical Therapy      Patient Name:  Gisselle Ortiz   MRN:  [id number]    Patient not seen today secondary to per charge nurse to hold off  PT today, pt having a rough day    Will follow-up next PT session.

## 2023-06-04 NOTE — PLAN OF CARE
Problem: Adult Inpatient Plan of Care  Goal: Plan of Care Review  Outcome: Ongoing, Progressing  Goal: Patient-Specific Goal (Individualized)  Outcome: Ongoing, Progressing     Problem: Fluid and Electrolyte Imbalance (Acute Kidney Injury/Impairment)  Goal: Fluid and Electrolyte Balance  Outcome: Ongoing, Progressing     Problem: Oral Intake Inadequate (Acute Kidney Injury/Impairment)  Goal: Optimal Nutrition Intake  Outcome: Ongoing, Progressing     Problem: Renal Function Impairment (Acute Kidney Injury/Impairment)  Goal: Effective Renal Function  Outcome: Ongoing, Progressing     Problem: Impaired Wound Healing  Goal: Optimal Wound Healing  Outcome: Ongoing, Progressing     Problem: Fall Injury Risk  Goal: Absence of Fall and Fall-Related Injury  Outcome: Ongoing, Progressing     Problem: Skin Injury Risk Increased  Goal: Skin Health and Integrity  Outcome: Ongoing, Progressing     Problem: Malnutrition  Goal: Improved Nutritional Intake  Outcome: Ongoing, Progressing     Problem: Infection  Goal: Absence of Infection Signs and Symptoms  Outcome: Ongoing, Progressing

## 2023-06-04 NOTE — CARE UPDATE
"I received the following notification from the patient's nurse at 11:30pm: "patient's BP was low earlier in the shift and I just spot checked it and it is still low.  LR was given earlier. Please review MAR. Patient has been having hypotensive episodes."    Initial VS:  BP (!) 115/54 (BP Location: Right arm, Patient Position: Lying)   Pulse 60   Temp 99 °F (37.2 °C) (Oral)   Resp 15   Ht 5' 3" (1.6 m)   Wt 59.4 kg (130 lb 15.3 oz)   LMP  (LMP Unknown)   SpO2 96%   BMI 23.20 kg/m²       Follow up VS after therapy:  BP (!) 115/54 (BP Location: Right arm, Patient Position: Lying)   Pulse 60   Temp 99 °F (37.2 °C) (Oral)   Resp 15   Ht 5' 3" (1.6 m)   Wt 59.4 kg (130 lb 15.3 oz)   LMP  (LMP Unknown)   SpO2 96%   BMI 23.20 kg/m²     I ordered stat LA, BMP, CBC and  cc bolus.  I have started Midodrine 10mg TID.    Recent Results (from the past 6 hour(s))   CBC Auto Differential    Collection Time: 06/04/23 12:01 AM   Result Value Ref Range    WBC 11.75 3.90 - 12.70 K/uL    RBC 2.58 (L) 4.00 - 5.40 M/uL    Hemoglobin 7.9 (L) 12.0 - 16.0 g/dL    Hematocrit 26.0 (L) 37.0 - 48.5 %     (H) 82 - 98 fL    MCH 30.6 27.0 - 31.0 pg    MCHC 30.4 (L) 32.0 - 36.0 g/dL    RDW 15.4 (H) 11.5 - 14.5 %    Platelets 267 150 - 450 K/uL    MPV 11.7 9.2 - 12.9 fL    Immature Granulocytes 0.7 (H) 0.0 - 0.5 %    Gran # (ANC) 8.6 (H) 1.8 - 7.7 K/uL    Immature Grans (Abs) 0.08 (H) 0.00 - 0.04 K/uL    Lymph # 1.6 1.0 - 4.8 K/uL    Mono # 1.1 (H) 0.3 - 1.0 K/uL    Eos # 0.3 0.0 - 0.5 K/uL    Baso # 0.06 0.00 - 0.20 K/uL    nRBC 0 0 /100 WBC    Gran % 73.0 38.0 - 73.0 %    Lymph % 13.9 (L) 18.0 - 48.0 %    Mono % 9.2 4.0 - 15.0 %    Eosinophil % 2.7 0.0 - 8.0 %    Basophil % 0.5 0.0 - 1.9 %    Differential Method Automated            TTE on 9/26/19:  Normal left ventricular systolic function. The estimated ejection fraction is 55%  Grade II (moderate) left ventricular diastolic dysfunction consistent with " pseudonormalization.  Eccentric left ventricular hypertrophy. Mild left ventricular enlargement.  Mild mitral regurgitation.  Normal right ventricular systolic function.  Mild tricuspid regurgitation.  Severe left atrial enlargement.

## 2023-06-04 NOTE — PLAN OF CARE
Problem: Adult Inpatient Plan of Care  Goal: Plan of Care Review  Outcome: Ongoing, Progressing  Goal: Patient-Specific Goal (Individualized)  Outcome: Ongoing, Progressing  Goal: Absence of Hospital-Acquired Illness or Injury  Outcome: Ongoing, Progressing  Goal: Optimal Comfort and Wellbeing  Outcome: Ongoing, Progressing     Problem: Fluid and Electrolyte Imbalance (Acute Kidney Injury/Impairment)  Goal: Fluid and Electrolyte Balance  Outcome: Ongoing, Progressing     Problem: Oral Intake Inadequate (Acute Kidney Injury/Impairment)  Goal: Optimal Nutrition Intake  Outcome: Ongoing, Progressing     Problem: Renal Function Impairment (Acute Kidney Injury/Impairment)  Goal: Effective Renal Function  Outcome: Ongoing, Progressing     Problem: Impaired Wound Healing  Goal: Optimal Wound Healing  Outcome: Ongoing, Progressing     Problem: Fall Injury Risk  Goal: Absence of Fall and Fall-Related Injury  Outcome: Ongoing, Progressing

## 2023-06-04 NOTE — NURSING
Nurse notified on call MD about patient having hypotensive episodes. Fluids have been encouraged. Patient denies dizziness. MD orders placed by MD.

## 2023-06-05 NOTE — PT/OT/SLP PROGRESS
Occupational Therapy      Patient Name:  Gisselle Ortiz   MRN:  [id number]    Patient not seen today secondary to pt politely refused to participate due to lethargy and nausea.  She was sitting up in the bedside chair holding an emesis bag upon OT entrance.  Nursing notified.  Will follow-up as scheduled per OT POC.    6/5/2023

## 2023-06-05 NOTE — PT/OT/SLP PROGRESS
"Physical Therapy Treatment    Patient Name:  Gisselle Ortiz   MRN:  [id number]  Admit Date: 5/23/2023  Admitting Diagnosis: Acute renal failure superimposed on stage 3 chronic kidney disease  Recent Surgeries:     General Precautions: Standard, fall  Orthopedic Precautions: N/A  Braces: N/A    Recommendations:     Discharge Recommendations: home health PT  Level of Assistance Recommended at Discharge: 24 hours significant assistance  Discharge Equipment Recommendations: none  Barriers to discharge:  (Increased assistance for mobility.)    Assessment:     Gisselle Ortiz is a 89 y.o. female admitted with a medical diagnosis of Acute renal failure superimposed on stage 3 chronic kidney disease . Pt with limited session, not feeling well declined therex, nsg notified, pt would continue to benefit from skilled PT services to improve overall functional mobility, strength and endurance.  .      Performance deficits affecting function: weakness, impaired endurance, impaired self care skills, impaired functional mobility, gait instability, impaired balance, decreased lower extremity function, decreased safety awareness, decreased upper extremity function, edema, impaired cardiopulmonary response to activity.    Rehab Potential is good    Activity Tolerance: Fair    Plan:     Patient to be seen 5 x/week to address the above listed problems via gait training, therapeutic activities, therapeutic exercises, neuromuscular re-education, wheelchair management/training    Plan of Care Expires: 06/23/23  Plan of Care Reviewed with: patient    Subjective     "Not good at all" specifics? "Everything" dizzy, (/63) nausea, nsg notified.     Pain/Comfort:  Pain Rating 1:  (did not rate)  Location - Orientation 1: posterior  Location 1: gluteal  Pain Addressed 1: Reposition, Nurse notified (pt sitting slouched in chair to off load buttocks)  Pain Rating Post-Intervention 1:  ("hurts")    Patient's cultural, spiritual, " Judaism conflicts given the current situation:  no    Objective:     Communicated with nsg prior to session. Clear for therapy , pt declined, Patient found with   upon PT entry to room.     Therapeutic Activities and Exercises: declined    Functional Mobility:  Bed Mobility:     Sit to Supine: moderate assistance and with BLE mgmt and trunk control  Transfers:     Bed to Chair: minimum assistance with  no AD and bed rail  using  Stand Pivot    AM-PAC 6 CLICK MOBILITY  15    Patient left supine with call button in reach, nsg notified, and belonging sin reach .    GOALS:   Multidisciplinary Problems       Physical Therapy Goals          Problem: Physical Therapy    Goal Priority Disciplines Outcome Goal Variances Interventions   Physical Therapy Goal     PT, PT/OT Ongoing, Progressing     Description: Goals to be met by: 23     Patient will increase functional independence with mobility by performin. Supine to sit with Supervision - not met  2. Sit to supine with Contact Guard Assistance - not met  3. Rolling to Left and Right with Supervision - not met  4. Sit to stand transfer with Stand-by Assistance - not met  5. Bed to chair transfer with Stand-by Assistance using Rolling Walker - not met  6. Gait  x 50 feet with Contact Guard Assistance using Rolling Walker - not met  7. Wheelchair propulsion x 50 feet with Modified [place] using bilateral upper extremities - not met  8. Ascend/Descend 4 inch curb step with Minimal Assistance using Rolling Walker - not met; unable to assess - assess when deemed safe                         Time Tracking:     PT Received On: 23  PT Start Time: 1336  PT Stop Time: 1346  PT Total Time (min): 10 min    Billable Minutes: Therapeutic Activity 10    Treatment Type: Treatment  PT/PTA: PTA     Number of PTA visits since last PT visit:  (in BS)     2023

## 2023-06-05 NOTE — PLAN OF CARE
Problem: Adult Inpatient Plan of Care  Goal: Plan of Care Review  Outcome: Ongoing, Progressing  Goal: Patient-Specific Goal (Individualized)  Outcome: Ongoing, Progressing  Goal: Absence of Hospital-Acquired Illness or Injury  Outcome: Ongoing, Progressing  Goal: Optimal Comfort and Wellbeing  Outcome: Ongoing, Progressing  Goal: Readiness for Transition of Care  Outcome: Ongoing, Progressing     Problem: Fluid and Electrolyte Imbalance (Acute Kidney Injury/Impairment)  Goal: Fluid and Electrolyte Balance  Outcome: Ongoing, Progressing     Problem: Oral Intake Inadequate (Acute Kidney Injury/Impairment)  Goal: Optimal Nutrition Intake  Outcome: Ongoing, Progressing     Problem: Renal Function Impairment (Acute Kidney Injury/Impairment)  Goal: Effective Renal Function  Outcome: Ongoing, Progressing     Problem: Impaired Wound Healing  Goal: Optimal Wound Healing  Outcome: Ongoing, Progressing     Problem: Fall Injury Risk  Goal: Absence of Fall and Fall-Related Injury  Outcome: Ongoing, Progressing     Problem: Skin Injury Risk Increased  Goal: Skin Health and Integrity  Outcome: Ongoing, Progressing     Problem: Malnutrition  Goal: Improved Nutritional Intake  Outcome: Ongoing, Progressing     Problem: Infection  Goal: Absence of Infection Signs and Symptoms  Outcome: Ongoing, Progressing

## 2023-06-05 NOTE — PROGRESS NOTES
[facility]                                  Skilled Nursing Facility                   Progress Note     Patient Name: Gisselle Ortiz  YOB: 1933  MRN: [id number]  Room: [place] [address] [address] [address]     Admit Date: 5/23/2023   FRANCESCA: 6/16/2023     Principal Problem: Acute renal failure superimposed on stage 3 chronic kidney disease    HPI obtained from patient interview and chart review     Chief Complaint: Re-evaluation of medical treatment and therapy status:  lab review, nausea      HPI:   Gisselle Ortiz is a 89 y.o. female with PMH of GERD, gallstone pancreatitis, thyroid disease, HTN, HLD, SSS s/p PPM, CHF (EF 55%), and a fib on eliquis who presented to the ED for nausea and vomiting. Pt admitted to hospital medicine for intractable n/v and UTI. Pt has remained hemodynamically stable. She was started on 5-days of IV rocephin. Urine culture + klebsiella pneumoniae and e.coli. No leukocytosis but was hyperkalemic K 5.3; discontinued spironolactone. AST/ALT elevated on admission and downtrending. Abd US with hepatomegaly, stable prominence of CBD, and cholelithiasis without evidence of cholecystitis. AES recommended MRCP, which the patient unable to have 2/2 pacemaker. KUB with nonobstructive bowel gas pattern and significant stool burden, which is improving on bowel regimen. CTAP with gallbladder sludge vs stones and colonic wall thickening vs. stenosis/peristalsis. Pt with MARIO on CKD, which is improving with IVFs. Pt with new onset thrombocytopenia which is now improving. Hematology consulted and recommended started cyanocobalamin. PT/OT consulted and on re-evaluation are recommending SNF. All questions were answered. Patient acknowledged understanding of discharge instructions and feels safe to discharge. Patient was transferred to SNF on 5/23/23 in stable condition.    Patient will be treated at [facility] with PT and OT to  improve functional status and ability to perform ADLs.     Interval History  24 hour chart review completed.   Has malaise and weakness. Reports nausea and vomiting today.   Labs reviewed no critical results  Change Malox prn to scheduled  Stop lactobacillus and magnesium oxide (both new meds added on 6/3) may be contributing to nausea  Check kub rule out constipation    Past Medical History: Patient has a past medical history of A-fib, Anemia of chronic disease (02/02/2021), Anticoagulant long-term use, CHF (congestive heart failure), Chronic diastolic heart failure (02/02/2021), COVID-19 (01/07/2022), Gallstone pancreatitis, Hypertension, Pancreatic abscess (09/26/2020), Stage 3 chronic kidney disease (5/11/2016), and Thyroid disease.    Past Surgical History: Patient has a past surgical history that includes Cataract extraction; Cataract extraction w/  intraocular lens implant (Bilateral, 2004); Revision of skin pocket for pacemaker (N/A, 1/21/2019); Eye surgery; Hysterectomy; Hip replacement arthroplasty (Right, 2016); Treatment of cardiac arrhythmia (N/A, 3/18/2019); Thyroidectomy; Treatment of cardiac arrhythmia (N/A, 5/14/2019); ERCP (N/A, 9/14/2020); Endoscopic ultrasound of upper gastrointestinal tract (N/A, 9/14/2020); ERCP (N/A, 9/25/2020); ERCP (N/A, 11/25/2020); Endoscopic ultrasound of upper gastrointestinal tract (11/25/2020); and Treatment of cardiac arrhythmia (N/A, 6/21/2022).    Social History: Patient reports that she has quit smoking. Her smoking use included cigarettes. She started smoking about 59 years ago. She has never used smokeless tobacco. She reports that she does not drink alcohol and does not use drugs.    Family History:  family history includes Heart attack in her maternal grandmother and mother; Heart disease in her maternal grandmother and mother; Heart failure in her maternal grandmother and mother; Hypertension in her maternal grandmother and mother; Stroke in her maternal  grandmother.    Allergies: Patient is allergic to ciprofloxacin.        Review of Systems   Constitutional:  Positive for malaise/fatigue. Negative for chills and fever.   Respiratory:  Negative for cough, sputum production, shortness of breath and wheezing.    Cardiovascular:  Negative for chest pain, palpitations and leg swelling.   Gastrointestinal:  Positive for abdominal pain, diarrhea, nausea and vomiting. Negative for constipation and heartburn.   Genitourinary:  Negative for dysuria and urgency.   Musculoskeletal:  Negative for back pain.   Neurological:  Positive for dizziness and weakness. Negative for headaches.   Endo/Heme/Allergies:  Negative for polydipsia. Does not bruise/bleed easily.   Psychiatric/Behavioral:  Negative for depression and hallucinations. The patient is not nervous/anxious.        24 hour Vital Sign Range   Temp:  [97.8 °F (36.6 °C)-98 °F (36.7 °C)]   Pulse:  [60-62]   Resp:  [18]   BP: (116-126)/(56-58)   SpO2:  [94 %-95 %]       Physical Exam  Vitals and nursing note reviewed.   Constitutional:       General: She is not in acute distress.     Appearance: Normal appearance. She is not ill-appearing.   Cardiovascular:      Rate and Rhythm: Normal rate and regular rhythm.      Pulses: Normal pulses.      Heart sounds: Normal heart sounds. No murmur heard.  Pulmonary:      Effort: Pulmonary effort is normal. No respiratory distress.      Breath sounds: Normal breath sounds. No wheezing or rhonchi.   Abdominal:      General: Bowel sounds are normal. There is no distension.      Palpations: Abdomen is soft. There is no mass.      Tenderness: There is no abdominal tenderness.   Musculoskeletal:         General: No swelling or tenderness.      Right lower leg: No edema.      Left lower leg: No edema.   Skin:     General: Skin is warm and dry.      Capillary Refill: Capillary refill takes less than 2 seconds.      Findings: No erythema or rash.   Neurological:      Mental Status: She is  alert and oriented to person, place, and time. Mental status is at baseline.      Motor: Weakness present.   Psychiatric:         Mood and Affect: Mood normal.         Behavior: Behavior normal.         Thought Content: Thought content normal.         Judgment: Judgment normal.         Labs:  Recent Labs   Lab 06/01/23  0436 06/04/23  0001 06/05/23  0406   WBC 8.38 11.75 8.49   HGB 8.4* 7.9* 8.4*   HCT 27.5* 26.0* 27.8*    267 250       Recent Labs   Lab 05/31/23  0836 06/01/23  0436 06/03/23  0547 06/04/23  1121 06/05/23  0406    144 142 144 144   K 4.6 5.2* 4.1 4.2 4.2   * 116* 114* 113* 116*   CO2 24 23 23 20* 23   BUN 39* 39* 39* 39* 40*   CREATININE 1.6* 1.5* 1.5* 1.7* 1.7*   GLU 94 86 91 46* 51*   CALCIUM 7.9* 7.7* 7.8* 7.6* 7.6*   MG 1.8 1.6 1.7  --  1.9   PHOS 2.8 2.4*  --   --  3.6       No results for input(s): ALKPHOS, ALT, AST, ALBUMIN, PROT, BILITOT, INR in the last 168 hours.      Recent Labs     06/04/23  1558   POCTGLUCOSE 89       Meds Scheduled:   amiodarone  200 mg Oral Daily    apixaban  2.5 mg Oral BID    aspirin  81 mg Oral Daily    atenoloL  12.5 mg Oral QHS    cyanocobalamin  1,000 mcg Oral Daily    diclofenac sodium  2 g Topical (Top) QID    folic acid  1 mg Oral Daily    Lactobacillus rhamnosus GG  1 capsule Oral BID    levothyroxine  137 mcg Oral Before breakfast    magnesium oxide  400 mg Oral Daily    midodrine  10 mg Oral Q8H    mirtazapine  7.5 mg Oral QHS    multivitamin  1 tablet Oral Daily    pantoprazole  40 mg Oral Daily       PRN:   acetaminophen, acetaminophen, aluminum-magnesium hydroxide-simethicone, bismuth subsalicylate, calcium carbonate, hydrALAZINE, loperamide, melatonin, ondansetron      Assessment and Plan:  Impaired functional mobility and endurance  - Chronic issue with acute worsening   - Continue with PT/OT for gait training and strengthening and restoration of ADL's   - Encourage mobility, OOB in chair, and early ambulation as appropriate  - Fall  precautions   - Monitor for bowel and bladder dysfunction  - Monitor for and prevent skin breakdown and pressure ulcers  - Continue DVT prophylaxis with apixaban      Acute renal failure superimposed on stage 3 chronic kidney disease  - Creatinine  (baseline 1-1.3)  - Monitor BMP daily, replete electrolytes if necessary  - Renally adjust drugs to CrCl; avoid nephrotoxic drugs   - Monitor I/Os  - Renal ultrasound consistent with bilateral medical renal disease & simple renal cysts; no hydronephrosis  - given D5 1.2 NS @50ml/hr x 1L  6/5/2023 renal function improving      Essential hypertension  Hypotension, improving  Weakness  - PRN  hydralazine   - Hypotension with associated weakness slowly improving.  - Holding atenolol 25 mg nightly and diltiazem 180 mg daily.  Resume atenolol at reduced dose of 12.5 mg nightly.  Initiate order(s): LR bolus 500ml    Hyperkalemia  Acute, new 6/2  - K 5.2   - Initiate order(s): Lokelma 10 g x 1 and obtain repeat BMP tomorrow morning.  phos low 2.4  - Continue to monitor BP closely    Hypomagnesemia  Acute, new 6/2  - Mag 1.6  -  imag ox 500 mg x 2 doses and obtain mag level tomorrow morning    Left upper extremity swelling  - Afebrile no leukocytosis  - U/S with resolution of L cephalic vein thrombosis and no evidence of DVT  - Elevate extremity  - Continue to monitor     Malnutrition of mild degree  BMI of 19 or less in adult  Body mass index is 24.99 kg/m².      - Pt reports poor PO intake for many weeks despite encouragement & good    family support  - Start mirtazapine 7.5 mg nightly   - Nutrition consulted, appreciate recs:   -Regular with supplements  -Recommend MVI, probiotic supplementation.      Thrombocytopenia  - Platelets 113 on admit to [facility]  - no heparin products this admit  - no evidence of thrombocytopenia in past labs  - had severe transaminitis in 1/2022 during COVID infection - other elevations also noted - may be underlying liver dz as an association  -  Hematology consulted given advancing skin changes and petechia, appreciate recs:  -- Agree with folic acid supplementation  -- Start cyanocobalamin 1,000 mcg PO daily  - Continue to monitor on daily labs     Transaminitis  -AST//177, tbili 0.9 on admission and downtrending.  -Patient denies abdominal pain.  -Repeat lipid panel wnl--discontinue statin  -Hepatitis panel negative  -Plan to f/u with PCP on when to restart statin  -Will need hepatology f/u OP     Constipation  Diarrhea  -KUB with nonobstructive bowel gas pattern noting large amount of stool throughout the colon may reflect constipation  - Continue bowel regimen with miralax TID & senna BID, hold for loose stool     Anemia due to chronic kidney disease  Folate deficiency      -Monitor Hgb       -Transfuse for Hgb <7    Chronic diastolic heart failure   -Monitor strict Is&Os and daily weights.  Continue to stress to patient importance of self efficacy and  on diet for CHF.      Paroxysmal atrial fibrillation  Current use of long term anticoagulation  -Apixaban 2.5 mg bid  - amiodarone 200 mg daily     Anticipate disposition:    Home with home health      Follow-up needed during SNF admission:       Follow-up needed after discharge from SNF:   - PCP within 1-2 weeks  - See appt scheduled below     Future Appointments   Date Time Provider Department Center   7/12/2023 11:00 AM Joi Romeo MD [facility] PAL MED [facility]   7/18/2023 10:00 AM HOME MONITOR DEVICE CHECK, [facility] [address]   7/24/2023  4:00 PM Roz Leon NP [facility] NEPHRO [facility]   8/15/2023  9:00 AM Sunil Naidu MD [facility] HEPAT [facility]   10/31/2023  3:00 PM Kai Montez MD [facility]         I certify that SNF services are required to be given on an inpatient basis because Gisselle Ortiz needs for skilled nursing care and/or skilled rehabilitation are required on a daily basis and such services can only practically be provided in a skilled  nursing facility setting and are for an ongoing condition for which she received inpatient care in the hospital.       Extended Visit  Total time spent: > 30 minutes  Description of Time: counseling patient on clinical condition, therapies provided, plan of care, emotional support, coordinating patient care with other care team members, reviewing and interpreting labs and imaging, collaboration with physician, initiating new orders, chart review, and documentation. See interval hx.           Nevaeh Walden NP  Department of Hospital Medicine   [facility]     DOS: 6/5/2023

## 2023-06-06 NOTE — PT/OT/SLP PROGRESS
"Occupational Therapy   Treatment    Name: Gisselle Ortiz  MRN: [id number]  Admit Date: 5/23/2023  Admitting Diagnosis:  Acute renal failure superimposed on stage 3 chronic kidney disease    General Precautions: Standard, fall   Orthopedic Precautions: N/A   Braces: N/A    Recommendations:     Discharge Recommendations:  home health OT  Level of Assistance Recommended at Discharge: 24 hours physical assistance for all ADL's and home management tasks  Discharge Equipment Recommendations: none  Barriers to discharge:   (increased assistance needed for mobility)    Assessment:     Gisselle Ortiz is a 89 y.o. female with a medical diagnosis of Acute renal failure superimposed on stage 3 chronic kidney disease .  She presents with performance deficits affecting function: weakness, impaired endurance, impaired self care skills, impaired functional mobility, impaired balance, impaired cardiopulmonary response to activity.   Pt agreeable to therapy focusing on postioning and self care 2/2 nausea. Pt is making slow progress in occupational therapy. BP in armchair prior to movement 135/59. She continues to require significant physical assist with ADLs, functional bed mobility, and functional transfers using a RW. Currently not performing tasks at [facility]. Pt would continue to benefit from skilled OT services to maximize functional independence with ADLs and functional mobility, reduce caregiver burden, and facilitate safe discharge in the least restrictive environment.   Rehab Potential is Fair    Activity tolerance:  Fair    Plan:     Patient to be seen 5 x/week to address the above listed problems via therapeutic activities, self-care/home management, therapeutic exercises    Plan of Care Expires: 06/22/23  Plan of Care Reviewed with: patient    Subjective     Communicated with: Nurse prior to session. Pt stated, " I'm not feeling great today." .    Pain/Comfort:  Pain Rating 1:  (pt did not rate pain but reported " feeling nauseous and threw-up during treatment session)  Pain Addressed 1: Reposition, Nurse notified  Pain Rating Post-Intervention 1: 0/10    Patient's cultural, spiritual, Taoist conflicts given the current situation:  no    Objective:     Patient found up in chair (recliner) with peripheral IV upon OT entry to room.    Bed Mobility:    Patient completed Sit to Supine with maximal assistance     Functional Mobility/Transfers:  Patient completed Sit <> Stand Transfer with minimum assistance  with  rolling walker   Patient completed Armchair (recliner) <> Bed Transfer using Stand Pivot and Step Transfer technique with minimum assistance with rolling walker      Activities of Daily Living:  Grooming: supervision and oral hygiene rinsing mouth out and washing face after throwing up , long sitting in bed    [facility] 6 Click ADL: 16        Treatment & Education:  -Pt educated to call for assistance and to transfer with hospital staff only   pt  verbalizing understanding.  Pt had no further questions & when asked whether there were any concerns pt reported none.     Patient left right sidelying with call button in reach and family at bedside    GOALS:   Multidisciplinary Problems       Occupational Therapy Goals          Problem: Occupational Therapy    Goal Priority Disciplines Outcome Interventions   Occupational Therapy Goal     OT, PT/OT Ongoing, Progressing    Description: Goals to be met by: 6/22/2023     Patient will increase functional independence with ADLs by performing:    UE Dressing with Set-up Assistance.  LE Dressing with Stand-by Assistance using appropriate AE as needed.  Grooming while seated at sink with Set-up Assistance.  Toileting from 3-in-1 commode over toilet with Stand-by Assistance for hygiene and clothing management.   Bathing from sitting/standing at sink with Minimal Assistance.  Supine to sit with Supervision.  Step transfer with Stand-by Assistance with RW.  Toilet transfer to 3-in-1  commode over toilet with Stand-by Assistance with RW.                         Time Tracking:     OT Date of Treatment: 06/06/23  OT Start Time: 1320    OT Stop Time: 1350  OT Total Time (min): 30 min    Billable Minutes:Self Care/Home Management 15  Therapeutic Activity 15    6/6/2023

## 2023-06-06 NOTE — PT/OT/SLP PROGRESS
Physical Therapy Treatment    Patient Name:  Gisselle Ortiz   MRN:  [id number]  Admit Date: 5/23/2023  Admitting Diagnosis: Acute renal failure superimposed on stage 3 chronic kidney disease  Recent Surgeries: N/A    General Precautions: Standard, fall  Orthopedic Precautions: N/A  Braces: N/A    Recommendations:     Discharge Recommendations: home health PT  Level of Assistance Recommended at Discharge: 24 hours significant assistance  Discharge Equipment Recommendations: none  Barriers to discharge:  (Increased assistance for mobility.)    Assessment:     Gisselle Ortiz is a 89 y.o. female admitted with a medical diagnosis of Acute renal failure superimposed on stage 3 chronic kidney disease. Patient agreeable to PT treatment this AM. Patient found seated in wheelchair at sink with PCT performing sponge bath to trunk. PT assisted patient with standing at sink and completion of anterior/posterior dilcia-care. Patient with incontinent bowel episode upon sitting. PT assisted patient again with hygiene needs in standing. Patient assisted to commode in restroom to complete bowel movement. Patient reports feelings of weakness while seated on commode. PT donned clean brief and pants/shirt and assisted patient to W/C to assess BP; 101/51 mmHg. Nurse reports that BP stable as compared to prior measurement this AM. PT assisted patient to BSC and elevated BLE. All functional transfers performed with Mercedes and use of W/C to push up and RW/grab bars to stabilize. Patient will benefit from continued SNF rehabilitation services to address deficits as well as progress mobility towards increased functional independence for safe transition to home environment at time of discharge.       Performance deficits affecting function: weakness, impaired endurance, impaired self care skills, impaired functional mobility, gait instability, impaired balance, decreased lower extremity function, decreased safety awareness, pain, edema,  "impaired cardiopulmonary response to activity.    Rehab Potential is good    Activity Tolerance: Fair    Plan:     Patient to be seen 5 x/week to address the above listed problems via gait training, therapeutic activities, therapeutic exercises, neuromuscular re-education, wheelchair management/training    Plan of Care Expires: 06/23/23  Plan of Care Reviewed with: patient    Subjective     "When will these loose bowels movements stop?"     Pain/Comfort:  Pain Rating 1:  (Pain level not quantified.)  Location - Side 1: Left  Location - Orientation 1: posterior  Location 1: gluteal  Pain Addressed 1: Reposition, Distraction, Cessation of Activity  Pain Rating Post-Intervention 1:  (Pain level not quantified.)    Patient's cultural, spiritual, Mu-ism conflicts given the current situation:  no    Objective:     Communicated with nursing staff prior to session.  Patient found up in chair with  (no active lines) upon PT entry to room. PCT present.     Therapeutic Activities and Exercises:   Patient found seated in wheelchair at sink with PCT performing sponge bath to trunk. PT assisted patient with standing at sink and completion of anterior/posterior dilcia-care. Patient with incontinent bowel episode upon sitting. PT assisted patient again with hygiene needs in standing. Patient assisted to commode in restroom to complete bowel movement. Patient reports feelings of weakness while seated on commode. PT donned clean brief and pants/shirt and assisted patient to W/C to assess BP; 101/51 mmHg. Nurse reports that BP stable as compared to prior measurement this AM. PT assisted patient to BSC and elevated BLE.    Patient reports fatigue and weakness; unable to continue with treatment at this time.     Functional Mobility:  Transfers:     Sit to Stand:  minimum assistance with rolling walker, grab bars, and performed x 6 trials throughout session from W/C, commode and bedside chair.   Toilet Transfer: minimum assistance with  " rolling walker and grab bars  using  Step Transfer    AM-PAC 6 CLICK MOBILITY  15    Patient left  reclined in bedside chair  with call button in reach and nursing staff notified.    GOALS:   Multidisciplinary Problems       Physical Therapy Goals          Problem: Physical Therapy    Goal Priority Disciplines Outcome Goal Variances Interventions   Physical Therapy Goal     PT, PT/OT Ongoing, Progressing     Description: Goals to be met by: 23     Patient will increase functional independence with mobility by performin. Supine to sit with Supervision - not met  2. Sit to supine with Contact Guard Assistance - not met  3. Rolling to Left and Right with Supervision - not met  4. Sit to stand transfer with Stand-by Assistance - not met  5. Bed to chair transfer with Stand-by Assistance using Rolling Walker - not met  6. Gait  x 50 feet with Contact Guard Assistance using Rolling Walker - not met  7. Wheelchair propulsion x 50 feet with Modified [place] using bilateral upper extremities - not met  8. Ascend/Descend 4 inch curb step with Minimal Assistance using Rolling Walker - not met; unable to assess - assess when deemed safe                         Time Tracking:     PT Received On: 23  PT Start Time: 1109  PT Stop Time: 1137  PT Total Time (min): 28 min    Billable Minutes: Therapeutic Activity 28    Treatment Type: Treatment  PT/PTA: PT     Number of PTA visits since last PT visit: 0     2023

## 2023-06-06 NOTE — PLAN OF CARE
Problem: Occupational Therapy  Goal: Occupational Therapy Goal  Description: Goals to be met by: 6/22/2023     Patient will increase functional independence with ADLs by performing:    UE Dressing with Set-up Assistance.-not met  LE Dressing with Stand-by Assistance using appropriate AE as needed.-not met  Grooming while seated at sink with Set-up Assistance.  Toileting from 3-in-1 commode over toilet with Stand-by Assistance for hygiene and clothing management. - Not Met  Bathing from sitting/standing at sink with Minimal Assistance. - Not Met  Supine to sit with Supervision. - Not Met  Step transfer with Stand-by Assistance with RW. - Not Met  Toilet transfer to 3-in-1 commode over toilet with Stand-by Assistance with RW.    Outcome: Ongoing,  Progressing

## 2023-06-06 NOTE — PROGRESS NOTES
[facility] - Skilled Nursing  Adult Nutrition  Progress Note    SUMMARY   Recommendations  Continue low potassium diet, encourage boost breeze, increased PO intake, RD following.  Goals: PO to improve to 75% of EEN with ONS by next RD follow up  Nutrition Goal Status: goal not met  Communication of RD Recs: other (comment) (POC)    Assessment and Plan  Malnutrition of mild degree  Malnutrition Type:  Context: acute illness or injury  Level: mild     Related to (etiology):   Taste changes, lack of interest in food     Malnutrition Characteristic Summary:  Energy Intake (Malnutrition): less than or equal to 50% for greater than or equal to 5 days  Muscle Mass (Malnutrition): moderate depletion        Interventions/Recommendations (treatment strategy):  Continue Low potassium diet to encourage PO intake, trial boost breeze daily, recommend Vit D per home med list, RD following     Nutrition Diagnosis Status:   Continues    Malnutrition Assessment  5/26  Malnutrition Context: acute illness or injury  Malnutrition Level: mild  Skin (Micronutrient): pallor, bruised, thinned  Eyes (Micronutrient): conjunctiva dull  Teeth (Micronutrient): broken dentition (no upper teeth, wont wear dentures)  Tongue (Micronutrient): magenta  Neck/Chest (Micronutrient): muscle wasting  Musculoskeletal/Lower Extremities: muscle wasting   Micronutrient Evaluation Summary: suspected deficiency  Micronutrient Evaluation Comments: folate, protein,   Energy Intake (Malnutrition): less than or equal to 50% for greater than or equal to 5 days  Muscle Mass (Malnutrition): moderate depletion   Orbital Region (Subcutaneous Fat Loss): severe depletion  Upper Arm Region (Subcutaneous Fat Loss): well nourished  Thoracic and Lumbar Region: well nourished   Confucianist Region (Muscle Loss): severe depletion  Clavicle and Acromion Bone Region (Muscle Loss): moderate depletion  Dorsal Hand (Muscle Loss): severe depletion  Anterior Thigh Region (Muscle Loss):  "moderate depletion                 Reason for Assessment  Reason For Assessment: RD follow-up  Diagnosis:  (acute kidney failure superimposed on CKD 3)  Relevant Medical History: CHF, HTN, HLD, Anemia, thyroid disease, pancreatic abcess, pacemaker, SSS, AFIB,  Interdisciplinary Rounds: attended    General Information Comments: ate better today than Friday, still in bed. patient states she ate breakfast and dinner tonight, had xray of abdomen tonight. reports diarrhea resolved but still feels nauseated. She is hoping her dinner stays down.RD sent communication to MARYLOU Walden regarding patient and family concern over continued nausea.   Nutrition Discharge Planning: DC on regular no added salt diet.    Nutrition/Diet History    Patient Reported Diet/Restrictions/Preferences: general  Typical Food/Fluid Intake: two meals  Spiritual, Cultural Beliefs, Anabaptist Practices, Values that Affect Care: no  Vitamin/Mineral/Herbal Supplements: centrum silver, Vit D  Food Allergies: NKFA  Factors Affecting Nutritional Intake: altered taste, decreased appetite    Anthropometrics    Temp: 98 °F (36.7 °C)  Height Method: Stated  Height: 5' 3" (160 cm)  Height (inches): 63 in  Weight Method: Bed Scale  Weight: 64 kg (141 lb 1.5 oz)  Weight (lb): 141.1 lb  Ideal Body Weight (IBW), Female: 115 lb  % Ideal Body Weight, Female (lb): 113.87 %  BMI (Calculated): 25  BMI Grade: 18.5-24.9 - normal  Usual Body Weight (UBW), k kg  Weight Change Amount:  (there is a weight descrepany on chart review with wt of 110 # if that were correct pt would have lost 15% but that weight is not reflected here)  % Usual Body Weight: 102.63  % Weight Change From Usual Weight: 2.41 %       Lab/Procedures/Meds    Pertinent Labs Reviewed: reviewed  Pertinent Labs Comments: GFR 28.5, Hg 8.4, Hct 27.8, Cr 1.7, K 4.2, BUN 40,  Pertinent Medications Reviewed: reviewed  Pertinent Medications Comments: Mg, Lactobacillus GG,    Estimated/Assessed Needs    Weight " Used For Calorie Calculations: 59.4 kg (130 lb 15.3 oz)  Energy Calorie Requirements (kcal): 1285  Energy Need Method: [place] Jeor (x 1.3(PAL))  Protein Requirements: 71g  Weight Used For Protein Calculations: 59.4 kg (130 lb 15.3 oz) (x 1.2g/kg)  Fluid Requirements (mL): 1285 or per MD  Estimated Fluid Requirement Method: RDA Method  RDA Method (mL): 1285  CHO Requirement: -      Nutrition Prescription Ordered  Current Diet Order: Low potassium  Nutrition Order Comments: PO 50%  Oral Nutrition Supplement: boost breeze refuses    Evaluation of Received Nutrient/Fluid Intake  I/O: no data  Energy Calories Required: not meeting needs  Protein Required: not meeting needs  Fluid Required: meeting needs  Comments: LBM 6/3  Tolerance: tolerating  % Intake of Estimated Energy Needs: 25 - 50 %  % Meal Intake: 25 - 50 %    Nutrition Risk    Level of Risk/Frequency of Follow-up: high (two times per week)     Monitor and Evaluation    Food and Nutrient Intake: food and beverage intake  Food and Nutrient Adminstration: diet order  Physical Activity and Function: nutrition-related ADLs and IADLs  Anthropometric Measurements: weight change  Biochemical Data, Medical Tests and Procedures: glucose/endocrine profile, gastrointestinal profile, electrolyte and renal panel  Nutrition-Focused Physical Findings: skin, overall appearance     Nutrition Follow-Up    RD Follow-up?: Yes

## 2023-06-06 NOTE — PLAN OF CARE
Continue with POC. Patient with limited tolerance for mobility secondary to feelings of weakness, fatigue and dizziness. BP currently stable. Nursing aware.     Problem: Physical Therapy  Goal: Physical Therapy Goal  Description: Goals to be met by: 23     Patient will increase functional independence with mobility by performin. Supine to sit with Supervision - not met  2. Sit to supine with Contact Guard Assistance - not met  3. Rolling to Left and Right with Supervision - not met  4. Sit to stand transfer with Stand-by Assistance - not met  5. Bed to chair transfer with Stand-by Assistance using Rolling Walker - not met  6. Gait  x 50 feet with Contact Guard Assistance using Rolling Walker - not met  7. Wheelchair propulsion x 50 feet with Modified [place] using bilateral upper extremities - not met  8. Ascend/Descend 4 inch curb step with Minimal Assistance using Rolling Walker - not met; unable to assess - assess when deemed safe    Outcome: Ongoing, Progressing   2023

## 2023-06-07 NOTE — PT/OT/SLP PROGRESS
"Occupational Therapy      Gisselle Ortiz  MRN: [id number]    Patient not seen today secondary to pt politely declined, stating "I am not feeling well, hunny." Pt noted w/ LUE edema; OT elevated UE w/ pillow and provided education on benefits/importance of elevation to reduce edema. Will follow-up 6/8/23.    "

## 2023-06-07 NOTE — PT/OT/SLP PROGRESS
"Physical Therapy Treatment    Patient Name:  Gisselle Ortiz   MRN:  [id number]  Admit Date: 5/23/2023  Admitting Diagnosis: Acute renal failure superimposed on stage 3 chronic kidney disease  Recent Surgeries: N/A    General Precautions: Standard, fall  Orthopedic Precautions: N/A  Braces: N/A    Recommendations:     Discharge Recommendations: home health PT  Level of Assistance Recommended at Discharge: 24 hours significant assistance  Discharge Equipment Recommendations: none  Barriers to discharge:  (Increased assistance for mobility.)    Assessment:     Gisselle Ortiz is a 89 y.o. female admitted with a medical diagnosis of Acute renal failure superimposed on stage 3 chronic kidney disease .     Pt continues to reports feeling unwell) nauseated and dizziness). Nurse notified and aware. Pt seated EOB with max encouragement, but attempted to returned self back to bed after ~3 mins. Pt continues to benefit from therapy to improve functional endurance, strength, and mobility.     Performance deficits affecting function: weakness, impaired endurance, impaired self care skills, impaired functional mobility, gait instability, impaired balance, decreased lower extremity function, decreased safety awareness, pain, edema, impaired cardiopulmonary response to activity.    Rehab Potential is fair    Activity Tolerance: Poor    Plan:     Patient to be seen 5 x/week to address the above listed problems via gait training, therapeutic activities, therapeutic exercises, neuromuscular re-education, wheelchair management/training    Plan of Care Expires: 06/23/23  Plan of Care Reviewed with: patient    Subjective     "I don't feel great".     Pain/Comfort:  Pain Rating 1: 0/10  Pain Rating Post-Intervention 1: 0/10    Patient's cultural, spiritual, Amish conflicts given the current situation:  no    Objective:     Communicated with nurse prior to session.  Patient found HOB elevated with  (no active) upon PT entry to " room.     Therapeutic Activities and Exercises:   Increase encouragement to participate in therapy. Pt continues to reports not feeling well.   Patient educated on role of therapy, goals of session, and benefits of out of bed mobility.   Instructed on use of call button and importance of calling nursing staff for assistance with mobility   Questions/concerns addressed within PTA scope of practice  Pt verbalized understanding.    HOB elevated at beginning of session:   BP: 114/53 MAP: 77 HR: 60    Functional Mobility:  Bed Mobility:   Rolling to Left/Right: stand by assistance with rails  To change brief and rosario pants  Scooting to HOB: stand by assistance via supine bridge  Mostly used BLE  Supine to Sit: minimum assistance; HOB elevated  With trunk and LE management  Sit to Supine: minimum assistance  With LE management   Transfers:   Pt refused OOB mobility today. Pt continues to report nausea and dizziness.   Balance:   Static/Dynamic sitting EOB balance: CGA  Tolerated ~3 mins before attempting to return self back to bed    AM-PAC 6 CLICK MOBILITY  15    Patient left HOB elevated with all lines intact, call button in reach, and nurse notified.    GOALS:   Multidisciplinary Problems       Physical Therapy Goals          Problem: Physical Therapy    Goal Priority Disciplines Outcome Goal Variances Interventions   Physical Therapy Goal     PT, PT/OT Ongoing, Progressing     Description: Goals to be met by: 23     Patient will increase functional independence with mobility by performin. Supine to sit with Supervision - not met  2. Sit to supine with Contact Guard Assistance - not met  3. Rolling to Left and Right with Supervision - not met  4. Sit to stand transfer with Stand-by Assistance - not met  5. Bed to chair transfer with Stand-by Assistance using Rolling Walker - not met  6. Gait  x 50 feet with Contact Guard Assistance using Rolling Walker - not met  7. Wheelchair propulsion x 50 feet with  Modified [place] using bilateral upper extremities - not met  8. Ascend/Descend 4 inch curb step with Minimal Assistance using Rolling Walker - not met; unable to assess - assess when deemed safe                         Time Tracking:     PT Received On: 06/07/23  PT Start Time: 1259  PT Stop Time: 1323  PT Total Time (min): 24 min    Billable Minutes: Therapeutic Activity 24    Treatment Type: Treatment  PT/PTA: PTA     Number of PTA visits since last PT visit: 1 06/07/2023

## 2023-06-07 NOTE — PROGRESS NOTES
[facility]                                  Skilled Nursing Facility                   Progress Note     Patient Name: Gisselle Ortiz  YOB: 1933  MRN: [id number]  Room: [place] [address] [address] A     Admit Date: 5/23/2023   FRANCESCA: 6/16/2023     Principal Problem: Acute renal failure superimposed on stage 3 chronic kidney disease    HPI obtained from patient interview and chart review     Chief Complaint: Re-evaluation of medical treatment and therapy status: nausea, weakness, review XR      HPI:   Gisselle Ortiz is a 89 y.o. female with PMH of GERD, gallstone pancreatitis, thyroid disease, HTN, HLD, SSS s/p PPM, CHF (EF 55%), and a fib on eliquis who presented to the ED for nausea and vomiting. Pt admitted to hospital medicine for intractable n/v and UTI. Pt has remained hemodynamically stable. She was started on 5-days of IV rocephin. Urine culture + klebsiella pneumoniae and e.coli. No leukocytosis but was hyperkalemic K 5.3; discontinued spironolactone. AST/ALT elevated on admission and downtrending. Abd US with hepatomegaly, stable prominence of CBD, and cholelithiasis without evidence of cholecystitis. AES recommended MRCP, which the patient unable to have 2/2 pacemaker. KUB with nonobstructive bowel gas pattern and significant stool burden, which is improving on bowel regimen. CTAP with gallbladder sludge vs stones and colonic wall thickening vs. stenosis/peristalsis. Pt with MARIO on CKD, which is improving with IVFs. Pt with new onset thrombocytopenia which is now improving. Hematology consulted and recommended started cyanocobalamin. PT/OT consulted and on re-evaluation are recommending SNF. All questions were answered. Patient acknowledged understanding of discharge instructions and feels safe to discharge. Patient was transferred to SNF on 5/23/23 in stable condition.    Patient will be treated at [facility] with PT  and OT to improve functional status and ability to perform ADLs.     Interval History  24 hour chart review completed.   Has malaise and weakness. Reports mild improvement with nausea and vomiting   Continue Malox scheduled  KUB reviewed no obstruction noted    Past Medical History: Patient has a past medical history of A-fib, Anemia of chronic disease (02/02/2021), Anticoagulant long-term use, CHF (congestive heart failure), Chronic diastolic heart failure (02/02/2021), COVID-19 (01/07/2022), Gallstone pancreatitis, Hypertension, Pancreatic abscess (09/26/2020), Stage 3 chronic kidney disease (5/11/2016), and Thyroid disease.    Past Surgical History: Patient has a past surgical history that includes Cataract extraction; Cataract extraction w/  intraocular lens implant (Bilateral, 2004); Revision of skin pocket for pacemaker (N/A, 1/21/2019); Eye surgery; Hysterectomy; Hip replacement arthroplasty (Right, 2016); Treatment of cardiac arrhythmia (N/A, 3/18/2019); Thyroidectomy; Treatment of cardiac arrhythmia (N/A, 5/14/2019); ERCP (N/A, 9/14/2020); Endoscopic ultrasound of upper gastrointestinal tract (N/A, 9/14/2020); ERCP (N/A, 9/25/2020); ERCP (N/A, 11/25/2020); Endoscopic ultrasound of upper gastrointestinal tract (11/25/2020); and Treatment of cardiac arrhythmia (N/A, 6/21/2022).    Social History: Patient reports that she has quit smoking. Her smoking use included cigarettes. She started smoking about 59 years ago. She has never used smokeless tobacco. She reports that she does not drink alcohol and does not use drugs.    Family History:  family history includes Heart attack in her maternal grandmother and mother; Heart disease in her maternal grandmother and mother; Heart failure in her maternal grandmother and mother; Hypertension in her maternal grandmother and mother; Stroke in her maternal grandmother.    Allergies: Patient is allergic to ciprofloxacin.        Review of Systems   Constitutional:  Positive  for malaise/fatigue. Negative for chills and fever.   Respiratory:  Negative for cough, sputum production, shortness of breath and wheezing.    Cardiovascular:  Negative for chest pain, palpitations and leg swelling.   Gastrointestinal:  Positive for abdominal pain, diarrhea, nausea and vomiting. Negative for constipation and heartburn.   Genitourinary:  Negative for dysuria and urgency.   Musculoskeletal:  Negative for back pain.   Neurological:  Positive for dizziness and weakness. Negative for headaches.   Endo/Heme/Allergies:  Negative for polydipsia. Does not bruise/bleed easily.   Psychiatric/Behavioral:  Negative for depression and hallucinations. The patient is not nervous/anxious.        24 hour Vital Sign Range   Temp:  [98.2 °F (36.8 °C)-98.3 °F (36.8 °C)]   Pulse:  [65]   Resp:  [18]   BP: (124-135)/(64-75)   SpO2:  [97 %-98 %]       Physical Exam  Vitals and nursing note reviewed.   Constitutional:       General: She is not in acute distress.     Appearance: Normal appearance. She is not ill-appearing.   Cardiovascular:      Rate and Rhythm: Normal rate and regular rhythm.      Pulses: Normal pulses.      Heart sounds: Normal heart sounds. No murmur heard.  Pulmonary:      Effort: Pulmonary effort is normal. No respiratory distress.      Breath sounds: Normal breath sounds. No wheezing or rhonchi.   Abdominal:      General: Bowel sounds are normal. There is no distension.      Palpations: Abdomen is soft. There is no mass.      Tenderness: There is no abdominal tenderness.   Musculoskeletal:         General: No swelling or tenderness.      Right lower leg: No edema.      Left lower leg: No edema.   Skin:     General: Skin is warm and dry.      Capillary Refill: Capillary refill takes less than 2 seconds.      Findings: No erythema or rash.   Neurological:      Mental Status: She is alert and oriented to person, place, and time. Mental status is at baseline.      Motor: Weakness present.   Psychiatric:          Mood and Affect: Mood normal.         Behavior: Behavior normal.         Thought Content: Thought content normal.         Judgment: Judgment normal.         Labs:  Recent Labs   Lab 06/01/23  0436 06/04/23  0001 06/05/23  0406   WBC 8.38 11.75 8.49   HGB 8.4* 7.9* 8.4*   HCT 27.5* 26.0* 27.8*    267 250       Recent Labs   Lab 06/01/23  0436 06/03/23  0547 06/04/23  1121 06/05/23  0406    142 144 144   K 5.2* 4.1 4.2 4.2   * 114* 113* 116*   CO2 23 23 20* 23   BUN 39* 39* 39* 40*   CREATININE 1.5* 1.5* 1.7* 1.7*   GLU 86 91 46* 51*   CALCIUM 7.7* 7.8* 7.6* 7.6*   MG 1.6 1.7  --  1.9   PHOS 2.4*  --   --  3.6       No results for input(s): ALKPHOS, ALT, AST, ALBUMIN, PROT, BILITOT, INR in the last 168 hours.    No results for input(s): POCTGLUCOSE in the last 72 hours.      Meds Scheduled:   amiodarone  200 mg Oral Daily    apixaban  2.5 mg Oral BID    aspirin  81 mg Oral Daily    atenoloL  12.5 mg Oral QHS    cyanocobalamin  1,000 mcg Oral Daily    diclofenac sodium  2 g Topical (Top) QID    folic acid  1 mg Oral Daily    levothyroxine  137 mcg Oral Before breakfast    midodrine  10 mg Oral Q8H    mirtazapine  7.5 mg Oral QHS    multivitamin  1 tablet Oral Daily    pantoprazole  40 mg Oral Daily    vancomycin  125 mg Oral Q6H       PRN:   acetaminophen, acetaminophen, bismuth subsalicylate, calcium carbonate, hydrALAZINE, loperamide, melatonin, ondansetron      Assessment and Plan:  Impaired functional mobility and endurance  - Chronic issue with acute worsening   - Continue with PT/OT for gait training and strengthening and restoration of ADL's   - Encourage mobility, OOB in chair, and early ambulation as appropriate  - Fall precautions   - Monitor for bowel and bladder dysfunction  - Monitor for and prevent skin breakdown and pressure ulcers  - Continue DVT prophylaxis with apixaban      Acute renal failure superimposed on stage 3 chronic kidney disease  - Creatinine  (baseline 1-1.3)  -  Monitor BMP daily, replete electrolytes if necessary  - Renally adjust drugs to CrCl; avoid nephrotoxic drugs   - Monitor I/Os  - Renal ultrasound consistent with bilateral medical renal disease & simple renal cysts; no hydronephrosis  - given D5 1.2 NS @50ml/hr x 1L   renal function improving      Essential hypertension  Hypotension, improving  Weakness  - PRN  hydralazine   - Hypotension with associated weakness slowly improving.  - Holding atenolol 25 mg nightly and diltiazem 180 mg daily.  Resume atenolol at reduced dose of 12.5 mg nightly.  Initiate order(s): LR bolus 500ml    Hyperkalemia  Acute, new 6/2  - K 5.2   - Initiate order(s): Lokelma 10 g x 1 and obtain repeat BMP tomorrow morning.  phos low 2.4  - Continue to monitor BP closely    Hypomagnesemia  Acute, new 6/2  - Mag 1.6  -  imag ox 500 mg x 2 doses and obtain mag level tomorrow morning    Left upper extremity swelling  - Afebrile no leukocytosis  - U/S with resolution of L cephalic vein thrombosis and no evidence of DVT  - Elevate extremity  - Continue to monitor     Malnutrition of mild degree  BMI of 19 or less in adult  Body mass index is 24.99 kg/m².      - Pt reports poor PO intake for many weeks despite encouragement & good    family support  - Start mirtazapine 7.5 mg nightly   - Nutrition consulted, appreciate recs:   -Regular with supplements  -Recommend MVI, probiotic supplementation.      Thrombocytopenia  - Platelets 113 on admit to [facility]  - no heparin products this admit  - no evidence of thrombocytopenia in past labs  - had severe transaminitis in 1/2022 during COVID infection - other elevations also noted - may be underlying liver dz as an association  - Hematology consulted given advancing skin changes and petechia, appreciate recs:  -- Agree with folic acid supplementation  -- Start cyanocobalamin 1,000 mcg PO daily  - Continue to monitor on daily labs     Transaminitis  -AST//177, tbili 0.9 on admission and  downtrending.  -Patient denies abdominal pain.  -Repeat lipid panel wnl--discontinue statin  -Hepatitis panel negative  -Plan to f/u with PCP on when to restart statin  -Will need hepatology f/u OP     Constipation  Diarrhea  -KUB with nonobstructive bowel gas pattern noting large amount of stool throughout the colon may reflect constipation  - Continue bowel regimen with miralax TID & senna BID, hold for loose stool     Anemia due to chronic kidney disease  Folate deficiency      -Monitor Hgb       -Transfuse for Hgb <7    Chronic diastolic heart failure   -Monitor strict Is&Os and daily weights.  Continue to stress to patient importance of self efficacy and  on diet for CHF.      Paroxysmal atrial fibrillation  Current use of long term anticoagulation  -Apixaban 2.5 mg bid  - amiodarone 200 mg daily     Anticipate disposition:    Home with home health      Follow-up needed during SNF admission:       Follow-up needed after discharge from SNF:   - PCP within 1-2 weeks  - See appt scheduled below     Future Appointments   Date Time Provider Department Center   7/12/2023 11:00 AM Joi Romeo MD [facility] PAL MED [facility]   7/18/2023 10:00 AM HOME MONITOR DEVICE CHECK, [facility]   7/24/2023  4:00 PM Roz Leon NP [facility] NEPHRO [facility]   8/15/2023  9:00 AM Sunil Naidu MD [facility] HEPAT [facility]   10/31/2023  3:00 PM Kai Montez MD [facility]         I certify that SNF services are required to be given on an inpatient basis because Gisselle Ortiz needs for skilled nursing care and/or skilled rehabilitation are required on a daily basis and such services can only practically be provided in a skilled nursing facility setting and are for an ongoing condition for which she received inpatient care in the hospital.     Nevaeh Walden NP  Department of Hospital Medicine   [facility]     DOS: 6/6/23

## 2023-06-07 NOTE — PROGRESS NOTES
[facility]                                  Skilled Nursing Facility                   Progress Note     Patient Name: Gisselle Ortiz  YOB: 1933  MRN: [id number]  Room: [place] [address]/[place] [address] A     Admit Date: 5/23/2023   FRANCESCA: 6/16/2023     Principal Problem: Acute renal failure superimposed on stage 3 chronic kidney disease    HPI obtained from patient interview and chart review     Chief Complaint: Re-evaluation of medical treatment and therapy status: nausea, vomiting, diarrhea       HPI:   Gisselle Ortiz is a 89 y.o. female with PMH of GERD, gallstone pancreatitis, thyroid disease, HTN, HLD, SSS s/p PPM, CHF (EF 55%), and a fib on eliquis who presented to the ED for nausea and vomiting. Pt admitted to hospital medicine for intractable n/v and UTI. Pt has remained hemodynamically stable. She was started on 5-days of IV rocephin. Urine culture + klebsiella pneumoniae and e.coli. No leukocytosis but was hyperkalemic K 5.3; discontinued spironolactone. AST/ALT elevated on admission and downtrending. Abd US with hepatomegaly, stable prominence of CBD, and cholelithiasis without evidence of cholecystitis. AES recommended MRCP, which the patient unable to have 2/2 pacemaker. KUB with nonobstructive bowel gas pattern and significant stool burden, which is improving on bowel regimen. CTAP with gallbladder sludge vs stones and colonic wall thickening vs. stenosis/peristalsis. Pt with MARIO on CKD, which is improving with IVFs. Pt with new onset thrombocytopenia which is now improving. Hematology consulted and recommended started cyanocobalamin. PT/OT consulted and on re-evaluation are recommending SNF. All questions were answered. Patient acknowledged understanding of discharge instructions and feels safe to discharge. Patient was transferred to SNF on 5/23/23 in stable condition.    Patient will be treated at [facility] with PT  and OT to improve functional status and ability to perform ADLs.     Interval History  Reports loose stools, foul smell that started last week.   Has malaise and weakness.   Reports uncontrolled nausea and vomiting today.   Order test for c. Diff, enteric precautions, give LR 75cc/hr x 1 liter for dehydration, and empiric C. Diff treatment--Vancomycin 125mg every 6 hours x 10 days.    Review of Systems   Constitutional:  Positive for malaise/fatigue. Negative for chills and fever.   Respiratory:  Negative for cough, sputum production, shortness of breath and wheezing.    Cardiovascular:  Negative for chest pain, palpitations and leg swelling.   Gastrointestinal:  Positive for abdominal pain, diarrhea, nausea and vomiting. Negative for constipation and heartburn.   Genitourinary:  Negative for dysuria and urgency.   Musculoskeletal:  Negative for back pain.   Neurological:  Positive for dizziness and weakness. Negative for headaches.   Endo/Heme/Allergies:  Negative for polydipsia. Does not bruise/bleed easily.   Psychiatric/Behavioral:  Negative for depression and hallucinations. The patient is not nervous/anxious.        24 hour Vital Sign Range   Temp:  [98.2 °F (36.8 °C)-98.3 °F (36.8 °C)]   Pulse:  [65]   Resp:  [18]   BP: (124-135)/(64-75)   SpO2:  [97 %-98 %]       Physical Exam  Vitals and nursing note reviewed.   Constitutional:       General: She is not in acute distress.     Appearance: Normal appearance. She is not ill-appearing.   Cardiovascular:      Rate and Rhythm: Normal rate and regular rhythm.      Pulses: Normal pulses.      Heart sounds: Normal heart sounds. No murmur heard.  Pulmonary:      Effort: Pulmonary effort is normal. No respiratory distress.      Breath sounds: Normal breath sounds. No wheezing or rhonchi.   Abdominal:      General: Bowel sounds are normal. There is no distension.      Palpations: Abdomen is soft. There is no mass.      Tenderness: There is no abdominal tenderness.    Musculoskeletal:         General: No swelling or tenderness.      Right lower leg: No edema.      Left lower leg: No edema.   Skin:     General: Skin is warm and dry.      Capillary Refill: Capillary refill takes less than 2 seconds.      Findings: No erythema or rash.   Neurological:      Mental Status: She is alert and oriented to person, place, and time. Mental status is at baseline.      Motor: Weakness present.   Psychiatric:         Mood and Affect: Mood normal.         Behavior: Behavior normal.         Thought Content: Thought content normal.         Judgment: Judgment normal.         Labs:  Recent Labs   Lab 06/01/23  0436 06/04/23  0001 06/05/23  0406   WBC 8.38 11.75 8.49   HGB 8.4* 7.9* 8.4*   HCT 27.5* 26.0* 27.8*    267 250       Recent Labs   Lab 06/01/23  0436 06/03/23  0547 06/04/23  1121 06/05/23  0406    142 144 144   K 5.2* 4.1 4.2 4.2   * 114* 113* 116*   CO2 23 23 20* 23   BUN 39* 39* 39* 40*   CREATININE 1.5* 1.5* 1.7* 1.7*   GLU 86 91 46* 51*   CALCIUM 7.7* 7.8* 7.6* 7.6*   MG 1.6 1.7  --  1.9   PHOS 2.4*  --   --  3.6       No results for input(s): ALKPHOS, ALT, AST, ALBUMIN, PROT, BILITOT, INR in the last 168 hours.    No results for input(s): POCTGLUCOSE in the last 72 hours.      Meds Scheduled:   amiodarone  200 mg Oral Daily    apixaban  2.5 mg Oral BID    aspirin  81 mg Oral Daily    atenoloL  12.5 mg Oral QHS    cyanocobalamin  1,000 mcg Oral Daily    diclofenac sodium  2 g Topical (Top) QID    folic acid  1 mg Oral Daily    levothyroxine  137 mcg Oral Before breakfast    midodrine  10 mg Oral Q8H    mirtazapine  7.5 mg Oral QHS    multivitamin  1 tablet Oral Daily    pantoprazole  40 mg Oral Daily    vancomycin  125 mg Oral Q6H       PRN:   acetaminophen, acetaminophen, bismuth subsalicylate, calcium carbonate, hydrALAZINE, loperamide, melatonin, ondansetron      Assessment and Plan:  Impaired functional mobility and endurance  - Chronic issue with acute  worsening   - Continue with PT/OT for gait training and strengthening and restoration of ADL's   - Encourage mobility, OOB in chair, and early ambulation as appropriate  - Fall precautions   - Monitor for bowel and bladder dysfunction  - Monitor for and prevent skin breakdown and pressure ulcers  - Continue DVT prophylaxis with apixaban      Acute renal failure superimposed on stage 3 chronic kidney disease  - Creatinine  (baseline 1-1.3)  - Monitor BMP daily, replete electrolytes if necessary  - Renally adjust drugs to CrCl; avoid nephrotoxic drugs   - Monitor I/Os  - Renal ultrasound consistent with bilateral medical renal disease & simple renal cysts; no hydronephrosis  - given D5 1.2 NS @50ml/hr x 1L  6/7/2023 renal function improving     C. Diff, suspected, requiring additional work up  6/7/23   Order test for c. Diff, enteric precautions, give LR 75cc/hr x 1 liter for dehydration, and empiric C. Diff treatment--Vancomycin 125mg every 6 hours x 10 days.     Essential hypertension  Hypotension, improving  Weakness  - PRN  hydralazine   - Hypotension with associated weakness slowly improving.  - Holding atenolol 25 mg nightly and diltiazem 180 mg daily.    Hyperkalemia  Acute, new 6/2  - K 5.2   - Initiate order(s): Lokelma 10 g x 1 and obtain repeat BMP tomorrow morning.  phos low 2.4  - Continue to monitor BP closely    Hypomagnesemia  Acute, new 6/2  - Mag 1.6  -  imag ox 500 mg x 2 doses and obtain mag level tomorrow morning    Left upper extremity swelling  - Afebrile no leukocytosis  - U/S with resolution of L cephalic vein thrombosis and no evidence of DVT  - Elevate extremity  - Continue to monitor     Malnutrition of mild degree  BMI of 19 or less in adult  Body mass index is 24.99 kg/m².      - Pt reports poor PO intake for many weeks despite encouragement & good    family support  - Start mirtazapine 7.5 mg nightly   - Nutrition consulted, appreciate recs:   -Regular with supplements  -Recommend MVI,  probiotic supplementation.      Thrombocytopenia  - Platelets 113 on admit to SNF  - no heparin products this admit  - no evidence of thrombocytopenia in past labs  - had severe transaminitis in 1/2022 during COVID infection - other elevations also noted - may be underlying liver dz as an association  - Hematology consulted given advancing skin changes and petechia, appreciate recs:  -- Agree with folic acid supplementation  -- Start cyanocobalamin 1,000 mcg PO daily  - Continue to monitor on daily labs     Transaminitis  -AST//177, tbili 0.9 on admission and downtrending.  -Patient denies abdominal pain.  -Repeat lipid panel wnl--discontinue statin  -Hepatitis panel negative  -Plan to f/u with PCP on when to restart statin  -Will need hepatology f/u OP     Constipation  Diarrhea  -KUB with nonobstructive bowel gas pattern noting large amount of stool throughout the colon may reflect constipation  - Continue bowel regimen with miralax TID & senna BID, hold for loose stool     Anemia due to chronic kidney disease  Folate deficiency      -Monitor Hgb       -Transfuse for Hgb <7    Chronic diastolic heart failure   -Monitor strict Is&Os and daily weights.  Continue to stress to patient importance of self efficacy and  on diet for CHF.      Paroxysmal atrial fibrillation  Current use of long term anticoagulation  -Apixaban 2.5 mg bid  - amiodarone 200 mg daily     Anticipate disposition:    Home with home health      Follow-up needed during SNF admission:       Follow-up needed after discharge from SNF:   - PCP within 1-2 weeks  - See appt scheduled below     Future Appointments   Date Time Provider Department Center   7/12/2023 11:00 AM Joi Romeo MD [facility] PAL MED [place] [facility]   7/18/2023 10:00 AM HOME MONITOR DEVICE CHECK, [facility] [address]   7/24/2023  4:00 PM Roz Leon NP [facility] NEPHRO [place] [address]   8/15/2023  9:00 AM Sunil Naidu MD [facility] HEPAT [place] [address]   10/31/2023  3:00 PM  Kai Montez MD [facility]         I certify that SNF services are required to be given on an inpatient basis because Gisselle Ortiz needs for skilled nursing care and/or skilled rehabilitation are required on a daily basis and such services can only practically be provided in a skilled nursing facility setting and are for an ongoing condition for which she received inpatient care in the hospital.       Extended Visit  Total time spent: > 30 minutes  Description of Time: counseling patient on clinical condition, therapies provided, plan of care, emotional support, coordinating patient care with other care team members, reviewing and interpreting labs and imaging, collaboration with physician, initiating new orders, chart review, and documentation. See interval hx.           Nevaeh Walden NP  Department of Hospital Medicine   [facility]     DOS: 6/7/2023

## 2023-06-08 NOTE — CHAPLAIN
" briefly notes visiting with pt after being informed that pt has stated she is "ready to see Danilo". Pt presents as very tried, is tearful, and is huddled up near side of bed. CH offers words of comfort and pt confirms "I am so tired." She explains that since spouse passed in 2020 "I didn't have time to grieve" before becoming so sick herself. Now, son and dtr take turns staying with her at her home. Pt isn't sure what should happen but appears worried about needing care from family. CH affirms pt's worthiness for care while also affirming pt's wish to not be in and out of hospital. CH asks and pt denies any desire to hurt herself. She does appear very sad and states "there's nothing left for me here." We discuss her belief in heaven and CH reminds pt (per pt's belief system) that Danilo has promised to be with us both here on earth and in heaven.   Visit ends with prayer. Pt appears calmer and has responded to 's compassionate presence and prayer.  fu with DON and team is aware of pt's increased need for emotional support. Spiritual care remains available to pt.   "

## 2023-06-08 NOTE — PROGRESS NOTES
[facility]                                  Skilled Nursing Facility                   Progress Note     Patient Name: Gisselle Ortiz  YOB: 1933  MRN: [id number]  Room: [place] [address] [address] A     Admit Date: 5/23/2023   FRANCESCA: 6/16/2023     Principal Problem: Acute renal failure superimposed on stage 3 chronic kidney disease    HPI obtained from patient interview and chart review     Chief Complaint: Re-evaluation of medical treatment and therapy status: nausea, vomiting, diarrhea, lab review, MARIO      HPI:   Gisselle Ortiz is a 89 y.o. female with PMH of GERD, gallstone pancreatitis, thyroid disease, HTN, HLD, SSS s/p PPM, CHF (EF 55%), and a fib on eliquis who presented to the ED for nausea and vomiting. Pt admitted to hospital medicine for intractable n/v and UTI. Pt has remained hemodynamically stable. She was started on 5-days of IV rocephin. Urine culture + klebsiella pneumoniae and e.coli. No leukocytosis but was hyperkalemic K 5.3; discontinued spironolactone. AST/ALT elevated on admission and downtrending. Abd US with hepatomegaly, stable prominence of CBD, and cholelithiasis without evidence of cholecystitis. AES recommended MRCP, which the patient unable to have 2/2 pacemaker. KUB with nonobstructive bowel gas pattern and significant stool burden, which is improving on bowel regimen. CTAP with gallbladder sludge vs stones and colonic wall thickening vs. stenosis/peristalsis. Pt with MARIO on CKD, which is improving with IVFs. Pt with new onset thrombocytopenia which is now improving. Hematology consulted and recommended started cyanocobalamin. PT/OT consulted and on re-evaluation are recommending SNF. All questions were answered. Patient acknowledged understanding of discharge instructions and feels safe to discharge. Patient was transferred to SNF on 5/23/23 in stable condition.    Patient will [facility] at  [facility] with PT and OT to improve functional status and ability to perform ADLs.     Interval History  All of today's labs reviewed; listed below. No critical values. E-lytes stable.  C-diff ordered d/t loose stools, foul smell.   C-diff order was auto-cancelled d/t no specimen within 48 hours.  Patient endorses malaise and weakness.   No emesis today, nausea present.  Will continue with empiric C. Diff treatment--Vancomycin 125mg every 6 hours x 10 days as loose stool now pasty since starting therapy.  Chart reviewed; suspect N/V/D r/t abnormal 5/18/23 CT AP findings of gallbladder sludge vs stones and colonic wall thickening vs. stenosis/peristalsis.  Discharge planning d/w nursing and case management. Patient capable of making own decisions. Involve family in decision-making as directed by patient.     Review of Systems   Constitutional:  Positive for malaise/fatigue. Negative for chills and fever.   Respiratory:  Negative for cough, sputum production, shortness of breath and wheezing.    Cardiovascular:  Negative for chest pain, palpitations and leg swelling.   Gastrointestinal:  Positive for abdominal pain, diarrhea, nausea and vomiting. Negative for constipation and heartburn.   Genitourinary:  Negative for dysuria and urgency.   Musculoskeletal:  Negative for back pain.   Neurological:  Positive for dizziness and weakness. Negative for headaches.   Endo/Heme/Allergies:  Negative for polydipsia. Does not bruise/bleed easily.   Psychiatric/Behavioral:  Negative for depression and hallucinations. The patient is not nervous/anxious.        24 hour Vital Sign Range   Temp:  [97.9 °F (36.6 °C)]   Pulse:  [68]   Resp:  [18]   BP: (130)/(58)   SpO2:  [97 %]       Physical Exam  Vitals and nursing note reviewed.   Constitutional:       General: She is not in acute distress.     Appearance: Normal appearance. She is not ill-appearing.   Cardiovascular:      Rate and Rhythm: Normal rate and regular rhythm.       Pulses: Normal pulses.      Heart sounds: Normal heart sounds. No murmur heard.  Pulmonary:      Effort: Pulmonary effort is normal. No respiratory distress.      Breath sounds: Normal breath sounds. No wheezing or rhonchi.   Abdominal:      General: Bowel sounds are normal. There is no distension.      Palpations: Abdomen is soft. There is no mass.      Tenderness: There is no abdominal tenderness.   Musculoskeletal:         General: No swelling or tenderness.      Right lower leg: No edema.      Left lower leg: No edema.   Skin:     General: Skin is warm and dry.      Capillary Refill: Capillary refill takes less than 2 seconds.      Findings: No erythema or rash.   Neurological:      Mental Status: She is alert and oriented to person, place, and time. Mental status is at baseline.      Motor: Weakness present.   Psychiatric:         Mood and Affect: Mood normal.         Behavior: Behavior normal.         Thought Content: Thought content normal.         Judgment: Judgment normal.         Labs:  Recent Labs   Lab 06/04/23  0001 06/05/23  0406 06/08/23  0602   WBC 11.75 8.49 8.41   HGB 7.9* 8.4* 9.2*   HCT 26.0* 27.8* 30.5*    250 244       Recent Labs   Lab 06/03/23  0547 06/04/23  1121 06/05/23  0406    144 144   K 4.1 4.2 4.2   * 113* 116*   CO2 23 20* 23   BUN 39* 39* 40*   CREATININE 1.5* 1.7* 1.7*   GLU 91 46* 51*   CALCIUM 7.8* 7.6* 7.6*   MG 1.7  --  1.9   PHOS  --   --  3.6       No results for input(s): ALKPHOS, ALT, AST, ALBUMIN, PROT, BILITOT, INR in the last 168 hours.    No results for input(s): POCTGLUCOSE in the last 72 hours.      Meds Scheduled:   amiodarone  200 mg Oral Daily    apixaban  2.5 mg Oral BID    aspirin  81 mg Oral Daily    atenoloL  12.5 mg Oral QHS    cyanocobalamin  1,000 mcg Oral Daily    diclofenac sodium  2 g Topical (Top) QID    folic acid  1 mg Oral Daily    levothyroxine  137 mcg Oral Before breakfast    midodrine  10 mg Oral Q8H    mirtazapine  7.5 mg Oral  "QHS    multivitamin  1 tablet Oral Daily    pantoprazole  40 mg Oral Daily    vancomycin  125 mg Oral Q6H       PRN:   acetaminophen, acetaminophen, bismuth subsalicylate, calcium carbonate, hydrALAZINE, loperamide, melatonin, ondansetron      Assessment and Plan:  Impaired functional mobility and endurance  - Chronic issue with acute worsening   - Continue with PT/OT for gait training and strengthening and restoration of ADL's   - Encourage mobility, OOB in chair, and early ambulation as appropriate  - Fall precautions   - Monitor for bowel and bladder dysfunction  - Monitor for and prevent skin breakdown and pressure ulcers  - Continue DVT prophylaxis with apixaban      Acute renal failure superimposed on stage 3 chronic kidney disease  - Creatinine  (baseline 1-1.3)  - Monitor BMP daily, replete electrolytes if necessary  - Renally adjust drugs to CrCl; avoid nephrotoxic drugs   - Monitor I/Os  - Renal ultrasound consistent with bilateral medical renal disease & simple renal cysts; no hydronephrosis  - given D5 1.2 NS @50ml/hr x 1L  6/8/2023 renal function improving, remains above basline    Nausea, Vomiting, Diarrhea  - see "C-diff, suspected" below  - Chart reviewed; suspect N/V/D r/t abnormal 5/18/23 CT AP findings of gallbladder sludge vs stones and colonic wall thickening vs. stenosis/peristalsis.    C. Diff, suspected  6/7/23: Order test for c. Diff, enteric precautions, give LR 75cc/hr x 1 liter for dehydration, and empiric C. Diff treatment--Vancomycin 125mg every 6 hours x 10 days.  6/8/23:  - C-diff order was auto-cancelled d/t no specimen within 48 hours.  - Continue with empiric C. Diff treatment--Vancomycin 125mg every 6 hours x 10 days as loose stool now pasty since starting therapy.    Essential hypertension  Hypotension, improving  Weakness  - PRN  hydralazine   - Hypotension with associated weakness slowly improving.  - Holding atenolol 25 mg nightly and diltiazem 180 mg " daily.    Hyperkalemia  Acute, new 6/2  - K 5.2   - Initiate order(s): Lokelma 10 g x 1 and obtain repeat BMP tomorrow morning.  phos low 2.4  - 6/8: K 4.4    Hypomagnesemia  Acute, new 6/2  - Mag 1.6  -  imag ox 500 mg x 2 doses   6/8: Mag 2.5  - monitor    Left upper extremity swelling  - Afebrile no leukocytosis  - U/S with resolution of L cephalic vein thrombosis and no evidence of DVT  - Elevate extremity  - Continue to monitor     Malnutrition of mild degree  BMI of 19 or less in adult  Body mass index is 24.99 kg/m².      - Pt reports poor PO intake for many weeks despite encouragement & good    family support  - Start mirtazapine 7.5 mg nightly   - Nutrition consulted, appreciate recs:   -Regular with supplements  -Recommend MVI, probiotic supplementation.      Thrombocytopenia  - Platelets 113 on admit to SNF  - no heparin products this admit  - no evidence of thrombocytopenia in past labs  - had severe transaminitis in 1/2022 during COVID infection - other elevations also noted - may be underlying liver dz as an association  - Hematology consulted given advancing skin changes and petechia, appreciate recs:  -- Agree with folic acid supplementation  -- Start cyanocobalamin 1,000 mcg PO daily  - Continue to monitor on daily labs     Transaminitis  -AST//177, tbili 0.9 on admission and downtrending.  -Patient denies abdominal pain.  -Repeat lipid panel wnl--discontinue statin  -Hepatitis panel negative  -Plan to f/u with PCP on when to restart statin  -Will need hepatology f/u OP     Constipation  Diarrhea  -KUB with nonobstructive bowel gas pattern noting large amount of stool throughout the colon may reflect constipation  - Continue bowel regimen with miralax TID & senna BID, hold for loose stool     Anemia due to chronic kidney disease  Folate deficiency      -Monitor Hgb       -Transfuse for Hgb <7    Chronic diastolic heart failure   -Monitor strict Is&Os and daily weights.  Continue to stress to  patient importance of self efficacy and  on diet for CHF.      Paroxysmal atrial fibrillation  Current use of long term anticoagulation  -Apixaban 2.5 mg bid  - amiodarone 200 mg daily     Anticipate disposition:    Home with home health      Follow-up needed during SNF admission:       Follow-up needed after discharge from SNF:   - PCP within 1-2 weeks  - See appt scheduled below     Future Appointments   Date Time Provider Department Center   7/12/2023 11:00 AM Joi Romeo MD [facility] PAL MED [facility]   7/18/2023 10:00 AM HOME MONITOR DEVICE CHECK, [facility] ARRHPRO [facility]   7/24/2023  4:00 PM Roz Leon NP [facility] NEPHRO [facility]   8/15/2023  9:00 AM Sunil Niadu MD [facility] HEPAT [facility]   10/31/2023  3:00 PM Kai Montez MD \[facility]\"" JUSTYN KPA         I certify that SNF services are required to be given on an inpatient basis because Gisselle Ortiz needs for skilled nursing care and/or skilled rehabilitation are required on a daily basis and such services can only practically be provided in a skilled nursing facility setting and are for an ongoing condition for which she received inpatient care in the hospital.       Extended Visit  Total time spent: > 45 minutes  Description of Time: counseling patient on clinical condition, therapies provided, plan of care, emotional support, coordinating patient care with other care team members, reviewing and interpreting labs and imaging, collaboration with physician, initiating new orders, chart review, and documentation. See interval hx.           Vivian Mendes NP  Department of Hospital Medicine   [facility]     DOS: 6/8/2023

## 2023-06-08 NOTE — PT/OT/SLP PROGRESS
Occupational Therapy Not Seen      Patient Name:  Gisselle Ortiz   MRN:  [id number]    Patient not seen today secondary pt politely refused to participate due to  Pt. Stating she was not feeling good and she stated that she was ready to go see fransisca  and that she has had a good life   .  Nursing notified Will follow-up  POC.    6/8/2023

## 2023-06-08 NOTE — PROGRESS NOTES
[facility] - Skilled Nursing  Wound Care    Patient Name:  Gisselle Ortiz   MRN:  [id number]  Date: 6/8/2023  Diagnosis: Acute renal failure superimposed on stage 3 chronic kidney disease    History:     Past Medical History:   Diagnosis Date    A-fib     Anemia of chronic disease 02/02/2021    Anticoagulant long-term use     CHF (congestive heart failure)     Chronic diastolic heart failure 02/02/2021    COVID-19 01/07/2022    Gallstone pancreatitis     Hypertension     Pancreatic abscess 09/26/2020    Stage 3 chronic kidney disease 5/11/2016    Thyroid disease        Social History     Socioeconomic History    Marital status:    Tobacco Use    Smoking status: Former     Types: Cigarettes     Start date: 5/19/1964    Smokeless tobacco: Never   Substance and Sexual Activity    Alcohol use: No    Drug use: No    Sexual activity: Not Currently     Partners: Male     Social Determinants of Health     Financial Resource Strain: Low Risk     Difficulty of Paying Living Expenses: Not hard at all   Food Insecurity: No Food Insecurity    Worried About Running Out of Food in the Last Year: Never true    Ran Out of Food in the Last Year: Never true   Transportation Needs: No Transportation Needs    Lack of Transportation (Medical): No    Lack of Transportation (Non-Medical): No   Physical Activity: Inactive    Days of Exercise per Week: 0 days    Minutes of Exercise per Session: 0 min   Stress: No Stress Concern Present    Feeling of Stress : Not at all   Social Connections: Moderately Isolated    Frequency of Communication with Friends and Family: More than three times a week    Frequency of Social Gatherings with Friends and Family: More than three times a week    Attends Synagogue Services: 1 to 4 times per year    Active Member of Clubs or Organizations: No    Attends Club or Organization Meetings: Never    Marital Status:    Housing Stability: Unknown    Unable to Pay for Housing in the Last Year: No     Unstable Housing in the Last Year: No       Precautions:     Allergies as of 05/23/2023 - Reviewed 05/23/2023   Allergen Reaction Noted    Ciprofloxacin Nausea And Vomiting 05/16/2019       [facility] Assessment Details/Treatment   Wound care follow-up  The sacral IAD with friction improved- skin red, blanches, friction areas are closed. States the sacral area is much better.   Wound care discussed, verbalized understanding    Plan:  Sacral spine- continue Triad ointment BID/prn cleansing.  Continue pressure prevention measures    Nursing to continue care, wound care will follow  Recommendations made to primary team for above plan per written report . Orders in place.      06/08/23 0810        Altered Skin Integrity 05/24/23 0546 Sacral spine Incontinence associated dermatitis   Date First Assessed/Time First Assessed: 05/24/23 0546   Altered Skin Integrity Present on Admission - Did Patient arrive to the hospital with altered skin?: yes  Location: Sacral spine  Is this injury device related?: No  Primary Wound Type: (c) Inco...   Wound Image    Dressing Appearance Open to air   Drainage Amount None   Drainage Characteristics/Odor No odor   Appearance Red;Dry;Closed/resurfaced   Tissue loss description Not applicable   Periwound Area Intact;Dry;Redness   Wound Edges Rolled/closed   Wound Length (cm) 5 cm   Wound Width (cm) 5 cm   Wound Depth (cm) 0 cm   Wound Volume (cm^3) 0 cm^3   Wound Surface Area (cm^2) 25 cm^2   Care Cleansed with:;Soap and water;Applied:;Skin Barrier         06/08/2023

## 2023-06-08 NOTE — PT/OT/SLP PROGRESS
Physical Therapy      Patient Name:  Gisselle Ortiz   MRN:  [id number]    Patient not seen today secondary to feeling unwell and unwilling to get out of bed at this time. Will follow-up as schedule per PT POC.    06/08/2023

## 2023-06-09 NOTE — PT/OT/SLP PROGRESS
Occupational Therapy   Treatment    Name: Gisselle Ortiz  MRN: [id number]  Admit Date: 5/23/2023  Admitting Diagnosis:  Acute renal failure superimposed on stage 3 chronic kidney disease    General Precautions: Standard, fall   Orthopedic Precautions: N/A   Braces: N/A    Recommendations:     Discharge Recommendations:  home health OT  Level of Assistance Recommended at Discharge: 24 hours physical assistance for all ADL's and home management tasks  Discharge Equipment Recommendations: none  Barriers to discharge:   (increased assistance needed for mobility)    Assessment:     Gisselle Ortiz is a 89 y.o. female with a medical diagnosis of Acute renal failure superimposed on stage 3 chronic kidney disease.  She presents with limitations in performance of self-care, functional mobility, and ADLs. Performance deficits affecting function are weakness, impaired endurance, impaired self care skills, impaired functional mobility, gait instability, impaired balance, decreased lower extremity function, decreased safety awareness, decreased upper extremity function, edema, impaired cardiopulmonary response to activity.     Tx took place outside in [facility] per nursing request. Pt tolerated Tx without incident, however reported shortness of breath with pt requiring frequent breaks. Nursing notified. Pt continues to require assist to perform self care tasks, functional mobility and functional transfers. Pt would continue to benefit from OT intervention to further functional (I)ce and safety.     Rehab Potential is fair    Activity tolerance:  Poor    Plan:     Patient to be seen 5 x/week to address the above listed problems via therapeutic activities, self-care/home management, therapeutic exercises    Plan of Care Expires: 06/22/23  Plan of Care Reviewed with: patient    Subjective     Communicated with: Nursing prior to session.     Pain/Comfort:  Pain Rating 1: 0/10  Pain Rating Post-Intervention 1:  0/10    Patient's cultural, spiritual, Roman Catholic conflicts given the current situation:  no    Objective:     Patient found up in chair with  (no active lines) upon OT entry to room.    Bed Mobility:    Pt seated in chair at onset of treatment session.     Functional Mobility/Transfers:  Patient completed Sit <> Stand Transfer with minimum assistance  with  rolling walker   Patient completed Chair <> Bedside Chair Stand Pivot technique with contact guard assistance with rolling walker    AMPAC 6 Click ADL: 16    OT Exercises: Pt performed bilateral UE exercise with 1lb dowel through functional ROM with rep of 10 x3 for each motion.  UE exercises performed to increase functional endurance and strength in order increase independence when performing self care tasks, functional ambulation, W/C propulsion, and functional standing activities .      Treatment & Education:  Pt educated on:  - role of OT  - level of assistance  - energy conservation and task modification to maximized independence with ADL's and mobility   -  safety while performing functional transfers and self care tasks  - progress towards OT goals      Patient left  up in bedside chair  with call button in reach and Nursing notified    GOALS:   Multidisciplinary Problems       Occupational Therapy Goals          Problem: Occupational Therapy    Goal Priority Disciplines Outcome Interventions   Occupational Therapy Goal     OT, PT/OT Ongoing, Not Progressing    Description: Goals to be met by: 6/22/2023     Patient will increase functional independence with ADLs by performing:    UE Dressing with Set-up Assistance.  LE Dressing with Stand-by Assistance using appropriate AE as needed.  Grooming while seated at sink with Set-up Assistance.  Toileting from 3-in-1 commode over toilet with Stand-by Assistance for hygiene and clothing management.   Bathing from sitting/standing at sink with Minimal Assistance.  Supine to sit with Supervision.  Step transfer with  Stand-by Assistance with RW.  Toilet transfer to 3-in-1 commode over toilet with Stand-by Assistance with RW.                         Time Tracking:     OT Date of Treatment: 06/09/23  OT Start Time: 1423    OT Stop Time: 1446  OT Total Time (min): 23 min    Billable Minutes:Therapeutic Activity 8  Therapeutic Exercise 15    6/9/2023

## 2023-06-09 NOTE — PT/OT/SLP PROGRESS
"Physical Therapy Treatment    Patient Name:  Gisselle Ortiz   MRN:  [id number]  Admit Date: 5/23/2023  Admitting Diagnosis: Acute renal failure superimposed on stage 3 chronic kidney disease  Recent Surgeries: N/A    General Precautions: Standard, fall  Orthopedic Precautions: N/A  Braces: N/A    Recommendations:     Discharge Recommendations: home health PT  Level of Assistance Recommended at Discharge: 24 hours significant assistance  Discharge Equipment Recommendations: none  Barriers to discharge:  (Increased assistance for mobility.)    Assessment:     Gisselle Ortiz is a 89 y.o. female admitted with a medical diagnosis of Acute renal failure superimposed on stage 3 chronic kidney disease. Patient agreeable to outdoor therapy today. Patient requiring Mercedes for functional transfer from bedside chair to wheelchair. Patient tolerated seated BLE without adverse reactions. Patient will benefit from continued SNF rehabilitation services to address deficits as well as progress mobility towards maximal functional potential for improved quality of life and decreased caregiver burden.       Performance deficits affecting function: weakness, impaired endurance, impaired self care skills, impaired functional mobility, gait instability, impaired balance, decreased lower extremity function, decreased safety awareness, decreased upper extremity function, edema, impaired cardiopulmonary response to activity.    Rehab Potential is good    Activity Tolerance: Fair    Plan:     Patient to be seen 5 x/week to address the above listed problems via gait training, therapeutic activities, therapeutic exercises, neuromuscular re-education, wheelchair management/training    Plan of Care Expires: 06/23/23  Plan of Care Reviewed with: patient, son    Subjective     "I can try. I don't have that much energy."     Pain/Comfort:  Pain Rating 1: 0/10  Pain Rating Post-Intervention 1: 0/10    Patient's cultural, spiritual, Presybeterian " conflicts given the current situation:  no    Objective:     Communicated with nursing staff prior to session.  Patient found  seated in bedside chair  with  (no active lines) upon PT entry to room.     Therapeutic Activities and Exercises:   Seated BLE exercises x 15 reps each including ankle DF/PF, LAQs, hip flexion, hip abduction/adduction and glute sets.     Functional Mobility:  Transfers:     Sit to Stand:  minimum assistance with rolling walker  Bedside chair to Wheelchair: minimum assistance with  rolling walker  using  Step Transfer    AM-PAC 6 CLICK MOBILITY  15    Patient left up in chair with  CARDENAS and son present.    GOALS:   Multidisciplinary Problems       Physical Therapy Goals          Problem: Physical Therapy    Goal Priority Disciplines Outcome Goal Variances Interventions   Physical Therapy Goal     PT, PT/OT Ongoing, Progressing     Description: Goals to be met by: 23     Patient will increase functional independence with mobility by performin. Supine to sit with Supervision - not met  2. Sit to supine with Contact Guard Assistance - not met  3. Rolling to Left and Right with Supervision - not met  4. Sit to stand transfer with Stand-by Assistance - not met  5. Bed to chair transfer with Stand-by Assistance using Rolling Walker - not met  6. Gait  x 50 feet with Contact Guard Assistance using Rolling Walker - not met  7. Wheelchair propulsion x 50 feet with Modified Stevenson using bilateral upper extremities - not met  8. Ascend/Descend 4 inch curb step with Minimal Assistance using Rolling Walker - not met; unable to assess - assess when deemed safe                         Time Tracking:     PT Received On: 23  PT Start Time: 1356  PT Stop Time: 1422  PT Total Time (min): 26 min    Billable Minutes: Therapeutic Activity 10 and Therapeutic Exercise 16    Treatment Type: Treatment  PT/PTA: PT     Number of PTA visits since last PT visit: 0     2023

## 2023-06-10 NOTE — PROGRESS NOTES
[facility] - Skilled Nursing  Adult Nutrition  Progress Note    SUMMARY   Recommendations  Continue regular diet,  encurage increased PO intake  Goals: PO to improve to 75% of EEN with ONS by next RD follow up  Nutrition Goal Status: goal not met  Communication of RD Recs: other (comment) (POC)    Assessment and Plan   Malnutrition of mild degree  Malnutrition Type:  Context: acute illness or injury  Level: mild     Related to (etiology):   Taste changes, lack of interest in food     Malnutrition Characteristic Summary:  Energy Intake (Malnutrition): less than or equal to 50% for greater than or equal to 5 days  Muscle Mass (Malnutrition): moderate depletion        Interventions/Recommendations (treatment strategy):  Continue Low potassium diet,  encourage PO intake,  recommend Vit D per home med list, RD following     Nutrition Diagnosis Status:   Continues    Malnutrition Assessment 5/26  Malnutrition Context: acute illness or injury  Malnutrition Level: mild  Skin (Micronutrient): pallor, bruised, thinned  Eyes (Micronutrient): conjunctiva dull  Teeth (Micronutrient): broken dentition (no upper teeth, wont wear dentures)  Tongue (Micronutrient): magenta  Neck/Chest (Micronutrient): muscle wasting  Musculoskeletal/Lower Extremities: muscle wasting   Micronutrient Evaluation Summary: suspected deficiency  Micronutrient Evaluation Comments: folate, protein,   Energy Intake (Malnutrition): less than or equal to 50% for greater than or equal to 5 days  Muscle Mass (Malnutrition): moderate depletion   Orbital Region (Subcutaneous Fat Loss): severe depletion  Upper Arm Region (Subcutaneous Fat Loss): well nourished  Thoracic and Lumbar Region: well nourished   Sabianism Region (Muscle Loss): severe depletion  Clavicle and Acromion Bone Region (Muscle Loss): moderate depletion  Dorsal Hand (Muscle Loss): severe depletion  Anterior Thigh Region (Muscle Loss): moderate depletion                 Reason for  "Assessment    Reason For Assessment: RD follow-up  Diagnosis:  (acute kidney failure superimposed on CKD 3)  Relevant Medical History: CHF, HTN, HLD, Anemia, thyroid disease, pancreatic abcess, pacemaker, SSS, AFIB,  Interdisciplinary Rounds: did not attend  General Information Comments: recent C diff tx, continues with GI distress, recent N/V/abdominal pain noted   Nutrition Discharge Planning: DC on regular no added salt diet.  Nutrition/Diet History    Patient Reported Diet/Restrictions/Preferences: general  Typical Food/Fluid Intake: two meals  Spiritual, Cultural Beliefs, Roman Catholic Practices, Values that Affect Care: no  Vitamin/Mineral/Herbal Supplements: centrum silver, Vit D  Food Allergies: NKFA  Factors Affecting Nutritional Intake: altered taste, decreased appetite    Anthropometrics    Temp: 98.1 °F (36.7 °C)  Height Method: Stated  Height: 5' 3" (160 cm)  Height (inches): 63 in  Weight Method: Bed Scale  Weight: 64 kg (141 lb 1.5 oz)  Weight (lb): 141.1 lb  Ideal Body Weight (IBW), Female: 115 lb  % Ideal Body Weight, Female (lb): 113.87 %  BMI (Calculated): 25  BMI Grade: 18.5-24.9 - normal  Usual Body Weight (UBW), k kg  Weight Change Amount:  (there is a weight descrepany on chart review with wt of 110 # if that were correct pt would have lost 15% but that weight is not reflected here)  % Usual Body Weight: 102.63  % Weight Change From Usual Weight: 2.41 %       Lab/Procedures/Meds    Pertinent Labs Reviewed: reviewed  Pertinent Labs Comments: Hg 9.2, Hct 30.5, GFR 30.6, BUN 36, Cr 1.6, Cl 114, K 4.4  Pertinent Medications Reviewed: reviewed  Pertinent Medications Comments: Mg, Lactobacillus GG,       Estimated/Assessed Needs    Weight Used For Calorie Calculations: 59.4 kg (130 lb 15.3 oz)  Energy Calorie Requirements (kcal): 1285  Energy Need Method: [place]-St Jeor (x 1.3(PAL))  Protein Requirements: 71g  Weight Used For Protein Calculations: 59.4 kg (130 lb 15.3 oz) (x 1.2g/kg)  Fluid " Requirements (mL): 1285 or per MD  Estimated Fluid Requirement Method: RDA Method  RDA Method (mL): 1285  CHO Requirement: -      Nutrition Prescription Ordered    Current Diet Order: Low potassium  Nutrition Order Comments: PO 25-50%  Oral Nutrition Supplement: dc    Evaluation of Received Nutrient/Fluid Intake    I/O: no data  Energy Calories Required: not meeting needs  Protein Required: not meeting needs  Fluid Required: meeting needs  Comments: LBM 6/8  Tolerance: tolerating  % Intake of Estimated Energy Needs: 25 - 50 %  % Meal Intake: 25 - 50 %    Nutrition Risk    Level of Risk/Frequency of Follow-up: high (two times per week)     Monitor and Evaluation    Food and Nutrient Intake: food and beverage intake  Food and Nutrient Adminstration: diet order  Physical Activity and Function: nutrition-related ADLs and IADLs  Anthropometric Measurements: weight change  Biochemical Data, Medical Tests and Procedures: glucose/endocrine profile, gastrointestinal profile, electrolyte and renal panel  Nutrition-Focused Physical Findings: skin, overall appearance     Nutrition Follow-Up    RD Follow-up?: Yes

## 2023-06-11 NOTE — PT/OT/SLP PROGRESS
Occupational Therapy   Treatment    Name: Gisselle Ortiz  MRN: [id number]  Admit Date: 5/23/2023  Admitting Diagnosis:  Acute renal failure superimposed on stage 3 chronic kidney disease    General Precautions: Standard, fall   Orthopedic Precautions: N/A   Braces: N/A    Recommendations:     Discharge Recommendations:  home with home health  Level of Assistance Recommended at Discharge: 24 hours physical assistance for all ADL's and home management tasks  Discharge Equipment Recommendations: none  Barriers to discharge:   (pt requires assistance to perform ADLs and functional mobility)    Assessment:     Gisselle Ortiz is a 89 y.o. female with a medical diagnosis of Acute renal failure superimposed on stage 3 chronic kidney disease.  She presents with performance deficits affecting function are weakness, impaired endurance, impaired functional mobility, impaired self care skills, gait instability, decreased upper extremity function, decreased lower extremity function, edema, impaired cardiopulmonary response to activity, impaired balance, decreased safety awareness. Pt required increased time to perform self care skills and transfers dut to weakness and fatigue. Pt tolerated session well and without incident, but she continues to require assistance to perform self-care tasks and mobility.  She would continue to benefit from skilled OT services at [facility] to maximize her gains in functional independence.      Rehab Potential is fair    Activity tolerance:  Fair    Plan:     Patient to be seen 5 x/week to address the above listed problems via self-care/home management, therapeutic activities, therapeutic exercises    Plan of Care Expires: 06/22/23  Plan of Care Reviewed with: patient    Subjective     Communicated with: pt agreeable prior to session.      Pain/Comfort:  Pain Rating 1: 0/10  Pain Rating Post-Intervention 1: 0/10    Patient's cultural, spiritual, Judaism conflicts given the current  situation:  no    Objective:     Patient found HOB elevated with PureWick upon OT entry to room.    Bed Mobility:    Patient completed Rolling/Turning to Left with  minimum assistance  Patient completed Scooting/Bridging with minimum assistance  Patient completed Supine to Sit with minimum assistance     Functional Mobility/Transfers:  Patient completed Bed <> Chair Transfer using Squat Pivot technique with minimum assistance     Activities of Daily Living:  Grooming: pt performed oral hygiene, face washing and hair grooming supervision   Bathing: pt performed bathing with CGA seated in w/c and in stance at sink   Upper Body Dressing: pt donned shirt with stand by assistance   Lower Body Dressing: pt donned pants and adult brief with minimum assistance     [facility] 6 Click ADL: 15    Treatment & Education:  ADLs, functional mobility and safety awareness    Patient left up in chair with call button in reach    GOALS:   Multidisciplinary Problems       Occupational Therapy Goals          Problem: Occupational Therapy    Goal Priority Disciplines Outcome Interventions   Occupational Therapy Goal     OT, PT/OT Ongoing, Not Progressing    Description: Goals to be met by: 6/22/2023     Patient will increase functional independence with ADLs by performing:    UE Dressing with Set-up Assistance.  LE Dressing with Stand-by Assistance using appropriate AE as needed.  Grooming while seated at sink with Set-up Assistance.  Toileting from 3-in-1 commode over toilet with Stand-by Assistance for hygiene and clothing management.   Bathing from sitting/standing at sink with Minimal Assistance.  Supine to sit with Supervision.  Step transfer with Stand-by Assistance with RW.  Toilet transfer to 3-in-1 commode over toilet with Stand-by Assistance with RW.                         Time Tracking:     OT Date of Treatment: 06/11/23  OT Start Time: 0827    OT Stop Time: 0857  OT Total Time (min): 30 min    Billable Minutes:Self Care/Home  Management 22  Therapeutic Activity 8    6/11/2023

## 2023-06-12 NOTE — PLAN OF CARE
Problem: Adult Inpatient Plan of Care  Goal: Patient-Specific Goal (Individualized)  Outcome: Ongoing, Progressing     Problem: Adult Inpatient Plan of Care  Goal: Absence of Hospital-Acquired Illness or Injury  Outcome: Ongoing, Progressing     Problem: Adult Inpatient Plan of Care  Goal: Optimal Comfort and Wellbeing  Outcome: Ongoing, Progressing     Problem: Adult Inpatient Plan of Care  Goal: Readiness for Transition of Care  Outcome: Ongoing, Progressing     Problem: Oral Intake Inadequate (Acute Kidney Injury/Impairment)  Goal: Optimal Nutrition Intake  Outcome: Ongoing, Progressing     Problem: Fall Injury Risk  Goal: Absence of Fall and Fall-Related Injury  Outcome: Ongoing, Progressing

## 2023-06-12 NOTE — PT/OT/SLP PROGRESS
"Physical Therapy Treatment    Patient Name:  Gisselle Ortiz   MRN:  [id number]  Admit Date: 5/23/2023  Admitting Diagnosis: Acute renal failure superimposed on stage 3 chronic kidney disease  Recent Surgeries:     General Precautions: Standard, fall  Orthopedic Precautions: N/A  Braces: N/A    Recommendations:     Discharge Recommendations: home health PT  Level of Assistance Recommended at Discharge: 24 hours significant assistance  Discharge Equipment Recommendations: none  Barriers to discharge:  (Increased assistance for mobility.)    Assessment:     Gisselle Ortiz is a 89 y.o. female admitted with a medical diagnosis of Acute renal failure superimposed on stage 3 chronic kidney disease . Pt tolerated well, however significant dec overall functional strength/endurance, fatigues easily, pt would continue to benefit from skilled PT services to improve overall functional mobility, strength and endurance.  .      Performance deficits affecting function: weakness, impaired endurance, impaired self care skills, impaired functional mobility, gait instability, impaired balance, decreased lower extremity function, decreased safety awareness, decreased upper extremity function, edema, impaired cardiopulmonary response to activity.    Rehab Potential is good    Activity Tolerance: Fair    Plan:     Patient to be seen 5 x/week to address the above listed problems via gait training, therapeutic activities, therapeutic exercises, neuromuscular re-education, wheelchair management/training    Plan of Care Expires: 06/23/23  Plan of Care Reviewed with: patient, son    Subjective     "Can't walk" agreeable to seated therex. "I'm always dizzy"    Pain/Comfort:  Pain Rating 1: 0/10  Pain Rating Post-Intervention 1: 0/10    Patient's cultural, spiritual, Buddhist conflicts given the current situation:  no    Objective:     Communicated with nsg re inc BLE edema noted R>L  Patient found with  (in BSC) upon PT entry to room. "     Therapeutic Activities and Exercises: mini elliptical x 10 min   X 15 reps reps AP,LAQ,hip flex with rest break some SOB 02 sat %  BP monitored nsg notified  Upon arrival seated 138/58 HR 72 BLE elev  After trfs BSC to WC prior to LBE BLE feet on floor 116/65 HR 74  After /56 HR 76 feet on floor  After session seated BLE elev 128/60 HR 76    Functional Mobility:  Transfers:     Sit to Stand:  minimum assistance with no AD  Bed to Chair: minimum assistance with  no AD  using  Stand Pivot and BSC<>WC  Gait: declined    AM-PAC 6 CLICK MOBILITY  15    Patient left up in chair with call button in reach, nsg notified, and belonging sin reach .    GOALS:   Multidisciplinary Problems       Physical Therapy Goals          Problem: Physical Therapy    Goal Priority Disciplines Outcome Goal Variances Interventions   Physical Therapy Goal     PT, PT/OT Ongoing, Progressing     Description: Goals to be met by: 23     Patient will increase functional independence with mobility by performin. Supine to sit with Supervision - not met  2. Sit to supine with Contact Guard Assistance - not met  3. Rolling to Left and Right with Supervision - not met  4. Sit to stand transfer with Stand-by Assistance - not met  5. Bed to chair transfer with Stand-by Assistance using Rolling Walker - not met  6. Gait  x 50 feet with Contact Guard Assistance using Rolling Walker - not met  7. Wheelchair propulsion x 50 feet with Modified [place] using bilateral upper extremities - not met  8. Ascend/Descend 4 inch curb step with Minimal Assistance using Rolling Walker - not met; unable to assess - assess when deemed safe                         Time Tracking:     PT Received On: 23  PT Start Time: 1306  PT Stop Time: 1345  PT Total Time (min): 39 min    Billable Minutes: Therapeutic Activity 15 and Therapeutic Exercise 24    Treatment Type: Treatment  PT/PTA: PTA     Number of PTA visits since last PT visit: 1      06/12/2023

## 2023-06-12 NOTE — PROGRESS NOTES
[facility]                                  Skilled Nursing Facility                   Progress Note     Patient Name: Gisselle Ortiz  YOB: 1933  MRN: [id number]  Room: [place] [address]/[place] [address] A     Admit Date: 5/23/2023   FRANCESCA: 6/16/2023     Principal Problem: Acute renal failure superimposed on stage 3 chronic kidney disease    HPI obtained from patient interview and chart review     Chief Complaint: Re-evaluation of medical treatment and therapy status: nausea, vomiting, diarrhea follow-up, lab review, MARIO      HPI:   Gisselle Ortiz is a 89 y.o. female with PMH of GERD, gallstone pancreatitis, thyroid disease, HTN, HLD, SSS s/p PPM, CHF (EF 55%), and a fib on eliquis who presented to the ED for nausea and vomiting. Pt admitted to hospital medicine for intractable n/v and UTI. Pt has remained hemodynamically stable. She was started on 5-days of IV rocephin. Urine culture + klebsiella pneumoniae and e.coli. No leukocytosis but was hyperkalemic K 5.3; discontinued spironolactone. AST/ALT elevated on admission and downtrending. Abd US with hepatomegaly, stable prominence of CBD, and cholelithiasis without evidence of cholecystitis. AES recommended MRCP, which the patient unable to have 2/2 pacemaker. KUB with nonobstructive bowel gas pattern and significant stool burden, which is improving on bowel regimen. CTAP with gallbladder sludge vs stones and colonic wall thickening vs. stenosis/peristalsis. Pt with MARIO on CKD, which is improving with IVFs. Pt with new onset thrombocytopenia which is now improving. Hematology consulted and recommended started cyanocobalamin. PT/OT consulted and on re-evaluation are recommending SNF. All questions were answered. Patient acknowledged understanding of discharge instructions and feels safe to discharge. Patient was transferred to SNF on 5/23/23 in stable condition.    Patient will be treated  at [facility] with PT and OT to improve functional status and ability to perform ADLs.     Interval History  All of today's labs reviewed; listed below. No critical values. E-lytes stable.  N/V/D resolved. Given symptom improvement since starting, will continue with empiric C. Diff treatment course --Vancomycin 125mg every 6 hours x 10 days.   Monitor BLE edema and encourage elevation and compression stockings. LUE edema; improving since discontinuing PIV - monitor.  Discharge planning d/w patient and case management. Patient plans to return to her home where son or daughter always present.     Review of Systems   Constitutional:  Positive for malaise/fatigue. Negative for chills and fever.   Respiratory:  Negative for cough, sputum production, shortness of breath and wheezing.    Cardiovascular:  Positive for leg swelling. Negative for chest pain and palpitations.   Gastrointestinal:  Negative for abdominal pain, constipation, diarrhea, heartburn, nausea and vomiting.   Genitourinary:  Negative for dysuria and urgency.   Musculoskeletal:  Negative for back pain.   Neurological:  Positive for dizziness and weakness. Negative for headaches.   Endo/Heme/Allergies:  Negative for polydipsia. Does not bruise/bleed easily.   Psychiatric/Behavioral:  Negative for depression and hallucinations. The patient is not nervous/anxious.        24 hour Vital Sign Range   Temp:  [97.9 °F (36.6 °C)-98.1 °F (36.7 °C)]   Pulse:  [66-68]   Resp:  [18]   BP: (122-123)/(60-61)   SpO2:  [99 %-100 %]       Physical Exam  Vitals and nursing note reviewed.   Constitutional:       General: She is not in acute distress.     Appearance: Normal appearance. She is not ill-appearing.   Cardiovascular:      Rate and Rhythm: Normal rate and regular rhythm.      Pulses: Normal pulses.      Heart sounds: Normal heart sounds. No murmur heard.  Pulmonary:      Effort: Pulmonary effort is normal. No respiratory distress.      Breath sounds: Normal breath  sounds. No wheezing or rhonchi.   Abdominal:      General: Bowel sounds are normal. There is no distension.      Palpations: Abdomen is soft. There is no mass.      Tenderness: There is no abdominal tenderness.   Musculoskeletal:         General: No swelling or tenderness.      Left forearm: Swelling and edema present.      Right lower leg: Swelling present. 1+ Edema present.      Left lower leg: Swelling present. 2+ Edema present.   Skin:     General: Skin is warm and dry.      Capillary Refill: Capillary refill takes less than 2 seconds.      Findings: Rash present. No erythema.      Nails: There is no clubbing.      Comments: Dermitits under breast skin folds bilaterally   Neurological:      Mental Status: She is alert and oriented to person, place, and time. Mental status is at baseline.      Motor: Weakness present.   Psychiatric:         Mood and Affect: Mood normal.         Behavior: Behavior normal.         Thought Content: Thought content normal.         Judgment: Judgment normal.         Labs:  Recent Labs   Lab 06/08/23  0602 06/12/23  0448   WBC 8.41 8.52   HGB 9.2* 9.2*   HCT 30.5* 30.8*    252       Recent Labs   Lab 06/08/23  0602 06/12/23  0448    144   K 4.4 4.3   * 114*   CO2 24 24   BUN 36* 41*   CREATININE 1.6* 1.5*   GLU 70 57*   CALCIUM 7.4* 7.4*   MG 2.5 2.2   PHOS 2.8 3.3       Recent Labs   Lab 06/12/23  0448   ALKPHOS 117   ALT 67*   AST 61*   ALBUMIN 1.8*   PROT 4.0*   BILITOT 0.4       No results for input(s): POCTGLUCOSE in the last 72 hours.      Meds Scheduled:   amiodarone  200 mg Oral Daily    apixaban  2.5 mg Oral BID    aspirin  81 mg Oral Daily    atenoloL  12.5 mg Oral QHS    cyanocobalamin  1,000 mcg Oral Daily    diclofenac sodium  2 g Topical (Top) QID    folic acid  1 mg Oral Daily    levothyroxine  137 mcg Oral Before breakfast    miconazole NITRATE 2 %   Topical (Top) BID    midodrine  10 mg Oral Q8H    mirtazapine  7.5 mg Oral QHS    multivitamin  1  "tablet Oral Daily    pantoprazole  40 mg Oral Daily    vancomycin  125 mg Oral Q6H       PRN:   acetaminophen, acetaminophen, bismuth subsalicylate, calcium carbonate, hydrALAZINE, loperamide, melatonin, ondansetron      Assessment and Plan:  Impaired functional mobility and endurance  - Chronic issue with acute worsening   - Continue with PT/OT for gait training and strengthening and restoration of ADL's   - Encourage mobility, OOB in chair, and early ambulation as appropriate  - Fall precautions   - Monitor for bowel and bladder dysfunction  - Monitor for and prevent skin breakdown and pressure ulcers  - Continue DVT prophylaxis with apixaban      Acute renal failure superimposed on stage 3 chronic kidney disease  - Creatinine  (baseline 1-1.3)  - Monitor BMP daily, replete electrolytes if necessary  - Renally adjust drugs to CrCl; avoid nephrotoxic drugs   - Monitor I/Os  - Renal ultrasound consistent with bilateral medical renal disease & simple renal cysts; no hydronephrosis  - given D5 1.2 NS @50ml/hr x 1L  6/12/2023 renal function improving, remains above basline    Left upper extremity swelling  - Afebrile no leukocytosis  - U/S with resolution of L cephalic vein thrombosis and no evidence of DVT  - Elevate extremity  - Continue to monitor    Nausea, Vomiting, Diarrhea  - see "C-diff, suspected" below  - Chart reviewed; suspect N/V/D r/t abnormal 5/18/23 CT AP findings of gallbladder sludge vs stones and colonic wall thickening vs. stenosis/peristalsis.  - 6/12: improving    C. Diff, suspected  6/7/23: Order test for c. Diff, enteric precautions, give LR 75cc/hr x 1 liter for dehydration, and empiric C. Diff treatment--Vancomycin 125mg every 6 hours x 10 days.  6/8/23:  - C-diff order was auto-cancelled d/t no specimen within 48 hours.  - Continue with empiric C. Diff treatment--Vancomycin 125mg every 6 hours x 10 days as loose stool, N, V resolving    Essential hypertension  Hypotension, " improving  Weakness  - PRN  hydralazine   - Hypotension with associated weakness slowly improving.  - Holding atenolol 25 mg nightly and diltiazem 180 mg daily.    Hyperkalemia  Acute, new 6/2  - K 5.2   - Initiate order(s): Lokelma 10 g x 1 and obtain repeat BMP tomorrow morning.  phos low 2.4  - 6/12: K 4.3    Hypomagnesemia  Acute, new 6/2  - Mag 1.6  -  imag ox 500 mg x 2 doses   6/12: Mag 2.2  - monitor    Moisture associated dermatitis.   - 6/12: Initiate order(s): miconazole powder bilat breast folds.    Malnutrition of mild degree  BMI of 19 or less in adult  Body mass index is 24.72 kg/m².      - Pt reports poor PO intake for many weeks despite encouragement & good    family support  - Start mirtazapine 7.5 mg nightly   - Nutrition consulted, appreciate recs:   -Regular with supplements  -Recommend MVI, probiotic supplementation.      Thrombocytopenia  - Platelets 113 on admit to SNF  - no heparin products this admit  - no evidence of thrombocytopenia in past labs  - had severe transaminitis in 1/2022 during COVID infection - other elevations also noted - may be underlying liver dz as an association  - Hematology consulted given advancing skin changes and petechia, appreciate recs:  -- Agree with folic acid supplementation  -- Start cyanocobalamin 1,000 mcg PO daily  - Continue to monitor on daily labs     Transaminitis  -AST//177, tbili 0.9 on admission and downtrending.  -Patient denies abdominal pain.  -Repeat lipid panel wnl--discontinue statin  -Hepatitis panel negative  -Plan to f/u with PCP on when to restart statin  -Will need hepatology f/u OP     Constipation  Diarrhea  -KUB with nonobstructive bowel gas pattern noting large amount of stool throughout the colon may reflect constipation  - Continue bowel regimen with miralax TID & senna BID, hold for loose stool     Anemia due to chronic kidney disease  Folate deficiency      -Monitor Hgb       -Transfuse for Hgb <7    Chronic diastolic  heart failure   -Monitor strict Is&Os and daily weights.  Continue to stress to patient importance of self efficacy and  on diet for CHF.      Paroxysmal atrial fibrillation  Current use of long term anticoagulation  -Apixaban 2.5 mg bid  - amiodarone 200 mg daily     Anticipate disposition:    Home with home health      Follow-up needed during SNF admission:       Follow-up needed after discharge from SNF:   - PCP within 1-2 weeks  - See appt scheduled below     Future Appointments   Date Time Provider Department Center   7/12/2023 11:00 AM Joi Romeo MD [facility] PAL MED [facility]   7/18/2023 10:00 AM HOME MONITOR DEVICE CHECK, [facility] ARRHPRO [facility]   7/24/2023  4:00 PM Roz Leon NP [facility] NEPHRO [facility]   8/15/2023  9:00 AM Sunil Naidu MD [facility] HEPAT [facility]   10/31/2023  3:00 PM Kai Montez MD [facility]         I certify that SNF services are required to be given on an inpatient basis because Gisselle Ortiz needs for skilled nursing care and/or skilled rehabilitation are required on a daily basis and such services can only practically be provided in a skilled nursing facility setting and are for an ongoing condition for which she received inpatient care in the hospital.       Extended Visit  Total time spent: > 30 minutes  Description of Time: counseling patient on clinical condition, therapies provided, plan of care, emotional support, coordinating patient care with other care team members, reviewing and interpreting labs and imaging, collaboration with physician, initiating new orders, chart review, and documentation. See interval hx.           Vivian Mendes NP  Department of Hospital Medicine   [facility]     DOS: 6/12/2023

## 2023-06-12 NOTE — PT/OT/SLP PROGRESS
Occupational Therapy   Treatment    Name: Gisselle Ortiz  MRN: [id number]  Admit Date: 5/23/2023  Admitting Diagnosis:  Acute renal failure superimposed on stage 3 chronic kidney disease    General Precautions: Standard, fall   Orthopedic Precautions: N/A   Braces: N/A    Recommendations:     Discharge Recommendations:  home with home health  Level of Assistance Recommended at Discharge: 24 hours physical assistance for all ADL's and home management tasks  Discharge Equipment Recommendations: none  Barriers to discharge:   (pt requires assistance to perform ADLs and functional mobility)    Assessment:     Gisselle Ortiz is a 89 y.o. female with a medical diagnosis of Acute renal failure superimposed on stage 3 chronic kidney disease.  She presents with  Performance deficits affecting function are weakness, impaired endurance, impaired functional mobility, impaired self care skills, gait instability, decreased upper extremity function, decreased lower extremity function, edema, impaired cardiopulmonary response to activity, impaired balance, decreased safety awareness.   Pt. Was cooperative and participated well with session on this day. Pt. With multiple;e BP check don on this day while in w/c with elevating leg rest  with noted with improvements with PT. Ability to perform in session on this day Pt  continues to demonstrate levels of physical deficits with  functional indep with daily management activities tasks, selfcare skills with balance,  functional mobility, UB strength and endurance. Pt. Will continue to benefit from continued OT to progress towards goals    Rehab Potential is fair    Activity tolerance:  Fair    Plan:     Patient to be seen 5 x/week to address the above listed problems via self-care/home management, therapeutic activities, therapeutic exercises    Plan of Care Expires: 06/22/23  Plan of Care Reviewed with: patient    Subjective     Communicated with: nsg and Pt  prior to session. I  still feel terrible    Pain/Comfort:  Pain Rating 1: 0/10  Pain Rating Post-Intervention 1: 0/10    Patient's cultural, spiritual, Yazdanism conflicts given the current situation:  no    Objective:     Patient found up in chair with PureWick upon OT entry to room.    Bed Mobility:    Not tested     Functional Mobility/Transfers:  Patient completed Sit <> Stand Transfer with minimum assistance  with  hand-held assist    Pt. With SPT from w/c to bed side chair with cues for safety and hand placement     Activities of Daily Living:  Grooming: stand by assistance at sink level with grooming needs    [facility] 6 Click ADL: 15    OT Exercises: UE Ergometer 8 min with breaks in between     Treatment & Education:      Pt with BP reading as below  108/55  98/53 elevated leg rest placed on w/c  122/60  126/68    Pt. With 2# dowel activity with AAROM x10 reps with  shd flex, bicep curls horz adb/add and forward flex motion to increase BUE ROM and strength,.     Pt. With therex performed to increase ROM, endurance selfcare task and fxl mobility for independence    Pt edu on role of OT, POC, safety when performing self care tasks , benefit of performing OOB activity, and safety when performing functional transfers and mobility management for preparation with goals to progress towards next level of care     Patient left up in chair with all lines intact, call button in reach, and nsg notified    GOALS:   Multidisciplinary Problems       Occupational Therapy Goals          Problem: Occupational Therapy    Goal Priority Disciplines Outcome Interventions   Occupational Therapy Goal     OT, PT/OT Ongoing, Not Progressing    Description: Goals to be met by: 6/22/2023     Patient will increase functional independence with ADLs by performing:    UE Dressing with Set-up Assistance.  LE Dressing with Stand-by Assistance using appropriate AE as needed.  Grooming while seated at sink with Set-up Assistance.  Toileting from 3-in-1 commode over  toilet with Stand-by Assistance for hygiene and clothing management.   Bathing from sitting/standing at sink with Minimal Assistance.  Supine to sit with Supervision.  Step transfer with Stand-by Assistance with RW.  Toilet transfer to 3-in-1 commode over toilet with Stand-by Assistance with RW.                         Time Tracking:     OT Date of Treatment: 06/12/23  OT Start Time: 0935    OT Stop Time: 1030  OT Total Time (min): 55 min    Billable Minutes:Therapeutic Activity 55    6/12/2023

## 2023-06-12 NOTE — PROGRESS NOTES
[facility] - Skilled Nursing  Adult Nutrition  Progress Note    SUMMARY   Recommendations  Continue low potassium diet, encourage boost breeze, recommend Vit D, RD following.  Goals: PO to improve to 75% of EEN with ONS by next RD follow up  Nutrition Goal Status: goal not met  Communication of RD Recs: other (comment) (POC)    Assessment and Plan    Malnutrition of mild degree  Malnutrition Type:  Context: acute illness or injury  Level: mild     Related to (etiology):   Taste changes, lack of interest in food     Malnutrition Characteristic Summary:  Energy Intake (Malnutrition): less than or equal to 50% for greater than or equal to 5 days  Muscle Mass (Malnutrition): moderate depletion        Interventions/Recommendations (treatment strategy):  Continue Low potassium diet,  encourage PO intake,  recommend Vit D per home med list, RD following     Nutrition Diagnosis Status:   Continues    Malnutrition Assessment 5/26  Malnutrition Context: acute illness or injury  Malnutrition Level: mild  Skin (Micronutrient): pallor, bruised, thinned  Eyes (Micronutrient): conjunctiva dull  Teeth (Micronutrient): broken dentition (no upper teeth, wont wear dentures)  Tongue (Micronutrient): magenta  Neck/Chest (Micronutrient): muscle wasting  Musculoskeletal/Lower Extremities: muscle wasting   Micronutrient Evaluation Summary: suspected deficiency  Micronutrient Evaluation Comments: folate, protein,   Energy Intake (Malnutrition): less than or equal to 50% for greater than or equal to 5 days  Muscle Mass (Malnutrition): moderate depletion   Orbital Region (Subcutaneous Fat Loss): severe depletion  Upper Arm Region (Subcutaneous Fat Loss): well nourished  Thoracic and Lumbar Region: well nourished   Tenriism Region (Muscle Loss): severe depletion  Clavicle and Acromion Bone Region (Muscle Loss): moderate depletion  Dorsal Hand (Muscle Loss): severe depletion  Anterior Thigh Region (Muscle Loss): moderate depletion              "    Reason for Assessment    Reason For Assessment: RD follow-up  Diagnosis:  (acute kidney failure superimposed on CKD 3)  Relevant Medical History: CHF, HTN, HLD, Anemia, thyroid disease, pancreatic abcess, pacemaker, SSS, AFIB,  Interdisciplinary Rounds: attended  General Information Comments: Patient looking better says her legs still wont let her walk, She is ordering off menu. RD assisted with taking meat of bone tonight. Patient with some lowl glucose readings in am. She was encourage to drink her nutrition supplement boost breeze which likes in the evening to reduce am hypoglycemia.  Nutrition Discharge Planning: DC on regular no added salt diet.    Nutrition/Diet History    Patient Reported Diet/Restrictions/Preferences: general  Typical Food/Fluid Intake: two meals  Spiritual, Cultural Beliefs, Confucianism Practices, Values that Affect Care: no  Vitamin/Mineral/Herbal Supplements: centrum silver, Vit D  Food Allergies: NKFA  Factors Affecting Nutritional Intake: altered taste, decreased appetite    Anthropometrics    Temp: 98.1 °F (36.7 °C)  Height Method: Stated  Height: 5' 3" (160 cm)  Height (inches): 63 in  Weight Method: Bed Scale  Weight: 63.3 kg (139 lb 8.8 oz)  Weight (lb): 139.55 lb  Ideal Body Weight (IBW), Female: 115 lb  % Ideal Body Weight, Female (lb): 113.87 %  BMI (Calculated): 24.7  BMI Grade: 18.5-24.9 - normal  Usual Body Weight (UBW), k kg  Weight Change Amount:  (there is a weight descrepany on chart review with wt of 110 # if that were correct pt would have lost 15% but that weight is not reflected here)  % Usual Body Weight: 102.63  % Weight Change From Usual Weight: 2.41 %       Lab/Procedures/Meds    Pertinent Labs Reviewed: reviewed  Pertinent Labs Comments: BUN 41, Cl 114, Cr 1.5, Hg 9.2, Hct 30.8, glucose 57,  Pertinent Medications Reviewed: reviewed  Pertinent Medications Comments: Mg, Lactobacillus GG,    Estimated/Assessed Needs    Weight Used For Calorie Calculations: " 59.4 kg (130 lb 15.3 oz)  Energy Calorie Requirements (kcal): 1285  Energy Need Method: [place]-St Jeor (x 1.3(PAL))  Protein Requirements: 71g  Weight Used For Protein Calculations: 59.4 kg (130 lb 15.3 oz) (x 1.2g/kg)  Fluid Requirements (mL): 1285 or per MD  Estimated Fluid Requirement Method: RDA Method  RDA Method (mL): 1285  CHO Requirement: -      Nutrition Prescription Ordered    Current Diet Order: Regular diet  Nutrition Order Comments: 50-70%  Oral Nutrition Supplement: boost breeze lunch only    Evaluation of Received Nutrient/Fluid Intake    I/O: no data  Energy Calories Required: not meeting needs  Protein Required: not meeting needs  Fluid Required: meeting needs  Comments: LBM 6/8  Tolerance: tolerating  % Intake of Estimated Energy Needs: 50 - 75 %  % Meal Intake: 50 - 75 %    Nutrition Risk    Level of Risk/Frequency of Follow-up: low (one time per week)     Monitor and Evaluation    Food and Nutrient Intake: food and beverage intake  Food and Nutrient Adminstration: diet order  Physical Activity and Function: nutrition-related ADLs and IADLs  Anthropometric Measurements: weight change  Biochemical Data, Medical Tests and Procedures: glucose/endocrine profile, gastrointestinal profile, electrolyte and renal panel  Nutrition-Focused Physical Findings: skin, overall appearance     Nutrition Follow-Up    RD Follow-up?: Yes

## 2023-06-13 NOTE — PROGRESS NOTES
[facility]                                  Skilled Nursing Facility                   Progress Note     Patient Name: Gisselle Ortiz  YOB: 1933  MRN: [id number]  Room: [place] [address]/[place] [address] A     Admit Date: 5/23/2023   FRANCESCA: 6/16/2023     Principal Problem: Acute renal failure superimposed on stage 3 chronic kidney disease    HPI obtained from patient interview and chart review     Chief Complaint: Re-evaluation of medical treatment and therapy status: weakness      HPI:   Gisselle Ortiz is a 89 y.o. female with PMH of GERD, gallstone pancreatitis, thyroid disease, HTN, HLD, SSS s/p PPM, CHF (EF 55%), and a fib on eliquis who presented to the ED for nausea and vomiting. Pt admitted to hospital medicine for intractable n/v and UTI. Pt has remained hemodynamically stable. She was started on 5-days of IV rocephin. Urine culture + klebsiella pneumoniae and e.coli. No leukocytosis but was hyperkalemic K 5.3; discontinued spironolactone. AST/ALT elevated on admission and downtrending. Abd US with hepatomegaly, stable prominence of CBD, and cholelithiasis without evidence of cholecystitis. AES recommended MRCP, which the patient unable to have 2/2 pacemaker. KUB with nonobstructive bowel gas pattern and significant stool burden, which is improving on bowel regimen. CTAP with gallbladder sludge vs stones and colonic wall thickening vs. stenosis/peristalsis. Pt with MARIO on CKD, which is improving with IVFs. Pt with new onset thrombocytopenia which is now improving. Hematology consulted and recommended started cyanocobalamin. PT/OT consulted and on re-evaluation are recommending SNF. All questions were answered. Patient acknowledged understanding of discharge instructions and feels safe to discharge. Patient was transferred to SNF on 5/23/23 in stable condition.    Patient will be treated at [facility] with PT and OT to improve  "functional status and ability to perform ADLs.     Interval History  24 hour chart review completed. MINA. NAD.  Afebrile, vital signs stable.   Patient feeling "whoozy" and weak with therapy, BP checked - no hypotension captured.  No further N/V/D. Continue with empiric C. Diff treatment 10 day course given symptom improvement since starting.  Monitor BLE edema and encourage elevation and compression stockings. LUE edema; no DVT seen on venous US.  Upon discharge, patient plans to return to her home where son or daughter always present.     Review of Systems   Constitutional:  Positive for malaise/fatigue. Negative for chills and fever.   Respiratory:  Negative for cough, sputum production, shortness of breath and wheezing.    Cardiovascular:  Positive for leg swelling. Negative for chest pain and palpitations.   Gastrointestinal:  Negative for abdominal pain, constipation, diarrhea, heartburn, nausea and vomiting.   Genitourinary:  Negative for dysuria and urgency.   Musculoskeletal:  Negative for back pain.   Neurological:  Positive for dizziness and weakness. Negative for headaches.   Endo/Heme/Allergies:  Negative for polydipsia. Does not bruise/bleed easily.   Psychiatric/Behavioral:  Negative for depression and hallucinations. The patient is not nervous/anxious.        24 hour Vital Sign Range   Temp:  [97.6 °F (36.4 °C)-98.6 °F (37 °C)]   Pulse:  [60-71]   Resp:  [18]   BP: (112-137)/(50-60)   SpO2:  [96 %-98 %]       Physical Exam  Vitals and nursing note reviewed.   Constitutional:       General: She is not in acute distress.     Appearance: Normal appearance. She is not ill-appearing.   Cardiovascular:      Rate and Rhythm: Normal rate and regular rhythm.      Pulses: Normal pulses.      Heart sounds: Normal heart sounds. No murmur heard.  Pulmonary:      Effort: Pulmonary effort is normal. No respiratory distress.      Breath sounds: Normal breath sounds. No wheezing or rhonchi.   Abdominal:      " General: Bowel sounds are normal. There is no distension.      Palpations: Abdomen is soft. There is no mass.      Tenderness: There is no abdominal tenderness.   Musculoskeletal:         General: No swelling or tenderness.      Left forearm: Swelling and edema present.      Right lower leg: Swelling present. 1+ Edema present.      Left lower leg: Swelling present. 2+ Edema present.   Skin:     General: Skin is warm and dry.      Capillary Refill: Capillary refill takes less than 2 seconds.      Findings: Rash present. No erythema.      Nails: There is no clubbing.      Comments: Dermitits under breast skin folds bilaterally   Neurological:      Mental Status: She is alert and oriented to person, place, and time. Mental status is at baseline.      Motor: Weakness present.   Psychiatric:         Mood and Affect: Mood normal.         Behavior: Behavior normal.         Thought Content: Thought content normal.         Judgment: Judgment normal.         Labs:  Recent Labs   Lab 06/08/23 0602 06/12/23  0448   WBC 8.41 8.52   HGB 9.2* 9.2*   HCT 30.5* 30.8*    252       Recent Labs   Lab 06/08/23 0602 06/12/23  0448    144   K 4.4 4.3   * 114*   CO2 24 24   BUN 36* 41*   CREATININE 1.6* 1.5*   GLU 70 57*   CALCIUM 7.4* 7.4*   MG 2.5 2.2   PHOS 2.8 3.3       Recent Labs   Lab 06/12/23  0448   ALKPHOS 117   ALT 67*   AST 61*   ALBUMIN 1.8*   PROT 4.0*   BILITOT 0.4       No results for input(s): POCTGLUCOSE in the last 72 hours.      Meds Scheduled:   amiodarone  200 mg Oral Daily    apixaban  2.5 mg Oral BID    aspirin  81 mg Oral Daily    atenoloL  12.5 mg Oral QHS    cyanocobalamin  1,000 mcg Oral Daily    diclofenac sodium  2 g Topical (Top) QID    folic acid  1 mg Oral Daily    levothyroxine  137 mcg Oral Before breakfast    miconazole NITRATE 2 %   Topical (Top) BID    midodrine  10 mg Oral Q8H    mirtazapine  7.5 mg Oral QHS    multivitamin  1 tablet Oral Daily    pantoprazole  40 mg Oral Daily     "vancomycin  125 mg Oral Q6H       PRN:   acetaminophen, acetaminophen, bismuth subsalicylate, calcium carbonate, hydrALAZINE, loperamide, melatonin, ondansetron      Assessment and Plan:  Impaired functional mobility and endurance  - Chronic issue with acute worsening   - Continue with PT/OT for gait training and strengthening and restoration of ADL's   - Encourage mobility, OOB in chair, and early ambulation as appropriate  - Fall precautions   - Monitor for bowel and bladder dysfunction  - Monitor for and prevent skin breakdown and pressure ulcers  - Continue DVT prophylaxis with apixaban      Acute renal failure superimposed on stage 3 chronic kidney disease  - Creatinine  (baseline 1-1.3)  - Monitor BMP daily, replete electrolytes if necessary  - Renally adjust drugs to CrCl; avoid nephrotoxic drugs   - Monitor I/Os  - Renal ultrasound consistent with bilateral medical renal disease & simple renal cysts; no hydronephrosis  - given D5 1.2 NS @50ml/hr x 1L  6/13/2023 renal function improving, remains above basline    Left upper extremity swelling  - Afebrile no leukocytosis  - U/S with resolution of L cephalic vein thrombosis and no evidence of DVT  - Elevate extremity  - Continue to monitor    Nausea, Vomiting, Diarrhea  - see "C-diff, suspected" below  - Chart reviewed; suspect N/V/D r/t abnormal 5/18/23 CT AP findings of gallbladder sludge vs stones and colonic wall thickening vs. stenosis/peristalsis.  - 6/12: improving    C. Diff, suspected  6/7/23: Order test for c. Diff, enteric precautions, give LR 75cc/hr x 1 liter for dehydration, and empiric C. Diff treatment--Vancomycin 125mg every 6 hours x 10 days.  6/8/23:  - C-diff order was auto-cancelled d/t no specimen within 48 hours.  - Continue with empiric C. Diff treatment--Vancomycin 125mg every 6 hours x 10 days as loose stool, N, V resolving    Essential hypertension  Hypotension, improving  Weakness  - PRN  hydralazine   - Hypotension with associated " weakness slowly improving.  - Holding atenolol 25 mg nightly and diltiazem 180 mg daily.    Hyperkalemia  Acute, new 6/2  - K 5.2   - Initiate order(s): Lokelma 10 g x 1 and obtain repeat BMP tomorrow morning.  phos low 2.4  - 6/12: K 4.3    Hypomagnesemia  Acute, new 6/2  - Mag 1.6  -  imag ox 500 mg x 2 doses   6/12: Mag 2.2  - monitor    Moisture associated dermatitis.   - 6/12: Initiate order(s): miconazole powder bilat breast folds.    Malnutrition of mild degree  BMI of 19 or less in adult  Body mass index is 24.25 kg/m².      - Pt reports poor PO intake for many weeks despite encouragement & good    family support  - Start mirtazapine 7.5 mg nightly   - Nutrition consulted, appreciate recs:   -Regular with supplements  -Recommend MVI, probiotic supplementation.      Thrombocytopenia  - Platelets 113 on admit to [facility]  - no heparin products this admit  - no evidence of thrombocytopenia in past labs  - had severe transaminitis in 1/2022 during COVID infection - other elevations also noted - may be underlying liver dz as an association  - Hematology consulted given advancing skin changes and petechia, appreciate recs:  -- Agree with folic acid supplementation  -- Start cyanocobalamin 1,000 mcg PO daily  - Continue to monitor on daily labs     Transaminitis  -AST//177, tbili 0.9 on admission and downtrending.  -Patient denies abdominal pain.  -Repeat lipid panel wnl--discontinue statin  -Hepatitis panel negative  -Plan to f/u with PCP on when to restart statin  -Will need hepatology f/u OP     Constipation  Diarrhea  -KUB with nonobstructive bowel gas pattern noting large amount of stool throughout the colon may reflect constipation  - Continue bowel regimen with miralax TID & senna BID, hold for loose stool     Anemia due to chronic kidney disease  Folate deficiency      -Monitor Hgb       -Transfuse for Hgb <7    Chronic diastolic heart failure   -Monitor strict Is&Os and daily weights.  Continue to stress  to patient importance of self efficacy and  on diet for CHF.      Paroxysmal atrial fibrillation  Current use of long term anticoagulation  -Apixaban 2.5 mg bid  - amiodarone 200 mg daily     Anticipate disposition:    Home with home health      Follow-up needed during SNF admission:       Follow-up needed after discharge from SNF:   - PCP within 1-2 weeks  - See appt scheduled below     Future Appointments   Date Time Provider Department Center   7/12/2023 11:00 AM Joi Romeo MD [facility] PAL MED [facility]   7/18/2023 10:00 AM HOME MONITOR DEVICE CHECK, [facility] ARRHPRO [facility]   7/24/2023  4:00 PM Roz Leon NP [facility] NEPHRO [facility]   8/15/2023  9:00 AM Sunil Naidu MD [facility] HEPAT [facility]   10/31/2023  3:00 PM Kai Montez MD [facility] JUSTYN K[facility]         I certify that SNF services are required to be given on an inpatient basis because Gisselle Ortiz needs for skilled nursing care and/or skilled rehabilitation are required on a daily basis and such services can only practically be provided in a skilled nursing facility setting and are for an ongoing condition for which she received inpatient care in the hospital.       Extended Visit  Total time spent: > 15 minutes  Description of Time: counseling patient on clinical condition, therapies provided, plan of care, emotional support, coordinating patient care with other care team members, reviewing and interpreting labs and imaging, collaboration with physician, initiating new orders, chart review, and documentation. See interval hx.           Vivian Mendes NP  Department of Hospital Medicine   [facility]     DOS: 6/13/2023

## 2023-06-13 NOTE — PT/OT/SLP PROGRESS
"Physical Therapy Treatment    Patient Name:  Gisselle Ortiz   MRN:  [id number]  Admit Date: 5/23/2023  Admitting Diagnosis: Acute renal failure superimposed on stage 3 chronic kidney disease  Recent Surgeries:     General Precautions: Standard, fall  Orthopedic Precautions: N/A  Braces: N/A    Recommendations:     Discharge Recommendations: home health PT  Level of Assistance Recommended at Discharge: 24 hours significant assistance  Discharge Equipment Recommendations: none  Barriers to discharge:  (Increased assistance for mobility.)    Assessment:     Gisselle Ortiz is a 89 y.o. female admitted with a medical diagnosis of Acute renal failure superimposed on stage 3 chronic kidney disease . Pt tolerated fair, continues with reports of feeling "woozie/dizzy all the time and always in a haze" low overall strength and endurance, nsg and NP notified, pt would continue to benefit from skilled PT services to improve overall functional mobility, strength and endurance.  .      Performance deficits affecting function: weakness, impaired endurance, impaired self care skills, impaired functional mobility, gait instability, impaired balance, decreased lower extremity function, decreased safety awareness, decreased upper extremity function, edema, impaired cardiopulmonary response to activity.    Rehab Potential is good    Activity Tolerance: Fair    Plan:     Patient to be seen 5 x/week to address the above listed problems via gait training, therapeutic activities, therapeutic exercises, neuromuscular re-education, wheelchair management/training    Plan of Care Expires: 06/23/23  Plan of Care Reviewed with: patient, son    Subjective     "They mentioned stockings for my leg/swelling" nsg also notified.     Pain/Comfort:  Pain Rating 1: 0/10  Pain Rating Post-Intervention 1: 0/10    Patient's cultural, spiritual, Congregational conflicts given the current situation:  no    Objective:     Communicated with nsg/NP of pt " reports as above, BP also monitored see below, nsg notified Patient found with  (in wc) upon PT entry to room.     Therapeutic Activities and Exercises: 2x10 reps with rerst breaks AP,LAQ,hip flex mini elliptical x 10 min    BP upon arrival sitting 121/60 HR 73  BP after /59 HR 65  BP after ex 110/57 HR 63  BP supine 128/60 HR 72    Functional Mobility:  Bed Mobility:     Sit to Supine: moderate assistance and with BLE mgmt and trunk  Transfers:     Sit to Stand:  minimum assistance with rolling walker  Bed to Chair: minimum assistance with  no AD  using  Stand Pivot and using bed rail  Gait: attempted , unable to take steps stood ~ 10 sec and had to sit back down, legs wouldn't move      AM-PAC 6 CLICK MOBILITY  15    Patient left supine with call button in reach, nsg notified, and belonging sin reach .    GOALS:   Multidisciplinary Problems       Physical Therapy Goals          Problem: Physical Therapy    Goal Priority Disciplines Outcome Goal Variances Interventions   Physical Therapy Goal     PT, PT/OT Ongoing, Progressing     Description: Goals to be met by: 23     Patient will increase functional independence with mobility by performin. Supine to sit with Supervision - not met  2. Sit to supine with Contact Guard Assistance - not met  3. Rolling to Left and Right with Supervision - not met  4. Sit to stand transfer with Stand-by Assistance - not met  5. Bed to chair transfer with Stand-by Assistance using Rolling Walker - not met  6. Gait  x 50 feet with Contact Guard Assistance using Rolling Walker - not met  7. Wheelchair propulsion x 50 feet with Modified [place] using bilateral upper extremities - not met  8. Ascend/Descend 4 inch curb step with Minimal Assistance using Rolling Walker - not met; unable to assess - assess when deemed safe                         Time Tracking:     PT Received On: 23  PT Start Time: 1310  PT Stop Time: 1355  PT Total Time (min): 45  min    Billable Minutes: Therapeutic Activity 20 and Therapeutic Exercise 25    Treatment Type: Treatment  PT/PTA: PTA     Number of PTA visits since last PT visit: 2     06/13/2023

## 2023-06-13 NOTE — PT/OT/SLP PROGRESS
Occupational Therapy   Treatment    Name: Gisselle Ortiz  MRN: [id number]  Admit Date: 5/23/2023  Admitting Diagnosis:  Acute renal failure superimposed on stage 3 chronic kidney disease    General Precautions: Standard, fall   Orthopedic Precautions: N/A   Braces: N/A    Recommendations:     Discharge Recommendations:  home with home health  Level of Assistance Recommended at Discharge: 24 hours physical assistance for all ADL's and home management tasks  Discharge Equipment Recommendations: none  Barriers to discharge:   (pt requires assistance to perform ADLs and functional mobility)    Assessment:     Gisselle Ortiz is a 89 y.o. female with a medical diagnosis of Acute renal failure superimposed on stage 3 chronic kidney disease .  She presents with  Performance deficits affecting function are weakness, impaired endurance, impaired functional mobility, impaired self care skills, gait instability, decreased upper extremity function, decreased lower extremity function, edema, impaired cardiopulmonary response to activity, impaired balance, decreased safety awareness.   Pt. Was cooperative and participated well with session on this day.  Pt. Still continues to present with hypotension during session at times. Pt. BP takes on this day as follow below.  Pt  continues to demonstrate levels of physical deficits with  functional indep with daily management activities tasks, selfcare skills with balance,  functional mobility, UB strength and endurance. Pt. Will continue to benefit from continued OT to progress towards goals      Rehab Potential is fair    Activity tolerance:  Fair    Plan:     Patient to be seen 5 x/week to address the above listed problems via self-care/home management, therapeutic activities, therapeutic exercises    Plan of Care Expires: 06/22/23  Plan of Care Reviewed with: patient    Subjective     Communicated with: nsg and Pt. prior to session. I don't know how I am doing until I get  up    Pain/Comfort:  Pain Rating 1: 0/10  Pain Rating Post-Intervention 1: 0/10    Patient's cultural, spiritual, Rastafarian conflicts given the current situation:  no    Objective:     Patient found HOB elevated with PureWick upon OT entry to room.    Bed Mobility:    Patient completed Rolling/Turning to Left with  minimum assistance  Patient completed Rolling/Turning to Right with minimum assistance  Patient completed Scooting/Bridging with moderate assistance  Patient completed Supine to Sit with moderate assistance and with side rail     Functional Mobility/Transfers:  Patient completed Sit <> Stand Transfer with minimum assistance and moderate assistance  with  rolling walker   Patient completed Bed <> Chair Transfer using Squat Pivot technique with minimum assistance and moderate assistance with rolling walker    Activities of Daily Living:  Grooming: stand by assistance at sink level with grooming needs  Upper Body Dressing: moderate assistance to doff/rosario pull over shirt and jacket  Lower Body Dressing: maximal assistance to rosario pants EOB level and to mange over hips instace with RW for bal and TA for BLE socks  Toileting: moderate assistance with cleaning and and diaper management Pt. With purwick in place      [facility] 6 Click ADL: 15    Treatment & Education:  Pt bp checked at start of session    Supine 118/58   Seated in chair 106/56    Pt edu on role of OT, POC, safety when performing self care tasks , benefit of performing OOB activity, and safety when performing functional transfers and mobility management for preparation with goals to progress towards next level of care     Patient left up in chair  with BLE elevated with all lines intact and call button in reach    GOALS:   Multidisciplinary Problems       Occupational Therapy Goals          Problem: Occupational Therapy    Goal Priority Disciplines Outcome Interventions   Occupational Therapy Goal     OT, PT/OT Ongoing, Not Progressing     Description: Goals to be met by: 6/22/2023     Patient will increase functional independence with ADLs by performing:    UE Dressing with Set-up Assistance.  LE Dressing with Stand-by Assistance using appropriate AE as needed.  Grooming while seated at sink with Set-up Assistance.  Toileting from 3-in-1 commode over toilet with Stand-by Assistance for hygiene and clothing management.   Bathing from sitting/standing at sink with Minimal Assistance.  Supine to sit with Supervision.  Step transfer with Stand-by Assistance with RW.  Toilet transfer to 3-in-1 commode over toilet with Stand-by Assistance with RW.                         Time Tracking:     OT Date of Treatment: 06/13/23  OT Start Time: 0924    OT Stop Time: 1002  OT Total Time (min): 38 min    Billable Minutes:Self Care/Home Management 38    6/13/2023

## 2023-06-14 PROBLEM — N17.9 ACUTE RENAL FAILURE SUPERIMPOSED ON STAGE 3 CHRONIC KIDNEY DISEASE: Status: RESOLVED | Noted: 2023-01-01 | Resolved: 2023-01-01

## 2023-06-14 PROBLEM — K59.00 CONSTIPATION: Status: RESOLVED | Noted: 2022-01-10 | Resolved: 2023-01-01

## 2023-06-14 PROBLEM — N18.30 ACUTE RENAL FAILURE SUPERIMPOSED ON STAGE 3 CHRONIC KIDNEY DISEASE: Status: RESOLVED | Noted: 2023-01-01 | Resolved: 2023-01-01

## 2023-06-14 NOTE — PLAN OF CARE
Problem: Occupational Therapy  Goal: Occupational Therapy Goal  Description: Goals to be met by: 6/22/2023     Patient will increase functional independence with ADLs by performing:    UE Dressing with Set-up Assistance.  LE Dressing with Stand-by Assistance using appropriate AE as needed.  Grooming while seated at sink with Set-up Assistance.  Toileting from 3-in-1 commode over toilet with Stand-by Assistance for hygiene and clothing management.   Bathing from sitting/standing at sink with Minimal Assistance.  Supine to sit with Supervision.  Step transfer with Stand-by Assistance with RW.  Toilet transfer to 3-in-1 commode over toilet with Stand-by Assistance with RW.    Outcome: Ongoing, Progressing     Goals remain appropriate. Cont POC.

## 2023-06-14 NOTE — PT/OT/SLP PROGRESS
"Physical Therapy Treatment    Patient Name:  Gisselle Ortiz   MRN:  [id number]  Admit Date: 5/23/2023  Admitting Diagnosis: Acute renal failure superimposed on stage 3 chronic kidney disease  Recent Surgeries:     General Precautions: Standard, fall  Orthopedic Precautions: N/A  Braces: N/A    Recommendations:     Discharge Recommendations: home health PT  Level of Assistance Recommended at Discharge: 24 hours significant assistance  Discharge Equipment Recommendations: none  Barriers to discharge:  (Increased assistance for mobility.)    Assessment:     Gisselle Ortiz is a 89 y.o. female admitted with a medical diagnosis of Acute renal failure superimposed on stage 3 chronic kidney disease . Pt tolerated better today, pt amb today 4 ft with RW min A, pt very encouraged by this today, better spirits, pt would continue to benefit from skilled PT services to improve overall functional mobility, strength and endurance.  .      Performance deficits affecting function: weakness, impaired endurance, impaired self care skills, impaired functional mobility, gait instability, impaired balance, decreased lower extremity function, decreased safety awareness, decreased upper extremity function, edema, impaired cardiopulmonary response to activity.    Rehab Potential is good    Activity Tolerance: Fair    Plan:     Patient to be seen 5 x/week to address the above listed problems via gait training, therapeutic activities, therapeutic exercises, neuromuscular re-education, wheelchair management/training    Plan of Care Expires: 06/23/23  Plan of Care Reviewed with: patient, son    Subjective     Pt agreeable to therapy "my legs feel so much better wrapped"   "I feel so good I was able to walk again".     Pain/Comfort:  Pain Rating 1: 0/10  Pain Rating Post-Intervention 1: 0/10    Patient's cultural, spiritual, [employer] conflicts given the current situation:  no    Objective:     Communicated with nsg prior to session. " Cleared to rosario marika hose, too tight nsg wrapped with ace wraps Patient found  with  (in BSC) upon PT entry to room.     Therapeutic Activities and Exercises: 2x10 reps AP,GS, QS, LAQ mini elliptical x 10 minutes  BP monitored upon arrival reclined in /62 HR 68  After gait 104/59 HR 77 inc dizziness  After session reclined in /60 HR 74  Nsg notified of all the above    Functional Mobility:  Transfers:     Sit to Stand:  minimum assistance with rolling walker  Gait: amb with RW min A [facility] [place] 4 ft (8 steps)    AM-PAC 6 CLICK MOBILITY  15    Patient left up in chair with call button in reach and belongings in reach .    GOALS:   Multidisciplinary Problems       Physical Therapy Goals          Problem: Physical Therapy    Goal Priority Disciplines Outcome Goal Variances Interventions   Physical Therapy Goal     PT, PT/OT Ongoing, Progressing     Description: Goals to be met by: 23     Patient will increase functional independence with mobility by performin. Supine to sit with Supervision - not met  2. Sit to supine with Contact Guard Assistance - not met  3. Rolling to Left and Right with Supervision - not met  4. Sit to stand transfer with Stand-by Assistance - not met  5. Bed to chair transfer with Stand-by Assistance using Rolling Walker - not met  6. Gait  x 50 feet with Contact Guard Assistance using Rolling Walker - not met  7. Wheelchair propulsion x 50 feet with Modified Reyno using bilateral upper extremities - not met  8. Ascend/Descend 4 inch curb step with Minimal Assistance using Rolling Walker - not met; unable to assess - assess when deemed safe                         Time Tracking:     PT Received On: 23  PT Start Time: 1300  PT Stop Time: 1340  PT Total Time (min): 40 min    Billable Minutes: Gait Training 10, Therapeutic Activity 12, and Therapeutic Exercise 18    Treatment Type: Treatment  PT/PTA: PTA     Number of PTA visits since last PT visit: 3      06/14/2023

## 2023-06-14 NOTE — PLAN OF CARE
[facility]      HOME HEALTH ORDERS  FACE TO FACE ENCOUNTER    Patient Name: Gisselle Ortiz  YOB: 1933    PCP: Kai Montez MD   PCP Address: [address] / [place] [address]  PCP Phone Number: [phone]  PCP Fax: [phone]    Encounter Date: 6/14/2023    Admit to Home Health    Diagnoses:  Active Hospital Problems    Diagnosis  POA    Malnutrition of mild degree [E44.1]  Yes    Thrombocytopenia [D69.6]  Yes    Impaired functional mobility and endurance [Z74.09]  Yes    Anemia due to chronic kidney disease [N18.9, D63.1]  Yes    Chronic diastolic heart failure [I50.32]  Yes    SSS (sick sinus syndrome) [I49.5]  Yes     Chronic    Current use of long term anticoagulation [Z79.01]  Not Applicable    Vitamin D deficiency [E55.9]  Yes    Gastroesophageal reflux disease [K21.9]  Yes    Acquired hypothyroidism [E03.9]  Yes    Essential hypertension [I10]  Yes     Chronic    High cholesterol [E78.00]  Yes    Paroxysmal atrial fibrillation [I48.0]  Yes     Chronic    Cardiac pacemaker in situ [Z95.0]  Yes      Resolved Hospital Problems    Diagnosis Date Resolved POA    *Acute renal failure superimposed on stage 3 chronic kidney disease [N17.9, N18.30] 06/14/2023 Yes    Constipation [K59.00] 06/14/2023 Yes       Follow Up Appointments:  Future Appointments   Date Time Provider Department Center   7/12/2023 11:00 AM Joi Romeo MD [facility] PAL MED [facility]   7/18/2023 10:00 AM HOME MONITOR DEVICE CHECK, [facility] [address]   7/24/2023  4:00 PM Roz Leon NP [facility] NEPHRO [address]   8/15/2023  9:00 AM Sunil Naidu MD [facility] HEPAT [facility]   10/31/2023  3:00 PM Kai Montez MD [facility]       Allergies:  Review of patient's allergies indicates:   Allergen Reactions    Ciprofloxacin Nausea And Vomiting       Medications: Review discharge medications with patient and family and provide education.    Current Facility-Administered  Medications   Medication Dose Route Frequency Provider Last Rate Last Admin    acetaminophen tablet 650 mg  650 mg Oral Q6H PRN Rajiv Grajeda MD        acetaminophen tablet 650 mg  650 mg Oral Q6H PRN Rajiv Grajeda MD        amiodarone tablet 200 mg  200 mg Oral Daily Rajiv Grajeda MD   200 mg at 06/14/23 0941    apixaban tablet 2.5 mg  2.5 mg Oral BID Rajiv Grajeda MD   2.5 mg at 06/14/23 0941    aspirin EC tablet 81 mg  81 mg Oral Daily Rajiv Grajeda MD   81 mg at 06/14/23 0941    atenolol split tablet 12.5 mg  12.5 mg Oral QHS Vivian Vaughn, NP   12.5 mg at 06/13/23 2030    bismuth subsalicylate 262 mg/15 mL suspension 30 mL  30 mL Oral Q8H PRN Rajiv Grajeda MD        calcium carbonate 200 mg calcium (500 mg) chewable tablet 500 mg  500 mg Oral BID PRN Rajiv Grajeda MD        cyanocobalamin tablet 1,000 mcg  1,000 mcg Oral Daily Rajiv Grajeda MD   1,000 mcg at 06/14/23 0941    dextrose 10% bolus 125 mL 125 mL  12.5 g Intravenous PRN Rajiv Grajeda MD        dextrose 10% bolus 250 mL 250 mL  25 g Intravenous PRN Rajiv Grajeda MD        dextrose 40 % gel 15,000 mg  15 g Oral PRN Rajiv Grajeda MD        dextrose 40 % gel 30,000 mg  30 g Oral PRN Rajiv Grajeda MD        diclofenac sodium 1 % gel 2 g  2 g Topical (Top) QID Rajiv Grajeda MD   2 g at 06/13/23 2100    folic acid tablet 1 mg  1 mg Oral Daily Rajiv Grajeda MD   1 mg at 06/14/23 0941    glucagon (human recombinant) injection 1 mg  1 mg Intramuscular PRN Vivian Mendes, NP        hydrALAZINE tablet 25 mg  25 mg Oral BID PRN Rajiv Grajeda MD        levothyroxine tablet 137 mcg  137 mcg Oral Before breakfast Rajiv Grajeda MD   137 mcg at 06/14/23 0531    loperamide capsule 2 mg  2 mg Oral QID PRN Vivian MARYLOU Mendes        melatonin tablet 6 mg  6 mg Oral Nightly PRN Rajiv Grajeda MD        miconazole NITRATE 2 % top powder   Topical (Top) BID Vivian  Vaughn, NP   Given at 06/14/23 0942    midodrine tablet 5 mg  5 mg Oral Q8H Vivian Mendes NP        mirtazapine tablet 7.5 mg  7.5 mg Oral QHS Rajiv Grajeda MD   7.5 mg at 06/13/23 2030    multivitamin tablet  1 tablet Oral Daily Rajiv Grajeda MD   1 tablet at 06/14/23 0941    ondansetron disintegrating tablet 4 mg  4 mg Oral Q6H PRN Vivian Mendes NP   4 mg at 06/06/23 1358    pantoprazole EC tablet 40 mg  40 mg Oral Daily Rajiv Grajeda MD   40 mg at 06/14/23 0941    vancomycin 125 mg/5 mL oral solution 125 mg  125 mg Oral Q6H Nevaeh Walden NP   125 mg at 06/14/23 1243     Current Discharge Medication List        START taking these medications    Details   midodrine (PROAMATINE) 5 MG Tab Take 1 tablet (5 mg total) by mouth every 8 (eight) hours.  Qty: 90 tablet, Refills: 11      ondansetron (ZOFRAN-ODT) 4 MG TbDL Take 1 tablet (4 mg total) by mouth every 6 (six) hours as needed (nausea).  Qty: 28 tablet, Refills: 0      vancomycin 125 mg/5 mL Soln Take 5 mLs (125 mg total) by mouth every 6 (six) hours. for 1 day  Qty: 20 mL, Refills: 0           CONTINUE these medications which have NOT CHANGED    Details   aluminum-magnesium hydroxide-simethicone (MAALOX) 200-200-20 mg/5 mL Susp Take by mouth every 8 (eight) hours as needed (vomiting).      amiodarone (PACERONE) 200 MG Tab Take 1 tablet (200 mg total) by mouth once daily.  Qty: 90 tablet, Refills: 3    Associated Diagnoses: Paroxysmal atrial fibrillation      aspirin (ECOTRIN) 81 MG EC tablet Take 1 tablet (81 mg total) by mouth once daily.  Refills: 0    Comments: Hold until directed by PCP      atenoloL (TENORMIN) 25 MG tablet TAKE 2 TABLETS EVERY MORNING AND 1 TABLET EVERY EVENING  Qty: 270 tablet, Refills: 3    Comments: .  Associated Diagnoses: Paroxysmal atrial fibrillation      bismuth subsalicylate (PEPTO BISMOL) 262 mg/15 mL suspension Take by mouth every 8 (eight) hours as needed (vomiting).      cyanocobalamin (VITAMIN  B-12) 1000 MCG tablet Take 1 tablet (1,000 mcg total) by mouth once daily.      cyproheptadine (PERIACTIN) 4 mg tablet Take 1 tablet (4 mg total) by mouth 3 (three) times daily.  Qty: 90 tablet, Refills: 6    Associated Diagnoses: Decreased appetite      diclofenac sodium (VOLTAREN) 1 % Gel Apply topically to affected joints as needed for pain  Qty: 50 g, Refills: 3    Associated Diagnoses: Osteoarthritis of right hip, unspecified osteoarthritis type      ELIQUIS 2.5 mg Tab TAKE 1 TABLET TWICE A DAY  Qty: 180 tablet, Refills: 3      folic acid (FOLVITE) 1 MG tablet Take 1 tablet (1 mg total) by mouth once daily.  Refills: 0      hydrALAZINE (APRESOLINE) 25 MG tablet Take 1 tablet (25 mg total) by mouth 2 (two) times daily as needed (SBP >180).  Qty: 90 tablet, Refills: 11    Comments: .      Lactobacillus rhamnosus GG (CULTURELLE) 10 billion cell capsule Take 1 capsule by mouth once daily.      levothyroxine (SYNTHROID) 137 MCG Tab tablet Take 1 tablet (137 mcg total) by mouth before breakfast.  Qty: 90 tablet, Refills: 3    Associated Diagnoses: Acquired hypothyroidism      mirtazapine (REMERON) 7.5 MG Tab Take 1 tablet (7.5 mg total) by mouth every evening.  Qty: 30 tablet, Refills: 11      multivitamin Tab Take 1 tablet by mouth once daily.      pantoprazole (PROTONIX) 40 MG tablet Take 1 tablet (40 mg total) by mouth once daily.  Qty: 30 tablet, Refills: 11      polyethylene glycol (GLYCOLAX) 17 gram PwPk Take 17 g by mouth once daily.  Refills: 0           STOP taking these medications       diltiaZEM (TIAZAC) 180 MG Cs24 Comments:   Reason for Stopping:               I have seen and examined this patient within the last 30 days. My clinical findings that support the need for the home health skilled services and home bound status are the following:no   Weakness/numbness causing balance and gait disturbance due to Weakness/Debility making it taxing to leave home.  Requiring assistive device to leave home due to  unsteady gait caused by  Weakness/Debility.  Medical restrictions requiring assistance of another human to leave home due to  Unstable ambulation.     Diet:   low potassium diet    Labs:  Report Lab results to PCP.    Referrals/ Consults  Physical Therapy to evaluate and treat. Evaluate for home safety and equipment needs; Establish/upgrade home exercise program. Perform / instruct on therapeutic exercises, gait training, transfer training, and Range of Motion.  Occupational Therapy to evaluate and treat. Evaluate home environment for safety and equipment needs. Perform/Instruct on transfers, ADL training, ROM, and therapeutic exercises.  Aide to provide assistance with personal care, ADLs, and vital signs.    Activities:   ambulate in house with assistance    Nursing:   Agency to admit patient within 24 hours of hospital discharge unless specified on physician order or at patient request    SN to complete comprehensive assessment including routine vital signs. Instruct on disease process and s/s of complications to report to MD. Review/verify medication list sent home with the patient at time of discharge  and instruct patient/caregiver as needed. Frequency may be adjusted depending on start of care date.     Skilled nurse to perform up to 3 visits PRN for symptoms related to diagnosis    Notify MD if SBP > 160 or < 90; DBP > 90 or < 50; HR > 120 or < 50; Temp > 101; O2 < 88%    Ok to schedule additional visits based on staff availability and patient request on consecutive days within the home health episode.    When multiple disciplines ordered:    Start of Care occurs on Sunday - Wednesday schedule remaining discipline evaluations as ordered on separate consecutive days following the start of care.    Thursday SOC -schedule subsequent evaluations Friday and Monday the following week.     Friday - Saturday SOC - schedule subsequent discipline evaluations on consecutive days starting Monday of the following  week.    For all post-discharge communication and subsequent orders please contact patient's primary care physician.  Miscellaneous   Routine Skin for Bedridden Patients: Instruct patient/caregiver to apply moisture barrier cream to all skin folds and wet areas in perineal area daily and after baths and all bowel movements.    SN to instruct on the following:    Instruct on the definition of CHF.   Instruct on the signs/sympoms of CHF to be reported.   Instruct on and monitor daily weights.   Instruct on factors that cause exacerbation.   Instruct on action, dose, schedule, and side effects of medications.   Instruct on diet as prescribed.   Instruct on activity allowed.   Instruct on life-style modifications for life long management of CHF   SN to assess compliance with daily weights, diet, medications, fluid retention,    safety precautions, activities permitted and life-style modifications.   Additional 1-2 SN visits per week as needed for signs and symptoms     of CHF exacerbation.      For Weight Gain > 2-3 lbs in 1 day or 4-6 lbs over 1 week notify PCP:    Home Health Aide:  Nursing Weekly, Physical Therapy Three times weekly, Occupational Therapy Three times weekly, and Home Health Aide Three times weekly    Wound Care Orders  yes:     Cleanse left upper forearm w/ normal saline then apply a mepilex border.  The left arm and hand edema to be elevated above the head if possible when not using  Sacral spine- apply Triad ointment to clean dry skin BID/prn cleansing  Continue pressure prevention measures    I certify that this patient is confined to her home and needs intermittent skilled nursing care, physical therapy, and occupational therapy.    Vivian Mendes NP

## 2023-06-14 NOTE — PROGRESS NOTES
[facility]                                  Skilled Nursing Facility                   Progress Note     Patient Name: Gisselle Ortiz  YOB: 1933  MRN: [id number]  Room: [place] [address] [address] A     Admit Date: 5/23/2023   FRANCESCA: 6/16/2023     Principal Problem: Acute renal failure superimposed on stage 3 chronic kidney disease    HPI obtained from patient interview and chart review     Chief Complaint: Re-evaluation of medical treatment and therapy status: lab review, discharge planning      HPI:   Gisselle Ortiz is a 89 y.o. female with PMH of GERD, gallstone pancreatitis, thyroid disease, HTN, HLD, SSS s/p PPM, CHF (EF 55%), and a fib on eliquis who presented to the ED for nausea and vomiting. Pt admitted to hospital medicine for intractable n/v and UTI. Pt has remained hemodynamically stable. She was started on 5-days of IV rocephin. Urine culture + klebsiella pneumoniae and e.coli. No leukocytosis but was hyperkalemic K 5.3; discontinued spironolactone. AST/ALT elevated on admission and downtrending. Abd US with hepatomegaly, stable prominence of CBD, and cholelithiasis without evidence of cholecystitis. AES recommended MRCP, which the patient unable to have 2/2 pacemaker. KUB with nonobstructive bowel gas pattern and significant stool burden, which is improving on bowel regimen. CTAP with gallbladder sludge vs stones and colonic wall thickening vs. stenosis/peristalsis. Pt with MARIO on CKD, which is improving with IVFs. Pt with new onset thrombocytopenia which is now improving. Hematology consulted and recommended started cyanocobalamin. PT/OT consulted and on re-evaluation are recommending SNF. All questions were answered. Patient acknowledged understanding of discharge instructions and feels safe to discharge. Patient was transferred to SNF on 5/23/23 in stable condition.    Patient will be treated at [facility] with PT  "and OT to improve functional status and ability to perform ADLs.     Interval History  24 hour chart review completed. MINA. NAD.  Afebrile, vital signs stable. Decrease midodrine from 10 to 5 mg.  All of today's labs reviewed; listed and addressed below.   Renal improved from 6/12.  Patient feeling "whoozy" and weak with therapy, BP checked - no hypotension captured. Patient with reduced oral intake. Fasting VBG 69 this morning. Initiate order(s): BID POCT Glucose. Hypoglycemia protocol. PRG hypoglycemia orders placed.  No further N/V/D. Continue with empiric C. Diff treatment 10 day course given symptom improvement since starting.  Monitor BLE edema and encourage elevation and compression stockings.   LUE edema improving; no DVT seen on venous US.  Patient plans to return to her home where son or daughter always present.   Discharge medications reconciled and home health orders placed in anticipation of 6/16/23 discharge.    Review of Systems   Constitutional:  Positive for malaise/fatigue. Negative for chills and fever.   Respiratory:  Negative for cough, sputum production, shortness of breath and wheezing.    Cardiovascular:  Positive for leg swelling. Negative for chest pain and palpitations.   Gastrointestinal:  Negative for abdominal pain, constipation, diarrhea, heartburn, nausea and vomiting.   Genitourinary:  Negative for dysuria and urgency.   Musculoskeletal:  Negative for back pain.   Neurological:  Positive for dizziness and weakness. Negative for headaches.   Endo/Heme/Allergies:  Negative for polydipsia. Does not bruise/bleed easily.   Psychiatric/Behavioral:  Negative for depression and hallucinations. The patient is not nervous/anxious.        24 hour Vital Sign Range   Temp:  [98 °F (36.7 °C)-98.2 °F (36.8 °C)]   Pulse:  [60-68]   Resp:  [18]   BP: (110-137)/(56-62)   SpO2:  [96 %-97 %]       Physical Exam  Vitals and nursing note reviewed.   Constitutional:       General: She is not in acute " distress.     Appearance: Normal appearance. She is not ill-appearing.   Cardiovascular:      Rate and Rhythm: Normal rate and regular rhythm.      Pulses: Normal pulses.      Heart sounds: Normal heart sounds. No murmur heard.  Pulmonary:      Effort: Pulmonary effort is normal. No respiratory distress.      Breath sounds: Normal breath sounds. No wheezing or rhonchi.   Abdominal:      General: Bowel sounds are normal. There is no distension.      Palpations: Abdomen is soft. There is no mass.      Tenderness: There is no abdominal tenderness.   Musculoskeletal:         General: No swelling or tenderness.      Left forearm: Swelling and edema present.      Right lower leg: Swelling present. 1+ Edema present.      Left lower leg: Swelling present. 2+ Edema present.   Skin:     General: Skin is warm and dry.      Capillary Refill: Capillary refill takes less than 2 seconds.      Findings: Rash present. No erythema.      Nails: There is no clubbing.      Comments: Dermitits under breast skin folds bilaterally   Neurological:      Mental Status: She is alert and oriented to person, place, and time. Mental status is at baseline.      Motor: Weakness present.   Psychiatric:         Mood and Affect: Mood normal.         Behavior: Behavior normal.         Thought Content: Thought content normal.         Judgment: Judgment normal.         Labs:  Recent Labs   Lab 06/08/23  0602 06/12/23 0448 06/14/23  0414   WBC 8.41 8.52 8.59   HGB 9.2* 9.2* 9.4*   HCT 30.5* 30.8* 30.8*    252 236       Recent Labs   Lab 06/08/23  0602 06/12/23 0448 06/14/23  0414    144 142   K 4.4 4.3 4.6   * 114* 113*   CO2 24 24 25   BUN 36* 41* 36*   CREATININE 1.6* 1.5* 1.2   GLU 70 57* 69*   CALCIUM 7.4* 7.4* 7.5*   MG 2.5 2.2 2.0   PHOS 2.8 3.3 3.1       Recent Labs   Lab 06/12/23 0448 06/14/23  0414   ALKPHOS 117 122   ALT 67* 61*   AST 61* 58*   ALBUMIN 1.8* 1.7*   PROT 4.0* 4.0*   BILITOT 0.4 0.4       No results for  "input(s): POCTGLUCOSE in the last 72 hours.      Meds Scheduled:   amiodarone  200 mg Oral Daily    apixaban  2.5 mg Oral BID    aspirin  81 mg Oral Daily    atenoloL  12.5 mg Oral QHS    cyanocobalamin  1,000 mcg Oral Daily    diclofenac sodium  2 g Topical (Top) QID    folic acid  1 mg Oral Daily    levothyroxine  137 mcg Oral Before breakfast    miconazole NITRATE 2 %   Topical (Top) BID    midodrine  10 mg Oral Q8H    mirtazapine  7.5 mg Oral QHS    multivitamin  1 tablet Oral Daily    pantoprazole  40 mg Oral Daily    vancomycin  125 mg Oral Q6H       PRN:   acetaminophen, acetaminophen, bismuth subsalicylate, calcium carbonate, hydrALAZINE, loperamide, melatonin, ondansetron      Assessment and Plan:  Debility  Impaired functional mobility and endurance  - Chronic issue with acute worsening   - Continue with PT/OT for gait training and strengthening and restoration of ADL's   - Encourage mobility, OOB in chair, and early ambulation as appropriate  - Fall precautions   - Monitor for bowel and bladder dysfunction  - Monitor for and prevent skin breakdown and pressure ulcers  - Continue DVT prophylaxis with apixaban      Malnutrition of mild degree  BMI of 19 or less in adult  Body mass index is 25.5 kg/m².      - Pt reports poor PO intake for many weeks despite encouragement & good    family support  - Start mirtazapine 7.5 mg nightly   - Nutrition consulted, appreciate recs:   -Regular with supplements  -Recommend MVI, probiotic supplementation.     Hypoglycemia  Fasting VBG 69 this morning. Initiate order(s): BID POCT Glucose. Hypoglycemia protocol. PRG hypoglycemia orders placed.    Nausea, Vomiting, Diarrhea  - see "C-diff, suspected" below  - Chart reviewed; suspect N/V/D r/t abnormal 5/18/23 CT AP findings of gallbladder sludge vs stones and colonic wall thickening vs. stenosis/peristalsis.  - 6/12: improving    C. Diff, suspected  6/7/23: Order test for c. Diff, enteric precautions, give LR 75cc/hr x 1 " "liter for dehydration, and empiric C. Diff treatment--Vancomycin 125mg every 6 hours x 10 days.  6/8/23:  - C-diff order was auto-cancelled d/t no specimen within 48 hours.  - Continue with empiric C. Diff treatment--Vancomycin 125mg every 6 hours x 10 days as loose stool, N, V resolving    Essential hypertension  Hypotension, improving  Weakness  - PRN  hydralazine   - Hypotension with associated weakness slowly improving.  - Holding atenolol 25 mg nightly and diltiazem 180 mg daily.  - midodrine 10 mg started 6/3/23 q 8 hours  - 6/14: BP improving, Initiate order(s): decrease midodrine to 5 mg q 8 hours  - see "hypoglycemia" above    Acute renal failure superimposed on stage 3 chronic kidney disease  - Creatinine  (baseline 1-1.3)  - Monitor BMP daily, replete electrolytes if necessary  - Renally adjust drugs to CrCl; avoid nephrotoxic drugs   - Monitor I/Os  - Renal ultrasound consistent with bilateral medical renal disease & simple renal cysts; no hydronephrosis  - given D5 1.2 NS @50ml/hr x 1L  6/14/2023 renal function improving, remains above basline    Left upper extremity swelling  - Afebrile no leukocytosis  - U/S with resolution of L cephalic vein thrombosis and no evidence of DVT  - Elevate extremity  - Continue to monitor    Hyperkalemia  Acute, new 6/2  - K 5.2   - Initiate order(s): Lokelma 10 g x 1 and obtain repeat BMP tomorrow morning.  phos low 2.4  - 6/12: K 4.3    Hypomagnesemia  Acute, new 6/2  - Mag 1.6  -  imag ox 500 mg x 2 doses   6/12: Mag 2.2  - monitor    Moisture associated dermatitis.   - 6/12: Initiate order(s): miconazole powder bilat breast folds.     Thrombocytopenia  - Platelets 113 on admit to [facility]  - no heparin products this admit  - no evidence of thrombocytopenia in past labs  - had severe transaminitis in 1/2022 during COVID infection - other elevations also noted - may be underlying liver dz as an association  - Hematology consulted given advancing skin changes and petechia, " appreciate recs:  -- Agree with folic acid supplementation  -- Start cyanocobalamin 1,000 mcg PO daily  - Continue to monitor on daily labs     Transaminitis  -AST//177, tbili 0.9 on admission and downtrending.  -Patient denies abdominal pain.  -Repeat lipid panel wnl--discontinue statin  -Hepatitis panel negative  -Plan to f/u with PCP on when to restart statin  -Will need hepatology f/u OP     Constipation  Diarrhea  -KUB with nonobstructive bowel gas pattern noting large amount of stool throughout the colon may reflect constipation  - Continue bowel regimen with miralax TID & senna BID, hold for loose stool     Anemia due to chronic kidney disease  Folate deficiency      -Monitor Hgb       -Transfuse for Hgb <7    Chronic diastolic heart failure   -Monitor strict Is&Os and daily weights.  Continue to stress to patient importance of self efficacy and  on diet for CHF.      Paroxysmal atrial fibrillation  Current use of long term anticoagulation  -Apixaban 2.5 mg bid  - amiodarone 200 mg daily     Anticipate disposition:  6/16/23 Home with home health; Will need hepatology f/u OP; hold statin on dc      Follow-up needed during SNF admission:       Follow-up needed after discharge from SNF: Will need hepatology f/u OP  - PCP within 1-2 weeks (Plan to f/u with PCP on when to restart statin)  - See appt scheduled below     Future Appointments   Date Time Provider Department Center   7/12/2023 11:00 AM Joi Romeo MD [facility] PAL MED [address]   7/18/2023 10:00 AM HOME MONITOR DEVICE CHECK, [facility] ARRHPRO [address]   7/24/2023  4:00 PM Roz Leon NP [facility] NEPHRO [address]   8/15/2023  9:00 AM Sunil Naidu MD [facility] HEPAT [address]   10/31/2023  3:00 PM Kai Montez MD [facility]         I certify that SNF services are required to be given on an inpatient basis because Gisselle Jameseliezerstephy needs for skilled nursing care and/or skilled rehabilitation are required on a daily basis and  such services can only practically be provided in a skilled nursing facility setting and are for an ongoing condition for which she received inpatient care in the hospital.       Extended Visit  Total time spent: > 45 minutes  Description of Time: counseling patient on clinical condition, therapies provided, plan of care, emotional support, coordinating patient care with other care team members, reviewing and interpreting labs and imaging, collaboration with physician, initiating new orders, chart review, and documentation. See interval hx.           Vivian Mendes NP  Department of Hospital Medicine   [facility]     DOS: 6/14/2023

## 2023-06-14 NOTE — PT/OT/SLP PROGRESS
"Occupational Therapy   Treatment    Name: Gisselle Ortiz  MRN: [id number]  Admit Date: 5/23/2023  Admitting Diagnosis:  Acute renal failure superimposed on stage 3 chronic kidney disease    General Precautions: Standard, fall   Orthopedic Precautions: N/A   Braces: N/A    Recommendations:     Discharge Recommendations:  home with home health  Level of Assistance Recommended at Discharge: 24 hours physical assistance for all ADL's and home management tasks  Discharge Equipment Recommendations: none  Barriers to discharge:   (pt requires assistance to perform ADLs and functional mobility)    Assessment:     Gisselle Ortiz is a 89 y.o. female with a medical diagnosis of Acute renal failure superimposed on stage 3 chronic kidney disease.  She presents with performance deficits affecting function are weakness, impaired endurance, impaired functional mobility, impaired self care skills, gait instability, decreased upper extremity function, decreased lower extremity function, edema, impaired cardiopulmonary response to activity, impaired balance, decreased safety awareness. Pt agreeable to OT session. Pt declined most ADLs and mobility due to "being extremely exhausted". Pt agreeable to therex. Pt worked on UE endurance and standing tolerance.    Rehab Potential is good    Activity tolerance:  Fair    Plan:     Patient to be seen 5 x/week to address the above listed problems via self-care/home management, therapeutic activities, therapeutic exercises    Plan of Care Expires: 06/22/23  Plan of Care Reviewed with: patient    Subjective     Communicated with: RN prior to session.     Pain/Comfort:   none    Patient's cultural, spiritual, [employer]aritan conflicts given the current situation:  no    Objective:     Patient found up in chair with no active lines upon OT entry to room.    Bed Mobility:    Not observed    Functional Mobility/Transfers:  Patient completed Sit <> Stand Transfer with minimum assistance  with  " rolling walker   Functional Mobility: did not occur    Activities of Daily Living:  Upper Body Dressing: stand by assistance jackets    [facility] 6 Click ADL: 16    OT Exercises: AROM chest press, shoulder press, biceps curls, rows, 3x10    Treatment & Education:  Pt performed sit to stand with Min A, mod cues for positioning of feet, positioning of chest over feet and use of UEs to push self up.  Pt stood with RW for 75 seconds.    Patient left up in chair with call button in reach and son present    GOALS:   Multidisciplinary Problems       Occupational Therapy Goals          Problem: Occupational Therapy    Goal Priority Disciplines Outcome Interventions   Occupational Therapy Goal     OT, PT/OT Ongoing, Progressing    Description: Goals to be met by: 6/22/2023     Patient will increase functional independence with ADLs by performing:    UE Dressing with Set-up Assistance.  LE Dressing with Stand-by Assistance using appropriate AE as needed.  Grooming while seated at sink with Set-up Assistance.  Toileting from 3-in-1 commode over toilet with Stand-by Assistance for hygiene and clothing management.   Bathing from sitting/standing at sink with Minimal Assistance.  Supine to sit with Supervision.  Step transfer with Stand-by Assistance with RW.  Toilet transfer to 3-in-1 commode over toilet with Stand-by Assistance with RW.                         Time Tracking:     OT date of treatment: 6/14/23  OT Start time: 1515  OT Stop time: 1540  OT Total treatment time: 25 minutes    Billable Minutes:Therapeutic Activity 10 minutes  Therapeutic Exercise 15 minutes    SHAHIDA Ayoub  6/14/2023  Pager: [phone]

## 2023-06-15 NOTE — PROGRESS NOTES
[facility] - Skilled Nursing  Wound Care    Patient Name:  Gisselle Ortiz   MRN:  [id number]  Date: 6/15/2023  Diagnosis: Acute renal failure superimposed on stage 3 chronic kidney disease    History:     Past Medical History:   Diagnosis Date    A-fib     Anemia of chronic disease 02/02/2021    Anticoagulant long-term use     CHF (congestive heart failure)     Chronic diastolic heart failure 02/02/2021    COVID-19 01/07/2022    Gallstone pancreatitis     Hypertension     Pancreatic abscess 09/26/2020    Stage 3 chronic kidney disease 5/11/2016    Thyroid disease        Social History     Socioeconomic History    Marital status:    Tobacco Use    Smoking status: Former     Types: Cigarettes     Start date: 5/19/1964    Smokeless tobacco: Never   Substance and Sexual Activity    Alcohol use: No    Drug use: No    Sexual activity: Not Currently     Partners: Male     Social Determinants of Health     Financial Resource Strain: Low Risk     Difficulty of Paying Living Expenses: Not hard at all   Food Insecurity: No Food Insecurity    Worried About Running Out of Food in the Last Year: Never true    Ran Out of Food in the Last Year: Never true   Transportation Needs: No Transportation Needs    Lack of Transportation (Medical): No    Lack of Transportation (Non-Medical): No   Physical Activity: Inactive    Days of Exercise per Week: 0 days    Minutes of Exercise per Session: 0 min   Stress: No Stress Concern Present    Feeling of Stress : Not at all   Social Connections: Moderately Isolated    Frequency of Communication with Friends and Family: More than three times a week    Frequency of Social Gatherings with Friends and Family: More than three times a week    Attends Scientologist Services: 1 to 4 times per year    Active Member of Clubs or Organizations: No    Attends Club or Organization Meetings: Never    Marital Status:    Housing Stability: Unknown    Unable to Pay for Housing in the Last Year: No     Unstable Housing in the Last Year: No       Precautions:     Allergies as of 05/23/2023 - Reviewed 05/23/2023   Allergen Reaction Noted    Ciprofloxacin Nausea And Vomiting 05/16/2019       [facility] Assessment Details/Treatment   Wound care follow-up  The sacral spine has scattered blanchable red areas otherwise skin is intact.   Discussed wound care, verbalized understanding    Plan:  Sacral spine- continue Triad ointment to clean dry skin BID/prn cleansing  Continue pressure prevention measures    Nursing to continue care, wound care will follow-up prn  Recommendations made to primary team for above plan per written report . Orders in place.      06/15/23 0812        Altered Skin Integrity 05/24/23 0546 Sacral spine Incontinence associated dermatitis   Date First Assessed/Time First Assessed: 05/24/23 0546   Altered Skin Integrity Present on Admission - Did Patient arrive to the hospital with altered skin?: yes  Location: Sacral spine  Is this injury device related?: No  Primary Wound Type: (c) Inco...   Dressing Appearance Open to air   Drainage Amount None   Drainage Characteristics/Odor No odor   Appearance Pink;Dry   Tissue loss description Not applicable   Periwound Area Intact;Dry   Wound Edges Rolled/closed   Wound Length (cm) 0 cm   Wound Width (cm) 0 cm   Wound Depth (cm) 0 cm   Wound Volume (cm^3) 0 cm^3   Wound Surface Area (cm^2) 0 cm^2   Care Applied:;Skin Barrier       06/15/2023

## 2023-06-15 NOTE — PT/OT/SLP PROGRESS
Occupational Therapy   Treatment    Name: Gisselle Ortiz  MRN: [id number]  Admit Date: 5/23/2023  Admitting Diagnosis:  Acute renal failure superimposed on stage 3 chronic kidney disease    General Precautions: Standard, fall   Orthopedic Precautions: N/A   Braces: N/A    Recommendations:     Discharge Recommendations:  home with home health  Level of Assistance Recommended at Discharge: 24 hours physical assistance for all ADL's and home management tasks  Discharge Equipment Recommendations: none  Barriers to discharge:   (pt requires assistance to perform ADLs and functional mobility)    Assessment:     Gisselle Ortiz is a 89 y.o. female with a medical diagnosis of Acute renal failure superimposed on stage 3 chronic kidney disease .  She presents with  Performance deficits affecting function are weakness, impaired endurance, impaired functional mobility, impaired self care skills, gait instability, decreased upper extremity function, decreased lower extremity function, edema, impaired cardiopulmonary response to activity, impaired balance, decreased safety awareness.     Pt. Was cooperative and participated well with session on this day.  Pt. Still continues to present with hypotension during session at times. Pt. BP takes on this day as follow below.  Pt  continues to demonstrate levels of physical deficits with  functional indep with daily management activities tasks, selfcare skills with balance,  functional mobility, UB strength and endurance. Pt. Will continue to benefit from continued OT to progress towards goals      Rehab Potential is fair    Activity tolerance:  Fair    Plan:     Patient to be seen 5 x/week to address the above listed problems via self-care/home management, therapeutic activities, therapeutic exercises    Plan of Care Expires: 06/22/23  Plan of Care Reviewed with: patient    Subjective     Communicated with: nsg and Pt. prior to session. I don't know how I am doing until I get  up    Pain/Comfort:  Pain Rating 1: 0/10  Pain Rating Post-Intervention 1: 0/10    Patient's cultural, spiritual, Voodoo conflicts given the current situation:  no    Objective:     Patient found HOB elevated with PureWick upon OT entry to room.    Bed Mobility:    Patient completed Rolling/Turning to Left with  minimum assistance  Patient completed Rolling/Turning to Right with minimum assistance  Patient completed Scooting/Bridging with moderate assistance  Patient completed Supine to Sit with moderate assistance and with side rail     Functional Mobility/Transfers:  Patient completed Sit <> Stand Transfer with minimum assistance and moderate assistance  with  rolling walker   Patient completed Bed <> Chair Transfer using Squat Pivot technique with minimum assistance and moderate assistance with rolling walker    Activities of Daily Living:  Feeding:  set up assistance with  breakfast management   Grooming: stand by assistance at sink level with grooming needs  Bathing: minimum assistance with BLE's for bathing aspects   Upper Body Dressing: minimum assistance to doff/rosario pull over shirt and jacket  Lower Body Dressing: maximal assistance to rosario pants EOB level and to mange over hips instace with RW for bal and TA for BLE socks  Toileting: minimum assistance with cleaning and and diaper management \      [facility] 6 Click ADL: 15    Treatment & Education:  Pt bp checked at start of session    Supine 119/56   Seated in chair 99/52    Pt edu on role of OT, POC, safety when performing self care tasks , benefit of performing OOB activity, and safety when performing functional transfers and mobility management for preparation with goals to progress towards next level of care     Patient left up in chair  with BLE elevated with all lines intact and call button in reach    GOALS:   Multidisciplinary Problems       Occupational Therapy Goals          Problem: Occupational Therapy    Goal Priority Disciplines Outcome  Interventions   Occupational Therapy Goal     OT, PT/OT Ongoing, Progressing    Description: Goals to be met by: 6/22/2023     Patient will increase functional independence with ADLs by performing:    UE Dressing with Set-up Assistance.  LE Dressing with Stand-by Assistance using appropriate AE as needed.  Grooming while seated at sink with Set-up Assistance.  Toileting from 3-in-1 commode over toilet with Stand-by Assistance for hygiene and clothing management.   Bathing from sitting/standing at sink with Minimal Assistance.  Supine to sit with Supervision.  Step transfer with Stand-by Assistance with RW.  Toilet transfer to 3-in-1 commode over toilet with Stand-by Assistance with RW.                         Time Tracking:     OT Date of Treatment: 06/15/23  OT Start Time: 0740    OT Stop Time: 0818  OT Total Time (min): 38 min    Billable Minutes:Self Care/Home Management 38    6/15/2023

## 2023-06-15 NOTE — ANESTHESIA POSTPROCEDURE EVALUATION
Anesthesia Post Evaluation    Patient: Gisselle Ortiz    Procedure(s) Performed: Procedure(s) (LRB):  Cardioversion or Defibrillation (N/A)  Transesophageal echo (JILL) intra-procedure log documentation (N/A)    Final Anesthesia Type: general  Patient location during evaluation: [facility]  Patient participation: Yes- Able to Participate  Level of consciousness: awake and alert  Post-procedure vital signs: reviewed and stable  Pain management: adequate  Airway patency: patent  PONV status at discharge: No PONV  Anesthetic complications: no      Cardiovascular status: blood pressure returned to baseline  Respiratory status: spontaneous ventilation and room air  Hydration status: euvolemic  Follow-up not needed.          Vitals Value Taken Time   /58 5/14/2019 12:50 PM   Temp 36.4 °C (97.5 °F) 5/14/2019 12:50 PM   Pulse 60 5/14/2019 12:50 PM   Resp 18 5/14/2019 12:50 PM   SpO2 98 % 5/14/2019 12:50 PM         Event Time     Out of Recovery 12:46:00          Pain/Shalonda Score: No data recorded       Fluconazole Counseling:  Patient counseled regarding adverse effects of fluconazole including but not limited to headache, diarrhea, nausea, upset stomach, liver function test abnormalities, taste disturbance, and stomach pain.  There is a rare possibility of liver failure that can occur when taking fluconazole.  The patient understands that monitoring of LFTs and kidney function test may be required, especially at baseline. The patient verbalized understanding of the proper use and possible adverse effects of fluconazole.  All of the patient's questions and concerns were addressed.

## 2023-06-15 NOTE — PROGRESS NOTES
[facility]                                  Skilled Nursing Facility                   Progress Note     Patient Name: Gisselle Ortiz  YOB: 1933  MRN: [id number]  Room: [place] [address] [address] [address]     Admit Date: 5/23/2023   FRANCESCA: 6/16/2023     Principal Problem: Acute renal failure superimposed on stage 3 chronic kidney disease    HPI obtained from patient interview and chart review     Chief Complaint: Re-evaluation of medical treatment and therapy status: lab review      HPI:   Gisselle Ortiz is a 89 y.o. female with PMH of GERD, gallstone pancreatitis, thyroid disease, HTN, HLD, SSS s/p PPM, CHF (EF 55%), and a fib on eliquis who presented to the ED for nausea and vomiting. Pt admitted to hospital medicine for intractable n/v and UTI. Pt has remained hemodynamically stable. She was started on 5-days of IV rocephin. Urine culture + klebsiella pneumoniae and e.coli. No leukocytosis but was hyperkalemic K 5.3; discontinued spironolactone. AST/ALT elevated on admission and downtrending. Abd US with hepatomegaly, stable prominence of CBD, and cholelithiasis without evidence of cholecystitis. AES recommended MRCP, which the patient unable to have 2/2 pacemaker. KUB with nonobstructive bowel gas pattern and significant stool burden, which is improving on bowel regimen. CTAP with gallbladder sludge vs stones and colonic wall thickening vs. stenosis/peristalsis. Pt with MARIO on CKD, which is improving with IVFs. Pt with new onset thrombocytopenia which is now improving. Hematology consulted and recommended started cyanocobalamin. PT/OT consulted and on re-evaluation are recommending SNF. All questions were answered. Patient acknowledged understanding of discharge instructions and feels safe to discharge. Patient was transferred to SNF on 5/23/23 in stable condition.    Patient will be treated at [facility] with PT and OT to improve  "functional status and ability to perform ADLs.     Interval History  24 hour chart review completed. WESLY NAD.  Afebrile, vital signs stable. Tolerating decreased midodrine dose of 5 mg.  All of today's labs reviewed; listed and addressed below.   Renal function continues to improve. Liver enzymes down trending.  Patient feeling "whoozy" and weak with therapy, BP checked - no hypotension captured. Patient with reduced oral intake. Fasting VBG 69 this morning. Initiate order(s): BID POCT Glucose. Hypoglycemia protocol. PRG hypoglycemia orders placed.  No further N/V/D. Continue with empiric C. Diff treatment 10 day course given symptom improvement since starting.  Monitor BLE edema and encourage elevation and compression stockings.   LUE edema improving; no DVT seen on venous US.  Patient plans to return to her home where son or daughter always present.   Anticipating discharge to home (with adult children who are her caregiveres) with home health tomorrow 6/16/23.    Review of Systems   Constitutional:  Positive for malaise/fatigue. Negative for chills and fever.   Respiratory:  Negative for cough, sputum production, shortness of breath and wheezing.    Cardiovascular:  Positive for leg swelling. Negative for chest pain and palpitations.   Gastrointestinal:  Negative for abdominal pain, constipation, diarrhea, heartburn, nausea and vomiting.   Genitourinary:  Negative for dysuria and urgency.   Musculoskeletal:  Negative for back pain.   Neurological:  Positive for dizziness and weakness. Negative for headaches.   Endo/Heme/Allergies:  Negative for polydipsia. Does not bruise/bleed easily.   Psychiatric/Behavioral:  Negative for depression and hallucinations. The patient is not nervous/anxious.        24 hour Vital Sign Range   Temp:  [98.4 °F (36.9 °C)-98.7 °F (37.1 °C)]   Pulse:  [60-65]   Resp:  [14-17]   BP: (111-141)/(56-63)   SpO2:  [96 %-99 %]       Physical Exam  Vitals and nursing note reviewed. "   Constitutional:       General: She is not in acute distress.     Appearance: Normal appearance. She is not ill-appearing.   Cardiovascular:      Rate and Rhythm: Normal rate and regular rhythm.      Pulses: Normal pulses.      Heart sounds: Normal heart sounds. No murmur heard.  Pulmonary:      Effort: Pulmonary effort is normal. No respiratory distress.      Breath sounds: Normal breath sounds. No wheezing or rhonchi.   Abdominal:      General: Bowel sounds are normal. There is no distension.      Palpations: Abdomen is soft. There is no mass.      Tenderness: There is no abdominal tenderness.   Musculoskeletal:         General: No swelling or tenderness.      Left forearm: Swelling and edema present.      Right lower leg: Swelling present. 1+ Edema present.      Left lower leg: Swelling present. 2+ Edema present.   Skin:     General: Skin is warm and dry.      Capillary Refill: Capillary refill takes less than 2 seconds.      Findings: Rash present. No erythema.      Nails: There is no clubbing.      Comments: Dermitits under breast skin folds bilaterally   Neurological:      Mental Status: She is alert and oriented to person, place, and time. Mental status is at baseline.      Motor: Weakness present.   Psychiatric:         Mood and Affect: Mood normal.         Behavior: Behavior normal.         Thought Content: Thought content normal.         Judgment: Judgment normal.         Labs:  Recent Labs   Lab 06/12/23  0448 06/14/23  0414   WBC 8.52 8.59   HGB 9.2* 9.4*   HCT 30.8* 30.8*    236       Recent Labs   Lab 06/12/23  0448 06/14/23  0414    142   K 4.3 4.6   * 113*   CO2 24 25   BUN 41* 36*   CREATININE 1.5* 1.2   GLU 57* 69*   CALCIUM 7.4* 7.5*   MG 2.2 2.0   PHOS 3.3 3.1       Recent Labs   Lab 06/12/23  0448 06/14/23  0414   ALKPHOS 117 122   ALT 67* 61*   AST 61* 58*   ALBUMIN 1.8* 1.7*   PROT 4.0* 4.0*   BILITOT 0.4 0.4         Recent Labs     06/14/23  1756 06/14/23 2031  "06/15/23  0651   POCTGLUCOSE 101 88 73         Meds Scheduled:   amiodarone  200 mg Oral Daily    apixaban  2.5 mg Oral BID    aspirin  81 mg Oral Daily    atenoloL  12.5 mg Oral QHS    cyanocobalamin  1,000 mcg Oral Daily    diclofenac sodium  2 g Topical (Top) QID    folic acid  1 mg Oral Daily    levothyroxine  137 mcg Oral Before breakfast    miconazole NITRATE 2 %   Topical (Top) BID    midodrine  5 mg Oral Q8H    mirtazapine  7.5 mg Oral QHS    multivitamin  1 tablet Oral Daily    pantoprazole  40 mg Oral Daily    vancomycin  125 mg Oral Q6H       PRN:   acetaminophen, acetaminophen, bismuth subsalicylate, calcium carbonate, dextrose 10%, dextrose 10%, dextrose, dextrose, glucagon (human recombinant), hydrALAZINE, loperamide, melatonin, ondansetron      Assessment and Plan:  Debility  Impaired functional mobility and endurance  - Chronic issue with acute worsening   - Continue with PT/OT for gait training and strengthening and restoration of ADL's   - Encourage mobility, OOB in chair, and early ambulation as appropriate  - Fall precautions   - Monitor for bowel and bladder dysfunction  - Monitor for and prevent skin breakdown and pressure ulcers  - Continue DVT prophylaxis with apixaban      Malnutrition of mild degree  BMI of 19 or less in adult  Body mass index is 25.5 kg/m².      - Pt reports poor PO intake for many weeks despite encouragement & good    family support  - Start mirtazapine 7.5 mg nightly   - Nutrition consulted, appreciate recs:   -Regular with supplements  -Recommend MVI, probiotic supplementation.     Hypoglycemia  Fasting VBG 69 this morning. Initiate order(s): BID POCT Glucose. Hypoglycemia protocol. PRG hypoglycemia orders placed.    Elevated Liver Enzymes  Nausea, Vomiting, Diarrhea  - see "C-diff, suspected" below  - Chart reviewed; suspect N/V/D r/t abnormal 5/18/23 CT AP findings of gallbladder sludge vs stones and colonic wall thickening vs. stenosis/peristalsis.  - 6/15: N/V/D " "stable; pt denies symptoms. AST/ALT 58/61, trending down    C. Diff, suspected  6/7/23: Order test for c. Diff, enteric precautions, give LR 75cc/hr x 1 liter for dehydration, and empiric C. Diff treatment--Vancomycin 125mg every 6 hours x 10 days.  6/8/23:  - C-diff order was auto-cancelled d/t no specimen within 48 hours.  - Continue with empiric C. Diff treatment--Vancomycin 125mg every 6 hours x 10 days as loose stool, N, V resolving    Essential hypertension  Hypotension, improving  Weakness  - PRN  hydralazine   - Hypotension with associated weakness slowly improving.  - Holding atenolol 25 mg nightly and diltiazem 180 mg daily.  - midodrine 10 mg started 6/3/23 q 8 hours  - 6/14: BP improving, Initiate order(s): decrease midodrine to 5 mg q 8 hours  - see "hypoglycemia" above    Acute renal failure superimposed on stage 3 chronic kidney disease  - Creatinine  (baseline 1-1.3)  - Monitor BMP daily, replete electrolytes if necessary  - Renally adjust drugs to CrCl; avoid nephrotoxic drugs   - Monitor I/Os  - Renal ultrasound consistent with bilateral medical renal disease & simple renal cysts; no hydronephrosis  - given D5 1.2 NS @50ml/hr x 1L  6/14/2023 renal function improving, remains above basline    Left upper extremity swelling  - Afebrile no leukocytosis  - U/S with resolution of L cephalic vein thrombosis and no evidence of DVT  - Elevate extremity  - Continue to monitor    Hyperkalemia  Acute, new 6/2  - K 5.2   - Initiate order(s): Lokelma 10 g x 1 and obtain repeat BMP tomorrow morning.  phos low 2.4  - 6/15: K 4.6    Hypomagnesemia  Acute, new 6/2  - Mag 1.6  -  imag ox 500 mg x 2 doses   6/15: Mag 2.0  - monitor    Moisture associated dermatitis.   - 6/12: Initiate order(s): miconazole powder bilat breast folds.  Improving     Thrombocytopenia  - Platelets 113 on admit to [facility]  - no heparin products this admit  - no evidence of thrombocytopenia in past labs  - had severe transaminitis in 1/2022 " during COVID infection - other elevations also noted - may be underlying liver dz as an association  - Hematology consulted given advancing skin changes and petechia, appreciate recs:  -- Agree with folic acid supplementation  -- Start cyanocobalamin 1,000 mcg PO daily  - Continue to monitor on daily labs     Transaminitis  -AST//177, tbili 0.9 on admission and downtrending.  -Patient denies abdominal pain.  -Repeat lipid panel wnl--discontinue statin  -Hepatitis panel negative  -Plan to f/u with PCP on when to restart statin  -Will need hepatology f/u OP     Constipation  Diarrhea  -KUB with nonobstructive bowel gas pattern noting large amount of stool throughout the colon may reflect constipation  - Continue bowel regimen with miralax TID & senna BID, hold for loose stool     Anemia due to chronic kidney disease  Folate deficiency      -Monitor Hgb       -Transfuse for Hgb <7    Chronic diastolic heart failure   -Monitor strict Is&Os and daily weights.  Continue to stress to patient importance of self efficacy and  on diet for CHF.      Paroxysmal atrial fibrillation  Current use of long term anticoagulation  -Apixaban 2.5 mg bid  - amiodarone 200 mg daily     Anticipate disposition:  6/16/23 Home with home health; Will need hepatology f/u OP; hold statin on dc      Follow-up needed during SNF admission:       Follow-up needed after discharge from SNF: Will need hepatology f/u OP  - PCP within 1-2 weeks (Plan to f/u with PCP on when to restart statin)  - See appt scheduled below     Future Appointments   Date Time Provider Department Center   7/12/2023 11:00 AM Joi Romeo MD [facility] PAL MED [address]   7/18/2023 10:00 AM HOME MONITOR DEVICE CHECK, [facility] ARRHPRO [address]   7/24/2023  4:00 PM Roz Leon NP [facility] NEPHRO [address]   8/15/2023  9:00 AM Sunil Naidu MD [facility] HEPAT [address]   10/31/2023  3:00 PM Kai Montez MD [facility]         I certify that SNF services are  required to be given on an inpatient basis because Gisselle Ortiz needs for skilled nursing care and/or skilled rehabilitation are required on a daily basis and such services can only practically be provided in a skilled nursing facility setting and are for an ongoing condition for which she received inpatient care in the hospital.       Extended Visit  Total time spent: > 30 minutes  Description of Time: counseling patient on clinical condition, therapies provided, plan of care, emotional support, coordinating patient care with other care team members, reviewing and interpreting labs and imaging, collaboration with physician, initiating new orders, chart review, and documentation. See interval hx.           Vivian Mendes NP  Department of Hospital Medicine   [facility]     DOS: 6/15/2023

## 2023-06-15 NOTE — PLAN OF CARE
Patient has appealed discharge. All documents were sent to [facility] this morning, as of this witting no decision has been made. Will follow up in AM.

## 2023-06-15 NOTE — PT/OT/SLP PROGRESS
Physical Therapy Treatment    Patient Name:  Gisselle Ortiz   MRN:  [id number]  Admit Date: 5/23/2023  Admitting Diagnosis: Acute renal failure superimposed on stage 3 chronic kidney disease  Recent Surgeries: N/A    General Precautions: Standard, fall  Orthopedic Precautions: N/A  Braces: N/A    Recommendations:     Discharge Recommendations: home health PT  Level of Assistance Recommended at Discharge: 24 hours light assistance  Discharge Equipment Recommendations: none  Barriers to discharge:  (Increased assistance for mobility.)    Assessment:     Gisselle Ortiz is a 89 y.o. female admitted with a medical diagnosis of Acute renal failure superimposed on stage 3 chronic kidney disease .   Performance deficits affecting function: weakness, impaired endurance, impaired self care skills, impaired functional mobility, gait instability, impaired balance, decreased upper extremity function, decreased lower extremity function, impaired cardiopulmonary response to activity.    Rehab Potential is good    Activity Tolerance: Good    Plan:     Patient to be seen 5 x/week to address the above listed problems via gait training, therapeutic activities, therapeutic exercises, neuromuscular re-education, wheelchair management/training    Plan of Care Expires: 06/23/23  Plan of Care Reviewed with: patient    Subjective     Pt. Agreeable to work with PT.     Pain/Comfort:  Pain Rating 1: 0/10  Pain Rating Post-Intervention 1: 0/10    Patient's cultural, spiritual, Taoist conflicts given the current situation:  no    Objective:     Communicated with pt's nurse prior to session.  Patient found up in chair with   upon PT entry to room.     Therapeutic Activities and Exercises: UBEx 15mins to help improve pt's cardiovascular endurance.  LBEx 10mins to help improve B l/E MMT and endurance.    Functional Mobility:  Transfers:     Sit to Stand:  minimum assistance with rolling walker  Bed to Chair: minimum assistance with  no  AD  using  Stand Pivot  Gait: 5ft +6ft with RW and Mercedes. Pt needed rest breaks d.t  fatigue  Wheelchair Propulsion:  Pt propelled Standard wheelchair x 150 feet on Level tile with  Bilateral upper extremity with Minimal Assistance.     AM-PAC 6 CLICK MOBILITY  14    Patient left up in chair with call button in reach.    GOALS:   Multidisciplinary Problems       Physical Therapy Goals          Problem: Physical Therapy    Goal Priority Disciplines Outcome Goal Variances Interventions   Physical Therapy Goal     PT, PT/OT Ongoing, Progressing     Description: Goals to be met by: 23     Patient will increase functional independence with mobility by performin. Supine to sit with Supervision - not met  2. Sit to supine with Contact Guard Assistance - not met  3. Rolling to Left and Right with Supervision - not met  4. Sit to stand transfer with Stand-by Assistance - not met  5. Bed to chair transfer with Stand-by Assistance using Rolling Walker - not met  6. Gait  x 50 feet with Contact Guard Assistance using Rolling Walker - not met  7. Wheelchair propulsion x 50 feet with Modified [place] using bilateral upper extremities - not met  8. Ascend/Descend 4 inch curb step with Minimal Assistance using Rolling Walker - not met; unable to assess - assess when deemed safe                         Time Tracking:     PT Received On: 06/15/23  PT Start Time: 1100  PT Stop Time: 1155  PT Total Time (min): 55 min    Billable Minutes: Gait Training 15, Therapeutic Activity 15, and Therapeutic Exercise 25    Treatment Type: Treatment  PT/PTA: PT     Number of PTA visits since last PT visit: 0     06/15/2023

## 2023-06-16 NOTE — PROGRESS NOTES
[facility]                                  Skilled Nursing Facility                   Progress Note     Patient Name: Gisselle Ortiz  YOB: 1933  MRN: [id number]  Room: [place] [address] [address] A     Admit Date: 5/23/2023   FRANCESCA: 6/16/2023 - delayed    Principal Problem: Acute renal failure superimposed on stage 3 chronic kidney disease    HPI obtained from patient interview and chart review     Chief Complaint: Re-evaluation of medical treatment and therapy status: lab review      HPI:   Gisselle Ortiz is a 89 y.o. female with PMH of GERD, gallstone pancreatitis, thyroid disease, HTN, HLD, SSS s/p PPM, CHF (EF 55%), and a fib on eliquis who presented to the ED for nausea and vomiting. Pt admitted to hospital medicine for intractable n/v and UTI. Pt has remained hemodynamically stable. She was started on 5-days of IV rocephin. Urine culture + klebsiella pneumoniae and e.coli. No leukocytosis but was hyperkalemic K 5.3; discontinued spironolactone. AST/ALT elevated on admission and downtrending. Abd US with hepatomegaly, stable prominence of CBD, and cholelithiasis without evidence of cholecystitis. AES recommended MRCP, which the patient unable to have 2/2 pacemaker. KUB with nonobstructive bowel gas pattern and significant stool burden, which is improving on bowel regimen. CTAP with gallbladder sludge vs stones and colonic wall thickening vs. stenosis/peristalsis. Pt with MARIO on CKD, which is improving with IVFs. Pt with new onset thrombocytopenia which is now improving. Hematology consulted and recommended started cyanocobalamin. PT/OT consulted and on re-evaluation are recommending SNF. All questions were answered. Patient acknowledged understanding of discharge instructions and feels safe to discharge. Patient was transferred to SNF on 5/23/23 in stable condition.    Patient will be treated at [facility] with PT and OT to  improve functional status and ability to perform ADLs.     Interval History  24 hour chart review completed. MINA. NAD.  Afebrile, vital signs stable. Tolerating decreased midodrine dose of 5 mg.  Patient reports feeling better overall today, however fatigue persists.   Continue with empiric C. Diff treatment 10 day course given symptom resolution with tx.  BLE edema improving with more frequent and prolonged elevation and marika hose.   Patient plans to return to her home where son or daughter always present.   Anticipating discharge to home with home health. CM advises patient appeal still in clinical review status.     Review of Systems   Constitutional:  Positive for malaise/fatigue. Negative for chills and fever.   Respiratory:  Negative for cough, sputum production, shortness of breath and wheezing.    Cardiovascular:  Positive for leg swelling. Negative for chest pain and palpitations.   Gastrointestinal:  Negative for abdominal pain, constipation, diarrhea, heartburn, nausea and vomiting.   Genitourinary:  Negative for dysuria and urgency.   Musculoskeletal:  Negative for back pain.   Neurological:  Positive for dizziness and weakness. Negative for headaches.   Endo/Heme/Allergies:  Negative for polydipsia. Does not bruise/bleed easily.   Psychiatric/Behavioral:  Negative for depression and hallucinations. The patient is not nervous/anxious.        24 hour Vital Sign Range   Temp:  [97.8 °F (36.6 °C)-98.5 °F (36.9 °C)]   Pulse:  [60-61]   Resp:  [16-18]   BP: (103-127)/(58-68)   SpO2:  [97 %-100 %]       Physical Exam  Vitals and nursing note reviewed.   Constitutional:       General: She is not in acute distress.     Appearance: Normal appearance. She is not ill-appearing.   Cardiovascular:      Rate and Rhythm: Normal rate and regular rhythm.      Pulses: Normal pulses.      Heart sounds: Normal heart sounds. No murmur heard.  Pulmonary:      Effort: Pulmonary effort is normal. No respiratory distress.       Breath sounds: Normal breath sounds. No wheezing or rhonchi.   Abdominal:      General: Bowel sounds are normal. There is no distension.      Palpations: Abdomen is soft. There is no mass.      Tenderness: There is no abdominal tenderness.   Musculoskeletal:         General: No swelling or tenderness.      Left forearm: Swelling and edema present.      Right lower leg: Swelling present. 1+ Edema present.      Left lower leg: Swelling present. 2+ Edema present.   Skin:     General: Skin is warm and dry.      Capillary Refill: Capillary refill takes less than 2 seconds.      Findings: Rash present. No erythema.      Nails: There is no clubbing.      Comments: Dermitits under breast skin folds bilaterally   Neurological:      Mental Status: She is alert and oriented to person, place, and time. Mental status is at baseline.      Motor: Weakness present.   Psychiatric:         Mood and Affect: Mood normal.         Behavior: Behavior normal.         Thought Content: Thought content normal.         Judgment: Judgment normal.         Labs:  Recent Labs   Lab 06/12/23 0448 06/14/23 0414 06/16/23 0419   WBC 8.52 8.59 7.66   HGB 9.2* 9.4* 8.8*   HCT 30.8* 30.8* 29.7*    236 219       Recent Labs   Lab 06/12/23 0448 06/14/23 0414 06/16/23 0419    142 142   K 4.3 4.6 4.7   * 113* 114*   CO2 24 25 23   BUN 41* 36* 35*   CREATININE 1.5* 1.2 1.2   GLU 57* 69* 82   CALCIUM 7.4* 7.5* 7.4*   MG 2.2 2.0 1.9   PHOS 3.3 3.1 3.2       Recent Labs   Lab 06/12/23 0448 06/14/23 0414 06/16/23 0419   ALKPHOS 117 122 134   ALT 67* 61* 60*   AST 61* 58* 67*   ALBUMIN 1.8* 1.7* 1.6*   PROT 4.0* 4.0* 3.8*   BILITOT 0.4 0.4 0.4         Recent Labs     06/15/23  2016 06/16/23  0707 06/16/23 2115 06/17/23  0752 06/17/23  1731 06/17/23  2115 06/18/23  0715 06/18/23  1605   POCTGLUCOSE 140* 85 133* 74 96 125* 70 136*         Meds Scheduled:   amiodarone  200 mg Oral Daily    apixaban  2.5 mg Oral BID    aspirin  81 mg Oral  "Daily    atenoloL  12.5 mg Oral QHS    cyanocobalamin  1,000 mcg Oral Daily    diclofenac sodium  2 g Topical (Top) QID    folic acid  1 mg Oral Daily    levothyroxine  137 mcg Oral Before breakfast    miconazole NITRATE 2 %   Topical (Top) BID    midodrine  5 mg Oral Q8H    mirtazapine  7.5 mg Oral QHS    multivitamin  1 tablet Oral Daily    pantoprazole  40 mg Oral Daily       PRN:   acetaminophen, acetaminophen, bismuth subsalicylate, calcium carbonate, dextrose 10%, dextrose 10%, dextrose, dextrose, glucagon (human recombinant), hydrALAZINE, loperamide, melatonin, ondansetron      Assessment and Plan:  Debility  Impaired functional mobility and endurance  - Chronic issue with acute worsening   - Continue with PT/OT for gait training and strengthening and restoration of ADL's   - Encourage mobility, OOB in chair, and early ambulation as appropriate  - Fall precautions   - Monitor for bowel and bladder dysfunction  - Monitor for and prevent skin breakdown and pressure ulcers  - Continue DVT prophylaxis with apixaban      Malnutrition of mild degree  BMI of 19 or less in adult  Body mass index is 25.5 kg/m².      - Pt reports poor PO intake for many weeks despite encouragement & good    family support  - Start mirtazapine 7.5 mg nightly   - Nutrition consulted, appreciate recs:   -Regular with supplements  -Recommend MVI, probiotic supplementation.     Hypoglycemia  Fasting VBG 69 this morning. Initiate order(s): BID POCT Glucose. Hypoglycemia protocol. PRG hypoglycemia orders placed.    Elevated Liver Enzymes  Nausea, Vomiting, Diarrhea  - see "C-diff, suspected" below  - Chart reviewed; suspect N/V/D r/t abnormal 5/18/23 CT AP findings of gallbladder sludge vs stones and colonic wall thickening vs. stenosis/peristalsis.  - 6/15: N/V/D stable; pt denies symptoms. AST/ALT 58/61, trending down    C. Diff, suspected  6/7/23: Order test for c. Diff, enteric precautions, give LR 75cc/hr x 1 liter for dehydration, and " "empiric C. Diff treatment--Vancomycin 125mg every 6 hours x 10 days.  6/8/23:  - C-diff order was auto-cancelled d/t no specimen within 48 hours.  - Continue with empiric C. Diff treatment--Vancomycin 125mg every 6 hours x 10 days as loose stool, N, V resolving    Essential hypertension  Hypotension, improving  Weakness  - PRN  hydralazine   - Hypotension with associated weakness slowly improving.  - Holding atenolol 25 mg nightly and diltiazem 180 mg daily.  - midodrine 10 mg started 6/3/23 q 8 hours  - 6/14: BP improving, Initiate order(s): decrease midodrine to 5 mg q 8 hours  - see "hypoglycemia" above    Acute renal failure superimposed on stage 3 chronic kidney disease  - Creatinine  (baseline 1-1.3)  - Monitor BMP daily, replete electrolytes if necessary  - Renally adjust drugs to CrCl; avoid nephrotoxic drugs   - Monitor I/Os  - Renal ultrasound consistent with bilateral medical renal disease & simple renal cysts; no hydronephrosis  - given D5 1.2 NS @50ml/hr x 1L  6/14/2023 renal function improving, remains above basline    Left upper extremity swelling  - Afebrile no leukocytosis  - U/S with resolution of L cephalic vein thrombosis and no evidence of DVT  - Elevate extremity  - Continue to monitor    Hyperkalemia  Acute, new 6/2  - K 5.2   - Initiate order(s): Lokelma 10 g x 1 and obtain repeat BMP tomorrow morning.  phos low 2.4  - 6/15: K 4.6    Hypomagnesemia  Acute, new 6/2  - Mag 1.6  -  imag ox 500 mg x 2 doses   6/15: Mag 2.0  - monitor    Moisture associated dermatitis.   - 6/12: Initiate order(s): miconazole powder bilat breast folds.  Improving     Thrombocytopenia  - Platelets 113 on admit to [facility]  - no heparin products this admit  - no evidence of thrombocytopenia in past labs  - had severe transaminitis in 1/2022 during COVID infection - other elevations also noted - may be underlying liver dz as an association  - Hematology consulted given advancing skin changes and petechia, appreciate " recs:  -- Agree with folic acid supplementation  -- Start cyanocobalamin 1,000 mcg PO daily  - Continue to monitor on daily labs     Transaminitis  -AST//177, tbili 0.9 on admission and downtrending.  -Patient denies abdominal pain.  -Repeat lipid panel wnl--discontinue statin  -Hepatitis panel negative  -Plan to f/u with PCP on when to restart statin  -Will need hepatology f/u OP     Constipation  Diarrhea  -KUB with nonobstructive bowel gas pattern noting large amount of stool throughout the colon may reflect constipation  - Continue bowel regimen with miralax TID & senna BID, hold for loose stool     Anemia due to chronic kidney disease  Folate deficiency      -Monitor Hgb       -Transfuse for Hgb <7    Chronic diastolic heart failure   -Monitor strict Is&Os and daily weights.  Continue to stress to patient importance of self efficacy and  on diet for CHF.      Paroxysmal atrial fibrillation  Current use of long term anticoagulation  -Apixaban 2.5 mg bid  - amiodarone 200 mg daily     Anticipate disposition:  6/16/23 Home with home health; Will need hepatology f/u OP; hold statin on dc      Follow-up needed during SNF admission:       Follow-up needed after discharge from SNF: Will need hepatology f/u OP  - PCP within 1-2 weeks (Plan to f/u with PCP on when to restart statin)  - See appt scheduled below     Future Appointments   Date Time Provider Department Center   6/23/2023 10:00 AM Nicholas Costa MD [facility] IM [address] PCW   7/12/2023 11:00 AM Joi Romeo MD [facility] PAL MED [address]   7/18/2023 10:00 AM HOME MONITOR DEVICE CHECK, [facility] ARRHPRO [address]   7/24/2023  4:00 PM Roz Leon NP [facility] NEPHRO [address]   8/15/2023  9:00 AM Sunil Naidu MD [facility] HEPAT [address]   10/31/2023  3:00 PM Kai Montez MD [facility]         I certify that SNF services are required to be given on an inpatient basis because Gisselle Ortiz needs for skilled nursing care and/or  skilled rehabilitation are required on a daily basis and such services can only practically be provided in a skilled nursing facility setting and are for an ongoing condition for which she received inpatient care in the hospital.       Extended Visit  Total time spent: > 15 minutes  Description of Time: counseling patient on clinical condition, therapies provided, plan of care, emotional support, coordinating patient care with other care team members, reviewing and interpreting labs and imaging, collaboration with physician, initiating new orders, chart review, and documentation. See interval hx.           Vivian Mendes NP  Department of Hospital Medicine   [facility]     DOS: 6/16/2023

## 2023-06-16 NOTE — PLAN OF CARE
As of this writing appeal with [facility] still in clinical review. Should resident loose appeal. OOH is set up to admit patient on tomorrow. No DME is needed. PCP appointment was made.

## 2023-06-16 NOTE — PT/OT/SLP PROGRESS
Occupational Therapy   Treatment    Name: Gisselle Ortiz  MRN: [id number]  Admit Date: 5/23/2023  Admitting Diagnosis:  Acute renal failure superimposed on stage 3 chronic kidney disease    General Precautions: Standard, fall   Orthopedic Precautions: N/A   Braces: N/A    Recommendations:     Discharge Recommendations:  home with home health  Level of Assistance Recommended at Discharge: 24 hours physical assistance for all ADL's and home management tasks  Discharge Equipment Recommendations: none  Barriers to discharge:   (pt requires assistance to perform ADLs and functional mobility)    Assessment:     Gisselle Ortiz is a 89 y.o. female with a medical diagnosis of Acute renal failure superimposed on stage 3 chronic kidney disease .  She presents with  Performance deficits affecting function are weakness, impaired endurance, impaired functional mobility, impaired self care skills, gait instability, decreased upper extremity function, decreased lower extremity function, edema, impaired cardiopulmonary response to activity, impaired balance, decreased safety awareness.     Pt. Was cooperative and participated well with session on this day.  Pt. Still continues to present with hypotension during session at times. Pt. BP takes on this day as follow below.  Pt  continues to demonstrate levels of physical deficits with  functional indep with daily management activities tasks, selfcare skills with balance,  functional mobility, UB strength and endurance. Pt. Will continue to benefit from continued OT to progress towards goals      Rehab Potential is fair    Activity tolerance:  Fair    Plan:     Patient to be seen 5 x/week to address the above listed problems via self-care/home management, therapeutic activities, therapeutic exercises    Plan of Care Expires: 06/22/23  Plan of Care Reviewed with: patient    Subjective     Communicated with: nsg and Pt. prior to session. I don't know how I am doing until I get  up    Pain/Comfort:  Pain Rating 1: 0/10  Pain Rating Post-Intervention 1: 0/10    Patient's cultural, spiritual, Yazidi conflicts given the current situation:  no    Objective:     Patient found HOB elevated with PureWick upon OT entry to room.    Bed Mobility:    Not tested   Functional Mobility/Transfers:  Patient completed Sit <> Stand Transfer with minimum assistance and moderate assistance  with  rolling walker     Activities of Daily Living:  Grooming: stand by assistance at sink level with grooming needs    AMPA 6 Click ADL: 15    OT Exercises: UE Ergometer x10 min    Treatment & Education:  Pt bp checked at start of session    Seated in chair 110/55    Seated in chair 106/55    Pt. With 1# dowel activity with 2x10 reps with  shd flex, bicep curls and forward flex motion to increase BUE ROM and strength.    Pt. With therex performed to increase ROM, endurance selfcare task and fxl mobility for independence     Pt edu on role of OT, POC, safety when performing self care tasks , benefit of performing OOB activity, and safety when performing functional transfers and mobility management for preparation with goals to progress towards next level of care     Patient left up in chair  with BLE elevated with all lines intact and call button in reach    GOALS:   Multidisciplinary Problems       Occupational Therapy Goals          Problem: Occupational Therapy    Goal Priority Disciplines Outcome Interventions   Occupational Therapy Goal     OT, PT/OT Ongoing, Progressing    Description: Goals to be met by: 6/22/2023     Patient will increase functional independence with ADLs by performing:    UE Dressing with Set-up Assistance.  LE Dressing with Stand-by Assistance using appropriate AE as needed.  Grooming while seated at sink with Set-up Assistance.  Toileting from 3-in-1 commode over toilet with Stand-by Assistance for hygiene and clothing management.   Bathing from sitting/standing at sink with Minimal  Assistance.  Supine to sit with Supervision.  Step transfer with Stand-by Assistance with RW.  Toilet transfer to 3-in-1 commode over toilet with Stand-by Assistance with RW.                         Time Tracking:     OT Date of Treatment: 06/16/23  OT Start Time: 1305    OT Stop Time: 1350  OT Total Time (min): 45 min    Billable Minutes:Therapeutic Activity 45    6/16/2023

## 2023-06-19 NOTE — PROGRESS NOTES
[facility] - Skilled Nursing  Adult Nutrition  Progress Note    SUMMARY   Recommendations  Continue low potassium diet, encourage boost breeze, provided education on low potassium content foods in oral and written form.Patient has salt alternative at bedside, informed patient that it was made from potassium Cl and that she could not use it rather try salt free seasoning blends such as Mrs Bowen.   Goals: PO to improve to 75% of EEN with ONS by next RD follow up  Nutrition Goal Status: goal not met  Communication of RD Recs: other (comment) (POC)    Assessment and Plan  Malnutrition of mild degree  Malnutrition Type:  Context: acute illness or injury  Level: mild     Related to (etiology):   Taste changes, lack of interest in food     Malnutrition Characteristic Summary:  Energy Intake (Malnutrition): less than or equal to 50% for greater than or equal to 5 days  Muscle Mass (Malnutrition): moderate depletion        Interventions/Recommendations (treatment strategy):  Continue Low potassium diet,  encourage PO intake,  recommend Vit D per home med list, RD following     Nutrition Diagnosis Status:   Continues  Malnutrition Assessment   5/26,6/19  Malnutrition Context: acute illness or injury  Malnutrition Level: mild  Skin (Micronutrient): pallor, bruised, thinned  Eyes (Micronutrient): conjunctiva dull  Teeth (Micronutrient): broken dentition (no upper teeth, wont wear dentures)  Tongue (Micronutrient): magenta  Neck/Chest (Micronutrient): muscle wasting  Musculoskeletal/Lower Extremities: muscle wasting   Micronutrient Evaluation Summary: suspected deficiency  Micronutrient Evaluation Comments: folate, protein,   Energy Intake (Malnutrition): less than or equal to 50% for greater than or equal to 5 days  Muscle Mass (Malnutrition): moderate depletion   Orbital Region (Subcutaneous Fat Loss): severe depletion  Upper Arm Region (Subcutaneous Fat Loss): well nourished  Thoracic and Lumbar Region: well nourished  "  [place] Region (Muscle Loss): severe depletion  Clavicle and Acromion Bone Region (Muscle Loss): moderate depletion  Dorsal Hand (Muscle Loss): severe depletion  Anterior Thigh Region (Muscle Loss): moderate depletion                 Reason for Assessment    Reason For Assessment: RD follow-up  Diagnosis:  (acute kidney failure superimposed on CKD 3)  Relevant Medical History: CHF, HTN, HLD, Anemia, thyroid disease, pancreatic abcess, pacemaker, SSS, AFIB,  Interdisciplinary Rounds: attended  General Information Comments: PO 75%  Nutrition Discharge Planning: DC on regular no added salt diet.    Nutrition/Diet History    Patient Reported Diet/Restrictions/Preferences: general  Typical Food/Fluid Intake: two meals  Spiritual, Cultural Beliefs, Shinto Practices, Values that Affect Care: no  Vitamin/Mineral/Herbal Supplements: centrum silver, Vit D  Food Allergies: NKFA  Factors Affecting Nutritional Intake: altered taste, decreased appetite    Anthropometrics    Temp: 98.4 °F (36.9 °C)  Height Method: Stated  Height: 5' 3" (160 cm)  Height (inches): 63 in  Weight Method: Bed Scale  Weight: 66.7 kg (147 lb 0.8 oz)  Weight (lb): 147.05 lb  Ideal Body Weight (IBW), Female: 115 lb  % Ideal Body Weight, Female (lb): 119.05 %  BMI (Calculated): 26.1  BMI Grade: 18.5-24.9 - normal  Usual Body Weight (UBW), k kg  Weight Change Amount:  (there is a weight descrepany on chart review with wt of 110 # if that were correct pt would have lost 15% but that weight is not reflected here)  % Usual Body Weight: 102.63  % Weight Change From Usual Weight: 2.41 %       Lab/Procedures/Meds    Pertinent Labs Reviewed: reviewed  Pertinent Labs Comments: Cl 111, BUN 42, Hg 9.2, Hct 30.7, GFR 33.1, elevated LFT's  Pertinent Medications Reviewed: reviewed  Pertinent Medications Comments: Mg, Lactobacillus GG,    Estimated/Assessed Needs    Weight Used For Calorie Calculations: 59.4 kg (130 lb 15.3 oz)  Energy Calorie Requirements " (kcal): 1285  Energy Need Method: [place]-St Jeor (x 1.3(PAL))  Protein Requirements: 71g  Weight Used For Protein Calculations: 59.4 kg (130 lb 15.3 oz) (x 1.2g/kg)  Fluid Requirements (mL): 1285 or per MD  Estimated Fluid Requirement Method: RDA Method  RDA Method (mL): 1285  CHO Requirement: -      Nutrition Prescription Ordered  Current Diet Order: Regular diet  Nutrition Order Comments: 75%  Oral Nutrition Supplement: boost breeze lunch only    Evaluation of Received Nutrient/Fluid Intake    I/O: no data  Energy Calories Required: not meeting needs  Protein Required: not meeting needs  Fluid Required: meeting needs  Comments: LBM 6/18  Tolerance: tolerating  % Intake of Estimated Energy Needs: 75 - 100 %  % Meal Intake: 75 - 100 %    Nutrition Risk    Level of Risk/Frequency of Follow-up: low (one time per week)     Monitor and Evaluation    Food and Nutrient Intake: food and beverage intake  Food and Nutrient Adminstration: diet order  Physical Activity and Function: nutrition-related ADLs and IADLs  Anthropometric Measurements: weight change  Biochemical Data, Medical Tests and Procedures: glucose/endocrine profile, gastrointestinal profile, electrolyte and renal panel  Nutrition-Focused Physical Findings: skin, overall appearance     Nutrition Follow-Up    RD Follow-up?: Yes

## 2023-06-19 NOTE — PT/OT/SLP PROGRESS
Physical Therapy Treatment    Patient Name:  Gisselle Ortiz   MRN:  [id number]  Admit Date: 5/23/2023  Admitting Diagnosis: Acute renal failure superimposed on stage 3 chronic kidney disease  Recent Surgeries: N/A    General Precautions: Standard, fall  Orthopedic Precautions: N/A  Braces: N/A    Recommendations:     Discharge Recommendations: home health PT  Level of Assistance Recommended at Discharge: 24 hours light assistance  Discharge Equipment Recommendations: none  Barriers to discharge:  (Increased assistance for mobility.)    Assessment:     Gisselle Ortiz is a 89 y.o. female admitted with a medical diagnosis of Acute renal failure superimposed on stage 3 chronic kidney disease . Pt with increase swelling and wheeping ANSELMO/E today. Pt however participated well in PT and was able to ambulate 16ftx 2 trials with Mercedes. Pt will benefit from continued SNF rehab.      Performance deficits affecting function: weakness, impaired endurance, impaired self care skills, impaired functional mobility, gait instability, impaired balance, decreased upper extremity function, decreased lower extremity function, impaired skin, edema, impaired cardiopulmonary response to activity.    Rehab Potential is good    Activity Tolerance: Good    Plan:     Patient to be seen 5 x/week to address the above listed problems via gait training, therapeutic activities, therapeutic exercises, neuromuscular re-education, wheelchair management/training    Plan of Care Expires: 06/23/23  Plan of Care Reviewed with: patient    Subjective     Pt. Agreeable to work with PT.     Pain/Comfort:  Pain Rating 1: 0/10  Pain Rating Post-Intervention 1: 0/10    Patient's cultural, spiritual, Roman Catholic conflicts given the current situation:  no    Objective:     Communicated with pt's nurse prior to session.  Patient found up in chair with   upon PT entry to room.     Therapeutic Activities and Exercises: UBEx 10mins with reissistance set at medium  to help improve pt's cardiovascular endurance  LBEx 10mins with resisstance set at medium to help improve B l/E MMT and endurance    Functional Mobility:  Transfers:     Sit to Stand:  contact guard assistance with rolling walker  Bschair>W/ Chair> Bschair: minimum assistance with  no AD  using  Stand Pivot  Gait: 16ft x 2 trials with RW and Mercedes 2* to pt with flexed trunk, decrease step length, decrease tae and pt c/o of fatigue  Wheelchair Propulsion:  Pt propelled Standard wheelchair x 150 feet on Level tile with  Bilateral upper extremity with Supervision or Set-up Assistance. Pt needed multiple rest breaks d.t fatigue    AM-PAC 6 CLICK MOBILITY  14    Patient left up in chair with call button in reach.    GOALS:   Multidisciplinary Problems       Physical Therapy Goals          Problem: Physical Therapy    Goal Priority Disciplines Outcome Goal Variances Interventions   Physical Therapy Goal     PT, PT/OT Ongoing, Progressing     Description: Goals to be met by: 23     Patient will increase functional independence with mobility by performin. Supine to sit with Supervision - not met  2. Sit to supine with Contact Guard Assistance - not met  3. Rolling to Left and Right with Supervision - not met  4. Sit to stand transfer with Stand-by Assistance - not met  5. Bed to chair transfer with Stand-by Assistance using Rolling Walker - not met  6. Gait  x 50 feet with Contact Guard Assistance using Rolling Walker - not met  7. Wheelchair propulsion x 50 feet with Modified [place] using bilateral upper extremities - not met  8. Ascend/Descend 4 inch curb step with Minimal Assistance using Rolling Walker - not met; unable to assess - assess when deemed safe                         Time Tracking:     PT Received On: 23  PT Start Time: 915  PT Stop Time:   PT Total Time (min): 57 min    Billable Minutes: Gait Training 20, Therapeutic Activity 10, and Therapeutic Exercise 27    Treatment Type:  Treatment  PT/PTA: PT     Number of PTA visits since last PT visit: 0     06/19/2023   Volar Splint

## 2023-06-19 NOTE — PLAN OF CARE
Problem: Adult Inpatient Plan of Care  Goal: Plan of Care Review  Outcome: Ongoing, Progressing  Goal: Patient-Specific Goal (Individualized)  Outcome: Ongoing, Progressing  Goal: Absence of Hospital-Acquired Illness or Injury  Outcome: Ongoing, Progressing  Goal: Optimal Comfort and Wellbeing  Outcome: Ongoing, Progressing  Goal: Readiness for Transition of Care  Outcome: Ongoing, Progressing     Problem: Fluid and Electrolyte Imbalance (Acute Kidney Injury/Impairment)  Goal: Fluid and Electrolyte Balance  Outcome: Ongoing, Progressing     Problem: Oral Intake Inadequate (Acute Kidney Injury/Impairment)  Goal: Optimal Nutrition Intake  Outcome: Ongoing, Progressing     Problem: Renal Function Impairment (Acute Kidney Injury/Impairment)  Goal: Effective Renal Function  Outcome: Ongoing, Progressing     Problem: Impaired Wound Healing  Goal: Optimal Wound Healing  Outcome: Ongoing, Progressing     Problem: Fall Injury Risk  Goal: Absence of Fall and Fall-Related Injury  Outcome: Ongoing, Progressing     Problem: Skin Injury Risk Increased  Goal: Skin Health and Integrity  Outcome: Ongoing, Progressing     Problem: Malnutrition  Goal: Improved Nutritional Intake  Outcome: Ongoing, Progressing     Problem: Infection  Goal: Absence of Infection Signs and Symptoms  Outcome: Ongoing, Progressing     Problem: Adult Inpatient Plan of Care  Goal: Plan of Care Review  Outcome: Ongoing, Progressing  Goal: Patient-Specific Goal (Individualized)  Outcome: Ongoing, Progressing  Goal: Absence of Hospital-Acquired Illness or Injury  Outcome: Ongoing, Progressing  Goal: Optimal Comfort and Wellbeing  Outcome: Ongoing, Progressing  Goal: Readiness for Transition of Care  Outcome: Ongoing, Progressing     Problem: Fluid and Electrolyte Imbalance (Acute Kidney Injury/Impairment)  Goal: Fluid and Electrolyte Balance  Outcome: Ongoing, Progressing     Problem: Oral Intake Inadequate (Acute Kidney Injury/Impairment)  Goal: Optimal  Nutrition Intake  Outcome: Ongoing, Progressing     Problem: Renal Function Impairment (Acute Kidney Injury/Impairment)  Goal: Effective Renal Function  Outcome: Ongoing, Progressing     Problem: Impaired Wound Healing  Goal: Optimal Wound Healing  Outcome: Ongoing, Progressing     Problem: Fall Injury Risk  Goal: Absence of Fall and Fall-Related Injury  Outcome: Ongoing, Progressing     Problem: Skin Injury Risk Increased  Goal: Skin Health and Integrity  Outcome: Ongoing, Progressing     Problem: Malnutrition  Goal: Improved Nutritional Intake  Outcome: Ongoing, Progressing     Problem: Infection  Goal: Absence of Infection Signs and Symptoms  Outcome: Ongoing, Progressing     Problem: Adult Inpatient Plan of Care  Goal: Plan of Care Review  Outcome: Ongoing, Progressing  Goal: Patient-Specific Goal (Individualized)  Outcome: Ongoing, Progressing  Goal: Absence of Hospital-Acquired Illness or Injury  Outcome: Ongoing, Progressing  Goal: Optimal Comfort and Wellbeing  Outcome: Ongoing, Progressing  Goal: Readiness for Transition of Care  Outcome: Ongoing, Progressing     Problem: Fluid and Electrolyte Imbalance (Acute Kidney Injury/Impairment)  Goal: Fluid and Electrolyte Balance  Outcome: Ongoing, Progressing     Problem: Oral Intake Inadequate (Acute Kidney Injury/Impairment)  Goal: Optimal Nutrition Intake  Outcome: Ongoing, Progressing     Problem: Renal Function Impairment (Acute Kidney Injury/Impairment)  Goal: Effective Renal Function  Outcome: Ongoing, Progressing     Problem: Impaired Wound Healing  Goal: Optimal Wound Healing  Outcome: Ongoing, Progressing     Problem: Fall Injury Risk  Goal: Absence of Fall and Fall-Related Injury  Outcome: Ongoing, Progressing     Problem: Skin Injury Risk Increased  Goal: Skin Health and Integrity  Outcome: Ongoing, Progressing     Problem: Malnutrition  Goal: Improved Nutritional Intake  Outcome: Ongoing, Progressing     Problem: Infection  Goal: Absence of Infection  Signs and Symptoms  Outcome: Ongoing, Progressing

## 2023-06-19 NOTE — PLAN OF CARE
Continue low potassium diet, encourage boost breeze, provided education on low potassium content foods in oral and written form.  Goals: PO to improve to 75% of EEN with ONS by next RD follow up  Nutrition Goal Status: goal not met  Communication of RD Recs: other (comment) (POC)     Assessment and Plan  Malnutrition of mild degree  Malnutrition Type:  Context: acute illness or injury  Level: mild     Related to (etiology):   Taste changes, lack of interest in food     Malnutrition Characteristic Summary:  Energy Intake (Malnutrition): less than or equal to 50% for greater than or equal to 5 days  Muscle Mass (Malnutrition): moderate depletion      awake in bed. +iv. family at bedside-stepped out during eval.

## 2023-06-19 NOTE — SIGNIFICANT EVENT
"I was notified by the nurse that, "Patient loss consciousness for 1 minute while having a BM in the bathroom. Patient was transported back to the bed and placed in trendelenburg. BP was 98/51 after getting back in bed. Patient did not fall and is now back at baseline."    Current VS:  BP (!) 114/55 (BP Location: Right arm, Patient Position: Lying)   Pulse 63   Temp 98.5 °F (36.9 °C) (Oral)   Resp 18   Ht 5' 3" (1.6 m)   Wt 64.4 kg (141 lb 15.6 oz)   LMP  (LMP Unknown)   SpO2 95%   BMI 25.15 kg/m²       EKG ordered, though likely vasovagal event from straining with BM.  EKG unchanged.      "

## 2023-06-19 NOTE — PT/OT/SLP PROGRESS
Occupational Therapy   Treatment    Name: Gisselle Ortiz  MRN: [id number]  Admit Date: 5/23/2023  Admitting Diagnosis:  Acute renal failure superimposed on stage 3 chronic kidney disease    General Precautions: Standard, fall   Orthopedic Precautions: N/A   Braces: N/A    Recommendations:     Discharge Recommendations:  home with home health  Level of Assistance Recommended at Discharge: 24 hours physical assistance for all ADL's and home management tasks  Discharge Equipment Recommendations: none  Barriers to discharge:   (pt requires assistance to perform ADLs and functional mobility)    Assessment:     Gisselle Ortiz is a 89 y.o. female with a medical diagnosis of Acute renal failure superimposed on stage 3 chronic kidney disease .  She presents with  Performance deficits affecting function are weakness, impaired endurance, impaired functional mobility, impaired self care skills, gait instability, decreased upper extremity function, decreased lower extremity function, edema, impaired cardiopulmonary response to activity, impaired balance, decreased safety awareness.     Pt. Was cooperative and participated well with session on this day.  Pt. Still continues to present with hypotension during session at times. Pt. BP taken on this day as follow below.P t  continues to demonstrate levels of physical deficits with  functional indep with daily management activities tasks, selfcare skills with balance,  functional mobility, UB strength and endurance. Pt. Will continue to benefit from continued OT to progress towards goals      Rehab Potential is fair    Activity tolerance:  Fair    Plan:     Patient to be seen 5 x/week to address the above listed problems via self-care/home management, therapeutic activities, therapeutic exercises    Plan of Care Expires: 06/22/23  Plan of Care Reviewed with: patient    Subjective     Communicated with: nsg and Pt. prior to session. I feel so-so today    Pain/Comfort:  Pain  Rating 1: 0/10  Pain Rating Post-Intervention 1: 0/10    Patient's cultural, spiritual, Judaism conflicts given the current situation:  no    Objective:     Patient found up in chair upon OT entry to room.    Bed Mobility:    Patient completed Sit to Supine with maximal assistance     Functional Mobility/Transfers:  Patient completed Sit <> Stand Transfer with minimum assistance and moderate assistance  with  rolling walker   Patient completed Bed <> Chair Transfer using Squat Pivot technique with moderate assistance with no assistive device    Activities of Daily Living:  Grooming: stand by assistance at sink level with grooming needs    [facility] 6 Click ADL: 15    OT Exercises: UE Ergometer x10 min    Treatment & Education:  Pt bp checked at start of session    Seated in chair 120/58   Supine in bed  110/55      Pt. With therex performed to increase ROM, endurance selfcare task and fxl mobility for independence     Pt edu on role of OT, POC, safety when performing self care tasks , benefit of performing OOB activity, and safety when performing functional transfers and mobility management for preparation with goals to progress towards next level of care     Patient left up in chair  with BLE elevated with all lines intact and call button in reach    GOALS:   Multidisciplinary Problems       Occupational Therapy Goals          Problem: Occupational Therapy    Goal Priority Disciplines Outcome Interventions   Occupational Therapy Goal     OT, PT/OT Ongoing, Progressing    Description: Goals to be met by: 6/22/2023     Patient will increase functional independence with ADLs by performing:    UE Dressing with Set-up Assistance.  LE Dressing with Stand-by Assistance using appropriate AE as needed.  Grooming while seated at sink with Set-up Assistance.  Toileting from 3-in-1 commode over toilet with Stand-by Assistance for hygiene and clothing management.   Bathing from sitting/standing at sink with Minimal  Assistance.  Supine to sit with Supervision.  Step transfer with Stand-by Assistance with RW.  Toilet transfer to 3-in-1 commode over toilet with Stand-by Assistance with RW.                         Time Tracking:     OT Date of Treatment: 06/19/23  OT Start Time: 1305    OT Stop Time: 1347  OT Total Time (min): 42 min    Billable Minutes:Therapeutic Activity 42    6/19/2023

## 2023-06-19 NOTE — NURSING
Summoned to patient room by call bell from desk, upon arrival, found patient on toilet with PCT attempting to assist but patient  was very week and slumping over, noted a large BM to toilet as well. Able to get in w/c with staff assist and charge nurse present. Transferred to bed and obtained vitals signs with a resulting BP of 98/51, pale in color. Due to transferring, obtained 2 skin tears to left wrist and left lower forearm. Placed in trendelenburg position, obtained another BP which resulted in 116/55, P63 , pulse ox 94% and patient began to state she was feeling better, color pink, placed on oxygen 2L/NC. Repositioned and cleansed and obtained another BP which resulted in 114/55 P 63 and pulse ox 95% on the 2L/NC. Charge stated she  will notify MD.

## 2023-06-19 NOTE — NURSING
Pt reported during night medication pass she was feeling much better. Took medications whole without difficulty, pt is resting, continued on O2, reported cold (which she usually is at night). Provided blankets and warm pack.  Elevated LUE with pillow.

## 2023-06-20 NOTE — PLAN OF CARE
Problem: Adult Inpatient Plan of Care  Goal: Plan of Care Review  Outcome: Ongoing, Progressing     Problem: Adult Inpatient Plan of Care  Goal: Absence of Hospital-Acquired Illness or Injury  Outcome: Ongoing, Progressing     Problem: Adult Inpatient Plan of Care  Goal: Readiness for Transition of Care  Outcome: Ongoing, Progressing

## 2023-06-20 NOTE — PLAN OF CARE
Problem: Adult Inpatient Plan of Care  Goal: Plan of Care Review  Outcome: Ongoing, Progressing  Goal: Patient-Specific Goal (Individualized)  Outcome: Ongoing, Progressing  Goal: Absence of Hospital-Acquired Illness or Injury  Outcome: Ongoing, Progressing  Goal: Optimal Comfort and Wellbeing  Outcome: Ongoing, Progressing  Goal: Readiness for Transition of Care  Outcome: Ongoing, Progressing     Problem: Fluid and Electrolyte Imbalance (Acute Kidney Injury/Impairment)  Goal: Fluid and Electrolyte Balance  Outcome: Ongoing, Progressing     Problem: Oral Intake Inadequate (Acute Kidney Injury/Impairment)  Goal: Optimal Nutrition Intake  Outcome: Ongoing, Progressing     Problem: Renal Function Impairment (Acute Kidney Injury/Impairment)  Goal: Effective Renal Function  Outcome: Ongoing, Progressing     Problem: Impaired Wound Healing  Goal: Optimal Wound Healing  Outcome: Ongoing, Progressing     Problem: Fall Injury Risk  Goal: Absence of Fall and Fall-Related Injury  Outcome: Ongoing, Progressing     Problem: Skin Injury Risk Increased  Goal: Skin Health and Integrity  Outcome: Ongoing, Progressing     Problem: Malnutrition  Goal: Improved Nutritional Intake  Outcome: Ongoing, Progressing     Problem: Infection  Goal: Absence of Infection Signs and Symptoms  Outcome: Ongoing, Progressing     Problem: Adult Inpatient Plan of Care  Goal: Plan of Care Review  Outcome: Ongoing, Progressing  Goal: Patient-Specific Goal (Individualized)  Outcome: Ongoing, Progressing  Goal: Absence of Hospital-Acquired Illness or Injury  Outcome: Ongoing, Progressing  Goal: Optimal Comfort and Wellbeing  Outcome: Ongoing, Progressing  Goal: Readiness for Transition of Care  Outcome: Ongoing, Progressing     Problem: Fluid and Electrolyte Imbalance (Acute Kidney Injury/Impairment)  Goal: Fluid and Electrolyte Balance  Outcome: Ongoing, Progressing     Problem: Oral Intake Inadequate (Acute Kidney Injury/Impairment)  Goal: Optimal  Nutrition Intake  Outcome: Ongoing, Progressing     Problem: Renal Function Impairment (Acute Kidney Injury/Impairment)  Goal: Effective Renal Function  Outcome: Ongoing, Progressing     Problem: Impaired Wound Healing  Goal: Optimal Wound Healing  Outcome: Ongoing, Progressing     Problem: Fall Injury Risk  Goal: Absence of Fall and Fall-Related Injury  Outcome: Ongoing, Progressing     Problem: Skin Injury Risk Increased  Goal: Skin Health and Integrity  Outcome: Ongoing, Progressing     Problem: Malnutrition  Goal: Improved Nutritional Intake  Outcome: Ongoing, Progressing     Problem: Infection  Goal: Absence of Infection Signs and Symptoms  Outcome: Ongoing, Progressing

## 2023-06-20 NOTE — PROGRESS NOTES
[facility]                                  Skilled Nursing Facility                   Progress Note     Patient Name: Gisselle Ortiz  YOB: 1933  MRN: [id number]  Room: [place] [address] [address] A     Admit Date: 5/23/2023   FRANCESCA: 6/23/2023 - delayed    Principal Problem: Acute renal failure superimposed on stage 3 chronic kidney disease    HPI obtained from patient interview and chart review     Chief Complaint: Re-evaluation of medical treatment and therapy status: lab review      HPI:   Gisselle Ortiz is a 89 y.o. female with PMH of GERD, gallstone pancreatitis, thyroid disease, HTN, HLD, SSS s/p PPM, CHF (EF 55%), and a fib on eliquis who presented to the ED for nausea and vomiting. Pt admitted to hospital medicine for intractable n/v and UTI. Pt has remained hemodynamically stable. She was started on 5-days of IV rocephin. Urine culture + klebsiella pneumoniae and e.coli. No leukocytosis but was hyperkalemic K 5.3; discontinued spironolactone. AST/ALT elevated on admission and downtrending. Abd US with hepatomegaly, stable prominence of CBD, and cholelithiasis without evidence of cholecystitis. AES recommended MRCP, which the patient unable to have 2/2 pacemaker. KUB with nonobstructive bowel gas pattern and significant stool burden, which is improving on bowel regimen. CTAP with gallbladder sludge vs stones and colonic wall thickening vs. stenosis/peristalsis. Pt with MARIO on CKD, which is improving with IVFs. Pt with new onset thrombocytopenia which is now improving. Hematology consulted and recommended started cyanocobalamin. PT/OT consulted and on re-evaluation are recommending SNF. All questions were answered. Patient acknowledged understanding of discharge instructions and feels safe to discharge. Patient was transferred to SNF on 5/23/23 in stable condition.    Patient will be treated at [facility] with PT and OT to  improve functional status and ability to perform ADLs.     Interval History  24 hour chart review completed.   Afebrile, vital signs stable.   Patient reports feeling better. Eating dinner   Continue with empiric C. Diff treatment 10 day course given symptom resolution with tx.  BLE edema improving with more frequent and prolonged elevation and marika hose.   Patient plans to return to her home where son or daughter always present.   Anticipating discharge to home with home health. CM advises patient appeal still in clinical review status.   Labs reviewed bun42, creat 1.5, encouraged to eat and drink fluids    Review of Systems   Constitutional:  Positive for malaise/fatigue. Negative for chills and fever.   Respiratory:  Negative for cough, sputum production, shortness of breath and wheezing.    Cardiovascular:  Positive for leg swelling. Negative for chest pain and palpitations.   Gastrointestinal:  Negative for abdominal pain, constipation, diarrhea, heartburn, nausea and vomiting.   Genitourinary:  Negative for dysuria and urgency.   Musculoskeletal:  Negative for back pain.   Neurological:  Positive for dizziness and weakness. Negative for headaches.   Endo/Heme/Allergies:  Negative for polydipsia. Does not bruise/bleed easily.   Psychiatric/Behavioral:  Negative for depression and hallucinations. The patient is not nervous/anxious.        24 hour Vital Sign Range   Temp:  [98.1 °F (36.7 °C)-98.4 °F (36.9 °C)]   Pulse:  [60-64]   Resp:  [18]   BP: (124-126)/(56-58)   SpO2:  [95 %-97 %]       Physical Exam  Vitals and nursing note reviewed.   Constitutional:       General: She is not in acute distress.     Appearance: Normal appearance. She is not ill-appearing.   Cardiovascular:      Rate and Rhythm: Normal rate and regular rhythm.      Pulses: Normal pulses.      Heart sounds: Normal heart sounds. No murmur heard.  Pulmonary:      Effort: Pulmonary effort is normal. No respiratory distress.      Breath sounds:  Normal breath sounds. No wheezing or rhonchi.   Abdominal:      General: Bowel sounds are normal. There is no distension.      Palpations: Abdomen is soft. There is no mass.      Tenderness: There is no abdominal tenderness.   Musculoskeletal:         General: No swelling or tenderness.      Left forearm: Swelling and edema present.      Right lower leg: Swelling present. 1+ Edema present.      Left lower leg: Swelling present. 2+ Edema present.   Skin:     General: Skin is warm and dry.      Capillary Refill: Capillary refill takes less than 2 seconds.      Findings: Rash present. No erythema.      Nails: There is no clubbing.      Comments: Dermitits under breast skin folds bilaterally   Neurological:      Mental Status: She is alert and oriented to person, place, and time. Mental status is at baseline.      Motor: Weakness present.   Psychiatric:         Mood and Affect: Mood normal.         Behavior: Behavior normal.         Thought Content: Thought content normal.         Judgment: Judgment normal.         Labs:  Recent Labs   Lab 06/14/23  0414 06/16/23  0419 06/19/23  0432   WBC 8.59 7.66 12.60   HGB 9.4* 8.8* 9.2*   HCT 30.8* 29.7* 30.7*    219 217       Recent Labs   Lab 06/14/23  0414 06/16/23  0419 06/19/23  0432    142 141   K 4.6 4.7 4.9   * 114* 111*   CO2 25 23 21*   BUN 36* 35* 42*   CREATININE 1.2 1.2 1.5*   GLU 69* 82 73   CALCIUM 7.5* 7.4* 7.5*   MG 2.0 1.9 1.8   PHOS 3.1 3.2 3.8       Recent Labs   Lab 06/14/23  0414 06/16/23  0419 06/19/23  0432   ALKPHOS 122 134 121   ALT 61* 60* 69*   AST 58* 67* 86*   ALBUMIN 1.7* 1.6* 1.7*   PROT 4.0* 3.8* 4.1*   BILITOT 0.4 0.4 0.4         Recent Labs     06/17/23  0752 06/17/23  1731 06/17/23  2115 06/18/23  0715 06/18/23  1605 06/18/23  2054 06/19/23  0711 06/19/23 2043   POCTGLUCOSE 74 96 125* 70 136* 170* 79 142*         Meds Scheduled:   amiodarone  200 mg Oral Daily    apixaban  2.5 mg Oral BID    aspirin  81 mg Oral Daily     "atenoloL  12.5 mg Oral QHS    cyanocobalamin  1,000 mcg Oral Daily    diclofenac sodium  2 g Topical (Top) QID    folic acid  1 mg Oral Daily    levothyroxine  137 mcg Oral Before breakfast    miconazole NITRATE 2 %   Topical (Top) BID    midodrine  5 mg Oral Q8H    mirtazapine  7.5 mg Oral QHS    multivitamin  1 tablet Oral Daily    pantoprazole  40 mg Oral Daily       PRN:   acetaminophen, acetaminophen, bismuth subsalicylate, calcium carbonate, dextrose 10%, dextrose 10%, dextrose, dextrose, glucagon (human recombinant), hydrALAZINE, loperamide, melatonin, ondansetron      Assessment and Plan:  Debility  Impaired functional mobility and endurance  - Chronic issue with acute worsening   - Continue with PT/OT for gait training and strengthening and restoration of ADL's   - Encourage mobility, OOB in chair, and early ambulation as appropriate  - Fall precautions   - Monitor for bowel and bladder dysfunction  - Monitor for and prevent skin breakdown and pressure ulcers  - Continue DVT prophylaxis with apixaban      Malnutrition of mild degree  BMI of 19 or less in adult  Body mass index is 25.5 kg/m².      - Pt reports poor PO intake for many weeks despite encouragement & good    family support  - Start mirtazapine 7.5 mg nightly   - Nutrition consulted, appreciate recs:   -Regular with supplements  -Recommend MVI, probiotic supplementation.     Hypoglycemia  Fasting VBG 69 this morning. Initiate order(s): BID POCT Glucose. Hypoglycemia protocol. PRG hypoglycemia orders placed.    Elevated Liver Enzymes  Nausea, Vomiting, Diarrhea  - see "C-diff, suspected" below  - Chart reviewed; suspect N/V/D r/t abnormal 5/18/23 CT AP findings of gallbladder sludge vs stones and colonic wall thickening vs. stenosis/peristalsis.  - 6/15: N/V/D stable; pt denies symptoms. AST/ALT 58/61, trending down    C. Diff, suspected  6/7/23: Order test for c. Diff, enteric precautions, give LR 75cc/hr x 1 liter for dehydration, and empiric C. " "Diff treatment--Vancomycin 125mg every 6 hours x 10 days.  6/8/23:  - C-diff order was auto-cancelled d/t no specimen within 48 hours.  - Continue with empiric C. Diff treatment--Vancomycin 125mg every 6 hours x 10 days as loose stool, N, V resolving    Essential hypertension  Hypotension, improving  Weakness  - PRN  hydralazine   - Hypotension with associated weakness slowly improving.  - Holding atenolol 25 mg nightly and diltiazem 180 mg daily.  - midodrine 10 mg started 6/3/23 q 8 hours  - 6/14: BP improving, Initiate order(s): decrease midodrine to 5 mg q 8 hours  - see "hypoglycemia" above    Acute renal failure superimposed on stage 3 chronic kidney disease  - Creatinine  (baseline 1-1.3)  - Monitor BMP daily, replete electrolytes if necessary  - Renally adjust drugs to CrCl; avoid nephrotoxic drugs   - Monitor I/Os  - Renal ultrasound consistent with bilateral medical renal disease & simple renal cysts; no hydronephrosis  - given D5 1.2 NS @50ml/hr x 1L  6/14/2023 renal function improving, remains above basline    Left upper extremity swelling  - Afebrile no leukocytosis  - U/S with resolution of L cephalic vein thrombosis and no evidence of DVT  - Elevate extremity  - Continue to monitor    Hyperkalemia  Acute, new 6/2  - K 5.2   - Initiate order(s): Lokelma 10 g x 1 and obtain repeat BMP tomorrow morning.  phos low 2.4  - 6/15: K 4.6    Hypomagnesemia  Acute, new 6/2  - Mag 1.6  -  imag ox 500 mg x 2 doses   6/15: Mag 2.0  - monitor    Moisture associated dermatitis.   - 6/12: Initiate order(s): miconazole powder bilat breast folds.  Improving     Thrombocytopenia  - Platelets 113 on admit to [facility]  - no heparin products this admit  - no evidence of thrombocytopenia in past labs  - had severe transaminitis in 1/2022 during COVID infection - other elevations also noted - may be underlying liver dz as an association  - Hematology consulted given advancing skin changes and petechia, appreciate recs:  -- Agree " with folic acid supplementation  -- Start cyanocobalamin 1,000 mcg PO daily  - Continue to monitor on daily labs     Transaminitis  -AST//177, tbili 0.9 on admission and downtrending.  -Patient denies abdominal pain.  -Repeat lipid panel wnl--discontinue statin  -Hepatitis panel negative  -Plan to f/u with PCP on when to restart statin  -Will need hepatology f/u OP     Constipation  Diarrhea  -KUB with nonobstructive bowel gas pattern noting large amount of stool throughout the colon may reflect constipation  - Continue bowel regimen with miralax TID & senna BID, hold for loose stool     Anemia due to chronic kidney disease  Folate deficiency      -Monitor Hgb       -Transfuse for Hgb <7    Chronic diastolic heart failure   -Monitor strict Is&Os and daily weights.  Continue to stress to patient importance of self efficacy and  on diet for CHF.      Paroxysmal atrial fibrillation  Current use of long term anticoagulation  -Apixaban 2.5 mg bid  - amiodarone 200 mg daily     Anticipate disposition:  6/16/23 Home with home health; Will need hepatology f/u OP; hold statin on dc      Follow-up needed during SNF admission:       Follow-up needed after discharge from SNF: Will need hepatology f/u OP  - PCP within 1-2 weeks (Plan to f/u with PCP on when to restart statin)  - See appt scheduled below     Future Appointments   Date Time Provider Department Center   6/23/2023 10:00 AM Nicholas Costa MD [facility] IM [address] PCW   7/12/2023 11:00 AM Joi Romeo MD [facility] PAL MED [address]   7/18/2023 10:00 AM HOME MONITOR DEVICE CHECK, [facility] ARRHPRO [address]   7/24/2023  4:00 PM Roz Leon NP [facility] NEPHRO [address]   8/15/2023  9:00 AM Sunil Naidu MD [facility] HEPAT [address]   10/31/2023  3:00 PM Kai Montez MD [facility]         I certify that SNF services are required to be given on an inpatient basis because Gisselle Ortiz needs for skilled nursing care and/or skilled rehabilitation  are required on a daily basis and such services can only practically be provided in a skilled nursing facility setting and are for an ongoing condition for which she received inpatient care in the hospital.         Nevaeh Walden NP  Department of Hospital Medicine   [facility]     DOS: 6/19/2023

## 2023-06-20 NOTE — PT/OT/SLP PROGRESS
"Occupational Therapy   Treatment    Name: Gisselle Ortiz  MRN: [id number]  Admit Date: 5/23/2023  Admitting Diagnosis:  Acute renal failure superimposed on stage 3 chronic kidney disease    General Precautions: Standard, fall   Orthopedic Precautions: N/A   Braces: N/A    Recommendations:     Discharge Recommendations:  home health OT  Level of Assistance Recommended at Discharge: 24 hours physical assistance for all ADL's and home management tasks  Discharge Equipment Recommendations: none  Barriers to discharge:  Other (Comment) (increased assistance required for ADLs and mobility)    Assessment:     Gisselle Ortiz is a 89 y.o. female with a medical diagnosis of Acute renal failure superimposed on stage 3 chronic kidney disease.  Pt had limited tolerance of session due to hypotension and dizziness.  She continues to require assistance for ADLs and mobility.  She presents with the following.  Performance deficits affecting function are weakness, impaired endurance, impaired self care skills, impaired functional mobility, gait instability, impaired balance, impaired cardiopulmonary response to activity, decreased upper extremity function, decreased lower extremity function, decreased safety awareness.     Rehab Potential is fair    Activity tolerance:  Fair    Plan:     Patient to be seen 5 x/week to address the above listed problems via self-care/home management, therapeutic activities, therapeutic exercises    Plan of Care Expires: 06/22/23  Plan of Care Reviewed with: patient    Subjective   "I'm not good.  Never good." - when asked how she was feeling  Communicated with: nursing prior to session.    Pain/Comfort:  Pain Rating 1: 0/10  Pain Rating Post-Intervention 1: 0/10    Patient's cultural, spiritual, Anabaptist conflicts given the current situation:  no    Objective:     Patient found up in bedside chair with Other (comments) (no lines attached) upon OT entry to room.    Bed Mobility:    Patient " completed Scooting/Bridging to HOB while HOB flat with stand by assistance and cueing for technique  Patient completed Sit to Supine from the L with moderate assistance for BLE management    Functional Mobility/Transfers:  Patient completed Sit <> Stand Transfer from bedside chair x 2 trials and from w/c x 1 trial with moderate assistance with hand-held assist   Patient completed Bedside Chair <> Wheelchair <> Bedside Chair Transfers using Stand Pivot technique with moderate assistance with hand-held assist  Patient completed  Bedside Chair <> Bed Transfer using Stand Pivot technique with moderate assistance with hand-held assist    Activities of Daily Living:  Pt declined to perform grooming tasks at the [facility] 6 Click ADL: 15    Treatment & Education:  - Pt's BP measured as follows: 114/54 while seated in bedside chair prior to activity, 98/46 while seated in w/c after stand pivot t/f, 116/57 while seated in bedside chair after stand pivot, and 125/61 while supine in bed after activity.     Pt edu on role of OT, POC, safety when performing self care tasks, benefit of performing OOB activity, and safety when performing functional transfers and mobility.    - Self care tasks completed-- as noted above      Patient left HOB elevated with call button in reach and nursing notified    GOALS:   Multidisciplinary Problems       Occupational Therapy Goals          Problem: Occupational Therapy    Goal Priority Disciplines Outcome Interventions   Occupational Therapy Goal     OT, PT/OT Ongoing, Progressing    Description: Goals to be met by: 6/22/2023     Patient will increase functional independence with ADLs by performing:    UE Dressing with Set-up Assistance.  LE Dressing with Stand-by Assistance using appropriate AE as needed.  Grooming while seated at [facility] with Set-up Assistance.  Toileting from 3-in-1 commode over toilet with Stand-by Assistance for hygiene and clothing management.   Bathing from  sitting/standing at sink with Minimal Assistance.  Supine to sit with Supervision.  Step transfer with Stand-by Assistance with RW.  Toilet transfer to 3-in-1 commode over toilet with Stand-by Assistance with RW.                         Time Tracking:     OT Date of Treatment: 06/20/23  OT Start Time: 1333    OT Stop Time: 1351  OT Total Time (min): 18 min    Billable Minutes:Therapeutic Activity 18 min    6/20/2023

## 2023-06-20 NOTE — PLAN OF CARE
CHW served per facility [facility] to patient. Patient signed and a copy was given. Also daughter Génesis was jacobo @ [phone]. CHW left message on VM informing daughter of NOM[facility].

## 2023-06-20 NOTE — PLAN OF CARE
Family Training     Patient Name:  Gisselle Ortiz   MRN:  [id number]  Admit Date: 5/23/2023      Rehab tech called to schedule family training. Unable to reach pt's family/caregiver and voicemail left. Will re-attempt to schedule as able.     6/20/2023

## 2023-06-20 NOTE — PT/OT/SLP PROGRESS
"Physical Therapy Treatment    Patient Name:  Gisselle Ortiz   MRN:  [id number]  Admit Date: 5/23/2023  Admitting Diagnosis: Acute renal failure superimposed on stage 3 chronic kidney disease  Recent Surgeries:     General Precautions: Standard, fall  Orthopedic Precautions: N/A  Braces: N/A    Recommendations:     Discharge Recommendations: home health PT  Level of Assistance Recommended at Discharge: 24 hours light assistance  Discharge Equipment Recommendations: none  Barriers to discharge:  (Increased assistance for mobility.)    Assessment:     Gisselle Ortiz is a 89 y.o. female admitted with a medical diagnosis of Acute renal failure superimposed on stage 3 chronic kidney disease . Pt with limited session 2* to feeling dizzy after trfs to , BP 87/50 HR 72, returned to BSC elev BLE 99/53 HR 71 nsg notified of all the above,  pt would continue to benefit from skilled PT services to improve overall functional mobility, strength and endurance.  .      Performance deficits affecting function: weakness, impaired endurance, impaired self care skills, impaired functional mobility, gait instability, impaired balance, decreased upper extremity function, decreased lower extremity function, impaired skin, edema, impaired cardiopulmonary response to activity.    Rehab Potential is good    Activity Tolerance: Fair    Plan:     Patient to be seen 5 x/week to address the above listed problems via gait training, therapeutic activities, therapeutic exercises, neuromuscular re-education, wheelchair management/training    Plan of Care Expires: 06/23/23  Plan of Care Reviewed with: patient    Subjective     "I love hearing your sweet voice in the [place]" pt agreeable to therapy needed to be cleaned/changed/dressed.     Pain/Comfort:  Pain Rating 1: 0/10  Pain Rating Post-Intervention 1: 0/10    Patient's cultural, spiritual, Scientologist conflicts given the current situation:  no    Objective:     Communicated with nsg during " session re pt reports of dizziness/BP readings / also B legs ace wrapped,  Patient found with PureWick (in bed) upon PT entry to room.     Therapeutic Activities and Exercises: deferred 2* to low BP/dizziness    Functional Mobility:  Bed Mobility:     Rolling Left:  modified independence, supervision, and with rail for cleaning/changing brief  Rolling Right: modified independence, supervision, and with rail for cleaning/changing brief  Supine to Sit: minimum assistance and HOB elev and rail  Transfers:     Sit to Stand:  minimum assistance with rolling walker and to pull up pants while sitting EOB  Bed to Chair: minimum assistance with  no AD  using  Stand Pivot and EOB to WC and WC to BSC vcs for tech/sequencing/handplacement  Gait: deferred 28 to low BP    AM-PAC 6 CLICK MOBILITY  14    Patient left up in chair with call button in reach, nsg notified, and belonging sin reach .    GOALS:   Multidisciplinary Problems       Physical Therapy Goals          Problem: Physical Therapy    Goal Priority Disciplines Outcome Goal Variances Interventions   Physical Therapy Goal     PT, PT/OT Ongoing, Progressing     Description: Goals to be met by: 23     Patient will increase functional independence with mobility by performin. Supine to sit with Supervision - not met  2. Sit to supine with Contact Guard Assistance - not met  3. Rolling to Left and Right with Supervision - not met  4. Sit to stand transfer with Stand-by Assistance - not met  5. Bed to chair transfer with Stand-by Assistance using Rolling Walker - not met  6. Gait  x 50 feet with Contact Guard Assistance using Rolling Walker - not met  7. Wheelchair propulsion x 50 feet with Modified [place] using bilateral upper extremities - not met  8. Ascend/Descend 4 inch curb step with Minimal Assistance using Rolling Walker - not met; unable to assess - assess when deemed safe                         Time Tracking:     PT Received On: 23  PT Start  Time: 0916  PT Stop Time: 0951  PT Total Time (min): 35 min    Billable Minutes: Therapeutic Activity 35    Treatment Type: Treatment  PT/PTA: PTA     Number of PTA visits since last PT visit: 1 06/20/2023

## 2023-06-21 NOTE — PT/OT/SLP PROGRESS
Occupational Therapy   Treatment    Name: Gisselle Ortiz  MRN: [id number]  Admit Date: 5/23/2023  Admitting Diagnosis:  Acute renal failure superimposed on stage 3 chronic kidney disease    General Precautions: Standard, fall   Orthopedic Precautions: N/A   Braces: N/A    Recommendations:     Discharge Recommendations:  home health OT  Level of Assistance Recommended at Discharge: 24 hours physical assistance for all ADL's and home management tasks  Discharge Equipment Recommendations: none  Barriers to discharge:  Other (Comment) (increased assistance required for ADLs and mobility)    Assessment:     Gisselle Ortiz is a 89 y.o. female with a medical diagnosis of Acute renal failure superimposed on stage 3 chronic kidney disease .  She presents with  Performance deficits affecting function are weakness, impaired endurance, impaired self care skills, impaired functional mobility, gait instability, impaired balance, impaired cardiopulmonary response to activity, decreased upper extremity function, decreased lower extremity function, decreased safety awareness.     Pt. Was cooperative and participated well with session on this day.  Pt. Still continues to present with hypotension during session at times. Pt. BP taken on this day as follow below. Pt.continues to demonstrate levels of physical deficits with  functional indep with daily management activities tasks, selfcare skills with balance,  functional mobility, UB strength and endurance. Pt. Will continue to benefit from continued OT to progress towards goals      Rehab Potential is fair    Activity tolerance:  Fair    Plan:     Patient to be seen 5 x/week to address the above listed problems via self-care/home management, therapeutic activities, therapeutic exercises    Plan of Care Expires: 06/22/23  Plan of Care Reviewed with: patient    Subjective     Communicated with: nsg and Pt. prior to session. I feel so so today as usual     Pain/Comfort:  Pain  Rating 1: 0/10  Pain Rating Post-Intervention 1: 0/10    Patient's cultural, spiritual, Yarsani conflicts given the current situation:  no    Objective:     Patient found up in chair with PCT upon OT entry to room.    Bed Mobility:    Not tested      Functional Mobility/Transfers:  Patient completed Sit <> Stand Transfer with minimum assistance and moderate assistance  with  rolling walker   Patient completed Bed <> Chair Transfer using Squat Pivot technique with moderate assistance with no assistive device    Activities of Daily Living:  Feeding:  supervision with breakfast management   Grooming: stand by assistance at sink level with grooming needs and Max A to wash hair and manage into ponytail  Upper Body Dressing: minimum assistance to doff/rosario fresh shirt  Lower Body Dressing: maximal assistance to rosario pants and to mange over hips instance with RW for bal     AMPAC 6 Click ADL: 15      Treatment & Education:  Pt bp checked at start of session    Seated in chair 107/57 hr 66   Supine in bed  105/52 hr 69    Pt edu on role of OT, POC, safety when performing self care tasks , benefit of performing OOB activity, and safety when performing functional transfers and mobility management for preparation with goals to progress towards next level of care     Patient left up in chair  with BLE elevated with all lines intact and call button in reach    GOALS:   Multidisciplinary Problems       Occupational Therapy Goals          Problem: Occupational Therapy    Goal Priority Disciplines Outcome Interventions   Occupational Therapy Goal     OT, PT/OT Ongoing, Progressing    Description: Goals to be met by: 6/22/2023     Patient will increase functional independence with ADLs by performing:    UE Dressing with Set-up Assistance.  LE Dressing with Stand-by Assistance using appropriate AE as needed.  Grooming while seated at sink with Set-up Assistance.  Toileting from 3-in-1 commode over toilet with Stand-by Assistance  for hygiene and clothing management.   Bathing from sitting/standing at sink with Minimal Assistance.  Supine to sit with Supervision.  Step transfer with Stand-by Assistance with RW.  Toilet transfer to 3-in-1 commode over toilet with Stand-by Assistance with RW.                         Time Tracking:     OT Date of Treatment: 06/21/23  OT Start Time: 0745    OT Stop Time: 0823  OT Total Time (min): 38 min    Billable Minutes:Self Care/Home Management 38    6/21/2023

## 2023-06-21 NOTE — PT/OT/SLP PROGRESS
"Physical Therapy Treatment    Patient Name:  Gisselle Ortiz   MRN:  [id number]  Admit Date: 5/23/2023  Admitting Diagnosis: Acute renal failure superimposed on stage 3 chronic kidney disease  Recent Surgeries: N/A    General Precautions: Standard, fall  Orthopedic Precautions: N/A  Braces: N/A    Recommendations:     Discharge Recommendations: home health PT  Level of Assistance Recommended at Discharge: 24 hours light assistance  Discharge Equipment Recommendations: none  Barriers to discharge:  (Increased assistance for mobility.)    Assessment:     Gisselle Ortiz is a 89 y.o. female admitted with a medical diagnosis of Acute renal failure superimposed on stage 3 chronic kidney disease .   Pt was agreeable and tolerated session fairly. Pt continues to have BP decrease with positional changes and symptotic when ambulating. Nurse notified of vitals. Pt continues to benefit from therapy to improve functional endurance, strength, and mobility.     Performance deficits affecting function: weakness, impaired endurance, impaired self care skills, impaired functional mobility, gait instability, impaired balance, decreased upper extremity function, decreased lower extremity function, impaired skin, edema, impaired cardiopulmonary response to activity.    Rehab Potential is good    Activity Tolerance: Fair    Plan:     Patient to be seen 5 x/week to address the above listed problems via gait training, therapeutic activities, therapeutic exercises, neuromuscular re-education, wheelchair management/training    Plan of Care Expires: 06/23/23  Plan of Care Reviewed with: patient    Subjective     "I wish I could do more".     Pain/Comfort:  Pain Rating 1: 0/10  Pain Rating Post-Intervention 1: 0/10    Patient's cultural, spiritual, Jehovah's witness conflicts given the current situation:  no    Objective:     Patient found up in chair with  (no lines) upon PT entry to room.     Therapeutic Activities and Exercises:   Seated " BLE therex 2x 15 reps: ankle DF/PF, LAQ, marching  Patient educated on role of therapy, goals of session, and benefits of out of bed mobility.   Instructed on use of call button and importance of calling nursing staff for assistance with mobility   Questions/concerns addressed within PTA scope of practice  Pt verbalized understanding.    Vitals monitored throughout session. All taken on RUE and seated in wheelchair/bedside chair    BP MAP HR   Seated in Chair 95/51 67 72   Post therex 101/53 73 73   Pre Gait 98/54 72 72   Post Gait* 89/51 65 79   Post Therex 107/54 77 75   Post Transfer 88/55 67 86   *pt reported increase dizziness following gait. No change after transfer or therex    Functional Mobility:  Transfers:   Sit <> Stand Transfer: minimum assistance with rolling walker   Cues to scoot forward and hand placement   Wheelchair <> Chair Transfer: minimum assistance with no assistive device using Stand Pivot technique   Gait:  Pt ambulated ~16 ft with minimum assistance and rolling walker. Rehab tech following with wheelchair  Pt abruptly tries to sit d/t dizziness  Gait Deviation(s): occasional unsteady gait with decrease tae, flexed posture, decrease step length, and downward gaze  Verbal/tactile cues for upright posture   Wheelchair Propulsion:    Pt propelled Standard wheelchair 2x 50 feet on Level tile with  Bilateral upper extremity with Supervision or Set-up Assistance.   Distance limited by BUE fatigued.    AM-PAC 6 CLICK MOBILITY  15    Patient left up in chair with call button in reach and nurse notified.    GOALS:   Multidisciplinary Problems       Physical Therapy Goals          Problem: Physical Therapy    Goal Priority Disciplines Outcome Goal Variances Interventions   Physical Therapy Goal     PT, PT/OT Ongoing, Progressing     Description: Goals to be met by: 23     Patient will increase functional independence with mobility by performin. Supine to sit with Supervision - not  met  2. Sit to supine with Contact Guard Assistance - not met  3. Rolling to Left and Right with Supervision - not met  4. Sit to stand transfer with Stand-by Assistance - not met  5. Bed to chair transfer with Stand-by Assistance using Rolling Walker - not met  6. Gait  x 50 feet with Contact Guard Assistance using Rolling Walker - not met  7. Wheelchair propulsion x 50 feet with Modified [place] using bilateral upper extremities - not met  8. Ascend/Descend 4 inch curb step with Minimal Assistance using Rolling Walker - not met; unable to assess - assess when deemed safe                         Time Tracking:     PT Received On: 06/21/23  PT Start Time: 0959  PT Stop Time: 1032  PT Total Time (min): 33 min    Billable Minutes: Therapeutic Activity 18 and Therapeutic Exercise 15    Treatment Type: Treatment  PT/PTA: PTA     Number of PTA visits since last PT visit: 2     06/21/2023

## 2023-06-22 NOTE — PLAN OF CARE
Patient will discharge tomorrow 6/23/23 at 1 pm with family to home via personal car. Confirmed today that patient has all needed DME. [facility] has been set to admit on Saturday [id number]

## 2023-06-22 NOTE — PT/OT/SLP PROGRESS
Occupational Therapy   Treatment/Discharge Summary    Name: Gisselle Ortiz  MRN: [id number]  Admit Date: 5/23/2023  Admitting Diagnosis:  Acute renal failure superimposed on stage 3 chronic kidney disease    General Precautions: Standard, fall   Orthopedic Precautions: N/A   Braces: N/A    Recommendations:     Discharge Recommendations:  home health OT  Level of Assistance Recommended at Discharge: 24 hours physical assistance for all ADL's and home management tasks  Discharge Equipment Recommendations: none  Barriers to discharge:  Other (Comment) (increased assistance required for ADLs and mobility)    Assessment:     Gisselle Ortiz is a 89 y.o. female with a medical diagnosis of Acute renal failure superimposed on stage 3 chronic kidney disease .  She presents with  Performance deficits affecting function are weakness, impaired endurance, impaired self care skills, impaired functional mobility, gait instability, impaired balance, impaired cardiopulmonary response to activity, decreased upper extremity function, decreased lower extremity function, decreased safety awareness.     Pt. Was cooperative and participated well with session on this day.  Pt. Still continues to present with hypotension during session at times. Pt. BP taken on this day as follow below. Pt.continues to demonstrate levels of physical deficits with  functional indep with daily management activities tasks, selfcare skills with balance,  functional mobility, UB strength and endurance. Pt. Will continue to benefit from continued OT to progress towards goals      Rehab Potential is fair    Activity tolerance:  Fair    Plan:     Patient to be seen 5 x/week to address the above listed problems via self-care/home management, therapeutic activities, therapeutic exercises    Plan of Care Expires: 06/22/23  Plan of Care Reviewed with: patient    Subjective     Communicated with: nsg and Pt. prior to session. I am just so dizzy when I sit  up    Pain/Comfort:  Pain Rating 1: 0/10  Pain Rating Post-Intervention 1: 0/10    Patient's cultural, spiritual, Restorationist conflicts given the current situation:  no    Objective:     Patient found supine with PCT upon OT entry to room.    Bed Mobility:    Patient completed Rolling/Turning to Left with  contact guard assistance  Patient completed Rolling/Turning to Right with contact guard assistance  Patient completed Scooting/Bridging with contact guard assistance and with side rail  Patient completed Supine to Sit with contact guard assistance and with side rail      Functional Mobility/Transfers:  Patient completed Sit <> Stand Transfer with moderate assistance  with  no assistive device     Activities of Daily Living:  Feeding:  supervision with breakfast management   Grooming: stand by assistance at sink level with grooming needs and Max A to wash hair and manage into ponytail  Bathing: moderate assistance bed level with bathing aspects  Upper Body Dressing: moderate assistance to doff/rosario fresh shirt  Lower Body Dressing: maximal assistance to rosario pants and then doff once laid back down   Toileting: total assistance with cleaning and diaper management Pt. With BM in place    [facility] 6 Click ADL: 15    Treatment & Education:  Pt. Blood pressure check one supine 138/65 hr 68  Once  to EOB 81/53 hr 76  Pt. Nurse in to check manually 112/58    Pt edu on role of OT, POC, safety when performing self care tasks , benefit of performing OOB activity, and safety when performing functional transfers and mobility management for preparation with goals to progress towards next level of care     Patient left HOB elevated  all lines intact and call button in reach    GOALS:   Multidisciplinary Problems       Occupational Therapy Goals          Problem: Occupational Therapy    Goal Priority Disciplines Outcome Interventions   Occupational Therapy Goal     OT, PT/OT Ongoing, Progressing    Description: Goals to be met by:  6/22/2023  Goals not met due to Pt. Ongoing BP management     Patient will increase functional independence with ADLs by performing:    UE Dressing with Set-up Assistance.-Not MET  LE Dressing with Stand-by Assistance using appropriate AE as needed.-Not MET  Grooming while seated at sink with Set-up Assistance. MET  Toileting from 3-in-1 commode over toilet with Stand-by Assistance for hygiene and clothing management. -Not MET  Bathing from sitting/standing at sink with Minimal Assistance.-Not MET  Supine to sit with Supervision.-Not MET  Step transfer with Stand-by Assistance with RW.Not MET  Toilet transfer to 3-in-1 commode over toilet with Stand-by Assistance with RW.-Not MET                           Time Tracking:     OT Date of Treatment: 06/22/23  OT Start Time: 0738    OT Stop Time: 0816  OT Total Time (min): 38 min    Billable Minutes:Self Care/Home Management 38    6/22/2023  A client care conference was performed between the SHAHIDA and RONALD, prior to treatment to discuss the patient's status, treatment plan and established goals.

## 2023-06-22 NOTE — PROGRESS NOTES
Food & Nutrition  Education    Diet Education: Low Sodium Nutrition Therapy and Low Potassium Nutrition Therapy   Time Spent: 5 minutes   Learners: Patient       Nutrition Education provided with handouts: The Low Sodium Nutrition Therapy education provided tips for following a diet, a list of low sodium foods, and a sample menu. The Low Potassium Nutrition Therapy provided tips for following a low potassium diet, high potassium foods, low potassium foods, and a sample menu.       Comments: Handouts were left at bedside for pt and family members. Family members were present earlier when pt requested low potassium diet education. Pt was drowsy upon visit, discussed briefly what was on handout. No further questions at this time.       Dietitian's contact information provided.       Follow-Up: Yes; 1 x week     Please Re-consult as needed      Thanks!    Sangita Danielson, Dietetic Intern

## 2023-06-22 NOTE — PROGRESS NOTES
[facility]                                  Skilled Nursing Facility                   Progress Note     Patient Name: Gisselle Ortiz  YOB: 1933  MRN: [id number]  Room: [place] [address] [address] A     Admit Date: 5/23/2023   FRANCESCA: 6/23/2023 - delayed    Principal Problem: Acute renal failure superimposed on stage 3 chronic kidney disease    HPI obtained from patient interview and chart review     Chief Complaint: Re-evaluation of medical treatment and therapy status: lab review      HPI:   Gisselle Ortiz is a 89 y.o. female with PMH of GERD, gallstone pancreatitis, thyroid disease, HTN, HLD, SSS s/p PPM, CHF (EF 55%), and a fib on eliquis who presented to the ED for nausea and vomiting. Pt admitted to hospital medicine for intractable n/v and UTI. Pt has remained hemodynamically stable. She was started on 5-days of IV rocephin. Urine culture + klebsiella pneumoniae and e.coli. No leukocytosis but was hyperkalemic K 5.3; discontinued spironolactone. AST/ALT elevated on admission and downtrending. Abd US with hepatomegaly, stable prominence of CBD, and cholelithiasis without evidence of cholecystitis. AES recommended MRCP, which the patient unable to have 2/2 pacemaker. KUB with nonobstructive bowel gas pattern and significant stool burden, which is improving on bowel regimen. CTAP with gallbladder sludge vs stones and colonic wall thickening vs. stenosis/peristalsis. Pt with MARIO on CKD, which is improving with IVFs. Pt with new onset thrombocytopenia which is now improving. Hematology consulted and recommended started cyanocobalamin. PT/OT consulted and on re-evaluation are recommending SNF. All questions were answered. Patient acknowledged understanding of discharge instructions and feels safe to discharge. Patient was transferred to SNF on 5/23/23 in stable condition.    Patient will be treated at [facility] with PT and OT to  improve functional status and ability to perform ADLs.     Interval History  24 hour chart review completed.   Afebrile, vital signs stable.   Continue with empiric C. Diff treatment 10 day course given symptom resolution with tx.  Patient plans to return to her home where son or daughter is available  Anticipating discharge to home with home health. CM advises patient appeal still in clinical review status.   Labs reviewed, creat stable at 1.5, encouraged to eat and drink fluids    Review of Systems   Constitutional:  Positive for malaise/fatigue. Negative for chills and fever.   Respiratory:  Negative for cough, sputum production, shortness of breath and wheezing.    Cardiovascular:  Positive for leg swelling. Negative for chest pain and palpitations.   Gastrointestinal:  Negative for abdominal pain, constipation, diarrhea, heartburn, nausea and vomiting.   Genitourinary:  Negative for dysuria and urgency.   Musculoskeletal:  Negative for back pain.   Neurological:  Positive for dizziness and weakness. Negative for headaches.   Endo/Heme/Allergies:  Negative for polydipsia. Does not bruise/bleed easily.   Psychiatric/Behavioral:  Negative for depression and hallucinations. The patient is not nervous/anxious.        24 hour Vital Sign Range   Temp:  [98 °F (36.7 °C)]   Pulse:  [61-76]   Resp:  [14]   BP: (111-123)/(56-60)   SpO2:  [98 %]       Physical Exam  Vitals and nursing note reviewed.   Constitutional:       General: She is not in acute distress.     Appearance: Normal appearance. She is not ill-appearing.   Cardiovascular:      Rate and Rhythm: Normal rate and regular rhythm.      Pulses: Normal pulses.      Heart sounds: Normal heart sounds. No murmur heard.  Pulmonary:      Effort: Pulmonary effort is normal. No respiratory distress.      Breath sounds: Normal breath sounds. No wheezing or rhonchi.   Abdominal:      General: Bowel sounds are normal. There is no distension.      Palpations: Abdomen is  soft. There is no mass.      Tenderness: There is no abdominal tenderness.   Musculoskeletal:         General: No swelling or tenderness.      Left forearm: Swelling and edema present.      Right lower leg: Swelling present. 1+ Edema present.      Left lower leg: Swelling present. 2+ Edema present.   Skin:     General: Skin is warm and dry.      Capillary Refill: Capillary refill takes less than 2 seconds.      Findings: Rash present. No erythema.      Nails: There is no clubbing.      Comments: Dermitits under breast skin folds bilaterally   Neurological:      Mental Status: She is alert and oriented to person, place, and time. Mental status is at baseline.      Motor: Weakness present.   Psychiatric:         Mood and Affect: Mood normal.         Behavior: Behavior normal.         Thought Content: Thought content normal.         Judgment: Judgment normal.         Labs:  Recent Labs   Lab 06/16/23 0419 06/19/23 0432 06/21/23 0418   WBC 7.66 12.60 8.77   HGB 8.8* 9.2* 8.3*   HCT 29.7* 30.7* 27.0*    217 222       Recent Labs   Lab 06/16/23 0419 06/19/23 0432 06/21/23 0418    141 141   K 4.7 4.9 4.4   * 111* 115*   CO2 23 21* 22*   BUN 35* 42* 44*   CREATININE 1.2 1.5* 1.5*   GLU 82 73 69*   CALCIUM 7.4* 7.5* 7.4*   MG 1.9 1.8 1.8   PHOS 3.2 3.8 3.3       Recent Labs   Lab 06/16/23 0419 06/19/23 0432 06/21/23 0418   ALKPHOS 134 121 159*   ALT 60* 69* 76*   AST 67* 86* 82*   ALBUMIN 1.6* 1.7* 1.6*   PROT 3.8* 4.1* 4.0*   BILITOT 0.4 0.4 0.6         Recent Labs     06/18/23 2054 06/19/23 0711 06/19/23 2043 06/20/23  0735 06/20/23 2007 06/21/23  0603   POCTGLUCOSE 170* 79 142* 75 116* 81         Meds Scheduled:   amiodarone  200 mg Oral Daily    apixaban  2.5 mg Oral BID    aspirin  81 mg Oral Daily    atenoloL  12.5 mg Oral QHS    cyanocobalamin  1,000 mcg Oral Daily    diclofenac sodium  2 g Topical (Top) QID    folic acid  1 mg Oral Daily    levothyroxine  137 mcg Oral Before breakfast  "   miconazole NITRATE 2 %   Topical (Top) BID    midodrine  5 mg Oral Q8H    mirtazapine  7.5 mg Oral QHS    multivitamin  1 tablet Oral Daily    pantoprazole  40 mg Oral Daily       PRN:   acetaminophen, acetaminophen, bismuth subsalicylate, calcium carbonate, dextrose 10%, dextrose 10%, dextrose, dextrose, glucagon (human recombinant), hydrALAZINE, loperamide, melatonin, ondansetron      Assessment and Plan:  Debility  Impaired functional mobility and endurance  - Continue with PT/OT for gait training and strengthening and restoration of ADL's   - Encourage mobility, OOB in chair, and early ambulation as appropriate  - Fall precautions   - Monitor for bowel and bladder dysfunction  - Monitor for and prevent skin breakdown and pressure ulcers  - Continue DVT prophylaxis with apixaban      Malnutrition of mild degree  BMI of 19 or less in adult  Body mass index is 25.5 kg/m².      - Pt reports poor PO intake for many weeks despite encouragement & good    family support  - Start mirtazapine 7.5 mg nightly   - Low K diet with boost  - MVI     Hypoglycemia  -6/21/2023 stable BG at     Elevated Liver Enzymes  Nausea, Vomiting, Diarrhea  - see "C-diff, suspected" below  - Chart reviewed; suspect N/V/D r/t abnormal 5/18/23 CT AP findings of gallbladder sludge vs stones and colonic wall thickening vs. stenosis/peristalsis.  - 6/15: N/V/D stable; pt denies symptoms. AST/ALT 82/76    C. Diff, suspected  6/7/23: Order test for c. Diff, enteric precautions, give LR 75cc/hr x 1 liter for dehydration, and empiric C. Diff treatment--Vancomycin 125mg every 6 hours x 10 days.  6/8/23:  - C-diff order was auto-cancelled d/t no specimen within 48 hours.  - Continue with empiric C. Diff treatment--Vancomycin 125mg every 6 hours x 10 days as loose stool, N, V resolving    Essential hypertension  Hypotension, improving  Weakness  - PRN  hydralazine   - Hypotension with associated weakness slowly improving.  - Holding atenolol 25 " "mg nightly and diltiazem 180 mg daily.  - midodrine 10 mg started 6/3/23 q 8 hours  - 6/14: BP improving, Initiate order(s): decrease midodrine to 5 mg q 8 hours  - see "hypoglycemia" above  6/21/2023 BP stable at 123/56    Acute renal failure superimposed on stage 3 chronic kidney disease  - Creatinine  (baseline 1-1.3)  - Monitor BMP daily, replete electrolytes if necessary  - Renally adjust drugs to CrCl; avoid nephrotoxic drugs   - Monitor I/Os  - Renal ultrasound consistent with bilateral medical renal disease & simple renal cysts; no hydronephrosis  - given D5 1.2 NS @50ml/hr x 1L  6/14/2023 renal function stable with Cr at 1.5    Left upper extremity swelling  - Afebrile no leukocytosis  - U/S with resolution of L cephalic vein thrombosis and no evidence of DVT  - Elevate extremity  - Continue to monitor    Hyperkalemia  6/21/2023 WNL    Hypomagnesemia  6/21/2023 WNL    Moisture associated dermatitis.   - 6/12: Initiate order(s): miconazole powder bilat breast folds.  Improving     Thrombocytopenia  - Platelets 113 on admit to [facility]  - no heparin products this admit  - no evidence of thrombocytopenia in past labs  - had severe transaminitis in 1/2022 during COVID infection - other elevations also noted - may be underlying liver dz as an association  - Hematology consulted given advancing skin changes and petechia, appreciate recs:  -- Agree with folic acid supplementation  -- Start cyanocobalamin 1,000 mcg PO daily  - 6/21/2023 Continue to monitor on daily labs and WNL     Transaminitis  -AST//177, tbili 0.9 on admission and downtrending.  -Patient denies abdominal pain.  -Repeat lipid panel wnl--discontinue statin  -Hepatitis panel negative  6/21/2023  -Plan to f/u with PCP on when to restart statin  -Will need hepatology f/u OP     Anemia due to chronic kidney disease      -Monitor Hgb       -Transfuse for Hgb <7    Chronic diastolic heart failure   -Monitor strict Is&Os and daily weights. Continue to " stress to patient importance of self efficacy and  on diet for CHF.      Paroxysmal atrial fibrillation  Current use of long term anticoagulation  - Apixaban 2.5 mg bid  - amiodarone 200 mg daily     Anticipate disposition:  6/16/23 Home with home health; Will need hepatology f/u OP; hold statin on dc    Follow-up needed after discharge from SNF: Will need hepatology f/u OP  - PCP within 1-2 weeks (Plan to f/u with PCP on when to restart statin)  - See appt scheduled below     Future Appointments   Date Time Provider Department Center   6/23/2023  1:30 PM Gayatri Lyn PA-C [facility] IM [facility] PCW   7/12/2023 11:00 AM Joi Romeo MD [facility] PAL MED [facility]   7/18/2023 10:00 AM HOME MONITOR DEVICE CHECK, [facility]   7/24/2023  4:00 PM Roz Leon NP [facility]   8/15/2023  9:00 AM Sunil Naidu MD [facility] HEPAT [facility]   10/31/2023  3:00 PM Kai Montez MD [facility]         I certify that SNF services are required to be given on an inpatient basis because Gisselle Ortiz needs for skilled nursing care and/or skilled rehabilitation are required on a daily basis and such services can only practically be provided in a skilled nursing facility setting and are for an ongoing condition for which she received inpatient care in the hospital.        Total time spent: > 45 minutes  Description of Time: counseling provided on clinical condition, therapies provided, plan of care, emotional support, coordinating patient care with other care team members, reviewing and interpreting labs and imaging, collaboration with physician, initiating new orders, chart review, and documentation. See interval hx.     Domo Ortega NP  Department of Hospital Medicine   [facility]     DOS: 6/21/2023

## 2023-06-22 NOTE — PT/OT/SLP PROGRESS
Physical Therapy      Patient Name:  Gisselle Ortiz   MRN:  [id number]    Patient not seen today secondary to  pt. C/o of diarrhea and nausea. PT confirmed with pt's nurse.Will follow-up 6/23/23.    Rebeca Sargent, PT

## 2023-06-22 NOTE — PLAN OF CARE
Problem: Adult Inpatient Plan of Care  Goal: Plan of Care Review  6/22/2023 1333 by Luis Crockett LPN  Outcome: Ongoing, Progressing  6/22/2023 1333 by Luis Crockett LPN  Outcome: Ongoing, Progressing  Goal: Patient-Specific Goal (Individualized)  6/22/2023 1333 by Luis Crockett LPN  Outcome: Ongoing, Progressing  6/22/2023 1333 by Luis Crockett LPN  Outcome: Ongoing, Progressing  Goal: Absence of Hospital-Acquired Illness or Injury  6/22/2023 1333 by Luis Crockett LPN  Outcome: Ongoing, Progressing  6/22/2023 1333 by Luis Crockett LPN  Outcome: Ongoing, Progressing  Goal: Optimal Comfort and Wellbeing  6/22/2023 1333 by Luis Crockett LPN  Outcome: Ongoing, Progressing  6/22/2023 1333 by Luis Crockett LPN  Outcome: Ongoing, Progressing  Goal: Readiness for Transition of Care  6/22/2023 1333 by Luis Crockett LPN  Outcome: Ongoing, Progressing  6/22/2023 1333 by Luis Crockett LPN  Outcome: Ongoing, Progressing

## 2023-06-23 NOTE — PT/OT/SLP PROGRESS
Physical Therapy Treatment/Discharge Note    Patient Name:  Gisselle Ortiz   MRN:  [id number]  Admit Date: 5/23/2023  Admitting Diagnosis: Acute renal failure superimposed on stage 3 chronic kidney disease  Recent Surgeries: N/A    General Precautions: Standard, fall  Orthopedic Precautions: N/A  Braces: N/A    Recommendations:     Discharge Recommendations: home health PT  Level of Assistance Recommended at Discharge: 24 hours light assistance  Discharge Equipment Recommendations: none  Barriers to discharge:  (Increased assistance for mobility)    Assessment:     Gisselle Ortiz is a 89 y.o. female admitted with a medical diagnosis of Acute renal failure superimposed on stage 3 chronic kidney disease. Patient agreeable to PT treatment this AM. Patient mobility remains limited by fluctuations in blood pressure with symptoms of dizziness. Patient reports more energy overall with mobility despite medical issues. Patient currently requiring Mercedes for sit to stand transfer, CGA for step transfer and SBA for supine to sit. Patient ambulating only short distances with Mercedes from PT. LE HEP provided to patient with demonstration. Patient scheduled for D/C on 6/23/23 with recommendation for Home Health PT/OT to continue with progression of mobility toward increased functional independence.     Performance deficits affecting function: weakness, impaired endurance, impaired self care skills, impaired functional mobility, gait instability, impaired balance, decreased lower extremity function, decreased safety awareness, edema, impaired skin, impaired cardiopulmonary response to activity.    Rehab Potential is good    Activity Tolerance: Fair    Plan:     Patient to be seen 5 x/week to address the above listed problems via gait training, therapeutic activities, therapeutic exercises, neuromuscular re-education, wheelchair management/training    Plan of Care Expires: 06/23/23  Plan of Care Reviewed with:  "patient    Subjective     "I am feeling better. I still get dizzy."     Pain/Comfort:  Pain Rating 1: 0/10  Pain Rating Post-Intervention 1: 0/10    Patient's cultural, spiritual, Mandaeism conflicts given the current situation:  no    Objective:     Communicated with nursing staff prior to session.  Patient found supine with  (no active lines) upon PT entry to room.     Therapeutic Activities and Exercises:   PT assisted patient with hygiene needs and changing of brief in supine prior to OOB activity. Patient performed repeated rolling to complete task.    LE HEP program presented to patient for supine and seated exercises. PT assisted patient with demonstration of movements. Patient expressed understanding.     Increased time spent monitoring vitals throughout session with position change. See the measurements below:    Vital Signs:  Position: supine in bed  Heart Rate: 69 bpm  Blood Pressure: 110/54 mmHg    Position: seated EOB  Heart Rate: 63 bpm  Blood Pressure: 81/51 mmHg (mild dizziness)    Position: seated EOB (5 min after prior BP measured - dizziness present)  Heart Rate: 73 bpm  Blood Pressure: 87/51 mmHg    Position: seated in bedside chair following step transfer (mild dizziness); patient agreeable to OOB to chair transfer  Heart Rate: 72 bpm  Blood Pressure: 74/40 mmHg    Position: seated in bedside chair with BLE elevated  Heart Rate: 72 bpm  Blood Pressure: 101/51 mmHg    Functional Mobility:     06/23/23 1058   Roll Left and Right   Assistance Needed Set-up / clean-up   Physical Assistance Level No physical assistance   Comment Supervision with HOB flat and use of bed rails; repeated for hygiene needs   CARE Score - Roll Left and Right 5   Sit to Lying   Assistance Needed Incidental touching   Physical Assistance Level No physical assistance   Comment CGA with HOB flat   CARE Score - Sit to Lying 4   Lying to Sitting on Side of Bed   Assistance Needed Supervision   Physical Assistance Level No " physical assistance   Comment SBA with HOB flat and use of bed rails   CARE Score - Lying to Sitting on Side of Bed 4   Sit to Stand   Assistance Needed Physical assistance   Physical Assistance Level 25% or less   Comment Mercedes using RW   CARE Score - Sit to Stand 3   Chair/Bed-to-Chair Transfer   Assistance Needed Incidental touching   Physical Assistance Level No physical assistance   Comment CGA using RW   CARE Score - Chair/Bed-to-Chair Transfer 4   Car Transfer   Reason if not Attempted Environmental limitations   CARE Score - Car Transfer 10   Walk 10 Feet   Comment Patient with significant decrease in BP upon standing with symptoms of dizziness. Unable to assess gait.   Reason if not Attempted Safety concerns   CARE Score - Walk 10 Feet 88   Walk 50 Feet with Two Turns   Reason if not Attempted Safety concerns   CARE Score - Walk 50 Feet with Two Turns 88   Walk 150 Feet   Reason if not Attempted Safety concerns   CARE Score - Walk 150 Feet 88   Walking 10 Feet on Uneven Surfaces   Reason if not Attempted Safety concerns   CARE Score - Walking 10 Feet on Uneven Surfaces 88   1 Step (Curb)   Reason if not Attempted Safety concerns   CARE Score - 1 Step (Curb) 88   4 Steps   Reason if not Attempted Safety concerns   CARE Score - 4 Steps 88   12 Steps   Reason if not Attempted Safety concerns   CARE Score - 12 Steps 88   Picking Up Object   Reason if not Attempted Safety concerns   CARE Score - Picking Up Object 88   Uses a Wheelchair/Scooter?   Uses a Wheelchair/Scooter? Yes   Wheel 50 Feet with Two Turns   Assistance Needed Set-up / clean-up   Physical Assistance Level No physical assistance   Comment Patient propelled W/C 50 feet using BUE with Supervision per PTA tx on 6/21/23   CARE Score - Wheel 50 Feet with Two Turns 5   Type of Wheelchair/Scooter Manual   Wheel 150 Feet   Comment UE fatigue on 6/21/23 tx with PTA   Reason if not Attempted Safety concerns   CARE Score - Wheel 150 Feet 88   Type of  Wheelchair/Scooter Manual       AM-PAC 6 CLICK MOBILITY  15    Patient left  reclined in bedside chair  with call button in reach and PCT notified.    GOALS:   Multidisciplinary Problems       Physical Therapy Goals          Problem: Physical Therapy    Goal Priority Disciplines Outcome Goal Variances Interventions   Physical Therapy Goal     PT, PT/OT Adequate for Care Transition     Description: Goals to be met by: 23     Patient will increase functional independence with mobility by performin. Supine to sit with Supervision - not met, SBA  2. Sit to supine with Contact Guard Assistance - MET  3. Rolling to Left and Right with Supervision - MET  4. Sit to stand transfer with Stand-by Assistance - not met, Mercedes   5. Bed to chair transfer with Stand-by Assistance using Rolling Walker - not met, CGA  6. Gait  x 50 feet with Contact Guard Assistance using Rolling Walker - not met, decreased endurance for increased distance  7. Wheelchair propulsion x 50 feet with Modified [place] using bilateral upper extremities - not met  8. Ascend/Descend 4 inch curb step with Minimal Assistance using Rolling Walker - not met; unable to assess - assess when deemed safe - not met                         Time Tracking:     PT Received On: 23  PT Start Time: 50  PT Stop Time: 928  PT Total Time (min): 38 min    Billable Minutes: Therapeutic Activity 28 and Therapeutic Exercise 10    Treatment Type: Treatment  PT/PTA: PT     Number of PTA visits since last PT visit: 0     2023

## 2023-06-23 NOTE — PLAN OF CARE
D/C on 23    Problem: Physical Therapy  Goal: Physical Therapy Goal  Description: Goals to be met by: 23     Patient will increase functional independence with mobility by performin. Supine to sit with Supervision - not met, SBA  2. Sit to supine with Contact Guard Assistance - MET  3. Rolling to Left and Right with Supervision - MET  4. Sit to stand transfer with Stand-by Assistance - not met, Mercedes   5. Bed to chair transfer with Stand-by Assistance using Rolling Walker - not met, CGA  6. Gait  x 50 feet with Contact Guard Assistance using Rolling Walker - not met, decreased endurance for increased distance  7. Wheelchair propulsion x 50 feet with Modified [place] using bilateral upper extremities - not met  8. Ascend/Descend 4 inch curb step with Minimal Assistance using Rolling Walker - not met; unable to assess - assess when deemed safe - not met    Outcome: Adequate for Care Transition   2023

## 2023-06-23 NOTE — PLAN OF CARE
Problem: Adult Inpatient Plan of Care  Goal: Plan of Care Review  Outcome: Ongoing, Progressing     Problem: Adult Inpatient Plan of Care  Goal: Optimal Comfort and Wellbeing  Outcome: Ongoing, Progressing     Problem: Adult Inpatient Plan of Care  Goal: Readiness for Transition of Care  Outcome: Ongoing, Progressing     Problem: Fluid and Electrolyte Imbalance (Acute Kidney Injury/Impairment)  Goal: Fluid and Electrolyte Balance  Outcome: Ongoing, Progressing     Problem: Renal Function Impairment (Acute Kidney Injury/Impairment)  Goal: Effective Renal Function  Outcome: Ongoing, Progressing     Problem: Malnutrition  Goal: Improved Nutritional Intake  Outcome: Ongoing, Progressing     Problem: Infection  Goal: Absence of Infection Signs and Symptoms  Outcome: Ongoing, Progressing     Problem: Fall Injury Risk  Goal: Absence of Fall and Fall-Related Injury  Outcome: Ongoing, Progressing

## 2023-06-23 NOTE — NURSING
Attempted to review  discharge home care instructions and family member only preferred to discuss the new ordered medications. Reviewed medications and where to p/u was discussed and agreed. Patient discharged from floor via w/c accompanied by a male & female family member and accompanied by PCT, April to car.

## 2023-06-23 NOTE — HOSPITAL COURSE
Patient progressed well with PT and OT. Home health was ordered. DME was ordered if needed. Follow up appointment to be made by patient within one week. All prescriptions and discharge instructions were ordered to be given to patient prior to discharge.

## 2023-06-23 NOTE — DISCHARGE SUMMARY
[facility] Medicine  Discharge Summary      Patient Name: Gisselle Ortiz  MRN: [id number]  CHADWICK: [id number]  Patient Class: IP- SNF  Admission Date: 5/23/2023  Hospital Length of Stay: 31 days  Discharge Date and Time:  06/23/2023 10:10 AM  DOS: 6/23/2023   Attending Physician: Rajiv Grajeda MD   Discharging Provider: Domo Ortega NP  Primary Care Provider: Kai Montez MD    Primary Care Team: LTAC 4    HPI:   No notes on file    * No surgery found *      Hospital Course:   Patient progressed well with PT and OT. Home health was ordered. DME was ordered if needed. Follow up appointment to be made by patient within one week. All prescriptions and discharge instructions were ordered to be given to patient prior to discharge.      PEx  Temp:  [97.9 °F (36.6 °C)]   Pulse:  [60-63]   Resp:  [16]   BP: (110-129)/(53-60)   SpO2:  [96 %-97 %]   Body mass index is 26.05 kg/m².      Physical Exam  Vitals and nursing note reviewed.   Constitutional:       General: She is not in acute distress.     Appearance: Normal appearance. She is not ill-appearing.   Cardiovascular:      Rate and Rhythm: Normal rate and regular rhythm.      Pulses: Normal pulses.      Heart sounds: Normal heart sounds. No murmur heard.  Pulmonary:      Effort: Pulmonary effort is normal. No respiratory distress.      Breath sounds: Normal breath sounds. No wheezing or rhonchi.   Abdominal:      General: Bowel sounds are normal. There is no distension.      Palpations: Abdomen is soft. There is no mass.      Tenderness: There is no abdominal tenderness.   Musculoskeletal:         General: No swelling or tenderness.      Left forearm: Swelling and edema present.      Right lower leg: Swelling present. 1+ Edema present.      Left lower leg: Swelling present. 2+ Edema present.   Skin:     General: Skin is warm and dry.      Capillary Refill: Capillary refill takes less than 2 seconds.      Findings: Rash  present. No erythema.      Nails: There is no clubbing.      Comments: Dermitits under breast skin folds bilaterally   Neurological:      Mental Status: She is alert and oriented to person, place, and time. Mental status is at baseline.      Motor: Weakness present but improving   Psychiatric:         Mood and Affect: Mood normal.         Behavior: Behavior normal.         Thought Content: Thought content normal.         Judgment: Judgment normal.     Goals of Care Treatment Preferences:  Code Status: DNR    Health care agent: Génesis Araiza  [facility] agent number: [phone]    Living Will: Yes     What is most important right now is to focus on improvement in condition but with limits to invasive therapies.  Accordingly, we have decided that the best plan to meet the patient's goals includes continuing with treatment.      Consults:   Consults (From admission, onward)        Status Ordering Provider     IP consult to case management  Once        Provider:  (Not yet assigned)    Acknowledged VIKTOR CASTILLO     Inpatient consult to Registered Dietitian/Nutritionist  Once        Provider:  (Not yet assigned)    Completed VIKTOR CASTILLO          No new Assessment & Plan notes have been filed under this hospital service since the last note was generated.  Service: Hospital Medicine    Final Active Diagnoses:    Diagnosis Date Noted POA    Malnutrition of mild degree [E44.1] 05/19/2023 Yes    Thrombocytopenia [D69.6] 05/18/2023 Yes    Impaired functional mobility and endurance [Z74.09] 02/03/2021 Yes    Anemia due to chronic kidney disease [N18.9, D63.1] 02/02/2021 Yes    Chronic diastolic heart failure [I50.32] 02/02/2021 Yes    SSS (sick sinus syndrome) [I49.5] 01/17/2019 Yes     Chronic    Current use of long term anticoagulation [Z79.01] 11/14/2017 Not Applicable    Vitamin D deficiency [E55.9] 11/13/2017 Yes    Gastroesophageal reflux disease [K21.9] 11/09/2015 Yes    Acquired  hypothyroidism [E03.9] 05/19/2015 Yes    Essential hypertension [I10] 05/19/2015 Yes     Chronic    High cholesterol [E78.00] 05/19/2015 Yes    Paroxysmal atrial fibrillation [I48.0] 05/19/2015 Yes     Chronic    Cardiac pacemaker in situ [Z95.0] 05/19/2015 Yes      Problems Resolved During this Admission:    Diagnosis Date Noted Date Resolved POA    PRINCIPAL PROBLEM:  Acute renal failure superimposed on stage 3 chronic kidney disease [N17.9, N18.30] 05/17/2023 06/14/2023 Yes    Constipation [K59.00] 01/10/2022 06/14/2023 Yes       Discharged Condition: good    Disposition: Home or Self Care    Follow Up:   Follow-up Information     Kai Montez MD. Schedule an appointment as soon as possible for a visit in 1 week(s).    Specialty: Family Medicine  Contact information:  [address] [facility]  [address]  [facility] [address]  [phone]             [facility] HEPATOLOGY. Schedule an appointment as soon as possible for a visit in 1 week(s).    Specialty: Hepatology  Contact information:  [address] [place] [address]  [phone]                     Patient Instructions:      No driving until:   Order Comments: Until cleared by Primary Care MD     Activity as tolerated         Pending Diagnostic Studies:     None         Medications:  Reconciled Home Medications:      Medication List      START taking these medications    cyproheptadine 4 mg tablet  Commonly known as: PERIACTIN  Take 1 tablet (4 mg total) by mouth 3 (three) times daily.     midodrine 5 MG Tab  Commonly known as: PROAMATINE  Take 1 tablet (5 mg total) by mouth every 8 (eight) hours.        CONTINUE taking these medications    aluminum-magnesium hydroxide-simethicone 200-200-20 mg/5 mL Susp  Commonly known as: MAALOX  Take by mouth every 8 (eight) hours as needed (vomiting).     amiodarone 200 MG Tab  Commonly known as: PACERONE  Take 1 tablet (200 mg total) by mouth once daily.     aspirin 81 MG EC tablet  Commonly known as:  ECOTRIN  Take 1 tablet (81 mg total) by mouth once daily.     atenoloL 25 MG tablet  Commonly known as: TENORMIN  TAKE 2 TABLETS EVERY MORNING AND 1 TABLET EVERY EVENING     bismuth subsalicylate 262 mg/15 mL suspension  Commonly known as: PEPTO BISMOL  Take by mouth every 8 (eight) hours as needed (vomiting).     cyanocobalamin 1000 MCG tablet  Commonly known as: VITAMIN B-12  Take 1 tablet (1,000 mcg total) by mouth once daily.     diclofenac sodium 1 % Gel  Commonly known as: VOLTAREN  Apply topically to affected joints as needed for pain     ELIQUIS 2.5 mg Tab  Generic drug: apixaban  TAKE 1 TABLET TWICE A DAY     folic acid 1 MG tablet  Commonly known as: FOLVITE  Take 1 tablet (1 mg total) by mouth once daily.     hydrALAZINE 25 MG tablet  Commonly known as: APRESOLINE  Take 1 tablet (25 mg total) by mouth 2 (two) times daily as needed (SBP >180).     Lactobacillus rhamnosus GG 10 billion cell capsule  Commonly known as: CULTURELLE  Take 1 capsule by mouth once daily.     levothyroxine 137 MCG Tab tablet  Commonly known as: SYNTHROID  Take 1 tablet (137 mcg total) by mouth before breakfast.     mirtazapine 7.5 MG Tab  Commonly known as: REMERON  Take 1 tablet (7.5 mg total) by mouth every evening.     multivitamin Tab  Take 1 tablet by mouth once daily.     pantoprazole 40 MG tablet  Commonly known as: PROTONIX  Take 1 tablet (40 mg total) by mouth once daily.     polyethylene glycol 17 gram Pwpk  Commonly known as: GLYCOLAX  Take 17 g by mouth once daily.        STOP taking these medications    diltiaZEM 180 MG Cs24  Commonly known as: TIAZAC        ASK your doctor about these medications    ondansetron 4 MG Tbdl  Commonly known as: ZOFRAN-ODT  Take 1 tablet (4 mg total) by mouth every 6 (six) hours as needed (nausea).  Ask about: Should I take this medication?     vancomycin 125 mg/5 mL Soln  Take 5 mLs (125 mg total) by mouth every 6 (six) hours. for 1 day  Ask about: Should I take this medication?             Indwelling Lines/Drains at time of discharge:   Lines/Drains/Airways     Drain  Duration           Female External Urinary Catheter 05/26/23 1839 27 days                Time spent on the discharge of patient: 38 minutes         Domo Ortega NP  Department of [facility]  [facility] Nursing

## 2023-06-24 PROBLEM — N18.9 ACUTE KIDNEY INJURY SUPERIMPOSED ON CHRONIC KIDNEY DISEASE: Status: ACTIVE | Noted: 2023-01-01

## 2023-06-24 PROBLEM — Z79.01 CURRENT USE OF LONG TERM ANTICOAGULATION: Chronic | Status: ACTIVE | Noted: 2017-11-14

## 2023-06-24 PROBLEM — I50.9 CHF EXACERBATION: Status: ACTIVE | Noted: 2023-01-01

## 2023-06-24 PROBLEM — I50.32 CHRONIC DIASTOLIC HEART FAILURE: Chronic | Status: ACTIVE | Noted: 2021-02-02

## 2023-06-24 PROBLEM — Z79.899 LONG TERM CURRENT USE OF AMIODARONE: Chronic | Status: ACTIVE | Noted: 2017-11-14

## 2023-06-24 PROBLEM — Z74.09 IMPAIRED FUNCTIONAL MOBILITY AND ENDURANCE: Chronic | Status: ACTIVE | Noted: 2021-02-03

## 2023-06-24 PROBLEM — N18.9 ANEMIA DUE TO CHRONIC KIDNEY DISEASE: Chronic | Status: ACTIVE | Noted: 2021-02-02

## 2023-06-24 PROBLEM — K52.9 COLITIS: Status: ACTIVE | Noted: 2023-01-01

## 2023-06-24 PROBLEM — D63.1 ANEMIA DUE TO CHRONIC KIDNEY DISEASE: Chronic | Status: ACTIVE | Noted: 2021-02-02

## 2023-06-24 NOTE — SUBJECTIVE & OBJECTIVE
Past Medical History:   Diagnosis Date    A-fib     Anemia of chronic disease 02/02/2021    Anticoagulant long-term use     CHF (congestive heart failure)     Chronic diastolic heart failure 02/02/2021    COVID-19 01/07/2022    Gallstone pancreatitis     Hypertension     Pancreatic abscess 09/26/2020    Stage 3 chronic kidney disease 5/11/2016    Thyroid disease        Past Surgical History:   Procedure Laterality Date    CATARACT EXTRACTION      CATARACT EXTRACTION W/  INTRAOCULAR LENS IMPLANT Bilateral 2004    DR IN Era    ENDOSCOPIC ULTRASOUND OF UPPER GASTROINTESTINAL TRACT N/A 9/14/2020    Procedure: ULTRASOUND, UPPER GI TRACT, ENDOSCOPIC;  Surgeon: Esha Howard MD;  Location: [facility] ENDO (2ND FLR);  Service: Endoscopy;  Laterality: N/A;    ENDOSCOPIC ULTRASOUND OF UPPER GASTROINTESTINAL TRACT  11/25/2020    Procedure: ULTRASOUND, UPPER GI TRACT, ENDOSCOPIC;  Surgeon: Esha Howard MD;  Location: [facility] ENDO (2ND FLR);  Service: Endoscopy;;    ERCP N/A 9/14/2020    Procedure: ERCP (ENDOSCOPIC RETROGRADE CHOLANGIOPANCREATOGRAPHY);  Surgeon: Esha Howard MD;  Location: [facility] (2ND [facility]);  Service: Endoscopy;  Laterality: N/A;    ERCP N/A 9/25/2020    Procedure: ERCP (ENDOSCOPIC RETROGRADE CHOLANGIOPANCREATOGRAPHY);  Surgeon: Esha Howard MD;  Location: [facility] ENDO (2ND FLR);  Service: Endoscopy;  Laterality: N/A;    ERCP N/A 11/25/2020    Procedure: ERCP (ENDOSCOPIC RETROGRADE CHOLANGIOPANCREATOGRAPHY);  Surgeon: Esha Howard MD;  Location: [facility] ENDO (2ND FLR);  Service: Endoscopy;  Laterality: N/A;  Covid-19 test 11/22/20 at [place] - pg  2 day hold Dr Favian Pisano - pg  PM prep    EYE SURGERY      HIP REPLACEMENT ARTHROPLASTY Right 2016    HYSTERECTOMY      REVISION OF SKIN POCKET FOR PACEMAKER N/A 1/21/2019    Procedure: REVISION-POCKET-PACEMAKER;  Surgeon: Asher Watts MD;  Location: [facility] EP LAB;  Service: Cardiology;  Laterality: N/A;  MODERATE SEDATION,  sedation issues in the past    THYROIDECTOMY      TREATMENT OF CARDIAC ARRHYTHMIA N/A 3/18/2019    Procedure: CARDIOVERSION OR DEFIBRILLATION;  Surgeon: Asher Watts MD;  Location: [facility] EP LAB;  Service: Cardiology;  Laterality: N/A;  AF, JILL-cx if SR, DCCV, MAC, FAS, 3PREP    TREATMENT OF CARDIAC ARRHYTHMIA N/A 5/14/2019    Procedure: Cardioversion or Defibrillation;  Surgeon: Derick Vizcarra MD;  Location: [facility] EP LAB;  Service: Cardiology;  Laterality: N/A;  AF, JILL, DCCV, MAC, DM, 3PREP    TREATMENT OF CARDIAC ARRHYTHMIA N/A 6/21/2022    Procedure: Cardioversion or Defibrillation;  Surgeon: Favian Colmenares MD;  Location: [facility] EP LAB;  Service: Cardiology;  Laterality: N/A;  AF, JILL, DCCV, MAC, DM, 3 Prep *SJM PPM in situ*       Review of patient's allergies indicates:   Allergen Reactions    Ciprofloxacin Nausea And Vomiting       Current Facility-Administered Medications on File Prior to Encounter   Medication    [DISCONTINUED] acetaminophen tablet 650 mg    [DISCONTINUED] acetaminophen tablet 650 mg    [DISCONTINUED] amiodarone tablet 200 mg    [DISCONTINUED] apixaban tablet 2.5 mg    [DISCONTINUED] aspirin EC tablet 81 mg    [DISCONTINUED] atenolol split tablet 12.5 mg    [DISCONTINUED] bismuth subsalicylate 262 mg/15 mL suspension 30 mL    [DISCONTINUED] calcium carbonate 200 mg calcium (500 mg) chewable tablet 500 mg    [DISCONTINUED] cyanocobalamin tablet 1,000 mcg    [DISCONTINUED] dextrose 10% bolus 125 mL 125 mL    [DISCONTINUED] dextrose 10% bolus 250 mL 250 mL    [DISCONTINUED] dextrose 40 % gel 15,000 mg    [DISCONTINUED] dextrose 40 % gel 30,000 mg    [DISCONTINUED] diclofenac sodium 1 % gel 2 g    [DISCONTINUED] folic acid tablet 1 mg    [DISCONTINUED] glucagon (human recombinant) injection 1 mg    [DISCONTINUED] hydrALAZINE tablet 25 mg    [DISCONTINUED] levothyroxine tablet 137 mcg    [DISCONTINUED] loperamide capsule 2 mg    [DISCONTINUED] melatonin tablet 6 mg    [DISCONTINUED]  miconazole NITRATE 2 % top powder    [DISCONTINUED] midodrine tablet 5 mg    [DISCONTINUED] mirtazapine tablet 7.5 mg    [DISCONTINUED] multivitamin tablet    [DISCONTINUED] ondansetron disintegrating tablet 4 mg    [DISCONTINUED] pantoprazole EC tablet 40 mg     Current Outpatient Medications on File Prior to Encounter   Medication Sig    aluminum-magnesium hydroxide-simethicone (MAALOX) 200-200-20 mg/5 mL Susp Take by mouth every 8 (eight) hours as needed (vomiting).    amiodarone (PACERONE) 200 MG Tab Take 1 tablet (200 mg total) by mouth once daily.    aspirin (ECOTRIN) 81 MG EC tablet Take 1 tablet (81 mg total) by mouth once daily.    atenoloL (TENORMIN) 25 MG tablet TAKE 2 TABLETS EVERY MORNING AND 1 TABLET EVERY EVENING    bismuth subsalicylate (PEPTO BISMOL) 262 mg/15 mL suspension Take by mouth every 8 (eight) hours as needed (vomiting).    cyanocobalamin (VITAMIN B-12) 1000 MCG tablet Take 1 tablet (1,000 mcg total) by mouth once daily.    cyproheptadine (PERIACTIN) 4 mg tablet Take 1 tablet (4 mg total) by mouth 3 (three) times daily.    diclofenac sodium (VOLTAREN) 1 % Gel Apply topically to affected joints as needed for pain    ELIQUIS 2.5 mg Tab TAKE 1 TABLET TWICE A DAY    folic acid (FOLVITE) 1 MG tablet Take 1 tablet (1 mg total) by mouth once daily.    hydrALAZINE (APRESOLINE) 25 MG tablet Take 1 tablet (25 mg total) by mouth 2 (two) times daily as needed (SBP >180).    Lactobacillus rhamnosus GG (CULTURELLE) 10 billion cell capsule Take 1 capsule by mouth once daily.    levothyroxine (SYNTHROID) 137 MCG Tab tablet Take 1 tablet (137 mcg total) by mouth before breakfast.    midodrine (PROAMATINE) 5 MG Tab Take 1 tablet (5 mg total) by mouth every 8 (eight) hours.    mirtazapine (REMERON) 7.5 MG Tab Take 1 tablet (7.5 mg total) by mouth every evening.    multivitamin Tab Take 1 tablet by mouth once daily.    pantoprazole (PROTONIX) 40 MG tablet Take 1 tablet (40 mg total) by mouth once daily.     polyethylene glycol (GLYCOLAX) 17 gram PwPk Take 17 g by mouth once daily.     Family History       Problem Relation (Age of Onset)    Heart attack Mother, Maternal Grandmother    Heart disease Mother, Maternal Grandmother    Heart failure Mother, Maternal Grandmother    Hypertension Mother, Maternal Grandmother    Stroke Maternal Grandmother          Tobacco Use    Smoking status: Former     Types: Cigarettes     Start date: 5/19/1964    Smokeless tobacco: Never   Substance and Sexual Activity    Alcohol use: No    Drug use: No    Sexual activity: Not Currently     Partners: Male     Review of Systems   Constitutional:  Positive for activity change and fatigue. Negative for chills, fever and unexpected weight change.   HENT:  Negative for sinus pain, trouble swallowing and voice change.    Eyes:  Negative for photophobia, pain and visual disturbance.   Respiratory:  Negative for cough, shortness of breath and wheezing.    Cardiovascular:  Negative for chest pain, palpitations and leg swelling.   Gastrointestinal:  Negative for abdominal pain, blood in stool, constipation, diarrhea, nausea and vomiting.   Endocrine: Negative for polydipsia and polyuria.   Genitourinary:  Negative for dysuria, frequency, hematuria and urgency.   Musculoskeletal:  Negative for arthralgias, back pain and myalgias.   Skin:  Negative for color change and pallor.   Neurological:  Positive for dizziness, weakness and light-headedness. Negative for syncope, numbness and headaches.   Hematological:  Does not bruise/bleed easily.   Psychiatric/Behavioral:  Negative for confusion, dysphoric mood and sleep disturbance.    Objective:     Vital Signs (Most Recent):  Temp: 97.8 °F (36.6 °C) (06/24/23 0342)  Pulse: 66 (06/24/23 0342)  Resp: 20 (06/24/23 0342)  BP: (!) 124/58 (06/24/23 0342)  SpO2: 100 % (06/24/23 0342) Vital Signs (24h Range):  Temp:  [97.8 °F (36.6 °C)-98 °F (36.7 °C)] 97.8 °F (36.6 °C)  Pulse:  [60-73] 66  Resp:  [15-25]  20  SpO2:  [96 %-100 %] 100 %  BP: ()/(50-62) 124/58     Weight: 66.7 kg (147 lb)  Body mass index is 26.04 kg/m².     Physical Exam  Vitals and nursing note reviewed.   Constitutional:       General: She is not in acute distress.     Appearance: She is not ill-appearing, toxic-appearing or diaphoretic.   HENT:      Head: Normocephalic and atraumatic.      Right Ear: External ear normal.      Left Ear: External ear normal.      Mouth/Throat:      Mouth: Mucous membranes are moist.      Pharynx: No oropharyngeal exudate.   Eyes:      Extraocular Movements: Extraocular movements intact.      Pupils: Pupils are equal, round, and reactive to light.   Cardiovascular:      Rate and Rhythm: Normal rate and regular rhythm.      Pulses: Normal pulses.      Heart sounds: Normal heart sounds. No murmur heard.  Pulmonary:      Effort: Pulmonary effort is normal. No respiratory distress.      Breath sounds: Rales present. No wheezing.   Abdominal:      General: Abdomen is flat. Bowel sounds are normal. There is no distension.      Palpations: Abdomen is soft. There is no mass.      Tenderness: There is no abdominal tenderness.   Musculoskeletal:         General: No swelling or tenderness. Normal range of motion.      Cervical back: Normal range of motion and neck supple.      Right lower leg: Edema present.      Left lower leg: Edema present.   Skin:     General: Skin is warm and dry.      Capillary Refill: Capillary refill takes less than 2 seconds.   Neurological:      General: No focal deficit present.      Mental Status: She is alert and oriented to person, place, and time. Mental status is at baseline.   Psychiatric:         Mood and Affect: Mood normal.         Behavior: Behavior normal.            CRANIAL NERVES     CN III, IV, VI   Pupils are equal, round, and reactive to light.     Significant Labs: All pertinent labs within the past 24 hours have been reviewed.    Significant Imaging: I have reviewed and  interpreted all pertinent imaging results/findings within the past 24 hours.

## 2023-06-24 NOTE — ASSESSMENT & PLAN NOTE
Stage 3b chronic kidney disease    On admission, Cr 1.5 w/ b/l 1.2-1.3.  Recent hospitalization w/ prerenal MARIO, resolved s/p IVF.  Also w/ CHF exac/hypervolemia, so would be cautious w/ IVF rehydration.  DDx includes cardiorenal syndrome/congestive vs prerenal vs intrinsic vs obstructive.    - will hold lasix as patient not requiring supplemental O2  - renally dose Rx  - strict I/Os, bladder scan p.r.n. UR, straight/[place] cath p.r.n. bladder scan > 300 cc  - hold NSAIDs  - avoid nephrotoxic agents

## 2023-06-24 NOTE — PLAN OF CARE
Problem: Occupational Therapy  Goal: Occupational Therapy Goal  Description: Goals to be met by: 7/22/23     Patient will increase functional independence with ADLs by performing:    LE Dressing with Moderate Assistance.  Grooming while EOB with Set-up Assistance.  Toileting from toilet with Minimal Assistance for hygiene and clothing management.   Supine to sit with Stand-by Assistance.  Step transfer with Minimal Assistance  Toilet transfer to toilet with Minimal Assistance.    Outcome: Ongoing, Progressing

## 2023-06-24 NOTE — ED PROVIDER NOTES
Encounter Date: 6/23/2023       History     Chief Complaint   Patient presents with    Diarrhea     Discharged from SNF today. Syncopal episode while on the toilet at home today.      Pt is an 89 year old female with PMHx significant for CHF, HTN, CKD who presents for evaluation of near syncope, which occurred today. Pt reports lightheadedness and fatigue following multiple episodes of diarrhea. Pt also complains of shortness of breath. Denies any fever, chills, chest pain, numbness, or weakness     The history is provided by the patient.   Review of patient's allergies indicates:   Allergen Reactions    Ciprofloxacin Nausea And Vomiting     Past Medical History:   Diagnosis Date    A-fib     Anemia of chronic disease 02/02/2021    Anticoagulant long-term use     CHF (congestive heart failure)     Chronic diastolic heart failure 02/02/2021    COVID-19 01/07/2022    Gallstone pancreatitis     Hypertension     Pancreatic abscess 09/26/2020    Stage 3 chronic kidney disease 5/11/2016    Thyroid disease      Past Surgical History:   Procedure Laterality Date    CATARACT EXTRACTION      CATARACT EXTRACTION W/  INTRAOCULAR LENS IMPLANT Bilateral 2004     IN [place]    ENDOSCOPIC ULTRASOUND OF UPPER GASTROINTESTINAL TRACT N/A 9/14/2020    Procedure: ULTRASOUND, UPPER GI TRACT, ENDOSCOPIC;  Surgeon: Esha Howard MD;  Location: [address]);  Service: Endoscopy;  Laterality: N/A;    ENDOSCOPIC ULTRASOUND OF UPPER GASTROINTESTINAL TRACT  11/25/2020    Procedure: ULTRASOUND, UPPER GI TRACT, ENDOSCOPIC;  Surgeon: Esha Howard MD;  Location: [address]);  Service: Endoscopy;;    ERCP N/A 9/14/2020    Procedure: ERCP (ENDOSCOPIC RETROGRADE CHOLANGIOPANCREATOGRAPHY);  Surgeon: Esha Howard MD;  Location: [address]);  Service: Endoscopy;  Laterality: N/A;    ERCP N/A 9/25/2020    Procedure: ERCP (ENDOSCOPIC RETROGRADE CHOLANGIOPANCREATOGRAPHY);  Surgeon: Esha Howard MD;   Location: [facility] ENDO (2ND FLR);  Service: Endoscopy;  Laterality: N/A;    ERCP N/A 11/25/2020    Procedure: ERCP (ENDOSCOPIC RETROGRADE CHOLANGIOPANCREATOGRAPHY);  Surgeon: Esha Howard MD;  Location: [facility] ENDO (2ND FLR);  Service: Endoscopy;  Laterality: N/A;  Covid-19 test 11/22/20 at [facility]  2 day hold Eliquis, Dr Favian Colmenares - pg  PM prep    EYE SURGERY      HIP REPLACEMENT ARTHROPLASTY Right 2016    HYSTERECTOMY      REVISION OF SKIN POCKET FOR PACEMAKER N/A 1/21/2019    Procedure: REVISION-POCKET-PACEMAKER;  Surgeon: Asher Watts MD;  Location: [facility] EP LAB;  Service: Cardiology;  Laterality: N/A;  MODERATE SEDATION, sedation issues in the past    THYROIDECTOMY      TREATMENT OF CARDIAC ARRHYTHMIA N/A 3/18/2019    Procedure: CARDIOVERSION OR DEFIBRILLATION;  Surgeon: Asher Watts MD;  Location: [facility] EP LAB;  Service: Cardiology;  Laterality: N/A;  AF, JILL-cx if SR, DCCV, MAC, FAS, 3PREP    TREATMENT OF CARDIAC ARRHYTHMIA N/A 5/14/2019    Procedure: Cardioversion or Defibrillation;  Surgeon: Derick Vizcarra MD;  Location: [facility] EP LAB;  Service: Cardiology;  Laterality: N/A;  AF, JILL, DCCV, MAC, DM, 3PREP    TREATMENT OF CARDIAC ARRHYTHMIA N/A 6/21/2022    Procedure: Cardioversion or Defibrillation;  Surgeon: Favian Colmenares MD;  Location: [facility] EP LAB;  Service: Cardiology;  Laterality: N/A;  AF, JILL, DCCV, MAC, DM, 3 Prep *SJM PPM in situ*     Family History   Problem Relation Age of Onset    Heart attack Mother     Heart disease Mother     Heart failure Mother     Hypertension Mother     Stroke Maternal Grandmother     Hypertension Maternal Grandmother     Heart disease Maternal Grandmother     Heart attack Maternal Grandmother     Heart failure Maternal Grandmother     Colon cancer Neg Hx     Esophageal cancer Neg Hx      Social History     Tobacco Use    Smoking status: Former     Types: Cigarettes     Start date: 5/19/1964    Smokeless tobacco: Never   Substance Use Topics     Alcohol use: No    Drug use: No     Review of Systems   Constitutional:  Positive for fatigue. Negative for chills and fever.   HENT:  Negative for ear discharge and ear pain.    Eyes:  Negative for pain and redness.   Respiratory:  Positive for shortness of breath.    Cardiovascular:  Negative for chest pain.   Gastrointestinal:  Positive for diarrhea and nausea. Negative for abdominal pain and vomiting.   Genitourinary:  Negative for dysuria and frequency.   Musculoskeletal:  Negative for back pain.   Skin:  Negative for rash and wound.   Neurological:  Positive for light-headedness.     Physical Exam     Initial Vitals [06/23/23 2150]   BP Pulse Resp Temp SpO2   (!) 92/52 64 16 98 °F (36.7 °C) 99 %      MAP       --         Physical Exam    Nursing note and vitals reviewed.  Constitutional: She appears well-developed and well-nourished. No distress.   HENT:   Head: Normocephalic and atraumatic.   Mouth/Throat: Oropharynx is clear and moist.   Eyes: Conjunctivae and EOM are normal.   Neck: Neck supple. No tracheal deviation present.   Cardiovascular:  Normal rate, regular rhythm and intact distal pulses.           No murmur heard.  Pulmonary/Chest: Breath sounds normal. No stridor. No respiratory distress. She has no wheezes. She has no rales.   Abdominal: Abdomen is soft. She exhibits no distension. There is abdominal tenderness. There is no rebound.   Musculoskeletal:         General: Edema present. Normal range of motion.      Cervical back: Neck supple.     Neurological: She is alert and oriented to person, place, and time. She has normal strength. No sensory deficit.   Skin: Skin is warm and dry. Capillary refill takes less than 2 seconds.       ED Course   Procedures  Labs Reviewed   CBC W/ AUTO DIFFERENTIAL - Abnormal; Notable for the following components:       Result Value    RBC 2.98 (*)     Hemoglobin 9.1 (*)     Hematocrit 30.1 (*)      (*)     MCHC 30.2 (*)     RDW 15.8 (*)     Immature  Granulocytes 1.0 (*)     Immature Grans (Abs) 0.07 (*)     Gran % 74.7 (*)     Lymph % 13.7 (*)     All other components within normal limits   COMPREHENSIVE METABOLIC PANEL - Abnormal; Notable for the following components:    Chloride 112 (*)     CO2 17 (*)     BUN 45 (*)     Creatinine 1.5 (*)     Calcium 7.3 (*)     Total Protein 4.5 (*)     Albumin 1.7 (*)     Alkaline Phosphatase 148 (*)     AST 96 (*)     ALT 96 (*)     eGFR 33.1 (*)     All other components within normal limits   B-TYPE NATRIURETIC PEPTIDE - Abnormal; Notable for the following components:     (*)     All other components within normal limits   TROPONIN I - Abnormal; Notable for the following components:    Troponin I 0.042 (*)     All other components within normal limits   URINALYSIS, REFLEX TO URINE CULTURE - Abnormal; Notable for the following components:    Appearance, UA Cloudy (*)     Occult Blood UA 1+ (*)     Nitrite, UA Positive (*)     Leukocytes, UA 3+ (*)     All other components within normal limits    Narrative:     Specimen Source->Urine   TROPONIN I - Abnormal; Notable for the following components:    Troponin I 0.043 (*)     All other components within normal limits   CLOSTRIDIUM DIFFICILE   CULTURE, STOOL   TSH   URINALYSIS MICROSCOPIC    Narrative:     Specimen Source->Urine   H. PYLORI ANTIGEN, STOOL   PANCREATIC ELASTASE, FECAL   FECAL FAT, QUALITATIVE   OCCULT BLOOD X 1, STOOL   WBC, STOOL   ROTAVIRUS ANTIGEN, STOOL   CALPROTECTIN, STOOL   GIARDIA/CRYPTOSPORIDIUM (EIA)   STOOL EXAM-OVA,CYSTS,PARASITES          Imaging Results              CT Abdomen Pelvis  Without Contrast (Final result)  Result time 06/24/23 02:54:57      Final result by Ed Livingston MD (06/24/23 02:54:57)                   Impression:      Diffuse colon wall thickening without significant distension.  Findings may reflect nonspecific colitis.  Suggest correlation for pseudomembranous colitis.    Moderate volume bilateral pleural effusions  with associated mild atelectatic changes in the lung bases, which are increased from prior examination.    Small volume abdominopelvic ascites.  Mesenteric edema.    Diffuse anasarca, worse from prior examination.    Additional findings above.    Electronically signed by resident: Val Montes  Date:    06/24/2023  Time:    02:21    Electronically signed by: Ed Livingston MD  Date:    06/24/2023  Time:    02:54               Narrative:    EXAMINATION:  CT ABDOMEN PELVIS WITHOUT CONTRAST    CLINICAL HISTORY:  Abdominal pain, acute, nonlocalized;    TECHNIQUE:  Low dose axial images, sagittal and coronal reformations were obtained from the lung bases to the pubic symphysis, Oral contrast was not administered.    COMPARISON:  CT abdomen pelvis 05/18/2023, abdominal radiograph 06/05/2023    FINDINGS:  Heart: Left atrial enlargement.  Hypoattenuation of the blood pool compared to myocardium which can indicate decreased hematocrit.  Trace pericardial effusion.  Cardiac wires are visualized.  Aortic annular calcifications are noted.  There are coronary artery calcifications.    Lung Bases: Moderate volume bilateral pleural effusions with associated atelectatic changes in the lung bases.  These are increased from prior.  No identifiable lower consolidation.    Liver: Somewhat nodular contour of the inferior right hepatic lobe.  The left hepatic lobe is atrophic.  High attenuation of the hepatic parenchyma, which can be seen in setting of chronic amiodarone therapy.  No portal venous gas.    Gallbladder: Distended without calcified stones.    Bile Ducts: No evidence of dilated ducts.    Pancreas: Atrophy of the pancreatic parenchyma.  No mass or peripancreatic stranding identified.    Spleen: Normal size.  Small accessory spleen.    Adrenals: No focal lesions.    Kidneys/ Ureters: Lobulated contour of the kidneys bilaterally.  Left renal cyst.  Subcentimeter hyperdensity in the inferior left hepatic lobe, likely  reflecting a hemorrhagic cyst based on attenuation values.  Multiple hypodensities located in the bilateral renal collecting systems which may reflect vascular calcifications versus punctate nonobstructive stones.  The largest in the inferior pole of the right kidney measuring 3 mm.  No hydronephrosis.  Mild bilateral perinephric stranding.    Bladder: No wall thickening although evaluation is degraded by beam hardening artifact from hip arthroplasty.    Reproductive organs: The uterus is surgically absent.    GI Tract/Mesentery: The stomach is nondistended.  Duodenal diverticula is again noted.  No evidence of bowel obstruction.  Multiple scattered diverticula in bowel.  Diffuse colon wall thickening is suggested.  No significant distension.  Diffuse mesenteric edema.    Peritoneal Space: Small volume intra-abdominal ascites, similar to slightly worse compared to prior.  No free air.    Retroperitoneum: No significant adenopathy.    Abdominal wall: Diffuse body wall edema, worse from prior study.    Vasculature: Dense aortoiliac calcific atherosclerosis.  No aneurysm.    Bones: Status post right hip total arthroplasty.  Osteopenic bones.  Osseous degenerative changes without acute fracture.  Dextrocurvature of the lumbar spine with left lateral listhesis of L1 on L2 and right lateral listhesis of L3 on L4.  No osseous destructive lesions.                                       X-Ray Chest 1 View (Final result)  Result time 06/23/23 23:39:06      Final result by Ed Livingston MD (06/23/23 23:39:06)                   Impression:      Cardiomegaly with bilateral pleural effusions and perihilar interstitial opacities, noting worsening right lung aeration when compared with 05/14/2023.  Findings could be related to CHF exacerbation/volume overload or less likely infectious/inflammatory process.      Electronically signed by: Ed Livingston MD  Date:    06/23/2023  Time:    23:39               Narrative:     "EXAMINATION:  XR CHEST 1 VIEW    CLINICAL HISTORY:  Provided history is "  Shortness of breath".    TECHNIQUE:  One view of the chest.    COMPARISON:  05/14/2023.    FINDINGS:  Patient is rotated.  Cardiac wires overlie the chest.  Left chest wall cardiac pacing device is present with transvenous leads overlying the heart.  Cardiomediastinal silhouette is enlarged and similar to the prior study.  Perihilar and lower lung zone interstitial opacities with probable small bilateral pleural effusions, right side greater than left.  Passive atelectasis versus airspace disease in the right lung base, noting worsening right lung aeration when compared with the prior study.  No distinct pneumothorax.                                       Medications   ciprofloxacin (CIPRO)400mg/200ml D5W IVPB 400 mg (400 mg Intravenous New Bag 6/24/23 0343)   metronidazole IVPB 500 mg (500 mg Intravenous New Bag 6/24/23 0343)   furosemide injection 80 mg (80 mg Intravenous Given 6/24/23 0001)     Medical Decision Making:   History:   Old Medical Records: I decided to obtain old medical records.  Initial Assessment:   Pt presents for evaluation of near syncope. Pt also complains of shortness of breath, fatigue, and diarrhea   Differential Diagnosis:   ACS, arrhythmia, electrolyte abnormality, anemia, UTI, colitis, diverticulitis, CHF exacerbation, gastroenteritis   Clinical Tests:   Lab Tests: Ordered and Reviewed  Radiological Study: Ordered and Reviewed  Medical Tests: Ordered and Reviewed  ED Management:  Labs significant for elevated BNP at 546. CXR concerning for pulmonary edema and pleural effusions. Pt treated in the ED with lasix. CT abdomen/pelvis concerning for non specific colitis. Pt started on antibiotics. Pt admitted to hospital medicine for further management and evaluation           Attending Attestation:   Physician Attestation Statement for Resident:  As the supervising MD   Physician Attestation Statement: I have personally " seen and examined this patient.   I agree with the above history.  -:   As the supervising MD I agree with the above PE.     As the supervising MD I agree with the above treatment, course, plan, and disposition.    I have reviewed and agree with the residents interpretation of the following: lab data, CT scans and EKG.  I have reviewed the following: old records at this facility.                           Clinical Impression:   Final diagnoses:  [R42] Lightheadedness  [R06.02] Shortness of breath  [R77.8] Elevated troponin (Primary)  [R42, R55] Postural dizziness with presyncope  [I50.9] CHF exacerbation        ED Disposition Condition    Admit                 Sonny Shin Jr., MD  Resident  06/24/23 [id number]       Cherelle Shi MD  06/24/23 [id number]

## 2023-06-24 NOTE — PT/OT/SLP EVAL
Physical Therapy Co-Evaluation and Co-Treatment    Patient Name:  Gisselle Ortiz   MRN:  [id number]    Recommendations:     Discharge Recommendations:  nursing facility, skilled   Discharge Equipment Recommendations: to be determined by next level of care   Barriers to discharge: decreased functional mobility, fall risk, decreased caregiver support, and inaccessible home    Assessment:     Gisselle Ortiz is a 89 y.o. female admitted with a medical diagnosis of CHF exacerbation.  Pt demonstrates the below listed impairments with decreased tolerance to functional mobility, weakness, and impaired cardiopulmonary response to activity being the most limiting.  Pt demonstrates fairly poor tolerance to EOB mobility, she is not safe for transfers at this time.  Pt noted to be lightheaded while EOB, attempted to receive BP however pt was unable to be read before she returned to supine where she was found to be 87/51.  While EOB forward slumped posture worsened and she continuously closed her eyes.  Pt is not safe for home discharge at this time due to patient's status as: a fall risk and requires skilled PT.      Impairments and functional limitations:  weakness, impaired endurance, impaired self care skills, impaired functional mobility, gait instability, impaired balance, decreased upper extremity function, decreased lower extremity function, impaired cardiopulmonary response to activity.  These deficits affect their roles and responsibilities in which they were able to complete prior to admit.  Rehab Prognosis:   Fair; patient would benefit from acute skilled PT services 3 x/week to address these deficits, improve quality of life, focus on recovery of impairments, provide patient/caregiver education, reduce fall risk, and reach maximum level of function.  Pt is highly  motivated to participated in skilled PT.    Recent Surgery:   * No surgery found *      Plan:     During this hospitalization, patient to be seen  "3 x/week to address the identified rehab impairments via therapeutic activities, therapeutic exercises, neuromuscular re-education, gait training and progress toward the following goals:    Plan of Care Expires:  07/24/23    Subjective     Chief Complaint: "I feel dizzy"  Patient/Family Comments/Goals: Progress to SNF  Pain/Comfort:  Pain Rating 1: 0/10    Patients cultural, spiritual, Anabaptism conflicts given the current situation: no    Living Environment:  Patient lives alone in single story home, threshold, tub/shower. Son and daughter currently staying with her to provide 24/7 care.  Pt utilizes RW for ambulation of home distances. Pt utilizes wheelchair for ambulation of community distances  Prior to admission, patient required assist with some ADLs.   DME owned: walker, rolling, grab bar, shower chair, raised toilet, wheelchair, rollator, bedside commode.   DME not currently used: n/a.   Upon discharge, patient will have assistance from family with 24/7 assist.     Objective:     Communicated with nursing prior to session.  Patient found right sidelying with [employer]ick, telemetry  upon PT entry to room.    General Precautions: Standard, fall, special contact   Orthopedic Precautions:N/A   Braces: N/A   Oxygen Device:      Exams:  Cognitive Exam:  Patient is oriented to Person, Place, Time, and Situation  Command following: Patient follows 100% of commands   RLE ROM: WFL  RLE Strength: grossly 4/5  LLE ROM: WFL  LLE Strength: grossly 4/5  Postural Exam:  Patient presented with the following abnormalities:    -       Rounded shoulders  -       Forward head  Sensation:    -       Intact    Functional Mobility:  Bed Mobility:  Rolling Right: Min A  Scooting: Min A  Supine to Sit: Min A and for LE management and trunk management  Sit to Supine: Min A of 2 persons and for LE management and trunk management  Head of bed position: HOB elevated  EOB for ~6min, forward slumped posture worsened, unable to retrieve BP " while EOB  Pt closes her eyes throughout this     Transfers:  n/a, pt not safe for transfers    Gait: n/a, pt not safe for ambulation    Balance:   Position Score Time   Static Sitting POOR+: MINIMAL assist to maintain 3 minute(s)   Dynamic Sitting POOR+: MINIMAL assist to maintain 3 minute(s)   Static Standing n/a: dependent n/a   Dynamic Standing n/a: dependent n/a       Therapeutic Activities:  Patient educated on role of acute care PT and PT POC, safety while in hospital including calling nurse for mobility, call light usage, benefits of out of bed mobility, breathing technique, fall risk, bed mobility , positioning, posture, risks of prolonged bed rest, and benefits of continued PT by explanation and demonstration.    Patient demonstrates fair understanding of education provided this day.   Whiteboard updated  Postural cues while EOB, tactile cues for posture as well as cues for keeping eyes open.     Therapeutic Exercises:  n/a    AM-PAC 6 CLICK MOBILITY  Total Score:10     Patient left right sidelying with all lines intact, call button in reach, and RN notified.    GOALS:   Multidisciplinary Problems       Physical Therapy Goals          Problem: Physical Therapy    Goal Priority Disciplines Outcome Goal Variances Interventions   Physical Therapy Goal     PT, PT/OT Ongoing, Progressing     Description: Goals to be met by: 23    Patient will increase functional independence with mobility by performin. Supine to sit with Supervision using LRAD as needed  2. Sit to supine with Supervision using LRAD as needed  3. Sit to stand transfer with Supervision using LRAD as needed  4. Gait  x 50 feet with Minimum Assist using LRAD as needed  5. Ascend/Descend 6 inch curb step with Minimum Assist using LRAD as needed                         History:     Past Medical History:   Diagnosis Date    A-fib     Anemia of chronic disease 2021    Anticoagulant long-term use     CHF (congestive heart failure)      Chronic diastolic heart failure 02/02/2021    COVID-19 01/07/2022    Gallstone pancreatitis     Hypertension     Pancreatic abscess 09/26/2020    Stage 3 chronic kidney disease 5/11/2016    Thyroid disease        Past Surgical History:   Procedure Laterality Date    CATARACT EXTRACTION      CATARACT EXTRACTION W/  INTRAOCULAR LENS IMPLANT Bilateral 2004     IN [place]    ENDOSCOPIC ULTRASOUND OF UPPER GASTROINTESTINAL TRACT N/A 9/14/2020    Procedure: ULTRASOUND, UPPER GI TRACT, ENDOSCOPIC;  Surgeon: Esha Howard MD;  Location: [facility] ENDO (2ND FLR);  Service: Endoscopy;  Laterality: N/A;    ENDOSCOPIC ULTRASOUND OF UPPER GASTROINTESTINAL TRACT  11/25/2020    Procedure: ULTRASOUND, UPPER GI TRACT, ENDOSCOPIC;  Surgeon: Esha Howard MD;  Location: [facility] ENDO (2ND FLR);  Service: Endoscopy;;    ERCP N/A 9/14/2020    Procedure: ERCP (ENDOSCOPIC RETROGRADE CHOLANGIOPANCREATOGRAPHY);  Surgeon: Esha Howard MD;  Location: [facility] ENDO (2ND FLR);  Service: Endoscopy;  Laterality: N/A;    ERCP N/A 9/25/2020    Procedure: ERCP (ENDOSCOPIC RETROGRADE CHOLANGIOPANCREATOGRAPHY);  Surgeon: Esha Howard MD;  Location: [facility] ENDO (2ND FLR);  Service: Endoscopy;  Laterality: N/A;    ERCP N/A 11/25/2020    Procedure: ERCP (ENDOSCOPIC RETROGRADE CHOLANGIOPANCREATOGRAPHY);  Surgeon: Esha Howard MD;  Location: [facility] ENDO (2ND FLR);  Service: Endoscopy;  Laterality: N/A;  Covid-19 test 11/22/20 at [facility] - pg  2 day hold Dr Favian Pisano - pg  PM prep    EYE SURGERY      HIP REPLACEMENT ARTHROPLASTY Right 2016    HYSTERECTOMY      REVISION OF SKIN POCKET FOR PACEMAKER N/A 1/21/2019    Procedure: REVISION-POCKET-PACEMAKER;  Surgeon: Asher Watts MD;  Location: [facility] EP LAB;  Service: Cardiology;  Laterality: N/A;  MODERATE SEDATION, sedation issues in the past    THYROIDECTOMY      TREATMENT OF CARDIAC ARRHYTHMIA N/A 3/18/2019    Procedure: CARDIOVERSION OR DEFIBRILLATION;  Surgeon:  Asher Watts MD;  Location: [facility] EP LAB;  Service: Cardiology;  Laterality: N/A;  AF, JILL-cx if SR, DCCV, MAC, FAS, 3PREP    TREATMENT OF CARDIAC ARRHYTHMIA N/A 5/14/2019    Procedure: Cardioversion or Defibrillation;  Surgeon: Derick Vizcarra MD;  Location: [facility] EP LAB;  Service: Cardiology;  Laterality: N/A;  AF, JILL, DCCV, MAC, DM, 3PREP    TREATMENT OF CARDIAC ARRHYTHMIA N/A 6/21/2022    Procedure: Cardioversion or Defibrillation;  Surgeon: Favian Colmenares MD;  Location: [facility] EP LAB;  Service: Cardiology;  Laterality: N/A;  AF, JILL, DCCV, MAC, DM, 3 Prep *SJM PPM in situ*       Time Tracking:     PT Received On: 06/24/23  PT Start Time: 0910     PT Stop Time: 0927  PT Total Time (min): 17 min     Billable Minutes: Evaluation 9 and Neuromuscular Re-education 8    06/24/2023    Co-treatment performed for this visit due to patient need for two skilled therapists to ensure patient and staff safety, to accommodate for patient activity tolerance/pain management, and maximize functional potential.

## 2023-06-24 NOTE — ED NOTES
Initial EKG @2235 did not muse over. Physician present when EKG performed and paper copy given to physician. Second EKG performed @2306 was able to muse over. Physician notified of second EKG.

## 2023-06-24 NOTE — PT/OT/SLP EVAL
Occupational Therapy   Evaluation    Name: Gisselle Ortiz  MRN: [id number]  Admitting Diagnosis: CHF exacerbation  Recent Surgery: * No surgery found *      Recommendations:     Discharge Recommendations: nursing facility, skilled  Discharge Equipment Recommendations:  to be determined by next level of care  Barriers to discharge:  Decreased caregiver support    Assessment:     Gisselle Ortiz is a 89 y.o. female with a medical diagnosis of CHF exacerbation.  She presents with the following performance deficits affecting function: weakness, impaired endurance, impaired self care skills, impaired functional mobility, gait instability, impaired balance, decreased coordination, decreased upper extremity function, decreased lower extremity function, decreased safety awareness, impaired coordination.      Rehab Prognosis: Good; patient would benefit from acute skilled OT services to address these deficits and reach maximum level of function.       Plan:     Patient to be seen 3 x/week to address the above listed problems via self-care/home management, therapeutic activities, therapeutic exercises  Plan of Care Expires: 07/22/23  Plan of Care Reviewed with: patient    Subjective     Chief Complaint: dizziness  Patient/Family Comments/goals: Return to PLOF    Occupational Profile:  Living Environment: Pt lives alone in [facility] w/ threshold to enter. Pt PLOF is independent w/ ADLs and functional mobility. Pt states her 2 children would rotate and come by to check on her.  Equipment Used at Home: walker, rolling, rollator, wheelchair, bedside commode, shower chair, grab bar    Pain/Comfort:  Pain Rating 1: 0/10  Pain Rating Post-Intervention 1: 0/10    Patients cultural, spiritual, Worship conflicts given the current situation: no    Objective:     Communicated with: NSG prior to session.  Patient found supine with PureWick, telemetry upon OT entry to room.    General Precautions: Standard, fall  Orthopedic  Precautions: N/A  Braces: N/A  Respiratory Status: Room air    Occupational Performance:    Bed Mobility:    Patient completed Rolling/Turning to Right with minimum assistance  Patient completed Supine to Sit with minimum assistance  Patient completed Sit to Supine with minimum assistance and 2 persons    Activities of Daily Living:  Lower Body Dressing: maximal assistance to don socks    Cognitive/Visual Perceptual:  Cognitive/Psychosocial Skills:     -       Oriented to: Person, Place, Time, and Situation   -       Follows Commands/attention:Follows two-step commands    Physical Exam:  Upper Extremity Range of Motion:     -       Right Upper Extremity: WFL  -       Left Upper Extremity: WFL  Upper Extremity Strength:    -       Right Upper Extremity: 4/5  -       Left Upper Extremity: 4/5    AMPAC 6 Click ADL:  AMPAC Total Score: 15    Treatment & Education:  Role of OT, goals, POC    Patient left supine with all lines intact, call button in reach, and NSG notified    GOALS:   Multidisciplinary Problems       Occupational Therapy Goals          Problem: Occupational Therapy    Goal Priority Disciplines Outcome Interventions   Occupational Therapy Goal     OT, PT/OT Ongoing, Progressing    Description: Goals to be met by: 7/22/23     Patient will increase functional independence with ADLs by performing:    LE Dressing with Moderate Assistance.  Grooming while EOB with Set-up Assistance.  Toileting from toilet with Minimal Assistance for hygiene and clothing management.   Supine to sit with Stand-by Assistance.  Step transfer with Minimal Assistance  Toilet transfer to toilet with Minimal Assistance.                         History:     Past Medical History:   Diagnosis Date    A-fib     Anemia of chronic disease 02/02/2021    Anticoagulant long-term use     CHF (congestive heart failure)     Chronic diastolic heart failure 02/02/2021    COVID-19 01/07/2022    Gallstone pancreatitis     Hypertension     Pancreatic  abscess 09/26/2020    Stage 3 chronic kidney disease 5/11/2016    Thyroid disease          Past Surgical History:   Procedure Laterality Date    CATARACT EXTRACTION      CATARACT EXTRACTION W/  INTRAOCULAR LENS IMPLANT Bilateral 2004    DR IN [place]    ENDOSCOPIC ULTRASOUND OF UPPER GASTROINTESTINAL TRACT N/A 9/14/2020    Procedure: ULTRASOUND, UPPER GI TRACT, ENDOSCOPIC;  Surgeon: Esha Howard MD;  Location: [facility] (2ND FLR);  Service: Endoscopy;  Laterality: N/A;    ENDOSCOPIC ULTRASOUND OF UPPER GASTROINTESTINAL TRACT  11/25/2020    Procedure: ULTRASOUND, UPPER GI TRACT, ENDOSCOPIC;  Surgeon: Esha Howard MD;  Location: [facility] ENDO (2ND FLR);  Service: Endoscopy;;    ERCP N/A 9/14/2020    Procedure: ERCP (ENDOSCOPIC RETROGRADE CHOLANGIOPANCREATOGRAPHY);  Surgeon: Esha Howard MD;  Location: [facility] ENDO (2ND FLR);  Service: Endoscopy;  Laterality: N/A;    ERCP N/A 9/25/2020    Procedure: ERCP (ENDOSCOPIC RETROGRADE CHOLANGIOPANCREATOGRAPHY);  Surgeon: sEha Howard MD;  Location: [facility] (2ND FLR);  Service: Endoscopy;  Laterality: N/A;    ERCP N/A 11/25/2020    Procedure: ERCP (ENDOSCOPIC RETROGRADE CHOLANGIOPANCREATOGRAPHY);  Surgeon: Esha Howard MD;  Location: [facility] (2ND FLR);  Service: Endoscopy;  Laterality: N/A;  Covid-19 test 11/22/20 at [facility] -   2 day hold Dr Favian Pisano - pg  PM prep    EYE SURGERY      HIP REPLACEMENT ARTHROPLASTY Right 2016    HYSTERECTOMY      REVISION OF SKIN POCKET FOR PACEMAKER N/A 1/21/2019    Procedure: REVISION-POCKET-PACEMAKER;  Surgeon: Asher Watts MD;  Location: [facility] EP LAB;  Service: Cardiology;  Laterality: N/A;  MODERATE SEDATION, sedation issues in the past    THYROIDECTOMY      TREATMENT OF CARDIAC ARRHYTHMIA N/A 3/18/2019    Procedure: CARDIOVERSION OR DEFIBRILLATION;  Surgeon: Asher Watts MD;  Location: [facility] EP LAB;  Service: Cardiology;  Laterality: N/A;  AF, JILL-cx if SR, DCCV, MAC, FAS,  3PREP    TREATMENT OF CARDIAC ARRHYTHMIA N/A 5/14/2019    Procedure: Cardioversion or Defibrillation;  Surgeon: Derick Vizcarra MD;  Location: [facility] EP LAB;  Service: Cardiology;  Laterality: N/A;  AF, JILL, DCCV, MAC, DM, 3PREP    TREATMENT OF CARDIAC ARRHYTHMIA N/A 6/21/2022    Procedure: Cardioversion or Defibrillation;  Surgeon: Favian Colmenares MD;  Location: [facility] EP LAB;  Service: Cardiology;  Laterality: N/A;  AF, JILL, DCCV, MAC, DM, 3 Prep *SJM PPM in situ*       Time Tracking:     OT Date of Treatment: 06/24/23  OT Start Time: 0910  OT Stop Time: 0918  OT Total Time (min): 8 min    Billable Minutes:Evaluation 8    6/24/2023

## 2023-06-24 NOTE — ED NOTES
Telemetry Verification   Patient placed on Telemetry Box  Verified with War Room  Box # [id number]   Monitor Tech Denea    Rate 62   Rhythm Afib

## 2023-06-24 NOTE — PLAN OF CARE
Problem: Physical Therapy  Goal: Physical Therapy Goal  Description: Goals to be met by: 23    Patient will increase functional independence with mobility by performin. Supine to sit with Supervision using LRAD as needed  2. Sit to supine with Supervision using LRAD as needed  3. Sit to stand transfer with Supervision using LRAD as needed  4. Gait  x 50 feet with Minimum Assist using LRAD as needed  5. Ascend/Descend 6 inch curb step with Minimum Assist using LRAD as needed    Outcome: Ongoing, Progressing     Pt tolerated PT session fairly well.      All needs met, all questions answered within PT scope of practice.

## 2023-06-24 NOTE — PLAN OF CARE
Discharge Planning Case Management Assessment/Note     Patient admitted on 6-24-23  Chart reviewed today  Care plan discussed with ER treatment team   attending Dr Moraes     Current dispo: new admit  Await bed/floor assignment  Was recently admitted to [facility] here from 5-14 thru 5-23.  Was also placed at [facility] for a few weeks and d/c'd from [facility] yesterday.  Not sure how many Medicare full coverage Sn[facility] days she still has available. Will discuss tomorrow or Monday if Snf becomes the plan again.  Transportation: has reliable  Consults following: case mgt., PT, OT  Case management  to follow

## 2023-06-24 NOTE — ED NOTES
MD at the bedside. Pt AO x4, resp even & unlabored, denies pain, complaints, or needs at this time. Bed locked & in the lowest position, rails up x2, call button in reach. POC reviewed.

## 2023-06-24 NOTE — ED NOTES
Pt hygiene performed. Pt brief and purewick changed. Pt repositioned to right side for comfort. Warm blanket and pillow provide.

## 2023-06-24 NOTE — PROGRESS NOTES
Pharmacist Renal Dose Adjustment Note    Gisselle Ortiz is a 89 y.o. female being treated with the medication ciprofloxacin    Patient Data:    Vital Signs (Most Recent):  Temp: 97.9 °F (36.6 °C) (06/24/23 0510)  Pulse: 70 (06/24/23 0616)  Resp: 20 (06/24/23 0342)  BP: (!) 90/50 (06/24/23 0616)  SpO2: 100 % (06/24/23 0510) Vital Signs (72h Range):  Temp:  [97.8 °F (36.6 °C)-98.3 °F (36.8 °C)]   Pulse:  [60-76]   Resp:  [14-25]   BP: ()/(50-63)   SpO2:  [94 %-100 %]      Recent Labs   Lab 06/23/23  0505 06/23/23  2245 06/24/23  0452   CREATININE 1.3 1.5* 1.8*     Serum creatinine: 1.8 mg/dL (H) 06/24/23 0452  Estimated creatinine clearance: 19.4 mL/min (A)    Medication: Ciprofloxacin 400 mg IV q 12 h will be changed to ciprofloxacin 400 mg IV q 24 h     Pharmacist's Name: Jossie Garza  Pharmacist's Extension: [phone]

## 2023-06-24 NOTE — ASSESSMENT & PLAN NOTE
Long term current use of amiodarone  Current use of long term anticoagulation    Patient with Paroxysmal (<7 days) atrial fibrillation which is controlled currently with Beta Blocker and Amiodarone. Patient is currently in sinus rhythm.[id number] Score: 3. HASBLED Score: 3. Anticoagulation indicated. Anticoagulation done with Apixaban.  Low suspicion for amiodarone toxicity (lung, liver, thyroid) at this time.    - monitor  - home amiodarone, apixaban  - ECG p.r.n. CP or palpitations

## 2023-06-24 NOTE — PT/OT/SLP PROGRESS
Occupational Therapy   Treatment    Name: Gisselle Ortiz  MRN: [id number]  Admitting Diagnosis:  CHF exacerbation       Recommendations:     Discharge Recommendations: nursing facility, skilled  Discharge Equipment Recommendations:  to be determined by next level of care  Barriers to discharge:  Decreased caregiver support    Assessment:     Gisselle Ortiz is a 89 y.o. female with a medical diagnosis of CHF exacerbation.  She presents with the following performance deficits affecting function: weakness, impaired endurance, impaired self care skills, impaired functional mobility, gait instability, impaired balance, decreased coordination, decreased upper extremity function, decreased lower extremity function, decreased safety awareness, impaired coordination. Pt tolerated OT tx session well whoever d/t increased dizziness and difficulty getting a BP reading, pt returned to bed w/ feet elevated. NSG notified.     Rehab Prognosis:  Good; patient would benefit from acute skilled OT services to address these deficits and reach maximum level of function.       Plan:     Patient to be seen 3 x/week to address the above listed problems via self-care/home management, therapeutic activities, therapeutic exercises  Plan of Care Expires: 07/22/23  Plan of Care Reviewed with: patient    Subjective     Chief Complaint: dizziness  Patient/Family Comments/goals: Return to PLOF  Pain/Comfort:  Pain Rating 1: 0/10  Pain Rating Post-Intervention 1: 0/10    Objective:     Communicated with: NSG prior to session.  Patient found supine with PureWick, telemetry upon OT entry to room.    General Precautions: Standard, fall    Orthopedic Precautions:N/A  Braces: N/A  Respiratory Status: Room air     Occupational Performance:     Bed Mobility:    Patient completed Rolling/Turning to Right with minimum assistance  Patient completed Supine to Sit with minimum assistance  Patient completed Sit to Supine with minimum assistance and 2  persons     Activities of Daily Living:  Lower Body Dressing: maximal assistance to don socks      AMPAC 6 Click ADL: 15    Treatment & Education:  Role of OT, goals, POC    Patient left supine with all lines intact, call button in reach, and NSG notified    GOALS:   Multidisciplinary Problems       Occupational Therapy Goals          Problem: Occupational Therapy    Goal Priority Disciplines Outcome Interventions   Occupational Therapy Goal     OT, PT/OT Ongoing, Progressing    Description: Goals to be met by: 7/22/23     Patient will increase functional independence with ADLs by performing:    LE Dressing with Moderate Assistance.  Grooming while EOB with Set-up Assistance.  Toileting from toilet with Minimal Assistance for hygiene and clothing management.   Supine to sit with Stand-by Assistance.  Step transfer with Minimal Assistance  Toilet transfer to toilet with Minimal Assistance.                         Time Tracking:     OT Date of Treatment: 06/24/23  OT Start Time: 0910  OT Stop Time: 0918  OT Total Time (min): 8 min    Billable Minutes:Therapeutic Activity 8    OT/MINERVA: OT          6/24/2023

## 2023-06-24 NOTE — ED NOTES
Gisselle Ortiz, a 89 y.o. female presents to the ED w/ complaint of diarrhea, nausea, weakness, and syncope. Pt and pt family stated pt had a syncopal episode while on the toilet today. No fall or head injury from syncopal episode. Pt has edema to left hand and bilateral lower extremities. Pt has 4+ pitting edema to bilateral feet. Pt has bruising to bilateral upper extremities. Pt denies any pain or shob.    Triage note:  Chief Complaint   Patient presents with    Diarrhea     Discharged from SNF today. Syncopal episode while on the toilet at home today.      Review of patient's allergies indicates:   Allergen Reactions    Ciprofloxacin Nausea And Vomiting     Past Medical History:   Diagnosis Date    A-fib     Anemia of chronic disease 02/02/2021    Anticoagulant long-term use     CHF (congestive heart failure)     Chronic diastolic heart failure 02/02/2021    COVID-19 01/07/2022    Gallstone pancreatitis     Hypertension     Pancreatic abscess 09/26/2020    Stage 3 chronic kidney disease 5/11/2016    Thyroid disease

## 2023-06-24 NOTE — ASSESSMENT & PLAN NOTE
Chronic diastolic heart failure    Patient is identified as having Diastolic (HFpEF) heart failure that is Acute on chronic. CHF is currently uncontrolled due to Continued edema of extremities, Rales/crackles on pulmonary exam and Pulmonary edema/pleural effusion on CXR. Latest ECHO performed and demonstrates- Results for orders placed during the hospital encounter of 09/26/19    Transthoracic echo (TTE) 2D with Color Flow    Interpretation Summary  · Normal left ventricular systolic function. The estimated ejection fraction is 55%  · Grade II (moderate) left ventricular diastolic dysfunction consistent with pseudonormalization.  · Eccentric left ventricular hypertrophy. Mild left ventricular enlargement.  · Mild mitral regurgitation.  · Normal right ventricular systolic function.  · Mild tricuspid regurgitation.  · Severe left atrial enlargement.  . Continue Beta Blocker and Furosemide and monitor clinical status closely. Monitor on telemetry. Patient is on CHF pathway.  Monitor strict Is&Os and daily weights.  Place on fluid restriction of 1.5 L. Continue to stress to patient importance of self efficacy and  on diet for CHF. Last BNP reviewed- and noted below   Recent Labs   Lab 06/23/23  2245   *     Pt w/ HFmEF (50%) on 06/21/2022 JILL (most recent); p/w presyncope & SOB.  Rales & BLE edema on Ex, tho pt is satting well ORA.  Labs w/ elevated BNP.  CXR w/ pulm congestion/edema.  Also w/ diarrhea & img c/f unspecific colitis, tho infectious is highly likely in s/o recent Abx for Klebsiella/E coli UTI.  Suspect CHF exac 2/2 infectious colitis vs Rx noncompliance vs dietary noncompliance vs worsening HF.  Spironolactone d/c'd on prior hospitalization in s/o hyperkalemia.    - hold ACEI/ARB in s/o MARIO on CKD  - TTE, ECG p.r.n. CP or palpitations, CXR p.r.n. worsening dyspnea or hypoxia  - will hold off on diuresis  - GDMT as tolerated  - replete lytes p.r.n. goal K > 4.0, Mg > 2.0  - cardiac tele w/  pulse ox, supp O2 p.r.n. goal SpO2 > 92%, strict I/Os, daily weights, fall precautions, delirium precautions, aspiration precautions  - diet cardiac/low Na  - avoid cardiotoxic agents

## 2023-06-24 NOTE — ASSESSMENT & PLAN NOTE
- home Atenolol  - hold Midodrine  - hold Hydralazine in s/o presyncope & possible infectious colitis; restart as clinically indicated

## 2023-06-24 NOTE — ED NOTES
Return call rec'd from admit team. Value read back and confirmed. No new orders rec'd. ED charge RN notified.

## 2023-06-24 NOTE — ASSESSMENT & PLAN NOTE
Pt w/ recent hospitalization w/ Abx for UTI; p/w watery nonbloody diarrhea.  Img c/w nonspecific colitis.  C/f C diff in s/o recent Abx use.  DDx other infectious colitis vs inflammatory vs less likely ischemic vs Rx-induced vs pseudomembranous.    - f/u stool Cx, stool O&P, stool WBC, C. Diff labs  - Abx, tailor p.r.n. Cx; Ciprofloxacin & metronidazole for GNR & anaerobic coverage

## 2023-06-24 NOTE — HPI
89 y.o. female w/ h/o HFmEF (50%), SSS s/p PM, pAF on apixaban, CKD 3b, HTN, HLD, hypothyroidism, GERD; p/w near syncope x1 episode.  Reports lightheadedness & fatigue following multiple episodes of nonbloody, watery diarrhea.  Also c/o SOB.  Denies fevers/chills, CP/cough, abdo pain, nausea/vomiting, constipation, dysuria/hematuria, melena/hematochezia, weakness/numbness/tingling, HA/presyncope/syncope.     Of note, pt was admitted 05/14-05/23 for intractable nausea/vomiting & Klebsiella & E coli UTI, now s/p Abx.  Found to have cholelithiasis w/o cholecystitis; AES recommended MRCP however pt unable to get 2/2 PM.  Also had constipation & significant stool burden on KUB, improved s/p bowel regimen.  Pt was d/c'd to SNF (05/23-06/23) for PT/OT.    ED course: AF, HR 64, BP 92/52, 99% ORA.  Labs u/r except for Hb 9.1 (b/l 8-9), HCO3 17, Cr 1.5 (b/l 1.2-1.3), Ca 7.3, , Alb 1.7, AST/ALT 96/96, , trop 0.042 --> 0.043; TSH u/r.  UA w/ positive nitrite, 3+ leuks, rare bacteria, 0 WBC.  CXR w/ cardiomgealy w/ BL pleural effusions & perihilar interstitial opacities, c/f CHF exacerbation/hypervolemia vs less likely infectious/inflammatory process.

## 2023-06-24 NOTE — ACP (ADVANCE CARE PLANNING)
Today (6/24/2023) a meeting regarding ACP & GOC took place at bedside     Pt Participation: Full/Self      Attendees (Name & Relationship to pt): N/A      Staff Attendees (Name & Role): Jaguar Daily MD (resident physician)      ACP Conversation (General): I engaged the pt & family about pt's preferences in the event their heart were to stop &/or they were to code during this hospitalization.  They expressed their desire to have a peaceful & natural death, & to not have CPR, chest compressions, or intubation/mechanical ventilation performed in order to prolong their life.  They expressed their desire to be made DNR in accordance to their wishes.    Medical Proxy/MPOA: In the event that the pt cannot make medical decisions for themselves, pt's children (Marjorie, Ari, Ralph) will be the ones to make medical decisions on their behalf.     Good Outcome Following Attempted Resuscitation (GO-FAR) Score:  The Good Outcome Following Attempted Resuscitation (GO-FAR) score provides validated risk stratification for a patients chance of neurologically-intact survival to discharge should he/she be successfully resuscitated following in-hospital cardiopulmonary arrest. The clinical significance of the GO-FAR score may be discussed with the patient and/or family while coming to a decision about code status.     Age: 85+  Chronic neurologic deficits: No  Acute stroke during the current admission: No  Medical non-cardiac diagnosis: Yes  Major trauma: No  Metastatic or hematologic cancer: No  Septicemia: No  Pneumonia: No  Hepatic insufficiency: No  Renal insufficiency or dialysis: No  Acute hypotension or hypoperfusion: No  Respiratory insufficiency: Yes  Admit from SNF: Yes  GO-FAR Score: 13    Interpretation Key:    GO-FAR Score       Likelihood of NISD       -15 to - 6         Above average > 15 %         - 5 to 13        Average         >3 to 15 %         14 to 23          Low                 1 to 3 %               > 23         Very low                         < 1 %       Regarding the details of the discussion about code status:   Patient's likelihood of neurologically-intact survival to discharge after successful resuscitation: Average  Has the patient/proxy been made aware of his/her overall prognosis? Yes  Has the patient/proxy been made aware of his/her predicted survival to discharge after successful resuscitation? Yes  Code status discussion included: Resident  Code status was discussed with: Patient  Code status preference was chosen by: Patient  Does the patient have an Advance Health Directive (AHD)? Power of  for healthcare on file      Code Status: DNR/DNI       GOC: Pt &/or family expressed their desire to improve/maintain quality of life as a priority.    Jaguar Daily MD  Internal Medicine, PGY-1  [facility]

## 2023-06-24 NOTE — PLAN OF CARE
06/24/23 1653   Post-Acute Status   Post-Acute Authorization Other   Discharge Delays None known at this time   Discharge Plan   Discharge Plan A Home Health   Discharge Plan B Skilled Nursing Facility

## 2023-06-24 NOTE — PLAN OF CARE
Initial Discharge Planning Case Management Assessment:    Patient admitted on 6-24-23  Chart reviewed today  Care plan discussed with ER treatment team   attending Dr Moraes    Current dispo: new admit  Was recently admitted to [facility] here from 5-14 thru 5-23.  Was also placed at [facility] for a few weeks and d/c'd from [facility] yesterday.  Not sure how many Medicare full coverage Snf days she still has available. Will discuss tomorrow or Monday if Snf becomes the plan again.  Transportation: has reliable  Consults following: case mgt., PT, OT  Case management  to follow    [facility] - Emergency Dept  Initial Discharge Assessment       Primary Care Provider: Kai Montez MD    Admission Diagnosis: CHF exacerbation [I50.9]  Elevated troponin [R77.8]    Admission Date: 6/23/2023  Expected Discharge Date: 6/27/2023         Payor: MEDICARE / Plan: MEDICARE PART A & B / Product Type: Government /     Extended Emergency Contact Information  Primary Emergency Contact: CHANO GORDILLO  Mobile Phone: [phone]  Relation: Son  Preferred language: English   needed? No  Secondary Emergency Contact: Génesis Araiza   [place]  Mobile Phone: [phone]  Relation: Daughter    Discharge Plan A: (P) Home Health  Discharge Plan B: (P) Skilled Nursing Facility      [id number] - [place] - [address] Pending [address] to [facility]  [address] [place] [place] [address]  Phone: [phone] Fax: [phone]    [id number] - [place] - [address] AT [facility] [address] [place]  [address] [place] [address]  Phone: [phone] Fax: [phone]    [facility] - [place], [place] - [address]  [address] [address]  Phone: [phone] Fax: [phone]      Initial Assessment (most recent)       Adult Discharge Assessment - 06/24/23 1649          Discharge Assessment    Assessment Type Discharge Planning Assessment (P)       Confirmed/corrected address, phone number and insurance Yes (P)      Confirmed Demographics Correct on Facesheet (P)      Source of Information patient;family;health record (P)      Does patient/caregiver understand observation status Yes (P)      Communicated FRANCESCA with patient/caregiver Yes (P)      People in Home child(maciej), adult (P)    adult children rotate weeks in her home    Do you expect to return to your current living situation? Yes (P)      Prior to hospitilization cognitive status: Alert/Oriented (P)      Current cognitive status: Alert/Oriented (P)      Equipment Currently Used at Home walker, rolling;wheelchair (P)      Patient currently being followed by outpatient case management? No (P)      Do you take prescription medications? Yes (P)      Do you have prescription coverage? Yes (P)      Do you have any problems affording any of your prescribed medications? No (P)      Is the patient taking medications as prescribed? yes (P)      How do you get to doctors appointments? family or friend will provide (P)      Are you on dialysis? No (P)      Discharge Plan A Home Health (P)      Discharge Plan B Skilled Nursing Facility (P)      Discharge Plan discussed with: Adult children;Patient (P)         Financial Resource Strain    How hard is it for you to pay for the very basics like food, housing, medical care, and heating? Not very hard (P)         Housing Stability    In the last 12 months, was there a time when you were not able to pay the mortgage or rent on time? No (P)      In the last 12 months, how many places have you lived? 1 (P)      In the last 12 months, was there a time when you did not have a steady place to sleep or slept in a shelter (including now)? No (P)         Transportation Needs    In the past 12 months, has lack of transportation kept you from medical appointments or from getting medications? No (P)      In the past 12 months, has lack of transportation kept you from meetings, work,  or from getting things needed for daily living? No (P)         Food Insecurity    Within the past 12 months, you worried that your food would run out before you got the money to buy more. Never true (P)         Social Connections    How often do you get together with friends or relatives? Once a week (P)      Are you , , , , never , or living with a partner?  (P)         Alcohol Use    Q1: How often do you have a drink containing alcohol? Never (P)

## 2023-06-24 NOTE — H&P
[facility] - Emergency Dept  [facility] Medicine  History & Physical    Patient Name: Gisselle Ortiz  MRN: [id number]  Patient Class: IP- Inpatient   Admission Date: 6/23/2023  Attending Physician: Abiola France DO   Primary Care Provider: Kai Montez MD        Patient information was obtained from patient, past medical records, and ER records.     Subjective:     Principal Problem:CHF exacerbation    Chief Complaint:  Chief Complaint   Patient presents with    Diarrhea     Discharged from SNF today. Syncopal episode while on the toilet at home today.        HPI: 89 y.o. female w/ h/o HFmEF (50%), SSS s/p PM, pAF on apixaban, CKD 3b, HTN, HLD, hypothyroidism, GERD; p/w near syncope x1 episode.  Reports lightheadedness & fatigue following multiple episodes of nonbloody, watery diarrhea.  Also c/o SOB.  Denies fevers/chills, CP/cough, abdo pain, nausea/vomiting, constipation, dysuria/hematuria, melena/hematochezia, weakness/numbness/tingling, HA/presyncope/syncope.     Of note, pt was admitted 05/14-05/23 for intractable nausea/vomiting & Klebsiella & E coli UTI, now s/p Abx.  Found to have cholelithiasis w/o cholecystitis; AES recommended MRCP however pt unable to get 2/2 PM.  Also had constipation & significant stool burden on KUB, improved s/p bowel regimen.  Pt was d/c'd to SNF (05/23-06/23) for PT/OT.    ED course: AF, HR 64, BP 92/52, 99% ORA.  Labs u/r except for Hb 9.1 (b/l 8-9), HCO3 17, Cr 1.5 (b/l 1.2-1.3), Ca 7.3, , Alb 1.7, AST/ALT 96/96, , trop 0.042 --> 0.043; TSH u/r.  UA w/ positive nitrite, 3+ leuks, rare bacteria, 0 WBC.  CXR w/ cardiomgealy w/ BL pleural effusions & perihilar interstitial opacities, c/f CHF exacerbation/hypervolemia vs less likely infectious/inflammatory process.      Past Medical History:   Diagnosis Date    A-fib     Anemia of chronic disease 02/02/2021    Anticoagulant long-term use     CHF (congestive heart failure)     Chronic diastolic heart failure  02/02/2021    COVID-19 01/07/2022    Gallstone pancreatitis     Hypertension     Pancreatic abscess 09/26/2020    Stage 3 chronic kidney disease 5/11/2016    Thyroid disease        Past Surgical History:   Procedure Laterality Date    CATARACT EXTRACTION      CATARACT EXTRACTION W/  INTRAOCULAR LENS IMPLANT Bilateral 2004    DR IN [place]    ENDOSCOPIC ULTRASOUND OF UPPER GASTROINTESTINAL TRACT N/A 9/14/2020    Procedure: ULTRASOUND, UPPER GI TRACT, ENDOSCOPIC;  Surgeon: Esha Howard MD;  Location: [facility] ENDO (2ND FLR);  Service: Endoscopy;  Laterality: N/A;    ENDOSCOPIC ULTRASOUND OF UPPER GASTROINTESTINAL TRACT  11/25/2020    Procedure: ULTRASOUND, UPPER GI TRACT, ENDOSCOPIC;  Surgeon: Esha Howard MD;  Location: [facility] ENDO (2ND FLR);  Service: Endoscopy;;    ERCP N/A 9/14/2020    Procedure: ERCP (ENDOSCOPIC RETROGRADE CHOLANGIOPANCREATOGRAPHY);  Surgeon: Esha Howard MD;  Location: [facility] ENDO (2ND FLR);  Service: Endoscopy;  Laterality: N/A;    ERCP N/A 9/25/2020    Procedure: ERCP (ENDOSCOPIC RETROGRADE CHOLANGIOPANCREATOGRAPHY);  Surgeon: Esha Howard MD;  Location: [facility] ENDO (2ND FLR);  Service: Endoscopy;  Laterality: N/A;    ERCP N/A 11/25/2020    Procedure: ERCP (ENDOSCOPIC RETROGRADE CHOLANGIOPANCREATOGRAPHY);  Surgeon: Esha Howard MD;  Location: [facility] ENDO (2ND FLR);  Service: Endoscopy;  Laterality: N/A;  Covid-19 test 11/22/20 at [place] -   2 day hold Dr Favian Pisano - pg  PM prep    EYE SURGERY      HIP REPLACEMENT ARTHROPLASTY Right 2016    HYSTERECTOMY      REVISION OF SKIN POCKET FOR PACEMAKER N/A 1/21/2019    Procedure: REVISION-POCKET-PACEMAKER;  Surgeon: Asher Watts MD;  Location: [facility] EP LAB;  Service: Cardiology;  Laterality: N/A;  MODERATE SEDATION, sedation issues in the past    THYROIDECTOMY      TREATMENT OF CARDIAC ARRHYTHMIA N/A 3/18/2019    Procedure: CARDIOVERSION OR DEFIBRILLATION;  Surgeon: Asher Watts MD;  Location:  [facility] EP LAB;  Service: Cardiology;  Laterality: N/A;  AF, JILL-cx if SR, DCCV, MAC, FAS, 3PREP    TREATMENT OF CARDIAC ARRHYTHMIA N/A 5/14/2019    Procedure: Cardioversion or Defibrillation;  Surgeon: Derick Vizcarra MD;  Location: [facility] EP LAB;  Service: Cardiology;  Laterality: N/A;  AF, JILL, DCCV, MAC, DM, 3PREP    TREATMENT OF CARDIAC ARRHYTHMIA N/A 6/21/2022    Procedure: Cardioversion or Defibrillation;  Surgeon: Favian Colmenares MD;  Location: [facility] EP LAB;  Service: Cardiology;  Laterality: N/A;  AF, JILL, DCCV, MAC, DM, 3 Prep *SJM PPM in situ*       Review of patient's allergies indicates:   Allergen Reactions    Ciprofloxacin Nausea And Vomiting       Current Facility-Administered Medications on File Prior to Encounter   Medication    [DISCONTINUED] acetaminophen tablet 650 mg    [DISCONTINUED] acetaminophen tablet 650 mg    [DISCONTINUED] amiodarone tablet 200 mg    [DISCONTINUED] apixaban tablet 2.5 mg    [DISCONTINUED] aspirin EC tablet 81 mg    [DISCONTINUED] atenolol split tablet 12.5 mg    [DISCONTINUED] bismuth subsalicylate 262 mg/15 mL suspension 30 mL    [DISCONTINUED] calcium carbonate 200 mg calcium (500 mg) chewable tablet 500 mg    [DISCONTINUED] cyanocobalamin tablet 1,000 mcg    [DISCONTINUED] dextrose 10% bolus 125 mL 125 mL    [DISCONTINUED] dextrose 10% bolus 250 mL 250 mL    [DISCONTINUED] dextrose 40 % gel 15,000 mg    [DISCONTINUED] dextrose 40 % gel 30,000 mg    [DISCONTINUED] diclofenac sodium 1 % gel 2 g    [DISCONTINUED] folic acid tablet 1 mg    [DISCONTINUED] glucagon (human recombinant) injection 1 mg    [DISCONTINUED] hydrALAZINE tablet 25 mg    [DISCONTINUED] levothyroxine tablet 137 mcg    [DISCONTINUED] loperamide capsule 2 mg    [DISCONTINUED] melatonin tablet 6 mg    [DISCONTINUED] miconazole NITRATE 2 % top powder    [DISCONTINUED] midodrine tablet 5 mg    [DISCONTINUED] mirtazapine tablet 7.5 mg    [DISCONTINUED] multivitamin tablet    [DISCONTINUED] ondansetron  disintegrating tablet 4 mg    [DISCONTINUED] pantoprazole EC tablet 40 mg     Current Outpatient Medications on File Prior to Encounter   Medication Sig    aluminum-magnesium hydroxide-simethicone (MAALOX) 200-200-20 mg/5 mL Susp Take by mouth every 8 (eight) hours as needed (vomiting).    amiodarone (PACERONE) 200 MG Tab Take 1 tablet (200 mg total) by mouth once daily.    aspirin (ECOTRIN) 81 MG EC tablet Take 1 tablet (81 mg total) by mouth once daily.    atenoloL (TENORMIN) 25 MG tablet TAKE 2 TABLETS EVERY MORNING AND 1 TABLET EVERY EVENING    bismuth subsalicylate (PEPTO BISMOL) 262 mg/15 mL suspension Take by mouth every 8 (eight) hours as needed (vomiting).    cyanocobalamin (VITAMIN B-12) 1000 MCG tablet Take 1 tablet (1,000 mcg total) by mouth once daily.    cyproheptadine (PERIACTIN) 4 mg tablet Take 1 tablet (4 mg total) by mouth 3 (three) times daily.    diclofenac sodium (VOLTAREN) 1 % Gel Apply topically to affected joints as needed for pain    ELIQUIS 2.5 mg Tab TAKE 1 TABLET TWICE A DAY    folic acid (FOLVITE) 1 MG tablet Take 1 tablet (1 mg total) by mouth once daily.    hydrALAZINE (APRESOLINE) 25 MG tablet Take 1 tablet (25 mg total) by mouth 2 (two) times daily as needed (SBP >180).    Lactobacillus rhamnosus GG (CULTURELLE) 10 billion cell capsule Take 1 capsule by mouth once daily.    levothyroxine (SYNTHROID) 137 MCG Tab tablet Take 1 tablet (137 mcg total) by mouth before breakfast.    midodrine (PROAMATINE) 5 MG Tab Take 1 tablet (5 mg total) by mouth every 8 (eight) hours.    mirtazapine (REMERON) 7.5 MG Tab Take 1 tablet (7.5 mg total) by mouth every evening.    multivitamin Tab Take 1 tablet by mouth once daily.    pantoprazole (PROTONIX) 40 MG tablet Take 1 tablet (40 mg total) by mouth once daily.    polyethylene glycol (GLYCOLAX) 17 gram PwPk Take 17 g by mouth once daily.     Family History       Problem Relation (Age of Onset)    Heart attack Mother, Maternal Grandmother    Heart  disease Mother, Maternal Grandmother    Heart failure Mother, Maternal Grandmother    Hypertension Mother, Maternal Grandmother    Stroke Maternal Grandmother          Tobacco Use    Smoking status: Former     Types: Cigarettes     Start date: 5/19/1964    Smokeless tobacco: Never   Substance and Sexual Activity    Alcohol use: No    Drug use: No    Sexual activity: Not Currently     Partners: Male     Review of Systems   Constitutional:  Positive for activity change and fatigue. Negative for chills, fever and unexpected weight change.   HENT:  Negative for sinus pain, trouble swallowing and voice change.    Eyes:  Negative for photophobia, pain and visual disturbance.   Respiratory:  Positive for shortness of breath. Negative for cough and wheezing.    Cardiovascular:  Negative for chest pain, palpitations and leg swelling.   Gastrointestinal:  Positive for diarrhea. Negative for abdominal pain, blood in stool, constipation, nausea and vomiting.   Endocrine: Negative for polydipsia and polyuria.   Genitourinary:  Negative for dysuria, frequency, hematuria and urgency.   Musculoskeletal:  Negative for arthralgias, back pain and myalgias.   Skin:  Negative for color change and pallor.   Neurological:  Positive for dizziness, weakness and light-headedness. Negative for syncope, numbness and headaches.   Hematological:  Does not bruise/bleed easily.   Psychiatric/Behavioral:  Negative for confusion, dysphoric mood and sleep disturbance.    Objective:     Vital Signs (Most Recent):  Temp: 97.8 °F (36.6 °C) (06/24/23 0342)  Pulse: 66 (06/24/23 0342)  Resp: 20 (06/24/23 0342)  BP: (!) 124/58 (06/24/23 0342)  SpO2: 100 % (06/24/23 0342) Vital Signs (24h Range):  Temp:  [97.8 °F (36.6 °C)-98 °F (36.7 °C)] 97.8 °F (36.6 °C)  Pulse:  [60-73] 66  Resp:  [15-25] 20  SpO2:  [96 %-100 %] 100 %  BP: ()/(50-62) 124/58     Weight: 66.7 kg (147 lb)  Body mass index is 26.04 kg/m².     Physical Exam  Vitals and nursing note  reviewed.   Constitutional:       General: She is not in acute distress.     Appearance: She is not ill-appearing, toxic-appearing or diaphoretic.   HENT:      Head: Normocephalic and atraumatic.      Right Ear: External ear normal.      Left Ear: External ear normal.      Mouth/Throat:      Mouth: Mucous membranes are moist.      Pharynx: No oropharyngeal exudate.   Eyes:      Extraocular Movements: Extraocular movements intact.      Pupils: Pupils are equal, round, and reactive to light.   Cardiovascular:      Rate and Rhythm: Normal rate and regular rhythm.      Pulses: Normal pulses.      Heart sounds: Normal heart sounds. No murmur heard.  Pulmonary:      Effort: Pulmonary effort is normal. No respiratory distress.      Breath sounds: Rales present. No wheezing.   Abdominal:      General: Abdomen is flat. Bowel sounds are normal. There is no distension.      Palpations: Abdomen is soft. There is no mass.      Tenderness: There is no abdominal tenderness.   Musculoskeletal:         General: No swelling or tenderness. Normal range of motion.      Cervical back: Normal range of motion and neck supple.      Right lower leg: Edema present.      Left lower leg: Edema present.   Skin:     General: Skin is warm and dry.      Capillary Refill: Capillary refill takes less than 2 seconds.   Neurological:      General: No focal deficit present.      Mental Status: She is alert and oriented to person, place, and time. Mental status is at baseline.   Psychiatric:         Mood and Affect: Mood normal.         Behavior: Behavior normal.            CRANIAL NERVES     CN III, IV, VI   Pupils are equal, round, and reactive to light.     Significant Labs: All pertinent labs within the past 24 hours have been reviewed.    Significant Imaging: I have reviewed and interpreted all pertinent imaging results/findings within the past 24 hours.  Assessment/Plan:      * CHF exacerbation  Chronic diastolic heart failure    Patient is  identified as having Diastolic (HFpEF) heart failure that is Acute on chronic. CHF is currently uncontrolled due to Continued edema of extremities, Rales/crackles on pulmonary exam and Pulmonary edema/pleural effusion on CXR. Latest ECHO performed and demonstrates- Results for orders placed during the hospital encounter of 09/26/19    Transthoracic echo (TTE) 2D with Color Flow    Interpretation Summary  · Normal left ventricular systolic function. The estimated ejection fraction is 55%  · Grade II (moderate) left ventricular diastolic dysfunction consistent with pseudonormalization.  · Eccentric left ventricular hypertrophy. Mild left ventricular enlargement.  · Mild mitral regurgitation.  · Normal right ventricular systolic function.  · Mild tricuspid regurgitation.  · Severe left atrial enlargement.  . Continue Beta Blocker and Furosemide and monitor clinical status closely. Monitor on telemetry. Patient is on CHF pathway.  Monitor strict Is&Os and daily weights.  Place on fluid restriction of 1.5 L. Continue to stress to patient importance of self efficacy and  on diet for CHF. Last BNP reviewed- and noted below   Recent Labs   Lab 06/23/23  2245   *     Pt w/ HFmEF (50%) on 06/21/2022 JILL (most recent); p/w presyncope & SOB.  Rales & BLE edema on Ex, tho pt is satting well ORA.  Labs w/ elevated BNP.  CXR w/ pulm congestion/edema.  Also w/ diarrhea & img c/f unspecific colitis, tho infectious is highly likely in s/o recent Abx for Klebsiella/E coli UTI.  Suspect CHF exac 2/2 infectious colitis vs Rx noncompliance vs dietary noncompliance vs worsening HF.  Spironolactone d/c'd on prior hospitalization in s/o hyperkalemia.    - hold ACEI/ARB in s/o MARIO on CKD  - TTE, ECG p.r.n. CP or palpitations, CXR p.r.n. worsening dyspnea or hypoxia  - IV diuresis, titrate p.r.n. goal I/Os net negative 2-3 L q.d.  - GDMT as tolerated; Home atenolol  - transition IV --> PO as clinically indicated  - replete lytes  p.r.n. goal K > 4.0, Mg > 2.0  - cardiac tele w/ pulse ox, supp O2 p.r.n. goal SpO2 > 92%, strict I/Os, daily weights, fall precautions, delirium precautions, aspiration precautions  - diet cardiac/low Na  - avoid cardiotoxic agents     Colitis  Pt w/ recent hospitalization w/ Abx for UTI; p/w watery nonbloody diarrhea.  Img c/w nonspecific colitis.  C/f C diff in s/o recent Abx use.  DDx other infectious colitis vs inflammatory vs less likely ischemic vs Rx-induced vs pseudomembranous.    - f/u stool Cx, stool O&P, stool WBC, C. Diff labs  - Abx, tailor p.r.n. Cx; Ciprofloxacin & metronidazole for GNR & anaerobic coverage  - C. Diff precautions     Acute kidney injury superimposed on chronic kidney disease  Stage 3b chronic kidney disease    On admission, Cr 1.5 w/ b/l 1.2-1.3.  Recent hospitalization w/ prerenal MARIO, resolved s/p IVF.  Also w/ CHF exac/hypervolemia, so would be cautious w/ IVF rehydration.  DDx includes cardiorenal syndrome/congestive vs prerenal vs intrinsic vs obstructive.    - UA, UPCR, uCr, Beau  - US RP  - renally dose Rx  - strict I/Os, bladder scan p.r.n. UR, straight/Cottrell cath p.r.n. bladder scan > 300 cc  - hold NSAIDs  - avoid nephrotoxic agents     Impaired functional mobility and endurance  - SW/CM consulted for placement assistance  - PT/OT consulted     Anemia due to chronic kidney disease  - monitor  - home Vitamins     Gastroesophageal reflux disease  - home PPI     Insomnia  - hold Mirtazapine in s/o presyncope; restart as clinically indicated     SSS (sick sinus syndrome)  Cardiac pacemaker in situ    - monitor     High cholesterol  - not on home statin    Acquired hypothyroidism  - home levothyroxine     Essential hypertension  - home Atenolol  - hold Midodrine  - hold Hydralazine in s/o presyncope & possible infectious colitis; restart as clinically indicated     Paroxysmal atrial fibrillation  Long term current use of amiodarone  Current use of long term  anticoagulation    Patient with Paroxysmal (<7 days) atrial fibrillation which is controlled currently with Beta Blocker and Amiodarone. Patient is currently in sinus rhythm.[id number] Score: 3. HASBLED Score: 3. Anticoagulation indicated. Anticoagulation done with Apixaban.  Low suspicion for amiodarone toxicity (lung, liver, thyroid) at this time.    - monitor  - home Atenolol, amiodarone, apixaban  - ECG p.r.n. CP or palpitations       VTE Risk Mitigation (From admission, onward)           Ordered     apixaban tablet 2.5 mg  2 times daily         06/24/23 0403     IP VTE HIGH RISK PATIENT  Once         06/24/23 0358     Place sequential compression device  Until discontinued         06/24/23 0358                      Jaguar Daily MD  Department of Hospital Medicine   [facility] - Emergency Dept

## 2023-06-25 PROBLEM — E43 SEVERE PROTEIN-CALORIE MALNUTRITION: Status: ACTIVE | Noted: 2023-01-01

## 2023-06-25 PROBLEM — E43 SEVERE PROTEIN-CALORIE MALNUTRITION: Chronic | Status: ACTIVE | Noted: 2023-01-01

## 2023-06-25 NOTE — CONSULTS
Food & Nutrition  Education    Diet Education: Fluid and Salt Restriction  Time Spent: 15 minutes  Learners: Patient      Nutrition Education provided with handouts: Heart Failure Nutrition Therapy      Comments: RD spoke with patient regarding fluid and salt restriction. We discussed limiting sodium in her diet to control buildup of fluids around the heart, stomach, lungs, and legs. Limiting fluid in the diet also helps because too much fluid can cause SOB, poor appetite, and weight gain from swelling or edema, which makes the heart work harder. RD explained the importance of reading the Nutrition Facts label, which will help determine the sodium content in 1 serving of a food. Select foods with <140 mg or less per serving. Avoid processed foods. Eat more fresh foods. If/when dining out use precaution restaurant meals can be very high in sodium. RD encourages patient to monitor fluid intake as well discussing anything that is a liquid at room temperature must be accounted for in total fluid intake, if fluid restriction is applicable. Lastly, RD discussed weight monitoring to determine sudden weight changes, if any. Patient agrees to review the material on their own, but no questions/concerns currently.      All questions and concerns answered. Dietitian's contact information provided.       Follow-Up: Yes    Please Re-consult as needed        Thanks!    Angeles Galvez Registration Eligible, Provisional LDN

## 2023-06-25 NOTE — PROGRESS NOTES
[facility] - Intensive Care ([place] [address])  [facility] Medicine  Progress Note    Patient Name: Gisselle Ortiz  MRN: [id number]  Patient Class: IP- Inpatient   Admission Date: 6/23/2023  Length of Stay: 1 days  Attending Physician: Luke Moraes MD  Primary Care Provider: Kai Montez MD        Subjective:     Principal Problem:CHF exacerbation        HPI:  89 y.o. female w/ h/o HFmEF (50%), SSS s/p PM, pAF on apixaban, CKD 3b, HTN, HLD, hypothyroidism, GERD; p/w near syncope x1 episode.  Reports lightheadedness & fatigue following multiple episodes of nonbloody, watery diarrhea.  Also c/o SOB.  Denies fevers/chills, CP/cough, abdo pain, nausea/vomiting, constipation, dysuria/hematuria, melena/hematochezia, weakness/numbness/tingling, HA/presyncope/syncope.     Of note, pt was admitted 05/14-05/23 for intractable nausea/vomiting & Klebsiella & E coli UTI, now s/p Abx.  Found to have cholelithiasis w/o cholecystitis; AES recommended MRCP however pt unable to get 2/2 PM.  Also had constipation & significant stool burden on KUB, improved s/p bowel regimen.  Pt was d/c'd to SNF (05/23-06/23) for PT/OT.    ED course: AF, HR 64, BP 92/52, 99% ORA.  Labs u/r except for Hb 9.1 (b/l 8-9), HCO3 17, Cr 1.5 (b/l 1.2-1.3), Ca 7.3, , Alb 1.7, AST/ALT 96/96, , trop 0.042 --> 0.043; TSH u/r.  UA w/ positive nitrite, 3+ leuks, rare bacteria, 0 WBC.  CXR w/ cardiomgealy w/ BL pleural effusions & perihilar interstitial opacities, c/f CHF exacerbation/hypervolemia vs less likely infectious/inflammatory process.      Overview/Hospital Course:  Patient admitted to hospital medicine for further management. Patient's BP was low but maintaining MAPs of >65. Lactic was negative. Held lasix in the setting of a worsening MARIO and as patient did not need supplemental O2. Echo pending. Patient now able to tolerate more PO intake.       Past Medical History:   Diagnosis Date    A-fib     Anemia of chronic disease  02/02/2021    Anticoagulant long-term use     CHF (congestive heart failure)     Chronic diastolic heart failure 02/02/2021    COVID-19 01/07/2022    Gallstone pancreatitis     Hypertension     Pancreatic abscess 09/26/2020    Stage 3 chronic kidney disease 5/11/2016    Thyroid disease        Past Surgical History:   Procedure Laterality Date    CATARACT EXTRACTION      CATARACT EXTRACTION W/  INTRAOCULAR LENS IMPLANT Bilateral 2004     IN [place]    ENDOSCOPIC ULTRASOUND OF UPPER GASTROINTESTINAL TRACT N/A 9/14/2020    Procedure: ULTRASOUND, UPPER GI TRACT, ENDOSCOPIC;  Surgeon: Esha Howard MD;  Location: [facility] (2ND FLR);  Service: Endoscopy;  Laterality: N/A;    ENDOSCOPIC ULTRASOUND OF UPPER GASTROINTESTINAL TRACT  11/25/2020    Procedure: ULTRASOUND, UPPER GI TRACT, ENDOSCOPIC;  Surgeon: Esha Howard MD;  Location: [facility] ENDO (2ND FLR);  Service: Endoscopy;;    ERCP N/A 9/14/2020    Procedure: ERCP (ENDOSCOPIC RETROGRADE CHOLANGIOPANCREATOGRAPHY);  Surgeon: Esha Howard MD;  Location: [facility] ENDO (2ND FLR);  Service: Endoscopy;  Laterality: N/A;    ERCP N/A 9/25/2020    Procedure: ERCP (ENDOSCOPIC RETROGRADE CHOLANGIOPANCREATOGRAPHY);  Surgeon: Esha Howard MD;  Location: [facility] ENDO (2ND FLR);  Service: Endoscopy;  Laterality: N/A;    ERCP N/A 11/25/2020    Procedure: ERCP (ENDOSCOPIC RETROGRADE CHOLANGIOPANCREATOGRAPHY);  Surgeon: Esha Howard MD;  Location: [facility] ENDO (2ND FLR);  Service: Endoscopy;  Laterality: N/A;  Covid-19 test 11/22/20 at [place] - pg  2 day hold Dr Favian Pisano - pg  PM prep    EYE SURGERY      HIP REPLACEMENT ARTHROPLASTY Right 2016    HYSTERECTOMY      REVISION OF SKIN POCKET FOR PACEMAKER N/A 1/21/2019    Procedure: REVISION-POCKET-PACEMAKER;  Surgeon: Asher Watts MD;  Location: [facility] EP LAB;  Service: Cardiology;  Laterality: N/A;  MODERATE SEDATION, sedation issues in the past    THYROIDECTOMY      TREATMENT OF CARDIAC  ARRHYTHMIA N/A 3/18/2019    Procedure: CARDIOVERSION OR DEFIBRILLATION;  Surgeon: Asher Watts MD;  Location: [facility] EP LAB;  Service: Cardiology;  Laterality: N/A;  AF, JILL-cx if SR, DCCV, MAC, FAS, 3PREP    TREATMENT OF CARDIAC ARRHYTHMIA N/A 5/14/2019    Procedure: Cardioversion or Defibrillation;  Surgeon: Derick Vizcarra MD;  Location: [facility] EP LAB;  Service: Cardiology;  Laterality: N/A;  AF, JILL, DCCV, MAC, DM, 3PREP    TREATMENT OF CARDIAC ARRHYTHMIA N/A 6/21/2022    Procedure: Cardioversion or Defibrillation;  Surgeon: Favian Colmenares MD;  Location: [facility] EP LAB;  Service: Cardiology;  Laterality: N/A;  AF, JILL, DCCV, MAC, DM, 3 Prep *SJM PPM in situ*       Review of patient's allergies indicates:   Allergen Reactions    Ciprofloxacin Nausea And Vomiting       Current Facility-Administered Medications on File Prior to Encounter   Medication    [DISCONTINUED] acetaminophen tablet 650 mg    [DISCONTINUED] acetaminophen tablet 650 mg    [DISCONTINUED] amiodarone tablet 200 mg    [DISCONTINUED] apixaban tablet 2.5 mg    [DISCONTINUED] aspirin EC tablet 81 mg    [DISCONTINUED] atenolol split tablet 12.5 mg    [DISCONTINUED] bismuth subsalicylate 262 mg/15 mL suspension 30 mL    [DISCONTINUED] calcium carbonate 200 mg calcium (500 mg) chewable tablet 500 mg    [DISCONTINUED] cyanocobalamin tablet 1,000 mcg    [DISCONTINUED] dextrose 10% bolus 125 mL 125 mL    [DISCONTINUED] dextrose 10% bolus 250 mL 250 mL    [DISCONTINUED] dextrose 40 % gel 15,000 mg    [DISCONTINUED] dextrose 40 % gel 30,000 mg    [DISCONTINUED] diclofenac sodium 1 % gel 2 g    [DISCONTINUED] folic acid tablet 1 mg    [DISCONTINUED] glucagon (human recombinant) injection 1 mg    [DISCONTINUED] hydrALAZINE tablet 25 mg    [DISCONTINUED] levothyroxine tablet 137 mcg    [DISCONTINUED] loperamide capsule 2 mg    [DISCONTINUED] melatonin tablet 6 mg    [DISCONTINUED] miconazole NITRATE 2 % top powder    [DISCONTINUED] midodrine tablet 5 mg     [DISCONTINUED] mirtazapine tablet 7.5 mg    [DISCONTINUED] multivitamin tablet    [DISCONTINUED] ondansetron disintegrating tablet 4 mg    [DISCONTINUED] pantoprazole EC tablet 40 mg     Current Outpatient Medications on File Prior to Encounter   Medication Sig    aluminum-magnesium hydroxide-simethicone (MAALOX) 200-200-20 mg/5 mL Susp Take by mouth every 8 (eight) hours as needed (vomiting).    amiodarone (PACERONE) 200 MG Tab Take 1 tablet (200 mg total) by mouth once daily.    aspirin (ECOTRIN) 81 MG EC tablet Take 1 tablet (81 mg total) by mouth once daily.    atenoloL (TENORMIN) 25 MG tablet TAKE 2 TABLETS EVERY MORNING AND 1 TABLET EVERY EVENING    bismuth subsalicylate (PEPTO BISMOL) 262 mg/15 mL suspension Take by mouth every 8 (eight) hours as needed (vomiting).    cyanocobalamin (VITAMIN B-12) 1000 MCG tablet Take 1 tablet (1,000 mcg total) by mouth once daily.    cyproheptadine (PERIACTIN) 4 mg tablet Take 1 tablet (4 mg total) by mouth 3 (three) times daily.    diclofenac sodium (VOLTAREN) 1 % Gel Apply topically to affected joints as needed for pain    ELIQUIS 2.5 mg Tab TAKE 1 TABLET TWICE A DAY    folic acid (FOLVITE) 1 MG tablet Take 1 tablet (1 mg total) by mouth once daily.    hydrALAZINE (APRESOLINE) 25 MG tablet Take 1 tablet (25 mg total) by mouth 2 (two) times daily as needed (SBP >180).    Lactobacillus rhamnosus GG (CULTURELLE) 10 billion cell capsule Take 1 capsule by mouth once daily.    levothyroxine (SYNTHROID) 137 MCG Tab tablet Take 1 tablet (137 mcg total) by mouth before breakfast.    midodrine (PROAMATINE) 5 MG Tab Take 1 tablet (5 mg total) by mouth every 8 (eight) hours.    mirtazapine (REMERON) 7.5 MG Tab Take 1 tablet (7.5 mg total) by mouth every evening.    multivitamin Tab Take 1 tablet by mouth once daily.    pantoprazole (PROTONIX) 40 MG tablet Take 1 tablet (40 mg total) by mouth once daily.    polyethylene glycol (GLYCOLAX) 17 gram PwPk Take 17 g by mouth once daily.      Family History       Problem Relation (Age of Onset)    Heart attack Mother, Maternal Grandmother    Heart disease Mother, Maternal Grandmother    Heart failure Mother, Maternal Grandmother    Hypertension Mother, Maternal Grandmother    Stroke Maternal Grandmother          Tobacco Use    Smoking status: Former     Types: Cigarettes     Start date: 5/19/1964    Smokeless tobacco: Never   Substance and Sexual Activity    Alcohol use: No    Drug use: No    Sexual activity: Not Currently     Partners: Male     Review of Systems   Constitutional:  Positive for activity change and fatigue. Negative for chills, fever and unexpected weight change.   HENT:  Negative for sinus pain, trouble swallowing and voice change.    Eyes:  Negative for photophobia, pain and visual disturbance.   Respiratory:  Negative for cough, shortness of breath and wheezing.    Cardiovascular:  Negative for chest pain, palpitations and leg swelling.   Gastrointestinal:  Negative for abdominal pain, blood in stool, constipation, diarrhea, nausea and vomiting.   Endocrine: Negative for polydipsia and polyuria.   Genitourinary:  Negative for dysuria, frequency, hematuria and urgency.   Musculoskeletal:  Negative for arthralgias, back pain and myalgias.   Skin:  Negative for color change and pallor.   Neurological:  Positive for dizziness, weakness and light-headedness. Negative for syncope, numbness and headaches.   Hematological:  Does not bruise/bleed easily.   Psychiatric/Behavioral:  Negative for confusion, dysphoric mood and sleep disturbance.    Objective:     Vital Signs (Most Recent):  Temp: 97.8 °F (36.6 °C) (06/24/23 0342)  Pulse: 66 (06/24/23 0342)  Resp: 20 (06/24/23 0342)  BP: (!) 124/58 (06/24/23 0342)  SpO2: 100 % (06/24/23 0342) Vital Signs (24h Range):  Temp:  [97.8 °F (36.6 °C)-98 °F (36.7 °C)] 97.8 °F (36.6 °C)  Pulse:  [60-73] 66  Resp:  [15-25] 20  SpO2:  [96 %-100 %] 100 %  BP: ()/(50-62) 124/58     Weight: 66.7 kg (147  lb)  Body mass index is 26.04 kg/m².     Physical Exam  Vitals and nursing note reviewed.   Constitutional:       General: She is not in acute distress.     Appearance: She is not ill-appearing, toxic-appearing or diaphoretic.   HENT:      Head: Normocephalic and atraumatic.      Right Ear: External ear normal.      Left Ear: External ear normal.      Mouth/Throat:      Mouth: Mucous membranes are moist.      Pharynx: No oropharyngeal exudate.   Eyes:      Extraocular Movements: Extraocular movements intact.      Pupils: Pupils are equal, round, and reactive to light.   Cardiovascular:      Rate and Rhythm: Normal rate and regular rhythm.      Pulses: Normal pulses.      Heart sounds: Normal heart sounds. No murmur heard.  Pulmonary:      Effort: Pulmonary effort is normal. No respiratory distress.      Breath sounds: Rales present. No wheezing.   Abdominal:      General: Abdomen is flat. Bowel sounds are normal. There is no distension.      Palpations: Abdomen is soft. There is no mass.      Tenderness: There is no abdominal tenderness.   Musculoskeletal:         General: No swelling or tenderness. Normal range of motion.      Cervical back: Normal range of motion and neck supple.      Right lower leg: Edema present.      Left lower leg: Edema present.   Skin:     General: Skin is warm and dry.      Capillary Refill: Capillary refill takes less than 2 seconds.   Neurological:      General: No focal deficit present.      Mental Status: She is alert and oriented to person, place, and time. Mental status is at baseline.   Psychiatric:         Mood and Affect: Mood normal.         Behavior: Behavior normal.            CRANIAL NERVES     CN III, IV, VI   Pupils are equal, round, and reactive to light.     Significant Labs: All pertinent labs within the past 24 hours have been reviewed.    Significant Imaging: I have reviewed and interpreted all pertinent imaging results/findings within the past 24  hours.      Assessment/Plan:      * CHF exacerbation  Chronic diastolic heart failure    Patient is identified as having Diastolic (HFpEF) heart failure that is Acute on chronic. CHF is currently uncontrolled due to Continued edema of extremities, Rales/crackles on pulmonary exam and Pulmonary edema/pleural effusion on CXR. Latest ECHO performed and demonstrates- Results for orders placed during the hospital encounter of 09/26/19    Transthoracic echo (TTE) 2D with Color Flow    Interpretation Summary  · Normal left ventricular systolic function. The estimated ejection fraction is 55%  · Grade II (moderate) left ventricular diastolic dysfunction consistent with pseudonormalization.  · Eccentric left ventricular hypertrophy. Mild left ventricular enlargement.  · Mild mitral regurgitation.  · Normal right ventricular systolic function.  · Mild tricuspid regurgitation.  · Severe left atrial enlargement.  . Continue Beta Blocker and Furosemide and monitor clinical status closely. Monitor on telemetry. Patient is on CHF pathway.  Monitor strict Is&Os and daily weights.  Place on fluid restriction of 1.5 L. Continue to stress to patient importance of self efficacy and  on diet for CHF. Last BNP reviewed- and noted below   Recent Labs   Lab 06/23/23  2245   *     Pt w/ HFmEF (50%) on 06/21/2022 JILL (most recent); p/w presyncope & SOB.  Rales & BLE edema on Ex, tho pt is satting well ORA.  Labs w/ elevated BNP.  CXR w/ pulm congestion/edema.  Also w/ diarrhea & img c/f unspecific colitis, tho infectious is highly likely in s/o recent Abx for Klebsiella/E coli UTI.  Suspect CHF exac 2/2 infectious colitis vs Rx noncompliance vs dietary noncompliance vs worsening HF.  Spironolactone d/c'd on prior hospitalization in s/o hyperkalemia.    - hold ACEI/ARB in s/o MARIO on CKD  - TTE, ECG p.r.n. CP or palpitations, CXR p.r.n. worsening dyspnea or hypoxia  - will hold off on diuresis  - GDMT as tolerated  - replete  lytes p.r.n. goal K > 4.0, Mg > 2.0  - cardiac tele w/ pulse ox, supp O2 p.r.n. goal SpO2 > 92%, strict I/Os, daily weights, fall precautions, delirium precautions, aspiration precautions  - diet cardiac/low Na  - avoid cardiotoxic agents     Colitis  Pt w/ recent hospitalization w/ Abx for UTI; p/w watery nonbloody diarrhea.  Img c/w nonspecific colitis.  C/f C diff in s/o recent Abx use.  DDx other infectious colitis vs inflammatory vs less likely ischemic vs Rx-induced vs pseudomembranous.    - f/u stool Cx, stool O&P, stool WBC, C. Diff labs  - Abx, tailor p.r.n. Cx; Ciprofloxacin & metronidazole for GNR & anaerobic coverage    Severe protein-calorie malnutrition  Nutrition consulted. Most recent weight and BMI monitored-     Measurements:  Wt Readings from Last 1 Encounters:   06/25/23 66.7 kg (147 lb)   Body mass index is 26.04 kg/m².    RD consulted  Likely edema 2/2 malnutrition. Patient feeling much better today. Will advance diet as tolerated       Acute kidney injury superimposed on chronic kidney disease  Stage 3b chronic kidney disease    On admission, Cr 1.5 w/ b/l 1.2-1.3.  Recent hospitalization w/ prerenal MARIO, resolved s/p IVF.  Also w/ CHF exac/hypervolemia, so would be cautious w/ IVF rehydration.  DDx includes cardiorenal syndrome/congestive vs prerenal vs intrinsic vs obstructive.    - will hold lasix as patient not requiring supplemental O2  - renally dose Rx  - strict I/Os, bladder scan p.r.n. UR, straight/Cottrell cath p.r.n. bladder scan > 300 cc  - hold NSAIDs  - avoid nephrotoxic agents     Impaired functional mobility and endurance  - SW/CM consulted for placement assistance  - PT/OT consulted     Anemia due to chronic kidney disease  - monitor  - home Vitamins     Chronic diastolic heart failure  See CHF exacerbation      Stage 3b chronic kidney disease  See MARIO      Gastroesophageal reflux disease  - home PPI     Insomnia  - hold Mirtazapine in s/o presyncope; restart as clinically  indicated     SSS (sick sinus syndrome)  Cardiac pacemaker in situ    - monitor     Current use of long term anticoagulation  [id number] Score: 3. Continue apixaban        Long term current use of amiodarone  Continue in setting of history of afib      High cholesterol  - not on home statin    Acquired hypothyroidism  - home levothyroxine     Essential hypertension  - home Atenolol  - hold Midodrine  - hold Hydralazine in s/o presyncope & possible infectious colitis; restart as clinically indicated     Cardiac pacemaker in situ  2/2 SSS      Paroxysmal atrial fibrillation  Long term current use of amiodarone  Current use of long term anticoagulation    Patient with Paroxysmal (<7 days) atrial fibrillation which is controlled currently with Beta Blocker and Amiodarone. Patient is currently in sinus rhythm.[id number] Score: 3. HASBLED Score: 3. Anticoagulation indicated. Anticoagulation done with Apixaban.  Low suspicion for amiodarone toxicity (lung, liver, thyroid) at this time.    - monitor  - home amiodarone, apixaban  - ECG p.r.n. CP or palpitations       VTE Risk Mitigation (From admission, onward)           Ordered     apixaban tablet 2.5 mg  2 times daily         06/24/23 0403     IP VTE HIGH RISK PATIENT  Once         06/24/23 0358     Place sequential compression device  Until discontinued         06/24/23 0358                    Discharge Planning   FRANCESCA: 6/28/2023     Code Status: DNR   Is the patient medically ready for discharge?: No    Reason for patient still in hospital (select all that apply): Patient trending condition, Treatment and Pending disposition  Discharge Plan A: Home Health   Discharge Delays: None known at this time              Gal Ward MD  Department of Hospital Medicine   [facility] - Intensive Care ([place])

## 2023-06-25 NOTE — ASSESSMENT & PLAN NOTE
Nutrition consulted. Most recent weight and BMI monitored-     Measurements:  Wt Readings from Last 1 Encounters:   06/25/23 66.7 kg (147 lb)   Body mass index is 26.04 kg/m².    RD consulted  Likely edema 2/2 malnutrition. Patient feeling much better today. Will advance diet as tolerated

## 2023-06-25 NOTE — HOSPITAL COURSE
Patient admitted to hospital medicine for further management. Patient's BP was low but maintaining MAPs of >65. Lactic was negative. Held lasix in the setting of a worsening MARIO and as patient did not need supplemental O2. Echo pending. Patient now able to tolerate more PO intake. HR dropped to 20's and low 30's during admission. On tele there was concern over possible artifact. EP consulted for device interrogation which revealed no abnormalities in pacemaker device. Repeat Echo showed EF of 65% and increased diastolic dysfunction, CVP of 3. PT/OT recommended SNF upon discharge. Pt qualified for [facility] in [place], [place]. Educated pt and family on difficulties of malnutrition. Encouraged pt to stay on top of protein intake going forward. Expressed concern to pt and family of how pt may never be back to her baseline strength. All questions answered at this time.

## 2023-06-26 NOTE — PROGRESS NOTES
"[facility]) nurse navigator notified of HFT[facility] candidate in need of education and introduction to 4-6 week program.      PT aao x 3 while lying in bed . Introduced self to pt as H[facility] nurse navigator.     Patient given "[facility] Home Care Guide" which includes "Daily weight and symptom tracker".  Encouraged pt  to review information.      Reviewed the following key points of HFT[facility] program with pt and family:   1.) Take your medications as directed. Please call us if any Healthcare provider changes your Heart/Fluid Medications   2.) Weight yourself daily Upon waking empty your bladder and weigh with as little clothing as possible before eating or drinking anything. Record your wts. on the Symptom tracker and compare them for fluid wt gain. Check to see if you have any other S/S of fluid overload and then call us.   3.) Follow low salt and limited fluid diet.  Keep a [id number] mg Sodium restriction because Sodium makes you hold fluid. Foods high in Sodium; Deli Meats/Cheese, Sausages/Hot Dogs, Fast Food/Restaurant Food, anything in a box, bottle or can or any pre esperanza meals. Use only seasonings labeled NO SALT. Keep a 1.5 to 2 liter fluid restriction. Measure anything you drink, the milk in your cereal, the broth in  your soup and anything that melts at room temperature. Be careful of watermelon as it is 92% water. For "[place]" use a couple of ice cubes in a plastic cup, they last longer than just drinking water.   4.) Stop smoking and start exercising Walking with a purpose is good but not in the heat! Start low and work your way up as tolerated.   5.) Go to your appointments and call your team. Do your daily wts. and take your BP/P daily, these measurements helps us to know how well your fluid medications are working      Pt reminded to follow Symptom tracker and to call at the onset of symptoms according to tracker.     Reviewed plan for follow up " once discharged to include phone calls, in person and virtual visits to assist pt optimizing their heart failure medication regimen and encouraging healthy lifestyle modifications.  Reminded pt that program will assist them over the next 4-6 weeks and then patient will be transferred to long term care provider .  Reminded pt how to contact HFT[facility] navigator via phone and or via [employer].     Pt instructed appointment will be printed on hospital discharge paperwork.     Pt also reminded RN will call 48-72 hours after discharge to check on them.     PT  verbalize read back of information given.  Encouraged pt  to read over information often and contact team with any questions or concerns.      Pt states her Cardiologist is Dr. Reyes here at the [facility]. [facility] Booklet left on Pt's table as she said she came without any belongings.  Pt lives in her own home but her daughter lives next door and her son comes in from [place]. They take turns caring for her and she is seldom alone

## 2023-06-26 NOTE — SUBJECTIVE & OBJECTIVE
Interval History: HR dropped to 20's and 30's overnight. Pt asymptomatic. EP consulted for device interrogation. Cr downtrending to 1.8. Working on SNF placement. Still hypervolemic on exam.    Review of Systems   Constitutional:  Positive for activity change and fatigue. Negative for chills, fever and unexpected weight change.   HENT:  Negative for sinus pain, trouble swallowing and voice change.    Eyes:  Negative for photophobia, pain and visual disturbance.   Respiratory:  Negative for cough, shortness of breath and wheezing.    Cardiovascular:  Negative for chest pain, palpitations and leg swelling.   Gastrointestinal:  Negative for abdominal pain, blood in stool, constipation, diarrhea, nausea and vomiting.   Endocrine: Negative for polydipsia and polyuria.   Genitourinary:  Negative for dysuria, frequency, hematuria and urgency.   Musculoskeletal:  Negative for arthralgias, back pain and myalgias.   Skin:  Negative for color change and pallor.   Neurological:  Positive for dizziness, weakness and light-headedness. Negative for syncope, numbness and headaches.   Hematological:  Does not bruise/bleed easily.   Psychiatric/Behavioral:  Negative for confusion, dysphoric mood and sleep disturbance.    Objective:     Vital Signs (Most Recent):  Temp: 97.9 °F (36.6 °C) (06/26/23 0821)  Pulse: 62 (06/26/23 0821)  Resp: 19 (06/26/23 0749)  BP: (!) 115/58 (06/26/23 0821)  SpO2: 100 % (06/26/23 0821) Vital Signs (24h Range):  Temp:  [97.9 °F (36.6 °C)-98.4 °F (36.9 °C)] 97.9 °F (36.6 °C)  Pulse:  [60-67] 62  Resp:  [16-19] 19  SpO2:  [96 %-100 %] 100 %  BP: (103-123)/(51-76) 115/58     Weight: 66.7 kg (147 lb)  Body mass index is 26.04 kg/m².    Intake/Output Summary (Last 24 hours) at 6/26/2023 1059  Last data filed at 6/26/2023 0800  Gross per 24 hour   Intake 222 ml   Output --   Net 222 ml         Physical Exam  Vitals and nursing note reviewed.   Constitutional:       General: She is not in acute distress.      Appearance: She is not ill-appearing, toxic-appearing or diaphoretic.   HENT:      Head: Normocephalic and atraumatic.      Right Ear: External ear normal.      Left Ear: External ear normal.      Mouth/Throat:      Mouth: Mucous membranes are moist.      Pharynx: No oropharyngeal exudate.   Eyes:      Extraocular Movements: Extraocular movements intact.      Pupils: Pupils are equal, round, and reactive to light.   Cardiovascular:      Rate and Rhythm: Normal rate and regular rhythm.      Pulses: Normal pulses.      Heart sounds: Normal heart sounds. No murmur heard.  Pulmonary:      Effort: Pulmonary effort is normal. No respiratory distress.      Breath sounds: Rales present. No wheezing.   Abdominal:      General: Abdomen is flat. Bowel sounds are normal. There is no distension.      Palpations: Abdomen is soft. There is no mass.      Tenderness: There is no abdominal tenderness.   Musculoskeletal:         General: No swelling or tenderness. Normal range of motion.      Cervical back: Normal range of motion and neck supple.      Right lower leg: Edema present.      Left lower leg: Edema present.   Skin:     General: Skin is warm and dry.      Capillary Refill: Capillary refill takes less than 2 seconds.   Neurological:      General: No focal deficit present.      Mental Status: She is alert and oriented to person, place, and time. Mental status is at baseline.   Psychiatric:         Mood and Affect: Mood normal.         Behavior: Behavior normal.           Significant Labs: All pertinent labs within the past 24 hours have been reviewed.    Significant Imaging: I have reviewed all pertinent imaging results/findings within the past 24 hours.

## 2023-06-26 NOTE — PLAN OF CARE
NURSING HOME ORDERS    06/29/2023  [facility]  [facility] ([facility] [place])  [address]  [facility] [address]  Dept: [phone]  Loc: [phone]     Admit to Nursing Home:  Nursing Home    Diagnoses:  Active Hospital Problems    Diagnosis  POA    *CHF exacerbation [I50.9]  Yes     Priority: 1 - High    Acute kidney injury superimposed on chronic kidney disease [N17.9, N18.9]  Yes     Priority: 2     SSS (sick sinus syndrome) [I49.5]  Yes     Priority: 3      Chronic    Impaired functional mobility and endurance [Z74.09]  Yes     Priority: 4      Chronic    Colitis [K52.9]  Yes    Severe protein-calorie malnutrition [E43]  Yes     Chronic    Anemia due to chronic kidney disease [N18.9, D63.1]  Yes     Chronic    Chronic diastolic heart failure [I50.32]  Yes     Chronic    Current use of long term anticoagulation [Z79.01]  Not Applicable     Chronic    Long term current use of amiodarone [Z79.899]  Not Applicable     Chronic    Stage 3b chronic kidney disease [N18.32]  Yes     Chronic    Insomnia [G47.00]  Yes     Chronic    Gastroesophageal reflux disease [K21.9]  Yes     Chronic    Essential hypertension [I10]  Yes     Chronic    Acquired hypothyroidism [E03.9]  Yes     Chronic    Cardiac pacemaker in situ [Z95.0]  Yes     Chronic    High cholesterol [E78.00]  Yes     Chronic    Paroxysmal atrial fibrillation [I48.0]  Yes     Chronic      Resolved Hospital Problems   No resolved problems to display.       Patient is homebound due to:  CHF exacerbation    Allergies:  Review of patient's allergies indicates:   Allergen Reactions    Ciprofloxacin Nausea And Vomiting       Vitals:  Routine    Diet: cardiac diet    Activities:   Activity as tolerated    Goals of Care Treatment Preferences:  Code Status: DNR    Health care agent: [facility] agent number: [phone]    Living Will: Yes     What is most important right now is to focus on improvement in  condition but with limits to invasive therapies.  Accordingly, we have decided that the best plan to meet the patient's goals includes continuing with treatment.      Labs:  per facility    Nursing Precautions:  Fall and Pressure ulcer prevention    Consults:   PT to evaluate and treat- 3 times a week, OT to evaluate and treat- 3 times a week, and Nutrition to evaluate and recommend diet     Miscellaneous Care: Routine Skin for Bedridden Patients:  Apply moisture barrier cream to all  CHF Care: Daily Weight with notification of MD/NP of 2lb or > increase in 24 hours    v/s and O2 sat every shift    Oxygen as needed for sats <90%    Report abnormal breath sounds to MD/NP    Edema checks q shift- notify MD/NP of increased edema    Task segmentation by nursing for daily care to decrease exertion    CHF education to include diet ,medication, and CHF flags for MD notification                   Diabetes Care:  SN to perform and educate Diabetic management with blood glucose monitoring:      Medications: Discontinue all previous medication orders, if any. See new list below.     Medication List        START taking these medications      furosemide 20 MG tablet  Commonly known as: LASIX  Take 1 tablet (20 mg total) by mouth once daily. Take as needed for swelling in your legs. HOLD MEDICATION IF HYPOTENSIVE            CHANGE how you take these medications      atenoloL 25 MG tablet  Commonly known as: TENORMIN  HOLD ATENOLOL UNTIL YOU ARE ABLE TO FOLLOW UP WITH PRIMARY CARE DOCTOR  What changed: additional instructions     hydrALAZINE 25 MG tablet  Commonly known as: APRESOLINE  Take 1 tablet (25 mg total) by mouth 2 (two) times daily as needed (SBP >180). HOLD UNTIL YOU ARE ABLE TO SEE YOUR PRIMARY CARE DOCTOR  What changed: additional instructions     polyethylene glycol 17 gram Pwpk  Commonly known as: GLYCOLAX  Take 17 g by mouth daily as needed (constipation).  What changed:   when to take this  reasons to take this             CONTINUE taking these medications      amiodarone 200 MG Tab  Commonly known as: PACERONE  Take 1 tablet (200 mg total) by mouth once daily.     aspirin 81 MG EC tablet  Commonly known as: ECOTRIN  Take 1 tablet (81 mg total) by mouth once daily.     cyanocobalamin 1000 MCG tablet  Commonly known as: VITAMIN B-12  Take 1 tablet (1,000 mcg total) by mouth once daily.     cyproheptadine 4 mg tablet  Commonly known as: PERIACTIN  Take 1 tablet (4 mg total) by mouth 3 (three) times daily.     diclofenac sodium 1 % Gel  Commonly known as: VOLTAREN  Apply topically to affected joints as needed for pain     ELIQUIS 2.5 mg Tab  Generic drug: apixaban  TAKE 1 TABLET TWICE A DAY     folic acid 1 MG tablet  Commonly known as: FOLVITE  Take 1 tablet (1 mg total) by mouth once daily.     Lactobacillus rhamnosus GG 10 billion cell capsule  Commonly known as: CULTURELLE  Take 1 capsule by mouth once daily.     levothyroxine 137 MCG Tab tablet  Commonly known as: SYNTHROID  Take 1 tablet (137 mcg total) by mouth before breakfast.     midodrine 5 MG Tab  Commonly known as: PROAMATINE  Take 1 tablet (5 mg total) by mouth every 8 (eight) hours.     mirtazapine 7.5 MG Tab  Commonly known as: REMERON  Take 1 tablet (7.5 mg total) by mouth every evening.     multivitamin Tab  Take 1 tablet by mouth once daily.     pantoprazole 40 MG tablet  Commonly known as: PROTONIX  Take 1 tablet (40 mg total) by mouth once daily.            STOP taking these medications      aluminum-magnesium hydroxide-simethicone 200-200-20 mg/5 mL Susp  Commonly known as: MAALOX     bismuth subsalicylate 262 mg/15 mL suspension  Commonly known as: PEPTO BISMOL                Immunizations Administered as of 6/29/2023       No immunizations on file.            Some patients may experience side effects after vaccination.  These may include fever, headache, muscle or joint aches.  Most symptoms resolve with 24-48 hours and do not require urgent medical  evaluation unless they persist for more than 72 hours or symptoms are concerning for an unrelated medical condition.          _________________________________  Nicholas Kline DO  06/29/2023

## 2023-06-26 NOTE — PLAN OF CARE
SW spoke to patient son to update him on the dc plan of nursing home placement. Patient son reports being upset that he wasn't contacted to discuss the dc plan. SW provided education to patient son in regard to obtaining HPOA for patient in moving forward if this is the patients wishes. THUY will meet with patient and family tomorrow.         Louann Yuan LMSW  [facility]   [id number]

## 2023-06-26 NOTE — NURSING
Pts telemetry keeps capturing that the patient is going kaushik to the 40s, but has a PM. Notified dr on call for [facility] med 5. Awaiting return call back.  2126-spoke with dr. Jewell. Will search tele records and the nurse will continue to monitor.

## 2023-06-26 NOTE — PLAN OF CARE
06/26/23 1618   Post-Acute Status   Post-Acute Authorization Placement   Post-Acute Placement Status Referrals Sent       Patient is requesting to admit to a nursing home when medically stable to ND. [facility] will [facility] to follow up.             Louann Yuan LMSW  [facility]   [id number]

## 2023-06-26 NOTE — PLAN OF CARE
[facility] - [facility] ([facility])  Discharge Reassessment    Primary Care Provider: Kai Montez MD    Expected Discharge Date: 6/28/2023    Reassessment (most recent)       Discharge Reassessment - 06/26/23 1612          Discharge Reassessment    Assessment Type Discharge Planning Reassessment (P)      Did the patient's condition or plan change since previous assessment? Yes (P)      Discharge Plan discussed with: Patient (P)      Communicated FRANCESCA with patient/caregiver Yes (P)      Discharge Plan A New Nursing Home placement - detention care facility (P)      Discharge Plan B New Nursing Home placement - detention care facility (P)                  SW met with patient at the bedside to discuss the dc plan in regard to SNF and was informed by [facility] admission coordinator that patient is now into her copay days with SNF. SW met with patient at the bedside and patient reports that she would like to move forward with nursing home placement post dc.           Louann Yuan LMSW  [facility]   [id number]

## 2023-06-26 NOTE — PLAN OF CARE
Pt is AOX4. Hr dropped to the 40s and 20s last night. MD aware. Purewick on. Bed is in the lowest position. Call light and personal items are within reach. Will continue to monitor.  Problem: Adult Inpatient Plan of Care  Goal: Plan of Care Review  Outcome: Ongoing, Progressing  Goal: Patient-Specific Goal (Individualized)  Outcome: Ongoing, Progressing  Goal: Absence of Hospital-Acquired Illness or Injury  Outcome: Ongoing, Progressing  Goal: Optimal Comfort and Wellbeing  Outcome: Ongoing, Progressing  Goal: Readiness for Transition of Care  Outcome: Ongoing, Progressing     Problem: Fluid and Electrolyte Imbalance (Acute Kidney Injury/Impairment)  Goal: Fluid and Electrolyte Balance  Outcome: Ongoing, Progressing     Problem: Oral Intake Inadequate (Acute Kidney Injury/Impairment)  Goal: Optimal Nutrition Intake  Outcome: Ongoing, Progressing     Problem: Renal Function Impairment (Acute Kidney Injury/Impairment)  Goal: Effective Renal Function  Outcome: Ongoing, Progressing     Problem: Infection  Goal: Absence of Infection Signs and Symptoms  Outcome: Ongoing, Progressing     Problem: Impaired Wound Healing  Goal: Optimal Wound Healing  Outcome: Ongoing, Progressing     Problem: Skin Injury Risk Increased  Goal: Skin Health and Integrity  Outcome: Ongoing, Progressing     Problem: Device-Related Complication Risk (Cardiac Rhythm Management Device)  Goal: Effective Device Function  Outcome: Ongoing, Progressing

## 2023-06-26 NOTE — NURSING
Pt is AOx4 resting comfortably in bed chest rise and fall observed call light within reach, bed in lowest position, and locked.  Waiting for pacemaker to be interrogated.

## 2023-06-26 NOTE — ASSESSMENT & PLAN NOTE
Chronic diastolic heart failure    Patient is identified as having Diastolic (HFpEF) heart failure that is Acute on chronic. CHF is currently uncontrolled due to Continued edema of extremities, Rales/crackles on pulmonary exam and Pulmonary edema/pleural effusion on CXR. Latest ECHO performed and demonstrates- Results for orders placed during the hospital encounter of 09/26/19    Transthoracic echo (TTE) 6/25/23   The left ventricle is normal in size with concentric remodeling and normal systolic function.   The estimated ejection fraction is 65%.   Grade III left ventricular diastolic dysfunction.   Severe left atrial enlargement.   Normal right ventricular size with normal right ventricular systolic function.   Mild right atrial enlargement.   There is mild aortic valve stenosis.   Aortic valve area is 2.22 cm2; peak velocity is 1.53 m/s; mean gradient is 6 mmHg.   Mild to moderate tricuspid regurgitation.   PASP in mid 50s to 60s depending on RAP.        Recent Labs   Lab 06/23/23  2245   *     Rales & BLE edema on Ex, tho pt is satting well ORA.  Labs w/ elevated BNP.  CXR w/ pulm congestion/edema.  Also w/ diarrhea & img c/f unspecific colitis, tho infectious is highly likely in s/o recent Abx for Klebsiella/E coli UTI.  Suspect CHF exac 2/2 infectious colitis vs Rx noncompliance vs dietary noncompliance vs worsening HF. Spironolactone d/c'd on prior hospitalization in s/o hyperkalemia.    - hold ACEI/ARB in s/o MARIO on CKD  - will hold off on diuresis  - GDMT as tolerated  - replete lytes p.r.n. goal K > 4.0, Mg > 2.0  - cardiac tele w/ pulse ox, supp O2 p.r.n. goal SpO2 > 92%, strict I/Os, daily weights, fall precautions, delirium precautions, aspiration precautions  - diet cardiac/low Na  - avoid cardiotoxic agents

## 2023-06-26 NOTE — PT/OT/SLP PROGRESS
Physical Therapy Treatment    Patient Name:  Gisselle Ortiz   MRN:  [id number]    Recommendations:     Discharge Recommendations: nursing facility, skilled  Discharge Equipment Recommendations: to be determined by next level of care  Barriers to discharge:   decreased functional mobility, fall risk, decreased caregiver support, and inaccessible home    Assessment:     Gisselle Ortiz is a 89 y.o. female admitted with a medical diagnosis of CHF exacerbation.  She presents with the following impairments/functional limitations: weakness, impaired endurance, impaired self care skills, impaired functional mobility, gait instability, impaired balance, decreased coordination, decreased upper extremity function, decreased lower extremity function, decreased safety awareness, pain, decreased ROM, impaired skin, edema, impaired cardiopulmonary response to activity.    Pt prasad ~9 min sitting at EOB with encouragement, pt with flexed posture and posterior leaning required Min A>>SBA for support/balance; v/t cues to hold her head up and maintain sitting upright posture. Pt c/o increased dizziness, subside as pt lie down.     BP was monitored throughout treatment  In supine prior treatment 114/59 MAP 83  Sitting up at EOB after 3 min 92/53 MAP 70  In Sitting 72/43 MAP 79  In supine post treatment 116/55 MAP 79  Nurse notified.    Pt will cont to benefit from Skilled therapy at SNF to reach goals.    Rehab Prognosis: Good and Fair; patient would benefit from acute skilled PT services to address these deficits and reach maximum level of function.    Recent Surgery: * No surgery found *      Plan:     During this hospitalization, patient to be seen 3 x/week to address the identified rehab impairments via therapeutic activities, therapeutic exercises, neuromuscular re-education, gait training and progress toward the following goals:    Plan of Care Expires:  07/24/23    Subjective     Chief Complaint: increased dizziness upon  sitting up at EOB  Patient/Family Comments/goals: Pt agreed to participate.  Pain/Comfort:  Pain Rating 1:  (pt did not rate)  Location - Side 1: Bilateral  Location 1: leg  Pain Addressed 1: Distraction, Cessation of Activity (when squeeze onto her legs, edema BUE/BLE)  Pain Rating Post-Intervention 1:  (pt did not rate)      Objective:     Communicated with nurse Blanchard/Travis prior to session.  Patient found HOB elevated with telemetry, peripheral IV, PureWick, pulse ox (continuous), blood pressure cuff upon PT entry to room.     General Precautions: Standard, fall, special contact  Orthopedic Precautions: N/A  Braces: N/A  Respiratory Status: Room air     Functional Mobility:  Bed Mobility:     Rolling Left:  minimum assistance   Rolling Right: minimum assistance  Scooting: anterior scoot to EOB with contact guard assistance  Bridging: toward HIB with total assistance and of 2 persons  Supine to Sit: contact guard assistance and minimum assistance for Trunk support with HOB elevated   Sit to Supine: moderate assistance, maximal assistance, and of 2 persons for Trunk and BLE management  Transfers:     Sit to Stand: from EOB with maximal assistance and of 2 persons with hand-held assist; brief standing for bed change  Balance: Fair Sitting    Min drainage from LUE, pad under L arm was soiled note and nurse notified.    Pt prasad ~9 min sitting at EOB with encouragement, pt with flexed posture and posterior leaning required Min A>>SBA for support/balance; v/t cues to hold her head up and maintain sitting upright posture. Pt c/o increased dizziness, subside as pt lie down.       AM-PAC 6 CLICK MOBILITY  Turning over in bed (including adjusting bedclothes, sheets and blankets)?: 3  Sitting down on and standing up from a chair with arms (e.g., wheelchair, bedside commode, etc.): 2  Moving from lying on back to sitting on the side of the bed?: 3  Moving to and from a bed to a chair (including a wheelchair)?: 1  Need to  walk in hospital room?: 1  Climbing 3-5 steps with a railing?: 1  Basic Mobility Total Score: 11       Treatment & Education:  Encouraged/educated pt on importance of Bed Mobility, elevated BLE above heart level, and performing BLE ex throughout the day, pt verbalized understanding.    BLE ex in seated at EOB 10 reps x 2 sets AP, LAQ (having pt counting number out loud)    Patient left HOB elevated with R side offloaded by wedge, BLE offloaded/elevated by pillow, LUE offloaded by pillow, tray table at bedside, all lines intact, call button in reach, nurse notified, and present.    GOALS:   Multidisciplinary Problems       Physical Therapy Goals          Problem: Physical Therapy    Goal Priority Disciplines Outcome Goal Variances Interventions   Physical Therapy Goal     PT, PT/OT Ongoing, Progressing     Description: Goals to be met by: 23    Patient will increase functional independence with mobility by performin. Supine to sit with Supervision using LRAD as needed  2. Sit to supine with Supervision using LRAD as needed  3. Sit to stand transfer with Supervision using LRAD as needed  4. Gait  x 50 feet with Minimum Assist using LRAD as needed  5. Ascend/Descend 6 inch curb step with Minimum Assist using LRAD as needed                         Time Tracking:     PT Received On: 23  PT Start Time: 1133     PT Stop Time: 1203  PT Total Time (min): 30 min     Billable Minutes: Therapeutic Activity 15 min and Therapeutic Exercise 15 min    Treatment Type: Treatment  PT/PTA: PTA     Number of PTA visits since last PT visit: 2023

## 2023-06-26 NOTE — PLAN OF CARE
Problem: Adult Inpatient Plan of Care  Goal: Plan of Care Review  Outcome: Ongoing, Progressing  Goal: Patient-Specific Goal (Individualized)  Outcome: Ongoing, Progressing  Goal: Absence of Hospital-Acquired Illness or Injury  Outcome: Ongoing, Progressing  Goal: Optimal Comfort and Wellbeing  Outcome: Ongoing, Progressing  Goal: Readiness for Transition of Care  Outcome: Ongoing, Progressing     Problem: Fluid and Electrolyte Imbalance (Acute Kidney Injury/Impairment)  Goal: Fluid and Electrolyte Balance  Outcome: Ongoing, Progressing     Problem: Oral Intake Inadequate (Acute Kidney Injury/Impairment)  Goal: Optimal Nutrition Intake  Outcome: Ongoing, Progressing     Problem: Renal Function Impairment (Acute Kidney Injury/Impairment)  Goal: Effective Renal Function  Outcome: Ongoing, Progressing     Problem: Infection  Goal: Absence of Infection Signs and Symptoms  Outcome: Ongoing, Progressing     Problem: Impaired Wound Healing  Goal: Optimal Wound Healing  Outcome: Ongoing, Progressing     Problem: Skin Injury Risk Increased  Goal: Skin Health and Integrity  Outcome: Ongoing, Progressing     Problem: Device-Related Complication Risk (Cardiac Rhythm Management Device)  Goal: Effective Device Function  Outcome: Ongoing, Progressing     Problem: Fall Injury Risk  Goal: Absence of Fall and Fall-Related Injury  Outcome: Ongoing, Progressing

## 2023-06-26 NOTE — PROGRESS NOTES
[facility] ([place] [address])  [facility] Medicine  Progress Note    Patient Name: Gisselle Ortiz  MRN: [id number]  Patient Class: IP- Inpatient   Admission Date: 6/23/2023  Length of Stay: 2 days  Attending Physician: Luke Moraes MD  Primary Care Provider: Kai Montez MD        Subjective:     Principal Problem:CHF exacerbation        HPI:  89 y.o. female w/ h/o HFmEF (50%), SSS s/p PM, pAF on apixaban, CKD 3b, HTN, HLD, hypothyroidism, GERD; p/w near syncope x1 episode.  Reports lightheadedness & fatigue following multiple episodes of nonbloody, watery diarrhea.  Also c/o SOB.  Denies fevers/chills, CP/cough, abdo pain, nausea/vomiting, constipation, dysuria/hematuria, melena/hematochezia, weakness/numbness/tingling, HA/presyncope/syncope.     Of note, pt was admitted 05/14-05/23 for intractable nausea/vomiting & Klebsiella & E coli UTI, now s/p Abx.  Found to have cholelithiasis w/o cholecystitis; AES recommended MRCP however pt unable to get 2/2 PM.  Also had constipation & significant stool burden on KUB, improved s/p bowel regimen.  Pt was d/c'd to SNF (05/23-06/23) for PT/OT.    ED course: AF, HR 64, BP 92/52, 99% ORA.  Labs u/r except for Hb 9.1 (b/l 8-9), HCO3 17, Cr 1.5 (b/l 1.2-1.3), Ca 7.3, , Alb 1.7, AST/ALT 96/96, , trop 0.042 --> 0.043; TSH u/r.  UA w/ positive nitrite, 3+ leuks, rare bacteria, 0 WBC.  CXR w/ cardiomgealy w/ BL pleural effusions & perihilar interstitial opacities, c/f CHF exacerbation/hypervolemia vs less likely infectious/inflammatory process.      Overview/Hospital Course:  Patient admitted to hospital medicine for further management. Patient's BP was low but maintaining MAPs of >65. Lactic was negative. Held lasix in the setting of a worsening MARIO and as patient did not need supplemental O2. Echo pending. Patient now able to tolerate more PO intake. HR dropped to 20's and low 30's during admission. On tele there was concern over  possible artifact. EP consulted for device interrogation. Repeat Echo showed EF of 65% and increased diastolic dysfunction. PT/OT recommended SNF upon discharge.      Interval History: HR dropped to 20's and 30's overnight. Pt asymptomatic. EP consulted for device interrogation. Cr downtrending to 1.8. Working on SNF placement. Still hypervolemic on exam.    Review of Systems   Constitutional:  Positive for activity change and fatigue. Negative for chills, fever and unexpected weight change.   HENT:  Negative for sinus pain, trouble swallowing and voice change.    Eyes:  Negative for photophobia, pain and visual disturbance.   Respiratory:  Negative for cough, shortness of breath and wheezing.    Cardiovascular:  Negative for chest pain, palpitations and leg swelling.   Gastrointestinal:  Negative for abdominal pain, blood in stool, constipation, diarrhea, nausea and vomiting.   Endocrine: Negative for polydipsia and polyuria.   Genitourinary:  Negative for dysuria, frequency, hematuria and urgency.   Musculoskeletal:  Negative for arthralgias, back pain and myalgias.   Skin:  Negative for color change and pallor.   Neurological:  Positive for dizziness, weakness and light-headedness. Negative for syncope, numbness and headaches.   Hematological:  Does not bruise/bleed easily.   Psychiatric/Behavioral:  Negative for confusion, dysphoric mood and sleep disturbance.    Objective:     Vital Signs (Most Recent):  Temp: 97.9 °F (36.6 °C) (06/26/23 0821)  Pulse: 62 (06/26/23 0821)  Resp: 19 (06/26/23 0749)  BP: (!) 115/58 (06/26/23 0821)  SpO2: 100 % (06/26/23 0821) Vital Signs (24h Range):  Temp:  [97.9 °F (36.6 °C)-98.4 °F (36.9 °C)] 97.9 °F (36.6 °C)  Pulse:  [60-67] 62  Resp:  [16-19] 19  SpO2:  [96 %-100 %] 100 %  BP: (103-123)/(51-76) 115/58     Weight: 66.7 kg (147 lb)  Body mass index is 26.04 kg/m².    Intake/Output Summary (Last 24 hours) at 6/26/2023 1059  Last data filed at 6/26/2023 0800  Gross per 24 hour    Intake 222 ml   Output --   Net 222 ml         Physical Exam  Vitals and nursing note reviewed.   Constitutional:       General: She is not in acute distress.     Appearance: She is not ill-appearing, toxic-appearing or diaphoretic.   HENT:      Head: Normocephalic and atraumatic.      Right Ear: External ear normal.      Left Ear: External ear normal.      Mouth/Throat:      Mouth: Mucous membranes are moist.      Pharynx: No oropharyngeal exudate.   Eyes:      Extraocular Movements: Extraocular movements intact.      Pupils: Pupils are equal, round, and reactive to light.   Cardiovascular:      Rate and Rhythm: Normal rate and regular rhythm.      Pulses: Normal pulses.      Heart sounds: Normal heart sounds. No murmur heard.  Pulmonary:      Effort: Pulmonary effort is normal. No respiratory distress.      Breath sounds: Rales present. No wheezing.   Abdominal:      General: Abdomen is flat. Bowel sounds are normal. There is no distension.      Palpations: Abdomen is soft. There is no mass.      Tenderness: There is no abdominal tenderness.   Musculoskeletal:         General: No swelling or tenderness. Normal range of motion.      Cervical back: Normal range of motion and neck supple.      Right lower leg: Edema present.      Left lower leg: Edema present.   Skin:     General: Skin is warm and dry.      Capillary Refill: Capillary refill takes less than 2 seconds.   Neurological:      General: No focal deficit present.      Mental Status: She is alert and oriented to person, place, and time. Mental status is at baseline.   Psychiatric:         Mood and Affect: Mood normal.         Behavior: Behavior normal.           Significant Labs: All pertinent labs within the past 24 hours have been reviewed.    Significant Imaging: I have reviewed all pertinent imaging results/findings within the past 24 hours.      Assessment/Plan:      * CHF exacerbation  Chronic diastolic heart failure    Patient is identified as  having Diastolic (HFpEF) heart failure that is Acute on chronic. CHF is currently uncontrolled due to Continued edema of extremities, Rales/crackles on pulmonary exam and Pulmonary edema/pleural effusion on CXR. Latest ECHO performed and demonstrates- Results for orders placed during the hospital encounter of 09/26/19    Transthoracic echo (TTE) 6/25/23   The left ventricle is normal in size with concentric remodeling and normal systolic function.   The estimated ejection fraction is 65%.   Grade III left ventricular diastolic dysfunction.   Severe left atrial enlargement.   Normal right ventricular size with normal right ventricular systolic function.   Mild right atrial enlargement.   There is mild aortic valve stenosis.   Aortic valve area is 2.22 cm2; peak velocity is 1.53 m/s; mean gradient is 6 mmHg.   Mild to moderate tricuspid regurgitation.   PASP in mid 50s to 60s depending on RAP.        Recent Labs   Lab 06/23/23  2245   *     Rales & BLE edema on Ex, tho pt is satting well ORA.  Labs w/ elevated BNP.  CXR w/ pulm congestion/edema.  Also w/ diarrhea & img c/f unspecific colitis, tho infectious is highly likely in s/o recent Abx for Klebsiella/E coli UTI.  Suspect CHF exac 2/2 infectious colitis vs Rx noncompliance vs dietary noncompliance vs worsening HF. Spironolactone d/c'd on prior hospitalization in s/o hyperkalemia.    - hold ACEI/ARB in s/o MARIO on CKD  - will hold off on diuresis  - GDMT as tolerated  - replete lytes p.r.n. goal K > 4.0, Mg > 2.0  - cardiac tele w/ pulse ox, supp O2 p.r.n. goal SpO2 > 92%, strict I/Os, daily weights, fall precautions, delirium precautions, aspiration precautions  - diet cardiac/low Na  - avoid cardiotoxic agents     Acute kidney injury superimposed on chronic kidney disease  Stage 3b chronic kidney disease    On admission, Cr 1.5 w/ b/l 1.2-1.3.  Recent hospitalization w/ prerenal MARIO, resolved s/p IVF.  Also w/ CHF exac/hypervolemia, so would be  cautious w/ IVF rehydration.  DDx includes cardiorenal syndrome/congestive vs prerenal vs intrinsic vs obstructive.    - will hold lasix as patient not requiring supplemental O2  - renally dose Rx  - strict I/Os, bladder scan p.r.n. UR, straight/Cottrell cath p.r.n. bladder scan > 300 cc  - hold NSAIDs  - avoid nephrotoxic agents     SSS (sick sinus syndrome)  Cardiac pacemaker in situ    - monitor     - EP consulted for device interrogation after episodes of bradycardia, despite having pacemaker    Impaired functional mobility and endurance  - PT/OT recommending SNF    Colitis  Pt w/ recent hospitalization w/ Abx for UTI; p/w watery nonbloody diarrhea.  Img c/w nonspecific colitis.  C/f C diff in s/o recent Abx use.  DDx other infectious colitis vs inflammatory vs less likely ischemic vs Rx-induced vs pseudomembranous.    - no episodes of diarrhea while inpatient, likely viral gastroenteritis episode  - Abx, tailor p.r.n. Cx; Ciprofloxacin & metronidazole for GNR & anaerobic coverage    Severe protein-calorie malnutrition  Nutrition consulted. Most recent weight and BMI monitored-     Measurements:  Wt Readings from Last 1 Encounters:   06/25/23 66.7 kg (147 lb)   Body mass index is 26.04 kg/m².    RD consulted  Likely edema 2/2 malnutrition. Patient feeling much better today. Will advance diet as tolerated       Anemia due to chronic kidney disease  - monitor  - home Vitamins     Chronic diastolic heart failure  See CHF exacerbation      Stage 3b chronic kidney disease  See MARIO      Gastroesophageal reflux disease  - home PPI     Insomnia  - hold Mirtazapine in s/o presyncope; restart as clinically indicated     Current use of long term anticoagulation  [id number] Score: 3. Continue apixaban        Long term current use of amiodarone  Continue in setting of history of afib      High cholesterol  - not on home statin    Acquired hypothyroidism  - home levothyroxine     Essential hypertension  - home Atenolol  - hold  Midodrine  - hold Hydralazine in s/o presyncope & possible infectious colitis; restart as clinically indicated     Cardiac pacemaker in situ  2/2 SSS      Paroxysmal atrial fibrillation  Long term current use of amiodarone  Current use of long term anticoagulation    Patient with Paroxysmal (<7 days) atrial fibrillation which is controlled currently with Beta Blocker and Amiodarone. Patient is currently in sinus rhythm.[id number] Score: 3. HASBLED Score: 3. Anticoagulation indicated. Anticoagulation done with Apixaban.  Low suspicion for amiodarone toxicity (lung, liver, thyroid) at this time.    - monitor  - home amiodarone, apixaban  - ECG p.r.n. CP or palpitations       VTE Risk Mitigation (From admission, onward)         Ordered     apixaban tablet 2.5 mg  2 times daily         06/24/23 0403     IP VTE HIGH RISK PATIENT  Once         06/24/23 0358     Place sequential compression device  Until discontinued         06/24/23 0358                Discharge Planning   FRANCESCA: 6/28/2023     Code Status: DNR   Is the patient medically ready for discharge?: No    Reason for patient still in hospital (select all that apply): Patient trending condition  Discharge Plan A: Home Health   Discharge Delays: None known at this time            Nicholas Kline DO  Department of Hospital Medicine   [facility] - Intensive Care ([place]

## 2023-06-26 NOTE — ASSESSMENT & PLAN NOTE
Pt w/ recent hospitalization w/ Abx for UTI; p/w watery nonbloody diarrhea.  Img c/w nonspecific colitis.  C/f C diff in s/o recent Abx use.  DDx other infectious colitis vs inflammatory vs less likely ischemic vs Rx-induced vs pseudomembranous.    - no episodes of diarrhea while inpatient, likely viral gastroenteritis episode  - Abx, tailor p.r.n. Cx; Ciprofloxacin & metronidazole for GNR & anaerobic coverage

## 2023-06-26 NOTE — ASSESSMENT & PLAN NOTE
Cardiac pacemaker in situ    - monitor     - EP consulted for device interrogation after episodes of bradycardia, despite having pacemaker

## 2023-06-27 NOTE — ASSESSMENT & PLAN NOTE
Stage 3b chronic kidney disease    On admission, Cr 1.5 w/ b/l 1.2-1.3.  Recent hospitalization w/ prerenal MARIO, resolved s/p IVF.  Also w/ CHF exac/hypervolemia, so would be cautious w/ IVF rehydration.  DDx includes cardiorenal syndrome/congestive vs prerenal vs intrinsic vs obstructive.    - one time dose IV lasix 40 mg to help with volume overload  - renally dose Rx  - strict I/Os, bladder scan p.r.n. UR, straight/Cottrell cath p.r.n. bladder scan > 300 cc  - hold NSAIDs  - avoid nephrotoxic agents

## 2023-06-27 NOTE — PROGRESS NOTES
Cardiac device interrogation and/or reprogramming completed by industry representative, Julio C with [employer]; please refer to report located in the Media tab.

## 2023-06-27 NOTE — NURSING
Notified Dr. Ruiz and med team 5 of swelling on right forearm next to where TB skin test was give, MD will evaluate.

## 2023-06-27 NOTE — ASSESSMENT & PLAN NOTE
Chronic diastolic heart failure    Patient is identified as having Diastolic (HFpEF) heart failure that is Acute on chronic. CHF is currently uncontrolled due to Continued edema of extremities, Rales/crackles on pulmonary exam and Pulmonary edema/pleural effusion on CXR. Latest ECHO performed and demonstrates- Results for orders placed during the hospital encounter of 09/26/19    Transthoracic echo (TTE) 6/25/23   The left ventricle is normal in size with concentric remodeling and normal systolic function.   The estimated ejection fraction is 65%.   Grade III left ventricular diastolic dysfunction.   Severe left atrial enlargement.   Normal right ventricular size with normal right ventricular systolic function.   Mild right atrial enlargement.   There is mild aortic valve stenosis.   Aortic valve area is 2.22 cm2; peak velocity is 1.53 m/s; mean gradient is 6 mmHg.   Mild to moderate tricuspid regurgitation.   PASP in mid 50s to 60s depending on RAP.        Recent Labs   Lab 06/23/23  2245   *     Rales & BLE edema on Ex, tho pt is satting well ORA.  Labs w/ elevated BNP.  CXR w/ pulm congestion/edema.  Also w/ diarrhea & img c/f unspecific colitis, tho infectious is highly likely in s/o recent Abx for Klebsiella/E coli UTI.  Suspect CHF exac 2/2 infectious colitis vs Rx noncompliance vs dietary noncompliance vs worsening HF. Spironolactone d/c'd on prior hospitalization in s/o hyperkalemia.    - hold ACEI/ARB in s/o MARIO on CKD  - GDMT as tolerated  - replete lytes p.r.n. goal K > 4.0, Mg > 2.0  - cardiac tele w/ pulse ox, supp O2 p.r.n. goal SpO2 > 92%, strict I/Os, daily weights, fall precautions, delirium precautions, aspiration precautions  - diet cardiac/low Na  - avoid cardiotoxic agents

## 2023-06-27 NOTE — CONSULTS
[facility] ([place] [address])  Wound Care    Patient Name:  Gisselle Ortiz   MRN:  [id number]  Date: 6/27/2023  Diagnosis: CHF exacerbation    History:     Past Medical History:   Diagnosis Date    A-fib     Anemia of chronic disease 02/02/2021    Anticoagulant long-term use     CHF (congestive heart failure)     Chronic diastolic heart failure 02/02/2021    COVID-19 01/07/2022    Gallstone pancreatitis     Hypertension     Pancreatic abscess 09/26/2020    Stage 3 chronic kidney disease 5/11/2016    Thyroid disease        Social History     Socioeconomic History    Marital status:    Tobacco Use    Smoking status: Former     Types: Cigarettes     Start date: 5/19/1964    Smokeless tobacco: Never   Substance and Sexual Activity    Alcohol use: No    Drug use: No    Sexual activity: Not Currently     Partners: Male     Social Determinants of Health     Financial Resource Strain: Low Risk     Difficulty of Paying Living Expenses: Not very hard   Food Insecurity: No Food Insecurity    Worried About Running Out of Food in the Last Year: Never true    Ran Out of Food in the Last Year: Never true   Transportation Needs: No Transportation Needs    Lack of Transportation (Medical): No    Lack of Transportation (Non-Medical): No   Physical Activity: Inactive    Days of Exercise per Week: 0 days    Minutes of Exercise per Session: 0 min   Stress: No Stress Concern Present    Feeling of Stress : Not at all   Social Connections: Moderately Isolated    Frequency of Communication with Friends and Family: More than three times a week    Frequency of Social Gatherings with Friends and Family: Once a week    Attends Buddhist Services: 1 to 4 times per year    Active Member of Clubs or Organizations: No    Attends Club or Organization Meetings: Never    Marital Status:    Housing Stability: Low Risk     Unable to Pay for Housing in the Last Year: No    Number of Places Lived in the Last Year: 1     Unstable Housing in the Last Year: No       Precautions:     Allergies as of 06/23/2023 - Reviewed 06/23/2023   Allergen Reaction Noted    Ciprofloxacin Nausea And Vomiting 05/16/2019       [facility] Assessment Details/Treatment   Patient seen for wound care consultation.   Reviewed chart for this encounter.   See Flow Sheet for findings.    The sacrum and buttocks are intact, pink, moist and blanchable. The left forearm has a skin tear with a pink and moist wound bed. The left arm weeks a moderate amount of serous fluid. The patient utilize a female external urinary catheter and is able to change positions in the bed with minimal assistance. She is on a low air loss mattress. Educated patient on the importance of changing positions every 2 hours.     RECOMMENDATIONS:  - Sacrum and buttocks: bedside nursing to cleanse with soap and water or wound cleanser, pat dry and apply triad BID and PRN; no dressings nor diapers  - Skin tears: bedside nursing to cleanse with NS, pat dry, apply a foam dressing every 3 days; use adhesive removal when removing dressings  - Weeping: absorbant pads/medline ultrasorbs advanced pads under weeping arm; change daily/PRN saturation   - Turning every 2 hours  - Heel protecting boots  - Low air loss mattress or waffle mattress overlay   - Nursing to maintain pressure injury prevention interventions.        06/27/23 1126        Altered Skin Integrity 05/24/23 0546 Sacral spine Incontinence associated dermatitis   Date First Assessed/Time First Assessed: 05/24/23 0546   Altered Skin Integrity Present on Admission - Did Patient arrive to the hospital with altered skin?: yes  Location: Sacral spine  Is this injury device related?: No  Primary Wound Type: (c) Inco...   Dressing Appearance Intact;Dry   Drainage Amount None   Appearance Pink;Other (see comments);Moist  (blanchable)   Periwound Area Intact;Moist   Care Cleansed with:;Soap and water   Dressing Removed;Foam;Applied;Other  (comment)  (TRIAD)       Recommendations made to primary team for above plan via secure chat. Orders placed. Wound care will follow-up as needed.   Discussed POC with patient and primary nurse.     06/27/2023

## 2023-06-27 NOTE — SUBJECTIVE & OBJECTIVE
Interval History: NAEO. Pt HDS in NAD. Cr improved today, 6/27/23. Will give one time IV lasix 40 to help with lower extremity swelling. Working with CM and SW for pt to apply to Medicaid to help pay for dispo to nursing home vs SNF. Pt improving from a medical standpoint.     Review of Systems   Constitutional:  Positive for activity change and fatigue. Negative for chills, fever and unexpected weight change.   HENT:  Negative for sinus pain, trouble swallowing and voice change.    Eyes:  Negative for photophobia, pain and visual disturbance.   Respiratory:  Negative for cough, shortness of breath and wheezing.    Cardiovascular:  Negative for chest pain, palpitations and leg swelling.   Gastrointestinal:  Negative for abdominal pain, blood in stool, constipation, diarrhea, nausea and vomiting.   Endocrine: Negative for polydipsia and polyuria.   Genitourinary:  Negative for dysuria, frequency, hematuria and urgency.   Musculoskeletal:  Negative for arthralgias, back pain and myalgias.   Skin:  Negative for color change and pallor.   Neurological:  Positive for dizziness, weakness and light-headedness. Negative for syncope, numbness and headaches.   Hematological:  Does not bruise/bleed easily.   Psychiatric/Behavioral:  Negative for confusion, dysphoric mood and sleep disturbance.    Objective:     Vital Signs (Most Recent):  Temp: 97.7 °F (36.5 °C) (06/27/23 1102)  Pulse: 65 (06/27/23 1109)  Resp: 18 (06/27/23 0432)  BP: (!) 118/59 (06/27/23 1102)  SpO2: (!) 91 % (06/27/23 1102) Vital Signs (24h Range):  Temp:  [97.6 °F (36.4 °C)-98.4 °F (36.9 °C)] 97.7 °F (36.5 °C)  Pulse:  [] 65  Resp:  [17-18] 18  SpO2:  [91 %-99 %] 91 %  BP: ()/(48-64) 118/59     Weight: 66.7 kg (147 lb)  Body mass index is 26.04 kg/m².    Intake/Output Summary (Last 24 hours) at 6/27/2023 1218  Last data filed at 6/27/2023 1047  Gross per 24 hour   Intake 120 ml   Output 900 ml   Net -780 ml         Physical Exam  Vitals and  nursing note reviewed.   Constitutional:       General: She is not in acute distress.     Appearance: She is not ill-appearing, toxic-appearing or diaphoretic.   HENT:      Head: Normocephalic and atraumatic.      Right Ear: External ear normal.      Left Ear: External ear normal.      Mouth/Throat:      Mouth: Mucous membranes are moist.      Pharynx: No oropharyngeal exudate.   Eyes:      Extraocular Movements: Extraocular movements intact.      Pupils: Pupils are equal, round, and reactive to light.   Cardiovascular:      Rate and Rhythm: Normal rate and regular rhythm.      Pulses: Normal pulses.      Heart sounds: Normal heart sounds. No murmur heard.  Pulmonary:      Effort: Pulmonary effort is normal. No respiratory distress.      Breath sounds: Rales present. No wheezing.   Abdominal:      General: Abdomen is flat. Bowel sounds are normal. There is no distension.      Palpations: Abdomen is soft. There is no mass.      Tenderness: There is no abdominal tenderness.   Musculoskeletal:         General: No swelling or tenderness. Normal range of motion.      Cervical back: Normal range of motion and neck supple.      Right lower leg: Edema present.      Left lower leg: Edema present.   Skin:     General: Skin is warm and dry.      Capillary Refill: Capillary refill takes less than 2 seconds.   Neurological:      General: No focal deficit present.      Mental Status: She is alert and oriented to person, place, and time. Mental status is at baseline.   Psychiatric:         Mood and Affect: Mood normal.         Behavior: Behavior normal.           Significant Labs: All pertinent labs within the past 24 hours have been reviewed.    Significant Imaging: I have reviewed all pertinent imaging results/findings within the past 24 hours.

## 2023-06-27 NOTE — ASSESSMENT & PLAN NOTE
Cardiac pacemaker in situ    - monitor     - EP consulted for device interrogation after episodes of bradycardia, despite having pacemaker  - interrogation showing no abnormalities with pacemaker, bradycardia likely artifact on telemetry

## 2023-06-27 NOTE — PROGRESS NOTES
[facility] - Intensive Care ([place] [address])  [facility] Medicine  Progress Note    Patient Name: Gisselle Ortiz  MRN: [id number]  Patient Class: IP- Inpatient   Admission Date: 6/23/2023  Length of Stay: 3 days  Attending Physician: Luke Moraes MD  Primary Care Provider: Kai Montez MD        Subjective:     Principal Problem:CHF exacerbation        HPI:  89 y.o. female w/ h/o HFmEF (50%), SSS s/p PM, pAF on apixaban, CKD 3b, HTN, HLD, hypothyroidism, GERD; p/w near syncope x1 episode.  Reports lightheadedness & fatigue following multiple episodes of nonbloody, watery diarrhea.  Also c/o SOB.  Denies fevers/chills, CP/cough, abdo pain, nausea/vomiting, constipation, dysuria/hematuria, melena/hematochezia, weakness/numbness/tingling, HA/presyncope/syncope.     Of note, pt was admitted 05/14-05/23 for intractable nausea/vomiting & Klebsiella & E coli UTI, now s/p Abx.  Found to have cholelithiasis w/o cholecystitis; AES recommended MRCP however pt unable to get 2/2 PM.  Also had constipation & significant stool burden on KUB, improved s/p bowel regimen.  Pt was d/c'd to SNF (05/23-06/23) for PT/OT.    ED course: AF, HR 64, BP 92/52, 99% ORA.  Labs u/r except for Hb 9.1 (b/l 8-9), HCO3 17, Cr 1.5 (b/l 1.2-1.3), Ca 7.3, , Alb 1.7, AST/ALT 96/96, , trop 0.042 --> 0.043; TSH u/r.  UA w/ positive nitrite, 3+ leuks, rare bacteria, 0 WBC.  CXR w/ cardiomgealy w/ BL pleural effusions & perihilar interstitial opacities, c/f CHF exacerbation/hypervolemia vs less likely infectious/inflammatory process.      Overview/Hospital Course:  Patient admitted to hospital medicine for further management. Patient's BP was low but maintaining MAPs of >65. Lactic was negative. Held lasix in the setting of a worsening MARIO and as patient did not need supplemental O2. Echo pending. Patient now able to tolerate more PO intake. HR dropped to 20's and low 30's during admission. On tele there was concern over  possible artifact. EP consulted for device interrogation which revealed no abnormalities in pacemaker device. Repeat Echo showed EF of 65% and increased diastolic dysfunction. PT/OT recommended SNF upon discharge. Due to insurance boundaries, patient may only be able to pay for [facility].      Interval History: NAEO. Pt HDS in NAD. Cr improved today, 6/27/23. Will give one time IV lasix 40 to help with lower extremity swelling. Working with CM and SW for pt to apply to Medicaid to help pay for dispo to nursing home vs SNF. Pt improving from a medical standpoint.     Review of Systems   Constitutional:  Positive for activity change and fatigue. Negative for chills, fever and unexpected weight change.   HENT:  Negative for sinus pain, trouble swallowing and voice change.    Eyes:  Negative for photophobia, pain and visual disturbance.   Respiratory:  Negative for cough, shortness of breath and wheezing.    Cardiovascular:  Negative for chest pain, palpitations and leg swelling.   Gastrointestinal:  Negative for abdominal pain, blood in stool, constipation, diarrhea, nausea and vomiting.   Endocrine: Negative for polydipsia and polyuria.   Genitourinary:  Negative for dysuria, frequency, hematuria and urgency.   Musculoskeletal:  Negative for arthralgias, back pain and myalgias.   Skin:  Negative for color change and pallor.   Neurological:  Positive for dizziness, weakness and light-headedness. Negative for syncope, numbness and headaches.   Hematological:  Does not bruise/bleed easily.   Psychiatric/Behavioral:  Negative for confusion, dysphoric mood and sleep disturbance.    Objective:     Vital Signs (Most Recent):  Temp: 97.7 °F (36.5 °C) (06/27/23 1102)  Pulse: 65 (06/27/23 1109)  Resp: 18 (06/27/23 0432)  BP: (!) 118/59 (06/27/23 1102)  SpO2: (!) 91 % (06/27/23 1102) Vital Signs (24h Range):  Temp:  [97.6 °F (36.4 °C)-98.4 °F (36.9 °C)] 97.7 °F (36.5 °C)  Pulse:  [] 65  Resp:  [17-18]  18  SpO2:  [91 %-99 %] 91 %  BP: ()/(48-64) 118/59     Weight: 66.7 kg (147 lb)  Body mass index is 26.04 kg/m².    Intake/Output Summary (Last 24 hours) at 6/27/2023 1218  Last data filed at 6/27/2023 1047  Gross per 24 hour   Intake 120 ml   Output 900 ml   Net -780 ml         Physical Exam  Vitals and nursing note reviewed.   Constitutional:       General: She is not in acute distress.     Appearance: She is not ill-appearing, toxic-appearing or diaphoretic.   HENT:      Head: Normocephalic and atraumatic.      Right Ear: External ear normal.      Left Ear: External ear normal.      Mouth/Throat:      Mouth: Mucous membranes are moist.      Pharynx: No oropharyngeal exudate.   Eyes:      Extraocular Movements: Extraocular movements intact.      Pupils: Pupils are equal, round, and reactive to light.   Cardiovascular:      Rate and Rhythm: Normal rate and regular rhythm.      Pulses: Normal pulses.      Heart sounds: Normal heart sounds. No murmur heard.  Pulmonary:      Effort: Pulmonary effort is normal. No respiratory distress.      Breath sounds: Rales present. No wheezing.   Abdominal:      General: Abdomen is flat. Bowel sounds are normal. There is no distension.      Palpations: Abdomen is soft. There is no mass.      Tenderness: There is no abdominal tenderness.   Musculoskeletal:         General: No swelling or tenderness. Normal range of motion.      Cervical back: Normal range of motion and neck supple.      Right lower leg: Edema present.      Left lower leg: Edema present.   Skin:     General: Skin is warm and dry.      Capillary Refill: Capillary refill takes less than 2 seconds.   Neurological:      General: No focal deficit present.      Mental Status: She is alert and oriented to person, place, and time. Mental status is at baseline.   Psychiatric:         Mood and Affect: Mood normal.         Behavior: Behavior normal.           Significant Labs: All pertinent labs within the past 24 hours  have been reviewed.    Significant Imaging: I have reviewed all pertinent imaging results/findings within the past 24 hours.      Assessment/Plan:      * CHF exacerbation  Chronic diastolic heart failure    Patient is identified as having Diastolic (HFpEF) heart failure that is Acute on chronic. CHF is currently uncontrolled due to Continued edema of extremities, Rales/crackles on pulmonary exam and Pulmonary edema/pleural effusion on CXR. Latest ECHO performed and demonstrates- Results for orders placed during the hospital encounter of 09/26/19    Transthoracic echo (TTE) 6/25/23   The left ventricle is normal in size with concentric remodeling and normal systolic function.   The estimated ejection fraction is 65%.   Grade III left ventricular diastolic dysfunction.   Severe left atrial enlargement.   Normal right ventricular size with normal right ventricular systolic function.   Mild right atrial enlargement.   There is mild aortic valve stenosis.   Aortic valve area is 2.22 cm2; peak velocity is 1.53 m/s; mean gradient is 6 mmHg.   Mild to moderate tricuspid regurgitation.   PASP in mid 50s to 60s depending on RAP.        Recent Labs   Lab 06/23/23  2245   *     Rales & BLE edema on Ex, tho pt is satting well ORA.  Labs w/ elevated BNP.  CXR w/ pulm congestion/edema.  Also w/ diarrhea & img c/f unspecific colitis, tho infectious is highly likely in s/o recent Abx for Klebsiella/E coli UTI.  Suspect CHF exac 2/2 infectious colitis vs Rx noncompliance vs dietary noncompliance vs worsening HF. Spironolactone d/c'd on prior hospitalization in s/o hyperkalemia.    - hold ACEI/ARB in s/o MARIO on CKD  - GDMT as tolerated  - replete lytes p.r.n. goal K > 4.0, Mg > 2.0  - cardiac tele w/ pulse ox, supp O2 p.r.n. goal SpO2 > 92%, strict I/Os, daily weights, fall precautions, delirium precautions, aspiration precautions  - diet cardiac/low Na  - avoid cardiotoxic agents     Acute kidney injury superimposed  on chronic kidney disease  Stage 3b chronic kidney disease    On admission, Cr 1.5 w/ b/l 1.2-1.3.  Recent hospitalization w/ prerenal MARIO, resolved s/p IVF.  Also w/ CHF exac/hypervolemia, so would be cautious w/ IVF rehydration.  DDx includes cardiorenal syndrome/congestive vs prerenal vs intrinsic vs obstructive.    - one time dose IV lasix 40 mg to help with volume overload  - renally dose Rx  - strict I/Os, bladder scan p.r.n. UR, straight/Cottrell cath p.r.n. bladder scan > 300 cc  - hold NSAIDs  - avoid nephrotoxic agents     SSS (sick sinus syndrome)  Cardiac pacemaker in situ    - monitor     - EP consulted for device interrogation after episodes of bradycardia, despite having pacemaker  - interrogation showing no abnormalities with pacemaker, bradycardia likely artifact on telemetry     Impaired functional mobility and endurance  - PT/OT recommending SNF    Colitis  Pt w/ recent hospitalization w/ Abx for UTI; p/w watery nonbloody diarrhea.  Img c/w nonspecific colitis.  C/f C diff in s/o recent Abx use.  DDx other infectious colitis vs inflammatory vs less likely ischemic vs Rx-induced vs pseudomembranous.    - no episodes of diarrhea while inpatient, likely viral gastroenteritis episode  - Abx, tailor p.r.n. Cx; Ciprofloxacin & metronidazole for 5 days to cover GNR & anaerobic     Severe protein-calorie malnutrition  Nutrition consulted. Most recent weight and BMI monitored-     Measurements:  Wt Readings from Last 1 Encounters:   06/25/23 66.7 kg (147 lb)   Body mass index is 26.04 kg/m².    RD consulted  Likely edema 2/2 malnutrition. Patient feeling much better today. Will advance diet as tolerated       Anemia due to chronic kidney disease  - monitor  - home Vitamins     Chronic diastolic heart failure  See CHF exacerbation      Stage 3b chronic kidney disease  See MARIO      Gastroesophageal reflux disease  - home PPI     Insomnia  - hold Mirtazapine in s/o presyncope; restart as clinically indicated      Current use of long term anticoagulation  [id number] Score: 3. Continue apixaban        Long term current use of amiodarone  Continue in setting of history of afib      High cholesterol  - not on home statin    Acquired hypothyroidism  - home levothyroxine     Essential hypertension  - home Atenolol  - hold Midodrine  - hold Hydralazine in s/o presyncope & possible infectious colitis; restart as clinically indicated     Cardiac pacemaker in situ  2/2 SSS      Paroxysmal atrial fibrillation  Long term current use of amiodarone  Current use of long term anticoagulation    Patient with Paroxysmal (<7 days) atrial fibrillation which is controlled currently with Beta Blocker and Amiodarone. Patient is currently in sinus rhythm.[id number] Score: 3. HASBLED Score: 3. Anticoagulation indicated. Anticoagulation done with Apixaban.  Low suspicion for amiodarone toxicity (lung, liver, thyroid) at this time.    - monitor  - home amiodarone, apixaban  - ECG p.r.n. CP or palpitations       VTE Risk Mitigation (From admission, onward)         Ordered     apixaban tablet 2.5 mg  2 times daily         06/24/23 0403     IP VTE HIGH RISK PATIENT  Once         06/24/23 0358     Place sequential compression device  Until discontinued         06/24/23 0358                Discharge Planning   FRANCESCA: 6/29/2023     Code Status: DNR   Is the patient medically ready for discharge?: Yes    Reason for patient still in hospital (select all that apply): Patient trending condition  Discharge Plan A: New Nursing Home placement - halfway care facility   Discharge Delays: None known at this time              Nicholas Kline DO  Department of Hospital Medicine   [facility] - Intensive Care ([place])

## 2023-06-27 NOTE — NURSING
Patient AAOx3. No distress or needs at this time. Plan of care reviewed with patient. Instructed on use of call light. Return demonstration given. Call light in reach. Bed in lowest and locked position. Side rails x2. Nurse will continue to monitor.

## 2023-06-27 NOTE — ASSESSMENT & PLAN NOTE
Pt w/ recent hospitalization w/ Abx for UTI; p/w watery nonbloody diarrhea.  Img c/w nonspecific colitis.  C/f C diff in s/o recent Abx use.  DDx other infectious colitis vs inflammatory vs less likely ischemic vs Rx-induced vs pseudomembranous.    - no episodes of diarrhea while inpatient, likely viral gastroenteritis episode  - Abx, tailor p.r.n. Cx; Ciprofloxacin & metronidazole for 5 days to cover GNR & anaerobic

## 2023-06-27 NOTE — PLAN OF CARE
NURSING HOME ORDERS    06/29/2023  [facility]  [facility] - INTENSIVE CARE ([place])  [address]  [facility] [address]  Dept: [phone]  Loc: [phone]     [facility]    Diagnoses:  Active Hospital Problems    Diagnosis  POA    *CHF exacerbation [I50.9]  Yes     Priority: 1 - High    Acute kidney injury superimposed on chronic kidney disease [N17.9, N18.9]  Yes     Priority: 2     SSS (sick sinus syndrome) [I49.5]  Yes     Priority: 3      Chronic    Impaired functional mobility and endurance [Z74.09]  Yes     Priority: 4      Chronic    Colitis [K52.9]  Yes    Severe protein-calorie malnutrition [E43]  Yes     Chronic    Anemia due to chronic kidney disease [N18.9, D63.1]  Yes     Chronic    Chronic diastolic heart failure [I50.32]  Yes     Chronic    Current use of long term anticoagulation [Z79.01]  Not Applicable     Chronic    Long term current use of amiodarone [Z79.899]  Not Applicable     Chronic    Stage 3b chronic kidney disease [N18.32]  Yes     Chronic    Insomnia [G47.00]  Yes     Chronic    Gastroesophageal reflux disease [K21.9]  Yes     Chronic    Essential hypertension [I10]  Yes     Chronic    Acquired hypothyroidism [E03.9]  Yes     Chronic    Cardiac pacemaker in situ [Z95.0]  Yes     Chronic    High cholesterol [E78.00]  Yes     Chronic    Paroxysmal atrial fibrillation [I48.0]  Yes     Chronic      Resolved Hospital Problems   No resolved problems to display.       Patient is homebound due to:  CHF exacerbation    Allergies:  Review of patient's allergies indicates:   Allergen Reactions    Ciprofloxacin Nausea And Vomiting       Vitals:  Routine    Diet: cardiac diet    Activities:   Activity as tolerated    Goals of Care Treatment Preferences:  Code Status: DNR    Health care agent: [facility] agent number: [phone]    Living Will: Yes     What is most  important right now is to focus on improvement in condition but with limits to invasive therapies.  Accordingly, we have decided that the best plan to meet the patient's goals includes continuing with treatment.      Labs:  per facility    Nursing Precautions:  Fall and Pressure ulcer prevention    Consults:   PT to evaluate and treat- 3 times a week and OT to evaluate and treat- 3 times a week     Miscellaneous Care: CHF Care: Daily Weight with notification of MD/NP of 2lb or > increase in 24 hours    v/s and O2 sat every shift    Oxygen as needed for sats <90%    Report abnormal breath sounds to MD/NP    Edema checks q shift- notify MD/NP of increased edema    Task segmentation by nursing for daily care to decrease exertion      CHF education to include diet ,medication, and CHF flags for MD notification                   Diabetes Care:  SN to perform and educate Diabetic management with blood glucose monitoring:      Medications: Discontinue all previous medication orders, if any. See new list below.     Medication List        START taking these medications      furosemide 20 MG tablet  Commonly known as: LASIX  Take 1 tablet (20 mg total) by mouth once daily. Take as needed for swelling in your legs. HOLD MEDICATION IF HYPOTENSIVE            CHANGE how you take these medications      atenoloL 25 MG tablet  Commonly known as: TENORMIN  HOLD ATENOLOL UNTIL YOU ARE ABLE TO FOLLOW UP WITH PRIMARY CARE DOCTOR  What changed: additional instructions     hydrALAZINE 25 MG tablet  Commonly known as: APRESOLINE  Take 1 tablet (25 mg total) by mouth 2 (two) times daily as needed (SBP >180). HOLD UNTIL YOU ARE ABLE TO SEE YOUR PRIMARY CARE DOCTOR  What changed: additional instructions     polyethylene glycol 17 gram Pwpk  Commonly known as: GLYCOLAX  Take 17 g by mouth daily as needed (constipation).  What changed:   when to take this  reasons to take this            CONTINUE taking these medications      amiodarone 200 MG  Tab  Commonly known as: PACERONE  Take 1 tablet (200 mg total) by mouth once daily.     aspirin 81 MG EC tablet  Commonly known as: ECOTRIN  Take 1 tablet (81 mg total) by mouth once daily.     cyanocobalamin 1000 MCG tablet  Commonly known as: VITAMIN B-12  Take 1 tablet (1,000 mcg total) by mouth once daily.     cyproheptadine 4 mg tablet  Commonly known as: PERIACTIN  Take 1 tablet (4 mg total) by mouth 3 (three) times daily.     diclofenac sodium 1 % Gel  Commonly known as: VOLTAREN  Apply topically to affected joints as needed for pain     ELIQUIS 2.5 mg Tab  Generic drug: apixaban  TAKE 1 TABLET TWICE A DAY     folic acid 1 MG tablet  Commonly known as: FOLVITE  Take 1 tablet (1 mg total) by mouth once daily.     Lactobacillus rhamnosus GG 10 billion cell capsule  Commonly known as: CULTURELLE  Take 1 capsule by mouth once daily.     levothyroxine 137 MCG Tab tablet  Commonly known as: SYNTHROID  Take 1 tablet (137 mcg total) by mouth before breakfast.     midodrine 5 MG Tab  Commonly known as: PROAMATINE  Take 1 tablet (5 mg total) by mouth every 8 (eight) hours.     mirtazapine 7.5 MG Tab  Commonly known as: REMERON  Take 1 tablet (7.5 mg total) by mouth every evening.     multivitamin Tab  Take 1 tablet by mouth once daily.     pantoprazole 40 MG tablet  Commonly known as: PROTONIX  Take 1 tablet (40 mg total) by mouth once daily.            STOP taking these medications      aluminum-magnesium hydroxide-simethicone 200-200-20 mg/5 mL Susp  Commonly known as: MAALOX     bismuth subsalicylate 262 mg/15 mL suspension  Commonly known as: PEPTO BISMOL                Immunizations Administered as of 6/29/2023       No immunizations on file.            Some patients may experience side effects after vaccination.  These may include fever, headache, muscle or joint aches.  Most symptoms resolve with 24-48 hours and do not require urgent medical evaluation unless they persist for more than 72 hours or symptoms are  concerning for an unrelated medical condition.          _________________________________  Nicholas Kline DO  06/29/2023

## 2023-06-27 NOTE — PT/OT/SLP PROGRESS
Occupational Therapy   Treatment    Name: Gisselle Ortiz  MRN: [id number]  Admitting Diagnosis:  CHF exacerbation       Recommendations:     Discharge Recommendations: nursing facility, skilled  Discharge Equipment Recommendations:  to be determined by next level of care  Barriers to discharge:  Decreased caregiver support    Assessment:     Gisselle Ortiz is a 89 y.o. female with a medical diagnosis of CHF exacerbation.  She presents with orthostatic hypotension today sitting edge of bed and c/o of dizziness. Performance deficits affecting function are weakness, impaired endurance, impaired self care skills, impaired functional mobility, impaired balance, gait instability, impaired cognition, impaired cardiopulmonary response to activity, decreased coordination, decreased lower extremity function, decreased safety awareness, pain, decreased ROM, impaired coordination, edema.     Rehab Prognosis:  Fair; patient would benefit from acute skilled OT services to address these deficits and reach maximum level of function.       Plan:     Patient to be seen 3 x/week to address the above listed problems via self-care/home management, therapeutic activities, therapeutic exercises  Plan of Care Expires: 07/22/23  Plan of Care Reviewed with: patient    Subjective     Chief Complaint: dizziness  Patient/Family Comments/goals: gain (I) and go home  Pain/Comfort:  Pain Rating 1: 0/10  Pain Rating Post-Intervention 1: 0/10    Objective:     Communicated with: Nurse prior to session.  Patient found supine with telemetry, peripheral IV, PureWick, pulse ox (continuous), blood pressure cuff upon OT entry to room.    General Precautions: Standard, fall    Orthopedic Precautions:N/A  Braces: N/A  Respiratory Status: Room air     Occupational Performance:     Bed Mobility:    Patient completed Supine <> Sit with mod assistance for lifting BLE and assist with trunk  Scooting up in bed : total assist x 2 persons  Rolling side to  side: Min assist  Pt sat edge of bed to improve activity tolerance but c/o of dizziness and and BP dropped. OT returned pt to supine position in bed with BLE elevated/ heels floated.    Pt bp checked at start of session   Supine in bed: 128/63   Sitting edge of bed: 82/42        Activities of Daily Living:  Toileting: total assistance for perineal hygiene and managing brief      AMPAC 6 Click ADL: 15    Treatment & Education:  -Education on energy conservation and task modification to maximize safety and (I) during ADLs and mobility  -Pt verbalizing understanding and had no further questions  Patient left supine with call button in reach and nurse notified    GOALS:   Multidisciplinary Problems       Occupational Therapy Goals          Problem: Occupational Therapy    Goal Priority Disciplines Outcome Interventions   Occupational Therapy Goal     OT, PT/OT Ongoing, Progressing    Description: Goals to be met by: 7/22/23     Patient will increase functional independence with ADLs by performing:    LE Dressing with Moderate Assistance.  Grooming while EOB with Set-up Assistance.  Toileting from toilet with Minimal Assistance for hygiene and clothing management.   Supine to sit with Stand-by Assistance.  Step transfer with Minimal Assistance  Toilet transfer to toilet with Minimal Assistance.                         Time Tracking:     OT Date of Treatment: 06/27/23  OT Start Time: 1207  OT Stop Time: 1226  OT Total Time (min): 19 min    Billable Minutes:Therapeutic Activity 19    OT/MINERVA: OT          6/27/2023

## 2023-06-27 NOTE — NURSING
Orthostats:    BP lying 104/58 at 1033  BP sitting 78/48 at 1040  Patient became dizzy while sitting on side of the bed.  Patient was laid back down in bed and BP recovered to 128/59.    Dr. Moraes and Med team 5 notified.

## 2023-06-27 NOTE — PLAN OF CARE
THUY spoke with Rommel coordinator with [facility] in regard to patient situation and she states that patient is accepted to this facility and if she is eligible for SNF they will apply for Medicaid. SW will speak to patient and son in regard to this plan.    THUY spoke to Rosio in admission reporting that she will be contacting patient/ patient son to discuss nursing home placement.    Updated 2:53pm    SW met with patient and son at the bedside to discuss the accepting SNF of [facility] and they stated they were interested. They are asking me to contact her secondary insurance  to verify if they are willing to pay the copay fees and I will follow up on this.  THUY also provided patient son with HPOA document if patient wanted him to have HPOA    Update 4:39   THUY spoke with Shauna with [facility] reporting that she will contact patient to discuss patient benefits in regard patient insurance if secondary insurance will cover the co pay days         Louann Yuan LMSW  [facility]   [id number]

## 2023-06-28 NOTE — PLAN OF CARE
THUY spoke to Ira rep with [employer] verifying that this payor will cover any co pay fees for SNF and patient will not be liable for any expenses. THUY has informed patient son of this information and he expressed he does have some medical concerns that he would like addressed by the medical team. THUY has informed the team of this information. Patient son expressed that he does want patient to admit to [facility]. Patient is stable to dc and will admit there today . THUY has spoken to [facility] confirming having a bed for today. THUY will send SNF orders with additional clinicals.      Updated 2:50   SW has been in communication with patient son reporting that he is aggravated about all of the paperwork. THUY has informed patient son to please contact please [facility] admission coordinator with [facility]. Patient is stable to dc today.      Updated 4:46  Patient will dc tomorrow to [facility]. Admission paperwork was sent to patient around noon by admission coordinator. THUY followed up with patient and he informed SW around 2:30 that he was having difficulties completing the virtual documents and requested a paper copy. THUY did not receive the papercopy to email in a timely matter. THUY has escalated this matter to  Leadership team. THUY will follow up with patient son.      Louann Yuan, SARITA  [facility]   [id number]

## 2023-06-28 NOTE — NURSING
Patient lying in bed supine watching TV. AAOx3. No distress or needs at this time. Instructed on use of call light. Encouraged to call with needs. Call light in reach. Bed in lowest and locked position. Side rails up x2. Nurse will continue  to monitor.

## 2023-06-28 NOTE — DISCHARGE SUMMARY
[facility] - Intensive Care ([place] [address])  [facility] Medicine  Discharge Summary      Patient Name: Gisselle Ortiz  MRN: [id number]  CHADWICK: [id number]  Patient Class: IP- Inpatient  Admission Date: 6/23/2023  Hospital Length of Stay: 5 days  Discharge Date and Time:  06/29/2023 1:47 PM  Attending Physician: Luke Moraes MD   Discharging Provider: Nicholas Kline DO  Primary Care Provider: Kai Montez MD  [facility] Medicine Team: [facility] MED 5 Nicholas Kline DO  Primary Care Team: [facility]OS[facility] MED 5    HPI:   89 y.o. female w/ h/o HFmEF (50%), SSS s/p PM, pAF on apixaban, CKD 3b, HTN, HLD, hypothyroidism, GERD; p/w near syncope x1 episode.  Reports lightheadedness & fatigue following multiple episodes of nonbloody, watery diarrhea.  Also c/o SOB.  Denies fevers/chills, CP/cough, abdo pain, nausea/vomiting, constipation, dysuria/hematuria, melena/hematochezia, weakness/numbness/tingling, HA/presyncope/syncope.     Of note, pt was admitted 05/14-05/23 for intractable nausea/vomiting & Klebsiella & E coli UTI, now s/p Abx.  Found to have cholelithiasis w/o cholecystitis; AES recommended MRCP however pt unable to get 2/2 PM.  Also had constipation & significant stool burden on KUB, improved s/p bowel regimen.  Pt was d/c'd to SNF (05/23-06/23) for PT/OT.    ED course: AF, HR 64, BP 92/52, 99% ORA.  Labs u/r except for Hb 9.1 (b/l 8-9), HCO3 17, Cr 1.5 (b/l 1.2-1.3), Ca 7.3, , Alb 1.7, AST/ALT 96/96, , trop 0.042 --> 0.043; TSH u/r.  UA w/ positive nitrite, 3+ leuks, rare bacteria, 0 WBC.  CXR w/ cardiomgealy w/ BL pleural effusions & perihilar interstitial opacities, c/f CHF exacerbation/hypervolemia vs less likely infectious/inflammatory process.      * No surgery found *      Hospital Course:   Patient admitted to hospital medicine for further management. Patient's BP was low but maintaining MAPs of >65. Lactic was negative. Held lasix in the setting of a worsening MARIO and as patient did not  need supplemental O2. Echo pending. Patient now able to tolerate more PO intake. HR dropped to 20's and low 30's during admission. On tele there was concern over possible artifact. EP consulted for device interrogation which revealed no abnormalities in pacemaker device. Repeat Echo showed EF of 65% and increased diastolic dysfunction, CVP of 3. PT/OT recommended SNF upon discharge. Pt qualified for [facility] in [place], [place]. Educated pt and family on difficulties of malnutrition. Encouraged pt to stay on top of protein intake going forward. Expressed concern to pt and family of how pt may never be back to her baseline strength. All questions answered at this time.        Goals of Care Treatment Preferences:  Code Status: DNR    Health care agent: [facility] agent number: [phone]    Living Will: Yes     What is most important right now is to focus on improvement in condition but with limits to invasive therapies.  Accordingly, we have decided that the best plan to meet the patient's goals includes continuing with treatment.      Consults:   Consults (From admission, onward)          Status Ordering Provider     Inpatient consult to Social Work/Case Management  Once        Provider:  (Not yet assigned)    Completed JEAN PIERRE HEADLEY     Inpatient consult to Registered Dietitian/Nutritionist  Once        Provider:  (Not yet assigned)    Completed JEAN PIERRE HEADLEY            No new Assessment & Plan notes have been filed under this hospital service since the last note was generated.  Service: Hospital Medicine    Final Active Diagnoses:    Diagnosis Date Noted POA    PRINCIPAL PROBLEM:  CHF exacerbation [I50.9] 06/24/2023 Yes    Acute kidney injury superimposed on chronic kidney disease [N17.9, N18.9] 05/17/2023 Yes    SSS (sick sinus syndrome) [I49.5] 01/17/2019 Yes     Chronic    Impaired functional mobility and endurance [Z74.09] 02/03/2021 Yes     Chronic    Colitis [K52.9] 06/24/2023 Yes     Severe protein-calorie malnutrition [E43] 05/19/2023 Yes     Chronic    Anemia due to chronic kidney disease [N18.9, D63.1] 02/02/2021 Yes     Chronic    Chronic diastolic heart failure [I50.32] 02/02/2021 Yes     Chronic    Current use of long term anticoagulation [Z79.01] 11/14/2017 Not Applicable     Chronic    Long term current use of amiodarone [Z79.899] 11/14/2017 Not Applicable     Chronic    Stage 3b chronic kidney disease [N18.32] 05/11/2016 Yes     Chronic    Insomnia [G47.00] 05/09/2016 Yes     Chronic    Gastroesophageal reflux disease [K21.9] 11/09/2015 Yes     Chronic    Essential hypertension [I10] 05/19/2015 Yes     Chronic    Acquired hypothyroidism [E03.9] 05/19/2015 Yes     Chronic    Cardiac pacemaker in situ [Z95.0] 05/19/2015 Yes     Chronic    High cholesterol [E78.00] 05/19/2015 Yes     Chronic    Paroxysmal atrial fibrillation [I48.0] 05/19/2015 Yes     Chronic      Problems Resolved During this Admission:       Discharged Condition: stable    Disposition: Skilled Nursing Facility    Follow Up:    Patient Instructions:   No discharge procedures on file.    Physical Exam  Vitals and nursing note reviewed.   Constitutional:       General: She is not in acute distress.     Appearance: Normal appearance. She is not ill-appearing.   HENT:      Head: Normocephalic and atraumatic.      Nose: Nose normal.      Mouth/Throat:      Mouth: Mucous membranes are moist.      Pharynx: Oropharynx is clear.   Eyes:      Extraocular Movements: Extraocular movements intact.      Conjunctiva/sclera: Conjunctivae normal.      Pupils: Pupils are equal, round, and reactive to light.   Cardiovascular:      Rate and Rhythm: Normal rate and regular rhythm.      Pulses: Normal pulses.      Heart sounds: Normal heart sounds. No murmur heard.  Pulmonary:      Effort: Pulmonary effort is normal. No respiratory distress.      Breath sounds: Rales present.   Abdominal:      General: Abdomen is flat. Bowel sounds are normal.  There is no distension.      Palpations: Abdomen is soft.      Tenderness: There is no abdominal tenderness.   Musculoskeletal:         General: Swelling present. Normal range of motion.      Cervical back: Normal range of motion.      Right lower leg: Edema present.      Left lower leg: Edema present.   Skin:     General: Skin is warm and dry.      Capillary Refill: Capillary refill takes less than 2 seconds.      Findings: Bruising present.   Neurological:      General: No focal deficit present.      Mental Status: She is alert and oriented to person, place, and time. Mental status is at baseline.      Cranial Nerves: No cranial nerve deficit.      Motor: No weakness.      Gait: Gait normal.   Psychiatric:         Mood and Affect: Mood normal.         Behavior: Behavior normal.         Significant Diagnostic Studies: Labs:   CMP   Recent Labs   Lab 06/28/23  0423 06/29/23  0510    143   K 3.2* 4.3   * 113*   CO2 23 23    85   BUN 24* 18   CREATININE 1.2 1.1   CALCIUM 6.9* 7.3*   PROT 3.9* 3.9*   ALBUMIN 1.5* 1.5*   BILITOT 0.3 0.3   ALKPHOS 109 104   AST 34 30   ALT 44 36   ANIONGAP 6* 7*    and CBC   Recent Labs   Lab 06/28/23  0423 06/29/23  0510   WBC 5.74 6.97   HGB 7.4* 7.7*   HCT 25.1* 25.5*    288       Pending Diagnostic Studies:       None           Medications:  Reconciled Home Medications:      Medication List        START taking these medications      furosemide 20 MG tablet  Commonly known as: LASIX  Take 1 tablet (20 mg total) by mouth once daily. Take as needed for swelling in your legs. HOLD MEDICATION IF HYPOTENSIVE            CHANGE how you take these medications      atenoloL 25 MG tablet  Commonly known as: TENORMIN  HOLD ATENOLOL UNTIL YOU ARE ABLE TO FOLLOW UP WITH PRIMARY CARE DOCTOR  What changed: additional instructions     hydrALAZINE 25 MG tablet  Commonly known as: APRESOLINE  Take 1 tablet (25 mg total) by mouth 2 (two) times daily as needed (SBP >180). HOLD  UNTIL YOU ARE ABLE TO SEE YOUR PRIMARY CARE DOCTOR  What changed: additional instructions     polyethylene glycol 17 gram Pwpk  Commonly known as: GLYCOLAX  Take 17 g by mouth daily as needed (constipation).  What changed:   when to take this  reasons to take this            CONTINUE taking these medications      amiodarone 200 MG Tab  Commonly known as: PACERONE  Take 1 tablet (200 mg total) by mouth once daily.     aspirin 81 MG EC tablet  Commonly known as: ECOTRIN  Take 1 tablet (81 mg total) by mouth once daily.     cyanocobalamin 1000 MCG tablet  Commonly known as: VITAMIN B-12  Take 1 tablet (1,000 mcg total) by mouth once daily.     cyproheptadine 4 mg tablet  Commonly known as: PERIACTIN  Take 1 tablet (4 mg total) by mouth 3 (three) times daily.     diclofenac sodium 1 % Gel  Commonly known as: VOLTAREN  Apply topically to affected joints as needed for pain     ELIQUIS 2.5 mg Tab  Generic drug: apixaban  TAKE 1 TABLET TWICE A DAY     folic acid 1 MG tablet  Commonly known as: FOLVITE  Take 1 tablet (1 mg total) by mouth once daily.     Lactobacillus rhamnosus GG 10 billion cell capsule  Commonly known as: CULTURELLE  Take 1 capsule by mouth once daily.     levothyroxine 137 MCG Tab tablet  Commonly known as: SYNTHROID  Take 1 tablet (137 mcg total) by mouth before breakfast.     midodrine 5 MG Tab  Commonly known as: PROAMATINE  Take 1 tablet (5 mg total) by mouth every 8 (eight) hours.     mirtazapine 7.5 MG Tab  Commonly known as: REMERON  Take 1 tablet (7.5 mg total) by mouth every evening.     multivitamin Tab  Take 1 tablet by mouth once daily.     pantoprazole 40 MG tablet  Commonly known as: PROTONIX  Take 1 tablet (40 mg total) by mouth once daily.            STOP taking these medications      aluminum-magnesium hydroxide-simethicone 200-200-20 mg/5 mL Susp  Commonly known as: MAALOX     bismuth subsalicylate 262 mg/15 mL suspension  Commonly known as: PEPTO BISMOL              Indwelling  Lines/Drains at time of discharge:   Lines/Drains/Airways       Drain  Duration             Female External Urinary Catheter 06/27/23 1830 <1 day                    Time spent on the discharge of patient: 50 minutes         Nicholas Kline DO  Department of Hospital Medicine  [facility] - Intensive Care ([place]

## 2023-06-28 NOTE — NURSING
Received critical lab value (Calcium 6.9) at 0604. Paged provider to report critical calcium at 0605. Still awaiting call back.

## 2023-06-28 NOTE — PT/OT/SLP PROGRESS
Physical Therapy      Patient Name:  Gisselle Ortiz   MRN:  [id number]    Patient not seen today secondary to Other (Pt is pending to be transferred to another facility today, PTA met with pt and family and more like pt's family was not in a good mood trying to work things out for pt (dealing with paperwork), prefer pt not to have therapy today, nurse notified). Will follow-up.

## 2023-06-28 NOTE — PROGRESS NOTES
[facility] - [facility] ([place] [address])  [facility]  Progress Note    Patient Name: Gisselle Ortiz  MRN: [id number]  Patient Class: IP- Inpatient   Admission Date: 6/23/2023  Length of Stay: 4 days  Attending Physician: Luke Moraes MD  Primary Care Provider: Kai Montez MD        Subjective:     Principal Problem:CHF exacerbation        HPI:  89 y.o. female w/ h/o HFmEF (50%), SSS s/p PM, pAF on apixaban, CKD 3b, HTN, HLD, hypothyroidism, GERD; p/w near syncope x1 episode.  Reports lightheadedness & fatigue following multiple episodes of nonbloody, watery diarrhea.  Also c/o SOB.  Denies fevers/chills, CP/cough, abdo pain, nausea/vomiting, constipation, dysuria/hematuria, melena/hematochezia, weakness/numbness/tingling, HA/presyncope/syncope.     Of note, pt was admitted 05/14-05/23 for intractable nausea/vomiting & Klebsiella & E coli UTI, now s/p Abx.  Found to have cholelithiasis w/o cholecystitis; AES recommended MRCP however pt unable to get 2/2 PM.  Also had constipation & significant stool burden on KUB, improved s/p bowel regimen.  Pt was d/c'd to SNF (05/23-06/23) for PT/OT.    ED course: AF, HR 64, BP 92/52, 99% ORA.  Labs u/r except for Hb 9.1 (b/l 8-9), HCO3 17, Cr 1.5 (b/l 1.2-1.3), Ca 7.3, , Alb 1.7, AST/ALT 96/96, , trop 0.042 --> 0.043; TSH u/r.  UA w/ positive nitrite, 3+ leuks, rare bacteria, 0 WBC.  CXR w/ cardiomgealy w/ BL pleural effusions & perihilar interstitial opacities, c/f CHF exacerbation/hypervolemia vs less likely infectious/inflammatory process.      Overview/Hospital Course:  Patient admitted to hospital medicine for further management. Patient's BP was low but maintaining MAPs of >65. Lactic was negative. Held lasix in the setting of a worsening MARIO and as patient did not need supplemental O2. Echo pending. Patient now able to tolerate more PO intake. HR dropped to 20's and low 30's during admission. On tele there was concern over  possible artifact. EP consulted for device interrogation which revealed no abnormalities in pacemaker device. Repeat Echo showed EF of 65% and increased diastolic dysfunction, CVP of 3. PT/OT recommended SNF upon discharge. Pt qualified for [facility] in [place], [place]. Educated pt and family on difficulties of malnutrition. Encouraged pt to stay on top of protein intake going forward. Expressed concern to pt and family of how pt may never be back to her baseline strength. All questions answered at this time.       Interval History: NAEO. Pt HDS in NAD. Discharge to SNF delayed by paperwork not being filled out on time. Will discharge tomorrow morning, 6/29/23.    Review of Systems   Constitutional:  Positive for activity change and fatigue. Negative for chills, fever and unexpected weight change.   HENT:  Negative for sinus pain, trouble swallowing and voice change.    Eyes:  Negative for photophobia, pain and visual disturbance.   Respiratory:  Negative for cough, shortness of breath and wheezing.    Cardiovascular:  Negative for chest pain, palpitations and leg swelling.   Gastrointestinal:  Negative for abdominal pain, blood in stool, constipation, diarrhea, nausea and vomiting.   Endocrine: Negative for polydipsia and polyuria.   Genitourinary:  Negative for dysuria, frequency, hematuria and urgency.   Musculoskeletal:  Negative for arthralgias, back pain and myalgias.   Skin:  Negative for color change and pallor.   Neurological:  Positive for dizziness, weakness and light-headedness. Negative for syncope, numbness and headaches.   Hematological:  Does not bruise/bleed easily.   Psychiatric/Behavioral:  Negative for confusion, dysphoric mood and sleep disturbance.    Objective:     Vital Signs (Most Recent):  Temp: 97.6 °F (36.4 °C) (06/28/23 1121)  Pulse: 68 (06/28/23 1455)  Resp: 18 (06/28/23 1121)  BP: (!) 124/56 (06/28/23 1121)  SpO2: 100 % (06/28/23 1121) Vital Signs (24h Range):  Temp:  [97.6 °F (36.4  °C)-98.4 °F (36.9 °C)] 97.6 °F (36.4 °C)  Pulse:  [60-69] 68  Resp:  [18] 18  SpO2:  [96 %-100 %] 100 %  BP: (113-128)/(55-68) 124/56     Weight: 66.7 kg (147 lb)  Body mass index is 26.04 kg/m².    Intake/Output Summary (Last 24 hours) at 6/28/2023 1844  Last data filed at 6/28/2023 0016  Gross per 24 hour   Intake --   Output 300 ml   Net -300 ml         Physical Exam  Vitals and nursing note reviewed.   Constitutional:       General: She is not in acute distress.     Appearance: She is not ill-appearing, toxic-appearing or diaphoretic.   HENT:      Head: Normocephalic and atraumatic.      Right Ear: External ear normal.      Left Ear: External ear normal.      Mouth/Throat:      Mouth: Mucous membranes are moist.      Pharynx: No oropharyngeal exudate.   Eyes:      Extraocular Movements: Extraocular movements intact.      Pupils: Pupils are equal, round, and reactive to light.   Cardiovascular:      Rate and Rhythm: Normal rate and regular rhythm.      Pulses: Normal pulses.      Heart sounds: Normal heart sounds. No murmur heard.  Pulmonary:      Effort: Pulmonary effort is normal. No respiratory distress.      Breath sounds: Rales present. No wheezing.   Abdominal:      General: Abdomen is flat. Bowel sounds are normal. There is no distension.      Palpations: Abdomen is soft. There is no mass.      Tenderness: There is no abdominal tenderness.   Musculoskeletal:         General: No swelling or tenderness. Normal range of motion.      Cervical back: Normal range of motion and neck supple.      Right lower leg: Edema present.      Left lower leg: Edema present.   Skin:     General: Skin is warm and dry.      Capillary Refill: Capillary refill takes less than 2 seconds.   Neurological:      General: No focal deficit present.      Mental Status: She is alert and oriented to person, place, and time. Mental status is at baseline.   Psychiatric:         Mood and Affect: Mood normal.         Behavior: Behavior normal.            Significant Labs: All pertinent labs within the past 24 hours have been reviewed.    Significant Imaging: I have reviewed all pertinent imaging results/findings within the past 24 hours.      Assessment/Plan:      * CHF exacerbation  Chronic diastolic heart failure    Patient is identified as having Diastolic (HFpEF) heart failure that is Acute on chronic. CHF is currently uncontrolled due to Continued edema of extremities, Rales/crackles on pulmonary exam and Pulmonary edema/pleural effusion on CXR. Latest ECHO performed and demonstrates- Results for orders placed during the hospital encounter of 09/26/19    Transthoracic echo (TTE) 6/25/23   The left ventricle is normal in size with concentric remodeling and normal systolic function.   The estimated ejection fraction is 65%.   Grade III left ventricular diastolic dysfunction.   Severe left atrial enlargement.   Normal right ventricular size with normal right ventricular systolic function.   Mild right atrial enlargement.   There is mild aortic valve stenosis.   Aortic valve area is 2.22 cm2; peak velocity is 1.53 m/s; mean gradient is 6 mmHg.   Mild to moderate tricuspid regurgitation.   PASP in mid 50s to 60s depending on RAP.        Recent Labs   Lab 06/23/23  2245   *     Rales & BLE edema on Ex, tho pt is satting well ORA.  Labs w/ elevated BNP.  CXR w/ pulm congestion/edema.  Also w/ diarrhea & img c/f unspecific colitis, tho infectious is highly likely in s/o recent Abx for Klebsiella/E coli UTI.  Suspect CHF exac 2/2 infectious colitis vs Rx noncompliance vs dietary noncompliance vs worsening HF. Spironolactone d/c'd on prior hospitalization in s/o hyperkalemia.    - hold ACEI/ARB in s/o MARIO on CKD  - GDMT as tolerated  - replete lytes p.r.n. goal K > 4.0, Mg > 2.0  - cardiac tele w/ pulse ox, supp O2 p.r.n. goal SpO2 > 92%, strict I/Os, daily weights, fall precautions, delirium precautions, aspiration precautions  - diet  cardiac/low Na  - avoid cardiotoxic agents     Acute kidney injury superimposed on chronic kidney disease  Stage 3b chronic kidney disease    On admission, Cr 1.5 w/ b/l 1.2-1.3.  Recent hospitalization w/ prerenal MARIO, resolved s/p IVF.  Also w/ CHF exac/hypervolemia, so would be cautious w/ IVF rehydration.  DDx includes cardiorenal syndrome/congestive vs prerenal vs intrinsic vs obstructive.    - one time dose IV lasix 40 mg to help with volume overload  - renally dose Rx  - strict I/Os, bladder scan p.r.n. UR, straight/Cottrell cath p.r.n. bladder scan > 300 cc  - hold NSAIDs  - avoid nephrotoxic agents     SSS (sick sinus syndrome)  Cardiac pacemaker in situ    - monitor     - EP consulted for device interrogation after episodes of bradycardia, despite having pacemaker  - interrogation showing no abnormalities with pacemaker, bradycardia likely artifact on telemetry     Impaired functional mobility and endurance  - PT/OT recommending SNF    Colitis  Pt w/ recent hospitalization w/ Abx for UTI; p/w watery nonbloody diarrhea.  Img c/w nonspecific colitis.  C/f C diff in s/o recent Abx use.  DDx other infectious colitis vs inflammatory vs less likely ischemic vs Rx-induced vs pseudomembranous.    - no episodes of diarrhea while inpatient, likely viral gastroenteritis episode  - Abx, tailor p.r.n. Cx; Ciprofloxacin & metronidazole for 5 days to cover GNR & anaerobic     Severe protein-calorie malnutrition  Nutrition consulted. Most recent weight and BMI monitored-     Measurements:  Wt Readings from Last 1 Encounters:   06/25/23 66.7 kg (147 lb)   Body mass index is 26.04 kg/m².    RD consulted  Likely edema 2/2 malnutrition. Patient feeling much better today. Will advance diet as tolerated       Anemia due to chronic kidney disease  - monitor  - home Vitamins     Chronic diastolic heart failure  See CHF exacerbation      Stage 3b chronic kidney disease  See MARIO      Gastroesophageal reflux disease  - home PPI      Insomnia  - hold Mirtazapine in s/o presyncope; restart as clinically indicated     Current use of long term anticoagulation  [id number] Score: 3. Continue apixaban        Long term current use of amiodarone  Continue in setting of history of afib      High cholesterol  - not on home statin    Acquired hypothyroidism  - home levothyroxine     Essential hypertension  - home Atenolol  - hold Midodrine  - hold Hydralazine in s/o presyncope & possible infectious colitis; restart as clinically indicated     Cardiac pacemaker in situ  2/2 SSS      Paroxysmal atrial fibrillation  Long term current use of amiodarone  Current use of long term anticoagulation    Patient with Paroxysmal (<7 days) atrial fibrillation which is controlled currently with Beta Blocker and Amiodarone. Patient is currently in sinus rhythm.[id number] Score: 3. HASBLED Score: 3. Anticoagulation indicated. Anticoagulation done with Apixaban.  Low suspicion for amiodarone toxicity (lung, liver, thyroid) at this time.    - monitor  - home amiodarone, apixaban  - ECG p.r.n. CP or palpitations       VTE Risk Mitigation (From admission, onward)         Ordered     apixaban tablet 2.5 mg  2 times daily         06/24/23 0403     IP VTE HIGH RISK PATIENT  Once         06/24/23 0358     Place sequential compression device  Until discontinued         06/24/23 0358                Discharge Planning   FRANCESCA: 6/28/2023     Code Status: DNR   Is the patient medically ready for discharge?: Yes    Reason for patient still in hospital (select all that apply): Patient trending condition  Discharge Plan A: New Nursing Home placement - care home care facility   Discharge Delays: None known at this time              Nicholas Kline DO  Department of Hospital Medicine   [facility] - Intensive Care ([facility]-)

## 2023-06-28 NOTE — PHYSICIAN QUERY
PT Name: Gisselle Ortiz  MR #: [id number]    DOCUMENTATION CLARIFICATION     CDS/: Elisa Sweet RN            Contact information: [email or web address]    This form is a permanent document in the medical record.     Query Date: June 28, 2023    By submitting this query, we are merely seeking further clarification of documentation.. Please utilize your independent clinical judgment when addressing the question(s) below.    The medical record contains the following:   Indicators  Supporting Clinical Findings Location in Medical Record    Energy Intake      Weight Loss      Fat Loss      Muscle Loss      Edema/Fluid Accumulation      Reduced  Strength (by dynamometer)     x Weight, BMI, Usual Body Weight [id number]  BMI=26 HM note 6-25    Delayed Wound Healing     x Registered Dietician Diagnosis RD consulted  Likely edema 2/2 malnutrition.     note 6-25   x Acute or Chronic Illness Severe protein-calorie malnutrition  Nutrition consulted. Most recent weight and BMI monitored-             note 6-25    Social or Environmental Circumstances      Treatment     x Other RD spoke with patient regarding fluid and salt restriction. We discussed limiting sodium in her diet to control buildup of fluids around the heart, stomach, lungs, and legs. Limiting fluid in the diet also helps because too much fluid can cause SOB, poor appetite, and weight gain from swelling or edema, which makes the heart work harder. RD explained the importance of reading the Nutrition Facts label, which will help determine the sodium content in 1 serving of a food. Select foods with <140 mg or less per serving. Avoid processed foods. Eat more fresh foods. If/when dining out use precaution restaurant meals can be very high in sodium. RD encourages patient to monitor fluid intake as well discussing anything that is a liquid at room temperature must be accounted for in total fluid intake, if fluid restriction is  applicable. Lastly, RD discussed weight monitoring to determine sudden weight changes, if any. Patient agrees to review the material on their own, but no questions/concerns currently. RD note 6-25     Academy of Nutrition and Dietetics (Academy) and the American Society for Parenteral and Enteral Nutrition (A.S.P.E.N.) Clinical Characteristics to support Malnutrition   Malnutrition in the Context of Acute Illness or Injury Malnutrition in the Context of Chronic Illness or Injury Malnutrition in the Context of Social or Environmental Circumstances   Malnutrition Level Moderate Severe Moderate Severe   Moderate   Severe   Energy Intake <75%                   >7 days <50%                 >5 days <75%           >1 month <75%                      >1 month   <75% for >3 months   <50% for >1 month   Weight Loss   1-2% in 1 week >2% in 1 week 5% in 1 month >5% in 1 month 5% in 1 month >5% in 1 month    5% in 1 month >5% in 1 month 7.5% in 3 months >7.5% in 3 months 7.5% in 3 months >7.5% in 3 months    7.5% in 3 months >7.5% in 3 months 10% in 6 months >10% in 6 months 10% in 6 months >10% in 6 months        20% in 1 year                    >20% in 1 year                                                                  20% in 1 year                            >20% in 1 year                                                  Subcutaneous Fat Loss Mild  Moderate  Mild  Severe    Mild   Severe   Muscle Loss Mild  Moderate  Mild  Severe    Mild   Severe   Edema/Fluid Accumulation Mild Moderate to severe  Mild  Severe   Mild   Severe   Reduced  Strength         (based on standards supplied by  of dynamometer) N/A Measurably reduced N/A Measurably reduced N/A Measurably reduced     Criteria for mild malnutrition is defined as 1 characteristic outlined above within the established moderate or severe parameters.  A minimum of 2 out of the 6 characteristics noted above are recommended for a diagnosis of moderate or  severe malnutrition.  Chronic illness/injury is a disease/condition lasting 3 months or longer.    The noted clinical guidelines are only system guidelines and do not replace the providers clinical judgment.    Due to the conflicting clinical picture, please clinically validate the diagnosis of Severe protein-calorie malnutrition.      If validated, please provide additional clinical support for the diagnosis.       [  x] Severe Malnutrition - a minimum of 2 of the 6 severe malnutrition characteristics noted above     Please specify clinical indicators:____ Severe fluid accumulation,  muscle loss, weight loss, and sub q fat loss.______________     [  ] Other Nutritional Diagnosis (please specify): _______     [  ] Malnutrition ruled out     Please document in your progress notes daily for the duration of treatment until resolved and  include in your discharge summary.      References:    JOSELUIS Mclain, & OLMAN Mon (2022, April). Assessment and management of anorexia and cachexia in palliative care. Retrieved May 23, 2022, from [email or web address]-[email or web address]-management-[email or web address]-[email or web address]-cachexia-[email or web address]-[email or web address]-care?[email or web address]&source=see_link     DANIELLA Mares, PhD, RD, Priya BLAS P., PhD, RN, BOBBY Meredith MD, PhD, Marilee BARILLAS A., MS, RD, [facility], ERICA Cruz, MS, RD, The Academy Malnutrition Work Group, The A.S.P.E.N. Board of Directors. (2012). Consensus Statement: Academy of Nutrition and Dietetics and American Society for Parenteral and Enteral Nutrition: Characteristics Recommended for the Identification and Documentation of Adult Malnutrition (Undernutrition). Journal of Parenteral and Enteral Nutrition, 36(3), 275-283. doi:10.1177/8033016359106992     Form No. 17892

## 2023-06-28 NOTE — CARE UPDATE
CHW met with patient/family at bedside. Patient experience rounding completed and reviewed the following.     Do you know your discharge plan? Yes, SNF    If you are discharging home, do you have help at home? Going to a SNF facility    Do you think you will need help at home at discharge? Maybe    Have you discussed your needs and preferences with your SW/CM? Yes     Assigned SW/CM notified of any patient/family needs or concerns.     Was very happy to get a bed bath, the nurses were wonderful. They even warmed the water.

## 2023-06-28 NOTE — SUBJECTIVE & OBJECTIVE
Interval History: NAEO. Pt HDS in NAD. Discharge to SNF delayed by paperwork not being filled out on time. Will discharge tomorrow morning, 6/29/23.    Review of Systems   Constitutional:  Positive for activity change and fatigue. Negative for chills, fever and unexpected weight change.   HENT:  Negative for sinus pain, trouble swallowing and voice change.    Eyes:  Negative for photophobia, pain and visual disturbance.   Respiratory:  Negative for cough, shortness of breath and wheezing.    Cardiovascular:  Negative for chest pain, palpitations and leg swelling.   Gastrointestinal:  Negative for abdominal pain, blood in stool, constipation, diarrhea, nausea and vomiting.   Endocrine: Negative for polydipsia and polyuria.   Genitourinary:  Negative for dysuria, frequency, hematuria and urgency.   Musculoskeletal:  Negative for arthralgias, back pain and myalgias.   Skin:  Negative for color change and pallor.   Neurological:  Positive for dizziness, weakness and light-headedness. Negative for syncope, numbness and headaches.   Hematological:  Does not bruise/bleed easily.   Psychiatric/Behavioral:  Negative for confusion, dysphoric mood and sleep disturbance.    Objective:     Vital Signs (Most Recent):  Temp: 97.6 °F (36.4 °C) (06/28/23 1121)  Pulse: 68 (06/28/23 1455)  Resp: 18 (06/28/23 1121)  BP: (!) 124/56 (06/28/23 1121)  SpO2: 100 % (06/28/23 1121) Vital Signs (24h Range):  Temp:  [97.6 °F (36.4 °C)-98.4 °F (36.9 °C)] 97.6 °F (36.4 °C)  Pulse:  [60-69] 68  Resp:  [18] 18  SpO2:  [96 %-100 %] 100 %  BP: (113-128)/(55-68) 124/56     Weight: 66.7 kg (147 lb)  Body mass index is 26.04 kg/m².    Intake/Output Summary (Last 24 hours) at 6/28/2023 0952  Last data filed at 6/28/2023 0016  Gross per 24 hour   Intake --   Output 300 ml   Net -300 ml         Physical Exam  Vitals and nursing note reviewed.   Constitutional:       General: She is not in acute distress.     Appearance: She is not ill-appearing,  toxic-appearing or diaphoretic.   HENT:      Head: Normocephalic and atraumatic.      Right Ear: External ear normal.      Left Ear: External ear normal.      Mouth/Throat:      Mouth: Mucous membranes are moist.      Pharynx: No oropharyngeal exudate.   Eyes:      Extraocular Movements: Extraocular movements intact.      Pupils: Pupils are equal, round, and reactive to light.   Cardiovascular:      Rate and Rhythm: Normal rate and regular rhythm.      Pulses: Normal pulses.      Heart sounds: Normal heart sounds. No murmur heard.  Pulmonary:      Effort: Pulmonary effort is normal. No respiratory distress.      Breath sounds: Rales present. No wheezing.   Abdominal:      General: Abdomen is flat. Bowel sounds are normal. There is no distension.      Palpations: Abdomen is soft. There is no mass.      Tenderness: There is no abdominal tenderness.   Musculoskeletal:         General: No swelling or tenderness. Normal range of motion.      Cervical back: Normal range of motion and neck supple.      Right lower leg: Edema present.      Left lower leg: Edema present.   Skin:     General: Skin is warm and dry.      Capillary Refill: Capillary refill takes less than 2 seconds.   Neurological:      General: No focal deficit present.      Mental Status: She is alert and oriented to person, place, and time. Mental status is at baseline.   Psychiatric:         Mood and Affect: Mood normal.         Behavior: Behavior normal.           Significant Labs: All pertinent labs within the past 24 hours have been reviewed.    Significant Imaging: I have reviewed all pertinent imaging results/findings within the past 24 hours.

## 2023-06-28 NOTE — PLAN OF CARE
THUY spoke with Ira cummings with [employer] in regard to SNF benefits/co pay days and she indicated that they will cover any copay fees for the patient at SNF.THUY has contacted patient son and informed him of this informatio            Louann Yuan LMSW  [facility]   [phone]

## 2023-06-28 NOTE — PT/OT/SLP PROGRESS
Occupational Therapy   Treatment    Name: Gisselle Ortiz  MRN: [id number]  Admitting Diagnosis:  CHF exacerbation       Recommendations:     Discharge Recommendations: nursing facility, skilled  Discharge Equipment Recommendations:  to be determined by next level of care  Barriers to discharge:  Decreased caregiver support    Assessment:     Gisselle Ortiz is a 89 y.o. female with a medical diagnosis of CHF exacerbation.  She presents with performance deficits affecting function are weakness, impaired endurance, impaired self care skills, impaired functional mobility, gait instability, decreased coordination, impaired cardiopulmonary response to activity, impaired coordination, decreased lower extremity function, decreased upper extremity function, edema.     Rehab Prognosis:  Good; patient would benefit from acute skilled OT services to address these deficits and reach maximum level of function.       Plan:     Patient to be seen 3 x/week to address the above listed problems via self-care/home management, therapeutic activities, therapeutic exercises  Plan of Care Expires: 07/22/23  Plan of Care Reviewed with: patient    Subjective     Chief Complaint: CHF exacerbation  Patient/Family Comments/goals: agreeable for therapy  Pain/Comfort:  Pain Rating 1: 0/10    Objective:     Communicated with: RN prior to session.  Patient found HOB elevated with telemetry, peripheral IV, PureWick, pulse ox (continuous) upon OT entry to room.    General Precautions: Standard, fall    Orthopedic Precautions:N/A  Braces: N/A  Respiratory Status: Room air     Occupational Performance:     Bed Mobility:    Patient completed Scooting to EOB with contact guard assistance, maxA x2 persons (PCT) to scoot to HOB with drawsheet  Patient completed Supine to Sit with minimum assistance  Patient completed Sit to Supine with minimum assistance with LEs    Activities of Daily Living:  Grooming: stand by assistance for oral care and  washing face while sitting EOB. Good static sitting balance throughout.      [facility] 6 Click ADL: 15    Treatment & Education:  Pt sat EOB ~10 min with good sitting balance throughout. Whiteboard updated. Pt educated on role of OT, POC, safety during ADLs, importance of OOB activity. Pt given call light and instructed to ask for assistance with needs/transfers when needed.      Patient left HOB elevated with all lines intact, call button in reach, and RN notified    GOALS:   Multidisciplinary Problems       Occupational Therapy Goals          Problem: Occupational Therapy    Goal Priority Disciplines Outcome Interventions   Occupational Therapy Goal     OT, PT/OT Ongoing, Progressing    Description: Goals to be met by: 7/22/23     Patient will increase functional independence with ADLs by performing:    LE Dressing with Moderate Assistance.  Grooming while EOB with Set-up Assistance.  Toileting from toilet with Minimal Assistance for hygiene and clothing management.   Supine to sit with Stand-by Assistance.  Step transfer with Minimal Assistance  Toilet transfer to toilet with Minimal Assistance.                         Time Tracking:     OT Date of Treatment: 06/28/23  OT Start Time: 0821  OT Stop Time: 0839  OT Total Time (min): 18 min    Billable Minutes:Self Care/Home Management 18    OT/MINERVA: OT          6/28/2023

## 2023-06-29 NOTE — NURSING
Discharge paperwork and education provided. Patient left via [facility] with all belongings, in stable condition.

## 2023-06-29 NOTE — PLAN OF CARE
[facility] ([facility])  Discharge Final Note    Primary Care Provider: Kai Montez MD    Expected Discharge Date: 6/29/2023    Final Discharge Note (most recent)       Final Note - 06/29/23 1430          Final Note    Assessment Type Final Discharge Note (P)      Anticipated Discharge Disposition Skilled Nursing Facility (P)                      Important Message from Medicare  Important Message from Medicare regarding Discharge Appeal Rights: Explained to patient/caregiver, Signed/date by patient/caregiver     Date IMM was signed: 06/28/23  Time IMM was signed: 1259    Patient discharging to [facility].  has arranged transport for 3:00pm. Patient will transport by stretcher.  Nurse call report to [phone].        Future Appointments   Date Time Provider Department Center   6/30/2023 11:00 AM Ben Whaley MD [facility] IM [facility] PCW   7/5/2023  1:30 PM LAB, APPOINTMENT [facility] LAB VNP [facility]   7/5/2023  2:00 PM Lisa Christensen PA-C [facility]   7/5/2023  2:00 PM [facility] HEART FAILURE NURSE [facility]   7/12/2023 11:00 AM Joi Romeo MD [facility] PAL MED [facility]   7/18/2023 10:00 AM HOME MONITOR DEVICE CHECK, [facility] ARRHPRO [facility]   7/24/2023  4:00 PM Roz Leon NP [facility] NEPHRO [facility]   8/15/2023  9:00 AM Sunil Naidu MD [facility] HEPAT [facility]   10/31/2023  3:00 PM Kai Montez MD [facility]   Updated ETA- is 4:50pm    Louann Yuan LMSW  [facility]   [id number]

## 2023-06-29 NOTE — NURSING
Patient lying in bed supine. No distress at this time. Assisted patient with gown change replaced bed linen. Applied preventative mepilex dressings to left arm. Repositioned patient for comfort. Instructed on use of call light. Encouraged to call with needs. Call light in reach. Bed in lowest and locked position. Nurse will  continue to monitor.

## 2023-06-29 NOTE — PT/OT/SLP PROGRESS
Physical Therapy      Patient Name:  Gisselle Ortiz   MRN:  [id number]    Patient not seen today secondary to Other (pt is pending d/c this PM and already had  time schedule). No PT follow-up required.

## 2023-06-29 NOTE — PLAN OF CARE
THUY has met with patient and patient son at the bedside and provided a paper copy of the admission packet and patient son is in the process of completing it now. THUY will follow back up.    Updated 1:32pm   All documents sent to [facility] in admission before noon, THUY spoke with her reporting that their team is reviewing the orders and she will call  SW back soon with an update.       Louann Yuan, SARITA  [facility]   [phone]

## 2023-06-30 NOTE — PROGRESS NOTES
[facility] ([facility]) Team notified of pt referral via [facility] (automated inbasket notification) .    Patient screened today by provider and RN for enrollment to program.      Pt was deemed not a candidate for enrollment at this time related to patient has follow up barrier: pt is being discharged to non-[facility] skilled nursing/rehab facility, nursing home or other facility that will be unable to assist pt with participation.     Pt will require additional follow up planning per primary team.     If pt status, diagnosis, or treatment plan changes , please place AMB referral to [facility] ([id number]) for HFTCC enrollment re-evalution.    Ari Ortiz states that his Mother was D/C to [facility] SNF for at least 20 days. Ari agrees that his Mother will not need our services. Pt will be Dis Enrolled.

## 2023-06-30 NOTE — PROGRESS NOTES
Pt d/c'd to [facility] on yesterday (6/29/23).  Called and s/w pt's son Ari who will bring pt's monitor to the SNF. Understanding was verbalized.  The son appreciated the call.

## 2023-06-30 NOTE — TELEPHONE ENCOUNTER
Call to Son Ari Ortiz to do [facility] D[facility]C call and he informs me that his Mother has been D/C from the Hospital to [facility] for at least 20 days. Son agrees that our services will not be needed.

## 2023-07-06 NOTE — TELEPHONE ENCOUNTER
LVM for patient to discuss possible persistent AF since 6/28/2023  Graphs suggest patient has been free of AF since DCCV 6/21/2022  Taking Eliquis      TECH NOTE:  Pls confirm that patient is still taking Pacerone 200mg daily  An updated transmission to confirm persistence would also be helpful

## 2023-07-14 NOTE — TELEPHONE ENCOUNTER
Alert transmission received showing persistent AF since  6/28/23 @ 06:47 PM. Waiting on pt call back to confirm medications including Amiodarone 200 mg daily and Eliquis 2.5 mg BID. Pt had not had any AF since [facility] on 6/21/22.

## 2023-08-03 NOTE — TELEPHONE ENCOUNTER
Alert transmission received today  showing persistent AF since  6/28/23.  LVM to assess symptoms and medication compliance.  V rates controlled.  Will cont to monitor.

## 2023-08-31 NOTE — TELEPHONE ENCOUNTER
Called pt to schedule an in clinic device check and the pt could not accept calls at that moment but a message was left.

## 2023-09-12 PROBLEM — I21.4 NSTEMI (NON-ST ELEVATED MYOCARDIAL INFARCTION): Status: ACTIVE | Noted: 2023-01-01

## 2023-09-12 PROBLEM — E87.8 ELECTROLYTE DISTURBANCE: Status: RESOLVED | Noted: 2023-01-01 | Resolved: 2023-01-01

## 2023-09-12 PROBLEM — A41.9 SEVERE SEPSIS: Status: ACTIVE | Noted: 2023-01-01

## 2023-09-12 PROBLEM — R62.7 FAILURE TO THRIVE IN ADULT: Status: ACTIVE | Noted: 2023-01-01

## 2023-09-12 PROBLEM — R65.20 SEVERE SEPSIS: Status: ACTIVE | Noted: 2023-01-01

## 2023-09-12 PROBLEM — R53.83 LETHARGY: Status: RESOLVED | Noted: 2023-01-01 | Resolved: 2023-01-01

## 2023-09-12 PROBLEM — Z51.5 PALLIATIVE CARE ENCOUNTER: Status: RESOLVED | Noted: 2023-01-01 | Resolved: 2023-01-01

## 2023-09-12 PROBLEM — D69.6 THROMBOCYTOPENIA: Status: RESOLVED | Noted: 2023-01-01 | Resolved: 2023-01-01

## 2023-09-12 PROBLEM — N30.01 ACUTE CYSTITIS WITH HEMATURIA: Status: RESOLVED | Noted: 2020-09-26 | Resolved: 2023-01-01

## 2023-09-12 PROBLEM — E87.6 HYPOKALEMIA: Status: ACTIVE | Noted: 2023-01-01

## 2023-09-12 PROBLEM — E87.5 HYPERKALEMIA: Status: RESOLVED | Noted: 2023-01-01 | Resolved: 2023-01-01

## 2023-09-12 NOTE — Clinical Note
Diagnosis: Urinary tract infection without hematuria, site unspecified [[id number]]   Admitting Provider:: JERE HERRERA [[id number]]   Future Attending Provider: MARKO BOND [[id number]]   Reason for IP Medical Treatment  (Clinical interventions that can only be accomplished in the IP setting? ) :: A fib with RVR, sepsis   I certify that Inpatient services for greater than or equal to 2 midnights are medically necessary:: Yes   Plans for Post-Acute care--if anticipated (pick the single best option):: [facility]

## 2023-09-12 NOTE — ED PROVIDER NOTES
Encounter Date: 9/12/2023       History     Chief Complaint   Patient presents with    Fatigue     From sunrise per staff patient patient having decline over the past 2 days     HPI  89 y/o lady with a hx of a-fib and CHF with pacemaker presents in the ED with reduced LOC, fatigue, and reduced oral intake that has progressively gotten worse since Sunday (per nurse at [place]).  Pt received 1 liter of fluid prior to arrival per EMS.  Pt is Aox4 at baseline.  She is not able to provide further history or answer questions about her symptoms at this time.    Review of patient's allergies indicates:   Allergen Reactions    Ciprofloxacin Nausea And Vomiting     Past Medical History:   Diagnosis Date    A-fib     Anemia of chronic disease 02/02/2021    Anticoagulant long-term use     CHF (congestive heart failure)     Chronic diastolic heart failure 02/02/2021    COVID-19 01/07/2022    Gallstone pancreatitis     Hypertension     Pancreatic abscess 09/26/2020    Stage 3 chronic kidney disease 5/11/2016    Thyroid disease      Past Surgical History:   Procedure Laterality Date    CATARACT EXTRACTION      CATARACT EXTRACTION W/  INTRAOCULAR LENS IMPLANT Bilateral 2004     IN [place]    ENDOSCOPIC ULTRASOUND OF UPPER GASTROINTESTINAL TRACT N/A 9/14/2020    Procedure: ULTRASOUND, UPPER GI TRACT, ENDOSCOPIC;  Surgeon: Esha Howard MD;  Location: [address]);  Service: Endoscopy;  Laterality: N/A;    ENDOSCOPIC ULTRASOUND OF UPPER GASTROINTESTINAL TRACT  11/25/2020    Procedure: ULTRASOUND, UPPER GI TRACT, ENDOSCOPIC;  Surgeon: Esha Howard MD;  Location: [address];  Service: Endoscopy;;    ERCP N/A 9/14/2020    Procedure: ERCP (ENDOSCOPIC RETROGRADE CHOLANGIOPANCREATOGRAPHY);  Surgeon: Esha Howard MD;  Location: [address]);  Service: Endoscopy;  Laterality: N/A;    ERCP N/A 9/25/2020    Procedure: ERCP (ENDOSCOPIC RETROGRADE CHOLANGIOPANCREATOGRAPHY);  Surgeon: Esha Elizondo  MD Lee;  Location: [facility] ENDO (2ND FLR);  Service: Endoscopy;  Laterality: N/A;    ERCP N/A 11/25/2020    Procedure: ERCP (ENDOSCOPIC RETROGRADE CHOLANGIOPANCREATOGRAPHY);  Surgeon: Esha Howard MD;  Location: [facility] ENDO (2ND FLR);  Service: Endoscopy;  Laterality: N/A;  Covid-19 test 11/22/20 at [facility]  2 day hold Eliquis, Dr Favian Colmenares - pg  PM prep    EYE SURGERY      HIP REPLACEMENT ARTHROPLASTY Right 2016    HYSTERECTOMY      REVISION OF SKIN POCKET FOR PACEMAKER N/A 1/21/2019    Procedure: REVISION-POCKET-PACEMAKER;  Surgeon: Asher Watts MD;  Location: [facility] EP LAB;  Service: Cardiology;  Laterality: N/A;  MODERATE SEDATION, sedation issues in the past    THYROIDECTOMY      TREATMENT OF CARDIAC ARRHYTHMIA N/A 3/18/2019    Procedure: CARDIOVERSION OR DEFIBRILLATION;  Surgeon: Asher Watts MD;  Location: [facility] EP LAB;  Service: Cardiology;  Laterality: N/A;  AF, JILL-cx if SR, DCCV, MAC, FAS, 3PREP    TREATMENT OF CARDIAC ARRHYTHMIA N/A 5/14/2019    Procedure: Cardioversion or Defibrillation;  Surgeon: Derick Vizcarra MD;  Location: [facility] EP LAB;  Service: Cardiology;  Laterality: N/A;  AF, JILL, DCCV, MAC, DM, 3PREP    TREATMENT OF CARDIAC ARRHYTHMIA N/A 6/21/2022    Procedure: Cardioversion or Defibrillation;  Surgeon: Favian Colmenares MD;  Location: [facility] EP LAB;  Service: Cardiology;  Laterality: N/A;  AF, JILL, DCCV, MAC, DM, 3 Prep *SJM PPM in situ*     Family History   Problem Relation Age of Onset    Heart attack Mother     Heart disease Mother     Heart failure Mother     Hypertension Mother     Stroke Maternal Grandmother     Hypertension Maternal Grandmother     Heart disease Maternal Grandmother     Heart attack Maternal Grandmother     Heart failure Maternal Grandmother     Colon cancer Neg Hx     Esophageal cancer Neg Hx      Social History     Tobacco Use    Smoking status: Former     Types: Cigarettes     Start date: 5/19/1964    Smokeless tobacco: Never   Substance  Use Topics    Alcohol use: No    Drug use: No     Review of Systems    Physical Exam     Initial Vitals [09/12/23 1019]   BP Pulse Resp Temp SpO2   118/60 (!) 120 12 96.8 °F (36 °C) 99 %      MAP       --         Physical Exam    Constitutional: She appears well-developed and well-nourished. She is not diaphoretic. No distress.   HENT:   Head: Normocephalic and atraumatic.   Eyes: EOM are normal. Pupils are equal, round, and reactive to light.   Pale conjuntivae   Neck: Neck supple.   Normal range of motion.  Cardiovascular:  Normal heart sounds and intact distal pulses.           Pulmonary/Chest: Breath sounds normal. No respiratory distress. She has no rales.   Abdominal: Abdomen is soft. She exhibits no distension. There is no abdominal tenderness.   Musculoskeletal:         General: Edema present. No tenderness. Normal range of motion.      Cervical back: Normal range of motion and neck supple.      Comments: Protruding left hip is baseline per nurse at sunrise     Neurological:   PT not following commands.  Able to state her first name   Skin: Skin is warm and dry.   Pustule in her vaginal area.  Picture in chart.         ED Course   Procedures  Labs Reviewed   CULTURE, BLOOD   CULTURE, BLOOD   CBC WITHOUT DIFFERENTIAL   COMPREHENSIVE METABOLIC PANEL   URINALYSIS, REFLEX TO URINE CULTURE   TROPONIN I   LACTIC ACID, PLASMA     EKG Readings: (Independently Interpreted)   Tachy in the 130-140, A fib with RVR, RBBB, occasional pacing spike.         Imaging Results    None          Medications   sodium chloride 0.9% bolus 500 mL 500 mL (500 mLs Intravenous New Bag 9/12/23 1041)     Medical Decision Making  Amount and/or Complexity of Data Reviewed  Labs: ordered.  Radiology: ordered.    Risk  Decision regarding hospitalization.    MDM:    89 y/o lady presents with reduced LOC and afib with RVR    Ddx: broad to include ICH, sepsis, cardiogenic hypotension    Pt was given 1 L fluid by EMS prior to arrival.  She  arrived normotensive and in a-fib with RVR.  She is afebrile.  Cautiously gave her another 500 cc bolus of fluids.  Since then pressures have dropped below 90 systolic and we have initiated the sepsis protocols.  U/A + for UTI, and pustule on patient's vaginal area... possible source of infection.  Did not rate control with concern for hypotension.  Afib with RVR likely a result secondary to septic bacteremia.  Consulted with hospital medicine who will admit her to stepdown.                    ED Course as of 09/12/23 1157   Tue Sep 12, 2023   1156 Pt blood pressure dropped to 84, will initiate sepsis protocols. vpc [VC]      ED Course User Index  [VC] Rufino Ring MD                    Clinical Impression:   Final diagnoses:  [R53.83] Fatigue               Rufino Ring MD  Resident  09/12/23 1612       Rufino Ring MD  Resident  09/12/23 1619

## 2023-09-12 NOTE — ASSESSMENT & PLAN NOTE
Pt is a 89 year old female with hx of HRpEF, persistent atrial fibrillation on AC, SSS s/p PPM, amongst other medical hx including recurrent UTI's and multiple hospital admissions this year with progressively worsening debility and failure to thrive. Initially presented to ED via EMS after being found with decreased level of consciousness after 2 days of poor PO intake and lethargy. Admitted to hospital medicine for failure to thrive, management of a fib with RVR and possible urosepsis.     --daughter, Génesis Araiza, and son, Ari Ortiz, are joint HCPOA.   --engaged in extensive discussion with family regarding goals of care, current medical status, and prognosis.  --family has had extensive discussions regarding pt's quality of life and progressive decline, requested code status to be DNR/DNI  --code status has been updated accordingly  --family wishes for treatment of acute illness where possible, but will likely discharge on home hospice  --palliative care consult placed   --social work consult placed for home hospice planning

## 2023-09-12 NOTE — SUBJECTIVE & OBJECTIVE
Past Medical History:   Diagnosis Date    A-fib     Anemia of chronic disease 02/02/2021    Anticoagulant long-term use     CHF (congestive heart failure)     Chronic diastolic heart failure 02/02/2021    COVID-19 01/07/2022    Gallstone pancreatitis     Hypertension     Pancreatic abscess 09/26/2020    Stage 3 chronic kidney disease 5/11/2016    Thyroid disease        Past Surgical History:   Procedure Laterality Date    CATARACT EXTRACTION      CATARACT EXTRACTION W/  INTRAOCULAR LENS IMPLANT Bilateral 2004    DR IN [place]    ENDOSCOPIC ULTRASOUND OF UPPER GASTROINTESTINAL TRACT N/A 9/14/2020    Procedure: ULTRASOUND, UPPER GI TRACT, ENDOSCOPIC;  Surgeon: Esha Howard MD;  Location: [facility] ENDO (2ND FLR);  Service: Endoscopy;  Laterality: N/A;    ENDOSCOPIC ULTRASOUND OF UPPER GASTROINTESTINAL TRACT  11/25/2020    Procedure: ULTRASOUND, UPPER GI TRACT, ENDOSCOPIC;  Surgeon: Esha Hoawrd MD;  Location: [facility] ENDO (2ND FLR);  Service: Endoscopy;;    ERCP N/A 9/14/2020    Procedure: ERCP (ENDOSCOPIC RETROGRADE CHOLANGIOPANCREATOGRAPHY);  Surgeon: Esha Howard MD;  Location: [facility] (2ND [facility]);  Service: Endoscopy;  Laterality: N/A;    ERCP N/A 9/25/2020    Procedure: ERCP (ENDOSCOPIC RETROGRADE CHOLANGIOPANCREATOGRAPHY);  Surgeon: Esha Howard MD;  Location: [facility] ENDO (2ND FLR);  Service: Endoscopy;  Laterality: N/A;    ERCP N/A 11/25/2020    Procedure: ERCP (ENDOSCOPIC RETROGRADE CHOLANGIOPANCREATOGRAPHY);  Surgeon: Esha Howard MD;  Location: [facility] ENDO (2ND FLR);  Service: Endoscopy;  Laterality: N/A;  Covid-19 test 11/22/20 at [place] - pg  2 day hold Dr Favian Pisano - pg  PM prep    EYE SURGERY      HIP REPLACEMENT ARTHROPLASTY Right 2016    HYSTERECTOMY      REVISION OF SKIN POCKET FOR PACEMAKER N/A 1/21/2019    Procedure: REVISION-POCKET-PACEMAKER;  Surgeon: Asher Watts MD;  Location: [facility] EP LAB;  Service: Cardiology;  Laterality: N/A;  MODERATE SEDATION,  sedation issues in the past    THYROIDECTOMY      TREATMENT OF CARDIAC ARRHYTHMIA N/A 3/18/2019    Procedure: CARDIOVERSION OR DEFIBRILLATION;  Surgeon: Asher Watts MD;  Location: [facility] EP LAB;  Service: Cardiology;  Laterality: N/A;  AF, JILL-cx if SR, DCCV, MAC, FAS, 3PREP    TREATMENT OF CARDIAC ARRHYTHMIA N/A 5/14/2019    Procedure: Cardioversion or Defibrillation;  Surgeon: Derick Vizcarra MD;  Location: [facility] EP LAB;  Service: Cardiology;  Laterality: N/A;  AF, JILL, DCCV, MAC, DM, 3PREP    TREATMENT OF CARDIAC ARRHYTHMIA N/A 6/21/2022    Procedure: Cardioversion or Defibrillation;  Surgeon: Favian Colmenares MD;  Location: [facility] EP LAB;  Service: Cardiology;  Laterality: N/A;  AF, JILL, DCCV, MAC, DM, 3 Prep *SJM PPM in situ*       Review of patient's allergies indicates:   Allergen Reactions    Ciprofloxacin Nausea And Vomiting       No current facility-administered medications on file prior to encounter.     Current Outpatient Medications on File Prior to Encounter   Medication Sig    amiodarone (PACERONE) 200 MG Tab Take 1 tablet (200 mg total) by mouth once daily.    aspirin (ECOTRIN) 81 MG EC tablet Take 1 tablet (81 mg total) by mouth once daily.    atenoloL (TENORMIN) 25 MG tablet HOLD ATENOLOL UNTIL YOU ARE ABLE TO FOLLOW UP WITH PRIMARY CARE DOCTOR    cyanocobalamin (VITAMIN B-12) 1000 MCG tablet Take 1 tablet (1,000 mcg total) by mouth once daily.    cyproheptadine (PERIACTIN) 4 mg tablet Take 1 tablet (4 mg total) by mouth 3 (three) times daily.    diclofenac sodium (VOLTAREN) 1 % Gel Apply topically to affected joints as needed for pain    ELIQUIS 2.5 mg Tab TAKE 1 TABLET TWICE A DAY    folic acid (FOLVITE) 1 MG tablet Take 1 tablet (1 mg total) by mouth once daily.    furosemide (LASIX) 20 MG tablet Take 1 tablet (20 mg total) by mouth once daily. Take as needed for swelling in your legs. HOLD MEDICATION IF HYPOTENSIVE    hydrALAZINE (APRESOLINE) 25 MG tablet Take 1 tablet (25 mg total) by  mouth 2 (two) times daily as needed (SBP >180). HOLD UNTIL YOU ARE ABLE TO SEE YOUR PRIMARY CARE DOCTOR    Lactobacillus rhamnosus GG (CULTURELLE) 10 billion cell capsule Take 1 capsule by mouth once daily.    levothyroxine (SYNTHROID) 137 MCG Tab tablet Take 1 tablet (137 mcg total) by mouth before breakfast.    midodrine (PROAMATINE) 5 MG Tab Take 1 tablet (5 mg total) by mouth every 8 (eight) hours.    mirtazapine (REMERON) 7.5 MG Tab Take 1 tablet (7.5 mg total) by mouth every evening.    multivitamin Tab Take 1 tablet by mouth once daily.    pantoprazole (PROTONIX) 40 MG tablet Take 1 tablet (40 mg total) by mouth once daily.    polyethylene glycol (GLYCOLAX) 17 gram PwPk Take 17 g by mouth daily as needed (constipation).     Family History       Problem Relation (Age of Onset)    Heart attack Mother, Maternal Grandmother    Heart disease Mother, Maternal Grandmother    Heart failure Mother, Maternal Grandmother    Hypertension Mother, Maternal Grandmother    Stroke Maternal Grandmother          Tobacco Use    Smoking status: Former     Types: Cigarettes     Start date: 5/19/1964    Smokeless tobacco: Never   Substance and Sexual Activity    Alcohol use: No    Drug use: No    Sexual activity: Not Currently     Partners: Male     Review of Systems   Unable to perform ROS: Mental status change     Objective:     Vital Signs (Most Recent):  Temp: 97 °F (36.1 °C) (09/12/23 1038)  Pulse: (!) 120 (09/12/23 1800)  Resp: 18 (09/12/23 1800)  BP: 112/61 (09/12/23 1800)  SpO2: 99 % (09/12/23 1800) Vital Signs (24h Range):  Temp:  [96.8 °F (36 °C)-97 °F (36.1 °C)] 97 °F (36.1 °C)  Pulse:  [120-144] 120  Resp:  [12-22] 18  SpO2:  [96 %-100 %] 99 %  BP: ()/(58-69) 112/61     Weight: 66.7 kg (147 lb)  Body mass index is 26.04 kg/m².     Physical Exam  Vitals and nursing note reviewed.   Constitutional:       General: She is not in acute distress.     Appearance: She is ill-appearing. She is not toxic-appearing.   HENT:       Head: Normocephalic and atraumatic.   Neck:      Comments: Neck positioning remains extended.   Cardiovascular:      Rate and Rhythm: Tachycardia present. Rhythm irregular.      Pulses: Normal pulses.      Heart sounds: Normal heart sounds.   Pulmonary:      Effort: Pulmonary effort is normal. No respiratory distress.      Breath sounds: Normal breath sounds. No wheezing.   Chest:      Chest wall: No tenderness.   Abdominal:      General: Abdomen is flat. There is no distension.      Palpations: Abdomen is soft.      Tenderness: There is no abdominal tenderness. There is no guarding or rebound.   Genitourinary:     Comments: Labial abscess noted (see picture in media).  Musculoskeletal:      Cervical back: No tenderness.      Comments: Upper and lower extremities are edematous L>R. 2-3+ pitting edema of lower extremities.    Skin:     General: Skin is warm and dry.      Coloration: Skin is not jaundiced.      Findings: Bruising present. No erythema or rash.      Comments: Sacral pressure ulcer, grade I.    Neurological:      Comments: Unable to participate in exam.              Significant Labs: All pertinent labs within the past 24 hours have been reviewed.  CBC:   Recent Labs   Lab 09/12/23  1142   WBC 6.67   HGB 11.2*   HCT 35.7*        CMP:   Recent Labs   Lab 09/12/23  1142      K 3.2*      CO2 18*   GLU 65*   BUN 58*   CREATININE 2.1*   CALCIUM 7.3*   PROT 4.9*   ALBUMIN 1.7*   BILITOT 1.4*   ALKPHOS 182*   AST 31   ALT 31   ANIONGAP 16     Urine Studies:   Recent Labs   Lab 09/12/23  1231   COLORU Ab[place]   APPEARANCEUA Cloudy*   PHUR 7.0   SPECGRAV 1.010   PROTEINUA 1+*   GLUCUA Negative   KETONESU Negative   BILIRUBINUA Negative   OCCULTUA 3+*   NITRITE Negative   LEUKOCYTESUR 3+*   RBCUA 36*   WBCUA >100*   BACTERIA Many*   SQUAMEPITHEL 2   HYALINECASTS 0       Significant Imaging: I have reviewed and interpreted all pertinent imaging results/findings within the past 24 hours.

## 2023-09-12 NOTE — HPI
Gisselle Ortiz is a 89 year old female with hx of HFpEF, sick sinus syndome s/p PPM, persistent A fib on oral anticoagulation, CKD3b, hypertension, hyperlipidemia, hypothyroidism, GERD, and recurrent UTI's who presented to the ED via EMS after being found by nursing home staff altered with reduced level of consciousness this morning. Pt reported to have been increasingly lethargic with poor PO intake for the past 2 days. After discussion with family at bedside and telephone, it is reported that pt is normally functional, though over time has become bedbound due to medical conditions. At baseline, she is alert and oriented x4 without baseline dementia. Last known normal was last night while daughter was visiting her at the nursing home. Pt has had multiple admissions this year with similar presentation, noted to have poor PO intake and progressive debility with failure to thrive.    In the ED, /60, , RR 12 with SpO2 99% on RA, afebrile. Noted to be in a fib with RVR on cardiac monitor and EKG. Labs notable for stable anemia, Na 141, K 3.2, bicarb 18, elevated BUN/creatinine 58/2.1, elevated T bili and alk phos. , troponin 0.147. Lactate wnl. Sepsis work-up initiated after brief episode of hypotension with blood cultures collected, IVF resuscitation (2L total via EMS and ED), and broad-spectrum antibiotics started. UA with evidence of UTI. CT head without contrast with no findings concerning for acute intracranial processes.  She will be admitted to hospital medicine for failure to thrive, management of a fib with RVR and possible urosepsis.

## 2023-09-12 NOTE — MEDICAL/APP STUDENT
Hospital Medicine Student   [place] and Physical  [facility] [facility]  09/12/2023  5:17 PM    SUBJECTIVE:     Chief Complaint: altered mental status    History of Present Illness:  Gisselle Ortiz is a 89 y.o. female with a relevant medical history of HFpEF, AF, CKD3B, HTN, GERD who presents with altered mental status. Preferred pronouns are she/her/hers. She currently resides at [facility], who reports that she was found unconscious in her bed when staff came to wake her up this morning. She received 1 L fluid from EMS prior to arrival and received another in the ED. H/H 11.2/35.7, troponin 0.147, , UA suspicious for UTI. Of note, pt has had several admissions in the past few months for CHF, MARIO, and UTIs.    Son at bedside, who reports that she is Aox4 at baseline and has a history of liver and urinary tract infections. Son is PoA with his sister and brother, who make joint decisions together. He confirms that pt is DNR/DNI.    Spoke to daughter, who reports that pt was eating well and Aox4 when she visited this Sat/Sun. Daughter confirmed DNR/DNI and PoA status.      Review of Systems   Constitutional:  Positive for malaise/fatigue. Negative for chills, diaphoresis and fever.   Neurological:  Positive for loss of consciousness and weakness.   Endo/Heme/Allergies:  Bruises/bleeds easily.   ROS limited by pt's reduced level of consciousness.       HISTORY:     Past Medical History:   Diagnosis Date    A-fib     Anemia of chronic disease 02/02/2021    Anticoagulant long-term use     CHF (congestive heart failure)     Chronic diastolic heart failure 02/02/2021    COVID-19 01/07/2022    Gallstone pancreatitis     Hypertension     Pancreatic abscess 09/26/2020    Stage 3 chronic kidney disease 5/11/2016    Thyroid disease        Past Surgical History:   Procedure Laterality Date    CATARACT EXTRACTION      CATARACT EXTRACTION W/  INTRAOCULAR LENS IMPLANT Bilateral 2004     IN [place]    ENDOSCOPIC  ULTRASOUND OF UPPER GASTROINTESTINAL TRACT N/A 9/14/2020    Procedure: ULTRASOUND, UPPER GI TRACT, ENDOSCOPIC;  Surgeon: Esha Howard MD;  Location: [facility] ENDO (2ND FLR);  Service: Endoscopy;  Laterality: N/A;    ENDOSCOPIC ULTRASOUND OF UPPER GASTROINTESTINAL TRACT  11/25/2020    Procedure: ULTRASOUND, UPPER GI TRACT, ENDOSCOPIC;  Surgeon: Esha Howard MD;  Location: [facility] ENDO (2ND FLR);  Service: Endoscopy;;    ERCP N/A 9/14/2020    Procedure: ERCP (ENDOSCOPIC RETROGRADE CHOLANGIOPANCREATOGRAPHY);  Surgeon: Esha Howard MD;  Location: [facility] ENDO (2ND FLR);  Service: Endoscopy;  Laterality: N/A;    ERCP N/A 9/25/2020    Procedure: ERCP (ENDOSCOPIC RETROGRADE CHOLANGIOPANCREATOGRAPHY);  Surgeon: Esha Howard MD;  Location: [facility] ENDO (2ND FLR);  Service: Endoscopy;  Laterality: N/A;    ERCP N/A 11/25/2020    Procedure: ERCP (ENDOSCOPIC RETROGRADE CHOLANGIOPANCREATOGRAPHY);  Surgeon: Esha Howard MD;  Location: [facility] ENDO (2ND FLR);  Service: Endoscopy;  Laterality: N/A;  Covid-19 test 11/22/20 at [place] - pg  2 day hold Dr Favian Pisano - pg  PM prep    EYE SURGERY      HIP REPLACEMENT ARTHROPLASTY Right 2016    HYSTERECTOMY      REVISION OF SKIN POCKET FOR PACEMAKER N/A 1/21/2019    Procedure: REVISION-POCKET-PACEMAKER;  Surgeon: Asher Watts MD;  Location: [facility] EP LAB;  Service: Cardiology;  Laterality: N/A;  MODERATE SEDATION, sedation issues in the past    THYROIDECTOMY      TREATMENT OF CARDIAC ARRHYTHMIA N/A 3/18/2019    Procedure: CARDIOVERSION OR DEFIBRILLATION;  Surgeon: Asher Watts MD;  Location: [facility] EP LAB;  Service: Cardiology;  Laterality: N/A;  AF, JILL-cx if SR, DCCV, MAC, FAS, 3PREP    TREATMENT OF CARDIAC ARRHYTHMIA N/A 5/14/2019    Procedure: Cardioversion or Defibrillation;  Surgeon: Derick Vizcarra MD;  Location: [facility] EP LAB;  Service: Cardiology;  Laterality: N/A;  AF, JILL, DCCV, MAC, DM, 3PREP    TREATMENT OF CARDIAC ARRHYTHMIA N/A  6/21/2022    Procedure: Cardioversion or Defibrillation;  Surgeon: Favian Colmenares MD;  Location: [facility] EP LAB;  Service: Cardiology;  Laterality: N/A;  AF, JILL, DCCV, MAC, DM, 3 Prep *SJM PPM in situ*       Family History   Problem Relation Age of Onset    Heart attack Mother     Heart disease Mother     Heart failure Mother     Hypertension Mother     Stroke Maternal Grandmother     Hypertension Maternal Grandmother     Heart disease Maternal Grandmother     Heart attack Maternal Grandmother     Heart failure Maternal Grandmother     Colon cancer Neg Hx     Esophageal cancer Neg Hx        Social History     Socioeconomic History    Marital status:    Tobacco Use    Smoking status: Former     Types: Cigarettes     Start date: 5/19/1964    Smokeless tobacco: Never   Substance and Sexual Activity    Alcohol use: No    Drug use: No    Sexual activity: Not Currently     Partners: Male     Social Determinants of Health     Financial Resource Strain: Low Risk  (6/24/2023)    Overall Financial Resource Strain (CARDIA)     Difficulty of Paying Living Expenses: Not very hard   Food Insecurity: No Food Insecurity (6/24/2023)    Hunger Vital Sign     Worried About Running Out of Food in the Last Year: Never true     Ran Out of Food in the Last Year: Never true   Transportation Needs: No Transportation Needs (6/24/2023)    PRAPARE - Transportation     Lack of Transportation (Medical): No     Lack of Transportation (Non-Medical): No   Physical Activity: Inactive (5/16/2023)    Exercise Vital Sign     Days of Exercise per Week: 0 days     Minutes of Exercise per Session: 0 min   Stress: No Stress Concern Present (5/16/2023)    [employer] - Occupational Stress Questionnaire     Feeling of Stress : Not at all   Social Connections: Moderately Isolated (6/24/2023)    Social Connection and Isolation Panel [NHANES]     Frequency of Communication with Friends and Family: More than three times a week      Frequency of Social Gatherings with Friends and Family: Once a week     Attends Sikhism Services: 1 to 4 times per year     Active Member of Clubs or Organizations: No     Attends Club or Organization Meetings: Never     Marital Status:    Housing Stability: Low Risk  (6/24/2023)    Housing Stability Vital Sign     Unable to Pay for Housing in the Last Year: No     Number of Places Lived in the Last Year: 1     Unstable Housing in the Last Year: No         MEDICATIONS & ALLERGIES:     No current facility-administered medications on file prior to encounter.     Current Outpatient Medications on File Prior to Encounter   Medication Sig Dispense Refill    amiodarone (PACERONE) 200 MG Tab Take 1 tablet (200 mg total) by mouth once daily. 90 tablet 3    aspirin (ECOTRIN) 81 MG EC tablet Take 1 tablet (81 mg total) by mouth once daily.  0    atenoloL (TENORMIN) 25 MG tablet HOLD ATENOLOL UNTIL YOU ARE ABLE TO FOLLOW UP WITH PRIMARY CARE DOCTOR 270 tablet 3    cyanocobalamin (VITAMIN B-12) 1000 MCG tablet Take 1 tablet (1,000 mcg total) by mouth once daily.      cyproheptadine (PERIACTIN) 4 mg tablet Take 1 tablet (4 mg total) by mouth 3 (three) times daily. 90 tablet 6    diclofenac sodium (VOLTAREN) 1 % Gel Apply topically to affected joints as needed for pain 50 g 3    ELIQUIS 2.5 mg Tab TAKE 1 TABLET TWICE A  tablet 3    folic acid (FOLVITE) 1 MG tablet Take 1 tablet (1 mg total) by mouth once daily.  0    furosemide (LASIX) 20 MG tablet Take 1 tablet (20 mg total) by mouth once daily. Take as needed for swelling in your legs. HOLD MEDICATION IF HYPOTENSIVE 30 tablet 11    hydrALAZINE (APRESOLINE) 25 MG tablet Take 1 tablet (25 mg total) by mouth 2 (two) times daily as needed (SBP >180). HOLD UNTIL YOU ARE ABLE TO SEE YOUR PRIMARY CARE DOCTOR 90 tablet 11    Lactobacillus rhamnosus GG (CULTURELLE) 10 billion cell capsule Take 1 capsule by mouth once daily.      levothyroxine (SYNTHROID) 137 MCG Tab  tablet Take 1 tablet (137 mcg total) by mouth before breakfast. 90 tablet 3    midodrine (PROAMATINE) 5 MG Tab Take 1 tablet (5 mg total) by mouth every 8 (eight) hours. 90 tablet 11    mirtazapine (REMERON) 7.5 MG Tab Take 1 tablet (7.5 mg total) by mouth every evening. 30 tablet 11    multivitamin Tab Take 1 tablet by mouth once daily.      pantoprazole (PROTONIX) 40 MG tablet Take 1 tablet (40 mg total) by mouth once daily. 30 tablet 11    polyethylene glycol (GLYCOLAX) 17 gram PwPk Take 17 g by mouth daily as needed (constipation).  0       Review of patient's allergies indicates:   Allergen Reactions    Ciprofloxacin Nausea And Vomiting       OBJECTIVE:     Vital Signs Recent:  Temp: 97 °F (36.1 °C) (09/12/23 1038)  Pulse: (!) 127 (09/12/23 1600)  Resp: 20 (09/12/23 1600)  BP: (!) 89/65 (09/12/23 1600)  SpO2: 99 % (09/12/23 1600)  Oxygen Documentation:                Device (Oxygen Therapy): room air         Vital Signs Range (Last 24H):  Temp:  [96.8 °F (36 °C)-97 °F (36.1 °C)]   Pulse:  [120-144]   Resp:  [12-22]   BP: ()/(59-69)   SpO2:  [96 %-99 %]        Weight:  Body mass index is 26.04 kg/m².  Wt Readings from Last 3 Encounters:   09/12/23 66.7 kg (147 lb)   06/27/23 66.7 kg (147 lb)   06/23/23 66.7 kg (147 lb 0.8 oz)        Physical Exam  Constitutional:       Appearance: She is ill-appearing.      Comments: Failure to thrive, thin, malnourished, cachectic   Cardiovascular:      Rate and Rhythm: Tachycardia present. Rhythm irregular.      Pulses: Normal pulses.   Pulmonary:      Breath sounds: Normal breath sounds.   Abdominal:      General: Abdomen is flat. Bowel sounds are normal.      Palpations: Abdomen is soft.   Genitourinary:     Comments: Labial abscess  Skin:     Coloration: Skin is pale.      Findings: Bruising present.   Neurological:      Mental Status: She is disoriented.            Sodium (mmol/L)   Date Value   09/12/2023 141   06/29/2023 143   06/28/2023 143     Potassium (mmol/L)    Date Value   09/12/2023 3.2 (L)   06/29/2023 4.3   06/28/2023 3.2 (L)     Chloride (mmol/L)   Date Value   09/12/2023 107   06/29/2023 113 (H)   06/28/2023 114 (H)     CO2 (mmol/L)   Date Value   09/12/2023 18 (L)   06/29/2023 23   06/28/2023 23     BUN (mg/dL)   Date Value   09/12/2023 58 (H)   06/29/2023 18   06/28/2023 24 (H)     Creatinine (mg/dL)   Date Value   09/12/2023 2.1 (H)   06/29/2023 1.1   06/28/2023 1.2     Glucose (mg/dL)   Date Value   09/12/2023 65 (L)   06/29/2023 85   06/28/2023 110     Calcium (mg/dL)   Date Value   09/12/2023 7.3 (L)   06/29/2023 7.3 (L)   06/28/2023 6.9 (LL)     Magnesium (mg/dL)   Date Value   06/29/2023 1.6   06/28/2023 1.6   06/27/2023 1.7     Phosphorus (mg/dL)   Date Value   06/29/2023 2.4 (L)   06/28/2023 2.6 (L)   06/27/2023 2.8     Alkaline Phosphatase (U/L)   Date Value   09/12/2023 182 (H)   06/29/2023 104   06/28/2023 109     ALT (U/L)   Date Value   09/12/2023 31   06/29/2023 36   06/28/2023 44     AST (U/L)   Date Value   09/12/2023 31   06/29/2023 30   06/28/2023 34     Albumin (g/dL)   Date Value   09/12/2023 1.7 (L)   06/29/2023 1.5 (L)   06/28/2023 1.5 (L)     Total Protein (g/dL)   Date Value   09/12/2023 4.9 (L)   06/29/2023 3.9 (L)   06/28/2023 3.9 (L)     Total Bilirubin (mg/dL)   Date Value   09/12/2023 1.4 (H)   06/29/2023 0.3   06/28/2023 0.3     INR (no units)   Date Value   06/27/2023 1.2   06/17/2022 1.1   01/10/2022 1.0       WBC (K/uL)   Date Value   09/12/2023 6.67   06/29/2023 6.97   06/28/2023 5.74     Hemoglobin (g/dL)   Date Value   09/12/2023 11.2 (L)   06/29/2023 7.7 (L)   06/28/2023 7.4 (L)     Platelets (K/uL)   Date Value   09/12/2023 262   06/29/2023 288   06/28/2023 268       Diagnostic Results:  CT head w/o contrast: Numerous pre-existing findings, without CT evidence of acute intracranial hemorrhage, acute large vascular territory brain infarct, or acute intracranial mass effect.    Urine dipstick shows positive for leukocytes, red  blood cells, bacteria.     ASSESSMENT -- PLAN:   Gisselle Ortiz is a 89 y.o. female with a relevant medical history of HFpEF, AF, CKD3B, HTN, GERD  who is being treated for UTI and altered mental status 2/2 AF/RVR.    Urinary tract infection  - blood cultures x2 and urine culture pending  - Zosyn + vancomycin  Antibiotics (From admission, onward)      Start     Stop Route Frequency Ordered    09/12/23 1200  piperacillin-tazobactam (ZOSYN) 4.5 g in dextrose 5 % in water (D5W) 100 mL IVPB (MB+)  (ED Adult Sepsis Treatment)         09/13/23 1159 IV Every 8 hours (non-standard times) 09/12/23 1157          Atrial fibrillation with rapid ventricular response  - home atenolol held, amiodarone dc'ed  - digoxin ordered  - continue home Eliquis    Hypotension  - 3 L fluid bolus total administered  - continue home midodrine BID    Hypokalemia  - potassium chloride infusion    Hypoglycemia  - glucose tablets prn  - glucagon 1 mg injection prn    Severe protein-calorie malnutrition  - cyproheptadine 4 mg TID    Labial abscess  - gynecology consulted    Hypothyroidism  - continue home levothyroxine    VTE prophylaxis  - Lovenox 30 mg daily    Discharge planning:   FRANCESCA: TBD   Code Status: DNR   Is the patient medically ready for discharge?: No   Reason for patient still in hospital (select all that apply): medically unstable  Discharge Plan A: hospice   Discharge Delays: None known at this time

## 2023-09-13 NOTE — H&P
[facility] - Emergency Dept  [facility] Medicine  History & Physical    Patient Name: Gisselle Ortiz  MRN: [id number]  Patient Class: IP- Inpatient  Admission Date: 9/12/2023  Attending Physician: Jen Harrison MD   Primary Care Provider: Kai Montez MD    Patient information was obtained from relative(s), nursing home and ER records.    Subjective:     Principal Problem:Failure to thrive in adult    Chief Complaint:   Chief Complaint   Patient presents with    Fatigue     From sunrise per staff patient patient having decline over the past 2 days        HPI: Gisselle Ortiz is a 89 year old female with hx of HFpEF, sick sinus syndome s/p PPM, persistent A fib on oral anticoagulation, CKD3b, hypertension, hyperlipidemia, hypothyroidism, GERD, and recurrent UTI's who presented to the ED via EMS after being found by nursing home staff altered with reduced level of consciousness this morning. Pt reported to have been increasingly lethargic with poor PO intake for the past 2 days. After discussion with family at bedside and telephone, it is reported that pt is normally functional, though over time has become bedbound due to medical conditions. At baseline, she is alert and oriented x4 without baseline dementia. Last known normal was last night while daughter was visiting her at the nursing home. Pt has had multiple admissions this year with similar presentation, noted to have poor PO intake and progressive debility with failure to thrive.    In the ED, /60, , RR 12 with SpO2 99% on RA, afebrile. Noted to be in a fib with RVR on cardiac monitor and EKG. Labs notable for stable anemia, Na 141, K 3.2, bicarb 18, elevated BUN/creatinine 58/2.1, elevated T bili and alk phos. , troponin 0.147. Lactate wnl. Sepsis work-up initiated after brief episode of hypotension with blood cultures collected, IVF resuscitation (2L total via EMS and ED), and broad-spectrum antibiotics started. UA with  evidence of UTI. CT head without contrast with no findings concerning for acute intracranial processes.  She will be admitted to hospital medicine for failure to thrive, management of a fib with RVR and possible urosepsis.         Past Medical History:   Diagnosis Date    A-fib     Anemia of chronic disease 02/02/2021    Anticoagulant long-term use     CHF (congestive heart failure)     Chronic diastolic heart failure 02/02/2021    COVID-19 01/07/2022    Gallstone pancreatitis     Hypertension     Pancreatic abscess 09/26/2020    Stage 3 chronic kidney disease 5/11/2016    Thyroid disease        Past Surgical History:   Procedure Laterality Date    CATARACT EXTRACTION      CATARACT EXTRACTION W/  INTRAOCULAR LENS IMPLANT Bilateral 2004    DR IN [place]    ENDOSCOPIC ULTRASOUND OF UPPER GASTROINTESTINAL TRACT N/A 9/14/2020    Procedure: ULTRASOUND, UPPER GI TRACT, ENDOSCOPIC;  Surgeon: Esha Howard MD;  Location: [facility] ENDO (2ND FLR);  Service: Endoscopy;  Laterality: N/A;    ENDOSCOPIC ULTRASOUND OF UPPER GASTROINTESTINAL TRACT  11/25/2020    Procedure: ULTRASOUND, UPPER GI TRACT, ENDOSCOPIC;  Surgeon: Esha Howard MD;  Location: [facility] ENDO (2ND FLR);  Service: Endoscopy;;    ERCP N/A 9/14/2020    Procedure: ERCP (ENDOSCOPIC RETROGRADE CHOLANGIOPANCREATOGRAPHY);  Surgeon: Esha Howard MD;  Location: [facility] ENDO (2ND FLR);  Service: Endoscopy;  Laterality: N/A;    ERCP N/A 9/25/2020    Procedure: ERCP (ENDOSCOPIC RETROGRADE CHOLANGIOPANCREATOGRAPHY);  Surgeon: Esha Howard MD;  Location: [facility] ENDO (2ND FLR);  Service: Endoscopy;  Laterality: N/A;    ERCP N/A 11/25/2020    Procedure: ERCP (ENDOSCOPIC RETROGRADE CHOLANGIOPANCREATOGRAPHY);  Surgeon: Esha Howard MD;  Location: [facility] ENDO (2ND FLR);  Service: Endoscopy;  Laterality: N/A;  Covid-19 test 11/22/20 at [facility] - pg  2 day hold Dr Favian Pisano - pg  PM prep    EYE SURGERY      HIP REPLACEMENT  ARTHROPLASTY Right 2016    HYSTERECTOMY      REVISION OF SKIN POCKET FOR PACEMAKER N/A 1/21/2019    Procedure: REVISION-POCKET-PACEMAKER;  Surgeon: Asher Watts MD;  Location: [facility] EP LAB;  Service: Cardiology;  Laterality: N/A;  MODERATE SEDATION, sedation issues in the past    THYROIDECTOMY      TREATMENT OF CARDIAC ARRHYTHMIA N/A 3/18/2019    Procedure: CARDIOVERSION OR DEFIBRILLATION;  Surgeon: Asher Watts MD;  Location: [facility] EP LAB;  Service: Cardiology;  Laterality: N/A;  AF, JILL-cx if SR, DCCV, MAC, FAS, 3PREP    TREATMENT OF CARDIAC ARRHYTHMIA N/A 5/14/2019    Procedure: Cardioversion or Defibrillation;  Surgeon: Derick Vizcarra MD;  Location: [facility] EP LAB;  Service: Cardiology;  Laterality: N/A;  AF, JILL, DCCV, MAC, DM, 3PREP    TREATMENT OF CARDIAC ARRHYTHMIA N/A 6/21/2022    Procedure: Cardioversion or Defibrillation;  Surgeon: Favian Colmenares MD;  Location: [facility] EP LAB;  Service: Cardiology;  Laterality: N/A;  AF, JILL, DCCV, MAC, DM, 3 Prep *SJM PPM in situ*       Review of patient's allergies indicates:   Allergen Reactions    Ciprofloxacin Nausea And Vomiting       No current facility-administered medications on file prior to encounter.     Current Outpatient Medications on File Prior to Encounter   Medication Sig    amiodarone (PACERONE) 200 MG Tab Take 1 tablet (200 mg total) by mouth once daily.    aspirin (ECOTRIN) 81 MG EC tablet Take 1 tablet (81 mg total) by mouth once daily.    atenoloL (TENORMIN) 25 MG tablet HOLD ATENOLOL UNTIL YOU ARE ABLE TO FOLLOW UP WITH PRIMARY CARE DOCTOR    cyanocobalamin (VITAMIN B-12) 1000 MCG tablet Take 1 tablet (1,000 mcg total) by mouth once daily.    cyproheptadine (PERIACTIN) 4 mg tablet Take 1 tablet (4 mg total) by mouth 3 (three) times daily.    diclofenac sodium (VOLTAREN) 1 % Gel Apply topically to affected joints as needed for pain    ELIQUIS 2.5 mg Tab TAKE 1 TABLET TWICE A DAY    folic acid (FOLVITE) 1 MG tablet Take  1 tablet (1 mg total) by mouth once daily.    furosemide (LASIX) 20 MG tablet Take 1 tablet (20 mg total) by mouth once daily. Take as needed for swelling in your legs. HOLD MEDICATION IF HYPOTENSIVE    hydrALAZINE (APRESOLINE) 25 MG tablet Take 1 tablet (25 mg total) by mouth 2 (two) times daily as needed (SBP >180). HOLD UNTIL YOU ARE ABLE TO SEE YOUR PRIMARY CARE DOCTOR    Lactobacillus rhamnosus GG (CULTURELLE) 10 billion cell capsule Take 1 capsule by mouth once daily.    levothyroxine (SYNTHROID) 137 MCG Tab tablet Take 1 tablet (137 mcg total) by mouth before breakfast.    midodrine (PROAMATINE) 5 MG Tab Take 1 tablet (5 mg total) by mouth every 8 (eight) hours.    mirtazapine (REMERON) 7.5 MG Tab Take 1 tablet (7.5 mg total) by mouth every evening.    multivitamin Tab Take 1 tablet by mouth once daily.    pantoprazole (PROTONIX) 40 MG tablet Take 1 tablet (40 mg total) by mouth once daily.    polyethylene glycol (GLYCOLAX) 17 gram PwPk Take 17 g by mouth daily as needed (constipation).     Family History       Problem Relation (Age of Onset)    Heart attack Mother, Maternal Grandmother    Heart disease Mother, Maternal Grandmother    Heart failure Mother, Maternal Grandmother    Hypertension Mother, Maternal Grandmother    Stroke Maternal Grandmother          Tobacco Use    Smoking status: Former     Types: Cigarettes     Start date: 5/19/1964    Smokeless tobacco: Never   Substance and Sexual Activity    Alcohol use: No    Drug use: No    Sexual activity: Not Currently     Partners: Male     Review of Systems   Unable to perform ROS: Mental status change     Objective:     Vital Signs (Most Recent):  Temp: 97 °F (36.1 °C) (09/12/23 1038)  Pulse: (!) 120 (09/12/23 1800)  Resp: 18 (09/12/23 1800)  BP: 112/61 (09/12/23 1800)  SpO2: 99 % (09/12/23 1800) Vital Signs (24h Range):  Temp:  [96.8 °F (36 °C)-97 °F (36.1 °C)] 97 °F (36.1 °C)  Pulse:  [120-144] 120  Resp:  [12-22] 18  SpO2:  [96 %-100 %]  99 %  BP: ()/(58-69) 112/61     Weight: 66.7 kg (147 lb)  Body mass index is 26.04 kg/m².     Physical Exam  Vitals and nursing note reviewed.   Constitutional:       General: She is not in acute distress.     Appearance: She is ill-appearing. She is not toxic-appearing.   HENT:      Head: Normocephalic and atraumatic.   Neck:      Comments: Neck positioning remains extended.   Cardiovascular:      Rate and Rhythm: Tachycardia present. Rhythm irregular.      Pulses: Normal pulses.      Heart sounds: Normal heart sounds.   Pulmonary:      Effort: Pulmonary effort is normal. No respiratory distress.      Breath sounds: Normal breath sounds. No wheezing.   Chest:      Chest wall: No tenderness.   Abdominal:      General: Abdomen is flat. There is no distension.      Palpations: Abdomen is soft.      Tenderness: There is no abdominal tenderness. There is no guarding or rebound.   Genitourinary:     Comments: Labial abscess noted (see picture in media).  Musculoskeletal:      Cervical back: No tenderness.      Comments: Upper and lower extremities are edematous L>R. 2-3+ pitting edema of lower extremities.    Skin:     General: Skin is warm and dry.      Coloration: Skin is not jaundiced.      Findings: Bruising present. No erythema or rash.      Comments: Sacral pressure ulcer, grade I.    Neurological:      Comments: Unable to participate in exam.              Significant Labs: All pertinent labs within the past 24 hours have been reviewed.  CBC:   Recent Labs   Lab 09/12/23  1142   WBC 6.67   HGB 11.2*   HCT 35.7*        CMP:   Recent Labs   Lab 09/12/23  1142      K 3.2*      CO2 18*   GLU 65*   BUN 58*   CREATININE 2.1*   CALCIUM 7.3*   PROT 4.9*   ALBUMIN 1.7*   BILITOT 1.4*   ALKPHOS 182*   AST 31   ALT 31   ANIONGAP 16     Urine Studies:   Recent Labs   Lab 09/12/23  1231   COLORU Abiola   APPEARANCEUA Cloudy*   PHUR 7.0   SPECGRAV 1.010   PROTEINUA 1+*   GLUCUA Negative   KETONESU  Negative   BILIRUBINUA Negative   OCCULTUA 3+*   NITRITE Negative   LEUKOCYTESUR 3+*   RBCUA 36*   WBCUA >100*   BACTERIA Many*   SQUAMEPITHEL 2   HYALINECASTS 0       Significant Imaging: I have reviewed and interpreted all pertinent imaging results/findings within the past 24 hours.    Assessment/Plan:     * Failure to thrive in adult  Pt is a 89 year old female with hx of HRpEF, persistent atrial fibrillation on AC, SSS s/p PPM, amongst other medical hx including recurrent UTI's and multiple hospital admissions this year with progressively worsening debility and failure to thrive. Initially presented to ED via EMS after being found with decreased level of consciousness after 2 days of poor PO intake and lethargy. Admitted to hospital medicine for failure to thrive, management of a fib with RVR and possible urosepsis.     --daughter, Génesis Araiza, and son, Ari Ortiz, are joint HCPOA.   --engaged in extensive discussion with family regarding goals of care, current medical status, and prognosis.  --family has had extensive discussions regarding pt's quality of life and progressive decline, requested code status to be DNR/DNI  --code status has been updated accordingly  --family wishes for treatment of acute illness where possible, but will likely discharge on home hospice  --palliative care consult placed   --social work consult placed for home hospice planning    Severe sepsis  This patient does have evidence of infective focus  My overall impression is sepsis.  Source: Urinary Tract and Skin and Soft Tissue (location labia)  Antibiotics given-   Antibiotics (72h ago, onward)    Start     Stop Route Frequency Ordered    09/12/23 1200  piperacillin-tazobactam (ZOSYN) 4.5 g in dextrose 5 % in water (D5W) 100 mL IVPB (MB+)  (ED Adult Sepsis Treatment)         09/13/23 1159 IV Every 8 hours (non-standard times) 09/12/23 1157        Latest lactate reviewed-  Recent Labs   Lab 09/12/23  1142   LACTATE 1.4      Organ dysfunction indicated by Acute kidney injury    Fluid challenge Ideal Body Weight- The patient's ideal body weight is Ideal body weight: 52.4 kg (115 lb 8.3 oz) which will be used to calculate fluid bolus of 30 ml/kg for treatment of septic shock.      Post- resuscitation assessment Yes Perfusion exam was performed within 6 hours of septic shock presentation after bolus shows Adequate tissue perfusion assessed by non-invasive monitoring     Will Not start Pressors- Levophed for MAP of 65    --pt with hx of recurrent UTI's and UA suggestive of infection today   --previous urine cx with pansensitivity  --presence of labial abscess (see image in media)   --will consult gyn  --received 3L total IVF, lactate wnl, on vanc/zosyn for broad coverage  --f/u urine and blood cultures       Acquired hypothyroidism  --continue home synthroid when able  --f/u TSH      Cardiac pacemaker in situ  Hx SSS s/p PPM. Atenolol d/c'ed last admission due to bradycardia. Unclear if pacemaker is functioning, will investigate further, pacer spikes noted on telemetry.     --can consider device interrogation at later time     Persistent atrial fibrillation  Hx of persistent atrial fibrillation on apixaban 2.5 mg BID for anticoagulation, amiodarone 200 mg daily. Previously was on atenolol which was d/c'ed on last admission and not restarted while outpatient as pt lost to follow-up. While in the ED, pt normotensive but remains in a fib with RVR despite 3L total IVF resuscitation.     --switch to lovenox as pt currently NPO, dosed by pharmacy  --digoxin load as pt is in septic shock, not a candidate for IV metoprolol  --previously on amio without success  --digoxin 2 mcg/kg now, followed by two 1 mcg/kg doses Q6H  --will assess response and consider PO maintenance dose if mentation allows  --concern for digoxin toxicity given MARIO, hypokalemia, and amio use  --will monitor closely       VTE Risk Mitigation (From admission, onward)          Ordered     enoxaparin injection 60 mg  Daily         09/12/23 1913     IP VTE HIGH RISK PATIENT  Once         09/12/23 1608     Place sequential compression device  Until discontinued         09/12/23 1608                   Hattie Purcell MD  Department of Hospital Medicine  [facility] - Emergency Dept

## 2023-09-13 NOTE — ASSESSMENT & PLAN NOTE
--K 3.2 on admission, replaced  --daily CMP, magnesium, phosphate   --electrolyte replacements when clinically appropriate

## 2023-09-13 NOTE — CONSULTS
[facility] - Telemetry Stepdown  Obstetrics & Gynecology  Consult Note    Patient Name: Gisselle Ortiz  MRN: [id number]  Admission Date: 2023  Hospital Length of Stay: 1 days  Code Status: DNR  Primary Care Provider: Kai Montez MD  Principal Problem: Failure to thrive in adult    Inpatient consult to Gynecology  Consult performed by: Reuben Gupta MD  Consult ordered by: Hattie Purcell MD  Reason for consult: labial abscess      Subjective:     History of Present Illness: Ms. Ortiz is a 89yo  with a complex medical hx most notable for HFpEF, Afib w/ RVR (now on digoxin), CKD, HTN and multiple recent UTIs, admitted yesterday for concern of urosepsis.  Per chart review, pt was found altered at her nursing home, where staff also reported a 2 day hx of lethargy and limited PO intake.  On presentation to [facility] ED, she was found to have a brief episode of hypotension and received a sepsis w/u significant for evidence of UTI, for which she was started on vanc and zosyn.  We are being consulted for a L sided vulvo-labial-perineal abscess.    Of note, her HCPOAs are her daughter and son, Génesis and Ari.  She is DNR/DNI w/ plans for home hospice care at d/c.    No current facility-administered medications on file prior to encounter.     Current Outpatient Medications on File Prior to Encounter   Medication Sig    amiodarone (PACERONE) 200 MG Tab Take 1 tablet (200 mg total) by mouth once daily.    aspirin (ECOTRIN) 81 MG EC tablet Take 1 tablet (81 mg total) by mouth once daily.    atenoloL (TENORMIN) 25 MG tablet HOLD ATENOLOL UNTIL YOU ARE ABLE TO FOLLOW UP WITH PRIMARY CARE DOCTOR    cyanocobalamin (VITAMIN B-12) 1000 MCG tablet Take 1 tablet (1,000 mcg total) by mouth once daily.    cyproheptadine (PERIACTIN) 4 mg tablet Take 1 tablet (4 mg total) by mouth 3 (three) times daily.    diclofenac sodium (VOLTAREN) 1 % Gel Apply topically to affected joints as needed for pain    ELIQUIS 2.5  mg Tab TAKE 1 TABLET TWICE A DAY    folic acid (FOLVITE) 1 MG tablet Take 1 tablet (1 mg total) by mouth once daily.    furosemide (LASIX) 20 MG tablet Take 1 tablet (20 mg total) by mouth once daily. Take as needed for swelling in your legs. HOLD MEDICATION IF HYPOTENSIVE    hydrALAZINE (APRESOLINE) 25 MG tablet Take 1 tablet (25 mg total) by mouth 2 (two) times daily as needed (SBP >180). HOLD UNTIL YOU ARE ABLE TO SEE YOUR PRIMARY CARE DOCTOR    Lactobacillus rhamnosus GG (CULTURELLE) 10 billion cell capsule Take 1 capsule by mouth once daily.    levothyroxine (SYNTHROID) 137 MCG Tab tablet Take 1 tablet (137 mcg total) by mouth before breakfast.    midodrine (PROAMATINE) 5 MG Tab Take 1 tablet (5 mg total) by mouth every 8 (eight) hours.    mirtazapine (REMERON) 7.5 MG Tab Take 1 tablet (7.5 mg total) by mouth every evening.    multivitamin Tab Take 1 tablet by mouth once daily.    pantoprazole (PROTONIX) 40 MG tablet Take 1 tablet (40 mg total) by mouth once daily.    polyethylene glycol (GLYCOLAX) 17 gram PwPk Take 17 g by mouth daily as needed (constipation).     Review of patient's allergies indicates:   Allergen Reactions    Ciprofloxacin Nausea And Vomiting     Past Medical History:   Diagnosis Date    A-fib     Anemia of chronic disease 2021    Anticoagulant long-term use     CHF (congestive heart failure)     Chronic diastolic heart failure 2021    COVID-19 2022    Gallstone pancreatitis     Hypertension     Pancreatic abscess 2020    Stage 3 chronic kidney disease 2016    Thyroid disease      OB History    Para Term  AB Living   3 3 0 0 0 3   SAB IAB Ectopic Multiple Live Births   0 0 0 0 0      # Outcome Date GA Lbr En/2nd Weight Sex Delivery Anes PTL Lv   3 Para            2 Para            1 Para              Past Surgical History:   Procedure Laterality Date    CATARACT EXTRACTION      CATARACT EXTRACTION W/  INTRAOCULAR LENS IMPLANT Bilateral       IN [place]    ENDOSCOPIC ULTRASOUND OF UPPER GASTROINTESTINAL TRACT N/A 9/14/2020    Procedure: ULTRASOUND, UPPER GI TRACT, ENDOSCOPIC;  Surgeon: Esha Howard MD;  Location: [facility] ENDO (2ND FLR);  Service: Endoscopy;  Laterality: N/A;    ENDOSCOPIC ULTRASOUND OF UPPER GASTROINTESTINAL TRACT  11/25/2020    Procedure: ULTRASOUND, UPPER GI TRACT, ENDOSCOPIC;  Surgeon: Esha Howard MD;  Location: [facility] ENDO (2ND FLR);  Service: Endoscopy;;    ERCP N/A 9/14/2020    Procedure: ERCP (ENDOSCOPIC RETROGRADE CHOLANGIOPANCREATOGRAPHY);  Surgeon: Esha Howard MD;  Location: [facility] ENDO (2ND FLR);  Service: Endoscopy;  Laterality: N/A;    ERCP N/A 9/25/2020    Procedure: ERCP (ENDOSCOPIC RETROGRADE CHOLANGIOPANCREATOGRAPHY);  Surgeon: Esha Howard MD;  Location: [facility] ENDO (2ND FLR);  Service: Endoscopy;  Laterality: N/A;    ERCP N/A 11/25/2020    Procedure: ERCP (ENDOSCOPIC RETROGRADE CHOLANGIOPANCREATOGRAPHY);  Surgeon: Esha Howard MD;  Location: [facility] ENDO (2ND FLR);  Service: Endoscopy;  Laterality: N/A;  Covid-19 test 11/22/20 at [place] - pg  2 day hold Dr Favian Pisano - pg  PM prep    EYE SURGERY      HIP REPLACEMENT ARTHROPLASTY Right 2016    HYSTERECTOMY      REVISION OF SKIN POCKET FOR PACEMAKER N/A 1/21/2019    Procedure: REVISION-POCKET-PACEMAKER;  Surgeon: Asher Watts MD;  Location: [facility] EP LAB;  Service: Cardiology;  Laterality: N/A;  MODERATE SEDATION, sedation issues in the past    THYROIDECTOMY      TREATMENT OF CARDIAC ARRHYTHMIA N/A 3/18/2019    Procedure: CARDIOVERSION OR DEFIBRILLATION;  Surgeon: Asher Watts MD;  Location: [facility] EP LAB;  Service: Cardiology;  Laterality: N/A;  AF, JILL-cx if SR, DCCV, MAC, FAS, 3PREP    TREATMENT OF CARDIAC ARRHYTHMIA N/A 5/14/2019    Procedure: Cardioversion or Defibrillation;  Surgeon: Derick Vizcarra MD;  Location: [facility] EP LAB;  Service: Cardiology;  Laterality: N/A;  AF, JILL, DCCV, MAC, DM, 3PREP    TREATMENT  OF CARDIAC ARRHYTHMIA N/A 6/21/2022    Procedure: Cardioversion or Defibrillation;  Surgeon: Favian Colmenares MD;  Location: [facility] EP LAB;  Service: Cardiology;  Laterality: N/A;  AF, JILL, DCCV, MAC, DM, 3 Prep *SJM PPM in situ*     Family History       Problem Relation (Age of Onset)    Heart attack Mother, Maternal Grandmother    Heart disease Mother, Maternal Grandmother    Heart failure Mother, Maternal Grandmother    Hypertension Mother, Maternal Grandmother    Stroke Maternal Grandmother          Tobacco Use    Smoking status: Former     Types: Cigarettes     Start date: 5/19/1964    Smokeless tobacco: Never   Substance and Sexual Activity    Alcohol use: No    Drug use: No    Sexual activity: Not Currently     Partners: Male     Review of Systems   Unable to perform ROS: Mental status change     Objective:     Vital Signs (Most Recent):  Temp: 97.5 °F (36.4 °C) (09/13/23 0315)  Pulse: (!) 133 (09/13/23 0315)  Resp: 18 (09/13/23 0315)  BP: 120/84 (09/13/23 0315)  SpO2: 98 % (09/13/23 0500) Vital Signs (24h Range):  Temp:  [96.3 °F (35.7 °C)-97.5 °F (36.4 °C)] 97.5 °F (36.4 °C)  Pulse:  [] 133  Resp:  [12-22] 18  SpO2:  [96 %-100 %] 98 %  BP: ()/(58-85) 120/84     Weight: 59 kg (130 lb 1.1 oz)  Body mass index is 23.04 kg/m².  No LMP recorded (lmp unknown). Patient has had a hysterectomy.    Physical Exam:   Constitutional: She appears lethargic.    HENT:   Head: Normocephalic and atraumatic.      Cardiovascular:  Regular rhythm.    Tachycardia present.          Pulmonary/Chest: No respiratory distress.        Abdominal: Soft. She exhibits no distension.     Genitourinary:          Genitourinary Comments: [facility] ([place] called for permission to perform external genital exam; permission granted.  Examined w/ RN Patrick chandler.    Approximately 1 cm furuncle with pustulant head; no exudate; no fluctuance.  Does not tract to deeper tissue plane. Minimal surrounding erythema.  TTP.  No local  lymphadenopathy.                 Neurological: She appears lethargic. She is disoriented.   Oriented to person only; non verbal.         Laboratory:  I have personallly reviewed all pertinent lab results from the last 24 hours.    Diagnostic Results:  none    Assessment/Plan:     Active Diagnoses:    Diagnosis Date Noted POA    PRINCIPAL PROBLEM:  Failure to thrive in adult [R62.7] 2023 Yes    Severe sepsis [A41.9, R65.20] 2023 Yes    Hypokalemia [E87.6] 2023 Yes    NSTEMI (non-ST elevated myocardial infarction), type 2 demand [I21.4] 2023 Yes    MARIO (acute kidney injury) [N17.9] 2023 Yes    Anemia due to chronic kidney disease [N18.9, D63.1] 2021 Yes     Chronic    Chronic diastolic heart failure [I50.32] 2021 Yes     Chronic    Persistent atrial fibrillation [I48.19] 2015 Yes     Chronic    Cardiac pacemaker in situ [Z95.0] 2015 Yes     Chronic    Acquired hypothyroidism [E03.9] 2015 Yes     Chronic      Problems Resolved During this Admission:     89yo  with a complex medical hx most notable for HFpEF, Afib w/ RVR (now on digoxin), CKD, HTN and multiple recent UTIs, admitted yesterday for concern of urosepsis and a left labial abscess.    Labial Abscess  - Tachycardic 2/2 afib, but otherwise stable.  Afebrile  - Discussed primary team's findings and initiation of consult with daughter Génesis, who also gave permission for genital exam  - Exam findings most consistent with a furuncle.  Pustulant but not exudative, minimal surrounding erythema, does not appear to tract to deeper tissue layers.  No fever nor leukocytosis and Bcxs showing NGTD.  Furuncle most likely not a significant infectious foci.  - I+D not indicated based on size and location; also significant concern for pt's ability to tolerate procedure and for her tissue's ability to heal via secondary intention.  Would reconsider surgical intervention if worsening signs of localized infection or  tissue breakdown.  - Recommend hot compresses q4hr to area to try and further withdraw pustulance  - Recommend vanc and zosyn for MRSA and polymicrobial coverage, but understand vanc not ideal given current renal disease; would be reasonable to add IV clindamycin for MRSA coverage.    Plan discussed with on call staff; in agreement.  Pt seen by staff on call.    Thank you for your consult. I will sign off. Please contact us if you have any additional questions.    Reuben Gupta MD  Obstetrics & Gynecology  [facility] - Telemetry Stepdown

## 2023-09-13 NOTE — ASSESSMENT & PLAN NOTE
Hx of persistent a fib, sick sinus syndrome s/p PPM, and HFpEF.     --found to have elevated troponin on initial presentation  --troponin trend is relatively flat, negative delta change   --0.147>0.156>0.157>0.155  --ECG without evidence of acute ST or T wave changes concerning for ischemia  --continue to monitor

## 2023-09-13 NOTE — NURSING
Nurses Note -- 4 Eyes      9/13/2023   7:09 AM      Skin assessed during: Q Shift Change      [] No Altered Skin Integrity Present    []Prevention Measures Documented      [x] Yes- Altered Skin Integrity Present or Discovered   [x] LDA Added if Not in Epic (Describe Wound)   [] New Altered Skin Integrity was Present on Admit and Documented in LDA   [x] Wound Image Taken    Wound Care Consulted? No    Attending Nurse:  roselyn OWENS RN    Second RN/Staff Member:  Belinda MEZA Rn

## 2023-09-13 NOTE — ASSESSMENT & PLAN NOTE
Hx SSS s/p PPM. Atenolol d/c'ed last admission due to bradycardia. Unclear if pacemaker is functioning, will investigate further, pacer spikes noted on telemetry.     --can consider device interrogation while inpatient if indicated  --last interrogated 08/17/23

## 2023-09-13 NOTE — CARE UPDATE
Attempted to call patients family to confirm whether they would want pressors or not in the event of her having hypotension. Will try and attempt to call again soon.    AVRIL Taveras MD   PGY-3  [facility] Medicine Team 1

## 2023-09-13 NOTE — ASSESSMENT & PLAN NOTE
Hx of chronic heart failure with preserved ejection fraction.     --continue midodrine when able for hypotension      Last TTE 06/25/23:   Normal LV size with concentric remodeling and normal systolic function.   The estimated ejection fraction is 65%.   Grade III left ventricular diastolic dysfunction.   Severe left atrial enlargement.

## 2023-09-13 NOTE — CARE UPDATE
"RAPID RESPONSE NURSE CHART REVIEW        Chart Reviewed: 09/13/2023, 12:25 PM    MRN: [id number]  Bed: 8081/8081 A    Dx: Failure to thrive in adult    Gisselle Ortiz has a past medical history of A-fib, Anemia of chronic disease, Anticoagulant long-term use, CHF (congestive heart failure), Chronic diastolic heart failure, COVID-19, Gallstone pancreatitis, Hypertension, Pancreatic abscess, Stage 3 chronic kidney disease, and Thyroid disease.    Last VS: BP (!) 91/55 (BP Location: Right arm, Patient Position: Lying)   Pulse (!) 127   Temp 97.5 °F (36.4 °C) (Axillary)   Resp 17   Ht 5' 3" (1.6 m)   Wt 59 kg (130 lb 1.1 oz)   LMP  (LMP Unknown)   SpO2 100%   Breastfeeding No   BMI 23.04 kg/m²     24H Vital Sign Range:  Temp:  [96.3 °F (35.7 °C)-97.9 °F (36.6 °C)]   Pulse:  []   Resp:  [15-22]   BP: ()/(55-85)   SpO2:  [96 %-100 %]     Level of Consciousness (AVPU): responds to voice    Recent Labs     09/12/23  1142 09/13/23  0235   WBC 6.67 10.58   HGB 11.2* 11.0*   HCT 35.7* 38.0    233       Recent Labs     09/12/23  1142 09/13/23  0601    138   K 3.2* 4.1    110   CO2 18* 15*   BUN 58* 55*   CREATININE 2.1* 1.7*   GLU 65* 60*   PHOS  --  3.8   MG  --  1.5*        No results for input(s): "PH", "PCO2", "PO2", "HCO3", "POCSATURATED", "BE" in the last 72 hours.     OXYGEN:             MEWS score: 4    Rounding completed with charge DAMON De La Torre. contacted for MEWs. No concerns verbalized at this time. Instructed to call [phone] for further concerns or assistance.    Matt Coto RN       "

## 2023-09-13 NOTE — SUBJECTIVE & OBJECTIVE
Interval History: Met Ms. Ortiz who was alone in my encounter. Able to tell me her name and location is [facility], but not oriented to time or why she is here. Didn't know where she was before arriving to [facility].    Past Medical History:   Diagnosis Date    A-fib     Anemia of chronic disease 02/02/2021    Anticoagulant long-term use     CHF (congestive heart failure)     Chronic diastolic heart failure 02/02/2021    COVID-19 01/07/2022    Gallstone pancreatitis     Hypertension     Pancreatic abscess 09/26/2020    Stage 3 chronic kidney disease 5/11/2016    Thyroid disease        Past Surgical History:   Procedure Laterality Date    CATARACT EXTRACTION      CATARACT EXTRACTION W/  INTRAOCULAR LENS IMPLANT Bilateral 2004     IN [place]    ENDOSCOPIC ULTRASOUND OF UPPER GASTROINTESTINAL TRACT N/A 9/14/2020    Procedure: ULTRASOUND, UPPER GI TRACT, ENDOSCOPIC;  Surgeon: Esha Howard MD;  Location: [facility] (2ND FLR);  Service: Endoscopy;  Laterality: N/A;    ENDOSCOPIC ULTRASOUND OF UPPER GASTROINTESTINAL TRACT  11/25/2020    Procedure: ULTRASOUND, UPPER GI TRACT, ENDOSCOPIC;  Surgeon: Esha Howard MD;  Location: [facility] (2ND FLR);  Service: Endoscopy;;    ERCP N/A 9/14/2020    Procedure: ERCP (ENDOSCOPIC RETROGRADE CHOLANGIOPANCREATOGRAPHY);  Surgeon: Esha Howard MD;  Location: [facility] ENDO (2ND FLR);  Service: Endoscopy;  Laterality: N/A;    ERCP N/A 9/25/2020    Procedure: ERCP (ENDOSCOPIC RETROGRADE CHOLANGIOPANCREATOGRAPHY);  Surgeon: Esha Howard MD;  Location: [facility] ENDO (2ND FLR);  Service: Endoscopy;  Laterality: N/A;    ERCP N/A 11/25/2020    Procedure: ERCP (ENDOSCOPIC RETROGRADE CHOLANGIOPANCREATOGRAPHY);  Surgeon: Esha Howard MD;  Location: [facility] ENDO (2ND FLR);  Service: Endoscopy;  Laterality: N/A;  Covid-19 test 11/22/20 at [place] - pg  2 day hold Dr Favian Pisano - pg  PM prep    EYE SURGERY      HIP REPLACEMENT ARTHROPLASTY Right 2016     HYSTERECTOMY      REVISION OF SKIN POCKET FOR PACEMAKER N/A 1/21/2019    Procedure: REVISION-POCKET-PACEMAKER;  Surgeon: Asher Watts MD;  Location: [facility] EP LAB;  Service: Cardiology;  Laterality: N/A;  MODERATE SEDATION, sedation issues in the past    THYROIDECTOMY      TREATMENT OF CARDIAC ARRHYTHMIA N/A 3/18/2019    Procedure: CARDIOVERSION OR DEFIBRILLATION;  Surgeon: Asher Watts MD;  Location: [facility] EP LAB;  Service: Cardiology;  Laterality: N/A;  AF, JILL-cx if SR, DCCV, MAC, FAS, 3PREP    TREATMENT OF CARDIAC ARRHYTHMIA N/A 5/14/2019    Procedure: Cardioversion or Defibrillation;  Surgeon: Derick Vizcarra MD;  Location: [facility] EP LAB;  Service: Cardiology;  Laterality: N/A;  AF, JILL, DCCV, MAC, DM, 3PREP    TREATMENT OF CARDIAC ARRHYTHMIA N/A 6/21/2022    Procedure: Cardioversion or Defibrillation;  Surgeon: Favian Colmenares MD;  Location: [facility] EP LAB;  Service: Cardiology;  Laterality: N/A;  AF, JILL, DCCV, MAC, DM, 3 Prep *SJM PPM in situ*       Review of patient's allergies indicates:   Allergen Reactions    Ciprofloxacin Nausea And Vomiting       Medications:  Continuous Infusions:   dextrose 5 % and 0.9 % NaCl 125 mL/hr at 09/13/23 1506     Scheduled Meds:   enoxparin  60 mg Subcutaneous Daily    levothyroxine  137 mcg Oral Before breakfast    midodrine  5 mg Oral BID    piperacillin-tazobactam (Zosyn) IV (PEDS and ADULTS) (extended infusion is not appropriate)  4.5 g Intravenous Q12H    polyethylene glycol  17 g Oral Daily    senna-docusate 8.6-50 mg  1 tablet Oral BID     PRN Meds:acetaminophen, dextrose 10%, dextrose 10%, glucagon (human recombinant), glucose, glucose, naloxone, ondansetron, prochlorperazine, sodium chloride 0.9%, Pharmacy to dose Vancomycin consult **AND** vancomycin - pharmacy to dose    Family History       Problem Relation (Age of Onset)    Heart attack Mother, Maternal Grandmother    Heart disease Mother, Maternal Grandmother    Heart failure Mother, Maternal  Grandmother    Hypertension Mother, Maternal Grandmother    Stroke Maternal Grandmother          Tobacco Use    Smoking status: Former     Types: Cigarettes     Start date: 5/19/1964    Smokeless tobacco: Never   Substance and Sexual Activity    Alcohol use: No    Drug use: No    Sexual activity: Not Currently     Partners: Male       Review of Systems   Unable to perform ROS: Mental status change     Objective:     Vital Signs (Most Recent):  Temp: 97.9 °F (36.6 °C) (09/13/23 1511)  Pulse: (!) 119 (09/13/23 1511)  Resp: 18 (09/13/23 1511)  BP: 111/62 (09/13/23 1511)  SpO2: 98 % (09/13/23 1511) Vital Signs (24h Range):  Temp:  [96.3 °F (35.7 °C)-97.9 °F (36.6 °C)] 97.9 °F (36.6 °C)  Pulse:  [] 119  Resp:  [15-22] 18  SpO2:  [96 %-100 %] 98 %  BP: ()/(55-85) 111/62     Weight: 59 kg (130 lb 1.1 oz)  Body mass index is 23.04 kg/m².       Physical Exam  Constitutional:       General: She is not in acute distress.     Appearance: She is ill-appearing.   HENT:      Head: Normocephalic and atraumatic.      Right Ear: External ear normal.      Left Ear: External ear normal.      Nose: Nose normal.      Mouth/Throat:      Mouth: Mucous membranes are dry.   Eyes:      Extraocular Movements: Extraocular movements intact.      Conjunctiva/sclera: Conjunctivae normal.   Cardiovascular:      Rate and Rhythm: Normal rate.   Pulmonary:      Effort: Pulmonary effort is normal.      Breath sounds: Normal breath sounds.   Abdominal:      General: Bowel sounds are normal.      Palpations: Abdomen is soft.   Musculoskeletal:         General: Swelling present.   Lymphadenopathy:      Cervical: No cervical adenopathy.   Skin:     General: Skin is dry.      Coloration: Skin is pale.   Neurological:      Mental Status: She is disoriented.            Review of Symptoms      Symptom Assessment (ESAS 0-10 Scale)  Unable to complete assessment due to Mental status change         Pain Assessment in Advanced Demential Scale (PAINAD)    Breathing - Independent of vocalization:  0  Negative vocalization:  0  Facial expression:  0  Body language:  0  Consolability:  0  Total:  0    Living Arrangements:  Lives in nursing home    Psychosocial/Cultural:   See Palliative Psychosocial Note: No  Resident of [facility] only for a few days  **Primary  to Follow**  Palliative Care  Consult: No        Advance Care Planning   Advance Directives:   Living Will: Yes        Copy on chart: Yes    LaPOST: No    Do Not Resuscitate Status: Yes    Medical Power of : Yes      Decision Making:  Family answered questions and Patient unable to communicate due to disease severity/cognitive impairment  Goals of Care: What is most important right now is to focus on improvement in condition but with limits to invasive therapies. Accordingly, we have decided that the best plan to meet the patient's goals includes continuing with treatment.         Significant Labs: CBC:   Recent Labs   Lab 09/12/23  1142 09/13/23  0235   WBC 6.67 10.58   HGB 11.2* 11.0*   HCT 35.7* 38.0    233     CMP:   Recent Labs   Lab 09/12/23  1142 09/13/23  0601    138   K 3.2* 4.1    110   CO2 18* 15*   GLU 65* 60*   BUN 58* 55*   CREATININE 2.1* 1.7*   CALCIUM 7.3* 6.9*   PROT 4.9* 4.4*   ALBUMIN 1.7* 1.5*   BILITOT 1.4* 1.3*   ALKPHOS 182* 147*   AST 31 34   ALT 31 29   ANIONGAP 16 13     CBC:   Recent Labs   Lab 09/13/23 0235   WBC 10.58   HGB 11.0*   HCT 38.0   *        BMP:  Recent Labs   Lab 09/13/23  0601   GLU 60*      K 4.1      CO2 15*   BUN 55*   CREATININE 1.7*   CALCIUM 6.9*   MG 1.5*     LFT:  Lab Results   Component Value Date    AST 34 09/13/2023    GGT 18 06/12/2023    ALKPHOS 147 (H) 09/13/2023    BILITOT 1.3 (H) 09/13/2023     Albumin:   Albumin   Date Value Ref Range Status   09/13/2023 1.5 (L) 3.5 - 5.2 g/dL Final     Protein:   Total Protein   Date Value Ref Range Status   09/13/2023 4.4 (L)  6.0 - 8.4 g/dL Final     Lactic acid:   Lab Results   Component Value Date    LACTATE 1.6 09/12/2023    LACTATE 1.4 09/12/2023       Significant Imaging: I have reviewed all pertinent imaging results/findings within the past 24 hours.

## 2023-09-13 NOTE — CONSULTS
[facility] - Telemetry Stepdown  Palliative Medicine  Consult Note    Patient Name: Gisselle Ortiz  MRN: [id number]  Admission Date: 9/12/2023  Hospital Length of Stay: 1 days  Code Status: DNR   Attending Provider: Jen Harrison MD  Consulting Provider: Radha Vasquez MD  Primary Care Physician: Kai Montez MD  Principal Problem:Failure to thrive in adult    Patient information was obtained from relative(s) and primary team.      Inpatient consult to Palliative Care  Consult performed by: Radha Vasquez MD  Consult ordered by: Hattie Purcell MD  Reason for consult: hospice discussion        Assessment/Plan:     Palliative Care  Encounter for palliative care  Gisselle Ortiz is a 89-year-old woman with a history of HFpEF, atrial fibrillation, sick sinus syndrome s/p PPM, HTN, CKD3b, hypothyroidism, recurrent UTI, debility who was admitted from [facility] to [facility] for acute encephalopathy and sepsis 2/2 UTI, complicated by atrial fibrillation with RVR. Palliative and Supportive Care was consulted to explore goals of care.    Advance Care Planning   Goals of Care:  - Code status: DNAR/DNI  - HCPOA: daughter Génesis Araiza ([phone]) and son Ari Ortiz ([phone])  - Patient does not have decision making capacity  - Prognosis: poor  - Family's understanding of prognosis: limited  - Goals: improvement in condition  - Plans: counseled Mr. Ari Ortiz about the benefits and limitations of hospice, but he needs clarification of his mother's trajectory and prognosis from primary team. At this time he admits that he is not ready for this transition and has not talked about this with his siblings. He is open to utilizing hospice support for his mother if prognosis is poor/short in order to decrease suffering and optimize comfort    Goals of Care Conversation:  - 9/13: I met Ms. Ortiz but due to encephalopathy, was not able to engage in a meaningful  conversation. I called her daughter Génesis Araiza ([phone]) but there was no ring tone and I left a voicemail. I called her son Ari Ortiz ([phone]) who shared that he had just left the hospital with his brother and nephew right before I called. I apologized for missing him earlier. He admits that he's not exactly sure what is going on, and when I offered that I learned that his mother had a UTI, he said he knew about this. He is hoping that she will get better and admits that he has not had conversations about hospice with his siblings for his mother. I shared my concern that in learning about her decline, that this hospitalization is reflective of her trajectory. He shares that he is waiting to hear from the doctors whether his mother will recover or if this is an irrecoverable process. With his permission, I did share the benefits and limitations of hospice, clarifying that hospice will never do anything to expedite death and also will not do anything to artificially prolong her suffering. He expressed understanding. I asked if he had any questions regarding this and he said no. He said that at this time, he does not believe this is a resource that they'll use until they know for certain of their mother's likely trajectory and prognosis. Expressed understanding and thanked him for his time.            Thank you for your consult. I will sign off. Please contact us if you have any additional questions.    Subjective:     HPI:   Gisselle Ortiz is a 89-year-old woman with a history of HFpEF, atrial fibrillation, sick sinus syndrome s/p PPM, HTN, CKD3b, hypothyroidism, recurrent UTI, debility who was admitted from [facility] to [facility] for acute encephalopathy and sepsis 2/2 UTI, complicated by atrial fibrillation with RVR. Palliative and Supportive Care was consulted to explore goals of care.      Hospital Course:  No notes on file    Interval History: Met Ms.  Diana who was alone in my encounter. Able to tell me her name and location is [facility], but not oriented to time or why she is here. Didn't know where she was before arriving to [facility].    Past Medical History:   Diagnosis Date    A-fib     Anemia of chronic disease 02/02/2021    Anticoagulant long-term use     CHF (congestive heart failure)     Chronic diastolic heart failure 02/02/2021    COVID-19 01/07/2022    Gallstone pancreatitis     Hypertension     Pancreatic abscess 09/26/2020    Stage 3 chronic kidney disease 5/11/2016    Thyroid disease        Past Surgical History:   Procedure Laterality Date    CATARACT EXTRACTION      CATARACT EXTRACTION W/  INTRAOCULAR LENS IMPLANT [place]ilateral 2004     [place]    ENDOSCOPIC ULTRASOUND OF UPPER GASTROINTESTINAL TRACT N/A 9/14/2020    Procedure: ULTRASOUND, UPPER GI TRACT, ENDOSCOPIC;  Surgeon: Esha Howard MD;  Location: [facility] ENDO (2ND FLR);  Service: Endoscopy;  Laterality: N/A;    ENDOSCOPIC ULTRASOUND OF UPPER GASTROINTESTINAL TRACT  11/25/2020    Procedure: ULTRASOUND, UPPER GI TRACT, ENDOSCOPIC;  Surgeon: Esha Howard MD;  Location: [facility] ENDO (2ND FLR);  Service: Endoscopy;;    ERCP N/A 9/14/2020    Procedure: ERCP (ENDOSCOPIC RETROGRADE CHOLANGIOPANCREATOGRAPHY);  Surgeon: Esha Howard MD;  Location: [facility] ENDO (2ND FLR);  Service: Endoscopy;  Laterality: N/A;    ERCP N/A 9/25/2020    Procedure: ERCP (ENDOSCOPIC RETROGRADE CHOLANGIOPANCREATOGRAPHY);  Surgeon: Esha Howard MD;  Location: [facility] ENDO (2ND FLR);  Service: Endoscopy;  Laterality: N/A;    ERCP N/A 11/25/2020    Procedure: ERCP (ENDOSCOPIC RETROGRADE CHOLANGIOPANCREATOGRAPHY);  Surgeon: Esha Howard MD;  Location: [facility] ENDO (2ND FLR);  Service: Endoscopy;  Laterality: N/A;  Covid-19 test 11/22/20 at [facility] - pg  2 day hold Dr Favian Pisano - pg  PM prep    EYE SURGERY      HIP REPLACEMENT ARTHROPLASTY Right 2016    HYSTERECTOMY       REVISION OF SKIN POCKET FOR PACEMAKER N/A 1/21/2019    Procedure: REVISION-POCKET-PACEMAKER;  Surgeon: Asher Watts MD;  Location: [facility] EP LAB;  Service: Cardiology;  Laterality: N/A;  MODERATE SEDATION, sedation issues in the past    THYROIDECTOMY      TREATMENT OF CARDIAC ARRHYTHMIA N/A 3/18/2019    Procedure: CARDIOVERSION OR DEFIBRILLATION;  Surgeon: Asher Watts MD;  Location: [facility] EP LAB;  Service: Cardiology;  Laterality: N/A;  AF, JILL-cx if SR, DCCV, MAC, FAS, 3PREP    TREATMENT OF CARDIAC ARRHYTHMIA N/A 5/14/2019    Procedure: Cardioversion or Defibrillation;  Surgeon: Derick Vizcarra MD;  Location: [facility] EP LAB;  Service: Cardiology;  Laterality: N/A;  AF, JILL, DCCV, MAC, DM, 3PREP    TREATMENT OF CARDIAC ARRHYTHMIA N/A 6/21/2022    Procedure: Cardioversion or Defibrillation;  Surgeon: Favian Colmenares MD;  Location: [facility] EP LAB;  Service: Cardiology;  Laterality: N/A;  AF, IJLL, DCCV, MAC, DM, 3 Prep *SJM PPM in situ*       Review of patient's allergies indicates:   Allergen Reactions    Ciprofloxacin Nausea And Vomiting       Medications:  Continuous Infusions:   dextrose 5 % and 0.9 % NaCl 125 mL/hr at 09/13/23 1506     Scheduled Meds:   enoxparin  60 mg Subcutaneous Daily    levothyroxine  137 mcg Oral Before breakfast    midodrine  5 mg Oral BID    piperacillin-tazobactam (Zosyn) IV (PEDS and ADULTS) (extended infusion is not appropriate)  4.5 g Intravenous Q12H    polyethylene glycol  17 g Oral Daily    senna-docusate 8.6-50 mg  1 tablet Oral BID     PRN Meds:acetaminophen, dextrose 10%, dextrose 10%, glucagon (human recombinant), glucose, glucose, naloxone, ondansetron, prochlorperazine, sodium chloride 0.9%, Pharmacy to dose Vancomycin consult **AND** vancomycin - pharmacy to dose    Family History       Problem Relation (Age of Onset)    Heart attack Mother, Maternal Grandmother    Heart disease Mother, Maternal Grandmother    Heart failure Mother, Maternal  Grandmother    Hypertension Mother, Maternal Grandmother    Stroke Maternal Grandmother          Tobacco Use    Smoking status: Former     Types: Cigarettes     Start date: 5/19/1964    Smokeless tobacco: Never   Substance and Sexual Activity    Alcohol use: No    Drug use: No    Sexual activity: Not Currently     Partners: Male       Review of Systems   Unable to perform ROS: Mental status change     Objective:     Vital Signs (Most Recent):  Temp: 97.9 °F (36.6 °C) (09/13/23 1511)  Pulse: (!) 119 (09/13/23 1511)  Resp: 18 (09/13/23 1511)  BP: 111/62 (09/13/23 1511)  SpO2: 98 % (09/13/23 1511) Vital Signs (24h Range):  Temp:  [96.3 °F (35.7 °C)-97.9 °F (36.6 °C)] 97.9 °F (36.6 °C)  Pulse:  [] 119  Resp:  [15-22] 18  SpO2:  [96 %-100 %] 98 %  BP: ()/(55-85) 111/62     Weight: 59 kg (130 lb 1.1 oz)  Body mass index is 23.04 kg/m².       Physical Exam  Constitutional:       General: She is not in acute distress.     Appearance: She is ill-appearing.   HENT:      Head: Normocephalic and atraumatic.      Right Ear: External ear normal.      Left Ear: External ear normal.      Nose: Nose normal.      Mouth/Throat:      Mouth: Mucous membranes are dry.   Eyes:      Extraocular Movements: Extraocular movements intact.      Conjunctiva/sclera: Conjunctivae normal.   Cardiovascular:      Rate and Rhythm: Normal rate.   Pulmonary:      Effort: Pulmonary effort is normal.      Breath sounds: Normal breath sounds.   Abdominal:      General: Bowel sounds are normal.      Palpations: Abdomen is soft.   Musculoskeletal:         General: Swelling present.   Lymphadenopathy:      Cervical: No cervical adenopathy.   Skin:     General: Skin is dry.      Coloration: Skin is pale.   Neurological:      Mental Status: She is disoriented.            Review of Symptoms      Symptom Assessment (ESAS 0-10 Scale)  Unable to complete assessment due to Mental status change         Pain Assessment in Advanced Demential Scale  (PAINAD)   Breathing - Independent of vocalization:  0  Negative vocalization:  0  Facial expression:  0  Body language:  0  Consolability:  0  Total:  0    Living Arrangements:  Lives in nursing home    Psychosocial/Cultural:   See Palliative Psychosocial Note: No  Resident of [facility] only for a few days  **Primary  to Follow**  Palliative Care  Consult: No        Advance Care Planning  Advance Directives:   Living Will: Yes        Copy on chart: Yes    LaPOST: No    Do Not Resuscitate Status: Yes    Medical Power of : Yes      Decision Making:  Family answered questions and Patient unable to communicate due to disease severity/cognitive impairment  Goals of Care: What is most important right now is to focus on improvement in condition but with limits to invasive therapies. Accordingly, we have decided that the best plan to meet the patient's goals includes continuing with treatment.         Significant Labs: CBC:   Recent Labs   Lab 09/12/23  1142 09/13/23  0235   WBC 6.67 10.58   HGB 11.2* 11.0*   HCT 35.7* 38.0    233     CMP:   Recent Labs   Lab 09/12/23  1142 09/13/23  0601    138   K 3.2* 4.1    110   CO2 18* 15*   GLU 65* 60*   BUN 58* 55*   CREATININE 2.1* 1.7*   CALCIUM 7.3* 6.9*   PROT 4.9* 4.4*   ALBUMIN 1.7* 1.5*   BILITOT 1.4* 1.3*   ALKPHOS 182* 147*   AST 31 34   ALT 31 29   ANIONGAP 16 13     CBC:   Recent Labs   Lab 09/13/23  0235   WBC 10.58   HGB 11.0*   HCT 38.0   *        BMP:  Recent Labs   Lab 09/13/23  0601   GLU 60*      K 4.1      CO2 15*   BUN 55*   CREATININE 1.7*   CALCIUM 6.9*   MG 1.5*     LFT:  Lab Results   Component Value Date    AST 34 09/13/2023    GGT 18 06/12/2023    ALKPHOS 147 (H) 09/13/2023    BILITOT 1.3 (H) 09/13/2023     Albumin:   Albumin   Date Value Ref Range Status   09/13/2023 1.5 (L) 3.5 - 5.2 g/dL Final     Protein:   Total Protein   Date Value Ref Range Status   09/13/2023  4.4 (L) 6.0 - 8.4 g/dL Final     Lactic acid:   Lab Results   Component Value Date    LACTATE 1.6 09/12/2023    LACTATE 1.4 09/12/2023       Significant Imaging: I have reviewed all pertinent imaging results/findings within the past 24 hours.        I spent a total of 75 minutes on the day of the visit. This includes face to face time in discussion of goals of care, symptom assessment, coordination of care and emotional support.  This also includes non-face to face time preparing to see the patient (eg, review of tests/imaging), obtaining and/or reviewing separately obtained history, documenting clinical information in the electronic or other health record, independently interpreting results and communicating results to the patient/family/caregiver, or care coordinator.    Radha Vasquez MD  Palliative Medicine  [facility] - Telemetry Stepdown

## 2023-09-13 NOTE — PROGRESS NOTES
Pharmacokinetic Assessment Follow Up: IV Vancomycin    Vancomycin serum concentration assessment/plan:  Random level resulted as 12.5 mcg/mL; Goal 10-15 mcg/mL, UTI  Patient meets criteria for MARIO  Re-dose Vancomycin 750 mg IV x 1; continue to dose by level when the random is less than 20 mcg/mL  Next level to be drawn on 9/14 at 0500    Drug levels (last 3 results):  Recent Labs   Lab Result Units 09/13/23  0601   Vancomycin, Random ug/mL 12.5     Pharmacy will continue to follow and monitor vancomycin.    Please contact pharmacy at extension [phone] for questions regarding this assessment.    Thank you for the consult,   Monica Muñoz     Patient brief summary:  Gisselle Ortiz is a 89 y.o. female initiated on antimicrobial therapy with IV Vancomycin for treatment of urinary tract infection    Drug Allergies:   Review of patient's allergies indicates:   Allergen Reactions    Ciprofloxacin Nausea And Vomiting     Actual Body Weight:   59 kg    Renal Function:   Estimated Creatinine Clearance: 14.7 mL/min (A) (based on SCr of 2.1 mg/dL (H)).,     Dialysis Method (if applicable):  N/A    CBC (last 72 hours):  Recent Labs   Lab Result Units 09/12/23  1142 09/13/23  0235   WBC K/uL 6.67 10.58   Hemoglobin g/dL 11.2* 11.0*   Hematocrit % 35.7* 38.0   Platelets K/uL 262 233   Gran % %  --  74.3*   Lymph % %  --  14.8*   Mono % %  --  7.6   Eosinophil % %  --  0.9   Basophil % %  --  0.7   Differential Method   --  Automated       Metabolic Panel (last 72 hours):  Recent Labs   Lab Result Units 09/12/23  1142 09/12/23  1231   Sodium mmol/L 141  --    Potassium mmol/L 3.2*  --    Chloride mmol/L 107  --    CO2 mmol/L 18*  --    Glucose mg/dL 65*  --    Glucose, UA   --  Negative   BUN mg/dL 58*  --    Creatinine mg/dL 2.1*  --    Albumin g/dL 1.7*  --    Total Bilirubin mg/dL 1.4*  --    Alkaline Phosphatase U/L 182*  --    AST U/L 31  --    ALT U/L 31  --        Vancomycin Administrations:  vancomycin given in the  last 96 hours                     vancomycin 1,250 mg in dextrose 5 % (D5W) 250 mL IVPB (Vial-Mate) (mg) 1,250 mg New Bag 09/12/23 1535                    Microbiologic Results:  Microbiology Results (last 7 days)       Procedure Component Value Units Date/Time    Blood Culture #2 **CANNOT BE ORDERED STAT** [[id number]] Collected: 09/12/23 1141    Order Status: Completed Specimen: Blood from Peripheral, Forearm, Right Updated: 09/12/23 1915     Blood Culture, Routine No Growth to date    Blood Culture #1 **CANNOT BE ORDERED STAT** [[id number]] Collected: 09/12/23 1121    Order Status: Completed Specimen: Blood from Peripheral, Wrist, Right Updated: 09/12/23 1915     Blood Culture, Routine No Growth to date    Urine culture [[id number]] Collected: 09/12/23 1231    Order Status: No result Specimen: Urine Updated: 09/12/23 1259

## 2023-09-13 NOTE — SUBJECTIVE & OBJECTIVE
Interval History: NAEON. Pt appears slightly more alert this morning, able to participate in answering yes/no questions and speak in short sentences. She reports that she is feeling unwell, denies sx like chest pain, shortness of breath, abdominal pain. Pt remains in a fib with RVR, completed digoxin load with slightly better control of -120s compared to initial presentation. Blood pressures tenous, will give IVF resuscitation with caution based on volume status. D5 today as CMP with low glucose levels. Gynecology consulted and seen patient this AM, appreciate recs. Initial blood cultures with preliminary growth of gram positive cocci resembling staph. Repeat blood cultures. Continue vanc/zosyn, pharmacy to renally dose. F/u urine culture. Pt currently unable to take anything PO at this time, will try to give medications IV.     Review of Systems   Reason unable to perform ROS: limited due to acuity of condition.   Constitutional:  Positive for activity change, appetite change and fatigue. Negative for chills and fever.   Respiratory:  Negative for shortness of breath and wheezing.    Cardiovascular:  Negative for chest pain.   Gastrointestinal:  Negative for abdominal pain.   Genitourinary:  Positive for dysuria. Negative for difficulty urinating.   Musculoskeletal:  Negative for back pain and myalgias.     Objective:     Vital Signs (Most Recent):  Temp: 97.5 °F (36.4 °C) (09/13/23 1105)  Pulse: (!) 127 (09/13/23 1211)  Resp: 17 (09/13/23 1105)  BP: (!) 91/55 (09/13/23 1105)  SpO2: 100 % (09/13/23 1211) Vital Signs (24h Range):  Temp:  [96.3 °F (35.7 °C)-97.9 °F (36.6 °C)] 97.5 °F (36.4 °C)  Pulse:  [] 127  Resp:  [15-22] 17  SpO2:  [96 %-100 %] 100 %  BP: ()/(55-85) 91/55     Weight: 59 kg (130 lb 1.1 oz)  Body mass index is 23.04 kg/m².    Intake/Output Summary (Last 24 hours) at 9/13/2023 1355  Last data filed at 9/13/2023 0620  Gross per 24 hour   Intake --   Output 775 ml   Net -775 ml          Physical Exam  Vitals and nursing note reviewed.   Constitutional:       General: She is not in acute distress.     Appearance: She is ill-appearing. She is not toxic-appearing.   HENT:      Head: Normocephalic and atraumatic.   Cardiovascular:      Rate and Rhythm: Tachycardia present. Rhythm irregular.      Pulses: Normal pulses.      Heart sounds: Normal heart sounds.   Pulmonary:      Effort: Pulmonary effort is normal. No respiratory distress.      Breath sounds: Normal breath sounds. No wheezing.   Chest:      Chest wall: No tenderness.   Abdominal:      General: Abdomen is flat. There is no distension.      Palpations: Abdomen is soft.      Tenderness: There is no abdominal tenderness. There is no guarding or rebound.   Genitourinary:     Comments: Labial abscess noted (see picture in media).  Musculoskeletal:      Cervical back: No tenderness.      Comments: Upper and lower extremities are edematous L>R. 2-3+ pitting edema of lower extremities.    Skin:     General: Skin is warm and dry.      Coloration: Skin is not jaundiced.      Findings: Bruising present. No erythema or rash.      Comments: Sacral pressure ulcer, grade I.    Neurological:      Comments: Unable to participate in exam.            Significant Labs: All pertinent labs within the past 24 hours have been reviewed.  CBC:   Recent Labs   Lab 09/12/23  1142 09/13/23  0235   WBC 6.67 10.58   HGB 11.2* 11.0*   HCT 35.7* 38.0    233     CMP:   Recent Labs   Lab 09/12/23  1142 09/13/23  0601    138   K 3.2* 4.1    110   CO2 18* 15*   GLU 65* 60*   BUN 58* 55*   CREATININE 2.1* 1.7*   CALCIUM 7.3* 6.9*   PROT 4.9* 4.4*   ALBUMIN 1.7* 1.5*   BILITOT 1.4* 1.3*   ALKPHOS 182* 147*   AST 31 34   ALT 31 29   ANIONGAP 16 13     Magnesium:   Recent Labs   Lab 09/13/23  0601   MG 1.5*     Urine Studies:   Recent Labs   Lab 09/12/23  1231   COLORU Abiola   APPEARANCEUA Cloudy*   PHUR 7.0   SPECGRAV 1.010   PROTEINUA 1+*   GLUCUA Negative    KETONESU Negative   BILIRUBINUA Negative   OCCULTUA 3+*   NITRITE Negative   LEUKOCYTESUR 3+*   RBCUA 36*   WBCUA >100*   BACTERIA Many*   SQUAMEPITHEL 2   HYALINECASTS 0       Significant Imaging: I have reviewed and interpreted all pertinent imaging results/findings within the past 24 hours.

## 2023-09-13 NOTE — PROGRESS NOTES
Pharmacist Renal Dose Adjustment Note    Gisselle Ortiz is a 89 y.o. female being treated with piperacillin / tazobactam for sepsis.    Patient Data:    Vital Signs (Most Recent):  Temp: 96.3 °F (35.7 °C) (09/12/23 2000)  Pulse: (!) 125 (09/12/23 2009)  Resp: 18 (09/12/23 2009)  BP: (!) 109/59 (09/12/23 2009)  SpO2: 98 % (09/12/23 2009) Vital Signs (72h Range):  Temp:  [96.3 °F (35.7 °C)-97 °F (36.1 °C)]   Pulse:  [120-144]   Resp:  [12-22]   BP: ()/(58-73)   SpO2:  [96 %-100 %]      Recent Labs   Lab 09/12/23  1142   CREATININE 2.1*     Serum creatinine:  2.1 mg/dL (H) 09/12/23 1142  Estimated creatinine clearance:  16.3 mL/min (A)    Piperacillin / tazobactam 4.5 grams IVPB every 8 hours will be changed to piperacillin / tazobactam 4.5 grams IVPB every 12 hours.    Pharmacist's Name:  Vandana Matthew  Pharmacist's Extension:  [phone]

## 2023-09-13 NOTE — ASSESSMENT & PLAN NOTE
--hb/hct on presentation 11.2/35.7  --hx of anemia with variable hb/hct levels reviewed in chart  --stable, continue to monitor

## 2023-09-13 NOTE — PROGRESS NOTES
Yimi Edmond - Telemetry [facility] Medicine  Progress Note    Patient Name: Gisselle Ortiz  MRN: [id number]  Patient Class: IP- Inpatient   Admission Date: 9/12/2023  Length of Stay: 1 days  Attending Physician: Jen Harrison MD  Primary Care Provider: Kai Montez MD    Subjective:     Principal Problem:Failure to thrive in adult    HPI:  Gisselle Ortiz is a 89 year old female with hx of HFpEF, sick sinus syndome s/p PPM, persistent A fib on oral anticoagulation, CKD3b, hypertension, hyperlipidemia, hypothyroidism, GERD, and recurrent UTI's who presented to the ED via EMS after being found by nursing home staff altered with reduced level of consciousness this morning. Pt reported to have been increasingly lethargic with poor PO intake for the past 2 days. After discussion with family at bedside and telephone, it is reported that pt is normally functional, though over time has become bedbound due to medical conditions. At baseline, she is alert and oriented x4 without baseline dementia. Last known normal was last night while daughter was visiting her at the nursing home. Pt has had multiple admissions this year with similar presentation, noted to have poor PO intake and progressive debility with failure to thrive.    In the ED, /60, , RR 12 with SpO2 99% on RA, afebrile. Noted to be in a fib with RVR on cardiac monitor and EKG. Labs notable for stable anemia, Na 141, K 3.2, bicarb 18, elevated BUN/creatinine 58/2.1, elevated T bili and alk phos. , troponin 0.147. Lactate wnl. Sepsis work-up initiated after brief episode of hypotension with blood cultures collected, IVF resuscitation (2L total via EMS and ED), and broad-spectrum antibiotics started. UA with evidence of UTI. CT head without contrast with no findings concerning for acute intracranial processes.  She will be admitted to hospital medicine for failure to thrive, management of a fib with RVR and possible  urosepsis.       Overview/Hospital Course:  Pt with hx of HFpEF, persistent atrial fibrillation, recurrent UTI, and multiple admissions this year who presented to [facility] ED via EMS on 09/13/23 with decreased level of consciousness after 2 days of progressively worsening lethargy and poor PO intake. Admitted to hospital medicine for failure to thrive, management of a fib with RVR and possible urosepsis. At initial presentation, pt was normotensive with elevated HR noted to be in a fib with RVR on cardiac monitoring and ECG. Unable to give history or answer ROS in state of altered mentation. Broad-spectrum antibiotics were started, cautious IVF resuscitation given cardiac hx. HR uncontrolled with home amiodarone, BB and diltiazem deferred due to labile blood pressures as well as possible sepsis. Digoxin load, dosed by pharmacy. Gynecology consulted for labial abscess noted on physical exam, no indications for I&D based on size and location of the lesion.       Interval History: NAEON. Pt appears slightly more alert this morning, able to participate in answering yes/no questions and speak in short sentences. She reports that she is feeling unwell, denies sx like chest pain, shortness of breath, abdominal pain. Pt remains in a fib with RVR, completed digoxin load with slightly better control of -120s compared to initial presentation. Blood pressures tenous, will give IVF resuscitation with caution based on volume status. D5 today as CMP with low glucose levels. Gynecology consulted and seen patient this AM, appreciate recs. Initial blood cultures with preliminary growth of gram positive cocci resembling staph. Repeat blood cultures. Continue vanc/zosyn, pharmacy to renally dose. F/u urine culture. Pt currently unable to take anything PO at this time, will try to give medications IV.     Review of Systems   Reason unable to perform ROS: limited due to acuity of condition.   Constitutional:  Positive for activity  change, appetite change and fatigue. Negative for chills and fever.   Respiratory:  Negative for shortness of breath and wheezing.    Cardiovascular:  Negative for chest pain.   Gastrointestinal:  Negative for abdominal pain.   Genitourinary:  Positive for dysuria. Negative for difficulty urinating.   Musculoskeletal:  Negative for back pain and myalgias.     Objective:     Vital Signs (Most Recent):  Temp: 97.5 °F (36.4 °C) (09/13/23 1105)  Pulse: (!) 127 (09/13/23 1211)  Resp: 17 (09/13/23 1105)  BP: (!) 91/55 (09/13/23 1105)  SpO2: 100 % (09/13/23 1211) Vital Signs (24h Range):  Temp:  [96.3 °F (35.7 °C)-97.9 °F (36.6 °C)] 97.5 °F (36.4 °C)  Pulse:  [] 127  Resp:  [15-22] 17  SpO2:  [96 %-100 %] 100 %  BP: ()/(55-85) 91/55     Weight: 59 kg (130 lb 1.1 oz)  Body mass index is 23.04 kg/m².    Intake/Output Summary (Last 24 hours) at 9/13/2023 1355  Last data filed at 9/13/2023 0620  Gross per 24 hour   Intake --   Output 775 ml   Net -775 ml         Physical Exam  Vitals and nursing note reviewed.   Constitutional:       General: She is not in acute distress.     Appearance: She is ill-appearing. She is not toxic-appearing.   HENT:      Head: Normocephalic and atraumatic.   Cardiovascular:      Rate and Rhythm: Tachycardia present. Rhythm irregular.      Pulses: Normal pulses.      Heart sounds: Normal heart sounds.   Pulmonary:      Effort: Pulmonary effort is normal. No respiratory distress.      Breath sounds: Normal breath sounds. No wheezing.   Chest:      Chest wall: No tenderness.   Abdominal:      General: Abdomen is flat. There is no distension.      Palpations: Abdomen is soft.      Tenderness: There is no abdominal tenderness. There is no guarding or rebound.   Genitourinary:     Comments: Labial abscess noted (see picture in media).  Musculoskeletal:      Cervical back: No tenderness.      Comments: Upper and lower extremities are edematous L>R. 2-3+ pitting edema of lower extremities.     Skin:     General: Skin is warm and dry.      Coloration: Skin is not jaundiced.      Findings: Bruising present. No erythema or rash.      Comments: Sacral pressure ulcer, grade I.    Neurological:      Comments: Unable to participate in exam.            Significant Labs: All pertinent labs within the past 24 hours have been reviewed.  CBC:   Recent Labs   Lab 09/12/23  1142 09/13/23  0235   WBC 6.67 10.58   HGB 11.2* 11.0*   HCT 35.7* 38.0    233     CMP:   Recent Labs   Lab 09/12/23  1142 09/13/23  0601    138   K 3.2* 4.1    110   CO2 18* 15*   GLU 65* 60*   BUN 58* 55*   CREATININE 2.1* 1.7*   CALCIUM 7.3* 6.9*   PROT 4.9* 4.4*   ALBUMIN 1.7* 1.5*   BILITOT 1.4* 1.3*   ALKPHOS 182* 147*   AST 31 34   ALT 31 29   ANIONGAP 16 13     Magnesium:   Recent Labs   Lab 09/13/23  0601   MG 1.5*     Urine Studies:   Recent Labs   Lab 09/12/23  1231   COLORU Abiola   APPEARANCEUA Cloudy*   PHUR 7.0   SPECGRAV 1.010   PROTEINUA 1+*   GLUCUA Negative   KETONESU Negative   BILIRUBINUA Negative   OCCULTUA 3+*   NITRITE Negative   LEUKOCYTESUR 3+*   RBCUA 36*   WBCUA >100*   BACTERIA Many*   SQUAMEPITHEL 2   HYALINECASTS 0       Significant Imaging: I have reviewed and interpreted all pertinent imaging results/findings within the past 24 hours.    Assessment/Plan:      * Failure to thrive in adult  Pt is a 89 year old female with hx of HRpEF, persistent atrial fibrillation on AC, SSS s/p PPM, amongst other medical hx including recurrent UTI's and multiple hospital admissions this year with progressively worsening debility and failure to thrive. Initially presented to ED via EMS after being found with decreased level of consciousness after 2 days of poor PO intake and lethargy. Admitted to hospital medicine for failure to thrive, management of a fib with RVR and possible urosepsis.     --daughter, Génesis Araiza, and son, Ari Ortiz, are joint HCPOA.   --engaged in extensive discussion with family  regarding goals of care, current medical status, and prognosis.  --family has had extensive discussions regarding pt's quality of life and progressive decline, requested code status to be DNR/DNI  --code status has been updated accordingly  --family wishes for treatment of acute illness where possible, but will likely discharge on home hospice  --palliative care consult placed   --social work consult placed for home hospice planning    Persistent atrial fibrillation  Hx of persistent atrial fibrillation on apixaban 2.5 mg BID for anticoagulation, amiodarone 200 mg daily. Previously was on atenolol which was d/c'ed on last admission and not restarted while outpatient as pt lost to follow-up. While in the ED, pt normotensive but remains in a fib with RVR despite 3L total IVF resuscitation.     --switch to lovenox as pt currently NPO, dosed by pharmacy  --digoxin load as pt is in septic shock, not a candidate for IV metoprolol   --previously on amio without success   --digoxin 2 mcg/kg now, followed by two 1 mcg/kg doses Q6H (completed)   --will assess response and consider PO maintenance dose if mentation allows    --pt currently unable to tolerate PO  --concern for digoxin toxicity given MARIO, hypokalemia, and amio use   --serum digoxin level in the AM for baseline levels due to current renal function  --will monitor closely     NSTEMI (non-ST elevated myocardial infarction), type 2 demand  Hx of persistent a fib, sick sinus syndrome s/p PPM, and HFpEF.     --found to have elevated troponin on initial presentation  --troponin trend is relatively flat, negative delta change   --0.147>0.156>0.157>0.155  --ECG without evidence of acute ST or T wave changes concerning for ischemia  --continue to monitor     Hypokalemia  --K 3.2 on admission, replaced  --daily CMP, magnesium, phosphate   --electrolyte replacements when clinically appropriate    Severe sepsis  This patient does have evidence of infective focus  My overall  impression is sepsis.  Source: Urinary Tract and Skin and Soft Tissue (location labia)  Antibiotics given-   Antibiotics (72h ago, onward)    Start     Stop Route Frequency Ordered    09/13/23 0000  piperacillin-tazobactam (ZOSYN) 4.5 g in dextrose 5 % in water (D5W) 100 mL IVPB (MB+)  (ED Adult Sepsis Treatment)         -- IV Every 12 hours (non-standard times) 09/12/23 2102 09/12/23 2136  vancomycin - pharmacy to dose  (vancomycin IVPB (PEDS and ADULTS))        See Main for full Linked Orders Report.    -- IV pharmacy to manage frequency 09/12/23 2036        Latest lactate reviewed-  Recent Labs   Lab 09/12/23  1142 09/12/23 2059   LACTATE 1.4 1.6     Organ dysfunction indicated by Acute kidney injury    Fluid challenge Ideal Body Weight- The patient's ideal body weight is Ideal body weight: 52.4 kg (115 lb 8.3 oz) which will be used to calculate fluid bolus of 30 ml/kg for treatment of septic shock.      Post- resuscitation assessment Yes Perfusion exam was performed within 6 hours of septic shock presentation after bolus shows Adequate tissue perfusion assessed by non-invasive monitoring     Will Not start Pressors- Levophed for MAP of 65    --pt with hx of recurrent UTI's and UA suggestive of infection on admission   --previous urine cx with pansensitivity  --presence of labial abscess (see image in media)   --gynecology consulted, appreciate recs   --per gyn, I+D not indicated based on size and location; also significant concern for pt's ability to tolerate procedure and for her tissue's ability to heal via secondary intention. Would reconsider surgical intervention if worsening signs of localized infection or tissue breakdown.   --recommend hot compresses Q4H to affected area     --received 3L total IVF upon admission, lactate wnl  --vanc/zosyn for broad coverage, dosed by pharmacy  --blood cultures preliminary growth of gram positive cocci resembling staph  --f/u urine and blood cultures     MARIO  (acute kidney injury)  Hx of chronic kidney disease, stage 3b. On admission, Cr 2.1 w/ baseline 1.2-1.3. Recent hospitalization w/ CHF exacerbation/hypervolemia and prerenal MARIO, resolved with IVF.     --IVF rehydration as tolerated given hx of chronic diastolic heart failure.   --renally dosing medications as appropriate  --avoid nephrotoxic agents   --continue to monitor renal function and urine output  --strict I/Os, bladder scan PRN urinary retention, straight/coughlin cath PRN bladder scan > 300 cc         Anemia due to chronic kidney disease  --hb/hct on presentation 11.2/35.7  --hx of anemia with variable hb/hct levels reviewed in chart  --stable, continue to monitor      Chronic diastolic heart failure  Hx of chronic heart failure with preserved ejection fraction.     --continue midodrine when able for hypotension      Last TTE 06/25/23:   Normal LV size with concentric remodeling and normal systolic function.   The estimated ejection fraction is 65%.   Grade III left ventricular diastolic dysfunction.   Severe left atrial enlargement.      Acquired hypothyroidism  --continue home synthroid when able  --f/u TSH      Cardiac pacemaker in situ  Hx SSS s/p PPM. Atenolol d/c'ed last admission due to bradycardia. Unclear if pacemaker is functioning, will investigate further, pacer spikes noted on telemetry.     --can consider device interrogation while inpatient if indicated  --last interrogated 08/17/23      VTE Risk Mitigation (From admission, onward)         Ordered     enoxaparin injection 60 mg  Daily         09/12/23 1913     IP VTE HIGH RISK PATIENT  Once         09/12/23 1608     Place sequential compression device  Until discontinued         09/12/23 1608                Discharge Planning   FRANCESCA: 9/16/2023     Code Status: DNR   Is the patient medically ready for discharge?: No    Reason for patient still in hospital (select all that apply): Patient trending condition and Treatment  Discharge Plan A:  Assisted Living ([facility])          Hattie Purcell MD  Department of Hospital Medicine   [facility] - Telemetry Stepdown

## 2023-09-13 NOTE — ASSESSMENT & PLAN NOTE
Hx of persistent atrial fibrillation on apixaban 2.5 mg BID for anticoagulation, amiodarone 200 mg daily. Previously was on atenolol which was d/c'ed on last admission and not restarted while outpatient as pt lost to follow-up. While in the ED, pt normotensive but remains in a fib with RVR despite 3L total IVF resuscitation.     --switch to lovenox as pt currently NPO, dosed by pharmacy  --digoxin load as pt is in septic shock, not a candidate for IV metoprolol   --previously on amio without success   --digoxin 2 mcg/kg now, followed by two 1 mcg/kg doses Q6H (completed)   --will assess response and consider PO maintenance dose if mentation allows    --pt currently unable to tolerate PO  --concern for digoxin toxicity given MARIO, hypokalemia, and amio use   --serum digoxin level in the AM for baseline levels due to current renal function  --will monitor closely

## 2023-09-13 NOTE — HOSPITAL COURSE
Pt with hx of HFpEF, persistent atrial fibrillation, recurrent UTI, who presented to [facility] ED via EMS on 09/13/23 with decreased level of consciousness after 2 days of progressively worsening lethargy and poor PO intake. Admitted to hospital medicine for failure to thrive, management of a fib with RVR and possible urosepsis. At initial presentation, pt was normotensive with elevated HR noted to be in a fib with RVR on cardiac monitoring and ECG. Unable to give history or answer ROS in state of altered mentation. Broad-spectrum antibiotics were started, cautious IVF resuscitation given cardiac hx. HR uncontrolled with home amiodarone, BB and diltiazem deferred due to labile blood pressures as well as possible sepsis. Digoxin loaded followed by every other day dose, determined by pharmacy. HR better controlled within 110s. Gynecology consulted for labial abscess noted on physical exam, no indications for I&D based on size and location of the lesion. Affected area managed by heat compresses Q4H to allow spontaneous drainage. SLP consulted for swallowing evaluation due to uncertain aspiration risk 2/2 current condition.     Pt has had multiple admissions this year similar in presentation with most recent admission 06/23 due to acute on chronic heart failure exacerbation, noted to have UTI, very poor PO intake and progressive debility at that time as well. Palliative care consulted during current admission for assistance in discussion with family about goals of care. Family made aware of this decision with agreement. Final urine culture with proteus (pansensitive) and klebsiella (previously sensitive in 05/23, now resistant to most). Pt was on vanc/zosyn since admission, but following culture results, zosyn discontinued and ertapenem was started. ID consulted with final recommendations to continue ertapenem for UTI and transition to doxycycline 100 mg BID instead of vanc due to age and poor renal function.  Given IVF  rehydration when clinically indicated on daily assessments.     Pt continued to have notably very poor PO intake during hospitalization with minimal change in level of consciousness/alertness despite active treatment of principal problems. Extensive discussions with family taken place in regard to goals of care and advanced care planning. Ultimately, family expressed the importance of maintaining current medical treatment with IV abx for UTI treatment; however, no escalation of care such as ICU level care, including vasopressors and central line placement. Family made decision of pt returning to her assisted living facility with [facility]. On 9/20/23: pt no longer responsive to voice or tactile stimuli. MAP <60. Longer apneic episodes. Discussed with family, likely not stable for transfer to outside inpatient hospice facility. Transitioned to hospice room.

## 2023-09-13 NOTE — ASSESSMENT & PLAN NOTE
Gisselle Ortiz is a 89-year-old woman with a history of HFpEF, atrial fibrillation, sick sinus syndrome s/p PPM, HTN, CKD3b, hypothyroidism, recurrent UTI, debility who was admitted from [facility] to [facility] for acute encephalopathy and sepsis 2/2 UTI, complicated by atrial fibrillation with RVR. Palliative and Supportive Care was consulted to explore goals of care.    Advance Care Planning   Goals of Care:  - Code status: DNAR/DNI  - HCPOA: daughter Génesis Araiza ([phone]) and son Ari Ortiz ([phone])  - Patient does not have decision making capacity  - Prognosis: poor  - Family's understanding of prognosis: limited  - Goals: improvement in condition  - Plans: counseled Mr. Ari Ortiz about the benefits and limitations of hospice, but he needs clarification of his mother's trajectory and prognosis from primary team. At this time he admits that he is not ready for this transition and has not talked about this with his siblings. He is open to utilizing hospice support for his mother if prognosis is poor/short in order to decrease suffering and optimize comfort    Goals of Care Conversation:  - 9/13: I met Ms. Ortiz but due to encephalopathy, was not able to engage in a meaningful conversation. I called her daughter Génesis Araiza ([phone]) but there was no ring tone and I left a voicemail. I called her son Ari Ortiz ([phone]) who shared that he had just left the hospital with his brother and nephew right before I called. I apologized for missing him earlier. He admits that he's not exactly sure what is going on, and when I offered that I learned that his mother had a UTI, he said he knew about this. He is hoping that she will get better and admits that he has not had conversations about hospice with his siblings for his mother. I shared my concern that in learning about her decline, that this hospitalization is reflective of her  trajectory. He shares that he is waiting to hear from the doctors whether his mother will recover or if this is an irrecoverable process. With his permission, I did share the benefits and limitations of hospice, clarifying that hospice will never do anything to expedite death and also will not do anything to artificially prolong her suffering. He expressed understanding. I asked if he had any questions regarding this and he said no. He said that at this time, he does not believe this is a resource that they'll use until they know for certain of their mother's likely trajectory and prognosis. Expressed understanding and thanked him for his time.

## 2023-09-13 NOTE — ASSESSMENT & PLAN NOTE
Hx of persistent atrial fibrillation on apixaban 2.5 mg BID for anticoagulation, amiodarone 200 mg daily. Previously was on atenolol which was d/c'ed on last admission and not restarted while outpatient as pt lost to follow-up. While in the ED, pt normotensive but remains in a fib with RVR despite 3L total IVF resuscitation.     --switch to lovenox as pt currently NPO, dosed by pharmacy  --digoxin load as pt is in septic shock, not a candidate for IV metoprolol  --previously on amio without success  --digoxin 2 mcg/kg now, followed by two 1 mcg/kg doses Q6H  --will assess response and consider PO maintenance dose if mentation allows  --concern for digoxin toxicity given MARIO, hypokalemia, and amio use  --will monitor closely

## 2023-09-13 NOTE — NURSING
Md notified of pt's critical calcium 6.9. No new orders received. Notified MD that pt did not received am dig dose d/t awaiting potassium level. Potassium level returned at 4.1. Awaiting further instruction on if to give digoxin now and if so to place a communication ok to give am dose. Monitoring continues.

## 2023-09-13 NOTE — ASSESSMENT & PLAN NOTE
Hx of chronic kidney disease, stage 3b. On admission, Cr 2.1 w/ baseline 1.2-1.3. Recent hospitalization w/ CHF exacerbation/hypervolemia and prerenal MARIO, resolved with IVF.     --IVF rehydration as tolerated given hx of chronic diastolic heart failure.   --renally dosing medications as appropriate  --avoid nephrotoxic agents   --continue to monitor renal function and urine output  --strict I/Os, bladder scan PRN urinary retention, straight/coughlin cath PRN bladder scan > 300 cc

## 2023-09-13 NOTE — PROGRESS NOTES
"Pharmacokinetic Initial Assessment: IV Vancomycin    Assessment/Plan:    Initiate intravenous vancomycin with loading dose of 1250 mg once, done in ED.  Since SCr increased from baseline, check random vancomycin level with AM labs to determine if scheduling subsequent doses appropriate or if intermittent dosing indicated.  Desired empiric serum trough concentration is 10 to 20 mcg/mL.  Draw vancomycin random level with AM labs on 09/13/2023.  Pharmacy will continue to follow and monitor vancomycin.      Please contact pharmacy at extension [phone] with any questions regarding this assessment.     Thank you for the consult,   Vandana Matthew       Patient brief summary:  Gisselle Ortiz is a 89 y.o. female initiated on antimicrobial therapy with IV Vancomycin for treatment of suspected urinary tract infection.    Drug Allergies:   Review of patient's allergies indicates:   Allergen Reactions    Ciprofloxacin Nausea And Vomiting       Actual Body Weight:   66.7 kg    Renal Function:   Estimated Creatinine Clearance: 16.3 mL/min (A) (based on SCr of 2.1 mg/dL (H)).    CBC (last 72 hours):  Recent Labs   Lab Result Units 09/12/23  1142   WBC K/uL 6.67   Hemoglobin g/dL 11.2*   Hematocrit % 35.7*   Platelets K/uL 262       Metabolic Panel (last 72 hours):  Recent Labs   Lab Result Units 09/12/23  1142 09/12/23  1231   Sodium mmol/L 141  --    Potassium mmol/L 3.2*  --    Chloride mmol/L 107  --    CO2 mmol/L 18*  --    Glucose mg/dL 65*  --    Glucose, UA   --  Negative   BUN mg/dL 58*  --    Creatinine mg/dL 2.1*  --    Albumin g/dL 1.7*  --    Total Bilirubin mg/dL 1.4*  --    Alkaline Phosphatase U/L 182*  --    AST U/L 31  --    ALT U/L 31  --        Drug levels (last 3 results):  No results for input(s): "VANCOMYCINRA", "VANCORANDOM", "VANCOMYCINPE", "VANCOPEAK", "VANCOMYCINTR", "VANCOTROUGH" in the last 72 hours.    Microbiologic Results:  Microbiology Results (last 7 days)       Procedure Component Value " Units Date/Time    Blood Culture #2 **CANNOT BE ORDERED STAT** [[id number]] Collected: 09/12/23 1141    Order Status: Completed Specimen: Blood from Peripheral, Forearm, Right Updated: 09/12/23 1915     Blood Culture, Routine No Growth to date    Blood Culture #1 **CANNOT BE ORDERED STAT** [[id number]] Collected: 09/12/23 1121    Order Status: Completed Specimen: Blood from Peripheral, Wrist, Right Updated: 09/12/23 1915     Blood Culture, Routine No Growth to date    Urine culture [[id number]] Collected: 09/12/23 1231    Order Status: No result Specimen: Urine Updated: 09/12/23 1259

## 2023-09-13 NOTE — ASSESSMENT & PLAN NOTE
This patient does have evidence of infective focus  My overall impression is sepsis.  Source: Urinary Tract and Skin and Soft Tissue (location labia)  Antibiotics given-   Antibiotics (72h ago, onward)    Start     Stop Route Frequency Ordered    09/13/23 0000  piperacillin-tazobactam (ZOSYN) 4.5 g in dextrose 5 % in water (D5W) 100 mL IVPB (MB+)  (ED Adult Sepsis Treatment)         -- IV Every 12 hours (non-standard times) 09/12/23 2102 09/12/23 2136  vancomycin - pharmacy to dose  (vancomycin IVPB (PEDS and ADULTS))        See Hyperspace for full Linked Orders Report.    -- IV pharmacy to manage frequency 09/12/23 2036        Latest lactate reviewed-  Recent Labs   Lab 09/12/23  1142 09/12/23 2059   LACTATE 1.4 1.6     Organ dysfunction indicated by Acute kidney injury    Fluid challenge Ideal Body Weight- The patient's ideal body weight is Ideal body weight: 52.4 kg (115 lb 8.3 oz) which will be used to calculate fluid bolus of 30 ml/kg for treatment of septic shock.      Post- resuscitation assessment Yes Perfusion exam was performed within 6 hours of septic shock presentation after bolus shows Adequate tissue perfusion assessed by non-invasive monitoring     Will Not start Pressors- Levophed for MAP of 65    --pt with hx of recurrent UTI's and UA suggestive of infection on admission   --previous urine cx with pansensitivity  --presence of labial abscess (see image in media)   --gynecology consulted, appreciate recs   --per gyn, I+D not indicated based on size and location; also significant concern for pt's ability to tolerate procedure and for her tissue's ability to heal via secondary intention. Would reconsider surgical intervention if worsening signs of localized infection or tissue breakdown.   --recommend hot compresses Q4H to affected area     --received 3L total IVF upon admission, lactate wnl  --vanc/zosyn for broad coverage, dosed by pharmacy  --blood cultures preliminary growth of gram positive  cocci resembling staph  --f/u urine and blood cultures

## 2023-09-13 NOTE — PROVIDER PROGRESS NOTES - EMERGENCY DEPT.
Encounter Date: 9/12/2023    ED Physician Progress Notes            Critical Care    Date/Time: 9/12/2023 12:00 PM    Performed by: Haseeb Sanderson MD  Authorized by: Haseeb Sanderson MD  Total critical care time (exclusive of procedural time) : 45 minutes  Critical care was necessary to treat or prevent imminent or life-threatening deterioration of the following conditions: sepsis and dehydration.

## 2023-09-13 NOTE — HPI
Gisselle Ortiz is a 89-year-old woman with a history of HFpEF, atrial fibrillation, sick sinus syndrome s/p PPM, HTN, CKD3b, hypothyroidism, recurrent UTI, debility who was admitted from [facility] to [facility] for acute encephalopathy and sepsis 2/2 UTI, complicated by atrial fibrillation with RVR. Palliative and Supportive Care was consulted to explore goals of care.

## 2023-09-13 NOTE — PLAN OF CARE
[facility] - Telemetry Stepdown  Initial Discharge Assessment       Primary Care Provider: Kai Montez MD    Admission Diagnosis: Fatigue [R53.83]  MARIO (acute kidney injury) [N17.9]  Chest pain [R07.9]  Atrial fibrillation with RVR [I48.91]  Urinary tract infection without hematuria, site unspecified [N39.0]  Sepsis with acute renal failure without septic shock, due to unspecified organism, unspecified acute renal failure type [A41.9, R65.20, N17.9]    Admission Date: 9/12/2023  Expected Discharge Date: 9/16/2023    Transition of Care Barriers: None    Payor: MEDICARE / Plan: MEDICARE PART A & B / Product Type: Government /     Extended Emergency Contact Information  Primary Emergency Contact: CONSUELOLORRAINECHANO  Mobile Phone: [phone]  Relation: Son  Preferred language: English   needed? No  Secondary Emergency Contact: Génesis Araiza   [place]  Mobile Phone: [phone]  Relation: Daughter    Discharge Plan A: Assisted Living ([facility])  Discharge Plan B: Assisted Living, Hospice/home      [id number] - [place] - [address] [place] [address]  Phone: [phone] Fax: [phone]    [id number] - [place] - [address] AT [facility] [place]  [address] [address]  Phone: [phone] Fax: [phone]    [facility] - [address]  [address] [place] [address]  Phone: [phone] Fax: [phone]      Initial Assessment (most recent)       Adult Discharge Assessment - 09/13/23 1431          Discharge Assessment    Assessment Type Discharge Planning Assessment     Confirmed/corrected address, phone number and insurance Yes     Confirmed Demographics Correct on Facesheet     Source of Information family     Communicated FRANCESCA with patient/caregiver Yes     People in Home facility resident     Facility Arrived  From: [facility]     Do you expect to return to your current living situation? Yes     Do you have help at home or someone to help you manage your care at home? Yes     Who are your caregiver(s) and their phone number(s)? Assisted living facility staff     Walking or Climbing Stairs ambulation difficulty, dependent;transferring difficulty, assistance 1 person;transferring difficulty, requires equipment     Mobility Management Patient has been unable to ambulate recently; has been using a w/c     Dressing/Bathing bathing difficulty, assistance 1 person;bathing difficulty, requires equipment;dressing difficulty, assistance 1 person     Home Accessibility wheelchair accessible     Home Layout Able to live on 1st floor     Equipment Currently Used at Home wheelchair     Readmission within 30 days? No     Patient currently being followed by outpatient case management? No     Do you currently have service(s) that help you manage your care at home? Yes     Name and Contact number of agency Assisted living facility staff     Is the pt/caregiver preference to resume services with current agency Yes     Do you take prescription medications? Yes     Do you have prescription coverage? Yes     Do you have any problems affording any of your prescribed medications? No     Who is going to help you get home at discharge? Family     How do you get to doctors appointments? family or friend will provide     Are you on dialysis? No     Do you take coumadin? No     DME Needed Upon Discharge  none     Discharge Plan discussed with: Adult children     Transition of Care Barriers None     Discharge Plan A Assisted Living   [facility]    Discharge Plan B Assisted Living;Hospice/home        Financial Resource Strain    How hard is it for you to pay for the very basics like food, housing, medical care, and heating? --   Family provides financial support for patient's KEILY costs       Housing Stability     In the last 12 months, was there a time when you were not able to pay the mortgage or rent on time? No     In the last 12 months, was there a time when you did not have a steady place to sleep or slept in a shelter (including now)? No        Transportation Needs    In the past 12 months, has lack of transportation kept you from medical appointments or from getting medications? No     In the past 12 months, has lack of transportation kept you from meetings, work, or from getting things needed for daily living? No        Food Insecurity    Within the past 12 months, you worried that your food would run out before you got the money to buy more. Never true     Within the past 12 months, the food you bought just didn't last and you didn't have money to get more. Never true                 Spoke with patient's son, Ari, regarding discharge planning. Per Ari, patient lives at [facility] and they care for all of patient's needs/ADLs. Patient normally transports via family vehicle. Ari reports family will provide transportation at [place]. Discussed potential hospice, Ari reports he/family are in communication with Palliative care for further hospice discussion.    Viki Torre, LCSW [facility]  Ext. [phone]

## 2023-09-13 NOTE — ASSESSMENT & PLAN NOTE
This patient does have evidence of infective focus  My overall impression is sepsis.  Source: Urinary Tract and Skin and Soft Tissue (location labia)  Antibiotics given-   Antibiotics (72h ago, onward)    Start     Stop Route Frequency Ordered    09/12/23 1200  piperacillin-tazobactam (ZOSYN) 4.5 g in dextrose 5 % in water (D5W) 100 mL IVPB (MB+)  (ED Adult Sepsis Treatment)         09/13/23 1159 IV Every 8 hours (non-standard times) 09/12/23 1157        Latest lactate reviewed-  Recent Labs   Lab 09/12/23  1142   LACTATE 1.4     Organ dysfunction indicated by Acute kidney injury    Fluid challenge Ideal Body Weight- The patient's ideal body weight is Ideal body weight: 52.4 kg (115 lb 8.3 oz) which will be used to calculate fluid bolus of 30 ml/kg for treatment of septic shock.      Post- resuscitation assessment Yes Perfusion exam was performed within 6 hours of septic shock presentation after bolus shows Adequate tissue perfusion assessed by non-invasive monitoring     Will Not start Pressors- Levophed for MAP of 65    --pt with hx of recurrent UTI's and UA suggestive of infection today   --previous urine cx with pansensitivity  --presence of labial abscess (see image in media)   --will consult gyn  --received 3L total IVF, lactate wnl, on vanc/zosyn for broad coverage  --f/u urine and blood cultures

## 2023-09-13 NOTE — ASSESSMENT & PLAN NOTE
Hx SSS s/p PPM. Atenolol d/c'ed last admission due to bradycardia. Unclear if pacemaker is functioning, will investigate further, pacer spikes noted on telemetry.     --can consider device interrogation at later time

## 2023-09-13 NOTE — PLAN OF CARE
Problem: Infection  Goal: Absence of Infection Signs and Symptoms  Outcome: Ongoing, Progressing     Problem: Adult Inpatient Plan of Care  Goal: Plan of Care Review  Outcome: Ongoing, Progressing  Goal: Patient-Specific Goal (Individualized)  Outcome: Ongoing, Progressing  Goal: Absence of Hospital-Acquired Illness or Injury  Outcome: Ongoing, Progressing  Goal: Optimal Comfort and Wellbeing  Outcome: Ongoing, Progressing  Goal: Readiness for Transition of Care  Outcome: Ongoing, Progressing     Problem: Infection Progression (Sepsis/Septic Shock)  Goal: Absence of Infection Signs and Symptoms  Outcome: Ongoing, Progressing     Problem: Fluid and Electrolyte Imbalance (Acute Kidney Injury/Impairment)  Goal: Fluid and Electrolyte Balance  Outcome: Ongoing, Progressing     Problem: Renal Function Impairment (Acute Kidney Injury/Impairment)  Goal: Effective Renal Function  Outcome: Ongoing, Progressing     Problem: Impaired Wound Healing  Goal: Optimal Wound Healing  Outcome: Ongoing, Progressing     Problem: Skin Injury Risk Increased  Goal: Skin Health and Integrity  Outcome: Ongoing, Progressing     Problem: Fall Injury Risk  Goal: Absence of Fall and Fall-Related Injury  Outcome: Ongoing, Progressing

## 2023-09-13 NOTE — NURSING
Nurses Note -- 4 Eyes      9/13/2023   1:31 AM      Skin assessed during: Admit      [] No Altered Skin Integrity Present    []Prevention Measures Documented      [x] Yes- Altered Skin Integrity Present or Discovered   [x] LDA Added if Not in Epic (Describe Wound)   [x] New Altered Skin Integrity was Present on Admit and Documented in LDA   [x] Wound Image Taken    Wound Care Consulted? No    Attending Nurse:  Denia Menchaca RN/Staff Member:   KAMERON JOSE

## 2023-09-14 NOTE — ASSESSMENT & PLAN NOTE
Hx of persistent a fib, sick sinus syndrome s/p PPM, and HFpEF.     --found to have elevated troponin on initial presentation  --troponin trend is relatively flat, negative delta change   --0.147>0.156>0.157>0.155  --ECG without evidence of acute ST or T wave changes concerning for ischemia  --has not c/o chest pain or tightness, will continue to monitor

## 2023-09-14 NOTE — PROGRESS NOTES
Pharmacokinetic Assessment Follow Up: IV Vancomycin    Vancomycin serum concentration assessment/plan:  Random level resulted as 16 mcg/mL; Goal 15-20 mcg/mL, Bacteremia  Patient meets criteria for MARIO  Re-dose Vancomycin 750 mg IV x 1; continue to dose by level when the random is less than 20 mcg/mL  Next level to be drawn on 9/15 at 0500    Drug levels (last 3 results):  Recent Labs   Lab Result Units 09/13/23  0601 09/14/23  1106   Vancomycin, Random ug/mL 12.5 16.0       Pharmacy will continue to follow and monitor vancomycin.    Please contact pharmacy at extension [phone] for questions regarding this assessment.    Thank you for the consult,   Monica Muñoz     Patient brief summary:  Gisselle Ortiz is a 89 y.o. female initiated on antimicrobial therapy with IV Vancomycin for treatment of bacteremia    Drug Allergies:   Review of patient's allergies indicates:   Allergen Reactions    Ciprofloxacin Nausea And Vomiting     Actual Body Weight:   59 kg    Renal Function:   Estimated Creatinine Clearance: 18.2 mL/min (A) (based on SCr of 1.7 mg/dL (H)).,     Dialysis Method (if applicable):  N/A    CBC (last 72 hours):  Recent Labs   Lab Result Units 09/12/23  1142 09/13/23  0235   WBC K/uL 6.67 10.58   Hemoglobin g/dL 11.2* 11.0*   Hematocrit % 35.7* 38.0   Platelets K/uL 262 233   Gran % %  --  74.3*   Lymph % %  --  14.8*   Mono % %  --  7.6   Eosinophil % %  --  0.9   Basophil % %  --  0.7   Differential Method   --  Automated         Metabolic Panel (last 72 hours):  Recent Labs   Lab Result Units 09/12/23  1142 09/12/23  1231 09/13/23  0601   Sodium mmol/L 141  --  138   Potassium mmol/L 3.2*  --  4.1   Chloride mmol/L 107  --  110   CO2 mmol/L 18*  --  15*   Glucose mg/dL 65*  --  60*   Glucose, UA   --  Negative  --    BUN mg/dL 58*  --  55*   Creatinine mg/dL 2.1*  --  1.7*   Albumin g/dL 1.7*  --  1.5*   Total Bilirubin mg/dL 1.4*  --  1.3*   Alkaline Phosphatase U/L 182*  --  147*   AST U/L 31   --  34   ALT U/L 31  --  29   Magnesium mg/dL  --   --  1.5*   Phosphorus mg/dL  --   --  3.8         Vancomycin Administrations:  vancomycin given in the last 96 hours                     vancomycin 1,250 mg in dextrose 5 % (D5W) 250 mL IVPB (Vial-Mate) (mg) 1,250 mg New Bag 09/12/23 1535                    Microbiologic Results:  Microbiology Results (last 7 days)       Procedure Component Value Units Date/Time    Blood Culture #1 **CANNOT BE ORDERED STAT** [[id number]] Collected: 09/12/23 1121    Order Status: Completed Specimen: Blood from Peripheral, Wrist, Right Updated: 09/14/23 1212     Blood Culture, Routine No Growth to date      No Growth to date      No Growth to date    Blood Culture #2 **CANNOT BE ORDERED STAT** [[id number]]  (Abnormal) Collected: 09/12/23 1141    Order Status: Completed Specimen: Blood from Peripheral, Forearm, Right Updated: 09/14/23 0848     Blood Culture, Routine Gram stain aer bottle: Gram positive cocci in clusters resembling Staph      Results called to and read back by: Boal Jones RN  09/13/2023  09:18      Gram stain luann bottle: Gram positive cocci in clusters resembling Staph      Positive results previously called 09/13/2023  19:47      COAGULASE-NEGATIVE STAPHYLOCOCCUS SPECIES  Organism is a probable contaminant      Blood culture [[id number]] Collected: 09/13/23 1411    Order Status: Completed Specimen: Blood Updated: 09/14/23 0115     Blood Culture, Routine No Growth to date    Narrative:      Specimen # 1    Blood culture [[id number]] Collected: 09/13/23 1411    Order Status: Completed Specimen: Blood Updated: 09/14/23 0115     Blood Culture, Routine No Growth to date    Narrative:      Specimen # 2    Urine culture [[id number]]  (Abnormal) Collected: 09/12/23 1231    Order Status: Completed Specimen: Urine Updated: 09/13/23 1705     Urine Culture, Routine PRESUMPTIVE PROTEUS SPECIES  >100,000 cfu/ml  Identification and susceptibility pending      Narrative:       Specimen Source->Urine    MRSA/SA Rapid ID by PCR from Blood culture [[id number]] Collected: 09/12/23 1141    Order Status: Completed Updated: 09/13/23 1038     Staph aureus ID by PCR Negative     Methicillin Resistant ID by PCR Negative

## 2023-09-14 NOTE — ASSESSMENT & PLAN NOTE
--hb/hct on presentation 11.2/35.7  --hx of anemia with variable hb/hct levels reviewed in chart    --hb/hct 8.3/25.3 on 09/14  --decrease likely 2/2 hemodilution as pt received IVF resuscitation last yest  --stable, continue to monitor

## 2023-09-14 NOTE — NURSING
Nurses Note -- 4 Eyes      9/14/2023   8:27 AM      Skin assessed during: Q Shift Change      [] No Altered Skin Integrity Present    []Prevention Measures Documented      [x] Yes- Altered Skin Integrity Present or Discovered   [] LDA Added if Not in Epic (Describe Wound)   [] New Altered Skin Integrity was Present on Admit and Documented in LDA   [x] Wound Image Taken    Wound Care Consulted? No    Attending Nurse:  DAMON Henderson    Second RN/Staff Member:   DAMON Kemp

## 2023-09-14 NOTE — SUBJECTIVE & OBJECTIVE
Interval History: NAEON. No changes in pt's mentation/awareness as compared to yesterday. Pt able to answer yes/no questions and speak in short sentences. Pt remains in a fib with RVR, completed digoxin load with slightly better control of -120s compared to on admission. Digoxin level this AM 0.5, will begin digoxin 0.0625 mg dosed every other day, per pharmacy. Blood pressures still tenous, pt received D5 fluids continuous over 12 hours overnight. Continue to monitor pt vitals and volume status for gentle IVF resuscitation. Repleting electrolytes as clinically indicated. Currently NPO due to questionable ability to tolerate PO intake. Consulted SLP for swallowing eval.     Review of Systems   Reason unable to perform ROS: limited due to acuity of condition.   Constitutional:  Positive for activity change, appetite change and fatigue. Negative for chills and fever.   Respiratory:  Negative for shortness of breath and wheezing.    Cardiovascular:  Negative for chest pain.   Gastrointestinal:  Negative for abdominal pain.   Genitourinary:  Negative for difficulty urinating and dysuria.   Musculoskeletal:  Negative for back pain and myalgias.     Objective:     Vital Signs (Most Recent):  Temp: 97.3 °F (36.3 °C) (09/14/23 1111)  Pulse: 101 (09/14/23 1128)  Resp: (!) 25 (09/14/23 1111)  BP: 118/65 (09/14/23 1111)  SpO2: 100 % (09/14/23 1128) Vital Signs (24h Range):  Temp:  [97.1 °F (36.2 °C)-97.9 °F (36.6 °C)] 97.3 °F (36.3 °C)  Pulse:  [] 101  Resp:  [18-25] 25  SpO2:  [93 %-100 %] 100 %  BP: ()/(53-65) 118/65     Weight: 59 kg (130 lb 1.1 oz)  Body mass index is 23.04 kg/m².    Intake/Output Summary (Last 24 hours) at 9/14/2023 1421  Last data filed at 9/13/2023 1528  Gross per 24 hour   Intake --   Output 150 ml   Net -150 ml           Physical Exam  Vitals and nursing note reviewed.   Constitutional:       General: She is not in acute distress.     Appearance: She is ill-appearing. She is not  toxic-appearing.   HENT:      Head: Normocephalic and atraumatic.   Cardiovascular:      Rate and Rhythm: Tachycardia present. Rhythm irregular.      Pulses: Normal pulses.      Heart sounds: Normal heart sounds.   Pulmonary:      Effort: Pulmonary effort is normal. No respiratory distress.      Breath sounds: Normal breath sounds. No wheezing.   Chest:      Chest wall: No tenderness.   Abdominal:      General: Abdomen is flat. There is no distension.      Palpations: Abdomen is soft.      Tenderness: There is no abdominal tenderness. There is no guarding or rebound.   Genitourinary:     Comments: Labial abscess noted (see picture in media).  Musculoskeletal:      Cervical back: No tenderness.      Comments: Upper and lower extremities are edematous L>R. 2-3+ pitting edema of lower extremities.    Skin:     General: Skin is warm and dry.      Coloration: Skin is not jaundiced.      Findings: Bruising present. No erythema or rash.      Comments: Sacral pressure ulcer, grade I.    Neurological:      Comments: Unable to participate in exam.            Significant Labs: All pertinent labs within the past 24 hours have been reviewed.  CBC:   Recent Labs   Lab 09/13/23  0235   WBC 10.58   HGB 11.0*   HCT 38.0          CMP:   Recent Labs   Lab 09/13/23  0601 09/14/23  1106    141   K 4.1 3.1*    114*   CO2 15* 19*   GLU 60* 109   BUN 55* 47*   CREATININE 1.7* 1.3   CALCIUM 6.9* 6.6*   PROT 4.4* 3.7*   ALBUMIN 1.5* 1.2*   BILITOT 1.3* 0.7   ALKPHOS 147* 114   AST 34 26   ALT 29 24   ANIONGAP 13 8       Magnesium:   Recent Labs   Lab 09/13/23  0601 09/14/23  1106   MG 1.5* 1.9       Significant Imaging: I have reviewed and interpreted all pertinent imaging results/findings within the past 24 hours.

## 2023-09-14 NOTE — ASSESSMENT & PLAN NOTE
Hx of persistent atrial fibrillation on apixaban 2.5 mg BID for anticoagulation, amiodarone 200 mg daily. Previously was on atenolol which was d/c'ed on last admission and not restarted while outpatient as pt lost to follow-up. While in the ED, pt normotensive but remained in a fib with RVR despite 3L total IVF resuscitation.     --switch to lovenox as pt currently NPO, dosed by pharmacy  --digoxin load as pt with possible sepsis, not a candidate for IV metoprolol   --previously on amio without success   --digoxin 2 mcg/kg now, followed by two 1 mcg/kg doses Q6H (completed)   --will assess response and consider PO maintenance dose if mentation allows    --pt currently unable to tolerate PO    --consulted SLP for swallowing eval, appreciate the help  --concern for digoxin toxicity given MARIO, hypokalemia, and amio use   --serum digoxin level after load 0.5   --digoxin 0.0625 mg IV every other day, dosing per pharmacy   --will monitor closely

## 2023-09-14 NOTE — NURSING
Nurses Note -- 4 Eyes      9/13/2023   7:07 PM      Skin assessed during: Q Shift Change      [] No Altered Skin Integrity Present    []Prevention Measures Documented      [x] Yes- Altered Skin Integrity Present or Discovered   [] LDA Added if Not in Epic (Describe Wound)   [] New Altered Skin Integrity was Present on Admit and Documented in LDA   [x] Wound Image Taken    Wound Care Consulted? No    Attending Nurse:DAMON Trevino    Second RN/Staff Member:   Viridiana RAMIREZ

## 2023-09-14 NOTE — PLAN OF CARE
Problem: Infection  Goal: Absence of Infection Signs and Symptoms  Outcome: Ongoing, Progressing     Problem: Adult Inpatient Plan of Care  Goal: Plan of Care Review  Outcome: Ongoing, Progressing  Goal: Patient-Specific Goal (Individualized)  Outcome: Ongoing, Progressing  Goal: Absence of Hospital-Acquired Illness or Injury  Outcome: Ongoing, Progressing  Goal: Readiness for Transition of Care  Outcome: Ongoing, Progressing    No acute changes occur this shift. Safety measures maintained. Will continue to monitor.

## 2023-09-14 NOTE — PLAN OF CARE
Problem: Infection  Goal: Absence of Infection Signs and Symptoms  Intervention: Prevent or Manage Infection  Flowsheets (Taken 9/14/2023 0744)  Isolation Precautions: precautions maintained     Problem: Adult Inpatient Plan of Care  Goal: Absence of Hospital-Acquired Illness or Injury  Intervention: Identify and Manage Fall Risk  Flowsheets (Taken 9/14/2023 0744)  Safety Promotion/Fall Prevention: assistive device/personal item within reach  Intervention: Prevent Skin Injury  Flowsheets (Taken 9/14/2023 0744)  Body Position: position changed independently  Intervention: Prevent and Manage VTE (Venous Thromboembolism) Risk  Flowsheets (Taken 9/14/2023 0744)  Activity Management: Rolling - L1  Goal: Optimal Comfort and Wellbeing  Intervention: Provide Person-Centered Care  Flowsheets (Taken 9/14/2023 0744)  Trust Relationship/Rapport:   care explained   questions answered   questions encouraged   emotional support provided   choices provided   reassurance provided   empathic listening provided   thoughts/feelings acknowledged   Pt in bed with no acute distress noted at this time.  Bed locked and at lowest position.  Call light in hand.  Frequent assessments ongoing

## 2023-09-14 NOTE — ASSESSMENT & PLAN NOTE
Pt is a 89 year old female with hx of HRpEF, persistent atrial fibrillation on AC, SSS s/p PPM, amongst other medical hx including recurrent UTI's and multiple hospital admissions this year with progressively worsening debility and failure to thrive. Initially presented to ED via EMS after being found with decreased level of consciousness after 2 days of poor PO intake and lethargy. Admitted to hospital medicine for failure to thrive, management of a fib with RVR and possible urosepsis.     --daughter, Génesis Araiza, and son, Ari Ortiz, are joint HCPOA.   --engaged in extensive discussion with family regarding goals of care, current medical status, and prognosis.  --family has had extensive discussions regarding pt's quality of life and progressive decline, requested code status to be DNR/DNI  --code status has been updated accordingly  --family wishes for treatment of acute illness where possible, but will likely discharge on home hospice  --palliative care consulted, appreciate the assistance regarding goals of care    --given pt with multiple admissions this year and progressively worsening decline noted by family, likely with each infection or hospitalization her baseline will decrease   --pt remains high risk for subsequent hospitalization and future infections similar to that on admission    --multiple attempts made to reach family to further discuss and clarify any questions regarding goals of care     --family have not been available at bedside and unsuccessful attempts to reach by phone, will keep trying  --social work consult placed for home hospice planning

## 2023-09-14 NOTE — ASSESSMENT & PLAN NOTE
This patient does have evidence of infective focus  My overall impression is sepsis.  Source: Urinary Tract and Skin and Soft Tissue (location labia)  Antibiotics given-   Antibiotics (72h ago, onward)    Start     Stop Route Frequency Ordered    09/13/23 0000  piperacillin-tazobactam (ZOSYN) 4.5 g in dextrose 5 % in water (D5W) 100 mL IVPB (MB+)  (ED Adult Sepsis Treatment)         -- IV Every 12 hours (non-standard times) 09/12/23 2102 09/12/23 2136  vancomycin - pharmacy to dose  (vancomycin IVPB (PEDS and ADULTS))        See Hyperspace for full Linked Orders Report.    -- IV pharmacy to manage frequency 09/12/23 2036        Latest lactate reviewed-  Recent Labs   Lab 09/12/23  1142 09/12/23 2059 09/14/23  1106   LACTATE 1.4 1.6 2.2     Organ dysfunction indicated by Acute kidney injury    Fluid challenge Ideal Body Weight- The patient's ideal body weight is Ideal body weight: 52.4 kg (115 lb 8.3 oz) which will be used to calculate fluid bolus of 30 ml/kg for treatment of septic shock.      Post- resuscitation assessment Yes Perfusion exam was performed within 6 hours of septic shock presentation after bolus shows Adequate tissue perfusion assessed by non-invasive monitoring     Will Not start Pressors- Levophed for MAP of 65    --pt with hx of recurrent UTI's and UA suggestive of infection on admission   --previous urine cx with pansensitivity  --presence of labial abscess (see image in media)   --gynecology consulted, appreciate recs   --per gyn, I+D not indicated based on size and location; also significant concern for pt's ability to tolerate procedure and for her tissue's ability to heal via secondary intention. Would reconsider surgical intervention if worsening signs of localized infection or tissue breakdown.   --recommend hot compresses Q4H to affected area     --received 3L total IVF upon admission, lactate wnl   --repeat lactate today wnl  --vanc/zosyn for broad coverage, dosed by  pharmacy  --initial blood cultures preliminary growth of gram positive cocci resembling staph   --repeat blood cultures negative to date  --urine cultures with preliminary growth of proteus spp  --f/u urine and blood cultures

## 2023-09-14 NOTE — ASSESSMENT & PLAN NOTE
--K 3.2 on admission, replaced  --daily CMP, magnesium, phosphate   --electrolyte replacements as clinically appropriate

## 2023-09-14 NOTE — PROGRESS NOTES
[facility]  Progress Note    Patient Name: Gisselle Ortiz  MRN: [id number]  Patient Class: IP- Inpatient   Admission Date: 9/12/2023  Length of Stay: 2 days  Attending Physician: Tim Man MD  Primary Care Provider: Kai Montez MD    Subjective:     Principal Problem:Failure to thrive in adult    HPI:  Gisselle Ortiz is a 89 year old female with hx of HFpEF, sick sinus syndome s/p PPM, persistent A fib on oral anticoagulation, CKD3b, hypertension, hyperlipidemia, hypothyroidism, GERD, and recurrent UTI's who presented to the ED via EMS after being found by nursing home staff altered with reduced level of consciousness this morning. Pt reported to have been increasingly lethargic with poor PO intake for the past 2 days. After discussion with family at bedside and telephone, it is reported that pt is normally functional, though over time has become bedbound due to medical conditions. At baseline, she is alert and oriented x4 without baseline dementia. Last known normal was last night while daughter was visiting her at the nursing home. Pt has had multiple admissions this year with similar presentation, noted to have poor PO intake and progressive debility with failure to thrive.    In the ED, /60, , RR 12 with SpO2 99% on RA, afebrile. Noted to be in a fib with RVR on cardiac monitor and EKG. Labs notable for stable anemia, Na 141, K 3.2, bicarb 18, elevated BUN/creatinine 58/2.1, elevated T bili and alk phos. , troponin 0.147. Lactate wnl. Sepsis work-up initiated after brief episode of hypotension with blood cultures collected, IVF resuscitation (2L total via EMS and ED), and broad-spectrum antibiotics started. UA with evidence of UTI. CT head without contrast with no findings concerning for acute intracranial processes.  She will be admitted to hospital medicine for failure to thrive, management of a fib with RVR and possible  urosepsis.         Overview/Hospital Course:  Pt with hx of HFpEF, persistent atrial fibrillation, recurrent UTI, who presented to [facility] ED via EMS on 09/13/23 with decreased level of consciousness after 2 days of progressively worsening lethargy and poor PO intake. Admitted to hospital medicine for failure to thrive, management of a fib with RVR and possible urosepsis. At initial presentation, pt was normotensive with elevated HR noted to be in a fib with RVR on cardiac monitoring and ECG. Unable to give history or answer ROS in state of altered mentation. Broad-spectrum antibiotics were started, cautious IVF resuscitation given cardiac hx. HR uncontrolled with home amiodarone, BB and diltiazem deferred due to labile blood pressures as well as possible sepsis. Digoxin load, dosed by pharmacy. Gynecology consulted for labial abscess noted on physical exam, no indications for I&D based on size and location of the lesion.     Pt has had multiple admissions this year similar in presentation with most recent admission 06/23 due to acute on chronic heart failure exacerbation, noted to have very poor PO intake and progressive debility at that time. Palliative care consulted during current admission for assistance in discussion with family about goals of care. Family made aware of this decision with agreement.       Interval History: NAEON. No changes in pt's mentation/awareness as compared to yesterday. Pt able to answer yes/no questions and speak in short sentences. Pt remains in a fib with RVR, completed digoxin load with slightly better control of -120s compared to on admission. Digoxin level this AM 0.5, will begin digoxin 0.0625 mg dosed every other day, per pharmacy. Blood pressures still tenous, pt received D5 fluids continuous over 12 hours overnight. Continue to monitor pt vitals and volume status for gentle IVF resuscitation. Repleting electrolytes as clinically indicated. Currently NPO due to questionable  ability to tolerate PO intake. Consulted SLP for swallowing eval.     Review of Systems   Reason unable to perform ROS: limited due to acuity of condition.   Constitutional:  Positive for activity change, appetite change and fatigue. Negative for chills and fever.   Respiratory:  Negative for shortness of breath and wheezing.    Cardiovascular:  Negative for chest pain.   Gastrointestinal:  Negative for abdominal pain.   Genitourinary:  Negative for difficulty urinating and dysuria.   Musculoskeletal:  Negative for back pain and myalgias.     Objective:     Vital Signs (Most Recent):  Temp: 97.3 °F (36.3 °C) (09/14/23 1111)  Pulse: 101 (09/14/23 1128)  Resp: (!) 25 (09/14/23 1111)  BP: 118/65 (09/14/23 1111)  SpO2: 100 % (09/14/23 1128) Vital Signs (24h Range):  Temp:  [97.1 °F (36.2 °C)-97.9 °F (36.6 °C)] 97.3 °F (36.3 °C)  Pulse:  [] 101  Resp:  [18-25] 25  SpO2:  [93 %-100 %] 100 %  BP: ()/(53-65) 118/65     Weight: 59 kg (130 lb 1.1 oz)  Body mass index is 23.04 kg/m².    Intake/Output Summary (Last 24 hours) at 9/14/2023 1421  Last data filed at 9/13/2023 1528  Gross per 24 hour   Intake --   Output 150 ml   Net -150 ml           Physical Exam  Vitals and nursing note reviewed.   Constitutional:       General: She is not in acute distress.     Appearance: She is ill-appearing. She is not toxic-appearing.   HENT:      Head: Normocephalic and atraumatic.   Cardiovascular:      Rate and Rhythm: Tachycardia present. Rhythm irregular.      Pulses: Normal pulses.      Heart sounds: Normal heart sounds.   Pulmonary:      Effort: Pulmonary effort is normal. No respiratory distress.      Breath sounds: Normal breath sounds. No wheezing.   Chest:      Chest wall: No tenderness.   Abdominal:      General: Abdomen is flat. There is no distension.      Palpations: Abdomen is soft.      Tenderness: There is no abdominal tenderness. There is no guarding or rebound.   Genitourinary:     Comments: Labial abscess  noted (see picture in media).  Musculoskeletal:      Cervical back: No tenderness.      Comments: Upper and lower extremities are edematous L>R. 2-3+ pitting edema of lower extremities.    Skin:     General: Skin is warm and dry.      Coloration: Skin is not jaundiced.      Findings: Bruising present. No erythema or rash.      Comments: Sacral pressure ulcer, grade I.    Neurological:      Comments: Unable to participate in exam.            Significant Labs: All pertinent labs within the past 24 hours have been reviewed.  CBC:   Recent Labs   Lab 09/13/23  0235   WBC 10.58   HGB 11.0*   HCT 38.0          CMP:   Recent Labs   Lab 09/13/23  0601 09/14/23  1106    141   K 4.1 3.1*    114*   CO2 15* 19*   GLU 60* 109   BUN 55* 47*   CREATININE 1.7* 1.3   CALCIUM 6.9* 6.6*   PROT 4.4* 3.7*   ALBUMIN 1.5* 1.2*   BILITOT 1.3* 0.7   ALKPHOS 147* 114   AST 34 26   ALT 29 24   ANIONGAP 13 8       Magnesium:   Recent Labs   Lab 09/13/23  0601 09/14/23  1106   MG 1.5* 1.9       Significant Imaging: I have reviewed and interpreted all pertinent imaging results/findings within the past 24 hours.      Assessment/Plan:      * Failure to thrive in adult  Pt is a 89 year old female with hx of HRpEF, persistent atrial fibrillation on AC, SSS s/p PPM, amongst other medical hx including recurrent UTI's and multiple hospital admissions this year with progressively worsening debility and failure to thrive. Initially presented to ED via EMS after being found with decreased level of consciousness after 2 days of poor PO intake and lethargy. Admitted to hospital medicine for failure to thrive, management of a fib with RVR and possible urosepsis.     --daughter, Génesis Araiza, and son, Ari Ortiz, are joint HCPOA.   --engaged in extensive discussion with family regarding goals of care, current medical status, and prognosis.  --family has had extensive discussions regarding pt's quality of life and progressive  decline, requested code status to be DNR/DNI  --code status has been updated accordingly  --family wishes for treatment of acute illness where possible, but will likely discharge on home hospice  --palliative care consulted, appreciate the assistance regarding goals of care    --given pt with multiple admissions this year and progressively worsening decline noted by family, likely with each infection or hospitalization her baseline will decrease   --pt remains high risk for subsequent hospitalization and future infections similar to that on admission    --multiple attempts made to reach family to further discuss and clarify any questions regarding goals of care     --family have not been available at bedside and unsuccessful attempts to reach by phone, will keep trying  --social work consult placed for home hospice planning    Persistent atrial fibrillation  Hx of persistent atrial fibrillation on apixaban 2.5 mg BID for anticoagulation, amiodarone 200 mg daily. Previously was on atenolol which was d/c'ed on last admission and not restarted while outpatient as pt lost to follow-up. While in the ED, pt normotensive but remained in a fib with RVR despite 3L total IVF resuscitation.     --switch to lovenox as pt currently NPO, dosed by pharmacy  --digoxin load as pt with possible sepsis, not a candidate for IV metoprolol   --previously on amio without success   --digoxin 2 mcg/kg now, followed by two 1 mcg/kg doses Q6H (completed)   --will assess response and consider PO maintenance dose if mentation allows    --pt currently unable to tolerate PO    --consulted SLP for swallowing eval, appreciate the help  --concern for digoxin toxicity given MARIO, hypokalemia, and amio use   --serum digoxin level after load 0.5   --digoxin 0.0625 mg IV every other day, dosing per pharmacy   --will monitor closely     NSTEMI (non-ST elevated myocardial infarction), type 2 demand  Hx of persistent a fib, sick sinus syndrome s/p PPM, and  HFpEF.     --found to have elevated troponin on initial presentation  --troponin trend is relatively flat, negative delta change   --0.147>0.156>0.157>0.155  --ECG without evidence of acute ST or T wave changes concerning for ischemia  --has not c/o chest pain or tightness, will continue to monitor     Hypokalemia  --K 3.2 on admission, replaced  --daily CMP, magnesium, phosphate   --electrolyte replacements as clinically appropriate    Severe sepsis  This patient does have evidence of infective focus  My overall impression is sepsis.  Source: Urinary Tract and Skin and Soft Tissue (location labia)  Antibiotics given-   Antibiotics (72h ago, onward)    Start     Stop Route Frequency Ordered    09/13/23 0000  piperacillin-tazobactam (ZOSYN) 4.5 g in dextrose 5 % in water (D5W) 100 mL IVPB (MB+)  (ED Adult Sepsis Treatment)         -- IV Every 12 hours (non-standard times) 09/12/23 2102 09/12/23 2136  vancomycin - pharmacy to dose  (vancomycin IVPB (PEDS and ADULTS))        See Hyperspace for full Linked Orders Report.    -- IV pharmacy to manage frequency 09/12/23 2036        Latest lactate reviewed-  Recent Labs   Lab 09/12/23  1142 09/12/23 2059 09/14/23  1106   LACTATE 1.4 1.6 2.2     Organ dysfunction indicated by Acute kidney injury    Fluid challenge Ideal Body Weight- The patient's ideal body weight is Ideal body weight: 52.4 kg (115 lb 8.3 oz) which will be used to calculate fluid bolus of 30 ml/kg for treatment of septic shock.      Post- resuscitation assessment Yes Perfusion exam was performed within 6 hours of septic shock presentation after bolus shows Adequate tissue perfusion assessed by non-invasive monitoring     Will Not start Pressors- Levophed for MAP of 65    --pt with hx of recurrent UTI's and UA suggestive of infection on admission   --previous urine cx with pansensitivity  --presence of labial abscess (see image in media)   --gynecology consulted, appreciate recs   --per gyn, I+D not  indicated based on size and location; also significant concern for pt's ability to tolerate procedure and for her tissue's ability to heal via secondary intention. Would reconsider surgical intervention if worsening signs of localized infection or tissue breakdown.   --recommend hot compresses Q4H to affected area     --received 3L total IVF upon admission, lactate wnl   --repeat lactate today wnl  --vanc/zosyn for broad coverage, dosed by pharmacy  --initial blood cultures preliminary growth of gram positive cocci resembling staph   --repeat blood cultures negative to date  --urine cultures with preliminary growth of proteus spp  --f/u urine and blood cultures     MARIO (acute kidney injury)  Hx of chronic kidney disease, stage 3b. On admission, Cr 2.1 w/ baseline 1.2-1.3. Recent hospitalization w/ CHF exacerbation/hypervolemia and prerenal MARIO, resolved with IVF.     --IVF rehydration as tolerated given hx of chronic diastolic heart failure.   --renally dosing medications as appropriate  --avoid nephrotoxic agents   --continue to monitor renal function and urine output  --strict I/Os, bladder scan PRN urinary retention, straight/coughlin cath PRN bladder scan > 300 cc         Anemia due to chronic kidney disease  --hb/hct on presentation 11.2/35.7  --hx of anemia with variable hb/hct levels reviewed in chart    --hb/hct 8.3/25.3 on 09/14  --decrease likely 2/2 hemodilution as pt received IVF resuscitation last yest  --stable, continue to monitor    Chronic diastolic heart failure  Hx of chronic heart failure with preserved ejection fraction.     --continue midodrine when able for hypotension    Last TTE 06/25/23:   Normal LV size with concentric remodeling and normal systolic function.   The estimated ejection fraction is 65%.   Grade III left ventricular diastolic dysfunction.   Severe left atrial enlargement.      Acquired hypothyroidism  --continue home synthroid when able  --f/u TSH      Cardiac pacemaker in  situ  Hx SSS s/p PPM. Atenolol d/c'ed last admission due to bradycardia. Unclear if pacemaker is functioning, will investigate further, pacer spikes noted on telemetry.     --can consider device interrogation while inpatient if indicated  --last interrogated 08/17/23      VTE Risk Mitigation (From admission, onward)         Ordered     enoxaparin injection 60 mg  Daily         09/12/23 1913     IP VTE HIGH RISK PATIENT  Once         09/12/23 1608     Place sequential compression device  Until discontinued         09/12/23 1608                Discharge Planning   FRANCESCA: 9/16/2023     Code Status: DNR   Is the patient medically ready for discharge?: No    Reason for patient still in hospital (select all that apply): Patient trending condition and Treatment  Discharge Plan A: Assisted Living ([facility])            Hattie Purcell MD  Department of Hospital Medicine   [facility]

## 2023-09-14 NOTE — CARE UPDATE
RAPID RESPONSE NURSE ROUND       Rounding completed with charge RNTala for tachycardia reports, 120's. No new concerns verbalized at this time. Instructed to call [phone] for further concerns or assistance.

## 2023-09-15 PROBLEM — N76.4 LABIAL ABSCESS: Status: ACTIVE | Noted: 2023-01-01

## 2023-09-15 PROBLEM — B96.89 URINARY TRACT INFECTION DUE TO ESBL KLEBSIELLA: Status: ACTIVE | Noted: 2023-01-01

## 2023-09-15 PROBLEM — N39.0 URINARY TRACT INFECTION DUE TO ESBL KLEBSIELLA: Status: ACTIVE | Noted: 2023-01-01

## 2023-09-15 NOTE — CONSULTS
[facility]  Infectious Disease  Consult Note    Patient Name: Gisselle Ortiz  MRN: [id number]  Admission Date: 9/12/2023  Hospital Length of Stay: 3 days  Attending Physician: Tim Man MD  Primary Care Provider: Kai Montez MD       Inpatient consult to Infectious Diseases  Consult performed by: Lor Zamora PA-C  Consult ordered by: Hattie Purcell MD          ID consult received. Chart being reviewed. Full consult note with recommendations to follow.      In the interim, please secure chat with any questions.    Thank you,  Lor Zamora PA-C

## 2023-09-15 NOTE — PLAN OF CARE
[facility] - Telemetry Stepdown  Discharge Reassessment    Primary Care Provider: Kai Montez MD    Expected Discharge Date: 9/16/2023    Reassessment (most recent)       Discharge Reassessment - 09/15/23 1156          Discharge Reassessment    Assessment Type Discharge Planning Reassessment     Did the patient's condition or plan change since previous assessment? No     Discharge Plan discussed with: Adult children     Name(s) and Number(s) pedro Pickard/JANEENOA, [phone]     Discharge Plan A Return to nursing home   [facility]    Discharge Plan B Return to Nursing Home   [facility] with hospice    DME Needed Upon Discharge  --   TBD    Transition of Care Barriers None     Why the patient remains in the hospital Requires continued medical care        Post-Acute Status    Post-Acute Authorization Hospice     Hospice Status --   Offered and emailed hospice list to Melly at [email or web address]    Discharge Delays None known at this time                 Per MD, patient's daughter, Melly, expressed interest in hospice.    Spoke with  at [facility] ([phone]) who reports she will have a representative return call to [facility] regarding if they prefer to use a specific hospice company.    Spoke with patient's daughter, Melly, regarding hospice. Offered and emailed hospice list for Melly and family to review.    12:50 PM  Spoke with Debra ([phone]), Unit Director, at [facility]. Debra reports they are not contracted with specific agencies, however, they have been using [facility], [facility], and [facility] recently per family choices. Spoke with patient's daughter, Melly, and updated. Melly reports she will notify  of their choice for hospice.    Viki Torre, LCSW [facility]  Ext. [phone]

## 2023-09-15 NOTE — ASSESSMENT & PLAN NOTE
Pt is a 89 year old female with hx of HRpEF, persistent atrial fibrillation on AC, SSS s/p PPM, amongst other medical hx including recurrent UTI's and multiple hospital admissions this year with progressively worsening debility and failure to thrive. Initially presented to ED via EMS after being found with decreased level of consciousness after 2 days of poor PO intake and lethargy. Admitted to hospital medicine for failure to thrive, management of a fib with RVR and possible urosepsis.     --daughter, Génesis Araiza, and son, Ari Ortiz, are joint HCPOA.   --engaged in extensive discussion with family regarding goals of care, current medical status, and prognosis.  --family has had extensive discussions regarding pt's quality of life and progressive decline, requested code status to be DNR/DNI  --code status has been updated accordingly  --family wishes for treatment of acute illness where possible, but will likely discharge on home hospice  --palliative care consulted, appreciate the assistance regarding goals of care    --given pt with multiple admissions this year and progressively worsening decline noted by family, likely with each infection or hospitalization her baseline will decrease   --pt remains high risk for subsequent hospitalization and future infections similar to that on admission   --social work consult placed for home hospice planning    Urine culture with resistant klebsiella; discontinued zosyn, started ertapenem with ID consulted.   Family made aware of current status and changes to medical management via telephone.   Goals of care were discussed voluntarily, please see ACP note dated on 09/15/23.

## 2023-09-15 NOTE — ASSESSMENT & PLAN NOTE
This patient does have evidence of infective focus  My overall impression is sepsis.  Source: Urinary Tract and Skin and Soft Tissue (location labia)  Antibiotics given-   Antibiotics (72h ago, onward)    Start     Stop Route Frequency Ordered    09/15/23 1200  vancomycin 750 mg in dextrose 5 % (D5W) 250 mL IVPB (Vial-Mate)         -- IV Once 09/15/23 1052    09/15/23 1045  ertapenem (INVANZ) 500 mg in sodium chloride 0.9% 100 mL IVPB         -- IV Every 24 hours (non-standard times) 09/15/23 0938    09/12/23 2136  vancomycin - pharmacy to dose  (vancomycin IVPB (PEDS and ADULTS))        See Hyperspace for full Linked Orders Report.    -- IV pharmacy to manage frequency 09/12/23 2036        Latest lactate reviewed-  Recent Labs   Lab 09/12/23 2059 09/14/23  1106 09/15/23  1114   LACTATE 1.6 2.2 1.5     Organ dysfunction indicated by Acute kidney injury    Fluid challenge Ideal Body Weight- The patient's ideal body weight is Ideal body weight: 52.4 kg (115 lb 8.3 oz) which will be used to calculate fluid bolus of 30 ml/kg for treatment of septic shock.      Post- resuscitation assessment Yes Perfusion exam was performed within 6 hours of septic shock presentation after bolus shows Adequate tissue perfusion assessed by non-invasive monitoring     Will Not start Pressors- Levophed for MAP of 65    --pt with hx of recurrent UTI's and UA suggestive of infection on admission   --previous urine cx with pansensitivity (e coli, klebsiella)  --presence of labial abscess (see image in media)   --gynecology consulted, appreciate recs   --per gyn, I+D not indicated based on size and location; also significant concern for pt's ability to tolerate procedure and for her tissue's ability to heal via secondary intention. Would reconsider surgical intervention if worsening signs of localized infection or tissue breakdown.   --recommend hot compresses Q4H to affected area     --received 3L total IVF upon admission, lactate  wnl   --repeat lactate remains wnl  --vanc/zosyn for broad coverage, dosed by pharmacy  --initial blood cultures preliminary growth of gram positive cocci resembling staph   --repeat blood cultures negative to date  --final urine cultures with proteus (sensitive) and klebsiella (previously sensitive, now resistant to most)  --discontinued zosyn, started ertapenem with ID consulted, appreciate recs.  --f/u blood cultures

## 2023-09-15 NOTE — SUBJECTIVE & OBJECTIVE
Past Medical History:   Diagnosis Date    A-fib     Anemia of chronic disease 02/02/2021    Anticoagulant long-term use     CHF (congestive heart failure)     Chronic diastolic heart failure 02/02/2021    COVID-19 01/07/2022    Gallstone pancreatitis     Hypertension     Pancreatic abscess 09/26/2020    Stage 3 chronic kidney disease 5/11/2016    Thyroid disease        Past Surgical History:   Procedure Laterality Date    CATARACT EXTRACTION      CATARACT EXTRACTION W/  INTRAOCULAR LENS IMPLANT Bilateral 2004    DR IN Rule    ENDOSCOPIC ULTRASOUND OF UPPER GASTROINTESTINAL TRACT N/A 9/14/2020    Procedure: ULTRASOUND, UPPER GI TRACT, ENDOSCOPIC;  Surgeon: Esha Howard MD;  Location: [facility] ENDO (2ND FLR);  Service: Endoscopy;  Laterality: N/A;    ENDOSCOPIC ULTRASOUND OF UPPER GASTROINTESTINAL TRACT  11/25/2020    Procedure: ULTRASOUND, UPPER GI TRACT, ENDOSCOPIC;  Surgeon: Esha Howard MD;  Location: [facility] ENDO (2ND FLR);  Service: Endoscopy;;    ERCP N/A 9/14/2020    Procedure: ERCP (ENDOSCOPIC RETROGRADE CHOLANGIOPANCREATOGRAPHY);  Surgeon: Esha Howard MD;  Location: [facility] (2ND [facility]);  Service: Endoscopy;  Laterality: N/A;    ERCP N/A 9/25/2020    Procedure: ERCP (ENDOSCOPIC RETROGRADE CHOLANGIOPANCREATOGRAPHY);  Surgeon: Esha Howard MD;  Location: [facility] ENDO (2ND FLR);  Service: Endoscopy;  Laterality: N/A;    ERCP N/A 11/25/2020    Procedure: ERCP (ENDOSCOPIC RETROGRADE CHOLANGIOPANCREATOGRAPHY);  Surgeon: Esha Howard MD;  Location: [facility] ENDO (2ND FLR);  Service: Endoscopy;  Laterality: N/A;  Covid-19 test 11/22/20 at [place] - pg  2 day hold Dr Favian Pisano - pg  PM prep    EYE SURGERY      HIP REPLACEMENT ARTHROPLASTY Right 2016    HYSTERECTOMY      REVISION OF SKIN POCKET FOR PACEMAKER N/A 1/21/2019    Procedure: REVISION-POCKET-PACEMAKER;  Surgeon: Asher Watts MD;  Location: [facility] EP LAB;  Service: Cardiology;  Laterality: N/A;  MODERATE SEDATION,  sedation issues in the past    THYROIDECTOMY      TREATMENT OF CARDIAC ARRHYTHMIA N/A 3/18/2019    Procedure: CARDIOVERSION OR DEFIBRILLATION;  Surgeon: Asher Watts MD;  Location: [facility] EP LAB;  Service: Cardiology;  Laterality: N/A;  AF, JILL-cx if SR, DCCV, MAC, FAS, 3PREP    TREATMENT OF CARDIAC ARRHYTHMIA N/A 5/14/2019    Procedure: Cardioversion or Defibrillation;  Surgeon: Derick Vizcarra MD;  Location: [facility] EP LAB;  Service: Cardiology;  Laterality: N/A;  AF, JILL, DCCV, MAC, DM, 3PREP    TREATMENT OF CARDIAC ARRHYTHMIA N/A 6/21/2022    Procedure: Cardioversion or Defibrillation;  Surgeon: Favian Colmenares MD;  Location: [facility] EP LAB;  Service: Cardiology;  Laterality: N/A;  AF, JILL, DCCV, MAC, DM, 3 Prep *SJM PPM in situ*       Review of patient's allergies indicates:   Allergen Reactions    Ciprofloxacin Nausea And Vomiting       Medications:  Medications Prior to Admission   Medication Sig    amiodarone (PACERONE) 200 MG Tab Take 1 tablet (200 mg total) by mouth once daily.    aspirin (ECOTRIN) 81 MG EC tablet Take 1 tablet (81 mg total) by mouth once daily.    atenoloL (TENORMIN) 25 MG tablet HOLD ATENOLOL UNTIL YOU ARE ABLE TO FOLLOW UP WITH PRIMARY CARE DOCTOR    cyanocobalamin (VITAMIN B-12) 1000 MCG tablet Take 1 tablet (1,000 mcg total) by mouth once daily.    cyproheptadine (PERIACTIN) 4 mg tablet Take 1 tablet (4 mg total) by mouth 3 (three) times daily.    diclofenac sodium (VOLTAREN) 1 % Gel Apply topically to affected joints as needed for pain    ELIQUIS 2.5 mg Tab TAKE 1 TABLET TWICE A DAY    folic acid (FOLVITE) 1 MG tablet Take 1 tablet (1 mg total) by mouth once daily.    furosemide (LASIX) 20 MG tablet Take 1 tablet (20 mg total) by mouth once daily. Take as needed for swelling in your legs. HOLD MEDICATION IF HYPOTENSIVE    hydrALAZINE (APRESOLINE) 25 MG tablet Take 1 tablet (25 mg total) by mouth 2 (two) times daily as needed (SBP >180). HOLD UNTIL YOU ARE ABLE TO SEE YOUR  PRIMARY CARE DOCTOR    Lactobacillus rhamnosus GG (CULTURELLE) 10 billion cell capsule Take 1 capsule by mouth once daily.    levothyroxine (SYNTHROID) 137 MCG Tab tablet Take 1 tablet (137 mcg total) by mouth before breakfast.    midodrine (PROAMATINE) 5 MG Tab Take 1 tablet (5 mg total) by mouth every 8 (eight) hours.    mirtazapine (REMERON) 7.5 MG Tab Take 1 tablet (7.5 mg total) by mouth every evening.    multivitamin Tab Take 1 tablet by mouth once daily.    pantoprazole (PROTONIX) 40 MG tablet Take 1 tablet (40 mg total) by mouth once daily.    polyethylene glycol (GLYCOLAX) 17 gram PwPk Take 17 g by mouth daily as needed (constipation).     Antibiotics (From admission, onward)      Start     Stop Route Frequency Ordered    09/15/23 1045  ertapenem (INVANZ) 500 mg in sodium chloride 0.9% 100 mL IVPB         -- IV Every 24 hours (non-standard times) 09/15/23 0938    09/12/23 2136  vancomycin - pharmacy to dose  (vancomycin IVPB (PEDS and ADULTS))        See Hyperspace for full Linked Orders Report.    -- IV pharmacy to manage frequency 09/12/23 2036          Antifungals (From admission, onward)      None          Antivirals (From admission, onward)      None             Immunization History   Administered Date(s) Administered    Influenza 10/03/2015    Influenza (FLUAD) - Quadrivalent - Adjuvanted - PF *Preferred* (65+) 11/02/2020    Influenza - High Dose - PF (65 years and older) 11/13/2014, 10/01/2015, 11/04/2016, 08/18/2017, 09/24/2018    Influenza - Quadrivalent - High Dose - PF (65 years and older) 10/27/2022    Influenza - Quadrivalent - PF *Preferred* (6 months and older) 09/16/2019    Influenza - Trivalent - PF (ADULT) 10/17/2011, 10/24/2012    Influenza Split 11/06/2003, 10/30/2007, 11/04/2008    Influenza Whole 12/08/1997, 11/18/2004, 11/23/2005    PPD Test 10/02/2020, 06/27/2023    Pneumococcal Conjugate - 13 Valent 05/11/2017    Pneumococcal Polysaccharide - 23 Valent 12/22/1997, 11/06/2003,  06/14/2007    Td (ADULT) 11/18/2004       Family History       Problem Relation (Age of Onset)    Heart attack Mother, Maternal Grandmother    Heart disease Mother, Maternal Grandmother    Heart failure Mother, Maternal Grandmother    Hypertension Mother, Maternal Grandmother    Stroke Maternal Grandmother          Social History     Socioeconomic History    Marital status:    Tobacco Use    Smoking status: Former     Types: Cigarettes     Start date: 5/19/1964    Smokeless tobacco: Never   Substance and Sexual Activity    Alcohol use: No    Drug use: No    Sexual activity: Not Currently     Partners: Male     Social Determinants of Health     Financial Resource Strain: Low Risk  (6/24/2023)    Overall Financial Resource Strain (CARDIA)     Difficulty of Paying Living Expenses: Not very hard   Food Insecurity: No Food Insecurity (9/13/2023)    Hunger Vital Sign     Worried About Running Out of Food in the Last Year: Never true     Ran Out of Food in the Last Year: Never true   Transportation Needs: No Transportation Needs (9/13/2023)    PRAPARE - Transportation     Lack of Transportation (Medical): No     Lack of Transportation (Non-Medical): No   Physical Activity: Inactive (5/16/2023)    Exercise Vital Sign     Days of Exercise per Week: 0 days     Minutes of Exercise per Session: 0 min   Stress: No Stress Concern Present (5/16/2023)    [employer] [place] [employer] - Occupational Stress Questionnaire     Feeling of Stress : Not at all   Social Connections: Moderately Isolated (6/24/2023)    Social Connection and Isolation Panel [NHANES]     Frequency of Communication with Friends and Family: More than three times a week     Frequency of Social Gatherings with Friends and Family: Once a week     Attends Sikhism Services: 1 to 4 times per year     Active Member of Clubs or Organizations: No     Attends Club or Organization Meetings: Never     Marital Status:    Housing Stability: Low Risk   (9/13/2023)    Housing Stability Vital Sign     Unable to Pay for Housing in the Last Year: No     Number of Places Lived in the Last Year: 1     Unstable Housing in the Last Year: No     Review of Systems   Unable to perform ROS: Mental status change (limited)   Constitutional:  Positive for activity change, appetite change, chills and fatigue.   Gastrointestinal:  Negative for abdominal pain.   Neurological:  Positive for weakness.     Objective:     Vital Signs (Most Recent):  Temp: 97.1 °F (36.2 °C) (09/15/23 1541)  Pulse: (!) 123 (09/15/23 1548)  Resp: (!) 22 (09/15/23 1541)  BP: (!) 88/60 (09/15/23 1541)  SpO2: 99 % (09/15/23 1541) Vital Signs (24h Range):  Temp:  [96.1 °F (35.6 °C)-97.2 °F (36.2 °C)] 97.1 °F (36.2 °C)  Pulse:  [] 123  Resp:  [16-22] 22  SpO2:  [96 %-100 %] 99 %  BP: ()/(60-75) 88/60     Weight: 59 kg (130 lb 1.1 oz)  Body mass index is 23.04 kg/m².    Estimated Creatinine Clearance: 23.8 mL/min (based on SCr of 1.3 mg/dL).     Physical Exam  Constitutional:       General: She is not in acute distress.     Appearance: Normal appearance. She is well-developed. She is ill-appearing.   HENT:      Head: Normocephalic and atraumatic.      Right Ear: External ear normal.      Left Ear: External ear normal.      Nose: Nose normal.   Eyes:      General: No scleral icterus.        Right eye: No discharge.         Left eye: No discharge.      Extraocular Movements: Extraocular movements intact.      Conjunctiva/sclera: Conjunctivae normal.   Cardiovascular:      Rate and Rhythm: Tachycardia present. Rhythm irregular.   Pulmonary:      Effort: Pulmonary effort is normal. No respiratory distress.      Breath sounds: No stridor.   Abdominal:      General: There is no distension.      Palpations: Abdomen is soft.      Tenderness: There is no abdominal tenderness. There is no right CVA tenderness or left CVA tenderness.   Musculoskeletal:         General: Swelling present. Normal range of  "motion.      Right lower leg: Edema present.      Left lower leg: Edema present.   Skin:     Findings: Bruising and lesion present. No erythema or rash.   Neurological:      Mental Status: She is lethargic.   Psychiatric:         Mood and Affect: Mood normal.         Behavior: Behavior normal.         Thought Content: Thought content normal.         Judgment: Judgment normal.          Significant Labs: Blood Culture:   Recent Labs   Lab 05/21/23  1139 05/21/23  1140 09/12/23  1121 09/12/23  1141 09/13/23  1411   LABBLOO No growth after 5 days. No growth after 5 days. No Growth to date  No Growth to date  No Growth to date  No Growth to date Gram stain aer bottle: Gram positive cocci in clusters resembling Staph  Results called to and read back by: Bola Jones RN  09/13/2023  09:18  Gram stain luann bottle: Gram positive cocci in clusters resembling Staph  Positive results previously called 09/13/2023  19:47  COAGULASE-NEGATIVE STAPHYLOCOCCUS SPECIES  Organism is a probable contaminant  * No Growth to date  No Growth to date  No Growth to date  No Growth to date     Bone Marrow Culture: No results for input(s): "BONEMARROWCU" in the last 4320 hours.  CBC:   Recent Labs   Lab 09/14/23  1106 09/15/23  1114   WBC 5.40 5.56   HGB 8.3* 10.2*   HCT 25.3* 33.1*    177     CMP:   Recent Labs   Lab 09/14/23  1106 09/15/23  0844    140   K 3.1* 4.7   * 114*   CO2 19* 18*    67*   BUN 47* 40*   CREATININE 1.3 1.3   CALCIUM 6.6* 7.0*   PROT 3.7* 4.7*   ALBUMIN 1.2* 1.4*   BILITOT 0.7 0.7   ALKPHOS 114 130   AST 26 52*   ALT 24 31   ANIONGAP 8 8     Microbiology Results (last 7 days)       Procedure Component Value Units Date/Time    Blood Culture #1 **CANNOT BE ORDERED STAT** [[id number]] Collected: 09/12/23 1121    Order Status: Completed Specimen: Blood from Peripheral, Wrist, Right Updated: 09/15/23 1212     Blood Culture, Routine No Growth to date      No Growth to date      No Growth to " "date      No Growth to date    Blood Culture #2 **CANNOT BE ORDERED STAT** [[id number]]  (Abnormal) Collected: 09/12/23 1141    Order Status: Completed Specimen: Blood from Peripheral, Forearm, Right Updated: 09/15/23 1017     Blood Culture, Routine Gram stain aer bottle: Gram positive cocci in clusters resembling Staph      Results called to and read back by: Bola Jones RN  09/13/2023  09:18      Gram stain luann bottle: Gram positive cocci in clusters resembling Staph      Positive results previously called 09/13/2023  19:47      COAGULASE-NEGATIVE STAPHYLOCOCCUS SPECIES  Organism is a probable contaminant      Urine culture [[id number]]  (Abnormal)  (Susceptibility) Collected: 09/12/23 1231    Order Status: Completed Specimen: Urine Updated: 09/14/23 2114     Urine Culture, Routine PROTEUS MIRABILIS  >100,000 cfu/ml        KLEBSIELLA OXYTOCA ESBL  50,000 - 99,999 cfu/ml      Narrative:      Specimen Source->Urine    Blood culture [[id number]] Collected: 09/13/23 1411    Order Status: Completed Specimen: Blood Updated: 09/14/23 1812     Blood Culture, Routine No Growth to date      No Growth to date    Narrative:      Specimen # 2    Blood culture [[id number]] Collected: 09/13/23 1411    Order Status: Completed Specimen: Blood Updated: 09/14/23 1812     Blood Culture, Routine No Growth to date      No Growth to date    Narrative:      Specimen # 1    MRSA/SA Rapid ID by PCR from Blood culture [[id number]] Collected: 09/12/23 1141    Order Status: Completed Updated: 09/13/23 1038     Staph aureus ID by PCR Negative     Methicillin Resistant ID by PCR Negative          Respiratory Culture: No results for input(s): "GSRESP", "RESPIRATORYC" in the last 4320 hours.  Urine Culture:   Recent Labs   Lab 05/14/23  1827 09/12/23  1231   LABURIN KLEBSIELLA PNEUMONIAE  > 100,000 cfu/ml  No other significant isolate  *  ESCHERICHIA COLI  > 100,000 cfu/ml  * PROTEUS MIRABILIS  >100,000 cfu/ml  *  KLEBSIELLA OXYTOCA " "ESBL  50,000 - 99,999 cfu/ml  *     Urine Studies:   Recent Labs   Lab 09/12/23  1231   COLORU [place]   APPEARANCEUA Cloudy*   PHUR 7.0   SPECGRAV 1.010   PROTEINUA 1+*   GLUCUA Negative   KETONESU Negative   BILIRUBINUA Negative   OCCULTUA 3+*   NITRITE Negative   LEUKOCYTESUR 3+*   RBCUA 36*   WBCUA >100*   BACTERIA Many*   SQUAMEPITHEL 2   HYALINECASTS 0     Wound Culture: No results for input(s): "LABAERO" in the last 4320 hours.  Recent Lab Results         09/15/23  1114   09/15/23  0844        Albumin   1.4       Alkaline Phosphatase   130       ALT   31       Anion Gap   8       AST   52  Comment: *Result may be interfered by visible hemolysis       Baso # 0.08         Basophil % 1.4         BILIRUBIN TOTAL   0.7  Comment: For infants and newborns, interpretation of results should be based  on gestational age, weight and in agreement with clinical  observations.    Premature Infant recommended reference ranges:  Up to 24 hours.............<8.0 mg/dL  Up to 48 hours............<12.0 mg/dL  3-5 days..................<15.0 mg/dL  6-29 days.................<15.0 mg/dL         BUN   40       Calcium   7.0       Chloride   114       CO2   18       Creatinine   1.3       Differential Method Automated         eGFR   39.1       Eos # 0.1         Eosinophil % 1.4         Glucose   67       Gran # (ANC) 3.2         Gran % 57.7         Hematocrit 33.1         Hemoglobin 10.2         Immature Grans (Abs) 0.16  Comment: Mild elevation in immature granulocytes is non specific and   can be seen in a variety of conditions including stress response,   acute inflammation, trauma and pregnancy. Correlation with other   laboratory and clinical findings is essential.           Immature Granulocytes 2.9         Lactate, [place] 1.5  Comment: Falsely low lactic acid results can be found in samples   containing >=13.0 mg/dL total bilirubin and/or >=3.5 mg/dL   direct bilirubin.           Lymph # 1.6         Lymph % 29.0         Magnesium  "   2.0       MCH 29.8         MCHC 30.8         MCV 97         Mono # 0.4         Mono % 7.6         MPV 10.4         nRBC 0         Platelets 177         Potassium   4.7  Comment: *Result may be interfered by visible hemolysis       PROTEIN TOTAL   4.7  Comment: *Result may be interfered by visible hemolysis       RBC 3.42         RDW 14.6         Sodium   140       Vancomycin, Random   18.8       WBC 5.56                 Significant Imaging:     Imaging Results              CT Head Without Contrast (Final result)  Result time 09/12/23 13:49:39      Final result by Rick Jha MD (09/12/23 13:49:39)                   Impression:      Numerous pre-existing findings, without CT evidence of acute intracranial hemorrhage, acute large vascular territory brain infarct, or acute intracranial mass effect.      Electronically signed by: Rick Jha  Date:    09/12/2023  Time:    13:49               Narrative:    EXAMINATION:  CT HEAD WITHOUT CONTRAST    CLINICAL HISTORY:  Mental status change, unknown cause;    TECHNIQUE:  Low dose axial images were obtained through the head.  Coronal and sagittal reformations were also performed. Contrast was not administered.    COMPARISON:  Head CT 01/31/2021    FINDINGS:  Artifacts related to motion and/or beam hardening degrade portions of the scan.  Allowing for these, there is no evidence of depressed calvarial fracture, acute intracranial hemorrhage, new intracranial mass or mass effect, or discrete acute large vascular territory brain infarct.    Pre-existing small left posterior parafalcine calcification, possibly a small subcentimeter ossified meningioma.    There is pre-existing cerebral and cerebellar parenchymal volume loss with a proportionate degree of presumed ex vacuo enlargement of the sulci, cisterns, and ventricles.    Contour deformity of the right lateral facial/periorbital soft tissues, possibly related to patient positioning in the head sahni; underlying  soft tissue abnormality is not excluded.    Intracranial atherosclerotic calcifications involving portions of both the anterior and posterior circulation.    Low-attenuation changes in portions of the cerebral white matter are most likely related to chronic small vessel ischemia.  Other white matter disorders are not fully excluded.    Pre-existing surgical changes in both ocular globes.  Pre-existing bilateral enophthalmos.    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  Pneumatized petrous temporal bones, more extensively on the left.    Minimal mucosal disease in portions of the paranasal sinuses.

## 2023-09-15 NOTE — [facility] SECONDARY REVIEW
Physician Advisor Internal    Other    Approved Inpatient      Dr. Harry this patient is IP already.  She is not meeting the check boxes for infection, extended care.  I just wanted your eyes on her incase the case is denied.   She has been made a DNR and they are setting up Hospice care.     Per Dr. Kristopher Harry:  She's traditional medicare so it shouldn't matter

## 2023-09-15 NOTE — ASSESSMENT & PLAN NOTE
Hx of persistent atrial fibrillation on apixaban 2.5 mg BID for anticoagulation, amiodarone 200 mg daily. Previously was on atenolol which was d/c'ed on last admission and not restarted while outpatient as pt lost to follow-up. While in the ED, pt normotensive but remained in a fib with RVR despite 3L total IVF resuscitation.     --lovenox as pt was NPO, dosed by pharmacy  --digoxin started due to pt with possible sepsis, not a candidate for IV metoprolol or diltiazem   --previously on amio without success   --digoxin 2 mcg/kg now, followed by two 1 mcg/kg doses Q6H (load completed)   --will assess response and consider PO maintenance dose if mentation allows    --evaluated by SLP for swallow study, appreciate recs  --concern for digoxin toxicity given MARIO, hypokalemia, and amio use   --serum digoxin level after load 0.5   --digoxin 0.0625 mg IV every other day, dosing per pharmacy starting today 09/15/23  --will monitor clinical status closely

## 2023-09-15 NOTE — PROGRESS NOTES
[facility] - Telemetry [facility] Medicine  Progress Note    Patient Name: Gisselle Ortiz  MRN: [id number]  Patient Class: IP- Inpatient   Admission Date: 9/12/2023  Length of Stay: 3 days  Attending Physician: Tim Man MD  Primary Care Provider: Kai Montez MD    Subjective:     Principal Problem:Failure to thrive in adult    HPI:  Gisselle Ortiz is a 89 year old female with hx of HFpEF, sick sinus syndome s/p PPM, persistent A fib on oral anticoagulation, CKD3b, hypertension, hyperlipidemia, hypothyroidism, GERD, and recurrent UTI's who presented to the ED via EMS after being found by nursing home staff altered with reduced level of consciousness this morning. Pt reported to have been increasingly lethargic with poor PO intake for the past 2 days. After discussion with family at bedside and telephone, it is reported that pt is normally functional, though over time has become bedbound due to medical conditions. At baseline, she is alert and oriented x4 without baseline dementia. Last known normal was last night while daughter was visiting her at the nursing home. Pt has had multiple admissions this year with similar presentation, noted to have poor PO intake and progressive debility with failure to thrive.    In the ED, /60, , RR 12 with SpO2 99% on RA, afebrile. Noted to be in a fib with RVR on cardiac monitor and EKG. Labs notable for stable anemia, Na 141, K 3.2, bicarb 18, elevated BUN/creatinine 58/2.1, elevated T bili and alk phos. , troponin 0.147. Lactate wnl. Sepsis work-up initiated after brief episode of hypotension with blood cultures collected, IVF resuscitation (2L total via EMS and ED), and broad-spectrum antibiotics started. UA with evidence of UTI. CT head without contrast with no findings concerning for acute intracranial processes.  She will be admitted to hospital medicine for failure to thrive, management of a fib with RVR and possible  urosepsis.       Overview/Hospital Course:  Pt with hx of HFpEF, persistent atrial fibrillation, recurrent UTI, who [place] [facility] [place]13[place]23 with decreased level of consciousness after 2 days of progressively worsening lethargy and poor PO intake. Admitted to hospital medicine for failure to thrive, management of a fib with RVR and possible urosepsis. At initial presentation, pt was normotensive with elevated HR noted to be in a fib with RVR on cardiac monitoring and ECG. Unable to give history or answer ROS in state of altered mentation. Broad-spectrum antibiotics were started, cautious IVF resuscitation given cardiac hx. HR uncontrolled with home amiodarone, BB and diltiazem deferred due to labile blood pressures as well as possible sepsis. Digoxin loaded followed by every other day dose, determined by pharmacy. HR better controlled within 110s. Gynecology consulted for labial abscess noted on physical exam, no indications for I&D based on size and location of the lesion. Affected area managed by heat compresses Q4H to allow spontaneous drainage. SLP consulted for swallowing evaluation due to uncertain aspiration risk 2/2 current condition.     Pt has had multiple admissions this year similar in presentation with most recent admission 06/23 due to acute on chronic heart failure exacerbation, noted to have UTI, very poor PO intake and progressive debility at that time as well. Palliative care consulted during current admission for assistance in discussion with family about goals of care. Family made aware of this decision with agreement. Final urine culture with proteus (pansensitive) and klebsiella (previously sensitive in 05/23, now resistant to most). Pt was on vanc/zosyn since admission, but following culture results, zosyn discontinued and ertapenem was started. ID consulted.       Interval History: NAEON. VSS. A fib better controlled after receiving digoxin load, started digoxin 0.0625 mg every  other day starting today. Will closely monitor status including electrolytes and vitals. SLP evaluated with recommendations. Blood cultures negative to date. Urine cultures finalized with growth of sensitive proteus and resistant klebsiella (previously sensitive in 05/23). Discontinued zosyn, started ertapenem with ID consulted.     Family made aware of these changes via telephone with ACP discussed. See ACP note on today 09/15/23.     Review of Systems   Reason unable to perform ROS: limited due to acuity of condition.   Constitutional:  Positive for activity change, appetite change and fatigue. Negative for chills and fever.   Respiratory:  Negative for shortness of breath and wheezing.    Cardiovascular:  Negative for chest pain.   Gastrointestinal:  Negative for abdominal pain.   Genitourinary:  Negative for difficulty urinating and dysuria.   Musculoskeletal:  Negative for back pain and myalgias.     Objective:     Vital Signs (Most Recent):  Temp: 97 °F (36.1 °C) (09/15/23 1124)  Pulse: 102 (09/15/23 1145)  Resp: (!) 22 (09/15/23 1124)  BP: 103/61 (09/15/23 1124)  SpO2: 100 % (09/15/23 1145) Vital Signs (24h Range):  Temp:  [96.1 °F (35.6 °C)-97.7 °F (36.5 °C)] 97 °F (36.1 °C)  Pulse:  [] 102  Resp:  [16-23] 22  SpO2:  [96 %-100 %] 100 %  BP: ()/(61-75) 103/61     Weight: 59 kg (130 lb 1.1 oz)  Body mass index is 23.04 kg/m².    Intake/Output Summary (Last 24 hours) at 9/15/2023 1240  Last data filed at 9/15/2023 0516  Gross per 24 hour   Intake 636.82 ml   Output 675 ml   Net -38.18 ml           Physical Exam  Vitals and nursing note reviewed.   Constitutional:       General: She is not in acute distress.     Appearance: She is ill-appearing. She is not toxic-appearing.   HENT:      Head: Normocephalic and atraumatic.   Cardiovascular:      Rate and Rhythm: Tachycardia present. Rhythm irregular.      Pulses: Normal pulses.      Heart sounds: Normal heart sounds.   Pulmonary:      Effort: Pulmonary  effort is normal. No respiratory distress.      Breath sounds: Normal breath sounds. No wheezing.   Chest:      Chest wall: No tenderness.   Abdominal:      General: Abdomen is flat. There is no distension.      Palpations: Abdomen is soft.      Tenderness: There is no abdominal tenderness. There is no guarding or rebound.   Genitourinary:     Comments: Labial abscess noted (see picture in media).  Musculoskeletal:      Cervical back: No tenderness.      Comments: Upper and lower extremities are edematous L>R. 2-3+ pitting edema of lower extremities.    Skin:     General: Skin is warm and dry.      Coloration: Skin is not jaundiced.      Findings: Bruising present. No erythema or rash.      Comments: Sacral pressure ulcer, grade I.    Neurological:      Comments: Unable to participate in exam.           Significant Labs: All pertinent labs within the past 24 hours have been reviewed.  CBC:   Recent Labs   Lab 09/14/23  1106 09/15/23  1114   WBC 5.40 5.56   HGB 8.3* 10.2*   HCT 25.3* 33.1*    177       CMP:   Recent Labs   Lab 09/14/23  1106 09/15/23  0844    140   K 3.1* 4.7   * 114*   CO2 19* 18*    67*   BUN 47* 40*   CREATININE 1.3 1.3   CALCIUM 6.6* 7.0*   PROT 3.7* 4.7*   ALBUMIN 1.2* 1.4*   BILITOT 0.7 0.7   ALKPHOS 114 130   AST 26 52*   ALT 24 31   ANIONGAP 8 8       Magnesium:   Recent Labs   Lab 09/14/23  1106 09/15/23  0844   MG 1.9 2.0       Significant Imaging: I have reviewed and interpreted all pertinent imaging results/findings within the past 24 hours.      Assessment/Plan:      * Failure to thrive in adult  Pt is a 89 year old female with hx of HRpEF, persistent atrial fibrillation on AC, SSS s/p PPM, amongst other medical hx including recurrent UTI's and multiple hospital admissions this year with progressively worsening debility and failure to thrive. Initially presented to ED via EMS after being found with decreased level of consciousness after 2 days of poor PO intake  and lethargy. Admitted to hospital medicine for failure to thrive, management of a fib with RVR and possible urosepsis.     --daughter, éGnesis Araiza, and son, Ari Ortiz, are joint HCPOA.   --engaged in extensive discussion with family regarding goals of care, current medical status, and prognosis.  --family has had extensive discussions regarding pt's quality of life and progressive decline, requested code status to be DNR/DNI  --code status has been updated accordingly  --family wishes for treatment of acute illness where possible, but will likely discharge on home hospice  --palliative care consulted, appreciate the assistance regarding goals of care    --given pt with multiple admissions this year and progressively worsening decline noted by family, likely with each infection or hospitalization her baseline will decrease   --pt remains high risk for subsequent hospitalization and future infections similar to that on admission   --social work consult placed for home hospice planning    Urine culture with resistant klebsiella; discontinued zosyn, started ertapenem with ID consulted.   Family made aware of current status and changes to medical management via telephone.   Goals of care were discussed voluntarily, please see ACP note dated on 09/15/23.    Persistent atrial fibrillation  Hx of persistent atrial fibrillation on apixaban 2.5 mg BID for anticoagulation, amiodarone 200 mg daily. Previously was on atenolol which was d/c'ed on last admission and not restarted while outpatient as pt lost to follow-up. While in the ED, pt normotensive but remained in a fib with RVR despite 3L total IVF resuscitation.     --lovenox as pt was NPO, dosed by pharmacy  --digoxin started due to pt with possible sepsis, not a candidate for IV metoprolol or diltiazem   --previously on amio without success   --digoxin 2 mcg/kg now, followed by two 1 mcg/kg doses Q6H (load completed)   --will assess response and consider PO  maintenance dose if mentation allows    --evaluated by SLP for swallow study, appreciate recs  --concern for digoxin toxicity given MARIO, hypokalemia, and amio use   --serum digoxin level after load 0.5   --digoxin 0.0625 mg IV every other day, dosing per pharmacy starting today 09/15/23  --will monitor clinical status closely    NSTEMI (non-ST elevated myocardial infarction), type 2 demand  Hx of persistent a fib, sick sinus syndrome s/p PPM, and HFpEF.     --found to have elevated troponin on initial presentation  --troponin trend is relatively flat, negative delta change   --0.147>0.156>0.157>0.155  --ECG without evidence of acute ST or T wave changes concerning for ischemia  --has not c/o chest pain or tightness, will continue to monitor     Hypokalemia  --K 3.2 on admission, replaced  --daily CMP, magnesium, phosphate   --electrolyte replacements as clinically appropriate    Severe sepsis  This patient does have evidence of infective focus  My overall impression is sepsis.  Source: Urinary Tract and Skin and Soft Tissue (location labia)  Antibiotics given-   Antibiotics (72h ago, onward)    Start     Stop Route Frequency Ordered    09/15/23 1200  vancomycin 750 mg in dextrose 5 % (D5W) 250 mL IVPB (Vial-Mate)         -- IV Once 09/15/23 1052    09/15/23 1045  ertapenem (INVANZ) 500 mg in sodium chloride 0.9% 100 mL IVPB         -- IV Every 24 hours (non-standard times) 09/15/23 0938    09/12/23 2136  vancomycin - pharmacy to dose  (vancomycin IVPB (PEDS and ADULTS))        See Hyperspace for full Linked Orders Report.    -- IV pharmacy to manage frequency 09/12/23 2036        Latest lactate reviewed-  Recent Labs   Lab 09/12/23 2059 09/14/23  1106 09/15/23  1114   LACTATE 1.6 2.2 1.5     Organ dysfunction indicated by Acute kidney injury    Fluid challenge Ideal Body Weight- The patient's ideal body weight is Ideal body weight: 52.4 kg (115 lb 8.3 oz) which will be used to calculate fluid bolus of 30 ml/kg for  treatment of septic shock.      Post- resuscitation assessment Yes Perfusion exam was performed within 6 hours of septic shock presentation after bolus shows Adequate tissue perfusion assessed by non-invasive monitoring     Will Not start Pressors- Levophed for MAP of 65    --pt with hx of recurrent UTI's and UA suggestive of infection on admission   --previous urine cx with pansensitivity (e coli, klebsiella)  --presence of labial abscess (see image in media)   --gynecology consulted, appreciate recs   --per gyn, I+D not indicated based on size and location; also significant concern for pt's ability to tolerate procedure and for her tissue's ability to heal via secondary intention. Would reconsider surgical intervention if worsening signs of localized infection or tissue breakdown.   --recommend hot compresses Q4H to affected area     --received 3L total IVF upon admission, lactate wnl   --repeat lactate remains wnl  --vanc/zosyn for broad coverage, dosed by pharmacy  --initial blood cultures preliminary growth of gram positive cocci resembling staph   --repeat blood cultures negative to date  --final urine cultures with proteus (sensitive) and klebsiella (previously sensitive, now resistant to most)  --discontinued zosyn, started ertapenem with ID consulted, appreciate recs.  --f/u blood cultures     Encounter for palliative care        MARIO (acute kidney injury)  Hx of chronic kidney disease, stage 3b. On admission, Cr 2.1 w/ baseline 1.2-1.3. Recent hospitalization w/ CHF exacerbation/hypervolemia and prerenal MARIO, resolved with IVF.     --IVF rehydration as tolerated given hx of chronic diastolic heart failure.   --renally dosing medications as appropriate  --avoid nephrotoxic agents   --continue to monitor renal function and urine output  --strict I/Os, bladder scan PRN urinary retention, straight/coughlin cath PRN bladder scan > 300 cc         Anemia due to chronic kidney disease  --hb/hct on presentation  11.2/35.7  --hx of anemia with variable hb/hct levels reviewed in chart    --stable, continue to monitor    Chronic diastolic heart failure  Hx of chronic heart failure with preserved ejection fraction.     --continue midodrine when able for hypotension    Last TTE 06/25/23:   Normal LV size with concentric remodeling and normal systolic function.   The estimated ejection fraction is 65%.   Grade III left ventricular diastolic dysfunction.   Severe left atrial enlargement.      Acquired hypothyroidism  --continue home synthroid when able  --f/u TSH      Cardiac pacemaker in situ  Hx SSS s/p PPM. Atenolol d/c'ed last admission due to bradycardia. Unclear if pacemaker is functioning, will investigate further, pacer spikes noted on telemetry.     --can consider device interrogation while inpatient if indicated  --last interrogated 08/17/23      VTE Risk Mitigation (From admission, onward)         Ordered     enoxaparin injection 60 mg  Daily         09/12/23 1913     IP VTE HIGH RISK PATIENT  Once         09/12/23 1608     Place sequential compression device  Until discontinued         09/12/23 1608                Discharge Planning   FRANCESCA: 9/16/2023     Code Status: DNR   Is the patient medically ready for discharge?: No    Reason for patient still in hospital (select all that apply): Patient trending condition and Treatment  Discharge Plan A: Return to nursing home ([facility])   Discharge Delays: None known at this time        Hattie Purcell MD  Department of Hospital Medicine   [facility] - Telemetry Stepdown

## 2023-09-15 NOTE — PLAN OF CARE
F/U appointment already made for 10/31/23 for 3:00 pm.      Elliot Echeverria [facility]  Case Management  [phone]

## 2023-09-15 NOTE — PT/OT/SLP EVAL
Speech Language Pathology Evaluation  Bedside Swallow    Patient Name:  Gisselle Ortiz   MRN:  [id number]  8081/8081 A    Admitting Diagnosis: Failure to thrive in adult    Recommendations:                 General Recommendations:   follow up indicated  Diet recommendations:  Puree, Mildly thick/Nectar thick liquids - IDDSI Level 2   Aspiration Precautions:   1 small bite/sip at a time,   Assistance with meals,   Avoid talking while eating,   Check for pocketing/oral residue,   Eliminate distractions,   Feed only when awake/alert,   Frequent oral care,   HOB to 90 degrees,   Meds crushed in puree,   Monitor for s/s of aspiration,   and Strict aspiration precautions   General Precautions: Standard, aspiration, pureed diet, nectar thick  Communication strategies:  none    Assessment:     Gisselle Ortiz is a 89 y.o. female with an SLP diagnosis of Dysphagia.  She presents with immediate coughing/choking with thin liquids.    History:     Past Medical History:   Diagnosis Date    A-fib     Anemia of chronic disease 02/02/2021    Anticoagulant long-term use     CHF (congestive heart failure)     Chronic diastolic heart failure 02/02/2021    COVID-19 01/07/2022    Gallstone pancreatitis     Hypertension     Pancreatic abscess 09/26/2020    Stage 3 chronic kidney disease 5/11/2016    Thyroid disease        Past Surgical History:   Procedure Laterality Date    CATARACT EXTRACTION      CATARACT EXTRACTION W/  INTRAOCULAR LENS IMPLANT Bilateral 2004     IN [place]    ENDOSCOPIC ULTRASOUND OF UPPER GASTROINTESTINAL TRACT N/A 9/14/2020    Procedure: ULTRASOUND, UPPER GI TRACT, ENDOSCOPIC;  Surgeon: Esha Howard MD;  Location: [address];  Service: Endoscopy;  Laterality: N/A;    ENDOSCOPIC ULTRASOUND OF UPPER GASTROINTESTINAL TRACT  11/25/2020    Procedure: ULTRASOUND, UPPER GI TRACT, ENDOSCOPIC;  Surgeon: Esha Howard MD;  Location: [address]);  Service: Endoscopy;;    ERCP N/A  9/14/2020    Procedure: ERCP (ENDOSCOPIC RETROGRADE CHOLANGIOPANCREATOGRAPHY);  Surgeon: Esha Howard MD;  Location: [facility] ENDO (2ND FLR);  Service: Endoscopy;  Laterality: N/A;    ERCP N/A 9/25/2020    Procedure: ERCP (ENDOSCOPIC RETROGRADE CHOLANGIOPANCREATOGRAPHY);  Surgeon: Esha Howard MD;  Location: [facility] ENDO (2ND FLR);  Service: Endoscopy;  Laterality: N/A;    ERCP N/A 11/25/2020    Procedure: ERCP (ENDOSCOPIC RETROGRADE CHOLANGIOPANCREATOGRAPHY);  Surgeon: Esha Howard MD;  Location: [facility] END[facility] (2ND FLR);  Service: Endoscopy;  Laterality: N/A;  Covid-19 test 11/22/20 at [facility] - pg  2 day hold Eliquis, Dr Favian Colmenares - pg  PM prep    EYE SURGERY      HIP REPLACEMENT ARTHROPLASTY Right 2016    HYSTERECTOMY      REVISION OF SKIN POCKET FOR PACEMAKER N/A 1/21/2019    Procedure: REVISION-POCKET-PACEMAKER;  Surgeon: Asher Watts MD;  Location: [facility] EP LAB;  Service: Cardiology;  Laterality: N/A;  MODERATE SEDATION, sedation issues in the past    THYROIDECTOMY      TREATMENT OF CARDIAC ARRHYTHMIA N/A 3/18/2019    Procedure: CARDIOVERSION OR DEFIBRILLATION;  Surgeon: Asher Watts MD;  Location: [facility] EP LAB;  Service: Cardiology;  Laterality: N/A;  AF, JILL-cx if SR, DCCV, MAC, FAS, 3PREP    TREATMENT OF CARDIAC ARRHYTHMIA N/A 5/14/2019    Procedure: Cardioversion or Defibrillation;  Surgeon: Derick Vizcarra MD;  Location: [facility] EP LAB;  Service: Cardiology;  Laterality: N/A;  AF, JILL, DCCV, MAC, DM, 3PREP    TREATMENT OF CARDIAC ARRHYTHMIA N/A 6/21/2022    Procedure: Cardioversion or Defibrillation;  Surgeon: Favian Colmenares MD;  Location: [facility] EP LAB;  Service: Cardiology;  Laterality: N/A;  AF, JILL, DCCV, MAC, DM, 3 Prep *SJM PPM in situ*     MD note: HPI: Gisselle Ortiz is a 89 year old female with hx of HFpEF, sick sinus syndome s/p PPM, persistent A fib on oral anticoagulation, CKD3b, hypertension, hyperlipidemia, hypothyroidism, GERD, and recurrent UTI's who  presented to the ED via EMS after being found by nursing home staff altered with reduced level of consciousness this morning. Pt reported to have been increasingly lethargic with poor PO intake for the past 2 days. After discussion with family at bedside and telephone, it is reported that pt is normally functional, though over time has become bedbound due to medical conditions. At baseline, she is alert and oriented x4 without baseline dementia. Last known normal was last night while daughter was visiting her at the nursing home. Pt has had multiple admissions this year with similar presentation, noted to have poor PO intake and progressive debility with failure to thrive.  In the ED, /60, , RR 12 with SpO2 99% on RA, afebrile. Noted to be in a fib with RVR on cardiac monitor and EKG. Labs notable for stable anemia, Na 141, K 3.2, bicarb 18, elevated BUN/creatinine 58/2.1, elevated T bili and alk phos. , troponin 0.147. Lactate wnl. Sepsis work-up initiated after brief episode of hypotension with blood cultures collected, IVF resuscitation (2L total via EMS and ED), and broad-spectrum antibiotics started. UA with evidence of UTI. CT head without contrast with no findings concerning for acute intracranial processes.  She will be admitted to hospital medicine for failure to thrive, management of a fib with RVR and possible urosepsis.   Overview/Hospital Course: Pt with hx of HFpEF, persistent atrial fibrillation, recurrent UTI, who presented [place] [facility] [place] via EMS on 09/13/23 with decreased level of consciousness after 2 days of progressively worsening lethargy and poor PO intake. Admitted to hospital medicine for failure to thrive, management of a fib with RVR and possible urosepsis. At initial presentation, pt was normotensive with elevated HR noted to be in a fib with RVR on cardiac monitoring and ECG. Unable to give history or answer ROS in state of altered mentation. Broad-spectrum antibiotics were  started, cautious IVF resuscitation given cardiac hx. HR uncontrolled with home amiodarone, BB and diltiazem deferred due to labile blood pressures as well as possible sepsis. Digoxin load, dosed by pharmacy. Gynecology consulted for labial abscess noted on physical exam, no indications for I&D based on size and location of the lesion.   Pt has had multiple admissions this year similar in presentation with most recent admission 06/23 due to acute on chronic heart failure exacerbation, noted to have very poor PO intake and progressive debility at that time. Palliative care consulted during current admission for assistance in discussion with family about goals of care. Family made aware of this decision with agreement.   Interval History: NAEON. No changes in pt's mentation/awareness as compared to yesterday. Pt able to answer yes/no questions and speak in short sentences. Pt remains in a fib with RVR, completed digoxin load with slightly better control of -120s compared to on admission. Digoxin level this AM 0.5, will begin digoxin 0.0625 mg dosed every other day, per pharmacy. Blood pressures still tenous, pt received D5 fluids continuous over 12 hours overnight. Continue to monitor pt vitals and volume status for gentle IVF resuscitation. Repleting electrolytes as clinically indicated. Currently NPO due to questionable ability to tolerate PO intake. Consulted SLP for swallowing eval.     Chest X-Rays: Cardiomegaly with bilateral pleural effusions and perihilar interstitial opacities, noting worsening right lung aeration when compared with 05/14/2023.  Findings could be related to CHF exacerbation/volume overload or less likely infectious/inflammatory process.    Prior diet: unknown; poor intake per chart review    Subjective     Patient lethargic.   Patient goals: none stated  SLP communicated with RN prior to entry. RN cleared SLP to administer po trials.      Objective:     Oral Musculature Evaluation  Oral  Musculature: general weakness, unable to assess due to poor participation/comprehension  Lingual Strength and Mobility: WFL  Volitional Cough: weak  Volitional Swallow: delayed  Voice Prior to PO Intake: impaired intensity    Bedside Swallow Eval:   Consistencies Assessed:  Thin liquids sips of water via teaspoon, cup, straw  Nectar thick liquids sips of nectar thick water via teaspoon, cup, straw  Puree bites of apple sauce  Soft solids bite of sharyn cracker softened in water      Oral Phase:   WFL  Prolonged mastication  Oral residue  Poor oral acceptance  Slow oral transit time    Pharyngeal Phase:   coughing/choking with all thin liquid trials  Multiple swallows with all trials    Compensatory Strategies  None    Treatment: SLP provided education on SLP recommendations, SLP role, s/s and risks of aspiration, safe swallow precautions, and POC. Patient nodded head in understanding, but she would benefit from reinforcement. SLP communicated recommendations with RN and MD.     Goals:   Multidisciplinary Problems       SLP Goals          Problem: SLP    Goal Priority Disciplines Outcome   SLP Goal     SLP Ongoing, Progressing   Description: Short Term Goals:   1. Pt will participate in an ongoing assessment to determine the least restrictive and safest diet with possible updated goals to follow pending results.                         Plan:     Patient to be seen:  3 x/week   Plan of Care expires:     Plan of Care reviewed with:  patient   SLP Follow-Up:  Yes       Discharge recommendations:  nursing facility, skilled   Barriers to Discharge:  None    Time Tracking:     SLP Treatment Date:   09/15/23  Speech Start Time:  0739  Speech Stop Time:  0751     Speech Total Time (min):  12 min    Billable Minutes: Eval Swallow and Oral Function 12    09/15/2023

## 2023-09-15 NOTE — PLAN OF CARE
Problem: Infection  Goal: Absence of Infection Signs and Symptoms  Outcome: Ongoing, Progressing     Problem: Adult Inpatient Plan of Care  Goal: Plan of Care Review  Outcome: Ongoing, Progressing  Goal: Patient-Specific Goal (Individualized)  Outcome: Ongoing, Progressing  Goal: Absence of Hospital-Acquired Illness or Injury  Outcome: Ongoing, Progressing  Goal: Optimal Comfort and Wellbeing  Outcome: Ongoing, Progressing     No acute changes occur this shift. Safety measures maintained. Will continue to monitor.

## 2023-09-15 NOTE — CARE UPDATE
"RAPID RESPONSE NURSE CHART REVIEW        Chart Reviewed: 09/15/2023, 11:04 AM    MRN: [id number]  Bed: 8081/8081 A    Dx: Failure to thrive in adult    Gisselle Ortiz has a past medical history of A-fib, Anemia of chronic disease, Anticoagulant long-term use, CHF (congestive heart failure), Chronic diastolic heart failure, COVID-19, Gallstone pancreatitis, Hypertension, Pancreatic abscess, Stage 3 chronic kidney disease, and Thyroid disease.    Last VS: BP (!) 85/64 (Patient Position: Lying)   Pulse 101   Temp 96.2 °F (35.7 °C) (Axillary)   Resp 18   Ht 5' 3" (1.6 m)   Wt 59 kg (130 lb 1.1 oz)   LMP  (LMP Unknown)   SpO2 96%   Breastfeeding No   BMI 23.04 kg/m²     24H Vital Sign Range:  Temp:  [96.1 °F (35.6 °C)-97.7 °F (36.5 °C)]   Pulse:  []   Resp:  [16-25]   BP: ()/(64-75)   SpO2:  [93 %-100 %]     Level of Consciousness (AVPU): responds to voice    Recent Labs     09/12/23  1142 09/13/23  0235 09/14/23  1106   WBC 6.67 10.58 5.40   HGB 11.2* 11.0* 8.3*   HCT 35.7* 38.0 25.3*    233 159       Recent Labs     09/13/23  0601 09/14/23  1106 09/15/23  0844    141 140   K 4.1 3.1* 4.7    114* 114*   CO2 15* 19* 18*   BUN 55* 47* 40*   CREATININE 1.7* 1.3 1.3   GLU 60* 109 67*   PHOS 3.8  --   --    MG 1.5* 1.9 2.0        No results for input(s): "PH", "PCO2", "PO2", "HCO3", "POCSATURATED", "BE" in the last 72 hours.     OXYGEN:             MEWS score: 5    Charge RNCherelle  contacted for tachycardia and soft pressures. Reports GOC conversations occurring. No additional concerns verbalized at this time. Instructed to call [facility] for further concerns or assistance.    Ben Ackerman RN        "

## 2023-09-15 NOTE — ASSESSMENT & PLAN NOTE
· Discontinue Vancomycin due to advanced age and poor renal function  · Start Doxycycline 100 mg PO BID   · Continue warm compresses

## 2023-09-15 NOTE — HPI
Gisselle Ortiz is a 89 year old female with hx of HFpEF, sick sinus syndome s/p PPM, persistent A fib on oral anticoagulation, CKD3, HTN, HLD, hypothyroidism, GERD, pancreatitis, hepatic abscess (2020) and recurrent UTI's who presented to the ED 9/12 via EMS after being found by nursing home staff altered with reduced level of consciousness.     In ED afebrile without a leukocytosis though tachycardic and hypotensive. EKG showed afib with RVR. CT head negative for acute new findings. Labs revealed an MARIO and elevated T bili. , lactate 1.6. UA with > 100 WBC, 3 + leukocytes. Patient started on empiric Vancomycin and Zosyn and IVF and admitted for further evaluation and treatment. MARIO resolved (Cr 1.3 today) and T bili normalized. Blood cultures 9/12 1/2 sets with CONS. Repeat blood cx 9/13 are NGTD. ID consulted as urine culture 9/12 is positive for ESBL Klebsiella and proteus mirabilis. Antibiotics changed to Ertapenem once susceptibilities returned.  Of note, gynecology consulted for labial furuncle. Recommended warm compresses. Per chart review, family wishes for treatment of acute illness where possible, but will likely discharge on home hospice.

## 2023-09-15 NOTE — PLAN OF CARE
Problem: SLP  Goal: SLP Goal  Description: Short Term Goals:   1. Pt will participate in an ongoing assessment to determine the least restrictive and safest diet with possible updated goals to follow pending results.    Outcome: Ongoing, Progressing   Bedside swallow study completed. Recommend: Pureed diet, NECTAR thick liquids, crushed meds in puree, 1:1 assistance with meals, and strict aspiration precautions. ST will continue to follow.

## 2023-09-15 NOTE — SUBJECTIVE & OBJECTIVE
Interval History: NAEON. VSS. A fib better controlled after receiving digoxin load, started digoxin 0.0625 mg every other day starting today. Will closely monitor status including electrolytes and vitals. SLP evaluated with recommendations. Blood cultures negative to date. Urine cultures finalized with growth of sensitive proteus and resistant klebsiella (previously sensitive in 05/23). Discontinued zosyn, started ertapenem with ID consulted.     Family made aware of these changes via telephone with ACP discussed. See ACP note on today 09/15/23.     Review of Systems   Reason unable to perform ROS: limited due to acuity of condition.   Constitutional:  Positive for activity change, appetite change and fatigue. Negative for chills and fever.   Respiratory:  Negative for shortness of breath and wheezing.    Cardiovascular:  Negative for chest pain.   Gastrointestinal:  Negative for abdominal pain.   Genitourinary:  Negative for difficulty urinating and dysuria.   Musculoskeletal:  Negative for back pain and myalgias.     Objective:     Vital Signs (Most Recent):  Temp: 97 °F (36.1 °C) (09/15/23 1124)  Pulse: 102 (09/15/23 1145)  Resp: (!) 22 (09/15/23 1124)  BP: 103/61 (09/15/23 1124)  SpO2: 100 % (09/15/23 1145) Vital Signs (24h Range):  Temp:  [96.1 °F (35.6 °C)-97.7 °F (36.5 °C)] 97 °F (36.1 °C)  Pulse:  [] 102  Resp:  [16-23] 22  SpO2:  [96 %-100 %] 100 %  BP: ()/(61-75) 103/61     Weight: 59 kg (130 lb 1.1 oz)  Body mass index is 23.04 kg/m².    Intake/Output Summary (Last 24 hours) at 9/15/2023 1240  Last data filed at 9/15/2023 0516  Gross per 24 hour   Intake 636.82 ml   Output 675 ml   Net -38.18 ml           Physical Exam  Vitals and nursing note reviewed.   Constitutional:       General: She is not in acute distress.     Appearance: She is ill-appearing. She is not toxic-appearing.   HENT:      Head: Normocephalic and atraumatic.   Cardiovascular:      Rate and Rhythm: Tachycardia present.  Rhythm irregular.      Pulses: Normal pulses.      Heart sounds: Normal heart sounds.   Pulmonary:      Effort: Pulmonary effort is normal. No respiratory distress.      Breath sounds: Normal breath sounds. No wheezing.   Chest:      Chest wall: No tenderness.   Abdominal:      General: Abdomen is flat. There is no distension.      Palpations: Abdomen is soft.      Tenderness: There is no abdominal tenderness. There is no guarding or rebound.   Genitourinary:     Comments: Labial abscess noted (see picture in media).  Musculoskeletal:      Cervical back: No tenderness.      Comments: Upper and lower extremities are edematous L>R. 2-3+ pitting edema of lower extremities.    Skin:     General: Skin is warm and dry.      Coloration: Skin is not jaundiced.      Findings: Bruising present. No erythema or rash.      Comments: Sacral pressure ulcer, grade I.    Neurological:      Comments: Unable to participate in exam.           Significant Labs: All pertinent labs within the past 24 hours have been reviewed.  CBC:   Recent Labs   Lab 09/14/23  1106 09/15/23  1114   WBC 5.40 5.56   HGB 8.3* 10.2*   HCT 25.3* 33.1*    177       CMP:   Recent Labs   Lab 09/14/23  1106 09/15/23  0844    140   K 3.1* 4.7   * 114*   CO2 19* 18*    67*   BUN 47* 40*   CREATININE 1.3 1.3   CALCIUM 6.6* 7.0*   PROT 3.7* 4.7*   ALBUMIN 1.2* 1.4*   BILITOT 0.7 0.7   ALKPHOS 114 130   AST 26 52*   ALT 24 31   ANIONGAP 8 8       Magnesium:   Recent Labs   Lab 09/14/23  1106 09/15/23  0844   MG 1.9 2.0       Significant Imaging: I have reviewed and interpreted all pertinent imaging results/findings within the past 24 hours.

## 2023-09-15 NOTE — ASSESSMENT & PLAN NOTE
· Zosyn transitioned to Ertapenem  · Continue Ertapenem 500 mg IV q 24 hours (renal dosing) for 3-5 days

## 2023-09-15 NOTE — ACP (ADVANCE CARE PLANNING)
Advance Care Planning     Date: 09/15/2023    [facility]  I engaged the daughter Génesis in a voluntary conversation about advance care planning and we specifically addressed what the goals of care would be moving forward, in light of the patient's change in clinical status, specifically failure to thrive and UTI.  We did specifically address the patient's likely prognosis, which is poor.  We explored the patient's values and preferences for future care.  The family endorses that what is most important right now is to focus on  maintaining current medical treatment with IV abx for UTI treatment, however no escalation of care such as ICU level care. That would include vasopressors and central line placement. Patients daughter notes that patient has expressed wanting to die in past and would not want further escalation in care.  Patients daughter is interested in having her mother Gisselle placed in hospice if she is deemed a candidate.    Accordingly, we have decided that the best plan to meet the patient's goals includes continuing with treatment, no further escalation in treatment, and we will look into hospice as an option.    I did explain the role for hospice care at this stage of the patient's illness, including its ability to help the patient live with the best quality of life possible.      I spent a total of 15 minutes engaging the patient in this advance care planning discussion.

## 2023-09-15 NOTE — CONSULTS
[facility] - Telemetry Stepdown  Infectious Disease  Consult Note    Patient Name: Gisselle Ortiz  MRN: [id number]  Admission Date: 9/12/2023  Hospital Length of Stay: 3 days  Attending Physician: Tim Man MD  Primary Care Provider: Kai Montez MD     Isolation Status: Contact    Patient information was obtained from past medical records.      Consults  Assessment/Plan:     Renal/  Labial abscess  · Discontinue Vancomycin due to advanced age and poor renal function  · Start Doxycycline 100 mg PO BID   · Continue warm compresses    Urinary tract infection due to ESBL Klebsiella  · Zosyn transitioned to Ertapenem  · Continue Ertapenem 500 mg IV q 24 hours (renal dosing) for 3-5 days        Discussed antibiotic plan with ID staff.     Thank you for your consult. I will sign off. Please contact us if you have any additional questions.    Lor Zamora PA-C  Infectious Disease  [facility] - Telemetry Stepdown    Subjective:     Principal Problem: Failure to thrive in adult    HPI: Gisselle Ortiz is a 89 year old female with hx of HFpEF, sick sinus syndome s/p PPM, persistent A fib on oral anticoagulation, CKD3, HTN, HLD, hypothyroidism, GERD, pancreatitis, hepatic abscess (2020) and recurrent UTI's who presented to the ED 9/12 via EMS after being found by nursing home staff altered with reduced level of consciousness.     In ED afebrile without a leukocytosis though tachycardic and hypotensive. EKG showed afib with RVR. CT head negative for acute new findings. Labs revealed an MARIO and elevated T bili. , lactate 1.6. UA with > 100 WBC, 3 + leukocytes. Patient started on empiric Vancomycin and Zosyn and IVF and admitted for further evaluation and treatment. MARIO resolved (Cr 1.3 today) and T bili normalized. Blood cultures 9/12 1/2 sets with CONS. Repeat blood cx 9/13 are NGTD. ID consulted as urine culture 9/12 is positive for ESBL Klebsiella and proteus mirabilis. Antibiotics changed  to Ertapenem once susceptibilities returned.  Of note, gynecology consulted for labial furuncle. Recommended warm compresses. Per chart review, family wishes for treatment of acute illness where possible, but will likely discharge on home hospice.      Past Medical History:   Diagnosis Date    A-fib     Anemia of chronic disease 02/02/2021    Anticoagulant long-term use     CHF (congestive heart failure)     Chronic diastolic heart failure 02/02/2021    COVID-19 01/07/2022    Gallstone pancreatitis     Hypertension     Pancreatic abscess 09/26/2020    Stage 3 chronic kidney disease 5/11/2016    Thyroid disease        Past Surgical History:   Procedure Laterality Date    CATARACT EXTRACTION      CATARACT EXTRACTION W/  INTRAOCULAR LENS IMPLANT Bilateral 2004    DR IN [place]    ENDOSCOPIC ULTRASOUND OF UPPER GASTROINTESTINAL TRACT N/A 9/14/2020    Procedure: ULTRASOUND, UPPER GI TRACT, ENDOSCOPIC;  Surgeon: Esha Howard MD;  Location: [facility] (2ND FLR);  Service: Endoscopy;  Laterality: N/A;    ENDOSCOPIC ULTRASOUND OF UPPER GASTROINTESTINAL TRACT  11/25/2020    Procedure: ULTRASOUND, UPPER GI TRACT, ENDOSCOPIC;  Surgeon: Esha Howard MD;  Location: [facility] (2ND FLR);  Service: Endoscopy;;    ERCP N/A 9/14/2020    Procedure: ERCP (ENDOSCOPIC RETROGRADE CHOLANGIOPANCREATOGRAPHY);  Surgeon: Esha Howard MD;  Location: [facility] ENDO (2ND FLR);  Service: Endoscopy;  Laterality: N/A;    ERCP N/A 9/25/2020    Procedure: ERCP (ENDOSCOPIC RETROGRADE CHOLANGIOPANCREATOGRAPHY);  Surgeon: Esha Howard MD;  Location: [facility] ENDO (2ND FLR);  Service: Endoscopy;  Laterality: N/A;    ERCP N/A 11/25/2020    Procedure: ERCP (ENDOSCOPIC RETROGRADE CHOLANGIOPANCREATOGRAPHY);  Surgeon: Esha Howard MD;  Location: [facility] ENDO (2ND FLR);  Service: Endoscopy;  Laterality: N/A;  Covid-19 test 11/22/20 at [facility] - pg  2 day hold Dr Favian Pisano - pg  PM prep    EYE SURGERY       HIP REPLACEMENT ARTHROPLASTY Right 2016    HYSTERECTOMY      REVISION OF SKIN POCKET FOR PACEMAKER N/A 1/21/2019    Procedure: REVISION-POCKET-PACEMAKER;  Surgeon: Asher Watts MD;  Location: [facility] EP LAB;  Service: Cardiology;  Laterality: N/A;  MODERATE SEDATION, sedation issues in the past    THYROIDECTOMY      TREATMENT OF CARDIAC ARRHYTHMIA N/A 3/18/2019    Procedure: CARDIOVERSION OR DEFIBRILLATION;  Surgeon: Asher Watts MD;  Location: [facility] EP LAB;  Service: Cardiology;  Laterality: N/A;  AF, JILL-cx if SR, DCCV, MAC, FAS, 3PREP    TREATMENT OF CARDIAC ARRHYTHMIA N/A 5/14/2019    Procedure: Cardioversion or Defibrillation;  Surgeon: Derick Vizcarra MD;  Location: [facility] EP LAB;  Service: Cardiology;  Laterality: N/A;  AF, JILL, DCCV, MAC, DM, 3PREP    TREATMENT OF CARDIAC ARRHYTHMIA N/A 6/21/2022    Procedure: Cardioversion or Defibrillation;  Surgeon: Favian Colmenares MD;  Location: [facility] EP LAB;  Service: Cardiology;  Laterality: N/A;  AF, JILL, DCCV, MAC, DM, 3 Prep *SJM PPM in situ*       Review of patient's allergies indicates:   Allergen Reactions    Ciprofloxacin Nausea And Vomiting       Medications:  Medications Prior to Admission   Medication Sig    amiodarone (PACERONE) 200 MG Tab Take 1 tablet (200 mg total) by mouth once daily.    aspirin (ECOTRIN) 81 MG EC tablet Take 1 tablet (81 mg total) by mouth once daily.    atenoloL (TENORMIN) 25 MG tablet HOLD ATENOLOL UNTIL YOU ARE ABLE TO FOLLOW UP WITH PRIMARY CARE DOCTOR    cyanocobalamin (VITAMIN B-12) 1000 MCG tablet Take 1 tablet (1,000 mcg total) by mouth once daily.    cyproheptadine (PERIACTIN) 4 mg tablet Take 1 tablet (4 mg total) by mouth 3 (three) times daily.    diclofenac sodium (VOLTAREN) 1 % Gel Apply topically to affected joints as needed for pain    ELIQUIS 2.5 mg Tab TAKE 1 TABLET TWICE A DAY    folic acid (FOLVITE) 1 MG tablet Take 1 tablet (1 mg total) by mouth once daily.    furosemide (LASIX) 20 MG  tablet Take 1 tablet (20 mg total) by mouth once daily. Take as needed for swelling in your legs. HOLD MEDICATION IF HYPOTENSIVE    hydrALAZINE (APRESOLINE) 25 MG tablet Take 1 tablet (25 mg total) by mouth 2 (two) times daily as needed (SBP >180). HOLD UNTIL YOU ARE ABLE TO SEE YOUR PRIMARY CARE DOCTOR    Lactobacillus rhamnosus GG (CULTURELLE) 10 billion cell capsule Take 1 capsule by mouth once daily.    levothyroxine (SYNTHROID) 137 MCG Tab tablet Take 1 tablet (137 mcg total) by mouth before breakfast.    midodrine (PROAMATINE) 5 MG Tab Take 1 tablet (5 mg total) by mouth every 8 (eight) hours.    mirtazapine (REMERON) 7.5 MG Tab Take 1 tablet (7.5 mg total) by mouth every evening.    multivitamin Tab Take 1 tablet by mouth once daily.    pantoprazole (PROTONIX) 40 MG tablet Take 1 tablet (40 mg total) by mouth once daily.    polyethylene glycol (GLYCOLAX) 17 gram PwPk Take 17 g by mouth daily as needed (constipation).     Antibiotics (From admission, onward)      Start     Stop Route Frequency Ordered    09/15/23 1045  ertapenem (INVANZ) 500 mg in sodium chloride 0.9% 100 mL IVPB         -- IV Every 24 hours (non-standard times) 09/15/23 0938    09/12/23 2136  vancomycin - pharmacy to dose  (vancomycin IVPB (PEDS and ADULTS))        See Hyperspace for full Linked Orders Report.    -- IV pharmacy to manage frequency 09/12/23 2036          Antifungals (From admission, onward)      None          Antivirals (From admission, onward)      None             Immunization History   Administered Date(s) Administered    Influenza 10/03/2015    Influenza (FLUAD) - Quadrivalent - Adjuvanted - PF *Preferred* (65+) 11/02/2020    Influenza - High Dose - PF (65 years and older) 11/13/2014, 10/01/2015, 11/04/2016, 08/18/2017, 09/24/2018    Influenza - Quadrivalent - High Dose - PF (65 years and older) 10/27/2022    Influenza - Quadrivalent - PF *Preferred* (6 months and older) 09/16/2019    Influenza - Trivalent -  PF (ADULT) 10/17/2011, 10/24/2012    Influenza Split 11/06/2003, 10/30/2007, 11/04/2008    Influenza Whole 12/08/1997, 11/18/2004, 11/23/2005    PPD Test 10/02/2020, 06/27/2023    Pneumococcal Conjugate - 13 Valent 05/11/2017    Pneumococcal Polysaccharide - 23 Valent 12/22/1997, 11/06/2003, 06/14/2007    Td (ADULT) 11/18/2004       Family History       Problem Relation (Age of Onset)    Heart attack Mother, Maternal Grandmother    Heart disease Mother, Maternal Grandmother    Heart failure Mother, Maternal Grandmother    Hypertension Mother, Maternal Grandmother    Stroke Maternal Grandmother          Social History     Socioeconomic History    Marital status:    Tobacco Use    Smoking status: Former     Types: Cigarettes     Start date: 5/19/1964    Smokeless tobacco: Never   Substance and Sexual Activity    Alcohol use: No    Drug use: No    Sexual activity: Not Currently     Partners: Male     Social Determinants of Health     Financial Resource Strain: Low Risk  (6/24/2023)    Overall Financial Resource Strain (CARDIA)     Difficulty of Paying Living Expenses: Not very hard   Food Insecurity: No Food Insecurity (9/13/2023)    Hunger Vital Sign     Worried About Running Out of Food in the Last Year: Never true     Ran Out of Food in the Last Year: Never true   Transportation Needs: No Transportation Needs (9/13/2023)    PRAPARE - Transportation     Lack of Transportation (Medical): No     Lack of Transportation (Non-Medical): No   Physical Activity: Inactive (5/16/2023)    Exercise Vital Sign     Days of Exercise per Week: 0 days     Minutes of Exercise per Session: 0 min   Stress: No Stress Concern Present (5/16/2023)    [place] [employer] - Occupational Stress Questionnaire     Feeling of Stress : Not at all   Social Connections: Moderately Isolated (6/24/2023)    Social Connection and Isolation Panel [NHANES]     Frequency of Communication with Friends and  Family: More than three times a week     Frequency of Social Gatherings with Friends and Family: Once a week     Attends Gnosticism Services: 1 to 4 times per year     Active Member of Clubs or Organizations: No     Attends Club or Organization Meetings: Never     Marital Status:    Housing Stability: Low Risk  (9/13/2023)    Housing Stability Vital Sign     Unable to Pay for Housing in the Last Year: No     Number of Places Lived in the Last Year: 1     Unstable Housing in the Last Year: No     Review of Systems   Unable to perform ROS: Mental status change (limited)   Constitutional:  Positive for activity change, appetite change, chills and fatigue.   Gastrointestinal:  Negative for abdominal pain.   Neurological:  Positive for weakness.     Objective:     Vital Signs (Most Recent):  Temp: 97.1 °F (36.2 °C) (09/15/23 1541)  Pulse: (!) 123 (09/15/23 1548)  Resp: (!) 22 (09/15/23 1541)  BP: (!) 88/60 (09/15/23 1541)  SpO2: 99 % (09/15/23 1541) Vital Signs (24h Range):  Temp:  [96.1 °F (35.6 °C)-97.2 °F (36.2 °C)] 97.1 °F (36.2 °C)  Pulse:  [] 123  Resp:  [16-22] 22  SpO2:  [96 %-100 %] 99 %  BP: ()/(60-75) 88/60     Weight: 59 kg (130 lb 1.1 oz)  Body mass index is 23.04 kg/m².    Estimated Creatinine Clearance: 23.8 mL/min (based on SCr of 1.3 mg/dL).     Physical Exam  Constitutional:       General: She is not in acute distress.     Appearance: Normal appearance. She is well-developed. She is ill-appearing.   HENT:      Head: Normocephalic and atraumatic.      Right Ear: External ear normal.      Left Ear: External ear normal.      Nose: Nose normal.   Eyes:      General: No scleral icterus.        Right eye: No discharge.         Left eye: No discharge.      Extraocular Movements: Extraocular movements intact.      Conjunctiva/sclera: Conjunctivae normal.   Cardiovascular:      Rate and Rhythm: Tachycardia present. Rhythm irregular.   Pulmonary:      Effort: Pulmonary effort is normal.  "No respiratory distress.      Breath sounds: No stridor.   Abdominal:      General: There is no distension.      Palpations: Abdomen is soft.      Tenderness: There is no abdominal tenderness. There is no right CVA tenderness or left CVA tenderness.   Musculoskeletal:         General: Swelling present. Normal range of motion.      Right lower leg: Edema present.      Left lower leg: Edema present.   Skin:     Findings: Bruising and lesion present. No erythema or rash.   Neurological:      Mental Status: She is lethargic.   Psychiatric:         Mood and Affect: Mood normal.         Behavior: Behavior normal.         Thought Content: Thought content normal.         Judgment: Judgment normal.          Significant Labs: Blood Culture:   Recent Labs   Lab 05/21/23  1139 05/21/23  1140 09/12/23  1121 09/12/23  1141 09/13/23  1411   LABBLOO No growth after 5 days. No growth after 5 days. No Growth to date  No Growth to date  No Growth to date  No Growth to date Gram stain aer bottle: Gram positive cocci in clusters resembling Staph  Results called to and read back by: Bola Jones RN  09/13/2023  09:18  Gram stain luann bottle: Gram positive cocci in clusters resembling Staph  Positive results previously called 09/13/2023  19:47  COAGULASE-NEGATIVE STAPHYLOCOCCUS SPECIES  Organism is a probable contaminant  * No Growth to date  No Growth to date  No Growth to date  No Growth to date     Bone Marrow Culture: No results for input(s): "BONEMARROWCU" in the last 4320 hours.  CBC:   Recent Labs   Lab 09/14/23  1106 09/15/23  1114   WBC 5.40 5.56   HGB 8.3* 10.2*   HCT 25.3* 33.1*    177     CMP:   Recent Labs   Lab 09/14/23  1106 09/15/23  0844    140   K 3.1* 4.7   * 114*   CO2 19* 18*    67*   BUN 47* 40*   CREATININE 1.3 1.3   CALCIUM 6.6* 7.0*   PROT 3.7* 4.7*   ALBUMIN 1.2* 1.4*   BILITOT 0.7 0.7   ALKPHOS 114 130   AST 26 52*   ALT 24 31   ANIONGAP 8 8     Microbiology Results (last 7 " days)       Procedure Component Value Units Date/Time    Blood Culture #1 **CANNOT BE ORDERED STAT** [[id number]] Collected: 09/12/23 1121    Order Status: Completed Specimen: Blood from Peripheral, Wrist, Right Updated: 09/15/23 1212     Blood Culture, Routine No Growth to date      No Growth to date      No Growth to date      No Growth to date    Blood Culture #2 **CANNOT BE ORDERED STAT** [[id number]]  (Abnormal) Collected: 09/12/23 1141    Order Status: Completed Specimen: Blood from Peripheral, Forearm, Right Updated: 09/15/23 1017     Blood Culture, Routine Gram stain aer bottle: Gram positive cocci in clusters resembling Staph      Results called to and read back by: Bola Jones RN  09/13/2023  09:18      Gram stain luann bottle: Gram positive cocci in clusters resembling Staph      Positive results previously called 09/13/2023  19:47      COAGULASE-NEGATIVE STAPHYLOCOCCUS SPECIES  Organism is a probable contaminant      Urine culture [[id number]]  (Abnormal)  (Susceptibility) Collected: 09/12/23 1231    Order Status: Completed Specimen: Urine Updated: 09/14/23 2114     Urine Culture, Routine PROTEUS MIRABILIS  >100,000 cfu/ml        KLEBSIELLA OXYTOCA ESBL  50,000 - 99,999 cfu/ml      Narrative:      Specimen Source->Urine    Blood culture [[id number]] Collected: 09/13/23 1411    Order Status: Completed Specimen: Blood Updated: 09/14/23 1812     Blood Culture, Routine No Growth to date      No Growth to date    Narrative:      Specimen # 2    Blood culture [[id number]] Collected: 09/13/23 1411    Order Status: Completed Specimen: Blood Updated: 09/14/23 1812     Blood Culture, Routine No Growth to date      No Growth to date    Narrative:      Specimen # 1    MRSA/SA Rapid ID by PCR from Blood culture [[id number]] Collected: 09/12/23 1141    Order Status: Completed Updated: 09/13/23 1038     Staph aureus ID by PCR Negative     Methicillin Resistant ID by PCR Negative          Respiratory Culture: No results  "for input(s): "GSRESP", "RESPIRATORYC" in the last 4320 hours.  Urine Culture:   Recent Labs   Lab 05/14/23  1827 09/12/23  1231   LABURIN KLEBSIELLA PNEUMONIAE  > 100,000 cfu/ml  No other significant isolate  *  ESCHERICHIA COLI  > 100,000 cfu/ml  * PROTEUS MIRABILIS  >100,000 cfu/ml  *  KLEBSIELLA OXYTOCA ESBL  50,000 - 99,999 cfu/ml  *     Urine Studies:   Recent Labs   Lab 09/12/23  1231   COLORU [place]   APPEARANCEUA Cloudy*   PHUR 7.0   SPECGRAV 1.010   PROTEINUA 1+*   GLUCUA Negative   KETONESU Negative   BILIRUBINUA Negative   OCCULTUA 3+*   NITRITE Negative   LEUKOCYTESUR 3+*   RBCUA 36*   WBCUA >100*   BACTERIA Many*   SQUAMEPITHEL 2   HYALINECASTS 0     Wound Culture: No results for input(s): "LABAERO" in the last 4320 hours.  Recent Lab Results         09/15/23  1114   09/15/23  0844        Albumin   1.4       Alkaline Phosphatase   130       ALT   31       Anion Gap   8       AST   52  Comment: *Result may be interfered by visible hemolysis       Baso # 0.08         Basophil % 1.4         BILIRUBIN TOTAL   0.7  Comment: For infants and newborns, interpretation of results should be based  on gestational age, weight and in agreement with clinical  observations.    Premature Infant recommended reference ranges:  Up to 24 hours.............<8.0 mg/dL  Up to 48 hours............<12.0 mg/dL  3-5 days..................<15.0 mg/dL  6-29 days.................<15.0 mg/dL         BUN   40       Calcium   7.0       Chloride   114       CO2   18       Creatinine   1.3       Differential Method Automated         eGFR   39.1       Eos # 0.1         Eosinophil % 1.4         Glucose   67       Gran # (ANC) 3.2         Gran % 57.7         Hematocrit 33.1         Hemoglobin 10.2         Immature Grans (Abs) 0.16  Comment: Mild elevation in immature granulocytes is non specific and   can be seen in a variety of conditions including stress response,   acute inflammation, trauma and pregnancy. Correlation with other "   laboratory and clinical findings is essential.           Immature Granulocytes 2.9         Lactate, [place] 1.5  Comment: Falsely low lactic acid results can be found in samples   containing >=13.0 mg/dL total bilirubin and/or >=3.5 mg/dL   direct bilirubin.           Lymph # 1.6         Lymph % 29.0         Magnesium    2.0       MCH 29.8         MCHC 30.8         MCV 97         Mono # 0.4         Mono % 7.6         MPV 10.4         nRBC 0         Platelets 177         Potassium   4.7  Comment: *Result may be interfered by visible hemolysis       PROTEIN TOTAL   4.7  Comment: *Result may be interfered by visible hemolysis       RBC 3.42         RDW 14.6         Sodium   140       Vancomycin, Random   18.8       WBC 5.56                 Significant Imaging:     Imaging Results              CT Head Without Contrast (Final result)  Result time 09/12/23 13:49:39      Final result by Rick Jha MD (09/12/23 13:49:39)                   Impression:      Numerous pre-existing findings, without CT evidence of acute intracranial hemorrhage, acute large vascular territory brain infarct, or acute intracranial mass effect.      Electronically signed by: Rick Jha  Date:    09/12/2023  Time:    13:49               Narrative:    EXAMINATION:  CT HEAD WITHOUT CONTRAST    CLINICAL HISTORY:  Mental status change, unknown cause;    TECHNIQUE:  Low dose axial images were obtained through the head.  Coronal and sagittal reformations were also performed. Contrast was not administered.    COMPARISON:  Head CT 01/31/2021    FINDINGS:  Artifacts related to motion and/or beam hardening degrade portions of the scan.  Allowing for these, there is no evidence of depressed calvarial fracture, acute intracranial hemorrhage, new intracranial mass or mass effect, or discrete acute large vascular territory brain infarct.    Pre-existing small left posterior parafalcine calcification, possibly a small subcentimeter ossified  meningioma.    There is pre-existing cerebral and cerebellar parenchymal volume loss with a proportionate degree of presumed ex vacuo enlargement of the sulci, cisterns, and ventricles.    Contour deformity of the right lateral facial/periorbital soft tissues, possibly related to patient positioning in the head sahni; underlying soft tissue abnormality is not excluded.    Intracranial atherosclerotic calcifications involving portions of both the anterior and posterior circulation.    Low-attenuation changes in portions of the cerebral white matter are most likely related to chronic small vessel ischemia.  Other white matter disorders are not fully excluded.    Pre-existing surgical changes in both ocular globes.  Pre-existing bilateral enophthalmos.    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  Pneumatized petrous temporal bones, more extensively on the left.    Minimal mucosal disease in portions of the paranasal sinuses.

## 2023-09-15 NOTE — PROGRESS NOTES
Pharmacokinetic Assessment Follow Up: IV Vancomycin    Vancomycin serum concentration assessment/plan:  Random level resulted as 18.8 mcg/mL; Goal 15-20 mcg/mL, Bacteremia  Patient meets criteria for MARIO  Re-dose Vancomycin 750 mg IV x 1; continue to dose by level when the random is less than 20 mcg/mL  Next level to be drawn on 9/16 at 0500    Drug levels (last 3 results):  Recent Labs   Lab Result Units 09/13/23  0601 09/14/23  1106 09/15/23  0844   Vancomycin, Random ug/mL 12.5 16.0 18.8       Pharmacy will continue to follow and monitor vancomycin.    Please contact pharmacy at extension [phone] for questions regarding this assessment.    Thank you for the consult,   Monica Muñoz     Patient brief summary:  Gisselle Ortiz is a 89 y.o. female initiated on antimicrobial therapy with IV Vancomycin for treatment of bacteremia    Drug Allergies:   Review of patient's allergies indicates:   Allergen Reactions    Ciprofloxacin Nausea And Vomiting     Actual Body Weight:   59 kg    Renal Function:   Estimated Creatinine Clearance: 23.8 mL/min (based on SCr of 1.3 mg/dL).,     Dialysis Method (if applicable):  N/A    CBC (last 72 hours):  Recent Labs   Lab Result Units 09/12/23  1142 09/13/23  0235 09/14/23  1106   WBC K/uL 6.67 10.58 5.40   Hemoglobin g/dL 11.2* 11.0* 8.3*   Hematocrit % 35.7* 38.0 25.3*   Platelets K/uL 262 233 159   Gran % %  --  74.3* 54.6   Lymph % %  --  14.8* 30.9   Mono % %  --  7.6 10.0   Eosinophil % %  --  0.9 1.9   Basophil % %  --  0.7 0.6   Differential Method   --  Automated Automated         Metabolic Panel (last 72 hours):  Recent Labs   Lab Result Units 09/12/23  1142 09/12/23  1231 09/13/23  0601 09/14/23  1106 09/15/23  0844   Sodium mmol/L 141  --  138 141 140   Potassium mmol/L 3.2*  --  4.1 3.1* 4.7   Chloride mmol/L 107  --  110 114* 114*   CO2 mmol/L 18*  --  15* 19* 18*   Glucose mg/dL 65*  --  60* 109 67*   Glucose, UA   --  Negative  --   --   --    BUN mg/dL 58*  --   Problem: Wound:  Goal: Will show signs of wound healing; wound closure and no evidence of infection  Description: Will show signs of wound healing; wound closure and no evidence of infection  Outcome: Ongoing 55* 47* 40*   Creatinine mg/dL 2.1*  --  1.7* 1.3 1.3   Albumin g/dL 1.7*  --  1.5* 1.2* 1.4*   Total Bilirubin mg/dL 1.4*  --  1.3* 0.7 0.7   Alkaline Phosphatase U/L 182*  --  147* 114 130   AST U/L 31  --  34 26 52*   ALT U/L 31  --  29 24 31   Magnesium mg/dL  --   --  1.5* 1.9 2.0   Phosphorus mg/dL  --   --  3.8  --   --          Vancomycin Administrations:  vancomycin given in the last 96 hours                     vancomycin 1,250 mg in dextrose 5 % (D5W) 250 mL IVPB (Vial-Mate) (mg) 1,250 mg New Bag 09/12/23 1535                    Microbiologic Results:  Microbiology Results (last 7 days)       Procedure Component Value Units Date/Time    Blood Culture #2 **CANNOT BE ORDERED STAT** [[id number]]  (Abnormal) Collected: 09/12/23 1141    Order Status: Completed Specimen: Blood from Peripheral, Forearm, Right Updated: 09/15/23 1017     Blood Culture, Routine Gram stain aer bottle: Gram positive cocci in clusters resembling Staph      Results called to and read back by: Bola Jones RN  09/13/2023  09:18      Gram stain luann bottle: Gram positive cocci in clusters resembling Staph      Positive results previously called 09/13/2023  19:47      COAGULASE-NEGATIVE STAPHYLOCOCCUS SPECIES  Organism is a probable contaminant      Urine culture [[id number]]  (Abnormal)  (Susceptibility) Collected: 09/12/23 1231    Order Status: Completed Specimen: Urine Updated: 09/14/23 2114     Urine Culture, Routine PROTEUS MIRABILIS  >100,000 cfu/ml        KLEBSIELLA OXYTOCA ESBL  50,000 - 99,999 cfu/ml      Narrative:      Specimen Source->Urine    Blood culture [[id number]] Collected: 09/13/23 1411    Order Status: Completed Specimen: Blood Updated: 09/14/23 1812     Blood Culture, Routine No Growth to date      No Growth to date    Narrative:      Specimen # 2    Blood culture [[id number]] Collected: 09/13/23 1411    Order Status: Completed Specimen: Blood Updated: 09/14/23 1812     Blood Culture, Routine No Growth to date      No  Growth to date    Narrative:      Specimen # 1    Blood Culture #1 **CANNOT BE ORDERED STAT** [[id number]] Collected: 09/12/23 1121    Order Status: Completed Specimen: Blood from Peripheral, Wrist, Right Updated: 09/14/23 1212     Blood Culture, Routine No Growth to date      No Growth to date      No Growth to date    MRSA/SA Rapid ID by PCR from Blood culture [[id number]] Collected: 09/12/23 1141    Order Status: Completed Updated: 09/13/23 1038     Staph aureus ID by PCR Negative     Methicillin Resistant ID by PCR Negative

## 2023-09-15 NOTE — NURSING
Nurses Note -- 4 Eyes      9/15/2023   1:01 PM      Skin assessed during: Q Shift Change      [] No Altered Skin Integrity Present    []Prevention Measures Documented      [x] Yes- Altered Skin Integrity Present or Discovered   [] LDA Added if Not in Epic (Describe Wound)   [] New Altered Skin Integrity was Present on Admit and Documented in LDA   [x] Wound Image Taken    Wound Care Consulted? No    Attending Nurse:  DAMON Henderson    Second RN/Staff Member:   DAMON Greene

## 2023-09-15 NOTE — NURSING
Nurses Note -- 4 Eyes      9/14/2023   7:13 PM      Skin assessed during: Q Shift Change      [] No Altered Skin Integrity Present    []Prevention Measures Documented      [x] Yes- Altered Skin Integrity Present or Discovered   [] LDA Added if Not in Epic (Describe Wound)   [] New Altered Skin Integrity was Present on Admit and Documented in LDA   [x] Wound Image Taken    Wound Care Consulted? No    Attending Nurse: DAMON Henderson    Second RN/Staff Member:   DAMON Greene

## 2023-09-16 NOTE — ASSESSMENT & PLAN NOTE
Pt is a 89 year old female with hx of HRpEF, persistent atrial fibrillation on AC, SSS s/p PPM, amongst other medical hx including recurrent UTI's and multiple hospital admissions this year with progressively worsening debility and failure to thrive. Initially presented to ED via EMS after being found with decreased level of consciousness after 2 days of poor PO intake and lethargy. Admitted to hospital medicine for failure to thrive, management of a fib with RVR and possible urosepsis.     --daughter, Génesis Araiza, and son, Ari Ortiz, are joint HCPOA.   --engaged in extensive discussion with family regarding goals of care, current medical status, and prognosis.  --family has had extensive discussions regarding pt's quality of life and progressive decline, requested code status to be DNR/DNI  --code status has been updated accordingly  --family wishes for treatment of acute illness where possible, but will likely discharge on home hospice  --palliative care consulted, appreciate the assistance regarding goals of care    --given pt with multiple admissions this year and progressively worsening decline noted by family, likely with each infection or hospitalization her baseline will decrease   --pt remains high risk for subsequent hospitalization and future infections similar to that on admission   --social work consult placed for home hospice planning    Urine culture with resistant klebsiella; continue ertapenem.   -ID consulted, appreciate recs    Family made aware of current status and changes to medical management via telephone.   Goals of care were discussed voluntarily, please see ACP note dated on 09/15/23.

## 2023-09-16 NOTE — CONSULTS
VANCOMYCIN DOSING BY PHARMACY DISCONTINUATION NOTE    Gisselle Ortiz is a 89 y.o. female who had been consulted for vancomycin dosing.    The pharmacy consult for vancomycin dosing has been discontinued.     Vancomycin Dosing by Pharmacy Consult will sign-off. Please reconsult if necessary. Thank you for allowing us to participate in this patient's care.     Moinca Muñoz, PharmD, BCPS  [phone]

## 2023-09-16 NOTE — PROGRESS NOTES
[facility]  Progress Note    Patient Name: Gisselle Ortiz  MRN: [id number]  Patient Class: IP- Inpatient   Admission Date: 9/12/2023  Length of Stay: 4 days  Attending Physician: Tim Man MD  Primary Care Provider: Kai Montez MD    Subjective:     Principal Problem:Failure to thrive in adult    HPI:  Gisselle Ortiz is a 89 year old female with hx of HFpEF, sick sinus syndome s/p PPM, persistent A fib on oral anticoagulation, CKD3b, hypertension, hyperlipidemia, hypothyroidism, GERD, and recurrent UTI's who presented to the ED via EMS after being found by nursing home staff altered with reduced level of consciousness this morning. Pt reported to have been increasingly lethargic with poor PO intake for the past 2 days. After discussion with family at bedside and telephone, it is reported that pt is normally functional, though over time has become bedbound due to medical conditions. At baseline, she is alert and oriented x4 without baseline dementia. Last known normal was last night while daughter was visiting her at the nursing home. Pt has had multiple admissions this year with similar presentation, noted to have poor PO intake and progressive debility with failure to thrive.    In the ED, /60, , RR 12 with SpO2 99% on RA, afebrile. Noted to be in a fib with RVR on cardiac monitor and EKG. Labs notable for stable anemia, Na 141, K 3.2, bicarb 18, elevated BUN/creatinine 58/2.1, elevated T bili and alk phos. , troponin 0.147. Lactate wnl. Sepsis work-up initiated after brief episode of hypotension with blood cultures collected, IVF resuscitation (2L total via EMS and ED), and broad-spectrum antibiotics started. UA with evidence of UTI. CT head without contrast with no findings concerning for acute intracranial processes.  She will be admitted to hospital medicine for failure to thrive, management of a fib with RVR and possible  urosepsis.       Overview/Hospital Course:  Pt with hx of HFpEF, persistent atrial fibrillation, recurrent UTI, who presented to [facility] ED via EMS on 09/13/23 with decreased level of consciousness after 2 days of progressively worsening lethargy and poor PO intake. Admitted to hospital medicine for failure to thrive, management of a fib with RVR and possible urosepsis. At initial presentation, pt was normotensive with elevated HR noted to be in a fib with RVR on cardiac monitoring and ECG. Unable to give history or answer ROS in state of altered mentation. Broad-spectrum antibiotics were started, cautious IVF resuscitation given cardiac hx. HR uncontrolled with home amiodarone, BB and diltiazem deferred due to labile blood pressures as well as possible sepsis. Digoxin loaded followed by every other day dose, determined by pharmacy. HR better controlled within 110s. Gynecology consulted for labial abscess noted on physical exam, no indications for I&D based on size and location of the lesion. Affected area managed by heat compresses Q4H to allow spontaneous drainage. SLP consulted for swallowing evaluation due to uncertain aspiration risk 2/2 current condition.     Pt has had multiple admissions this year similar in presentation with most recent admission 06/23 due to acute on chronic heart failure exacerbation, noted to have UTI, very poor PO intake and progressive debility at that time as well. Palliative care consulted during current admission for assistance in discussion with family about goals of care. Family made aware of this decision with agreement. Final urine culture with proteus (pansensitive) and klebsiella (previously sensitive in 05/23, now resistant to most). Pt was on vanc/zosyn since admission, but following culture results, zosyn discontinued and ertapenem was started. ID consulted with final recommendations to continue ertapenem for UTI and transition to doxycycline 100 mg BID instead of vanc due to  age and poor renal function.       Interval History: Night team informed about low blood pressure readings overnight (noted 50-60s systolic), examined at bedside with repeat blood pressure reading 105/64. Pt received several NS boluses overnight with stable BP after. Otherwise, pt remains stable without any major changes in mentation. Slightly more alert this AM and oriented to person, able to answer yes/no questions, follow commands. She was able to take PO meds crushed with pudding but did not want to eat further. Encourage PO intake as tolerated. Will restart home eliquis today. Continue to follow electrolytes and vitals, especially while on digoxin. On ertapenem and doxycycline for infection control.     Family made aware of these changes via telephone with ACP discussed. See ACP note on today 09/15/23.     Review of Systems   Reason unable to perform ROS: limited due to acuity of condition.   Constitutional:  Positive for activity change, appetite change and fatigue. Negative for chills and fever.   Respiratory:  Negative for shortness of breath and wheezing.    Cardiovascular:  Negative for chest pain.   Gastrointestinal:  Negative for abdominal pain.   Genitourinary:  Negative for difficulty urinating and dysuria.   Musculoskeletal:  Negative for back pain and myalgias.     Objective:     Vital Signs (Most Recent):  Temp: 97.6 °F (36.4 °C) (09/16/23 1120)  Pulse: 103 (09/16/23 1120)  Resp: 16 (09/16/23 1120)  BP: 105/65 (09/16/23 1120)  SpO2: 97 % (09/16/23 1120) Vital Signs (24h Range):  Temp:  [97.1 °F (36.2 °C)-98 °F (36.7 °C)] 97.6 °F (36.4 °C)  Pulse:  [] 103  Resp:  [16-22] 16  SpO2:  [94 %-100 %] 97 %  BP: ()/(57-65) 105/65     Weight: 59 kg (130 lb 1.1 oz)  Body mass index is 23.04 kg/m².    Intake/Output Summary (Last 24 hours) at 9/16/2023 1135  Last data filed at 9/16/2023 0700  Gross per 24 hour   Intake 50 ml   Output 325 ml   Net -275 ml           Physical Exam  Vitals and nursing  note reviewed.   Constitutional:       General: She is not in acute distress.     Appearance: She is ill-appearing. She is not toxic-appearing.   HENT:      Head: Normocephalic and atraumatic.   Cardiovascular:      Rate and Rhythm: Tachycardia present. Rhythm irregular.      Pulses: Normal pulses.      Heart sounds: Normal heart sounds.   Pulmonary:      Effort: Pulmonary effort is normal. No respiratory distress.      Breath sounds: Normal breath sounds. No wheezing.   Chest:      Chest wall: No tenderness.   Abdominal:      General: Abdomen is flat. There is no distension.      Palpations: Abdomen is soft.      Tenderness: There is no abdominal tenderness. There is no guarding or rebound.   Genitourinary:     Comments: Labial abscess noted (see picture in media).  Musculoskeletal:      Cervical back: No tenderness.      Comments: Upper and lower extremities are edematous L>R. 2-3+ pitting edema of lower extremities.    Skin:     General: Skin is warm and dry.      Coloration: Skin is not jaundiced.      Findings: Bruising present. No erythema or rash.      Comments: Sacral pressure ulcer, grade I.    Neurological:      Comments: Unable to participate in exam.           Significant Labs: All pertinent labs within the past 24 hours have been reviewed.  CBC:   Recent Labs   Lab 09/15/23  1114 09/16/23  0642   WBC 5.56 6.09   HGB 10.2* 9.9*   HCT 33.1* 33.4*    176       CMP:   Recent Labs   Lab 09/15/23  0844 09/16/23  0642    141   K 4.7 3.9   * 115*   CO2 18* 18*   GLU 67* 63*   BUN 40* 39*   CREATININE 1.3 1.2   CALCIUM 7.0* 7.0*   PROT 4.7* 4.2*   ALBUMIN 1.4* 1.3*   BILITOT 0.7 0.6   ALKPHOS 130 119   AST 52* 29   ALT 31 28   ANIONGAP 8 8       Magnesium:   Recent Labs   Lab 09/15/23  0844 09/16/23  0642   MG 2.0 2.0       Significant Imaging: I have reviewed and interpreted all pertinent imaging results/findings within the past 24 hours.      Assessment/Plan:      * Failure to thrive in  adult  Pt is a 89 year old female with hx of HRpEF, persistent atrial fibrillation on AC, SSS s/p PPM, amongst other medical hx including recurrent UTI's and multiple hospital admissions this year with progressively worsening debility and failure to thrive. Initially presented to ED via EMS after being found with decreased level of consciousness after 2 days of poor PO intake and lethargy. Admitted to hospital medicine for failure to thrive, management of a fib with RVR and possible urosepsis.     --daughter, Génesis Araiza, and son, Ari Ortiz, are joint HCPOA.   --engaged in extensive discussion with family regarding goals of care, current medical status, and prognosis.  --family has had extensive discussions regarding pt's quality of life and progressive decline, requested code status to be DNR/DNI  --code status has been updated accordingly  --family wishes for treatment of acute illness where possible, but will likely discharge on home hospice  --palliative care consulted, appreciate the assistance regarding goals of care    --given pt with multiple admissions this year and progressively worsening decline noted by family, likely with each infection or hospitalization her baseline will decrease   --pt remains high risk for subsequent hospitalization and future infections similar to that on admission   --social work consult placed for home hospice planning    Urine culture with resistant klebsiella; continue ertapenem.   -ID consulted, appreciate recs    Family made aware of current status and changes to medical management via telephone.   Goals of care were discussed voluntarily, please see ACP note dated on 09/15/23.    Persistent atrial fibrillation  Hx of persistent atrial fibrillation on apixaban 2.5 mg BID for anticoagulation, amiodarone 200 mg daily. Previously was on atenolol which was d/c'ed on last admission and not restarted while outpatient as pt lost to follow-up. While in the ED, pt  normotensive but remained in a fib with RVR despite 3L total IVF resuscitation.     --restart eliquis home dose as pt tolerating crushed meds with pudding  --digoxin started due to pt with possible sepsis, not a candidate for IV metoprolol or diltiazem   --previously on amio without success   --digoxin 2 mcg/kg now, followed by two 1 mcg/kg doses Q6H (load completed)   --will assess response and consider PO maintenance dose if mentation allows    --evaluated by SLP for swallow study, appreciate recs  --concern for digoxin toxicity given MARIO, hypokalemia, and amio use   --serum digoxin level after load 0.5   --digoxin 0.0625 mg IV every other day, dosing per pharmacy starting today 09/15/23  --will monitor clinical status closely    Labial abscess  -see severe sepsis      Urinary tract infection due to ESBL Klebsiella  -see severe sepsis    NSTEMI (non-ST elevated myocardial infarction), type 2 demand  Hx of persistent a fib, sick sinus syndrome s/p PPM, and HFpEF.     --found to have elevated troponin on initial presentation  --troponin trend is relatively flat, negative delta change   --0.147>0.156>0.157>0.155  --ECG without evidence of acute ST or T wave changes concerning for ischemia  --has not c/o chest pain or tightness, will continue to monitor     Hypokalemia  --K 3.2 on admission, replaced  --daily CMP, magnesium, phosphate   --electrolyte replacements as clinically appropriate    Severe sepsis  This patient does have evidence of infective focus  My overall impression is sepsis.  Source: Urinary Tract and Skin and Soft Tissue (location labia)  Antibiotics given-   Antibiotics (72h ago, onward)    Start     Stop Route Frequency Ordered    09/16/23 0830  doxycycline (VIBRAMYCIN) 100 mg in dextrose 5 % in water (D5W) 100 mL IVPB (MB+)         -- IV Every 12 hours (non-standard times) 09/16/23 0726    09/15/23 1045  ertapenem (INVANZ) 500 mg in sodium chloride 0.9% 100 mL IVPB         -- IV Every 24 hours  (non-standard times) 09/15/23 0938        Latest lactate reviewed-  Recent Labs   Lab 09/14/23  1106 09/15/23  1114   LACTATE 2.2 1.5     Organ dysfunction indicated by Acute kidney injury    Fluid challenge Ideal Body Weight- The patient's ideal body weight is Ideal body weight: 52.4 kg (115 lb 8.3 oz) which will be used to calculate fluid bolus of 30 ml/kg for treatment of septic shock.      Post- resuscitation assessment Yes Perfusion exam was performed within 6 hours of septic shock presentation after bolus shows Adequate tissue perfusion assessed by non-invasive monitoring     Will Not start Pressors- Levophed for MAP of 65    --pt with hx of recurrent UTI's and UA suggestive of infection on admission   --previous urine cx with pansensitivity (e coli, klebsiella)  --presence of labial abscess (see image in media)   --gynecology consulted, appreciate recs   --per gyn, I+D not indicated based on size and location; also significant concern for pt's ability to tolerate procedure and for her tissue's ability to heal via secondary intention. Would reconsider surgical intervention if worsening signs of localized infection or tissue breakdown.   --recommend hot compresses Q4H to affected area   --will discontinue vanc, start doxycycline 100 mg Q12H by IV, per ID recs.     --received 3L total IVF upon admission, lactate wnl   --repeat lactate remains wnl  --vanc/zosyn for broad coverage, dosed by pharmacy  --initial blood cultures preliminary growth of gram positive cocci resembling staph   --repeat blood cultures negative to date  --final urine cultures with proteus (sensitive) and klebsiella (previously sensitive, now resistant to most)  --discontinued zosyn, started ertapenem with ID consulted, appreciate recs.  --f/u blood cultures     Encounter for palliative care        MARIO (acute kidney injury)  Hx of chronic kidney disease, stage 3b. On admission, Cr 2.1 w/ baseline 1.2-1.3. Recent hospitalization w/ CHF  exacerbation/hypervolemia and prerenal MARIO, resolved with IVF.     --IVF rehydration as tolerated given hx of chronic diastolic heart failure.   --renally dosing medications as appropriate  --avoid nephrotoxic agents   --continue to monitor renal function and urine output  --strict I/Os, bladder scan PRN urinary retention, straight/coughlin cath PRN bladder scan > 300 cc         Anemia due to chronic kidney disease  --hb/hct on presentation 11.2/35.7  --hx of anemia with variable hb/hct levels reviewed in chart    --stable, continue to monitor    Chronic diastolic heart failure  Hx of chronic heart failure with preserved ejection fraction.     --continue midodrine when able for hypotension    Last TTE 06/25/23:   Normal LV size with concentric remodeling and normal systolic function.   The estimated ejection fraction is 65%.   Grade III left ventricular diastolic dysfunction.   Severe left atrial enlargement.      Acquired hypothyroidism  --continue home synthroid when able  --f/u TSH      Cardiac pacemaker in situ  Hx SSS s/p PPM. Atenolol d/c'ed last admission due to bradycardia. Unclear if pacemaker is functioning, will investigate further, pacer spikes noted on telemetry.     --can consider device interrogation while inpatient if indicated  --last interrogated 08/17/23      VTE Risk Mitigation (From admission, onward)         Ordered     apixaban tablet 2.5 mg  2 times daily         09/16/23 1048     IP VTE HIGH RISK PATIENT  Once         09/12/23 1608     Place sequential compression device  Until discontinued         09/12/23 1608                Discharge Planning   FRANCESCA: 9/18/2023     Code Status: DNR   Is the patient medically ready for discharge?: No    Reason for patient still in hospital (select all that apply): Patient trending condition and Treatment  Discharge Plan A: Return to nursing home ([facility])   Discharge Delays: None known at this time        Hattie Purcell MD  Department of  [facility] Medicine   [facility] - Telemetry Stepdown

## 2023-09-16 NOTE — ASSESSMENT & PLAN NOTE
Hx of persistent atrial fibrillation on apixaban 2.5 mg BID for anticoagulation, amiodarone 200 mg daily. Previously was on atenolol which was d/c'ed on last admission and not restarted while outpatient as pt lost to follow-up. While in the ED, pt normotensive but remained in a fib with RVR despite 3L total IVF resuscitation.     --restart eliquis home dose as pt tolerating crushed meds with pudding  --digoxin started due to pt with possible sepsis, not a candidate for IV metoprolol or diltiazem   --previously on amio without success   --digoxin 2 mcg/kg now, followed by two 1 mcg/kg doses Q6H (load completed)   --will assess response and consider PO maintenance dose if mentation allows    --evaluated by SLP for swallow study, appreciate recs  --concern for digoxin toxicity given MRAIO, hypokalemia, and amio use   --serum digoxin level after load 0.5   --digoxin 0.0625 mg IV every other day, dosing per pharmacy starting today 09/15/23  --will monitor clinical status closely

## 2023-09-16 NOTE — SUBJECTIVE & OBJECTIVE
Interval History: Night team informed about low blood pressure readings overnight (noted 50-60s systolic), examined at bedside with repeat blood pressure reading 105/64. Pt received several NS boluses overnight with stable BP after. Otherwise, pt remains stable without any major changes in mentation. Slightly more alert this AM and oriented to person, able to answer yes/no questions, follow commands. She was able to take PO meds crushed with pudding but did not want to eat further. Encourage PO intake as tolerated. Will restart home eliquis today. Continue to follow electrolytes and vitals, especially while on digoxin. On ertapenem and doxycycline for infection control.     Family made aware of these changes via telephone with ACP discussed. See ACP note on today 09/15/23.     Review of Systems   Reason unable to perform ROS: limited due to acuity of condition.   Constitutional:  Positive for activity change, appetite change and fatigue. Negative for chills and fever.   Respiratory:  Negative for shortness of breath and wheezing.    Cardiovascular:  Negative for chest pain.   Gastrointestinal:  Negative for abdominal pain.   Genitourinary:  Negative for difficulty urinating and dysuria.   Musculoskeletal:  Negative for back pain and myalgias.     Objective:     Vital Signs (Most Recent):  Temp: 97.6 °F (36.4 °C) (09/16/23 1120)  Pulse: 103 (09/16/23 1120)  Resp: 16 (09/16/23 1120)  BP: 105/65 (09/16/23 1120)  SpO2: 97 % (09/16/23 1120) Vital Signs (24h Range):  Temp:  [97.1 °F (36.2 °C)-98 °F (36.7 °C)] 97.6 °F (36.4 °C)  Pulse:  [] 103  Resp:  [16-22] 16  SpO2:  [94 %-100 %] 97 %  BP: ()/(57-65) 105/65     Weight: 59 kg (130 lb 1.1 oz)  Body mass index is 23.04 kg/m².    Intake/Output Summary (Last 24 hours) at 9/16/2023 1135  Last data filed at 9/16/2023 0700  Gross per 24 hour   Intake 50 ml   Output 325 ml   Net -275 ml           Physical Exam  Vitals and nursing note reviewed.   Constitutional:        General: She is not in acute distress.     Appearance: She is ill-appearing. She is not toxic-appearing.   HENT:      Head: Normocephalic and atraumatic.   Cardiovascular:      Rate and Rhythm: Tachycardia present. Rhythm irregular.      Pulses: Normal pulses.      Heart sounds: Normal heart sounds.   Pulmonary:      Effort: Pulmonary effort is normal. No respiratory distress.      Breath sounds: Normal breath sounds. No wheezing.   Chest:      Chest wall: No tenderness.   Abdominal:      General: Abdomen is flat. There is no distension.      Palpations: Abdomen is soft.      Tenderness: There is no abdominal tenderness. There is no guarding or rebound.   Genitourinary:     Comments: Labial abscess noted (see picture in media).  Musculoskeletal:      Cervical back: No tenderness.      Comments: Upper and lower extremities are edematous L>R. 2-3+ pitting edema of lower extremities.    Skin:     General: Skin is warm and dry.      Coloration: Skin is not jaundiced.      Findings: Bruising present. No erythema or rash.      Comments: Sacral pressure ulcer, grade I.    Neurological:      Comments: Unable to participate in exam.           Significant Labs: All pertinent labs within the past 24 hours have been reviewed.  CBC:   Recent Labs   Lab 09/15/23  1114 09/16/23  0642   WBC 5.56 6.09   HGB 10.2* 9.9*   HCT 33.1* 33.4*    176       CMP:   Recent Labs   Lab 09/15/23  0844 09/16/23  0642    141   K 4.7 3.9   * 115*   CO2 18* 18*   GLU 67* 63*   BUN 40* 39*   CREATININE 1.3 1.2   CALCIUM 7.0* 7.0*   PROT 4.7* 4.2*   ALBUMIN 1.4* 1.3*   BILITOT 0.7 0.6   ALKPHOS 130 119   AST 52* 29   ALT 31 28   ANIONGAP 8 8       Magnesium:   Recent Labs   Lab 09/15/23  0844 09/16/23  0642   MG 2.0 2.0       Significant Imaging: I have reviewed and interpreted all pertinent imaging results/findings within the past 24 hours.

## 2023-09-16 NOTE — NURSING
Nurses Note -- 4 Eyes        9/15/2023   7:30 PM        Skin assessed during: Q Shift Change        [] No Altered Skin Integrity Present                 []Prevention Measures Documented        [x] Yes- Altered Skin Integrity Present or Discovered              [] LDA Added if Not in Epic (Describe Wound)              [] New Altered Skin Integrity was Present on Admit and Documented in LDA              [] Wound Image Taken     Wound Care Consulted? No     Attending Nurse: Maria Teresa Garcia RN     Second RN/Staff Member:   DAMON Henderson

## 2023-09-16 NOTE — NURSING
PCT reported BP of 50/30s in left arm and 60/40 in left leg @ 1230. Pt found in bed, AAO x person only, NADN, RR even and unlabored on room air. Pt is moaning. MD Hayden notified of low BP readings. Order received to start nacl bolus. Pt is a DNR. Prior to bolus starting, BP in right arm was 105/62, MD stated to proceed w/ 500 cc nacl bolus and monitor. Rapid response notified of patient status and agreed w/ POC. Will continue to monitor.

## 2023-09-17 NOTE — SUBJECTIVE & OBJECTIVE
Interval History: NAEON. Vitals with improvement after receiving IVF throughout the day. BP 120s systolic, HR ranging 90-110s compared to prior. Pt still appears to be somnolent with no changes in level of consciousness, she is no longer groaning compared to days prior. Labs unable to get done today, okay to defer until tomorrow unless indicated.     Review of Systems   Reason unable to perform ROS: limited due to acuity of condition.   Constitutional:  Positive for activity change, appetite change and fatigue. Negative for chills and fever.   Respiratory:  Negative for shortness of breath and wheezing.    Cardiovascular:  Negative for chest pain.   Gastrointestinal:  Negative for abdominal pain.   Genitourinary:  Negative for difficulty urinating and dysuria.   Musculoskeletal:  Negative for back pain and myalgias.     Objective:     Vital Signs (Most Recent):  Temp: 97.5 °F (36.4 °C) (09/17/23 1140)  Pulse: 91 (09/17/23 1140)  Resp: 19 (09/17/23 1140)  BP: 100/73 (09/17/23 1140)  SpO2: (!) 92 % (09/17/23 1140) Vital Signs (24h Range):  Temp:  [97 °F (36.1 °C)-98.7 °F (37.1 °C)] 97.5 °F (36.4 °C)  Pulse:  [] 91  Resp:  [18-25] 19  SpO2:  [90 %-95 %] 92 %  BP: ()/(55-80) 100/73     Weight: 59 kg (130 lb 1.1 oz)  Body mass index is 23.04 kg/m².    Intake/Output Summary (Last 24 hours) at 9/17/2023 1232  Last data filed at 9/16/2023 2232  Gross per 24 hour   Intake 829.6 ml   Output --   Net 829.6 ml           Physical Exam  Vitals and nursing note reviewed.   Constitutional:       General: She is not in acute distress.     Appearance: She is ill-appearing. She is not toxic-appearing.   HENT:      Head: Normocephalic and atraumatic.   Cardiovascular:      Rate and Rhythm: Tachycardia present. Rhythm irregular.      Pulses: Normal pulses.      Heart sounds: Normal heart sounds.   Pulmonary:      Effort: Pulmonary effort is normal. No respiratory distress.      Breath sounds: Normal breath sounds. No  wheezing.   Chest:      Chest wall: No tenderness.   Abdominal:      General: Abdomen is flat. There is no distension.      Palpations: Abdomen is soft.      Tenderness: There is no abdominal tenderness. There is no guarding or rebound.   Genitourinary:     Comments: Labial abscess noted (see picture in media).  Musculoskeletal:      Cervical back: No tenderness.      Comments: Upper and lower extremities are edematous L>R. 2-3+ pitting edema of lower extremities.    Skin:     General: Skin is warm and dry.      Coloration: Skin is not jaundiced.      Findings: Bruising present. No erythema or rash.      Comments: Sacral pressure ulcer, grade I.    Neurological:      Comments: Unable to participate in exam.           Significant Labs: All pertinent labs within the past 24 hours have been reviewed.  CBC:   Recent Labs   Lab 09/16/23  0642   WBC 6.09   HGB 9.9*   HCT 33.4*          CMP:   Recent Labs   Lab 09/16/23  0642      K 3.9   *   CO2 18*   GLU 63*   BUN 39*   CREATININE 1.2   CALCIUM 7.0*   PROT 4.2*   ALBUMIN 1.3*   BILITOT 0.6   ALKPHOS 119   AST 29   ALT 28   ANIONGAP 8       Magnesium:   Recent Labs   Lab 09/16/23  0642   MG 2.0       Significant Imaging: I have reviewed and interpreted all pertinent imaging results/findings within the past 24 hours.

## 2023-09-17 NOTE — ASSESSMENT & PLAN NOTE
This patient does have evidence of infective focus  My overall impression is sepsis.  Source: Urinary Tract and Skin and Soft Tissue (location labia)  Antibiotics given-   Antibiotics (72h ago, onward)    Start     Stop Route Frequency Ordered    09/16/23 0830  doxycycline (VIBRAMYCIN) 100 mg in dextrose 5 % in water (D5W) 100 mL IVPB (MB+)         -- IV Every 12 hours (non-standard times) 09/16/23 0726    09/15/23 1045  ertapenem (INVANZ) 500 mg in sodium chloride 0.9% 100 mL IVPB         -- IV Every 24 hours (non-standard times) 09/15/23 0938        Latest lactate reviewed-  Recent Labs   Lab 09/15/23  1114   LACTATE 1.5     Organ dysfunction indicated by Acute kidney injury    Fluid challenge Ideal Body Weight- The patient's ideal body weight is Ideal body weight: 52.4 kg (115 lb 8.3 oz) which will be used to calculate fluid bolus of 30 ml/kg for treatment of septic shock.      Post- resuscitation assessment Yes Perfusion exam was performed within 6 hours of septic shock presentation after bolus shows Adequate tissue perfusion assessed by non-invasive monitoring     Will Not start Pressors- Levophed for MAP of 65    --pt with hx of recurrent UTI's and UA suggestive of infection on admission   --previous urine cx with pansensitivity (e coli, klebsiella)  --presence of labial abscess (see image in media)   --gynecology consulted, appreciate recs   --per gyn, I+D not indicated based on size and location; also significant concern for pt's ability to tolerate procedure and for her tissue's ability to heal via secondary intention. Would reconsider surgical intervention if worsening signs of localized infection or tissue breakdown.   --recommend hot compresses Q4H to affected area   --will discontinue vanc, start doxycycline 100 mg Q12H by IV, per ID recs.     --received 3L total IVF upon admission, lactate wnl   --repeat lactate remains wnl  --vanc/zosyn for broad coverage, dosed by pharmacy  --initial blood cultures  preliminary growth of gram positive cocci resembling staph   --repeat blood cultures negative to date  --final urine cultures with proteus (sensitive) and klebsiella (previously sensitive, now resistant to most)  --discontinued zosyn, started ertapenem with ID consulted, appreciate recs.  --f/u blood cultures

## 2023-09-17 NOTE — PLAN OF CARE
THUY received call from patient's daughter Melly [phone] who chose [facility] as agency.  THUY sent patient information to [facility] via Support[employer] system.  THUY left voice message for [facility] [phone].       09/17/23 1255   Post-Acute Status   Post-Acute Authorization Hospice   Hospice Status Referrals Sent   Patient choice form signed by patient/caregiver List with quality metrics by geographic area provided

## 2023-09-17 NOTE — NURSING
Nurses Note -- 4 Eyes        9/16/2023   7:15 PM        Skin assessed during: Q Shift Change        [] No Altered Skin Integrity Present                 []Prevention Measures Documented        [x] Yes- Altered Skin Integrity Present or Discovered              [] LDA Added if Not in Epic (Describe Wound)              [] New Altered Skin Integrity was Present on Admit and Documented in LDA              [] Wound Image Taken     Wound Care Consulted? No     Attending Nurse: Maria Teresa Garcia RN     Second RN/Staff Member:   Griffin Coy RN

## 2023-09-17 NOTE — PLAN OF CARE
Problem: Infection  Goal: Absence of Infection Signs and Symptoms  Outcome: Ongoing, Progressing     Problem: Adult Inpatient Plan of Care  Goal: Plan of Care Review  Outcome: Ongoing, Progressing     Problem: Fluid and Electrolyte Imbalance (Acute Kidney Injury/Impairment)  Goal: Fluid and Electrolyte Balance  Outcome: Ongoing, Progressing     Problem: Oral Intake Inadequate (Acute Kidney Injury/Impairment)  Goal: Optimal Nutrition Intake  Outcome: Ongoing, Progressing     Problem: Renal Function Impairment (Acute Kidney Injury/Impairment)  Goal: Effective Renal Function  Outcome: Ongoing, Progressing     Problem: Impaired Wound Healing  Goal: Optimal Wound Healing  Outcome: Ongoing, Progressing     Problem: Skin Injury Risk Increased  Goal: Skin Health and Integrity  Outcome: Ongoing, Progressing     Problem: Fall Injury Risk  Goal: Absence of Fall and Fall-Related Injury  Outcome: Ongoing, Progressing

## 2023-09-17 NOTE — PLAN OF CARE
Problem: Infection  Goal: Absence of Infection Signs and Symptoms  Outcome: Ongoing, Progressing     Problem: Adult Inpatient Plan of Care  Goal: Plan of Care Review  Outcome: Ongoing, Progressing  Goal: Patient-Specific Goal (Individualized)  Outcome: Ongoing, Progressing  Goal: Absence of Hospital-Acquired Illness or Injury  Outcome: Ongoing, Progressing  Goal: Optimal Comfort and Wellbeing  Outcome: Ongoing, Progressing  Goal: Readiness for Transition of Care  Outcome: Ongoing, Progressing     Problem: Coping Ineffective  Goal: Effective Coping  Outcome: Ongoing, Progressing     Problem: Adjustment to Illness (Sepsis/Septic Shock)  Goal: Optimal Coping  Outcome: Ongoing, Progressing     Problem: Bleeding (Sepsis/Septic Shock)  Goal: Absence of Bleeding  Outcome: Ongoing, Progressing     Problem: Glycemic Control Impaired (Sepsis/Septic Shock)  Goal: Blood Glucose Level Within Desired Range  Outcome: Ongoing, Progressing     Problem: Infection Progression (Sepsis/Septic Shock)  Goal: Absence of Infection Signs and Symptoms  Outcome: Ongoing, Progressing     Problem: Nutrition Impaired (Sepsis/Septic Shock)  Goal: Optimal Nutrition Intake  Outcome: Ongoing, Progressing     Problem: Fluid and Electrolyte Imbalance (Acute Kidney Injury/Impairment)  Goal: Fluid and Electrolyte Balance  Outcome: Ongoing, Progressing     Problem: Oral Intake Inadequate (Acute Kidney Injury/Impairment)  Goal: Optimal Nutrition Intake  Outcome: Ongoing, Progressing     Problem: Renal Function Impairment (Acute Kidney Injury/Impairment)  Goal: Effective Renal Function  Outcome: Ongoing, Progressing     Problem: Impaired Wound Healing  Goal: Optimal Wound Healing  Outcome: Ongoing, Progressing     Problem: Skin Injury Risk Increased  Goal: Skin Health and Integrity  Outcome: Ongoing, Progressing     Problem: Fall Injury Risk  Goal: Absence of Fall and Fall-Related Injury  Outcome: Ongoing, Progressing

## 2023-09-17 NOTE — PROGRESS NOTES
[facility] - Telemetry [facility] Medicine  Progress Note    Patient Name: Gisselle Ortiz  MRN: [id number]  Patient Class: IP- Inpatient   Admission Date: 9/12/2023  Length of Stay: 5 days  Attending Physician: Tim Man MD  Primary Care Provider: Kai Montez MD    Subjective:     Principal Problem:Failure to thrive in adult    HPI:  Gisselle Ortiz is a 89 year old female with hx of HFpEF, sick sinus syndome s/p PPM, persistent A fib on oral anticoagulation, CKD3b, hypertension, hyperlipidemia, hypothyroidism, GERD, and recurrent UTI's who presented to the ED via EMS after being found by nursing home staff altered with reduced level of consciousness this morning. Pt reported to have been increasingly lethargic with poor PO intake for the past 2 days. After discussion with family at bedside and telephone, it is reported that pt is normally functional, though over time has become bedbound due to medical conditions. At baseline, she is alert and oriented x4 without baseline dementia. Last known normal was last night while daughter was visiting her at the nursing home. Pt has had multiple admissions this year with similar presentation, noted to have poor PO intake and progressive debility with failure to thrive.    In the ED, /60, , RR 12 with SpO2 99% on RA, afebrile. Noted to be in a fib with RVR on cardiac monitor and EKG. Labs notable for stable anemia, Na 141, K 3.2, bicarb 18, elevated BUN/creatinine 58/2.1, elevated T bili and alk phos. , troponin 0.147. Lactate wnl. Sepsis work-up initiated after brief episode of hypotension with blood cultures collected, IVF resuscitation (2L total via EMS and ED), and broad-spectrum antibiotics started. UA with evidence of UTI. CT head without contrast with no findings concerning for acute intracranial processes.  She will be admitted to hospital medicine for failure to thrive, management of a fib with RVR and possible  urosepsis.       Overview/Hospital Course:  Pt with hx of HFpEF, persistent atrial fibrillation, recurrent UTI, who presented to [facility] ED via EMS on 09/13/23 with decreased level of consciousness after 2 days of progressively worsening lethargy and poor PO intake. Admitted to hospital medicine for failure to thrive, management of a fib with RVR and possible urosepsis. At initial presentation, pt was normotensive with elevated HR noted to be in a fib with RVR on cardiac monitoring and ECG. Unable to give history or answer ROS in state of altered mentation. Broad-spectrum antibiotics were started, cautious IVF resuscitation given cardiac hx. HR uncontrolled with home amiodarone, BB and diltiazem deferred due to labile blood pressures as well as possible sepsis. Digoxin loaded followed by every other day dose, determined by pharmacy. HR better controlled within 110s. Gynecology consulted for labial abscess noted on physical exam, no indications for I&D based on size and location of the lesion. Affected area managed by heat compresses Q4H to allow spontaneous drainage. SLP consulted for swallowing evaluation due to uncertain aspiration risk 2/2 current condition.     Pt has had multiple admissions this year similar in presentation with most recent admission 06/23 due to acute on chronic heart failure exacerbation, noted to have UTI, very poor PO intake and progressive debility at that time as well. Palliative care consulted during current admission for assistance in discussion with family about goals of care. Family made aware of this decision with agreement. Final urine culture with proteus (pansensitive) and klebsiella (previously sensitive in 05/23, now resistant to most). Pt was on vanc/zosyn since admission, but following culture results, zosyn discontinued and ertapenem was started. ID consulted with final recommendations to continue ertapenem for UTI and transition to doxycycline 100 mg BID instead of vanc due to  age and poor renal function. Pt notably with very poor PO intake including food and water throughout hospital stay. Given IVF rehydration when clinically indicated on daily assessments.       Interval History: NAEON. Vitals with improvement after receiving IVF throughout the day. BP 120s systolic, HR ranging 90-110s compared to prior. Pt still appears to be somnolent with no changes in level of consciousness, she is no longer groaning compared to days prior. Labs unable to get done today, okay to defer until tomorrow unless indicated.     Review of Systems   Reason unable to perform ROS: limited due to acuity of condition.   Constitutional:  Positive for activity change, appetite change and fatigue. Negative for chills and fever.   Respiratory:  Negative for shortness of breath and wheezing.    Cardiovascular:  Negative for chest pain.   Gastrointestinal:  Negative for abdominal pain.   Genitourinary:  Negative for difficulty urinating and dysuria.   Musculoskeletal:  Negative for back pain and myalgias.     Objective:     Vital Signs (Most Recent):  Temp: 97.5 °F (36.4 °C) (09/17/23 1140)  Pulse: 91 (09/17/23 1140)  Resp: 19 (09/17/23 1140)  BP: 100/73 (09/17/23 1140)  SpO2: (!) 92 % (09/17/23 1140) Vital Signs (24h Range):  Temp:  [97 °F (36.1 °C)-98.7 °F (37.1 °C)] 97.5 °F (36.4 °C)  Pulse:  [] 91  Resp:  [18-25] 19  SpO2:  [90 %-95 %] 92 %  BP: ()/(55-80) 100/73     Weight: 59 kg (130 lb 1.1 oz)  Body mass index is 23.04 kg/m².    Intake/Output Summary (Last 24 hours) at 9/17/2023 1232  Last data filed at 9/16/2023 2232  Gross per 24 hour   Intake 829.6 ml   Output --   Net 829.6 ml           Physical Exam  Vitals and nursing note reviewed.   Constitutional:       General: She is not in acute distress.     Appearance: She is ill-appearing. She is not toxic-appearing.   HENT:      Head: Normocephalic and atraumatic.   Cardiovascular:      Rate and Rhythm: Tachycardia present. Rhythm irregular.       Pulses: Normal pulses.      Heart sounds: Normal heart sounds.   Pulmonary:      Effort: Pulmonary effort is normal. No respiratory distress.      Breath sounds: Normal breath sounds. No wheezing.   Chest:      Chest wall: No tenderness.   Abdominal:      General: Abdomen is flat. There is no distension.      Palpations: Abdomen is soft.      Tenderness: There is no abdominal tenderness. There is no guarding or rebound.   Genitourinary:     Comments: Labial abscess noted (see picture in media).  Musculoskeletal:      Cervical back: No tenderness.      Comments: Upper and lower extremities are edematous L>R. 2-3+ pitting edema of lower extremities.    Skin:     General: Skin is warm and dry.      Coloration: Skin is not jaundiced.      Findings: Bruising present. No erythema or rash.      Comments: Sacral pressure ulcer, grade I.    Neurological:      Comments: Unable to participate in exam.           Significant Labs: All pertinent labs within the past 24 hours have been reviewed.  CBC:   Recent Labs   Lab 09/16/23  0642   WBC 6.09   HGB 9.9*   HCT 33.4*          CMP:   Recent Labs   Lab 09/16/23  0642      K 3.9   *   CO2 18*   GLU 63*   BUN 39*   CREATININE 1.2   CALCIUM 7.0*   PROT 4.2*   ALBUMIN 1.3*   BILITOT 0.6   ALKPHOS 119   AST 29   ALT 28   ANIONGAP 8       Magnesium:   Recent Labs   Lab 09/16/23  0642   MG 2.0       Significant Imaging: I have reviewed and interpreted all pertinent imaging results/findings within the past 24 hours.      Assessment/Plan:      * Failure to thrive in adult  Pt is a 89 year old female with hx of HRpEF, persistent atrial fibrillation on AC, SSS s/p PPM, amongst other medical hx including recurrent UTI's and multiple hospital admissions this year with progressively worsening debility and failure to thrive. Initially presented to ED via EMS after being found with decreased level of consciousness after 2 days of poor PO intake and lethargy. Admitted to  hospital medicine for failure to thrive, management of a fib with RVR and possible urosepsis.     --daughter, Génesis Araiza, and son, Ari Ortiz, are joint HCPOA.   --engaged in extensive discussion with family regarding goals of care, current medical status, and prognosis.  --family has had extensive discussions regarding pt's quality of life and progressive decline, requested code status to be DNR/DNI  --code status has been updated accordingly  --family wishes for treatment of acute illness where possible, but will likely discharge on home hospice  --palliative care consulted, appreciate the assistance regarding goals of care    --given pt with multiple admissions this year and progressively worsening decline noted by family, likely with each infection or hospitalization her baseline will decrease   --pt remains high risk for subsequent hospitalization and future infections similar to that on admission   --social work consult placed for home hospice planning    Urine culture with resistant klebsiella; continue ertapenem.   -ID consulted, appreciate recs    Family made aware of current status and changes to medical management via telephone.   Goals of care were discussed voluntarily, please see ACP note dated on 09/15/23.    Persistent atrial fibrillation  Hx of persistent atrial fibrillation on apixaban 2.5 mg BID for anticoagulation, amiodarone 200 mg daily. Previously was on atenolol which was d/c'ed on last admission and not restarted while outpatient as pt lost to follow-up. While in the ED, pt normotensive but remained in a fib with RVR despite 3L total IVF resuscitation.     --lovenox for thromboembolism prophylaxis 2/2 A Fib, as pt unable to tolerate PO  --digoxin started due to pt with possible sepsis, not a candidate for IV metoprolol or diltiazem   --previously on amio without success   --digoxin 2 mcg/kg now, followed by two 1 mcg/kg doses Q6H (load completed)   --will assess response and  consider PO maintenance dose if mentation allows    --evaluated by SLP for swallow study, appreciate recs  --concern for digoxin toxicity given MARIO, hypokalemia, and amio use   --serum digoxin level after load 0.5   --digoxin 0.0625 mg IV every other day, dosing per pharmacy starting today 09/15/23  --will monitor clinical status closely    Labial abscess  -see severe sepsis      Urinary tract infection due to ESBL Klebsiella  -see severe sepsis    NSTEMI (non-ST elevated myocardial infarction), type 2 demand  Hx of persistent a fib, sick sinus syndrome s/p PPM, and HFpEF.     --found to have elevated troponin on initial presentation  --troponin trend is relatively flat, negative delta change   --0.147>0.156>0.157>0.155  --ECG without evidence of acute ST or T wave changes concerning for ischemia  --has not c/o chest pain or tightness, will continue to monitor     Hypokalemia  --K 3.2 on admission, replaced  --daily CMP, magnesium, phosphate   --electrolyte replacements as clinically appropriate    Severe sepsis  This patient does have evidence of infective focus  My overall impression is sepsis.  Source: Urinary Tract and Skin and Soft Tissue (location labia)  Antibiotics given-   Antibiotics (72h ago, onward)    Start     Stop Route Frequency Ordered    09/16/23 0830  doxycycline (VIBRAMYCIN) 100 mg in dextrose 5 % in water (D5W) 100 mL IVPB (MB+)         -- IV Every 12 hours (non-standard times) 09/16/23 0726    09/15/23 1045  ertapenem (INVANZ) 500 mg in sodium chloride 0.9% 100 mL IVPB         -- IV Every 24 hours (non-standard times) 09/15/23 0938        Latest lactate reviewed-  Recent Labs   Lab 09/15/23  1114   LACTATE 1.5     Organ dysfunction indicated by Acute kidney injury    Fluid challenge Ideal Body Weight- The patient's ideal body weight is Ideal body weight: 52.4 kg (115 lb 8.3 oz) which will be used to calculate fluid bolus of 30 ml/kg for treatment of septic shock.      Post- resuscitation  assessment Yes Perfusion exam was performed within 6 hours of septic shock presentation after bolus shows Adequate tissue perfusion assessed by non-invasive monitoring     Will Not start Pressors- Levophed for MAP of 65    --pt with hx of recurrent UTI's and UA suggestive of infection on admission   --previous urine cx with pansensitivity (e coli, klebsiella)  --presence of labial abscess (see image in media)   --gynecology consulted, appreciate recs   --per gyn, I+D not indicated based on size and location; also significant concern for pt's ability to tolerate procedure and for her tissue's ability to heal via secondary intention. Would reconsider surgical intervention if worsening signs of localized infection or tissue breakdown.   --recommend hot compresses Q4H to affected area   --will discontinue vanc, start doxycycline 100 mg Q12H by IV, per ID recs.     --received 3L total IVF upon admission, lactate wnl   --repeat lactate remains wnl  --vanc/zosyn for broad coverage, dosed by pharmacy  --initial blood cultures preliminary growth of gram positive cocci resembling staph   --repeat blood cultures negative to date  --final urine cultures with proteus (sensitive) and klebsiella (previously sensitive, now resistant to most)  --discontinued zosyn, started ertapenem with ID consulted, appreciate recs.  --f/u blood cultures     Encounter for palliative care        MARIO (acute kidney injury)  Hx of chronic kidney disease, stage 3b. On admission, Cr 2.1 w/ baseline 1.2-1.3. Recent hospitalization w/ CHF exacerbation/hypervolemia and prerenal MARIO, resolved with IVF.     --IVF rehydration as tolerated given hx of chronic diastolic heart failure.   --renally dosing medications as appropriate  --avoid nephrotoxic agents   --continue to monitor renal function and urine output  --strict I/Os, bladder scan PRN urinary retention, straight/coughlin cath PRN bladder scan > 300 cc         Anemia due to chronic kidney  disease  --hb/hct on presentation 11.2/35.7  --hx of anemia with variable hb/hct levels reviewed in chart    --stable, continue to monitor    Chronic diastolic heart failure  Hx of chronic heart failure with preserved ejection fraction.     --continue midodrine when able for hypotension    Last TTE 06/25/23:   Normal LV size with concentric remodeling and normal systolic function.   The estimated ejection fraction is 65%.   Grade III left ventricular diastolic dysfunction.   Severe left atrial enlargement.      Acquired hypothyroidism  --continue home synthroid when able  --f/u TSH      Cardiac pacemaker in situ  Hx SSS s/p PPM. Atenolol d/c'ed last admission due to bradycardia. Unclear if pacemaker is functioning, will investigate further, pacer spikes noted on telemetry.     --can consider device interrogation while inpatient if indicated  --last interrogated 08/17/23      VTE Risk Mitigation (From admission, onward)         Ordered     enoxaparin injection 60 mg  Every 24 hours         09/16/23 1208     IP VTE HIGH RISK PATIENT  Once         09/12/23 1608     Place sequential compression device  Until discontinued         09/12/23 1608                Discharge Planning   FRANCESCA: 9/18/2023     Code Status: DNR   Is the patient medically ready for discharge?: No    Reason for patient still in hospital (select all that apply): Patient trending condition, Treatment and Pending disposition  Discharge Plan A: Return to nursing home ([facility])   Discharge Delays: (!) Patient and Family Barriers        Hattie Purcell MD  Department of Hospital Medicine   [facility] - Telemetry Stepdown

## 2023-09-17 NOTE — NURSING
Notified med team 1 that pt's HR is sustaining in the 130s and that pt's BP is 96/55. Matt GAYTAN ordered 500 mL bolus of LR.    1715  Notifed med team 1 that pt's HR is 119 and BP is 122/67 upon reassessment. No new orders given. Pt in bed with the call light in reach and the bed alarm on.

## 2023-09-17 NOTE — ASSESSMENT & PLAN NOTE
Hx of persistent atrial fibrillation on apixaban 2.5 mg BID for anticoagulation, amiodarone 200 mg daily. Previously was on atenolol which was d/c'ed on last admission and not restarted while outpatient as pt lost to follow-up. While in the ED, pt normotensive but remained in a fib with RVR despite 3L total IVF resuscitation.     --lovenox for thromboembolism prophylaxis 2/2 A Fib, as pt unable to tolerate PO  --digoxin started due to pt with possible sepsis, not a candidate for IV metoprolol or diltiazem   --previously on amio without success   --digoxin 2 mcg/kg now, followed by two 1 mcg/kg doses Q6H (load completed)   --will assess response and consider PO maintenance dose if mentation allows    --evaluated by SLP for swallow study, appreciate recs  --concern for digoxin toxicity given MARIO, hypokalemia, and amio use   --serum digoxin level after load 0.5   --digoxin 0.0625 mg IV every other day, dosing per pharmacy starting today 09/15/23  --will monitor clinical status closely

## 2023-09-17 NOTE — NURSING
Nurses Note -- 4 Eyes      9/17/2023   11:03 AM      Skin assessed during: Q Shift Change      [] No Altered Skin Integrity Present    []Prevention Measures Documented      [x] Yes- Altered Skin Integrity Present or Discovered   [] LDA Added if Not in Epic (Describe Wound)   [x] New Altered Skin Integrity was Present on Admit and Documented in LDA   [] Wound Image Taken    Wound Care Consulted? No    Attending Nurse:  Kennedy Menchaca RN/Staff Member:   norah

## 2023-09-17 NOTE — PLAN OF CARE
Met spoke with patient's daughter Melly [phone] to review discharge recommendation of hospice and is agreeable to plan.  Per Melly, she has not made a choice on Hospice at this time.  THUY explained that per THUY Drew's note; she sent her a list of agencies to choose from but spoke with Debra at [facility] that gave [facility], [facility], and [facility].  Per Melly, she will review those agencies and return call to this SW.    Patient/family provided list of facilities in-network with patient's payor plan. Providers that are owned, operated, or affiliated with [facility] are included on the list.     Notified that referral sent to below listed facilities from in-network list based on proximity to home/family support:   [facility]  2.    [facility]  3.    [facility]  4.  5. (can send more than 5)    Patient/family instructed to identify preference.    Preferred Facility: (if more than 1, listed in order of descending preference)  [facility]    If an additional preferred facility not listed above is identified, additional referral to be sent. If above facilities unable to accept, will send additional referrals to in-network providers.        THUY spoke to patient's daughter Melly [phone] who stated    09/17/23 1043   Post-Acute Status   Post-Acute Authorization Hospice   Discharge Delays (!) Patient and Family Barriers

## 2023-09-17 NOTE — PLAN OF CARE
SW received call from Bebo with [facility] [phone] who stated patient accepted.  Per Bebo, they will meet with patient's family tomorrow between 11:00am- 12:00pm to complete needs assessment.       09/17/23 1533   Post-Acute Status   Post-Acute Authorization Hospice   Hospice Status Pending equipment/medication delivery

## 2023-09-18 NOTE — SUBJECTIVE & OBJECTIVE
Interval History: NAEON. Pt remains in A Fib with controlled HR 80-110s, BP 110s systolic. Continue to monitor for changes in clinical status. Pt appears to be resting comfortably though no changes in level of consciousness or alertness. Slight elevation of creatinine 1.5 today vs two days ago. Will monitor closely. Blood cultures neg. Currently day 4 of ertapenem for ESBL UTI. Day 3 of doxycycline for soft tissue infection.     Review of Systems   Reason unable to perform ROS: limited due to acuity of condition.   Constitutional:  Positive for activity change, appetite change and fatigue. Negative for chills and fever.   Respiratory:  Negative for shortness of breath and wheezing.    Cardiovascular:  Negative for chest pain.   Gastrointestinal:  Negative for abdominal pain.   Genitourinary:  Negative for difficulty urinating and dysuria.   Musculoskeletal:  Negative for back pain and myalgias.     Objective:     Vital Signs (Most Recent):  Temp: 97.7 °F (36.5 °C) (09/18/23 1106)  Pulse: 95 (09/18/23 1106)  Resp: (!) 22 (09/18/23 0807)  BP: 111/70 (09/18/23 1106)  SpO2: (!) 90 % (09/18/23 1106) Vital Signs (24h Range):  Temp:  [97.4 °F (36.3 °C)-97.8 °F (36.6 °C)] 97.7 °F (36.5 °C)  Pulse:  [] 95  Resp:  [15-22] 22  SpO2:  [90 %-94 %] 90 %  BP: (104-116)/(62-86) 111/70     Weight: 59 kg (130 lb 1.1 oz)  Body mass index is 23.04 kg/m².    Intake/Output Summary (Last 24 hours) at 9/18/2023 1321  Last data filed at 9/18/2023 0658  Gross per 24 hour   Intake 198.97 ml   Output 250 ml   Net -51.03 ml           Physical Exam  Vitals and nursing note reviewed.   Constitutional:       General: She is not in acute distress.     Appearance: She is ill-appearing. She is not toxic-appearing.   HENT:      Head: Normocephalic and atraumatic.   Cardiovascular:      Rate and Rhythm: Tachycardia present. Rhythm irregular.      Pulses: Normal pulses.      Heart sounds: Normal heart sounds.   Pulmonary:      Effort: Pulmonary  effort is normal. No respiratory distress.      Breath sounds: Normal breath sounds. No wheezing.   Chest:      Chest wall: No tenderness.   Abdominal:      General: Abdomen is flat. There is no distension.      Palpations: Abdomen is soft.      Tenderness: There is no abdominal tenderness. There is no guarding or rebound.   Genitourinary:     Comments: Labial abscess noted (see picture in media).  Musculoskeletal:      Cervical back: No tenderness.      Comments: Upper and lower extremities are edematous L>R. 2-3+ pitting edema of lower extremities.    Skin:     General: Skin is warm and dry.      Coloration: Skin is not jaundiced.      Findings: Bruising present. No erythema or rash.      Comments: Sacral pressure ulcer, grade I.    Neurological:      Comments: Unable to participate in exam.           Significant Labs: All pertinent labs within the past 24 hours have been reviewed.  CBC:   Recent Labs   Lab 09/18/23  0502   WBC 6.93   HGB 9.9*   HCT 32.8*          CMP:   Recent Labs   Lab 09/18/23  0502      K 3.8   *   CO2 19*   GLU 51*   BUN 41*   CREATININE 1.5*   CALCIUM 7.3*   PROT 4.5*   ALBUMIN 1.3*   BILITOT 0.6   ALKPHOS 118   AST 35   ALT 28   ANIONGAP 9       Magnesium:   Recent Labs   Lab 09/18/23  0502   MG 2.0       Significant Imaging: I have reviewed and interpreted all pertinent imaging results/findings within the past 24 hours.

## 2023-09-18 NOTE — NURSING
Nurses Note -- 4 Eyes      9/17/2023   7:37 PM      Skin assessed during: Daily Assessment      [] No Altered Skin Integrity Present    []Prevention Measures Documented      [x] Yes- Altered Skin Integrity Present or Discovered   [] LDA Added if Not in Epic (Describe Wound)   [] New Altered Skin Integrity was Present on Admit and Documented in LDA   [] Wound Image Taken    Wound Care Consulted? No    Attending Nurse:  Gunnar Menchaca RN/Staff Member:   Kennedy JOSE

## 2023-09-18 NOTE — CARE UPDATE
"RAPID RESPONSE NURSE CHART REVIEW        Chart Reviewed: 09/18/2023, 3:39 PM    MRN: [id number]  Bed: 8081/8081 A    Dx: Failure to thrive in adult    Gisselle Ortiz has a past medical history of A-fib, Anemia of chronic disease, Anticoagulant long-term use, CHF (congestive heart failure), Chronic diastolic heart failure, COVID-19, Gallstone pancreatitis, Hypertension, Pancreatic abscess, Stage 3 chronic kidney disease, and Thyroid disease.    Last VS: /70 (BP Location: Left arm, Patient Position: Lying)   Pulse (!) 113   Temp 97.7 °F (36.5 °C) (Axillary)   Resp (!) 22   Ht 5' 3" (1.6 m)   Wt 59 kg (130 lb 1.1 oz)   LMP  (LMP Unknown)   SpO2 (!) 92%   Breastfeeding No   BMI 23.04 kg/m²     24H Vital Sign Range:  Temp:  [97.6 °F (36.4 °C)-97.8 °F (36.6 °C)]   Pulse:  []   Resp:  [15-22]   BP: (104-116)/(62-86)   SpO2:  [90 %-94 %]     Level of Consciousness (AVPU): alert    Recent Labs     09/16/23  0642 09/18/23  0502   WBC 6.09 6.93   HGB 9.9* 9.9*   HCT 33.4* 32.8*    209       Recent Labs     09/16/23  0642 09/18/23  0502    142   K 3.9 3.8   * 114*   CO2 18* 19*   BUN 39* 41*   CREATININE 1.2 1.5*   GLU 63* 51*   MG 2.0 2.0      MEWS score: 2    Bedside Kennedy JOSE  contacted for hypoxia. Reports patient is breathing comfortably at present and working on transistion to home hospice. No additional concerns verbalized at this time. Instructed to call [phone] for further concerns or assistance.    Tahmina Paige RN        "

## 2023-09-18 NOTE — PT/OT/SLP PROGRESS
Speech Language Pathology Treatment    Patient Name:  Gisselle Ortiz   MRN:  [id number]  8081/8081 A    Admitting Diagnosis: Failure to thrive in adult    Recommendations:                 General Recommendations:   ongoing swallowing assessment; consider RD consult for possible calorie count if not following , consider alternative means of nutrition/hydration/medication as appropriate  Diet recommendations:    NPO;   Should patient present with increased KARMA to safely accept po intake, okay to resume:  Puree, Liquid Diet Level: Mildly thick/Nectar thick liquids - IDDSI Level 2  Aspiration Precautions: 1 bite/sip at a time, Alternating bites/sips, Assistance with meals and Assistance with thickening liquids, Feed only when awake/alert, Frequent oral care, HOB to 90 degrees, Meds crushed in puree, Small bites/sips, and Strict aspiration precautions   General Precautions: Standard, aspiration  Communication strategies:  none    Assessment:     Gisselle Ortiz is a 89 y.o. female with an SLP diagnosis of Dysphagia and poor KARMA to safely accept po intake at this time.     Subjective     Patient lethargic.     Pain/Comfort:  Pain Rating 1: 0/10    Objective:     Has the patient been evaluated by SLP for swallowing?   Yes  Keep patient NPO? No     Patient seen for an ongoing swallowing assessment. She required sternal rub and auditory stimulation to open eyes. HOB elevated for po trials. She accepted 1 teaspoon sip of water. Severely delayed swallow and anterior loss appreciated. She turned her head away and shook her head 'no' to all further trials and oral care attempted. SLP provided education on SLP role, goals, and POC. Shw would benefit from ongoing reinforcement.     Goals:   Multidisciplinary Problems       SLP Goals          Problem: SLP    Goal Priority Disciplines Outcome   SLP Goal     SLP Ongoing, Progressing   Description: Short Term Goals:   1. Pt will participate in an ongoing assessment to  determine the least restrictive and safest diet with possible updated goals to follow pending results.                         Plan:     Patient to be seen:  3 x/week   Plan of Care expires:     Plan of Care reviewed with:  patient   SLP Follow-Up:  Yes       Discharge recommendations:  nursing facility, skilled   Barriers to Discharge:  Level of Skilled Assistance Needed      Time Tracking:     SLP Treatment Date:   09/18/23  Speech Start Time:  0816  Speech Stop Time:  0824     Speech Total Time (min):  8 min    Billable Minutes: Treatment Swallowing Dysfunction 8    09/18/2023

## 2023-09-18 NOTE — PROGRESS NOTES
[facility] - Tele[facility] Medicine  Progress Note    Patient Name: Gisselle Ortiz  MRN: [id number]  Patient Class: IP- Inpatient   Admission Date: 9/12/2023  Length of Stay: 6 days  Attending Physician: Tim Man MD  Primary Care Provider: Kai Montez MD    Subjective:     Principal Problem:Failure to thrive in adult    HPI:  Gisselle Ortiz is a 89 year old female with hx of HFpEF, sick sinus syndome s/p PPM, persistent A fib on oral anticoagulation, CKD3b, hypertension, hyperlipidemia, hypothyroidism, GERD, and recurrent UTI's who presented to the ED via EMS after being found by nursing home staff altered with reduced level of consciousness this morning. Pt reported to have been increasingly lethargic with poor PO intake for the past 2 days. After discussion with family at bedside and telephone, it is reported that pt is normally functional, though over time has become bedbound due to medical conditions. At baseline, she is alert and oriented x4 without baseline dementia. Last known normal was last night while daughter was visiting her at the nursing home. Pt has had multiple admissions this year with similar presentation, noted to have poor PO intake and progressive debility with failure to thrive.    In the ED, /60, , RR 12 with SpO2 99% on RA, afebrile. Noted to be in a fib with RVR on cardiac monitor and EKG. Labs notable for stable anemia, Na 141, K 3.2, bicarb 18, elevated BUN/creatinine 58/2.1, elevated T bili and alk phos. , troponin 0.147. Lactate wnl. Sepsis work-up initiated after brief episode of hypotension with blood cultures collected, IVF resuscitation (2L total via EMS and ED), and broad-spectrum antibiotics started. UA with evidence of UTI. CT head without contrast with no findings concerning for acute intracranial processes.  She will be admitted to hospital medicine for failure to thrive, management of a fib with RVR and possible  urosepsis.       Overview/Hospital Course:  Pt with hx of HFpEF, persistent atrial fibrillation, recurrent UTI, who presented to [facility] ED via EMS on 09/13/23 with decreased level of consciousness after 2 days of progressively worsening lethargy and poor PO intake. Admitted to hospital medicine for failure to thrive, management of a fib with RVR and possible urosepsis. At initial presentation, pt was normotensive with elevated HR noted to be in a fib with RVR on cardiac monitoring and ECG. Unable to give history or answer ROS in state of altered mentation. Broad-spectrum antibiotics were started, cautious IVF resuscitation given cardiac hx. HR uncontrolled with home amiodarone, BB and diltiazem deferred due to labile blood pressures as well as possible sepsis. Digoxin loaded followed by every other day dose, determined by pharmacy. HR better controlled within 110s. Gynecology consulted for labial abscess noted on physical exam, no indications for I&D based on size and location of the lesion. Affected area managed by heat compresses Q4H to allow spontaneous drainage. SLP consulted for swallowing evaluation due to uncertain aspiration risk 2/2 current condition.     Pt has had multiple admissions this year similar in presentation with most recent admission 06/23 due to acute on chronic heart failure exacerbation, noted to have UTI, very poor PO intake and progressive debility at that time as well. Palliative care consulted during current admission for assistance in discussion with family about goals of care. Family made aware of this decision with agreement. Final urine culture with proteus (pansensitive) and klebsiella (previously sensitive in 05/23, now resistant to most). Pt was on vanc/zosyn since admission, but following culture results, zosyn discontinued and ertapenem was started. ID consulted with final recommendations to continue ertapenem for UTI and transition to doxycycline 100 mg BID instead of vanc due to  age and poor renal function.  Given IVF rehydration when clinically indicated on daily assessments.     Pt continued to have notably very poor PO intake during hospitalization with minimal change in level of consciousness/alertness despite active treatment of principal problems. Extensive discussions with family taken place in regard to goals of care and advanced care planning. Ultimately, family expressed the importance of maintaining current medical treatment with IV abx for UTI treatment; however, no escalation of care such as ICU level care, including vasopressors and central line placement. Family made decision of pt returning to her assisted living facility with [facility].       Interval History: NAEON. Pt remains in A Fib with controlled HR 80-110s, BP 110s systolic. Continue to monitor for changes in clinical status. Pt appears to be resting comfortably though no changes in level of consciousness or alertness. Slight elevation of creatinine 1.5 today vs two days ago. Will monitor closely. Blood cultures neg. Currently day 4 of ertapenem for ESBL UTI. Day 3 of doxycycline for soft tissue infection.     Review of Systems   Reason unable to perform ROS: limited due to acuity of condition.   Constitutional:  Positive for activity change, appetite change and fatigue. Negative for chills and fever.   Respiratory:  Negative for shortness of breath and wheezing.    Cardiovascular:  Negative for chest pain.   Gastrointestinal:  Negative for abdominal pain.   Genitourinary:  Negative for difficulty urinating and dysuria.   Musculoskeletal:  Negative for back pain and myalgias.     Objective:     Vital Signs (Most Recent):  Temp: 97.7 °F (36.5 °C) (09/18/23 1106)  Pulse: 95 (09/18/23 1106)  Resp: (!) 22 (09/18/23 0807)  BP: 111/70 (09/18/23 1106)  SpO2: (!) 90 % (09/18/23 1106) Vital Signs (24h Range):  Temp:  [97.4 °F (36.3 °C)-97.8 °F (36.6 °C)] 97.7 °F (36.5 °C)  Pulse:  [] 95  Resp:  [15-22] 22  SpO2:   [90 %-94 %] 90 %  BP: (104-116)/(62-86) 111/70     Weight: 59 kg (130 lb 1.1 oz)  Body mass index is 23.04 kg/m².    Intake/Output Summary (Last 24 hours) at 9/18/2023 1321  Last data filed at 9/18/2023 0658  Gross per 24 hour   Intake 198.97 ml   Output 250 ml   Net -51.03 ml       Physical Exam  Vitals and nursing note reviewed.   Constitutional:       General: She is not in acute distress.     Appearance: She is ill-appearing. She is not toxic-appearing.   HENT:      Head: Normocephalic and atraumatic.   Cardiovascular:      Rate and Rhythm: Tachycardia present. Rhythm irregular.      Pulses: Normal pulses.      Heart sounds: Normal heart sounds.   Pulmonary:      Effort: Pulmonary effort is normal. No respiratory distress.      Breath sounds: Normal breath sounds. No wheezing.   Chest:      Chest wall: No tenderness.   Abdominal:      General: Abdomen is flat. There is no distension.      Palpations: Abdomen is soft.      Tenderness: There is no abdominal tenderness. There is no guarding or rebound.   Genitourinary:     Comments: Labial abscess noted (see picture in media).  Musculoskeletal:      Cervical back: No tenderness.      Comments: Upper and lower extremities are edematous L>R. 2-3+ pitting edema of lower extremities.    Skin:     General: Skin is warm and dry.      Coloration: Skin is not jaundiced.      Findings: Bruising present. No erythema or rash.      Comments: Sacral pressure ulcer, grade I.    Neurological:      Comments: Unable to participate in exam.        Significant Labs: All pertinent labs within the past 24 hours have been reviewed.  CBC:   Recent Labs   Lab 09/18/23  0502   WBC 6.93   HGB 9.9*   HCT 32.8*          CMP:   Recent Labs   Lab 09/18/23  0502      K 3.8   *   CO2 19*   GLU 51*   BUN 41*   CREATININE 1.5*   CALCIUM 7.3*   PROT 4.5*   ALBUMIN 1.3*   BILITOT 0.6   ALKPHOS 118   AST 35   ALT 28   ANIONGAP 9       Magnesium:   Recent Labs   Lab 09/18/23  0502    MG 2.0       Significant Imaging: I have reviewed and interpreted all pertinent imaging results/findings within the past 24 hours.    Assessment/Plan:      * Failure to thrive in adult  Pt is a 89 year old female with hx of HRpEF, persistent atrial fibrillation on AC, SSS s/p PPM, amongst other medical hx including recurrent UTI's and multiple hospital admissions this year with progressively worsening debility and failure to thrive. Initially presented to ED via EMS after being found with decreased level of consciousness after 2 days of poor PO intake and lethargy. Admitted to hospital medicine for failure to thrive, management of a fib with RVR and possible urosepsis.     --daughter, Génesis Araiza, and son, Ari Ortiz, are joint HCPOA.   --engaged in extensive discussion with family regarding goals of care, current medical status, and prognosis.  --family has had extensive discussions regarding pt's quality of life and progressive decline, requested code status to be DNR/DNI  --code status has been updated accordingly  --family wishes for treatment of acute illness where possible, but will likely discharge on home hospice  --palliative care consulted, appreciate the assistance regarding goals of care    --given pt with multiple admissions this year and progressively worsening decline noted by family, likely with each infection or hospitalization her baseline will decrease   --pt remains high risk for subsequent hospitalization and future infections similar to that on admission   --social work consult placed for home hospice planning    Urine culture with resistant klebsiella; continue ertapenem.   -ID consulted, appreciate recs    Family made aware of current status and changes to medical management via telephone.   Goals of care were discussed voluntarily, please see ACP note dated on 09/15/23.    Persistent atrial fibrillation  Hx of persistent atrial fibrillation on apixaban 2.5 mg BID for  anticoagulation, amiodarone 200 mg daily. Previously was on atenolol which was d/c'ed on last admission and not restarted while outpatient as pt lost to follow-up. While in the ED, pt normotensive but remained in a fib with RVR despite 3L total IVF resuscitation.     --lovenox for thromboembolism prophylaxis 2/2 A Fib, as pt unable to tolerate PO  --digoxin started due to pt with possible sepsis, not a candidate for IV metoprolol or diltiazem   --previously on amio without success   --digoxin 2 mcg/kg now, followed by two 1 mcg/kg doses Q6H (load completed)   --will assess response and consider PO maintenance dose if mentation allows    --evaluated by SLP for swallow study, appreciate recs  --concern for digoxin toxicity given MARIO, hypokalemia, and amio use   --serum digoxin level after load 0.5   --digoxin 0.0625 mg IV every other day, dosing per pharmacy starting today 09/15/23  --will monitor clinical status closely    Labial abscess  -see severe sepsis      Urinary tract infection due to ESBL Klebsiella  -see severe sepsis    NSTEMI (non-ST elevated myocardial infarction), type 2 demand  Hx of persistent a fib, sick sinus syndrome s/p PPM, and HFpEF.     --found to have elevated troponin on initial presentation  --troponin trend is relatively flat, negative delta change   --0.147>0.156>0.157>0.155  --ECG without evidence of acute ST or T wave changes concerning for ischemia  --has not c/o chest pain or tightness, will continue to monitor     Hypokalemia  --K 3.2 on admission, replaced  --daily CMP, magnesium, phosphate   --electrolyte replacements as clinically appropriate    Severe sepsis  This patient does have evidence of infective focus  My overall impression is sepsis.  Source: Urinary Tract and Skin and Soft Tissue (location labia)  Antibiotics given-   Antibiotics (72h ago, onward)    Start     Stop Route Frequency Ordered    09/16/23 0830  doxycycline (VIBRAMYCIN) 100 mg in dextrose 5 % in water (D5W)  100 mL IVPB (MB+)         -- IV Every 12 hours (non-standard times) 09/16/23 0726    09/15/23 1045  ertapenem (INVANZ) 500 mg in sodium chloride 0.9% 100 mL IVPB         -- IV Every 24 hours (non-standard times) 09/15/23 0938        Latest lactate reviewed-  Recent Labs   Lab 09/15/23  1114   LACTATE 1.5     Organ dysfunction indicated by Acute kidney injury    Fluid challenge Ideal Body Weight- The patient's ideal body weight is Ideal body weight: 52.4 kg (115 lb 8.3 oz) which will be used to calculate fluid bolus of 30 ml/kg for treatment of septic shock.      Post- resuscitation assessment Yes Perfusion exam was performed within 6 hours of septic shock presentation after bolus shows Adequate tissue perfusion assessed by non-invasive monitoring     Will Not start Pressors- Levophed for MAP of 65    --pt with hx of recurrent UTI's and UA suggestive of infection on admission   --previous urine cx with pansensitivity (e coli, klebsiella)  --presence of labial abscess (see image in media)   --gynecology consulted, appreciate recs   --per gyn, I+D not indicated based on size and location; also significant concern for pt's ability to tolerate procedure and for her tissue's ability to heal via secondary intention. Would reconsider surgical intervention if worsening signs of localized infection or tissue breakdown.   --recommend hot compresses Q4H to affected area   --will discontinue vanc, start doxycycline 100 mg Q12H by IV, per ID recs.     --received 3L total IVF upon admission, lactate wnl   --repeat lactate remains wnl  --vanc/zosyn for broad coverage, dosed by pharmacy  --initial blood cultures preliminary growth of gram positive cocci resembling staph   --repeat blood cultures negative to date  --final urine cultures with proteus (sensitive) and klebsiella (previously sensitive, now resistant to most)  --discontinued zosyn, started ertapenem with ID consulted, appreciate recs.  --f/u blood cultures     Encounter  for palliative care        MARIO (acute kidney injury)  Hx of chronic kidney disease, stage 3b. On admission, Cr 2.1 w/ baseline 1.2-1.3. Recent hospitalization w/ CHF exacerbation/hypervolemia and prerenal MARIO, resolved with IVF.     --IVF rehydration as tolerated given hx of chronic diastolic heart failure.   --renally dosing medications as appropriate  --avoid nephrotoxic agents   --continue to monitor renal function and urine output  --strict I/Os, bladder scan PRN urinary retention, straight/coughlin cath PRN bladder scan > 300 cc         Anemia due to chronic kidney disease  --hb/hct on presentation 11.2/35.7  --hx of anemia with variable hb/hct levels reviewed in chart    --stable, continue to monitor    Chronic diastolic heart failure  Hx of chronic heart failure with preserved ejection fraction.     --continue midodrine when able for hypotension    Last TTE 06/25/23:   Normal LV size with concentric remodeling and normal systolic function.   The estimated ejection fraction is 65%.   Grade III left ventricular diastolic dysfunction.   Severe left atrial enlargement.      Acquired hypothyroidism  --continue home synthroid when able  --f/u TSH      Cardiac pacemaker in situ  Hx SSS s/p PPM. Atenolol d/c'ed last admission due to bradycardia. Unclear if pacemaker is functioning, will investigate further, pacer spikes noted on telemetry.     --can consider device interrogation while inpatient if indicated  --last interrogated 08/17/23      VTE Risk Mitigation (From admission, onward)         Ordered     enoxaparin injection 60 mg  Every 24 hours         09/16/23 1208     IP VTE HIGH RISK PATIENT  Once         09/12/23 1608     Place sequential compression device  Until discontinued         09/12/23 1608                Discharge Planning   FRANCESCA: 9/20/2023     Code Status: DNR   Is the patient medically ready for discharge?: No    Reason for patient still in hospital (select all that apply): Patient trending  condition, Treatment and Pending disposition  Discharge Plan A: Assisted Living (with [facility])   Discharge Delays: None known at this time        Hattie Purcell MD  Department of Hospital Medicine   [facility] - Telemetry Stepdown

## 2023-09-18 NOTE — NURSING
Dr Mohr notified that pt wasn't alert enough to take PO meds at bedtime. No new order received. Plan of care on going.

## 2023-09-18 NOTE — PLAN OF CARE
"Pt is not alert, will respond to question ,"uh-uh" unable to assess orientation. Turn q2h with wedge, BUE edema noted, weeping serous fluid. BLE edema +3 as well. Cottrell cath CDI. Afib on telemetry 's. Plan of care on going.    Problem: Infection  Goal: Absence of Infection Signs and Symptoms  Outcome: Ongoing, Progressing     Problem: Adult Inpatient Plan of Care  Goal: Plan of Care Review  Outcome: Ongoing, Progressing  Goal: Absence of Hospital-Acquired Illness or Injury  Outcome: Ongoing, Progressing  Goal: Optimal Comfort and Wellbeing  Outcome: Ongoing, Progressing  Goal: Readiness for Transition of Care  Outcome: Ongoing, Progressing     Problem: Impaired Wound Healing  Goal: Optimal Wound Healing  Outcome: Ongoing, Progressing     Problem: Skin Injury Risk Increased  Goal: Skin Health and Integrity  Outcome: Ongoing, Progressing     Problem: Fall Injury Risk  Goal: Absence of Fall and Fall-Related Injury  Outcome: Ongoing, Progressing     "

## 2023-09-18 NOTE — PLAN OF CARE
[facility] - Telemetry Stepdown  Discharge Reassessment    Primary Care Provider: Kai Montez MD    Expected Discharge Date: 9/18/2023    Reassessment (most recent)       Discharge Reassessment - 09/18/23 1222          Discharge Reassessment    Assessment Type Discharge Planning Reassessment     Did the patient's condition or plan change since previous assessment? No     Discharge Plan discussed with: Adult children     Name(s) and Number(s) Melly daughter     Communicated FRANCESCA with patient/caregiver Yes     Discharge Plan A Assisted Living   with [facility]    Discharge Plan B Assisted Living     DME Needed Upon Discharge  hospital bed     Transition of Care Barriers None     Why the patient remains in the hospital Requires continued medical care        Post-Acute Status    Post-Acute Authorization Hospice     Hospice Status --   pending DME delivery    Discharge Delays None known at this time                 Received call from Lara ([phone]) from [facility] who reports she met with patient's family today and will order DME for patient.    Spoke with patient's daughter, Melly, who confirmed above and states they need to remove the current bed that is in patient's room and rearrange/prepare for the patient to transition back to [facility]. Melly reports they should have DME/the room ready by tomorrow for dc.    Updated MD team.    1:15 PM  Received call from patient's daughter stating she has a family emergency with her son and cannot move the bed today. Daughter requests for Wednesday dc to get everything in place. Updated MD team.    Viki Torre, [facility] [place] [facility]  [facility]  Ext. [phone]

## 2023-09-19 PROBLEM — Z51.5 COMFORT MEASURES ONLY STATUS: Status: ACTIVE | Noted: 2023-01-01

## 2023-09-19 NOTE — PROGRESS NOTES
[facility] - Telemetry [facility] Medicine  Progress Note    Patient Name: Gisselle Ortiz  MRN: [id number]  Patient Class: IP- Inpatient   Admission Date: 9/12/2023  Length of Stay: 7 days  Attending Physician: Tim Man MD  Primary Care Provider: Kai Montez MD        Subjective:     Principal Problem:Failure to thrive in adult        HPI:  Gisselle Ortiz is a 89 year old female with hx of HFpEF, sick sinus syndome s/p PPM, persistent A fib on oral anticoagulation, CKD3b, hypertension, hyperlipidemia, hypothyroidism, GERD, and recurrent UTI's who presented to the ED via EMS after being found by nursing home staff altered with reduced level of consciousness this morning. Pt reported to have been increasingly lethargic with poor PO intake for the past 2 days. After discussion with family at bedside and telephone, it is reported that pt is normally functional, though over time has become bedbound due to medical conditions. At baseline, she is alert and oriented x4 without baseline dementia. Last known normal was last night while daughter was visiting her at the nursing home. Pt has had multiple admissions this year with similar presentation, noted to have poor PO intake and progressive debility with failure to thrive.    In the ED, /60, , RR 12 with SpO2 99% on RA, afebrile. Noted to be in a fib with RVR on cardiac monitor and EKG. Labs notable for stable anemia, Na 141, K 3.2, bicarb 18, elevated BUN/creatinine 58/2.1, elevated T bili and alk phos. , troponin 0.147. Lactate wnl. Sepsis work-up initiated after brief episode of hypotension with blood cultures collected, IVF resuscitation (2L total via EMS and ED), and broad-spectrum antibiotics started. UA with evidence of UTI. CT head without contrast with no findings concerning for acute intracranial processes.  She will be admitted to hospital medicine for failure to thrive, management of a fib with RVR and  possible urosepsis.         Overview/Hospital Course:  Pt with hx of HFpEF, persistent atrial fibrillation, recurrent UTI, who presented to [facility] ED via EMS on 09/13/23 with decreased level of consciousness after 2 days of progressively worsening lethargy and poor PO intake. Admitted to hospital medicine for failure to thrive, management of a fib with RVR and possible urosepsis. At initial presentation, pt was normotensive with elevated HR noted to be in a fib with RVR on cardiac monitoring and ECG. Unable to give history or answer ROS in state of altered mentation. Broad-spectrum antibiotics were started, cautious IVF resuscitation given cardiac hx. HR uncontrolled with home amiodarone, BB and diltiazem deferred due to labile blood pressures as well as possible sepsis. Digoxin loaded followed by every other day dose, determined by pharmacy. HR better controlled within 110s. Gynecology consulted for labial abscess noted on physical exam, no indications for I&D based on size and location of the lesion. Affected area managed by heat compresses Q4H to allow spontaneous drainage. SLP consulted for swallowing evaluation due to uncertain aspiration risk 2/2 current condition.     Pt has had multiple admissions this year similar in presentation with most recent admission 06/23 due to acute on chronic heart failure exacerbation, noted to have UTI, very poor PO intake and progressive debility at that time as well. Palliative care consulted during current admission for assistance in discussion with family about goals of care. Family made aware of this decision with agreement. Final urine culture with proteus (pansensitive) and klebsiella (previously sensitive in 05/23, now resistant to most). Pt was on vanc/zosyn since admission, but following culture results, zosyn discontinued and ertapenem was started. ID consulted with final recommendations to continue ertapenem for UTI and transition to doxycycline 100 mg BID instead of  vanc due to age and poor renal function.  Given IVF rehydration when clinically indicated on daily assessments.     Pt continued to have notably very poor PO intake during hospitalization with minimal change in level of consciousness/alertness despite active treatment of principal problems. Extensive discussions with family taken place in regard to goals of care and advanced care planning. Ultimately, family expressed the importance of maintaining current medical treatment with IV abx for UTI treatment; however, no escalation of care such as ICU level care, including vasopressors and central line placement. Family made decision of pt returning to her assisted living facility with [facility].       Interval History: Patient in bed this AM, has been declining meds. Patient with some discomfort. On last day of Ertapenem to treat UTI. No family at bedside this AM. Family wants patient to go with hospice tomorrow to allow for things to be set up.    Review of Systems   Unable to perform ROS: Mental status change     Objective:     Vital Signs (Most Recent):  Temp: 98.9 °F (37.2 °C) (09/19/23 0530)  Pulse: 77 (09/19/23 0757)  Resp: 14 (09/19/23 0757)  BP: (!) 89/54 (09/19/23 0757)  SpO2: 98 % (09/19/23 0757) Vital Signs (24h Range):  Temp:  [96.4 °F (35.8 °C)-98.9 °F (37.2 °C)] 98.9 °F (37.2 °C)  Pulse:  [] 77  Resp:  [14-25] 14  SpO2:  [90 %-100 %] 98 %  BP: ()/(51-77) 89/54     Weight: 59 kg (130 lb 1.1 oz)  Body mass index is 23.04 kg/m².    Intake/Output Summary (Last 24 hours) at 9/19/2023 0850  Last data filed at 9/19/2023 0600  Gross per 24 hour   Intake 199.96 ml   Output 100 ml   Net 99.96 ml         Physical Exam  Vitals and nursing note reviewed.   Constitutional:       General: She is not in acute distress.     Appearance: She is ill-appearing. She is not toxic-appearing.   HENT:      Head: Normocephalic and atraumatic.   Cardiovascular:      Rate and Rhythm: Normal rate.      Pulses: Normal  pulses.      Heart sounds: Normal heart sounds.   Pulmonary:      Effort: Pulmonary effort is normal. No respiratory distress.      Breath sounds: No wheezing.   Chest:      Chest wall: No tenderness.   Abdominal:      General: Abdomen is flat. There is no distension.      Palpations: Abdomen is soft.      Tenderness: There is no abdominal tenderness. There is no guarding or rebound.   Genitourinary:     Comments: Labial abscess noted (see picture in media).  Musculoskeletal:      Cervical back: No tenderness.      Comments: Upper and lower extremities are edematous L>R. 2-3+ pitting edema of lower extremities.    Skin:     General: Skin is warm and dry.      Coloration: Skin is not jaundiced.      Findings: Bruising present. No erythema or rash.      Comments: Sacral pressure ulcer, grade I.    Neurological:      Comments: Unable to participate in exam.              Significant Labs: All pertinent labs within the past 24 hours have been reviewed.    Significant Imaging: I have reviewed all pertinent imaging results/findings within the past 24 hours.      Assessment/Plan:      * Failure to thrive in adult  Pt is a 89 year old female with hx of HRpEF, persistent atrial fibrillation on AC, SSS s/p PPM, amongst other medical hx including recurrent UTI's and multiple hospital admissions this year with progressively worsening debility and failure to thrive. Initially presented to ED via EMS after being found with decreased level of consciousness after 2 days of poor PO intake and lethargy. Admitted to hospital medicine for failure to thrive, management of a fib with RVR and possible urosepsis.     --daughter, Génesis Araiza, and son, Ari Ortiz, are joint HCPOA.   --engaged in extensive discussion with family regarding goals of care, current medical status, and prognosis.  --family has had extensive discussions regarding pt's quality of life and progressive decline, requested code status to be DNR/DNI  --code  status has been updated accordingly  --family wishes for treatment of acute illness where possible, but will likely discharge on home hospice  --palliative care consulted, appreciate the assistance regarding goals of care    --given pt with multiple admissions this year and progressively worsening decline noted by family, likely with each infection or hospitalization her baseline will decrease   --pt remains high risk for subsequent hospitalization and future infections similar to that on admission   --social work consult placed for home hospice planning    Urine culture with resistant klebsiella; continue ertapenem.   -ID consulted, appreciate recs    Family made aware of current status and changes to medical management via telephone.   Goals of care were discussed voluntarily, please see ACP note dated on 09/15/23.  Will discuss with family about comfort measures today and limiting unnecessary lab testing and therapy.    Labial abscess  -see severe sepsis      Urinary tract infection due to ESBL Klebsiella  -see severe sepsis    NSTEMI (non-ST elevated myocardial infarction), type 2 demand  Hx of persistent a fib, sick sinus syndrome s/p PPM, and HFpEF.     --found to have elevated troponin on initial presentation  --troponin trend is relatively flat, negative delta change   --0.147>0.156>0.157>0.155  --ECG without evidence of acute ST or T wave changes concerning for ischemia  --has not c/o chest pain or tightness, will continue to monitor     Hypokalemia  --K 3.2 on admission, replaced  --daily CMP, magnesium, phosphate   --electrolyte replacements as clinically appropriate    Severe sepsis  This patient does have evidence of infective focus  My overall impression is sepsis.  Source: Urinary Tract and Skin and Soft Tissue (location labia)  Antibiotics given-   Antibiotics (72h ago, onward)    Start     Stop Route Frequency Ordered    09/19/23 1045  ertapenem (INVANZ) 500 mg in sodium chloride 0.9% 100 mL IVPB     "     09/20/23 1044 IV Every 24 hours (non-standard times) 09/19/23 0849    09/16/23 0830  doxycycline (VIBRAMYCIN) 100 mg in dextrose 5 % in water (D5W) 100 mL IVPB (MB+)         -- IV Every 12 hours (non-standard times) 09/16/23 0726        Latest lactate reviewed-  No results for input(s): "LACTATE" in the last 72 hours.  Organ dysfunction indicated by Acute kidney injury    Fluid challenge Ideal Body Weight- The patient's ideal body weight is Ideal body weight: 52.4 kg (115 lb 8.3 oz) which will be used to calculate fluid bolus of 30 ml/kg for treatment of septic shock.      Post- resuscitation assessment Yes Perfusion exam was performed within 6 hours of septic shock presentation after bolus shows Adequate tissue perfusion assessed by non-invasive monitoring     Will Not start Pressors- Levophed for MAP of 65    --pt with hx of recurrent UTI's and UA suggestive of infection on admission   --previous urine cx with pansensitivity (e coli, klebsiella)  --presence of labial abscess (see image in media)   --gynecology consulted, appreciate recs   --per gyn, I+D not indicated based on size and location; also significant concern for pt's ability to tolerate procedure and for her tissue's ability to heal via secondary intention. Would reconsider surgical intervention if worsening signs of localized infection or tissue breakdown.   --recommend hot compresses Q4H to affected area   --will discontinue vanc, start doxycycline 100 mg Q12H by IV, per ID recs.     --received 3L total IVF upon admission, lactate wnl   --repeat lactate remains wnl  --vanc/zosyn for broad coverage, dosed by pharmacy  --initial blood cultures preliminary growth of gram positive cocci resembling staph   --repeat blood cultures negative to date  --final urine cultures with proteus (sensitive) and klebsiella (previously sensitive, now resistant to most)  --discontinued zosyn, started ertapenem with ID consulted, appreciate recs.  --f/u blood cultures "     Encounter for palliative care        MARIO (acute kidney injury)  Hx of chronic kidney disease, stage 3b. On admission, Cr 2.1 w/ baseline 1.2-1.3. Recent hospitalization w/ CHF exacerbation/hypervolemia and prerenal MARIO, resolved with IVF.     --IVF rehydration as tolerated given hx of chronic diastolic heart failure.   --renally dosing medications as appropriate  --avoid nephrotoxic agents   --continue to monitor renal function and urine output  --strict I/Os, bladder scan PRN urinary retention, straight/coughlin cath PRN bladder scan > 300 cc         Anemia due to chronic kidney disease  --hb/hct on presentation 11.2/35.7  --hx of anemia with variable hb/hct levels reviewed in chart    --stable, continue to monitor    Chronic diastolic heart failure  Hx of chronic heart failure with preserved ejection fraction.     --continue midodrine when able for hypotension    Last TTE 06/25/23:   Normal LV size with concentric remodeling and normal systolic function.   The estimated ejection fraction is 65%.   Grade III left ventricular diastolic dysfunction.   Severe left atrial enlargement.      Acquired hypothyroidism  --continue home synthroid when able  --f/u TSH      Cardiac pacemaker in situ  Hx SSS s/p PPM. Atenolol d/c'ed last admission due to bradycardia. Unclear if pacemaker is functioning, will investigate further, pacer spikes noted on telemetry.     --can consider device interrogation while inpatient if indicated  --last interrogated 08/17/23    Persistent atrial fibrillation  Hx of persistent atrial fibrillation on apixaban 2.5 mg BID for anticoagulation, amiodarone 200 mg daily. Previously was on atenolol which was d/c'ed on last admission and not restarted while outpatient as pt lost to follow-up. While in the ED, pt normotensive but remained in a fib with RVR despite 3L total IVF resuscitation.     --lovenox for thromboembolism prophylaxis 2/2 A Fib, as pt unable to tolerate PO  --digoxin started due to  pt with possible sepsis, not a candidate for IV metoprolol or diltiazem   --previously on amio without success   --digoxin 2 mcg/kg now, followed by two 1 mcg/kg doses Q6H (load completed)   --will assess response and consider PO maintenance dose if mentation allows    --evaluated by SLP for swallow study, appreciate recs  --concern for digoxin toxicity given MARIO, hypokalemia, and amio use   --serum digoxin level after load 0.5   --digoxin 0.0625 mg IV every other day, dosing per pharmacy starting today 09/15/23  --will monitor clinical status closely      VTE Risk Mitigation (From admission, onward)         Ordered     enoxaparin injection 60 mg  Every 24 hours         09/16/23 1208     IP VTE HIGH RISK PATIENT  Once         09/12/23 1608     Place sequential compression device  Until discontinued         09/12/23 1608                Discharge Planning   FRANCESCA: 9/20/2023     Code Status: DNR   Is the patient medically ready for discharge?: No    Reason for patient still in hospital (select all that apply): Pending disposition  Discharge Plan A: Assisted Living (with [facility])   Discharge Delays: None known at this time              Matt Taveras MD  Department of Hospital Medicine   [facility] - Telemetry Stepdown

## 2023-09-19 NOTE — PLAN OF CARE
Pt is responding to voice, awaiting d/c with hospice. VSS, no acute distress noted. BUE edema, weeping serous fluid. Turn q2h. Plan of care of going.    Problem: Adult Inpatient Plan of Care  Goal: Plan of Care Review  Outcome: Ongoing, Progressing  Goal: Patient-Specific Goal (Individualized)  Outcome: Ongoing, Progressing  Goal: Optimal Comfort and Wellbeing  Outcome: Ongoing, Progressing  Goal: Readiness for Transition of Care  Outcome: Ongoing, Progressing

## 2023-09-19 NOTE — SUBJECTIVE & OBJECTIVE
Interval History: Patient in bed this AM, has been declining meds. Patient with some discomfort. On last day of Ertapenem to treat UTI. No family at bedside this AM. Family wants patient to go with hospice tomorrow to allow for things to be set up.    Review of Systems   Unable to perform ROS: Mental status change     Objective:     Vital Signs (Most Recent):  Temp: 98.9 °F (37.2 °C) (09/19/23 0530)  Pulse: 77 (09/19/23 0757)  Resp: 14 (09/19/23 0757)  BP: (!) 89/54 (09/19/23 0757)  SpO2: 98 % (09/19/23 0757) Vital Signs (24h Range):  Temp:  [96.4 °F (35.8 °C)-98.9 °F (37.2 °C)] 98.9 °F (37.2 °C)  Pulse:  [] 77  Resp:  [14-25] 14  SpO2:  [90 %-100 %] 98 %  BP: ()/(51-77) 89/54     Weight: 59 kg (130 lb 1.1 oz)  Body mass index is 23.04 kg/m².    Intake/Output Summary (Last 24 hours) at 9/19/2023 0850  Last data filed at 9/19/2023 0600  Gross per 24 hour   Intake 199.96 ml   Output 100 ml   Net 99.96 ml         Physical Exam  Vitals and nursing note reviewed.   Constitutional:       General: She is not in acute distress.     Appearance: She is ill-appearing. She is not toxic-appearing.   HENT:      Head: Normocephalic and atraumatic.   Cardiovascular:      Rate and Rhythm: Normal rate.      Pulses: Normal pulses.      Heart sounds: Normal heart sounds.   Pulmonary:      Effort: Pulmonary effort is normal. No respiratory distress.      Breath sounds: No wheezing.   Chest:      Chest wall: No tenderness.   Abdominal:      General: Abdomen is flat. There is no distension.      Palpations: Abdomen is soft.      Tenderness: There is no abdominal tenderness. There is no guarding or rebound.   Genitourinary:     Comments: Labial abscess noted (see picture in media).  Musculoskeletal:      Cervical back: No tenderness.      Comments: Upper and lower extremities are edematous L>R. 2-3+ pitting edema of lower extremities.    Skin:     General: Skin is warm and dry.      Coloration: Skin is not jaundiced.       Findings: Bruising present. No erythema or rash.      Comments: Sacral pressure ulcer, grade I.    Neurological:      Comments: Unable to participate in exam.              Significant Labs: All pertinent labs within the past 24 hours have been reviewed.    Significant Imaging: I have reviewed all pertinent imaging results/findings within the past 24 hours.

## 2023-09-19 NOTE — PT/OT/SLP PROGRESS
Speech Language Pathology      Gisselle Ortiz  MRN: [id number]  [id number] A    Patient not seen today secondary to Patient unwilling to participate. Patient shaking head no, turning head away, and declining all po trials. ST will continue to follow as appropriate.

## 2023-09-19 NOTE — PLAN OF CARE
Spoke with patient's daughter, Melly, about inpatient hospice as patient is on IV morphine and ativan. Melly refuses for patient to be moved out of the hospital to inpatient hospice at this time. Offered to send a referral for an inpatient hospice representative to speak with Melly about their services, however, Melly declined. Updated MD team.    Viki Torre, LCSW [facility]  Ext. [phone]

## 2023-09-19 NOTE — CARE UPDATE
Spoke with Melly (patient's daughter) and confirmed that we are focusing on comfort care measures for Ms Ortiz. Patients daughter is in agreement. PRN morphine and ativan added as patient with pain this AM.    Daughter has concerns with patient going home with hospice. Inpatient hospice was offered and case management spoke with patient about options. Patients daughter declining at this time as she prefers her mom to pass in the hospital.    Spoke with Dr. Vasquez from Palliative Medicine who will assess patient.

## 2023-09-19 NOTE — ASSESSMENT & PLAN NOTE
"This patient does have evidence of infective focus  My overall impression is sepsis.  Source: Urinary Tract and Skin and Soft Tissue (location labia)  Antibiotics given-   Antibiotics (72h ago, onward)    Start     Stop Route Frequency Ordered    09/19/23 1045  ertapenem (INVANZ) 500 mg in sodium chloride 0.9% 100 mL IVPB         09/20/23 1044 IV Every 24 hours (non-standard times) 09/19/23 0849    09/16/23 0830  doxycycline (VIBRAMYCIN) 100 mg in dextrose 5 % in water (D5W) 100 mL IVPB (MB+)         -- IV Every 12 hours (non-standard times) 09/16/23 0726        Latest lactate reviewed-  No results for input(s): "LACTATE" in the last 72 hours.  Organ dysfunction indicated by Acute kidney injury    Fluid challenge Ideal Body Weight- The patient's ideal body weight is Ideal body weight: 52.4 kg (115 lb 8.3 oz) which will be used to calculate fluid bolus of 30 ml/kg for treatment of septic shock.      Post- resuscitation assessment Yes Perfusion exam was performed within 6 hours of septic shock presentation after bolus shows Adequate tissue perfusion assessed by non-invasive monitoring     Will Not start Pressors- Levophed for MAP of 65    --pt with hx of recurrent UTI's and UA suggestive of infection on admission   --previous urine cx with pansensitivity (e coli, klebsiella)  --presence of labial abscess (see image in media)   --gynecology consulted, appreciate recs   --per gyn, I+D not indicated based on size and location; also significant concern for pt's ability to tolerate procedure and for her tissue's ability to heal via secondary intention. Would reconsider surgical intervention if worsening signs of localized infection or tissue breakdown.   --recommend hot compresses Q4H to affected area   --will discontinue vanc, start doxycycline 100 mg Q12H by IV, per ID recs.     --received 3L total IVF upon admission, lactate wnl   --repeat lactate remains wnl  --vanc/zosyn for broad coverage, dosed by " pharmacy  --initial blood cultures preliminary growth of gram positive cocci resembling staph   --repeat blood cultures negative to date  --final urine cultures with proteus (sensitive) and klebsiella (previously sensitive, now resistant to most)  --discontinued zosyn, started ertapenem with ID consulted, appreciate recs.  --f/u blood cultures

## 2023-09-19 NOTE — ASSESSMENT & PLAN NOTE
Gisselle Ortiz is a 89-year-old woman with a history of HFpEF, atrial fibrillation, sick sinus syndrome s/p PPM, HTN, CKD3b, hypothyroidism, recurrent UTI, debility, severe protein calorie malnutrition who was admitted from [facility] to [facility] for acute encephalopathy and sepsis 2/2 UTI, complicated by atrial fibrillation with RVR. Despite appropriate medical intervention, Ms. Ortiz continues to decline. Transitioned to comfort focused care with original plans to return to assisted living facility with hospice support, which was discontinued due to fears of transfer that may cause Ms. Ortiz to pass en route. I do not believe that this will happen. However, goals are consistent to optimize comfort. Palliative and Supportive Care was consulted to assist with hospice discussion.    Advance Care Planning   Goals of Care:  - Code status: DNAR/DNI  - HCPOA: daughter Génesis Araiza ([phone]) and son Ari Ortiz ([phone])  - Patient does not have decision making capacity  - Prognosis: poor, days  - Family's understanding of prognosis: fair  - Goals: comfort  - Recommendations: please consult [facility] for evaluation of inpatient hospice    Goals of Care Conversation:  - 9/19: I met Ms. Ortiz, who appears to be sleeping in her hospital room, but does not respond verbally nor open her eyes when I speak or touch her shoulder. She is demonstrating very short apneic episodes and her radial pulses are thready. She has no eye brow furrowing or other signs of pain. There is weeping across her arms and her feet are edematous. I called her daughter Génesis and introduced myself. Explained that I had spoke with Dr. Booth and she verified the conversations surrounding their understanding of Ms. Ortiz's poor prognosis and that she is at the end of life. Génesis reiterated her concerns that after speaking with a nurse who told her that Ms. Ortiz wouldn't make  it out of the hospital alive, she was left with the impression that by transferring her out of [facility], this would expedite her death in case she  en route to the nursing home or an inpatient hospice facility. She stressed that she wants her mother to stay comfortable and feels that her mother does not have very much longer to live. She is hopeful if her mother could stay at [facility] as she believes her mother is passing away soon. I expressed understanding and agreed that her priority is to optimize comfort and minimize suffering. I shared that I believe her mother looks comfortable thankfully, and admitted that had I thought that her symptom burden was severely uncontrolled, then I would have recommended transfer to an inpatient hospice unit where the staff there are trained to observe and treat any signs of suffering. I shared about the partnership of [facility] with our hospital and that Dr. Booth had reached out to ask about this as a way to help facilitate comfort focused care in [facility], but unfortunately was met with resistance. I proposed that we at least allow [facility] to assess her formally, and that if they disagree with my assessment that Ms. Ortiz is in her last days of life, then we know we've tried everything we can to try to keep her at [facility] and can request for evaluation by [facility] inpatient hospice. I shared that this assessment will likely not occur today, so I will be in touch tomorrow as well to do my best to help support them during this time. Génesis expressed understanding and gratefulness. Will continue to follow along.       Comfort Recommendations:    Dyspnea / Secretions  - Morphine 4 mg IV q1h PRN dyspnea or breakthrough pain  - Lorazepam 1 mg IV q1h PRN dyspnea refractory to opioids, anxiety  - Glycopyrrolate 0.1 mg IV q8h PRN secretions  - Frequent oral care  - Gentle repositioning of head to shift oropharyngeal secretions out of airway  - Yankauer suction at  bedside  - Fan at bedside  - Avoid artificial hydration    Pain  - Opioids as above    Agitation / Delirium / Anxiety  - Gentle reorientation techniques, minimize environmental stimuli  - Discontinue laboratory draws and remove non-essential lines  - Treat for pain/dyspnea as above  - Lorazepam 1 mg IV q1h PRN anxiety, dyspnea refractory to opioids  - Consider addition of haldol 1 mg IV q4h PRN agitation if above measures do not work    Nutrition / Hydration  - Avoid IV fluids and artificial nutrition  - Comfort feedings as desired and tolerated  - Frequent oral care    Nausea  - Ondansetron 4 mg IV q6h PRN nausea/vomiting    Bowel Regimen  - Will not prioritize at end-of-life    Fever  - Apply cold compresses  - Fan at bedside

## 2023-09-19 NOTE — PHYSICIAN QUERY
PT Name: Gisselle Ortiz  MR #: [id number]    DOCUMENTATION CLARIFICATION     CDS/: Katty Lawrence RN CDi        Contact information: [email or web address]   This form is a permanent document in the medical record.     Query Date: September 19, 2023    By submitting this query, we are merely seeking further clarification of documentation. Please utilize your independent clinical judgment when addressing the question(s) below.    The Medical Record contains the following:   Indicators   Supporting Clinical Findings Location in Medical Record   X AMS, Confusion,  LOC, etc.  presented to ED with AMS, generalized weakness, fatigue, decreased oral intake.    presented to the ED via EMS after being found by nursing home staff altered with reduced level of consciousness this morning. Pt reported to have been increasingly lethargic with poor PO intake for the past 2 days. After discussion with family at bedside and telephone, it is reported that pt is normally functional, though over time has become bedbound due to medical conditions. At baseline, she is alert and oriented x4 without baseline dementia. Last known normal was last night while daughter was visiting her at the nursing home   ER 9/12 VIPIN Ring MD / ROMAN Sanderson MD    H&P 9/12  JASPER Purcell MD/ ALIRIO Harrison MD   X Acute/Chronic Illness Severe sepsis  UTI  Failure to thrive  Persistent a fib    NSTEMI type 2    H&P 9/12  JASPER Purcell MD/ ALIRIO Harrison MD   X Radiology Findings  Numerous pre-existing findings, without CT evidence of acute intracranial hemorrhage, acute large vascular territory brain infarct, or acute intracranial mass effect.   CT Head 9/12   X Electrolyte Imbalance Hypokalemia POA  MARIO   H med 9/18 JASPER Purcell MD/ ALIRIO Man MD     X Medication Discontinue Vancomycin due to advanced age and poor renal function  Start Doxycycline 100 mg PO BID     Zosyn transitioned to Ertapenem  Continue Ertapenem 500 mg IV q 24 hours (renal dosing) for 3-5 days ID COnsult 9/15 H  Sera MORENO/ GERI Pate MD    Treatment              Other       The noted clinical guidelines are only system guidelines and do not replace the providers clinical judgment.    The [place] of Neurologic Disorders and Stroke (NINDS) of the NIH describes encephalopathy as any diffuse disease of the brain that alters brain function or structure.    Provider, please specify the diagnosis or diagnoses associated with above clinical findings.  [ x  ] Metabolic Encephalopathy - Due to electrolyte imbalance, metabolic derangements, or infectious processes, includes Septic Encephalopathy, Uremic Encephalopathy     [   ] Encephalopathy, unspecified        [   ] Other Encephalopathy (please specify): ____________________     [   ] Other neurological condition- Includes Post-ictal altered mental status (please specify condition): __________     [   ]  Clinically Undetermined       Please document in your progress notes daily for the duration of treatment until resolved, and include in your discharge summary.    References:  CITLALI Pedroza RN, CCDS. (2018, June 9). Notes from the Instructor: Encephalopathy tips. Retrieved October 22, 2020, from [email or web address]/[email or web address]-[email or web address]-encephalopathy-tips    ICD-9-CM Coding Clinic First Quarter 2013, Effective with discharges: October 21, 2013 [facility] § Seizure with encephalopathy due to postictal state (2013).    ICD-10-CM/[facility] Integrated Codebook (Version V.20.8.10.0) [Computer software]. (2020). Retrieved October 21, 2020.    [place] of Neurological Disorders and Stroke. (2019, March 27). Retrieved October 22, 2020, from https://www.ninds.nih.gov/Disorders/All-Disorders/Gbhobaqttjatoj-Kvatmqibvza-Rctb    Form No. 22295

## 2023-09-19 NOTE — CONSULTS
[facility] - Telemetry Stepdown  Palliative Medicine  Progress Note    Patient Name: Gisselle Ortiz  MRN: [id number]  Admission Date: 9/12/2023  Hospital Length of Stay: 7 days  Code Status: DNR   Attending Provider: Tim Man MD  Consulting Provider: Radha Vasquez MD  Primary Care Physician: Kai Montez MD  Principal Problem:Failure to thrive in adult    Patient information was obtained from patient, relative(s) and primary team.      Assessment/Plan:     Palliative Care  Encounter for palliative care  Gisselle Ortiz is a 89-year-old woman with a history of HFpEF, atrial fibrillation, sick sinus syndrome s/p PPM, HTN, CKD3b, hypothyroidism, recurrent UTI, debility, severe protein calorie malnutrition who was admitted from [facility] to [facility] for acute encephalopathy and sepsis 2/2 UTI, complicated by atrial fibrillation with RVR. Despite appropriate medical intervention, Ms. Ortiz continues to decline. Transitioned to comfort focused care with original plans to return to assisted living facility with hospice support, which was discontinued due to fears of transfer that may cause Ms. Ortiz to pass en route. I do not believe that this will happen. However, goals are consistent to optimize comfort. Palliative and Supportive Care was consulted to assist with hospice discussion.    Advance Care Planning   Goals of Care:  - Code status: DNAR/DNI  - HCPOA: daughter Génesis Araiza ([phone]) and son rAi Ortiz ([phone])  - Patient does not have decision making capacity  - Prognosis: poor, days  - Family's understanding of prognosis: fair  - Goals: comfort  - Recommendations: please consult [facility] for evaluation of inpatient hospice    Goals of Care Conversation:  - 9/19: I met Ms. Ortiz, who appears to be sleeping in her hospital room, but does not respond verbally nor open her eyes when I speak or touch her shoulder. She is  demonstrating very short apneic episodes and her radial pulses are thready. She has no eye brow furrowing or other signs of pain. There is weeping across her arms and her feet are edematous. I called her daughter Génesis and introduced myself. Explained that I had spoke with Dr. Booth and she verified the conversations surrounding their understanding of Ms. Ortiz's poor prognosis and that she is at the end of life. Génesis reiterated her concerns that after speaking with a nurse who told her that Ms. Ortiz wouldn't make it out of the hospital alive, she was left with the impression that by transferring her out of [facility], this would expedite her death in case she  en route to the nursing home or an inpatient hospice facility. She stressed that she wants her mother to stay comfortable and feels that her mother does not have very much longer to live. She is hopeful if her mother could stay at [facility] as she believes her mother is passing away soon. I expressed understanding and agreed that her priority is to optimize comfort and minimize suffering. I shared that I believe her mother looks comfortable thankfully, and admitted that had I thought that her symptom burden was severely uncontrolled, then I would have recommended transfer to an inpatient hospice unit where the staff there are trained to observe and treat any signs of suffering. I shared about the partnership of [facility] with our hospital and that Dr. Booth had reached out to ask about this as a way to help facilitate comfort focused care in [facility], but unfortunately was met with resistance. I proposed that we at least allow [facility] to assess her formally, and that if they disagree with my assessment that Ms. Ortiz is in her last days of life, then we know we've tried everything we can to try to keep her at [facility] and can request for evaluation by [facility] inpatient [facility]ice. I shared that this assessment will likely not occur  today, so I will be in touch tomorrow as well to do my best to help support them during this time. Génesis expressed understanding and gratefulness. Will continue to follow along.      Comfort Recommendations:    Dyspnea / Secretions  - Morphine 4 mg IV q1h PRN dyspnea or breakthrough pain  - Lorazepam 1 mg IV q1h PRN dyspnea refractory to opioids, anxiety  - Glycopyrrolate 0.1 mg IV q8h PRN secretions  - Frequent oral care  - Gentle repositioning of head to shift oropharyngeal secretions out of airway  - Yankauer suction at bedside  - Fan at bedside  - Avoid artificial hydration    Pain  - Opioids as above    Agitation / Delirium / Anxiety  - Gentle reorientation techniques, minimize environmental stimuli  - Discontinue laboratory draws and remove non-essential lines  - Treat for pain/dyspnea as above  - Lorazepam 1 mg IV q1h PRN anxiety, dyspnea refractory to opioids  - Consider addition of haldol 1 mg IV q4h PRN agitation if above measures do not work    Nutrition / Hydration  - Avoid IV fluids and artificial nutrition  - Comfort feedings as desired and tolerated  - Frequent oral care    Nausea  - Ondansetron 4 mg IV q6h PRN nausea/vomiting    Bowel Regimen  - Will not prioritize at end-of-life    Fever  - Apply cold compresses  - Fan at bedside          I will follow-up with patient. Please contact us if you have any additional questions.    Subjective:     Chief Complaint:   Chief Complaint   Patient presents with    Fatigue     From sunrise per staff patient patient having decline over the past 2 days       HPI:   Gisselle Ortiz is a 89-year-old woman with a history of HFpEF, atrial fibrillation, sick sinus syndrome s/p PPM, HTN, CKD3b, hypothyroidism, recurrent UTI, debility who was admitted from [facility] to [facility] for acute encephalopathy and sepsis 2/2 UTI, complicated by atrial fibrillation with RVR. Palliative and Supportive Care was consulted to explore goals of  care.      Hospital Course:  No notes on file    Interval History: Met Ms. Ortiz who was alone in my encounter. Able to tell me her name and location is [facility], but not oriented to time or why she is here. Didn't know where she was before arriving to [facility].    Past Medical History:   Diagnosis Date    A-fib     Anemia of chronic disease 02/02/2021    Anticoagulant long-term use     CHF (congestive heart failure)     Chronic diastolic heart failure 02/02/2021    COVID-19 01/07/2022    Gallstone pancreatitis     Hypertension     Pancreatic abscess 09/26/2020    Stage 3 chronic kidney disease 5/11/2016    Thyroid disease        Past Surgical History:   Procedure Laterality Date    CATARACT EXTRACTION      CATARACT EXTRACTION W/  INTRAOCULAR LENS IMPLANT Bilateral 2004    DR IN [place]    ENDOSCOPIC ULTRASOUND OF UPPER GASTROINTESTINAL TRACT N/A 9/14/2020    Procedure: ULTRASOUND, UPPER GI TRACT, ENDOSCOPIC;  Surgeon: Esah Howard MD;  [address]  Service: Endoscopy;  Laterality: N/A;    ENDOSCOPIC ULTRASOUND OF UPPER GASTROINTESTINAL TRACT  11/25/2020    Procedure: ULTRASOUND, UPPER GI TRACT, ENDOSCOPIC;  Surgeon: Esha Howard MD;  Location: [facility] (2ND FLR);  Service: Endoscopy;;    ERCP N/A 9/14/2020    Procedure: ERCP (ENDOSCOPIC RETROGRADE CHOLANGIOPANCREATOGRAPHY);  Surgeon: Esha Howard MD;  Location: [facility] (2ND FLR);  Service: Endoscopy;  Laterality: N/A;    ERCP N/A 9/25/2020    Procedure: ERCP (ENDOSCOPIC RETROGRADE CHOLANGIOPANCREATOGRAPHY);  Surgeon: Esha Howard MD;  Location: [facility] (2ND FLR);  Service: Endoscopy;  Laterality: N/A;    ERCP N/A 11/25/2020    Procedure: ERCP (ENDOSCOPIC RETROGRADE CHOLANGIOPANCREATOGRAPHY);  Surgeon: Esha Howard MD;  Location: [facility] (2ND FLR);  Service: Endoscopy;  Laterality: N/A;  Covid-19 test 11/22/20 at [facility] - pg  2 day hold Dr Favian Pisano - pg  PM prep     EYE SURGERY      HIP REPLACEMENT ARTHROPLASTY Right 2016    HYSTERECTOMY      REVISION OF SKIN POCKET FOR PACEMAKER N/A 1/21/2019    Procedure: REVISION-POCKET-PACEMAKER;  Surgeon: Asher Watts MD;  Location: [facility] EP LAB;  Service: Cardiology;  Laterality: N/A;  MODERATE SEDATION, sedation issues in the past    THYROIDECTOMY      TREATMENT OF CARDIAC ARRHYTHMIA N/A 3/18/2019    Procedure: CARDIOVERSION OR DEFIBRILLATION;  Surgeon: Asher Watts MD;  Location: [facility] EP LAB;  Service: Cardiology;  Laterality: N/A;  AF, JILL-cx if SR, DCCV, MAC, FAS, 3PREP    TREATMENT OF CARDIAC ARRHYTHMIA N/A 5/14/2019    Procedure: Cardioversion or Defibrillation;  Surgeon: Derick Vizcarra MD;  Location: [facility] EP LAB;  Service: Cardiology;  Laterality: N/A;  AF, JILL, DCCV, MAC, DM, 3PREP    TREATMENT OF CARDIAC ARRHYTHMIA N/A 6/21/2022    Procedure: Cardioversion or Defibrillation;  Surgeon: Favian Colmenares MD;  Location: [facility] EP LAB;  Service: Cardiology;  Laterality: N/A;  AF, JILL, DCCV, MAC, DM, 3 Prep *SJM PPM in situ*       Review of patient's allergies indicates:   Allergen Reactions    Ciprofloxacin Nausea And Vomiting       Medications:  Continuous Infusions:      Scheduled Meds:      PRN Meds:acetaminophen, glycopyrrolate, lorazepam, morphine, ondansetron, prochlorperazine, sodium chloride 0.9%    Family History       Problem Relation (Age of Onset)    Heart attack Mother, Maternal Grandmother    Heart disease Mother, Maternal Grandmother    Heart failure Mother, Maternal Grandmother    Hypertension Mother, Maternal Grandmother    Stroke Maternal Grandmother          Tobacco Use    Smoking status: Former     Types: Cigarettes     Start date: 5/19/1964    Smokeless tobacco: Never   Substance and Sexual Activity    Alcohol use: No    Drug use: No    Sexual activity: Not Currently     Partners: Male       Review of Systems   Unable to perform ROS: Patient unresponsive     Objective:     Vital  Signs (Most Recent):  Temp: 98 °F (36.7 °C) (09/19/23 1248)  Pulse: 83 (09/19/23 1248)  Resp: 18 (09/19/23 1248)  BP: (!) 91/52 (09/19/23 1248)  SpO2: 98 % (09/19/23 1248) Vital Signs (24h Range):  Temp:  [96.4 °F (35.8 °C)-98.9 °F (37.2 °C)] 98 °F (36.7 °C)  Pulse:  [] 83  Resp:  [14-25] 18  SpO2:  [91 %-100 %] 98 %  BP: ()/(51-77) 91/52     Weight: 59 kg (130 lb 1.1 oz)  Body mass index is 23.04 kg/m².       Physical Exam  Constitutional:       General: She is not in acute distress.     Appearance: She is ill-appearing.   HENT:      Head: Normocephalic and atraumatic.      Right Ear: External ear normal.      Left Ear: External ear normal.      Nose: Nose normal.      Mouth/Throat:      Mouth: Mucous membranes are dry.   Eyes:      Conjunctiva/sclera: Conjunctivae normal.   Cardiovascular:      Rate and Rhythm: Normal rate.      Comments: Thready radial pulse  Pulmonary:      Effort: Pulmonary effort is normal.      Breath sounds: Normal breath sounds.      Comments: Very short apneic episodes  Abdominal:      General: Bowel sounds are normal.      Palpations: Abdomen is soft.   Musculoskeletal:         General: Swelling present.   Lymphadenopathy:      Cervical: No cervical adenopathy.   Skin:     General: Skin is dry.      Coloration: Skin is pale.      Findings: Bruising present.   Neurological:      Comments: Not responsive to voice            Review of Symptoms      Symptom Assessment (ESAS 0-10 Scale)  Unable to complete assessment due to Patient unresponsive         Pain Assessment in Advanced Demential Scale (PAINAD)   Breathing - Independent of vocalization:  0  Negative vocalization:  0  Facial expression:  0  Body language:  0  Consolability:  0  Total:  0    Living Arrangements:  Lives in nursing home    Psychosocial/Cultural:   See Palliative Psychosocial Note: No  Resident of [facility] only for a few days  **Primary  to Follow**  Palliative Care   Consult: No        Advance Care Planning  Advance Directives:   Living Will: Yes        Copy on chart: Yes    [place]: No    Do Not Resuscitate Status: Yes    Medical Power of : Yes      Decision Making:  Family answered questions and Patient unable to communicate due to disease severity/cognitive impairment  Goals of Care: The healthcare power of   endorses that what is most important right now is to focus on comfort and QOL     Accordingly, we have decided that the best plan to meet the patient's goals includes pivot to comfort-focused care           Significant Labs: CBC:   Recent Labs   Lab 09/18/23  0502 09/19/23 0518   WBC 6.93 11.03   HGB 9.9* 10.1*   HCT 32.8* 34.5*    231       CMP:   Recent Labs   Lab 09/18/23  0502 09/19/23 0518    143   K 3.8 3.8   * 116*   CO2 19* 12*   GLU 51* 57*   BUN 41* 41*   CREATININE 1.5* 1.8*   CALCIUM 7.3* 7.5*   PROT 4.5* 4.7*   ALBUMIN 1.3* 1.5*   BILITOT 0.6 0.6   ALKPHOS 118 125   AST 35 35   ALT 28 27   ANIONGAP 9 15       CBC:   Recent Labs   Lab 09/19/23 0518   WBC 11.03   HGB 10.1*   HCT 34.5*   *          BMP:  Recent Labs   Lab 09/19/23 0518   GLU 57*      K 3.8   *   CO2 12*   BUN 41*   CREATININE 1.8*   CALCIUM 7.5*   MG 2.1       LFT:  Lab Results   Component Value Date    AST 35 09/19/2023    GGT 18 06/12/2023    ALKPHOS 125 09/19/2023    BILITOT 0.6 09/19/2023     Albumin:   Albumin   Date Value Ref Range Status   09/19/2023 1.5 (L) 3.5 - 5.2 g/dL Final     Protein:   Total Protein   Date Value Ref Range Status   09/19/2023 4.7 (L) 6.0 - 8.4 g/dL Final     Lactic acid:   Lab Results   Component Value Date    LACTATE 1.5 09/15/2023    LACTATE 2.2 09/14/2023       Significant Imaging: I have reviewed all pertinent imaging results/findings within the past 24 hours.    I spent a total of 65 minutes on the day of the visit. This includes face to face time in discussion of goals of care, symptom  assessment, coordination of care and emotional support. This also includes non-face to face time preparing to see the patient (eg, review of tests/imaging), obtaining and/or reviewing separately obtained history, documenting clinical information in the electronic or other health record, independently interpreting results and communicating results to the patient/family/caregiver, or care coordinator.      Radha Vasquez MD  Palliative Medicine  [facility] - Telemetry Stepdown

## 2023-09-19 NOTE — SUBJECTIVE & OBJECTIVE
Interval History: Met Ms. Ortiz who was alone in my encounter. Able to tell me her name and location is [facility], but not oriented to time or why she is here. Didn't know where she was before arriving to [facility].    Past Medical History:   Diagnosis Date    A-fib     Anemia of chronic disease 02/02/2021    Anticoagulant long-term use     CHF (congestive heart failure)     Chronic diastolic heart failure 02/02/2021    COVID-19 01/07/2022    Gallstone pancreatitis     Hypertension     Pancreatic abscess 09/26/2020    Stage 3 chronic kidney disease 5/11/2016    Thyroid disease        Past Surgical History:   Procedure Laterality Date    CATARACT EXTRACTION      CATARACT EXTRACTION W/  INTRAOCULAR LENS IMPLANT Bilateral 2004     IN [place]    ENDOSCOPIC ULTRASOUND OF UPPER GASTROINTESTINAL TRACT N/A 9/14/2020    Procedure: ULTRASOUND, UPPER GI TRACT, ENDOSCOPIC;  Surgeon: Esha Howard MD;  Location: [facility] (2ND FLR);  Service: Endoscopy;  Laterality: N/A;    ENDOSCOPIC ULTRASOUND OF UPPER GASTROINTESTINAL TRACT  11/25/2020    Procedure: ULTRASOUND, UPPER GI TRACT, ENDOSCOPIC;  Surgeon: Esha Howard MD;  Location: [facility] (2ND FLR);  Service: Endoscopy;;    ERCP N/A 9/14/2020    Procedure: ERCP (ENDOSCOPIC RETROGRADE CHOLANGIOPANCREATOGRAPHY);  Surgeon: Esha Howard MD;  Location: [facility] ENDO (2ND FLR);  Service: Endoscopy;  Laterality: N/A;    ERCP N/A 9/25/2020    Procedure: ERCP (ENDOSCOPIC RETROGRADE CHOLANGIOPANCREATOGRAPHY);  Surgeon: Esha Howard MD;  Location: [facility] ENDO (2ND FLR);  Service: Endoscopy;  Laterality: N/A;    ERCP N/A 11/25/2020    Procedure: ERCP (ENDOSCOPIC RETROGRADE CHOLANGIOPANCREATOGRAPHY);  Surgeon: Esha Howard MD;  Location: [facility] ENDO (2ND FLR);  Service: Endoscopy;  Laterality: N/A;  Covid-19 test 11/22/20 at [place] - pg  2 day hold Dr Favian Pisano - pg  PM prep    EYE SURGERY      HIP REPLACEMENT ARTHROPLASTY Right 2016     HYSTERECTOMY      REVISION OF SKIN POCKET FOR PACEMAKER N/A 1/21/2019    Procedure: REVISION-POCKET-PACEMAKER;  Surgeon: Asher Watts MD;  Location: [facility] EP LAB;  Service: Cardiology;  Laterality: N/A;  MODERATE SEDATION, sedation issues in the past    THYROIDECTOMY      TREATMENT OF CARDIAC ARRHYTHMIA N/A 3/18/2019    Procedure: CARDIOVERSION OR DEFIBRILLATION;  Surgeon: Asher Watts MD;  Location: [facility] EP LAB;  Service: Cardiology;  Laterality: N/A;  AF, JILL-cx if SR, DCCV, MAC, FAS, 3PREP    TREATMENT OF CARDIAC ARRHYTHMIA N/A 5/14/2019    Procedure: Cardioversion or Defibrillation;  Surgeon: Derick Vizcarra MD;  Location: [facility] EP LAB;  Service: Cardiology;  Laterality: N/A;  AF, JILL, DCCV, MAC, DM, 3PREP    TREATMENT OF CARDIAC ARRHYTHMIA N/A 6/21/2022    Procedure: Cardioversion or Defibrillation;  Surgeon: Favian Colmenares MD;  Location: [facility] EP LAB;  Service: Cardiology;  Laterality: N/A;  AF, JILL, DCCV, MAC, DM, 3 Prep *SJM PPM in situ*       Review of patient's allergies indicates:   Allergen Reactions    Ciprofloxacin Nausea And Vomiting       Medications:  Continuous Infusions:      Scheduled Meds:      PRN Meds:acetaminophen, glycopyrrolate, lorazepam, morphine, ondansetron, prochlorperazine, sodium chloride 0.9%    Family History       Problem Relation (Age of Onset)    Heart attack Mother, Maternal Grandmother    Heart disease Mother, Maternal Grandmother    Heart failure Mother, Maternal Grandmother    Hypertension Mother, Maternal Grandmother    Stroke Maternal Grandmother          Tobacco Use    Smoking status: Former     Types: Cigarettes     Start date: 5/19/1964    Smokeless tobacco: Never   Substance and Sexual Activity    Alcohol use: No    Drug use: No    Sexual activity: Not Currently     Partners: Male       Review of Systems   Unable to perform ROS: Patient unresponsive     Objective:     Vital Signs (Most Recent):  Temp: 98 °F (36.7 °C) (09/19/23 1248)  Pulse: 83  (09/19/23 1248)  Resp: 18 (09/19/23 1248)  BP: (!) 91/52 (09/19/23 1248)  SpO2: 98 % (09/19/23 1248) Vital Signs (24h Range):  Temp:  [96.4 °F (35.8 °C)-98.9 °F (37.2 °C)] 98 °F (36.7 °C)  Pulse:  [] 83  Resp:  [14-25] 18  SpO2:  [91 %-100 %] 98 %  BP: ()/(51-77) 91/52     Weight: 59 kg (130 lb 1.1 oz)  Body mass index is 23.04 kg/m².       Physical Exam  Constitutional:       General: She is not in acute distress.     Appearance: She is ill-appearing.   HENT:      Head: Normocephalic and atraumatic.      Right Ear: External ear normal.      Left Ear: External ear normal.      Nose: Nose normal.      Mouth/Throat:      Mouth: Mucous membranes are dry.   Eyes:      Conjunctiva/sclera: Conjunctivae normal.   Cardiovascular:      Rate and Rhythm: Normal rate.      Comments: Thready radial pulse  Pulmonary:      Effort: Pulmonary effort is normal.      Breath sounds: Normal breath sounds.      Comments: Very short apneic episodes  Abdominal:      General: Bowel sounds are normal.      Palpations: Abdomen is soft.   Musculoskeletal:         General: Swelling present.   Lymphadenopathy:      Cervical: No cervical adenopathy.   Skin:     General: Skin is dry.      Coloration: Skin is pale.      Findings: Bruising present.   Neurological:      Comments: Not responsive to voice            Review of Symptoms      Symptom Assessment (ESAS 0-10 Scale)  Unable to complete assessment due to Patient unresponsive         Pain Assessment in Advanced Demential Scale (PAINAD)   Breathing - Independent of vocalization:  0  Negative vocalization:  0  Facial expression:  0  Body language:  0  Consolability:  0  Total:  0    Living Arrangements:  Lives in nursing home    Psychosocial/Cultural:   See Palliative Psychosocial Note: No  Resident of [facility] nursing [place] only for a few days  **Primary  to Follow**  Palliative Care  Consult: No        Advance Care Planning   Advance Directives:   Living  Will: Yes        Copy on chart: Yes    LaPOST: No    Do Not Resuscitate Status: Yes    Medical Power of : Yes      Decision Making:  Family answered questions and Patient unable to communicate due to disease severity/cognitive impairment  Goals of Care: The healthcare power of   endorses that what is most important right now is to focus on comfort and QOL     Accordingly, we have decided that the best plan to meet the patient's goals includes pivot to comfort-focused care           Significant Labs: CBC:   Recent Labs   Lab 09/18/23  0502 09/19/23 0518   WBC 6.93 11.03   HGB 9.9* 10.1*   HCT 32.8* 34.5*    231       CMP:   Recent Labs   Lab 09/18/23  0502 09/19/23 0518    143   K 3.8 3.8   * 116*   CO2 19* 12*   GLU 51* 57*   BUN 41* 41*   CREATININE 1.5* 1.8*   CALCIUM 7.3* 7.5*   PROT 4.5* 4.7*   ALBUMIN 1.3* 1.5*   BILITOT 0.6 0.6   ALKPHOS 118 125   AST 35 35   ALT 28 27   ANIONGAP 9 15       CBC:   Recent Labs   Lab 09/19/23 0518   WBC 11.03   HGB 10.1*   HCT 34.5*   *          BMP:  Recent Labs   Lab 09/19/23 0518   GLU 57*      K 3.8   *   CO2 12*   BUN 41*   CREATININE 1.8*   CALCIUM 7.5*   MG 2.1       LFT:  Lab Results   Component Value Date    AST 35 09/19/2023    GGT 18 06/12/2023    ALKPHOS 125 09/19/2023    BILITOT 0.6 09/19/2023     Albumin:   Albumin   Date Value Ref Range Status   09/19/2023 1.5 (L) 3.5 - 5.2 g/dL Final     Protein:   Total Protein   Date Value Ref Range Status   09/19/2023 4.7 (L) 6.0 - 8.4 g/dL Final     Lactic acid:   Lab Results   Component Value Date    LACTATE 1.5 09/15/2023    LACTATE 2.2 09/14/2023       Significant Imaging: I have reviewed all pertinent imaging results/findings within the past 24 hours.

## 2023-09-19 NOTE — NURSING
No data to display                Nurses Note -- 4 Eyes      9/18/2023   7:03 PM      Skin assessed during: Daily Assessment      [] No Altered Skin Integrity Present    []Prevention Measures Documented      [x] Yes- Altered Skin Integrity Present or Discovered   [] LDA Added if Not in Epic (Describe Wound)   [] New Altered Skin Integrity was Present on Admit and Documented in LDA   [] Wound Image Taken    Wound Care Consulted? Yes    Attending Nurse:  Kennedy Menchaca RN/Staff Member:   jose

## 2023-09-19 NOTE — CODE/ RAPID DOCUMENTATION
"RAPID RESPONSE NURSE CHART REVIEW        Chart Reviewed: 09/19/2023, 10:05 AM    MRN: [id number]  Bed: 8081/8081 A    Dx: Failure to thrive in adult    Gisselle Ortiz has a past medical history of A-fib, Anemia of chronic disease, Anticoagulant long-term use, CHF (congestive heart failure), Chronic diastolic heart failure, COVID-19, Gallstone pancreatitis, Hypertension, Pancreatic abscess, Stage 3 chronic kidney disease, and Thyroid disease.    Last VS: BP (!) 89/54 (Patient Position: Lying)   Pulse 77   Temp 98.9 °F (37.2 °C)   Resp 14   Ht 5' 3" (1.6 m)   Wt 59 kg (130 lb 1.1 oz)   LMP  (LMP Unknown)   SpO2 98%   Breastfeeding No   BMI 23.04 kg/m²     24H Vital Sign Range:  Temp:  [96.4 °F (35.8 °C)-98.9 °F (37.2 °C)]   Pulse:  []   Resp:  [14-25]   BP: ()/(51-77)   SpO2:  [90 %-100 %]     Level of Consciousness (AVPU): unresponsive    Recent Labs     09/18/23  0502 09/19/23  0518   WBC 6.93 11.03   HGB 9.9* 10.1*   HCT 32.8* 34.5*    231       Recent Labs     09/18/23  0502 09/19/23  0518    143   K 3.8 3.8   * 116*   CO2 19* 12*   BUN 41* 41*   CREATININE 1.5* 1.8*   GLU 51* 57*   MG 2.0 2.1        OXYGEN: N/A      MEWS score: 4    Rounding completed with charge DAMON Villarreal. Reports pt to be placed with hospice. No concerns verbalized at this time. Instructed to call [phone] for further concerns or assistance.    Jeannette Del Rio RN       "

## 2023-09-19 NOTE — ASSESSMENT & PLAN NOTE
Pt is a 89 year old female with hx of HRpEF, persistent atrial fibrillation on AC, SSS s/p PPM, amongst other medical hx including recurrent UTI's and multiple hospital admissions this year with progressively worsening debility and failure to thrive. Initially presented to ED via EMS after being found with decreased level of consciousness after 2 days of poor PO intake and lethargy. Admitted to hospital medicine for failure to thrive, management of a fib with RVR and possible urosepsis.     --daughter, Génesis Araiza, and son, Ari Ortiz, are joint HCPOA.   --engaged in extensive discussion with family regarding goals of care, current medical status, and prognosis.  --family has had extensive discussions regarding pt's quality of life and progressive decline, requested code status to be DNR/DNI  --code status has been updated accordingly  --family wishes for treatment of acute illness where possible, but will likely discharge on home hospice  --palliative care consulted, appreciate the assistance regarding goals of care    --given pt with multiple admissions this year and progressively worsening decline noted by family, likely with each infection or hospitalization her baseline will decrease   --pt remains high risk for subsequent hospitalization and future infections similar to that on admission   --social work consult placed for home hospice planning    Urine culture with resistant klebsiella; continue ertapenem.   -ID consulted, appreciate recs    Family made aware of current status and changes to medical management via telephone.   Goals of care were discussed voluntarily, please see ACP note dated on 09/15/23.  Will discuss with family about comfort measures today and limiting unnecessary lab testing and therapy.

## 2023-09-20 NOTE — ASSESSMENT & PLAN NOTE
Gisselle Ortiz is a 89-year-old woman with a history of HFpEF, atrial fibrillation, sick sinus syndrome s/p PPM, HTN, CKD3b, hypothyroidism, recurrent UTI, debility, severe protein calorie malnutrition who was admitted from [facility] to [facility] for acute encephalopathy and sepsis 2/2 UTI, complicated by atrial fibrillation with RVR. Despite appropriate medical intervention, Ms. Ortiz continues to decline. Transitioned to comfort focused care with original plans to return to assisted living facility with hospice support, which was discontinued due to fears of transfer that may cause Ms. Ortiz to pass en route. Palliative and Supportive Care was consulted to assist with hospice discussion.    9/20/23: Significant change from yesterday, now with longer apneic episodes and no longer responsive to voice or touch. Prognosis is likely hours, maybe up to a day or two.    Advance Care Planning   Goals of Care:  - Code status: DNAR/DNI  - HCPOA: daughter Génesis Araiza ([phone]) and son Ari Ortiz ([phone])  - Patient does not have decision making capacity  - Prognosis: poor, hours to short days  - Family's understanding of prognosis: fair  - Goals: comfort  - Recommendations: please consult [facility] for evaluation of inpatient hospice flip    Goals of Care Conversation:  - 9/19: I met Ms. Ortiz, who appears to be sleeping in her hospital room, but does not respond verbally nor open her eyes when I speak or touch her shoulder. She is demonstrating very short apneic episodes and her radial pulses are thready. She has no eye brow furrowing or other signs of pain. There is weeping across her arms and her feet are edematous. I called her daughter Génesis and introduced myself. Explained that I had spoke with Dr. Booth and she verified the conversations surrounding their understanding of Ms. Ortiz's poor prognosis and that she is at the end of life. Génesis  reiterated her concerns that after speaking with a nurse who told her that Ms. Ortiz wouldn't make it out of the hospital alive, she was left with the impression that by transferring her out of [facility], this would expedite her death in case she  en route to the nursing home or an inpatient hospice facility. She stressed that she wants her mother to stay comfortable and feels that her mother does not have very much longer to live. She is hopeful if her mother could stay at [facility] as she believes her mother is passing away soon. I expressed understanding and agreed that her priority is to optimize comfort and minimize suffering. I shared that I believe her mother looks comfortable thankfully, and admitted that had I thought that her symptom burden was severely uncontrolled, then I would have recommended transfer to an inpatient hospice unit where the staff there are trained to observe and treat any signs of suffering. I shared about the partnership of [facility] with our hospital and that Dr. Booth had reached out to ask about this as a way to help facilitate comfort focused care in [facility], but unfortunately was met with resistance. I proposed that we at least allow [facility] to assess her formally, and that if they disagree with my assessment that Ms. Ortiz is in her last days of life, then we know we've tried everything we can to try to keep her at [facility] and can request for evaluation by [facility] inpatient hospice. I shared that this assessment will likely not occur today, so I will be in touch tomorrow as well to do my best to help support them during this time. Génesis expressed understanding and gratefulness. Will continue to follow along.       Comfort Recommendations:    Dyspnea / Secretions  - Morphine 4 mg IV q1h PRN dyspnea or breakthrough pain  - Lorazepam 1 mg IV q1h PRN dyspnea refractory to opioids, anxiety  - Glycopyrrolate 0.1 mg IV q8h PRN secretions  - Frequent oral care  -  Gentle repositioning of head to shift oropharyngeal secretions out of airway  - Yankauer suction at bedside  - Fan at bedside  - Avoid artificial hydration    Pain  - Opioids as above    Agitation / Delirium / Anxiety  - Gentle reorientation techniques, minimize environmental stimuli  - Discontinue laboratory draws and remove non-essential lines  - Treat for pain/dyspnea as above  - Lorazepam 1 mg IV q1h PRN anxiety, dyspnea refractory to opioids  - Consider addition of haldol 1 mg IV q4h PRN agitation if above measures do not work    Nutrition / Hydration  - Avoid IV fluids and artificial nutrition  - Comfort feedings as desired and tolerated  - Frequent oral care    Nausea  - Ondansetron 4 mg IV q6h PRN nausea/vomiting    Bowel Regimen  - Will not prioritize at end-of-life    Fever  - Apply cold compresses  - Fan at bedside

## 2023-09-20 NOTE — SUBJECTIVE & OBJECTIVE
Interval History: Long apneic episodes, not responsive to touch or voice    Past Medical History:   Diagnosis Date    A-fib     Anemia of chronic disease 02/02/2021    Anticoagulant long-term use     CHF (congestive heart failure)     Chronic diastolic heart failure 02/02/2021    COVID-19 01/07/2022    Gallstone pancreatitis     Hypertension     Pancreatic abscess 09/26/2020    Stage 3 chronic kidney disease 5/11/2016    Thyroid disease        Past Surgical History:   Procedure Laterality Date    CATARACT EXTRACTION      CATARACT EXTRACTION W/  INTRAOCULAR LENS IMPLANT Bilateral 2004     IN [place]    ENDOSCOPIC ULTRASOUND OF UPPER GASTROINTESTINAL TRACT N/A 9/14/2020    Procedure: ULTRASOUND, UPPER GI TRACT, ENDOSCOPIC;  Surgeon: Esha Howard MD;  Location: [facility] ENDO (2ND FLR);  Service: Endoscopy;  Laterality: N/A;    ENDOSCOPIC ULTRASOUND OF UPPER GASTROINTESTINAL TRACT  11/25/2020    Procedure: ULTRASOUND, UPPER GI TRACT, ENDOSCOPIC;  Surgeon: Esha Howard MD;  Location: [facility] ENDO (2ND FLR);  Service: Endoscopy;;    ERCP N/A 9/14/2020    Procedure: ERCP (ENDOSCOPIC RETROGRADE CHOLANGIOPANCREATOGRAPHY);  Surgeon: Esha Howard MD;  Location: [facility] ENDO (2ND FLR);  Service: Endoscopy;  Laterality: N/A;    ERCP N/A 9/25/2020    Procedure: ERCP (ENDOSCOPIC RETROGRADE CHOLANGIOPANCREATOGRAPHY);  Surgeon: Esha Howard MD;  Location: [facility] ENDO (2ND FLR);  Service: Endoscopy;  Laterality: N/A;    ERCP N/A 11/25/2020    Procedure: ERCP (ENDOSCOPIC RETROGRADE CHOLANGIOPANCREATOGRAPHY);  Surgeon: Esha Howard MD;  Location: [facility] ENDO (2ND FLR);  Service: Endoscopy;  Laterality: N/A;  Covid-19 test 11/22/20 at [place] - [facility]  2 day hold Dr Favian Pisano - pg  PM prep    EYE SURGERY      HIP REPLACEMENT ARTHROPLASTY Right 2016    HYSTERECTOMY      REVISION OF SKIN POCKET FOR PACEMAKER N/A 1/21/2019    Procedure: REVISION-POCKET-PACEMAKER;  Surgeon: Asher Watts MD;   Location: [facility] EP LAB;  Service: Cardiology;  Laterality: N/A;  MODERATE SEDATION, sedation issues in the past    THYROIDECTOMY      TREATMENT OF CARDIAC ARRHYTHMIA N/A 3/18/2019    Procedure: CARDIOVERSION OR DEFIBRILLATION;  Surgeon: Asher Watts MD;  Location: [facility] EP LAB;  Service: Cardiology;  Laterality: N/A;  AF, JILL-cx if SR, DCCV, MAC, FAS, 3PREP    TREATMENT OF CARDIAC ARRHYTHMIA N/A 5/14/2019    Procedure: Cardioversion or Defibrillation;  Surgeon: Derick Vizcarra MD;  Location: [facility] EP LAB;  Service: Cardiology;  Laterality: N/A;  AF, JILL, DCCV, MAC, DM, 3PREP    TREATMENT OF CARDIAC ARRHYTHMIA N/A 6/21/2022    Procedure: Cardioversion or Defibrillation;  Surgeon: Favian Colmenares MD;  Location: [facility] EP LAB;  Service: Cardiology;  Laterality: N/A;  AF, JILL, DCCV, MAC, DM, 3 Prep *SJM PPM in situ*       Review of patient's allergies indicates:   Allergen Reactions    Ciprofloxacin Nausea And Vomiting       Medications:  Continuous Infusions:      Scheduled Meds:      PRN Meds:acetaminophen, glycopyrrolate, lorazepam, morphine, ondansetron, prochlorperazine, sodium chloride 0.9%    Family History       Problem Relation (Age of Onset)    Heart attack Mother, Maternal Grandmother    Heart disease Mother, Maternal Grandmother    Heart failure Mother, Maternal Grandmother    Hypertension Mother, Maternal Grandmother    Stroke Maternal Grandmother          Tobacco Use    Smoking status: Former     Types: Cigarettes     Start date: 5/19/1964    Smokeless tobacco: Never   Substance and Sexual Activity    Alcohol use: No    Drug use: No    Sexual activity: Not Currently     Partners: Male       Review of Systems   Unable to perform ROS: Patient unresponsive     Objective:     Vital Signs (Most Recent):  Temp: 96.4 °F (35.8 °C) (09/20/23 0743)  Pulse: 75 (09/20/23 0910)  Resp: 18 (09/20/23 0743)  BP: (!) 79/43 (09/20/23 0910)  SpO2: (!) 91 % (09/20/23 0743) Vital Signs (24h Range):  Temp:  [96.1 °F  (35.6 °C)-98 °F (36.7 °C)] 96.4 °F (35.8 °C)  Pulse:  [] 75  Resp:  [9-18] 18  SpO2:  [91 %-98 %] 91 %  BP: (77-91)/(43-52) 79/43     Weight: 59 kg (130 lb 1.1 oz)  Body mass index is 23.04 kg/m².       Physical Exam  Constitutional:       General: She is not in acute distress.     Appearance: She is ill-appearing.   HENT:      Head: Normocephalic and atraumatic.      Right Ear: External ear normal.      Left Ear: External ear normal.      Nose: Nose normal.      Mouth/Throat:      Mouth: Mucous membranes are dry.   Eyes:      Conjunctiva/sclera: Conjunctivae normal.   Cardiovascular:      Rate and Rhythm: Normal rate.      Comments: Thready radial pulse  Pulmonary:      Effort: Pulmonary effort is normal.      Breath sounds: Normal breath sounds.      Comments: Very short apneic episodes  Abdominal:      General: Bowel sounds are normal.      Palpations: Abdomen is soft.   Musculoskeletal:         General: Swelling present.   Lymphadenopathy:      Cervical: No cervical adenopathy.   Skin:     General: Skin is dry.      Coloration: Skin is pale.      Findings: Bruising present.   Neurological:      Comments: Not responsive to voice            Review of Symptoms      Symptom Assessment (ESAS 0-10 Scale)  Unable to complete assessment due to Patient unresponsive         Pain Assessment in Advanced Demential Scale (PAINAD)   Breathing - Independent of vocalization:  0  Negative vocalization:  0  Facial expression:  0  Body language:  0  Consolability:  0  Total:  0    Living Arrangements:  Lives in nursing home    Psychosocial/Cultural:   See Palliative Psychosocial Note: No  Resident of [facility] only for a few days  **Primary  to Follow**  Palliative Care  Consult: No        Advance Care Planning   Advance Directives:   Living Will: Yes        Copy on chart: Yes    LaPOST: No    Do Not Resuscitate Status: Yes    Medical Power of : Yes      Decision Making:  Family  "answered questions and Patient unable to communicate due to disease severity/cognitive impairment  Goals of Care: What is most important right now is to focus on comfort and QOL . Accordingly, we have decided that the best plan to meet the patient's goals includes pivot to comfort-focused care.         Significant Labs: CBC:   Recent Labs   Lab 09/19/23 0518   WBC 11.03   HGB 10.1*   HCT 34.5*          CMP:   Recent Labs   Lab 09/19/23 0518      K 3.8   *   CO2 12*   GLU 57*   BUN 41*   CREATININE 1.8*   CALCIUM 7.5*   PROT 4.7*   ALBUMIN 1.5*   BILITOT 0.6   ALKPHOS 125   AST 35   ALT 27   ANIONGAP 15       CBC:   Recent Labs   Lab 09/19/23 0518   WBC 11.03   HGB 10.1*   HCT 34.5*   *          BMP:  No results for input(s): "GLU", "NA", "K", "CL", "CO2", "BUN", "CREATININE", "CALCIUM", "MG" in the last 24 hours.    LFT:  Lab Results   Component Value Date    AST 35 09/19/2023    GGT 18 06/12/2023    ALKPHOS 125 09/19/2023    BILITOT 0.6 09/19/2023     Albumin:   Albumin   Date Value Ref Range Status   09/19/2023 1.5 (L) 3.5 - 5.2 g/dL Final     Protein:   Total Protein   Date Value Ref Range Status   09/19/2023 4.7 (L) 6.0 - 8.4 g/dL Final     Lactic acid:   Lab Results   Component Value Date    LACTATE 1.5 09/15/2023    LACTATE 2.2 09/14/2023       Significant Imaging: I have reviewed all pertinent imaging results/findings within the past 24 hours.    "

## 2023-09-20 NOTE — H&P
[facility] ([address] Medicine  History & Physical    Patient Name: Gisselle Ortiz  MRN: [id number]  Patient Class: IP- Hospice  Admission Date: (Not on file)  Attending Physician: Haseeb Sanderson MD   Primary Care Provider: Kai Montez MD         Patient information was obtained from patient, nursing home, past medical records, ER records and primary team.     Subjective:     Principal Problem:Comfort measures only status    Chief Complaint: No chief complaint on file.       HPI: 89yoF w hx including HFpEF, atrial fibrillation, sick sinus syndrome s/p PPM, HTN, CKD3b, hypothyroidism, recurrent UTI, debility, severe protein calorie malnutrition who was admitted from [facility] to [facility] for acute encephalopathy and sepsis 2/2 UTI, complicated by atrial fibrillation with RVR. Despite appropriate medical intervention, Ms. Ortiz continues to decline. Transitioned to comfort focused care with original plans to return to assisted living facility with hospice support, which was discontinued due to fears of transfer that may cause Ms. Ortiz to pass en route. Palliative and Supportive Care was consulted to assist with hospice discussion. On 9/20/23, patient having longer apneic episodes and no longer responsive to voice or touch. Prognosis is likely hours, maybe up to a day or two. Discussed with family, pt transferred to hospice floor.       Past Medical History:   Diagnosis Date    A-fib     Anemia of chronic disease 02/02/2021    Anticoagulant long-term use     CHF (congestive heart failure)     Chronic diastolic heart failure 02/02/2021    COVID-19 01/07/2022    Gallstone pancreatitis     Hypertension     Pancreatic abscess 09/26/2020    Stage 3 chronic kidney disease 5/11/2016    Thyroid disease        Past Surgical History:   Procedure Laterality Date    CATARACT EXTRACTION      CATARACT EXTRACTION W/  INTRAOCULAR LENS  IMPLANT Bilateral 2004    DR IN [place]    ENDOSCOPIC ULTRASOUND OF UPPER GASTROINTESTINAL TRACT N/A 9/14/2020    Procedure: ULTRASOUND, UPPER GI TRACT, ENDOSCOPIC;  Surgeon: Esha Howard MD;  Location: [facility] ENDO (2ND FLR);  Service: Endoscopy;  Laterality: N/A;    ENDOSCOPIC ULTRASOUND OF UPPER GASTROINTESTINAL TRACT  11/25/2020    Procedure: ULTRASOUND, UPPER GI TRACT, ENDOSCOPIC;  Surgeon: Esha Howard MD;  Location: [facility] ENDO (2ND FLR);  Service: Endoscopy;;    ERCP N/A 9/14/2020    Procedure: ERCP (ENDOSCOPIC RETROGRADE CHOLANGIOPANCREATOGRAPHY);  Surgeon: Esha Howard MD;  Location: [facility] ENDO (2ND FLR);  Service: Endoscopy;  Laterality: N/A;    ERCP N/A 9/25/2020    Procedure: ERCP (ENDOSCOPIC RETROGRADE CHOLANGIOPANCREATOGRAPHY);  Surgeon: Esha Howard MD;  Location: [facility] ENDO (2ND FLR);  Service: Endoscopy;  Laterality: N/A;    ERCP N/A 11/25/2020    Procedure: ERCP (ENDOSCOPIC RETROGRADE CHOLANGIOPANCREATOGRAPHY);  Surgeon: Esha Howard MD;  Location: [facility] ENDO (2ND FLR);  Service: Endoscopy;  Laterality: N/A;  Covid-19 test 11/22/20 at [facility] - pg  2 day hold Dr Favian Pisano - pg  PM prep    EYE SURGERY      HIP REPLACEMENT ARTHROPLASTY Right 2016    HYSTERECTOMY      REVISION OF SKIN POCKET FOR PACEMAKER N/A 1/21/2019    Procedure: REVISION-POCKET-PACEMAKER;  Surgeon: Asher Watts MD;  Location: [facility] EP LAB;  Service: Cardiology;  Laterality: N/A;  MODERATE SEDATION, sedation issues in the past    THYROIDECTOMY      TREATMENT OF CARDIAC ARRHYTHMIA N/A 3/18/2019    Procedure: CARDIOVERSION OR DEFIBRILLATION;  Surgeon: Asher Watts MD;  Location: [facility] EP LAB;  Service: Cardiology;  Laterality: N/A;  AF, JILL-cx if SR, DCCV, MAC, FAS, 3PREP    TREATMENT OF CARDIAC ARRHYTHMIA N/A 5/14/2019    Procedure: Cardioversion or Defibrillation;  Surgeon: Derick Vizcarra MD;  Location: [facility] EP LAB;  Service: Cardiology;  Laterality: N/A;   AF, JILL, DCCV, MAC, DM, 3PREP    TREATMENT OF CARDIAC ARRHYTHMIA N/A 6/21/2022    Procedure: Cardioversion or Defibrillation;  Surgeon: Favian Colmenares MD;  Location: [facility] EP LAB;  Service: Cardiology;  Laterality: N/A;  AF, JILL, DCCV, MAC, DM, 3 Prep *SJM PPM in situ*       Review of patient's allergies indicates:   Allergen Reactions    Ciprofloxacin Nausea And Vomiting       Current Facility-Administered Medications on File Prior to Encounter   Medication    acetaminophen tablet 650 mg    glycopyrrolate injection 0.1 mg    LORazepam injection 1 mg    morphine 100 mg (1 mg/mL) in NACL 100 mL infusion (TITRATING)    morphine injection 4 mg    ondansetron injection 4 mg    prochlorperazine injection Soln 5 mg    sodium chloride 0.9% flush 10 mL     No current outpatient medications on file prior to encounter.     Family History       Problem Relation (Age of Onset)    Heart attack Mother, Maternal Grandmother    Heart disease Mother, Maternal Grandmother    Heart failure Mother, Maternal Grandmother    Hypertension Mother, Maternal Grandmother    Stroke Maternal Grandmother          Tobacco Use    Smoking status: Former     Types: Cigarettes     Start date: 5/19/1964    Smokeless tobacco: Never   Substance and Sexual Activity    Alcohol use: No    Drug use: No    Sexual activity: Not Currently     Partners: Male     Review of Systems   Reason unable to perform ROS: lethargy.     Objective:     Vital Signs (Most Recent):    Vital Signs (24h Range):  Temp:  [96.1 °F (35.6 °C)-97.6 °F (36.4 °C)] 97 °F (36.1 °C)  Pulse:  [] 80  Resp:  [9-18] 16  SpO2:  [91 %-98 %] 95 %  BP: (77-91)/(43-51) 78/46        There is no height or weight on file to calculate BMI.     Physical Exam  Constitutional:       General: She is not in acute distress.     Appearance: She is ill-appearing.   HENT:      Head: Normocephalic and atraumatic.      Right Ear: External ear normal.      Left Ear: External ear normal.       Nose: Nose normal.      Mouth/Throat:      Mouth: Mucous membranes are dry.   Eyes:      Conjunctiva/sclera: Conjunctivae normal.   Cardiovascular:      Rate and Rhythm: Normal rate.      Comments: Thready radial pulse  Pulmonary:      Effort: Pulmonary effort is normal.      Breath sounds: Normal breath sounds.      Comments: Very short apneic episodes  Abdominal:      General: Bowel sounds are normal.      Palpations: Abdomen is soft.   Musculoskeletal:         General: Swelling present.   Lymphadenopathy:      Cervical: No cervical adenopathy.   Skin:     General: Skin is dry.      Coloration: Skin is pale.      Findings: Bruising present.   Neurological:      Comments: Not responsive to voice                Significant Labs: All pertinent labs within the past 24 hours have been reviewed.  Recent Lab Results       None            Significant Imaging: I have reviewed all pertinent imaging results/findings within the past 24 hours.    Assessment/Plan:     * Comfort measures only status  Discussed with family, patient transferred to hospice room. Receiving comfort measures only.       VTE Risk Mitigation (From admission, onward)    None                     Froilan Hopkins MD  Department of Hospital Medicine  [facility] - Intensive Care ([place]-)

## 2023-09-20 NOTE — ASSESSMENT & PLAN NOTE
Pt is a 89 year old female with hx of HRpEF, persistent atrial fibrillation on AC, SSS s/p PPM, amongst other medical hx including recurrent UTI's and multiple hospital admissions this year with progressively worsening debility and failure to thrive. Initially presented to ED via EMS after being found with decreased level of consciousness after 2 days of poor PO intake and lethargy. Admitted to hospital medicine for failure to thrive, management of a fib with RVR and possible urosepsis.     --daughter, Génesis Araiza, and son, Ari Ortiz, are joint HCPOA.   --engaged in extensive discussion with family regarding goals of care, current medical status, and prognosis.  --family has had extensive discussions regarding pt's quality of life and progressive decline, requested code status to be DNR/DNI  --code status has been updated accordingly  --family wishes for treatment of acute illness where possible, but will likely discharge on home hospice  --palliative care consulted, appreciate the assistance regarding goals of care    --given pt with multiple admissions this year and progressively worsening decline noted by family, likely with each infection or hospitalization her baseline will decrease   --pt remains high risk for subsequent hospitalization and future infections similar to that on admission   --social work consult placed for home hospice planning    Urine culture with resistant klebsiella; continue ertapenem.   -ID consulted, appreciate recs    Family made aware of current status and changes to medical management via telephone.   Goals of care were discussed voluntarily, please see ACP note dated on 09/15/23.  Will discuss with family about comfort measures today and limiting unnecessary lab testing and therapy.  Comfort measures only placed.    9/20: Mental status declining, no longer responds to audible or tactile stimuli. Longer apneic episodes. Discussed with family, would no longer tolerate  transport to outside inpatient hospice facility. transitioned to hospice room.

## 2023-09-20 NOTE — HPI
89yoF w hx including HFpEF, atrial fibrillation, sick sinus syndrome s/p PPM, HTN, CKD3b, hypothyroidism, recurrent UTI, debility, severe protein calorie malnutrition who was admitted from [facility] to [facility] for acute encephalopathy and sepsis 2/2 UTI, complicated by atrial fibrillation with RVR. Despite appropriate medical intervention, Ms. Ortiz continues to decline. Transitioned to comfort focused care with original plans to return to assisted living facility with hospice support, which was discontinued due to fears of transfer that may cause Ms. Ortiz to pass en route. Palliative and Supportive Care was consulted to assist with hospice discussion. On 9/20/23, patient having longer apneic episodes and no longer responsive to voice or touch. Prognosis is likely hours, maybe up to a day or two. Discussed with family, pt transferred to hospice floor.

## 2023-09-20 NOTE — CHAPLAIN
Palliative Care     Patient: Gisselle Ortiz  MRN: [id number]  : 1933  Age: 89 y.o.  Hospital Length of Stay: 8  Code Status: Code Status Discussion Note  Attending Provider: Tim Man MD  Principal Problem: Failure to thrive in adult    Non-clinical observations of patient/room: Visited [facility] med pt who is in process of transitioning to hospice, now on comfort care measures; pt was alone; [facility] nurse just arrived to assess pt.    Provided compassionate presence for pt and reminded her how loved she is and being well taken care of; turned on soft music on the computer for pt and put comfort care sign on the door.     Said prayer over patient; Epic states she is Rastafarian Buddhist.     Future (Spiritual Care Plan of Action):  Palliative  following and available as needed. Lord, in your mercy.       **Spiritual Care Dept. Chaplains are available evenings, overnight and weekends **    In Peace,  Rev. Sayra Lopez MDiv, [facility]  Board Certified   Palliative Medicine Department  [phone]

## 2023-09-20 NOTE — NURSING
Nurses Note -- 4 Eyes      9/19/2023   7:59 PM      Skin assessed during: Q Shift Change      [] No Altered Skin Integrity Present    []Prevention Measures Documented      [x] Yes- Altered Skin Integrity Present or Discovered   [] LDA Added if Not in Epic (Describe Wound)   [] New Altered Skin Integrity was Present on Admit and Documented in LDA   [] Wound Image Taken    Wound Care Consulted? Yes    Attending Nurse:  Gunnar JOSE      Second RN/Staff Member:   Kennedy JOSE

## 2023-09-20 NOTE — PLAN OF CARE
[facility] ([facility]  Discharge Final Note    Primary Care Provider: Kai Montez MD    Expected Discharge Date: 9/20/2023    Final Discharge Note (most recent)       Final Note - 09/20/23 1551          Final Note    Assessment Type Final Discharge Note     Anticipated Discharge Disposition Hospice/Medical Facility     Hospital Resources/Appts/Education Provided --   N/A       Post-Acute Status    Post-Acute Authorization Hospice     Hospice Status Set-up Complete/Auth obtained   [facility] hosp[facility] bed    Discharge Delays None known at this time                     Important Message from Medicare  Important Message from Medicare regarding Discharge Appeal Rights: Other (comments) (Pt is discharging with hospice.)     Date IMM was signed: 09/20/23  Time IMM was signed: 7034    Viki Torre LCSW  [facility]  Ext. [phone]

## 2023-09-20 NOTE — PLAN OF CARE
Was informed by MD that patient has a prognosis of likely hours. Attending MD and team would like to attempt to have patient moved to the inpatient -[facility] hospice bed. Referral sent to [facility] and spoke with Glenys regarding prognosis and referral. Glenys reports she or a representative will assess patient as soon as possible.    Viki Torre LCSW  [facility]  Ext. [phone]

## 2023-09-20 NOTE — NURSING
Pt arrived to room 70562. VSS and morphine drip started at 1 mg/hr. No signs of discomfort and sleeping well. Opens eyes to physical touch.

## 2023-09-20 NOTE — PLAN OF CARE
Problem: Adult Inpatient Plan of Care  Goal: Plan of Care Review  Outcome: Ongoing, Progressing  Goal: Patient-Specific Goal (Individualized)  Outcome: Ongoing, Progressing  Goal: Absence of Hospital-Acquired Illness or Injury  Outcome: Ongoing, Progressing  Goal: Optimal Comfort and Wellbeing  Outcome: Ongoing, Progressing  Goal: Readiness for Transition of Care  Outcome: Ongoing, Progressing     Problem: Adjustment to Illness (Sepsis/Septic Shock)  Goal: Optimal Coping  Outcome: Ongoing, Progressing     Problem: Bleeding (Sepsis/Septic Shock)  Goal: Absence of Bleeding  Outcome: Ongoing, Progressing     Problem: Glycemic Control Impaired (Sepsis/Septic Shock)  Goal: Blood Glucose Level Within Desired Range  Outcome: Ongoing, Progressing     Problem: Infection Progression (Sepsis/Septic Shock)  Goal: Absence of Infection Signs and Symptoms  Outcome: Ongoing, Progressing     Problem: Nutrition Impaired (Sepsis/Septic Shock)  Goal: Optimal Nutrition Intake  Outcome: Ongoing, Progressing     Problem: Fluid and Electrolyte Imbalance (Acute Kidney Injury/Impairment)  Goal: Fluid and Electrolyte Balance  Outcome: Ongoing, Progressing     Problem: Oral Intake Inadequate (Acute Kidney Injury/Impairment)  Goal: Optimal Nutrition Intake  Outcome: Ongoing, Progressing     Problem: Renal Function Impairment (Acute Kidney Injury/Impairment)  Goal: Effective Renal Function  Outcome: Ongoing, Progressing     Problem: Palliative Care  Goal: Enhanced Quality of Life  Outcome: Ongoing, Progressing     Problem: Infection  Goal: Absence of Infection Signs and Symptoms  Outcome: Ongoing, Progressing     Problem: Impaired Wound Healing  Goal: Optimal Wound Healing  Outcome: Ongoing, Progressing

## 2023-09-20 NOTE — PROGRESS NOTES
[facility] - Telemetry Stepdown  Palliative Medicine  Progress Note    Patient Name: Gisselle Ortiz  MRN: [id number]  Admission Date: 9/12/2023  Hospital Length of Stay: 8 days  Code Status: DNR   Attending Provider: Tim Man MD  Consulting Provider: Radha Vasquez MD  Primary Care Physician: Kai Montez MD  Principal Problem:Failure to thrive in adult    Patient information was obtained from patient and primary team.      Assessment/Plan:     Palliative Care  Encounter for palliative care  Gisselle Ortiz is a 89-year-old woman with a history of HFpEF, atrial fibrillation, sick sinus syndrome s/p PPM, HTN, CKD3b, hypothyroidism, recurrent UTI, debility, severe protein calorie malnutrition who was admitted from [facility] to [facility] for acute encephalopathy and sepsis 2/2 UTI, complicated by atrial fibrillation with RVR. Despite appropriate medical intervention, Ms. Ortiz continues to decline. Transitioned to comfort focused care with original plans to return to assisted living facility with hospice support, which was discontinued due to fears of transfer that may cause Ms. Ortiz to pass en route. Palliative and Supportive Care was consulted to assist with hospice discussion.    9/20/23: Significant change from yesterday, now with longer apneic episodes and no longer responsive to voice or touch. Prognosis is likely hours, maybe up to a day or two.    Advance Care Planning   Goals of Care:  - Code status: DNAR/DNI  - HCPOA: daughter Génesis Araiza ([phone]) and son Ari Ortiz ([phone])  - Patient does not have decision making capacity  - Prognosis: poor, hours to short days  - Family's understanding of prognosis: fair  - Goals: comfort  - Recommendations: please consult [facility] for evaluation of inpatient hospice flip    Goals of Care Conversation:  - 9/19: I met Ms. Ortiz, who appears to be sleeping in her hospital room, but does  not respond verbally nor open her eyes when I speak or touch her shoulder. She is demonstrating very short apneic episodes and her radial pulses are thready. She has no eye brow furrowing or other signs of pain. There is weeping across her arms and her feet are edematous. I called her daughter Génesis and introduced myself. Explained that I had spoke with Dr. Booth and she verified the conversations surrounding their understanding of Ms. Ortiz's poor prognosis and that she is at the end of life. Génesis reiterated her concerns that after speaking with a nurse who told her that Ms. Ortiz wouldn't make it out of the hospital alive, she was left with the impression that by transferring her out of [facility], this would expedite her death in case she  en route to the nursing home or an inpatient hospice facility. She stressed that she wants her mother to stay comfortable and feels that her mother does not have very much longer to live. She is hopeful if her mother could stay at [facility] as she believes her mother is passing away soon. I expressed understanding and agreed that her priority is to optimize comfort and minimize suffering. I shared that I believe her mother looks comfortable thankfully, and admitted that had I thought that her symptom burden was severely uncontrolled, then I would have recommended transfer to an inpatient hospice unit where the staff there are trained to observe and treat any signs of suffering. I shared about the partnership of [facility] with our hospital and that Dr. Booth had reached out to ask about this as a way to help facilitate comfort focused care in [facility], but unfortunately was met with resistance. I proposed that we at least allow [facility] to assess her formally, and that if they disagree with my assessment that Ms. Ortiz is in her last days of life, then we know we've tried everything we can to try to keep her at [facility] and can request for evaluation  by [facility] inpatient hospice. I shared that this assessment will likely not occur today, so I will be in touch tomorrow as well to do my best to help support them during this time. Génesis expressed understanding and gratefulness. Will continue to follow along.      Comfort Recommendations:    Dyspnea / Secretions  - Morphine 4 mg IV q1h PRN dyspnea or breakthrough pain  - Lorazepam 1 mg IV q1h PRN dyspnea refractory to opioids, anxiety  - Glycopyrrolate 0.1 mg IV q8h PRN secretions  - Frequent oral care  - Gentle repositioning of head to shift oropharyngeal secretions out of airway  - Yankauer suction at bedside  - Fan at bedside  - Avoid artificial hydration    Pain  - Opioids as above    Agitation / Delirium / Anxiety  - Gentle reorientation techniques, minimize environmental stimuli  - Discontinue laboratory draws and remove non-essential lines  - Treat for pain/dyspnea as above  - Lorazepam 1 mg IV q1h PRN anxiety, dyspnea refractory to opioids  - Consider addition of haldol 1 mg IV q4h PRN agitation if above measures do not work    Nutrition / Hydration  - Avoid IV fluids and artificial nutrition  - Comfort feedings as desired and tolerated  - Frequent oral care    Nausea  - Ondansetron 4 mg IV q6h PRN nausea/vomiting    Bowel Regimen  - Will not prioritize at end-of-life    Fever  - Apply cold compresses  - Fan at bedside          I will follow-up with patient. Please contact us if you have any additional questions.    Subjective:     Chief Complaint:   Chief Complaint   Patient presents with    Fatigue     From sunrise per staff patient patient having decline over the past 2 days       HPI:   Gisselle Ortiz is a 89-year-old woman with a history of HFpEF, atrial fibrillation, sick sinus syndrome s/p PPM, HTN, CKD3b, hypothyroidism, recurrent UTI, debility who was admitted from [facility] to [facility] for acute encephalopathy and sepsis 2/2 UTI, complicated by atrial fibrillation  with RVR. Palliative and Supportive Care was consulted to explore goals of care.      Hospital Course:  No notes on file    Interval History: Long apneic episodes, not responsive to touch or voice    Past Medical History:   Diagnosis Date    A-fib     Anemia of chronic disease 02/02/2021    Anticoagulant long-term use     CHF (congestive heart failure)     Chronic diastolic heart failure 02/02/2021    COVID-19 01/07/2022    Gallstone pancreatitis     Hypertension     Pancreatic abscess 09/26/2020    Stage 3 chronic kidney disease 5/11/2016    Thyroid disease        Past Surgical History:   Procedure Laterality Date    CATARACT EXTRACTION      CATARACT EXTRACTION W/  INTRAOCULAR LENS IMPLANT Bilateral 2004    DR IN [place]    ENDOSCOPIC ULTRASOUND OF UPPER GASTROINTESTINAL TRACT N/A 9/14/2020    Procedure: ULTRASOUND, UPPER GI TRACT, ENDOSCOPIC;  Surgeon: Esha Howard MD;  Location: [facility] ENDO (2ND FLR);  Service: Endoscopy;  Laterality: N/A;    ENDOSCOPIC ULTRASOUND OF UPPER GASTROINTESTINAL TRACT  11/25/2020    Procedure: ULTRASOUND, UPPER GI TRACT, ENDOSCOPIC;  Surgeon: Esha Howard MD;  Location: [facility] ENDO (2ND FLR);  Service: Endoscopy;;    ERCP N/A 9/14/2020    Procedure: ERCP (ENDOSCOPIC RETROGRADE CHOLANGIOPANCREATOGRAPHY);  Surgeon: Esha Howard MD;  Location: [facility] ENDO (2ND FLR);  Service: Endoscopy;  Laterality: N/A;    ERCP N/A 9/25/2020    Procedure: ERCP (ENDOSCOPIC RETROGRADE CHOLANGIOPANCREATOGRAPHY);  Surgeon: Esha Howard MD;  Location: [facility] ENDO (2ND FLR);  Service: Endoscopy;  Laterality: N/A;    ERCP N/A 11/25/2020    Procedure: ERCP (ENDOSCOPIC RETROGRADE CHOLANGIOPANCREATOGRAPHY);  Surgeon: Esha Howard MD;  Location: [facility] ENDO (2ND FLR);  Service: Endoscopy;  Laterality: N/A;  Covid-19 test 11/22/20 at [place] - pg  2 day hold Dr Favian Pisano - pg  PM prep    EYE SURGERY      HIP REPLACEMENT ARTHROPLASTY Right 2016     HYSTERECTOMY      REVISION OF SKIN POCKET FOR PACEMAKER N/A 1/21/2019    Procedure: REVISION-POCKET-PACEMAKER;  Surgeon: Asher Watts MD;  Location: [facility] EP LAB;  Service: Cardiology;  Laterality: N/A;  MODERATE SEDATION, sedation issues in the past    THYROIDECTOMY      TREATMENT OF CARDIAC ARRHYTHMIA N/A 3/18/2019    Procedure: CARDIOVERSION OR DEFIBRILLATION;  Surgeon: Asher Watts MD;  Location: [facility] EP LAB;  Service: Cardiology;  Laterality: N/A;  AF, JILL-cx if SR, DCCV, MAC, FAS, 3PREP    TREATMENT OF CARDIAC ARRHYTHMIA N/A 5/14/2019    Procedure: Cardioversion or Defibrillation;  Surgeon: Derick Vizcarra MD;  Location: [facility] EP LAB;  Service: Cardiology;  Laterality: N/A;  AF, JILL, DCCV, MAC, DM, 3PREP    TREATMENT OF CARDIAC ARRHYTHMIA N/A 6/21/2022    Procedure: Cardioversion or Defibrillation;  Surgeon: Favian Colmenares MD;  Location: [facility] EP LAB;  Service: Cardiology;  Laterality: N/A;  AF, JILL, DCCV, MAC, DM, 3 Prep *SJM PPM in situ*       Review of patient's allergies indicates:   Allergen Reactions    Ciprofloxacin Nausea And Vomiting       Medications:  Continuous Infusions:      Scheduled Meds:      PRN Meds:acetaminophen, glycopyrrolate, lorazepam, morphine, ondansetron, prochlorperazine, sodium chloride 0.9%    Family History       Problem Relation (Age of Onset)    Heart attack Mother, Maternal Grandmother    Heart disease Mother, Maternal Grandmother    Heart failure Mother, Maternal Grandmother    Hypertension Mother, Maternal Grandmother    Stroke Maternal Grandmother          Tobacco Use    Smoking status: Former     Types: Cigarettes     Start date: 5/19/1964    Smokeless tobacco: Never   Substance and Sexual Activity    Alcohol use: No    Drug use: No    Sexual activity: Not Currently     Partners: Male       Review of Systems   Unable to perform ROS: Patient unresponsive     Objective:     Vital Signs (Most Recent):  Temp: 96.4 °F (35.8 °C) (09/20/23  0743)  Pulse: 75 (09/20/23 0910)  Resp: 18 (09/20/23 0743)  BP: (!) 79/43 (09/20/23 0910)  SpO2: (!) 91 % (09/20/23 0743) Vital Signs (24h Range):  Temp:  [96.1 °F (35.6 °C)-98 °F (36.7 °C)] 96.4 °F (35.8 °C)  Pulse:  [] 75  Resp:  [9-18] 18  SpO2:  [91 %-98 %] 91 %  BP: (77-91)/(43-52) 79/43     Weight: 59 kg (130 lb 1.1 oz)  Body mass index is 23.04 kg/m².       Physical Exam  Constitutional:       General: She is not in acute distress.     Appearance: She is ill-appearing.   HENT:      Head: Normocephalic and atraumatic.      Right Ear: External ear normal.      Left Ear: External ear normal.      Nose: Nose normal.      Mouth/Throat:      Mouth: Mucous membranes are dry.   Eyes:      Conjunctiva/sclera: Conjunctivae normal.   Cardiovascular:      Rate and Rhythm: Normal rate.      Comments: Thready radial pulse  Pulmonary:      Effort: Pulmonary effort is normal.      Breath sounds: Normal breath sounds.      Comments: Very short apneic episodes  Abdominal:      General: Bowel sounds are normal.      Palpations: Abdomen is soft.   Musculoskeletal:         General: Swelling present.   Lymphadenopathy:      Cervical: No cervical adenopathy.   Skin:     General: Skin is dry.      Coloration: Skin is pale.      Findings: Bruising present.   Neurological:      Comments: Not responsive to voice            Review of Symptoms      Symptom Assessment (ESAS 0-10 Scale)  Unable to complete assessment due to Patient unresponsive         Pain Assessment in Advanced Demential Scale (PAINAD)   Breathing - Independent of vocalization:  0  Negative vocalization:  0  Facial expression:  0  Body language:  0  Consolability:  0  Total:  0    Living Arrangements:  Lives in nursing home    Psychosocial/Cultural:   See Palliative Psychosocial Note: No  Resident of [facility] [place] only for a few days  **Primary  to Follow**  Palliative Care  Consult: No        Advance Care Planning  Advance  "Directives:   Living Will: Yes        Copy on chart: Yes    LaPOST: No    Do Not Resuscitate Status: Yes    Medical Power of : Yes      Decision Making:  Family answered questions and Patient unable to communicate due to disease severity/cognitive impairment  Goals of Care: What is most important right now is to focus on comfort and QOL . Accordingly, we have decided that the best plan to meet the patient's goals includes pivot to comfort-focused care.         Significant Labs: CBC:   Recent Labs   Lab 09/19/23 0518   WBC 11.03   HGB 10.1*   HCT 34.5*          CMP:   Recent Labs   Lab 09/19/23 0518      K 3.8   *   CO2 12*   GLU 57*   BUN 41*   CREATININE 1.8*   CALCIUM 7.5*   PROT 4.7*   ALBUMIN 1.5*   BILITOT 0.6   ALKPHOS 125   AST 35   ALT 27   ANIONGAP 15       CBC:   Recent Labs   Lab 09/19/23 0518   WBC 11.03   HGB 10.1*   HCT 34.5*   *          BMP:  No results for input(s): "GLU", "NA", "K", "CL", "CO2", "BUN", "CREATININE", "CALCIUM", "MG" in the last 24 hours.    LFT:  Lab Results   Component Value Date    AST 35 09/19/2023    GGT 18 06/12/2023    ALKPHOS 125 09/19/2023    BILITOT 0.6 09/19/2023     Albumin:   Albumin   Date Value Ref Range Status   09/19/2023 1.5 (L) 3.5 - 5.2 g/dL Final     Protein:   Total Protein   Date Value Ref Range Status   09/19/2023 4.7 (L) 6.0 - 8.4 g/dL Final     Lactic acid:   Lab Results   Component Value Date    LACTATE 1.5 09/15/2023    LACTATE 2.2 09/14/2023       Significant Imaging: I have reviewed all pertinent imaging results/findings within the past 24 hours.    I spent a total of 50 minutes on the day of the visit. This includes face to face time in discussion of goals of care, symptom assessment, coordination of care and emotional support. This also includes non-face to face time preparing to see the patient (eg, review of tests/imaging), obtaining and/or reviewing separately obtained history, documenting clinical " information in the electronic or other health record, independently interpreting results and communicating results to the patient/family/caregiver, or care coordinator.        Radha Vasquez MD  Palliative Medicine  [facility] - Telemetry Stepdown

## 2023-09-20 NOTE — ASSESSMENT & PLAN NOTE
Symptom Assessment:  Overall, patient continues to have ongoing pain and dyspnea requiring IV titration.     - Pain: controlled  - Dyspnea: controlled  - Agitation: controlled  - Mentation: not interactive    Current Medications:    No current facility-administered medications for this encounter.  No current outpatient medications on file.    Facility-Administered Medications Ordered in Other Encounters:     acetaminophen tablet 650 mg, 650 mg, Oral, Q4H PRN, Hattie Purcell MD    glycopyrrolate injection 0.1 mg, 0.1 mg, Intravenous, TID PRN, Matt Taveras MD    LORazepam injection 1 mg, 1 mg, Intravenous, Q4H PRN, Matt Taveras MD    morphine 100 mg (1 mg/mL) in NACL 100 mL infusion (TITRATING), 0-10 mg/hr, Intravenous, Continuous, Tim Man MD, Last Rate: 1 mL/hr at 09/20/23 1454, 1 mg/hr at 09/20/23 1454    morphine injection 4 mg, 4 mg, Intravenous, Q4H PRN, Matt Taveras MD, 4 mg at 09/20/23 0632    ondansetron injection 4 mg, 4 mg, Intravenous, Q8H PRN, Hattie Purcell MD    prochlorperazine injection Soln 5 mg, 5 mg, Intravenous, Q6H PRN, Hattie Purcell MD    sodium chloride 0.9% flush 10 mL, 10 mL, Intravenous, Q12H PRN, Hattie Purcell MD      Family discussion updates:  See prior documentation    Plan of care:  Morphine gtt for dyspnea and pain + prns as noted in the MAR    Follow up plans:   - Symptomatic condition is stabilized.   - Death is imminent within a short period of time as evidenced by clinical deterioration such as mottling of skin, respiratory status change, and level of consciousness which are not conducive to medical transport.  - Hospice is working diligently to develop and provide a plan for safe discharge to a lower level of care.       Advanced Directives::  LaPOST: Yes  Do Not Resuscitate Status: Yes  Surrogate Decision maker / Medical Power of :daughter Génesis Araiza ([phone]) and son Ari Ortiz ([phone])

## 2023-09-20 NOTE — CARE UPDATE
"RAPID RESPONSE NURSE CHART REVIEW        Chart Reviewed: 09/20/2023, 9:43 AM    MRN: [id number]  Bed: 8081/8081 A    Dx: Failure to thrive in adult    Gisselle Ortiz has a past medical history of A-fib, Anemia of chronic disease, Anticoagulant long-term use, CHF (congestive heart failure), Chronic diastolic heart failure, COVID-19, Gallstone pancreatitis, Hypertension, Pancreatic abscess, Stage 3 chronic kidney disease, and Thyroid disease.    Last VS: BP (!) 79/43 (BP Location: Right arm, Patient Position: Lying)   Pulse 75   Temp 96.4 °F (35.8 °C) (Oral)   Resp 18   Ht 5' 3" (1.6 m)   Wt 59 kg (130 lb 1.1 oz)   LMP  (LMP Unknown)   SpO2 (!) 91%   Breastfeeding No   BMI 23.04 kg/m²     24H Vital Sign Range:  Temp:  [96.1 °F (35.6 °C)-98 °F (36.7 °C)]   Pulse:  []   Resp:  [9-18]   BP: (77-91)/(43-52)   SpO2:  [91 %-98 %]     Level of Consciousness (AVPU): responds to voice    Recent Labs     09/18/23  0502 09/19/23  0518   WBC 6.93 11.03   HGB 9.9* 10.1*   HCT 32.8* 34.5*    231       Recent Labs     09/18/23  0502 09/19/23  0518    143   K 3.8 3.8   * 116*   CO2 19* 12*   BUN 41* 41*   CREATININE 1.5* 1.8*   GLU 51* 57*   MG 2.0 2.1        No results for input(s): "PH", "PCO2", "PO2", "HCO3", "POCSATURATED", "BE" in the last 72 hours.     OXYGEN:             MEWS score: 6    Rounding completed with charge DAMON De La Torre. contacted for Comfort Care status but no actual comfort care order. Reports reach out to team. No additional concerns verbalized at this time. Instructed to call [phone] for further concerns or assistance.    Matt Coto RN       "

## 2023-09-20 NOTE — PLAN OF CARE
Comfort measure continue.    Problem: Adult Inpatient Plan of Care  Goal: Plan of Care Review  Outcome: Ongoing, Progressing  Goal: Absence of Hospital-Acquired Illness or Injury  Outcome: Ongoing, Progressing  Goal: Optimal Comfort and Wellbeing  Outcome: Ongoing, Progressing  Goal: Readiness for Transition of Care  Outcome: Ongoing, Progressing

## 2023-09-20 NOTE — SUBJECTIVE & OBJECTIVE
Past Medical History:   Diagnosis Date    A-fib     Anemia of chronic disease 02/02/2021    Anticoagulant long-term use     CHF (congestive heart failure)     Chronic diastolic heart failure 02/02/2021    COVID-19 01/07/2022    Gallstone pancreatitis     Hypertension     Pancreatic abscess 09/26/2020    Stage 3 chronic kidney disease 5/11/2016    Thyroid disease        Past Surgical History:   Procedure Laterality Date    CATARACT EXTRACTION      CATARACT EXTRACTION W/  INTRAOCULAR LENS IMPLANT Bilateral 2004    DR IN [place]    ENDOSCOPIC ULTRASOUND OF UPPER GASTROINTESTINAL TRACT N/A 9/14/2020    Procedure: ULTRASOUND, UPPER GI TRACT, ENDOSCOPIC;  Surgeon: Esha Howard MD;  Location: [facility] ENDO (2ND FLR);  Service: Endoscopy;  Laterality: N/A;    ENDOSCOPIC ULTRASOUND OF UPPER GASTROINTESTINAL TRACT  11/25/2020    Procedure: ULTRASOUND, UPPER GI TRACT, ENDOSCOPIC;  Surgeon: Esha Howard MD;  Location: [facility] ENDO (2ND FLR);  Service: Endoscopy;;    ERCP N/A 9/14/2020    Procedure: ERCP (ENDOSCOPIC RETROGRADE CHOLANGIOPANCREATOGRAPHY);  Surgeon: Esha Howard MD;  Location: [facility] (2ND [facility]);  Service: Endoscopy;  Laterality: N/A;    ERCP N/A 9/25/2020    Procedure: ERCP (ENDOSCOPIC RETROGRADE CHOLANGIOPANCREATOGRAPHY);  Surgeon: Esha Howard MD;  Location: [facility] ENDO (2ND FLR);  Service: Endoscopy;  Laterality: N/A;    ERCP N/A 11/25/2020    Procedure: ERCP (ENDOSCOPIC RETROGRADE CHOLANGIOPANCREATOGRAPHY);  Surgeon: Esha Howard MD;  Location: [facility] ENDO (2ND FLR);  Service: Endoscopy;  Laterality: N/A;  Covid-19 test 11/22/20 at [place] - pg  2 day hold Dr Favian Pisano - pg  PM prep    EYE SURGERY      HIP REPLACEMENT ARTHROPLASTY Right 2016    HYSTERECTOMY      REVISION OF SKIN POCKET FOR PACEMAKER N/A 1/21/2019    Procedure: REVISION-POCKET-PACEMAKER;  Surgeon: Asher Watts MD;  Location: [facility] EP LAB;  Service: Cardiology;  Laterality: N/A;  MODERATE SEDATION,  sedation issues in the past    THYROIDECTOMY      TREATMENT OF CARDIAC ARRHYTHMIA N/A 3/18/2019    Procedure: CARDIOVERSION OR DEFIBRILLATION;  Surgeon: Asher Watts MD;  Location: [facility] EP LAB;  Service: Cardiology;  Laterality: N/A;  AF, JILL-cx if SR, DCCV, MAC, FAS, 3PREP    TREATMENT OF CARDIAC ARRHYTHMIA N/A 5/14/2019    Procedure: Cardioversion or Defibrillation;  Surgeon: Derick Vizcarra MD;  Location: [facility] EP LAB;  Service: Cardiology;  Laterality: N/A;  AF, JILL, DCCV, MAC, DM, 3PREP    TREATMENT OF CARDIAC ARRHYTHMIA N/A 6/21/2022    Procedure: Cardioversion or Defibrillation;  Surgeon: Favian Colmenares MD;  Location: [facility] EP LAB;  Service: Cardiology;  Laterality: N/A;  AF, JILL, DCCV, MAC, DM, 3 Prep *SJM PPM in situ*       Review of patient's allergies indicates:   Allergen Reactions    Ciprofloxacin Nausea And Vomiting       Current Facility-Administered Medications on File Prior to Encounter   Medication    acetaminophen tablet 650 mg    glycopyrrolate injection 0.1 mg    LORazepam injection 1 mg    morphine 100 mg (1 mg/mL) in NACL 100 mL infusion (TITRATING)    morphine injection 4 mg    ondansetron injection 4 mg    prochlorperazine injection Soln 5 mg    sodium chloride 0.9% flush 10 mL     No current outpatient medications on file prior to encounter.     Family History       Problem Relation (Age of Onset)    Heart attack Mother, Maternal Grandmother    Heart disease Mother, Maternal Grandmother    Heart failure Mother, Maternal Grandmother    Hypertension Mother, Maternal Grandmother    Stroke Maternal Grandmother          Tobacco Use    Smoking status: Former     Types: Cigarettes     Start date: 5/19/1964    Smokeless tobacco: Never   Substance and Sexual Activity    Alcohol use: No    Drug use: No    Sexual activity: Not Currently     Partners: Male     Review of Systems   Reason unable to perform ROS: lethargy.     Objective:     Vital Signs (Most Recent):    Vital Signs (24h  Range):  Temp:  [96.1 °F (35.6 °C)-97.6 °F (36.4 °C)] 97 °F (36.1 °C)  Pulse:  [] 80  Resp:  [9-18] 16  SpO2:  [91 %-98 %] 95 %  BP: (77-91)/(43-51) 78/46        There is no height or weight on file to calculate BMI.     Physical Exam  Constitutional:       General: She is not in acute distress.     Appearance: She is ill-appearing.   HENT:      Head: Normocephalic and atraumatic.      Right Ear: External ear normal.      Left Ear: External ear normal.      Nose: Nose normal.      Mouth/Throat:      Mouth: Mucous membranes are dry.   Eyes:      Conjunctiva/sclera: Conjunctivae normal.   Cardiovascular:      Rate and Rhythm: Normal rate.      Comments: Thready radial pulse  Pulmonary:      Effort: Pulmonary effort is normal.      Breath sounds: Normal breath sounds.      Comments: Very short apneic episodes  Abdominal:      General: Bowel sounds are normal.      Palpations: Abdomen is soft.   Musculoskeletal:         General: Swelling present.   Lymphadenopathy:      Cervical: No cervical adenopathy.   Skin:     General: Skin is dry.      Coloration: Skin is pale.      Findings: Bruising present.   Neurological:      Comments: Not responsive to voice                Significant Labs: All pertinent labs within the past 24 hours have been reviewed.  Recent Lab Results       None            Significant Imaging: I have reviewed all pertinent imaging results/findings within the past 24 hours.

## 2023-09-20 NOTE — DISCHARGE SUMMARY
[facility] - Telemetry [facility] Medicine  Discharge Summary      Patient Name: Gisselle Ortiz  MRN: [id number]  CHADWIKC: [id number]  Patient Class: IP- Inpatient  Admission Date: 9/12/2023  Hospital Length of Stay: 8 days  Discharge Date and Time:  09/20/2023 2:06 PM  Attending Physician: Tim Man MD   Discharging Provider: Froilan Hopkins MD  Primary Care Provider: Kai Montez MD  [facility] Medicine Team: [facility] HOSP MED 1 Froilan Hopkins MD  Primary Care Team: [facility] 1    HPI:   Gisselle Ortiz is a 89 year old female with hx of HFpEF, sick sinus syndome s/p PPM, persistent A fib on oral anticoagulation, CKD3b, hypertension, hyperlipidemia, hypothyroidism, GERD, and recurrent UTI's who presented to the ED via EMS after being found by nursing home staff altered with reduced level of consciousness this morning. Pt reported to have been increasingly lethargic with poor PO intake for the past 2 days. After discussion with family at bedside and telephone, it is reported that pt is normally functional, though over time has become bedbound due to medical conditions. At baseline, she is alert and oriented x4 without baseline dementia. Last known normal was last night while daughter was visiting her at the nursing home. Pt has had multiple admissions this year with similar presentation, noted to have poor PO intake and progressive debility with failure to thrive.    In the ED, /60, , RR 12 with SpO2 99% on RA, afebrile. Noted to be in a fib with RVR on cardiac monitor and EKG. Labs notable for stable anemia, Na 141, K 3.2, bicarb 18, elevated BUN/creatinine 58/2.1, elevated T bili and alk phos. , troponin 0.147. Lactate wnl. Sepsis work-up initiated after brief episode of hypotension with blood cultures collected, IVF resuscitation (2L total via EMS and ED), and broad-spectrum antibiotics started. UA with evidence of UTI. CT head without contrast with no findings concerning  for acute intracranial processes.  She will be admitted to hospital medicine for failure to thrive, management of a fib with RVR and possible urosepsis.         * No surgery found *      Hospital Course:   Pt with hx of HFpEF, persistent atrial fibrillation, recurrent UTI, who presented to [facility] ED via EMS on 09/13/23 with decreased level of consciousness after 2 days of progressively worsening lethargy and poor PO intake. Admitted to hospital medicine for failure to thrive, management of a fib with RVR and possible urosepsis. At initial presentation, pt was normotensive with elevated HR noted to be in a fib with RVR on cardiac monitoring and ECG. Unable to give history or answer ROS in state of altered mentation. Broad-spectrum antibiotics were started, cautious IVF resuscitation given cardiac hx. HR uncontrolled with home amiodarone, BB and diltiazem deferred due to labile blood pressures as well as possible sepsis. Digoxin loaded followed by every other day dose, determined by pharmacy. HR better controlled within 110s. Gynecology consulted for labial abscess noted on physical exam, no indications for I&D based on size and location of the lesion. Affected area managed by heat compresses Q4H to allow spontaneous drainage. SLP consulted for swallowing evaluation due to uncertain aspiration risk 2/2 current condition.     Pt has had multiple admissions this year similar in presentation with most recent admission 06/23 due to acute on chronic heart failure exacerbation, noted to have UTI, very poor PO intake and progressive debility at that time as well. Palliative care consulted during current admission for assistance in discussion with family about goals of care. Family made aware of this decision with agreement. Final urine culture with proteus (pansensitive) and klebsiella (previously sensitive in 05/23, now resistant to most). Pt was on vanc/zosyn since admission, but following culture results, zosyn discontinued  and ertapenem was started. ID consulted with final recommendations to continue ertapenem for UTI and transition to doxycycline 100 mg BID instead of vanc due to age and poor renal function.  Given IVF rehydration when clinically indicated on daily assessments.     Pt continued to have notably very poor PO intake during hospitalization with minimal change in level of consciousness/alertness despite active treatment of principal problems. Extensive discussions with family taken place in regard to goals of care and advanced care planning. Ultimately, family expressed the importance of maintaining current medical treatment with IV abx for UTI treatment; however, no escalation of care such as ICU level care, including vasopressors and central line placement. Family made decision of pt returning to her assisted living facility with [facility]. On 9/20/23: pt no longer responsive to voice or tactile stimuli. MAP <60. Longer apneic episodes. Discussed with family, likely not stable for transfer to outside inpatient hospice facility. Transitioned to hospice room.        Goals of Care Treatment Preferences:  Code Status: DNR    Health care agent: [facility] agent number: [phone]    Living Will: Yes     What is most important right now is to focus on comfort and QOL .  Accordingly, we have decided that the best plan to meet the patient's goals includes pivot to comfort-focused care.      Consults:   Consults (From admission, onward)        Status Ordering Provider     Inpatient consult to Hospice  Once        Provider:  (Not yet assigned)    Acknowledged KYRIE CABAN     Inpatient consult to Palliative Care  Once        Provider:  (Not yet assigned)    Completed REUBEN CORBETT     Inpatient consult to Infectious Diseases  Once        Provider:  (Not yet assigned)    Completed KIRAN TAMAYO     Inpatient consult to Palliative Care  Once        Provider:  (Not yet assigned)    Completed  KIRAN TAMAYO     Inpatient consult to Social Work  Once        Provider:  (Not yet assigned)    Acknowledged KIRAN TAMAYO     Inpatient consult to Gynecology  Once        Provider:  (Not yet assigned)    Completed KIRAN TAMAYO          Endocrine  * Failure to thrive in adult  Pt is a 89 year old female with hx of HRpEF, persistent atrial fibrillation on AC, SSS s/p PPM, amongst other medical hx including recurrent UTI's and multiple hospital admissions this year with progressively worsening debility and failure to thrive. Initially presented to ED via EMS after being found with decreased level of consciousness after 2 days of poor PO intake and lethargy. Admitted to hospital medicine for failure to thrive, management of a fib with RVR and possible urosepsis.     --daughter, Génesis Araiza, and son, Ari Ortiz, are joint HCPOA.   --engaged in extensive discussion with family regarding goals of care, current medical status, and prognosis.  --family has had extensive discussions regarding pt's quality of life and progressive decline, requested code status to be DNR/DNI  --code status has been updated accordingly  --family wishes for treatment of acute illness where possible, but will likely discharge on home hospice  --palliative care consulted, appreciate the assistance regarding goals of care    --given pt with multiple admissions this year and progressively worsening decline noted by family, likely with each infection or hospitalization her baseline will decrease   --pt remains high risk for subsequent hospitalization and future infections similar to that on admission   --social work consult placed for home hospice planning    Urine culture with resistant klebsiella; continue ertapenem.   -ID consulted, appreciate recs    Family made aware of current status and changes to medical management via telephone.   Goals of care were discussed voluntarily, please see ACP note dated on 09/15/23.  Will discuss  with family about comfort measures today and limiting unnecessary lab testing and therapy.  Comfort measures only placed.    9/20: Mental status declining, no longer responds to audible or tactile stimuli. Longer apneic episodes. Discussed with family, would no longer tolerate transport to outside inpatient hospice facility. transitioned to hospice room.       Final Active Diagnoses:    Diagnosis Date Noted POA    PRINCIPAL PROBLEM:  Failure to thrive in adult [R62.7] 09/12/2023 Yes    Comfort measures only status [Z51.5] 09/19/2023 Not Applicable    Urinary tract infection due to ESBL Klebsiella [N39.0, B96.89] 09/15/2023 Yes    Labial abscess [N76.4] 09/15/2023 Yes    Severe sepsis [A41.9, R65.20] 09/12/2023 Yes    Hypokalemia [E87.6] 09/12/2023 Yes    NSTEMI (non-ST elevated myocardial infarction), type 2 demand [I21.4] 09/12/2023 Yes    Encounter for palliative care [Z51.5] 05/19/2023 Not Applicable    MARIO (acute kidney injury) [N17.9] 05/17/2023 Yes    Anemia due to chronic kidney disease [N18.9, D63.1] 02/02/2021 Yes     Chronic    Chronic diastolic heart failure [I50.32] 02/02/2021 Yes     Chronic    Persistent atrial fibrillation [I48.19] 05/19/2015 Yes     Chronic    Cardiac pacemaker in situ [Z95.0] 05/19/2015 Yes     Chronic    Acquired hypothyroidism [E03.9] 05/19/2015 Yes     Chronic      Problems Resolved During this Admission:       Discharged Condition: critical    Disposition: Hospice    Follow Up:   Follow-up Information     Kai Montez MD Follow up.    Specialty: Family Medicine  Contact information:  [address] [facility]  [address]  [place] [id number] [phone]                       Patient Instructions:   No discharge procedures on file.    Significant Diagnostic Studies: N/A    Pending Diagnostic Studies:     Procedure Component Value Units Date/Time    CBC with Automated Differential [[id number]]     Order Status: Sent Lab Status: No result     Specimen: Blood      Comprehensive Metabolic Panel (CMP) [[id number]]     Order Status: Sent Lab Status: No result     Specimen: Blood     Magnesium [[id number]]     Order Status: Sent Lab Status: No result     Specimen: Blood     Troponin I [[id number]] Collected: 09/13/23 0601    Order Status: Sent Lab Status: In process Updated: 09/13/23 0601    Specimen: Blood     Narrative:      Collection has been rescheduled by JT7 at 09/13/2023 02:38 Reason:   Unable to collect per Denia         Medications:  Transfer Medications (for Discharge Readmit only):   Current Facility-Administered Medications   Medication Dose Route Frequency Provider Last Rate Last Admin    acetaminophen tablet 650 mg  650 mg Oral Q4H PRN Hattie Purcell MD        glycopyrrolate injection 0.1 mg  0.1 mg Intravenous TID PRN Matt Taveras MD        LORazepam injection 1 mg  1 mg Intravenous Q4H PRN Matt Taveras MD        morphine 100 mg (1 mg/mL) in NACL 100 mL infusion (TITRATING)  0-10 mg/hr Intravenous Continuous Tim Man MD        morphine injection 4 mg  4 mg Intravenous Q4H PRN Matt Taveras MD   4 mg at 09/20/23 0632    ondansetron injection 4 mg  4 mg Intravenous Q8H PRN Hattie Purcell MD        prochlorperazine injection Soln 5 mg  5 mg Intravenous Q6H PRN Hattie Purcell MD        sodium chloride 0.9% flush 10 mL  10 mL Intravenous Q12H PRN Hattie Purcell MD           Indwelling Lines/Drains at time of discharge:   Lines/Drains/Airways     Drain  Duration                Urethral Catheter 09/12/23 1245 Non-latex;Silicone 8 days                Time spent on the discharge of patient: 60 minutes         Froilan Hopkins MD  Department of Hospital Medicine  [facility] - Telemetry Stepdown

## 2023-09-20 NOTE — NURSING
Pt transported to 14, rm [id number], via bed with transporters and primary nurse and PCT. Bag of Morphine given to Elisa MAGDALENO. No changes noted with pt.

## 2023-09-20 NOTE — H&P
[facility] - Intensive Care ([address] Medicine  History & Physical    Patient Name: Gisselle Ortiz  MRN: [id number]  Patient Class: IP- Hospice  Admission Date: (Not on file)  Attending Physician: Tim Man MD  Primary Care Provider: Kai Montez MD         Patient information was obtained from past medical records and primary team.     Subjective:     Principal Problem:Comfort measures only status    Chief Complaint: No chief complaint on file.       HPI: 89yoF w hx including HFpEF, atrial fibrillation, sick sinus syndrome s/p PPM, HTN, CKD3b, hypothyroidism, recurrent UTI, debility, severe protein calorie malnutrition who was admitted from [facility] to [facility] for acute encephalopathy and sepsis 2/2 UTI, complicated by atrial fibrillation with RVR. Despite appropriate medical intervention, Ms. Ortiz continues to decline. Transitioned to comfort focused care with original plans to return to assisted living facility with hospice support, which was discontinued due to fears of transfer that may cause Ms. Ortiz to pass en route. Palliative and Supportive Care was consulted to assist with hospice discussion. On 9/20/23, patient having longer apneic episodes and no longer responsive to voice or touch. Prognosis is likely hours, maybe up to a day or two. Discussed with family, pt transferred to hospice floor.       Past Medical History:   Diagnosis Date    A-fib     Anemia of chronic disease 02/02/2021    Anticoagulant long-term use     CHF (congestive heart failure)     Chronic diastolic heart failure 02/02/2021    COVID-19 01/07/2022    Gallstone pancreatitis     Hypertension     Pancreatic abscess 09/26/2020    Stage 3 chronic kidney disease 5/11/2016    Thyroid disease        Past Surgical History:   Procedure Laterality Date    CATARACT EXTRACTION      CATARACT EXTRACTION W/  INTRAOCULAR LENS IMPLANT Bilateral 2004     IN  [place]    ENDOSCOPIC ULTRASOUND OF UPPER GASTROINTESTINAL TRACT N/A 9/14/2020    Procedure: ULTRASOUND, UPPER GI TRACT, ENDOSCOPIC;  Surgeon: Esha Howard MD;  Location: [facility] ENDO (2ND FLR);  Service: Endoscopy;  Laterality: N/A;    ENDOSCOPIC ULTRASOUND OF UPPER GASTROINTESTINAL TRACT  11/25/2020    Procedure: ULTRASOUND, UPPER GI TRACT, ENDOSCOPIC;  Surgeon: Esha Howard MD;  Location: [facility] ENDO (2ND FLR);  Service: Endoscopy;;    ERCP N/A 9/14/2020    Procedure: ERCP (ENDOSCOPIC RETROGRADE CHOLANGIOPANCREATOGRAPHY);  Surgeon: Esha Howard MD;  Location: [facility] ENDO (2ND FLR);  Service: Endoscopy;  Laterality: N/A;    ERCP N/A 9/25/2020    Procedure: ERCP (ENDOSCOPIC RETROGRADE CHOLANGIOPANCREATOGRAPHY);  Surgeon: Esha Howard MD;  Location: [facility] ENDO (2ND FLR);  Service: Endoscopy;  Laterality: N/A;    ERCP N/A 11/25/2020    Procedure: ERCP (ENDOSCOPIC RETROGRADE CHOLANGIOPANCREATOGRAPHY);  Surgeon: Esha Howard MD;  Location: [facility] ENDO (2ND FLR);  Service: Endoscopy;  Laterality: N/A;  Covid-19 test 11/22/20 at [place] - pg  2 day hold Dr Favian Pisano - pg  PM prep    EYE SURGERY      HIP REPLACEMENT ARTHROPLASTY Right 2016    HYSTERECTOMY      REVISION OF SKIN POCKET FOR PACEMAKER N/A 1/21/2019    Procedure: REVISION-POCKET-PACEMAKER;  Surgeon: Asher Watts MD;  Location: [facility] EP LAB;  Service: Cardiology;  Laterality: N/A;  MODERATE SEDATION, sedation issues in the past    THYROIDECTOMY      TREATMENT OF CARDIAC ARRHYTHMIA N/A 3/18/2019    Procedure: CARDIOVERSION OR DEFIBRILLATION;  Surgeon: Asher Watts MD;  Location: [facility] EP LAB;  Service: Cardiology;  Laterality: N/A;  AF, JILL-cx if SR, DCCV, MAC, FAS, 3PREP    TREATMENT OF CARDIAC ARRHYTHMIA N/A 5/14/2019    Procedure: Cardioversion or Defibrillation;  Surgeon: Derick Vizcarra MD;  Location: [facility] EP LAB;  Service: Cardiology;  Laterality: N/A;  AF, JILL, DCCV, MAC, DM, 3PREP     TREATMENT OF CARDIAC ARRHYTHMIA N/A 6/21/2022    Procedure: Cardioversion or Defibrillation;  Surgeon: Favian Colmenares MD;  Location: [facility] EP LAB;  Service: Cardiology;  Laterality: N/A;  AF, JILL, DCCV, MAC, DM, 3 Prep *SJM PPM in situ*       Review of patient's allergies indicates:   Allergen Reactions    Ciprofloxacin Nausea And Vomiting       Current Facility-Administered Medications on File Prior to Encounter   Medication    acetaminophen tablet 650 mg    glycopyrrolate injection 0.1 mg    LORazepam injection 1 mg    morphine 100 mg (1 mg/mL) in NACL 100 mL infusion (TITRATING)    morphine injection 4 mg    ondansetron injection 4 mg    prochlorperazine injection Soln 5 mg    sodium chloride 0.9% flush 10 mL     No current outpatient medications on file prior to encounter.     Family History       Problem Relation (Age of Onset)    Heart attack Mother, Maternal Grandmother    Heart disease Mother, Maternal Grandmother    Heart failure Mother, Maternal Grandmother    Hypertension Mother, Maternal Grandmother    Stroke Maternal Grandmother          Tobacco Use    Smoking status: Former     Types: Cigarettes     Start date: 5/19/1964    Smokeless tobacco: Never   Substance and Sexual Activity    Alcohol use: No    Drug use: No    Sexual activity: Not Currently     Partners: Male     Review of Systems   Unable to perform ROS: Patient unresponsive     Objective:     Vital Signs (Most Recent):    Vital Signs (24h Range):  Temp:  [96.1 °F (35.6 °C)-97.6 °F (36.4 °C)] 97 °F (36.1 °C)  Pulse:  [] 80  Resp:  [9-18] 14  SpO2:  [91 %-98 %] 95 %  BP: (77-91)/(43-51) 78/46        There is no height or weight on file to calculate BMI.     Physical Exam  Constitutional:       General: She is not in acute distress.     Appearance: She is ill-appearing. She is not toxic-appearing or diaphoretic.   Cardiovascular:      Rate and Rhythm: Tachycardia present. Rhythm irregular.      Heart sounds: No murmur  heard.     No gallop.   Pulmonary:      Effort: Pulmonary effort is normal. No respiratory distress.      Breath sounds: No wheezing.      Comments: Mild accessory muscle use  Abdominal:      General: Bowel sounds are normal.      Palpations: Abdomen is soft.                Significant Labs: All pertinent labs within the past 24 hours have been reviewed.    Significant Imaging: I have reviewed all pertinent imaging results/findings within the past 24 hours.    Assessment/Plan:     * Comfort measures only status  Symptom Assessment:  Overall, patient continues to have ongoing pain and dyspnea requiring IV titration.     - Pain: controlled  - Dyspnea: controlled  - Agitation: controlled  - Mentation: not interactive    Current Medications:    No current facility-administered medications for this encounter.  No current outpatient medications on file.    Facility-Administered Medications Ordered in Other Encounters:     acetaminophen tablet 650 mg, 650 mg, Oral, Q4H PRN, Hattie Purcell MD    glycopyrrolate injection 0.1 mg, 0.1 mg, Intravenous, TID PRN, Matt Taveras MD    LORazepam injection 1 mg, 1 mg, Intravenous, Q4H PRN, Matt Taveras MD    morphine 100 mg (1 mg/mL) in NACL 100 mL infusion (TITRATING), 0-10 mg/hr, Intravenous, Continuous, Tim Man MD, Last Rate: 1 mL/hr at 09/20/23 1454, 1 mg/hr at 09/20/23 1454    morphine injection 4 mg, 4 mg, Intravenous, Q4H PRN, Matt Taveras MD, 4 mg at 09/20/23 0632    ondansetron injection 4 mg, 4 mg, Intravenous, Q8H PRN, Hattie Purcell MD    prochlorperazine injection Soln 5 mg, 5 mg, Intravenous, Q6H PRN, Hattie Purcell MD    sodium chloride 0.9% flush 10 mL, 10 mL, Intravenous, Q12H PRN, Hattie Purcell MD      Family discussion updates:  See prior documentation    Plan of care:  Morphine gtt for dyspnea and pain + prns as noted in the MAR    Follow up plans:   - Symptomatic condition is stabilized.   - Death is imminent  within a short period of time as evidenced by clinical deterioration such as mottling of skin, respiratory status change, and level of consciousness which are not conducive to medical transport.  - Hospice is working diligently to develop and provide a plan for safe discharge to a lower level of care.       Advanced Directives::  LaPOST: Yes  Do Not Resuscitate Status: Yes  Surrogate Decision maker / Medical Power of :daughter Génesis Araiza ([phone]) and son Ari Ortiz ([phone])      VTE Risk Mitigation (From admission, onward)    None                     Tim Man MD  Department of Hospital Medicine  [facility] - Intensive Care ([place]

## 2023-09-20 NOTE — PT/OT/SLP PROGRESS
Speech Language Pathology      Gisselle Ortiz  MRN: [id number]    Per review of palliative MD note, the pt and family with decision to transition to comfort measures regarding nutrition/hydration. The goal is to proceed with nutrition/hydration via comfort feeds as desired and tolerated. No further skilled SLP service warranted at this time.     9/20/2023

## 2023-09-20 NOTE — NURSING
Nurses Note -- 4 Eyes      9/20/2023   7:22 AM      Skin assessed during: Q Shift Change      [] No Altered Skin Integrity Present    []Prevention Measures Documented      [x] Yes- Altered Skin Integrity Present or Discovered   [] LDA Added if Not in Epic (Describe Wound)   [] New Altered Skin Integrity was Present on Admit and Documented in LDA   [] Wound Image Taken    Wound Care Consulted? No    Attending Nurse:  Belinda Menchaca RN/Staff Member:   DAMON Maher

## 2023-09-20 NOTE — SUBJECTIVE & OBJECTIVE
Past Medical History:   Diagnosis Date    A-fib     Anemia of chronic disease 02/02/2021    Anticoagulant long-term use     CHF (congestive heart failure)     Chronic diastolic heart failure 02/02/2021    COVID-19 01/07/2022    Gallstone pancreatitis     Hypertension     Pancreatic abscess 09/26/2020    Stage 3 chronic kidney disease 5/11/2016    Thyroid disease        Past Surgical History:   Procedure Laterality Date    CATARACT EXTRACTION      CATARACT EXTRACTION W/  INTRAOCULAR LENS IMPLANT Bilateral 2004    DR IN [place]    ENDOSCOPIC ULTRASOUND OF UPPER GASTROINTESTINAL TRACT N/A 9/14/2020    Procedure: ULTRASOUND, UPPER GI TRACT, ENDOSCOPIC;  Surgeon: Esha Howard MD;  Location: [facility] ENDO (2ND FLR);  Service: Endoscopy;  Laterality: N/A;    ENDOSCOPIC ULTRASOUND OF UPPER GASTROINTESTINAL TRACT  11/25/2020    Procedure: ULTRASOUND, UPPER GI TRACT, ENDOSCOPIC;  Surgeon: Esha Howard MD;  Location: [facility] ENDO (2ND FLR);  Service: Endoscopy;;    ERCP N/A 9/14/2020    Procedure: ERCP (ENDOSCOPIC RETROGRADE CHOLANGIOPANCREATOGRAPHY);  Surgeon: Esha Howard MD;  Location: [facility] (2ND [facility]);  Service: Endoscopy;  Laterality: N/A;    ERCP N/A 9/25/2020    Procedure: ERCP (ENDOSCOPIC RETROGRADE CHOLANGIOPANCREATOGRAPHY);  Surgeon: Esha Howard MD;  Location: [facility] ENDO (2ND FLR);  Service: Endoscopy;  Laterality: N/A;    ERCP N/A 11/25/2020    Procedure: ERCP (ENDOSCOPIC RETROGRADE CHOLANGIOPANCREATOGRAPHY);  Surgeon: Esha Howard MD;  Location: [facility] ENDO (2ND FLR);  Service: Endoscopy;  Laterality: N/A;  Covid-19 test 11/22/20 at [place] - pg  2 day hold Dr Favian Pisano - pg  PM prep    EYE SURGERY      HIP REPLACEMENT ARTHROPLASTY Right 2016    HYSTERECTOMY      REVISION OF SKIN POCKET FOR PACEMAKER N/A 1/21/2019    Procedure: REVISION-POCKET-PACEMAKER;  Surgeon: Asher Watts MD;  Location: [facility] EP LAB;  Service: Cardiology;  Laterality: N/A;  MODERATE SEDATION,  sedation issues in the past    THYROIDECTOMY      TREATMENT OF CARDIAC ARRHYTHMIA N/A 3/18/2019    Procedure: CARDIOVERSION OR DEFIBRILLATION;  Surgeon: Asher Watts MD;  Location: [facility] EP LAB;  Service: Cardiology;  Laterality: N/A;  AF, JILL-cx if SR, DCCV, MAC, FAS, 3PREP    TREATMENT OF CARDIAC ARRHYTHMIA N/A 5/14/2019    Procedure: Cardioversion or Defibrillation;  Surgeon: Derick Vizcarra MD;  Location: [facility] EP LAB;  Service: Cardiology;  Laterality: N/A;  AF, JILL, DCCV, MAC, DM, 3PREP    TREATMENT OF CARDIAC ARRHYTHMIA N/A 6/21/2022    Procedure: Cardioversion or Defibrillation;  Surgeon: Favian Colmenares MD;  Location: [facility] EP LAB;  Service: Cardiology;  Laterality: N/A;  AF, JILL, DCCV, MAC, DM, 3 Prep *SJM PPM in situ*       Review of patient's allergies indicates:   Allergen Reactions    Ciprofloxacin Nausea And Vomiting       Current Facility-Administered Medications on File Prior to Encounter   Medication    acetaminophen tablet 650 mg    glycopyrrolate injection 0.1 mg    LORazepam injection 1 mg    morphine 100 mg (1 mg/mL) in NACL 100 mL infusion (TITRATING)    morphine injection 4 mg    ondansetron injection 4 mg    prochlorperazine injection Soln 5 mg    sodium chloride 0.9% flush 10 mL     No current outpatient medications on file prior to encounter.     Family History       Problem Relation (Age of Onset)    Heart attack Mother, Maternal Grandmother    Heart disease Mother, Maternal Grandmother    Heart failure Mother, Maternal Grandmother    Hypertension Mother, Maternal Grandmother    Stroke Maternal Grandmother          Tobacco Use    Smoking status: Former     Types: Cigarettes     Start date: 5/19/1964    Smokeless tobacco: Never   Substance and Sexual Activity    Alcohol use: No    Drug use: No    Sexual activity: Not Currently     Partners: Male     Review of Systems   Unable to perform ROS: Patient unresponsive     Objective:     Vital Signs (Most Recent):    Vital Signs (24h  Range):  Temp:  [96.1 °F (35.6 °C)-97.6 °F (36.4 °C)] 97 °F (36.1 °C)  Pulse:  [] 80  Resp:  [9-18] 14  SpO2:  [91 %-98 %] 95 %  BP: (77-91)/(43-51) 78/46        There is no height or weight on file to calculate BMI.     Physical Exam  Constitutional:       General: She is not in acute distress.     Appearance: She is ill-appearing. She is not toxic-appearing or diaphoretic.   Cardiovascular:      Rate and Rhythm: Tachycardia present. Rhythm irregular.      Heart sounds: No murmur heard.     No gallop.   Pulmonary:      Effort: Pulmonary effort is normal. No respiratory distress.      Breath sounds: No wheezing.      Comments: Mild accessory muscle use  Abdominal:      General: Bowel sounds are normal.      Palpations: Abdomen is soft.                Significant Labs: All pertinent labs within the past 24 hours have been reviewed.    Significant Imaging: I have reviewed all pertinent imaging results/findings within the past 24 hours.

## 2023-09-21 NOTE — PLAN OF CARE
Patient is 89yr old female currently on comfort measures only. Patient has a continuous morphine drip going and appears comfortable. Patient only responsive to pain.   Problem: Adult Inpatient Plan of Care  Goal: Plan of Care Review  Outcome: Ongoing, Progressing  Flowsheets (Taken 9/21/2023 1637)  Plan of Care Reviewed With:   patient   family     Problem: Palliative Care  Goal: Enhanced Quality of Life  Outcome: Ongoing, Progressing  Intervention: Maximize Comfort  Flowsheets (Taken 9/21/2023 1637)  Pain Management Interventions: around-the-clock dosing utilized

## 2023-09-21 NOTE — PLAN OF CARE
Problem: Adult Inpatient Plan of Care  Goal: Plan of Care Review  Outcome: Ongoing, Progressing  Goal: Patient-Specific Goal (Individualized)  Outcome: Ongoing, Progressing  Goal: Absence of Hospital-Acquired Illness or Injury  Outcome: Ongoing, Progressing  Goal: Optimal Comfort and Wellbeing  Outcome: Ongoing, Progressing  Goal: Readiness for Transition of Care  Outcome: Ongoing, Progressing     Problem: Adjustment to Illness (Sepsis/Septic Shock)  Goal: Optimal Coping  Outcome: Ongoing, Progressing     Problem: Palliative Care  Goal: Enhanced Quality of Life  Outcome: Ongoing, Progressing     Problem: Fall Injury Risk  Goal: Absence of Fall and Fall-Related Injury  Outcome: Ongoing, Progressing     Hospice/comfort care measures only.

## 2023-09-21 NOTE — PLAN OF CARE
[facility] - [facility] ([facility]  Initial Discharge Assessment       Primary Care Provider: Kai Montez MD    Admission Diagnosis: Other fatigue [R53.83]  Urinary tract infection without hematuria, site unspecified [N39.0]  Urinary tract infection without hematuria, site unspecified [N39.0]    Admission Date: 9/20/2023  Expected Discharge Date: 9/22/2023    Transition of Care Barriers: None    Payor: [employer] / Plan: [facility] / Product Type: Guarantor 3rd Party /     Extended Emergency Contact Information  Primary Emergency Contact: CONSUELOCHANO PETERS  Mobile Phone: [phone]  Relation: Son  Preferred language: English   needed? No  Secondary Emergency Contact: Génesis Araiza   [place]  Mobile Phone: [phone]  Relation: Daughter    Discharge Plan A: Inpatient Hospice  Discharge Plan B: Inpatient Hospice      [id number] - [place] - [address] [place] [address]  Phone: [phone] Fax: [phone]    [id number] - [place] - [address] [facility] [address]  [place]  [facility] [place] [address]  Phone: [phone] Fax: [phone]    [facility] - [place], [place] - [address] [place] [address]  Phone: [phone] Fax: [phone]      Initial Assessment (most recent)       Adult Discharge Assessment - 09/21/23 1444          Discharge Assessment    Assessment Type Discharge Planning Assessment     Confirmed/corrected address, phone number and insurance Yes     Confirmed Demographics Correct on Facesheet     Source of Information family     If unable to respond/provide information was family/caregiver contacted? Yes     Contact Name/Number CHANO GORDILLO (Son)   [phone] (Mobile)     When was your last doctors appointment? 08/07/23     Reason For Admission Comfort measures only status     People in Home facility  resident     Facility Arrived From: [facility]     Do you expect to return to your current living situation? No     Do you have help at home or someone to help you manage your care at home? Yes     Who are your caregiver(s) and their phone number(s)? [facility]     Prior to hospitilization cognitive status: Coma/Sedated/Intubated     Current cognitive status: Coma/Sedated/Intubated     Walking or Climbing Stairs transferring difficulty, dependent     Dressing/Bathing dressing difficulty, dependent     Home Accessibility wheelchair accessible     Equipment Currently Used at Home none     Readmission within 30 days? Yes     Patient currently being followed by outpatient case management? No     Do you currently have service(s) that help you manage your care at home? No     Name and Contact number of agency NA     Is the pt/caregiver preference to resume services with current agency No     Do you take prescription medications? No     Do you have prescription coverage? No   Comfort measures only status    Do you have any problems affording any of your prescribed medications? No     Is the patient taking medications as prescribed? no     If no, which medications is patient not taking? Comfort measures only status     Who is going to help you get home at discharge? inpaitent hospice     How do you get to doctors appointments? family or friend will provide     Are you on dialysis? No     Do you take coumadin? No     DME Needed Upon Discharge  none     Discharge Plan discussed with: Adult children     Name(s) and Number(s) CHANO GORDILLO (Son)   [phone] (Mobile)     Transition of Care Barriers None     Discharge Plan A Inpatient Hospice     Discharge Plan B Inpatient Hospice        Physical Activity    On average, how many days per week do you engage in moderate to strenuous exercise (like a brisk walk)? --   Comfort measures only status    On average, how many minutes do you engage in exercise at this level? --    Comfort measures only status       Financial Resource Strain    How hard is it for you to pay for the very basics like food, housing, medical care, and heating? --   Comfort measures only status       Housing Stability    In the last 12 months, was there a time when you were not able to pay the mortgage or rent on time? No     In the last 12 months, was there a time when you did not have a steady place to sleep or slept in a shelter (including now)? No        Transportation Needs    In the past 12 months, has lack of transportation kept you from medical appointments or from getting medications? No     In the past 12 months, has lack of transportation kept you from meetings, work, or from getting things needed for daily living? No        Food Insecurity    Within the past 12 months, you worried that your food would run out before you got the money to buy more. --   Comfort measures only status       Social Connections    In a typical week, how many times do you talk on the phone with family, friends, or neighbors? --   Comfort measures only status    How often do you get together with friends or relatives? --   Comfort measures only status    How often do you attend Baptism or Episcopal services? --   Comfort measures only status    How often do you attend meetings of the clubs or organizations you belong to? --   Comfort measures only status    Are you , , , , never , or living with a partner?         Alcohol Use    Q1: How often do you have a drink containing alcohol? --   Comfort measures only status    Q2: How many drinks containing alcohol do you have on a typical day when you are drinking? --   Comfort measures only status    Q3: How often do you have six or more drinks on one occasion? --   Comfort measures only status                    Readmission Assessment (most recent)       Readmission Assessment - 09/21/23 5801          Readmission    Why were you  hospitalized in the last 30 days? yes a week ago     Why were you readmitted? Planned readmission     When you left the hospital how did you feel? MY mother was okay     When you left the hospital where did you go? Assisted Living     Did patient/caregiver refused recommended DC plan? No     Tell me about what happened between when you left the hospital and the day you returned. She was not feeling well     When did you start not feeling well? A few days after returning home     Did you try to manage your symptoms your self? No     What did you do? NA     Did you call anyone? Yes     Who did you call? PCP     Did you try to see or did see a doctor or nurse before you came? No     Why? She was brought to the ED     Did you have  a follow-up appointment on discharge? Yes     Did you go? Yes     Was this a planned readmission? Yes                      CM spoke  with son via phone introduction after GOC discussed with medical team or Palliative services . Patient transferred to [address] [place] [id number].   Patient accepted by   hospice [[facility]].     Karoline Kasper RN  Case Management  [facility]  [phone]

## 2023-09-22 NOTE — SUBJECTIVE & OBJECTIVE
Interval History: Patient lying in bed, no acute distress. Unresponsive. Resting peacefully. On moprhine drip. No acute events.       Review of Systems   Unable to perform ROS: Patient unresponsive     Objective:     Vital Signs (Most Recent):  Temp: 96.3 °F (35.7 °C) (09/22/23 0546)  Pulse: 92 (09/22/23 0546)  Resp: (!) 8 (09/22/23 0546)  BP: (!) 52/36 (09/22/23 0546)  SpO2: (!) 86 % (09/22/23 0546) Vital Signs (24h Range):  Temp:  [96.3 °F (35.7 °C)] 96.3 °F (35.7 °C)  Pulse:  [92] 92  Resp:  [8-10] 8  SpO2:  [86 %-88 %] 86 %  BP: (52)/(36) 52/36     Weight: 59 kg (130 lb 1.1 oz)  Body mass index is 23.04 kg/m².    Intake/Output Summary (Last 24 hours) at 9/22/2023 1729  Last data filed at 9/22/2023 0553  Gross per 24 hour   Intake 22.58 ml   Output 10 ml   Net 12.58 ml           Physical Exam  Constitutional:       Appearance: She is ill-appearing.   HENT:      Mouth/Throat:      Mouth: Mucous membranes are dry.   Cardiovascular:      Rate and Rhythm: Tachycardia present. Rhythm regularly irregular.   Pulmonary:      Effort: No respiratory distress.   Abdominal:      General: There is no distension.      Palpations: Abdomen is soft.   Musculoskeletal:      Right lower leg: No edema.      Left lower leg: No edema.             Significant Labs:   TSH:   Recent Labs   Lab 09/12/23  1142   TSH 0.879           Significant Imaging: I have reviewed all pertinent imaging results/findings within the past 24 hours.

## 2023-09-22 NOTE — PROGRESS NOTES
[facility] - [facility] ([address] Medicine  Progress Note    Patient Name: Gisselle Ortiz  MRN: [id number]  Patient Class: IP- Hospice   Admission Date: 9/20/2023  Length of Stay: 2 days  Attending Physician: Mk Coates MD  Primary Care Provider: Kai Montez MD        Subjective:     Principal Problem:Comfort measures only status        HPI:  89yoF w hx including HFpEF, atrial fibrillation, sick sinus syndrome s/p PPM, HTN, CKD3b, hypothyroidism, recurrent UTI, debility, severe protein calorie malnutrition who was admitted from [facility] to [facility] for acute encephalopathy and sepsis 2/2 UTI, complicated by atrial fibrillation with RVR. Despite appropriate medical intervention, Ms. Ortiz continues to decline. Transitioned to comfort focused care with original plans to return to assisted living facility with hospice support, which was discontinued due to fears of transfer that may cause Ms. Ortiz to pass en route. Palliative and Supportive Care was consulted to assist with hospice discussion. On 9/20/23, patient having longer apneic episodes and no longer responsive to voice or touch. Prognosis is likely hours, maybe up to a day or two. Discussed with family, pt transferred to hospice floor.       Overview/Hospital Course:  No notes on file    Interval History: Patient lying in bed, no acute distress. Unresponsive. Resting peacefully. On moprhine drip. No acute events.       Review of Systems   Unable to perform ROS: Patient unresponsive     Objective:     Vital Signs (Most Recent):  Temp: 96.3 °F (35.7 °C) (09/22/23 0546)  Pulse: 92 (09/22/23 0546)  Resp: (!) 8 (09/22/23 0546)  BP: (!) 52/36 (09/22/23 0546)  SpO2: (!) 86 % (09/22/23 0546) Vital Signs (24h Range):  Temp:  [96.3 °F (35.7 °C)] 96.3 °F (35.7 °C)  Pulse:  [92] 92  Resp:  [8-10] 8  SpO2:  [86 %-88 %] 86 %  BP: (52)/(36) 52/36     Weight: 59 kg (130 lb 1.1 oz)  Body mass index  is 23.04 kg/m².    Intake/Output Summary (Last 24 hours) at 9/22/2023 1729  Last data filed at 9/22/2023 0553  Gross per 24 hour   Intake 22.58 ml   Output 10 ml   Net 12.58 ml           Physical Exam  Constitutional:       Appearance: She is ill-appearing.   HENT:      Mouth/Throat:      Mouth: Mucous membranes are dry.   Cardiovascular:      Rate and Rhythm: Tachycardia present. Rhythm regularly irregular.   Pulmonary:      Effort: No respiratory distress.   Abdominal:      General: There is no distension.      Palpations: Abdomen is soft.   Musculoskeletal:      Right lower leg: No edema.      Left lower leg: No edema.             Significant Labs:   TSH:   Recent Labs   Lab 09/12/23  1142   TSH 0.879           Significant Imaging: I have reviewed all pertinent imaging results/findings within the past 24 hours.      Assessment/Plan:      * Comfort measures only status  Symptom Assessment:  Overall, patient continues to have ongoing pain and dyspnea requiring IV titration.     - Pain: controlled  - Dyspnea: controlled  - Agitation: controlled  - Mentation: not interactive    Current Medications:    No current facility-administered medications for this encounter.  No current outpatient medications on file.    Facility-Administered Medications Ordered in Other Encounters:     acetaminophen tablet 650 mg, 650 mg, Oral, Q4H PRN, Hattie Purcell MD    glycopyrrolate injection 0.1 mg, 0.1 mg, Intravenous, TID PRN, Matt Taveras MD    LORazepam injection 1 mg, 1 mg, Intravenous, Q4H PRN, Matt Taveras MD    morphine 100 mg (1 mg/mL) in NACL 100 mL infusion (TITRATING), 0-10 mg/hr, Intravenous, Continuous, Tim Man MD, Last Rate: 1 mL/hr at 09/20/23 1454, 1 mg/hr at 09/20/23 1454    morphine injection 4 mg, 4 mg, Intravenous, Q4H PRN, Matt Taveras MD, 4 mg at 09/20/23 0632    ondansetron injection 4 mg, 4 mg, Intravenous, Q8H PRN, Hattie Purcell MD    prochlorperazine injection  Soln 5 mg, 5 mg, Intravenous, Q6H PRN, Hattie Purcell MD    sodium chloride 0.9% flush 10 mL, 10 mL, Intravenous, Q12H PRN, Hattie Purcell MD      Family discussion updates:  See prior documentation    Plan of care:  Morphine gtt for dyspnea and pain + prns as noted in the MAR    Follow up plans:   - Symptomatic condition is stabilized.   - Death is imminent within a short period of time as evidenced by clinical deterioration such as mottling of skin, respiratory status change, and level of consciousness which are not conducive to medical transport.  - Hospice is working diligently to develop and provide a plan for safe discharge to a lower level of care.       Advanced Directives::  [place]POST: Yes  Do Not Resuscitate Status: Yes  Surrogate Decision maker / Medical Power of :daughter Génesis Araiza ([phone]) and son Ari Ortiz ([phone])      VTE Risk Mitigation (From admission, onward)         Ordered     IP VTE HIGH RISK PATIENT  Once         09/20/23 1613     Place sequential compression device  Until discontinued         09/20/23 1613                Discharge Planning   FRANCESCA: 9/24/2023     Code Status: DNR   Is the patient medically ready for discharge?: No    Reason for patient still in hospital (select all that apply): Patient trending condition and Treatment  Discharge Plan A: Inpatient Hospice          Time spent in care of patient: > 35 minutes           Mk Coates MD  Department of Hospital Medicine   [facility] - Intensive Care ([place])

## 2023-09-22 NOTE — PLAN OF CARE
Pt responds to pain, disoriented. Pt on comfort care measures, morphine gtt @1mg/hr. Pt appears comfortable. Indwelling catheter in place, not making much urine, approx. 20cc the last 24 hours. No other changes overnight, continuing POC.

## 2023-09-22 NOTE — PROGRESS NOTES
[facility] - [facility] ([address] Medicine  Progress Note    Patient Name: Gisselle Ortiz  MRN: [id number]  Patient Class: IP- Hospice   Admission Date: 9/20/2023  Length of Stay: 1 days  Attending Physician: Mk Coates MD  Primary Care Provider: Kai Montez MD        Subjective:     Principal Problem:Comfort measures only status        HPI:  89yoF w hx including HFpEF, atrial fibrillation, sick sinus syndrome s/p PPM, HTN, CKD3b, hypothyroidism, recurrent UTI, debility, severe protein calorie malnutrition who was admitted from [facility] to [facility] for acute encephalopathy and sepsis 2/2 UTI, complicated by atrial fibrillation with RVR. Despite appropriate medical intervention, Ms. Ortiz continues to decline. Transitioned to comfort focused care with original plans to return to assisted living facility with hospice support, which was discontinued due to fears of transfer that may cause Ms. Ortiz to pass en route. Palliative and Supportive Care was consulted to assist with hospice discussion. On 9/20/23, patient having longer apneic episodes and no longer responsive to voice or touch. Prognosis is likely hours, maybe up to a day or two. Discussed with family, pt transferred to hospice floor.       Overview/Hospital Course:  No notes on file    Interval History: Patient lying in bed, no acute distress. Unresponsive.       Review of Systems   Unable to perform ROS: Patient unresponsive     Objective:     Vital Signs (Most Recent):  Temp: 97.4 °F (36.3 °C) (09/21/23 0815)  Pulse: 88 (09/21/23 0815)  Resp: 12 (09/21/23 0815)  BP: (!) 62/32 (09/21/23 0815)  SpO2: (!) 80 % (09/21/23 0815) Vital Signs (24h Range):  Temp:  [97 °F (36.1 °C)-97.4 °F (36.3 °C)] 97.4 °F (36.3 °C)  Pulse:  [82-88] 88  Resp:  [12-15] 12  SpO2:  [80 %-97 %] 80 %  BP: (62-67)/(32-41) 62/32        There is no height or weight on file to calculate BMI.    Intake/Output  Summary (Last 24 hours) at 9/21/2023 1935  Last data filed at 9/21/2023 1842  Gross per 24 hour   Intake 27.79 ml   Output --   Net 27.79 ml         Physical Exam  Constitutional:       Appearance: She is ill-appearing.   HENT:      Mouth/Throat:      Mouth: Mucous membranes are dry.   Cardiovascular:      Rate and Rhythm: Tachycardia present. Rhythm regularly irregular.   Pulmonary:      Effort: No respiratory distress.   Abdominal:      General: There is no distension.      Palpations: Abdomen is soft.   Musculoskeletal:      Right lower leg: No edema.      Left lower leg: No edema.             Significant Labs:   TSH:   Recent Labs   Lab 09/12/23  1142   TSH 0.879         Significant Imaging: I have reviewed all pertinent imaging results/findings within the past 24 hours.      Assessment/Plan:      * Comfort measures only status  Symptom Assessment:  Overall, patient continues to have ongoing pain and dyspnea requiring IV titration.     - Pain: controlled  - Dyspnea: controlled  - Agitation: controlled  - Mentation: not interactive    Current Medications:    No current facility-administered medications for this encounter.  No current outpatient medications on file.    Facility-Administered Medications Ordered in Other Encounters:     acetaminophen tablet 650 mg, 650 mg, Oral, Q4H PRN, Hattie Purcell MD    glycopyrrolate injection 0.1 mg, 0.1 mg, Intravenous, TID PRN, Matt Taveras MD    LORazepam injection 1 mg, 1 mg, Intravenous, Q4H PRN, Matt Taveras MD    morphine 100 mg (1 mg/mL) in NACL 100 mL infusion (TITRATING), 0-10 mg/hr, Intravenous, Continuous, Tim Man MD, Last Rate: 1 mL/hr at 09/20/23 1454, 1 mg/hr at 09/20/23 1454    morphine injection 4 mg, 4 mg, Intravenous, Q4H PRN, Matt Taveras MD, 4 mg at 09/20/23 0632    ondansetron injection 4 mg, 4 mg, Intravenous, Q8H PRN, Hattie Purcell MD    prochlorperazine injection Soln 5 mg, 5 mg, Intravenous, Q6H PRN,  Hattie Purcell MD    sodium chloride 0.9% flush 10 mL, 10 mL, Intravenous, Q12H PRN, Hattie Purcell MD      Family discussion updates:  See prior documentation    Plan of care:  Morphine gtt for dyspnea and pain + prns as noted in the MAR    Follow up plans:   - Symptomatic condition is stabilized.   - Death is imminent within a short period of time as evidenced by clinical deterioration such as mottling of skin, respiratory status change, and level of consciousness which are not conducive to medical transport.  - Hospice is working diligently to develop and provide a plan for safe discharge to a lower level of care.       Advanced Directives::  [place]: Yes  Do Not Resuscitate Status: Yes  Surrogate Decision maker / Medical Power of :daughter Génesis Araiza ([phone]) and son Ari Ortiz ([phone])      VTE Risk Mitigation (From admission, onward)         Ordered     IP VTE HIGH RISK PATIENT  Once         09/20/23 1613     Place sequential compression device  Until discontinued         09/20/23 1613                Discharge Planning   FRANCESCA: 9/22/2023     Code Status: DNR   Is the patient medically ready for discharge?: No    Reason for patient still in hospital (select all that apply): Patient trending condition and Laboratory test  Discharge Plan A: Inpatient Hospice            Time spent in care of patient: > 35 minutes         Mk Coates MD  Department of Hospital Medicine   [facility] - Intensive Care ([place])

## 2023-09-22 NOTE — SUBJECTIVE & OBJECTIVE
Interval History: Patient lying in bed, no acute distress. Unresponsive.       Review of Systems   Unable to perform ROS: Patient unresponsive     Objective:     Vital Signs (Most Recent):  Temp: 97.4 °F (36.3 °C) (09/21/23 0815)  Pulse: 88 (09/21/23 0815)  Resp: 12 (09/21/23 0815)  BP: (!) 62/32 (09/21/23 0815)  SpO2: (!) 80 % (09/21/23 0815) Vital Signs (24h Range):  Temp:  [97 °F (36.1 °C)-97.4 °F (36.3 °C)] 97.4 °F (36.3 °C)  Pulse:  [82-88] 88  Resp:  [12-15] 12  SpO2:  [80 %-97 %] 80 %  BP: (62-67)/(32-41) 62/32        There is no height or weight on file to calculate BMI.    Intake/Output Summary (Last 24 hours) at 9/21/2023 1935  Last data filed at 9/21/2023 1842  Gross per 24 hour   Intake 27.79 ml   Output --   Net 27.79 ml         Physical Exam  Constitutional:       Appearance: She is ill-appearing.   HENT:      Mouth/Throat:      Mouth: Mucous membranes are dry.   Cardiovascular:      Rate and Rhythm: Tachycardia present. Rhythm regularly irregular.   Pulmonary:      Effort: No respiratory distress.   Abdominal:      General: There is no distension.      Palpations: Abdomen is soft.   Musculoskeletal:      Right lower leg: No edema.      Left lower leg: No edema.             Significant Labs:   TSH:   Recent Labs   Lab 09/12/23  1142   TSH 0.879         Significant Imaging: I have reviewed all pertinent imaging results/findings within the past 24 hours.

## 2023-10-10 NOTE — HOSPITAL COURSE
Unresponsive. Resting peacefully. On moprhine drip. No acute events. She was seen by inpatient hospice services until she passed away.

## 2023-10-10 NOTE — DISCHARGE SUMMARY
[facility] - [facility] ([place] [address])  [facility] Medicine  Discharge Summary      Patient Name: Gisselle Ortiz  MRN: [id number]  [place]AD[place]: [id number]  Patient Class: IP- Hospice  Admission Date: 9/20/2023  Hospital Length of Stay: 2 days  Discharge Date and Time: 9/22/2023  7:36 PM  Attending Physician: Katie att. providers found   Discharging Provider: Mk Coates MD  Primary Care Provider: Kai Montez MD  [facility] Medicine Team: [facility] Mk Coates MD  Primary Care Team: [facility] O    HPI:   89yoF w hx including HFpEF, atrial fibrillation, sick sinus syndrome s/p PPM, HTN, CKD3b, hypothyroidism, recurrent UTI, debility, severe protein calorie malnutrition who was admitted from [facility] to [facility] for acute encephalopathy and sepsis 2/2 UTI, complicated by atrial fibrillation with RVR. Despite appropriate medical intervention, Ms. Ortiz continues to decline. Transitioned to comfort focused care with original plans to return to assisted living facility with hospice support, which was discontinued due to fears of transfer that may cause Ms. Ortiz to pass en route. Palliative and Supportive Care was consulted to assist with hospice discussion. On 9/20/23, patient having longer apneic episodes and no longer responsive to voice or touch. Prognosis is likely hours, maybe up to a day or two. Discussed with family, pt transferred to hospice floor.       * No surgery found *      Hospital Course:   Unresponsive. Resting peacefully. On moprhine drip. No acute events. She was seen by inpatient hospice services until she passed away.         Goals of Care Treatment Preferences:  Code Status: DNR    Health care agent: Génesis [facility] agent number: [phone]    Living Will: Yes     What is most important right now is to focus on comfort and QOL .  Accordingly, we have decided that the best plan to meet the patient's goals includes pivot to  comfort-focused care.      Consults:     No new Assessment & Plan notes have been filed under this hospital service since the last note was generated.  Service: Hospital Medicine    Final Active Diagnoses:    Diagnosis Date Noted POA    PRINCIPAL PROBLEM:  Comfort measures only status [Z51.5] 2023 Not Applicable      Problems Resolved During this Admission:       Discharged Condition:     Disposition:  in Medical Facil*    Follow Up:    Patient Instructions:   No discharge procedures on file.    Significant Diagnostic Studies: Labs:     CBC:     Latest Reference Range & Units 23 05:18   WBC 3.90 - 12.70 K/uL 11.03   RBC 4.00 - 5.40 M/uL 3.32 (L)   Hemoglobin 12.0 - 16.0 g/dL 10.1 (L)   Hematocrit 37.0 - 48.5 % 34.5 (L)   MCV 82 - 98 fL 104 (H)   MCH 27.0 - 31.0 pg 30.4   MCHC 32.0 - 36.0 g/dL 29.3 (L)   RDW 11.5 - 14.5 % 14.7 (H)   Platelet Count 150 - 450 K/uL 231   (L): Data is abnormally low  (H): Data is abnormally high    CMP:     Latest Reference Range & Units 23 05:18   Sodium 136 - 145 mmol/L 143   Potassium 3.5 - 5.1 mmol/L 3.8   Chloride 95 - 110 mmol/L 116 (H)   CO2 23 - 29 mmol/L 12 (L)   Anion Gap 8 - 16 mmol/L 15   BUN 8 - 23 mg/dL 41 (H)   Creatinine 0.5 - 1.4 mg/dL 1.8 (H)   eGFR >60 mL/min/1.73 m^2 26.4 !   Glucose 70 - 110 mg/dL 57 (L)   Calcium 8.7 - 10.5 mg/dL 7.5 (L)   Magnesium  1.6 - 2.6 mg/dL 2.1   ALP 55 - 135 U/L 125   PROTEIN TOTAL 6.0 - 8.4 g/dL 4.7 (L)   Albumin 3.5 - 5.2 g/dL 1.5 (L)   BILIRUBIN TOTAL 0.1 - 1.0 mg/dL 0.6   AST 10 - 40 U/L 35   ALT 10 - 44 U/L 27   (H): Data is abnormally high  (L): Data is abnormally low  !: Data is abnormal    Microbiology:   Microbiology Results (last 7 days)     ** No results found for the last 168 hours. **            Pending Diagnostic Studies:     None         Microbiology Results (last 7 days)     ** No results found for the last 168 hours. **          Medications:  Reconciled Home Medications:      Medication  List      You have not been prescribed any medications.         Indwelling Lines/Drains at time of discharge:   Lines/Drains/Airways     None                 Time spent on the discharge of patient: 45 minutes         Mk Coates MD  Department of Hospital Medicine  [facility] - Intensive Care ([place])

## 2024-08-02 NOTE — ASSESSMENT & PLAN NOTE
Hx of persistent atrial fibrillation on apixaban 2.5 mg BID for anticoagulation, amiodarone 200 mg daily. Previously was on atenolol which was d/c'ed on last admission and not restarted while outpatient as pt lost to follow-up. While in the ED, pt normotensive but remained in a fib with RVR despite 3L total IVF resuscitation.     --lovenox for thromboembolism prophylaxis 2/2 A Fib, as pt unable to tolerate PO  --digoxin started due to pt with possible sepsis, not a candidate for IV metoprolol or diltiazem   --previously on amio without success   --digoxin 2 mcg/kg now, followed by two 1 mcg/kg doses Q6H (load completed)   --will assess response and consider PO maintenance dose if mentation allows    --evaluated by SLP for swallow study, appreciate recs  --concern for digoxin toxicity given MARIO, hypokalemia, and amio use   --serum digoxin level after load 0.5   --digoxin 0.0625 mg IV every other day, dosing per pharmacy starting today 09/15/23  --will monitor clinical status closely   [FreeTextEntry1] : I, Mitra Estrada (scribe) assisted in filling out this chart under the dictation of Weston Dominguez on 08/01/2024.

## (undated) DEVICE — PACK PACER PERMANENT

## (undated) DEVICE — ADHESIVE DERMABOND ADVANCED

## (undated) DEVICE — ELECTRODE POLYHESIVEPRE-ATTACH

## (undated) DEVICE — PAD DEFIB CADENCE ADULT R2

## (undated) DEVICE — DRESSING ADH COVADERM PLUS 4X4

## (undated) DEVICE — BLADE PLASMA WIDE SPATULA TIP